# Patient Record
Sex: FEMALE | Race: WHITE | NOT HISPANIC OR LATINO | Employment: PART TIME | URBAN - METROPOLITAN AREA
[De-identification: names, ages, dates, MRNs, and addresses within clinical notes are randomized per-mention and may not be internally consistent; named-entity substitution may affect disease eponyms.]

---

## 2017-01-02 ENCOUNTER — MYC MEDICAL ADVICE (OUTPATIENT)
Dept: ORTHOPEDICS | Facility: CLINIC | Age: 22
End: 2017-01-02

## 2017-01-02 NOTE — TELEPHONE ENCOUNTER
Attempted to call patient, her phone number got disconnected.     Called patient's mom, who helped schedule patient with Dr. Coyle on Wednesday January 4th. Patient will callback to reschedule if the appointment time doesn't work for her.

## 2017-01-02 NOTE — TELEPHONE ENCOUNTER
----- Message from Edmond Smith MD sent at 1/2/2017 10:51 AM CST -----  Please help Mamie make an appt with [facility]--Sonny or Leonides, rizwana available. L knee pain hx of CF and meniscal tear and menisecal repair. Small meniscal tear. Unclear if pain is intra-articular.    Edmond Smith MD CAQ

## 2017-01-05 ENCOUNTER — RECORDS - HEALTHEAST (OUTPATIENT)
Dept: ADMINISTRATIVE | Facility: OTHER | Age: 22
End: 2017-01-05

## 2017-01-05 ENCOUNTER — OFFICE VISIT (OUTPATIENT)
Dept: OTOLARYNGOLOGY | Facility: CLINIC | Age: 22
End: 2017-01-05

## 2017-01-05 DIAGNOSIS — J32.0 CHRONIC MAXILLARY SINUSITIS: Primary | ICD-10-CM

## 2017-01-05 ASSESSMENT — PAIN SCALES - GENERAL: PAINLEVEL: MILD PAIN (2)

## 2017-01-05 NOTE — PATIENT INSTRUCTIONS
Plan of care:  Follow up with Dr Haile as needed  Clinic contact information:  1. To schedule an appointment call [phone], option 1  2. To talk to the Triage RN call [phone], option 3  3. If you need to speak to Ellie or get a message to your doctor on a Friday, call the triage RN  4. EllieRN: [phone]  5. Surgery scheduling:      Mahi Canela: [phone]      Brisa Aparicio: [phone]  6. Fax: [phone]

## 2017-01-05 NOTE — NURSING NOTE
Chief Complaint   Patient presents with     RECHECK     chronic maxillary sinus     Andie March Medical Assistant

## 2017-01-05 NOTE — Clinical Note
1/5/2017       RE: Mamie Rice  [address] [facility] [address]     Dear Colleague,    Thank you for referring your patient, Mamie Rice, to the [facility] EAR [facility]AT at [facility]. Please see a copy of my visit note below.    HISTORY OF PRESENT ILLNESS:  Mamie Rice returns.  It has been several weeks since I have seen her.  She is developing some nasal congestion and pressure.          PHYSICAL EXAMINATION:  Her nose is examined.  On anterior rhinoscopy, she has dryness throughout the middle meatus and nasal cavities.  No purulence or crusting is noted.  She responds well to  meropenem instillations.      PROCEDURE NOTE:  Therefore, meropenem mixed with saline is then sprayed into the maxillary antrum on both sides under visualization with the rigid endoscope.  She tolerates the procedure very well.      PLAN:  She will return in four weeks for repeat.         Again, thank you for allowing me to participate in the care of your patient.      Sincerely,    Mariela Haile MD

## 2017-01-06 NOTE — PROGRESS NOTES
HISTORY OF PRESENT ILLNESS:  Mamie Rice returns.  It has been several weeks since I have seen her.  She is developing some nasal congestion and pressure.          PHYSICAL EXAMINATION:  Her nose is examined.  On anterior rhinoscopy, she has dryness throughout the middle meatus and nasal cavities.  No purulence or crusting is noted.  She responds well to  meropenem instillations.      PROCEDURE NOTE:  Therefore, meropenem mixed with saline is then sprayed into the maxillary antrum on both sides under visualization with the rigid endoscope.  She tolerates the procedure very well.      PLAN:  She will return in four weeks for repeat.

## 2017-01-11 ENCOUNTER — RECORDS - HEALTHEAST (OUTPATIENT)
Dept: ADMINISTRATIVE | Facility: OTHER | Age: 22
End: 2017-01-11

## 2017-01-11 ENCOUNTER — OFFICE VISIT (OUTPATIENT)
Dept: ORTHOPEDICS | Facility: CLINIC | Age: 22
End: 2017-01-11

## 2017-01-11 VITALS — BODY MASS INDEX: 34.36 KG/M2 | HEIGHT: 61 IN | WEIGHT: 182 LBS

## 2017-01-11 DIAGNOSIS — S83.209A MENISCUS TEAR: Primary | ICD-10-CM

## 2017-01-11 NOTE — NURSING NOTE
Reason For Visit:   Chief Complaint   Patient presents with     Knee left       ?  No  Occupation works in  as paraprofessional and coaches dance.  Currently working? Yes.  Work status?  Part-time.  Date of injury: none  Type of injury: no injury.  Date of surgery: none  Type of surgery: none.  Smoker: No  Request smoking cessation information: No    Pain Assessment  Patient Currently in Pain: Yes  Primary Pain Location: Knee  Pain Orientation: Left

## 2017-01-11 NOTE — Clinical Note
1/11/2017      RE: Mamie Rice  [address]   [place] [facility] [address]       Orthopaedic Surgery PGY-5Consultation  1/11/2017 9:56 AM   by He Smallwood MD    Mamie Rice MRN# [id number]   Age: 21 year old YOB: 1995     Reason for consult: L knee pain       Requesting physician: Edmond Ruiz               Assessment and Plan:   Assessment:   L knee medial meniscus tear on MRI  L knee pain in setting of CF and previous MMT and MMR      Plan:   Discussed treatment options, risks, benefits, and alternatives.  Specifically discussed risks of nerve or vessel damage, infection, bleeding, and need for removal of hardware. Also discussed risks of anesthesia including heart and lung damage and even death.    - At this time, patient would like to proceed with arthroscopy and potential revision repair meniscus vs partial menisectomy              History of Present Illness:   21 year old female with 12mo of L knee pain after no acute injury or instigating event.  Has tried to treat her pain with corticosteroid injection, therapy, bracing, and rest with limited success.  Pain is the symptom, no problems with instability.  Past medial and surgical history significant for cystic fibrosis, DM1, hip arthroscopy, and previous L knee medial meniscus tear and subsequent surgical repair.  Her B12 supplementation has resulted in greatly improved sensorium in her extremities.    Relevant Surgeries Include:  2006 - L MMR by Dr. Dodge  2009 - L knee scar tissue excision by Dr. Dodge          Past Medical History:     Patient Active Problem List   Diagnosis     Hip joint pain     Aspergillosis (H)     Chronic maxillary sinusitis     Hypoglycemia     Type 1 diabetes mellitus (H)     Cystic fibrosis with pulmonary manifestations (H)     Chronic constipation     Frontal sinusitis     Major depression     ADHD (attention deficit hyperactivity disorder)     Back pain     Pancreatic insufficiency     Non morbid  obesity due to excess calories     Acne vulgaris     Left knee pain     Cystic fibrosis (H)     Insomnia     Major depressive disorder with single episode     Past Medical History   Diagnosis Date     Cystic fibrosis without mention of meconium ileus      SWEAT TEST:Date: 2/17/1994   [facility]  296 mg, 104 mmol/L ClSample #2  295 mg, 104 mmol/L Cl GENOTYPING:Date: 10/15/2007,  Laboratory: [facility]: [id number]     Chronic sinusitis      Aspergillosis, with pneumonia (H)      fugus found caused chest pain     Exocrine pancreatic insufficiency      Constipation, chronic      MRSA (methicillin resistant Staphylococcus aureus) carrier      Cystic fibrosis with pulmonary manifestations (H) 12/19/2011     Pancreatic disease      Gastro-oesophageal reflux disease      Hip pain, right      Dysthymic disorder      ADHD (attention deficit hyperactivity disorder)      Diabetes      no meds currently     Chronic infection      CF, MRSA.,             Past Surgical History:   Relevant surgical history as mentioned in HPI.  Past Surgical History   Procedure Laterality Date     Bronchoscopies       Meniscus repair       Bronchoscopy       Sinus surgery       Left hip labral tear  5/11/2011     left hip arthroscopy with labral debridement and synovectomy     Optical tracking system endoscopic sinus surgery  10/14/2011     Procedure:OPTICAL TRACKING SYSTEM ENDOSCOPIC SINUS SURGERY; FESS (functional endoscopic sinus surgery) with Image Guidance, bronchial lavage and cultures; Surgeon:JUAN JOSE GARCIA; Location:[facility]     Optical tracking system endoscopic sinus surgery  5/18/2012     Procedure:OPTICAL TRACKING SYSTEM ENDOSCOPIC SINUS SURGERY; Right  and Left Image Guided Functional Endoscopic Sinus Surgery With  Frontal Approach, [employer]; Surgeon:JUAN JOSE GARCIA; Location:[facility]     Optical tracking system endoscopic sinus surgery  9/26/2012     Procedure: OPTICAL TRACKING SYSTEM ENDOSCOPIC SINUS SURGERY;  Stealth  Guided Bilateral Functional Endoscopic Sinus Surgery *Latex Safe*;  Surgeon: Goyo Kuo MD;  [facility]     Hc knee scope, diagnostic       Arthroscopy, Knee- left     Orthopedic surgery       left hip tear repair 2010     Arthroscopy hip, osteoplasty femur proximal, combined  3/11/2013     Procedure: COMBINED ARTHROSCOPY HIP, OSTEOPLASTY FEMUR PROXIMAL;  Right Hip Arthroscopy, Labral  Debridement.    surgeon request choice anesthesia/admit to [facility] after surgery;  Surgeon: Omkar Austin MD;  Location: [facility]     Exam under anesthesia, restorations, extraction(s) dental complex, combined  5/13/2013     Procedure: COMBINED EXAM UNDER ANESTHESIA, RESTORATIONS, EXTRACTION(S) DENTAL COMPLEX;  Dental Exam, Radiographs, Restorations. Single Extraction  Tooth #2. Restorations x 3;  Surgeon: Danilo rOtiz DDS;  Location: [facility] OR     Optical tracking system endoscopic sinus surgery Bilateral 10/16/2015     Procedure: OPTICAL TRACKING SYSTEM ENDOSCOPIC SINUS SURGERY;  Surgeon: Mariela Haile MD;  [facility]     Exam under anesthesia anus N/A 5/10/2016     Procedure: EXAM UNDER ANESTHESIA ANUS;  Surgeon: Chet Gaviria MD;  [facility]     Inject botox N/A 5/10/2016     Procedure: INJECT BOTOX;  Surgeon: Chet Gaviria MD;  [facility]              Social History:     Social History     Social History     Marital Status: Single     Spouse Name: N/A     Number of Children: N/A     Years of Education: N/A     Occupational History     Not on file.     Social History Main Topics     Smoking status: Never Smoker      Smokeless tobacco: Never Used      Comment: one person at home smokes outside     Alcohol Use: No     Drug Use: No     Sexual Activity: No     Other Topics Concern     Blood Transfusions No     Exercise Yes     Social History Narrative    Mamie lives with mother in [place], [place].  There is a cat in the home, but Mamie does not have any litterbox duties.  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  She gets essentially no exercise because of the tingling in her feet (says it bothers her even to stand).        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        8/2015 no change in social situation        2/15/2016 Pt is single and lives with mother and stepfather.     No Smoking, No EtOH, works at [employer]       Family History:     Family History   Problem Relation Age of Onset     Anesthesia Reaction No family hx of      Blood Disease No family hx of      Colon Polyps No family hx of      Crohn Disease No family hx of      Ulcerative Colitis No family hx of      Colon Cancer No family hx of      Melanoma No family hx of      Other Cancer Paternal Grandmother      Skin     CANCER Paternal Grandmother      Skin Cancer Paternal Grandmother      CANCER Paternal Grandfather      PGF had throat cancer (he was a smoker)     No history of problems with bleeding or anesthesia       Allergies:     Allergies   Allergen Reactions     Vancomycin Hives     Redmens skin rash      Vancomycin Rash          Medications:     Current Outpatient Prescriptions   Medication Sig     ciprofloxacin (CIPRO) 750 MG tablet Take 1 tablet (750 mg) by mouth 2 times daily     ascorbic acid (VITAMIN C) 500 MG tablet TAKE ONE TABLET BY MOUTH TWICE A DAY     montelukast (SINGULAIR) 10 MG tablet TAKE ONE TABLET BY MOUTH ONCE DAILY AT BEDTIME     VITAMIN D3 1000 UNITS tablet TAKE ONE TABLET BY MOUTH EVERY DAY     clindamycin (CLEOCIN T) 1 % lotion Apply topically every morning After washing face with benzoyl peroxide wash     adapalene (DIFFERIN) 0.1 % cream Apply topically At Bedtime After washing face     LORATADINE PO Take 10 mg by mouth     phentermine 15 MG capsule Take 1 capsule (15 mg) by mouth every morning     albuterol (2.5 MG/3ML) 0.083% nebulizer solution Take 1 vial (2.5 mg) by nebulization 4 times daily      omeprazole (PRILOSEC) 20 MG capsule TAKE 1 CAPSULE BY MOUTH TWICE A DAY     vitamin E 400 UNIT capsule TAKE 1 CAPSULE BY MOUTH TWICE A DAY     azithromycin (ZITHROMAX) 500 MG tablet Take 1 tablet (500 mg) by mouth daily M-W-F     beta carotene 63906 UNIT capsule Take 1 capsule (25,000 Units) by mouth Every Mon, Wed, Fri Morning     methylcellulose, laxative, (CITRUCEL) POWD Start with 1 heaping tablespoon. Increase as needed, 1 heaping tablespoon at a time, up to 3 times per day.     GLYCOPYRROLATE PO Take 1 mg by mouth 2 times daily      TRAZODONE HCL PO Take 100 mg by mouth At Bedtime      phytonadione (MEPHYTON) 5 MG tablet Take 1 tablet (5 mg) by mouth once a week     acetylcysteine (MUCOMYST) 20 % nebulizer solution Nebulize 4 mLs into the lungs 2 times daily. May increase to 3-4 times daily with increased cough/cold symptoms     cyanocobalamin (VITAMIN B12) 1000 MCG/ML injection Inject 1 mL into the muscle every 30 days      blood glucose (ACCU-CHEK SMARTVIEW) test strip Use to test blood sugar 4 times daily or as directed.     albuterol (PROAIR HFA, PROVENTIL HFA, VENTOLIN HFA) 108 (90 BASE) MCG/ACT inhaler Inhale 2 puffs into the lungs every 6 hours as needed for shortness of breath / dyspnea or wheezing     meropenem (MERREM) 500 MG injection 500 mg by Nasal Instillation route as needed      FLUoxetine (PROZAC) 40 MG capsule Take 80 mg by mouth daily.     Amphetamine-Dextroamphetamine (ADDERALL PO) Take 20 mg by mouth daily.     Leuprolide Acetate (LUPRON DEPOT IM) Inject  into the muscle every 3 months.     Multiple Vitamin (MULTIVITAMIN OR) Take 1 tablet by mouth daily.     No current facility-administered medications for this visit.     Facility-Administered Medications Ordered in Other Visits   Medication     albuterol (PROAIR HFA, PROVENTIL HFA, VENTOLIN HFA) inhaler             Review of Systems:   A comprehensive 10 point review of systems (constitutional, ENT, cardiac, peripheral vascular,  lymphatic, respiratory, GI, , Musculoskeletal, skin, Neurological) was performed and found to be negative except as described in this note.           Physical Exam:     EXAMINATION pertinent findings:   VITAL SIGNS: There were no vitals taken for this visit.  There is no weight on file to calculate BMI.  RESP: non labored breathing   CARD: comfortable, no acute distress  ABD: benign   LLE:       trace effusion       ROM 0 to 120       Stable to anterior, posterior, varus, valgus stress, 1A Lachmans       Tender to palpation at medial jointline, less tender at lateral jointline       Wounds healed from previous surgery       Sens: SILT dp/sp/s/s/t       Motor: fires EHL/FHL/TA/GSc       Vasc: wwp          Data:   All imaging studies reviewed by me.  Pertinent Imaging and Lab Review: increased ACL signal intensity with ACL intact.  Posterior horn medial meniscus tear, potential site of previous repair.      Patient seen and discussed with Dr. Leonides Smallwood MD  Orthopaedic Surgery PGY-5  [phone]           Patient seen and examined. Agree with history, physical and imaging as well as Assessment and Plan as detailed by Dr. Smallwood.    Assesment: meniscus tear following meniscus repair, 10 years prior, acl cyst. CF.    Plan: failed nonop management with attempts at injections, PT, time    Offered EUA. Arthroscopy, acl cyst decompression, partial meniscetomy    I discussed with the patient the risks, benefits, complications and techniques of surgery as well as the natural history of meniscus tars and the alternative treatment options.    The risks include, but are not limited to the risk of death and risk of a myocardial infarction, risk of bleeding and a risk of infection, risk of nerve damage and a risk of muscle damage, stiffness, instability, continued pain or worsening pain and retear, continued pain, failure to improve, need for future surgery.    The patient was provided an opportunity to ask  questions and these were answered.      I was present with the resident during the history and exam.  I discussed the case with the resident and agree with the findings as documented in the assessment and plan.        Jethro Coyel MD

## 2017-01-11 NOTE — PROGRESS NOTES
Orthopaedic Surgery PGY-5Consultation  1/11/2017 9:56 AM   by He Smallwood MD    Mamie Rice MRN# [id number]   Age: 21 year old YOB: 1995     Reason for consult: L knee pain       Requesting physician: Edmond Ruiz               Assessment and Plan:   Assessment:   L knee medial meniscus tear on MRI  L knee pain in setting of CF and previous MMT and MMR      Plan:   Discussed treatment options, risks, benefits, and alternatives.  Specifically discussed risks of nerve or vessel damage, infection, bleeding, and need for removal of hardware. Also discussed risks of anesthesia including heart and lung damage and even death.    - At this time, patient would like to proceed with arthroscopy and potential revision repair meniscus vs partial menisectomy              History of Present Illness:   21 year old female with 12mo of L knee pain after no acute injury or instigating event.  Has tried to treat her pain with corticosteroid injection, therapy, bracing, and rest with limited success.  Pain is the symptom, no problems with instability.  Past medial and surgical history significant for cystic fibrosis, DM1, hip arthroscopy, and previous L knee medial meniscus tear and subsequent surgical repair.  Her B12 supplementation has resulted in greatly improved sensorium in her extremities.    Relevant Surgeries Include:  2006 - L MMR by Dr. Dodge  2009 - L knee scar tissue excision by Dr. Dodge          Past Medical History:     Patient Active Problem List   Diagnosis     Hip joint pain     Aspergillosis (H)     Chronic maxillary sinusitis     Hypoglycemia     Type 1 diabetes mellitus (H)     Cystic fibrosis with pulmonary manifestations (H)     Chronic constipation     Frontal sinusitis     Major depression     ADHD (attention deficit hyperactivity disorder)     Back pain     Pancreatic insufficiency     Non morbid obesity due to excess calories     Acne vulgaris     Left knee pain     Cystic fibrosis  (H)     Insomnia     Major depressive disorder with single episode     Past Medical History   Diagnosis Date     Cystic fibrosis without mention of meconium ileus      SWEAT TEST:Date: 2/17/1994   [facility]  296 [facility], 104 mmol/L ClSample #2  295 mg, 104 mmol/L Cl GENOTYPING:Date: 10/15/2007,  Laboratory: [facility]: [id number]     Chronic sinusitis      Aspergillosis, with pneumonia (H)      fugus found caused chest pain     Exocrine pancreatic insufficiency      Constipation, chronic      MRSA (methicillin resistant Staphylococcus aureus) carrier      Cystic fibrosis with pulmonary manifestations (H) 12/19/2011     Pancreatic disease      Gastro-oesophageal reflux disease      Hip pain, right      Dysthymic disorder      ADHD (attention deficit hyperactivity disorder)      Diabetes      no meds currently     Chronic infection      CF, MRSA.,             Past Surgical History:   Relevant surgical history as mentioned in HPI.  Past Surgical History   Procedure Laterality Date     Bronchoscopies       Meniscus repair       Bronchoscopy       Sinus surgery       Left hip labral tear  5/11/2011     left hip arthroscopy with labral debridement and synovectomy     Optical tracking system endoscopic sinus surgery  10/14/2011     Procedure:OPTICAL TRACKING SYSTEM ENDOSCOPIC SINUS SURGERY; FESS (functional endoscopic sinus surgery) with Image Guidance, bronchial lavage and cultures; Surgeon:JUAN JOSE GARCIA; Location:[facility]     Optical tracking system endoscopic sinus surgery  5/18/2012     Procedure:OPTICAL TRACKING SYSTEM ENDOSCOPIC SINUS SURGERY; Right  and Left Image Guided Functional Endoscopic Sinus Surgery With  Frontal Approach, [employer]; Surgeon:JUAN JOSE GARCIA; Location:[facility]     Optical tracking system endoscopic sinus surgery  9/26/2012     Procedure: OPTICAL TRACKING SYSTEM ENDOSCOPIC SINUS SURGERY;  Stealth Guided Bilateral Functional Endoscopic Sinus Surgery *[employer] Safe*;  Surgeon: Ayaan  MD Goyo;  Location: [facility]     Hc knee scope, diagnostic       Arthroscopy, Knee- left     Orthopedic surgery       left hip tear repair 2010     Arthroscopy hip, osteoplasty femur proximal, combined  3/11/2013     Procedure: COMBINED ARTHROSCOPY HIP, OSTEOPLASTY FEMUR PROXIMAL;  Right Hip Arthroscopy, Labral  Debridement.    surgeon request choice anesthesia/admit to [facility] after surgery;  Surgeon: Omkar Austin MD;  Location: [facility]     Exam under anesthesia, restorations, extraction(s) dental complex, combined  5/13/2013     Procedure: COMBINED EXAM UNDER ANESTHESIA, RESTORATIONS, EXTRACTION(S) DENTAL COMPLEX;  Dental Exam, Radiographs, Restorations. Single Extraction  Tooth #2. Restorations x 3;  Surgeon: Danilo Ortiz DDS;  Location: [facility]     Optical tracking system endoscopic sinus surgery Bilateral 10/16/2015     Procedure: OPTICAL TRACKING SYSTEM ENDOSCOPIC SINUS SURGERY;  Surgeon: Mariela Haile MD;  Location: [facility]     Exam under anesthesia anus N/A 5/10/2016     Procedure: EXAM UNDER ANESTHESIA ANUS;  Surgeon: Chet Gaviria MD;  [facility]: [facility]     Inject botox N/A 5/10/2016     Procedure: INJECT BOTOX;  Surgeon: Chet Gaviria MD;  Location: [facility]              Social History:     Social History     Social History     Marital Status: Single     Spouse Name: N/A     Number of Children: N/A     Years of Education: N/A     Occupational History     Not on file.     Social History Main Topics     Smoking status: Never Smoker      Smokeless tobacco: Never Used      Comment: one person at home smokes outside     Alcohol Use: No     Drug Use: No     Sexual Activity: No     Other Topics Concern     Blood Transfusions No     Exercise Yes     Social History Narrative    Mamie lives with mother in [place], [place].  There is a cat in the home, but Mamie does not have any litterbox duties.  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  She gets essentially no exercise because  of the tingling in her feet (says it bothers her even to stand).        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        8/2015 no change in social situation        2/15/2016 Pt is single and lives with mother and stepfather.     No Smoking, No EtOH, works at [employer]       Family History:     Family History   Problem Relation Age of Onset     Anesthesia Reaction No family hx of      Blood Disease No family hx of      Colon Polyps No family hx of      Crohn Disease No family hx of      Ulcerative Colitis No family hx of      Colon Cancer No family hx of      Melanoma No family hx of      Other Cancer Paternal Grandmother      Skin     CANCER Paternal Grandmother      Skin Cancer Paternal Grandmother      CANCER Paternal Grandfather      PGF had throat cancer (he was a smoker)     No history of problems with bleeding or anesthesia       Allergies:     Allergies   Allergen Reactions     Vancomycin Hives     Redmens skin rash      Vancomycin Rash          Medications:     Current Outpatient Prescriptions   Medication Sig     ciprofloxacin (CIPRO) 750 MG tablet Take 1 tablet (750 mg) by mouth 2 times daily     ascorbic acid (VITAMIN C) 500 MG tablet TAKE ONE TABLET BY MOUTH TWICE A DAY     montelukast (SINGULAIR) 10 MG tablet TAKE ONE TABLET BY MOUTH ONCE DAILY AT BEDTIME     VITAMIN D3 1000 UNITS tablet TAKE ONE TABLET BY MOUTH EVERY DAY     clindamycin (CLEOCIN T) 1 % lotion Apply topically every morning After washing face with benzoyl peroxide wash     adapalene (DIFFERIN) 0.1 % cream Apply topically At Bedtime After washing face     LORATADINE PO Take 10 mg by mouth     phentermine 15 MG capsule Take 1 capsule (15 mg) by mouth every morning     albuterol (2.5 MG/3ML) 0.083% nebulizer solution Take 1 vial (2.5 mg) by nebulization 4 times daily     omeprazole (PRILOSEC) 20 MG capsule TAKE 1 CAPSULE BY MOUTH TWICE A DAY     vitamin E  400 UNIT capsule TAKE 1 CAPSULE BY MOUTH TWICE A DAY     azithromycin (ZITHROMAX) 500 MG tablet Take 1 tablet (500 mg) by mouth daily M-W-F     beta carotene 35254 UNIT capsule Take 1 capsule (25,000 Units) by mouth Every Mon, Wed, Fri Morning     methylcellulose, laxative, (CITRUCEL) POWD Start with 1 heaping tablespoon. Increase as needed, 1 heaping tablespoon at a time, up to 3 times per day.     GLYCOPYRROLATE PO Take 1 mg by mouth 2 times daily      TRAZODONE HCL PO Take 100 mg by mouth At Bedtime      phytonadione (MEPHYTON) 5 MG tablet Take 1 tablet (5 mg) by mouth once a week     acetylcysteine (MUCOMYST) 20 % nebulizer solution Nebulize 4 mLs into the lungs 2 times daily. May increase to 3-4 times daily with increased cough/cold symptoms     cyanocobalamin (VITAMIN B12) 1000 MCG/ML injection Inject 1 mL into the muscle every 30 days      blood glucose (ACCU-CHEK SMARTVIEW) test strip Use to test blood sugar 4 times daily or as directed.     albuterol (PROAIR HFA, PROVENTIL HFA, VENTOLIN HFA) 108 (90 BASE) MCG/ACT inhaler Inhale 2 puffs into the lungs every 6 hours as needed for shortness of breath / dyspnea or wheezing     meropenem (MERREM) 500 MG injection 500 mg by Nasal Instillation route as needed      FLUoxetine (PROZAC) 40 MG capsule Take 80 mg by mouth daily.     Amphetamine-Dextroamphetamine (ADDERALL PO) Take 20 mg by mouth daily.     Leuprolide Acetate (LUPRON DEPOT IM) Inject  into the muscle every 3 months.     Multiple Vitamin (MULTIVITAMIN OR) Take 1 tablet by mouth daily.     No current facility-administered medications for this visit.     Facility-Administered Medications Ordered in Other Visits   Medication     albuterol (PROAIR HFA, PROVENTIL HFA, VENTOLIN HFA) inhaler             Review of Systems:   A comprehensive 10 point review of systems (constitutional, ENT, cardiac, peripheral vascular, lymphatic, respiratory, GI, , Musculoskeletal, skin, Neurological) was performed and found to  be negative except as described in this note.           Physical Exam:     EXAMINATION pertinent findings:   VITAL SIGNS: There were no vitals taken for this visit.  There is no weight on file to calculate BMI.  RESP: non labored breathing   CARD: comfortable, no acute distress  ABD: benign   LLE:       trace effusion       ROM 0 to 120       Stable to anterior, posterior, varus, valgus stress, 1A Lachmans       Tender to palpation at medial jointline, less tender at lateral jointline       Wounds healed from previous surgery       Sens: SILT dp/sp/s/s/t       Motor: fires EHL/FHL/TA/GSc       Vasc: wwp          Data:   All imaging studies reviewed by me.  Pertinent Imaging and Lab Review: increased ACL signal intensity with ACL intact.  Posterior horn medial meniscus tear, potential site of previous repair.      Patient seen and discussed with Dr. Leonides Smallwood MD  Orthopaedic Surgery PGY-5  [phone]

## 2017-01-11 NOTE — NURSING NOTE
Teaching Flowsheet   Relevant Diagnosis: left knee EUA, arthroscopy, partial meniscectomy   Teaching Topic: pre and post operative care     Person(s) involved in teaching:   Patient     Motivation Level:  Asks Questions: Yes  Eager to Learn: Yes  Cooperative: Yes  Receptive (willing/able to accept information): Yes  Any cultural factors/Islam beliefs that may influence understanding or compliance? NA  Comments: NA     Patient demonstrates understanding of the following:  Reason for the appointment, diagnosis and treatment plan: Yes  Knowledge of proper use of medications and conditions for which they are ordered (with special attention to potential side effects or drug interactions): Yes  Which situations necessitate calling provider and whom to contact: Yes     Teaching Concerns Addressed:   Comments: pre and post operative care     Proper use and care of crutches/brace (medical equip, care aids, etc.): NA  Nutritional needs and diet plan: Yes  Pain management techniques: NA  Wound Care: Yes  How and/when to access community resources: NA     Instructional Materials Used/Given: verbal and written instruction given    *patient has CF- will communicate with her physician that manages to be cleared in addition to her PCP.  *patient has history of MRSA in lungs- does not remember when or how treated, has had negative tests since. Dr. Coyle said ok with no issue  *patient has diabetes- well controlled, does not take insulin. Will check blood sugar and will talk to PCP about surgery and needs to manage.

## 2017-01-11 NOTE — PROGRESS NOTES
Patient seen and examined. Agree with history, physical and imaging as well as Assessment and Plan as detailed by Dr. Smallwood.    Assesment: meniscus tear following meniscus repair, 10 years prior, acl cyst. CF.    Plan: failed nonop management with attempts at injections, PT, time    Offered EUA. Arthroscopy, acl cyst decompression, partial meniscetomy    I discussed with the patient the risks, benefits, complications and techniques of surgery as well as the natural history of meniscus tars and the alternative treatment options.    The risks include, but are not limited to the risk of death and risk of a myocardial infarction, risk of bleeding and a risk of infection, risk of nerve damage and a risk of muscle damage, stiffness, instability, continued pain or worsening pain and retear, continued pain, failure to improve, need for future surgery.    The patient was provided an opportunity to ask questions and these were answered.      I was present with the resident during the history and exam.  I discussed the case with the resident and agree with the findings as documented in the assessment and plan.

## 2017-01-12 ENCOUNTER — AMBULATORY - HEALTHEAST (OUTPATIENT)
Dept: HEALTH INFORMATION MANAGEMENT | Facility: CLINIC | Age: 22
End: 2017-01-12

## 2017-01-19 ENCOUNTER — CARE COORDINATION (OUTPATIENT)
Dept: PULMONOLOGY | Facility: CLINIC | Age: 22
End: 2017-01-19

## 2017-01-19 NOTE — PROGRESS NOTES
Pt called per Dr. Allison request to remind her to do 4 vest therapies for 1-2 days before her arthroscopic knee surgery 1/31/17. Advised to call the CF office if she needs anything or has any questions.

## 2017-01-22 ASSESSMENT — ENCOUNTER SYMPTOMS
MYALGIAS: 1
NECK PAIN: 0
DECREASED CONCENTRATION: 1
SINUS PAIN: 1
NAIL CHANGES: 0
SORE THROAT: 1
BACK PAIN: 0
NERVOUS/ANXIOUS: 1
SKIN CHANGES: 0
SINUS CONGESTION: 1
STIFFNESS: 0
DEPRESSION: 1
PANIC: 0
ARTHRALGIAS: 1
NECK MASS: 0
TROUBLE SWALLOWING: 0
POOR WOUND HEALING: 0
SMELL DISTURBANCE: 0
MUSCLE WEAKNESS: 0
JOINT SWELLING: 0
INSOMNIA: 1
TASTE DISTURBANCE: 1
MUSCLE CRAMPS: 0
HOARSE VOICE: 1

## 2017-01-23 ENCOUNTER — OFFICE VISIT (OUTPATIENT)
Dept: ENDOCRINOLOGY | Facility: CLINIC | Age: 22
End: 2017-01-23

## 2017-01-23 ENCOUNTER — OFFICE VISIT (OUTPATIENT)
Dept: DERMATOLOGY | Facility: CLINIC | Age: 22
End: 2017-01-23

## 2017-01-23 VITALS
SYSTOLIC BLOOD PRESSURE: 124 MMHG | OXYGEN SATURATION: 98 % | WEIGHT: 181.6 LBS | HEIGHT: 61 IN | HEART RATE: 100 BPM | BODY MASS INDEX: 34.29 KG/M2 | DIASTOLIC BLOOD PRESSURE: 81 MMHG

## 2017-01-23 DIAGNOSIS — E66.09 NON MORBID OBESITY DUE TO EXCESS CALORIES: Primary | ICD-10-CM

## 2017-01-23 DIAGNOSIS — L70.0 ACNE VULGARIS: Primary | ICD-10-CM

## 2017-01-23 RX ORDER — BUPROPION HYDROCHLORIDE 150 MG/1
150 TABLET, EXTENDED RELEASE ORAL 2 TIMES DAILY
COMMUNITY
End: 2018-02-13

## 2017-01-23 RX ORDER — SPIRONOLACTONE 25 MG/1
25 TABLET ORAL DAILY
Qty: 60 TABLET | Refills: 3 | Status: SHIPPED | OUTPATIENT
Start: 2017-01-23 | End: 2017-08-02

## 2017-01-23 RX ORDER — PHENTERMINE HYDROCHLORIDE 15 MG/1
30 CAPSULE ORAL EVERY MORNING
Qty: 30 CAPSULE | Refills: 5 | Status: SHIPPED | OUTPATIENT
Start: 2017-01-23 | End: 2017-02-13

## 2017-01-23 ASSESSMENT — PAIN SCALES - GENERAL: PAINLEVEL: NO PAIN (0)

## 2017-01-23 NOTE — Clinical Note
1/23/2017       RE: Mamie Rice  [address] [facility] [address]     Dear Colleague,    Thank you for referring your patient, Mamie Rice, to the [facility] DERMATOLOGY at [facility]. Please see a copy of my visit note below.    [facility] Dermatology Note    Dermatology Problem List:  1. Acne vulgaris s/p benzoyl peroxide facial wash, stopped due to skin irritation  - clindamycin 1% lotion, adapalene 0.1% cream, spironolactone 25 mg - can increase to 50 mg if tolerated well  2. CF  3. DM Type II    Encounter Date: Jan 23, 2017    CC:   Chief Complaint   Patient presents with     Derm Problem     Mamie is here for acne. Mamie states her acne has not improved     History of Present Illness:  This 21 year old female presents as a follow up for acne. The patient was last seen on 10/19/16 when she started benzoyl peroxide facial wash, clindamycin 1% lotion, and adapalene 0.1% cream. Today the patient reports that the acne has not improved, but it has not worsened. She notes that the benzoyl peroxide facial wash burned her face due to her sensitive skin. She also reports that her face is pruritic. She notes that her CF is doing well and she has knee surgery next week. The patient reports no other lesions of concern at this time.     Otherwise, the patient reports no painful, bleeding, nonhealing, or pruritic lesions, and denies new or changing moles.     Past Medical History:   Patient Active Problem List   Diagnosis     Hip joint pain     Aspergillosis (H)     Chronic maxillary sinusitis     Hypoglycemia     Type 1 diabetes mellitus (H)     Cystic fibrosis with pulmonary manifestations (H)     Chronic constipation     Frontal sinusitis     Major depression     ADHD (attention deficit hyperactivity disorder)     Back pain     Pancreatic insufficiency     Non morbid obesity due to excess calories     Acne vulgaris     Left knee pain     Cystic  fibrosis (H)     Insomnia     Major depressive disorder with single episode     Past Medical History   Diagnosis Date     Cystic fibrosis without mention of meconium ileus      SWEAT TEST:Date: 2/17/1994   [facility]  296 mg, 104 mmol/L ClSample #2  295 mg, 104 mmol/L Cl GENOTYPING:Date: 10/15/2007,  Laboratory: [facility]: [id number]     Chronic sinusitis      Aspergillosis, with pneumonia (H)      fugus found caused chest pain     Exocrine pancreatic insufficiency      Constipation, chronic      MRSA (methicillin resistant Staphylococcus aureus) carrier      Cystic fibrosis with pulmonary manifestations (H) 12/19/2011     Pancreatic disease      Gastro-oesophageal reflux disease      Hip pain, right      Dysthymic disorder      ADHD (attention deficit hyperactivity disorder)      Diabetes      no meds currently     Chronic infection      CF, MRSA.,      Depressive disorder      Past Surgical History   Procedure Laterality Date     Bronchoscopies       Meniscus repair       Bronchoscopy       Sinus surgery       Left hip labral tear  5/11/2011     left hip arthroscopy with labral debridement and synovectomy     Optical tracking system endoscopic sinus surgery  10/14/2011     Procedure:OPTICAL TRACKING SYSTEM ENDOSCOPIC SINUS SURGERY; FESS (functional endoscopic sinus surgery) with Image Guidance, bronchial lavage and cultures; Surgeon:GOYO KUO; Location:[facility] OR     Optical tracking system endoscopic sinus surgery  5/18/2012     Procedure:OPTICAL TRACKING SYSTEM ENDOSCOPIC SINUS SURGERY; Right  and Left Image Guided Functional Endoscopic Sinus Surgery With  Frontal Approach, [employer]; Surgeon:GOYO KUO; Location:[facility] OR     Optical tracking system endoscopic sinus surgery  9/26/2012     Procedure: OPTICAL TRACKING SYSTEM ENDOSCOPIC SINUS SURGERY;  Stealth Guided Bilateral Functional Endoscopic Sinus Surgery *Latex Safe*;  Surgeon: Goyo Kuo MD;  [facility]      knee scope,  diagnostic       Arthroscopy, Knee- left     Orthopedic surgery       left hip tear repair 2010     Arthroscopy hip, osteoplasty femur proximal, combined  3/11/2013     Procedure: COMBINED ARTHROSCOPY HIP, OSTEOPLASTY FEMUR PROXIMAL;  Right Hip Arthroscopy, Labral  Debridement.    surgeon request choice anesthesia/admit to [facility] after surgery;  Surgeon: Omkar Austin MD;  Location: [facility] OR     Exam under anesthesia, restorations, extraction(s) dental complex, combined  5/13/2013     Procedure: COMBINED EXAM UNDER ANESTHESIA, RESTORATIONS, EXTRACTION(S) DENTAL COMPLEX;  Dental Exam, Radiographs, Restorations. Single Extraction  Tooth #2. 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 Former use of tanning beds (high school).     Family History:  There is a family history of presumed melanoma in the patient's grandmother.    Medications:  Current Outpatient Prescriptions   Medication Sig Dispense Refill     MedroxyPROGESTERone Acetate (DEPO-PROVERA IM)        buPROPion (WELLBUTRIN SR) 150 MG 12 hr tablet Take 150 mg by mouth 2 times daily       phentermine 15 MG capsule Take 2 capsules (30 mg) by mouth every morning 30 capsule 5     spironolactone (ALDACTONE) 25 MG tablet Take 1 tablet (25 mg) by mouth daily 60 tablet 3     ciprofloxacin (CIPRO) 750 MG tablet Take 1 tablet (750 mg) by mouth 2 times daily 42 tablet 0     ascorbic acid (VITAMIN C) 500 MG tablet TAKE  ONE TABLET BY MOUTH TWICE A  tablet 3     montelukast (SINGULAIR) 10 MG tablet TAKE ONE TABLET BY MOUTH ONCE DAILY AT BEDTIME 30 tablet 11     VITAMIN D3 1000 UNITS tablet TAKE ONE TABLET BY MOUTH EVERY DAY 30 tablet 11     clindamycin (CLEOCIN T) 1 % lotion Apply topically every morning After washing face with benzoyl peroxide wash 60 mL 11     adapalene (DIFFERIN) 0.1 % cream Apply topically At Bedtime After washing face 45 g 11     LORATADINE PO Take 10 mg by mouth       albuterol (2.5 MG/3ML) 0.083% nebulizer solution Take 1 vial (2.5 mg) by nebulization 4 times daily 120 vial 11     omeprazole (PRILOSEC) 20 MG capsule TAKE 1 CAPSULE BY MOUTH TWICE A DAY 60 capsule 11     vitamin E 400 UNIT capsule TAKE 1 CAPSULE BY MOUTH TWICE A DAY 60 capsule 11     azithromycin (ZITHROMAX) 500 MG tablet Take 1 tablet (500 mg) by mouth daily M-W-F 36 tablet 4     beta carotene 90058 UNIT capsule Take 1 capsule (25,000 Units) by mouth Every Mon, Wed, Fri Morning 36 capsule 4     methylcellulose, laxative, (CITRUCEL) POWD Start with 1 heaping tablespoon. Increase as needed, 1 heaping tablespoon at a time, up to 3 times per day. 479 g 3     GLYCOPYRROLATE PO Take 1 mg by mouth 2 times daily        TRAZODONE HCL PO Take 100 mg by mouth At Bedtime        phytonadione (MEPHYTON) 5 MG tablet Take 1 tablet (5 mg) by mouth once a week 4 tablet 11     acetylcysteine (MUCOMYST) 20 % nebulizer solution Nebulize 4 mLs into the lungs 2 times daily. May increase to 3-4 times daily with increased cough/cold symptoms 360 mL 11     cyanocobalamin (VITAMIN B12) 1000 MCG/ML injection Inject 1 mL into the muscle every 30 days        blood glucose (ACCU-CHEK SMARTVIEW) test strip Use to test blood sugar 4 times daily or as directed. 1 Month 12     albuterol (PROAIR HFA, PROVENTIL HFA, VENTOLIN HFA) 108 (90 BASE) MCG/ACT inhaler Inhale 2 puffs into the lungs every 6 hours as needed for shortness of breath / dyspnea or wheezing 1 Inhaler 12      meropenem (MERREM) 500 MG injection 500 mg by Nasal Instillation route as needed  4 mL 0     FLUoxetine (PROZAC) 40 MG capsule Take 80 mg by mouth daily.       Amphetamine-Dextroamphetamine (ADDERALL PO) Take 20 mg by mouth daily.       Leuprolide Acetate (LUPRON DEPOT IM) Inject  into the muscle every 3 months.       Multiple Vitamin (MULTIVITAMIN OR) Take 1 tablet by mouth daily.       Allergies   Allergen Reactions     Vancomycin Hives     Redmens skin rash      Vancomycin Rash     Review of Systems:  -As per HPI    Physical exam:   /81, pulse 100  GEN: This is a well developed, well-nourished female in no acute distress, in a pleasant mood.      SKIN: Acne exam, which includes the face, neck, upper central chest, and upper central back was performed.  - Superficial excoriated acneiform papules with intermixed open and closed comedones on the face > chest and back  - More excoriated papules on the face than previous visit  - No other lesions of concern on areas examined.     Impression/Plan:  1. Acne vulgaris, acne excoree   - Stop benzoyl peroxide facial wash due to this irritating the patient's skin.   - Start spironolactone 25 mg (1 tablet), increase to 50 mg (2 tablets) if tolerated well after one week. Discussion that it can cause breast tenderness, spotting, dizziness and to not get pregnant while on this medication. Start this medication 2 weeks after the knee surgery. Blood pressure will be checked every 3 months. The patient advised to drink lots of fluids.   -Chart review revealed recent basic metabolic panel with normal creatinine and potassium   -If responding to the medication,  But not clearing, could increase strength of spirolactone   - Discussion of accutane as a potential treatment if needed.   - Continue clindamycin 1% lotion - apply after benzoyl peroxide in the morning.  - Continue adapalene 0.1% cream - apply a thin pea-sized amount of cream to the entire face.    Follow-up in 3  months, earlier for new or changing lesions.     Staff Involved:  Scribe Disclosure:   I, Nessaalfredo Maricruz, am serving as a scribe to document services personally performed by Paige Reid, based on data collection and the provider's statements to me.    Provider Disclosure:   I agree with above History, Review of Systems, Physical exam and Plan. I have reviewed the content of the documentation and have edited it as needed. I have personally performed the services documented here and the documentation accurately represents those services and the decisions I have made.     All risks, benefits and alternatives were discussed with patient.  Patient is in agreement and understands the assessment and plan.  All questions were answered.  Sun Screen Education was given.   Return to Clinic in 3 months or sooner as needed.   Paige Reid PA-C

## 2017-01-23 NOTE — NURSING NOTE
"Chief Complaint   Patient presents with     Weight Problem     [facility]       Filed Vitals:    01/23/17 1046   BP: 124/81   Pulse: 100   Height: 5' 1\"   Weight: 181 lb 9.6 oz   SpO2: 98%       Body mass index is 34.33 kg/(m^2).    Lesia Greene CMA                            "

## 2017-01-23 NOTE — NURSING NOTE
Dermatology Rooming Note    Mamie Rice's goals for this visit include:   Chief Complaint   Patient presents with     Derm Problem     Mamie is here for acne. Mamie states her acne has not improved     Valerie Ghosh LPN

## 2017-01-23 NOTE — Clinical Note
"2017       RE: Mamie Rice  [address] [facility] [address]     Dear Colleague,    Thank you for referring your patient, Mamie Rice, to the [facility] MEDICAL WEIGHT MANAGEMENT at [facility]. Please see a copy of my visit note below.        Return Medical Weight Management Note     Mamie Rice  MRN:  [id number]  :  1995  STEPHEN:  2017    Dear Dr. De La Rosa,     I had the pleasure of seeing your patient Mamie Rice.  She is a 21 year old female who I am continuing to see for treatment of obesity related to:    I Have Reviewed The Following Co-Morbidities With The Patient 10/9/2016   I have the following co-morbidities associated with obesity: Back Pain   I have the following co-morbidities associated with obesity: Knee Pain     CURRENT WEIGHT:   181 lbs 9.6 oz    Wt Readings from Last 4 Encounters:   17 82.373 kg (181 lb 9.6 oz)   17 82.555 kg (182 lb)   16 82.555 kg (182 lb)   16 83 kg (182 lb 15.7 oz)     Height:  5' 1\"  Body Mass Index:  Body mass index is 34.33 kg/(m^2).  Vitals:  B/P: 124/81, P: 100    Initial consult weight was 182 on 10/14/2016.  Weight change since last seen on 10/14/2016 is down 1 pounds.   Total loss is 1 pounds.    INTERVAL HISTORY:  No real food change other than snacking on fruit. Activity changes are aspirational.    ROS    MEDICATIONS:   Current Outpatient Prescriptions   Medication     MedroxyPROGESTERone Acetate (DEPO-PROVERA IM)     buPROPion (WELLBUTRIN SR) 150 MG 12 hr tablet     ascorbic acid (VITAMIN C) 500 MG tablet     montelukast (SINGULAIR) 10 MG tablet     VITAMIN D3 1000 UNITS tablet     clindamycin (CLEOCIN T) 1 % lotion     adapalene (DIFFERIN) 0.1 % cream     phentermine 15 MG capsule     albuterol (2.5 MG/3ML) 0.083% nebulizer solution     omeprazole (PRILOSEC) 20 MG capsule     vitamin E 400 UNIT capsule     azithromycin (ZITHROMAX) 500 MG tablet     beta carotene 25321 UNIT " capsule     GLYCOPYRROLATE PO     TRAZODONE HCL PO     phytonadione (MEPHYTON) 5 MG tablet     acetylcysteine (MUCOMYST) 20 % nebulizer solution     meropenem (MERREM) 500 MG injection     FLUoxetine (PROZAC) 40 MG capsule     Amphetamine-Dextroamphetamine (ADDERALL PO)     Multiple Vitamin (MULTIVITAMIN OR)     ciprofloxacin (CIPRO) 750 MG tablet     LORATADINE PO     methylcellulose, laxative, (CITRUCEL) POWD     cyanocobalamin (VITAMIN B12) 1000 MCG/ML injection     blood glucose (ACCU-CHEK SMARTVIEW) test strip     albuterol (PROAIR HFA, PROVENTIL HFA, VENTOLIN HFA) 108 (90 BASE) MCG/ACT inhaler     Leuprolide Acetate (LUPRON DEPOT IM)     No current facility-administered medications for this visit.     Facility-Administered Medications Ordered in Other Visits   Medication     albuterol (PROAIR HFA, PROVENTIL HFA, VENTOLIN HFA) inhaler       Weight Loss Medication History Reviewed With Patient 1/22/2017   Which weight loss medications are you currently taking on a regular basis?  Phentermine   Are you having any side effects from the weight loss medication that we have prescribed you? No     ASSESSMENT:   Will increase phentermine in support, reinforced food plan - focus on no between meal snacking, aggressive lowering of starches and cheese    FOLLOW-UP:    12 weeks.  10/15 minutes spent on counseling and education    Sincerely,    Alec Argueta MD

## 2017-01-23 NOTE — PROGRESS NOTES
[facility] Dermatology Note    Dermatology Problem List:  1. Acne vulgaris s/p benzoyl peroxide facial wash, stopped due to skin irritation  - clindamycin 1% lotion, adapalene 0.1% cream, spironolactone 25 mg - can increase to 50 mg if tolerated well  2. CF  3. DM Type II    Encounter Date: Jan 23, 2017    CC:   Chief Complaint   Patient presents with     Derm Problem     Mamie is here for acne. Mamie states her acne has not improved     History of Present Illness:  This 21 year old female presents as a follow up for acne. The patient was last seen on 10/19/16 when she started benzoyl peroxide facial wash, clindamycin 1% lotion, and adapalene 0.1% cream. Today the patient reports that the acne has not improved, but it has not worsened. She notes that the benzoyl peroxide facial wash burned her face due to her sensitive skin. She also reports that her face is pruritic. She notes that her CF is doing well and she has knee surgery next week. The patient reports no other lesions of concern at this time.     Otherwise, the patient reports no painful, bleeding, nonhealing, or pruritic lesions, and denies new or changing moles.     Past Medical History:   Patient Active Problem List   Diagnosis     Hip joint pain     Aspergillosis (H)     Chronic maxillary sinusitis     Hypoglycemia     Type 1 diabetes mellitus (H)     Cystic fibrosis with pulmonary manifestations (H)     Chronic constipation     Frontal sinusitis     Major depression     ADHD (attention deficit hyperactivity disorder)     Back pain     Pancreatic insufficiency     Non morbid obesity due to excess calories     Acne vulgaris     Left knee pain     Cystic fibrosis (H)     Insomnia     Major depressive disorder with single episode     Past Medical History   Diagnosis Date     Cystic fibrosis without mention of meconium ileus      SWEAT TEST:Date: 2/17/1994   [facility]  296 mg, 104 mmol/L ClSample #2  295 mg, 104 mmol/L  Cl GENOTYPING:Date: 10/15/2007,  Laboratory: [facility]: [id number]     Chronic sinusitis      Aspergillosis, with pneumonia (H)      fugus found caused chest pain     Exocrine pancreatic insufficiency      Constipation, chronic      MRSA (methicillin resistant Staphylococcus aureus) carrier      Cystic fibrosis with pulmonary manifestations (H) 12/19/2011     Pancreatic disease      Gastro-oesophageal reflux disease      Hip pain, right      Dysthymic disorder      ADHD (attention deficit hyperactivity disorder)      Diabetes      no meds currently     Chronic infection      CF, MRSA.,      Depressive disorder      Past Surgical History   Procedure Laterality Date     Bronchoscopies       Meniscus repair       Bronchoscopy       Sinus surgery       Left hip labral tear  5/11/2011     left hip arthroscopy with labral debridement and synovectomy     Optical tracking system endoscopic sinus surgery  10/14/2011     Procedure:OPTICAL TRACKING SYSTEM ENDOSCOPIC SINUS SURGERY; FESS (functional endoscopic sinus surgery) with Image Guidance, bronchial lavage and cultures; Surgeon:GOYO KUO; Location:[facility] OR     Optical tracking system endoscopic sinus surgery  5/18/2012     Procedure:OPTICAL TRACKING SYSTEM ENDOSCOPIC SINUS SURGERY; Right  and Left Image Guided Functional Endoscopic Sinus Surgery With  Frontal Approach, [employer]; Surgeon:GOYO KUO; Location:[facility] OR     Optical tracking system endoscopic sinus surgery  9/26/2012     Procedure: OPTICAL TRACKING SYSTEM ENDOSCOPIC SINUS SURGERY;  Stealth Guided Bilateral Functional Endoscopic Sinus Surgery *[employer]x Safe*;  Surgeon: Goyo Kuo MD;  [facility]: [facility] OR      knee scope, diagnostic       Arthroscopy, Knee- left     Orthopedic surgery       left hip tear repair 2010     Arthroscopy hip, osteoplasty femur proximal, combined  3/11/2013     Procedure: COMBINED ARTHROSCOPY HIP, OSTEOPLASTY FEMUR PROXIMAL;  Right Hip Arthroscopy, Labral  Debridement.     surgeon request choice anesthesia/admit to [facility] after surgery;  Surgeon: Omkar Austin MD;  Location: [facility] OR     Exam under anesthesia, restorations, extraction(s) dental complex, combined  5/13/2013     Procedure: COMBINED EXAM UNDER ANESTHESIA, RESTORATIONS, EXTRACTION(S) DENTAL COMPLEX;  Dental Exam, Radiographs, Restorations. Single Extraction  Tooth #2. 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 Former use of tanning beds (high school).     Family History:  There is a family history of presumed melanoma in the patient's grandmother.    Medications:  Current Outpatient Prescriptions   Medication Sig Dispense Refill     MedroxyPROGESTERone Acetate (DEPO-PROVERA IM)        buPROPion (WELLBUTRIN SR) 150 MG 12 hr tablet Take 150 mg by mouth 2 times daily       phentermine 15 MG capsule Take 2 capsules (30 mg) by mouth every morning 30 capsule 5     spironolactone (ALDACTONE) 25 MG tablet Take 1 tablet (25 mg) by mouth daily 60 tablet 3     ciprofloxacin (CIPRO) 750 MG tablet Take 1 tablet (750 mg) by mouth 2 times daily 42 tablet 0     ascorbic acid (VITAMIN C) 500 MG tablet TAKE ONE TABLET BY MOUTH TWICE A  tablet 3     montelukast (SINGULAIR) 10 MG tablet TAKE ONE TABLET BY MOUTH ONCE DAILY AT BEDTIME 30 tablet 11     VITAMIN D3 1000 UNITS tablet TAKE ONE TABLET BY MOUTH EVERY DAY 30 tablet 11     clindamycin (CLEOCIN T) 1 % lotion Apply topically  every morning After washing face with benzoyl peroxide wash 60 mL 11     adapalene (DIFFERIN) 0.1 % cream Apply topically At Bedtime After washing face 45 g 11     LORATADINE PO Take 10 mg by mouth       albuterol (2.5 MG/3ML) 0.083% nebulizer solution Take 1 vial (2.5 mg) by nebulization 4 times daily 120 vial 11     omeprazole (PRILOSEC) 20 MG capsule TAKE 1 CAPSULE BY MOUTH TWICE A DAY 60 capsule 11     vitamin E 400 UNIT capsule TAKE 1 CAPSULE BY MOUTH TWICE A DAY 60 capsule 11     azithromycin (ZITHROMAX) 500 MG tablet Take 1 tablet (500 mg) by mouth daily M-W-F 36 tablet 4     beta carotene 45293 UNIT capsule Take 1 capsule (25,000 Units) by mouth Every Mon, Wed, Fri Morning 36 capsule 4     methylcellulose, laxative, (CITRUCEL) POWD Start with 1 heaping tablespoon. Increase as needed, 1 heaping tablespoon at a time, up to 3 times per day. 479 g 3     GLYCOPYRROLATE PO Take 1 mg by mouth 2 times daily        TRAZODONE HCL PO Take 100 mg by mouth At Bedtime        phytonadione (MEPHYTON) 5 MG tablet Take 1 tablet (5 mg) by mouth once a week 4 tablet 11     acetylcysteine (MUCOMYST) 20 % nebulizer solution Nebulize 4 mLs into the lungs 2 times daily. May increase to 3-4 times daily with increased cough/cold symptoms 360 mL 11     cyanocobalamin (VITAMIN B12) 1000 MCG/ML injection Inject 1 mL into the muscle every 30 days        blood glucose (ACCU-CHEK SMARTVIEW) test strip Use to test blood sugar 4 times daily or as directed. 1 Month 12     albuterol (PROAIR HFA, PROVENTIL HFA, VENTOLIN HFA) 108 (90 BASE) MCG/ACT inhaler Inhale 2 puffs into the lungs every 6 hours as needed for shortness of breath / dyspnea or wheezing 1 Inhaler 12     meropenem (MERREM) 500 MG injection 500 mg by Nasal Instillation route as needed  4 mL 0     FLUoxetine (PROZAC) 40 MG capsule Take 80 mg by mouth daily.       Amphetamine-Dextroamphetamine (ADDERALL PO) Take 20 mg by mouth daily.       Leuprolide Acetate (LUPRON DEPOT IM) Inject   into the muscle every 3 months.       Multiple Vitamin (MULTIVITAMIN OR) Take 1 tablet by mouth daily.       Allergies   Allergen Reactions     Vancomycin Hives     Redmens skin rash      Vancomycin Rash     Review of Systems:  -As per HPI    Physical exam:   /81, pulse 100  GEN: This is a well developed, well-nourished female in no acute distress, in a pleasant mood.      SKIN: Acne exam, which includes the face, neck, upper central chest, and upper central back was performed.  - Superficial excoriated acneiform papules with intermixed open and closed comedones on the face > chest and back  - More excoriated papules on the face than previous visit  - No other lesions of concern on areas examined.     Impression/Plan:  1. Acne vulgaris, acne excoree   - Stop benzoyl peroxide facial wash due to this irritating the patient's skin.   - Start spironolactone 25 mg (1 tablet), increase to 50 mg (2 tablets) if tolerated well after one week. Discussion that it can cause breast tenderness, spotting, dizziness and to not get pregnant while on this medication. Start this medication 2 weeks after the knee surgery. Blood pressure will be checked every 3 months. The patient advised to drink lots of fluids.   -Chart review revealed recent basic metabolic panel with normal creatinine and potassium   -If responding to the medication,  But not clearing, could increase strength of spirolactone   - Discussion of accutane as a potential treatment if needed.   - Continue clindamycin 1% lotion - apply after benzoyl peroxide in the morning.  - Continue adapalene 0.1% cream - apply a thin pea-sized amount of cream to the entire face.      Follow-up in 3 months, earlier for new or changing lesions.     Staff Involved:  Scribe Disclosure:   NICO, Lupe Alcantara, am serving as a scribe to document services personally performed by Paige Reid, based on data collection and the provider's statements to me.    Provider Disclosure:   I  agree with above History, Review of Systems, Physical exam and Plan. I have reviewed the content of the documentation and have edited it as needed. I have personally performed the services documented here and the documentation accurately represents those services and the decisions I have made.     All risks, benefits and alternatives were discussed with patient.  Patient is in agreement and understands the assessment and plan.  All questions were answered.  Sun Screen Education was given.   Return to Clinic in 3 months or sooner as needed.   Paige Reid PA-C

## 2017-01-23 NOTE — PROGRESS NOTES
"    Return Medical Weight Management Note     Mamie Rice  MRN:  [id number]  :  1995  STEPHEN:  2017    Dear Dr. De La Rosa,     I had the pleasure of seeing your patient Mamie Rice.  She is a 21 year old female who I am continuing to see for treatment of obesity related to:    I Have Reviewed The Following Co-Morbidities With The Patient 10/9/2016   I have the following co-morbidities associated with obesity: Back Pain   I have the following co-morbidities associated with obesity: Knee Pain     CURRENT WEIGHT:   181 lbs 9.6 oz    Wt Readings from Last 4 Encounters:   17 82.373 kg (181 lb 9.6 oz)   17 82.555 kg (182 lb)   16 82.555 kg (182 lb)   16 83 kg (182 lb 15.7 oz)     Height:  5' 1\"  Body Mass Index:  Body mass index is 34.33 kg/(m^2).  Vitals:  B/P: 124/81, P: 100    Initial consult weight was 182 on 10/14/2016.  Weight change since last seen on 10/14/2016 is down 1 pounds.   Total loss is 1 pounds.    INTERVAL HISTORY:  No real food change other than snacking on fruit. Activity changes are aspirational.    ROS    MEDICATIONS:   Current Outpatient Prescriptions   Medication     MedroxyPROGESTERone Acetate (DEPO-PROVERA IM)     buPROPion (WELLBUTRIN SR) 150 MG 12 hr tablet     ascorbic acid (VITAMIN C) 500 MG tablet     montelukast (SINGULAIR) 10 MG tablet     VITAMIN D3 1000 UNITS tablet     clindamycin (CLEOCIN T) 1 % lotion     adapalene (DIFFERIN) 0.1 % cream     phentermine 15 MG capsule     albuterol (2.5 MG/3ML) 0.083% nebulizer solution     omeprazole (PRILOSEC) 20 MG capsule     vitamin E 400 UNIT capsule     azithromycin (ZITHROMAX) 500 MG tablet     beta carotene 01405 UNIT capsule     GLYCOPYRROLATE PO     TRAZODONE HCL PO     phytonadione (MEPHYTON) 5 MG tablet     acetylcysteine (MUCOMYST) 20 % nebulizer solution     meropenem (MERREM) 500 MG injection     FLUoxetine (PROZAC) 40 MG capsule     Amphetamine-Dextroamphetamine (ADDERALL PO)     Multiple Vitamin " (MULTIVITAMIN OR)     ciprofloxacin (CIPRO) 750 MG tablet     LORATADINE PO     methylcellulose, laxative, (CITRUCEL) POWD     cyanocobalamin (VITAMIN B12) 1000 MCG/ML injection     blood glucose (ACCU-CHEK SMARTVIEW) test strip     albuterol (PROAIR HFA, PROVENTIL HFA, VENTOLIN HFA) 108 (90 BASE) MCG/ACT inhaler     Leuprolide Acetate (LUPRON DEPOT IM)     No current facility-administered medications for this visit.     Facility-Administered Medications Ordered in Other Visits   Medication     albuterol (PROAIR HFA, PROVENTIL HFA, VENTOLIN HFA) inhaler       Weight Loss Medication History Reviewed With Patient 1/22/2017   Which weight loss medications are you currently taking on a regular basis?  Phentermine   Are you having any side effects from the weight loss medication that we have prescribed you? No     ASSESSMENT:   Will increase phentermine in support, reinforced food plan - focus on no between meal snacking, aggressive lowering of starches and cheese    FOLLOW-UP:    12 weeks.  10/15 minutes spent on counseling and education    Sincerely,    Alec Argueta MD

## 2017-01-26 DIAGNOSIS — E84.9 CF (CYSTIC FIBROSIS) (H): Primary | ICD-10-CM

## 2017-01-26 RX ORDER — ACETYLCYSTEINE 200 MG/ML
SOLUTION ORAL; RESPIRATORY (INHALATION)
Qty: 360 ML | Refills: 11 | Status: SHIPPED
Start: 2017-01-26 | End: 2018-01-08

## 2017-01-30 ENCOUNTER — ANESTHESIA EVENT (OUTPATIENT)
Dept: SURGERY | Facility: CLINIC | Age: 22
End: 2017-01-30
Payer: COMMERCIAL

## 2017-01-31 ENCOUNTER — HOSPITAL ENCOUNTER (OUTPATIENT)
Facility: CLINIC | Age: 22
Discharge: HOME | End: 2017-01-31
Attending: ORTHOPAEDIC SURGERY | Admitting: ORTHOPAEDIC SURGERY
Payer: COMMERCIAL

## 2017-01-31 ENCOUNTER — ANESTHESIA (OUTPATIENT)
Dept: SURGERY | Facility: CLINIC | Age: 22
End: 2017-01-31
Payer: COMMERCIAL

## 2017-01-31 VITALS
BODY MASS INDEX: 34.3 KG/M2 | OXYGEN SATURATION: 97 % | DIASTOLIC BLOOD PRESSURE: 80 MMHG | HEIGHT: 61 IN | SYSTOLIC BLOOD PRESSURE: 117 MMHG | WEIGHT: 181.66 LBS | RESPIRATION RATE: 29 BRPM | HEART RATE: 82 BPM | TEMPERATURE: 98.2 F

## 2017-01-31 DIAGNOSIS — M25.562 CHRONIC PAIN OF LEFT KNEE: Primary | ICD-10-CM

## 2017-01-31 DIAGNOSIS — G89.29 CHRONIC PAIN OF LEFT KNEE: Primary | ICD-10-CM

## 2017-01-31 LAB
GLUCOSE BLDC GLUCOMTR-MCNC: 123 MG/DL (ref 70–99)
GLUCOSE SERPL-MCNC: 87 MG/DL (ref 70–99)
HCG UR QL: NEGATIVE
HGB BLD-MCNC: 15.4 G/DL (ref 11.7–15.7)

## 2017-01-31 PROCEDURE — 36415 COLL VENOUS BLD VENIPUNCTURE: CPT | Performed by: ANESTHESIOLOGY

## 2017-01-31 PROCEDURE — 27210794 ZZH OR GENERAL SUPPLY STERILE: Performed by: ORTHOPAEDIC SURGERY

## 2017-01-31 PROCEDURE — 40000170 ZZH STATISTIC PRE-PROCEDURE ASSESSMENT II: Performed by: ORTHOPAEDIC SURGERY

## 2017-01-31 PROCEDURE — 36000059 ZZH SURGERY LEVEL 3 EA 15 ADDTL MIN UMMC: Performed by: ORTHOPAEDIC SURGERY

## 2017-01-31 PROCEDURE — 37000008 ZZH ANESTHESIA TECHNICAL FEE, 1ST 30 MIN: Performed by: ORTHOPAEDIC SURGERY

## 2017-01-31 PROCEDURE — 36000057 ZZH SURGERY LEVEL 3 1ST 30 MIN - UMMC: Performed by: ORTHOPAEDIC SURGERY

## 2017-01-31 PROCEDURE — 25000125 ZZHC RX 250: Performed by: NURSE ANESTHETIST, CERTIFIED REGISTERED

## 2017-01-31 PROCEDURE — 25000125 ZZHC RX 250: Performed by: ORTHOPAEDIC SURGERY

## 2017-01-31 PROCEDURE — 82962 GLUCOSE BLOOD TEST: CPT

## 2017-01-31 PROCEDURE — 25800025 ZZH RX 258: Performed by: ORTHOPAEDIC SURGERY

## 2017-01-31 PROCEDURE — 25000128 H RX IP 250 OP 636: Performed by: ORTHOPAEDIC SURGERY

## 2017-01-31 PROCEDURE — 82947 ASSAY GLUCOSE BLOOD QUANT: CPT | Mod: 91 | Performed by: ANESTHESIOLOGY

## 2017-01-31 PROCEDURE — 25000125 ZZHC RX 250: Performed by: ANESTHESIOLOGY

## 2017-01-31 PROCEDURE — 71000016 ZZH RECOVERY PHASE 1 LEVEL 3 FIRST HR: Performed by: ORTHOPAEDIC SURGERY

## 2017-01-31 PROCEDURE — 25800025 ZZH RX 258: Performed by: ANESTHESIOLOGY

## 2017-01-31 PROCEDURE — 71000027 ZZH RECOVERY PHASE 2 EACH 15 MINS: Performed by: ORTHOPAEDIC SURGERY

## 2017-01-31 PROCEDURE — 25000128 H RX IP 250 OP 636: Performed by: NURSE ANESTHETIST, CERTIFIED REGISTERED

## 2017-01-31 PROCEDURE — 37000009 ZZH ANESTHESIA TECHNICAL FEE, EACH ADDTL 15 MIN: Performed by: ORTHOPAEDIC SURGERY

## 2017-01-31 PROCEDURE — 81025 URINE PREGNANCY TEST: CPT | Performed by: ANESTHESIOLOGY

## 2017-01-31 PROCEDURE — 85018 HEMOGLOBIN: CPT | Performed by: ANESTHESIOLOGY

## 2017-01-31 PROCEDURE — 71000017 ZZH RECOVERY PHASE 1 LEVEL 3 EA ADDTL HR: Performed by: ORTHOPAEDIC SURGERY

## 2017-01-31 RX ORDER — CEFAZOLIN SODIUM 1 G/3ML
1 INJECTION, POWDER, FOR SOLUTION INTRAMUSCULAR; INTRAVENOUS SEE ADMIN INSTRUCTIONS
Status: DISCONTINUED | OUTPATIENT
Start: 2017-01-31 | End: 2017-01-31 | Stop reason: HOSPADM

## 2017-01-31 RX ORDER — BUPIVACAINE HYDROCHLORIDE AND EPINEPHRINE 2.5; 5 MG/ML; UG/ML
INJECTION, SOLUTION INFILTRATION; PERINEURAL PRN
Status: DISCONTINUED | OUTPATIENT
Start: 2017-01-31 | End: 2017-01-31 | Stop reason: HOSPADM

## 2017-01-31 RX ORDER — ACETAMINOPHEN 325 MG/1
650 TABLET ORAL
Status: DISCONTINUED | OUTPATIENT
Start: 2017-01-31 | End: 2017-01-31 | Stop reason: HOSPADM

## 2017-01-31 RX ORDER — NALOXONE HYDROCHLORIDE 0.4 MG/ML
.1-.4 INJECTION, SOLUTION INTRAMUSCULAR; INTRAVENOUS; SUBCUTANEOUS
Status: DISCONTINUED | OUTPATIENT
Start: 2017-01-31 | End: 2017-01-31 | Stop reason: HOSPADM

## 2017-01-31 RX ORDER — AMOXICILLIN 250 MG
1-2 CAPSULE ORAL 2 TIMES DAILY
Qty: 30 TABLET | Refills: 0 | Status: SHIPPED | OUTPATIENT
Start: 2017-01-31 | End: 2017-02-06

## 2017-01-31 RX ORDER — DEXTROSE MONOHYDRATE 50 MG/ML
INJECTION, SOLUTION INTRAVENOUS CONTINUOUS PRN
Status: DISCONTINUED | OUTPATIENT
Start: 2017-01-31 | End: 2017-01-31

## 2017-01-31 RX ORDER — ONDANSETRON 4 MG/1
4-8 TABLET, ORALLY DISINTEGRATING ORAL EVERY 8 HOURS PRN
Qty: 4 TABLET | Refills: 0 | Status: SHIPPED | OUTPATIENT
Start: 2017-01-31 | End: 2017-02-06

## 2017-01-31 RX ORDER — OXYCODONE HYDROCHLORIDE 5 MG/1
5-10 TABLET ORAL
Status: DISCONTINUED | OUTPATIENT
Start: 2017-01-31 | End: 2017-01-31 | Stop reason: HOSPADM

## 2017-01-31 RX ORDER — OXYCODONE HYDROCHLORIDE 5 MG/1
5-10 TABLET ORAL
Qty: 30 TABLET | Refills: 0 | Status: SHIPPED | OUTPATIENT
Start: 2017-01-31 | End: 2017-02-06

## 2017-01-31 RX ORDER — FENTANYL CITRATE 50 UG/ML
INJECTION, SOLUTION INTRAMUSCULAR; INTRAVENOUS PRN
Status: DISCONTINUED | OUTPATIENT
Start: 2017-01-31 | End: 2017-01-31

## 2017-01-31 RX ORDER — ONDANSETRON 2 MG/ML
4 INJECTION INTRAMUSCULAR; INTRAVENOUS EVERY 30 MIN PRN
Status: DISCONTINUED | OUTPATIENT
Start: 2017-01-31 | End: 2017-01-31 | Stop reason: HOSPADM

## 2017-01-31 RX ORDER — CEFAZOLIN SODIUM 2 G/100ML
2 INJECTION, SOLUTION INTRAVENOUS
Status: COMPLETED | OUTPATIENT
Start: 2017-01-31 | End: 2017-01-31

## 2017-01-31 RX ORDER — ACETAMINOPHEN 325 MG/1
650 TABLET ORAL EVERY 4 HOURS PRN
Qty: 100 TABLET | Refills: 0 | Status: SHIPPED | OUTPATIENT
Start: 2017-01-31 | End: 2021-06-08

## 2017-01-31 RX ORDER — MEPERIDINE HYDROCHLORIDE 25 MG/ML
12.5 INJECTION INTRAMUSCULAR; INTRAVENOUS; SUBCUTANEOUS
Status: DISCONTINUED | OUTPATIENT
Start: 2017-01-31 | End: 2017-01-31 | Stop reason: HOSPADM

## 2017-01-31 RX ORDER — FENTANYL CITRATE 50 UG/ML
25-50 INJECTION, SOLUTION INTRAMUSCULAR; INTRAVENOUS
Status: DISCONTINUED | OUTPATIENT
Start: 2017-01-31 | End: 2017-01-31 | Stop reason: HOSPADM

## 2017-01-31 RX ORDER — ONDANSETRON 4 MG/1
4 TABLET, ORALLY DISINTEGRATING ORAL EVERY 30 MIN PRN
Status: DISCONTINUED | OUTPATIENT
Start: 2017-01-31 | End: 2017-01-31 | Stop reason: HOSPADM

## 2017-01-31 RX ORDER — BUPIVACAINE HYDROCHLORIDE 7.5 MG/ML
INJECTION, SOLUTION EPIDURAL; RETROBULBAR PRN
Status: DISCONTINUED | OUTPATIENT
Start: 2017-01-31 | End: 2017-01-31

## 2017-01-31 RX ORDER — SODIUM CHLORIDE, SODIUM LACTATE, POTASSIUM CHLORIDE, CALCIUM CHLORIDE 600; 310; 30; 20 MG/100ML; MG/100ML; MG/100ML; MG/100ML
INJECTION, SOLUTION INTRAVENOUS CONTINUOUS
Status: DISCONTINUED | OUTPATIENT
Start: 2017-01-31 | End: 2017-01-31 | Stop reason: HOSPADM

## 2017-01-31 RX ORDER — PROPOFOL 10 MG/ML
INJECTION, EMULSION INTRAVENOUS CONTINUOUS PRN
Status: DISCONTINUED | OUTPATIENT
Start: 2017-01-31 | End: 2017-01-31

## 2017-01-31 RX ORDER — BUPIVACAINE HYDROCHLORIDE 7.5 MG/ML
INJECTION, SOLUTION INTRASPINAL PRN
Status: DISCONTINUED | OUTPATIENT
Start: 2017-01-31 | End: 2017-01-31

## 2017-01-31 RX ORDER — HYDROXYZINE HYDROCHLORIDE 25 MG/1
25 TABLET, FILM COATED ORAL EVERY 6 HOURS PRN
Qty: 30 TABLET | Refills: 0 | Status: SHIPPED | OUTPATIENT
Start: 2017-01-31 | End: 2018-02-13

## 2017-01-31 RX ADMIN — BUPIVACAINE HYDROCHLORIDE IN DEXTROSE 1.8 ML: 7.5 INJECTION, SOLUTION SUBARACHNOID at 10:55

## 2017-01-31 RX ADMIN — DEXTROSE MONOHYDRATE: 50 INJECTION, SOLUTION INTRAVENOUS at 10:38

## 2017-01-31 RX ADMIN — CEFAZOLIN SODIUM 2 G: 2 INJECTION, SOLUTION INTRAVENOUS at 10:58

## 2017-01-31 RX ADMIN — FENTANYL CITRATE 100 MCG: 50 INJECTION, SOLUTION INTRAMUSCULAR; INTRAVENOUS at 10:54

## 2017-01-31 RX ADMIN — MIDAZOLAM HYDROCHLORIDE 2 MG: 1 INJECTION, SOLUTION INTRAMUSCULAR; INTRAVENOUS at 10:40

## 2017-01-31 RX ADMIN — MIDAZOLAM HYDROCHLORIDE 1 MG: 1 INJECTION, SOLUTION INTRAMUSCULAR; INTRAVENOUS at 11:08

## 2017-01-31 RX ADMIN — PROPOFOL 50 MCG/KG/MIN: 10 INJECTION, EMULSION INTRAVENOUS at 11:05

## 2017-01-31 RX ADMIN — SODIUM CHLORIDE, POTASSIUM CHLORIDE, SODIUM LACTATE AND CALCIUM CHLORIDE: 600; 310; 30; 20 INJECTION, SOLUTION INTRAVENOUS at 10:40

## 2017-01-31 NOTE — OR NURSING
PT dressed with help from mom.  PT tried to use commode in room with no results she states she would like to lay down for a while and then try again.  PT states she feels slight pressure in the bladder but is not uncomfortable at this time.

## 2017-01-31 NOTE — IP AVS SNAPSHOT
MRN:[id number]                      After Visit Summary   1/31/2017    Mamie Rice    MRN: [id number]           Thank you!     Thank you for choosing [facility] for your care. Our goal is always to provide you with excellent care. Hearing back from our patients is one way we can continue to improve our services. Please take a few minutes to complete the written survey that you may receive in the mail after you visit with us. Thank you!        Patient Information     Date Of Birth          1995        About your hospital stay     You were admitted on:  January 31, 2017 You last received care in the:   PACU    You were discharged on:  January 31, 2017       Who to Call     For medical emergencies, please call 911.  For non-urgent questions about your medical care, please call your primary care provider or clinic, [phone]  For questions related to your surgery, please call your surgery clinic        Attending Provider     Provider    Jethro Coyle MD       Primary Care Provider Office Phone # Fax #    Malik De La Rosa [phone]       [place] [phone]        After Care Instructions      Diet as Tolerated       Return to diet before surgery, unless instructed otherwise.            Discharge Instructions       Review outpatient procedure discharge instructions with patient as directed by Provider            Dressing Change        IF leaking, remove and redress. Wash hands before and after and use gloves.            Dressing Change       Change dressing on third day after surgery.            Ice to affected area       Ice pack to surgical site every 15 minutes per hour for 24 hours            No Alcohol       For 24 hours post procedure            No driving or operating machinery        until the day after procedure            Notify Provider       For signs and symptoms of infection: Fever greater  than 101, redness, swelling, heat at site, drainage, pus.            Remove dressing - at 72 hours            Return to clinic       Return to clinic in 2 weeks            Shower        Cover dressing if dressing is not going to be changed today            Weight bearing - As tolerated           Wound care       Do not immerse wound in water until sutures removed                  Your next 10 appointments already scheduled     Feb 06, 2017  1:00 PM   (Arrive by 12:45 PM)   Return Visit with Jethro Coyle MD   [facility] ([facility])    [address] [place] [id number]   [phone]            Feb 07, 2017 10:00 AM   CF LOOP with UC PFL CF   [facility] ([facility])    [address] [place] [phone]   [phone]            Feb 07, 2017 10:40 AM   (Arrive by 10:25 AM)   RETURN CYSTIC FIBROSIS VISIT with Hugo Prieto MD   [facility] ([facility])    [address] [place] [phone]   [phone]            Feb 07, 2017 11:20 AM   (Arrive by 11:05 AM)   RETURN CYSTIC FIBROSIS VISIT with Gavi Allison MD   [facility] ([facility])    [address] [place] [id number]   [phone]            Feb 15, 2017  2:45 PM   (Arrive by 2:30 PM)   RETURN RHINOLOGY with Mariela Haile MD   [facility] ([facility])    [address] [place] [phone]   [phone]            Mar 20, 2017  4:00 PM   (Arrive by 3:45 PM)   RETURN RHINOLOGY with Mariela Haile MD   [facility] ([facility])    [address] [place] [phone]   [phone]              Further  instructions from your care team       Same-Day Surgery   Adult Discharge Orders & Instructions     For 24 hours after surgery:  1. Get plenty of rest.  A responsible adult must stay with you for at least 24 hours after you leave the hospital.   2. Pain medication can slow your reflexes. Do not drive or use heavy equipment.  If you have weakness or tingling, don't drive or use heavy equipment until this feeling goes away.  3. Mixing alcohol and pain medication can cause dizziness and slow your breathing. It can even be fatal. Do not drink alcohol while taking pain medication.  4. Avoid strenuous or risky activities.  Ask for help when climbing stairs.   5. You may feel lightheaded.  If so, sit for a few minutes before standing.  Have someone help you get up.   6. If you have nausea (feel sick to your stomach), drink only clear liquids such as apple juice, ginger ale, broth or 7-Up.  Rest may also help.  Be sure to drink enough fluids.  Move to a regular diet as you feel able. Take pain medications with a small amount of solid food, such as toast or crackers, to avoid nausea.   7. A slight fever is normal. Call the doctor if your fever is over 100 F (37.7 C) (taken under the tongue) or lasts longer than 24 hours.  8. You may have a dry mouth, muscle aches, trouble sleeping or a sore throat.  These symptoms should go away after 24 hours.  9. Do not make important or legal decisions.   Pain Management:      1. Take pain medication (if prescribed) for pain as directed by your physician.        2. WARNING: If the pain medication you have been prescribed contains Tylenol  (acetaminophen), DO NOT take additional doses of Tylenol (acetaminophen).     Call your doctor for any of the followin.  Signs of infection (fever, growing tenderness at the surgery site, severe pain, a large amount of drainage or bleeding, foul-smelling drainage, redness, swelling).    2.  It has been over 8 to 10 hours since surgery and you are  still not able to urinate (pee).    3.  Headache for over 24 hours.    4.  Numbness, tingling or weakness the day after surgery (if you had spinal anesthesia).  To contact a doctor, call _____________________________________ or:      [phone] and ask for the Resident On Call for:          __________________________________________ (answered 24 hours a day)      Emergency Department:  [facility]: [phone]  [facility]: [phone]  [facility] Emergency Department: [phone]             Rev. 10/2014     Post Operative Instructions: Following Arthroscopy of the Knee    Diet  You have no restrictions on your diet. During the evening following surgery, drink plenty of fluids and eat a light dinner.   Nausea  The anesthesia may produce some nausea. If you feel nauseated, stay in bed, keep your head down, and try drinking fluids such as 7up, tea, or soup.   Discomfort    The amount of pain you can expect is unpredictable. If you have pain that cannot be controlled with the prescription you may have been given, you should notify your doctor.     Some residual swelling and discomfort may occur for one to two weeks.     Elevating your leg or applying an ice pack for approximately 10 minutes may help relieve the pain and/or swelling.     When applying an ice pack, be sure your dressing does not get wet. Wrap plastic around the knee.   Drainage/Dressings    You may expect a small amount of drainage from the incisions on your knee.    If you have Band-Aids only, you may change if soiled.     Keep white steri-strips in place. They will fall off on their own in 7-10 days.     Keep all other dressings in place until seen by your doctor.   Activity    You will be up walking the day of your surgery    Follow the weight bearing instructions given by your doctor.     Crutches may be needed to keep weight off your knee.     Activities such as walking, stair  "climbing, or driving may be resumed as tolerated or as directed by your doctor.     You may not drive the day of surgery, however, because of the effects of anesthesia.     No running or sports activities for 1 week after surgery.    You can go back to work or school provided no problems develop such as increase in pain and/or swelling.    You may shower but do not get the dressing wet. You can shower with the steri-strips in place.     Do not soak in water (no baths, hot tubs, swimming pools) until your doctor says it is ok.   Exercise    Point and flex your foot, and rotate your ankle as much as possible during the first few weeks following surgery.    Wiggle your toes often while awake.     Slowly bend and straighten your affected leg as far as you can, unless your doctor tells you otherwise. Do this several times a day starting the day after surgery.    Straight leg raises, quadriceps tightening and toe raises start the day after surgery.  Contact Your Doctor if:    You have a large amount of bleeding.    Green or yellow foul smelling drainage.    Severe pain not relieved by the medication prescribed.     Numbness, tingling, or coldness in your foot or leg.    Increased swelling    Fever above 100.4 or shaking chills.      Rev. 5/12    Pending Results     No orders found from 1/30/2017 to 2/1/2017.            Admission Information        Provider Department Dept Phone    1/31/2017 Jethro Coyle MD  [facility] [phone]      Your Vitals Were     Blood Pressure Pulse Temperature    108/77 mmHg 82 98.2  F (36.8  C) (Oral)    Respirations Height Weight    24 1.549 m (5' 1\") 82.4 kg (181 lb 10.5 oz)    BMI (Body Mass Index) Pulse Oximetry       34.34 kg/m2 97%       MyChart Information     [employer] gives you secure access to your electronic health record. If you see a primary care provider, you can also send messages to your care team and make appointments. If you have questions, please call your primary " care clinic.  If you do not have a primary care provider, please call [phone] and they will assist you.        Care EveryWhere ID     This is your Care EveryWhere ID. This could be used by other organizations to access your [facility] medical records  [id number]           Review of your medicines      START taking        Dose / Directions    acetaminophen 325 MG tablet   Commonly known as:  TYLENOL   Used for:  Chronic pain of left knee        Dose:  650 mg   Take 2 tablets (650 mg) by mouth every 4 hours as needed for other (mild pain)   Quantity:  100 tablet   Refills:  0       hydrOXYzine 25 MG tablet   Commonly known as:  ATARAX   Used for:  Chronic pain of left knee        Dose:  25 mg   Take 1 tablet (25 mg) by mouth every 6 hours as needed for itching (and nausea)   Quantity:  30 tablet   Refills:  0       ondansetron 4 MG ODT tab   Commonly known as:  ZOFRAN-ODT   Used for:  Chronic pain of left knee        Dose:  4-8 mg   Take 1-2 tablets (4-8 mg) by mouth every 8 hours as needed for nausea Dissolve ON the tongue.   Quantity:  4 tablet   Refills:  0       oxyCODONE 5 MG IR tablet   Commonly known as:  ROXICODONE   Used for:  Chronic pain of left knee        Dose:  5-10 mg   Take 1-2 tablets (5-10 mg) by mouth every 3 hours as needed for pain or other (Moderate to Severe)   Quantity:  30 tablet   Refills:  0       senna-docusate 8.6-50 MG per tablet   Commonly known as:  SENOKOT-S;PERICOLACE   Used for:  Chronic pain of left knee        Dose:  1-2 tablet   Take 1-2 tablets by mouth 2 times daily Take while on oral narcotics to prevent or treat constipation.   Quantity:  30 tablet   Refills:  0         CONTINUE these medicines which have NOT CHANGED        Dose / Directions    acetylcysteine 20 % injection   Commonly known as:  MUCOMYST   Used for:  CF (cystic fibrosis) (H)        INHALE ONE 4ML NEB INTO LUNGS VIA NEBULIZER TWO TIMES A DAY, MAY INCREASE TO 3-4 TIMES A DAY WITH INCREASE COUGH/COLD  SYMPTOMS   Quantity:  360 mL   Refills:  11       adapalene 0.1 % cream   Commonly known as:  DIFFERIN   Used for:  Acne vulgaris        Apply topically At Bedtime After washing face   Quantity:  45 g   Refills:  11       ADDERALL PO        Dose:  20 mg   Take 20 mg by mouth daily.   Refills:  0       * albuterol 108 (90 BASE) MCG/ACT Inhaler   Commonly known as:  PROAIR HFA/PROVENTIL HFA/VENTOLIN HFA   Used for:  Cystic fibrosis with pulmonary manifestations (H), Sinusitis, chronic, Diabetes mellitus type 1 (H)        Dose:  2 puff   Inhale 2 puffs into the lungs every 6 hours as needed for shortness of breath / dyspnea or wheezing   Quantity:  1 Inhaler   Refills:  12       * albuterol (2.5 MG/3ML) 0.083% neb solution   Used for:  CF (cystic fibrosis) (H)        Dose:  2.5 mg   Take 1 vial (2.5 mg) by nebulization 4 times daily   Quantity:  120 vial   Refills:  11       ascorbic acid 500 MG tablet   Commonly known as:  VITAMIN C   Used for:  Cystic fibrosis with pulmonary manifestations (H)        TAKE ONE TABLET BY MOUTH TWICE A DAY   Quantity:  100 tablet   Refills:  3       azithromycin 500 MG tablet   Commonly known as:  ZITHROMAX   Used for:  CF (cystic fibrosis) (H)        Dose:  500 mg   Take 1 tablet (500 mg) by mouth daily M-W-F   Quantity:  36 tablet   Refills:  4       beta carotene 05053 UNIT capsule   Used for:  Cystic fibrosis with pulmonary manifestations (H)        Dose:  58054 Units   Take 1 capsule (25,000 Units) by mouth Every Mon, Wed, Fri Morning   Quantity:  36 capsule   Refills:  4       blood glucose monitoring test strip   Commonly known as:  ACCU-CHEK SMARTVIEW   Used for:  Type I (juvenile type) diabetes mellitus without mention of complication, not stated as uncontrolled        Use to test blood sugar 4 times daily or as directed.   Quantity:  1 Month   Refills:  12       buPROPion 150 MG 12 hr tablet   Commonly known as:  WELLBUTRIN SR        Dose:  150 mg   Take 150 mg by mouth 2  times daily   Refills:  0       cholecalciferol 1000 UNIT tablet   Commonly known as:  vitamin D   Used for:  CF (cystic fibrosis) (H), Exocrine pancreatic insufficiency        TAKE ONE TABLET BY MOUTH EVERY DAY   Quantity:  30 tablet   Refills:  11       clindamycin 1 % lotion   Commonly known as:  CLEOCIN T   Used for:  Acne vulgaris        Apply topically every morning After washing face with benzoyl peroxide wash   Quantity:  60 mL   Refills:  11       cyanocobalamin 1000 MCG/ML injection   Commonly known as:  VITAMIN B12        Dose:  1 mL   Inject 1 mL into the muscle every 30 days   Refills:  0       DEPO-PROVERA IM        Refills:  0       GLYCOPYRROLATE PO        Dose:  1 mg   Take 1 mg by mouth 2 times daily   Refills:  0       LORATADINE PO        Dose:  10 mg   Take 10 mg by mouth   Refills:  0       LUPRON DEPOT IM   Used for:  CF (cystic fibrosis) (H)        Inject  into the muscle every 3 months.   Refills:  0       meropenem 500 MG vial   Commonly known as:  MERREM        Dose:  500 mg   500 mg by Nasal Instillation route as needed   Quantity:  4 mL   Refills:  0       methylcellulose (laxative) Powd   Commonly known as:  CITRUCEL   Used for:  Other constipation        Start with 1 heaping tablespoon. Increase as needed, 1 heaping tablespoon at a time, up to 3 times per day.   Quantity:  479 g   Refills:  3       montelukast 10 MG tablet   Commonly known as:  SINGULAIR   Used for:  CF (cystic fibrosis) (H)        TAKE ONE TABLET BY MOUTH ONCE DAILY AT BEDTIME   Quantity:  30 tablet   Refills:  11       MULTIVITAMIN PO        Dose:  1 tablet   Take 1 tablet by mouth daily.   Refills:  0       omeprazole 20 MG CR capsule   Commonly known as:  priLOSEC   Used for:  CF (cystic fibrosis) (H)        TAKE 1 CAPSULE BY MOUTH TWICE A DAY   Quantity:  60 capsule   Refills:  11       phentermine 15 MG capsule   Used for:  Non morbid obesity due to excess calories        Dose:  30 mg   Take 2 capsules (30 mg) by  mouth every morning   Quantity:  30 capsule   Refills:  5       phytonadione 5 MG tablet   Commonly known as:  MEPHYTON   Used for:  Cystic fibrosis with pulmonary manifestations (H), Cystic fibrosis with pulmonary manifestations (H), Aspergillosis (H), Pancreatic insufficiency        Dose:  5 mg   Take 1 tablet (5 mg) by mouth once a week   Quantity:  4 tablet   Refills:  11       PROZAC 40 MG capsule   Used for:  Cystic fibrosis with pulmonary manifestations (H)   Generic drug:  FLUoxetine        Dose:  80 mg   Take 80 mg by mouth daily.   Refills:  0       spironolactone 25 MG tablet   Commonly known as:  ALDACTONE   Used for:  Acne vulgaris        Dose:  25 mg   Take 1 tablet (25 mg) by mouth daily   Quantity:  60 tablet   Refills:  3       TRAZODONE HCL PO        Dose:  100 mg   Take 100 mg by mouth At Bedtime   Refills:  0       vitamin E 400 UNIT capsule   Used for:  CF (cystic fibrosis) (H), Pancreatic insufficiency        TAKE 1 CAPSULE BY MOUTH TWICE A DAY   Quantity:  60 capsule   Refills:  11       * Notice:  This list has 2 medication(s) that are the same as other medications prescribed for you. Read the directions carefully, and ask your doctor or other care provider to review them with you.         Where to get your medicines      These medications were sent to [facility] [place], [place] - [address]  [address] [address]     Phone:  [phone]    - acetaminophen 325 MG tablet  - hydrOXYzine 25 MG tablet  - ondansetron 4 MG ODT tab  - senna-docusate 8.6-50 MG per tablet      Some of these will need a paper prescription and others can be bought over the counter. Ask your nurse if you have questions.     Bring a paper prescription for each of these medications    - oxyCODONE 5 MG IR tablet             Protect others around you: Learn how to safely use, store and throw away your medicines at www.disposemymeds.org.             Medication List: This is a  list of all your medications and when to take them. Check marks below indicate your daily home schedule. Keep this list as a reference.      Medications           Morning Afternoon Evening Bedtime As Needed    acetaminophen 325 MG tablet   Commonly known as:  TYLENOL   Take 2 tablets (650 mg) by mouth every 4 hours as needed for other (mild pain)                                acetylcysteine 20 % injection   Commonly known as:  MUCOMYST   INHALE ONE 4ML NEB INTO LUNGS VIA NEBULIZER TWO TIMES A DAY, MAY INCREASE TO 3-4 TIMES A DAY WITH INCREASE COUGH/COLD SYMPTOMS                                adapalene 0.1 % cream   Commonly known as:  DIFFERIN   Apply topically At Bedtime After washing face                                ADDERALL PO   Take 20 mg by mouth daily.                                * albuterol 108 (90 BASE) MCG/ACT Inhaler   Commonly known as:  PROAIR HFA/PROVENTIL HFA/VENTOLIN HFA   Inhale 2 puffs into the lungs every 6 hours as needed for shortness of breath / dyspnea or wheezing                                * albuterol (2.5 MG/3ML) 0.083% neb solution   Take 1 vial (2.5 mg) by nebulization 4 times daily                                ascorbic acid 500 MG tablet   Commonly known as:  VITAMIN C   TAKE ONE TABLET BY MOUTH TWICE A DAY                                azithromycin 500 MG tablet   Commonly known as:  ZITHROMAX   Take 1 tablet (500 mg) by mouth daily M-W-F                                beta carotene 61167 UNIT capsule   Take 1 capsule (25,000 Units) by mouth Every Mon, Wed, Fri Morning                                blood glucose monitoring test strip   Commonly known as:  ACCU-CHEK SMARTVIEW   Use to test blood sugar 4 times daily or as directed.                                buPROPion 150 MG 12 hr tablet   Commonly known as:  WELLBUTRIN SR   Take 150 mg by mouth 2 times daily                                cholecalciferol 1000 UNIT tablet   Commonly known as:  vitamin D   TAKE ONE TABLET  BY MOUTH EVERY DAY                                clindamycin 1 % lotion   Commonly known as:  CLEOCIN T   Apply topically every morning After washing face with benzoyl peroxide wash                                cyanocobalamin 1000 MCG/ML injection   Commonly known as:  VITAMIN B12   Inject 1 mL into the muscle every 30 days                                DEPO-PROVERA IM                                GLYCOPYRROLATE PO   Take 1 mg by mouth 2 times daily                                hydrOXYzine 25 MG tablet   Commonly known as:  ATARAX   Take 1 tablet (25 mg) by mouth every 6 hours as needed for itching (and nausea)                                LORATADINE PO   Take 10 mg by mouth                                LUPRON DEPOT IM   Inject  into the muscle every 3 months.                                meropenem 500 MG vial   Commonly known as:  MERREM   500 mg by Nasal Instillation route as needed                                methylcellulose (laxative) Powd   Commonly known as:  CITRUCEL   Start with 1 heaping tablespoon. Increase as needed, 1 heaping tablespoon at a time, up to 3 times per day.                                montelukast 10 MG tablet   Commonly known as:  SINGULAIR   TAKE ONE TABLET BY MOUTH ONCE DAILY AT BEDTIME                                MULTIVITAMIN PO   Take 1 tablet by mouth daily.                                omeprazole 20 MG CR capsule   Commonly known as:  priLOSEC   TAKE 1 CAPSULE BY MOUTH TWICE A DAY                                ondansetron 4 MG ODT tab   Commonly known as:  ZOFRAN-ODT   Take 1-2 tablets (4-8 mg) by mouth every 8 hours as needed for nausea Dissolve ON the tongue.                                oxyCODONE 5 MG IR tablet   Commonly known as:  ROXICODONE   Take 1-2 tablets (5-10 mg) by mouth every 3 hours as needed for pain or other (Moderate to Severe)                                phentermine 15 MG capsule   Take 2 capsules (30 mg) by mouth every morning                                 phytonadione 5 MG tablet   Commonly known as:  MEPHYTON   Take 1 tablet (5 mg) by mouth once a week                                PROZAC 40 MG capsule   Take 80 mg by mouth daily.   Generic drug:  FLUoxetine                                senna-docusate 8.6-50 MG per tablet   Commonly known as:  SENOKOT-S;PERICOLACE   Take 1-2 tablets by mouth 2 times daily Take while on oral narcotics to prevent or treat constipation.                                spironolactone 25 MG tablet   Commonly known as:  ALDACTONE   Take 1 tablet (25 mg) by mouth daily                                TRAZODONE HCL PO   Take 100 mg by mouth At Bedtime                                vitamin E 400 UNIT capsule   TAKE 1 CAPSULE BY MOUTH TWICE A DAY                                * Notice:  This list has 2 medication(s) that are the same as other medications prescribed for you. Read the directions carefully, and ask your doctor or other care provider to review them with you.

## 2017-01-31 NOTE — DISCHARGE INSTRUCTIONS
Same-Day Surgery   Adult Discharge Orders & Instructions     For 24 hours after surgery:  1. Get plenty of rest.  A responsible adult must stay with you for at least 24 hours after you leave the hospital.   2. Pain medication can slow your reflexes. Do not drive or use heavy equipment.  If you have weakness or tingling, don't drive or use heavy equipment until this feeling goes away.  3. Mixing alcohol and pain medication can cause dizziness and slow your breathing. It can even be fatal. Do not drink alcohol while taking pain medication.  4. Avoid strenuous or risky activities.  Ask for help when climbing stairs.   5. You may feel lightheaded.  If so, sit for a few minutes before standing.  Have someone help you get up.   6. If you have nausea (feel sick to your stomach), drink only clear liquids such as apple juice, ginger ale, broth or 7-Up.  Rest may also help.  Be sure to drink enough fluids.  Move to a regular diet as you feel able. Take pain medications with a small amount of solid food, such as toast or crackers, to avoid nausea.   7. A slight fever is normal. Call the doctor if your fever is over 100 F (37.7 C) (taken under the tongue) or lasts longer than 24 hours.  8. You may have a dry mouth, muscle aches, trouble sleeping or a sore throat.  These symptoms should go away after 24 hours.  9. Do not make important or legal decisions.   Pain Management:      1. Take pain medication (if prescribed) for pain as directed by your physician.        2. WARNING: If the pain medication you have been prescribed contains Tylenol  (acetaminophen), DO NOT take additional doses of Tylenol (acetaminophen).     Call your doctor for any of the followin.  Signs of infection (fever, growing tenderness at the surgery site, severe pain, a large amount of drainage or bleeding, foul-smelling drainage, redness, swelling).    2.  It has been over 8 to 10 hours since surgery and you are still not able to urinate (pee).    3.   Headache for over 24 hours.    4.  Numbness, tingling or weakness the day after surgery (if you had spinal anesthesia).  To contact a doctor, call _____________________________________ or:      [phone] and ask for the Resident On Call for:          __________________________________________ (answered 24 hours a day)      Emergency Department:  [facility]: [phone]  [facility]: [phone]  [facility] Emergency Department: [phone]             Rev. 10/2014     Post Operative Instructions: Following Arthroscopy of the Knee    Diet  You have no restrictions on your diet. During the evening following surgery, drink plenty of fluids and eat a light dinner.   Nausea  The anesthesia may produce some nausea. If you feel nauseated, stay in bed, keep your head down, and try drinking fluids such as 7up, tea, or soup.   Discomfort    The amount of pain you can expect is unpredictable. If you have pain that cannot be controlled with the prescription you may have been given, you should notify your doctor.     Some residual swelling and discomfort may occur for one to two weeks.     Elevating your leg or applying an ice pack for approximately 10 minutes may help relieve the pain and/or swelling.     When applying an ice pack, be sure your dressing does not get wet. Wrap plastic around the knee.   Drainage/Dressings    You may expect a small amount of drainage from the incisions on your knee.    If you have Band-Aids only, you may change if soiled.     Keep white steri-strips in place. They will fall off on their own in 7-10 days.     Keep all other dressings in place until seen by your doctor.   Activity    You will be up walking the day of your surgery    Follow the weight bearing instructions given by your doctor.     Crutches may be needed to keep weight off your knee.     Activities such as walking, stair climbing, or driving may be resumed as  tolerated or as directed by your doctor.     You may not drive the day of surgery, however, because of the effects of anesthesia.     No running or sports activities for 1 week after surgery.    You can go back to work or school provided no problems develop such as increase in pain and/or swelling.    You may shower but do not get the dressing wet. You can shower with the steri-strips in place.     Do not soak in water (no baths, hot tubs, swimming pools) until your doctor says it is ok.   Exercise    Point and flex your foot, and rotate your ankle as much as possible during the first few weeks following surgery.    Wiggle your toes often while awake.     Slowly bend and straighten your affected leg as far as you can, unless your doctor tells you otherwise. Do this several times a day starting the day after surgery.    Straight leg raises, quadriceps tightening and toe raises start the day after surgery.  Contact Your Doctor if:    You have a large amount of bleeding.    Green or yellow foul smelling drainage.    Severe pain not relieved by the medication prescribed.     Numbness, tingling, or coldness in your foot or leg.    Increased swelling    Fever above 100.4 or shaking chills.      Rev. 5/12

## 2017-01-31 NOTE — ANESTHESIA CARE TRANSFER NOTE
Patient: Mamie Rice    ARTHROSCOPY KNEE WITH MEDIAL MENISCECTOMY (Left Knee)  Additional InformationProcedure(s):  Left Knee Exam Under Anesthesia, Left Knee Arthroscopy, Partial Meniscectomy   anterior chamber debridement  - Wound Class: I-Clean    Diagnosis: Meniscus Tear Left Knee   Diagnosis Additional Information: No value filed.    Anesthesia Type:   General, ETT, Periph. Nerve Block for postop pain     Note:  Airway :Face Mask  Patient transferred to:PACU  Comments: To PAR.  Report to RN.  VSS  102/51, HR 91, sat 98%, RR 12, T 36.3      Vitals: (Last set prior to Anesthesia Care Transfer)              Electronically Signed By: DANIEL Anguiano CRNA  January 31, 2017  12:07 PM

## 2017-01-31 NOTE — OR NURSING
Dr. Castillo into see PT.  Discussed plan that PT is OK to dc to home, but if she can not urinate by 1800 - 1900 this evening or if she feels uncomfortable pressure in the bladder she should got to ED to have a straight cath done.  PT and family voiced understanding and agreement with this plan.

## 2017-01-31 NOTE — OR NURSING
Pt doing well. Spinal still at T9 but pt is able to wiggle toes on R. Otherwise CMS is intact.Pt is to d/c with crutches but has her own and is familiar with crutch walking.

## 2017-01-31 NOTE — IP AVS SNAPSHOT
[facility]    [address] [address]    Phone:  [phone]                                       After Visit Summary   1/31/2017    Mamie Rice    MRN: [id number]           After Visit Summary Signature Page     I have received my discharge instructions, and my questions have been answered. I have discussed any challenges I see with this plan with the nurse or doctor.    ..........................................................................................................................................  Patient/Patient Representative Signature      ..........................................................................................................................................  Patient Representative Print Name and Relationship to Patient    ..................................................               ................................................  Date                                            Time    ..........................................................................................................................................  Reviewed by Signature/Title    ...................................................              ..............................................  Date                                                            Time

## 2017-01-31 NOTE — ANESTHESIA PROCEDURE NOTES
Spinal/LP Procedure Note    Spinal Block  Staff:     Anesthesiologist:  REMY MIMS  Location: OR  Procedure Start/Stop Times:      1/31/2017 10:48 AM     1/31/2017 10:55 AM    patient identified, IV checked, site marked, risks and benefits discussed, informed consent, monitors and equipment checked, pre-op evaluation, at physician/surgeon's request and post-op pain management      Correct Patient: Yes      Correct Position: Yes      Correct Site: Yes      Correct Procedure: Yes      Correct Laterality:  Yes    Site Marked:  Yes  Procedure:     Procedure:  Intrathecal    ASA:  3    Diagnosis:  Lt knee meniscus tear    Position:  Sitting    Sterile Prep: chloraprep, mask, sterile gloves and patient draped      Insertion site:  L4-5    Approach:  Midline    Needle Type:  Irma    Needle gauge (G):  25    Local Skin Infiltration:  1% lidocaine    amount (ml):  3    Needle Length (in):  3.5    Introducer used: Yes      Introducer gauge:  20 G    Attempts:  1    CSF:  Clear    Paresthesias:  No    Time injected:  10:55  Assessment/Narrative:     Sensory Level:  T6     Easy placement.  No issues.

## 2017-01-31 NOTE — BRIEF OP NOTE
Brief Orthopaedic Op Note    Date of Service: 01/31/2017    Attending Surgeon: Jethro Coyle*  Resident Surgeons: Zenon    Pre-Op Diagnosis: Meniscus Tear Left Knee   Post-Op Diagnosis: same  Procedures: Procedure(s):  ARTHROSCOPY KNEE WITH MEDIAL MENISCECTOMY    EBL: 5  Anesthesia: Spinal Anesthesia  Specimens:  None  Implants: none    Findings:    See Operative Report.  Complications:  None  Condition: Stable to PACU  Comments: See Full Dictated Operative Report for Full Details      Post-Operative Care:   - Discharge home today.  - WBAT LLE  - Home Diabetic Diet  - Medications (oral pain meds, vistaril, bowel meds)  - Mechanical DVT prophylaxis  - F/U: 2 weeks with Dr. Coyle No xrays    Jason Yarbrough MD  Orthopedics PGY-5  [phone]

## 2017-01-31 NOTE — ANESTHESIA PREPROCEDURE EVALUATION
Anesthesia Evaluation     . Pt has had prior anesthetic. Type: General    No history of anesthetic complications     ROS/MED HX    ENT/Pulmonary:     (+), cystic fibrosis Well controlled, no current issues. .    Neurologic:  - neg neurologic ROS     Cardiovascular:  - neg cardiovascular ROS       METS/Exercise Tolerance:     Hematologic:         Musculoskeletal:   (+) , , other musculoskeletal- Left knee meniscus injury      GI/Hepatic:     (+) GERD       Renal/Genitourinary:         Endo:     (+) type I DM, Using insulin - not using insulin pump Normal glucose range: 87 not previously admitted for DM/DKA Obesity, Other Endocrine Disorder CF-associated diabetes mellitus.      Psychiatric:     (+) psychiatric history anxiety, other (comment) and depression (ADHD)      Infectious Disease:  - neg infectious disease ROS       Malignancy:      - no malignancy   Other:               Physical Exam  Normal systems: cardiovascular, pulmonary and dental    Airway   Mallampati: I  TM distance: >3 FB  Neck ROM: full    Dental     Cardiovascular   Rhythm and rate: regular and normal      Pulmonary    breath sounds clear to auscultation    Other findings: All lung fields clear.  No recent URI                Anesthesia Plan      History & Physical Review      ASA Status:  3 .    NPO Status:  > 8 hours and > 2 hours    Plan for Spinal   PONV prophylaxis:  Ondansetron (or other 5HT-3)  Glucose 87. Started D5 piggyback in preop. GA/Spinal risks reviewed.  All questions answered.  Pt and Mother wish to proceed. Spinal with sedation      Postoperative Care  Postoperative pain management:  IV analgesics, Multi-modal analgesia and Peripheral nerve block (Single Shot).      Consents  Anesthetic plan, risks, benefits and alternatives discussed with:  Patient and Parent (Mother and/or Father)..                          .

## 2017-01-31 NOTE — ANESTHESIA POSTPROCEDURE EVALUATION
Patient: Mamie Rice    ARTHROSCOPY KNEE WITH MEDIAL MENISCECTOMY (Left Knee)  Additional InformationProcedure(s):  Left Knee Exam Under Anesthesia, Left Knee Arthroscopy, Partial Meniscectomy   anterior chamber debridement  - Wound Class: I-Clean    Diagnosis:Meniscus Tear Left Knee   Diagnosis Additional Information: No value filed.    Anesthesia Type:  General, ETT, Periph. Nerve Block for postop pain    Note:  Anesthesia Post Evaluation    Patient location during evaluation: PACU and Bedside  Patient participation: Able to fully participate in evaluation  Level of consciousness: awake and alert  Pain management: adequate  Airway patency: patent  Cardiovascular status: acceptable  Respiratory status: acceptable  Hydration status: balanced  PONV: none     Anesthetic complications: None    Comments: Pt one hour into PACU, 13:00, spinal block to T10, resolving.  Pt has urinated.  Right nonoperative leg with free range of motion, full function.  Lt operative leg full function at hip, no motion in foot.  Pt was blocked into the wound intraoperatively by the surgeon with 20 ml bupivacaine.  13:30 operative extremity foot now with motion.        Last vitals:  Filed Vitals:    01/31/17 1215 01/31/17 1230 01/31/17 1245   BP: 104/57 100/69 108/77   Pulse:      Temp:  36.8  C (98.2  F)    Resp: 16 24 24   SpO2: 100% 95% 97%       Electronically Signed By: Ellie Castillo MD  January 31, 2017  1:13 PM

## 2017-02-01 NOTE — OP NOTE
DATE OF SURGERY: 01/31/2017      PREOPERATIVE DIAGNOSIS:  Medial meniscus tear, left knee.      POSTOPERATIVE DIAGNOSIS:  Medial meniscus tear, left knee.      PROCEDURES:   1.  Examination under anesthesia, left knee.   2.  Left knee arthroscopy.   3.  Partial medial meniscectomy.   4.  Anterior chamber/fat pad debridement.   5.  ACL cyst decompression.      SURGEON:  Jethro Coyle MD      ASSISTANT:  Jason Yarbrough MD      OPERATIVE INDICATIONS:  Mamie Rice is a pleasant 21-year-old young woman.  She has a history of a partial medial meniscectomy in the past.  She also has known history of cystic fibrosis.  She was seen through my clinic with medial-sided knee pain.  It showed evidence of medial meniscus tear.  I discussed with him the risks, benefits, complications, techniques and alternatives.  We reviewed the expected course of recovery and alternative treatment options and together through a combined decision making approach, elected to proceed.  She understood that there would be risk of future arthritis.  There is continued wear about her knee and she may need future surgery.  We also saw that she had an ACL cyst, we planned to decompress, though this was not a formal surgical excision of the cyst.  She was apprised of risks and benefits and desired to proceed.      OPERATIVE DETAILS:  In the preoperative area, the patient's informed consent was reviewed and desired to proceed.  The left knee was marked.  The patient was in agreement.  Taken to the operating room where timeout was performed and all parties were in agreement.  Spinal anesthesia was applied.  The patient was sedated.  Left leg was prepped and draped in the usual sterile fashion after an examination under anesthesia was performed.  This showed full range of motion 0-145.  Stable to varus valgus stress.  Stable posterior drawer.  Stable anterior drawer.  No pivot shift.  Range of motion 0-145.  At this time, a bump was placed beneath the  ipsilateral hip.  Left leg was prepped and draped in the usual sterile fashion and examination was performed.  No tourniquet was applied.  Anteromedial and anterolateral arthroscopic portals were created and diagnostic arthroscopy was performed with following findings:  There were no loose bodies in the suprapatellar pouch, medial gutter, lateral gutter.  Medial patellar facet, central ridge, lateral patellar facets, central trochlea were normal.  Popliteus tendon normal.  ACL, PCL normal.  Lateral meniscus normal.  Medial meniscus showed an undersurface and free edge tearing in the area of prior meniscectomy.  At this time, working with the motorized biter and shaver, partial medial meniscectomy was performed until a balanced stable rim of meniscal tissue remained.  At this time, decompression of the patient's ACL cyst along its tibial origin was performed using a rongeur, a spinal needle as well as the blunt obturator for the arthroscope.  Synovial fluid was seen to come from the cyst and it was seen to decompress underneath the synovial covering.  At this time, an anterior chamber debridement/isolated synovectomy the anterior chamber was performed with a motorized biter and shaver.  At this time, we completed our anterior chamber debridement.  There were no further synovial fingers or irritation connected to the ACL and debridement of the ACL stump was performed.  Partial medial meniscectomy was completed.  Copious irrigation was performed.  Portal sites were closed with nylon.  Sterile dressings were applied.  The patient was awakened from anesthesia, transferred to recovery room in stable condition with stable vital signs.      COMPLICATIONS:  None apparent.      DRAINS:  None.      SPECIMENS:  None.      ESTIMATED BLOOD LOSS:  5 mL.      TOURNIQUET TIME:  None.  No tourniquet was placed.      POSTOPERATIVE PLAN:  The patient will be weightbearing as tolerated, range of motion as tolerated.  No running,  jumping, pounding sports for 6 weeks.  Follow up in my Orthopedic Clinic in 1 week.         NOAH JOHN MD             D: 2017 15:00   T: 2017 15:32   MT: LENNIE      Name:     DELTA DAVIS   MRN:      [id number]        Account:        [id number]   :      1995           Procedure Date: 2017      Document: [id number]

## 2017-02-04 ASSESSMENT — ACTIVITIES OF DAILY LIVING (ADL)
DO_MEMBERS_OF_YOUR_HOUSEHOLD_USE_SUNSCREEN?: N
ARE_THERE_FIREARMS_IN_YOUR_HOME?: N
DO_MEMBERS_OF_YOUR_HOUSEHOLD_WEAR_SEAT_BELTS?: Y
DO_MEMBERS_OF_YOUR_HOUSEHOLD_USE_SAFETY_HELMETS?: Y
ARE_THERE_SMOKE_DETECTORS_IN_YOUR_HOME?: Y
ARE_THERE_CARBON_MONOXIDE_DETECTORS_IN_YOUR_HOME?: Y

## 2017-02-06 ENCOUNTER — OFFICE VISIT (OUTPATIENT)
Dept: ORTHOPEDICS | Facility: CLINIC | Age: 22
End: 2017-02-06

## 2017-02-06 DIAGNOSIS — G89.29 CHRONIC PAIN OF LEFT KNEE: Primary | ICD-10-CM

## 2017-02-06 DIAGNOSIS — M25.562 CHRONIC PAIN OF LEFT KNEE: Primary | ICD-10-CM

## 2017-02-06 NOTE — PROGRESS NOTES
HISTORY OF PRESENT ILLNESS:  Mamie returns to my clinic today for interval followup.  She is a very pleasant 21-year-old young woman with cystic fibrosis who is now 1 week status post arthroscopic meniscectomy and a chondroplasty.  She  is overall doing well.  She is not having pain.  She is off narcotics.      PHYSICAL EXAMINATION:  On exam, pleasant woman in no acute distress, articulate and interactive.  Visual inspection shows no redness, no drainage is no suggestion of infection.  Ligament stable, neurovascularly intact.      CLINICAL ASSESSMENT:  One week status post arthroscopic meniscectomy and anterior chamber debridement.      PLAN:  I had a long discussion today with Mamie.  At this time, I think she is doing well.  She is weightbearing as tolerated, motion as tolerated.  No running, jumping, pounding sports for the first 6 weeks.  She will follow up in my Orthopaedic Clinic in 6 weeks.  Otherwise, on an as-needed basis.

## 2017-02-06 NOTE — NURSING NOTE
Reason For Visit:   Chief Complaint   Patient presents with     Surgical Followup     1 week post-op, LEFT arthroscopy, meniscectomy, ACL cyst decompression, DOS 1/31/17     Here with mother    Currently working? Yes. Returned today    Date of surgery: 1/31/17    Pain Assessment  Patient Currently in Pain: No

## 2017-02-06 NOTE — Clinical Note
2/6/2017       RE: Mamie Rice  [address] [facility] [address]     Dear Colleague,    Thank you for referring your patient, Mamie Rice, to the [facility] at [facility]. Please see a copy of my visit note below.    HISTORY OF PRESENT ILLNESS:  Mamie returns to my clinic today for interval followup.  She is a very pleasant 21-year-old young woman with cystic fibrosis who is now 1 week status post arthroscopic meniscectomy and a chondroplasty.  She  is overall doing well.  She is not having pain.  She is off narcotics.      PHYSICAL EXAMINATION:  On exam, pleasant woman in no acute distress, articulate and interactive.  Visual inspection shows no redness, no drainage is no suggestion of infection.  Ligament stable, neurovascularly intact.      CLINICAL ASSESSMENT:  One week status post arthroscopic meniscectomy and anterior chamber debridement.      PLAN:  I had a long discussion today with Mamie.  At this time, I think she is doing well.  She is weightbearing as tolerated, motion as tolerated.  No running, jumping, pounding sports for the first 6 weeks.  She will follow up in my Orthopaedic Clinic in 6 weeks.  Otherwise, on an as-needed basis.       Again, thank you for allowing me to participate in the care of your patient.      Sincerely,    Jethro Coyle MD

## 2017-02-07 ENCOUNTER — OFFICE VISIT (OUTPATIENT)
Dept: PULMONOLOGY | Facility: CLINIC | Age: 22
End: 2017-02-07
Attending: INTERNAL MEDICINE
Payer: COMMERCIAL

## 2017-02-07 ENCOUNTER — OFFICE VISIT (OUTPATIENT)
Dept: ENDOCRINOLOGY | Facility: CLINIC | Age: 22
End: 2017-02-07
Attending: INTERNAL MEDICINE
Payer: COMMERCIAL

## 2017-02-07 VITALS
HEART RATE: 93 BPM | WEIGHT: 177.91 LBS | OXYGEN SATURATION: 93 % | RESPIRATION RATE: 18 BRPM | SYSTOLIC BLOOD PRESSURE: 117 MMHG | TEMPERATURE: 98.5 F | HEIGHT: 61 IN | DIASTOLIC BLOOD PRESSURE: 77 MMHG | BODY MASS INDEX: 33.59 KG/M2

## 2017-02-07 VITALS
HEIGHT: 61 IN | OXYGEN SATURATION: 93 % | BODY MASS INDEX: 33.59 KG/M2 | TEMPERATURE: 98.5 F | WEIGHT: 177.91 LBS | SYSTOLIC BLOOD PRESSURE: 117 MMHG | RESPIRATION RATE: 18 BRPM | HEART RATE: 93 BPM | DIASTOLIC BLOOD PRESSURE: 77 MMHG

## 2017-02-07 DIAGNOSIS — E66.09 NON MORBID OBESITY DUE TO EXCESS CALORIES: ICD-10-CM

## 2017-02-07 DIAGNOSIS — K86.89 PANCREATIC INSUFFICIENCY: ICD-10-CM

## 2017-02-07 DIAGNOSIS — E84.0 CYSTIC FIBROSIS WITH PULMONARY MANIFESTATIONS (H): Primary | ICD-10-CM

## 2017-02-07 DIAGNOSIS — E10.9 TYPE 1 DIABETES MELLITUS WITHOUT COMPLICATION (H): ICD-10-CM

## 2017-02-07 DIAGNOSIS — E84.9 CF (CYSTIC FIBROSIS) (H): Primary | ICD-10-CM

## 2017-02-07 LAB
EXPTIME-PRE: 6.73 SEC
FEF2575-%PRED-PRE: 121 %
FEF2575-PRE: 4.48 L/SEC
FEF2575-PRED: 3.68 L/SEC
FEFMAX-%PRED-PRE: 150 %
FEFMAX-PRE: 9.95 L/SEC
FEFMAX-PRED: 6.6 L/SEC
FEV1-%PRED-PRE: 107 %
FEV1-PRE: 3.32 L
FEV1FEV6-PRE: 89 %
FEV1FEV6-PRED: 86 %
FEV1FVC-PRE: 89 %
FEV1FVC-PRED: 87 %
FIFMAX-PRE: 4.14 L/SEC
FVC-%PRED-PRE: 104 %
FVC-PRE: 3.72 L
FVC-PRED: 3.55 L
HBA1C MFR BLD: 5.5 % (ref 0–5.7)

## 2017-02-07 PROCEDURE — 99212 OFFICE O/P EST SF 10 MIN: CPT | Mod: ZF

## 2017-02-07 PROCEDURE — 36416 COLLJ CAPILLARY BLOOD SPEC: CPT | Mod: ZF

## 2017-02-07 PROCEDURE — 97803 MED NUTRITION INDIV SUBSEQ: CPT | Mod: ZF | Performed by: DIETITIAN, REGISTERED

## 2017-02-07 PROCEDURE — 83036 HEMOGLOBIN GLYCOSYLATED A1C: CPT | Mod: ZF | Performed by: INTERNAL MEDICINE

## 2017-02-07 ASSESSMENT — ENCOUNTER SYMPTOMS
JAUNDICE: 0
SNORES LOUDLY: 0
DECREASED LIBIDO: 0
NERVOUS/ANXIOUS: 0
BLOATING: 0
PALPITATIONS: 0
BOWEL INCONTINENCE: 0
SORE THROAT: 0
POSTURAL DYSPNEA: 0
DEPRESSION: 0
POOR WOUND HEALING: 0
STIFFNESS: 0
PANIC: 0
BACK PAIN: 0
EXERCISE INTOLERANCE: 0
DECREASED CONCENTRATION: 0
SLEEP DISTURBANCES DUE TO BREATHING: 0
NUMBNESS: 0
NIGHT SWEATS: 0
WHEEZING: 0
CONSTIPATION: 0
POLYDIPSIA: 0
DIARRHEA: 0
CLAUDICATION: 0
HEARTBURN: 0
DECREASED APPETITE: 0
HYPOTENSION: 0
SPUTUM PRODUCTION: 0
SPEECH CHANGE: 0
HOARSE VOICE: 0
DOUBLE VISION: 0
TINGLING: 0
WEIGHT LOSS: 0
RECTAL BLEEDING: 0
ALTERED TEMPERATURE REGULATION: 0
FLANK PAIN: 0
PARALYSIS: 0
ABDOMINAL PAIN: 0
TACHYCARDIA: 0
HEMATURIA: 0
EYE IRRITATION: 0
BLOOD IN STOOL: 0
LOSS OF CONSCIOUSNESS: 0
NAIL CHANGES: 0
DIZZINESS: 0
NECK MASS: 0
HEADACHES: 0
ORTHOPNEA: 0
MUSCLE WEAKNESS: 0
NAUSEA: 0
RECTAL PAIN: 0
POLYPHAGIA: 0
LEG PAIN: 0
HEMOPTYSIS: 0
DYSPNEA ON EXERTION: 0
EYE REDNESS: 0
WEAKNESS: 0
DIFFICULTY URINATING: 0
HYPERTENSION: 0
VOMITING: 0
EXTREMITY NUMBNESS: 0
SHORTNESS OF BREATH: 0
FEVER: 0
SEIZURES: 0
FATIGUE: 0
LEG SWELLING: 0
INCREASED ENERGY: 0
TREMORS: 0
NECK PAIN: 0
CHILLS: 0
LIGHT-HEADEDNESS: 0
COUGH: 0
SYNCOPE: 0
RESPIRATORY PAIN: 0
HALLUCINATIONS: 0
WEIGHT GAIN: 0
TROUBLE SWALLOWING: 0
INSOMNIA: 0
DISTURBANCES IN COORDINATION: 0
COUGH DISTURBING SLEEP: 0
BREAST PAIN: 0
SINUS PAIN: 0
ARTHRALGIAS: 0
MEMORY LOSS: 0
MYALGIAS: 0
MUSCLE CRAMPS: 0
TASTE DISTURBANCE: 0
EYE WATERING: 0
DYSURIA: 0
EYE PAIN: 0
BRUISES/BLEEDS EASILY: 0
BREAST MASS: 0
SWOLLEN GLANDS: 0
SINUS CONGESTION: 0
HOT FLASHES: 0
SKIN CHANGES: 0
SMELL DISTURBANCE: 0
JOINT SWELLING: 0

## 2017-02-07 ASSESSMENT — PAIN SCALES - GENERAL: PAINLEVEL: NO PAIN (0)

## 2017-02-07 NOTE — PROGRESS NOTES
[facility]  February 7, 2017         Assessment and Plan:   Mamie Rice is a 21 year old female with cystic fibrosis.    1. CF lung disease with normal spirometry:  Mamie has shown evidence of excellent stability in her pulmonary function as well as her pulmonary symptomatology.  She recently tolerated spinal anesthesia without event.  She does continue to show evidence of Staphylococcus and Achromobacter in her sputum.  At this time, she should continue on her present bronchial drainage and nebulized therapy.    2.  Recent arthroscopic knee surgery.  Mamie tolerated this well as an outpatient.  She did have some nausea and vomiting postoperatively on the way home, but this resolved without event.    3.  Psychosocial.  Mamie reports that her mother has stopped her trazodone and Prozac and that her mood has stayed stable.  She is seen by a psychiatrist at the [facility], Deyanira Hsieh.  She has been managing her medication.  She reports that the relationship is quite good.  She is currently pleased with her mood stability.    4.  Neuropathy.  Mamie was also on B12 shots for neuropathic changes.  She reports that most of them have resolved.  She is now off these shots.  This is being followed by her primary care physician.    5.  Weight management.  Mamie is currently involved in a weight management clinic.  She is pleased with her recent weight loss.    6.  Dermatology.  Mamie is being seen by Dermatology for management of her acne.   7. Pancreatic sufficiency:  The patient has no new symptoms consistent with worsening malabsorption.  The plan is to continue the present dose of vitamin supplementation.    Gavi Allison MD MPH   of Medicine  Pulmonary, Allergy, Critical Care and Sleep Medicine      Interval History:     Mamie does continue to produce minimal amounts of yellow-green sputum.  Her cough  frequency and sputum volume are stable.  She is performing vest therapy 2 times per day.          Review of Systems:     All questionnaires were reviewed by me today with the patient.  Review of Systems     Constitutional:  Negative for fever, chills, weight loss, weight gain, fatigue, decreased appetite, night sweats, recent stressors, height gain, height loss, post-operative complications, incisional pain, hallucinations, increased energy, hyperactivity and confused.   HENT:  Negative for ear pain, hearing loss, tinnitus, nosebleeds, trouble swallowing, hoarse voice, mouth sores, sore throat, ear discharge, tooth pain, gum tenderness, taste disturbance, smell disturbance, hearing aid, bleeding gums, dry mouth, sinus pain, sinus congestion and neck mass.    Eyes:  Negative for double vision, pain, redness, eye pain, decreased vision, eye watering, eye bulging, eye dryness, flashing lights, spots, floaters, strabismus, tunnel vision, jaundice and eye irritation.   Respiratory:   Negative for cough, hemoptysis, sputum production, shortness of breath, wheezing, sleep disturbances due to breathing, snores loudly, respiratory pain, dyspnea on exertion, cough disturbing sleep and postural dyspnea.    Cardiovascular:  Negative for chest pain, dyspnea on exertion, palpitations, orthopnea, claudication, leg swelling, fingers/toes turn blue, hypertension, hypotension, syncope, history of heart murmur, chest pain on exertion, chest pain at rest, pacemaker, few scattered varicosities, leg pain, sleep disturbances due to breathing, tachycardia, light-headedness, exercise intolerance and edema.   Gastrointestinal:  Negative for heartburn, nausea, vomiting, abdominal pain, diarrhea, constipation, blood in stool, melena, rectal pain, bloating, hemorrhoids, bowel incontinence, jaundice, rectal bleeding, coffee ground emesis and change in stool.   Genitourinary:  Negative for bladder incontinence, dysuria, urgency, hematuria, flank  pain, vaginal discharge, difficulty urinating, genital sores, dyspareunia, decreased libido, nocturia, voiding less frequently, arousal difficulty, abnormal vaginal bleeding, excessive menstruation, menstrual changes, hot flashes, vaginal dryness and postmenopausal bleeding.   Musculoskeletal:  Negative for myalgias, back pain, joint swelling, arthralgias, stiffness, muscle cramps, neck pain, bone pain, muscle weakness and fracture.   Skin:  Negative for nail changes, itching, poor wound healing, rash, hair changes, skin changes, acne, warts, poor wound healing, scarring, flaky skin, Raynaud's phenomenon, sensitivity to sunlight and skin thickening.   Neurological:  Negative for dizziness, tingling, tremors, speech change, seizures, loss of consciousness, weakness, light-headedness, numbness, headaches, disturbances in coordination, extremity numbness, memory loss, difficulty walking and paralysis.   Endo/Heme:  Negative for anemia, swollen glands and bruises/bleeds easily.   Psychiatric/Behavioral:  Negative for depression, hallucinations, memory loss, decreased concentration, mood swings and panic attacks.    Breast:  Negative for breast discharge, breast mass, breast pain and nipple retraction.   Endocrine:  Negative for altered temperature regulation, polyphagia, polydipsia, unwanted hair growth and change in facial hair.            Past Medical and Surgical History:     Past Medical History   Diagnosis Date     Cystic fibrosis without mention of meconium ileus      SWEAT TEST:Date: 2/17/1994   [facility]  296 mg, 104 mmol/L ClSample #2  295 mg, 104 mmol/L Cl GENOTYPING:Date: 10/15/2007,  Laboratory: [facility]: [id number]     Chronic sinusitis      Aspergillosis, with pneumonia (H)      fugus found caused chest pain     Exocrine pancreatic insufficiency      Constipation, chronic      MRSA (methicillin resistant Staphylococcus aureus) carrier      Cystic fibrosis with pulmonary  manifestations (H) 12/19/2011     Pancreatic disease      Gastro-oesophageal reflux disease      Hip pain, right      Dysthymic disorder      ADHD (attention deficit hyperactivity disorder)      Diabetes      no meds currently     Chronic infection      CF, MRSA.,      Depressive disorder      Past Surgical History   Procedure Laterality Date     Bronchoscopies       Meniscus repair       Bronchoscopy       Sinus surgery       Left hip labral tear  5/11/2011     left hip arthroscopy with labral debridement and synovectomy     Optical tracking system endoscopic sinus surgery  10/14/2011     Procedure:OPTICAL TRACKING SYSTEM ENDOSCOPIC SINUS SURGERY; FESS (functional endoscopic sinus surgery) with Image Guidance, bronchial lavage and cultures; Surgeon:GOYO KUO; Location:[facility] OR     Optical tracking system endoscopic sinus surgery  5/18/2012     Procedure:OPTICAL TRACKING SYSTEM ENDOSCOPIC SINUS SURGERY; Right  and Left Image Guided Functional Endoscopic Sinus Surgery With  Frontal Approach, [employer]; Surgeon:GOYO KUO; Location:[facility] OR     Optical tracking system endoscopic sinus surgery  9/26/2012     Procedure: OPTICAL TRACKING SYSTEM ENDOSCOPIC SINUS SURGERY;  Stealth Guided Bilateral Functional Endoscopic Sinus Surgery *Latex Safe*;  Surgeon: Goyo Kuo MD;  [facility]      knee scope, diagnostic       Arthroscopy, Knee- left     Orthopedic surgery       left hip tear repair 2010     Arthroscopy hip, osteoplasty femur proximal, combined  3/11/2013     Procedure: COMBINED ARTHROSCOPY HIP, OSTEOPLASTY FEMUR PROXIMAL;  Right Hip Arthroscopy, Labral  Debridement.    surgeon request choice anesthesia/admit to [facility] after surgery;  Surgeon: Omkar Austin MD;  Location: [facility]     Exam under anesthesia, restorations, extraction(s) dental complex, combined  5/13/2013     Procedure: COMBINED EXAM UNDER ANESTHESIA, RESTORATIONS, EXTRACTION(S) DENTAL COMPLEX;  Dental Exam, Radiographs, Restorations.  Single Extraction  Tooth #2. Restorations x 3;  Surgeon: Danilo Ortiz DDS;  Location: [facility] OR     Optical tracking system endoscopic sinus surgery Bilateral 10/16/2015     Procedure: OPTICAL TRACKING SYSTEM ENDOSCOPIC SINUS SURGERY;  Surgeon: Mariela Haile MD;  [facility]: [facility]     Exam under anesthesia anus N/A 5/10/2016     Procedure: EXAM UNDER ANESTHESIA ANUS;  Surgeon: Chet Gaviria MD;  [facility]     Inject botox N/A 5/10/2016     Procedure: INJECT BOTOX;  Surgeon: Chet Gaviria MD;  [facility]     Arthroscopy knee with medial meniscectomy Left 1/31/2017     Procedure: ARTHROSCOPY KNEE WITH MEDIAL MENISCECTOMY;  Surgeon: Jethro Coyle MD;  Location: [facility]           Family History:     Family History   Problem Relation Age of Onset     Anesthesia Reaction No family hx of      Blood Disease No family hx of      Colon Polyps No family hx of      Crohn Disease No family hx of      Ulcerative Colitis No family hx of      Colon Cancer No family hx of      Melanoma No family hx of      CANCER Paternal Grandmother      Skin Cancer Paternal Grandmother      CANCER Paternal Grandfather      PGF had throat cancer (he was a smoker)     Other Cancer Paternal Grandmother      Skin     Other Cancer Paternal Grandfather      Anxiety Disorder Paternal Grandfather      Thyroid Disease Mother      Obesity Paternal Grandmother      Obesity Other             Social History:     Social History     Social History     Marital Status: Single     Spouse Name: N/A     Number of Children: N/A     Years of Education: N/A     Occupational History     Not on file.     Social History Main Topics     Smoking status: Never Smoker      Smokeless tobacco: Never Used      Comment: one person at home smokes outside     Alcohol Use: No     Drug Use: No     Sexual Activity: No     Other Topics Concern     Blood Transfusions No     Exercise Yes     Social History Narrative    Mamie lives with  mother in [place], [place].  There is a cat in the home, but Mamie does not have any litterbox duties.  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  She gets essentially no exercise because of the tingling in her feet (says it bothers her even to stand).        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        8/2015 no change in social situation        2/15/2016 Pt is single and lives with mother and stepfather.            Medications:     Current Outpatient Prescriptions   Medication     hydrOXYzine (ATARAX) 25 MG tablet     acetaminophen (TYLENOL) 325 MG tablet     acetylcysteine (MUCOMYST) 20 % injection     MedroxyPROGESTERone Acetate (DEPO-PROVERA IM)     buPROPion (WELLBUTRIN SR) 150 MG 12 hr tablet     phentermine 15 MG capsule     spironolactone (ALDACTONE) 25 MG tablet     ascorbic acid (VITAMIN C) 500 MG tablet     montelukast (SINGULAIR) 10 MG tablet     VITAMIN D3 1000 UNITS tablet     clindamycin (CLEOCIN T) 1 % lotion     adapalene (DIFFERIN) 0.1 % cream     LORATADINE PO     albuterol (2.5 MG/3ML) 0.083% nebulizer solution     omeprazole (PRILOSEC) 20 MG capsule     vitamin E 400 UNIT capsule     azithromycin (ZITHROMAX) 500 MG tablet     beta carotene 57265 UNIT capsule     methylcellulose, laxative, (CITRUCEL) POWD     GLYCOPYRROLATE PO     TRAZODONE HCL PO     phytonadione (MEPHYTON) 5 MG tablet     cyanocobalamin (VITAMIN B12) 1000 MCG/ML injection     blood glucose (ACCU-CHEK SMARTVIEW) test strip     albuterol (PROAIR HFA, PROVENTIL HFA, VENTOLIN HFA) 108 (90 BASE) MCG/ACT inhaler     meropenem (MERREM) 500 MG injection     FLUoxetine (PROZAC) 40 MG capsule     Amphetamine-Dextroamphetamine (ADDERALL PO)     Leuprolide Acetate (LUPRON DEPOT IM)     Multiple Vitamin (MULTIVITAMIN OR)     No current facility-administered medications for this visit.     Facility-Administered Medications Ordered in Other Visits   Medication  "    albuterol (PROAIR HFA, PROVENTIL HFA, VENTOLIN HFA) inhaler            Physical Exam:   /77 mmHg  Pulse 93  Temp(Src) 98.5  F (36.9  C)  Resp 18  Ht 1.549 m (5' 1\")  Wt 80.7 kg (177 lb 14.6 oz)  BMI 33.63 kg/m2  SpO2 93%    Constitutional:   Awake, alert and in no apparent distress     Eyes:   nonicteric     ENT:   oral mucosa moist without lesions, normal tm bilaterally, bilateral mucosal edema      Neck:   Supple without supraclavicular or cervical lymphadenopathy     Lungs:   Good air flow.  No crackles. No rhonchi.  No wheezes.     Cardiovascular:   Normal S1 and S2.  RRR.  No murmur, gallop or rub.     Abdomen:   NABS, soft, nontender, nondistended.       Musculoskeletal:   No edema, digital clubbing present     Neurologic:   Alert and conversant.     Skin:   Warm, dry.  No rash on limited exam.             Data:   All laboratory and imaging data reviewed.    Cystic Fibrosis Culture  SPECIMEN DESCRIPTION   Date Value Ref Range Status   11/01/2016 Throat  Final   08/02/2016 Sputum  Final   08/02/2016 Sputum  Final   08/02/2016 Sputum  Final    CULTURE MICRO   Date Value Ref Range Status   11/01/2016 *  Final    Light growth Normal roberto carlos  Moderate growth Staphylococcus aureus This isolate is presumed to be clindamycin   resistant based on detection of inducible clindamycin resistance. Erythromycin   and clindamycin are resistant, therefore, they are not recommended for use.  Light growth Achromobacter xylosoxidans/denitrificans     08/02/2016 *  Final    Light growth Normal roberto carlos  Moderate growth Staphylococcus aureus Organism failed to thrive for   susceptibility testing  Light growth Enterobacter cloacae complex  Light growth Strain 2 Enterobacter cloacae complex     08/02/2016   Final    Culture negative for acid fast bacilli  Assayed at [facility] [address]          Recent Results (from the past 168 hour(s))   Hemoglobin A1c POCT    Collection Time: 02/07/17 12:00 AM "   Result Value Ref Range    Hemoglobin A1C 5.5 %   General PFT Lab (Please always keep checked)    Collection Time: 02/07/17  9:59 AM   Result Value Ref Range    FVC-Pred 3.55 L    FVC-Pre 3.72 L    FVC-%Pred-Pre 104 %    FEV1-Pre 3.32 L    FEV1-%Pred-Pre 107 %    FEV1FVC-Pred 87 %    FEV1FVC-Pre 89 %    FEFMax-Pred 6.60 L/sec    FEFMax-Pre 9.95 L/sec    FEFMax-%Pred-Pre 150 %    FEF2575-Pred 3.68 L/sec    FEF2575-Pre 4.48 L/sec    HYL1948-%Pred-Pre 121 %    ExpTime-Pre 6.73 sec    FIFMax-Pre 4.14 L/sec    FEV1FEV6-Pred 86 %    FEV1FEV6-Pre 89 %       PFT: The spirometry is normal.  When compared to 11/1/2016, the FEV1 and FVC have increased.  T

## 2017-02-07 NOTE — Clinical Note
2/7/2017       RE: Mamie Rice  [address] [facility] [id number]     Dear Colleague,    Thank you for referring your patient, Mamie Rice, to the [facility] at [facility]. Please see a copy of my visit note below.    [facility]  February 7, 2017         Assessment and Plan:   Mamie Rice is a 21 year old female with cystic fibrosis.    1. CF lung disease with normal spirometry:  Mamie has shown evidence of excellent stability in her pulmonary function as well as her pulmonary symptomatology.  She recently tolerated spinal anesthesia without event.  She does continue to show evidence of Staphylococcus and Achromobacter in her sputum.  At this time, she should continue on her present bronchial drainage and nebulized therapy.    2.  Recent arthroscopic knee surgery.  Mamie tolerated this well as an outpatient.  She did have some nausea and vomiting postoperatively on the way home, but this resolved without event.    3.  Psychosocial.  Mamie reports that her mother has stopped her trazodone and Prozac and that her mood has stayed stable.  She is seen by a psychiatrist at the [facility], Deyanira Hsieh.  She has been managing her medication.  She reports that the relationship is quite good.  She is currently pleased with her mood stability.    4.  Neuropathy.  Mamie was also on B12 shots for neuropathic changes.  She reports that most of them have resolved.  She is now off these shots.  This is being followed by her primary care physician.    5.  Weight management.  Mamie is currently involved in a weight management clinic.  She is pleased with her recent weight loss.    6.  Dermatology.  Mamie is being seen by Dermatology for management of her acne.   7. Pancreatic sufficiency:  The patient has no new symptoms consistent with worsening malabsorption.   The plan is to continue the present dose of vitamin supplementation.    Gavi Allison MD MPH   of Medicine  Pulmonary, Allergy, Critical Care and Sleep Medicine      Interval History:     Mamie does continue to produce minimal amounts of yellow-green sputum.  Her cough frequency and sputum volume are stable.  She is performing vest therapy 2 times per day.          Review of Systems:     All questionnaires were reviewed by me today with the patient.  Review of Systems     Constitutional:  Negative for fever, chills, weight loss, weight gain, fatigue, decreased appetite, night sweats, recent stressors, height gain, height loss, post-operative complications, incisional pain, hallucinations, increased energy, hyperactivity and confused.   HENT:  Negative for ear pain, hearing loss, tinnitus, nosebleeds, trouble swallowing, hoarse voice, mouth sores, sore throat, ear discharge, tooth pain, gum tenderness, taste disturbance, smell disturbance, hearing aid, bleeding gums, dry mouth, sinus pain, sinus congestion and neck mass.    Eyes:  Negative for double vision, pain, redness, eye pain, decreased vision, eye watering, eye bulging, eye dryness, flashing lights, spots, floaters, strabismus, tunnel vision, jaundice and eye irritation.   Respiratory:   Negative for cough, hemoptysis, sputum production, shortness of breath, wheezing, sleep disturbances due to breathing, snores loudly, respiratory pain, dyspnea on exertion, cough disturbing sleep and postural dyspnea.    Cardiovascular:  Negative for chest pain, dyspnea on exertion, palpitations, orthopnea, claudication, leg swelling, fingers/toes turn blue, hypertension, hypotension, syncope, history of heart murmur, chest pain on exertion, chest pain at rest, pacemaker, few scattered varicosities, leg pain, sleep disturbances due to breathing, tachycardia, light-headedness, exercise intolerance and edema.   Gastrointestinal:  Negative for heartburn,  nausea, vomiting, abdominal pain, diarrhea, constipation, blood in stool, melena, rectal pain, bloating, hemorrhoids, bowel incontinence, jaundice, rectal bleeding, coffee ground emesis and change in stool.   Genitourinary:  Negative for bladder incontinence, dysuria, urgency, hematuria, flank pain, vaginal discharge, difficulty urinating, genital sores, dyspareunia, decreased libido, nocturia, voiding less frequently, arousal difficulty, abnormal vaginal bleeding, excessive menstruation, menstrual changes, hot flashes, vaginal dryness and postmenopausal bleeding.   Musculoskeletal:  Negative for myalgias, back pain, joint swelling, arthralgias, stiffness, muscle cramps, neck pain, bone pain, muscle weakness and fracture.   Skin:  Negative for nail changes, itching, poor wound healing, rash, hair changes, skin changes, acne, warts, poor wound healing, scarring, flaky skin, Raynaud's phenomenon, sensitivity to sunlight and skin thickening.   Neurological:  Negative for dizziness, tingling, tremors, speech change, seizures, loss of consciousness, weakness, light-headedness, numbness, headaches, disturbances in coordination, extremity numbness, memory loss, difficulty walking and paralysis.   Endo/Heme:  Negative for anemia, swollen glands and bruises/bleeds easily.   Psychiatric/Behavioral:  Negative for depression, hallucinations, memory loss, decreased concentration, mood swings and panic attacks.    Breast:  Negative for breast discharge, breast mass, breast pain and nipple retraction.   Endocrine:  Negative for altered temperature regulation, polyphagia, polydipsia, unwanted hair growth and change in facial hair.            Past Medical and Surgical History:     Past Medical History   Diagnosis Date     Cystic fibrosis without mention of meconium ileus      SWEAT TEST:Date: 2/17/1994   Labor[facility]  296 mg, 104 mmol/L ClSample #2  295 mg, 104 mmol/L Cl GENOTYPING:Date: 10/15/2007,  Laboratory:  AmbryGenotype: [id number]     Chronic sinusitis      Aspergillosis, with pneumonia (H)      fugus found caused chest pain     Exocrine pancreatic insufficiency      Constipation, chronic      MRSA (methicillin resistant Staphylococcus aureus) carrier      Cystic fibrosis with pulmonary manifestations (H) 12/19/2011     Pancreatic disease      Gastro-oesophageal reflux disease      Hip pain, right      Dysthymic disorder      ADHD (attention deficit hyperactivity disorder)      Diabetes      no meds currently     Chronic infection      CF, MRSA.,      Depressive disorder      Past Surgical History   Procedure Laterality Date     Bronchoscopies       Meniscus repair       Bronchoscopy       Sinus surgery       Left hip labral tear  5/11/2011     left hip arthroscopy with labral debridement and synovectomy     Optical tracking system endoscopic sinus surgery  10/14/2011     Procedure:OPTICAL TRACKING SYSTEM ENDOSCOPIC SINUS SURGERY; FESS (functional endoscopic sinus surgery) with Image Guidance, bronchial lavage and cultures; Surgeon:GOYO KUO; Location:[facility] OR     Optical tracking system endoscopic sinus surgery  5/18/2012     Procedure:OPTICAL TRACKING SYSTEM ENDOSCOPIC SINUS SURGERY; Right  and Left Image Guided Functional Endoscopic Sinus Surgery With  Frontal Approach, [employer]; Surgeon:GOYO KUO; Location[facility] OR     Optical tracking system endoscopic sinus surgery  9/26/2012     Procedure: OPTICAL TRACKING SYSTEM ENDOSCOPIC SINUS SURGERY;  Stealth Guided Bilateral Functional Endoscopic Sinus Surgery *Latex Safe*;  Surgeon: Goyo Kuo MD;  [facility]      knee scope, diagnostic       Arthroscopy, Knee- left     Orthopedic surgery       left hip tear repair 2010     Arthroscopy hip, osteoplasty femur proximal, combined  3/11/2013     Procedure: COMBINED ARTHROSCOPY HIP, OSTEOPLASTY FEMUR PROXIMAL;  Right Hip Arthroscopy, Labral  Debridement.    surgeon request choice anesthesia/admit to [facility]  after surgery;  Surgeon: Omkar Austin MD;  [facility]: [facility]     Exam under anesthesia, restorations, extraction(s) dental complex, combined  5/13/2013     Procedure: COMBINED EXAM UNDER ANESTHESIA, RESTORATIONS, EXTRACTION(S) DENTAL COMPLEX;  Dental Exam, Radiographs, Restorations. Single Extraction  Tooth #2. Restorations x 3;  Surgeon: Danilo Ortiz DDS;  Location: [facility] OR     Optical tracking system endoscopic sinus surgery Bilateral 10/16/2015     Procedure: OPTICAL TRACKING SYSTEM ENDOSCOPIC SINUS SURGERY;  Surgeon: Mariela Haile MD;  [facility]     Exam under anesthesia anus N/A 5/10/2016     Procedure: EXAM UNDER ANESTHESIA ANUS;  Surgeon: Chet Gaviria MD;  [facility]     Inject botox N/A 5/10/2016     Procedure: INJECT BOTOX;  Surgeon: Chet Gaviria MD;  [facility]     Arthroscopy knee with medial meniscectomy Left 1/31/2017     Procedure: ARTHROSCOPY KNEE WITH MEDIAL MENISCECTOMY;  Surgeon: Jethro Coyle MD;  [facility]           Family History:     Family History   Problem Relation Age of Onset     Anesthesia Reaction No family hx of      Blood Disease No family hx of      Colon Polyps No family hx of      Crohn Disease No family hx of      Ulcerative Colitis No family hx of      Colon Cancer No family hx of      Melanoma No family hx of      CANCER Paternal Grandmother      Skin Cancer Paternal Grandmother      CANCER Paternal Grandfather      PGF had throat cancer (he was a smoker)     Other Cancer Paternal Grandmother      Skin     Other Cancer Paternal Grandfather      Anxiety Disorder Paternal Grandfather      Thyroid Disease Mother      Obesity Paternal Grandmother      Obesity Other             Social History:     Social History     Social History     Marital Status: Single     Spouse Name: N/A     Number of Children: N/A     Years of Education: N/A     Occupational History     Not on file.     Social History Main Topics      Smoking status: Never Smoker      Smokeless tobacco: Never Used      Comment: one person at home smokes outside     Alcohol Use: No     Drug Use: No     Sexual Activity: No     Other Topics Concern     Blood Transfusions No     Exercise Yes     Social History Narrative    Mamie lives with mother in [place], [place].  There is a cat in the home, but Mamie does not have any litterbox duties.  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  She gets essentially no exercise because of the tingling in her feet (says it bothers her even to stand).        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        8/2015 no change in social situation        2/15/2016 Pt is single and lives with mother and stepfather.            Medications:     Current Outpatient Prescriptions   Medication     hydrOXYzine (ATARAX) 25 MG tablet     acetaminophen (TYLENOL) 325 MG tablet     acetylcysteine (MUCOMYST) 20 % injection     MedroxyPROGESTERone Acetate (DEPO-PROVERA IM)     buPROPion (WELLBUTRIN SR) 150 MG 12 hr tablet     phentermine 15 MG capsule     spironolactone (ALDACTONE) 25 MG tablet     ascorbic acid (VITAMIN C) 500 MG tablet     montelukast (SINGULAIR) 10 MG tablet     VITAMIN D3 1000 UNITS tablet     clindamycin (CLEOCIN T) 1 % lotion     adapalene (DIFFERIN) 0.1 % cream     LORATADINE PO     albuterol (2.5 MG/3ML) 0.083% nebulizer solution     omeprazole (PRILOSEC) 20 MG capsule     vitamin E 400 UNIT capsule     azithromycin (ZITHROMAX) 500 MG tablet     beta carotene 48320 UNIT capsule     methylcellulose, laxative, (CITRUCEL) POWD     GLYCOPYRROLATE PO     TRAZODONE HCL PO     phytonadione (MEPHYTON) 5 MG tablet     cyanocobalamin (VITAMIN B12) 1000 MCG/ML injection     blood glucose (ACCU-CHEK SMARTVIEW) test strip     albuterol (PROAIR HFA, PROVENTIL HFA, VENTOLIN HFA) 108 (90 BASE) MCG/ACT inhaler     meropenem (MERREM) 500 MG injection     FLUoxetine  "(PROZAC) 40 MG capsule     Amphetamine-Dextroamphetamine (ADDERALL PO)     Leuprolide Acetate (LUPRON DEPOT IM)     Multiple Vitamin (MULTIVITAMIN OR)     No current facility-administered medications for this visit.     Facility-Administered Medications Ordered in Other Visits   Medication     albuterol (PROAIR HFA, PROVENTIL HFA, VENTOLIN HFA) inhaler            Physical Exam:   /77 mmHg  Pulse 93  Temp(Src) 98.5  F (36.9  C)  Resp 18  Ht 1.549 m (5' 1\")  Wt 80.7 kg (177 lb 14.6 oz)  BMI 33.63 kg/m2  SpO2 93%    Constitutional:   Awake, alert and in no apparent distress     Eyes:   nonicteric     ENT:   oral mucosa moist without lesions, normal tm bilaterally, bilateral mucosal edema      Neck:   Supple without supraclavicular or cervical lymphadenopathy     Lungs:   Good air flow.  No crackles. No rhonchi.  No wheezes.     Cardiovascular:   Normal S1 and S2.  RRR.  No murmur, gallop or rub.     Abdomen:   NABS, soft, nontender, nondistended.       Musculoskeletal:   No edema, digital clubbing present     Neurologic:   Alert and conversant.     Skin:   Warm, dry.  No rash on limited exam.             Data:   All laboratory and imaging data reviewed.    Cystic Fibrosis Culture  SPECIMEN DESCRIPTION   Date Value Ref Range Status   11/01/2016 Throat  Final   08/02/2016 Sputum  Final   08/02/2016 Sputum  Final   08/02/2016 Sputum  Final    CULTURE MICRO   Date Value Ref Range Status   11/01/2016 *  Final    Light growth Normal roberto carlos  Moderate growth Staphylococcus aureus This isolate is presumed to be clindamycin   resistant based on detection of inducible clindamycin resistance. Erythromycin   and clindamycin are resistant, therefore, they are not recommended for use.  Light growth Achromobacter xylosoxidans/denitrificans     08/02/2016 *  Final    Light growth Normal roberto carlos  Moderate growth Staphylococcus aureus Organism failed to thrive for   susceptibility testing  Light growth Enterobacter cloacae " "complex  Light growth Strain 2 Enterobacter cloacae complex     08/02/2016   Final    Culture negative for acid fast bacilli  Assayed at [facility] [address]          Recent Results (from the past 168 hour(s))   Hemoglobin A1c POCT    Collection Time: 02/07/17 12:00 AM   Result Value Ref Range    Hemoglobin A1C 5.5 %   General PFT Lab (Please always keep checked)    Collection Time: 02/07/17  9:59 AM   Result Value Ref Range    FVC-Pred 3.55 L    FVC-Pre 3.72 L    FVC-%Pred-Pre 104 %    FEV1-Pre 3.32 L    FEV1-%Pred-Pre 107 %    FEV1FVC-Pred 87 %    FEV1FVC-Pre 89 %    FEFMax-Pred 6.60 L/sec    FEFMax-Pre 9.95 L/sec    FEFMax-%Pred-Pre 150 %    FEF2575-Pred 3.68 L/sec    FEF2575-Pre 4.48 L/sec    EXA7682-%Pred-Pre 121 %    ExpTime-Pre 6.73 sec    FIFMax-Pre 4.14 L/sec    FEV1FEV6-Pred 86 %    FEV1FEV6-Pre 89 %       PFT: The spirometry is normal.  When compared to 11/1/2016, the FEV1 and FVC have increased.  T      CF Annual Nutrition Assessment    Reason for Assessment  Assessed during Dr. Gavi Allison' clinic r/t increased nutrition risk with diagnosis of CF per protocol.    Nutrition Significant PMH  Mild Lung Disease   Pancreatic Insufficient (fecal elastase done 2013)  CFRD - sees CF Endocrinologist, Dr. 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   Current weight is considered: Overweight/Obese and above recommended weight range for CF     Wt Readings from Last 12 Encounters:   02/07/17 " "80.7 kg (177 lb 14.6 oz)   02/07/17 80.7 kg (177 lb 14.6 oz)   01/31/17 82.4 kg (181 lb 10.5 oz)   01/23/17 82.373 kg (181 lb 9.6 oz)   01/11/17 82.555 kg (182 lb)   11/07/16 82.555 kg (182 lb)   11/01/16 83 kg (182 lb 15.7 oz)   10/14/16 82.827 kg (182 lb 9.6 oz)   08/24/16 83.734 kg (184 lb 9.6 oz)   08/02/16 84 kg (185 lb 3 oz)   06/20/16 83.144 kg (183 lb 4.8 oz)   05/24/16 83.4 kg (183 lb 13.8 oz)     Comments:  Mamie is making slow progress with weight loss, continues to follow with the [facility] clinic team and is taking medications to support weight control.  She has lost 3 kg since August 2016 or 3.6% total body weight.      Physical Activity/Exercise:  Due to recent knee surgery and back pain issues Mamie is doing very little physical activity outside of ADL's, no formal exercise regimen.  She is seeing a Chiropractor but has not done physical therapy.  She states she has activity restrictions for a further 3 weeks and then can return to more strength/endurance activities.     Pancreatic Enzymes  Although tested as pancreatic insufficient pt does not take enzymes, stopped taking them in 2015 as she felt they had little affect on her GI symptoms. Ok'd this with CF MD prior to stopping.      Diet History and Assessment  Diet Preferences/Allergies/Intolerances: Regular diet  Appetite Stimulant Rx:  No, on medications for appetite suppression for weight loss per [facility]WM  Intake Recall/Comments:  Due to recent knee surgery pt states her dieting plan has gotten somewhat put on hold although she is still trying to limit snacking and portions. Continues to take the recommended medications from [facility] although is concerned one of them may be \"on hold\" and she is unsure what to do or if this will impact her negatively (phentermine). She is struggling with reducing milk consumption.  Baseline of 1 gallon skim milk per day, has been instructed via MV to get this down to 1 glass.  Pt states this has been a struggle for her since " she enjoys milk so much but also did not know if this was ok for her CF.      Calcium: As above, baseline intake 1 gallon milk and cheese/other dairy PRN  Salt: Adequate, added to meals  Hydration:  Does not like plain water, will drink juice or soda but she is also trying to reduce her intake of soda (Dr. Pepper)    Supplements:  No    Tube Feeding:  No    MALNUTRITION  % Intake:  No decreased intake noted  % Weight Loss:  Weight loss does not meet criteria for malnutrition (intentional)  Subcutaneous Fat Loss:  None observed  Muscle Loss:  None observed  Fluid Accumulation/Edema:  None noted    Malnutrition Diagnosis: Patient does not meet two of the above criteria necessary for diagnosing malnutrition    Estimated Energy and Protein Needs  Estimation based on weight loss with Mild lung disease and mild pancreatic insufficiency.    9034-1600 kcals/day =  25-30 kcal/kg For weight loss (increase to 30-35 kcal/day for maintenance)   70-90 g protein/day = 1.2-1.5 g/kg (increase to 1.5-2 g/kg- pancreatic insufficient)    Laboratory Assessment  Date: 8/2/16  Total 25-Hydroxyvitamin D: WNL  Vitamin A: WNL  Vitamin E: WNL  Iron: WNL  Lipid Panel: Low HDL, high TGL    Current Vitamin/Mineral Prescriptions: Multivitamin, Vitamin D 1000 International Units, Vitamin E 400 International Units, Betacarotene 25,000 International Units 3x/week, Vitamin B12 1 mL IMJ 1x/month, Vitamin K weekly and Vitamin C 250 mg BID    Comments:  Pt states she is taking all of the above, could not remember exactly the amounts but the supplements above are per her current prescriptions.      NUTRITION DIAGNOSIS  Overweight/obese r/t excessive energy intake and lack of physical activity AEB BMI >30 kg/m2 with pt-reported chronic overeating requiring intervention via [facility] clinic.     INTERVENTIONS/RECOMMENDATIONS  1) Reviewed weight trends, current intake patterns and food recall, physical activity goals s/p knee surgery.  Mamie is doing well  following with her team in the [facility] clinic and I encouraged her to continue to pursue this care.  Reinforced intake goals as given by [facility] hallie and CRISTINA, however due to pt's increased risk of CF-related bone disease I instructed her to increase goal milk consumption to 2 16-oz glasses per day of skim milk (~1200 mg calcium per day).  I will provide an SBAR to CHARITY JUNIOR and RD regarding our visit today and this goal adjustment, pt in agreement with this communication.   2) Endocrine - pt seen by CF endocrinologist today, remains off insulin.  Encouraged frequent blood glucose checks/as prescribed and quick treatment of hypoglycemic episodes.  Pt afraid of more frequent hypoglycemic episodes due to reducing intake for weight loss, encouraged her to have a source of glucose/sucrose nearby at all times and reviewed the 15/15 correction rule.    3) Reviewed annual studies/vitamin prescriptions.  No changes made today, continue same regimen.  Annual studies due with DEXA in August 2017.     Patient Understanding: Good  Expected Compliance: Good  Follow-Up Plans: Per protocol    GOALS:  1) Limit daily milk intake to 2 16-oz glasses of skim per day.  2) Limit snacking between meals aside from hypoglycemic needs.  3) Weight to trend downward to goal of BMI <30 kg/m2 within the next 6-12 months.       FOLLOW-UP/MONITORING:  Visit patient within 6-12 month(s)    Time Spent In Face-to-Face Patient Interactions:  30 minutes    Theresa Ceja MS, RD, LD (pager [phone])  Cystic Fibrosis/Lung Transplant Dietitian      -Available Tues-Fri 8 AM-4:30 PM. On Mondays please contact pager [phone] (Amy Andino RD) and on weekends/holidays contact coverage dietitian at pager [phone] (inpatient use only).         Again, thank you for allowing me to participate in the care of your patient.      Sincerely,    Gavi Allison MD

## 2017-02-07 NOTE — PATIENT INSTRUCTIONS
Cystic Fibrosis Self-Care Plan       MRN: [id number]   Clinic Date: February 7, 2017   Patient: Mamie Rice     Annual Studies:   IGG   Date Value Ref Range Status   08/02/2016 612* 695 - 1620 mg/dL Final     INSULIN   Date Value Ref Range Status   03/30/2015 81 mU/L Final     Comment:     Reference Range:  0-20     There are no preventive care reminders to display for this patient.      Pulmonary Function Tests  FEV1: amount of air you can blow out in 1 second  FVC: total amount of air you can take in and blow out    Your Goals:         PFT 2/7/2017   FVC 3.72   FEV1 3.32   FVC% 104   FEV1% 107          Airway Clearance: The Most Important Way to Keep Your Lungs Healthy  Vest Settings:    Hill-Rom Frequencies: 8, 9, 10 Pressure 10 Then, Frequencies 18, 19, 20 Pressure 6      RespirTech: Quick Start with Pressure of     Do each frequency for 5 minutes; Deflate vest after each frequency & cough 3 times before beginning the next setting.    Vest and Neb Therapy should be done 1-2 times/day.    Good Nutrition Can Improve Lung Function and Overall Health     Take ALL of your vitamins with food     Take 1/2 of your enzymes before EVERY meal/snack and the other 1/2 mid-meal/snack    Wt Readings from Last 3 Encounters:   02/07/17 80.7 kg (177 lb 14.6 oz)   02/07/17 80.7 kg (177 lb 14.6 oz)   01/31/17 82.4 kg (181 lb 10.5 oz)       Body mass index is 33.63 kg/(m^2).         National CF Foundation Recommendations for BMI in CF Adults: Women: at least 22 Men: at least 23        Controlling Blood Sugars Helps Prevent Lung Infections & Improves Nutrition  Test blood sugar:     In the morning before eating (goal is )     2 hours after a meal (goal is less than 150)     When pre-meal glucose is greater than 150 add correction     At bedtime (if less than 100 eat a snack with 15 grams of carbohydrates  Last A1C Results:   HEMOGLOBIN A1C   Date Value Ref Range Status   02/07/2017 5.5 % Final         If diabetic,  measure A1C every 6 months. Goal: Under 7%    Staying Healthy    Research:  If you are interested in learning about research opportunities or have questions, please contact Mariana Smith at [phone] or [email or web address].      CF Foundation:  Compass is a personalized resource service to help you with the insurance, financial, legal and other issues you are facing.  It's free, confidential and available to anyone with CF.  Ask your  for more information or contact Compass directly at [phone]-COMPASS ([phone]) or compass@cff.org, or learn more at cff.org/compass.          RECOMMENDATIONS: Great job!  Keep doing what your doing.    YOUR GOAL:  Enjoy the rest of the school year.  Spring will be here soon.      CF Nurse Line:  Burton Anderson: [phone]   Jhoana Trinh, RT: [phone]     Amy Andino: [phone] or Theresa Ceja, Dieticians: [phone]   Audra Zhang, Diabetes Nurse: [phone]      Rupa Dunne: [phone] or Rosette Burnett [phone], Social Workers   [email or web address]

## 2017-02-07 NOTE — MR AVS SNAPSHOT
After Visit Summary   2/7/2017    Mamie Rice    MRN: [id number]           Patient Information     Date Of Birth          1995        Visit Information        Provider Department      2/7/2017 10:40 AM Hugo Prieto MD [facility]        Today's Diagnoses     CF (cystic fibrosis) (H)    -  1       Care Instructions    Nice to see meet you today Mamie.    As far as your diabetes/hypoglycemia: lets get more data.  Keep careful records for 2-3 days  and send them to Rachel.  The more information the better, including glucose levels before and 2 hours after eating.     Lets schedule a return appointment for 6 months.      Call Rachel, Diabetes Educator at [phone] in 2-3 days to review glucose numbers        Follow-ups after your visit        Follow-up notes from your care team     Return in about 6 months (around 8/7/2017).      Your next 10 appointments already scheduled     Feb 15, 2017  2:45 PM   (Arrive by 2:30 PM)   RETURN RHINOLOGY with Mariela Haile MD   [facility] ([facility])    [address] [place] [address]   [phone]            Apr 03, 2017  2:45 PM   (Arrive by 2:30 PM)   RETURN RHINOLOGY with Mariela Haile MD   [facility] ([facility])    [address] [place] [address]   [phone]            Apr 25, 2017 11:00 AM   CF LOOP with  PFL CF   [facility] Pulmonary Function Testing ([facility])    [address] [place] [address]   [phone]            Apr 25, 2017 11:20 AM   (Arrive by 11:05 AM)   RETURN CYSTIC FIBROSIS VISIT with Gavi Allison MD   [facility] Science [facility] ([facility])    [address] [place] [address]   [phone]              Who to contact     If  "you have questions or need follow up information about today's clinic visit or your schedule please contact [facility] FOR [facility]ENCE [facility] HE[facility] directly at [phone].  Normal or non-critical lab and imaging results will be communicated to you by MyChart, letter or phone within 4 business days after the clinic has received the results. If you do not hear from us within 7 days, please contact the clinic through Whisper Communicationshart or phone. If you have a critical or abnormal lab result, we will notify you by phone as soon as possible.  Submit refill requests through iTagged or call your pharmacy and they will forward the refill request to us. Please allow 3 business days for your refill to be completed.          Additional Information About Your Visit        iTagged Information     iTagged gives you secure access to your electronic health record. If you see a primary care provider, you can also send messages to your care team and make appointments. If you have questions, please call your primary care clinic.  If you do not have a primary care provider, please call [phone] and they will assist you.        Care EveryWhere ID     This is your Care EveryWhere ID. This could be used by other organizations to access your [facility] medical records  [id number]        Your Vitals Were     Pulse Temperature Respirations Height BMI (Body Mass Index) Pulse Oximetry    93 98.5  F (36.9  C) 18 1.549 m (5' 1\") 33.63 kg/m2 93%       Blood Pressure from Last 3 Encounters:   02/07/17 117/77   02/07/17 117/77   01/31/17 117/80    Weight from Last 3 Encounters:   02/07/17 80.7 kg (177 lb 14.6 oz)   02/07/17 80.7 kg (177 lb 14.6 oz)   01/31/17 82.4 kg (181 lb 10.5 oz)              We Performed the Following     Hemoglobin A1c POCT        Primary Care Provider Office Phone # Fax #    Malik De La Rosa [phone]       [address] [facility] [address]        Thank you!     Thank " you for choosing [facility]ENCE [facility]  for your care. Our goal is always to provide you with excellent care. Hearing back from our patients is one way we can continue to improve our services. Please take a few minutes to complete the written survey that you may receive in the mail after your visit with us. Thank you!             Your Updated Medication List - Protect others around you: Learn how to safely use, store and throw away your medicines at www.disposemymeds.org.          This list is accurate as of: 2/7/17  1:36 PM.  Always use your most recent med list.                   Brand Name Dispense Instructions for use    acetaminophen 325 MG tablet    TYLENOL    100 tablet    Take 2 tablets (650 mg) by mouth every 4 hours as needed for other (mild pain)       acetylcysteine 20 % injection    MUCOMYST    360 mL    INHALE ONE 4ML NEB INTO LUNGS VIA NEBULIZER TWO TIMES A DAY, MAY INCREASE TO 3-4 TIMES A DAY WITH INCREASE COUGH/COLD SYMPTOMS       adapalene 0.1 % cream    DIFFERIN    45 g    Apply topically At Bedtime After washing face       ADDERALL PO      Take 20 mg by mouth daily.       * albuterol 108 (90 BASE) MCG/ACT Inhaler    PROAIR HFA/PROVENTIL HFA/VENTOLIN HFA    1 Inhaler    Inhale 2 puffs into the lungs every 6 hours as needed for shortness of breath / dyspnea or wheezing       * albuterol (2.5 MG/3ML) 0.083% neb solution     120 vial    Take 1 vial (2.5 mg) by nebulization 4 times daily       ascorbic acid 500 MG tablet    VITAMIN C    100 tablet    TAKE ONE TABLET BY MOUTH TWICE A DAY       azithromycin 500 MG tablet    ZITHROMAX    36 tablet    Take 1 tablet (500 mg) by mouth daily M-W-F       beta carotene 50822 UNIT capsule     36 capsule    Take 1 capsule (25,000 Units) by mouth Every Mon, Wed, Fri Morning       blood glucose monitoring test strip    ACCU-CHEK SMARTVIEW    1 Month    Use to test blood sugar 4 times daily or as directed.       buPROPion 150 MG 12 hr tablet     WELLBUTRIN SR     Take 150 mg by mouth 2 times daily       cholecalciferol 1000 UNIT tablet    vitamin D    30 tablet    TAKE ONE TABLET BY MOUTH EVERY DAY       clindamycin 1 % lotion    CLEOCIN T    60 mL    Apply topically every morning After washing face with benzoyl peroxide wash       cyanocobalamin 1000 MCG/ML injection    VITAMIN B12     Inject 1 mL into the muscle every 30 days       DEPO-PROVERA IM          GLYCOPYRROLATE PO      Take 1 mg by mouth 2 times daily       hydrOXYzine 25 MG tablet    ATARAX    30 tablet    Take 1 tablet (25 mg) by mouth every 6 hours as needed for itching (and nausea)       LORATADINE PO      Take 10 mg by mouth       LUPRON DEPOT IM      Inject  into the muscle every 3 months.       meropenem 500 MG vial    MERREM    4 mL    500 mg by Nasal Instillation route as needed       methylcellulose (laxative) Powd    CITRUCEL    479 g    Start with 1 heaping tablespoon. Increase as needed, 1 heaping tablespoon at a time, up to 3 times per day.       montelukast 10 MG tablet    SINGULAIR    30 tablet    TAKE ONE TABLET BY MOUTH ONCE DAILY AT BEDTIME       MULTIVITAMIN PO      Take 1 tablet by mouth daily.       omeprazole 20 MG CR capsule    priLOSEC    60 capsule    TAKE 1 CAPSULE BY MOUTH TWICE A DAY       phentermine 15 MG capsule     30 capsule    Take 2 capsules (30 mg) by mouth every morning       phytonadione 5 MG tablet    MEPHYTON    4 tablet    Take 1 tablet (5 mg) by mouth once a week       PROZAC 40 MG capsule   Generic drug:  FLUoxetine      Take 80 mg by mouth daily.       spironolactone 25 MG tablet    ALDACTONE    60 tablet    Take 1 tablet (25 mg) by mouth daily       TRAZODONE HCL PO      Take 100 mg by mouth At Bedtime       vitamin E 400 UNIT capsule     60 capsule    TAKE 1 CAPSULE BY MOUTH TWICE A DAY       * Notice:  This list has 2 medication(s) that are the same as other medications prescribed for you. Read the directions carefully, and ask your doctor or  other care provider to review them with you.

## 2017-02-07 NOTE — MR AVS SNAPSHOT
After Visit Summary   2/7/2017    Mamie Rice    MRN: [id number]           Patient Information     Date Of Birth          1995        Visit Information        Provider Department      2/7/2017 11:20 AM Gavi Allison MD [facility] for [facility] Science and [facility]        Today's Diagnoses     Cystic fibrosis with pulmonary manifestations (H)    -  1       Care Instructions    Cystic Fibrosis Self-Care Plan       MRN: [id number]   Clinic Date: February 7, 2017   Patient: Mamie Rice     Annual Studies:   IGG   Date Value Ref Range Status   08/02/2016 612* 695 - 1620 mg/dL Final     INSULIN   Date Value Ref Range Status   03/30/2015 81 mU/L Final     Comment:     Reference Range:  0-20     There are no preventive care reminders to display for this patient.      Pulmonary Function Tests  FEV1: amount of air you can blow out in 1 second  FVC: total amount of air you can take in and blow out    Your Goals:         PFT 2/7/2017   FVC 3.72   FEV1 3.32   FVC% 104   FEV1% 107          Airway Clearance: The Most Important Way to Keep Your Lungs Healthy  Vest Settings:    Hill-Rom Frequencies: 8, 9, 10 Pressure 10 Then, Frequencies 18, 19, 20 Pressure 6      RespirTech: Quick Start with Pressure of     Do each frequency for 5 minutes; Deflate vest after each frequency & cough 3 times before beginning the next setting.    Vest and Neb Therapy should be done 1-2 times/day.    Good Nutrition Can Improve Lung Function and Overall Health     Take ALL of your vitamins with food     Take 1/2 of your enzymes before EVERY meal/snack and the other 1/2 mid-meal/snack    Wt Readings from Last 3 Encounters:   02/07/17 80.7 kg (177 lb 14.6 oz)   02/07/17 80.7 kg (177 lb 14.6 oz)   01/31/17 82.4 kg (181 lb 10.5 oz)       Body mass index is 33.63 kg/(m^2).         National CF Foundation Recommendations for BMI in CF Adults: Women: at least 22 Men: at least 23        Controlling Blood Sugars Helps  Prevent Lung Infections & Improves Nutrition  Test blood sugar:     In the morning before eating (goal is )     2 hours after a meal (goal is less than 150)     When pre-meal glucose is greater than 150 add correction     At bedtime (if less than 100 eat a snack with 15 grams of carbohydrates  Last A1C Results:   HEMOGLOBIN A1C   Date Value Ref Range Status   02/07/2017 5.5 % Final         If diabetic, measure A1C every 6 months. Goal: Under 7%    Staying Healthy    Research:  If you are interested in learning about research opportunities or have questions, please contact Mariana Smith at [phone] or [email or web address].      CF Foundation:  Compass is a personalized resource service to help you with the insurance, financial, legal and other issues you are facing.  It's free, confidential and available to anyone with CF.  Ask your  for more information or contact Compass directly at [facility] ([phone]) or compass@cff.org, or learn more at cff.org/compass.          RECOMMENDATIONS: Great job!  Keep doing what your doing.    YOUR GOAL:  Enjoy the rest of the school year.  Spring will be here soon.      CF Nurse Line:  Burton Anderson: [phone]   Jhoana Trinh, RT: [phone]     Amy Andino: [phone] or Tanisha Pablo: [phone]   Audra Zhang, Diabetes Nurse: [phone]      Rupa Dunne: [phone] or Rosette Burnett [phone], Social Workers   [email or web address]                   Follow-ups after your visit        Follow-up notes from your care team     Return in about 3 months (around 5/7/2017).      Your next 10 appointments already scheduled     Feb 15, 2017  2:45 PM   (Arrive by 2:30 PM)   RETURN RHINOLOGY with Mariela Haile MD   [facility] ([facility])    [address] [place] [address]   [phone]            Apr 03, 2017  2:45 PM   (Arrive by 2:30 PM)   RETURN  RHINOLOGY with Mariela Haile MD   [facility] ([facility])    [address]  [address] [facility] [place] [address] [phone]            Apr 25, 2017 11:00 AM   CF LOOP with UC PFL CF   [facility] Testing ([facility])    [address]  [address] [facility] [place] [id number] [phone]            Apr 25, 2017 11:20 AM   (Arrive by 11:05 AM)   RETURN CYSTIC FIBROSIS VISIT with Gavi Allison MD   [facility] for Lung Science and Health ([facility])    [address]  [address] [facility] [place] [address] [phone]              Who to contact     If you have questions or need follow up information about today's clinic visit or your schedule please contact [facility] directly at [phone].  Normal or non-critical lab and imaging results will be communicated to you by Evrihart, letter or phone within 4 business days after the clinic has received the results. If you do not hear from us within 7 days, please contact the clinic through RVXt or phone. If you have a critical or abnormal lab result, we will notify you by phone as soon as possible.  Submit refill requests through Triptrotting or call your pharmacy and they will forward the refill request to us. Please allow 3 business days for your refill to be completed.          Additional Information About Your Visit        Evrihart Information     Triptrotting gives you secure access to your electronic health record. If you see a primary care provider, you can also send messages to your care team and make appointments. If you have questions, please call your primary care clinic.  If you do not have a primary care provider, please call [phone] and they will assist you.        Care EveryWhere ID     This is your Care EveryWhere ID. This could be used by other organizations to access your  "[facility] medical records  [id number]        Your Vitals Were     Pulse Temperature Respirations Height BMI (Body Mass Index) Pulse Oximetry    93 98.5  F (36.9  C) 18 1.549 m (5' 1\") 33.63 kg/m2 93%       Blood Pressure from Last 3 Encounters:   02/07/17 117/77   02/07/17 117/77   01/31/17 117/80    Weight from Last 3 Encounters:   02/07/17 80.7 kg (177 lb 14.6 oz)   02/07/17 80.7 kg (177 lb 14.6 oz)   01/31/17 82.4 kg (181 lb 10.5 oz)              Today, you had the following     No orders found for display       Primary Care Provider Office Phone # Fax #    Malik De La Rosa [phone]       [address] [facility] [address]        Thank you!     Thank you for choosing [facility] FOR LUNG SCIENCE AND HEALTH  for your care. Our goal is always to provide you with excellent care. Hearing back from our patients is one way we can continue to improve our services. Please take a few minutes to complete the written survey that you may receive in the mail after your visit with us. Thank you!             Your Updated Medication List - Protect others around you: Learn how to safely use, store and throw away your medicines at www.disposemymeds.org.          This list is accurate as of: 2/7/17  1:46 PM.  Always use your most recent med list.                   Brand Name Dispense Instructions for use    acetaminophen 325 MG tablet    TYLENOL    100 tablet    Take 2 tablets (650 mg) by mouth every 4 hours as needed for other (mild pain)       acetylcysteine 20 % injection    MUCOMYST    360 mL    INHALE ONE 4ML NEB INTO LUNGS VIA NEBULIZER TWO TIMES A DAY, MAY INCREASE TO 3-4 TIMES A DAY WITH INCREASE COUGH/COLD SYMPTOMS       adapalene 0.1 % cream    DIFFERIN    45 g    Apply topically At Bedtime After washing face       ADDERALL PO      Take 20 mg by mouth daily.       * albuterol 108 (90 BASE) MCG/ACT Inhaler    PROAIR HFA/PROVENTIL HFA/VENTOLIN HFA    1 Inhaler "    Inhale 2 puffs into the lungs every 6 hours as needed for shortness of breath / dyspnea or wheezing       * albuterol (2.5 MG/3ML) 0.083% neb solution     120 vial    Take 1 vial (2.5 mg) by nebulization 4 times daily       ascorbic acid 500 MG tablet    VITAMIN C    100 tablet    TAKE ONE TABLET BY MOUTH TWICE A DAY       azithromycin 500 MG tablet    ZITHROMAX    36 tablet    Take 1 tablet (500 mg) by mouth daily M-W-F       beta carotene 23726 UNIT capsule     36 capsule    Take 1 capsule (25,000 Units) by mouth Every Mon, Wed, Fri Morning       blood glucose monitoring test strip    ACCU-CHEK SMARTVIEW    1 Month    Use to test blood sugar 4 times daily or as directed.       buPROPion 150 MG 12 hr tablet    WELLBUTRIN SR     Take 150 mg by mouth 2 times daily       cholecalciferol 1000 UNIT tablet    vitamin D    30 tablet    TAKE ONE TABLET BY MOUTH EVERY DAY       clindamycin 1 % lotion    CLEOCIN T    60 mL    Apply topically every morning After washing face with benzoyl peroxide wash       cyanocobalamin 1000 MCG/ML injection    VITAMIN B12     Inject 1 mL into the muscle every 30 days       DEPO-PROVERA IM          GLYCOPYRROLATE PO      Take 1 mg by mouth 2 times daily       hydrOXYzine 25 MG tablet    ATARAX    30 tablet    Take 1 tablet (25 mg) by mouth every 6 hours as needed for itching (and nausea)       LORATADINE PO      Take 10 mg by mouth       LUPRON DEPOT IM      Inject  into the muscle every 3 months.       meropenem 500 MG vial    MERREM    4 mL    500 mg by Nasal Instillation route as needed       methylcellulose (laxative) Powd    CITRUCEL    479 g    Start with 1 heaping tablespoon. Increase as needed, 1 heaping tablespoon at a time, up to 3 times per day.       montelukast 10 MG tablet    SINGULAIR    30 tablet    TAKE ONE TABLET BY MOUTH ONCE DAILY AT BEDTIME       MULTIVITAMIN PO      Take 1 tablet by mouth daily.       omeprazole 20 MG CR capsule    priLOSEC    60 capsule    TAKE 1  CAPSULE BY MOUTH TWICE A DAY       phentermine 15 MG capsule     30 capsule    Take 2 capsules (30 mg) by mouth every morning       phytonadione 5 MG tablet    MEPHYTON    4 tablet    Take 1 tablet (5 mg) by mouth once a week       PROZAC 40 MG capsule   Generic drug:  FLUoxetine      Take 80 mg by mouth daily.       spironolactone 25 MG tablet    ALDACTONE    60 tablet    Take 1 tablet (25 mg) by mouth daily       TRAZODONE HCL PO      Take 100 mg by mouth At Bedtime       vitamin E 400 UNIT capsule     60 capsule    TAKE 1 CAPSULE BY MOUTH TWICE A DAY       * Notice:  This list has 2 medication(s) that are the same as other medications prescribed for you. Read the directions carefully, and ask your doctor or other care provider to review them with you.

## 2017-02-07 NOTE — PROGRESS NOTES
CF Endocrinology Return Consultation:  Diabetes  :   Patient: Mamie Rice MRN# [id number]   YOB: 1995 Age: 21 year old   Date of Visit: 2/7/2017  Dear Dr. Malik De La Rosa:    I had the pleasure of seeing your patient, Mamie Rice in the [facility], [facility], on 2/7/2017 for a consultation regarding CFRD.           Problem list:     Patient Active Problem List    Diagnosis Date Noted     Acne vulgaris 10/19/2016     Priority: Medium     Non morbid obesity due to excess calories 08/24/2016     Priority: Medium     Pancreatic insufficiency 11/13/2014     Priority: Medium     Fecal elastase < 50       Back pain 10/22/2014     Priority: Medium     Major depression 06/13/2013     Priority: Medium     6/13/13  Well-managed with Prozac.  HENRIETTA MORFIN         ADHD (attention deficit hyperactivity disorder) 06/13/2013     Priority: Medium     6/13/13  Well-managed with Adderall.  HENRIETTA MORFIN         Frontal sinusitis 05/10/2012     Priority: Medium     Insomnia 03/28/2012     Priority: Medium     Major depressive disorder with single episode 03/28/2012     Priority: Medium     Chronic constipation 03/04/2012     Priority: Medium     Cystic fibrosis with pulmonary manifestations (H) 12/19/2011     Priority: Medium     SWEAT TEST:  Date: 2/17/1994          Laboratory: [facility]  Sample #1  296 mg           104 mmol/L Cl  Sample #2  295 mg           104 mmol/L Cl     GENOTYPING:  Date: 10/15/2007               Laboratory: [facility]  Genotype: [id number]  Poly T Variant:  [  ]/[  ]         Type 1 diabetes mellitus (H) 11/09/2011     Priority: Medium     Problem list name updated by automated process. Provider to review       Hypoglycemia 10/28/2011     Priority: Medium     Reactive hypoglycemia  updating diagnosis code for icd10 cutover       Chronic maxillary sinusitis 09/08/2011     Priority: Medium     Aspergillosis (H) 07/21/2011     Priority: Medium     Hip joint pain  04/11/2011     Priority: Medium     Cystic fibrosis (H) 02/27/2011     Priority: Medium            HPI:   Mamie is a 21 year old female with Cystic Fibrosis Related Diabetes Mellitus (CFRD).    I have reviewed the available past laboratory evaluations, imaging studies, and medical records available to me at this visit. I have reviewed  Mamie'height and weight.    History was obtained from the patient and the medical record.  I have reviewed the notes of the pulmonary care team entered into the medical record since I last saw the patient.    Did not bring glucose meter today     Reports checking BG 1-2 time per week, only if she feels symptoms of low. Reports symptoms of low after eating or after physical activity . Reports BG in 50's however this is very rare and she is not concerned about it    A1c:  Today s hemoglobin A1c: 5.5%  Previous two HbA1c results: A1C      5.5   8/2/2016  A1C      6.0   8/11/2015   Result was discussed at today's visit.     Current insulin regimen:    not using insulin at this time. Last insulin use > 1 year ago.    Family history and social history were reviewed and updated from my previous visit.          Past Medical History:     Past Medical History   Diagnosis Date     Cystic fibrosis without mention of meconium ileus      SWEAT TEST:Date: 2/17/1994   [facility]  [facility], 104 mmol/L ClSample #2  295 mg, 104 mmol/L Cl GENOTYPING:Date: 10/15/2007,  Laboratory: [facility]: [id number]     Chronic sinusitis      Aspergillosis, with pneumonia (H)      fugus found caused chest pain     Exocrine pancreatic insufficiency      Constipation, chronic      MRSA (methicillin resistant Staphylococcus aureus) carrier      Cystic fibrosis with pulmonary manifestations (H) 12/19/2011     Pancreatic disease      Gastro-oesophageal reflux disease      Hip pain, right      Dysthymic disorder      ADHD (attention deficit hyperactivity disorder)      Diabetes      no meds  currently     Chronic infection      CF, MRSA.,      Depressive disorder             Past Surgical History:     Past Surgical History   Procedure Laterality Date     Bronchoscopies       Meniscus repair       Bronchoscopy       Sinus surgery       Left hip labral tear  5/11/2011     left hip arthroscopy with labral debridement and synovectomy     Optical tracking system endoscopic sinus surgery  10/14/2011     Procedure:OPTICAL TRACKING SYSTEM ENDOSCOPIC SINUS SURGERY; FESS (functional endoscopic sinus surgery) with Image Guidance, bronchial lavage and cultures; Surgeon:GOYO KUO; Location:[facility]     Optical tracking system endoscopic sinus surgery  5/18/2012     Procedure:OPTICAL TRACKING SYSTEM ENDOSCOPIC SINUS SURGERY; Right  and Left Image Guided Functional Endoscopic Sinus Surgery With  Frontal Approach, [facility]; Surgeon:GOYO KUO; [facility]     Optical tracking system endoscopic sinus surgery  9/26/2012     Procedure: OPTICAL TRACKING SYSTEM ENDOSCOPIC SINUS SURGERY;  Stealth Guided Bilateral Functional Endoscopic Sinus Surgery *Latex Safe*;  Surgeon: Goyo Kuo MD;  [facility]     Hc knee scope, diagnostic       Arthroscopy, Knee- left     Orthopedic surgery       left hip tear repair 2010     Arthroscopy hip, osteoplasty femur proximal, combined  3/11/2013     Procedure: COMBINED ARTHROSCOPY HIP, OSTEOPLASTY FEMUR PROXIMAL;  Right Hip Arthroscopy, Labral  Debridement.    surgeon request choice anesthesia/admit to [facility] after surgery;  Surgeon: Omkar Austin MD;  [facility]: [facility]     Exam under anesthesia, restorations, extraction(s) dental complex, combined  5/13/2013     Procedure: COMBINED EXAM UNDER ANESTHESIA, RESTORATIONS, EXTRACTION(S) DENTAL COMPLEX;  Dental Exam, Radiographs, Restorations. Single Extraction  Tooth #2. Restorations x 3;  Surgeon: Danilo Ortzi DDS;  Location: [facility]     Optical tracking system endoscopic sinus surgery Bilateral 10/16/2015     Procedure:  OPTICAL TRACKING SYSTEM ENDOSCOPIC SINUS SURGERY;  Surgeon: Mariela Haile MD;  [facility]     Exam under anesthesia anus N/A 5/10/2016     Procedure: EXAM UNDER ANESTHESIA ANUS;  Surgeon: Chet Gaviria MD;  [facility]     Inject botox N/A 5/10/2016     Procedure: INJECT BOTOX;  Surgeon: Chet Gaviria MD;  [facility]     Arthroscopy knee with medial meniscectomy Left 1/31/2017     Procedure: ARTHROSCOPY KNEE WITH MEDIAL MENISCECTOMY;  Surgeon: Jethro Coyle MD;  [facility]: [facility]               Social History:     Social History     Social History Narrative    Maime lives with mother in [place], [place].  There is a cat in the home, but Mamie does not have any litterbox duties.  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  She gets essentially no exercise because of the tingling in her feet (says it bothers her even to stand).        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        8/2015 no change in social situation        2/15/2016 Pt is single and lives with mother and stepfather.              Family History:     Family History   Problem Relation Age of Onset     Anesthesia Reaction No family hx of      Blood Disease No family hx of      Colon Polyps No family hx of      Crohn Disease No family hx of      Ulcerative Colitis No family hx of      Colon Cancer No family hx of      Melanoma No family hx of      CANCER Paternal Grandmother      Skin Cancer Paternal Grandmother      CANCER Paternal Grandfather      PGF had throat cancer (he was a smoker)     Other Cancer Paternal Grandmother      Skin     Other Cancer Paternal Grandfather      Anxiety Disorder Paternal Grandfather      Thyroid Disease Mother      Obesity Paternal Grandmother      Obesity Other             Allergies:     Allergies   Allergen Reactions     Vancomycin Hives     Redmens skin rash      Vancomycin Rash              Medications:     Current Outpatient Rx   Name  Route  Sig  Dispense  Refill     hydrOXYzine (ATARAX) 25 MG tablet    Oral    Take 1 tablet (25 mg) by mouth every 6 hours as needed for itching (and nausea)    30 tablet    0       acetaminophen (TYLENOL) 325 MG tablet    Oral    Take 2 tablets (650 mg) by mouth every 4 hours as needed for other (mild pain)    100 tablet    0       acetylcysteine (MUCOMYST) 20 % injection        INHALE ONE 4ML NEB INTO LUNGS VIA NEBULIZER TWO TIMES A DAY, MAY INCREASE TO 3-4 TIMES A DAY WITH INCREASE COUGH/COLD SYMPTOMS    360 mL    11       MedroxyPROGESTERone Acetate (DEPO-PROVERA IM)    Intramuscular                   buPROPion (WELLBUTRIN SR) 150 MG 12 hr tablet    Oral    Take 150 mg by mouth 2 times daily               phentermine 15 MG capsule    Oral    Take 2 capsules (30 mg) by mouth every morning    30 capsule    5       spironolactone (ALDACTONE) 25 MG tablet    Oral    Take 1 tablet (25 mg) by mouth daily    60 tablet    3       Increase to 50 mg daily after one week if tolerati ...       ascorbic acid (VITAMIN C) 500 MG tablet        TAKE ONE TABLET BY MOUTH TWICE A DAY    100 tablet    3       montelukast (SINGULAIR) 10 MG tablet        TAKE ONE TABLET BY MOUTH ONCE DAILY AT BEDTIME    30 tablet    11       VITAMIN D3 1000 UNITS tablet        TAKE ONE TABLET BY MOUTH EVERY DAY    30 tablet    11       clindamycin (CLEOCIN T) 1 % lotion    Topical    Apply topically every morning After washing face with benzoyl peroxide wash    60 mL    11       adapalene (DIFFERIN) 0.1 % cream    Topical    Apply topically At Bedtime After washing face    45 g    11       LORATADINE PO    Oral    Take 10 mg by mouth               albuterol (2.5 MG/3ML) 0.083% nebulizer solution    Nebulization    Take 1 vial (2.5 mg) by nebulization 4 times daily    120 vial    11       omeprazole (PRILOSEC) 20 MG capsule        TAKE 1 CAPSULE BY MOUTH TWICE A DAY    60 capsule    11       vitamin E 400  UNIT capsule        TAKE 1 CAPSULE BY MOUTH TWICE A DAY    60 capsule    11       azithromycin (ZITHROMAX) 500 MG tablet    Oral    Take 1 tablet (500 mg) by mouth daily M-W-F    36 tablet    4       beta carotene 96708 UNIT capsule    Oral    Take 1 capsule (25,000 Units) by mouth Every Mon, Wed, Fri Morning    36 capsule    4       methylcellulose, laxative, (CITRUCEL) POWD        Start with 1 heaping tablespoon. Increase as needed, 1 heaping tablespoon at a time, up to 3 times per day.    479 g    3       GLYCOPYRROLATE PO    Oral    Take 1 mg by mouth 2 times daily                TRAZODONE HCL PO    Oral    Take 100 mg by mouth At Bedtime                phytonadione (MEPHYTON) 5 MG tablet    Oral    Take 1 tablet (5 mg) by mouth once a week    4 tablet    11       cyanocobalamin (VITAMIN B12) 1000 MCG/ML injection    Intramuscular    Inject 1 mL into the muscle every 30 days                blood glucose (ACCU-CHEK SMARTVIEW) test strip        Use to test blood sugar 4 times daily or as directed.    1 Month    12       albuterol (PROAIR HFA, PROVENTIL HFA, VENTOLIN HFA) 108 (90 BASE) MCG/ACT inhaler    Inhalation    Inhale 2 puffs into the lungs every 6 hours as needed for shortness of breath / dyspnea or wheezing    1 Inhaler    12       meropenem (MERREM) 500 MG injection    Nasal Instillation    500 mg by Nasal Instillation route as needed     4 mL    0       FLUoxetine (PROZAC) 40 MG capsule    Oral    Take 80 mg by mouth daily.               Amphetamine-Dextroamphetamine (ADDERALL PO)    Oral    Take 20 mg by mouth daily.               Leuprolide Acetate (LUPRON DEPOT IM)    Intramuscular    Inject  into the muscle every 3 months.               Multiple Vitamin (MULTIVITAMIN OR)    Oral    Take 1 tablet by mouth daily.                       Review of Systems:     Comprehensive ROS negative other than the symptoms noted above in the HPI.          Physical Exam:   Blood pressure 117/77, pulse 93, temperature  "98.5  F (36.9  C), resp. rate 18, height 1.549 m (5' 1\"), weight 80.7 kg (177 lb 14.6 oz), SpO2 93 %.  Normalized stature-for-age data available only for age 0 to 20 years.  Height: 5' 1\", Normalized stature-for-age data available only for age 0 to 20 years.  Weight: 177 lbs 14.58 oz, Normalized weight-for-age data available only for age 0 to 20 years.  BMI: Body mass index is 33.63 kg/(m^2)., Normalized BMI data available only for age 2 to 20 years.      CONSTITUTIONAL:   Awake, alert, and in no apparent distress.  HEAD: Normocephalic, without obvious abnormality.  EYES: Lids and lashes normal, sclera clear, conjunctiva normal.  ENT: external ears without lesions, nares clear, oral pharynx with moist mucus membranes.  NECK: Supple, symmetrical, trachea midline.  THYROID: symmetric, not enlarged and no tenderness.  HEMATOLOGIC/LYMPHATIC: No cervical lymphadenopathy.  LUNGS: No increased work of breathing, clear to auscultation  with good air entry  CARDIOVASCULAR: Regular rate and rhythm, no murmurs.  ABDOMEN: Soft, non-distended, pale < 1 cm striae   NEUROLOGIC:No focal deficits noted.   PSYCHIATRIC: Cooperative, no agitation.  SKIN: Insulin administration sites intact without lipohypertrophy. No acanthosis nigricans.  MUSCULOSKELETAL:  Full range of motion noted.  Motor strength and tone are normal.          Laboratory results:     TSH   Date Value Ref Range Status   08/02/2016 1.36 0.40 - 4.00 mU/L Final     CHOLESTEROL   Date Value Ref Range Status   08/02/2016 107 <200 mg/dL Final     ALBUMIN URINE MG/L   Date Value Ref Range Status   08/02/2016 <5 mg/L Final     TRIGLYCERIDES   Date Value Ref Range Status   08/02/2016 210* <150 mg/dL Final     Comment:     Borderline high:  150-199 mg/dl   High:             200-499 mg/dl   Very high:       >499 mg/dl   Non Fasting       HDL CHOLESTEROL   Date Value Ref Range Status   08/02/2016 26* >49 mg/dL Final     LDL CHOLESTEROL CALCULATED   Date Value Ref Range Status "   08/02/2016 39 <100 mg/dL Final     Comment:     Desirable:       <100 mg/dl     CHOLESTEROL/HDL RATIO   Date Value Ref Range Status   03/12/2014 3.0 0.0 - 5.0 Final     NON HDL CHOLESTEROL   Date Value Ref Range Status   08/02/2016 81 <130 mg/dL Final     A1C      5.5   8/2/2016  A1C      6.0   8/11/2015  A1C      5.8   5/14/2015  A1C      5.7   4/16/2015  A1C      5.5   3/30/2015 HEMOGLOBINA1      5.2   7/21/2011  HEMOGLOBINA1      5.6   4/21/2011        CF  Diabetes Health Maintenance    Date of Diabetes Diagnosis:  OGTT shows diabetes but her main problem is hypoglycemia    Special Notes (if any): follows with Dr. Argueta for obesity     Date Last Eye Exam:      Date Last Dental Appointment:     Dates of Episodes Severe* Hypoglycemia (month/year, cumulative, ongoing, assess each visit):    *Severe=patient unconscious, seizure, unable to help self    Last 25-Vitamin D (every year): 38 (2016)    Last DXA, lowest Z-score (every 2 years): Z-score of -0.1, March 2015       ?Bisphosphonates (yes/no):     Last Urine Microalbumin (every year):      No results found for: MICROALBUMIN    Last Testosterone: No results found for: TESTOSTTOTAL   On testosterone therapy (yes/no)?     Date of Last Diabetes Visit:         Assessment and Plan:   Mamie is a 21 year old female with early CFRD, reactive hypoglycemia and obesity.  She is pancreatic sufficient.    Hx of reactive hypoglycemia in past, however she denies any current significant issues  No BG data today to evaluate for hyperglycemia. A1C is ok   Check Bg and send data for review    I have reviewed this plan with the patient and with the diabetes nurse educator, who will communicate with the patient between visits for insulin adjustment.    Patient Instructions   Nice to see meet you today Mamie.    As far as your diabetes/hypoglycemia: lets get more data.  Keep careful records for 2-3 days  and send them to Rachel.  The more information the better, including glucose  levels before and 2 hours after eating.     Lets schedule a return appointment for 6 months.      Call Rachel, Diabetes Educator at [phone] in 2-3 days to review glucose numbers        Thank you for allowing me to participate in the care of your patient.  Please do not hesitate to call with questions or concerns.    Sincerely,    LINDA Cortés    CC  [facility]        Answers for HPI/ROS submitted by the patient on 2/4/2017   General Symptoms: No  Skin Symptoms: No  HENT Symptoms: No  EYE SYMPTOMS: No  HEART SYMPTOMS: No  LUNG SYMPTOMS: No  INTESTINAL SYMPTOMS: No  URINARY SYMPTOMS: No  GYNECOLOGIC SYMPTOMS: No  BREAST SYMPTOMS: No  SKELETAL SYMPTOMS: No  BLOOD SYMPTOMS: No  NERVOUS SYSTEM SYMPTOMS: No  MENTAL HEALTH SYMPTOMS: No

## 2017-02-07 NOTE — PATIENT INSTRUCTIONS
Nice to see meet you today Mamie.    As far as your diabetes/hypoglycemia: lets get more data.  Keep careful records for 2-3 days  and send them to Rachel.  The more information the better, including glucose levels before and 2 hours after eating.     Lets schedule a return appointment for 6 months.      Call Rachel, Diabetes Educator at [phone] in 2-3 days to review glucose numbers

## 2017-02-07 NOTE — Clinical Note
2/7/2017       RE: Mamie Rice  [address] [facility] [address]     Dear Colleague,    Thank you for referring your patient, Mamie Rice, to the [facility] FOR LUNG SCIENCE AND HEALTH at [facility]. Please see a copy of my visit note below.    CF Endocrinology Return Consultation:  Diabetes  :   Patient: Mamie Rice MRN# [id number]   YOB: 1995 Age: 21 year old   Date of Visit: 2/7/2017  Dear Dr. Malik De La Rosa:    I had the pleasure of seeing your patient, Mamie Rice in the CF Endocrinology Clinic, [facility], on 2/7/2017 for a consultation regarding CFRD.           Problem list:     Patient Active Problem List    Diagnosis Date Noted     Acne vulgaris 10/19/2016     Priority: Medium     Non morbid obesity due to excess calories 08/24/2016     Priority: Medium     Pancreatic insufficiency 11/13/2014     Priority: Medium     Fecal elastase < 50       Back pain 10/22/2014     Priority: Medium     Major depression 06/13/2013     Priority: Medium     6/13/13  Well-managed with Prozac.  HENRIETTA MORFIN         ADHD (attention deficit hyperactivity disorder) 06/13/2013     Priority: Medium     6/13/13  Well-managed with Adderall.  HENRIETTA MORFIN         Frontal sinusitis 05/10/2012     Priority: Medium     Insomnia 03/28/2012     Priority: Medium     Major depressive disorder with single episode 03/28/2012     Priority: Medium     Chronic constipation 03/04/2012     Priority: Medium     Cystic fibrosis with pulmonary manifestations (H) 12/19/2011     Priority: Medium     SWEAT TEST:  Date: 2/17/1994          Laboratory: [facility]  Sample #1  296 mg           104 mmol/L Cl  Sample #2  295 mg           104 mmol/L Cl     GENOTYPING:  Date: 10/15/2007               Laboratory: [facility]  Genotype: [id number]  Poly T Variant:  [  ]/[  ]         Type 1 diabetes mellitus (H) 11/09/2011     Priority: Medium     Problem list name  updated by automated process. Provider to review       Hypoglycemia 10/28/2011     Priority: Medium     Reactive hypoglycemia  updating diagnosis code for icd10 cutover       Chronic maxillary sinusitis 09/08/2011     Priority: Medium     Aspergillosis (H) 07/21/2011     Priority: Medium     Hip joint pain 04/11/2011     Priority: Medium     Cystic fibrosis (H) 02/27/2011     Priority: Medium            HPI:   Mamie is a 21 year old female with Cystic Fibrosis Related Diabetes Mellitus (CFRD).    I have reviewed the available past laboratory evaluations, imaging studies, and medical records available to me at this visit. I have reviewed  Mamie'height and weight.    History was obtained from the patient and the medical record.  I have reviewed the notes of the pulmonary care team entered into the medical record since I last saw the patient.    Did not bring glucose meter today     Reports checking BG 1-2 time per week, only if she feels symptoms of low. Reports symptoms of low after eating or after physical activity . Reports BG in 50's however this is very rare and she is not concerned about it    A1c:  Today s hemoglobin A1c: 5.5%  Previous two HbA1c results: A1C      5.5   8/2/2016  A1C      6.0   8/11/2015   Result was discussed at today's visit.     Current insulin regimen:    not using insulin at this time. Last insulin use > 1 year ago.    Family history and social history were reviewed and updated from my previous visit.          Past Medical History:     Past Medical History   Diagnosis Date     Cystic fibrosis without mention of meconium ileus      SWEAT TEST:Date: 2/17/1994   [facility]  [facility], 104 mmol/L ClSample #2  295 mg, 104 mmol/L Cl GENOTYPING:Date: 10/15/2007,  Laboratory: [facility]: [id number]     Chronic sinusitis      Aspergillosis, with pneumonia (H)      fugus found caused chest pain     Exocrine pancreatic insufficiency      Constipation, chronic      MRSA  (methicillin resistant Staphylococcus aureus) carrier      Cystic fibrosis with pulmonary manifestations (H) 12/19/2011     Pancreatic disease      Gastro-oesophageal reflux disease      Hip pain, right      Dysthymic disorder      ADHD (attention deficit hyperactivity disorder)      Diabetes      no meds currently     Chronic infection      CF, MRSA.,      Depressive disorder             Past Surgical History:     Past Surgical History   Procedure Laterality Date     Bronchoscopies       Meniscus repair       Bronchoscopy       Sinus surgery       Left hip labral tear  5/11/2011     left hip arthroscopy with labral debridement and synovectomy     Optical tracking system endoscopic sinus surgery  10/14/2011     Procedure:OPTICAL TRACKING SYSTEM ENDOSCOPIC SINUS SURGERY; FESS (functional endoscopic sinus surgery) with Image Guidance, bronchial lavage and cultures; Surgeon:GOYO KUO; Location:[facility] OR     Optical tracking system endoscopic sinus surgery  5/18/2012     Procedure:OPTICAL TRACKING SYSTEM ENDOSCOPIC SINUS SURGERY; Right  and Left Image Guided Functional Endoscopic Sinus Surgery With  Frontal Approach, [facility]; Surgeon:GOYO KUO; [facility] OR     Optical tracking system endoscopic sinus surgery  9/26/2012     Procedure: OPTICAL TRACKING SYSTEM ENDOSCOPIC SINUS SURGERY;  Stealth Guided Bilateral Functional Endoscopic Sinus Surgery *Latex Safe*;  Surgeon: Goyo Kuo MD;  [facility]      knee scope, diagnostic       Arthroscopy, Knee- left     Orthopedic surgery       left hip tear repair 2010     Arthroscopy hip, osteoplasty femur proximal, combined  3/11/2013     Procedure: COMBINED ARTHROSCOPY HIP, OSTEOPLASTY FEMUR PROXIMAL;  Right Hip Arthroscopy, Labral  Debridement.    surgeon request choice anesthesia/admit to [facility] after surgery;  Surgeon: Omkar Austin MD;  [facility]     Exam under anesthesia, restorations, extraction(s) dental complex, combined  5/13/2013      Procedure: COMBINED EXAM UNDER ANESTHESIA, RESTORATIONS, EXTRACTION(S) DENTAL COMPLEX;  Dental Exam, Radiographs, Restorations. Single Extraction  Tooth #2. Restorations x 3;  Surgeon: Danilo Ortiz DDS;  [facility] OR     Optical tracking system endoscopic sinus surgery Bilateral 10/16/2015     Procedure: OPTICAL TRACKING SYSTEM ENDOSCOPIC SINUS SURGERY;  Surgeon: Mariela Haile MD;  [facility]     Exam under anesthesia anus N/A 5/10/2016     Procedure: EXAM UNDER ANESTHESIA ANUS;  Surgeon: Chet Gaviria MD;  [facility]     Inject botox N/A 5/10/2016     Procedure: INJECT BOTOX;  Surgeon: Chet Gaviria MD;  [facility]     Arthroscopy knee with medial meniscectomy Left 1/31/2017     Procedure: ARTHROSCOPY KNEE WITH MEDIAL MENISCECTOMY;  Surgeon: Jethro Coyle MD;  [facility] OR               Social History:     Social History     Social History Narrative    Mamie lives with mother in [place], [place].  There is a cat in the home, but Mamie does not have any litterbox duties.  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  She gets essentially no exercise because of the tingling in her feet (says it bothers her even to stand).        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        8/2015 no change in social situation        2/15/2016 Pt is single and lives with mother and stepfather.              Family History:     Family History   Problem Relation Age of Onset     Anesthesia Reaction No family hx of      Blood Disease No family hx of      Colon Polyps No family hx of      Crohn Disease No family hx of      Ulcerative Colitis No family hx of      Colon Cancer No family hx of      Melanoma No family hx of      CANCER Paternal Grandmother      Skin Cancer Paternal Grandmother      CANCER Paternal Grandfather      PGF had throat cancer (he was a smoker)     Other Cancer Paternal  Grandmother      Skin     Other Cancer Paternal Grandfather      Anxiety Disorder Paternal Grandfather      Thyroid Disease Mother      Obesity Paternal Grandmother      Obesity Other             Allergies:     Allergies   Allergen Reactions     Vancomycin Hives     Redmens skin rash      Vancomycin Rash             Medications:     Current Outpatient Rx   Name  Route  Sig  Dispense  Refill     hydrOXYzine (ATARAX) 25 MG tablet    Oral    Take 1 tablet (25 mg) by mouth every 6 hours as needed for itching (and nausea)    30 tablet    0       acetaminophen (TYLENOL) 325 MG tablet    Oral    Take 2 tablets (650 mg) by mouth every 4 hours as needed for other (mild pain)    100 tablet    0       acetylcysteine (MUCOMYST) 20 % injection        INHALE ONE 4ML NEB INTO LUNGS VIA NEBULIZER TWO TIMES A DAY, MAY INCREASE TO 3-4 TIMES A DAY WITH INCREASE COUGH/COLD SYMPTOMS    360 mL    11       MedroxyPROGESTERone Acetate (DEPO-PROVERA IM)    Intramuscular                   buPROPion (WELLBUTRIN SR) 150 MG 12 hr tablet    Oral    Take 150 mg by mouth 2 times daily               phentermine 15 MG capsule    Oral    Take 2 capsules (30 mg) by mouth every morning    30 capsule    5       spironolactone (ALDACTONE) 25 MG tablet    Oral    Take 1 tablet (25 mg) by mouth daily    60 tablet    3       Increase to 50 mg daily after one week if tolerati ...       ascorbic acid (VITAMIN C) 500 MG tablet        TAKE ONE TABLET BY MOUTH TWICE A DAY    100 tablet    3       montelukast (SINGULAIR) 10 MG tablet        TAKE ONE TABLET BY MOUTH ONCE DAILY AT BEDTIME    30 tablet    11       VITAMIN D3 1000 UNITS tablet        TAKE ONE TABLET BY MOUTH EVERY DAY    30 tablet    11       clindamycin (CLEOCIN T) 1 % lotion    Topical    Apply topically every morning After washing face with benzoyl peroxide wash    60 mL    11       adapalene (DIFFERIN) 0.1 % cream    Topical    Apply topically At Bedtime After washing face    45 g    11        LORATADINE PO    Oral    Take 10 mg by mouth               albuterol (2.5 MG/3ML) 0.083% nebulizer solution    Nebulization    Take 1 vial (2.5 mg) by nebulization 4 times daily    120 vial    11       omeprazole (PRILOSEC) 20 MG capsule        TAKE 1 CAPSULE BY MOUTH TWICE A DAY    60 capsule    11       vitamin E 400 UNIT capsule        TAKE 1 CAPSULE BY MOUTH TWICE A DAY    60 capsule    11       azithromycin (ZITHROMAX) 500 MG tablet    Oral    Take 1 tablet (500 mg) by mouth daily M-W-F    36 tablet    4       beta carotene 06570 UNIT capsule    Oral    Take 1 capsule (25,000 Units) by mouth Every Mon, Wed, Fri Morning    36 capsule    4       methylcellulose, laxative, (CITRUCEL) POWD        Start with 1 heaping tablespoon. Increase as needed, 1 heaping tablespoon at a time, up to 3 times per day.    479 g    3       GLYCOPYRROLATE PO    Oral    Take 1 mg by mouth 2 times daily                TRAZODONE HCL PO    Oral    Take 100 mg by mouth At Bedtime                phytonadione (MEPHYTON) 5 MG tablet    Oral    Take 1 tablet (5 mg) by mouth once a week    4 tablet    11       cyanocobalamin (VITAMIN B12) 1000 MCG/ML injection    Intramuscular    Inject 1 mL into the muscle every 30 days                blood glucose (ACCU-CHEK SMARTVIEW) test strip        Use to test blood sugar 4 times daily or as directed.    1 Month    12       albuterol (PROAIR HFA, PROVENTIL HFA, VENTOLIN HFA) 108 (90 BASE) MCG/ACT inhaler    Inhalation    Inhale 2 puffs into the lungs every 6 hours as needed for shortness of breath / dyspnea or wheezing    1 Inhaler    12       meropenem (MERREM) 500 MG injection    Nasal Instillation    500 mg by Nasal Instillation route as needed     4 mL    0       FLUoxetine (PROZAC) 40 MG capsule    Oral    Take 80 mg by mouth daily.               Amphetamine-Dextroamphetamine (ADDERALL PO)    Oral    Take 20 mg by mouth daily.               Leuprolide Acetate (LUPRON DEPOT IM)    Intramuscular     "Inject  into the muscle every 3 months.               Multiple Vitamin (MULTIVITAMIN OR)    Oral    Take 1 tablet by mouth daily.                       Review of Systems:     Comprehensive ROS negative other than the symptoms noted above in the HPI.          Physical Exam:   Blood pressure 117/77, pulse 93, temperature 98.5  F (36.9  C), resp. rate 18, height 1.549 m (5' 1\"), weight 80.7 kg (177 lb 14.6 oz), SpO2 93 %.  Normalized stature-for-age data available only for age 0 to 20 years.  Height: 5' 1\", Normalized stature-for-age data available only for age 0 to 20 years.  Weight: 177 lbs 14.58 oz, Normalized weight-for-age data available only for age 0 to 20 years.  BMI: Body mass index is 33.63 kg/(m^2)., Normalized BMI data available only for age 2 to 20 years.      CONSTITUTIONAL:   Awake, alert, and in no apparent distress.  HEAD: Normocephalic, without obvious abnormality.  EYES: Lids and lashes normal, sclera clear, conjunctiva normal.  ENT: external ears without lesions, nares clear, oral pharynx with moist mucus membranes.  NECK: Supple, symmetrical, trachea midline.  THYROID: symmetric, not enlarged and no tenderness.  HEMATOLOGIC/LYMPHATIC: No cervical lymphadenopathy.  LUNGS: No increased work of breathing, clear to auscultation  with good air entry  CARDIOVASCULAR: Regular rate and rhythm, no murmurs.  ABDOMEN: Soft, non-distended, pale < 1 cm striae   NEUROLOGIC:No focal deficits noted.   PSYCHIATRIC: Cooperative, no agitation.  SKIN: Insulin administration sites intact without lipohypertrophy. No acanthosis nigricans.  MUSCULOSKELETAL:  Full range of motion noted.  Motor strength and tone are normal.          Laboratory results:     TSH   Date Value Ref Range Status   08/02/2016 1.36 0.40 - 4.00 mU/L Final     CHOLESTEROL   Date Value Ref Range Status   08/02/2016 107 <200 mg/dL Final     ALBUMIN URINE MG/L   Date Value Ref Range Status   08/02/2016 <5 mg/L Final     TRIGLYCERIDES   Date Value Ref " Range Status   08/02/2016 210* <150 mg/dL Final     Comment:     Borderline high:  150-199 mg/dl   High:             200-499 mg/dl   Very high:       >499 mg/dl   Non Fasting       HDL CHOLESTEROL   Date Value Ref Range Status   08/02/2016 26* >49 mg/dL Final     LDL CHOLESTEROL CALCULATED   Date Value Ref Range Status   08/02/2016 39 <100 mg/dL Final     Comment:     Desirable:       <100 mg/dl     CHOLESTEROL/HDL RATIO   Date Value Ref Range Status   03/12/2014 3.0 0.0 - 5.0 Final     NON HDL CHOLESTEROL   Date Value Ref Range Status   08/02/2016 81 <130 mg/dL Final     A1C      5.5   8/2/2016  A1C      6.0   8/11/2015  A1C      5.8   5/14/2015  A1C      5.7   4/16/2015  A1C      5.5   3/30/2015 HEMOGLOBINA1      5.2   7/21/2011  HEMOGLOBINA1      5.6   4/21/2011        CF  Diabetes Health Maintenance    Date of Diabetes Diagnosis:  OGTT shows diabetes but her main problem is hypoglycemia    Special Notes (if any): follows with Dr. Argueta for obesity     Date Last Eye Exam:      Date Last Dental Appointment:     Dates of Episodes Severe* Hypoglycemia (month/year, cumulative, ongoing, assess each visit):    *Severe=patient unconscious, seizure, unable to help self    Last 25-Vitamin D (every year): 38 (2016)    Last DXA, lowest Z-score (every 2 years): Z-score of -0.1, March 2015       ?Bisphosphonates (yes/no):     Last Urine Microalbumin (every year):      No results found for: MICROALBUMIN    Last Testosterone: No results found for: TESTOSTTOTAL   On testosterone therapy (yes/no)?     Date of Last Diabetes Visit:         Assessment and Plan:   Mamie is a 21 year old female with early CFRD, reactive hypoglycemia and obesity.  She is pancreatic sufficient.    Hx of reactive hypoglycemia in past, however she denies any current significant issues  No BG data today to evaluate for hyperglycemia. A1C is ok   Check Bg and send data for review    I have reviewed this plan with the patient and with the diabetes  nurse educator, who will communicate with the patient between visits for insulin adjustment.    Patient Instructions   Nice to see meet you today Mamie.    As far as your diabetes/hypoglycemia: lets get more data.  Keep careful records for 2-3 days  and send them to Rachel.  The more information the better, including glucose levels before and 2 hours after eating.     Lets schedule a return appointment for 6 months.      Call Rachel, Diabetes Educator at [phone] in 2-3 days to review glucose numbers        Thank you for allowing me to participate in the care of your patient.  Please do not hesitate to call with questions or concerns.    Sincerely,    LINDA Cortés    CC  [facility]

## 2017-02-07 NOTE — NURSING NOTE
Chief Complaint   Patient presents with     RECHECK     RUPERT Cook LPN  Neuroscience- Movement Disorders and Epilepsy

## 2017-02-08 NOTE — PROGRESS NOTES
"CF Annual Nutrition Assessment    Reason for Assessment  Assessed during Dr. Gavi Allison' clinic r/t increased nutrition risk with diagnosis of CF per protocol.    Nutrition Significant PMH  Mild Lung Disease   Pancreatic Insufficient (fecal elastase done 2013)  CFRD - sees CF Endocrinologist, Dr. 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   Current weight is considered: Overweight/Obese and above recommended weight range for CF     Wt Readings from Last 12 Encounters:   02/07/17 80.7 kg (177 lb 14.6 oz)   02/07/17 80.7 kg (177 lb 14.6 oz)   01/31/17 82.4 kg (181 lb 10.5 oz)   01/23/17 82.373 kg (181 lb 9.6 oz)   01/11/17 82.555 kg (182 lb)   11/07/16 82.555 kg (182 lb)   11/01/16 83 kg (182 lb 15.7 oz)   10/14/16 82.827 kg (182 lb 9.6 oz)   08/24/16 83.734 kg (184 lb 9.6 oz)   08/02/16 84 kg (185 lb 3 oz)   06/20/16 83.144 kg (183 lb 4.8 oz)   05/24/16 83.4 kg (183 lb 13.8 oz)     Comments:  Mamie is making slow progress with weight loss, continues to follow with the [facility] clinic team and is taking medications to support weight control.  She has lost 3 kg since August 2016 or 3.6% total body weight.      Physical Activity/Exercise:  Due to recent knee surgery and back pain issues Mamie is doing very little physical activity outside of ADL's, no formal exercise regimen.  She is seeing a Chiropractor but has not done physical therapy.  She states she has activity restrictions for a further 3 weeks and then can return to " "more strength/endurance activities.     Pancreatic Enzymes  Although tested as pancreatic insufficient pt does not take enzymes, stopped taking them in 2015 as she felt they had little affect on her GI symptoms. Ok'd this with CF MD prior to stopping.      Diet History and Assessment  Diet Preferences/Allergies/Intolerances: Regular diet  Appetite Stimulant Rx:  No, on medications for appetite suppression for weight loss per [facility]  Intake Recall/Comments:  Due to recent knee surgery pt states her dieting plan has gotten somewhat put on hold although she is still trying to limit snacking and portions. Continues to take the recommended medications from [facility] although is concerned one of them may be \"on hold\" and she is unsure what to do or if this will impact her negatively (phentermine). She is struggling with reducing milk consumption.  Baseline of 1 gallon skim milk per day, has been instructed via [facility] to get this down to 1 glass.  Pt states this has been a struggle for her since she enjoys milk so much but also did not know if this was ok for her CF.      Calcium: As above, baseline intake 1 gallon milk and cheese/other dairy PRN  Salt: Adequate, added to meals  Hydration:  Does not like plain water, will drink juice or soda but she is also trying to reduce her intake of soda (Dr. Pepper)    Supplements:  No    Tube Feeding:  No    MALNUTRITION  % Intake:  No decreased intake noted  % Weight Loss:  Weight loss does not meet criteria for malnutrition (intentional)  Subcutaneous Fat Loss:  None observed  Muscle Loss:  None observed  Fluid Accumulation/Edema:  None noted    Malnutrition Diagnosis: Patient does not meet two of the above criteria necessary for diagnosing malnutrition    Estimated Energy and Protein Needs  Estimation based on weight loss with Mild lung disease and mild pancreatic insufficiency.    6052-5947 kcals/day =  25-30 kcal/kg For weight loss (increase to 30-35 kcal/day for maintenance)   70-90 g " protein/day = 1.2-1.5 g/kg (increase to 1.5-2 g/kg- pancreatic insufficient)    Laboratory Assessment  Date: 8/2/16  Total 25-Hydroxyvitamin D: WNL  Vitamin A: WNL  Vitamin E: WNL  Iron: WNL  Lipid Panel: Low HDL, high TGL    Current Vitamin/Mineral Prescriptions: Multivitamin, Vitamin D 1000 International Units, Vitamin E 400 International Units, Betacarotene 25,000 International Units 3x/week, Vitamin B12 1 mL IMJ 1x/month, Vitamin K weekly and Vitamin C 250 mg BID    Comments:  Pt states she is taking all of the above, could not remember exactly the amounts but the supplements above are per her current prescriptions.      NUTRITION DIAGNOSIS  Overweight/obese r/t excessive energy intake and lack of physical activity AEB BMI >30 kg/m2 with pt-reported chronic overeating requiring intervention via [facility] clinic.     INTERVENTIONS/RECOMMENDATIONS  1) Reviewed weight trends, current intake patterns and food recall, physical activity goals s/p knee surgery.  Mamie is doing well following with her team in the [facility] clinic and I encouraged her to continue to pursue this care.  Reinforced intake goals as given by [facility] hallie and RD, however due to pt's increased risk of CF-related bone disease I instructed her to increase goal milk consumption to 2 16-oz glasses per day of skim milk (~1200 mg calcium per day).  I will provide an SBAR to CHARITY MD and RD regarding our visit today and this goal adjustment, pt in agreement with this communication.   2) Endocrine - pt seen by CF endocrinologist today, remains off insulin.  Encouraged frequent blood glucose checks/as prescribed and quick treatment of hypoglycemic episodes.  Pt afraid of more frequent hypoglycemic episodes due to reducing intake for weight loss, encouraged her to have a source of glucose/sucrose nearby at all times and reviewed the 15/15 correction rule.    3) Reviewed annual studies/vitamin prescriptions.  No changes made today, continue same regimen.  Annual  studies due with DEXA in August 2017.     Patient Understanding: Good  Expected Compliance: Good  Follow-Up Plans: Per protocol    GOALS:  1) Limit daily milk intake to 2 16-oz glasses of skim per day.  2) Limit snacking between meals aside from hypoglycemic needs.  3) Weight to trend downward to goal of BMI <30 kg/m2 within the next 6-12 months.       FOLLOW-UP/MONITORING:  Visit patient within 6-12 month(s)    Time Spent In Face-to-Face Patient Interactions:  30 minutes    Theresa Ceja MS, RD, LD (pager [phone])  Cystic Fibrosis/Lung Transplant Dietitian      -Available Tues-Fri 8 AM-4:30 PM. On Mondays please contact pager [phone] (Amy Andino RD) and on weekends/holidays contact coverage dietitian at pager [phone] (inpatient use only).

## 2017-02-13 ENCOUNTER — MYC REFILL (OUTPATIENT)
Dept: ENDOCRINOLOGY | Facility: CLINIC | Age: 22
End: 2017-02-13

## 2017-02-13 DIAGNOSIS — E66.09 NON MORBID OBESITY DUE TO EXCESS CALORIES: ICD-10-CM

## 2017-02-16 RX ORDER — PHENTERMINE HYDROCHLORIDE 15 MG/1
30 CAPSULE ORAL EVERY MORNING
Qty: 60 CAPSULE | Refills: 4
Start: 2017-02-16 | End: 2017-08-26

## 2017-02-16 NOTE — TELEPHONE ENCOUNTER
Message from [employer]:  Original authorizing provider: Alec Argueta MD    Mamie Rice would like a refill of the following medications:  phentermine 15 MG capsule [Alec Argueta MD]    Preferred pharmacy: [facility] & [address]    Comment:

## 2017-02-16 NOTE — TELEPHONE ENCOUNTER
phentermine 15 MG capsule 30 capsule 5 1/23/2017  No      Sig: Take 2 capsules (30 mg) by mouth every morning     Pharmacy needs new Rx for the increase of Phentermine from last OV. New Rx sent for #60 with 4 refills per [facility] nurse protocol.  Pricilla Rob RN, BSN

## 2017-02-28 ENCOUNTER — PRE VISIT (OUTPATIENT)
Dept: OTOLARYNGOLOGY | Facility: CLINIC | Age: 22
End: 2017-02-28

## 2017-02-28 NOTE — TELEPHONE ENCOUNTER
1.  Date/reason for appt: 3/10/17 - chronic maxillary sinusitis  2.  Referring provider: self  3.  Call to patient (Yes / No - short description): no, recs in epic  4.  [facility]

## 2017-03-09 ENCOUNTER — OFFICE VISIT (OUTPATIENT)
Dept: OTOLARYNGOLOGY | Facility: CLINIC | Age: 22
End: 2017-03-09

## 2017-03-09 VITALS — WEIGHT: 180 LBS | HEIGHT: 61 IN | BODY MASS INDEX: 33.99 KG/M2

## 2017-03-09 DIAGNOSIS — J32.4 CHRONIC PANSINUSITIS: Primary | ICD-10-CM

## 2017-03-09 ASSESSMENT — PAIN SCALES - GENERAL: PAINLEVEL: NO PAIN (0)

## 2017-03-09 NOTE — MR AVS SNAPSHOT
After Visit Summary   3/9/2017    Mamie Rice    MRN: [id number]           Patient Information     Date Of Birth          1995        Visit Information        Provider Department      3/9/2017 11:00 AM Mariela Haile MD [facility] Ear Nose [facility] Throat        Today's Diagnoses     Chronic pansinusitis    -  1       Follow-ups after your visit        Your next 10 appointments already scheduled     Mar 20, 2017 11:45 AM CDT   (Arrive by 11:30 AM)   Return Visit with Paige Reid PA-C   [facility] Dermatology ([facility])    [address] [place] [id number]   [phone]            Apr 03, 2017  2:45 PM CDT   (Arrive by 2:30 PM)   RETURN RHINOLOGY with Mariela Haile MD   [facility] ([facility])    [address] [place] [id number] [phone]            Apr 25, 2017 11:00 AM CDT   CF LOOP with  PFL CF   [facility] Pulmonary Function Testing ([facility])    [address] [place] [id number] [phone]            Apr 25, 2017 11:20 AM CDT   (Arrive by 11:05 AM)   RETURN CYSTIC FIBROSIS VISIT with Gavi Allison MD   [facility] ([facility])    [address] [place] [id number] [phone]              Who to contact     Please call your clinic at [phone] to:    Ask questions about your health    Make or cancel appointments    Discuss your medicines    Learn about your test results    Speak to your doctor   If you have compliments or concerns about an experience at your clinic, or if you wish to file a complaint, please contact [facility] Patient Relations at [phone] or email us at [email or web address]         Additional Information About Your Visit        MyChart Information      "[employer] gives you secure access to your electronic health record. If you see a primary care provider, you can also send messages to your care team and make appointments. If you have questions, please call your primary care clinic.  If you do not have a primary care provider, please call [phone] and they will assist you.      [employer] is an electronic gateway that provides easy, online access to your medical records. With [employer] you can request a clinic appointment, read your test results, renew a prescription or communicate with your care team.     To access your existing account, please contact your [facility] or call [phone] for assistance.        Care EveryWhere ID     This is your Care EveryWhere ID. This could be used by other organizations to access your [facility] medical records  [id number]        Your Vitals Were     Height BMI (Body Mass Index)                1.55 m (5' 1.02\") 33.98 kg/m2           Blood Pressure from Last 3 Encounters:   02/07/17 117/77   02/07/17 117/77   01/31/17 117/80    Weight from Last 3 Encounters:   03/09/17 81.6 kg (180 lb)   02/07/17 80.7 kg (177 lb 14.6 oz)   02/07/17 80.7 kg (177 lb 14.6 oz)              We Performed the Following     NASAL ENDOSCOPY, DIAGNOSTIC     NASAL/SINUS SCOPE W PREETI SOLER        Primary Care Provider Office Phone # Fax #    Malik De La Rosa [phone]       [address] [address]        Thank you!     Thank you for choosing [facility] E[facility]SE [facility] TH[facility]AT  for your care. Our goal is always to provide you with excellent care. Hearing back from our patients is one way we can continue to improve our services. Please take a few minutes to complete the written survey that you may receive in the mail after your visit with us. Thank you!             Your Updated Medication List - Protect others around you: Learn how to safely use, store and throw away " your medicines at www.disposemymeds.org.          This list is accurate as of: 3/9/17 11:59 PM.  Always use your most recent med list.                   Brand Name Dispense Instructions for use    acetaminophen 325 MG tablet    TYLENOL    100 tablet    Take 2 tablets (650 mg) by mouth every 4 hours as needed for other (mild pain)       acetylcysteine 20 % nebulizer solution    MUCOMYST    360 mL    INHALE ONE 4ML NEB INTO LUNGS VIA NEBULIZER TWO TIMES A DAY, MAY INCREASE TO 3-4 TIMES A DAY WITH INCREASE COUGH/COLD SYMPTOMS       adapalene 0.1 % cream    DIFFERIN    45 g    Apply topically At Bedtime After washing face       ADDERALL PO      Take 20 mg by mouth daily.       * albuterol 108 (90 BASE) MCG/ACT Inhaler    PROAIR HFA/PROVENTIL HFA/VENTOLIN HFA    1 Inhaler    Inhale 2 puffs into the lungs every 6 hours as needed for shortness of breath / dyspnea or wheezing       * albuterol (2.5 MG/3ML) 0.083% neb solution     120 vial    Take 1 vial (2.5 mg) by nebulization 4 times daily       ascorbic acid 500 MG tablet    VITAMIN C    100 tablet    TAKE ONE TABLET BY MOUTH TWICE A DAY       azithromycin 500 MG tablet    ZITHROMAX    36 tablet    Take 1 tablet (500 mg) by mouth daily M-W-F       beta carotene 69098 UNIT capsule     36 capsule    Take 1 capsule (25,000 Units) by mouth Every Mon, Wed, Fri Morning       blood glucose monitoring test strip    ACCU-CHEK SMARTVIEW    1 Month    Use to test blood sugar 4 times daily or as directed.       buPROPion 150 MG 12 hr tablet    WELLBUTRIN SR     Take 150 mg by mouth 2 times daily       cholecalciferol 1000 UNIT tablet    vitamin D    30 tablet    TAKE ONE TABLET BY MOUTH EVERY DAY       clindamycin 1 % lotion    CLEOCIN T    60 mL    Apply topically every morning After washing face with benzoyl peroxide wash       cyanocobalamin 1000 MCG/ML injection    VITAMIN B12     Inject 1 mL into the muscle every 30 days       DEPO-PROVERA IM          GLYCOPYRROLATE PO       Take 1 mg by mouth 2 times daily       hydrOXYzine 25 MG tablet    ATARAX    30 tablet    Take 1 tablet (25 mg) by mouth every 6 hours as needed for itching (and nausea)       LORATADINE PO      Take 10 mg by mouth       LUPRON DEPOT IM      Inject  into the muscle every 3 months.       meropenem 500 MG vial    MERREM    4 mL    500 mg by Nasal Instillation route as needed       methylcellulose (laxative) Powd    CITRUCEL    479 g    Start with 1 heaping tablespoon. Increase as needed, 1 heaping tablespoon at a time, up to 3 times per day.       montelukast 10 MG tablet    SINGULAIR    30 tablet    TAKE ONE TABLET BY MOUTH ONCE DAILY AT BEDTIME       MULTIVITAMIN PO      Take 1 tablet by mouth daily.       omeprazole 20 MG CR capsule    priLOSEC    60 capsule    TAKE 1 CAPSULE BY MOUTH TWICE A DAY       phentermine 15 MG capsule     60 capsule    Take 2 capsules (30 mg) by mouth every morning       phytonadione 5 MG tablet    MEPHYTON    4 tablet    Take 1 tablet (5 mg) by mouth once a week       PROZAC 40 MG capsule   Generic drug:  FLUoxetine      Take 80 mg by mouth daily.       spironolactone 25 MG tablet    ALDACTONE    60 tablet    Take 1 tablet (25 mg) by mouth daily       TRAZODONE HCL PO      Take 100 mg by mouth At Bedtime       vitamin E 400 UNIT capsule     60 capsule    TAKE 1 CAPSULE BY MOUTH TWICE A DAY       * Notice:  This list has 2 medication(s) that are the same as other medications prescribed for you. Read the directions carefully, and ask your doctor or other care provider to review them with you.

## 2017-03-09 NOTE — LETTER
3/9/2017      RE: Mamie Rice  [address] [facility] [phone]       HISTORY OF PRESENT ILLNESS:  The patient is here for meropenem instillation.  She is doing reasonably well.  She has delayed this appointment because of other commitments.      PHYSICAL EXAMINATION:  She is examined today.      PROCEDURE:  She is examined today with the 0-degree rigid endoscope.  On the right, the middle meatus is clear.  I was able to instill 2 cc of meropenem into the maxillary antrum.  On the left, she has a polyp in the anterior aspect of the middle meatus.  This is debrided with suction and then I was able to instill 2 cc of meropenem into that area.      PLAN:  She is going to return in one month.         Mariela Haile MD

## 2017-03-09 NOTE — LETTER
3/9/2017       RE: Mamie Rice  [address] [facility] [phone]     Dear Colleague,    Thank you for referring your patient, Mamie Rice, to the [facility] E[facility] NOSE AND THROAT at [facility]. Please see a copy of my visit note below.    HISTORY OF PRESENT ILLNESS:  The patient is here for meropenem instillation.  She is doing reasonably well.  She has delayed this appointment because of other commitments.      PHYSICAL EXAMINATION:  She is examined today.      PROCEDURE:  She is examined today with the 0-degree rigid endoscope.  On the right, the middle meatus is clear.  I was able to instill 2 cc of meropenem into the maxillary antrum.  On the left, she has a polyp in the anterior aspect of the middle meatus.  This is debrided with suction and then I was able to instill 2 cc of meropenem into that area.      PLAN:  She is going to return in one month.         Again, thank you for allowing me to participate in the care of your patient.      Sincerely,    Mariela Haile MD

## 2017-03-09 NOTE — PROGRESS NOTES
HISTORY OF PRESENT ILLNESS:  The patient is here for meropenem instillation.  She is doing reasonably well.  She has delayed this appointment because of other commitments.      PHYSICAL EXAMINATION:  She is examined today.      PROCEDURE:  She is examined today with the 0-degree rigid endoscope.  On the right, the middle meatus is clear.  I was able to instill 2 cc of meropenem into the maxillary antrum.  On the left, she has a polyp in the anterior aspect of the middle meatus.  This is debrided with suction and then I was able to instill 2 cc of meropenem into that area.      PLAN:  She is going to return in one month.

## 2017-03-20 ENCOUNTER — OFFICE VISIT (OUTPATIENT)
Dept: DERMATOLOGY | Facility: CLINIC | Age: 22
End: 2017-03-20

## 2017-03-20 VITALS — DIASTOLIC BLOOD PRESSURE: 61 MMHG | SYSTOLIC BLOOD PRESSURE: 99 MMHG | HEART RATE: 90 BPM

## 2017-03-20 DIAGNOSIS — L70.0 ACNE VULGARIS: Primary | ICD-10-CM

## 2017-03-20 RX ORDER — SPIRONOLACTONE 50 MG/1
50 TABLET, FILM COATED ORAL 2 TIMES DAILY
Qty: 60 TABLET | Refills: 2 | Status: SHIPPED | OUTPATIENT
Start: 2017-03-20 | End: 2017-08-02

## 2017-03-20 ASSESSMENT — PAIN SCALES - GENERAL: PAINLEVEL: NO PAIN (0)

## 2017-03-20 NOTE — LETTER
3/20/2017       RE: Mamie Rice  [address] [facility] [address]     Dear Colleague,    Thank you for referring your patient, Mamie Rice, to the [facility] DERMATOLOGY at [facility]. Please see a copy of my visit note below.    [facility] Dermatology Note    Dermatology Problem List:  1. Acne vulgaris s/p benzoyl peroxide facial wash, stopped due to skin irritation  - clindamycin 1% lotion, adapalene 0.1% cream, spironolactone 50 mg bid  2. CF  3. DM Type II    Encounter Date: Mar 20, 2017    CC:   Chief Complaint   Patient presents with     Derm Problem     Mamie is here for acne follow up, does not note improvement.     History of Present Illness:  This 21 year old female presents as a follow up for acne. The patient was last seen on 1/23/17 when she started spironolactone 25 mg and continued clindamycin 1% lotion and adapalene 0.1% cream. The patient reports that her skin is not doing well and she is still breaking out on her face and back. Her chest is doing well. She has not had any light headedness, passing out, or breast tenderness. She notes that her CF is doing great. She also notes that she had acne prior to the Depo. The patient reports no other lesions of concern at this time.     Otherwise, the patient reports no painful, bleeding, nonhealing, or pruritic lesions, and denies new or changing moles.     Past Medical History:   Patient Active Problem List   Diagnosis     Hip joint pain     Aspergillosis (H)     Chronic maxillary sinusitis     Hypoglycemia     Type 1 diabetes mellitus (H)     Cystic fibrosis with pulmonary manifestations (H)     Chronic constipation     Frontal sinusitis     Major depression     ADHD (attention deficit hyperactivity disorder)     Back pain     Pancreatic insufficiency     Non morbid obesity due to excess calories     Acne vulgaris     Cystic fibrosis (H)     Insomnia     Major depressive disorder with  single episode     Past Medical History   Diagnosis Date     ADHD (attention deficit hyperactivity disorder)      Aspergillosis, with pneumonia (H)      fugus found caused chest pain     Chronic infection      CF, MRSA.,      Chronic sinusitis      Constipation, chronic      Cystic fibrosis with pulmonary manifestations (H) 12/19/2011     Cystic fibrosis without mention of meconium ileus      SWEAT TEST:Date: 2/17/1994   [facility]  296 mg, 104 mmol/L ClSample #2  295 mg, 104 mmol/L Cl GENOTYPING:Date: 10/15/2007,  Laboratory: [facility]: [id number]     Depressive disorder      Diabetes      no meds currently     Dysthymic disorder      Exocrine pancreatic insufficiency      Gastro-oesophageal reflux disease      Hip pain, right      MRSA (methicillin resistant Staphylococcus aureus) carrier      Pancreatic disease      Past Surgical History   Procedure Laterality Date     Bronchoscopies       Meniscus repair       Bronchoscopy       Sinus surgery       Left hip labral tear  5/11/2011     left hip arthroscopy with labral debridement and synovectomy     Optical tracking system endoscopic sinus surgery  10/14/2011     Procedure:OPTICAL TRACKING SYSTEM ENDOSCOPIC SINUS SURGERY; FESS (functional endoscopic sinus surgery) with Image Guidance, bronchial lavage and cultures; Surgeon:GOYO KUO; Location:[facility] OR     Optical tracking system endoscopic sinus surgery  5/18/2012     Procedure:OPTICAL TRACKING SYSTEM ENDOSCOPIC SINUS SURGERY; Right  and Left Image Guided Functional Endoscopic Sinus Surgery With  Frontal Approach, [employer]; Surgeon:GOYO KUO; Location:[facility] OR     Optical tracking system endoscopic sinus surgery  9/26/2012     Procedure: OPTICAL TRACKING SYSTEM ENDOSCOPIC SINUS SURGERY;  Stealth Guided Bilateral Functional Endoscopic Sinus Surgery *Latex Safe*;  Surgeon: Goyo Kuo MD;  [facility] OR      knee scope, diagnostic       Arthroscopy, Knee- left     Orthopedic  surgery       left hip tear repair 2010     Arthroscopy hip, osteoplasty femur proximal, combined  3/11/2013     Procedure: COMBINED ARTHROSCOPY HIP, OSTEOPLASTY FEMUR PROXIMAL;  Right Hip Arthroscopy, Labral  Debridement.    surgeon request choice anesthesia/admit to [facility] after surgery;  Surgeon: Omkar Austin MD;  Location: [facility] OR     Exam under anesthesia, restorations, extraction(s) dental complex, combined  5/13/2013     Procedure: COMBINED EXAM UNDER ANESTHESIA, RESTORATIONS, EXTRACTION(S) DENTAL COMPLEX;  Dental Exam, Radiographs, Restorations. Single Extraction  Tooth #2. Restorations x 3;  Surgeon: Danilo Ortiz DDS;  Location: [facility] OR     Optical tracking system endoscopic sinus surgery Bilateral 10/16/2015     Procedure: OPTICAL TRACKING SYSTEM ENDOSCOPIC SINUS SURGERY;  Surgeon: Mariela Haile MD;  [facility] OR     Exam under anesthesia anus N/A 5/10/2016     Procedure: EXAM UNDER ANESTHESIA ANUS;  Surgeon: Chet Gaviria MD;  [facility]     Inject botox N/A 5/10/2016     Procedure: INJECT BOTOX;  Surgeon: Chet Gaviria MD;  [facility]     Arthroscopy knee with medial meniscectomy Left 1/31/2017     Procedure: ARTHROSCOPY KNEE WITH MEDIAL MENISCECTOMY;  Surgeon: Jethro Coyle MD;  [facility] OR     Social History:  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 Former use of tanning beds (high school).     Family History:  There is a family history of presumed melanoma in the patient's grandmother.    Medications:  Current Outpatient Prescriptions   Medication Sig Dispense Refill     phentermine 15 MG capsule Take 2 capsules (30 mg) by mouth every morning 60 capsule 4     hydrOXYzine (ATARAX) 25 MG tablet Take 1 tablet (25 mg) by mouth every 6 hours as needed for itching (and nausea) 30 tablet 0     acetaminophen (TYLENOL) 325 MG tablet Take 2 tablets (650 mg) by mouth every 4 hours as needed for other (mild pain) 100  tablet 0     acetylcysteine (MUCOMYST) 20 % injection INHALE ONE 4ML NEB INTO LUNGS VIA NEBULIZER TWO TIMES A DAY, MAY INCREASE TO 3-4 TIMES A DAY WITH INCREASE COUGH/COLD SYMPTOMS 360 mL 11     MedroxyPROGESTERone Acetate (DEPO-PROVERA IM)        buPROPion (WELLBUTRIN SR) 150 MG 12 hr tablet Take 150 mg by mouth 2 times daily       spironolactone (ALDACTONE) 25 MG tablet Take 1 tablet (25 mg) by mouth daily 60 tablet 3     ascorbic acid (VITAMIN C) 500 MG tablet TAKE ONE TABLET BY MOUTH TWICE A  tablet 3     montelukast (SINGULAIR) 10 MG tablet TAKE ONE TABLET BY MOUTH ONCE DAILY AT BEDTIME 30 tablet 11     VITAMIN D3 1000 UNITS tablet TAKE ONE TABLET BY MOUTH EVERY DAY 30 tablet 11     clindamycin (CLEOCIN T) 1 % lotion Apply topically every morning After washing face with benzoyl peroxide wash 60 mL 11     adapalene (DIFFERIN) 0.1 % cream Apply topically At Bedtime After washing face 45 g 11     LORATADINE PO Take 10 mg by mouth       albuterol (2.5 MG/3ML) 0.083% nebulizer solution Take 1 vial (2.5 mg) by nebulization 4 times daily 120 vial 11     omeprazole (PRILOSEC) 20 MG capsule TAKE 1 CAPSULE BY MOUTH TWICE A DAY 60 capsule 11     vitamin E 400 UNIT capsule TAKE 1 CAPSULE BY MOUTH TWICE A DAY 60 capsule 11     azithromycin (ZITHROMAX) 500 MG tablet Take 1 tablet (500 mg) by mouth daily M-W-F 36 tablet 4     beta carotene 83656 UNIT capsule Take 1 capsule (25,000 Units) by mouth Every Mon, Wed, Fri Morning 36 capsule 4     methylcellulose, laxative, (CITRUCEL) POWD Start with 1 heaping tablespoon. Increase as needed, 1 heaping tablespoon at a time, up to 3 times per day. 479 g 3     GLYCOPYRROLATE PO Take 1 mg by mouth 2 times daily        TRAZODONE HCL PO Take 100 mg by mouth At Bedtime        phytonadione (MEPHYTON) 5 MG tablet Take 1 tablet (5 mg) by mouth once a week 4 tablet 11     cyanocobalamin (VITAMIN B12) 1000 MCG/ML injection Inject 1 mL into the muscle every 30 days        blood glucose  (ACCU-CHEK SMARTVIEW) test strip Use to test blood sugar 4 times daily or as directed. 1 Month 12     albuterol (PROAIR HFA, PROVENTIL HFA, VENTOLIN HFA) 108 (90 BASE) MCG/ACT inhaler Inhale 2 puffs into the lungs every 6 hours as needed for shortness of breath / dyspnea or wheezing 1 Inhaler 12     meropenem (MERREM) 500 MG injection 500 mg by Nasal Instillation route as needed  4 mL 0     FLUoxetine (PROZAC) 40 MG capsule Take 80 mg by mouth daily.       Amphetamine-Dextroamphetamine (ADDERALL PO) Take 20 mg by mouth daily.       Leuprolide Acetate (LUPRON DEPOT IM) Inject  into the muscle every 3 months.       Multiple Vitamin (MULTIVITAMIN OR) Take 1 tablet by mouth daily.       Allergies   Allergen Reactions     Vancomycin Hives     Redmens skin rash      Vancomycin Rash     Review of Systems:  -As per HPI    Physical exam:  BP 99/61  GEN: This is a well developed, well-nourished female in no acute distress, in a pleasant mood.      SKIN: Acne exam, which includes the face, neck, upper central chest, and upper central back was performed.  - Superficial excoriated acneiform papules with intermixed open and closed comedones on the face greater than the chest and back  - No other lesions of concern on areas examined.     Impression/Plan:  1. Acne vulgaris, acne excoree   - Increase spironolactone to 50 mg bid. Discussion that it can cause breast tenderness, spotting, dizziness and to not get pregnant while on this medication. Blood pressure will be checked every 3 months. The patient advised to drink lots of fluids.   - Continue clindamycin 1% lotion - apply after benzoyl peroxide in the morning.  - Continue adapalene 0.1% cream - apply a thin pea-sized amount of cream to the entire face.  - Continue BPO facial wash  - Consider accutane if no improvement is made, information booklet given.       Follow-up in 3 months, earlier for new or changing lesions.     Staff Involved:  Scribe Disclosure:   Lupe WALSH,  am serving as a scribe to document services personally performed by Paige Reid, based on data collection and the provider's statements to me.    Provider Disclosure:   The documentation recorded by the scribe accurately reflects the services I personally performed and the decisions made by me.    All risks, benefits and alternatives were discussed with patient.  Patient is in agreement and understands the assessment and plan.  All questions were answered.  Sun Screen Education was given.   Return to Clinic in 3 months or sooner as needed.   Paige Reid PA-C   [facility]

## 2017-03-20 NOTE — NURSING NOTE
Dermatology Rooming Note    Mamie Rice's goals for this visit include:   Chief Complaint   Patient presents with     Derm Problem     Mamie is here for acne follow up, does not note improvement.     Valerie Ghosh LPN

## 2017-03-20 NOTE — PROGRESS NOTES
[facility] Dermatology Note    Dermatology Problem List:  1. Acne vulgaris s/p benzoyl peroxide facial wash, stopped due to skin irritation  - clindamycin 1% lotion, adapalene 0.1% cream, spironolactone 50 mg bid  2. CF  3. DM Type II    Encounter Date: Mar 20, 2017    CC:   Chief Complaint   Patient presents with     Derm Problem     Mamie is here for acne follow up, does not note improvement.     History of Present Illness:  This 21 year old female presents as a follow up for acne. The patient was last seen on 1/23/17 when she started spironolactone 25 mg and continued clindamycin 1% lotion and adapalene 0.1% cream. The patient reports that her skin is not doing well and she is still breaking out on her face and back. Her chest is doing well. She has not had any light headedness, passing out, or breast tenderness. She notes that her CF is doing great. She also notes that she had acne prior to the Depo. The patient reports no other lesions of concern at this time.     Otherwise, the patient reports no painful, bleeding, nonhealing, or pruritic lesions, and denies new or changing moles.     Past Medical History:   Patient Active Problem List   Diagnosis     Hip joint pain     Aspergillosis (H)     Chronic maxillary sinusitis     Hypoglycemia     Type 1 diabetes mellitus (H)     Cystic fibrosis with pulmonary manifestations (H)     Chronic constipation     Frontal sinusitis     Major depression     ADHD (attention deficit hyperactivity disorder)     Back pain     Pancreatic insufficiency     Non morbid obesity due to excess calories     Acne vulgaris     Cystic fibrosis (H)     Insomnia     Major depressive disorder with single episode     Past Medical History   Diagnosis Date     ADHD (attention deficit hyperactivity disorder)      Aspergillosis, with pneumonia (H)      fugus found caused chest pain     Chronic infection      CF, MRSA.,      Chronic sinusitis      Constipation, chronic       Cystic fibrosis with pulmonary manifestations (H) 12/19/2011     Cystic fibrosis without mention of meconium ileus      SWEAT TEST:Date: 2/17/1994   Labor[facility]  296 mg, 104 mmol/L ClSample #2  295 mg, 104 mmol/L Cl GENOTYPING:Date: 10/15/2007,  Laboratory: [facility]: [id number]     Depressive disorder      Diabetes      no meds currently     Dysthymic disorder      Exocrine pancreatic insufficiency      Gastro-oesophageal reflux disease      Hip pain, right      MRSA (methicillin resistant Staphylococcus aureus) carrier      Pancreatic disease      Past Surgical History   Procedure Laterality Date     Bronchoscopies       Meniscus repair       Bronchoscopy       Sinus surgery       Left hip labral tear  5/11/2011     left hip arthroscopy with labral debridement and synovectomy     Optical tracking system endoscopic sinus surgery  10/14/2011     Procedure:OPTICAL TRACKING SYSTEM ENDOSCOPIC SINUS SURGERY; FESS (functional endoscopic sinus surgery) with Image Guidance, bronchial lavage and cultures; Surgeon:GOYO KUO; [facility] OR     Optical tracking system endoscopic sinus surgery  5/18/2012     Procedure:OPTICAL TRACKING SYSTEM ENDOSCOPIC SINUS SURGERY; Right  and Left Image Guided Functional Endoscopic Sinus Surgery With  Frontal Approach, [employer]; Surgeon:GOYO KUO; [facility] OR     Optical tracking system endoscopic sinus surgery  9/26/2012     Procedure: OPTICAL TRACKING SYSTEM ENDOSCOPIC SINUS SURGERY;  Stealth Guided Bilateral Functional Endoscopic Sinus Surgery *Latex Safe*;  Surgeon: Goyo Kuo MD;  [facility]      knee scope, diagnostic       Arthroscopy, Knee- left     Orthopedic surgery       left hip tear repair 2010     Arthroscopy hip, osteoplasty femur proximal, combined  3/11/2013     Procedure: COMBINED ARTHROSCOPY HIP, OSTEOPLASTY FEMUR PROXIMAL;  Right Hip Arthroscopy, Labral  Debridement.    surgeon request choice anesthesia/admit to [facility] after  surgery;  Surgeon: Omkar Austin MD;  [facility]     Exam under anesthesia, restorations, extraction(s) dental complex, combined  5/13/2013     Procedure: COMBINED EXAM UNDER ANESTHESIA, RESTORATIONS, EXTRACTION(S) DENTAL COMPLEX;  Dental Exam, Radiographs, Restorations. Single Extraction  Tooth #2. Restorations x 3;  Surgeon: Danilo Ortiz DDS;  [facility] OR     Optical tracking system endoscopic sinus surgery Bilateral 10/16/2015     Procedure: OPTICAL TRACKING SYSTEM ENDOSCOPIC SINUS SURGERY;  Surgeon: Mariela Haile MD;  [facility]     Exam under anesthesia anus N/A 5/10/2016     Procedure: EXAM UNDER ANESTHESIA ANUS;  Surgeon: Chet Gaviria MD;  [facility]     Inject botox N/A 5/10/2016     Procedure: INJECT BOTOX;  Surgeon: Chet Gaviria MD;  [facility]     Arthroscopy knee with medial meniscectomy Left 1/31/2017     Procedure: ARTHROSCOPY KNEE WITH MEDIAL MENISCECTOMY;  Surgeon: Jethro Coyle MD;  [facility]     Social History:  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 Former use of tanning beds (high school).     Family History:  There is a family history of presumed melanoma in the patient's grandmother.    Medications:  Current Outpatient Prescriptions   Medication Sig Dispense Refill     phentermine 15 MG capsule Take 2 capsules (30 mg) by mouth every morning 60 capsule 4     hydrOXYzine (ATARAX) 25 MG tablet Take 1 tablet (25 mg) by mouth every 6 hours as needed for itching (and nausea) 30 tablet 0     acetaminophen (TYLENOL) 325 MG tablet Take 2 tablets (650 mg) by mouth every 4 hours as needed for other (mild pain) 100 tablet 0     acetylcysteine (MUCOMYST) 20 % injection INHALE ONE 4ML NEB INTO LUNGS VIA NEBULIZER TWO TIMES A DAY, MAY INCREASE TO 3-4 TIMES A DAY WITH INCREASE COUGH/COLD SYMPTOMS 360 mL 11     MedroxyPROGESTERone Acetate (DEPO-PROVERA IM)        buPROPion (WELLBUTRIN SR) 150 MG 12  hr tablet Take 150 mg by mouth 2 times daily       spironolactone (ALDACTONE) 25 MG tablet Take 1 tablet (25 mg) by mouth daily 60 tablet 3     ascorbic acid (VITAMIN C) 500 MG tablet TAKE ONE TABLET BY MOUTH TWICE A  tablet 3     montelukast (SINGULAIR) 10 MG tablet TAKE ONE TABLET BY MOUTH ONCE DAILY AT BEDTIME 30 tablet 11     VITAMIN D3 1000 UNITS tablet TAKE ONE TABLET BY MOUTH EVERY DAY 30 tablet 11     clindamycin (CLEOCIN T) 1 % lotion Apply topically every morning After washing face with benzoyl peroxide wash 60 mL 11     adapalene (DIFFERIN) 0.1 % cream Apply topically At Bedtime After washing face 45 g 11     LORATADINE PO Take 10 mg by mouth       albuterol (2.5 MG/3ML) 0.083% nebulizer solution Take 1 vial (2.5 mg) by nebulization 4 times daily 120 vial 11     omeprazole (PRILOSEC) 20 MG capsule TAKE 1 CAPSULE BY MOUTH TWICE A DAY 60 capsule 11     vitamin E 400 UNIT capsule TAKE 1 CAPSULE BY MOUTH TWICE A DAY 60 capsule 11     azithromycin (ZITHROMAX) 500 MG tablet Take 1 tablet (500 mg) by mouth daily M-W-F 36 tablet 4     beta carotene 24923 UNIT capsule Take 1 capsule (25,000 Units) by mouth Every Mon, Wed, Fri Morning 36 capsule 4     methylcellulose, laxative, (CITRUCEL) POWD Start with 1 heaping tablespoon. Increase as needed, 1 heaping tablespoon at a time, up to 3 times per day. 479 g 3     GLYCOPYRROLATE PO Take 1 mg by mouth 2 times daily        TRAZODONE HCL PO Take 100 mg by mouth At Bedtime        phytonadione (MEPHYTON) 5 MG tablet Take 1 tablet (5 mg) by mouth once a week 4 tablet 11     cyanocobalamin (VITAMIN B12) 1000 MCG/ML injection Inject 1 mL into the muscle every 30 days        blood glucose (ACCU-CHEK SMARTVIEW) test strip Use to test blood sugar 4 times daily or as directed. 1 Month 12     albuterol (PROAIR HFA, PROVENTIL HFA, VENTOLIN HFA) 108 (90 BASE) MCG/ACT inhaler Inhale 2 puffs into the lungs every 6 hours as needed for shortness of breath / dyspnea or wheezing 1  Inhaler 12     meropenem (MERREM) 500 MG injection 500 mg by Nasal Instillation route as needed  4 mL 0     FLUoxetine (PROZAC) 40 MG capsule Take 80 mg by mouth daily.       Amphetamine-Dextroamphetamine (ADDERALL PO) Take 20 mg by mouth daily.       Leuprolide Acetate (LUPRON DEPOT IM) Inject  into the muscle every 3 months.       Multiple Vitamin (MULTIVITAMIN OR) Take 1 tablet by mouth daily.       Allergies   Allergen Reactions     Vancomycin Hives     Redmens skin rash      Vancomycin Rash     Review of Systems:  -As per HPI    Physical exam:  BP 99/61  GEN: This is a well developed, well-nourished female in no acute distress, in a pleasant mood.      SKIN: Acne exam, which includes the face, neck, upper central chest, and upper central back was performed.  - Superficial excoriated acneiform papules with intermixed open and closed comedones on the face greater than the chest and back  - No other lesions of concern on areas examined.     Impression/Plan:  1. Acne vulgaris, acne excoree   - Increase spironolactone to 50 mg bid. Discussion that it can cause breast tenderness, spotting, dizziness and to not get pregnant while on this medication. Blood pressure will be checked every 3 months. The patient advised to drink lots of fluids.   - Continue clindamycin 1% lotion - apply after benzoyl peroxide in the morning.  - Continue adapalene 0.1% cream - apply a thin pea-sized amount of cream to the entire face.  - Continue BPO facial wash  - Consider accutane if no improvement is made, information booklet given.       Follow-up in 3 months, earlier for new or changing lesions.     Staff Involved:  Scribe Disclosure:   I, Lupe Alcantara, am serving as a scribe to document services personally performed by Paige Reid, based on data collection and the provider's statements to me.    Provider Disclosure:   The documentation recorded by the scribe accurately reflects the services I personally performed and the  decisions made by me.    All risks, benefits and alternatives were discussed with patient.  Patient is in agreement and understands the assessment and plan.  All questions were answered.  Sun Screen Education was given.   Return to Clinic in 3 months or sooner as needed.   Paige Reid PA-C   [facility]

## 2017-03-20 NOTE — MR AVS SNAPSHOT
After Visit Summary   3/20/2017    Mamie Rice    MRN: [id number]           Patient Information     Date Of Birth          1995        Visit Information        Provider Department      3/20/2017 11:45 AM Paige Reid PA-C [facility] Dermatology        Today's Diagnoses     Acne vulgaris    -  1       Follow-ups after your visit        Follow-up notes from your care team     Return in about 3 months (around 6/20/2017).      Your next 10 appointments already scheduled     Apr 03, 2017  2:45 PM CDT   (Arrive by 2:30 PM)   RETURN RHINOLOGY with Mariela Haile MD   [facility] ([facility])    [address] [place] [address]  4th [facility] [id number] [phone]            Apr 25, 2017 11:00 AM CDT   CF LOOP with  PFL CF   [facility] Pulmonary Function Testing ([facility])    [address] [place] [id number]   [phone]            Apr 25, 2017 11:20 AM CDT   (Arrive by 11:05 AM)   RETURN CYSTIC FIBROSIS VISIT with Gavi Allison MD   [facility] ([facility])    [address] [place] [id number]   [phone]            Jun 21, 2017 10:45 AM CDT   (Arrive by 10:30 AM)   Return Visit with Pagie Reid PA-C   [facility] Dermatology ([facility])    [address] [place] [id number] [phone]              Who to contact     Please call your clinic at [phone] to:    Ask questions about your health    Make or cancel appointments    Discuss your medicines    Learn about your test results    Speak to your doctor   If you have compliments or concerns about an experience at your clinic, or if you wish to file a complaint, please contact [facility] Patient Relations at [phone] or email us at  [email or web address]         Additional Information About Your Visit        [employer] Information     [employer] gives you secure access to your electronic health record. If you see a primary care provider, you can also send messages to your care team and make appointments. If you have questions, please call your primary care clinic.  If you do not have a primary care provider, please call [phone] and they will assist you.      [employer] is an electronic gateway that provides easy, online access to your medical records. With [employer], you can request a clinic appointment, read your test results, renew a prescription or communicate with your care team.     To access your existing account, please contact your [facility] or call [phone] for assistance.        Care EveryWhere ID     This is your Care EveryWhere ID. This could be used by other organizations to access your [place] medical records  [id number]        Your Vitals Were     Pulse                   90            Blood Pressure from Last 3 Encounters:   03/20/17 99/61   02/07/17 117/77   02/07/17 117/77    Weight from Last 3 Encounters:   03/09/17 81.6 kg (180 lb)   02/07/17 80.7 kg (177 lb 14.6 oz)   02/07/17 80.7 kg (177 lb 14.6 oz)              Today, you had the following     No orders found for display         Today's Medication Changes          These changes are accurate as of: 3/20/17  6:46 PM.  If you have any questions, ask your nurse or doctor.               These medicines have changed or have updated prescriptions.        Dose/Directions    * spironolactone 25 MG tablet   Commonly known as:  ALDACTONE   This may have changed:  Another medication with the same name was added. Make sure you understand how and when to take each.   Used for:  Acne vulgaris        Dose:  25 mg   Take 1 tablet (25 mg) by mouth daily   Quantity:  60 tablet   Refills:  3       * spironolactone 50 MG tablet   Commonly known  as:  ALDACTONE   This may have changed:  You were already taking a medication with the same name, and this prescription was added. Make sure you understand how and when to take each.   Used for:  Acne vulgaris        Dose:  50 mg   Take 1 tablet (50 mg) by mouth 2 times daily   Quantity:  60 tablet   Refills:  2       * Notice:  This list has 2 medication(s) that are the same as other medications prescribed for you. Read the directions carefully, and ask your doctor or other care provider to review them with you.         Where to get your medicines      These medications were sent to [facility] - [address] [address]     Phone:  [phone]     spironolactone 50 MG tablet                Primary Care Provider Office Phone # Fax #    Malik De La Rosa [phone]       [address] [facility] [address]        Thank you!     Thank you for choosing [facility] DERMATOLOGY  for your care. Our goal is always to provide you with excellent care. Hearing back from our patients is one way we can continue to improve our services. Please take a few minutes to complete the written survey that you may receive in the mail after your visit with us. Thank you!             Your Updated Medication List - Protect others around you: Learn how to safely use, store and throw away your medicines at www.disposemymeds.org.          This list is accurate as of: 3/20/17  6:46 PM.  Always use your most recent med list.                   Brand Name Dispense Instructions for use    acetaminophen 325 MG tablet    TYLENOL    100 tablet    Take 2 tablets (650 mg) by mouth every 4 hours as needed for other (mild pain)       acetylcysteine 20 % nebulizer solution    MUCOMYST    360 mL    INHALE ONE 4ML NEB INTO LUNGS VIA NEBULIZER TWO TIMES A DAY, MAY INCREASE TO 3-4 TIMES A DAY WITH INCREASE COUGH/COLD SYMPTOMS       adapalene 0.1 %  cream    DIFFERIN    45 g    Apply topically At Bedtime After washing face       ADDERALL PO      Take 20 mg by mouth daily.       * albuterol 108 (90 BASE) MCG/ACT Inhaler    PROAIR HFA/PROVENTIL HFA/VENTOLIN HFA    1 Inhaler    Inhale 2 puffs into the lungs every 6 hours as needed for shortness of breath / dyspnea or wheezing       * albuterol (2.5 MG/3ML) 0.083% neb solution     120 vial    Take 1 vial (2.5 mg) by nebulization 4 times daily       ascorbic acid 500 MG tablet    VITAMIN C    100 tablet    TAKE ONE TABLET BY MOUTH TWICE A DAY       azithromycin 500 MG tablet    ZITHROMAX    36 tablet    Take 1 tablet (500 mg) by mouth daily M-W-F       beta carotene 66011 UNIT capsule     36 capsule    Take 1 capsule (25,000 Units) by mouth Every Mon, Wed, Fri Morning       blood glucose monitoring test strip    ACCU-CHEK SMARTVIEW    1 Month    Use to test blood sugar 4 times daily or as directed.       buPROPion 150 MG 12 hr tablet    WELLBUTRIN SR     Take 150 mg by mouth 2 times daily       cholecalciferol 1000 UNIT tablet    vitamin D    30 tablet    TAKE ONE TABLET BY MOUTH EVERY DAY       clindamycin 1 % lotion    CLEOCIN T    60 mL    Apply topically every morning After washing face with benzoyl peroxide wash       cyanocobalamin 1000 MCG/ML injection    VITAMIN B12     Inject 1 mL into the muscle every 30 days       DEPO-PROVERA IM          GLYCOPYRROLATE PO      Take 1 mg by mouth 2 times daily       hydrOXYzine 25 MG tablet    ATARAX    30 tablet    Take 1 tablet (25 mg) by mouth every 6 hours as needed for itching (and nausea)       LORATADINE PO      Take 10 mg by mouth       LUPRON DEPOT IM      Inject  into the muscle every 3 months.       meropenem 500 MG vial    MERREM    4 mL    500 mg by Nasal Instillation route as needed       methylcellulose (laxative) Powd    CITRUCEL    479 g    Start with 1 heaping tablespoon. Increase as needed, 1 heaping tablespoon at a time, up to 3 times per day.        montelukast 10 MG tablet    SINGULAIR    30 tablet    TAKE ONE TABLET BY MOUTH ONCE DAILY AT BEDTIME       MULTIVITAMIN PO      Take 1 tablet by mouth daily.       omeprazole 20 MG CR capsule    priLOSEC    60 capsule    TAKE 1 CAPSULE BY MOUTH TWICE A DAY       phentermine 15 MG capsule     60 capsule    Take 2 capsules (30 mg) by mouth every morning       phytonadione 5 MG tablet    MEPHYTON    4 tablet    Take 1 tablet (5 mg) by mouth once a week       PROZAC 40 MG capsule   Generic drug:  FLUoxetine      Take 80 mg by mouth daily.       * spironolactone 25 MG tablet    ALDACTONE    60 tablet    Take 1 tablet (25 mg) by mouth daily       * spironolactone 50 MG tablet    ALDACTONE    60 tablet    Take 1 tablet (50 mg) by mouth 2 times daily       TRAZODONE HCL PO      Take 100 mg by mouth At Bedtime       vitamin E 400 UNIT capsule     60 capsule    TAKE 1 CAPSULE BY MOUTH TWICE A DAY       * Notice:  This list has 4 medication(s) that are the same as other medications prescribed for you. Read the directions carefully, and ask your doctor or other care provider to review them with you.

## 2017-04-01 LAB — NORMAL RANGE: NORMAL

## 2017-04-03 ENCOUNTER — OFFICE VISIT (OUTPATIENT)
Dept: OTOLARYNGOLOGY | Facility: CLINIC | Age: 22
End: 2017-04-03

## 2017-04-03 VITALS — WEIGHT: 179 LBS | BODY MASS INDEX: 33.79 KG/M2 | HEIGHT: 61 IN

## 2017-04-03 DIAGNOSIS — J32.4 CHRONIC PANSINUSITIS: ICD-10-CM

## 2017-04-03 DIAGNOSIS — R09.81 NASAL CONGESTION: Primary | ICD-10-CM

## 2017-04-03 RX ORDER — FLUTICASONE PROPIONATE 50 MCG
2 SPRAY, SUSPENSION (ML) NASAL DAILY
Qty: 1 BOTTLE | Refills: 6 | Status: SHIPPED | OUTPATIENT
Start: 2017-04-03 | End: 2017-05-03

## 2017-04-03 ASSESSMENT — PAIN SCALES - GENERAL: PAINLEVEL: SEVERE PAIN (6)

## 2017-04-03 NOTE — LETTER
4/3/2017       RE: Mamie Rice  [address] [facility] [id number]     Dear Colleague,    Thank you for referring your patient, Mamie Rice, to the [facility]. Please see a copy of my visit note below.    HISTORY OF PRESENT ILLNESS:  Mamie Rice presents.  Over the weekend, she developed increasing nasal congestion, coughing, no change in her nasal drainage or breathing.      PHYSICAL EXAMINATION:  She is examined today.      PROCEDURE:  A 0-degree rigid endoscope is used for nasal examination.  She has no crusting in the middle meatus on the right, and I can see into the maxillary antrum.  She has benefited in the past from meropenem instillation so we went ahead and did instillation of meropenem under endoscopic visualization today into the middle meatus on the right.  Some of this went into the antrum.  On the left side, I repeated the procedure and was able to get some of the meropenem into her antrum and into the middle meatus as well.  She tolerates this very well.        ASSESSMENT AND PLAN:  I do not see any evidence of sinus infection.  I am concerned that her symptoms over the weekend may be due to allergies.  I am going to start her on Flonase nasal spray.  If she worsens at all with regards to her cough, she will contact the Pulmonary Clinic for further evaluation.  She is also requesting an allergy evaluation which I think is reasonable.  I will see her back in a month for repeat meropenem instillation.         Again, thank you for allowing me to participate in the care of your patient.      Sincerely,    Mariela Haile MD

## 2017-04-03 NOTE — MR AVS SNAPSHOT
After Visit Summary   4/3/2017    Mamie Rice    MRN: [id number]           Patient Information     Date Of Birth          1995        Visit Information        Provider Department      4/3/2017 2:45 PM Mariela Haile MD [facility] Ear Nose and Throat        Today's Diagnoses     Nasal congestion    -  1    Chronic pansinusitis          Care Instructions    Plan of care:  Flonase 2 sprays each nostril every day  Allergy consult  Clinic contact information:  1. To schedule an appointment call [phone], option 1  2. To talk to the Triage RN call [phone], option 3  3. If you need to speak to Ellie or get a message to your doctor on a Friday, call the triage RN  4. EllieRN: [phone]  5. Surgery scheduling:      Mahi Canela: [phone]      Brisa Aparicio: [phone]  6. Fax: [phone]  7. [phone]          Follow-ups after your visit        Additional Services     MISCELLANEOUS REFERRAL       Referral to allergy/Dr Marquez                  Your next 10 appointments already scheduled     2017 11:00 AM CDT   CF LOOP with  PFL CF   [facility] Pulmonary Function Testing ([facility])    [address] [place] [id number] [phone]            2017 11:20 AM CDT   (Arrive by 11:05 AM)   RETURN CYSTIC FIBROSIS VISIT with Gavi Allison MD   [facility] Lung Science and Health ([facility] [place])    [address] [place] [phone]   [phone]            2017  7:30 AM CDT   PFT VISIT with UC PFL A   [facility] Pulmonary Function Testing ([facility])    [address] [place] [address]   [phone]            2017  8:15 AM CDT   (Arrive by 8:00 AM)   New Allergy with Beau Marquez MD   [facility] Lung Science [facility] Health ([facility] [place])     "[address] [address] [phone]           Do not take anti-histamines or Zantac for seven day prior to your appointment.            Jun 21, 2017 10:45 AM CDT   (Arrive by 10:30 AM)   Return Visit with VANESA [facility] Dermatology ([facility] [place])    [address] [address] [phone]              Who to contact     Please call your clinic at [phone] to:    Ask questions about your health    Make or cancel appointments    Discuss your medicines    Learn about your test results    Speak to your doctor   If you have compliments or concerns about an experience at your clinic, or if you wish to file a complaint, please contact [facility] Patient Relations at [phone] or email us at [email or web address]         Additional Information About Your Visit        [employer] Information     [employer] gives you secure access to your electronic health record. If you see a primary care provider, you can also send messages to your care team and make appointments. If you have questions, please call your primary care clinic.  If you do not have a primary care provider, please call [phone] and they will assist you.      [employer] is an electronic gateway that provides easy, online access to your medical records. With [employer], you can request a clinic appointment, read your test results, renew a prescription or communicate with your care team.     To access your existing account, please contact your [facility] or call [phone] for assistance.        Care EveryWhere ID     This is your Care EveryWhere ID. This could be used by other organizations to access your [facility] medical records  [id number]        Your Vitals Were     Height BMI (Body Mass Index)                1.549 m (5' 1\") 33.82 kg/m2           Blood Pressure from Last 3 " Encounters:   03/20/17 99/61   02/07/17 117/77   02/07/17 117/77    Weight from Last 3 Encounters:   04/03/17 81.2 kg (179 lb)   03/09/17 81.6 kg (180 lb)   02/07/17 80.7 kg (177 lb 14.6 oz)              We Performed the Following     MISCELLANEOUS REFERRAL          Today's Medication Changes          These changes are accurate as of: 4/3/17  3:38 PM.  If you have any questions, ask your nurse or doctor.               Start taking these medicines.        Dose/Directions    fluticasone 50 MCG/ACT spray   Commonly known as:  FLONASE   Used for:  Nasal congestion, Chronic pansinusitis   Started by:  Mariela Haile MD        Dose:  2 spray   Spray 2 sprays into both nostrils daily   Quantity:  1 Bottle   Refills:  6            Where to get your medicines      These medications were sent to [facility] - [address] [address]     Phone:  [phone]     fluticasone 50 MCG/ACT spray                Primary Care Provider Office Phone # Fax #    Malik De La Rosa [phone]       [address] [facility] [address]        Thank you!     Thank you for choosing [facility] EAR NOSE AND THROAT  for your care. Our goal is always to provide you with excellent care. Hearing back from our patients is one way we can continue to improve our services. Please take a few minutes to complete the written survey that you may receive in the mail after your visit with us. Thank you!             Your Updated Medication List - Protect others around you: Learn how to safely use, store and throw away your medicines at www.disposemymeds.org.          This list is accurate as of: 4/3/17  3:38 PM.  Always use your most recent med list.                   Brand Name Dispense Instructions for use    acetaminophen 325 MG tablet    TYLENOL    100 tablet    Take 2 tablets (650 mg) by mouth every 4 hours as needed for other (mild pain)        acetylcysteine 20 % nebulizer solution    MUCOMYST    360 mL    INHALE ONE 4ML NEB INTO LUNGS VIA NEBULIZER TWO TIMES A DAY, MAY INCREASE TO 3-4 TIMES A DAY WITH INCREASE COUGH/COLD SYMPTOMS       adapalene 0.1 % cream    DIFFERIN    45 g    Apply topically At Bedtime After washing face       ADDERALL PO      Take 20 mg by mouth daily.       * albuterol 108 (90 BASE) MCG/ACT Inhaler    PROAIR HFA/PROVENTIL HFA/VENTOLIN HFA    1 Inhaler    Inhale 2 puffs into the lungs every 6 hours as needed for shortness of breath / dyspnea or wheezing       * albuterol (2.5 MG/3ML) 0.083% neb solution     120 vial    Take 1 vial (2.5 mg) by nebulization 4 times daily       ascorbic acid 500 MG tablet    VITAMIN C    100 tablet    TAKE ONE TABLET BY MOUTH TWICE A DAY       azithromycin 500 MG tablet    ZITHROMAX    36 tablet    Take 1 tablet (500 mg) by mouth daily M-W-F       beta carotene 58711 UNIT capsule     36 capsule    Take 1 capsule (25,000 Units) by mouth Every Mon, Wed, Fri Morning       blood glucose monitoring test strip    ACCU-CHEK SMARTVIEW    1 Month    Use to test blood sugar 4 times daily or as directed.       buPROPion 150 MG 12 hr tablet    WELLBUTRIN SR     Take 150 mg by mouth 2 times daily       cholecalciferol 1000 UNIT tablet    vitamin D    30 tablet    TAKE ONE TABLET BY MOUTH EVERY DAY       clindamycin 1 % lotion    CLEOCIN T    60 mL    Apply topically every morning After washing face with benzoyl peroxide wash       cyanocobalamin 1000 MCG/ML injection    VITAMIN B12     Inject 1 mL into the muscle every 30 days       DEPO-PROVERA IM          fluticasone 50 MCG/ACT spray    FLONASE    1 Bottle    Spray 2 sprays into both nostrils daily       GLYCOPYRROLATE PO      Take 1 mg by mouth 2 times daily       hydrOXYzine 25 MG tablet    ATARAX    30 tablet    Take 1 tablet (25 mg) by mouth every 6 hours as needed for itching (and nausea)       LORATADINE PO      Take 10 mg by mouth       LUPRON DEPOT IM       Inject  into the muscle every 3 months.       meropenem 500 MG vial    MERREM    4 mL    500 mg by Nasal Instillation route as needed       methylcellulose (laxative) Powd    CITRUCEL    479 g    Start with 1 heaping tablespoon. Increase as needed, 1 heaping tablespoon at a time, up to 3 times per day.       montelukast 10 MG tablet    SINGULAIR    30 tablet    TAKE ONE TABLET BY MOUTH ONCE DAILY AT BEDTIME       MULTIVITAMIN PO      Take 1 tablet by mouth daily.       omeprazole 20 MG CR capsule    priLOSEC    60 capsule    TAKE 1 CAPSULE BY MOUTH TWICE A DAY       phentermine 15 MG capsule     60 capsule    Take 2 capsules (30 mg) by mouth every morning       phytonadione 5 MG tablet    MEPHYTON    4 tablet    Take 1 tablet (5 mg) by mouth once a week       PROZAC 40 MG capsule   Generic drug:  FLUoxetine      Take 80 mg by mouth daily.       * spironolactone 25 MG tablet    ALDACTONE    60 tablet    Take 1 tablet (25 mg) by mouth daily       * spironolactone 50 MG tablet    ALDACTONE    60 tablet    Take 1 tablet (50 mg) by mouth 2 times daily       TRAZODONE HCL PO      Take 100 mg by mouth At Bedtime       vitamin E 400 UNIT capsule     60 capsule    TAKE 1 CAPSULE BY MOUTH TWICE A DAY       * Notice:  This list has 4 medication(s) that are the same as other medications prescribed for you. Read the directions carefully, and ask your doctor or other care provider to review them with you.

## 2017-04-03 NOTE — NURSING NOTE
Chief Complaint   Patient presents with     RECHECK     Return Chronic maxillary sinusitis      Pt states facial and ear pain of 6 today.    N Emeka SINGHN

## 2017-04-03 NOTE — PATIENT INSTRUCTIONS
Plan of care:  Flonase 2 sprays each nostril every day  Allergy consult  Clinic contact information:  1. To schedule an appointment call [phone], option 1  2. To talk to the Triage RN call [phone], option 3  3. If you need to speak to Ellie or get a message to your doctor on a Friday, call the triage RN  4. EllieRN: [phone]  5. Surgery scheduling:      Mahi Canela: [phone]      Brisa Aparicio: [phone]  6. Fax: [phone]  7. [phone]

## 2017-04-03 NOTE — PROGRESS NOTES
HISTORY OF PRESENT ILLNESS:  Mamie Rice presents.  Over the weekend, she developed increasing nasal congestion, coughing, no change in her nasal drainage or breathing.      PHYSICAL EXAMINATION:  She is examined today.      PROCEDURE:  A 0-degree rigid endoscope is used for nasal examination.  She has no crusting in the middle meatus on the right, and I can see into the maxillary antrum.  She has benefited in the past from meropenem instillation so we went ahead and did instillation of meropenem under endoscopic visualization today into the middle meatus on the right.  Some of this went into the antrum.  On the left side, I repeated the procedure and was able to get some of the meropenem into her antrum and into the middle meatus as well.  She tolerates this very well.        ASSESSMENT AND PLAN:  I do not see any evidence of sinus infection.  I am concerned that her symptoms over the weekend may be due to allergies.  I am going to start her on Flonase nasal spray.  If she worsens at all with regards to her cough, she will contact the Pulmonary Clinic for further evaluation.  She is also requesting an allergy evaluation which I think is reasonable.  I will see her back in a month for repeat meropenem instillation.

## 2017-04-10 ENCOUNTER — TELEPHONE (OUTPATIENT)
Dept: PULMONOLOGY | Facility: CLINIC | Age: 22
End: 2017-04-10

## 2017-04-10 DIAGNOSIS — E84.0 CYSTIC FIBROSIS WITH PULMONARY MANIFESTATIONS (H): Primary | ICD-10-CM

## 2017-04-10 RX ORDER — SULFAMETHOXAZOLE/TRIMETHOPRIM 800-160 MG
2 TABLET ORAL 2 TIMES DAILY
Qty: 56 TABLET | Refills: 0 | Status: SHIPPED | OUTPATIENT
Start: 2017-04-10 | End: 2017-04-24

## 2017-04-10 NOTE — TELEPHONE ENCOUNTER
[facility]  April 10, 2017    Malik De La Rosa    CF Provider: Gavi Allison MD    Caller: Patient     Clinical information:  Mamie Rice called with complaint of increased cough for the past 1 1/2 weeks. Complaining of chest tightness despite extra therapy.    Plan:   Discussed with Dr Allison:  Bactrim DS, one tablet BID x14 days.  Call back with any new or worsening symptoms/concerns.    Caller verbalized understanding of plan and agrees with advice given.

## 2017-04-16 ASSESSMENT — ENCOUNTER SYMPTOMS
NECK MASS: 0
DOUBLE VISION: 0
EYE WATERING: 0
RESPIRATORY PAIN: 1
WHEEZING: 1
SMELL DISTURBANCE: 0
EYE REDNESS: 0
SPUTUM PRODUCTION: 1
HEMOPTYSIS: 0
SHORTNESS OF BREATH: 1
NERVOUS/ANXIOUS: 1
SNORES LOUDLY: 0
SKIN CHANGES: 0
POSTURAL DYSPNEA: 1
POOR WOUND HEALING: 1
TASTE DISTURBANCE: 0
PANIC: 0
NAIL CHANGES: 0
SINUS PAIN: 1
COUGH DISTURBING SLEEP: 1
SORE THROAT: 0
HOARSE VOICE: 1
TROUBLE SWALLOWING: 0
SINUS CONGESTION: 1
INSOMNIA: 1
DYSPNEA ON EXERTION: 0
COUGH: 1
EYE IRRITATION: 0
EYE PAIN: 1
DECREASED CONCENTRATION: 0
DEPRESSION: 1

## 2017-04-25 ENCOUNTER — ALLIED HEALTH/NURSE VISIT (OUTPATIENT)
Dept: CARE COORDINATION | Facility: CLINIC | Age: 22
End: 2017-04-25

## 2017-04-25 ENCOUNTER — OFFICE VISIT (OUTPATIENT)
Dept: PULMONOLOGY | Facility: CLINIC | Age: 22
End: 2017-04-25
Attending: INTERNAL MEDICINE
Payer: COMMERCIAL

## 2017-04-25 VITALS
DIASTOLIC BLOOD PRESSURE: 68 MMHG | BODY MASS INDEX: 32.9 KG/M2 | RESPIRATION RATE: 16 BRPM | HEART RATE: 84 BPM | WEIGHT: 178.79 LBS | SYSTOLIC BLOOD PRESSURE: 99 MMHG | OXYGEN SATURATION: 97 % | HEIGHT: 62 IN

## 2017-04-25 DIAGNOSIS — Z13.9 RISK AND FUNCTIONAL ASSESSMENT: Primary | ICD-10-CM

## 2017-04-25 DIAGNOSIS — E84.9 CYSTIC FIBROSIS (H): Primary | ICD-10-CM

## 2017-04-25 DIAGNOSIS — E84.0 CYSTIC FIBROSIS WITH PULMONARY MANIFESTATIONS (H): ICD-10-CM

## 2017-04-25 LAB
EXPTIME-PRE: 6.79 SEC
FEF2575-%PRED-PRE: 113 %
FEF2575-PRE: 4.16 L/SEC
FEF2575-PRED: 3.67 L/SEC
FEFMAX-%PRED-PRE: 143 %
FEFMAX-PRE: 9.52 L/SEC
FEFMAX-PRED: 6.61 L/SEC
FEV1-%PRED-PRE: 102 %
FEV1-PRE: 3.17 L
FEV1FEV6-PRE: 89 %
FEV1FEV6-PRED: 86 %
FEV1FVC-PRE: 89 %
FEV1FVC-PRED: 87 %
FIFMAX-PRE: 5.34 L/SEC
FVC-%PRED-PRE: 100 %
FVC-PRE: 3.58 L
FVC-PRED: 3.55 L

## 2017-04-25 PROCEDURE — 99212 OFFICE O/P EST SF 10 MIN: CPT | Mod: ZF

## 2017-04-25 PROCEDURE — 87070 CULTURE OTHR SPECIMN AEROBIC: CPT | Performed by: INTERNAL MEDICINE

## 2017-04-25 PROCEDURE — 87077 CULTURE AEROBIC IDENTIFY: CPT | Performed by: INTERNAL MEDICINE

## 2017-04-25 PROCEDURE — 87186 SC STD MICRODIL/AGAR DIL: CPT | Performed by: INTERNAL MEDICINE

## 2017-04-25 ASSESSMENT — ENCOUNTER SYMPTOMS
FLANK PAIN: 0
POOR WOUND HEALING: 1
MEMORY LOSS: 0
POLYDIPSIA: 0
WEIGHT GAIN: 0
SHORTNESS OF BREATH: 1
JOINT SWELLING: 0
LEG PAIN: 0
ABDOMINAL PAIN: 0
ARTHRALGIAS: 0
WEAKNESS: 0
RECTAL PAIN: 0
NAUSEA: 0
RECTAL BLEEDING: 0
SEIZURES: 0
BLOOD IN STOOL: 0
SWOLLEN GLANDS: 0
DEPRESSION: 1
HALLUCINATIONS: 0
INSOMNIA: 1
DIARRHEA: 0
MUSCLE WEAKNESS: 0
SPUTUM PRODUCTION: 1
VOMITING: 0
BLOATING: 0
DECREASED CONCENTRATION: 0
PALPITATIONS: 0
BREAST PAIN: 0
SINUS CONGESTION: 1
BREAST MASS: 0
TINGLING: 0
CLAUDICATION: 0
BRUISES/BLEEDS EASILY: 0
SYNCOPE: 0
NAIL CHANGES: 0
POSTURAL DYSPNEA: 1
HYPERTENSION: 0
DIZZINESS: 0
SKIN CHANGES: 0
EYE PAIN: 1
EXERCISE INTOLERANCE: 0
SINUS PAIN: 1
STIFFNESS: 0
EXTREMITY NUMBNESS: 0
HOARSE VOICE: 1
SMELL DISTURBANCE: 0
SORE THROAT: 0
EYE WATERING: 0
FEVER: 0
MUSCLE CRAMPS: 0
BOWEL INCONTINENCE: 0
ALTERED TEMPERATURE REGULATION: 0
HYPOTENSION: 0
LEG SWELLING: 0
WEIGHT LOSS: 0
NECK PAIN: 0
NECK MASS: 0
WHEEZING: 1
TROUBLE SWALLOWING: 0
TASTE DISTURBANCE: 0
EYE REDNESS: 0
SNORES LOUDLY: 0
BACK PAIN: 0
DECREASED LIBIDO: 0
POLYPHAGIA: 0
COUGH: 1
MYALGIAS: 0
DIFFICULTY URINATING: 0
RESPIRATORY PAIN: 1
TREMORS: 0
TACHYCARDIA: 0
COUGH DISTURBING SLEEP: 1
EYE IRRITATION: 0
CONSTIPATION: 0
DYSURIA: 0
HEADACHES: 0
DYSPNEA ON EXERTION: 0
DOUBLE VISION: 0
HEMATURIA: 0
PARALYSIS: 0
PANIC: 0
INCREASED ENERGY: 0
JAUNDICE: 0
DISTURBANCES IN COORDINATION: 0
SLEEP DISTURBANCES DUE TO BREATHING: 0
FATIGUE: 0
HEMOPTYSIS: 0
SPEECH CHANGE: 0
NUMBNESS: 0
CHILLS: 0
NERVOUS/ANXIOUS: 1
LOSS OF CONSCIOUSNESS: 0
LIGHT-HEADEDNESS: 0
ORTHOPNEA: 0
NIGHT SWEATS: 0
DECREASED APPETITE: 0
HOT FLASHES: 0
HEARTBURN: 0

## 2017-04-25 ASSESSMENT — PAIN SCALES - GENERAL: PAINLEVEL: NO PAIN (0)

## 2017-04-25 NOTE — MR AVS SNAPSHOT
After Visit Summary   4/25/2017    Mamie Rice    MRN: [id number]           Patient Information     Date Of Birth          1995        Visit Information        Provider Department      4/25/2017 1:49 PM Rupa Dunne, MSW [facility]        Today's Diagnoses     Risk and functional assessment    -  1       Follow-ups after your visit        Your next 10 appointments already scheduled     Jun 05, 2017  7:30 AM CDT   PFT VISIT with  PFL A   [facility] Pulmonary Function Testing ([facility])    [address] [place] [address]   [phone]            Jun 05, 2017  8:15 AM CDT   (Arrive by 8:00 AM)   New Allergy with Beau Marquez MD   [facility] for Lung Science and Health ([facility])    [address] [place] [address]   [phone]           Do not take anti-histamines or Zantac for seven day prior to your appointment.            Jun 07, 2017 10:15 AM CDT   (Arrive by 10:00 AM)   Return Visit with Mariela Haile MD   [facility] Ear Nose and Throat ([facility])    [address] [place] [address]  [address] [facility] [address]   [phone]            Jun 19, 2017  2:00 PM CDT   (Arrive by 1:45 PM)   Return Visit with Paige Reid PA-C   [facility] Dermatology ([facility])    [address] [place] [address]   [phone]            Aug 02, 2017  7:15 AM CDT   Lab with  LAB   [facility] Lab ([facility])    [address] [place] [address]   [phone]            Aug 02, 2017  7:30 AM CDT   CF LOOP with  PFL CF   [facility] Pulmonary Function Testing ([facility])    [address] [place] [address]   [phone]            Aug 02, 2017  7:50 AM CDT   (Arrive by 7:35 AM)   RETURN  CYSTIC FIBROSIS VISIT with Gavi Allison MD   [facility] for Lung Science and Health ([facility])    [address] [facility]  3rd [facility] [id number] [phone]            Aug 02, 2017  8:45 AM CDT   (Arrive by 8:30 AM)   XR CHEST 2 VIEWS with UCXR1   [facility] Xray ([facility])    [address] [place] [address]  1st [facility] [phone] [phone]           Please bring a list of your current medicines to your exam. (Include vitamins, minerals and over-thecounter medicines.) Leave your valuables at home.  Tell your doctor if there is a chance you may be pregnant.  You do not need to do anything special for this exam.            Aug 02, 2017  9:00 AM CDT   DX HIP/PELVIS/SPINE with UCDX1   [facility] Dexa ([facility])    [address] [place] [id number] [phone]           Please do not take any of the following 48 hours prior to your exam: vitamins, calcium tablets, antacids.            Aug 02, 2017 10:15 AM CDT   (Arrive by 10:00 AM)   Return Visit with Mariela Haile MD   [facility] Ear Nose and Throat ([facility])    [address] [place] [address]  4th [facility] [id number] [phone]              Who to contact     If you have questions or need follow up information about today's clinic visit or your schedule please contact  CASE MANAGEMENT directly at [phone].  Normal or non-critical lab and imaging results will be communicated to you by MyChart, letter or phone within 4 business days after the clinic has received the results. If you do not hear from us within 7 days, please contact the clinic through MyChart or phone. If you have a critical or abnormal lab result, we will notify you by phone as soon as possible.  Submit refill requests through [email or web address] or call your pharmacy and they will forward the refill  request to us. Please allow 3 business days for your refill to be completed.          Additional Information About Your Visit        [employer] Information     [employer] gives you secure access to your electronic health record. If you see a primary care provider, you can also send messages to your care team and make appointments. If you have questions, please call your primary care clinic.  If you do not have a primary care provider, please call [phone] and they will assist you.        Care EveryWhere ID     This is your Care EveryWhere ID. This could be used by other organizations to access your [place] medical records  [id number]         Blood Pressure from Last 3 Encounters:   04/25/17 99/68   03/20/17 99/61   02/07/17 117/77    Weight from Last 3 Encounters:   05/01/17 82.1 kg (181 lb)   04/25/17 81.1 kg (178 lb 12.7 oz)   04/03/17 81.2 kg (179 lb)              Today, you had the following     No orders found for display       Primary Care Provider Office Phone # Fax #    Malik De La Rosa [phone]       [address] [facility] [address]        Thank you!     Thank you for choosing [facility]  for your care. Our goal is always to provide you with excellent care. Hearing back from our patients is one way we can continue to improve our services. Please take a few minutes to complete the written survey that you may receive in the mail after your visit with us. Thank you!             Your Updated Medication List - Protect others around you: Learn how to safely use, store and throw away your medicines at www.disposemymeds.org.          This list is accurate as of: 4/25/17 11:59 PM.  Always use your most recent med list.                   Brand Name Dispense Instructions for use    acetaminophen 325 MG tablet    TYLENOL    100 tablet    Take 2 tablets (650 mg) by mouth every 4 hours as needed for other (mild pain)       acetylcysteine 20 % nebulizer  solution    MUCOMYST    360 mL    INHALE ONE 4ML NEB INTO LUNGS VIA NEBULIZER TWO TIMES A DAY, MAY INCREASE TO 3-4 TIMES A DAY WITH INCREASE COUGH/COLD SYMPTOMS       adapalene 0.1 % cream    DIFFERIN    45 g    Apply topically At Bedtime After washing face       ADDERALL PO      Take 20 mg by mouth daily.       * albuterol 108 (90 BASE) MCG/ACT Inhaler    PROAIR HFA/PROVENTIL HFA/VENTOLIN HFA    1 Inhaler    Inhale 2 puffs into the lungs every 6 hours as needed for shortness of breath / dyspnea or wheezing       * albuterol (2.5 MG/3ML) 0.083% neb solution     120 vial    Take 1 vial (2.5 mg) by nebulization 4 times daily       ascorbic acid 500 MG tablet    VITAMIN C    100 tablet    TAKE ONE TABLET BY MOUTH TWICE A DAY       azithromycin 500 MG tablet    ZITHROMAX    36 tablet    Take 1 tablet (500 mg) by mouth daily M-W-F       beta carotene 38722 UNIT capsule     36 capsule    Take 1 capsule (25,000 Units) by mouth Every Mon, Wed, Fri Morning       blood glucose monitoring test strip    ACCU-CHEK SMARTVIEW    1 Month    Use to test blood sugar 4 times daily or as directed.       buPROPion 150 MG 12 hr tablet    WELLBUTRIN SR     Take 150 mg by mouth 2 times daily       cholecalciferol 1000 UNIT tablet    vitamin D    30 tablet    TAKE ONE TABLET BY MOUTH EVERY DAY       clindamycin 1 % lotion    CLEOCIN T    60 mL    Apply topically every morning After washing face with benzoyl peroxide wash       cyanocobalamin 1000 MCG/ML injection    VITAMIN B12     Inject 1 mL into the muscle every 30 days       DEPO-PROVERA IM          fluticasone 50 MCG/ACT spray    FLONASE    1 Bottle    Spray 2 sprays into both nostrils daily       GLYCOPYRROLATE PO      Take 1 mg by mouth 2 times daily       hydrOXYzine 25 MG tablet    ATARAX    30 tablet    Take 1 tablet (25 mg) by mouth every 6 hours as needed for itching (and nausea)       LORATADINE PO      Take 10 mg by mouth       LUPRON DEPOT IM      Inject  into the muscle  every 3 months.       meropenem 500 MG vial    MERREM    4 mL    500 mg by Nasal Instillation route as needed       methylcellulose (laxative) Powd    CITRUCEL    479 g    Start with 1 heaping tablespoon. Increase as needed, 1 heaping tablespoon at a time, up to 3 times per day.       montelukast 10 MG tablet    SINGULAIR    30 tablet    TAKE ONE TABLET BY MOUTH ONCE DAILY AT BEDTIME       MULTIVITAMIN PO      Take 1 tablet by mouth daily.       omeprazole 20 MG CR capsule    priLOSEC    60 capsule    TAKE 1 CAPSULE BY MOUTH TWICE A DAY       phentermine 15 MG capsule     60 capsule    Take 2 capsules (30 mg) by mouth every morning       PROZAC 40 MG capsule   Generic drug:  FLUoxetine      Take 80 mg by mouth daily.       * spironolactone 25 MG tablet    ALDACTONE    60 tablet    Take 1 tablet (25 mg) by mouth daily       * spironolactone 50 MG tablet    ALDACTONE    60 tablet    Take 1 tablet (50 mg) by mouth 2 times daily       TRAZODONE HCL PO      Take 100 mg by mouth At Bedtime       vitamin E 400 UNIT capsule     60 capsule    TAKE 1 CAPSULE BY MOUTH TWICE A DAY       * Notice:  This list has 4 medication(s) that are the same as other medications prescribed for you. Read the directions carefully, and ask your doctor or other care provider to review them with you.

## 2017-04-25 NOTE — MR AVS SNAPSHOT
After Visit Summary   4/25/2017    Mamie Rice    MRN: [id number]           Patient Information     Date Of Birth          1995        Visit Information        Provider Department      4/25/2017 11:20 AM Gavi Allison MD [facility]        Today's Diagnoses     Cystic fibrosis with pulmonary manifestations (H)          Care Instructions    Cystic Fibrosis Self-Care Plan       MRN: [id number]   Clinic Date: April 25, 2017   Patient: Mamie Rice     Annual Studies:   IGG   Date Value Ref Range Status   08/02/2016 612 (L) 695 - 1620 mg/dL Final     Insulin   Date Value Ref Range Status   03/30/2015 81 mU/L Final     Comment:     Reference Range:  0-20     There are no preventive care reminders to display for this patient.      Pulmonary Function Tests  FEV1: amount of air you can blow out in 1 second  FVC: total amount of air you can take in and blow out    Your Goals:         PFT Latest Ref Rng & Units 4/25/2017   FVC L 3.58   FEV1 L 3.17   FVC% % 100   FEV1% % 102          Airway Clearance: The Most Important Way to Keep Your Lungs Healthy  Vest Settings:    Hill-Rom Frequencies: 8, 9, 10 Pressure 10 Then, Frequencies 18, 19, 20 Pressure 6      RespirTech: Quick Start with Pressure of     Do each frequency for 5 minutes; Deflate vest after each frequency & cough 3 times before beginning the next setting.    Vest and Neb Therapy should be done 2 times/day.    Good Nutrition Can Improve Lung Function and Overall Health     Take ALL of your vitamins with food     Take 1/2 of your enzymes before EVERY meal/snack and the other 1/2 mid-meal/snack    Wt Readings from Last 3 Encounters:   04/25/17 81.1 kg (178 lb 12.7 oz)   04/03/17 81.2 kg (179 lb)   03/09/17 81.6 kg (180 lb)       Body mass index is 32.69 kg/(m^2).         National CF Foundation Recommendations for BMI in CF Adults: Women: at least 22 Men: at least 23        Controlling Blood  Sugars Helps Prevent Lung Infections & Improves Nutrition  Test blood sugar:     In the morning before eating (goal is )     2 hours after a meal (goal is less than 150)     When pre-meal glucose is greater than 150 add correction     At bedtime (if less than 100 eat a snack with 15 grams of carbohydrates  Last A1C Results:   Hemoglobin A1C   Date Value Ref Range Status   02/07/2017 5.5 % Final         If diabetic, measure A1C every 6 months. Goal: Under 7%    Staying Healthy    Research:  If you are interested in learning about research opportunities or have questions, please contact Mariana Smith at [phone] or [email or web address].      CF Foundation:  Compass is a personalized resource service to help you with the insurance, financial, legal and other issues you are facing.  It's free, confidential and available to anyone with CF.  Ask your  for more information or contact Compass directly at [phone]-COMPASS ([phone]) or compass@cff.org, or learn more at [email or web address].          RECOMMENDATIONS: Keep up the good work.  Annual studies at next visit.    YOUR GOAL:  Stay well and good luck with the job opportunities.      CF Nurse Line:  Caitie Anderson and Lashae: [phone]   Jhoana Trinh, RT: [phone]     Amy Andino: [phone] or Tanisha Pablo: [phone]   Audra Zhang, Diabetes Nurse: [phone]      Rupa Dunne: [phone] or Rosette Burnett [phone], Social Workers   [email or web address]                 Follow-ups after your visit        Follow-up notes from your care team     Return in about 3 months (around 7/25/2017).      Your next 10 appointments already scheduled     May 01, 2017  3:00 PM CDT   (Arrive by 2:45 PM)   Return Visit with Mariela Haile MD   [facility] ([facility])    [address] [place] [address]   [phone]            May 01, 2017  5:30 PM CDT   (Arrive  by 5:15 PM)   Return Visit with Paige Reid PA-C   [facility] ([facility])    [address] [place] [address]   [phone]            Jun 05, 2017  7:30 AM CDT   PFT VISIT with  PFL A   [facility] Pulmonary Function Testing ([facility])    [address] [place] [address]   [phone]            Jun 05, 2017  8:15 AM CDT   (Arrive by 8:00 AM)   New Allergy with Beau Marquez MD   [facility] ([facility])    [address] [place] [address]   [phone]           Do not take anti-histamines or Zantac for seven day prior to your appointment.            Jun 07, 2017 10:15 AM CDT   (Arrive by 10:00 AM)   Return Visit with Mariela Haile MD   [facility] ([facility])    [address] [place] [address]   [phone]            Jun 21, 2017 10:45 AM CDT   (Arrive by 10:30 AM)   Return Visit with Paige Reid PA-C   [facility] ([facility])    [address] [place] [address]   [phone]            Aug 02, 2017  7:30 AM CDT   CF LOOP with  PFL CF   [facility] Pulmonary Function Testing ([facility])    [address] [place] [address]   [phone]            Aug 02, 2017  7:50 AM CDT   (Arrive by 7:35 AM)   RETURN CYSTIC FIBROSIS VISIT with Gavi Allison MD   [facility] ([facility])    [address] [place] [address]   [phone]            Aug 02, 2017  8:45 AM CDT   (Arrive by 8:30 AM)   XR CHEST 2 VIEWS with UCXR1   [facility] X[facility] ([facility])    [address] [place] [address]  Floor  [facility] [id number] [phone]           Please bring a list of your current medicines to your exam. (Include vitamins, minerals and over-thecounter medicines.) Leave your valuables at home.  Tell your doctor if there is a chance you may be pregnant.  You do not need to do anything special for this exam.            Aug 02, 2017 10:15 AM CDT   (Arrive by 10:00 AM)   Return Visit with Mariela Haile MD   [facility] ([facility])    [address] [facility] [facility] [address]  4th Floor  [facility] [address] [phone]              Future tests that were ordered for you today     Open Future Orders        Priority Expected Expires Ordered    Cystic Fibrosis Culture Aerob Bacterial Routine 7/25/2017 4/25/2018 4/25/2017    X-ray Chest 2 vws* Routine 7/25/2017 4/25/2018 4/25/2017    Dexa hip/pelvis/spine* Routine 7/25/2017 4/25/2018 4/25/2017    ALT Routine 7/25/2017 4/25/2018 4/25/2017    Basic metabolic panel Routine 7/25/2017 4/25/2018 4/25/2017    Lipid Profile Routine 7/25/2017 4/25/2018 4/25/2017    Albumin level Routine 7/25/2017 4/25/2018 4/25/2017    Alkaline phosphatase Routine 7/25/2017 4/25/2018 4/25/2017    AST Routine 7/25/2017 4/25/2018 4/25/2017    Iron Routine 7/25/2017 4/25/2018 4/25/2017    GGT Routine 7/25/2017 4/25/2018 4/25/2017    Hemoglobin A1c Routine 7/25/2017 4/25/2018 4/25/2017    IgA Routine 7/25/2017 4/25/2018 4/25/2017    IgG Routine 7/25/2017 4/25/2018 4/25/2017    IgM Routine 7/25/2017 4/25/2018 4/25/2017    IgE Routine 7/25/2017 4/25/2018 4/25/2017    INR Routine 7/25/2017 4/25/2018 4/25/2017    Magnesium Routine 7/25/2017 4/25/2018 4/25/2017    Phosphorus Routine 7/25/2017 4/25/2018 4/25/2017    Testosterone total  Routine 7/25/2017 4/25/2018 4/25/2017    Protein total Routine 7/25/2017 4/25/2018 4/25/2017    TSH with free T4 reflex Routine 7/25/2017 4/25/2018 4/25/2017    Vitamin A Routine 7/25/2017 4/25/2018 4/25/2017    Vitamin E Routine  7/25/2017 4/25/2018 4/25/2017    Vitamin D Deficiency Routine 7/25/2017 4/25/2018 4/25/2017    CBC with platelets differential Routine 7/25/2017 4/25/2018 4/25/2017    Erythrocyte sedimentation rate auto Routine 7/25/2017 4/25/2018 4/25/2017    Routine UA with microscopic Routine 7/25/2017 4/25/2018 4/25/2017    Albumin Random Urine Quantitative Routine 7/25/2017 4/25/2018 4/25/2017    Nocardia culture Routine 7/25/2017 4/25/2018 4/25/2017    Fungus Culture, non-blood Routine 7/25/2017 4/25/2018 4/25/2017    AFB Stain Non Blood Routine 7/25/2017 4/25/2018 4/25/2017    AFB Culture Non Blood Routine 7/25/2017 4/25/2018 4/25/2017    RESPIRATORY FLOW VOLUME LOOP Routine 7/25/2017 4/25/2018 4/25/2017    Cystic Fibrosis Culture Aerob Bacterial Routine 7/25/2017 4/25/2018 4/25/2017            Who to contact     If you have questions or need follow up information about today's clinic visit or your schedule please contact [facility] directly at [phone].  Normal or non-critical lab and imaging results will be communicated to you by Schedulicityhart, letter or phone within 4 business days after the clinic has received the results. If you do not hear from us within 7 days, please contact the clinic through Bridgefyt or phone. If you have a critical or abnormal lab result, we will notify you by phone as soon as possible.  Submit refill requests through [employer] or call your pharmacy and they will forward the refill request to us. Please allow 3 business days for your refill to be completed.          Additional Information About Your Visit        [employer] Information     [employer] gives you secure access to your electronic health record. If you see a primary care provider, you can also send messages to your care team and make appointments. If you have questions, please call your primary care clinic.  If you do not have a primary care provider, please call [phone] and they will assist you.        Care  "EveryWhere ID     This is your Care EveryWhere ID. This could be used by other organizations to access your [facility] medical records  [id number]        Your Vitals Were     Pulse Respirations Height Pulse Oximetry BMI (Body Mass Index)       84 16 1.575 m (5' 2.01\") 97% 32.69 kg/m2        Blood Pressure from Last 3 Encounters:   04/25/17 99/68   03/20/17 99/61   02/07/17 117/77    Weight from Last 3 Encounters:   04/25/17 81.1 kg (178 lb 12.7 oz)   04/03/17 81.2 kg (179 lb)   03/09/17 81.6 kg (180 lb)              We Performed the Following     Cystic Fibrosis Culture Aerob Bacterial        Primary Care Provider Office Phone # Fax #    Malik De La Rosa [phone]       [address] [facility] [address]        Thank you!     Thank you for choosing [facility]  for your care. Our goal is always to provide you with excellent care. Hearing back from our patients is one way we can continue to improve our services. Please take a few minutes to complete the written survey that you may receive in the mail after your visit with us. Thank you!             Your Updated Medication List - Protect others around you: Learn how to safely use, store and throw away your medicines at www.disposemymeds.org.          This list is accurate as of: 4/25/17  3:42 PM.  Always use your most recent med list.                   Brand Name Dispense Instructions for use    acetaminophen 325 MG tablet    TYLENOL    100 tablet    Take 2 tablets (650 mg) by mouth every 4 hours as needed for other (mild pain)       acetylcysteine 20 % nebulizer solution    MUCOMYST    360 mL    INHALE ONE 4ML NEB INTO LUNGS VIA NEBULIZER TWO TIMES A DAY, MAY INCREASE TO 3-4 TIMES A DAY WITH INCREASE COUGH/COLD SYMPTOMS       adapalene 0.1 % cream    DIFFERIN    45 g    Apply topically At Bedtime After washing face       ADDERALL PO      Take 20 mg by mouth daily.       * " albuterol 108 (90 BASE) MCG/ACT Inhaler    PROAIR HFA/PROVENTIL HFA/VENTOLIN HFA    1 Inhaler    Inhale 2 puffs into the lungs every 6 hours as needed for shortness of breath / dyspnea or wheezing       * albuterol (2.5 MG/3ML) 0.083% neb solution     120 vial    Take 1 vial (2.5 mg) by nebulization 4 times daily       ascorbic acid 500 MG tablet    VITAMIN C    100 tablet    TAKE ONE TABLET BY MOUTH TWICE A DAY       azithromycin 500 MG tablet    ZITHROMAX    36 tablet    Take 1 tablet (500 mg) by mouth daily M-W-F       beta carotene 41282 UNIT capsule     36 capsule    Take 1 capsule (25,000 Units) by mouth Every Mon, Wed, Fri Morning       blood glucose monitoring test strip    ACCU-CHEK SMARTVIEW    1 Month    Use to test blood sugar 4 times daily or as directed.       buPROPion 150 MG 12 hr tablet    WELLBUTRIN SR     Take 150 mg by mouth 2 times daily       cholecalciferol 1000 UNIT tablet    vitamin D    30 tablet    TAKE ONE TABLET BY MOUTH EVERY DAY       clindamycin 1 % lotion    CLEOCIN T    60 mL    Apply topically every morning After washing face with benzoyl peroxide wash       cyanocobalamin 1000 MCG/ML injection    VITAMIN B12     Inject 1 mL into the muscle every 30 days       DEPO-PROVERA IM          fluticasone 50 MCG/ACT spray    FLONASE    1 Bottle    Spray 2 sprays into both nostrils daily       GLYCOPYRROLATE PO      Take 1 mg by mouth 2 times daily       hydrOXYzine 25 MG tablet    ATARAX    30 tablet    Take 1 tablet (25 mg) by mouth every 6 hours as needed for itching (and nausea)       LORATADINE PO      Take 10 mg by mouth       LUPRON DEPOT IM      Inject  into the muscle every 3 months.       meropenem 500 MG vial    MERREM    4 mL    500 mg by Nasal Instillation route as needed       methylcellulose (laxative) Powd    CITRUCEL    479 g    Start with 1 heaping tablespoon. Increase as needed, 1 heaping tablespoon at a time, up to 3 times per day.       montelukast 10 MG tablet     SINGULAIR    30 tablet    TAKE ONE TABLET BY MOUTH ONCE DAILY AT BEDTIME       MULTIVITAMIN PO      Take 1 tablet by mouth daily.       omeprazole 20 MG CR capsule    priLOSEC    60 capsule    TAKE 1 CAPSULE BY MOUTH TWICE A DAY       phentermine 15 MG capsule     60 capsule    Take 2 capsules (30 mg) by mouth every morning       phytonadione 5 MG tablet    MEPHYTON    4 tablet    Take 1 tablet (5 mg) by mouth once a week       PROZAC 40 MG capsule   Generic drug:  FLUoxetine      Take 80 mg by mouth daily.       * spironolactone 25 MG tablet    ALDACTONE    60 tablet    Take 1 tablet (25 mg) by mouth daily       * spironolactone 50 MG tablet    ALDACTONE    60 tablet    Take 1 tablet (50 mg) by mouth 2 times daily       TRAZODONE HCL PO      Take 100 mg by mouth At Bedtime       vitamin E 400 UNIT capsule     60 capsule    TAKE 1 CAPSULE BY MOUTH TWICE A DAY       * Notice:  This list has 4 medication(s) that are the same as other medications prescribed for you. Read the directions carefully, and ask your doctor or other care provider to review them with you.

## 2017-04-25 NOTE — PATIENT INSTRUCTIONS
Cystic Fibrosis Self-Care Plan       MRN: [id number]   Clinic Date: April 25, 2017   Patient: Mamie Rice     Annual Studies:   IGG   Date Value Ref Range Status   08/02/2016 612 (L) 695 - 1620 mg/dL Final     Insulin   Date Value Ref Range Status   03/30/2015 81 mU/L Final     Comment:     Reference Range:  0-20     There are no preventive care reminders to display for this patient.      Pulmonary Function Tests  FEV1: amount of air you can blow out in 1 second  FVC: total amount of air you can take in and blow out    Your Goals:         PFT Latest Ref Rng & Units 4/25/2017   FVC L 3.58   FEV1 L 3.17   FVC% % 100   FEV1% % 102          Airway Clearance: The Most Important Way to Keep Your Lungs Healthy  Vest Settings:    Hill-Rom Frequencies: 8, 9, 10 Pressure 10 Then, Frequencies 18, 19, 20 Pressure 6      RespirTech: Quick Start with Pressure of     Do each frequency for 5 minutes; Deflate vest after each frequency & cough 3 times before beginning the next setting.    Vest and Neb Therapy should be done 2 times/day.    Good Nutrition Can Improve Lung Function and Overall Health     Take ALL of your vitamins with food     Take 1/2 of your enzymes before EVERY meal/snack and the other 1/2 mid-meal/snack    Wt Readings from Last 3 Encounters:   04/25/17 81.1 kg (178 lb 12.7 oz)   04/03/17 81.2 kg (179 lb)   03/09/17 81.6 kg (180 lb)       Body mass index is 32.69 kg/(m^2).         National CF Foundation Recommendations for BMI in CF Adults: Women: at least 22 Men: at least 23        Controlling Blood Sugars Helps Prevent Lung Infections & Improves Nutrition  Test blood sugar:     In the morning before eating (goal is )     2 hours after a meal (goal is less than 150)     When pre-meal glucose is greater than 150 add correction     At bedtime (if less than 100 eat a snack with 15 grams of carbohydrates  Last A1C Results:   Hemoglobin A1C   Date Value Ref Range Status   02/07/2017 5.5 % Final         If  diabetic, measure A1C every 6 months. Goal: Under 7%    Staying Healthy    Research:  If you are interested in learning about research opportunities or have questions, please contact Mariana Smith at [phone] or [email or web address].      CF Foundation:  Compass is a personalized resource service to help you with the insurance, financial, legal and other issues you are facing.  It's free, confidential and available to anyone with CF.  Ask your  for more information or contact Compass directly at [phone]-[phone] ([phone]) or compass@cff.org, or learn more at cff.org/compass.          RECOMMENDATIONS: Keep up the good work.  Annual studies at next visit.    YOUR GOAL:  Stay well and good luck with the job opportunities.      CF Nurse Line:  Burton Anderson: [phone]   Jhoana Trinh, RT: [phone]     Amy Andino: [phone] or Theresa Ceja, Dieticians: [phone]   Audra Zhang, Diabetes Nurse: [phone]      Rupa Dunne: [phone] or Rosette Burnett [phone], Social Workers   [email or web address]

## 2017-04-25 NOTE — LETTER
4/25/2017       RE: Mamie Rice  [address] [place] [id number]     Dear Colleague,    Thank you for referring your patient, Mamie Rice, to the [facility] at [facility]. Please see a copy of my visit note below.    [facility]  April 25, 2017         Assessment and Plan:   Mamie Rice is a 21 year old female with cystic fibrosis.    1. CF lung disease with normal spirometry:  Since last being seen, Mamie did require antibiotics for an upper respiratory tract infection.  She reports since initiating this that she has had marked improvement in both her cough and pulmonary symptoms as well.  She does continue to show evidence of Staph aureus and Achromobacter in her sputum.  At this time, I have recommended that she continue on her present bronchial drainage and nebulized therapy.   2.  Cystic fibrosis-related diabetes.  Mamie has had a history of adequate control of her blood sugars.   3.  Weight management.  Mamie is going to the Weight Management Clinic here at the [place].  I defer to them for this.    4.  Acne.  Mamie is considering going on Accutane.  I did discuss with her that one symptom that we worry about is drying of her secretions.  I do not think that there are other reasons related to CF that would be prohibitive.   5.  Psychosocial.  Mamie is struggling with some work-related issues.  She did spend time with Rupa Dunne, our , today regarding this.   6. Pancreatic Insufficiency:  The patient has no new symptoms consistent with worsening malabsorption.  The plan is to continue the present dose of pancreatic enzymes and vitamin supplementation.    Gavi Allison MD MPH   of Medicine  Pulmonary, Allergy, Critical Care and Sleep Medicine      Interval History:     Mamie does continue to produce sputum that is  unchanged in color.  She is performing 2 vest therapies per day. Her activity tolerance remains stable.          Review of Systems:     All questionnaires were reviewed by me today with the patient.  Review of Systems     Constitutional:  Negative for fever, chills, weight loss, weight gain, fatigue, decreased appetite, night sweats, recent stressors, height gain, height loss, post-operative complications, incisional pain, hallucinations, increased energy, hyperactivity and confused.   HENT:  Positive for ear pain, tinnitus, hoarse voice, dry mouth, sinus pain and sinus congestion. Negative for hearing loss, nosebleeds, trouble swallowing, mouth sores, sore throat, ear discharge, tooth pain, gum tenderness, taste disturbance, smell disturbance, hearing aid, bleeding gums and neck mass.    Eyes:  Positive for pain, eye pain and decreased vision. Negative for double vision, redness, eye watering, eye bulging, eye dryness, flashing lights, spots, floaters, strabismus, tunnel vision, jaundice and eye irritation.   Respiratory:   Positive for cough, sputum production, shortness of breath, wheezing, respiratory pain, cough disturbing sleep and postural dyspnea. Negative for hemoptysis, sleep disturbances due to breathing, snores loudly and dyspnea on exertion.    Cardiovascular:  Negative for chest pain, dyspnea on exertion, palpitations, orthopnea, claudication, leg swelling, fingers/toes turn blue, hypertension, hypotension, syncope, history of heart murmur, chest pain on exertion, chest pain at rest, pacemaker, few scattered varicosities, leg pain, sleep disturbances due to breathing, tachycardia, light-headedness, exercise intolerance and edema.   Gastrointestinal:  Negative for heartburn, nausea, vomiting, abdominal pain, diarrhea, constipation, blood in stool, melena, rectal pain, bloating, hemorrhoids, bowel incontinence, jaundice, rectal bleeding, coffee ground emesis and change in stool.   Genitourinary:   Negative for bladder incontinence, dysuria, urgency, hematuria, flank pain, vaginal discharge, difficulty urinating, genital sores, dyspareunia, decreased libido, nocturia, voiding less frequently, arousal difficulty, abnormal vaginal bleeding, excessive menstruation, menstrual changes, hot flashes, vaginal dryness and postmenopausal bleeding.   Musculoskeletal:  Negative for myalgias, back pain, joint swelling, arthralgias, stiffness, muscle cramps, neck pain, bone pain, muscle weakness and fracture.   Skin:  Positive for itching, poor wound healing, acne and poor wound healing. Negative for nail changes, rash, hair changes, skin changes, warts, scarring, flaky skin, Raynaud's phenomenon, sensitivity to sunlight and skin thickening.   Neurological:  Negative for dizziness, tingling, tremors, speech change, seizures, loss of consciousness, weakness, light-headedness, numbness, headaches, disturbances in coordination, extremity numbness, memory loss, difficulty walking and paralysis.   Endo/Heme:  Negative for anemia, swollen glands and bruises/bleeds easily.   Psychiatric/Behavioral:  Positive for depression and mood swings. Negative for hallucinations, memory loss, decreased concentration and panic attacks.    Breast:  Negative for breast discharge, breast mass, breast pain and nipple retraction.   Endocrine:  Negative for altered temperature regulation, polyphagia, polydipsia, unwanted hair growth and change in facial hair.            Past Medical and Surgical History:     Past Medical History:   Diagnosis Date     ADHD (attention deficit hyperactivity disorder)      Aspergillosis, with pneumonia (H)     fugus found caused chest pain     Chronic infection     CF, MRSA.,      Chronic sinusitis      Constipation, chronic      Cystic fibrosis with pulmonary manifestations (H) 12/19/2011     Cystic fibrosis without mention of meconium ileus     SWEAT TEST:Date: 2/17/1994   Laboratory[facility]  296 mg, 104  mmol/L ClSample #2  295 mg, 104 mmol/L Cl GENOTYPING:Date: 10/15/2007,  Laboratory: [facility]otype: [id number]     Depressive disorder      Diabetes     no meds currently     Dysthymic disorder      Exocrine pancreatic insufficiency      Gastro-oesophageal reflux disease      Hip pain, right      MRSA (methicillin resistant Staphylococcus aureus) carrier      Pancreatic disease      Past Surgical History:   Procedure Laterality Date     ARTHROSCOPY HIP, OSTEOPLASTY FEMUR PROXIMAL, COMBINED  3/11/2013    Procedure: COMBINED ARTHROSCOPY HIP, OSTEOPLASTY FEMUR PROXIMAL;  Right Hip Arthroscopy, Labral  Debridement.    surgeon request choice anesthesia/admit to [facility] after surgery;  Surgeon: Omkar Austin MD;  [facility]     ARTHROSCOPY KNEE WITH MEDIAL MENISCECTOMY Left 1/31/2017    Procedure: ARTHROSCOPY KNEE WITH MEDIAL MENISCECTOMY;  Surgeon: Jethro Coyle MD;  [facility] OR     bronchoscopies       BRONCHOSCOPY       EXAM UNDER ANESTHESIA ANUS N/A 5/10/2016    Procedure: EXAM UNDER ANESTHESIA ANUS;  Surgeon: Chet Gaviria MD;  [facility]     EXAM UNDER ANESTHESIA, RESTORATIONS, EXTRACTION(S) DENTAL COMPLEX, COMBINED  5/13/2013    Procedure: COMBINED EXAM UNDER ANESTHESIA, RESTORATIONS, EXTRACTION(S) DENTAL COMPLEX;  Dental Exam, Radiographs, Restorations. Single Extraction  Tooth #2. Restorations x 3;  Surgeon: Danilo Ortiz DDS;  [facility]: [facility] OR     HC KNEE SCOPE, DIAGNOSTIC      Arthroscopy, Knee- left     INJECT BOTOX N/A 5/10/2016    Procedure: INJECT BOTOX;  Surgeon: Chet Gaviria MD;  [facility]     left hip labral tear  5/11/2011    left hip arthroscopy with labral debridement and synovectomy     meniscus repair       OPTICAL TRACKING SYSTEM ENDOSCOPIC SINUS SURGERY  10/14/2011    Procedure:OPTICAL TRACKING SYSTEM ENDOSCOPIC SINUS SURGERY; FESS (functional endoscopic sinus surgery) with Image Guidance, bronchial lavage and cultures;  Surgeon:GOYO KUO; Location:[facility] OR     OPTICAL TRACKING SYSTEM ENDOSCOPIC SINUS SURGERY  5/18/2012    Procedure:OPTICAL TRACKING SYSTEM ENDOSCOPIC SINUS SURGERY; Right  and Left Image Guided Functional Endoscopic Sinus Surgery With  Frontal Approach, [employer]; Surgeon:GOYO KUO; [facility] OR     OPTICAL TRACKING SYSTEM ENDOSCOPIC SINUS SURGERY  9/26/2012    Procedure: OPTICAL TRACKING SYSTEM ENDOSCOPIC SINUS SURGERY;  Stealth Guided Bilateral Functional Endoscopic Sinus Surgery *Latex Safe*;  Surgeon: Goyo Kuo MD;  [facility]     OPTICAL TRACKING SYSTEM ENDOSCOPIC SINUS SURGERY Bilateral 10/16/2015    Procedure: OPTICAL TRACKING SYSTEM ENDOSCOPIC SINUS SURGERY;  Surgeon: Mariela Haile MD;  [facility]     ORTHOPEDIC SURGERY      left hip tear repair 2010     SINUS SURGERY             Family History:     Family History   Problem Relation Age of Onset     CANCER Paternal Grandmother      Skin Cancer Paternal Grandmother      Other Cancer Paternal Grandmother      Skin     Obesity Paternal Grandmother      CANCER Paternal Grandfather      PGF had throat cancer (he was a smoker)     Other Cancer Paternal Grandfather      Anxiety Disorder Paternal Grandfather      Thyroid Disease Mother      Obesity Other      Anesthesia Reaction No family hx of      Blood Disease No family hx of      Colon Polyps No family hx of      Crohn Disease No family hx of      Ulcerative Colitis No family hx of      Colon Cancer No family hx of      Melanoma No family hx of             Social History:     Social History     Social History     Marital status: Single     Spouse name: N/A     Number of children: N/A     Years of education: N/A     Occupational History     Not on file.     Social History Main Topics     Smoking status: Never Smoker     Smokeless tobacco: Never Used      Comment: one person at home smokes outside     Alcohol use No     Drug use: No     Sexual activity: No     Other Topics Concern     Blood  Transfusions No     Exercise Yes     Social History Narrative    Mamie lives with mother in [place], [place].  There is a cat in the home, but Mamie does not have any litterbox duties.  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  She gets essentially no exercise because of the tingling in her feet (says it bothers her even to stand).        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        8/2015 no change in social situation        2/15/2016 Pt is single and lives with mother and stepfather.            Medications:     Current Outpatient Prescriptions   Medication     fluticasone (FLONASE) 50 MCG/ACT spray     spironolactone (ALDACTONE) 50 MG tablet     phentermine 15 MG capsule     hydrOXYzine (ATARAX) 25 MG tablet     acetaminophen (TYLENOL) 325 MG tablet     acetylcysteine (MUCOMYST) 20 % injection     MedroxyPROGESTERone Acetate (DEPO-PROVERA IM)     buPROPion (WELLBUTRIN SR) 150 MG 12 hr tablet     spironolactone (ALDACTONE) 25 MG tablet     ascorbic acid (VITAMIN C) 500 MG tablet     montelukast (SINGULAIR) 10 MG tablet     VITAMIN D3 1000 UNITS tablet     clindamycin (CLEOCIN T) 1 % lotion     adapalene (DIFFERIN) 0.1 % cream     LORATADINE PO     albuterol (2.5 MG/3ML) 0.083% nebulizer solution     omeprazole (PRILOSEC) 20 MG capsule     vitamin E 400 UNIT capsule     azithromycin (ZITHROMAX) 500 MG tablet     beta carotene 28787 UNIT capsule     GLYCOPYRROLATE PO     TRAZODONE HCL PO     cyanocobalamin (VITAMIN B12) 1000 MCG/ML injection     blood glucose (ACCU-CHEK SMARTVIEW) test strip     albuterol (PROAIR HFA, PROVENTIL HFA, VENTOLIN HFA) 108 (90 BASE) MCG/ACT inhaler     meropenem (MERREM) 500 MG injection     FLUoxetine (PROZAC) 40 MG capsule     Amphetamine-Dextroamphetamine (ADDERALL PO)     Leuprolide Acetate (LUPRON DEPOT IM)     Multiple Vitamin (MULTIVITAMIN OR)     MEPHYTON 5 MG tablet     methylcellulose, laxative,  "(CITRUCEL) POWD     No current facility-administered medications for this visit.      Facility-Administered Medications Ordered in Other Visits   Medication     albuterol (PROAIR HFA, PROVENTIL HFA, VENTOLIN HFA) inhaler            Physical Exam:   BP 99/68 (BP Location: Right arm, Patient Position: Chair, Cuff Size: Adult Large)  Pulse 84  Resp 16  Ht 1.575 m (5' 2.01\")  Wt 81.1 kg (178 lb 12.7 oz)  SpO2 97%  BMI 32.69 kg/m2    Constitutional:   Awake, alert and in no apparent distress     Eyes:   nonicteric     ENT:   oral mucosa moist without lesions, normal tm bilaterally, bilateral mucosal edema      Neck:   Supple without supraclavicular or cervical lymphadenopathy     Lungs:   Good air flow.  Left apical crackles. No rhonchi.  No wheezes.     Cardiovascular:   Normal S1 and S2.  RRR.  No murmur, gallop or rub.     Abdomen:   NABS, soft, nontender, nondistended.       Musculoskeletal:   No edema, digital clubbing present     Neurologic:   Alert and conversant.     Skin:   Warm, dry.  No rash on limited exam.             Data:   All laboratory and imaging data reviewed.    Cystic Fibrosis Culture  Specimen Description   Date Value Ref Range Status   04/25/2017 Throat  Final   11/01/2016 Throat  Final   08/02/2016 Sputum  Final   08/02/2016 Sputum  Final   08/02/2016 Sputum  Final    Culture Micro   Date Value Ref Range Status   04/25/2017 (A)  Final    Moderate growth Normal roberto carlos  Moderate growth Staphylococcus aureus Susceptibility testing in progress  Culture in progress     11/01/2016 (A)  Final    Light growth Normal roberto carlos  Moderate growth Staphylococcus aureus This isolate is presumed to be clindamycin   resistant based on detection of inducible clindamycin resistance. Erythromycin   and clindamycin are resistant, therefore, they are not recommended for use.  Light growth Achromobacter xylosoxidans/denitrificans     08/02/2016 (A)  Final    Light growth Normal roberto carlos  Moderate growth Staphylococcus " aureus Organism failed to thrive for   susceptibility testing  Light growth Enterobacter cloacae complex  Light growth Strain 2 Enterobacter cloacae complex     08/02/2016   Final    Culture negative for acid fast bacilli  Assayed at [facility] [address]          Recent Results (from the past 168 hour(s))   General PFT Lab (Please always keep checked)    Collection Time: 04/25/17  9:58 AM   Result Value Ref Range    FVC-Pred 3.55 L    FVC-Pre 3.58 L    FVC-%Pred-Pre 100 %    FEV1-Pre 3.17 L    FEV1-%Pred-Pre 102 %    FEV1FVC-Pred 87 %    FEV1FVC-Pre 89 %    FEFMax-Pred 6.61 L/sec    FEFMax-Pre 9.52 L/sec    FEFMax-%Pred-Pre 143 %    FEF2575-Pred 3.67 L/sec    FEF2575-Pre 4.16 L/sec    GPM3756-%Pred-Pre 113 %    ExpTime-Pre 6.79 sec    FIFMax-Pre 5.34 L/sec    FEV1FEV6-Pred 86 %    FEV1FEV6-Pre 89 %   Cystic Fibrosis Culture Aerob Bacterial    Collection Time: 04/25/17 11:20 AM   Result Value Ref Range    Specimen Description Throat     Special Requests Specimen collected in eSwab transport (white cap)     Culture Micro (A)      Moderate growth Normal roberto carlos  Moderate growth Staphylococcus aureus Susceptibility testing in progress  Culture in progress      Micro Report Status Pending        PFT: The spirometry is normal.  When compared to 2/7/17, the FEV1 and FVC have little change.      Nutrition Note    Reason for Visit:  Follow-up r/t CF and Weight Management    Since last visit Sonya reports she has tried to stay consistent with the food plan given to her by [facility] clinic.  She is working on reducing her milk intake to the recommended 2 16-oz glasses that we discussed last visit although this can be a struggle for her, particularly at dinner.  Her weight today is actually up from last visit by ~1 lb which may be due to natural variation, no weight loss however.  Of note, she has not returned to [facility] clinic since her initial visit.     Interventions/Recommendations:  Recommended pt continue  with the goals and diet plan that we and her [facility] staff have collaborated upon, she was also instructed to contact [facility] clinic staff for a follow-up appointment.  Will see patient at next visit when she will be completing her annual studies.       Theresa Ceja MS, RD, LD (pager [phone])  Cystic Fibrosis/Lung Transplant Dietitian      -Available Tues-Fri 8 AM-4:30 PM. On Mondays please contact pager [phone] (Amy Andino RD) and on weekends/holidays contact coverage dietitian at pager [phone] (inpatient use only).

## 2017-04-25 NOTE — PROGRESS NOTES
"Adult Cystic Fibrosis Program  Annual Psychosocial Assessment    Presenting Information:  Mamie is a 21-year-old woman with cystic fibrosis, presenting in CF clinic for a regular follow up with primary CF provider, Dr. Allison.  Met with Mamie to complete an updated annual psychosocial assessment.     Living situation:  Mamie lives with her mother and stepfather in [place], [place].  Her 9 yr-old nephew lives with them 90% of the time, he is like a brother to Mamie.  Mamie denies any concerns about her living situation.      Family Constellation:  Mamie's parents  when she was [age] years old. She was subsequently raised by her mother, who later remarried. She had visitation with her father while growing up, and he currently lives about 20 miles away from her.  She does not have contact with her father now. Mamie has a 39-year-old half-brother through her father, whom she had previously only met twice but now communicates with through on Facebook.  She has a half-sister, aged 29, who has a 9 year-old son and lives in the same neighborhood.  She also has 27-year-old christen who lives nearby.    Social Support:  Mamie reports good social support. She identifies her mother as her strongest source of support. She draws additional support from co-workers and friends. Mamie does not attend [employer] regularly, as she is typically tired on Sunday mornings and her mother works on Sundays. Mamie does not like to go to [employer] by herself.    Adjustment to Illness:  Mamie was diagnosed with CF in infancy.  She reports that her mother instilled a strong foundation of doing routine CF care.  She had a consistent therapy routine, and they brought the vest machine on vacations.  She noted that her mother discussed therapy as \"part of life\" and the best way to reduce complications.  Mamie reports going through more complications during her high school career, during which she evidently had reactions to the air " "quality in her school building.  She often felt ill and missed school.  She recalls having a couple PICC lines during that time.  She reports that her school accommodated her through letting her go home early and make up work through other avenues.  She feels her increased health complications caused her to lose some friends because some people didn't know how to handle her illness.      Mamie describes her current health status as \"good\". Clinically, she has mild lung disease and a history of impaired glucose tolerance.  She typically does 2 vest treatments per day. She does not exercise. She reports her back, hip, and knee pain resolved after receiving B-12 shots once a month. She is no longer receiving B-12 shots, stopped 6 months ago.  Mamie attends the weight management clinic. She denies any health problems that interfere with activities of daily living. Mamie describes herself as very open about having CF and feels she manages her health well.     Health Care Directive:  Mamie has previously received Health Care Directive education. Mamie has stated in the past she does not want her father to act as a health care agent for her, since they do not have a close relationship. Reviewed today and Mamie stated he father would not become involved, so she is not concerned about it at this time. Encouraged her to complete a health care directive to ensure her father is not involved, but she declined.     Education:  Mamie completed high school but missed a lot of school due to aforementioned health issues. She tried a semester of college classes, but had difficulty keeping up, which she feels was partially due to untreated AD/HD (see below).  At this point, she has no specific plans for further education.       Employment:  Mamie is currently working two jobs. She is working at a  from 7am-12pm daily and as a high school dance coach. The dance position is wrapping up, as the season is ending. 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 This is a point of frustration for Mamie.     Finances:  Mamie receives income from wages and parental support. She denies any financial concerns.  She previously applied for and was denied Social Security benefits due to her health exceeding requirements.    Insurance:  Mamie is insured through her mother and denies any insurance concerns. She reports her mother pays her medical bills.     Mental Health:  Mamie describes a history of depression, starting in 9th grade.  She initially had difficulty expressing her feelings, but got help about a year later.  She received both counseling and medication.  She found the counseling only somewhat helpful because the therapist was not a good \"fit\" for her.  Currently Mamie is taking Prozac and Wellbutrin to help manage her mood. These medications are prescribed by her PCP. She started Wellbutrin about three months ago to help with anxiety and feels it is helping. She described feeling more \"crabby and irritable\" prior to starting the medication. Mamie struggled with symptoms of AD/HD for many years and notes a strong family history of this disorder.  She \"got by\" in younger years due to having a lot of support in school but was formally diagnosed and treated with Adderall in 2015 after doing poorly in college classes. She continues to find this medication very helpful. Mamie also takes Trazadone to assist with sleep.     Mamie completed the anxiety and depression screen.   MECHE-7 Score: 11, indicating moderate anxiety, as described as somewhat difficult to manage.  PHQ-9 Score: 9, indicating mild depression as described as somewhat difficult to manage.     Mamie agreed with the scores above. She denied any additional symptoms not addressed on this screen. She generally jony with stressors through venting her concerns to " her mother, or getting some sleep. Mamie declined the need to see the CF psychiatrist or a counselor and feels able to talk openly with her PCP about thoughts and feelings.    Chemical Health:  Mamie denies use of alcohol, tobacco or other drugs.    Leisure Activities/Interests:  Mamie enjoys spending time with family and friends, going out to eat, and sleeping.    Intervention:  -Psychosocial Assessment  -Health Care Directive education review  -Mental Health Screening   -Supportive counseling re: work related issues and mental health     Assessment:  Mamie was open in responses. She continues to be under the care of her PCP for psychotropic medication managment. She declined needing further mental health support at this time. Mamie's main concern today was work related issues and finding adequate employment for the summer. No additional concerns expressed/noted. Recommend f/u as noted in the plan below.     Plan:  -Continue to follow through intermittent clinic consult to assess mood and coping.   -Complete psychosocial assessment annually.    AUSTIN Franz  Social Work for Adult Cystic Fibrosis   Phone: [phone]  Pager: [phone]  Fax: [phone]

## 2017-04-25 NOTE — NURSING NOTE
Chief Complaint   Patient presents with     Cystic Fibrosis     Patient is being seen for CF follow up      Nena Blank CMA at 11:35 AM on 4/25/2017

## 2017-04-25 NOTE — PROGRESS NOTES
Nutrition Note    Reason for Visit:  Follow-up r/t CF and Weight Management    Since last visit Sonya reports she has tried to stay consistent with the food plan given to her by [facility] clinic.  She is working on reducing her milk intake to the recommended 2 16-oz glasses that we discussed last visit although this can be a struggle for her, particularly at dinner.  Her weight today is actually up from last visit by ~1 lb which may be due to natural variation, no weight loss however.  Of note, she has not returned to [facility] clinic since her initial visit.     Interventions/Recommendations:  Recommended pt continue with the goals and diet plan that we and her [facility] staff have collaborated upon, she was also instructed to contact [facility] clinic staff for a follow-up appointment.  Will see patient at next visit when she will be completing her annual studies.       Theresa Ceja MS, RD, LD (pager [phone])  Cystic Fibrosis/Lung Transplant Dietitian      -Available Tues-Fri 8 AM-4:30 PM. On Mondays please contact pager [phone] (Amy Andino RD) and on weekends/holidays contact coverage dietitian at pager [phone] (inpatient use only).

## 2017-04-25 NOTE — PROGRESS NOTES
[facility]  April 25, 2017         Assessment and Plan:   Mamie Rice is a 21 year old female with cystic fibrosis.    1. CF lung disease with normal spirometry:  Since last being seen, Mamie did require antibiotics for an upper respiratory tract infection.  She reports since initiating this that she has had marked improvement in both her cough and pulmonary symptoms as well.  She does continue to show evidence of Staph aureus and Achromobacter in her sputum.  At this time, I have recommended that she continue on her present bronchial drainage and nebulized therapy.   2.  Cystic fibrosis-related diabetes.  Mamie has had a history of adequate control of her blood sugars.   3.  Weight management.  Mamie is going to the Weight Management Clinic here at the [place].  I defer to them for this.    4.  Acne.  Mamie is considering going on Accutane.  I did discuss with her that one symptom that we worry about is drying of her secretions.  I do not think that there are other reasons related to CF that would be prohibitive.   5.  Psychosocial.  Mamie is struggling with some work-related issues.  She did spend time with Rupa Dunne, our , today regarding this.   6. Pancreatic Insufficiency:  The patient has no new symptoms consistent with worsening malabsorption.  The plan is to continue the present dose of pancreatic enzymes and vitamin supplementation.    Gavi Allison MD MPH   of Medicine  Pulmonary, Allergy, Critical Care and Sleep Medicine      Interval History:     Mamie does continue to produce sputum that is unchanged in color.  She is performing 2 vest therapies per day. Her activity tolerance remains stable.          Review of Systems:     All questionnaires were reviewed by me today with the patient.  Review of Systems     Constitutional:  Negative for fever, chills, weight loss, weight gain, fatigue,  decreased appetite, night sweats, recent stressors, height gain, height loss, post-operative complications, incisional pain, hallucinations, increased energy, hyperactivity and confused.   HENT:  Positive for ear pain, tinnitus, hoarse voice, dry mouth, sinus pain and sinus congestion. Negative for hearing loss, nosebleeds, trouble swallowing, mouth sores, sore throat, ear discharge, tooth pain, gum tenderness, taste disturbance, smell disturbance, hearing aid, bleeding gums and neck mass.    Eyes:  Positive for pain, eye pain and decreased vision. Negative for double vision, redness, eye watering, eye bulging, eye dryness, flashing lights, spots, floaters, strabismus, tunnel vision, jaundice and eye irritation.   Respiratory:   Positive for cough, sputum production, shortness of breath, wheezing, respiratory pain, cough disturbing sleep and postural dyspnea. Negative for hemoptysis, sleep disturbances due to breathing, snores loudly and dyspnea on exertion.    Cardiovascular:  Negative for chest pain, dyspnea on exertion, palpitations, orthopnea, claudication, leg swelling, fingers/toes turn blue, hypertension, hypotension, syncope, history of heart murmur, chest pain on exertion, chest pain at rest, pacemaker, few scattered varicosities, leg pain, sleep disturbances due to breathing, tachycardia, light-headedness, exercise intolerance and edema.   Gastrointestinal:  Negative for heartburn, nausea, vomiting, abdominal pain, diarrhea, constipation, blood in stool, melena, rectal pain, bloating, hemorrhoids, bowel incontinence, jaundice, rectal bleeding, coffee ground emesis and change in stool.   Genitourinary:  Negative for bladder incontinence, dysuria, urgency, hematuria, flank pain, vaginal discharge, difficulty urinating, genital sores, dyspareunia, decreased libido, nocturia, voiding less frequently, arousal difficulty, abnormal vaginal bleeding, excessive menstruation, menstrual changes, hot flashes, vaginal  dryness and postmenopausal bleeding.   Musculoskeletal:  Negative for myalgias, back pain, joint swelling, arthralgias, stiffness, muscle cramps, neck pain, bone pain, muscle weakness and fracture.   Skin:  Positive for itching, poor wound healing, acne and poor wound healing. Negative for nail changes, rash, hair changes, skin changes, warts, scarring, flaky skin, Raynaud's phenomenon, sensitivity to sunlight and skin thickening.   Neurological:  Negative for dizziness, tingling, tremors, speech change, seizures, loss of consciousness, weakness, light-headedness, numbness, headaches, disturbances in coordination, extremity numbness, memory loss, difficulty walking and paralysis.   Endo/Heme:  Negative for anemia, swollen glands and bruises/bleeds easily.   Psychiatric/Behavioral:  Positive for depression and mood swings. Negative for hallucinations, memory loss, decreased concentration and panic attacks.    Breast:  Negative for breast discharge, breast mass, breast pain and nipple retraction.   Endocrine:  Negative for altered temperature regulation, polyphagia, polydipsia, unwanted hair growth and change in facial hair.            Past Medical and Surgical History:     Past Medical History:   Diagnosis Date     ADHD (attention deficit hyperactivity disorder)      Aspergillosis, with pneumonia (H)     fugus found caused chest pain     Chronic infection     CF, MRSA.,      Chronic sinusitis      Constipation, chronic      Cystic fibrosis with pulmonary manifestations (H) 12/19/2011     Cystic fibrosis without mention of meconium ileus     SWEAT TEST:Date: 2/17/1994   [facility]  296 mg, 104 mmol/L ClSample #2  295 mg, 104 mmol/L Cl GENOTYPING:Date: 10/15/2007,  Laboratory: [facility]: [id number]     Depressive disorder      Diabetes     no meds currently     Dysthymic disorder      Exocrine pancreatic insufficiency      Gastro-oesophageal reflux disease      Hip pain, right      MRSA  (methicillin resistant Staphylococcus aureus) carrier      Pancreatic disease      Past Surgical History:   Procedure Laterality Date     ARTHROSCOPY HIP, OSTEOPLASTY FEMUR PROXIMAL, COMBINED  3/11/2013    Procedure: COMBINED ARTHROSCOPY HIP, OSTEOPLASTY FEMUR PROXIMAL;  Right Hip Arthroscopy, Labral  Debridement.    surgeon request choice anesthesia/admit to [facility] after surgery;  Surgeon: Omkar Austin MD;  [facility]     ARTHROSCOPY KNEE WITH MEDIAL MENISCECTOMY Left 1/31/2017    Procedure: ARTHROSCOPY KNEE WITH MEDIAL MENISCECTOMY;  Surgeon: Jethro Coyle MD;  [facility]     bronchoscopies       BRONCHOSCOPY       EXAM UNDER ANESTHESIA ANUS N/A 5/10/2016    Procedure: EXAM UNDER ANESTHESIA ANUS;  Surgeon: Chet Gaviria MD;  [facility]     EXAM UNDER ANESTHESIA, RESTORATIONS, EXTRACTION(S) DENTAL COMPLEX, COMBINED  5/13/2013    Procedure: COMBINED EXAM UNDER ANESTHESIA, RESTORATIONS, EXTRACTION(S) DENTAL COMPLEX;  Dental Exam, Radiographs, Restorations. Single Extraction  Tooth #2. Restorations x 3;  Surgeon: Danilo Ortiz DDS;  [facility]     HC KNEE SCOPE, DIAGNOSTIC      Arthroscopy, Knee- left     INJECT BOTOX N/A 5/10/2016    Procedure: INJECT BOTOX;  Surgeon: Chet Gaviria MD;  [facility]     left hip labral tear  5/11/2011    left hip arthroscopy with labral debridement and synovectomy     meniscus repair       OPTICAL TRACKING SYSTEM ENDOSCOPIC SINUS SURGERY  10/14/2011    Procedure:OPTICAL TRACKING SYSTEM ENDOSCOPIC SINUS SURGERY; FESS (functional endoscopic sinus surgery) with Image Guidance, bronchial lavage and cultures; Surgeon:JUAN JOSE GARCIA; Location:[facility] OR     OPTICAL TRACKING SYSTEM ENDOSCOPIC SINUS SURGERY  5/18/2012    Procedure:OPTICAL TRACKING SYSTEM ENDOSCOPIC SINUS SURGERY; Right  and Left Image Guided Functional Endoscopic Sinus Surgery With  Frontal Approach, [employer]; Surgeon:JUAN JOSE GARCIA; [facility]     OPTICAL  TRACKING SYSTEM ENDOSCOPIC SINUS SURGERY  9/26/2012    Procedure: OPTICAL TRACKING SYSTEM ENDOSCOPIC SINUS SURGERY;  Stealth Guided Bilateral Functional Endoscopic Sinus Surgery *Latex Safe*;  Surgeon: Goyo Kuo MD;  [facility]     OPTICAL TRACKING SYSTEM ENDOSCOPIC SINUS SURGERY Bilateral 10/16/2015    Procedure: OPTICAL TRACKING SYSTEM ENDOSCOPIC SINUS SURGERY;  Surgeon: Mariela Haile MD;  [facility]     ORTHOPEDIC SURGERY      left hip tear repair 2010     SINUS SURGERY             Family History:     Family History   Problem Relation Age of Onset     CANCER Paternal Grandmother      Skin Cancer Paternal Grandmother      Other Cancer Paternal Grandmother      Skin     Obesity Paternal Grandmother      CANCER Paternal Grandfather      PGF had throat cancer (he was a smoker)     Other Cancer Paternal Grandfather      Anxiety Disorder Paternal Grandfather      Thyroid Disease Mother      Obesity Other      Anesthesia Reaction No family hx of      Blood Disease No family hx of      Colon Polyps No family hx of      Crohn Disease No family hx of      Ulcerative Colitis No family hx of      Colon Cancer No family hx of      Melanoma No family hx of             Social History:     Social History     Social History     Marital status: Single     Spouse name: N/A     Number of children: N/A     Years of education: N/A     Occupational History     Not on file.     Social History Main Topics     Smoking status: Never Smoker     Smokeless tobacco: Never Used      Comment: one person at home smokes outside     Alcohol use No     Drug use: No     Sexual activity: No     Other Topics Concern     Blood Transfusions No     Exercise Yes     Social History Narrative    Mamie lives with mother in [place], [place].  There is a cat in the home, but Mamie does not have any litterbox duties.  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  She gets essentially no exercise because of the tingling in her feet (says it  bothers her even to stand).        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        8/2015 no change in social situation        2/15/2016 Pt is single and lives with mother and stepfather.            Medications:     Current Outpatient Prescriptions   Medication     fluticasone (FLONASE) 50 MCG/ACT spray     spironolactone (ALDACTONE) 50 MG tablet     phentermine 15 MG capsule     hydrOXYzine (ATARAX) 25 MG tablet     acetaminophen (TYLENOL) 325 MG tablet     acetylcysteine (MUCOMYST) 20 % injection     MedroxyPROGESTERone Acetate (DEPO-PROVERA IM)     buPROPion (WELLBUTRIN SR) 150 MG 12 hr tablet     spironolactone (ALDACTONE) 25 MG tablet     ascorbic acid (VITAMIN C) 500 MG tablet     montelukast (SINGULAIR) 10 MG tablet     VITAMIN D3 1000 UNITS tablet     clindamycin (CLEOCIN T) 1 % lotion     adapalene (DIFFERIN) 0.1 % cream     LORATADINE PO     albuterol (2.5 MG/3ML) 0.083% nebulizer solution     omeprazole (PRILOSEC) 20 MG capsule     vitamin E 400 UNIT capsule     azithromycin (ZITHROMAX) 500 MG tablet     beta carotene 41210 UNIT capsule     GLYCOPYRROLATE PO     TRAZODONE HCL PO     cyanocobalamin (VITAMIN B12) 1000 MCG/ML injection     blood glucose (ACCU-CHEK SMARTVIEW) test strip     albuterol (PROAIR HFA, PROVENTIL HFA, VENTOLIN HFA) 108 (90 BASE) MCG/ACT inhaler     meropenem (MERREM) 500 MG injection     FLUoxetine (PROZAC) 40 MG capsule     Amphetamine-Dextroamphetamine (ADDERALL PO)     Leuprolide Acetate (LUPRON DEPOT IM)     Multiple Vitamin (MULTIVITAMIN OR)     MEPHYTON 5 MG tablet     methylcellulose, laxative, (CITRUCEL) POWD     No current facility-administered medications for this visit.      Facility-Administered Medications Ordered in Other Visits   Medication     albuterol (PROAIR HFA, PROVENTIL HFA, VENTOLIN HFA) inhaler            Physical Exam:   BP 99/68 (BP Location: Right arm, Patient Position: Chair, Cuff  "Size: Adult Large)  Pulse 84  Resp 16  Ht 1.575 m (5' 2.01\")  Wt 81.1 kg (178 lb 12.7 oz)  SpO2 97%  BMI 32.69 kg/m2    Constitutional:   Awake, alert and in no apparent distress     Eyes:   nonicteric     ENT:   oral mucosa moist without lesions, normal tm bilaterally, bilateral mucosal edema      Neck:   Supple without supraclavicular or cervical lymphadenopathy     Lungs:   Good air flow.  Left apical crackles. No rhonchi.  No wheezes.     Cardiovascular:   Normal S1 and S2.  RRR.  No murmur, gallop or rub.     Abdomen:   NABS, soft, nontender, nondistended.       Musculoskeletal:   No edema, digital clubbing present     Neurologic:   Alert and conversant.     Skin:   Warm, dry.  No rash on limited exam.             Data:   All laboratory and imaging data reviewed.    Cystic Fibrosis Culture  Specimen Description   Date Value Ref Range Status   04/25/2017 Throat  Final   11/01/2016 Throat  Final   08/02/2016 Sputum  Final   08/02/2016 Sputum  Final   08/02/2016 Sputum  Final    Culture Micro   Date Value Ref Range Status   04/25/2017 (A)  Final    Moderate growth Normal roberto carlos  Moderate growth Staphylococcus aureus Susceptibility testing in progress  Culture in progress     11/01/2016 (A)  Final    Light growth Normal roberto carlos  Moderate growth Staphylococcus aureus This isolate is presumed to be clindamycin   resistant based on detection of inducible clindamycin resistance. Erythromycin   and clindamycin are resistant, therefore, they are not recommended for use.  Light growth Achromobacter xylosoxidans/denitrificans     08/02/2016 (A)  Final    Light growth Normal roberto carlos  Moderate growth Staphylococcus aureus Organism failed to thrive for   susceptibility testing  Light growth Enterobacter cloacae complex  Light growth Strain 2 Enterobacter cloacae complex     08/02/2016   Final    Culture negative for acid fast bacilli  Assayed at [facility] [address]          Recent Results (from the " past 168 hour(s))   General PFT Lab (Please always keep checked)    Collection Time: 04/25/17  9:58 AM   Result Value Ref Range    FVC-Pred 3.55 L    FVC-Pre 3.58 L    FVC-%Pred-Pre 100 %    FEV1-Pre 3.17 L    FEV1-%Pred-Pre 102 %    FEV1FVC-Pred 87 %    FEV1FVC-Pre 89 %    FEFMax-Pred 6.61 L/sec    FEFMax-Pre 9.52 L/sec    FEFMax-%Pred-Pre 143 %    FEF2575-Pred 3.67 L/sec    FEF2575-Pre 4.16 L/sec    DKI4497-%Pred-Pre 113 %    ExpTime-Pre 6.79 sec    FIFMax-Pre 5.34 L/sec    FEV1FEV6-Pred 86 %    FEV1FEV6-Pre 89 %   Cystic Fibrosis Culture Aerob Bacterial    Collection Time: 04/25/17 11:20 AM   Result Value Ref Range    Specimen Description Throat     Special Requests Specimen collected in eSwab transport (white cap)     Culture Micro (A)      Moderate growth Normal roberto carlos  Moderate growth Staphylococcus aureus Susceptibility testing in progress  Culture in progress      Micro Report Status Pending        PFT: The spirometry is normal.  When compared to 2/7/17, the FEV1 and FVC have little change.

## 2017-04-26 DIAGNOSIS — B44.9 ASPERGILLOSIS (H): ICD-10-CM

## 2017-04-26 DIAGNOSIS — E84.0 CYSTIC FIBROSIS WITH PULMONARY MANIFESTATIONS (H): ICD-10-CM

## 2017-04-26 DIAGNOSIS — K86.89 PANCREATIC INSUFFICIENCY: ICD-10-CM

## 2017-04-26 RX ORDER — PHYTONADIONE (VIT K1) 5 MG
TABLET ORAL
Qty: 4 TABLET | Refills: 11 | Status: SHIPPED | OUTPATIENT
Start: 2017-04-26 | End: 2018-04-28

## 2017-04-30 LAB
BACTERIA SPEC CULT: ABNORMAL
Lab: ABNORMAL
MICRO REPORT STATUS: ABNORMAL
MICROORGANISM SPEC CULT: ABNORMAL
SPECIMEN SOURCE: ABNORMAL

## 2017-05-01 ENCOUNTER — OFFICE VISIT (OUTPATIENT)
Dept: OTOLARYNGOLOGY | Facility: CLINIC | Age: 22
End: 2017-05-01

## 2017-05-01 ENCOUNTER — OFFICE VISIT (OUTPATIENT)
Dept: DERMATOLOGY | Facility: CLINIC | Age: 22
End: 2017-05-01

## 2017-05-01 VITALS — HEIGHT: 62 IN | WEIGHT: 181 LBS | BODY MASS INDEX: 33.31 KG/M2

## 2017-05-01 DIAGNOSIS — L70.0 ACNE VULGARIS: Primary | ICD-10-CM

## 2017-05-01 DIAGNOSIS — Z79.899 MEDICATION MANAGEMENT: ICD-10-CM

## 2017-05-01 DIAGNOSIS — L70.0 ACNE VULGARIS: ICD-10-CM

## 2017-05-01 DIAGNOSIS — J32.0 CHRONIC MAXILLARY SINUSITIS: Primary | ICD-10-CM

## 2017-05-01 LAB — HCG UR QL: NEGATIVE

## 2017-05-01 ASSESSMENT — PAIN SCALES - GENERAL
PAINLEVEL: NO PAIN (0)
PAINLEVEL: NO PAIN (0)

## 2017-05-01 NOTE — PATIENT INSTRUCTIONS
Plan of care:  Follow up in one month as needed  Clinic contact information:  1. To schedule an appointment call [phone], option 1  2. To talk to the Triage RN call [phone], option 3  3. If you need to speak to Ellie or get a message to your doctor on a Friday, call the triage RN  4. EllieRN: [phone]  5. Surgery scheduling:      Mahi Canela: [phone]      Brisa Aparicio: [phone]  6. Fax: [phone]  7. [phone]

## 2017-05-01 NOTE — PROGRESS NOTES
"[facility] Dermatology Note    Dermatology Problem List:  1. Acne vulgaris s/p benzoyl peroxide facial wash, stopped due to skin irritation  - clindamycin 1% lotion, adapalene 0.1% cream, spironolactone 50 mg bid  2. CF  3. DM Type II    Encounter Date: May 1, 2017    CC:   Chief Complaint   Patient presents with     Derm Problem     Acne, Mamie states \" it is the same.\"     History of Present Illness:  This 21 year old female presents as a follow up for acne. The patient was last seen on 3/20/17 when her spironolactone was increased to 50 mg bid. She was continued on clindamycin 1% lotion and adapalene 0.1% cream. The patient reports that her skin is not doing well and she is still breaking out on her face and back. She is interested in other options, such as Accutane. this was discussed briefly at her last visit and she is ready to move forward.  She has not had any light headedness, passing out, or breast tenderness. She notes that her CF is doing great. She also notes that she had acne prior to the Depo. The patient reports no other lesions of concern at this time.     Otherwise, the patient reports no painful, bleeding, nonhealing, or pruritic lesions, and denies new or changing moles.     Past Medical History:   Patient Active Problem List   Diagnosis     Hip joint pain     Aspergillosis (H)     Chronic maxillary sinusitis     Hypoglycemia     Type 1 diabetes mellitus (H)     Cystic fibrosis with pulmonary manifestations (H)     Chronic constipation     Frontal sinusitis     Major depression     ADHD (attention deficit hyperactivity disorder)     Back pain     Pancreatic insufficiency     Non morbid obesity due to excess calories     Acne vulgaris     Cystic fibrosis (H)     Insomnia     Major depressive disorder with single episode     Past Medical History:   Diagnosis Date     ADHD (attention deficit hyperactivity disorder)      Aspergillosis, with pneumonia (H)     fugus found caused " chest pain     Chronic infection     CF, MRSA.,      Chronic sinusitis      Constipation, chronic      Cystic fibrosis with pulmonary manifestations (H) 12/19/2011     Cystic fibrosis without mention of meconium ileus     SWEAT TEST:Date: 2/17/1994   Laboratory: [facility]  296 mg, 104 mmol/L ClSample #2  295 mg, 104 mmol/L Cl GENOTYPING:Date: 10/15/2007,  Laboratory: [facility]Genotype: [id number]     Depressive disorder      Diabetes     no meds currently     Dysthymic disorder      Exocrine pancreatic insufficiency      Gastro-oesophageal reflux disease      Hip pain, right      MRSA (methicillin resistant Staphylococcus aureus) carrier      Pancreatic disease      Past Surgical History:   Procedure Laterality Date     ARTHROSCOPY HIP, OSTEOPLASTY FEMUR PROXIMAL, COMBINED  3/11/2013    Procedure: COMBINED ARTHROSCOPY HIP, OSTEOPLASTY FEMUR PROXIMAL;  Right Hip Arthroscopy, Labral  Debridement.    surgeon request choice anesthesia/admit to [facility] after surgery;  Surgeon: Omkar Austin MD;  [facility]     ARTHROSCOPY KNEE WITH MEDIAL MENISCECTOMY Left 1/31/2017    Procedure: ARTHROSCOPY KNEE WITH MEDIAL MENISCECTOMY;  Surgeon: Jethro Coyle MD;  [facility]     bronchoscopies       BRONCHOSCOPY       EXAM UNDER ANESTHESIA ANUS N/A 5/10/2016    Procedure: EXAM UNDER ANESTHESIA ANUS;  Surgeon: Chet Gaviria MD;  [facility]     EXAM UNDER ANESTHESIA, RESTORATIONS, EXTRACTION(S) DENTAL COMPLEX, COMBINED  5/13/2013    Procedure: COMBINED EXAM UNDER ANESTHESIA, RESTORATIONS, EXTRACTION(S) DENTAL COMPLEX;  Dental Exam, Radiographs, Restorations. Single Extraction  Tooth #2. Restorations x 3;  Surgeon: Danilo Ortiz DDS;  [facility]     HC KNEE SCOPE, DIAGNOSTIC      Arthroscopy, Knee- left     INJECT BOTOX N/A 5/10/2016    Procedure: INJECT BOTOX;  Surgeon: Chet Gaviria MD;  [facility]     left hip labral tear  5/11/2011    left hip arthroscopy  with labral debridement and synovectomy     meniscus repair       OPTICAL TRACKING SYSTEM ENDOSCOPIC SINUS SURGERY  10/14/2011    Procedure:OPTICAL TRACKING SYSTEM ENDOSCOPIC SINUS SURGERY; FESS (functional endoscopic sinus surgery) with Image Guidance, bronchial lavage and cultures; Surgeon:GOYO KUO; [facility]     OPTICAL TRACKING SYSTEM ENDOSCOPIC SINUS SURGERY  5/18/2012    Procedure:OPTICAL TRACKING SYSTEM ENDOSCOPIC SINUS SURGERY; Right  and Left Image Guided Functional Endoscopic Sinus Surgery With  Frontal Approach, [employer]; Surgeon:GOYO KUO; [facility]     OPTICAL TRACKING SYSTEM ENDOSCOPIC SINUS SURGERY  9/26/2012    Procedure: OPTICAL TRACKING SYSTEM ENDOSCOPIC SINUS SURGERY;  Stealth Guided Bilateral Functional Endoscopic Sinus Surgery *Latex Safe*;  Surgeon: Goyo Kuo MD;  [facility]     OPTICAL TRACKING SYSTEM ENDOSCOPIC SINUS SURGERY Bilateral 10/16/2015    Procedure: OPTICAL TRACKING SYSTEM ENDOSCOPIC SINUS SURGERY;  Surgeon: Mariela Haile MD;  [facility]     ORTHOPEDIC SURGERY      left hip tear repair 2010     SINUS SURGERY       Social History:  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 Former use of tanning beds (high school).     Family History:  There is a family history of presumed melanoma in the patient's grandmother.    Medications:  Current Outpatient Prescriptions   Medication Sig Dispense Refill     MEPHYTON 5 MG tablet TAKE 1 TABLET BY MOUTH ONCE A WEEK 4 tablet 11     fluticasone (FLONASE) 50 MCG/ACT spray Spray 2 sprays into both nostrils daily 1 Bottle 6     spironolactone (ALDACTONE) 50 MG tablet Take 1 tablet (50 mg) by mouth 2 times daily 60 tablet 2     phentermine 15 MG capsule Take 2 capsules (30 mg) by mouth every morning 60 capsule 4     hydrOXYzine (ATARAX) 25 MG tablet Take 1 tablet (25 mg) by mouth every 6 hours as needed for itching (and nausea) 30 tablet 0     acetaminophen (TYLENOL) 325 MG tablet Take 2  tablets (650 mg) by mouth every 4 hours as needed for other (mild pain) 100 tablet 0     acetylcysteine (MUCOMYST) 20 % injection INHALE ONE 4ML NEB INTO LUNGS VIA NEBULIZER TWO TIMES A DAY, MAY INCREASE TO 3-4 TIMES A DAY WITH INCREASE COUGH/COLD SYMPTOMS 360 mL 11     MedroxyPROGESTERone Acetate (DEPO-PROVERA IM)        buPROPion (WELLBUTRIN SR) 150 MG 12 hr tablet Take 150 mg by mouth 2 times daily       spironolactone (ALDACTONE) 25 MG tablet Take 1 tablet (25 mg) by mouth daily 60 tablet 3     ascorbic acid (VITAMIN C) 500 MG tablet TAKE ONE TABLET BY MOUTH TWICE A  tablet 3     montelukast (SINGULAIR) 10 MG tablet TAKE ONE TABLET BY MOUTH ONCE DAILY AT BEDTIME 30 tablet 11     VITAMIN D3 1000 UNITS tablet TAKE ONE TABLET BY MOUTH EVERY DAY 30 tablet 11     clindamycin (CLEOCIN T) 1 % lotion Apply topically every morning After washing face with benzoyl peroxide wash 60 mL 11     adapalene (DIFFERIN) 0.1 % cream Apply topically At Bedtime After washing face 45 g 11     LORATADINE PO Take 10 mg by mouth       albuterol (2.5 MG/3ML) 0.083% nebulizer solution Take 1 vial (2.5 mg) by nebulization 4 times daily 120 vial 11     omeprazole (PRILOSEC) 20 MG capsule TAKE 1 CAPSULE BY MOUTH TWICE A DAY 60 capsule 11     vitamin E 400 UNIT capsule TAKE 1 CAPSULE BY MOUTH TWICE A DAY 60 capsule 11     azithromycin (ZITHROMAX) 500 MG tablet Take 1 tablet (500 mg) by mouth daily M-W-F 36 tablet 4     beta carotene 53965 UNIT capsule Take 1 capsule (25,000 Units) by mouth Every Mon, Wed, Fri Morning 36 capsule 4     methylcellulose, laxative, (CITRUCEL) POWD Start with 1 heaping tablespoon. Increase as needed, 1 heaping tablespoon at a time, up to 3 times per day. 479 g 3     GLYCOPYRROLATE PO Take 1 mg by mouth 2 times daily        TRAZODONE HCL PO Take 100 mg by mouth At Bedtime        cyanocobalamin (VITAMIN B12) 1000 MCG/ML injection Inject 1 mL into the muscle every 30 days        blood glucose (ACCU-CHEK  SMARTVIEW) test strip Use to test blood sugar 4 times daily or as directed. 1 Month 12     albuterol (PROAIR HFA, PROVENTIL HFA, VENTOLIN HFA) 108 (90 BASE) MCG/ACT inhaler Inhale 2 puffs into the lungs every 6 hours as needed for shortness of breath / dyspnea or wheezing 1 Inhaler 12     meropenem (MERREM) 500 MG injection 500 mg by Nasal Instillation route as needed  4 mL 0     FLUoxetine (PROZAC) 40 MG capsule Take 80 mg by mouth daily.       Amphetamine-Dextroamphetamine (ADDERALL PO) Take 20 mg by mouth daily.       Leuprolide Acetate (LUPRON DEPOT IM) Inject  into the muscle every 3 months.       Multiple Vitamin (MULTIVITAMIN OR) Take 1 tablet by mouth daily.       Allergies   Allergen Reactions     Vancomycin Hives     Redmens skin rash      Vancomycin Rash     Review of Systems:  -As per HPI    Physical exam:  BP 99/61  GEN: This is a well developed, well-nourished female in no acute distress, in a pleasant mood.      SKIN: Acne exam, which includes the face, neck, upper central chest, and upper central back was performed.  - Superficial excoriated acneiform papules with intermixed open and closed comedones on the face greater than the chest and back  - No other lesions of concern on areas examined.     Impression/Plan:  1. Acne vulgaris, acne excoree , Start Accutane process.   Okay to finish course of spironolactone.    Discussion that it can cause breast tenderness, spotting, dizziness and to not get pregnant while on this medication. Blood pressure will be checked every 3 months. The patient advised to drink lots of fluids.   - Continue clindamycin 1% lotion - apply after benzoyl peroxide in the morning.  - Continue adapalene 0.1% cream - apply a thin pea-sized amount of cream to the entire face.  - Continue BPO facial wash      Discussion of the risks and side effects of Accutane including but not limited to mucocutaneous dryness, arthralgias, myalgias, depression, suicidal ideation, headache, blurred  vision,  increase in liver function tests, increase in lipids, and teratogenic effects. Discussed need for two forms of contraception. The ipledgeprogram brochure was provided and the contents discussed with the patient. The patient was counseled they cannot give blood while on Accutane. No personal or family history of inflammatory bowel disease or hypertriglyceridemia known to patient. Reviewed need to avoid alcohol on medication.   Vecastedge program consent was obtained.   At this visit, we will begin Accutane 40 mg daily. .Goal dose is 9840 to 12,300 mg for 120-150 mg/kg dosing in this 82 kg patient.    Baseline labs including CBC, CMP, fasting lipids and will be obtained today.      Baseline pregnancy test today. The patients two forms of contraception are Depo and male latex condoms.      Total cumulative dose 0mg.   Patient's I-pledge # is [id number].    The patient will stop all acne medications, when she starts the medication.   Follow-up in 1 months, earlier for new or changing lesions.     Staff Involved:    All risks, benefits and alternatives were discussed with patient.  Patient is in agreement and understands the assessment and plan.  All questions were answered.  Sun Screen Education was given.   Return to Clinic in 1 month or sooner as needed.   Paige Reid PA-C   [facility] Dermatology [facility]

## 2017-05-01 NOTE — LETTER
"5/1/2017       RE: Mamie Rice  [address] [facility] [address]     Dear Colleague,    Thank you for referring your patient, Mamie Rice, to the [facility] DERMATOLOGY at [facility]. Please see a copy of my visit note below.    [facility] Dermatology Note    Dermatology Problem List:  1. Acne vulgaris s/p benzoyl peroxide facial wash, stopped due to skin irritation  - clindamycin 1% lotion, adapalene 0.1% cream, spironolactone 50 mg bid  2. CF  3. DM Type II    Encounter Date: May 1, 2017    CC:   Chief Complaint   Patient presents with     Derm Problem     Acne, Mamie states \" it is the same.\"     History of Present Illness:  This 21 year old female presents as a follow up for acne. The patient was last seen on 3/20/17 when her spironolactone was increased to 50 mg bid. She was continued on clindamycin 1% lotion and adapalene 0.1% cream. The patient reports that her skin is not doing well and she is still breaking out on her face and back. She is interested in other options, such as Accutane. this was discussed briefly at her last visit and she is ready to move forward.  She has not had any light headedness, passing out, or breast tenderness. She notes that her CF is doing great. She also notes that she had acne prior to the Depo. The patient reports no other lesions of concern at this time.     Otherwise, the patient reports no painful, bleeding, nonhealing, or pruritic lesions, and denies new or changing moles.     Past Medical History:   Patient Active Problem List   Diagnosis     Hip joint pain     Aspergillosis (H)     Chronic maxillary sinusitis     Hypoglycemia     Type 1 diabetes mellitus (H)     Cystic fibrosis with pulmonary manifestations (H)     Chronic constipation     Frontal sinusitis     Major depression     ADHD (attention deficit hyperactivity disorder)     Back pain     Pancreatic insufficiency     Non morbid obesity due to " excess calories     Acne vulgaris     Cystic fibrosis (H)     Insomnia     Major depressive disorder with single episode     Past Medical History:   Diagnosis Date     ADHD (attention deficit hyperactivity disorder)      Aspergillosis, with pneumonia (H)     fugus found caused chest pain     Chronic infection     CF, MRSA.,      Chronic sinusitis      Constipation, chronic      Cystic fibrosis with pulmonary manifestations (H) 12/19/2011     Cystic fibrosis without mention of meconium ileus     SWEAT TEST:Date: 2/17/1994   Laboratory: [facility]1  296 mg, 104 mmol/L ClSample #2  295 mg, 104 mmol/L Cl GENOTYPING:Date: 10/15/2007,  Laboratory: [facility]: [id number]     Depressive disorder      Diabetes     no meds currently     Dysthymic disorder      Exocrine pancreatic insufficiency      Gastro-oesophageal reflux disease      Hip pain, right      MRSA (methicillin resistant Staphylococcus aureus) carrier      Pancreatic disease      Past Surgical History:   Procedure Laterality Date     ARTHROSCOPY HIP, OSTEOPLASTY FEMUR PROXIMAL, COMBINED  3/11/2013    Procedure: COMBINED ARTHROSCOPY HIP, OSTEOPLASTY FEMUR PROXIMAL;  Right Hip Arthroscopy, Labral  Debridement.    surgeon request choice anesthesia/admit to [facility] after surgery;  Surgeon: Omkar Austin MD;  [facility]     ARTHROSCOPY KNEE WITH MEDIAL MENISCECTOMY Left 1/31/2017    Procedure: ARTHROSCOPY KNEE WITH MEDIAL MENISCECTOMY;  Surgeon: Jethro Coyle MD;  [facility]     bronchoscopies       BRONCHOSCOPY       EXAM UNDER ANESTHESIA ANUS N/A 5/10/2016    Procedure: EXAM UNDER ANESTHESIA ANUS;  Surgeon: Chet Gaviria MD;  [facility]     EXAM UNDER ANESTHESIA, RESTORATIONS, EXTRACTION(S) DENTAL COMPLEX, COMBINED  5/13/2013    Procedure: COMBINED EXAM UNDER ANESTHESIA, RESTORATIONS, EXTRACTION(S) DENTAL COMPLEX;  Dental Exam, Radiographs, Restorations. Single Extraction  Tooth #2. Restorations x 3;   Surgeon: Danilo Ortiz DDS;  [facility]     HC KNEE SCOPE, DIAGNOSTIC      Arthroscopy, Knee- left     INJECT BOTOX N/A 5/10/2016    Procedure: INJECT BOTOX;  Surgeon: Chet Gaviria MD;  [facility]     left hip labral tear  5/11/2011    left hip arthroscopy with labral debridement and synovectomy     meniscus repair       OPTICAL TRACKING SYSTEM ENDOSCOPIC SINUS SURGERY  10/14/2011    Procedure:OPTICAL TRACKING SYSTEM ENDOSCOPIC SINUS SURGERY; FESS (functional endoscopic sinus surgery) with Image Guidance, bronchial lavage and cultures; Surgeon:GOYO KUO; [facility]     OPTICAL TRACKING SYSTEM ENDOSCOPIC SINUS SURGERY  5/18/2012    Procedure:OPTICAL TRACKING SYSTEM ENDOSCOPIC SINUS SURGERY; Right  and Left Image Guided Functional Endoscopic Sinus Surgery With  Frontal Approach, [employer]; Surgeon:GOYO KUO; [facility]     OPTICAL TRACKING SYSTEM ENDOSCOPIC SINUS SURGERY  9/26/2012    Procedure: OPTICAL TRACKING SYSTEM ENDOSCOPIC SINUS SURGERY;  Stealth Guided Bilateral Functional Endoscopic Sinus Surgery *Latex Safe*;  Surgeon: Goyo Kuo MD;  [facility]     OPTICAL TRACKING SYSTEM ENDOSCOPIC SINUS SURGERY Bilateral 10/16/2015    Procedure: OPTICAL TRACKING SYSTEM ENDOSCOPIC SINUS SURGERY;  Surgeon: Mariela Haile MD;  [facility]     ORTHOPEDIC SURGERY      left hip tear repair 2010     SINUS SURGERY       Social History:  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     Family History:  There is a family history of presumed melanoma in the patient's grandmother.    Medications:  Current Outpatient Prescriptions   Medication Sig Dispense Refill     MEPHYTON 5 MG tablet TAKE 1 TABLET BY MOUTH ONCE A WEEK 4 tablet 11     fluticasone (FLONASE) 50 MCG/ACT spray Spray 2 sprays into both nostrils daily 1 Bottle 6     spironolactone (ALDACTONE) 50 MG tablet Take 1 tablet (50 mg) by mouth 2 times daily  60 tablet 2     phentermine 15 MG capsule Take 2 capsules (30 mg) by mouth every morning 60 capsule 4     hydrOXYzine (ATARAX) 25 MG tablet Take 1 tablet (25 mg) by mouth every 6 hours as needed for itching (and nausea) 30 tablet 0     acetaminophen (TYLENOL) 325 MG tablet Take 2 tablets (650 mg) by mouth every 4 hours as needed for other (mild pain) 100 tablet 0     acetylcysteine (MUCOMYST) 20 % injection INHALE ONE 4ML NEB INTO LUNGS VIA NEBULIZER TWO TIMES A DAY, MAY INCREASE TO 3-4 TIMES A DAY WITH INCREASE COUGH/COLD SYMPTOMS 360 mL 11     MedroxyPROGESTERone Acetate (DEPO-PROVERA IM)        buPROPion (WELLBUTRIN SR) 150 MG 12 hr tablet Take 150 mg by mouth 2 times daily       spironolactone (ALDACTONE) 25 MG tablet Take 1 tablet (25 mg) by mouth daily 60 tablet 3     ascorbic acid (VITAMIN C) 500 MG tablet TAKE ONE TABLET BY MOUTH TWICE A  tablet 3     montelukast (SINGULAIR) 10 MG tablet TAKE ONE TABLET BY MOUTH ONCE DAILY AT BEDTIME 30 tablet 11     VITAMIN D3 1000 UNITS tablet TAKE ONE TABLET BY MOUTH EVERY DAY 30 tablet 11     clindamycin (CLEOCIN T) 1 % lotion Apply topically every morning After washing face with benzoyl peroxide wash 60 mL 11     adapalene (DIFFERIN) 0.1 % cream Apply topically At Bedtime After washing face 45 g 11     LORATADINE PO Take 10 mg by mouth       albuterol (2.5 MG/3ML) 0.083% nebulizer solution Take 1 vial (2.5 mg) by nebulization 4 times daily 120 vial 11     omeprazole (PRILOSEC) 20 MG capsule TAKE 1 CAPSULE BY MOUTH TWICE A DAY 60 capsule 11     vitamin E 400 UNIT capsule TAKE 1 CAPSULE BY MOUTH TWICE A DAY 60 capsule 11     azithromycin (ZITHROMAX) 500 MG tablet Take 1 tablet (500 mg) by mouth daily M-W-F 36 tablet 4     beta carotene 69903 UNIT capsule Take 1 capsule (25,000 Units) by mouth Every Mon, Wed, Fri Morning 36 capsule 4     methylcellulose, laxative, (CITRUCEL) POWD Start with 1 heaping tablespoon. Increase as needed, 1 heaping tablespoon at a time, up  to 3 times per day. 479 g 3     GLYCOPYRROLATE PO Take 1 mg by mouth 2 times daily        TRAZODONE HCL PO Take 100 mg by mouth At Bedtime        cyanocobalamin (VITAMIN B12) 1000 MCG/ML injection Inject 1 mL into the muscle every 30 days        blood glucose (ACCU-CHEK SMARTVIEW) test strip Use to test blood sugar 4 times daily or as directed. 1 Month 12     albuterol (PROAIR HFA, PROVENTIL HFA, VENTOLIN HFA) 108 (90 BASE) MCG/ACT inhaler Inhale 2 puffs into the lungs every 6 hours as needed for shortness of breath / dyspnea or wheezing 1 Inhaler 12     meropenem (MERREM) 500 MG injection 500 mg by Nasal Instillation route as needed  4 mL 0     FLUoxetine (PROZAC) 40 MG capsule Take 80 mg by mouth daily.       Amphetamine-Dextroamphetamine (ADDERALL PO) Take 20 mg by mouth daily.       Leuprolide Acetate (LUPRON DEPOT IM) Inject  into the muscle every 3 months.       Multiple Vitamin (MULTIVITAMIN OR) Take 1 tablet by mouth daily.       Allergies   Allergen Reactions     Vancomycin Hives     Redmens skin rash      Vancomycin Rash     Review of Systems:  -As per HPI    Physical exam:  BP 99/61  GEN: This is a well developed, well-nourished female in no acute distress, in a pleasant mood.      SKIN: Acne exam, which includes the face, neck, upper central chest, and upper central back was performed.  - Superficial excoriated acneiform papules with intermixed open and closed comedones on the face greater than the chest and back  - No other lesions of concern on areas examined.     Impression/Plan:  1. Acne vulgaris, acne excoree , Start Accutane process.   Okay to finish course of spironolactone.    Discussion that it can cause breast tenderness, spotting, dizziness and to not get pregnant while on this medication. Blood pressure will be checked every 3 months. The patient advised to drink lots of fluids.   - Continue clindamycin 1% lotion - apply after benzoyl peroxide in the morning.  - Continue adapalene 0.1% cream  - apply a thin pea-sized amount of cream to the entire face.  - Continue BPO facial wash      Discussion of the risks and side effects of Accutane including but not limited to mucocutaneous dryness, arthralgias, myalgias, depression, suicidal ideation, headache, blurred vision,  increase in liver function tests, increase in lipids, and teratogenic effects. Discussed need for two forms of contraception. The Samplesaintedgeprogram brochure was provided and the contents discussed with the patient. The patient was counseled they cannot give blood while on Accutane. No personal or family history of inflammatory bowel disease or hypertriglyceridemia known to patient. Reviewed need to avoid alcohol on medication.   Ipledge program consent was obtained.   At this visit, we will begin Accutane 40 mg daily. .Goal dose is 9840 to 12,300 mg for 120-150 mg/kg dosing in this 82 kg patient.    Baseline labs including CBC, CMP, fasting lipids and will be obtained today.      Baseline pregnancy test today. The patients two forms of contraception are Depo and male latex condoms.      Total cumulative dose 0mg.   Patient's I-pledge # is [id number].    The patient will stop all acne medications, when she starts the medication.   Follow-up in 1 months, earlier for new or changing lesions.     Staff Involved:    All risks, benefits and alternatives were discussed with patient.  Patient is in agreement and understands the assessment and plan.  All questions were answered.  Sun Screen Education was given.   Return to Clinic in 1 month or sooner as needed.   Paige Reid PA-C   [facility]     Accutane Intake:  Ipledge number:[id number]  Copy of consent retained for medical record:Yes  Baseline and future lab orders placed:Yes  Patient registered:Yes      Paige Reid PA-C

## 2017-05-01 NOTE — LETTER
5/1/2017       RE: Mamie Rice  [address] [facility] [address]     Dear Colleague,    Thank you for referring your patient, Mamie Rice, to the [facility] EAR NOSE AND THROAT at [facility]. Please see a copy of my visit note below.    HISTORY OF PRESENT ILLNESS:  Mamie returns.  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  She states that overall her symptoms are stable.  She has lost some weight through the weight loss clinic.      PHYSICAL EXAMINATION:  She is examined.        PROCEDURE:  A 0-degree rigid endoscope was used to examine the right middle meatus.  There are some scant secretions in the posterior aspect of the middle meatus.  2 cc of meropenem are sprayed into the maxillary antrum on that side.  On the left, she has a polyp in the anterior aspect of the middle meatus and some purulent secretions.  This was suctioned away and meropenem is sprayed into the maxillary antrum on that side as well.  Mamie tolerates this well.      ASSESSMENT AND PLAN:  I will see her back in one month.      HB/miko         Again, thank you for allowing me to participate in the care of your patient.      Sincerely,    Mariela Haile MD

## 2017-05-01 NOTE — MR AVS SNAPSHOT
After Visit Summary   2017    Mamie Rice    MRN: [id number]           Patient Information     Date Of Birth          1995        Visit Information        Provider Department      2017 3:00 PM Mariela Hiale MD [facility] Ear Nose and Throat        Today's Diagnoses     Chronic maxillary sinusitis    -  1      Care Instructions    Plan of care:  Follow up in one month as needed  Clinic contact information:  1. To schedule an appointment call [phone], option 1  2. To talk to the Triage RN call [phone], option 3  3. If you need to speak to Ellie or get a message to your doctor on a Friday, call the triage RN  4. EllieRN: [phone]  5. Surgery scheduling:      Mahi Canela: [phone]      Brisa Alessandra: [phone]  6. Fax: [phone]  7. [phone]          Follow-ups after your visit        Your next 10 appointments already scheduled     May 04, 2017  6:00 AM CDT   LAB with  LAB    [facility] ([facility])    [address] [place] [id number] [phone]           Patient must bring picture ID.  Patient should be prepared to give a urine specimen  Please do not eat 10-12 hours before your appointment if you are coming in fasting for labs on lipids, cholesterol, or glucose (sugar).  Pregnant women should follow their Care Team instructions. Water with medications is okay. Do not drink coffee or other fluids.   If you have concerns about taking  your medications, please ask at office or if scheduling via [employer], send a message by clicking on Secure Messaging, Message Your Care Team.            2017  7:30 AM CDT   PFT VISIT with  PFL DIMITRY   [facility] Pulmonary Function Testing ([facility])    [address] [place] [address] [phone]            2017  8:15 AM CDT   (Arrive by 8:00 AM)   New Allergy with Beau Marquez MD     [facility] Lung Science and Health ([facility])    [address] [place] [address]   [phone]           Do not take anti-histamines or Zantac for seven day prior to your appointment.            Jun 07, 2017 10:15 AM CDT   (Arrive by 10:00 AM)   Return Visit with Mariela Haile MD   [facility] Ear Nose and Throat ([facility])    [address] [place] [address]  4th [facility] [address]   [phone]            Jun 19, 2017  2:00 PM CDT   (Arrive by 1:45 PM)   Return Visit with Paige Reid PA-C   [facility] Dermatology ([facility])    [address] [place] [address]   [phone]            Aug 02, 2017  7:30 AM CDT   CF LOOP with  PFL CF   [facility] Pulmonary Function Testing ([facility])    [address] [place] [address]   [phone]            Aug 02, 2017  7:50 AM CDT   (Arrive by 7:35 AM)   RETURN CYSTIC FIBROSIS VISIT with Gavi Allison MD   [facility] Lung Science and Health ([facility])    [address] [place] [address]   [phone]            Aug 02, 2017  8:45 AM CDT   (Arrive by 8:30 AM)   XR CHEST 2 VIEWS with XR1   [facility] ([facility])    [address] [place] [address]   [phone]           Please bring a list of your current medicines to your exam. (Include vitamins, minerals and over-thecounter medicines.) Leave your valuables at home.  Tell your doctor if there is a chance you may be pregnant.  You do not need to do anything special for this exam.            Aug 02, 2017  9:00 AM CDT   DX HIP/PELVIS/SPINE with UCDX1   [facility]a ([facility])    [address] [address]    [phone]           Please do not take any of the following 48 hours prior to your exam: vitamins, calcium tablets, antacids.            Aug 02, 2017 10:15 AM CDT   (Arrive by 10:00 AM)   Return Visit with Mariela Haile MD   [facility] Ear Nose and Throat ([facility])    [address] [phone] [phone]              Future tests that were ordered for you today     Open Future Orders        Priority Expected Expires Ordered    Comprehensive metabolic panel Routine 5/8/2017 5/1/2018 5/1/2017    Lipid Profile Routine 5/8/2017 5/1/2018 5/1/2017            Who to contact     Please call your clinic at [phone] to:    Ask questions about your health    Make or cancel appointments    Discuss your medicines    Learn about your test results    Speak to your doctor   If you have compliments or concerns about an experience at your clinic, or if you wish to file a complaint, please contact [facility] Patient Relations at [phone] or email us at [email or web address]         Additional Information About Your Visit        [employer] Information     [employer] gives you secure access to your electronic health record. If you see a primary care provider, you can also send messages to your care team and make appointments. If you have questions, please call your primary care clinic.  If you do not have a primary care provider, please call [phone] and they will assist you.      [employer] is an electronic gateway that provides easy, online access to your medical records. With [employer], you can request a clinic appointment, read your test results, renew a prescription or communicate with your care team.     To access your existing account, please contact your [facility] or call [phone] for assistance.        Care EveryWhere ID     This is your Care EveryWhere ID. This could be used by other  "organizations to access your [place] medical records  [id number]        Your Vitals Were     Height BMI (Body Mass Index)                1.57 m (5' 1.81\") 33.31 kg/m2           Blood Pressure from Last 3 Encounters:   04/25/17 99/68   03/20/17 99/61   02/07/17 117/77    Weight from Last 3 Encounters:   05/01/17 82.1 kg (181 lb)   04/25/17 81.1 kg (178 lb 12.7 oz)   04/03/17 81.2 kg (179 lb)              We Performed the Following     NASAL ENDOSCOPY, DIAGNOSTIC     NASAL/SINUS SCOPE W THER [facility]        Primary Care Provider Office Phone # Fax #    Malik De La Rosa [phone]       [address] [facility] [address]        Thank you!     Thank you for choosing [facility] EAR NOSE AND THROAT  for your care. Our goal is always to provide you with excellent care. Hearing back from our patients is one way we can continue to improve our services. Please take a few minutes to complete the written survey that you may receive in the mail after your visit with us. Thank you!             Your Updated Medication List - Protect others around you: Learn how to safely use, store and throw away your medicines at www.disposemymeds.org.          This list is accurate as of: 5/1/17 11:59 PM.  Always use your most recent med list.                   Brand Name Dispense Instructions for use    acetaminophen 325 MG tablet    TYLENOL    100 tablet    Take 2 tablets (650 mg) by mouth every 4 hours as needed for other (mild pain)       acetylcysteine 20 % nebulizer solution    MUCOMYST    360 mL    INHALE ONE 4ML NEB INTO LUNGS VIA NEBULIZER TWO TIMES A DAY, MAY INCREASE TO 3-4 TIMES A DAY WITH INCREASE COUGH/COLD SYMPTOMS       adapalene 0.1 % cream    DIFFERIN    45 g    Apply topically At Bedtime After washing face       ADDERALL PO      Take 20 mg by mouth daily.       * albuterol 108 (90 BASE) MCG/ACT Inhaler    PROAIR HFA/PROVENTIL HFA/VENTOLIN HFA    1 Inhaler    Inhale 2 " puffs into the lungs every 6 hours as needed for shortness of breath / dyspnea or wheezing       * albuterol (2.5 MG/3ML) 0.083% neb solution     120 vial    Take 1 vial (2.5 mg) by nebulization 4 times daily       ascorbic acid 500 MG tablet    VITAMIN C    100 tablet    TAKE ONE TABLET BY MOUTH TWICE A DAY       azithromycin 500 MG tablet    ZITHROMAX    36 tablet    Take 1 tablet (500 mg) by mouth daily M-W-F       beta carotene 83354 UNIT capsule     36 capsule    Take 1 capsule (25,000 Units) by mouth Every Mon, Wed, Fri Morning       blood glucose monitoring test strip    ACCU-CHEK SMARTVIEW    1 Month    Use to test blood sugar 4 times daily or as directed.       buPROPion 150 MG 12 hr tablet    WELLBUTRIN SR     Take 150 mg by mouth 2 times daily       cholecalciferol 1000 UNIT tablet    vitamin D    30 tablet    TAKE ONE TABLET BY MOUTH EVERY DAY       clindamycin 1 % lotion    CLEOCIN T    60 mL    Apply topically every morning After washing face with benzoyl peroxide wash       cyanocobalamin 1000 MCG/ML injection    VITAMIN B12     Inject 1 mL into the muscle every 30 days       DEPO-PROVERA IM          fluticasone 50 MCG/ACT spray    FLONASE    1 Bottle    Spray 2 sprays into both nostrils daily       GLYCOPYRROLATE PO      Take 1 mg by mouth 2 times daily       hydrOXYzine 25 MG tablet    ATARAX    30 tablet    Take 1 tablet (25 mg) by mouth every 6 hours as needed for itching (and nausea)       LORATADINE PO      Take 10 mg by mouth       LUPRON DEPOT IM      Inject  into the muscle every 3 months.       MEPHYTON 5 MG tablet   Generic drug:  phytonadione     4 tablet    TAKE 1 TABLET BY MOUTH ONCE A WEEK       meropenem 500 MG vial    MERREM    4 mL    500 mg by Nasal Instillation route as needed       methylcellulose (laxative) Powd    CITRUCEL    479 g    Start with 1 heaping tablespoon. Increase as needed, 1 heaping tablespoon at a time, up to 3 times per day.       montelukast 10 MG tablet     SINGULAIR    30 tablet    TAKE ONE TABLET BY MOUTH ONCE DAILY AT BEDTIME       MULTIVITAMIN PO      Take 1 tablet by mouth daily.       omeprazole 20 MG CR capsule    priLOSEC    60 capsule    TAKE 1 CAPSULE BY MOUTH TWICE A DAY       phentermine 15 MG capsule     60 capsule    Take 2 capsules (30 mg) by mouth every morning       PROZAC 40 MG capsule   Generic drug:  FLUoxetine      Take 80 mg by mouth daily.       * spironolactone 25 MG tablet    ALDACTONE    60 tablet    Take 1 tablet (25 mg) by mouth daily       * spironolactone 50 MG tablet    ALDACTONE    60 tablet    Take 1 tablet (50 mg) by mouth 2 times daily       TRAZODONE HCL PO      Take 100 mg by mouth At Bedtime       vitamin E 400 UNIT capsule     60 capsule    TAKE 1 CAPSULE BY MOUTH TWICE A DAY       * Notice:  This list has 4 medication(s) that are the same as other medications prescribed for you. Read the directions carefully, and ask your doctor or other care provider to review them with you.

## 2017-05-01 NOTE — PROGRESS NOTES
HISTORY OF PRESENT ILLNESS:  Mamie returns.  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  She states that overall her symptoms are stable.  She has lost some weight through the weight loss clinic.      PHYSICAL EXAMINATION:  She is examined.        PROCEDURE:  A 0-degree rigid endoscope was used to examine the right middle meatus.  There are some scant secretions in the posterior aspect of the middle meatus.  2 cc of meropenem are sprayed into the maxillary antrum on that side.  On the left, she has a polyp in the anterior aspect of the middle meatus and some purulent secretions.  This was suctioned away and meropenem is sprayed into the maxillary antrum on that side as well.  Mamie tolerates this well.      ASSESSMENT AND PLAN:  I will see her back in one month.      MISSY/lz

## 2017-05-01 NOTE — NURSING NOTE
"Dermatology Rooming Note    Mamie Rice's goals for this visit include:   Chief Complaint   Patient presents with     Derm Problem     Acne, Mamie states \" it is the same.\"     Valerie Ghosh LPN  "

## 2017-05-01 NOTE — MR AVS SNAPSHOT
After Visit Summary   5/1/2017    Mamie Rice    MRN: [id number]           Patient Information     Date Of Birth          1995        Visit Information        Provider Department      5/1/2017 5:30 PM Paige Reid PA-C [facility] Dermatology        Today's Diagnoses     Acne vulgaris    -  1       Follow-ups after your visit        Your next 10 appointments already scheduled     May 01, 2017  6:00 PM CDT   LAB with [facility] ([facility])    [address] [place] [id number] [phone]           Patient must bring picture ID.  Patient should be prepared to give a urine specimen  Please do not eat 10-12 hours before your appointment if you are coming in fasting for labs on lipids, cholesterol, or glucose (sugar).  Pregnant women should follow their Care Team instructions. Water with medications is okay. Do not drink coffee or other fluids.   If you have concerns about taking  your medications, please ask at office or if scheduling via [employer], send a message by clicking on Secure Messaging, Message Your Care Team.            May 04, 2017  6:00 AM CDT   LAB with  LAB    [facility] ([facility])    [address] [place] [id number] [phone]           Patient must bring picture ID.  Patient should be prepared to give a urine specimen  Please do not eat 10-12 hours before your appointment if you are coming in fasting for labs on lipids, cholesterol, or glucose (sugar).  Pregnant women should follow their Care Team instructions. Water with medications is okay. Do not drink coffee or other fluids.   If you have concerns about taking  your medications, please ask at office or if scheduling via [employer], send a message by clicking on Secure Messaging, Message Your Care Team.            Jun 05, 2017  7:30 AM CDT   PFT VISIT with  PFL A   [facility] Pulmonary Function Testing  ([facility])    [address] [place] [address]   [phone]            Jun 05, 2017  8:15 AM CDT   (Arrive by 8:00 AM)   New Allergy with Beau Marquez MD   [facility] Lung Science and Health ([facility])    [address] [place] [address]   [phone]           Do not take anti-histamines or Zantac for seven day prior to your appointment.            Jun 07, 2017 10:15 AM CDT   (Arrive by 10:00 AM)   Return Visit with Mariela Haile MD   [facility] Ear Nose and Throat ([facility])    [address] [place] [address]   [phone]            Jun 19, 2017  2:00 PM CDT   (Arrive by 1:45 PM)   Return Visit with Paige Reid PA-C   [facility] Dermatology ([facility])    [address] [place] [address]   [phone]            Jun 21, 2017 10:45 AM CDT   (Arrive by 10:30 AM)   Return Visit with Paige Reid PA-C   [facility] Dermatology ([facility])    [address] [place] [address]   [phone]            Aug 02, 2017  7:30 AM CDT   CF LOOP with  PFL CF   [facility] Pulmonary Function Testing ([facility])    [address] [place] [address]   [phone]            Aug 02, 2017  7:50 AM CDT   (Arrive by 7:35 AM)   RETURN CYSTIC FIBROSIS VISIT with Gavi Allison MD   [facility] Science [facility] Health ([facility])    [address] [place] [address]   [phone]            Aug 02, 2017 10:15 AM CDT   (Arrive by 10:00 AM)   Return Visit with Mariela Haile MD   [facility] Ear Nose and Throat ([facility])    [address] [place] [phone] [phone]               Future tests that were ordered for you today     Open Future Orders        Priority Expected Expires Ordered    Comprehensive metabolic panel Routine 5/8/2017 5/1/2018 5/1/2017    Lipid Profile Routine 5/8/2017 5/1/2018 5/1/2017    HCG qualitative urine Routine 5/1/2017 5/1/2018 5/1/2017            Who to contact     Please call your clinic at [phone] to:    Ask questions about your health    Make or cancel appointments    Discuss your medicines    Learn about your test results    Speak to your doctor   If you have compliments or concerns about an experience at your clinic, or if you wish to file a complaint, please contact [facility] Patient Relations at [phone] or email us at [email or web address]         Additional Information About Your Visit        [employer] Information     [employer] gives you secure access to your electronic health record. If you see a primary care provider, you can also send messages to your care team and make appointments. If you have questions, please call your primary care clinic.  If you do not have a primary care provider, please call [phone] and they will assist you.      [employer] is an electronic gateway that provides easy, online access to your medical records. With [employer], you can request a clinic appointment, read your test results, renew a prescription or communicate with your care team.     To access your existing account, please contact your [facility] or call [phone] for assistance.        Care EveryWhere ID     This is your Care EveryWhere ID. This could be used by other organizations to access your [facility] medical records  [id number]         Blood Pressure from Last 3 Encounters:   04/25/17 99/68   03/20/17 99/61   02/07/17 117/77    Weight from Last 3 Encounters:   05/01/17 82.1 kg (181 lb)   04/25/17 81.1 kg (178 lb 12.7 oz)   04/03/17 81.2 kg (179 lb)               Primary  Care Provider Office Phone # Fax #    Malik De La Rosa [phone]       [address] [facility] [address]        Thank you!     Thank you for choosing [facility] DERMATOLOGY  for your care. Our goal is always to provide you with excellent care. Hearing back from our patients is one way we can continue to improve our services. Please take a few minutes to complete the written survey that you may receive in the mail after your visit with us. Thank you!             Your Updated Medication List - Protect others around you: Learn how to safely use, store and throw away your medicines at www.disposemymeds.org.          This list is accurate as of: 5/1/17  5:54 PM.  Always use your most recent med list.                   Brand Name Dispense Instructions for use    acetaminophen 325 MG tablet    TYLENOL    100 tablet    Take 2 tablets (650 mg) by mouth every 4 hours as needed for other (mild pain)       acetylcysteine 20 % nebulizer solution    MUCOMYST    360 mL    INHALE ONE 4ML NEB INTO LUNGS VIA NEBULIZER TWO TIMES A DAY, MAY INCREASE TO 3-4 TIMES A DAY WITH INCREASE COUGH/COLD SYMPTOMS       adapalene 0.1 % cream    DIFFERIN    45 g    Apply topically At Bedtime After washing face       ADDERALL PO      Take 20 mg by mouth daily.       * albuterol 108 (90 BASE) MCG/ACT Inhaler    PROAIR HFA/PROVENTIL HFA/VENTOLIN HFA    1 Inhaler    Inhale 2 puffs into the lungs every 6 hours as needed for shortness of breath / dyspnea or wheezing       * albuterol (2.5 MG/3ML) 0.083% neb solution     120 vial    Take 1 vial (2.5 mg) by nebulization 4 times daily       ascorbic acid 500 MG tablet    VITAMIN C    100 tablet    TAKE ONE TABLET BY MOUTH TWICE A DAY       azithromycin 500 MG tablet    ZITHROMAX    36 tablet    Take 1 tablet (500 mg) by mouth daily M-W-F       beta carotene 73609 UNIT capsule     36 capsule    Take 1 capsule (25,000 Units) by mouth Every Mon, Wed, Fri  Morning       blood glucose monitoring test strip    ACCU-CHEK SMARTVIEW    1 Month    Use to test blood sugar 4 times daily or as directed.       buPROPion 150 MG 12 hr tablet    WELLBUTRIN SR     Take 150 mg by mouth 2 times daily       cholecalciferol 1000 UNIT tablet    vitamin D    30 tablet    TAKE ONE TABLET BY MOUTH EVERY DAY       clindamycin 1 % lotion    CLEOCIN T    60 mL    Apply topically every morning After washing face with benzoyl peroxide wash       cyanocobalamin 1000 MCG/ML injection    VITAMIN B12     Inject 1 mL into the muscle every 30 days       DEPO-PROVERA IM          fluticasone 50 MCG/ACT spray    FLONASE    1 Bottle    Spray 2 sprays into both nostrils daily       GLYCOPYRROLATE PO      Take 1 mg by mouth 2 times daily       hydrOXYzine 25 MG tablet    ATARAX    30 tablet    Take 1 tablet (25 mg) by mouth every 6 hours as needed for itching (and nausea)       LORATADINE PO      Take 10 mg by mouth       LUPRON DEPOT IM      Inject  into the muscle every 3 months.       MEPHYTON 5 MG tablet   Generic drug:  phytonadione     4 tablet    TAKE 1 TABLET BY MOUTH ONCE A WEEK       meropenem 500 MG vial    MERREM    4 mL    500 mg by Nasal Instillation route as needed       methylcellulose (laxative) Powd    CITRUCEL    479 g    Start with 1 heaping tablespoon. Increase as needed, 1 heaping tablespoon at a time, up to 3 times per day.       montelukast 10 MG tablet    SINGULAIR    30 tablet    TAKE ONE TABLET BY MOUTH ONCE DAILY AT BEDTIME       MULTIVITAMIN PO      Take 1 tablet by mouth daily.       omeprazole 20 MG CR capsule    priLOSEC    60 capsule    TAKE 1 CAPSULE BY MOUTH TWICE A DAY       phentermine 15 MG capsule     60 capsule    Take 2 capsules (30 mg) by mouth every morning       PROZAC 40 MG capsule   Generic drug:  FLUoxetine      Take 80 mg by mouth daily.       * spironolactone 25 MG tablet    ALDACTONE    60 tablet    Take 1 tablet (25 mg) by mouth daily       *  spironolactone 50 MG tablet    ALDACTONE    60 tablet    Take 1 tablet (50 mg) by mouth 2 times daily       TRAZODONE HCL PO      Take 100 mg by mouth At Bedtime       vitamin E 400 UNIT capsule     60 capsule    TAKE 1 CAPSULE BY MOUTH TWICE A DAY       * Notice:  This list has 4 medication(s) that are the same as other medications prescribed for you. Read the directions carefully, and ask your doctor or other care provider to review them with you.

## 2017-05-02 NOTE — PROGRESS NOTES
Accutane Intake:  Ipledge number:[id number]  Copy of consent retained for medical record:Yes  Baseline and future lab orders placed:Yes  Patient registered:Yes

## 2017-05-02 NOTE — NURSING NOTE
Respiratory Therapist Note:    Vest    Brand: Hill-Rom - Model 105   Settings: Hill Rom: Frequencies 8, 9, 10 at pressure 10 then frequencies 18, 19, 20 at pressure 6.   Cough Pause: Yes. Frequency:  Duration:    Vest Garment Size: Adult Medium   Last Fitting Date: 2017   Frequency of therapy: 2 times per day   Concerns: none    Alternative Airway Clearance:     Nebulized Medications   Bronchodilators: Albuterol   Mucolytic: Mucomyst   Antibiotics:    Additional Inhaled Medications:     Review Cleaning: Yes. Soap and boil.    Education and Transition Information   Correct order of inhaled medications: Yes   Mechanism of Action of inhaled medications: Yes   Frequency of inhaled medications: Yes   Dosage of inhaled medications: Yes   Other:     Home Care   Nebulizer Compressor    Year Purchased: 2017   Home Care Company:     [facility], Phone: [phone], Fax: [phone]    Oxygen:     Pulmonary Rehab   Site:    Date Completed:     Other Comments: keep up the good work and work on a exercise program  Goals   Next Visit: keep up the good work   Next Year: as above

## 2017-05-04 DIAGNOSIS — E84.0 CYSTIC FIBROSIS WITH PULMONARY MANIFESTATIONS (H): ICD-10-CM

## 2017-05-04 DIAGNOSIS — L70.0 ACNE VULGARIS: ICD-10-CM

## 2017-05-04 LAB
ALBUMIN SERPL-MCNC: 3.7 G/DL (ref 3.4–5)
ALBUMIN UR-MCNC: NEGATIVE MG/DL
ALP SERPL-CCNC: 83 U/L (ref 40–150)
ALT SERPL W P-5'-P-CCNC: 40 U/L (ref 0–50)
ANION GAP SERPL CALCULATED.3IONS-SCNC: 8 MMOL/L (ref 3–14)
APPEARANCE UR: ABNORMAL
AST SERPL W P-5'-P-CCNC: 22 U/L (ref 0–45)
BACTERIA #/AREA URNS HPF: ABNORMAL /HPF
BASOPHILS # BLD AUTO: 0 10E9/L (ref 0–0.2)
BASOPHILS NFR BLD AUTO: 0.6 %
BILIRUB SERPL-MCNC: 0.3 MG/DL (ref 0.2–1.3)
BILIRUB UR QL STRIP: NEGATIVE
BUN SERPL-MCNC: 6 MG/DL (ref 7–30)
CALCIUM SERPL-MCNC: 8.6 MG/DL (ref 8.5–10.1)
CHLORIDE SERPL-SCNC: 106 MMOL/L (ref 94–109)
CHOLEST SERPL-MCNC: 108 MG/DL
CO2 SERPL-SCNC: 28 MMOL/L (ref 20–32)
COLOR UR AUTO: YELLOW
CREAT SERPL-MCNC: 0.75 MG/DL (ref 0.52–1.04)
CREAT UR-MCNC: 201 MG/DL
DEPRECATED CALCIDIOL+CALCIFEROL SERPL-MC: 34 UG/L (ref 20–75)
DIFFERENTIAL METHOD BLD: NORMAL
EOSINOPHIL # BLD AUTO: 0 10E9/L (ref 0–0.7)
EOSINOPHIL NFR BLD AUTO: 0 %
ERYTHROCYTE [DISTWIDTH] IN BLOOD BY AUTOMATED COUNT: 12.7 % (ref 10–15)
ERYTHROCYTE [SEDIMENTATION RATE] IN BLOOD BY WESTERGREN METHOD: 10 MM/H (ref 0–20)
GFR SERPL CREATININE-BSD FRML MDRD: ABNORMAL ML/MIN/1.7M2
GGT SERPL-CCNC: 37 U/L (ref 0–40)
GLUCOSE SERPL-MCNC: 81 MG/DL (ref 70–99)
GLUCOSE UR STRIP-MCNC: NEGATIVE MG/DL
HBA1C MFR BLD: 5.6 % (ref 4.3–6)
HCT VFR BLD AUTO: 42.9 % (ref 35–47)
HDLC SERPL-MCNC: 42 MG/DL
HGB BLD-MCNC: 14 G/DL (ref 11.7–15.7)
HGB UR QL STRIP: NEGATIVE
HYALINE CASTS #/AREA URNS LPF: 1 /LPF (ref 0–2)
IGA SERPL-MCNC: 145 MG/DL (ref 70–380)
IGG SERPL-MCNC: 675 MG/DL (ref 695–1620)
IGM SERPL-MCNC: 248 MG/DL (ref 60–265)
IMM GRANULOCYTES # BLD: 0 10E9/L (ref 0–0.4)
IMM GRANULOCYTES NFR BLD: 0.2 %
INR PPP: 1.06 (ref 0.86–1.14)
IRON SERPL-MCNC: 50 UG/DL (ref 35–180)
KETONES UR STRIP-MCNC: NEGATIVE MG/DL
LDLC SERPL CALC-MCNC: 47 MG/DL
LEUKOCYTE ESTERASE UR QL STRIP: ABNORMAL
LYMPHOCYTES # BLD AUTO: 1.6 10E9/L (ref 0.8–5.3)
LYMPHOCYTES NFR BLD AUTO: 25.3 %
MAGNESIUM SERPL-MCNC: 2.1 MG/DL (ref 1.6–2.3)
MCH RBC QN AUTO: 27.4 PG (ref 26.5–33)
MCHC RBC AUTO-ENTMCNC: 32.6 G/DL (ref 31.5–36.5)
MCV RBC AUTO: 84 FL (ref 78–100)
MICROALBUMIN UR-MCNC: 15 MG/L
MICROALBUMIN/CREAT UR: 7.36 MG/G CR (ref 0–25)
MONOCYTES # BLD AUTO: 0.6 10E9/L (ref 0–1.3)
MONOCYTES NFR BLD AUTO: 9.1 %
MUCOUS THREADS #/AREA URNS LPF: PRESENT /LPF
NEUTROPHILS # BLD AUTO: 4.1 10E9/L (ref 1.6–8.3)
NEUTROPHILS NFR BLD AUTO: 64.8 %
NITRATE UR QL: NEGATIVE
NONHDLC SERPL-MCNC: 66 MG/DL
NRBC # BLD AUTO: 0 10*3/UL
NRBC BLD AUTO-RTO: 0 /100
PH UR STRIP: 6 PH (ref 5–7)
PHOSPHATE SERPL-MCNC: 3.6 MG/DL (ref 2.5–4.5)
PLATELET # BLD AUTO: 340 10E9/L (ref 150–450)
POTASSIUM SERPL-SCNC: 3.9 MMOL/L (ref 3.4–5.3)
PROT SERPL-MCNC: 7.1 G/DL (ref 6.8–8.8)
RBC # BLD AUTO: 5.11 10E12/L (ref 3.8–5.2)
RBC #/AREA URNS AUTO: 2 /HPF (ref 0–2)
SODIUM SERPL-SCNC: 142 MMOL/L (ref 133–144)
SP GR UR STRIP: 1.02 (ref 1–1.03)
SQUAMOUS #/AREA URNS AUTO: 4 /HPF (ref 0–1)
TRIGL SERPL-MCNC: 94 MG/DL
TSH SERPL DL<=0.005 MIU/L-ACNC: 2.91 MU/L (ref 0.4–4)
URN SPEC COLLECT METH UR: ABNORMAL
UROBILINOGEN UR STRIP-MCNC: 0 MG/DL (ref 0–2)
WBC # BLD AUTO: 6.3 10E9/L (ref 4–11)
WBC #/AREA URNS AUTO: 12 /HPF (ref 0–2)

## 2017-05-05 LAB — IGE SERPL-ACNC: 31 KIU/L (ref 0–114)

## 2017-05-07 LAB
A-TOCOPHEROL VIT E SERPL-MCNC: 7.8
ANNOTATION COMMENT IMP: NORMAL
BETA+GAMMA TOCOPHEROL SERPL-MCNC: 0.3 MG/DL
RETINYL PALMITATE SERPL-MCNC: 0.03 UG/ML
VIT A SERPL-MCNC: 0.62 UG/ML

## 2017-05-09 DIAGNOSIS — E84.0 CYSTIC FIBROSIS WITH PULMONARY MANIFESTATIONS (H): Primary | ICD-10-CM

## 2017-05-09 RX ORDER — CIPROFLOXACIN 500 MG/1
500 TABLET, FILM COATED ORAL DAILY
Qty: 7 TABLET | Refills: 0 | Status: SHIPPED | OUTPATIENT
Start: 2017-05-09 | End: 2017-05-16

## 2017-05-12 ASSESSMENT — ANXIETY QUESTIONNAIRES
7. FEELING AFRAID AS IF SOMETHING AWFUL MIGHT HAPPEN: SEVERAL DAYS
5. BEING SO RESTLESS THAT IT IS HARD TO SIT STILL: SEVERAL DAYS
2. NOT BEING ABLE TO STOP OR CONTROL WORRYING: SEVERAL DAYS
GAD7 TOTAL SCORE: 11
1. FEELING NERVOUS, ANXIOUS, OR ON EDGE: MORE THAN HALF THE DAYS
IF YOU CHECKED OFF ANY PROBLEMS ON THIS QUESTIONNAIRE, HOW DIFFICULT HAVE THESE PROBLEMS MADE IT FOR YOU TO DO YOUR WORK, TAKE CARE OF THINGS AT HOME, OR GET ALONG WITH OTHER PEOPLE: SOMEWHAT DIFFICULT
6. BECOMING EASILY ANNOYED OR IRRITABLE: MORE THAN HALF THE DAYS
3. WORRYING TOO MUCH ABOUT DIFFERENT THINGS: MORE THAN HALF THE DAYS

## 2017-05-12 ASSESSMENT — PATIENT HEALTH QUESTIONNAIRE - PHQ9: 5. POOR APPETITE OR OVEREATING: MORE THAN HALF THE DAYS

## 2017-05-13 ASSESSMENT — ANXIETY QUESTIONNAIRES: GAD7 TOTAL SCORE: 11

## 2017-05-13 ASSESSMENT — PATIENT HEALTH QUESTIONNAIRE - PHQ9: SUM OF ALL RESPONSES TO PHQ QUESTIONS 1-9: 9

## 2017-05-25 ASSESSMENT — ENCOUNTER SYMPTOMS
TASTE DISTURBANCE: 0
NECK PAIN: 0
MUSCLE WEAKNESS: 0
SMELL DISTURBANCE: 0
SINUS PAIN: 1
SINUS CONGESTION: 0
POOR WOUND HEALING: 1
SKIN CHANGES: 0
NAIL CHANGES: 0
SORE THROAT: 0
MYALGIAS: 1
NECK MASS: 0
HOARSE VOICE: 0
TROUBLE SWALLOWING: 0
ARTHRALGIAS: 1
STIFFNESS: 0
MUSCLE CRAMPS: 1
JOINT SWELLING: 1
BACK PAIN: 0

## 2017-05-26 ENCOUNTER — CARE COORDINATION (OUTPATIENT)
Dept: PULMONOLOGY | Facility: CLINIC | Age: 22
End: 2017-05-26

## 2017-05-26 NOTE — PROGRESS NOTES
Writer called and spoke with patient's mom to remind patient to hold antihistamines for 1 week prior to upcoming appointment with Dr. Marquez. Patient's mom will pass on the message. (Reminder call done today to due holiday on Monday).

## 2017-05-30 ENCOUNTER — PRE VISIT (OUTPATIENT)
Dept: PULMONOLOGY | Facility: CLINIC | Age: 22
End: 2017-05-30

## 2017-05-30 NOTE — TELEPHONE ENCOUNTER
1.  Date/reason for appt:6/5/17, Allergy testing  2.  Referring provider: RALPH BARNETT  3.  Call to patient (Yes / No - short description): No, referred  4.  Previous care at / records requested from:   [facility]- office notes are in epic.

## 2017-06-05 ENCOUNTER — OFFICE VISIT (OUTPATIENT)
Dept: PULMONOLOGY | Facility: CLINIC | Age: 22
End: 2017-06-05
Attending: ALLERGY & IMMUNOLOGY
Payer: COMMERCIAL

## 2017-06-05 ENCOUNTER — TELEPHONE (OUTPATIENT)
Dept: DERMATOLOGY | Facility: CLINIC | Age: 22
End: 2017-06-05

## 2017-06-05 VITALS
HEART RATE: 82 BPM | WEIGHT: 176.15 LBS | OXYGEN SATURATION: 94 % | BODY MASS INDEX: 32.42 KG/M2 | SYSTOLIC BLOOD PRESSURE: 111 MMHG | DIASTOLIC BLOOD PRESSURE: 78 MMHG | RESPIRATION RATE: 16 BRPM

## 2017-06-05 DIAGNOSIS — J30.89 ALLERGIC RHINITIS DUE TO AMERICAN HOUSE DUST MITE: Primary | ICD-10-CM

## 2017-06-05 DIAGNOSIS — Z51.81 MEDICATION MONITORING ENCOUNTER: Primary | ICD-10-CM

## 2017-06-05 DIAGNOSIS — L50.8 URTICARIA, CHRONIC: ICD-10-CM

## 2017-06-05 DIAGNOSIS — Z51.81 MEDICATION MONITORING ENCOUNTER: ICD-10-CM

## 2017-06-05 DIAGNOSIS — R09.81 NASAL CONGESTION: ICD-10-CM

## 2017-06-05 DIAGNOSIS — J32.4 CHRONIC PANSINUSITIS: ICD-10-CM

## 2017-06-05 DIAGNOSIS — J30.89 ALLERGIC RHINITIS DUE TO HOUSE DUST MITE: Primary | ICD-10-CM

## 2017-06-05 DIAGNOSIS — E84.0 CYSTIC FIBROSIS WITH PULMONARY MANIFESTATIONS (H): Primary | ICD-10-CM

## 2017-06-05 LAB — HCG UR QL: NEGATIVE

## 2017-06-05 PROCEDURE — 99212 OFFICE O/P EST SF 10 MIN: CPT | Mod: ZF

## 2017-06-05 PROCEDURE — 95024 IQ TESTS W/ALLERGENIC XTRCS: CPT | Performed by: ALLERGY & IMMUNOLOGY

## 2017-06-05 PROCEDURE — 95004 PERQ TESTS W/ALRGNC XTRCS: CPT | Performed by: ALLERGY & IMMUNOLOGY

## 2017-06-05 RX ORDER — CETIRIZINE HYDROCHLORIDE 10 MG/1
10 TABLET ORAL EVERY EVENING
Qty: 30 TABLET | Refills: 1 | Status: SHIPPED | OUTPATIENT
Start: 2017-06-05 | End: 2017-08-20

## 2017-06-05 ASSESSMENT — PAIN SCALES - GENERAL: PAINLEVEL: NO PAIN (0)

## 2017-06-05 NOTE — LETTER
6/5/2017       RE: Mamie Rice  [address] [facility] [id number]     Dear Colleague,    Thank you for referring your patient, Mamie Rice, to the [facility] at [facility]. Please see a copy of my visit note below.    Reason for Visit  Mamie Rice is a 21 year old female who is referred by Malik De La Rosa for possible allergies.    Allergy HPI  Mamie presents today for initial consultation regarding concern of possible environmental allergies at the request of ENT.  She says her ears are always feeling plugged and it has been going on for about 2 years.  She has had lots of ear infections as a kid as well.  She has had nasal polyps in the past and frequent sinus pressure concerns and multiple sinus surgeries.  It is important to note she does have cystic fibrosis.  She has no seasonal pattern to this.  She notes grass makes her legs get itchy.  She also incidentally has hives every day if she is not on Benadryl, hives occur randomly but no triggers noticed.  Overall no food triggers.  She uses albuterol scheduled.      FAMILY HISTORY:  No CF.  Grandmother allergic to flowers, no asthma.      SOCIAL HISTORY:  Lives with mom, alex, 1 cat and 1 dog.  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         The patient was seen and examined by Beau Connor MD   Current Outpatient Prescriptions   Medication     MEPHYTON 5 MG tablet     spironolactone (ALDACTONE) 50 MG tablet     phentermine 15 MG capsule     hydrOXYzine (ATARAX) 25 MG tablet     acetaminophen (TYLENOL) 325 MG tablet     acetylcysteine (MUCOMYST) 20 % injection     MedroxyPROGESTERone Acetate (DEPO-PROVERA IM)     buPROPion (WELLBUTRIN SR) 150 MG 12 hr tablet     spironolactone (ALDACTONE) 25 MG tablet     ascorbic acid (VITAMIN C) 500 MG tablet     montelukast (SINGULAIR) 10 MG tablet     VITAMIN D3 1000 UNITS tablet     clindamycin  (CLEOCIN T) 1 % lotion     adapalene (DIFFERIN) 0.1 % cream     LORATADINE PO     albuterol (2.5 MG/3ML) 0.083% nebulizer solution     omeprazole (PRILOSEC) 20 MG capsule     vitamin E 400 UNIT capsule     azithromycin (ZITHROMAX) 500 MG tablet     beta carotene 48569 UNIT capsule     methylcellulose, laxative, (CITRUCEL) POWD     GLYCOPYRROLATE PO     TRAZODONE HCL PO     cyanocobalamin (VITAMIN B12) 1000 MCG/ML injection     blood glucose (ACCU-CHEK SMARTVIEW) test strip     albuterol (PROAIR HFA, PROVENTIL HFA, VENTOLIN HFA) 108 (90 BASE) MCG/ACT inhaler     meropenem (MERREM) 500 MG injection     FLUoxetine (PROZAC) 40 MG capsule     Amphetamine-Dextroamphetamine (ADDERALL PO)     Leuprolide Acetate (LUPRON DEPOT IM)     Multiple Vitamin (MULTIVITAMIN OR)     No current facility-administered medications for this visit.      Facility-Administered Medications Ordered in Other Visits   Medication     albuterol (PROAIR HFA, PROVENTIL HFA, VENTOLIN HFA) inhaler     Allergies   Allergen Reactions     Vancomycin Hives     Redmens skin rash      Vancomycin Rash     Social History     Social History     Marital status: Single     Spouse name: N/A     Number of children: N/A     Years of education: N/A     Occupational History     Not on file.     Social History Main Topics     Smoking status: Never Smoker     Smokeless tobacco: Never Used      Comment: one person at home smokes outside     Alcohol use No     Drug use: No     Sexual activity: No     Other Topics Concern     Blood Transfusions No     Exercise Yes     Social History Narrative    Mamie lives with mother in [place], [place].  There is a cat in the home, but Mamie does not have any litterbox duties.  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  She gets essentially no exercise because of the tingling in her feet (says it bothers her even to stand).        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        8/2015 no change in social situation        2/15/2016 Pt is single and lives with mother and stepfather.     Past Medical History:   Diagnosis Date     ADHD (attention deficit hyperactivity disorder)      Aspergillosis, with pneumonia (H)     fugus found caused chest pain     Chronic infection     CF, MRSA.,      Chronic sinusitis      Constipation, chronic      Cystic fibrosis with pulmonary manifestations (H) 12/19/2011     Cystic fibrosis without mention of meconium ileus     SWEAT TEST:Date: 2/17/1994   [facility]  296 mg, 104 mmol/L ClSample #2  295 mg, 104 mmol/L Cl GENOTYPING:Date: 10/15/2007,  Laboratory: [facility]: [id number]     Depressive disorder      Diabetes     no meds currently     Dysthymic disorder      Exocrine pancreatic insufficiency      Gastro-oesophageal reflux disease      Hip pain, right      MRSA (methicillin resistant Staphylococcus aureus) carrier      Pancreatic disease      Past Surgical History:   Procedure Laterality Date     ARTHROSCOPY HIP, OSTEOPLASTY FEMUR PROXIMAL, COMBINED  3/11/2013    Procedure: COMBINED ARTHROSCOPY HIP, OSTEOPLASTY FEMUR PROXIMAL;  Right Hip Arthroscopy, Labral  Debridement.    surgeon request choice anesthesia/admit to [facility] after surgery;  Surgeon: Omkar Austin MD;  Location: [facility] OR     ARTHROSCOPY KNEE WITH MEDIAL MENISCECTOMY Left 1/31/2017    Procedure: ARTHROSCOPY KNEE WITH MEDIAL MENISCECTOMY;  Surgeon: Jethro Coyle MD;  Location: [facility] OR     bronchoscopies       BRONCHOSCOPY       EXAM UNDER ANESTHESIA ANUS N/A 5/10/2016    Procedure: EXAM UNDER ANESTHESIA ANUS;  Surgeon: Chet Gaviria MD;  [facility]     EXAM UNDER ANESTHESIA, RESTORATIONS, EXTRACTION(S) DENTAL COMPLEX, COMBINED  5/13/2013    Procedure: COMBINED EXAM UNDER ANESTHESIA, RESTORATIONS, EXTRACTION(S) DENTAL COMPLEX;  Dental Exam, Radiographs, Restorations. Single Extraction  Tooth #2. Restorations  x 3;  Surgeon: Danilo Ortiz DDS;  Location: [facility] OR     HC KNEE SCOPE, DIAGNOSTIC      Arthroscopy, Knee- left     INJECT BOTOX N/A 5/10/2016    Procedure: INJECT BOTOX;  Surgeon: Chet Gaviria MD;  [facility]     left hip labral tear  5/11/2011    left hip arthroscopy with labral debridement and synovectomy     meniscus repair       OPTICAL TRACKING SYSTEM ENDOSCOPIC SINUS SURGERY  10/14/2011    Procedure:OPTICAL TRACKING SYSTEM ENDOSCOPIC SINUS SURGERY; FESS (functional endoscopic sinus surgery) with Image Guidance, bronchial lavage and cultures; Surgeon:GOYO KUO; Location:[facility] OR     OPTICAL TRACKING SYSTEM ENDOSCOPIC SINUS SURGERY  5/18/2012    Procedure:OPTICAL TRACKING SYSTEM ENDOSCOPIC SINUS SURGERY; Right  and Left Image Guided Functional Endoscopic Sinus Surgery With  Frontal Approach, [employer]; Surgeon:GOYO KUO; Location:[facility] OR     OPTICAL TRACKING SYSTEM ENDOSCOPIC SINUS SURGERY  9/26/2012    Procedure: OPTICAL TRACKING SYSTEM ENDOSCOPIC SINUS SURGERY;  Stealth Guided Bilateral Functional Endoscopic Sinus Surgery *Latex Safe*;  Surgeon: Goyo Kuo MD;  [facility]     OPTICAL TRACKING SYSTEM ENDOSCOPIC SINUS SURGERY Bilateral 10/16/2015    Procedure: OPTICAL TRACKING SYSTEM ENDOSCOPIC SINUS SURGERY;  Surgeon: Mariela Haile MD;  [facility]     ORTHOPEDIC SURGERY      left hip tear repair 2010     SINUS SURGERY       Family History   Problem Relation Age of Onset     CANCER Paternal Grandmother      Skin Cancer Paternal Grandmother      Other Cancer Paternal Grandmother      Skin     Obesity Paternal Grandmother      CANCER Paternal Grandfather      PGF had throat cancer (he was a smoker)     Other Cancer Paternal Grandfather      Anxiety Disorder Paternal Grandfather      Thyroid Disease Mother      Obesity Other      Anesthesia Reaction No family hx of      Blood Disease No family hx of      Colon Polyps No family hx of      Crohn Disease No family hx of       Ulcerative Colitis No family hx of      Colon Cancer No family hx of      Melanoma No family hx of          ROS   A complete ROS was otherwise negative except as noted in the HPI and the end of the note.  /78 (BP Location: Right arm, Patient Position: Chair, Cuff Size: Adult Large)  Pulse 82  Resp 16  Wt 79.9 kg (176 lb 2.4 oz)  SpO2 94%  BMI 32.42 kg/m2  Exam:   GENERAL APPEARANCE: Well developed, well nourished, alert, and in no apparent distress.  EYES: PERRL, EOMI, conjunctiva clear non-injected  HENT: Nasal mucosa with no edema and no discharge. No nasal polyps.  No facial tenderness.  EARS: Canals clear, TMs normal  MOUTH: Oral mucosa is moist, without any lesions, no tonsillar enlargement, no oropharyngeal exudate.  NECK: Supple, no masses, no thyromegaly.  LYMPHATICS: No significant cervical, or supraclavicular nodes.  RESP: Good air flow throughout.  No crackles. No rhonchi. No wheezes.  CV: Normal S1, S2, regular rhythm, normal rate. No murmur.  No rub. No gallop. No LE edema.   MS: Extremities normal. No clubbing. No cyanosis.  SKIN: No rashes noted  NEURO: Speech normal, normal strength and tone, normal gait and stance  PSYCH: Normal mentation, orientation to person, place, and time.  Results: 38 percutaneous environmental allergen (and 2 controls) extracts placed by MA and read by MD.  All negative.  30 intradermal environmental allergen (and 2 controls) extracts placed by MA and read by MD.  Positive to dust mites, fusarium mold and maple tree pollen        Assessment and plan: Mamie presents today for an initial visit regarding concern of congestion in the ear.  She does have a history of CF, but IgE mediated skin testing did show a mild positive sensitivity to dust mites, as well as Fusarium, mold, and maple tree pollen.  I do recommend her starting 10 mg of cetirizine, which will also help with chronic urticaria which is most likely idiopathic at this point.  Unfortunately antihistamine  can dry out patient with CF; however, at this time I recommend this treatment.  We will also use QVAR 1 spray in each nostril twice a day to help with the congestion and ear pressure.  Hopefully, this will prevent nasal polyp growth.  She will follow up in 3 months or sooner if necessary.               Answers for HPI/ROS submitted by the patient on 5/25/2017   General Symptoms: No  Skin Symptoms: Yes  HENT Symptoms: Yes  EYE SYMPTOMS: No  HEART SYMPTOMS: No  LUNG SYMPTOMS: No  INTESTINAL SYMPTOMS: No  URINARY SYMPTOMS: No  GYNECOLOGIC SYMPTOMS: No  BREAST SYMPTOMS: No  SKELETAL SYMPTOMS: Yes  BLOOD SYMPTOMS: No  NERVOUS SYSTEM SYMPTOMS: No  MENTAL HEALTH SYMPTOMS: No  Changes in hair: No  Changes in moles/birth marks: No  Itching: Yes  Rashes: Yes  Changes in nails: No  Acne: Yes  Hair in places you don't want it: No  Change in facial hair: No  Warts: No  Non-healing sores: Yes  Scarring: Yes  Flaking of skin: No  Color changes of hands/feet in cold : No  Sun sensitivity: No  Skin thickening: No  Ear pain: Yes  Ear discharge: No  Hearing loss: No  Tinnitus: Yes  Nosebleeds: No  Congestion: No  Sinus pain: Yes  Trouble swallowing: No   Voice hoarseness: No  Mouth sores: No  Sore throat: No  Tooth pain: Yes  Gum tenderness: Yes  Bleeding gums: Yes  Change in taste: No  Change in sense of smell: No  Dry mouth: Yes  Hearing aid used: No  Neck lump: No  Back pain: No  Muscle aches: Yes  Neck pain: No  Swollen joints: Yes  Joint pain: Yes  Bone pain: No  Muscle cramps: Yes  Muscle weakness: No  Joint stiffness: No  Bone fracture: No      Again, thank you for allowing me to participate in the care of your patient.      Sincerely,    Beau Connor MD

## 2017-06-05 NOTE — MR AVS SNAPSHOT
After Visit Summary   6/5/2017    Mamie Rice    MRN: [id number]           Patient Information     Date Of Birth          1995        Visit Information        Provider Department      6/5/2017 8:15 AM Beau Marquez MD [facility]        Today's Diagnoses     Allergic rhinitis due to American house dust mite    -  1    Nasal congestion        Chronic pansinusitis        Urticaria, chronic          Care Instructions      10 mg cetirizine daily this can help with the hives and may help the sinuses.    Qvar or Flovent into the nose 1 spray twice a day.      DUST MITE ALLERGY  Dust mites are microscopic bugs that live in dust, feeding on dead skin from our bodies. Dust mites flourish in warm, humid environments and therefore are highest  in number in carpeting, pillows and mattresses.     Tips:    Encase pillows, mattresses, and box springs in zippered allergy proof covers.    Wash all bed linens in at least 1300 F every week.    Remove stuffed animals or freeze them every other week.     Remove upholstered furniture from the bedroom and consider removing the carpet.     Keep ceiling fans off in the bedroom as they can stir up dust mite allergens.    Frequently dust and vacuum the house, especially the bedroom.    Acaracides (chemicals which kill mites) are available for use in the home. These acaracides require repeated applications to remain effective and may irritate asthmatics. It is still unclear how much, if any clinical benefit is gained by the use of acaracides. Therefore these agents are usually not recommended for initial mite control.        *All information has been reviewed, updated and approved by:  Dr. [facility] updated: 11/2016  Mold Allergy    Remove live indoor houseplants (molds are in the soil).    Keep windows and doors shut and run the air conditioner at all times; this keeps the  "molds outside.    Keep windows closed while in the car and air conditioner on with it set to  recirculate  mode.    Stay indoors as much as possible when the mold count is high. Check allergy counts by going to our website: www.pollen.com    If you have a basement, use a dehumidifier.    Use bleach if you see evidence of molds in bathrooms or cabrera.   MOLD WHERE MOLDS ARE MOST COMMONLY FOUND   Alternaria Outdoors On decaying plants and live plant material, also in soil   Helminthosporium Outdoors On grain plants and grasses, ricki counts in rural areas when threshing grain   Hormodendrum/Cladosporium Indoors & Outdoors On leather, rubber, cloth, foods, and wood products; in soil, living and dead plants; also released after a rain   Fusarium Outdoors On garden plants (peas, beans, tomatoes, corn etc.) and stored fruits and vegetables   Aspergillus niger Indoors & Outdoors On damp hay, cloth, leather, paper, spoiled foods, decaying plant and vegetable material, in bathrooms and in refrigerator drip pans   Epicoccum Indoors & Outdoors In soil and on living or dead plants (such as small black dots), and uncooked fruit     *All information has been reviewed, updated and approved by:  Dr. [facility] updated: 11/2016           Pollen Control Measures      * Keep windows and doors shut and run air conditioner at all times. This keeps outdoor air outside.    * Keep windows closed while in the car and air conditioner on the \"re-circulate\" mode.    * Wash your hair before going to bed at night to reduce exposure during sleep.    * Keep pets outdoors to prevent the pollen from being brought in on their hair.    * Avoid hanging clothes and linens outside as pollens may collect on the items.     * Don't mow the lawn if you are allergic to grass or weeds. If you must mow the grass, wear a face mask.       Spring: Tree Pollen    Summer: Grass Pollen and Mold    Fall: Weed Pollen " and Mold      *All information has been reviewed, updated and approved by:  Dr. Beau [facility] updated: 11/2016    Urticaria (Hives)    What are hives?  Hives (also known as urticaria) look like mosquito bites. They range from the size of a pinhead to that of a dinner plate. Although some unlucky individuals can have them every day for weeks, individual lumps usually go away in minutes to hours. In most people, hives are not due to allergy.     How common are hives?  Approximately 1 in 6 people will develop hives some time during their lifetime and are most common in children. They eventually disappear in most people. They may reappear following infection, when under stress or for no particular reason.     Hives occur in the skin:  Underneath the lining of the skin, gut, lungs, nose and eyes are mast cells. Mast cells are filled with irritant chemicals including histamine. When these are released in small amounts, they cause local itch and irritation. In larger amounts, they will cause fluid to leak out of blood vessels, resulting in swelling of the skin. Occasionally, hives may not be itchy at all.     Can hives occur anywhere else?  Swelling of the lips and face is uncomfortable and cosmetically embarrassing, but is not dangerous. Around 1 in 3 people with hives can also have swelling of the tongue and throat. This is called angioedema, and is caused by similar swelling deeper in the tissues. Occasionally the swellings will occur inside the stomach causing pain or cramps.     Can hives be dangerous?  Hives on the outside don't harm us. They are not damaging inside organs like kidneys, liver or lungs. The only danger is if the tongue swells or the back of the throat swells severely. Since this can cause difficulty breathing, this is a symptom that needs to be taken seriously. Urgent medical treatment is required if this occurs. Hives are rarely due to a nasty underlying  "disease.     Are hive always itchy?  Usually, but not always! Occasionally, swelling in the deeper layers of skin (known as \"angioedema\") can even be painful or burning, particularly when it occurs over joints. These sometimes last for days.     What causes hives?  Common causes include:  Infection (particularly in young children)  Contact with animals or plants  Foods or food additives  Medications such as pain killers (Aspirin,NSAIDS-Ibuprofen/Advil), arthritis medications (Naproxen, Diclofenac) or Antibiotics  Insect stings  Stress    Most people with urticaria do not need tests.      Hereditary angioedema (HAE) occurs in around 1 in 100,000 people. Patients lack an effective enzyme (known as C1 esterase), which is associated with non-itchy swellings of the face, throat or limbs. Swelling of the gut is a common symptom, resulting at times in severe abdominal pain and sometimes unnecessary surgery.     How long do hives last?  Most hives go away within days to a few weeks. Occasionally unlucky individuals will have itches and swellings that come and go over many years. Fortunately, that is the exception rather than the rule!    Treatment of urticaria and angioedema:    -Time is a wonderful healer. Most resolve within a couple of weeks.  -Avoid aggravating factors: Non-specific measures such as avoiding excessive heat, spicy foods or alcohol are often useful.   -Aspirin/NSAIDS should be avoided as it often makes symptoms worse.   -Medication: Medicines like antihistamines are often used to reduce the severity of the itch. Severe throat swelling requires early use of medication and attention by your doctor in a clinic or hospital setting. Adrenaline (Epinephrine) by injection can be used if there is a significant risk of dangerous throat swelling. Other medications (like Cortisone tablets) may be needed if symptoms are severe.   -Special diets: Sometimes going on a restricted elimination diet is needed and will " "help. Unfortunately, once cannot predict who will or will not respond to diet on the basis of history or allergy testing alone. The only way to sort it out is to put people on a temporary \"elimination diet\" under close supervision, followed by challenges if it helps.                      *All information has been reviewed, updated and approved by:  Dr. Beau [facility] updated: 11/2016                Follow-ups after your visit        Follow-up notes from your care team     Return in about 3 months (around 9/5/2017).      Your next 10 appointments already scheduled     Jun 07, 2017 10:15 AM CDT   (Arrive by 10:00 AM)   Return Visit with Mariela Haile MD   [facility] ([facility])    [address] [place] [address]  [address] [facility] [address]   [phone]            Jun 19, 2017  2:00 PM CDT   (Arrive by 1:45 PM)   Return Visit with Paige Reid PA-C   [facility] D[facility]at[facility] ([facility])    [address] [place] [address]  [address] [facility] [address]   [phone]            Aug 02, 2017  7:15 AM CDT   Lab with  LAB   [facility] ([facility])    [address] [place] [address]  [address]st [facility] [address]   [phone]            Aug 02, 2017  7:30 AM CDT   CF LOOP with  PFL CF   [facility] Pulmonary Function Testing ([facility])    [address] [place] [address]  [address] [facility] [address]   [phone]            Aug 02, 2017  7:50 AM CDT   (Arrive by 7:35 AM)   RETURN CYSTIC FIBROSIS VISIT with Gavi Allison MD   [facility] ([facility])    [address] [place] [address]  [address] [facility] [address]   [phone]            Aug 02, 2017  8:45 AM CDT   (Arrive by 8:30 AM)   XR CHEST 2 VIEWS with XR1   [facility] ([facility])    [address] [facility]  [address] [facility] [id number] [phone]           Please bring a list of your current medicines to your exam. (Include vitamins, minerals and over-thecounter medicines.) Leave your valuables at home.  Tell your doctor if there is a chance you may be pregnant.  You do not need to do anything special for this exam.            Aug 02, 2017  9:00 AM CDT   DX HIP/PELVIS/SPINE with UCDX1   [facility] Dexa ([facility])    [address] [facility]  [address] [facility] [id number] [phone]           Please do not take any of the following 24 hours prior to the day of your exam: vitamins, calcium tablets, antacids.            Aug 02, 2017 10:15 AM CDT   (Arrive by 10:00 AM)   Return Visit with Mariela Haile MD   [facility] ([facility])    [address] [place] [address]  [address]th [facility] [id number] [phone]            Sep 11, 2017  7:55 AM CDT   (Arrive by 7:40 AM)   Return Allergy with Beau Marquez MD   [facility] ([facility])    [address] [facility]  [address] [facility] [id number] [phone]           Do not take anti-histamines or Zantac for seven day prior to your appointment.              Future tests that were ordered for you today     Open Future Orders        Priority Expected Expires Ordered    HCG qualitative urine Routine  6/5/2018 6/5/2017            Who to contact     If you have questions or need follow up information about today's clinic visit or your schedule please contact [facility] directly at [phone].  Normal or non-critical lab and imaging results will be communicated to you by MyChart, letter or phone within 4 business days after the clinic has received the results. If you do not hear from us within 7 days, please contact the clinic through MyChart or phone. If you  have a critical or abnormal lab result, we will notify you by phone as soon as possible.  Submit refill requests through [employer] or call your pharmacy and they will forward the refill request to us. Please allow 3 business days for your refill to be completed.          Additional Information About Your Visit        [email or web address] Information     [employer] gives you secure access to your electronic health record. If you see a primary care provider, you can also send messages to your care team and make appointments. If you have questions, please call your primary care clinic.  If you do not have a primary care provider, please call [phone] and they will assist you.        Care EveryWhere ID     This is your Care EveryWhere ID. This could be used by other organizations to access your [facility] medical records  [id number]        Your Vitals Were     Pulse Respirations Pulse Oximetry BMI (Body Mass Index)          82 16 94% 32.42 kg/m2         Blood Pressure from Last 3 Encounters:   06/05/17 111/78   04/25/17 99/68   03/20/17 99/61    Weight from Last 3 Encounters:   06/05/17 79.9 kg (176 lb 2.4 oz)   05/01/17 82.1 kg (181 lb)   04/25/17 81.1 kg (178 lb 12.7 oz)              We Performed the Following     Intradermal test with allergic extract with number of test to be specified     Percutaneous test with allergic extract with number of test to be specified          Today's Medication Changes          These changes are accurate as of: 6/5/17 10:20 AM.  If you have any questions, ask your nurse or doctor.               Start taking these medicines.        Dose/Directions    cetirizine 10 MG tablet   Commonly known as:  zyrTEC   Used for:  Allergic rhinitis due to American house dust mite, Urticaria, chronic   Started by:  Beau Marquez MD        Dose:  10 mg   Take 1 tablet (10 mg) by mouth every evening   Quantity:  30 tablet   Refills:  1            Where to get your medicines      These medications were sent  to [facility] - [place], [place] - [address] [place] [address]  [address] [facility] [address]     Phone:  [phone]     cetirizine 10 MG tablet                Primary Care Provider Office Phone # Fax #    Malik De La Rosa [phone]       [address] [facility] [address]        Thank you!     Thank you for choosing [facility]  for your care. Our goal is always to provide you with excellent care. Hearing back from our patients is one way we can continue to improve our services. Please take a few minutes to complete the written survey that you may receive in the mail after your visit with us. Thank you!             Your Updated Medication List - Protect others around you: Learn how to safely use, store and throw away your medicines at www.disposemymeds.org.          This list is accurate as of: 6/5/17 10:20 AM.  Always use your most recent med list.                   Brand Name Dispense Instructions for use    acetaminophen 325 MG tablet    TYLENOL    100 tablet    Take 2 tablets (650 mg) by mouth every 4 hours as needed for other (mild pain)       acetylcysteine 20 % nebulizer solution    MUCOMYST    360 mL    INHALE ONE 4ML NEB INTO LUNGS VIA NEBULIZER TWO TIMES A DAY, MAY INCREASE TO 3-4 TIMES A DAY WITH INCREASE COUGH/COLD SYMPTOMS       adapalene 0.1 % cream    DIFFERIN    45 g    Apply topically At Bedtime After washing face       ADDERALL PO      Take 20 mg by mouth daily.       * albuterol 108 (90 BASE) MCG/ACT Inhaler    PROAIR HFA/PROVENTIL HFA/VENTOLIN HFA    1 Inhaler    Inhale 2 puffs into the lungs every 6 hours as needed for shortness of breath / dyspnea or wheezing       * albuterol (2.5 MG/3ML) 0.083% neb solution     120 vial    Take 1 vial (2.5 mg) by nebulization 4 times daily       ascorbic acid 500 MG tablet    VITAMIN C    100 tablet    TAKE ONE TABLET BY MOUTH TWICE A DAY       azithromycin 500  MG tablet    ZITHROMAX    36 tablet    Take 1 tablet (500 mg) by mouth daily M-W-F       beta carotene 90601 UNIT capsule     36 capsule    Take 1 capsule (25,000 Units) by mouth Every Mon, Wed, Fri Morning       blood glucose monitoring test strip    ACCU-CHEK SMARTVIEW    1 Month    Use to test blood sugar 4 times daily or as directed.       buPROPion 150 MG 12 hr tablet    WELLBUTRIN SR     Take 150 mg by mouth 2 times daily       cetirizine 10 MG tablet    zyrTEC    30 tablet    Take 1 tablet (10 mg) by mouth every evening       cholecalciferol 1000 UNIT tablet    vitamin D    30 tablet    TAKE ONE TABLET BY MOUTH EVERY DAY       clindamycin 1 % lotion    CLEOCIN T    60 mL    Apply topically every morning After washing face with benzoyl peroxide wash       cyanocobalamin 1000 MCG/ML injection    VITAMIN B12     Inject 1 mL into the muscle every 30 days       DEPO-PROVERA IM          GLYCOPYRROLATE PO      Take 1 mg by mouth 2 times daily       hydrOXYzine 25 MG tablet    ATARAX    30 tablet    Take 1 tablet (25 mg) by mouth every 6 hours as needed for itching (and nausea)       LORATADINE PO      Take 10 mg by mouth       LUPRON DEPOT IM      Inject  into the muscle every 3 months.       MEPHYTON 5 MG tablet   Generic drug:  phytonadione     4 tablet    TAKE 1 TABLET BY MOUTH ONCE A WEEK       meropenem 500 MG vial    MERREM    4 mL    500 mg by Nasal Instillation route as needed       methylcellulose (laxative) Powd    CITRUCEL    479 g    Start with 1 heaping tablespoon. Increase as needed, 1 heaping tablespoon at a time, up to 3 times per day.       montelukast 10 MG tablet    SINGULAIR    30 tablet    TAKE ONE TABLET BY MOUTH ONCE DAILY AT BEDTIME       MULTIVITAMIN PO      Take 1 tablet by mouth daily.       omeprazole 20 MG CR capsule    priLOSEC    60 capsule    TAKE 1 CAPSULE BY MOUTH TWICE A DAY       phentermine 15 MG capsule     60 capsule    Take 2 capsules (30 mg) by mouth every morning        PROZAC 40 MG capsule   Generic drug:  FLUoxetine      Take 80 mg by mouth daily.       * spironolactone 25 MG tablet    ALDACTONE    60 tablet    Take 1 tablet (25 mg) by mouth daily       * spironolactone 50 MG tablet    ALDACTONE    60 tablet    Take 1 tablet (50 mg) by mouth 2 times daily       TRAZODONE HCL PO      Take 100 mg by mouth At Bedtime       vitamin E 400 UNIT capsule     60 capsule    TAKE 1 CAPSULE BY MOUTH TWICE A DAY       * Notice:  This list has 4 medication(s) that are the same as other medications prescribed for you. Read the directions carefully, and ask your doctor or other care provider to review them with you.

## 2017-06-05 NOTE — PATIENT INSTRUCTIONS
10 mg cetirizine daily this can help with the hives and may help the sinuses.    Qvar or Flovent into the nose 1 spray twice a day.      DUST MITE ALLERGY  Dust mites are microscopic bugs that live in dust, feeding on dead skin from our bodies. Dust mites flourish in warm, humid environments and therefore are highest  in number in carpeting, pillows and mattresses.     Tips:    Encase pillows, mattresses, and box springs in zippered allergy proof covers.    Wash all bed linens in at least 1300 F every week.    Remove stuffed animals or freeze them every other week.     Remove upholstered furniture from the bedroom and consider removing the carpet.     Keep ceiling fans off in the bedroom as they can stir up dust mite allergens.    Frequently dust and vacuum the house, especially the bedroom.    Acaracides (chemicals which kill mites) are available for use in the home. These acaracides require repeated applications to remain effective and may irritate asthmatics. It is still unclear how much, if any clinical benefit is gained by the use of acaracides. Therefore these agents are usually not recommended for initial mite control.        *All information has been reviewed, updated and approved by:  Dr. [facility] at [facility] updated: 11/2016  Mold Allergy    Remove live indoor houseplants (molds are in the soil).    Keep windows and doors shut and run the air conditioner at all times; this keeps the molds outside.    Keep windows closed while in the car and air conditioner on with it set to  recirculate  mode.    Stay indoors as much as possible when the mold count is high. Check allergy counts by going to our website: www.pollen.com    If you have a basement, use a dehumidifier.    Use bleach if you see evidence of molds in bathrooms or cabrera.   MOLD WHERE MOLDS ARE MOST COMMONLY FOUND   Alternaria Outdoors On decaying plants and live plant material, also in soil  "  Helminthosporium Outdoors On grain plants and grasses, ricki counts in rural areas when threshing grain   Hormodendrum/Cladosporium Indoors & Outdoors On leather, rubber, cloth, foods, and wood products; in soil, living and dead plants; also released after a rain   Fusarium Outdoors On garden plants (peas, beans, tomatoes, corn etc.) and stored fruits and vegetables   Aspergillus niger Indoors & Outdoors On damp hay, cloth, leather, paper, spoiled foods, decaying plant and vegetable material, in bathrooms and in refrigerator drip pans   Epicoccum Indoors & Outdoors In soil and on living or dead plants (such as small black dots), and uncooked fruit     *All information has been reviewed, updated and approved by:  Dr. Beau Marquez-[facility] updated: 11/2016           Pollen Control Measures      * Keep windows and doors shut and run air conditioner at all times. This keeps outdoor air outside.    * Keep windows closed while in the car and air conditioner on the \"re-circulate\" mode.    * Wash your hair before going to bed at night to reduce exposure during sleep.    * Keep pets outdoors to prevent the pollen from being brought in on their hair.    * Avoid hanging clothes and linens outside as pollens may collect on the items.     * Don't mow the lawn if you are allergic to grass or weeds. If you must mow the grass, wear a face mask.       Spring: Tree Pollen    Summer: Grass Pollen and Mold    Fall: Weed Pollen and Mold      *All information has been reviewed, updated and approved by:  Dr. Beau Marquez[facility] at [facility] updated: 11/2016    Urticaria (Hives)    What are hives?  Hives (also known as urticaria) look like mosquito bites. They range from the size of a pinhead to that of a dinner plate. Although some unlucky individuals can have them every day for weeks, individual lumps usually go away in minutes to hours. In most people, hives are not " "due to allergy.     How common are hives?  Approximately 1 in 6 people will develop hives some time during their lifetime and are most common in children. They eventually disappear in most people. They may reappear following infection, when under stress or for no particular reason.     Hives occur in the skin:  Underneath the lining of the skin, gut, lungs, nose and eyes are mast cells. Mast cells are filled with irritant chemicals including histamine. When these are released in small amounts, they cause local itch and irritation. In larger amounts, they will cause fluid to leak out of blood vessels, resulting in swelling of the skin. Occasionally, hives may not be itchy at all.     Can hives occur anywhere else?  Swelling of the lips and face is uncomfortable and cosmetically embarrassing, but is not dangerous. Around 1 in 3 people with hives can also have swelling of the tongue and throat. This is called angioedema, and is caused by similar swelling deeper in the tissues. Occasionally the swellings will occur inside the stomach causing pain or cramps.     Can hives be dangerous?  Hives on the outside don't harm us. They are not damaging inside organs like kidneys, liver or lungs. The only danger is if the tongue swells or the back of the throat swells severely. Since this can cause difficulty breathing, this is a symptom that needs to be taken seriously. Urgent medical treatment is required if this occurs. Hives are rarely due to a nasty underlying disease.     Are hive always itchy?  Usually, but not always! Occasionally, swelling in the deeper layers of skin (known as \"angioedema\") can even be painful or burning, particularly when it occurs over joints. These sometimes last for days.     What causes hives?  Common causes include:  Infection (particularly in young children)  Contact with animals or plants  Foods or food additives  Medications such as pain killers (Aspirin,NSAIDS-Ibuprofen/Advil), arthritis " "medications (Naproxen, Diclofenac) or Antibiotics  Insect stings  Stress    Most people with urticaria do not need tests.      Hereditary angioedema (HAE) occurs in around 1 in 100,000 people. Patients lack an effective enzyme (known as C1 esterase), which is associated with non-itchy swellings of the face, throat or limbs. Swelling of the gut is a common symptom, resulting at times in severe abdominal pain and sometimes unnecessary surgery.     How long do hives last?  Most hives go away within days to a few weeks. Occasionally unlucky individuals will have itches and swellings that come and go over many years. Fortunately, that is the exception rather than the rule!    Treatment of urticaria and angioedema:    -Time is a wonderful healer. Most resolve within a couple of weeks.  -Avoid aggravating factors: Non-specific measures such as avoiding excessive heat, spicy foods or alcohol are often useful.   -Aspirin/NSAIDS should be avoided as it often makes symptoms worse.   -Medication: Medicines like antihistamines are often used to reduce the severity of the itch. Severe throat swelling requires early use of medication and attention by your doctor in a clinic or hospital setting. Adrenaline (Epinephrine) by injection can be used if there is a significant risk of dangerous throat swelling. Other medications (like Cortisone tablets) may be needed if symptoms are severe.   -Special diets: Sometimes going on a restricted elimination diet is needed and will help. Unfortunately, once cannot predict who will or will not respond to diet on the basis of history or allergy testing alone. The only way to sort it out is to put people on a temporary \"elimination diet\" under close supervision, followed by challenges if it helps.                      *All information has been reviewed, updated and approved by:  Dr. Beau [facility]quez[facility] at [facility] updated: 11/2016        "

## 2017-06-05 NOTE — PROGRESS NOTES
Reason for Visit  Mamie Rice is a 21 year old female who is referred by Malik De La Rosa for possible allergies.    Allergy HPI  Mamie presents today for initial consultation regarding concern of possible environmental allergies at the request of ENT.  She says her ears are always feeling plugged and it has been going on for about 2 years.  She has had lots of ear infections as a kid as well.  She has had nasal polyps in the past and frequent sinus pressure concerns and multiple sinus surgeries.  It is important to note she does have cystic fibrosis.  She has no seasonal pattern to this.  She notes grass makes her legs get itchy.  She also incidentally has hives every day if she is not on Benadryl, hives occur randomly but no triggers noticed.  Overall no food triggers.  She uses albuterol scheduled.      FAMILY HISTORY:  No CF.  Grandmother allergic to flowers, no asthma.      SOCIAL HISTORY:  Lives with mom, alex, 1 cat and 1 dog.  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         The patient was seen and examined by Beau Connor MD   Current Outpatient Prescriptions   Medication     MEPHYTON 5 MG tablet     spironolactone (ALDACTONE) 50 MG tablet     phentermine 15 MG capsule     hydrOXYzine (ATARAX) 25 MG tablet     acetaminophen (TYLENOL) 325 MG tablet     acetylcysteine (MUCOMYST) 20 % injection     MedroxyPROGESTERone Acetate (DEPO-PROVERA IM)     buPROPion (WELLBUTRIN SR) 150 MG 12 hr tablet     spironolactone (ALDACTONE) 25 MG tablet     ascorbic acid (VITAMIN C) 500 MG tablet     montelukast (SINGULAIR) 10 MG tablet     VITAMIN D3 1000 UNITS tablet     clindamycin (CLEOCIN T) 1 % lotion     adapalene (DIFFERIN) 0.1 % cream     LORATADINE PO     albuterol (2.5 MG/3ML) 0.083% nebulizer solution     omeprazole (PRILOSEC) 20 MG capsule     vitamin E 400 UNIT capsule     azithromycin (ZITHROMAX) 500 MG tablet     beta carotene 87879 UNIT capsule     methylcellulose,  laxative, (CITRUCEL) POWD     GLYCOPYRROLATE PO     TRAZODONE HCL PO     cyanocobalamin (VITAMIN B12) 1000 MCG/ML injection     blood glucose (ACCU-CHEK SMARTVIEW) test strip     albuterol (PROAIR HFA, PROVENTIL HFA, VENTOLIN HFA) 108 (90 BASE) MCG/ACT inhaler     meropenem (MERREM) 500 MG injection     FLUoxetine (PROZAC) 40 MG capsule     Amphetamine-Dextroamphetamine (ADDERALL PO)     Leuprolide Acetate (LUPRON DEPOT IM)     Multiple Vitamin (MULTIVITAMIN OR)     No current facility-administered medications for this visit.      Facility-Administered Medications Ordered in Other Visits   Medication     albuterol (PROAIR HFA, PROVENTIL HFA, VENTOLIN HFA) inhaler     Allergies   Allergen Reactions     Vancomycin Hives     Redmens skin rash      Vancomycin Rash     Social History     Social History     Marital status: Single     Spouse name: N/A     Number of children: N/A     Years of education: N/A     Occupational History     Not on file.     Social History Main Topics     Smoking status: Never Smoker     Smokeless tobacco: Never Used      Comment: one person at home smokes outside     Alcohol use No     Drug use: No     Sexual activity: No     Other Topics Concern     Blood Transfusions No     Exercise Yes     Social History Narrative    Mamie lives with mother in [place], [place].  There is a cat in the home, but Mamie does not have any litterbox duties.  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  She gets essentially no exercise because of the tingling in her feet (says it bothers her even to stand).        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        8/2015 no change in social situation        2/15/2016 Pt is single and lives with mother and stepfather.     Past Medical History:   Diagnosis Date     ADHD (attention deficit hyperactivity disorder)      Aspergillosis, with pneumonia (H)     fugus found caused chest pain      Chronic infection     CF, MRSA.,      Chronic sinusitis      Constipation, chronic      Cystic fibrosis with pulmonary manifestations (H) 12/19/2011     Cystic fibrosis without mention of meconium ileus     SWEAT TEST:Date: 2/17/1994   [facility]  296 mg, 104 mmol/L ClSample #2  295 mg, 104 mmol/L Cl GENOTYPING:Date: 10/15/2007,  Laboratory: [facility]: [id number]     Depressive disorder      Diabetes     no meds currently     Dysthymic disorder      Exocrine pancreatic insufficiency      Gastro-oesophageal reflux disease      Hip pain, right      MRSA (methicillin resistant Staphylococcus aureus) carrier      Pancreatic disease      Past Surgical History:   Procedure Laterality Date     ARTHROSCOPY HIP, OSTEOPLASTY FEMUR PROXIMAL, COMBINED  3/11/2013    Procedure: COMBINED ARTHROSCOPY HIP, OSTEOPLASTY FEMUR PROXIMAL;  Right Hip Arthroscopy, Labral  Debridement.    surgeon request choice anesthesia/admit to [facility] after surgery;  Surgeon: Omkar Austin MD;  [facility]     ARTHROSCOPY KNEE WITH MEDIAL MENISCECTOMY Left 1/31/2017    Procedure: ARTHROSCOPY KNEE WITH MEDIAL MENISCECTOMY;  Surgeon: Jethro Coyle MD;  [facility]: [facility] OR     bronchoscopies       BRONCHOSCOPY       EXAM UNDER ANESTHESIA ANUS N/A 5/10/2016    Procedure: EXAM UNDER ANESTHESIA ANUS;  Surgeon: Chet Gaviria MD;  [facility]     EXAM UNDER ANESTHESIA, RESTORATIONS, EXTRACTION(S) DENTAL COMPLEX, COMBINED  5/13/2013    Procedure: COMBINED EXAM UNDER ANESTHESIA, RESTORATIONS, EXTRACTION(S) DENTAL COMPLEX;  Dental Exam, Radiographs, Restorations. Single Extraction  Tooth #2. Restorations x 3;  Surgeon: Danilo Ortiz DDS;  [facility] OR     HC KNEE SCOPE, DIAGNOSTIC      Arthroscopy, Knee- left     INJECT BOTOX N/A 5/10/2016    Procedure: INJECT BOTOX;  Surgeon: Chet Gaviria MD;  [facility]     left hip labral tear  5/11/2011    left hip arthroscopy with labral  debridement and synovectomy     meniscus repair       OPTICAL TRACKING SYSTEM ENDOSCOPIC SINUS SURGERY  10/14/2011    Procedure:OPTICAL TRACKING SYSTEM ENDOSCOPIC SINUS SURGERY; FESS (functional endoscopic sinus surgery) with Image Guidance, bronchial lavage and cultures; Surgeon:GOYO KUO; Location:[facility] OR     OPTICAL TRACKING SYSTEM ENDOSCOPIC SINUS SURGERY  5/18/2012    Procedure:OPTICAL TRACKING SYSTEM ENDOSCOPIC SINUS SURGERY; Right  and Left Image Guided Functional Endoscopic Sinus Surgery With  Frontal Approach, Landmarx; Surgeon:GOYO KUO; [facility] OR     OPTICAL TRACKING SYSTEM ENDOSCOPIC SINUS SURGERY  9/26/2012    Procedure: OPTICAL TRACKING SYSTEM ENDOSCOPIC SINUS SURGERY;  Stealth Guided Bilateral Functional Endoscopic Sinus Surgery *Latex Safe*;  Surgeon: Goyo Kuo MD;  [facility]     OPTICAL TRACKING SYSTEM ENDOSCOPIC SINUS SURGERY Bilateral 10/16/2015    Procedure: OPTICAL TRACKING SYSTEM ENDOSCOPIC SINUS SURGERY;  Surgeon: Mariela Haile MD;  [facility]     ORTHOPEDIC SURGERY      left hip tear repair 2010     SINUS SURGERY       Family History   Problem Relation Age of Onset     CANCER Paternal Grandmother      Skin Cancer Paternal Grandmother      Other Cancer Paternal Grandmother      Skin     Obesity Paternal Grandmother      CANCER Paternal Grandfather      PGF had throat cancer (he was a smoker)     Other Cancer Paternal Grandfather      Anxiety Disorder Paternal Grandfather      Thyroid Disease Mother      Obesity Other      Anesthesia Reaction No family hx of      Blood Disease No family hx of      Colon Polyps No family hx of      Crohn Disease No family hx of      Ulcerative Colitis No family hx of      Colon Cancer No family hx of      Melanoma No family hx of          ROS   A complete ROS was otherwise negative except as noted in the HPI and the end of the note.  /78 (BP Location: Right arm, Patient Position: Chair, Cuff Size: Adult Large)  Pulse 82  Resp  16  Wt 79.9 kg (176 lb 2.4 oz)  SpO2 94%  BMI 32.42 kg/m2  Exam:   GENERAL APPEARANCE: Well developed, well nourished, alert, and in no apparent distress.  EYES: PERRL, EOMI, conjunctiva clear non-injected  HENT: Nasal mucosa with no edema and no discharge. No nasal polyps.  No facial tenderness.  EARS: Canals clear, TMs normal  MOUTH: Oral mucosa is moist, without any lesions, no tonsillar enlargement, no oropharyngeal exudate.  NECK: Supple, no masses, no thyromegaly.  LYMPHATICS: No significant cervical, or supraclavicular nodes.  RESP: Good air flow throughout.  No crackles. No rhonchi. No wheezes.  CV: Normal S1, S2, regular rhythm, normal rate. No murmur.  No rub. No gallop. No LE edema.   MS: Extremities normal. No clubbing. No cyanosis.  SKIN: No rashes noted  NEURO: Speech normal, normal strength and tone, normal gait and stance  PSYCH: Normal mentation, orientation to person, place, and time.  Results: 38 percutaneous environmental allergen (and 2 controls) extracts placed by MA and read by MD.  All negative.  30 intradermal environmental allergen (and 2 controls) extracts placed by MA and read by MD.  Positive to dust mites, fusarium mold and maple tree pollen        Assessment and plan: Mamie presents today for an initial visit regarding concern of congestion in the ear.  She does have a history of CF, but IgE mediated skin testing did show a mild positive sensitivity to dust mites, as well as Fusarium, mold, and maple tree pollen.  I do recommend her starting 10 mg of cetirizine, which will also help with chronic urticaria which is most likely idiopathic at this point.  Unfortunately antihistamine can dry out patient with CF; however, at this time I recommend this treatment.  We will also use QVAR 1 spray in each nostril twice a day to help with the congestion and ear pressure.  Hopefully, this will prevent nasal polyp growth.  She will follow up in 3 months or sooner if necessary.                Answers for HPI/ROS submitted by the patient on 5/25/2017   General Symptoms: No  Skin Symptoms: Yes  HENT Symptoms: Yes  EYE SYMPTOMS: No  HEART SYMPTOMS: No  LUNG SYMPTOMS: No  INTESTINAL SYMPTOMS: No  URINARY SYMPTOMS: No  GYNECOLOGIC SYMPTOMS: No  BREAST SYMPTOMS: No  SKELETAL SYMPTOMS: Yes  BLOOD SYMPTOMS: No  NERVOUS SYSTEM SYMPTOMS: No  MENTAL HEALTH SYMPTOMS: No  Changes in hair: No  Changes in moles/birth marks: No  Itching: Yes  Rashes: Yes  Changes in nails: No  Acne: Yes  Hair in places you don't want it: No  Change in facial hair: No  Warts: No  Non-healing sores: Yes  Scarring: Yes  Flaking of skin: No  Color changes of hands/feet in cold : No  Sun sensitivity: No  Skin thickening: No  Ear pain: Yes  Ear discharge: No  Hearing loss: No  Tinnitus: Yes  Nosebleeds: No  Congestion: No  Sinus pain: Yes  Trouble swallowing: No   Voice hoarseness: No  Mouth sores: No  Sore throat: No  Tooth pain: Yes  Gum tenderness: Yes  Bleeding gums: Yes  Change in taste: No  Change in sense of smell: No  Dry mouth: Yes  Hearing aid used: No  Neck lump: No  Back pain: No  Muscle aches: Yes  Neck pain: No  Swollen joints: Yes  Joint pain: Yes  Bone pain: No  Muscle cramps: Yes  Muscle weakness: No  Joint stiffness: No  Bone fracture: No

## 2017-06-05 NOTE — TELEPHONE ENCOUNTER
Patient is at the [facility] for pulmonary and would like to stop by Lab and give a sample of urine (Microalbumin quantitative random urine ) prior to starting Accutane. This is 2 of 2 urine test needed.    Can you please place order.     Ciara Briones RN

## 2017-06-05 NOTE — NURSING NOTE
Chief Complaint   Patient presents with     Allergy Consult     Patient is being seen for consultation of allergies      Nena Blank CMA at 7:53 AM on 6/5/2017

## 2017-06-07 ENCOUNTER — OFFICE VISIT (OUTPATIENT)
Dept: OTOLARYNGOLOGY | Facility: CLINIC | Age: 22
End: 2017-06-07

## 2017-06-07 DIAGNOSIS — E84.9 CF (CYSTIC FIBROSIS) (H): ICD-10-CM

## 2017-06-07 DIAGNOSIS — J32.4 CHRONIC PANSINUSITIS: Primary | ICD-10-CM

## 2017-06-07 ASSESSMENT — PAIN SCALES - GENERAL: PAINLEVEL: NO PAIN (0)

## 2017-06-07 NOTE — PATIENT INSTRUCTIONS
Plan of care:  Follow up with Dr Haile as scheduled  Clinic contact information:  1. To schedule an appointment call [phone], option 1  2. To talk to the Triage RN call [phone], option 3  3. If you need to speak to Ellie or get a message to your doctor on a Friday, call the triage RN  4. EllieRN: [phone]  5. Surgery scheduling:      Mahi Canela: [phone]      Brisa Aparicio: [phone]  6. Fax: [phone]  7. [phone]

## 2017-06-07 NOTE — LETTER
6/7/2017       RE: Mamie Rice  [address] [facility] [id number]     Dear Colleague,    Thank you for referring your patient, Mamie Rice, to the [facility] EAR [facility] AND [facility]ROAT at [facility]. Please see a copy of my visit note below.    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  She is doing well from a sinus standpoint.  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      PHYSICAL EXAMINATION:  She is examined.  On nasal endoscopy, she has very clear nasal passages today.  I was able to instill 2 cc of meropenem into the left middle meatus easily.  There is some scarring present.  On the right, she has moderate scarring.  It is difficult to get into the middle meatus, but I was able to get 2 cc of meropenem into the maxillary antrum.      PLAN:  I will see her back again in a month for repeat treatment.      HB/ms         Again, thank you for allowing me to participate in the care of your patient.      Sincerely,    Mariela Haile MD

## 2017-06-07 NOTE — MR AVS SNAPSHOT
After Visit Summary   2017    Mamie Rice    MRN: [id number]           Patient Information     Date Of Birth          1995        Visit Information        Provider Department      2017 10:15 AM Mariela Haile MD [facility]        Today's Diagnoses     Chronic pansinusitis    -  1    CF (cystic fibrosis) (H)          Care Instructions    Plan of care:  Follow up with Dr Haile as scheduled  Clinic contact information:  1. To schedule an appointment call [phone], option 1  2. To talk to the Triage RN call [phone], option 3  3. If you need to speak to Ellie or get a message to your doctor on a Friday, call the triage RN  4. EllieRN: [phone]  5. Surgery scheduling:      Mahi Canela: [phone]      Brisa Aparicio: [phone]  6. Fax: [phone]  7. [phone]            Follow-ups after your visit        Your next 10 appointments already scheduled     2017  2:00 PM CDT   (Arrive by 1:45 PM)   Return Visit with Paige Reid PA-C   [facility] ([facility])    [address] [place] [id number] [phone]            Aug 02, 2017  7:15 AM CDT   Lab with  LAB   [facility] ([facility])    [address] [place] [address]   [phone]            Aug 02, 2017  7:30 AM CDT   CF LOOP with  PFL CF   [facility] ([facility])    [address] [id number] [phone]            Aug 02, 2017  7:50 AM CDT   (Arrive by 7:35 AM)   RETURN CYSTIC FIBROSIS VISIT with Gavi Allison MD   [facility] ([facility])    [address] [place] [id number] [phone]            Aug 02, 2017  8:45 AM CDT   (Arrive by 8:30 AM)   XR CHEST 2 VIEWS with UCXR1    [facility] ([facility])    [address] [address] [phone]           Please bring a list of your current medicines to your exam. (Include vitamins, minerals and over-thecounter medicines.) Leave your valuables at home.  Tell your doctor if there is a chance you may be pregnant.  You do not need to do anything special for this exam.            Aug 02, 2017  9:00 AM CDT   DX HIP/PELVIS/SPINE with UCDX1   [facility] ([facility])    [address] [id number] [phone]           Please do not take any of the following 24 hours prior to the day of your exam: vitamins, calcium tablets, antacids.            Aug 02, 2017 10:15 AM CDT   (Arrive by 10:00 AM)   Return Visit with Mariela Haile MD   [facility] ([facility])    [address] [place] [address]  4th [facility] [address] [phone]            Sep 11, 2017  7:55 AM CDT   (Arrive by 7:40 AM)   Return Allergy with Beau Marquez MD   [facility] ([facility])    [address] [place] [address] [phone]           Do not take anti-histamines or Zantac for seven day prior to your appointment.              Who to contact     Please call your clinic at [phone] to:    Ask questions about your health    Make or cancel appointments    Discuss your medicines    Learn about your test results    Speak to your doctor   If you have compliments or concerns about an experience at your clinic, or if you wish to file a complaint, please contact [facility] Patient Relations at [phone] or email us at [email or web address]         Additional Information About Your Visit        MyChart Information     [employer] gives you secure access to  your electronic health record. If you see a primary care provider, you can also send messages to your care team and make appointments. If you have questions, please call your primary care clinic.  If you do not have a primary care provider, please call [phone] and they will assist you.      [employer] is an electronic gateway that provides easy, online access to your medical records. With [employer], you can request a clinic appointment, read your test results, renew a prescription or communicate with your care team.     To access your existing account, please contact your [facility] or call [phone] for assistance.        Care EveryWhere ID     This is your Care EveryWhere ID. This could be used by other organizations to access your [place] medical records  [id number]         Blood Pressure from Last 3 Encounters:   06/05/17 111/78   04/25/17 99/68   03/20/17 99/61    Weight from Last 3 Encounters:   06/05/17 79.9 kg (176 lb 2.4 oz)   05/01/17 82.1 kg (181 lb)   04/25/17 81.1 kg (178 lb 12.7 oz)              We Performed the Following     NASAL ENDOSCOPY, DIAGNOSTIC     NASAL/SINUS SCOPE W THER [facility]        Primary Care Provider Office Phone # Fax #    Malik De La Rosa [phone]       [address] [address]        Thank you!     Thank you for choosing [facility]  for your care. Our goal is always to provide you with excellent care. Hearing back from our patients is one way we can continue to improve our services. Please take a few minutes to complete the written survey that you may receive in the mail after your visit with us. Thank you!             Your Updated Medication List - Protect others around you: Learn how to safely use, store and throw away your medicines at www.disposemymeds.org.          This list is accurate as of: 6/7/17 11:59 PM.  Always use your most recent med list.                    Brand Name Dispense Instructions for use    acetaminophen 325 MG tablet    TYLENOL    100 tablet    Take 2 tablets (650 mg) by mouth every 4 hours as needed for other (mild pain)       acetylcysteine 20 % nebulizer solution    MUCOMYST    360 mL    INHALE ONE 4ML NEB INTO LUNGS VIA NEBULIZER TWO TIMES A DAY, MAY INCREASE TO 3-4 TIMES A DAY WITH INCREASE COUGH/COLD SYMPTOMS       adapalene 0.1 % cream    DIFFERIN    45 g    Apply topically At Bedtime After washing face       ADDERALL PO      Take 20 mg by mouth daily.       * albuterol 108 (90 BASE) MCG/ACT Inhaler    PROAIR HFA/PROVENTIL HFA/VENTOLIN HFA    1 Inhaler    Inhale 2 puffs into the lungs every 6 hours as needed for shortness of breath / dyspnea or wheezing       * albuterol (2.5 MG/3ML) 0.083% neb solution     120 vial    Take 1 vial (2.5 mg) by nebulization 4 times daily       ascorbic acid 500 MG tablet    VITAMIN C    100 tablet    TAKE ONE TABLET BY MOUTH TWICE A DAY       azithromycin 500 MG tablet    ZITHROMAX    36 tablet    Take 1 tablet (500 mg) by mouth daily M-W-F       beclomethasone 80 MCG/ACT Inhaler    QVAR    1 Inhaler    Inhale 1 puff into the lungs 2 times daily       beta carotene 21748 UNIT capsule     36 capsule    Take 1 capsule (25,000 Units) by mouth Every Mon, Wed, Fri Morning       blood glucose monitoring test strip    ACCU-CHEK SMARTVIEW    1 Month    Use to test blood sugar 4 times daily or as directed.       buPROPion 150 MG 12 hr tablet    WELLBUTRIN SR     Take 150 mg by mouth 2 times daily       cetirizine 10 MG tablet    zyrTEC    30 tablet    Take 1 tablet (10 mg) by mouth every evening       cholecalciferol 1000 UNIT tablet    vitamin D    30 tablet    TAKE ONE TABLET BY MOUTH EVERY DAY       clindamycin 1 % lotion    CLEOCIN T    60 mL    Apply topically every morning After washing face with benzoyl peroxide wash       cyanocobalamin 1000 MCG/ML injection    VITAMIN B12     Inject 1 mL into the muscle  every 30 days       DEPO-PROVERA IM          GLYCOPYRROLATE PO      Take 1 mg by mouth 2 times daily       hydrOXYzine 25 MG tablet    ATARAX    30 tablet    Take 1 tablet (25 mg) by mouth every 6 hours as needed for itching (and nausea)       LORATADINE PO      Take 10 mg by mouth       LUPRON DEPOT IM      Inject  into the muscle every 3 months.       MEPHYTON 5 MG tablet   Generic drug:  phytonadione     4 tablet    TAKE 1 TABLET BY MOUTH ONCE A WEEK       meropenem 500 MG vial    MERREM    4 mL    500 mg by Nasal Instillation route as needed       methylcellulose (laxative) Powd    CITRUCEL    479 g    Start with 1 heaping tablespoon. Increase as needed, 1 heaping tablespoon at a time, up to 3 times per day.       montelukast 10 MG tablet    SINGULAIR    30 tablet    TAKE ONE TABLET BY MOUTH ONCE DAILY AT BEDTIME       MULTIVITAMIN PO      Take 1 tablet by mouth daily.       omeprazole 20 MG CR capsule    priLOSEC    60 capsule    TAKE 1 CAPSULE BY MOUTH TWICE A DAY       phentermine 15 MG capsule     60 capsule    Take 2 capsules (30 mg) by mouth every morning       PROZAC 40 MG capsule   Generic drug:  FLUoxetine      Take 80 mg by mouth daily.       * spironolactone 25 MG tablet    ALDACTONE    60 tablet    Take 1 tablet (25 mg) by mouth daily       * spironolactone 50 MG tablet    ALDACTONE    60 tablet    Take 1 tablet (50 mg) by mouth 2 times daily       TRAZODONE HCL PO      Take 100 mg by mouth At Bedtime       vitamin E 400 UNIT capsule     60 capsule    TAKE 1 CAPSULE BY MOUTH TWICE A DAY       * Notice:  This list has 4 medication(s) that are the same as other medications prescribed for you. Read the directions carefully, and ask your doctor or other care provider to review them with you.

## 2017-06-07 NOTE — PROGRESS NOTES
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  She is doing well from a sinus standpoint.  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      PHYSICAL EXAMINATION:  She is examined.  On nasal endoscopy, she has very clear nasal passages today.  I was able to instill 2 cc of meropenem into the left middle meatus easily.  There is some scarring present.  On the right, she has moderate scarring.  It is difficult to get into the middle meatus, but I was able to get 2 cc of meropenem into the maxillary antrum.      PLAN:  I will see her back again in a month for repeat treatment.      HB/ms

## 2017-06-19 ENCOUNTER — OFFICE VISIT (OUTPATIENT)
Dept: DERMATOLOGY | Facility: CLINIC | Age: 22
End: 2017-06-19

## 2017-06-19 VITALS — HEART RATE: 82 BPM | SYSTOLIC BLOOD PRESSURE: 116 MMHG | DIASTOLIC BLOOD PRESSURE: 73 MMHG

## 2017-06-19 DIAGNOSIS — L70.0 ACNE VULGARIS: Primary | ICD-10-CM

## 2017-06-19 DIAGNOSIS — Z79.899 ON ISOTRETINOIN THERAPY: ICD-10-CM

## 2017-06-19 ASSESSMENT — PAIN SCALES - GENERAL: PAINLEVEL: NO PAIN (0)

## 2017-06-19 NOTE — MR AVS SNAPSHOT
After Visit Summary   6/19/2017    Mamie Rice    MRN: [id number]           Patient Information     Date Of Birth          1995        Visit Information        Provider Department      6/19/2017 2:00 PM Paige Reid PA-C [facility]        Today's Diagnoses     Acne vulgaris    -  1    On isotretinoin therapy           Follow-ups after your visit        Follow-up notes from your care team     Return in about 4 weeks (around 7/17/2017).      Your next 10 appointments already scheduled     Jul 26, 2017  3:00 PM CDT   (Arrive by 2:45 PM)   Return Visit with Paige Reid PA-C   [facility] ([facility])    [address] [place] [address]   [phone]            Aug 02, 2017  7:15 AM CDT   Lab with  LAB   [facility] ([facility])    [address] [place] [address]   [phone]            Aug 02, 2017  7:30 AM CDT   CF LOOP with  PFL CF   [facility] Pulmonary Function Testing ([facility])    [address] [place] [address]   [phone]            Aug 02, 2017  7:50 AM CDT   (Arrive by 7:35 AM)   RETURN CYSTIC FIBROSIS VISIT with Gavi Allison MD   [facility] ([facility])    [address] [place] [address]   [phone]            Aug 02, 2017  8:45 AM CDT   (Arrive by 8:30 AM)   XR CHEST 2 VIEWS with XR1   [facility] ([facility])    [address] [place] [address]   [phone]           Please bring a list of your current medicines to your exam. (Include vitamins, minerals and over-thecounter medicines.) Leave your valuables at home.  Tell your doctor if there is a chance you may be pregnant.  You do not need to do anything special  for this exam.            Aug 02, 2017  9:00 AM CDT   DX HIP/PELVIS/SPINE with UCDX1   [facility] ([facility])    [address] [facility]  1st [facility] [id number] [phone]           Please do not take any of the following 24 hours prior to the day of your exam: vitamins, calcium tablets, antacids.            Aug 02, 2017 10:15 AM CDT   (Arrive by 10:00 AM)   Return Visit with Mariela Haile MD   [facility] ([facility])    [address]  4th [facility] [phone] [phone]            Sep 11, 2017  7:55 AM CDT   (Arrive by 7:40 AM)   Return Allergy with Beau Marquez MD   [facility] ([facility])    [address]  3rd [facility] [address] [phone]           Do not take anti-histamines or Zantac for seven day prior to your appointment.              Who to contact     Please call your clinic at [phone] to:    Ask questions about your health    Make or cancel appointments    Discuss your medicines    Learn about your test results    Speak to your doctor   If you have compliments or concerns about an experience at your clinic, or if you wish to file a complaint, please contact [facility] Patient Relations at [phone] or email us at [email or web address]         Additional Information About Your Visit        Certainhart Information     [employer] gives you secure access to your electronic health record. If you see a primary care provider, you can also send messages to your care team and make appointments. If you have questions, please call your primary care clinic.  If you do not have a primary care provider, please call [phone] and they will assist you.      [employer] is an electronic gateway that provides easy, online access to your medical records. With [employer],  you can request a clinic appointment, read your test results, renew a prescription or communicate with your care team.     To access your existing account, please contact your [facility] or call [phone] for assistance.        Care EveryWhere ID     This is your Care EveryWhere ID. This could be used by other organizations to access your [place] medical records  [id number]        Your Vitals Were     Pulse                   82            Blood Pressure from Last 3 Encounters:   06/19/17 116/73   06/05/17 111/78   04/25/17 99/68    Weight from Last 3 Encounters:   06/05/17 79.9 kg (176 lb 2.4 oz)   05/01/17 82.1 kg (181 lb)   04/25/17 81.1 kg (178 lb 12.7 oz)                 Today's Medication Changes          These changes are accurate as of: 6/19/17 11:59 PM.  If you have any questions, ask your nurse or doctor.               Start taking these medicines.        Dose/Directions    ISOtretinoin 40 MG capsule   Commonly known as:  ACCUTANE   Used for:  Acne vulgaris        Dose:  40 mg   Take 1 capsule (40 mg) by mouth daily   Quantity:  30 capsule   Refills:  0            Where to get your medicines      These medications were sent to [facility] [place] [address]     Phone:  [phone]     ISOtretinoin 40 MG capsule                Primary Care Provider Office Phone # Fax #    Malik De La Rosa [phone]       [address] [facility] [address]        Equal Access to Services     CYDNEY MARTINEZ AH: Hadii aad ku hadasho Soyesenia, waaxda luqadaha, qaybta kaalmada adenakiayaheron, david cardoso. So [facility] [phone].    ATENCIÓN: Si habla español, tiene a hidalgo disposición servicios gratuitos de asistencia lingüística. Llame al [phone].    We comply with applicable federal civil rights laws and [place] laws. We do not discriminate on the  basis of race, color, national origin, age, disability sex, sexual orientation or gender identity.            Thank you!     Thank you for choosing [facility] DER[facility]  for your care. Our goal is always to provide you with excellent care. Hearing back from our patients is one way we can continue to improve our services. Please take a few minutes to complete the written survey that you may receive in the mail after your visit with us. Thank you!             Your Updated Medication List - Protect others around you: Learn how to safely use, store and throw away your medicines at www.disposemymeds.org.          This list is accurate as of: 6/19/17 11:59 PM.  Always use your most recent med list.                   Brand Name Dispense Instructions for use Diagnosis    acetaminophen 325 MG tablet    TYLENOL    100 tablet    Take 2 tablets (650 mg) by mouth every 4 hours as needed for other (mild pain)    Chronic pain of left knee       acetylcysteine 20 % nebulizer solution    MUCOMYST    360 mL    INHALE ONE 4ML NEB INTO LUNGS VIA NEBULIZER TWO TIMES A DAY, MAY INCREASE TO 3-4 TIMES A DAY WITH INCREASE COUGH/COLD SYMPTOMS    CF (cystic fibrosis) (H)       adapalene 0.1 % cream    DIFFERIN    45 g    Apply topically At Bedtime After washing face    Acne vulgaris       ADDERALL PO      Take 20 mg by mouth daily.        * albuterol 108 (90 BASE) MCG/ACT Inhaler    PROAIR HFA/PROVENTIL HFA/VENTOLIN HFA    1 Inhaler    Inhale 2 puffs into the lungs every 6 hours as needed for shortness of breath / dyspnea or wheezing    Cystic fibrosis with pulmonary manifestations (H), Sinusitis, chronic, Diabetes mellitus type 1 (H)       * albuterol (2.5 MG/3ML) 0.083% neb solution     120 vial    Take 1 vial (2.5 mg) by nebulization 4 times daily    CF (cystic fibrosis) (H)       azithromycin 500 MG tablet    ZITHROMAX    36 tablet    Take 1 tablet (500 mg) by mouth daily M-W-F    CF (cystic fibrosis) (H)       beclomethasone 80 MCG/ACT  Inhaler    QVAR    1 Inhaler    Inhale 1 puff into the lungs 2 times daily    Allergic rhinitis due to house dust mite       beta carotene 57402 UNIT capsule     36 capsule    Take 1 capsule (25,000 Units) by mouth Every Mon, Wed, Fri Morning    Cystic fibrosis with pulmonary manifestations (H)       blood glucose monitoring test strip    ACCU-CHEK SMARTVIEW    1 Month    Use to test blood sugar 4 times daily or as directed.    Type I (juvenile type) diabetes mellitus without mention of complication, not stated as uncontrolled       buPROPion 150 MG 12 hr tablet    WELLBUTRIN SR     Take 150 mg by mouth 2 times daily        cetirizine 10 MG tablet    zyrTEC    30 tablet    Take 1 tablet (10 mg) by mouth every evening    Allergic rhinitis due to [place] house dust mite, Urticaria, chronic       cholecalciferol 1000 UNIT tablet    vitamin D    30 tablet    TAKE ONE TABLET BY MOUTH EVERY DAY    CF (cystic fibrosis) (H), Exocrine pancreatic insufficiency       clindamycin 1 % lotion    CLEOCIN T    60 mL    Apply topically every morning After washing face with benzoyl peroxide wash    Acne vulgaris       cyanocobalamin 1000 MCG/ML injection    VITAMIN B12     Inject 1 mL into the muscle every 30 days        DEPO-PROVERA IM           GLYCOPYRROLATE PO      Take 1 mg by mouth 2 times daily        hydrOXYzine 25 MG tablet    ATARAX    30 tablet    Take 1 tablet (25 mg) by mouth every 6 hours as needed for itching (and nausea)    Chronic pain of left knee       ISOtretinoin 40 MG capsule    ACCUTANE    30 capsule    Take 1 capsule (40 mg) by mouth daily    Acne vulgaris       LORATADINE PO      Take 10 mg by mouth        LUPRON DEPOT IM      Inject  into the muscle every 3 months.    CF (cystic fibrosis) (H)       MEPHYTON 5 MG tablet   Generic drug:  phytonadione     4 tablet    TAKE 1 TABLET BY MOUTH ONCE A WEEK    Cystic fibrosis with pulmonary manifestations (H), Cystic fibrosis with pulmonary manifestations (H),  Aspergillosis (H), Pancreatic insufficiency       meropenem 500 MG vial    MERREM    4 mL    500 mg by Nasal Instillation route as needed        methylcellulose (laxative) Powd    CITRUCEL    479 g    Start with 1 heaping tablespoon. Increase as needed, 1 heaping tablespoon at a time, up to 3 times per day.    Other constipation       montelukast 10 MG tablet    SINGULAIR    30 tablet    TAKE ONE TABLET BY MOUTH ONCE DAILY AT BEDTIME    CF (cystic fibrosis) (H)       MULTIVITAMIN PO      Take 1 tablet by mouth daily.        omeprazole 20 MG CR capsule    priLOSEC    60 capsule    TAKE 1 CAPSULE BY MOUTH TWICE A DAY    CF (cystic fibrosis) (H)       phentermine 15 MG capsule     60 capsule    Take 2 capsules (30 mg) by mouth every morning    Non morbid obesity due to excess calories       PROZAC 40 MG capsule   Generic drug:  FLUoxetine      Take 80 mg by mouth daily.    Cystic fibrosis with pulmonary manifestations (H)       * spironolactone 25 MG tablet    ALDACTONE    60 tablet    Take 1 tablet (25 mg) by mouth daily    Acne vulgaris       * spironolactone 50 MG tablet    ALDACTONE    60 tablet    Take 1 tablet (50 mg) by mouth 2 times daily    Acne vulgaris       TRAZODONE HCL PO      Take 100 mg by mouth At Bedtime        vitamin E 400 UNIT capsule     60 capsule    TAKE 1 CAPSULE BY MOUTH TWICE A DAY    CF (cystic fibrosis) (H), Pancreatic insufficiency       * Notice:  This list has 4 medication(s) that are the same as other medications prescribed for you. Read the directions carefully, and ask your doctor or other care provider to review them with you.

## 2017-06-19 NOTE — LETTER
6/19/2017       RE: Mamie Rice  [address] [facility] [address]     Dear Colleague,    Thank you for referring your patient, Mamie Rice, to the [facility] DERMATOLOGY at [facility]. Please see a copy of my visit note below.    [facility] Dermatology Note    Dermatology Problem List:  1. Acne vulgaris s/p benzoyl peroxide facial wash, stopped due to skin irritation  - clindamycin 1% lotion, adapalene 0.1% cream, spironolactone 50 mg bid  2. CF  3. DM Type II    Encounter Date: Jun 19, 2017    CC:   Chief Complaint   Patient presents with     Derm Problem     Acne, Mamie would like to start accutane.     History of Present Illness:  This 21 year old female presents as a follow up for acne. The patient was last seen on 5/1/17 when the Accutane process was initiated. She was registered in ipledge. She finished her spironolactone and continued on her topical medications,  clindamycin 1% lotion and adapalene 0.1% cream. She has had two negative pregnancy tests and normal CBC, CMP and fasting lipid panel. She has continued on the Depo injection every 3 months. She continues to have acne and is eager to start Accutane. She is feeling well. The patient reports no other lesions of concern at this time.     Otherwise, the patient reports no painful, bleeding, nonhealing, or pruritic lesions, and denies new or changing moles.     Past Medical History:   Patient Active Problem List   Diagnosis     Hip joint pain     Aspergillosis (H)     Chronic maxillary sinusitis     Hypoglycemia     Type 1 diabetes mellitus (H)     Cystic fibrosis with pulmonary manifestations (H)     Chronic constipation     Frontal sinusitis     Major depression     ADHD (attention deficit hyperactivity disorder)     Back pain     Pancreatic insufficiency     Non morbid obesity due to excess calories     Acne vulgaris     Cystic fibrosis (H)     Insomnia     Major depressive disorder  with single episode     Past Medical History:   Diagnosis Date     ADHD (attention deficit hyperactivity disorder)      Aspergillosis, with pneumonia (H)     fugus found caused chest pain     Chronic infection     CF, MRSA.,      Chronic sinusitis      Constipation, chronic      Cystic fibrosis with pulmonary manifestations (H) 12/19/2011     Cystic fibrosis without mention of meconium ileus     SWEAT TEST:Date: 2/17/1994   Laboratory[facility]  296 mg, 104 mmol/L ClSample #2  295 mg, 104 mmol/L Cl GENOTYPING:Date: 10/15/2007,  Laboratory: [facility]: [id number]     Depressive disorder      Diabetes     no meds currently     Dysthymic disorder      Exocrine pancreatic insufficiency      Gastro-oesophageal reflux disease      Hip pain, right      MRSA (methicillin resistant Staphylococcus aureus) carrier      Pancreatic disease      Past Surgical History:   Procedure Laterality Date     ARTHROSCOPY HIP, OSTEOPLASTY FEMUR PROXIMAL, COMBINED  3/11/2013    Procedure: COMBINED ARTHROSCOPY HIP, OSTEOPLASTY FEMUR PROXIMAL;  Right Hip Arthroscopy, Labral  Debridement.    surgeon request choice anesthesia/admit to [facility] after surgery;  Surgeon: Omkar Austin MD;  [facility]     ARTHROSCOPY KNEE WITH MEDIAL MENISCECTOMY Left 1/31/2017    Procedure: ARTHROSCOPY KNEE WITH MEDIAL MENISCECTOMY;  Surgeon: Jethro Coyle MD;  [facility]     bronchoscopies       BRONCHOSCOPY       EXAM UNDER ANESTHESIA ANUS N/A 5/10/2016    Procedure: EXAM UNDER ANESTHESIA ANUS;  Surgeon: Chet Gaviria MD;  [facility]     EXAM UNDER ANESTHESIA, RESTORATIONS, EXTRACTION(S) DENTAL COMPLEX, COMBINED  5/13/2013    Procedure: COMBINED EXAM UNDER ANESTHESIA, RESTORATIONS, EXTRACTION(S) DENTAL COMPLEX;  Dental Exam, Radiographs, Restorations. Single Extraction  Tooth #2. Restorations x 3;  Surgeon: Danilo Ortiz DDS;  [facility]     HC KNEE SCOPE, DIAGNOSTIC      Arthroscopy, Knee-  left     INJECT BOTOX N/A 5/10/2016    Procedure: INJECT BOTOX;  Surgeon: Chet Gaviria MD;  [facility]     left hip labral tear  5/11/2011    left hip arthroscopy with labral debridement and synovectomy     meniscus repair       OPTICAL TRACKING SYSTEM ENDOSCOPIC SINUS SURGERY  10/14/2011    Procedure:OPTICAL TRACKING SYSTEM ENDOSCOPIC SINUS SURGERY; FESS (functional endoscopic sinus surgery) with Image Guidance, bronchial lavage and cultures; Surgeon:OGYO KUO; [facility]     OPTICAL TRACKING SYSTEM ENDOSCOPIC SINUS SURGERY  5/18/2012    Procedure:OPTICAL TRACKING SYSTEM ENDOSCOPIC SINUS SURGERY; Right  and Left Image Guided Functional Endoscopic Sinus Surgery With  Frontal Approach, [employer]; Surgeon:GOYO KUO; [facility]     OPTICAL TRACKING SYSTEM ENDOSCOPIC SINUS SURGERY  9/26/2012    Procedure: OPTICAL TRACKING SYSTEM ENDOSCOPIC SINUS SURGERY;  Stealth Guided Bilateral Functional Endoscopic Sinus Surgery *Latex Safe*;  Surgeon: Goyo Kuo MD;  [facility]     OPTICAL TRACKING SYSTEM ENDOSCOPIC SINUS SURGERY Bilateral 10/16/2015    Procedure: OPTICAL TRACKING SYSTEM ENDOSCOPIC SINUS SURGERY;  Surgeon: Mariela Haile MD;  [facility]     ORTHOPEDIC SURGERY      left hip tear repair 2010     SINUS SURGERY       Social History:  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 Former use of tanning beds (high school).     Family History:  There is a family history of presumed melanoma in the patient's grandmother.    Medications:  Current Outpatient Prescriptions   Medication Sig Dispense Refill     ISOtretinoin (ACCUTANE) 40 MG capsule Take 1 capsule (40 mg) by mouth daily 30 capsule 0     cetirizine (ZYRTEC) 10 MG tablet Take 1 tablet (10 mg) by mouth every evening 30 tablet 1     beclomethasone (QVAR) 80 MCG/ACT Inhaler Inhale 1 puff into the lungs 2 times daily 1 Inhaler 3     MEPHYTON 5 MG tablet TAKE 1 TABLET BY MOUTH ONCE A WEEK 4 tablet 11      spironolactone (ALDACTONE) 50 MG tablet Take 1 tablet (50 mg) by mouth 2 times daily 60 tablet 2     phentermine 15 MG capsule Take 2 capsules (30 mg) by mouth every morning 60 capsule 4     hydrOXYzine (ATARAX) 25 MG tablet Take 1 tablet (25 mg) by mouth every 6 hours as needed for itching (and nausea) 30 tablet 0     acetaminophen (TYLENOL) 325 MG tablet Take 2 tablets (650 mg) by mouth every 4 hours as needed for other (mild pain) 100 tablet 0     acetylcysteine (MUCOMYST) 20 % injection INHALE ONE 4ML NEB INTO LUNGS VIA NEBULIZER TWO TIMES A DAY, MAY INCREASE TO 3-4 TIMES A DAY WITH INCREASE COUGH/COLD SYMPTOMS 360 mL 11     MedroxyPROGESTERone Acetate (DEPO-PROVERA IM)        buPROPion (WELLBUTRIN SR) 150 MG 12 hr tablet Take 150 mg by mouth 2 times daily       spironolactone (ALDACTONE) 25 MG tablet Take 1 tablet (25 mg) by mouth daily 60 tablet 3     montelukast (SINGULAIR) 10 MG tablet TAKE ONE TABLET BY MOUTH ONCE DAILY AT BEDTIME 30 tablet 11     VITAMIN D3 1000 UNITS tablet TAKE ONE TABLET BY MOUTH EVERY DAY 30 tablet 11     clindamycin (CLEOCIN T) 1 % lotion Apply topically every morning After washing face with benzoyl peroxide wash 60 mL 11     adapalene (DIFFERIN) 0.1 % cream Apply topically At Bedtime After washing face 45 g 11     LORATADINE PO Take 10 mg by mouth       albuterol (2.5 MG/3ML) 0.083% nebulizer solution Take 1 vial (2.5 mg) by nebulization 4 times daily 120 vial 11     omeprazole (PRILOSEC) 20 MG capsule TAKE 1 CAPSULE BY MOUTH TWICE A DAY 60 capsule 11     vitamin E 400 UNIT capsule TAKE 1 CAPSULE BY MOUTH TWICE A DAY 60 capsule 11     azithromycin (ZITHROMAX) 500 MG tablet Take 1 tablet (500 mg) by mouth daily M-W-F 36 tablet 4     beta carotene 60523 UNIT capsule Take 1 capsule (25,000 Units) by mouth Every Mon, Wed, Fri Morning 36 capsule 4     methylcellulose, laxative, (CITRUCEL) POWD Start with 1 heaping tablespoon. Increase as needed, 1 heaping tablespoon at a time, up to 3  times per day. 479 g 3     GLYCOPYRROLATE PO Take 1 mg by mouth 2 times daily        TRAZODONE HCL PO Take 100 mg by mouth At Bedtime        cyanocobalamin (VITAMIN B12) 1000 MCG/ML injection Inject 1 mL into the muscle every 30 days        blood glucose (ACCU-CHEK SMARTVIEW) test strip Use to test blood sugar 4 times daily or as directed. 1 Month 12     albuterol (PROAIR HFA, PROVENTIL HFA, VENTOLIN HFA) 108 (90 BASE) MCG/ACT inhaler Inhale 2 puffs into the lungs every 6 hours as needed for shortness of breath / dyspnea or wheezing 1 Inhaler 12     meropenem (MERREM) 500 MG injection 500 mg by Nasal Instillation route as needed  4 mL 0     FLUoxetine (PROZAC) 40 MG capsule Take 80 mg by mouth daily.       Amphetamine-Dextroamphetamine (ADDERALL PO) Take 20 mg by mouth daily.       Leuprolide Acetate (LUPRON DEPOT IM) Inject  into the muscle every 3 months.       Multiple Vitamin (MULTIVITAMIN OR) Take 1 tablet by mouth daily.       ascorbic acid (VITAMIN C) 500 MG tablet TAKE ONE TABLET BY MOUTH TWICE A  tablet 3     Allergies   Allergen Reactions     Vancomycin Hives     Redmens skin rash      Review of Systems:  -As per HPI    Physical exam:  BP 99/61  GEN: This is a well developed, well-nourished female in no acute distress, in a pleasant mood.      SKIN: Acne exam, which includes the face, neck, upper central chest, and upper central back was performed.  - Superficial excoriated acneiform papules with intermixed open and closed comedones on the face greater than the chest and back  - No other lesions of concern on areas examined.     Impression/Plan:  1. Acne vulgaris, acne excoree ,Resistant to hormonal therapy, appropriate oral and topical antibiotics and well as topical retinoids.       Discussion of the risks and side effects of Accutane including but not limited to mucocutaneous dryness, arthralgias, myalgias, depression, suicidal ideation, headache, blurred vision,  increase in liver function  tests, increase in lipids, and teratogenic effects. Discussed need for two forms of contraception. The ipledgeprogram brochure was provided and the contents discussed with the patient. The patient was counseled they cannot give blood while on Accutane. No personal or family history of inflammatory bowel disease or hypertriglyceridemia known to patient. Reviewed need to avoid alcohol on medication.   Ipledge program consent was obtained.   At this visit, we will begin Accutane 40 mg daily. .Goal dose is 9840 to 12,300 mg for 120-150 mg/kg dosing in this 82 kg patient.    Previous Baseline labs including CBC, CMP, fasting lipids were normal.     Baseline pregnancy test today. The patients two forms of contraception are Depo and male latex condoms.      Total cumulative dose 0mg.   Patient's I-pledge # is [phone].    The patient will stop all acne medications, when she starts the medication.   Follow-up in 1 months, earlier for new or changing lesions.     Staff Involved:    All risks, benefits and alternatives were discussed with patient.  Patient is in agreement and understands the assessment and plan.  All questions were answered.  Sun Screen Education was given.   Return to Clinic in 1 month or sooner as needed.   Paige Reid PA-C   [facility] Dermatology [facility]

## 2017-06-19 NOTE — NURSING NOTE
Dermatology Rooming Note    Mamie Rice's goals for this visit include:   Chief Complaint   Patient presents with     Derm Problem     Acne, Mamie would like to start accutane.     Valerie Ghohs LPN

## 2017-06-19 NOTE — PROGRESS NOTES
[facility] Dermatology Note    Dermatology Problem List:  1. Acne vulgaris s/p benzoyl peroxide facial wash, stopped due to skin irritation  - clindamycin 1% lotion, adapalene 0.1% cream, spironolactone 50 mg bid  2. CF  3. DM Type II    Encounter Date: Jun 19, 2017    CC:   Chief Complaint   Patient presents with     Derm Problem     Acne, Mamie would like to start accutane.     History of Present Illness:  This 21 year old female presents as a follow up for acne. The patient was last seen on 5/1/17 when the Accutane process was initiated. She was registered in ipledge. She finished her spironolactone and continued on her topical medications,  clindamycin 1% lotion and adapalene 0.1% cream. She has had two negative pregnancy tests and normal CBC, CMP and fasting lipid panel. She has continued on the Depo injection every 3 months. She continues to have acne and is eager to start Accutane. She is feeling well. The patient reports no other lesions of concern at this time.     Otherwise, the patient reports no painful, bleeding, nonhealing, or pruritic lesions, and denies new or changing moles.     Past Medical History:   Patient Active Problem List   Diagnosis     Hip joint pain     Aspergillosis (H)     Chronic maxillary sinusitis     Hypoglycemia     Type 1 diabetes mellitus (H)     Cystic fibrosis with pulmonary manifestations (H)     Chronic constipation     Frontal sinusitis     Major depression     ADHD (attention deficit hyperactivity disorder)     Back pain     Pancreatic insufficiency     Non morbid obesity due to excess calories     Acne vulgaris     Cystic fibrosis (H)     Insomnia     Major depressive disorder with single episode     Past Medical History:   Diagnosis Date     ADHD (attention deficit hyperactivity disorder)      Aspergillosis, with pneumonia (H)     fugus found caused chest pain     Chronic infection     CF, MRSA.,      Chronic sinusitis      Constipation, chronic       Cystic fibrosis with pulmonary manifestations (H) 12/19/2011     Cystic fibrosis without mention of meconium ileus     SWEAT TEST:Date: 2/17/1994   Laboratory: [facility]1  296 mg, 104 mmol/L ClSample #2  295 mg, 104 mmol/L Cl GENOTYPING:Date: 10/15/2007,  Laboratory: [facility]: [id number]     Depressive disorder      Diabetes     no meds currently     Dysthymic disorder      Exocrine pancreatic insufficiency      Gastro-oesophageal reflux disease      Hip pain, right      MRSA (methicillin resistant Staphylococcus aureus) carrier      Pancreatic disease      Past Surgical History:   Procedure Laterality Date     ARTHROSCOPY HIP, OSTEOPLASTY FEMUR PROXIMAL, COMBINED  3/11/2013    Procedure: COMBINED ARTHROSCOPY HIP, OSTEOPLASTY FEMUR PROXIMAL;  Right Hip Arthroscopy, Labral  Debridement.    surgeon request choice anesthesia/admit to [facility] after surgery;  Surgeon: Omkar Austin MD;  [facility]     ARTHROSCOPY KNEE WITH MEDIAL MENISCECTOMY Left 1/31/2017    Procedure: ARTHROSCOPY KNEE WITH MEDIAL MENISCECTOMY;  Surgeon: Jethro Coyle MD;  [facility]     bronchoscopies       BRONCHOSCOPY       EXAM UNDER ANESTHESIA ANUS N/A 5/10/2016    Procedure: EXAM UNDER ANESTHESIA ANUS;  Surgeon: Chet Gaviria MD;  [facility]     EXAM UNDER ANESTHESIA, RESTORATIONS, EXTRACTION(S) DENTAL COMPLEX, COMBINED  5/13/2013    Procedure: COMBINED EXAM UNDER ANESTHESIA, RESTORATIONS, EXTRACTION(S) DENTAL COMPLEX;  Dental Exam, Radiographs, Restorations. Single Extraction  Tooth #2. Restorations x 3;  Surgeon: Danilo Ortiz DDS;  [facility]     HC KNEE SCOPE, DIAGNOSTIC      Arthroscopy, Knee- left     INJECT BOTOX N/A 5/10/2016    Procedure: INJECT BOTOX;  Surgeon: Chet Gaviria MD;  [facility]     left hip labral tear  5/11/2011    left hip arthroscopy with labral debridement and synovectomy     meniscus repair       OPTICAL TRACKING SYSTEM ENDOSCOPIC  SINUS SURGERY  10/14/2011    Procedure:OPTICAL TRACKING SYSTEM ENDOSCOPIC SINUS SURGERY; FESS (functional endoscopic sinus surgery) with Image Guidance, bronchial lavage and cultures; Surgeon:GOYO KUO; [facility]     OPTICAL TRACKING SYSTEM ENDOSCOPIC SINUS SURGERY  5/18/2012    Procedure:OPTICAL TRACKING SYSTEM ENDOSCOPIC SINUS SURGERY; Right  and Left Image Guided Functional Endoscopic Sinus Surgery With  Frontal Approach, [employer]; Surgeon:GOYO KUO; [facility]     OPTICAL TRACKING SYSTEM ENDOSCOPIC SINUS SURGERY  9/26/2012    Procedure: OPTICAL TRACKING SYSTEM ENDOSCOPIC SINUS SURGERY;  Stealth Guided Bilateral Functional Endoscopic Sinus Surgery *Latex Safe*;  Surgeon: Goyo Kuo MD;  [facility]     OPTICAL TRACKING SYSTEM ENDOSCOPIC SINUS SURGERY Bilateral 10/16/2015    Procedure: OPTICAL TRACKING SYSTEM ENDOSCOPIC SINUS SURGERY;  Surgeon: Mariela Haile MD;  [facility]     ORTHOPEDIC SURGERY      left hip tear repair 2010     SINUS SURGERY       Social History:  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     Family History:  There is a family history of presumed melanoma in the patient's grandmother.    Medications:  Current Outpatient Prescriptions   Medication Sig Dispense Refill     ISOtretinoin (ACCUTANE) 40 MG capsule Take 1 capsule (40 mg) by mouth daily 30 capsule 0     cetirizine (ZYRTEC) 10 MG tablet Take 1 tablet (10 mg) by mouth every evening 30 tablet 1     beclomethasone (QVAR) 80 MCG/ACT Inhaler Inhale 1 puff into the lungs 2 times daily 1 Inhaler 3     MEPHYTON 5 MG tablet TAKE 1 TABLET BY MOUTH ONCE A WEEK 4 tablet 11     spironolactone (ALDACTONE) 50 MG tablet Take 1 tablet (50 mg) by mouth 2 times daily 60 tablet 2     phentermine 15 MG capsule Take 2 capsules (30 mg) by mouth every morning 60 capsule 4     hydrOXYzine (ATARAX) 25 MG tablet Take 1 tablet (25 mg) by mouth every 6 hours as  needed for itching (and nausea) 30 tablet 0     acetaminophen (TYLENOL) 325 MG tablet Take 2 tablets (650 mg) by mouth every 4 hours as needed for other (mild pain) 100 tablet 0     acetylcysteine (MUCOMYST) 20 % injection INHALE ONE 4ML NEB INTO LUNGS VIA NEBULIZER TWO TIMES A DAY, MAY INCREASE TO 3-4 TIMES A DAY WITH INCREASE COUGH/COLD SYMPTOMS 360 mL 11     MedroxyPROGESTERone Acetate (DEPO-PROVERA IM)        buPROPion (WELLBUTRIN SR) 150 MG 12 hr tablet Take 150 mg by mouth 2 times daily       spironolactone (ALDACTONE) 25 MG tablet Take 1 tablet (25 mg) by mouth daily 60 tablet 3     montelukast (SINGULAIR) 10 MG tablet TAKE ONE TABLET BY MOUTH ONCE DAILY AT BEDTIME 30 tablet 11     VITAMIN D3 1000 UNITS tablet TAKE ONE TABLET BY MOUTH EVERY DAY 30 tablet 11     clindamycin (CLEOCIN T) 1 % lotion Apply topically every morning After washing face with benzoyl peroxide wash 60 mL 11     adapalene (DIFFERIN) 0.1 % cream Apply topically At Bedtime After washing face 45 g 11     LORATADINE PO Take 10 mg by mouth       albuterol (2.5 MG/3ML) 0.083% nebulizer solution Take 1 vial (2.5 mg) by nebulization 4 times daily 120 vial 11     omeprazole (PRILOSEC) 20 MG capsule TAKE 1 CAPSULE BY MOUTH TWICE A DAY 60 capsule 11     vitamin E 400 UNIT capsule TAKE 1 CAPSULE BY MOUTH TWICE A DAY 60 capsule 11     azithromycin (ZITHROMAX) 500 MG tablet Take 1 tablet (500 mg) by mouth daily M-W-F 36 tablet 4     beta carotene 18260 UNIT capsule Take 1 capsule (25,000 Units) by mouth Every Mon, Wed, Fri Morning 36 capsule 4     methylcellulose, laxative, (CITRUCEL) POWD Start with 1 heaping tablespoon. Increase as needed, 1 heaping tablespoon at a time, up to 3 times per day. 479 g 3     GLYCOPYRROLATE PO Take 1 mg by mouth 2 times daily        TRAZODONE HCL PO Take 100 mg by mouth At Bedtime        cyanocobalamin (VITAMIN B12) 1000 MCG/ML injection Inject 1 mL into the muscle every 30 days        blood glucose (ACCU-CHEK SMARTVIEW)  test strip Use to test blood sugar 4 times daily or as directed. 1 Month 12     albuterol (PROAIR HFA, PROVENTIL HFA, VENTOLIN HFA) 108 (90 BASE) MCG/ACT inhaler Inhale 2 puffs into the lungs every 6 hours as needed for shortness of breath / dyspnea or wheezing 1 Inhaler 12     meropenem (MERREM) 500 MG injection 500 mg by Nasal Instillation route as needed  4 mL 0     FLUoxetine (PROZAC) 40 MG capsule Take 80 mg by mouth daily.       Amphetamine-Dextroamphetamine (ADDERALL PO) Take 20 mg by mouth daily.       Leuprolide Acetate (LUPRON DEPOT IM) Inject  into the muscle every 3 months.       Multiple Vitamin (MULTIVITAMIN OR) Take 1 tablet by mouth daily.       ascorbic acid (VITAMIN C) 500 MG tablet TAKE ONE TABLET BY MOUTH TWICE A  tablet 3     Allergies   Allergen Reactions     Vancomycin Hives     Redmens skin rash      Review of Systems:  -As per HPI    Physical exam:  BP 99/61  GEN: This is a well developed, well-nourished female in no acute distress, in a pleasant mood.      SKIN: Acne exam, which includes the face, neck, upper central chest, and upper central back was performed.  - Superficial excoriated acneiform papules with intermixed open and closed comedones on the face greater than the chest and back  - No other lesions of concern on areas examined.     Impression/Plan:  1. Acne vulgaris, acne excoree ,Resistant to hormonal therapy, appropriate oral and topical antibiotics and well as topical retinoids.       Discussion of the risks and side effects of Accutane including but not limited to mucocutaneous dryness, arthralgias, myalgias, depression, suicidal ideation, headache, blurred vision,  increase in liver function tests, increase in lipids, and teratogenic effects. Discussed need for two forms of contraception. The ipledgeprogram brochure was provided and the contents discussed with the patient. The patient was counseled they cannot give blood while on Accutane. No personal or family history  of inflammatory bowel disease or hypertriglyceridemia known to patient. Reviewed need to avoid alcohol on medication.   [facility].   At this visit, we will begin Accutane 40 mg daily. .Goal dose is 9840 to 12,300 mg for 120-150 mg/kg dosing in this 82 kg patient.    Previous Baseline labs including CBC, CMP, fasting lipids were normal.     Baseline pregnancy test today. The patients two forms of contraception are Depo and male latex condoms.      Total cumulative dose 0mg.   Patient's I-pledge # is [phone].    The patient will stop all acne medications, when she starts the medication.   Follow-up in 1 months, earlier for new or changing lesions.     Staff Involved:    All risks, benefits and alternatives were discussed with patient.  Patient is in agreement and understands the assessment and plan.  All questions were answered.  Sun Screen Education was given.   Return to Clinic in 1 month or sooner as needed.   Paige Reid PA-C   [facility] Dermat[facility]

## 2017-06-23 ENCOUNTER — TELEPHONE (OUTPATIENT)
Dept: ENDOCRINOLOGY | Facility: CLINIC | Age: 22
End: 2017-06-23

## 2017-06-23 NOTE — TELEPHONE ENCOUNTER
Prior Authorization Retail Medication Request  Medication/Dose: Phentermine  Diagnosis and ICD code: Obesity, cystic fibrosis  New/Renewal/Insurance Change PA:   Previously Tried and Failed Therapies:     Insurance ID (if provided):   Insurance Phone (if provided):     Any additional info from fax request: Parents have been paying out of pocket. Report that she is having success while on phentermine. Running out beginning of next week.    If you received a fax notification from an outside Pharmacy:  Pharmacy Name:  Pharmacy #:  Pharmacy Fax:

## 2017-06-23 NOTE — TELEPHONE ENCOUNTER
Prior Authorization Approval    Authorization Effective Date: 5/23/2017  Authorization Expiration Date: 12/20/2017  Medication: Phentermine (APPROVED)  Approved Dose/Quantity: 60 PER 30 DAYS  Reference #:     Insurance Company: [employer] - Phone [phone] Fax [phone]  Expected CoPay: $5     CoPay Card Available:      Foundation Assistance Needed:    Which Pharmacy is filling the prescription (Not needed for infusion/clinic administered): [facility] - [place], [place] - [address] [place] [address]  Pharmacy Notified:  YES  Patient Notified:

## 2017-06-24 ENCOUNTER — TRANSFERRED RECORDS (OUTPATIENT)
Dept: HEALTH INFORMATION MANAGEMENT | Facility: CLINIC | Age: 22
End: 2017-06-24

## 2017-06-26 DIAGNOSIS — E84.0 CYSTIC FIBROSIS WITH PULMONARY MANIFESTATIONS (H): ICD-10-CM

## 2017-06-26 RX ORDER — ASCORBIC ACID 500 MG
TABLET ORAL
Qty: 100 TABLET | Refills: 3 | Status: SHIPPED | OUTPATIENT
Start: 2017-06-26 | End: 2018-03-01

## 2017-06-27 DIAGNOSIS — L70.0 ACNE VULGARIS: ICD-10-CM

## 2017-06-27 RX ORDER — ISOTRETINOIN 40 MG/1
40 CAPSULE ORAL DAILY
Qty: 30 CAPSULE | Refills: 0 | Status: SHIPPED | OUTPATIENT
Start: 2017-06-27 | End: 2017-07-03

## 2017-06-27 RX ORDER — SPIRONOLACTONE 50 MG/1
50 TABLET, FILM COATED ORAL 2 TIMES DAILY
Qty: 60 TABLET | Refills: 2 | Status: CANCELLED | OUTPATIENT
Start: 2017-06-27

## 2017-06-27 NOTE — TELEPHONE ENCOUNTER
Last seen 6/19/17,  Next appt 7/26/17  Last blood work completed 5/4/17      . Acne vulgaris, acne excoree ,Resistant to hormonal therapy, appropriate oral and topical antibiotics and well as topical retinoids.         Discussion of the risks and side effects of Accutane including but not limited to mucocutaneous dryness, arthralgias, myalgias, depression, suicidal ideation, headache, blurred vision,  increase in liver function tests, increase in lipids, and teratogenic effects. Discussed need for two forms of contraception. The [employer] brochure was provided and the contents discussed with the patient. The patient was counseled they cannot give blood while on Accutane. No personal or family history of inflammatory bowel disease or hypertriglyceridemia known to patient. Reviewed need to avoid alcohol on medication.   Ipledge program consent was obtained.   At this visit, we will begin Accutane 40 mg daily. .Goal dose is 9840 to 12,300 mg for 120-150 mg/kg dosing in this 82 kg patient.    Previous Baseline labs including CBC, CMP, fasting lipids were normal.      Baseline pregnancy test today. The patients two forms of contraception are Depo and male latex condoms.       Total cumulative dose 0mg.   Patient's I-pledge # is [id number].    The patient will stop all acne medications, when she starts the medication.   Follow-up in 1 months, earlier for new or changing lesions.

## 2017-06-28 LAB
EXPTIME-PRE: 6.31 SEC
FEF2575-%PRED-PRE: 102 %
FEF2575-PRE: 3.76 L/SEC
FEF2575-PRED: 3.67 L/SEC
FEFMAX-%PRED-PRE: 136 %
FEFMAX-PRE: 9 L/SEC
FEFMAX-PRED: 6.61 L/SEC
FEV1-%PRED-PRE: 102 %
FEV1-PRE: 3.18 L
FEV1FEV6-PRE: 87 %
FEV1FEV6-PRED: 86 %
FEV1FVC-PRE: 87 %
FEV1FVC-PRED: 87 %
FIFMAX-PRE: 5.08 L/SEC
FVC-%PRED-PRE: 102 %
FVC-PRE: 3.64 L
FVC-PRED: 3.55 L

## 2017-06-28 NOTE — TELEPHONE ENCOUNTER
Refill response from provider,    Mamie doesn't need to continue on spironolactone now that we are starting accutane. I send the prescription today for Accutane. I'm not sure if Valerie got a change to speak with her to confirm her and have her  her rx.     I;m going to pend this in case she for some reason does not want to start Accutane.     Thanks, Paige

## 2017-07-01 ENCOUNTER — HEALTH MAINTENANCE LETTER (OUTPATIENT)
Age: 22
End: 2017-07-01

## 2017-07-03 DIAGNOSIS — L70.0 ACNE VULGARIS: ICD-10-CM

## 2017-07-03 RX ORDER — ISOTRETINOIN 40 MG/1
40 CAPSULE ORAL DAILY
Qty: 30 CAPSULE | Refills: 0 | Status: SHIPPED | OUTPATIENT
Start: 2017-07-03 | End: 2017-07-14

## 2017-07-14 ENCOUNTER — TELEPHONE (OUTPATIENT)
Dept: DERMATOLOGY | Facility: CLINIC | Age: 22
End: 2017-07-14

## 2017-07-14 ENCOUNTER — TRANSFERRED RECORDS (OUTPATIENT)
Dept: HEALTH INFORMATION MANAGEMENT | Facility: CLINIC | Age: 22
End: 2017-07-14

## 2017-07-14 DIAGNOSIS — L70.0 ACNE VULGARIS: ICD-10-CM

## 2017-07-14 RX ORDER — ISOTRETINOIN 40 MG/1
40 CAPSULE ORAL DAILY
Qty: 30 CAPSULE | Refills: 0 | Status: SHIPPED | OUTPATIENT
Start: 2017-07-14 | End: 2017-08-14

## 2017-07-14 NOTE — TELEPHONE ENCOUNTER
[facility]. Negative pregnancy results, routed to provider for review.

## 2017-07-14 NOTE — TELEPHONE ENCOUNTER
Delaney was given the patient's Ipledge number that was in the last chart note.   Devorah Escalona, CMA

## 2017-07-20 DIAGNOSIS — E84.9 CF (CYSTIC FIBROSIS) (H): Primary | ICD-10-CM

## 2017-07-24 ASSESSMENT — ENCOUNTER SYMPTOMS
TROUBLE SWALLOWING: 0
SINUS CONGESTION: 1
HOARSE VOICE: 0
NAIL CHANGES: 0
SINUS PAIN: 1
NECK MASS: 0
POOR WOUND HEALING: 0
TASTE DISTURBANCE: 0
SMELL DISTURBANCE: 0
SORE THROAT: 1
SKIN CHANGES: 0

## 2017-07-24 ASSESSMENT — ACTIVITIES OF DAILY LIVING (ADL)
DO_MEMBERS_OF_YOUR_HOUSEHOLD_USE_SAFETY_HELMETS?: Y
DO_MEMBERS_OF_YOUR_HOUSEHOLD_WEAR_SEAT_BELTS?: Y
DO_MEMBERS_OF_YOUR_HOUSEHOLD_USE_SUNSCREEN?: Y
ARE_THERE_CARBON_MONOXIDE_DETECTORS_IN_YOUR_HOME?: Y
ARE_THERE_FIREARMS_IN_YOUR_HOME?: N
ARE_THERE_SMOKE_DETECTORS_IN_YOUR_HOME?: Y

## 2017-07-27 DIAGNOSIS — E84.0 CYSTIC FIBROSIS WITH PULMONARY MANIFESTATIONS (H): Primary | ICD-10-CM

## 2017-07-28 DIAGNOSIS — E84.9 CF (CYSTIC FIBROSIS) (H): ICD-10-CM

## 2017-07-28 RX ORDER — AZITHROMYCIN 500 MG/1
TABLET, FILM COATED ORAL
Qty: 36 TABLET | Refills: 4 | Status: SHIPPED | OUTPATIENT
Start: 2017-07-28 | End: 2018-08-30

## 2017-08-02 ENCOUNTER — OFFICE VISIT (OUTPATIENT)
Dept: PULMONOLOGY | Facility: CLINIC | Age: 22
End: 2017-08-02
Attending: INTERNAL MEDICINE
Payer: COMMERCIAL

## 2017-08-02 ENCOUNTER — OFFICE VISIT (OUTPATIENT)
Dept: OTOLARYNGOLOGY | Facility: CLINIC | Age: 22
End: 2017-08-02

## 2017-08-02 VITALS
DIASTOLIC BLOOD PRESSURE: 80 MMHG | HEART RATE: 90 BPM | BODY MASS INDEX: 32.33 KG/M2 | SYSTOLIC BLOOD PRESSURE: 112 MMHG | WEIGHT: 175.71 LBS | OXYGEN SATURATION: 99 % | RESPIRATION RATE: 16 BRPM

## 2017-08-02 VITALS — WEIGHT: 177 LBS | BODY MASS INDEX: 33.42 KG/M2 | HEIGHT: 61 IN

## 2017-08-02 DIAGNOSIS — E84.0 CYSTIC FIBROSIS WITH PULMONARY MANIFESTATIONS (H): Primary | ICD-10-CM

## 2017-08-02 DIAGNOSIS — E84.0 CYSTIC FIBROSIS WITH PULMONARY MANIFESTATIONS (H): ICD-10-CM

## 2017-08-02 DIAGNOSIS — J32.0 CHRONIC MAXILLARY SINUSITIS: Primary | ICD-10-CM

## 2017-08-02 LAB
ALBUMIN SERPL-MCNC: 3.7 G/DL (ref 3.4–5)
ALBUMIN UR-MCNC: NEGATIVE MG/DL
ALP SERPL-CCNC: 87 U/L (ref 40–150)
ALT SERPL W P-5'-P-CCNC: 43 U/L (ref 0–50)
ANION GAP SERPL CALCULATED.3IONS-SCNC: 9 MMOL/L (ref 3–14)
APPEARANCE UR: ABNORMAL
AST SERPL W P-5'-P-CCNC: 24 U/L (ref 0–45)
BACTERIA #/AREA URNS HPF: ABNORMAL /HPF
BASOPHILS # BLD AUTO: 0 10E9/L (ref 0–0.2)
BASOPHILS NFR BLD AUTO: 0.5 %
BILIRUB UR QL STRIP: NEGATIVE
BUN SERPL-MCNC: 10 MG/DL (ref 7–30)
CALCIUM SERPL-MCNC: 8.3 MG/DL (ref 8.5–10.1)
CHLORIDE SERPL-SCNC: 106 MMOL/L (ref 94–109)
CHOLEST SERPL-MCNC: 112 MG/DL
CO2 SERPL-SCNC: 27 MMOL/L (ref 20–32)
COLOR UR AUTO: YELLOW
CREAT SERPL-MCNC: 0.77 MG/DL (ref 0.52–1.04)
CREAT UR-MCNC: 154 MG/DL
DEPRECATED CALCIDIOL+CALCIFEROL SERPL-MC: 39 UG/L (ref 20–75)
DIFFERENTIAL METHOD BLD: NORMAL
EOSINOPHIL # BLD AUTO: 0 10E9/L (ref 0–0.7)
EOSINOPHIL NFR BLD AUTO: 0 %
ERYTHROCYTE [DISTWIDTH] IN BLOOD BY AUTOMATED COUNT: 12.2 % (ref 10–15)
ERYTHROCYTE [SEDIMENTATION RATE] IN BLOOD BY WESTERGREN METHOD: 6 MM/H (ref 0–20)
EXPTIME-PRE: 7.26 SEC
FEF2575-%PRED-PRE: 105 %
FEF2575-PRE: 3.85 L/SEC
FEF2575-PRED: 3.66 L/SEC
FEFMAX-%PRED-PRE: 131 %
FEFMAX-PRE: 8.72 L/SEC
FEFMAX-PRED: 6.61 L/SEC
FEV1-%PRED-PRE: 100 %
FEV1-PRE: 3.1 L
FEV1FEV6-PRE: 87 %
FEV1FEV6-PRED: 86 %
FEV1FVC-PRE: 87 %
FEV1FVC-PRED: 87 %
FIFMAX-PRE: 4.38 L/SEC
FVC-%PRED-PRE: 100 %
FVC-PRE: 3.56 L
FVC-PRED: 3.55 L
GFR SERPL CREATININE-BSD FRML MDRD: ABNORMAL ML/MIN/1.7M2
GGT SERPL-CCNC: 30 U/L (ref 0–40)
GLUCOSE SERPL-MCNC: 90 MG/DL (ref 70–99)
GLUCOSE UR STRIP-MCNC: NEGATIVE MG/DL
HBA1C MFR BLD: 5.5 % (ref 4.3–6)
HCT VFR BLD AUTO: 43.3 % (ref 35–47)
HDLC SERPL-MCNC: 31 MG/DL
HGB BLD-MCNC: 13.9 G/DL (ref 11.7–15.7)
HGB UR QL STRIP: NEGATIVE
IGA SERPL-MCNC: 132 MG/DL (ref 70–380)
IGG SERPL-MCNC: 689 MG/DL (ref 695–1620)
IGM SERPL-MCNC: 220 MG/DL (ref 60–265)
IMM GRANULOCYTES # BLD: 0 10E9/L (ref 0–0.4)
IMM GRANULOCYTES NFR BLD: 0.2 %
INR PPP: 1.1 (ref 0.86–1.14)
IRON SERPL-MCNC: 67 UG/DL (ref 35–180)
KETONES UR STRIP-MCNC: NEGATIVE MG/DL
LDLC SERPL CALC-MCNC: 49 MG/DL
LEUKOCYTE ESTERASE UR QL STRIP: ABNORMAL
LYMPHOCYTES # BLD AUTO: 2.5 10E9/L (ref 0.8–5.3)
LYMPHOCYTES NFR BLD AUTO: 45.3 %
MAGNESIUM SERPL-MCNC: 2 MG/DL (ref 1.6–2.3)
MCH RBC QN AUTO: 27 PG (ref 26.5–33)
MCHC RBC AUTO-ENTMCNC: 32.1 G/DL (ref 31.5–36.5)
MCV RBC AUTO: 84 FL (ref 78–100)
MICROALBUMIN UR-MCNC: 9 MG/L
MICROALBUMIN/CREAT UR: 5.92 MG/G CR (ref 0–25)
MONOCYTES # BLD AUTO: 0.5 10E9/L (ref 0–1.3)
MONOCYTES NFR BLD AUTO: 9 %
NEUTROPHILS # BLD AUTO: 2.5 10E9/L (ref 1.6–8.3)
NEUTROPHILS NFR BLD AUTO: 45 %
NITRATE UR QL: NEGATIVE
NONHDLC SERPL-MCNC: 81 MG/DL
NRBC # BLD AUTO: 0 10*3/UL
NRBC BLD AUTO-RTO: 0 /100
PH UR STRIP: 5 PH (ref 5–7)
PHOSPHATE SERPL-MCNC: 4.2 MG/DL (ref 2.5–4.5)
PLATELET # BLD AUTO: 311 10E9/L (ref 150–450)
POTASSIUM SERPL-SCNC: 3.6 MMOL/L (ref 3.4–5.3)
PROT SERPL-MCNC: 6.7 G/DL (ref 6.8–8.8)
RBC # BLD AUTO: 5.15 10E12/L (ref 3.8–5.2)
RBC #/AREA URNS AUTO: 2 /HPF (ref 0–2)
SODIUM SERPL-SCNC: 142 MMOL/L (ref 133–144)
SP GR UR STRIP: 1.02 (ref 1–1.03)
SQUAMOUS #/AREA URNS AUTO: 4 /HPF (ref 0–1)
TRIGL SERPL-MCNC: 160 MG/DL
TSH SERPL DL<=0.005 MIU/L-ACNC: 1.66 MU/L (ref 0.4–4)
URN SPEC COLLECT METH UR: ABNORMAL
UROBILINOGEN UR STRIP-MCNC: 0 MG/DL (ref 0–2)
WBC # BLD AUTO: 5.6 10E9/L (ref 4–11)
WBC #/AREA URNS AUTO: 10 /HPF (ref 0–2)

## 2017-08-02 PROCEDURE — 84590 ASSAY OF VITAMIN A: CPT | Performed by: INTERNAL MEDICINE

## 2017-08-02 PROCEDURE — 85610 PROTHROMBIN TIME: CPT | Performed by: INTERNAL MEDICINE

## 2017-08-02 PROCEDURE — 87186 SC STD MICRODIL/AGAR DIL: CPT | Performed by: INTERNAL MEDICINE

## 2017-08-02 PROCEDURE — 84403 ASSAY OF TOTAL TESTOSTERONE: CPT | Performed by: INTERNAL MEDICINE

## 2017-08-02 PROCEDURE — 81001 URINALYSIS AUTO W/SCOPE: CPT | Performed by: INTERNAL MEDICINE

## 2017-08-02 PROCEDURE — 87206 SMEAR FLUORESCENT/ACID STAI: CPT | Performed by: INTERNAL MEDICINE

## 2017-08-02 PROCEDURE — 87102 FUNGUS ISOLATION CULTURE: CPT | Mod: 59 | Performed by: INTERNAL MEDICINE

## 2017-08-02 PROCEDURE — 84460 ALANINE AMINO (ALT) (SGPT): CPT | Performed by: INTERNAL MEDICINE

## 2017-08-02 PROCEDURE — 82043 UR ALBUMIN QUANTITATIVE: CPT | Performed by: INTERNAL MEDICINE

## 2017-08-02 PROCEDURE — 87077 CULTURE AEROBIC IDENTIFY: CPT | Performed by: INTERNAL MEDICINE

## 2017-08-02 PROCEDURE — 99212 OFFICE O/P EST SF 10 MIN: CPT | Mod: ZF

## 2017-08-02 PROCEDURE — 87116 MYCOBACTERIA CULTURE: CPT | Performed by: INTERNAL MEDICINE

## 2017-08-02 PROCEDURE — 84075 ASSAY ALKALINE PHOSPHATASE: CPT | Performed by: INTERNAL MEDICINE

## 2017-08-02 PROCEDURE — 82306 VITAMIN D 25 HYDROXY: CPT | Performed by: INTERNAL MEDICINE

## 2017-08-02 PROCEDURE — 80061 LIPID PANEL: CPT | Performed by: INTERNAL MEDICINE

## 2017-08-02 PROCEDURE — 83540 ASSAY OF IRON: CPT | Performed by: INTERNAL MEDICINE

## 2017-08-02 PROCEDURE — 82977 ASSAY OF GGT: CPT | Performed by: INTERNAL MEDICINE

## 2017-08-02 PROCEDURE — 87106 FUNGI IDENTIFICATION YEAST: CPT | Performed by: INTERNAL MEDICINE

## 2017-08-02 PROCEDURE — 82784 ASSAY IGA/IGD/IGG/IGM EACH: CPT | Performed by: INTERNAL MEDICINE

## 2017-08-02 PROCEDURE — 84446 ASSAY OF VITAMIN E: CPT | Performed by: INTERNAL MEDICINE

## 2017-08-02 PROCEDURE — 84443 ASSAY THYROID STIM HORMONE: CPT | Performed by: INTERNAL MEDICINE

## 2017-08-02 PROCEDURE — 87015 SPECIMEN INFECT AGNT CONCNTJ: CPT | Performed by: INTERNAL MEDICINE

## 2017-08-02 PROCEDURE — 87102 FUNGUS ISOLATION CULTURE: CPT | Performed by: INTERNAL MEDICINE

## 2017-08-02 PROCEDURE — 84450 TRANSFERASE (AST) (SGOT): CPT | Performed by: INTERNAL MEDICINE

## 2017-08-02 PROCEDURE — 85652 RBC SED RATE AUTOMATED: CPT | Performed by: INTERNAL MEDICINE

## 2017-08-02 PROCEDURE — 83036 HEMOGLOBIN GLYCOSYLATED A1C: CPT | Performed by: INTERNAL MEDICINE

## 2017-08-02 PROCEDURE — 82785 ASSAY OF IGE: CPT | Performed by: INTERNAL MEDICINE

## 2017-08-02 PROCEDURE — 94375 RESPIRATORY FLOW VOLUME LOOP: CPT | Mod: ZF | Performed by: INTERNAL MEDICINE

## 2017-08-02 PROCEDURE — 87070 CULTURE OTHR SPECIMN AEROBIC: CPT | Performed by: INTERNAL MEDICINE

## 2017-08-02 PROCEDURE — 83735 ASSAY OF MAGNESIUM: CPT | Performed by: INTERNAL MEDICINE

## 2017-08-02 PROCEDURE — 36415 COLL VENOUS BLD VENIPUNCTURE: CPT | Performed by: INTERNAL MEDICINE

## 2017-08-02 PROCEDURE — 84155 ASSAY OF PROTEIN SERUM: CPT | Performed by: INTERNAL MEDICINE

## 2017-08-02 PROCEDURE — 80069 RENAL FUNCTION PANEL: CPT | Performed by: INTERNAL MEDICINE

## 2017-08-02 PROCEDURE — 85025 COMPLETE CBC W/AUTO DIFF WBC: CPT | Performed by: INTERNAL MEDICINE

## 2017-08-02 ASSESSMENT — PAIN SCALES - GENERAL
PAINLEVEL: NO PAIN (0)
PAINLEVEL: NO PAIN (0)

## 2017-08-02 NOTE — NURSING NOTE
Chief Complaint   Patient presents with     Cystic Fibrosis     Patient is being seen for CF follow up.      Nena Blank  CMA at 7:19 AM on 8/2/2017

## 2017-08-02 NOTE — PATIENT INSTRUCTIONS
Plan of care:  Follow up with Dr Haile in one month  Clinic contact information:  1. To schedule an appointment call [phone], option 1  2. To talk to the Triage RN call [phone], option 3  3. If you need to speak to Ellie or get a message to your doctor on a Friday, call the triage RN  4. EllieRN: [phone]  5. Surgery scheduling:      Mahi Canela: [phone]      Brisa Aparicio: [phone]  6. Fax: [phone]  7. [phone]

## 2017-08-02 NOTE — LETTER
8/2/2017       RE: Mamie Rice  [address] [facility] [address]     Dear Colleague,    Thank you for referring your patient, Mamie Rice, to the [facility] EAR NOSE AND THROAT at [facility]. Please see a copy of my visit note below.    Mamie is doing well, no new complaints.  She is here for Merem instillation into her sinuses for CRS related to CF.  This has helped her in the past with reducing infections.    Exam:  Rigid endoscopy is used for visualization to place 2 cc of merem with saline into the maxillary antrum.  No crusting or polyps seen, moderate scar tissue.    A/P:  Mamie has stable sinus issues. We will continue current management.     Again, thank you for allowing me to participate in the care of your patient.      Sincerely,    Mariela Haile MD

## 2017-08-02 NOTE — LETTER
8/2/2017       RE: Mamie Rice  [address] [facility] [address]     Dear Colleague,    Thank you for referring your patient, Mamie Rice, to the [facility] FOR L[facility]ENCE AND HEALTH at [facility]. Please see a copy of my visit note below.    [facility]  August 2, 2017         Assessment and Plan:   Mamie Rice is a 21 year old female with cystic fibrosis.    1. CF lung disease with normal spirometry:  Mamie has shown evidence of excellent stability in her pulmonary function as well as her pulmonary symptomatology.  She continues to be quite adherent with her nebulized and bronchial drainage therapy.  She shows evidence of Staph aureus and intermittent Achromobacter in her sputum.  At this time, I would recommend that she continue on her present regimen.   2.  History of abnormal glucose tolerance.  Mamie currently is not on insulin therapy as she has had hypoglycemia in the past with its use.  She does report that she checks blood sugars and they remain quite good.   3.  Cystic fibrosis sinus disease.  Mamie is due to see Dr. Haile today.  She does feel that her symptoms are under control.    4.  Acne.  Mamie is being treated with Accutane.  She reports that the course is going well.  She is followed up monthly for this.    5.  Weight management.  [facility]  There is some concern by her about her followup.  I have encouraged her to contact them for appropriate followup.    6.  Health care maintenance.  Mamie did have annual study labs performed today.  Her vitamin levels are pending.  Her cholesterol profile was reviewed by me with her.  I have recommended that she discuss both her cholesterol profile and her vitamin levels with Theresa Ceja, our dietitian.  Additionally, Mamie is up-to-date on an eye exam and dental as well.    7.   Psychosocial.  Mamie is now working at [employer] and reports that this is going well.  She is doing a job as a nanny as well.  She is having difficulty with one of the children in the home.   . Pancreatic sufficiency:  The patient has no new symptoms consistent with worsening malabsorption.  The plan is to continue the present dose vitamin supplementation.    Gavi Allison MD MPH   of Medicine  Pulmonary, Allergy, Critical Care and Sleep Medicine      Interval History:     Mamie does continue to produce sputum that is gray in color.  Her cough frequency and sputum volume are unchanged.  Her activity tolerance remains good.  She does perform 2 vest therapies per day.          Review of Systems:     All questionnaires were reviewed by me today with the patient.  Review of Systems     Constitutional:  Negative for fever, chills, weight loss, weight gain, fatigue, decreased appetite, night sweats, recent stressors, height gain, height loss, post-operative complications, incisional pain, hallucinations, increased energy, hyperactivity and confused.   HENT:  Positive for ear pain, sore throat, sinus pain and sinus congestion. Negative for hearing loss, tinnitus, nosebleeds, trouble swallowing, hoarse voice, mouth sores, ear discharge, tooth pain, gum tenderness, taste disturbance, smell disturbance, hearing aid, bleeding gums, dry mouth and neck mass.    Eyes:  Negative for double vision, pain, redness, eye pain, decreased vision, eye watering, eye bulging, eye dryness, flashing lights, spots, floaters, strabismus, tunnel vision, jaundice and eye irritation.   Respiratory:   Negative for cough, hemoptysis, sputum production, shortness of breath, wheezing, sleep disturbances due to breathing, snores loudly, respiratory pain, dyspnea on exertion, cough disturbing sleep and postural dyspnea.    Cardiovascular:  Negative for chest pain, dyspnea on exertion, palpitations, orthopnea,  claudication, leg swelling, fingers/toes turn blue, hypertension, hypotension, syncope, history of heart murmur, chest pain on exertion, chest pain at rest, pacemaker, few scattered varicosities, leg pain, sleep disturbances due to breathing, tachycardia, light-headedness, exercise intolerance and edema.   Gastrointestinal:  Negative for heartburn, nausea, vomiting, abdominal pain, diarrhea, constipation, blood in stool, melena, rectal pain, bloating, hemorrhoids, bowel incontinence, jaundice, rectal bleeding, coffee ground emesis and change in stool.   Genitourinary:  Negative for bladder incontinence, dysuria, urgency, hematuria, flank pain, vaginal discharge, difficulty urinating, genital sores, dyspareunia, decreased libido, nocturia, voiding less frequently, arousal difficulty, abnormal vaginal bleeding, excessive menstruation, menstrual changes, hot flashes, vaginal dryness and postmenopausal bleeding.   Musculoskeletal:  Negative for myalgias, back pain, joint swelling, arthralgias, stiffness, muscle cramps, neck pain, bone pain, muscle weakness and fracture.   Skin:  Positive for acne. Negative for nail changes, itching, poor wound healing, rash, hair changes, skin changes, warts, poor wound healing, scarring, flaky skin, Raynaud's phenomenon, sensitivity to sunlight and skin thickening.   Neurological:  Negative for dizziness, tingling, tremors, speech change, seizures, loss of consciousness, weakness, light-headedness, numbness, headaches, disturbances in coordination, extremity numbness, memory loss, difficulty walking and paralysis.   Endo/Heme:  Negative for anemia, swollen glands and bruises/bleeds easily.   Psychiatric/Behavioral:  Negative for depression, hallucinations, memory loss, decreased concentration, mood swings and panic attacks.    Breast:  Negative for breast discharge, breast mass, breast pain and nipple retraction.   Endocrine:  Negative for altered temperature regulation, polyphagia,  polydipsia, unwanted hair growth and change in facial hair.            Past Medical and Surgical History:     Past Medical History:   Diagnosis Date     ADHD (attention deficit hyperactivity disorder)      Anxiety      Aspergillosis, with pneumonia (H)     fugus found caused chest pain     Chronic infection     CF, MRSA.,      Chronic sinusitis      Constipation, chronic      Cystic fibrosis with pulmonary manifestations (H) 12/19/2011     Cystic fibrosis without mention of meconium ileus     SWEAT TEST:Date: 2/17/1994   Laboratory[facility]  296 mg, 104 mmol/L ClSample #2  295 mg, 104 mmol/L Cl GENOTYPING:Date: 10/15/2007,  Laboratory: [facility]otype: [id number]     Depressive disorder      Diabetes     no meds currently     Dysthymic disorder      Exocrine pancreatic insufficiency      Gastro-oesophageal reflux disease      Hip pain, right      MRSA (methicillin resistant Staphylococcus aureus) carrier      Pancreatic disease      Past Surgical History:   Procedure Laterality Date     ARTHROSCOPY HIP, OSTEOPLASTY FEMUR PROXIMAL, COMBINED  3/11/2013    Procedure: COMBINED ARTHROSCOPY HIP, OSTEOPLASTY FEMUR PROXIMAL;  Right Hip Arthroscopy, Labral  Debridement.    surgeon request choice anesthesia/admit to [facility] after surgery;  Surgeon: Omkar Austin MD;  [facility]     ARTHROSCOPY KNEE WITH MEDIAL MENISCECTOMY Left 1/31/2017    Procedure: ARTHROSCOPY KNEE WITH MEDIAL MENISCECTOMY;  Surgeon: Jethro Coyle MD;  Location: [facility] OR     bronchoscopies       BRONCHOSCOPY       EXAM UNDER ANESTHESIA ANUS N/A 5/10/2016    Procedure: EXAM UNDER ANESTHESIA ANUS;  Surgeon: Chet Gaviria MD;  [facility]     EXAM UNDER ANESTHESIA, RESTORATIONS, EXTRACTION(S) DENTAL COMPLEX, COMBINED  5/13/2013    Procedure: COMBINED EXAM UNDER ANESTHESIA, RESTORATIONS, EXTRACTION(S) DENTAL COMPLEX;  Dental Exam, Radiographs, Restorations. Single Extraction  Tooth #2. Restorations x 3;   Surgeon: Danilo Ortiz DDS;  [facility]: [facility] OR     HC KNEE SCOPE, DIAGNOSTIC      Arthroscopy, Knee- left     INJECT BOTOX N/A 5/10/2016    Procedure: INJECT BOTOX;  Surgeon: Chet Gaviria MD;  [facility]     left hip labral tear  5/11/2011    left hip arthroscopy with labral debridement and synovectomy     meniscus repair       OPTICAL TRACKING SYSTEM ENDOSCOPIC SINUS SURGERY  10/14/2011    Procedure:OPTICAL TRACKING SYSTEM ENDOSCOPIC SINUS SURGERY; FESS (functional endoscopic sinus surgery) with Image Guidance, bronchial lavage and cultures; Surgeon:GOYO KUO; Location:[facility] OR     OPTICAL TRACKING SYSTEM ENDOSCOPIC SINUS SURGERY  5/18/2012    Procedure:OPTICAL TRACKING SYSTEM ENDOSCOPIC SINUS SURGERY; Right  and Left Image Guided Functional Endoscopic Sinus Surgery With  Frontal Approach, [employer]; Surgeon:GOYO KUO; [facility]     OPTICAL TRACKING SYSTEM ENDOSCOPIC SINUS SURGERY  9/26/2012    Procedure: OPTICAL TRACKING SYSTEM ENDOSCOPIC SINUS SURGERY;  Stealth Guided Bilateral Functional Endoscopic Sinus Surgery *Latex Safe*;  Surgeon: Goyo Kuo MD;  [facility]     OPTICAL TRACKING SYSTEM ENDOSCOPIC SINUS SURGERY Bilateral 10/16/2015    Procedure: OPTICAL TRACKING SYSTEM ENDOSCOPIC SINUS SURGERY;  Surgeon: Mariela Haile MD;  [facility]     ORTHOPEDIC SURGERY      left hip tear repair 2010     SINUS SURGERY             Family History:     Family History   Problem Relation Age of Onset     CANCER Paternal Grandmother      Skin Cancer Paternal Grandmother      Other Cancer Paternal Grandmother      Skin     Obesity Paternal Grandmother      CANCER Paternal Grandfather      PGF had throat cancer (he was a smoker)     Other Cancer Paternal Grandfather      Anxiety Disorder Paternal Grandfather      Thyroid Disease Mother      ,     Obesity Other      Anesthesia Reaction No family hx of      Blood Disease No family hx of      Colon Polyps No family hx of      Crohn Disease  No family hx of      Ulcerative Colitis No family hx of      Colon Cancer No family hx of      Melanoma No family hx of             Social History:     Social History     Social History     Marital status: Single     Spouse name: N/A     Number of children: N/A     Years of education: N/A     Occupational History     Not on file.     Social History Main Topics     Smoking status: Never Smoker     Smokeless tobacco: Never Used      Comment: one person at home smokes outside     Alcohol use No     Drug use: No     Sexual activity: No     Other Topics Concern     Blood Transfusions No     Exercise Yes     Social History Narrative    Mamie lives with mother in [place], [place].  There is a cat in the home, but Mamie does not have any litterbox duties.  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  She gets essentially no exercise because of the tingling in her feet (says it bothers her even to stand).        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        8/2015 no change in social situation        2/15/2016 Pt is single and lives with mother and stepfather.            Medications:     Current Outpatient Prescriptions   Medication     azithromycin (ZITHROMAX) 500 MG tablet     ISOtretinoin (ACCUTANE) 40 MG capsule     ascorbic acid (VITAMIN C) 500 MG tablet     cetirizine (ZYRTEC) 10 MG tablet     beclomethasone (QVAR) 80 MCG/ACT Inhaler     MEPHYTON 5 MG tablet     phentermine 15 MG capsule     hydrOXYzine (ATARAX) 25 MG tablet     acetylcysteine (MUCOMYST) 20 % injection     MedroxyPROGESTERone Acetate (DEPO-PROVERA IM)     buPROPion (WELLBUTRIN SR) 150 MG 12 hr tablet     montelukast (SINGULAIR) 10 MG tablet     VITAMIN D3 1000 UNITS tablet     adapalene (DIFFERIN) 0.1 % cream     LORATADINE PO     albuterol (2.5 MG/3ML) 0.083% nebulizer solution     omeprazole (PRILOSEC) 20 MG capsule     vitamin E 400 UNIT capsule     beta carotene 39321  UNIT capsule     methylcellulose, laxative, (CITRUCEL) POWD     GLYCOPYRROLATE PO     TRAZODONE HCL PO     blood glucose (ACCU-CHEK SMARTVIEW) test strip     albuterol (PROAIR HFA, PROVENTIL HFA, VENTOLIN HFA) 108 (90 BASE) MCG/ACT inhaler     meropenem (MERREM) 500 MG injection     FLUoxetine (PROZAC) 40 MG capsule     Amphetamine-Dextroamphetamine (ADDERALL PO)     Multiple Vitamin (MULTIVITAMIN OR)     acetaminophen (TYLENOL) 325 MG tablet     No current facility-administered medications for this visit.      Facility-Administered Medications Ordered in Other Visits   Medication     albuterol (PROAIR HFA, PROVENTIL HFA, VENTOLIN HFA) inhaler            Physical Exam:   /80 (BP Location: Right arm, Patient Position: Chair, Cuff Size: Adult Regular)  Pulse 90  Resp 16  Wt 79.7 kg (175 lb 11.3 oz)  SpO2 99%  BMI 32.33 kg/m2    Constitutional:   Awake, alert and in no apparent distress     Eyes:   nonicteric     ENT:   oral mucosa moist without lesions, normal tm bilaterally, bilateral mucosal edema      Neck:   Supple without supraclavicular or cervical lymphadenopathy     Lungs:   Good air flow.  No crackles. No rhonchi.  No wheezes.     Cardiovascular:   Normal S1 and S2.  RRR.  No murmur, gallop or rub.     Abdomen:   NABS, soft, nontender, nondistended.       Musculoskeletal:   No edema, digital clubbing present     Neurologic:   Alert and conversant.     Skin:   Warm, dry.  No rash on limited exam.             Data:   All laboratory and imaging data reviewed.    Cystic Fibrosis Culture  Specimen Description   Date Value Ref Range Status   08/02/2017 Sputum  Final   04/25/2017 Throat  Final   11/01/2016 Throat  Final    Culture Micro   Date Value Ref Range Status   08/02/2017 Culture negative after 3 hours  Final   04/25/2017 (A)  Final    Moderate growth Normal roberto carlos  Moderate growth Staphylococcus aureus This isolate is presumed to be clindamycin   resistant based on detection of inducible  clindamycin resistance. Erythromycin   and clindamycin are resistant, therefore, they are not recommended for use.     11/01/2016 (A)  Final    Light growth Normal roberto carlos  Moderate growth Staphylococcus aureus This isolate is presumed to be clindamycin   resistant based on detection of inducible clindamycin resistance. Erythromycin   and clindamycin are resistant, therefore, they are not recommended for use.  Light growth Achromobacter xylosoxidans/denitrificans          Recent Results (from the past 168 hour(s))   Routine UA with microscopic    Collection Time: 08/02/17  6:50 AM   Result Value Ref Range    Color Urine Yellow     Appearance Urine Slightly Cloudy     Glucose Urine Negative NEG mg/dL    Bilirubin Urine Negative NEG    Ketones Urine Negative NEG mg/dL    Specific Gravity Urine 1.019 1.003 - 1.035    Blood Urine Negative NEG    pH Urine 5.0 5.0 - 7.0 pH    Protein Albumin Urine Negative NEG mg/dL    Urobilinogen mg/dL 0.0 0.0 - 2.0 mg/dL    Nitrite Urine Negative NEG    Leukocyte Esterase Urine Small (A) NEG    Source Midstream Urine     WBC Urine 10 (H) 0 - 2 /HPF    RBC Urine 2 0 - 2 /HPF    Bacteria Urine Few (A) NEG /HPF    Squamous Epithelial /HPF Urine 4 (H) 0 - 1 /HPF   Albumin Random Urine Quantitative    Collection Time: 08/02/17  6:50 AM   Result Value Ref Range    Creatinine Urine 154 mg/dL    Albumin Urine mg/L 9 mg/L    Albumin Urine mg/g Cr 5.92 0 - 25 mg/g Cr   ALT    Collection Time: 08/02/17  6:54 AM   Result Value Ref Range    ALT 43 0 - 50 U/L   Basic metabolic panel    Collection Time: 08/02/17  6:54 AM   Result Value Ref Range    Sodium 142 133 - 144 mmol/L    Potassium 3.6 3.4 - 5.3 mmol/L    Chloride 106 94 - 109 mmol/L    Carbon Dioxide 27 20 - 32 mmol/L    Anion Gap 9 3 - 14 mmol/L    Glucose 90 70 - 99 mg/dL    Urea Nitrogen 10 7 - 30 mg/dL    Creatinine 0.77 0.52 - 1.04 mg/dL    GFR Estimate >90  Non  GFR Calc   >60 mL/min/1.7m2    GFR Estimate If Black  >90   GFR Calc   >60 mL/min/1.7m2    Calcium 8.3 (L) 8.5 - 10.1 mg/dL   Lipid Profile    Collection Time: 08/02/17  6:54 AM   Result Value Ref Range    Cholesterol 112 <200 mg/dL    Triglycerides 160 (H) <150 mg/dL    HDL Cholesterol 31 (L) >49 mg/dL    LDL Cholesterol Calculated 49 <100 mg/dL    Non HDL Cholesterol 81 <130 mg/dL   Albumin level    Collection Time: 08/02/17  6:54 AM   Result Value Ref Range    Albumin 3.7 3.4 - 5.0 g/dL   Alkaline phosphatase    Collection Time: 08/02/17  6:54 AM   Result Value Ref Range    Alkaline Phosphatase 87 40 - 150 U/L   AST    Collection Time: 08/02/17  6:54 AM   Result Value Ref Range    AST 24 0 - 45 U/L   Iron    Collection Time: 08/02/17  6:54 AM   Result Value Ref Range    Iron 67 35 - 180 ug/dL   GGT    Collection Time: 08/02/17  6:54 AM   Result Value Ref Range    GGT 30 0 - 40 U/L   Hemoglobin A1c    Collection Time: 08/02/17  6:54 AM   Result Value Ref Range    Hemoglobin A1C 5.5 4.3 - 6.0 %   IgA    Collection Time: 08/02/17  6:54 AM   Result Value Ref Range     70 - 380 mg/dL   IgG    Collection Time: 08/02/17  6:54 AM   Result Value Ref Range     (L) 695 - 1620 mg/dL   IgM    Collection Time: 08/02/17  6:54 AM   Result Value Ref Range     60 - 265 mg/dL   INR    Collection Time: 08/02/17  6:54 AM   Result Value Ref Range    INR 1.10 0.86 - 1.14   Magnesium    Collection Time: 08/02/17  6:54 AM   Result Value Ref Range    Magnesium 2.0 1.6 - 2.3 mg/dL   Phosphorus    Collection Time: 08/02/17  6:54 AM   Result Value Ref Range    Phosphorus 4.2 2.5 - 4.5 mg/dL   Protein total    Collection Time: 08/02/17  6:54 AM   Result Value Ref Range    Protein Total 6.7 (L) 6.8 - 8.8 g/dL   TSH with free T4 reflex    Collection Time: 08/02/17  6:54 AM   Result Value Ref Range    TSH 1.66 0.40 - 4.00 mU/L   Vitamin D Deficiency    Collection Time: 08/02/17  6:54 AM   Result Value Ref Range    Vitamin D Deficiency screening 39 20 - 75 ug/L    CBC with platelets differential    Collection Time: 08/02/17  6:54 AM   Result Value Ref Range    WBC 5.6 4.0 - 11.0 10e9/L    RBC Count 5.15 3.8 - 5.2 10e12/L    Hemoglobin 13.9 11.7 - 15.7 g/dL    Hematocrit 43.3 35.0 - 47.0 %    MCV 84 78 - 100 fl    MCH 27.0 26.5 - 33.0 pg    MCHC 32.1 31.5 - 36.5 g/dL    RDW 12.2 10.0 - 15.0 %    Platelet Count 311 150 - 450 10e9/L    Diff Method Automated Method     % Neutrophils 45.0 %    % Lymphocytes 45.3 %    % Monocytes 9.0 %    % Eosinophils 0.0 %    % Basophils 0.5 %    % Immature Granulocytes 0.2 %    Nucleated RBCs 0 0 /100    Absolute Neutrophil 2.5 1.6 - 8.3 10e9/L    Absolute Lymphocytes 2.5 0.8 - 5.3 10e9/L    Absolute Monocytes 0.5 0.0 - 1.3 10e9/L    Absolute Eosinophils 0.0 0.0 - 0.7 10e9/L    Absolute Basophils 0.0 0.0 - 0.2 10e9/L    Abs Immature Granulocytes 0.0 0 - 0.4 10e9/L    Absolute Nucleated RBC 0.0    Erythrocyte sedimentation rate auto    Collection Time: 08/02/17  6:54 AM   Result Value Ref Range    Sed Rate 6 0 - 20 mm/h   Fungus Culture, non-blood    Collection Time: 08/02/17  6:55 AM   Result Value Ref Range    Specimen Description Sputum     Culture Micro Culture negative after 3 hours     Micro Report Status Pending    General PFT Lab (Please always keep checked)    Collection Time: 08/02/17  7:09 AM   Result Value Ref Range    FVC-Pred 3.55 L    FVC-Pre 3.56 L    FVC-%Pred-Pre 100 %    FEV1-Pre 3.10 L    FEV1-%Pred-Pre 100 %    FEV1FVC-Pred 87 %    FEV1FVC-Pre 87 %    FEFMax-Pred 6.61 L/sec    FEFMax-Pre 8.72 L/sec    FEFMax-%Pred-Pre 131 %    FEF2575-Pred 3.66 L/sec    FEF2575-Pre 3.85 L/sec    CAF5036-%Pred-Pre 105 %    ExpTime-Pre 7.26 sec    FIFMax-Pre 4.38 L/sec    FEV1FEV6-Pred 86 %    FEV1FEV6-Pre 87 %       PFT: The spirometry is normal.  When compared to 6/5/17, the FEV1 and FVC have little change.      Again, thank you for allowing me to participate in the care of your patient.      Sincerely,    Gavi Allison MD

## 2017-08-02 NOTE — PATIENT INSTRUCTIONS
Cystic Fibrosis Self-Care Plan       MRN: [id number]   Clinic Date: August 2, 2017   Patient: Mamie Rice     Annual Studies:   IGG   Date Value Ref Range Status   05/04/2017 675 (L) 695 - 1620 mg/dL Final     Insulin   Date Value Ref Range Status   03/30/2015 81 mU/L Final     Comment:     Reference Range:  0-20     There are no preventive care reminders to display for this patient.      Pulmonary Function Tests  FEV1: amount of air you can blow out in 1 second  FVC: total amount of air you can take in and blow out    Your Goals:         PFT Latest Ref Rng & Units 8/2/2017   FVC L 3.56   FEV1 L 3.10   FVC% % 100   FEV1% % 100          Airway Clearance: The Most Important Way to Keep Your Lungs Healthy  Vest Settings:    Hill-Rom Frequencies: 8, 9, 10 Pressure 10 Then, Frequencies 18, 19, 20 Pressure 6      RespirTech: Quick Start with Pressure of     Do each frequency for 5 minutes; Deflate vest after each frequency & cough 3 times before beginning the next setting.    Vest and Neb Therapy should be done 2 times/day.    Good Nutrition Can Improve Lung Function and Overall Health     Take ALL of your vitamins with food     Take 1/2 of your enzymes before EVERY meal/snack and the other 1/2 mid-meal/snack    Wt Readings from Last 3 Encounters:   08/02/17 79.7 kg (175 lb 11.3 oz)   06/05/17 79.9 kg (176 lb 2.4 oz)   05/01/17 82.1 kg (181 lb)       Body mass index is 32.33 kg/(m^2).         National CF Foundation Recommendations for BMI in CF Adults: Women: at least 22 Men: at least 23        Controlling Blood Sugars Helps Prevent Lung Infections & Improves Nutrition  Test blood sugar:     In the morning before eating (goal is )     2 hours after a meal (goal is less than 150)     When pre-meal glucose is greater than 150 add correction     At bedtime (if less than 100 eat a snack with 15 grams of carbohydrates  Last A1C Results:   Hemoglobin A1C   Date Value Ref Range Status   08/02/2017 5.5 4.3 - 6.0 %  Final         If diabetic, measure A1C every 6 months. Goal: Under 7%    Staying Healthy    Research:  If you are interested in learning about research opportunities or have questions, please contact Mariana Smith at [phone] or [email or web address].      CF Foundation:  Compass is a personalized resource service to help you with the insurance, financial, legal and other issues you are facing.  It's free, confidential and available to anyone with CF.  Ask your  for more information or contact Compass directly at 015-COMPASS ([phone]) or compass@cff.org, or learn more at cff.org/compass.          RECOMMENDATIONS: Great job!  I am excited about your new job.  Hang in there with the  position.  I will Theresa call you about vitamins and cholesterol    YOUR GOAL:  Enjoy the rest of the summer.      CF Nurse Line:  Burton Anderson: [phone]   Jhoana Trinh, RT: [phone]     Amy Andino: [phone] or Theresa Ceja, Dieticians: [phone]   Audra Zhang, Diabetes Nurse: [phone]      Rupa Dunne: [phone] or Rosette Burnett [phone], Social Workers   [email or web address]

## 2017-08-02 NOTE — PROGRESS NOTES
[facility]  August 2, 2017         Assessment and Plan:   Mamie Rice is a 21 year old female with cystic fibrosis.    1. CF lung disease with normal spirometry:  Mamie has shown evidence of excellent stability in her pulmonary function as well as her pulmonary symptomatology.  She continues to be quite adherent with her nebulized and bronchial drainage therapy.  She shows evidence of Staph aureus and intermittent Achromobacter in her sputum.  At this time, I would recommend that she continue on her present regimen.   2.  History of abnormal glucose tolerance.  Mamie currently is not on insulin therapy as she has had hypoglycemia in the past with its use.  She does report that she checks blood sugars and they remain quite good.   3.  Cystic fibrosis sinus disease.  Mamie is due to see Dr. Haile today.  She does feel that her symptoms are under control.    4.  Acne.  Mamie is being treated with Accutane.  She reports that the course is going well.  She is followed up monthly for this.    5.  Weight management.  [facility]  There is some concern by her about her followup.  I have encouraged her to contact them for appropriate followup.    6.  Health care maintenance.  Mamie did have annual study labs performed today.  Her vitamin levels are pending.  Her cholesterol profile was reviewed by me with her.  I have recommended that she discuss both her cholesterol profile and her vitamin levels with Theresa Ceja, our dietitian.  Additionally, Mamie is up-to-date on an eye exam and dental as well.    7.  Psychosocial.  Mamie is now working at [employer] and reports that this is going well.  She is doing a job as a nanny as well.  She is having difficulty with one of the children in the home.   . Pancreatic sufficiency:  The patient has no new symptoms consistent with worsening  malabsorption.  The plan is to continue the present dose vitamin supplementation.    Gavi Allison MD MPH   of Medicine  Pulmonary, Allergy, Critical Care and Sleep Medicine      Interval History:     Mamie does continue to produce sputum that is gray in color.  Her cough frequency and sputum volume are unchanged.  Her activity tolerance remains good.  She does perform 2 vest therapies per day.          Review of Systems:     All questionnaires were reviewed by me today with the patient.  Review of Systems     Constitutional:  Negative for fever, chills, weight loss, weight gain, fatigue, decreased appetite, night sweats, recent stressors, height gain, height loss, post-operative complications, incisional pain, hallucinations, increased energy, hyperactivity and confused.   HENT:  Positive for ear pain, sore throat, sinus pain and sinus congestion. Negative for hearing loss, tinnitus, nosebleeds, trouble swallowing, hoarse voice, mouth sores, ear discharge, tooth pain, gum tenderness, taste disturbance, smell disturbance, hearing aid, bleeding gums, dry mouth and neck mass.    Eyes:  Negative for double vision, pain, redness, eye pain, decreased vision, eye watering, eye bulging, eye dryness, flashing lights, spots, floaters, strabismus, tunnel vision, jaundice and eye irritation.   Respiratory:   Negative for cough, hemoptysis, sputum production, shortness of breath, wheezing, sleep disturbances due to breathing, snores loudly, respiratory pain, dyspnea on exertion, cough disturbing sleep and postural dyspnea.    Cardiovascular:  Negative for chest pain, dyspnea on exertion, palpitations, orthopnea, claudication, leg swelling, fingers/toes turn blue, hypertension, hypotension, syncope, history of heart murmur, chest pain on exertion, chest pain at rest, pacemaker, few scattered varicosities, leg pain, sleep disturbances due to breathing, tachycardia, light-headedness, exercise intolerance  and edema.   Gastrointestinal:  Negative for heartburn, nausea, vomiting, abdominal pain, diarrhea, constipation, blood in stool, melena, rectal pain, bloating, hemorrhoids, bowel incontinence, jaundice, rectal bleeding, coffee ground emesis and change in stool.   Genitourinary:  Negative for bladder incontinence, dysuria, urgency, hematuria, flank pain, vaginal discharge, difficulty urinating, genital sores, dyspareunia, decreased libido, nocturia, voiding less frequently, arousal difficulty, abnormal vaginal bleeding, excessive menstruation, menstrual changes, hot flashes, vaginal dryness and postmenopausal bleeding.   Musculoskeletal:  Negative for myalgias, back pain, joint swelling, arthralgias, stiffness, muscle cramps, neck pain, bone pain, muscle weakness and fracture.   Skin:  Positive for acne. Negative for nail changes, itching, poor wound healing, rash, hair changes, skin changes, warts, poor wound healing, scarring, flaky skin, Raynaud's phenomenon, sensitivity to sunlight and skin thickening.   Neurological:  Negative for dizziness, tingling, tremors, speech change, seizures, loss of consciousness, weakness, light-headedness, numbness, headaches, disturbances in coordination, extremity numbness, memory loss, difficulty walking and paralysis.   Endo/Heme:  Negative for anemia, swollen glands and bruises/bleeds easily.   Psychiatric/Behavioral:  Negative for depression, hallucinations, memory loss, decreased concentration, mood swings and panic attacks.    Breast:  Negative for breast discharge, breast mass, breast pain and nipple retraction.   Endocrine:  Negative for altered temperature regulation, polyphagia, polydipsia, unwanted hair growth and change in facial hair.            Past Medical and Surgical History:     Past Medical History:   Diagnosis Date     ADHD (attention deficit hyperactivity disorder)      Anxiety      Aspergillosis, with pneumonia (H)     fugus found caused chest pain      Chronic infection     CF, MRSA.,      Chronic sinusitis      Constipation, chronic      Cystic fibrosis with pulmonary manifestations (H) 12/19/2011     Cystic fibrosis without mention of meconium ileus     SWEAT TEST:Date: 2/17/1994   [facility]  296 mg, 104 mmol/L ClSample #2  295 mg, 104 mmol/L Cl GENOTYPING:Date: 10/15/2007,  Laboratory: [facility]: [id number]     Depressive disorder      Diabetes     no meds currently     Dysthymic disorder      Exocrine pancreatic insufficiency      Gastro-oesophageal reflux disease      Hip pain, right      MRSA (methicillin resistant Staphylococcus aureus) carrier      Pancreatic disease      Past Surgical History:   Procedure Laterality Date     ARTHROSCOPY HIP, OSTEOPLASTY FEMUR PROXIMAL, COMBINED  3/11/2013    Procedure: COMBINED ARTHROSCOPY HIP, OSTEOPLASTY FEMUR PROXIMAL;  Right Hip Arthroscopy, Labral  Debridement.    surgeon request choice anesthesia/admit to [facility] after surgery;  Surgeon: Omkar Austin MD;  [facility]     ARTHROSCOPY KNEE WITH MEDIAL MENISCECTOMY Left 1/31/2017    Procedure: ARTHROSCOPY KNEE WITH MEDIAL MENISCECTOMY;  Surgeon: Jethro Coyle MD;  [facility]     bronchoscopies       BRONCHOSCOPY       EXAM UNDER ANESTHESIA ANUS N/A 5/10/2016    Procedure: EXAM UNDER ANESTHESIA ANUS;  Surgeon: Chet Gaviria MD;  [facility]     EXAM UNDER ANESTHESIA, RESTORATIONS, EXTRACTION(S) DENTAL COMPLEX, COMBINED  5/13/2013    Procedure: COMBINED EXAM UNDER ANESTHESIA, RESTORATIONS, EXTRACTION(S) DENTAL COMPLEX;  Dental Exam, Radiographs, Restorations. Single Extraction  Tooth #2. Restorations x 3;  Surgeon: Danilo Ortiz DDS;  [facility]     HC KNEE SCOPE, DIAGNOSTIC      Arthroscopy, Knee- left     INJECT BOTOX N/A 5/10/2016    Procedure: INJECT BOTOX;  Surgeon: Chet Gaviria MD;  [facility]     left hip labral tear  5/11/2011    left hip arthroscopy with labral  debridement and synovectomy     meniscus repair       OPTICAL TRACKING SYSTEM ENDOSCOPIC SINUS SURGERY  10/14/2011    Procedure:OPTICAL TRACKING SYSTEM ENDOSCOPIC SINUS SURGERY; FESS (functional endoscopic sinus surgery) with Image Guidance, bronchial lavage and cultures; Surgeon:GOYO KUO; Location:[facility]     OPTICAL TRACKING SYSTEM ENDOSCOPIC SINUS SURGERY  5/18/2012    Procedure:OPTICAL TRACKING SYSTEM ENDOSCOPIC SINUS SURGERY; Right  and Left Image Guided Functional Endoscopic Sinus Surgery With  Frontal Approach, [employer]; Surgeon:GOYO KUO; [facility]     OPTICAL TRACKING SYSTEM ENDOSCOPIC SINUS SURGERY  9/26/2012    Procedure: OPTICAL TRACKING SYSTEM ENDOSCOPIC SINUS SURGERY;  Stealth Guided Bilateral Functional Endoscopic Sinus Surgery *Latex Safe*;  Surgeon: Goyo Kuo MD;  [facility]     OPTICAL TRACKING SYSTEM ENDOSCOPIC SINUS SURGERY Bilateral 10/16/2015    Procedure: OPTICAL TRACKING SYSTEM ENDOSCOPIC SINUS SURGERY;  Surgeon: Mareila Haile MD;  [facility]     ORTHOPEDIC SURGERY      left hip tear repair 2010     SINUS SURGERY             Family History:     Family History   Problem Relation Age of Onset     CANCER Paternal Grandmother      Skin Cancer Paternal Grandmother      Other Cancer Paternal Grandmother      Skin     Obesity Paternal Grandmother      CANCER Paternal Grandfather      PGF had throat cancer (he was a smoker)     Other Cancer Paternal Grandfather      Anxiety Disorder Paternal Grandfather      Thyroid Disease Mother      ,     Obesity Other      Anesthesia Reaction No family hx of      Blood Disease No family hx of      Colon Polyps No family hx of      Crohn Disease No family hx of      Ulcerative Colitis No family hx of      Colon Cancer No family hx of      Melanoma No family hx of             Social History:     Social History     Social History     Marital status: Single     Spouse name: N/A     Number of children: N/A     Years of education: N/A      Occupational History     Not on file.     Social History Main Topics     Smoking status: Never Smoker     Smokeless tobacco: Never Used      Comment: one person at home smokes outside     Alcohol use No     Drug use: No     Sexual activity: No     Other Topics Concern     Blood Transfusions No     Exercise Yes     Social History Narrative    Mamie lives with mother in [place], [place].  There is a cat in the home, but Mamie does not have any litterbox duties.  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  She gets essentially no exercise because of the tingling in her feet (says it bothers her even to stand).        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        8/2015 no change in social situation        2/15/2016 Pt is single and lives with mother and stepfather.            Medications:     Current Outpatient Prescriptions   Medication     azithromycin (ZITHROMAX) 500 MG tablet     ISOtretinoin (ACCUTANE) 40 MG capsule     ascorbic acid (VITAMIN C) 500 MG tablet     cetirizine (ZYRTEC) 10 MG tablet     beclomethasone (QVAR) 80 MCG/ACT Inhaler     MEPHYTON 5 MG tablet     phentermine 15 MG capsule     hydrOXYzine (ATARAX) 25 MG tablet     acetylcysteine (MUCOMYST) 20 % injection     MedroxyPROGESTERone Acetate (DEPO-PROVERA IM)     buPROPion (WELLBUTRIN SR) 150 MG 12 hr tablet     montelukast (SINGULAIR) 10 MG tablet     VITAMIN D3 1000 UNITS tablet     adapalene (DIFFERIN) 0.1 % cream     LORATADINE PO     albuterol (2.5 MG/3ML) 0.083% nebulizer solution     omeprazole (PRILOSEC) 20 MG capsule     vitamin E 400 UNIT capsule     beta carotene 36261 UNIT capsule     methylcellulose, laxative, (CITRUCEL) POWD     GLYCOPYRROLATE PO     TRAZODONE HCL PO     blood glucose (ACCU-CHEK SMARTVIEW) test strip     albuterol (PROAIR HFA, PROVENTIL HFA, VENTOLIN HFA) 108 (90 BASE) MCG/ACT inhaler     meropenem (MERREM) 500 MG injection      FLUoxetine (PROZAC) 40 MG capsule     Amphetamine-Dextroamphetamine (ADDERALL PO)     Multiple Vitamin (MULTIVITAMIN OR)     acetaminophen (TYLENOL) 325 MG tablet     No current facility-administered medications for this visit.      Facility-Administered Medications Ordered in Other Visits   Medication     albuterol (PROAIR HFA, PROVENTIL HFA, VENTOLIN HFA) inhaler            Physical Exam:   /80 (BP Location: Right arm, Patient Position: Chair, Cuff Size: Adult Regular)  Pulse 90  Resp 16  Wt 79.7 kg (175 lb 11.3 oz)  SpO2 99%  BMI 32.33 kg/m2    Constitutional:   Awake, alert and in no apparent distress     Eyes:   nonicteric     ENT:   oral mucosa moist without lesions, normal tm bilaterally, bilateral mucosal edema      Neck:   Supple without supraclavicular or cervical lymphadenopathy     Lungs:   Good air flow.  No crackles. No rhonchi.  No wheezes.     Cardiovascular:   Normal S1 and S2.  RRR.  No murmur, gallop or rub.     Abdomen:   NABS, soft, nontender, nondistended.       Musculoskeletal:   No edema, digital clubbing present     Neurologic:   Alert and conversant.     Skin:   Warm, dry.  No rash on limited exam.             Data:   All laboratory and imaging data reviewed.    Cystic Fibrosis Culture  Specimen Description   Date Value Ref Range Status   08/02/2017 Sputum  Final   04/25/2017 Throat  Final   11/01/2016 Throat  Final    Culture Micro   Date Value Ref Range Status   08/02/2017 Culture negative after 3 hours  Final   04/25/2017 (A)  Final    Moderate growth Normal roberto carlos  Moderate growth Staphylococcus aureus This isolate is presumed to be clindamycin   resistant based on detection of inducible clindamycin resistance. Erythromycin   and clindamycin are resistant, therefore, they are not recommended for use.     11/01/2016 (A)  Final    Light growth Normal roberto carlos  Moderate growth Staphylococcus aureus This isolate is presumed to be clindamycin   resistant based on detection of  inducible clindamycin resistance. Erythromycin   and clindamycin are resistant, therefore, they are not recommended for use.  Light growth Achromobacter xylosoxidans/denitrificans          Recent Results (from the past 168 hour(s))   Routine UA with microscopic    Collection Time: 08/02/17  6:50 AM   Result Value Ref Range    Color Urine Yellow     Appearance Urine Slightly Cloudy     Glucose Urine Negative NEG mg/dL    Bilirubin Urine Negative NEG    Ketones Urine Negative NEG mg/dL    Specific Gravity Urine 1.019 1.003 - 1.035    Blood Urine Negative NEG    pH Urine 5.0 5.0 - 7.0 pH    Protein Albumin Urine Negative NEG mg/dL    Urobilinogen mg/dL 0.0 0.0 - 2.0 mg/dL    Nitrite Urine Negative NEG    Leukocyte Esterase Urine Small (A) NEG    Source Midstream Urine     WBC Urine 10 (H) 0 - 2 /HPF    RBC Urine 2 0 - 2 /HPF    Bacteria Urine Few (A) NEG /HPF    Squamous Epithelial /HPF Urine 4 (H) 0 - 1 /HPF   Albumin Random Urine Quantitative    Collection Time: 08/02/17  6:50 AM   Result Value Ref Range    Creatinine Urine 154 mg/dL    Albumin Urine mg/L 9 mg/L    Albumin Urine mg/g Cr 5.92 0 - 25 mg/g Cr   ALT    Collection Time: 08/02/17  6:54 AM   Result Value Ref Range    ALT 43 0 - 50 U/L   Basic metabolic panel    Collection Time: 08/02/17  6:54 AM   Result Value Ref Range    Sodium 142 133 - 144 mmol/L    Potassium 3.6 3.4 - 5.3 mmol/L    Chloride 106 94 - 109 mmol/L    Carbon Dioxide 27 20 - 32 mmol/L    Anion Gap 9 3 - 14 mmol/L    Glucose 90 70 - 99 mg/dL    Urea Nitrogen 10 7 - 30 mg/dL    Creatinine 0.77 0.52 - 1.04 mg/dL    GFR Estimate >90  Non  GFR Calc   >60 mL/min/1.7m2    GFR Estimate If Black >90   GFR Calc   >60 mL/min/1.7m2    Calcium 8.3 (L) 8.5 - 10.1 mg/dL   Lipid Profile    Collection Time: 08/02/17  6:54 AM   Result Value Ref Range    Cholesterol 112 <200 mg/dL    Triglycerides 160 (H) <150 mg/dL    HDL Cholesterol 31 (L) >49 mg/dL    LDL Cholesterol  Calculated 49 <100 mg/dL    Non HDL Cholesterol 81 <130 mg/dL   Albumin level    Collection Time: 08/02/17  6:54 AM   Result Value Ref Range    Albumin 3.7 3.4 - 5.0 g/dL   Alkaline phosphatase    Collection Time: 08/02/17  6:54 AM   Result Value Ref Range    Alkaline Phosphatase 87 40 - 150 U/L   AST    Collection Time: 08/02/17  6:54 AM   Result Value Ref Range    AST 24 0 - 45 U/L   Iron    Collection Time: 08/02/17  6:54 AM   Result Value Ref Range    Iron 67 35 - 180 ug/dL   GGT    Collection Time: 08/02/17  6:54 AM   Result Value Ref Range    GGT 30 0 - 40 U/L   Hemoglobin A1c    Collection Time: 08/02/17  6:54 AM   Result Value Ref Range    Hemoglobin A1C 5.5 4.3 - 6.0 %   IgA    Collection Time: 08/02/17  6:54 AM   Result Value Ref Range     70 - 380 mg/dL   IgG    Collection Time: 08/02/17  6:54 AM   Result Value Ref Range     (L) 695 - 1620 mg/dL   IgM    Collection Time: 08/02/17  6:54 AM   Result Value Ref Range     60 - 265 mg/dL   INR    Collection Time: 08/02/17  6:54 AM   Result Value Ref Range    INR 1.10 0.86 - 1.14   Magnesium    Collection Time: 08/02/17  6:54 AM   Result Value Ref Range    Magnesium 2.0 1.6 - 2.3 mg/dL   Phosphorus    Collection Time: 08/02/17  6:54 AM   Result Value Ref Range    Phosphorus 4.2 2.5 - 4.5 mg/dL   Protein total    Collection Time: 08/02/17  6:54 AM   Result Value Ref Range    Protein Total 6.7 (L) 6.8 - 8.8 g/dL   TSH with free T4 reflex    Collection Time: 08/02/17  6:54 AM   Result Value Ref Range    TSH 1.66 0.40 - 4.00 mU/L   Vitamin D Deficiency    Collection Time: 08/02/17  6:54 AM   Result Value Ref Range    Vitamin D Deficiency screening 39 20 - 75 ug/L   CBC with platelets differential    Collection Time: 08/02/17  6:54 AM   Result Value Ref Range    WBC 5.6 4.0 - 11.0 10e9/L    RBC Count 5.15 3.8 - 5.2 10e12/L    Hemoglobin 13.9 11.7 - 15.7 g/dL    Hematocrit 43.3 35.0 - 47.0 %    MCV 84 78 - 100 fl    MCH 27.0 26.5 - 33.0 pg    MCHC  32.1 31.5 - 36.5 g/dL    RDW 12.2 10.0 - 15.0 %    Platelet Count 311 150 - 450 10e9/L    Diff Method Automated Method     % Neutrophils 45.0 %    % Lymphocytes 45.3 %    % Monocytes 9.0 %    % Eosinophils 0.0 %    % Basophils 0.5 %    % Immature Granulocytes 0.2 %    Nucleated RBCs 0 0 /100    Absolute Neutrophil 2.5 1.6 - 8.3 10e9/L    Absolute Lymphocytes 2.5 0.8 - 5.3 10e9/L    Absolute Monocytes 0.5 0.0 - 1.3 10e9/L    Absolute Eosinophils 0.0 0.0 - 0.7 10e9/L    Absolute Basophils 0.0 0.0 - 0.2 10e9/L    Abs Immature Granulocytes 0.0 0 - 0.4 10e9/L    Absolute Nucleated RBC 0.0    Erythrocyte sedimentation rate auto    Collection Time: 08/02/17  6:54 AM   Result Value Ref Range    Sed Rate 6 0 - 20 mm/h   Fungus Culture, non-blood    Collection Time: 08/02/17  6:55 AM   Result Value Ref Range    Specimen Description Sputum     Culture Micro Culture negative after 3 hours     Micro Report Status Pending    General PFT Lab (Please always keep checked)    Collection Time: 08/02/17  7:09 AM   Result Value Ref Range    FVC-Pred 3.55 L    FVC-Pre 3.56 L    FVC-%Pred-Pre 100 %    FEV1-Pre 3.10 L    FEV1-%Pred-Pre 100 %    FEV1FVC-Pred 87 %    FEV1FVC-Pre 87 %    FEFMax-Pred 6.61 L/sec    FEFMax-Pre 8.72 L/sec    FEFMax-%Pred-Pre 131 %    FEF2575-Pred 3.66 L/sec    FEF2575-Pre 3.85 L/sec    ZHG8300-%Pred-Pre 105 %    ExpTime-Pre 7.26 sec    FIFMax-Pre 4.38 L/sec    FEV1FEV6-Pred 86 %    FEV1FEV6-Pre 87 %       PFT: The spirometry is normal.  When compared to 6/5/17, the FEV1 and FVC have little change.

## 2017-08-02 NOTE — MR AVS SNAPSHOT
After Visit Summary   8/2/2017    Mamie Rice    MRN: [id number]           Patient Information     Date Of Birth          1995        Visit Information        Provider Department      8/2/2017 7:50 AM Gavi Allison MD [facility]        Today's Diagnoses     Cystic fibrosis with pulmonary manifestations (H)    -  1      Care Instructions    Cystic Fibrosis Self-Care Plan       MRN: [id number]   Clinic Date: August 2, 2017   Patient: Mamie Rice     Annual Studies:   IGG   Date Value Ref Range Status   05/04/2017 675 (L) 695 - 1620 mg/dL Final     Insulin   Date Value Ref Range Status   03/30/2015 81 mU/L Final     Comment:     Reference Range:  0-20     There are no preventive care reminders to display for this patient.      Pulmonary Function Tests  FEV1: amount of air you can blow out in 1 second  FVC: total amount of air you can take in and blow out    Your Goals:         PFT Latest Ref Rng & Units 8/2/2017   FVC L 3.56   FEV1 L 3.10   FVC% % 100   FEV1% % 100          Airway Clearance: The Most Important Way to Keep Your Lungs Healthy  Vest Settings:    Hill-Rom Frequencies: 8, 9, 10 Pressure 10 Then, Frequencies 18, 19, 20 Pressure 6      RespirTech: Quick Start with Pressure of     Do each frequency for 5 minutes; Deflate vest after each frequency & cough 3 times before beginning the next setting.    Vest and Neb Therapy should be done 2 times/day.    Good Nutrition Can Improve Lung Function and Overall Health     Take ALL of your vitamins with food     Take 1/2 of your enzymes before EVERY meal/snack and the other 1/2 mid-meal/snack    Wt Readings from Last 3 Encounters:   08/02/17 79.7 kg (175 lb 11.3 oz)   06/05/17 79.9 kg (176 lb 2.4 oz)   05/01/17 82.1 kg (181 lb)       Body mass index is 32.33 kg/(m^2).         National CF Foundation Recommendations for BMI in CF Adults: Women: at least 22 Men: at least 23        Controlling  Blood Sugars Helps Prevent Lung Infections & Improves Nutrition  Test blood sugar:     In the morning before eating (goal is )     2 hours after a meal (goal is less than 150)     When pre-meal glucose is greater than 150 add correction     At bedtime (if less than 100 eat a snack with 15 grams of carbohydrates  Last A1C Results:   Hemoglobin A1C   Date Value Ref Range Status   08/02/2017 5.5 4.3 - 6.0 % Final         If diabetic, measure A1C every 6 months. Goal: Under 7%    Staying Healthy    Research:  If you are interested in learning about research opportunities or have questions, please contact Mariana Smith at [phone] or [email or web address].      CF Foundation:  Compass is a personalized resource service to help you with the insurance, financial, legal and other issues you are facing.  It's free, confidential and available to anyone with CF.  Ask your  for more information or contact Compass directly at [facility] ([phone]) or compass@cff.org, or learn more at [email or web address].          RECOMMENDATIONS: Great job!  I am excited about your new job.  Hang in there with the  position.  I will Theresa call you about vitamins and cholesterol    YOUR GOAL:  Enjoy the rest of the summer.      CF Nurse Line:  Burton Anderson: [phone]   Jhoana Trinh, RT: [phone]     Amy Andino: [phone] or Theresa Ceja, Dieticians: [phone]   Audra Zhang, Diabetes Nurse: [phone]      Rupa Dunne: [phone] or Rosette Burnett [phone], Social Workers   [email or web address]                   Follow-ups after your visit        Follow-up notes from your care team     Return in about 3 months (around 11/2/2017).      Your next 10 appointments already scheduled     Aug 02, 2017  8:45 AM CDT   (Arrive by 8:30 AM)   XR CHEST 2 VIEWS with UCXR1   [facility] ([facility])    [address]  [id number] [phone]           Please bring a list of your current medicines to your exam. (Include vitamins, minerals and over-thecounter medicines.) Leave your valuables at home.  Tell your doctor if there is a chance you may be pregnant.  You do not need to do anything special for this exam.            Aug 02, 2017  9:00 AM CDT   DX HIP/PELVIS/SPINE with DX1   [facility] Dexa ([facility])    [address] [place] [phone]           Please do not take any of the following 24 hours prior to the day of your exam: vitamins, calcium tablets, antacids.  If possible, please wear clothes without metal (snaps, zippers). A sweatsuit works well.            Aug 02, 2017 10:15 AM CDT   (Arrive by 10:00 AM)   Return Visit with Mariela Haile MD   [facility] Ear Nose and Throat ([facility])    [address] [place] [id number] [phone]            Aug 14, 2017  8:30 AM CDT   LAB with  LAB   [facility] Lab ([facility])    [address] [place] [id number] [phone]           Patient must bring picture ID. Patient should be prepared to give a urine specimen  Please do not eat 10-12 hours before your appointment if you are coming in fasting for labs on lipids, cholesterol, or glucose (sugar). Pregnant women should follow their Care Team instructions. Water with medications is okay. Do not drink coffee or other fluids. If you have concerns about taking  your medications, please ask at office or if scheduling via [employer], send a message by clicking on Secure Messaging, Message Your Care Team.            Aug 14, 2017  8:45 AM CDT   (Arrive by 8:30 AM)   Return Visit with Paige Reid PA-C   [facility] Dermatology ([facility])    [address] [place] [phone]             Sep 11, 2017  7:55 AM CDT   (Arrive by 7:40 AM)   Return Allergy with Beau Marquez MD   [facility] ([facility])    [address] [place] [phone]           Do not take anti-histamines or Zantac for seven day prior to your appointment.            Nov 01, 2017  9:30 AM CDT   CF LOOP with UC PFL CF   [facility] Pulmonary Function Testing ([facility])    [address] [place] [address] [phone]            Nov 01, 2017  9:50 AM CDT   (Arrive by 9:35 AM)   RETURN CYSTIC FIBROSIS VISIT with Gavi Allison MD   [facility] ([facility])    [address] [place] [phone]              Future tests that were ordered for you today     Open Future Orders        Priority Expected Expires Ordered    Cystic Fibrosis Culture Aerob Bacterial Routine  8/2/2018 8/2/2017            Who to contact     If you have questions or need follow up information about today's clinic visit or your schedule please contact [facility] directly at [phone].  Normal or non-critical lab and imaging results will be communicated to you by MyChart, letter or phone within 4 business days after the clinic has received the results. If you do not hear from us within 7 days, please contact the clinic through MyChart or phone. If you have a critical or abnormal lab result, we will notify you by phone as soon as possible.  Submit refill requests through [email or web address] or call your pharmacy and they will forward the refill request to us. Please allow 3 business days for your refill to be completed.          Additional Information About Your Visit        [email or web address] Information     [email or web address] gives you secure access to your electronic health record. If you see a  primary care provider, you can also send messages to your care team and make appointments. If you have questions, please call your primary care clinic.  If you do not have a primary care provider, please call [phone] and they will assist you.        Care EveryWhere ID     This is your Care EveryWhere ID. This could be used by other organizations to access your [place] medical records  [id number]        Your Vitals Were     Pulse Respirations Pulse Oximetry BMI (Body Mass Index)          90 16 99% 32.33 kg/m2         Blood Pressure from Last 3 Encounters:   08/02/17 112/80   06/19/17 116/73   06/05/17 111/78    Weight from Last 3 Encounters:   08/02/17 79.7 kg (175 lb 11.3 oz)   06/05/17 79.9 kg (176 lb 2.4 oz)   05/01/17 82.1 kg (181 lb)              We Performed the Following     RESPIRATORY FLOW VOLUME LOOP          Today's Medication Changes          These changes are accurate as of: 8/2/17  8:04 AM.  If you have any questions, ask your nurse or doctor.               Stop taking these medicines if you haven't already. Please contact your care team if you have questions.     LUPRON DEPOT IM   Stopped by:  Gavi Allison MD           spironolactone 25 MG tablet   Commonly known as:  ALDACTONE   Stopped by:  Gavi Allison MD           spironolactone 50 MG tablet   Commonly known as:  ALDACTONE   Stopped by:  Gavi Allison MD                    Primary Care Provider Office Phone # Fax #    Malik De La Rosa [phone]       [address] [facility] [address]        Equal Access to Services     [facility]: Hadii martin good hadashsienna Soyesenia, waaxda luqadaha, qaybta kaalmada david mccarthy. So [facility] [phone].    ATENCIÓN: Si habla español, tiene a hidalgo disposición servicios gratuitos de asistencia lingüística. Llame al [phone].    We comply with applicable federal civil rights  laws and [place] laws. We do not discriminate on the basis of race, color, national origin, age, disability sex, sexual orientation or gender identity.            Thank you!     Thank you for choosing [facility]  for your care. Our goal is always to provide you with excellent care. Hearing back from our patients is one way we can continue to improve our services. Please take a few minutes to complete the written survey that you may receive in the mail after your visit with us. Thank you!             Your Updated Medication List - Protect others around you: Learn how to safely use, store and throw away your medicines at www.disposemymeds.org.          This list is accurate as of: 8/2/17  8:04 AM.  Always use your most recent med list.                   Brand Name Dispense Instructions for use Diagnosis    acetaminophen 325 MG tablet    TYLENOL    100 tablet    Take 2 tablets (650 mg) by mouth every 4 hours as needed for other (mild pain)    Chronic pain of left knee       acetylcysteine 20 % nebulizer solution    MUCOMYST    360 mL    INHALE ONE 4ML NEB INTO LUNGS VIA NEBULIZER TWO TIMES A DAY, MAY INCREASE TO 3-4 TIMES A DAY WITH INCREASE COUGH/COLD SYMPTOMS    CF (cystic fibrosis) (H)       adapalene 0.1 % cream    DIFFERIN    45 g    Apply topically At Bedtime After washing face    Acne vulgaris       ADDERALL PO      Take 20 mg by mouth daily.        * albuterol 108 (90 BASE) MCG/ACT Inhaler    PROAIR HFA/PROVENTIL HFA/VENTOLIN HFA    1 Inhaler    Inhale 2 puffs into the lungs every 6 hours as needed for shortness of breath / dyspnea or wheezing    Cystic fibrosis with pulmonary manifestations (H), Sinusitis, chronic, Diabetes mellitus type 1 (H)       * albuterol (2.5 MG/3ML) 0.083% neb solution     120 vial    Take 1 vial (2.5 mg) by nebulization 4 times daily    CF (cystic fibrosis) (H)       ascorbic acid 500 MG tablet    VITAMIN C    100 tablet    TAKE ONE TABLET BY MOUTH  TWICE A DAY    Cystic fibrosis with pulmonary manifestations (H)       azithromycin 500 MG tablet    ZITHROMAX    36 tablet    TAKE ONE TABLET BY MOUTH ON MONDAY, WEDNESDAY AND FRIDAY    CF (cystic fibrosis) (H)       beclomethasone 80 MCG/ACT Inhaler    QVAR    1 Inhaler    Inhale 1 puff into the lungs 2 times daily    Allergic rhinitis due to house dust mite       beta carotene 23637 UNIT capsule     36 capsule    Take 1 capsule (25,000 Units) by mouth Every Mon, Wed, Fri Morning    Cystic fibrosis with pulmonary manifestations (H)       blood glucose monitoring test strip    ACCU-CHEK SMARTVIEW    1 Month    Use to test blood sugar 4 times daily or as directed.    Type I (juvenile type) diabetes mellitus without mention of complication, not stated as uncontrolled       buPROPion 150 MG 12 hr tablet    WELLBUTRIN SR     Take 150 mg by mouth 2 times daily        cetirizine 10 MG tablet    zyrTEC    30 tablet    Take 1 tablet (10 mg) by mouth every evening    Allergic rhinitis due to [employer] house dust mite, Urticaria, chronic       cholecalciferol 1000 UNIT tablet    vitamin D    30 tablet    TAKE ONE TABLET BY MOUTH EVERY DAY    CF (cystic fibrosis) (H), Exocrine pancreatic insufficiency       DEPO-PROVERA IM           GLYCOPYRROLATE PO      Take 1 mg by mouth 2 times daily        hydrOXYzine 25 MG tablet    ATARAX    30 tablet    Take 1 tablet (25 mg) by mouth every 6 hours as needed for itching (and nausea)    Chronic pain of left knee       ISOtretinoin 40 MG capsule    ACCUTANE    30 capsule    Take 1 capsule (40 mg) by mouth daily    Acne vulgaris       LORATADINE PO      Take 10 mg by mouth        MEPHYTON 5 MG tablet   Generic drug:  phytonadione     4 tablet    TAKE 1 TABLET BY MOUTH ONCE A WEEK    Cystic fibrosis with pulmonary manifestations (H), Cystic fibrosis with pulmonary manifestations (H), Aspergillosis (H), Pancreatic insufficiency       meropenem 500 MG vial    MERREM    4 mL    500 mg by Nasal  Instillation route as needed        methylcellulose (laxative) Powd    CITRUCEL    479 g    Start with 1 heaping tablespoon. Increase as needed, 1 heaping tablespoon at a time, up to 3 times per day.    Other constipation       montelukast 10 MG tablet    SINGULAIR    30 tablet    TAKE ONE TABLET BY MOUTH ONCE DAILY AT BEDTIME    CF (cystic fibrosis) (H)       MULTIVITAMIN PO      Take 1 tablet by mouth daily.        omeprazole 20 MG CR capsule    priLOSEC    60 capsule    TAKE 1 CAPSULE BY MOUTH TWICE A DAY    CF (cystic fibrosis) (H)       phentermine 15 MG capsule     60 capsule    Take 2 capsules (30 mg) by mouth every morning    Non morbid obesity due to excess calories       PROZAC 40 MG capsule   Generic drug:  FLUoxetine      Take 80 mg by mouth daily.    Cystic fibrosis with pulmonary manifestations (H)       TRAZODONE HCL PO      Take 100 mg by mouth At Bedtime        vitamin E 400 UNIT capsule     60 capsule    TAKE 1 CAPSULE BY MOUTH TWICE A DAY    CF (cystic fibrosis) (H), Pancreatic insufficiency       * Notice:  This list has 2 medication(s) that are the same as other medications prescribed for you. Read the directions carefully, and ask your doctor or other care provider to review them with you.

## 2017-08-02 NOTE — NURSING NOTE
Chief Complaint   Patient presents with     Procedure     Meropenum injection     Jacky Rodriguez LPN

## 2017-08-03 LAB
IGE SERPL-ACNC: 35 KIU/L (ref 0–114)
TESTOST SERPL-MCNC: 10 NG/DL (ref 8–60)

## 2017-08-03 ASSESSMENT — ENCOUNTER SYMPTOMS
JOINT SWELLING: 0
EXERCISE INTOLERANCE: 0
DIARRHEA: 0
LEG PAIN: 0
BLOOD IN STOOL: 0
NUMBNESS: 0
HYPERTENSION: 0
RESPIRATORY PAIN: 0
PANIC: 0
PARALYSIS: 0
BREAST MASS: 0
WEIGHT GAIN: 0
SLEEP DISTURBANCES DUE TO BREATHING: 0
SORE THROAT: 1
FATIGUE: 0
MUSCLE WEAKNESS: 0
SWOLLEN GLANDS: 0
HEADACHES: 0
DECREASED LIBIDO: 0
SHORTNESS OF BREATH: 0
BOWEL INCONTINENCE: 0
WEIGHT LOSS: 0
LOSS OF CONSCIOUSNESS: 0
NECK MASS: 0
WEAKNESS: 0
POLYPHAGIA: 0
MEMORY LOSS: 0
VOMITING: 0
SYNCOPE: 0
INCREASED ENERGY: 0
STIFFNESS: 0
NECK PAIN: 0
POLYDIPSIA: 0
WHEEZING: 0
HOT FLASHES: 0
BACK PAIN: 0
COUGH: 0
TACHYCARDIA: 0
LIGHT-HEADEDNESS: 0
TINGLING: 0
RECTAL BLEEDING: 0
POOR WOUND HEALING: 0
MYALGIAS: 0
DYSPNEA ON EXERTION: 0
LEG SWELLING: 0
HEARTBURN: 0
SMELL DISTURBANCE: 0
EYE IRRITATION: 0
NIGHT SWEATS: 0
DIFFICULTY URINATING: 0
TASTE DISTURBANCE: 0
DECREASED APPETITE: 0
SPUTUM PRODUCTION: 0
SINUS PAIN: 1
NAUSEA: 0
EXTREMITY NUMBNESS: 0
POSTURAL DYSPNEA: 0
ALTERED TEMPERATURE REGULATION: 0
DEPRESSION: 0
CONSTIPATION: 0
HALLUCINATIONS: 0
NERVOUS/ANXIOUS: 0
FEVER: 0
EYE REDNESS: 0
EYE PAIN: 0
MUSCLE CRAMPS: 0
EYE WATERING: 0
NAIL CHANGES: 0
CLAUDICATION: 0
PALPITATIONS: 0
SPEECH CHANGE: 0
DECREASED CONCENTRATION: 0
HOARSE VOICE: 0
DIZZINESS: 0
COUGH DISTURBING SLEEP: 0
SINUS CONGESTION: 1
SNORES LOUDLY: 0
HEMOPTYSIS: 0
DYSURIA: 0
FLANK PAIN: 0
SEIZURES: 0
ABDOMINAL PAIN: 0
INSOMNIA: 0
DOUBLE VISION: 0
ARTHRALGIAS: 0
TROUBLE SWALLOWING: 0
CHILLS: 0
ORTHOPNEA: 0
BLOATING: 0
BREAST PAIN: 0
BRUISES/BLEEDS EASILY: 0
DISTURBANCES IN COORDINATION: 0
SKIN CHANGES: 0
RECTAL PAIN: 0
HEMATURIA: 0
JAUNDICE: 0
TREMORS: 0
HYPOTENSION: 0

## 2017-08-04 LAB
ACID FAST STN SPEC QL: NORMAL
MICRO REPORT STATUS: NORMAL
SPECIMEN SOURCE: NORMAL

## 2017-08-05 LAB
A-TOCOPHEROL VIT E SERPL-MCNC: 7.7
ANNOTATION COMMENT IMP: NORMAL
BETA+GAMMA TOCOPHEROL SERPL-MCNC: 0.3 MG/DL
RETINYL PALMITATE SERPL-MCNC: 0.03 UG/ML
VIT A SERPL-MCNC: 0.55 UG/ML

## 2017-08-05 NOTE — PROGRESS NOTES
Mamie is doing well, no new complaints.  She is here for Merem instillation into her sinuses for CRS related to CF.  This has helped her in the past with reducing infections.    Exam:  Rigid endoscopy is used for visualization to place 2 cc of merem with saline into the maxillary antrum.  No crusting or polyps seen, moderate scar tissue.    A/P:  Mamie has stable sinus issues. We will continue current management.

## 2017-08-08 LAB
BACTERIA SPEC CULT: ABNORMAL
MICRO REPORT STATUS: ABNORMAL
MICROORGANISM SPEC CULT: ABNORMAL
MICROORGANISM SPEC CULT: ABNORMAL
SPECIMEN SOURCE: ABNORMAL

## 2017-08-14 ENCOUNTER — OFFICE VISIT (OUTPATIENT)
Dept: DERMATOLOGY | Facility: CLINIC | Age: 22
End: 2017-08-14

## 2017-08-14 DIAGNOSIS — K13.0 CHEILITIS: Primary | ICD-10-CM

## 2017-08-14 DIAGNOSIS — L70.0 ACNE VULGARIS: ICD-10-CM

## 2017-08-14 DIAGNOSIS — Z79.899 ON ISOTRETINOIN THERAPY: ICD-10-CM

## 2017-08-14 LAB — HCG UR QL: NEGATIVE

## 2017-08-14 RX ORDER — ISOTRETINOIN 40 MG/1
80 CAPSULE ORAL DAILY
Qty: 60 CAPSULE | Refills: 0 | Status: SHIPPED | OUTPATIENT
Start: 2017-08-14 | End: 2017-09-13

## 2017-08-14 RX ORDER — MUPIROCIN 20 MG/G
OINTMENT TOPICAL
Qty: 22 G | Refills: 3 | Status: SHIPPED | OUTPATIENT
Start: 2017-08-14 | End: 2017-11-01

## 2017-08-14 NOTE — LETTER
"8/14/2017       RE: Mamie Rice  [address] [facility] [id number]     Dear Colleague,    Thank you for referring your patient, Mamie Rice, to the [facility] DERMATOLOGY at [facility]. Please see a copy of my visit note below.    [facility] Dermatology Note    Dermatology Problem List:  1. Acne vulgaris s/p benzoyl peroxide facial wash, stopped due to skin irritation  - clindamycin 1% lotion, adapalene 0.1% cream, spironolactone 50 mg bid  2. Cheilitis  - mupirocin 2% ointment   3. CF  4. DM Type II    Encounter Date: Aug 14, 2017    CC:   Chief Complaint   Patient presents with     Derm Problem     Accutane follow up, Mamie states \" It is better but my chin is rough.\"     History of Present Illness:  This 21 year old female presents as a follow up for acne. Today the patients that there has been some improvement, but there has been some acne lesions on her chin. She has had less breakouts overall, first week did have worse. She notes that her CF is stable and doing well. Her knee is painful, but this was an issue before she started the medication. The patient denies any nosebleeds, irritated dry eyes, headaches with blurred vision, muscle or joint aches, changes in bowel habits, depressive thoughts, sharing medication, or donating blood. The patient reports no other lesions of concern at this time.     Otherwise, the patient reports no painful, bleeding, nonhealing, or pruritic lesions, and denies new or changing moles.     Past Medical History:   Patient Active Problem List   Diagnosis     Hip joint pain     Aspergillosis (H)     Chronic maxillary sinusitis     Hypoglycemia     Type 1 diabetes mellitus (H)     Cystic fibrosis with pulmonary manifestations (H)     Chronic constipation     Frontal sinusitis     Major depression     ADHD (attention deficit hyperactivity disorder)     Back pain     Pancreatic insufficiency     Non morbid obesity " due to excess calories     Acne vulgaris     Cystic fibrosis (H)     Insomnia     Major depressive disorder with single episode     Past Medical History:   Diagnosis Date     ADHD (attention deficit hyperactivity disorder)      Anxiety      Aspergillosis, with pneumonia (H)     fugus found caused chest pain     Chronic infection     CF, MRSA.,      Chronic sinusitis      Constipation, chronic      Cystic fibrosis with pulmonary manifestations (H) 12/19/2011     Cystic fibrosis without mention of meconium ileus     SWEAT TEST:Date: 2/17/1994   Laboratory: [facility]  296 mg, 104 mmol/L ClSample #2  295 mg, 104 mmol/L Cl GENOTYPING:Date: 10/15/2007,  Laboratory: [facility]: [id number]     Depressive disorder      Diabetes     no meds currently     Dysthymic disorder      Exocrine pancreatic insufficiency      Gastro-oesophageal reflux disease      Hip pain, right      MRSA (methicillin resistant Staphylococcus aureus) carrier      Pancreatic disease      Past Surgical History:   Procedure Laterality Date     ARTHROSCOPY HIP, OSTEOPLASTY FEMUR PROXIMAL, COMBINED  3/11/2013    Procedure: COMBINED ARTHROSCOPY HIP, OSTEOPLASTY FEMUR PROXIMAL;  Right Hip Arthroscopy, Labral  Debridement.    surgeon request choice anesthesia/admit to [facility] after surgery;  Surgeon: Omkar Austin MD;  [facility]     ARTHROSCOPY KNEE WITH MEDIAL MENISCECTOMY Left 1/31/2017    Procedure: ARTHROSCOPY KNEE WITH MEDIAL MENISCECTOMY;  Surgeon: Jethro Coyle MD;  [facility]     bronchoscopies       BRONCHOSCOPY       EXAM UNDER ANESTHESIA ANUS N/A 5/10/2016    Procedure: EXAM UNDER ANESTHESIA ANUS;  Surgeon: Chet Gaviria MD;  [facility]     EXAM UNDER ANESTHESIA, RESTORATIONS, EXTRACTION(S) DENTAL COMPLEX, COMBINED  5/13/2013    Procedure: COMBINED EXAM UNDER ANESTHESIA, RESTORATIONS, EXTRACTION(S) DENTAL COMPLEX;  Dental Exam, Radiographs, Restorations. Single Extraction  Tooth #2.  Restorations x 3;  Surgeon: Danilo Ortiz DDS;  [facility]     HC KNEE SCOPE, DIAGNOSTIC      Arthroscopy, Knee- left     INJECT BOTOX N/A 5/10/2016    Procedure: INJECT BOTOX;  Surgeon: Chet Gaviria MD;  [facility]     left hip labral tear  5/11/2011    left hip arthroscopy with labral debridement and synovectomy     meniscus repair       OPTICAL TRACKING SYSTEM ENDOSCOPIC SINUS SURGERY  10/14/2011    Procedure:OPTICAL TRACKING SYSTEM ENDOSCOPIC SINUS SURGERY; FESS (functional endoscopic sinus surgery) with Image Guidance, bronchial lavage and cultures; Surgeon:GOYO KUO; [facility]     OPTICAL TRACKING SYSTEM ENDOSCOPIC SINUS SURGERY  5/18/2012    Procedure:OPTICAL TRACKING SYSTEM ENDOSCOPIC SINUS SURGERY; Right  and Left Image Guided Functional Endoscopic Sinus Surgery With  Frontal Approach, [employer]; Surgeon:GOYO KUO; [facility]     OPTICAL TRACKING SYSTEM ENDOSCOPIC SINUS SURGERY  9/26/2012    Procedure: OPTICAL TRACKING SYSTEM ENDOSCOPIC SINUS SURGERY;  Stealth Guided Bilateral Functional Endoscopic Sinus Surgery *Latex Safe*;  Surgeon: Goyo Kuo MD;  [facility]     OPTICAL TRACKING SYSTEM ENDOSCOPIC SINUS SURGERY Bilateral 10/16/2015    Procedure: OPTICAL TRACKING SYSTEM ENDOSCOPIC SINUS SURGERY;  Surgeon: Mariela Haile MD;  [facility]     ORTHOPEDIC SURGERY      left hip tear repair 2010     SINUS SURGERY       Social History:  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 Former use of tanning beds (high school).     Family History:  There is a family history of presumed melanoma in the patient's grandmother.    Medications:  Current Outpatient Prescriptions   Medication Sig Dispense Refill     ISOtretinoin (ACCUTANE) 40 MG capsule Take 2 capsules (80 mg) by mouth daily 60 capsule 0     azithromycin (ZITHROMAX) 500 MG tablet TAKE ONE TABLET BY MOUTH ON MONDAY, WEDNESDAY AND FRIDAY 36 tablet 4     ascorbic acid (VITAMIN C)  500 MG tablet TAKE ONE TABLET BY MOUTH TWICE A  tablet 3     cetirizine (ZYRTEC) 10 MG tablet Take 1 tablet (10 mg) by mouth every evening 30 tablet 1     beclomethasone (QVAR) 80 MCG/ACT Inhaler Inhale 1 puff into the lungs 2 times daily 1 Inhaler 3     MEPHYTON 5 MG tablet TAKE 1 TABLET BY MOUTH ONCE A WEEK 4 tablet 11     phentermine 15 MG capsule Take 2 capsules (30 mg) by mouth every morning 60 capsule 4     hydrOXYzine (ATARAX) 25 MG tablet Take 1 tablet (25 mg) by mouth every 6 hours as needed for itching (and nausea) 30 tablet 0     acetaminophen (TYLENOL) 325 MG tablet Take 2 tablets (650 mg) by mouth every 4 hours as needed for other (mild pain) 100 tablet 0     acetylcysteine (MUCOMYST) 20 % injection INHALE ONE 4ML NEB INTO LUNGS VIA NEBULIZER TWO TIMES A DAY, MAY INCREASE TO 3-4 TIMES A DAY WITH INCREASE COUGH/COLD SYMPTOMS 360 mL 11     MedroxyPROGESTERone Acetate (DEPO-PROVERA IM)        buPROPion (WELLBUTRIN SR) 150 MG 12 hr tablet Take 150 mg by mouth 2 times daily       montelukast (SINGULAIR) 10 MG tablet TAKE ONE TABLET BY MOUTH ONCE DAILY AT BEDTIME 30 tablet 11     VITAMIN D3 1000 UNITS tablet TAKE ONE TABLET BY MOUTH EVERY DAY 30 tablet 11     adapalene (DIFFERIN) 0.1 % cream Apply topically At Bedtime After washing face 45 g 11     LORATADINE PO Take 10 mg by mouth       albuterol (2.5 MG/3ML) 0.083% nebulizer solution Take 1 vial (2.5 mg) by nebulization 4 times daily 120 vial 11     omeprazole (PRILOSEC) 20 MG capsule TAKE 1 CAPSULE BY MOUTH TWICE A DAY 60 capsule 11     vitamin E 400 UNIT capsule TAKE 1 CAPSULE BY MOUTH TWICE A DAY 60 capsule 11     beta carotene 74997 UNIT capsule Take 1 capsule (25,000 Units) by mouth Every Mon, Wed, Fri Morning 36 capsule 4     methylcellulose, laxative, (CITRUCEL) POWD Start with 1 heaping tablespoon. Increase as needed, 1 heaping tablespoon at a time, up to 3 times per day. 479 g 3     GLYCOPYRROLATE PO Take 1 mg by mouth 2 times daily         TRAZODONE HCL PO Take 100 mg by mouth At Bedtime        blood glucose (ACCU-CHEK SMARTVIEW) test strip Use to test blood sugar 4 times daily or as directed. 1 Month 12     albuterol (PROAIR HFA, PROVENTIL HFA, VENTOLIN HFA) 108 (90 BASE) MCG/ACT inhaler Inhale 2 puffs into the lungs every 6 hours as needed for shortness of breath / dyspnea or wheezing 1 Inhaler 12     meropenem (MERREM) 500 MG injection 500 mg by Nasal Instillation route as needed  4 mL 0     FLUoxetine (PROZAC) 40 MG capsule Take 80 mg by mouth daily.       Amphetamine-Dextroamphetamine (ADDERALL PO) Take 20 mg by mouth daily.       Multiple Vitamin (MULTIVITAMIN OR) Take 1 tablet by mouth daily.       Allergies   Allergen Reactions     Vancomycin Hives     Redmens skin rash      Review of Systems:  - As per HPI  - No headaches with vision changes  - No muscle aches or joint aches  - No stomach upsets, constipation, or diarrhea   - No depressive thoughts or significant mood changes    Physical exam:  BP 99/61 - last visit.  GEN: This is a well developed, well-nourished female in no acute distress, in a pleasant mood.      SKIN: Acne exam, which includes the face, neck, upper central chest, and upper central back was performed.  - Excoriated papules on lower face  - Chest and back clear  - Superficial fissuring on the bilateral labial commissures   - No other lesions of concern on areas examined.     Impression/Plan:  1. Acne vulgaris, acne excoriee - Resistant to hormonal therapy, appropriate oral and topical antibiotics and well as topical retinoids.    - Discussion of the risks and side effects of Accutane including but not limited to mucocutaneous dryness, arthralgias, myalgias, depression, suicidal ideation, headache, blurred vision, increase in liver function tests, increase in lipids, and teratogenic effects. Discussed need for two forms of contraception. The ipledgeprogram brochure was provided and the contents discussed with the patient.  The patient was counseled they cannot give blood while on Accutane. No personal or family history of inflammatory bowel disease or hypertriglyceridemia known to patient. Reviewed need to avoid alcohol on medication. Ipledge program consent was obtained.   - At this visit, we will increase to Accutane 80 mg daily. .Goal dose is 12,300 mg for 150 mg/kg dosing in this 82 kg patient. Recommend to take medication with food containing fat.  - Labs including CBC (nomal), CMP, fasting lipids (160) were within normal limits. Pregnancy test today is negative. The patients two forms of contraception are Depo and male latex condoms.   --Continue to hold your beta carotene.      2. Cheilitis  - Continue using lip moisturization.   - Start mupirocin 2% ointment - apply to cracks on sides of lips.    Total cumulative dose 1200 mg. Patient's I-pledge # is [id number]. The patient will stop all acne medications, when she starts the medication.     Follow-up in 30 days, earlier for new or changing lesions.     Staff Involved:  Scribe Disclosure:   I, Lupe Alcantara, am serving as a scribe to document services personally performed by Paige Reid PA-C, based on data collection and the provider's statements to me.    Provider Disclosure:   The documentation recorded by the scribe accurately reflects the services I personally performed and the decisions made by me.    All risks, benefits and alternatives were discussed with patient.  Patient is in agreement and understands the assessment and plan.  All questions were answered.  Sun Screen Education was given.   Return to Clinic in 1 month or sooner as needed.   Paige Reid PA-C   [facility] Dermatology Clinic

## 2017-08-14 NOTE — PROGRESS NOTES
"[facility] Dermatology Note    Dermatology Problem List:  1. Acne vulgaris s/p benzoyl peroxide facial wash, stopped due to skin irritation  - clindamycin 1% lotion, adapalene 0.1% cream, spironolactone 50 mg bid  2. Cheilitis  - mupirocin 2% ointment   3. CF  4. DM Type II    Encounter Date: Aug 14, 2017    CC:   Chief Complaint   Patient presents with     Derm Problem     Accutane follow up, Mamie states \" It is better but my chin is rough.\"     History of Present Illness:  This 21 year old female presents as a follow up for acne. Today the patients that there has been some improvement, but there has been some acne lesions on her chin. She has had less breakouts overall, first week did have worse. She notes that her CF is stable and doing well. Her knee is painful, but this was an issue before she started the medication. The patient denies any nosebleeds, irritated dry eyes, headaches with blurred vision, muscle or joint aches, changes in bowel habits, depressive thoughts, sharing medication, or donating blood. The patient reports no other lesions of concern at this time.     Otherwise, the patient reports no painful, bleeding, nonhealing, or pruritic lesions, and denies new or changing moles.     Past Medical History:   Patient Active Problem List   Diagnosis     Hip joint pain     Aspergillosis (H)     Chronic maxillary sinusitis     Hypoglycemia     Type 1 diabetes mellitus (H)     Cystic fibrosis with pulmonary manifestations (H)     Chronic constipation     Frontal sinusitis     Major depression     ADHD (attention deficit hyperactivity disorder)     Back pain     Pancreatic insufficiency     Non morbid obesity due to excess calories     Acne vulgaris     Cystic fibrosis (H)     Insomnia     Major depressive disorder with single episode     Past Medical History:   Diagnosis Date     ADHD (attention deficit hyperactivity disorder)      Anxiety      Aspergillosis, with pneumonia (H)     " fugus found caused chest pain     Chronic infection     CF, MRSA.,      Chronic sinusitis      Constipation, chronic      Cystic fibrosis with pulmonary manifestations (H) 12/19/2011     Cystic fibrosis without mention of meconium ileus     SWEAT TEST:Date: 2/17/1994   Laboratory: [facility]1  296 mg, 104 mmol/L ClSample #2  295 mg, 104 mmol/L Cl GENOTYPING:Date: 10/15/2007,  Laboratory: [facility]: [id number]     Depressive disorder      Diabetes     no meds currently     Dysthymic disorder      Exocrine pancreatic insufficiency      Gastro-oesophageal reflux disease      Hip pain, right      MRSA (methicillin resistant Staphylococcus aureus) carrier      Pancreatic disease      Past Surgical History:   Procedure Laterality Date     ARTHROSCOPY HIP, OSTEOPLASTY FEMUR PROXIMAL, COMBINED  3/11/2013    Procedure: COMBINED ARTHROSCOPY HIP, OSTEOPLASTY FEMUR PROXIMAL;  Right Hip Arthroscopy, Labral  Debridement.    surgeon request choice anesthesia/admit to [facility] after surgery;  Surgeon: Omkar Austin MD;  [facility]     ARTHROSCOPY KNEE WITH MEDIAL MENISCECTOMY Left 1/31/2017    Procedure: ARTHROSCOPY KNEE WITH MEDIAL MENISCECTOMY;  Surgeon: Jethro Coyle MD;  [facility]     bronchoscopies       BRONCHOSCOPY       EXAM UNDER ANESTHESIA ANUS N/A 5/10/2016    Procedure: EXAM UNDER ANESTHESIA ANUS;  Surgeon: Chet Gaviria MD;  [facility]     EXAM UNDER ANESTHESIA, RESTORATIONS, EXTRACTION(S) DENTAL COMPLEX, COMBINED  5/13/2013    Procedure: COMBINED EXAM UNDER ANESTHESIA, RESTORATIONS, EXTRACTION(S) DENTAL COMPLEX;  Dental Exam, Radiographs, Restorations. Single Extraction  Tooth #2. Restorations x 3;  Surgeon: Danilo Ortiz DDS;  [facility]     HC KNEE SCOPE, DIAGNOSTIC      Arthroscopy, Knee- left     INJECT BOTOX N/A 5/10/2016    Procedure: INJECT BOTOX;  Surgeon: Chet Gaviria MD;  [facility]     left hip labral tear  5/11/2011     left hip arthroscopy with labral debridement and synovectomy     meniscus repair       OPTICAL TRACKING SYSTEM ENDOSCOPIC SINUS SURGERY  10/14/2011    Procedure:OPTICAL TRACKING SYSTEM ENDOSCOPIC SINUS SURGERY; FESS (functional endoscopic sinus surgery) with Image Guidance, bronchial lavage and cultures; Surgeon:GOYO KUO; [facility]     OPTICAL TRACKING SYSTEM ENDOSCOPIC SINUS SURGERY  5/18/2012    Procedure:OPTICAL TRACKING SYSTEM ENDOSCOPIC SINUS SURGERY; Right  and Left Image Guided Functional Endoscopic Sinus Surgery With  Frontal Approach, [employer]; Surgeon:GOYO KUO; [facility]     OPTICAL TRACKING SYSTEM ENDOSCOPIC SINUS SURGERY  9/26/2012    Procedure: OPTICAL TRACKING SYSTEM ENDOSCOPIC SINUS SURGERY;  Stealth Guided Bilateral Functional Endoscopic Sinus Surgery *Latex Safe*;  Surgeon: Goyo Kuo MD;  [facility]     OPTICAL TRACKING SYSTEM ENDOSCOPIC SINUS SURGERY Bilateral 10/16/2015    Procedure: OPTICAL TRACKING SYSTEM ENDOSCOPIC SINUS SURGERY;  Surgeon: Mariela Haile MD;  [facility]     ORTHOPEDIC SURGERY      left hip tear repair 2010     SINUS SURGERY       Social History:  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     Family History:  There is a family history of presumed melanoma in the patient's grandmother.    Medications:  Current Outpatient Prescriptions   Medication Sig Dispense Refill     ISOtretinoin (ACCUTANE) 40 MG capsule Take 2 capsules (80 mg) by mouth daily 60 capsule 0     azithromycin (ZITHROMAX) 500 MG tablet TAKE ONE TABLET BY MOUTH ON MONDAY, WEDNESDAY AND FRIDAY 36 tablet 4     ascorbic acid (VITAMIN C) 500 MG tablet TAKE ONE TABLET BY MOUTH TWICE A  tablet 3     cetirizine (ZYRTEC) 10 MG tablet Take 1 tablet (10 mg) by mouth every evening 30 tablet 1     beclomethasone (QVAR) 80 MCG/ACT Inhaler Inhale 1 puff into the lungs 2 times daily 1 Inhaler 3     MEPHYTON 5 MG tablet  TAKE 1 TABLET BY MOUTH ONCE A WEEK 4 tablet 11     phentermine 15 MG capsule Take 2 capsules (30 mg) by mouth every morning 60 capsule 4     hydrOXYzine (ATARAX) 25 MG tablet Take 1 tablet (25 mg) by mouth every 6 hours as needed for itching (and nausea) 30 tablet 0     acetaminophen (TYLENOL) 325 MG tablet Take 2 tablets (650 mg) by mouth every 4 hours as needed for other (mild pain) 100 tablet 0     acetylcysteine (MUCOMYST) 20 % injection INHALE ONE 4ML NEB INTO LUNGS VIA NEBULIZER TWO TIMES A DAY, MAY INCREASE TO 3-4 TIMES A DAY WITH INCREASE COUGH/COLD SYMPTOMS 360 mL 11     MedroxyPROGESTERone Acetate (DEPO-PROVERA IM)        buPROPion (WELLBUTRIN SR) 150 MG 12 hr tablet Take 150 mg by mouth 2 times daily       montelukast (SINGULAIR) 10 MG tablet TAKE ONE TABLET BY MOUTH ONCE DAILY AT BEDTIME 30 tablet 11     VITAMIN D3 1000 UNITS tablet TAKE ONE TABLET BY MOUTH EVERY DAY 30 tablet 11     adapalene (DIFFERIN) 0.1 % cream Apply topically At Bedtime After washing face 45 g 11     LORATADINE PO Take 10 mg by mouth       albuterol (2.5 MG/3ML) 0.083% nebulizer solution Take 1 vial (2.5 mg) by nebulization 4 times daily 120 vial 11     omeprazole (PRILOSEC) 20 MG capsule TAKE 1 CAPSULE BY MOUTH TWICE A DAY 60 capsule 11     vitamin E 400 UNIT capsule TAKE 1 CAPSULE BY MOUTH TWICE A DAY 60 capsule 11     beta carotene 81110 UNIT capsule Take 1 capsule (25,000 Units) by mouth Every Mon, Wed, Fri Morning 36 capsule 4     methylcellulose, laxative, (CITRUCEL) POWD Start with 1 heaping tablespoon. Increase as needed, 1 heaping tablespoon at a time, up to 3 times per day. 479 g 3     GLYCOPYRROLATE PO Take 1 mg by mouth 2 times daily        TRAZODONE HCL PO Take 100 mg by mouth At Bedtime        blood glucose (ACCU-CHEK SMARTVIEW) test strip Use to test blood sugar 4 times daily or as directed. 1 Month 12     albuterol (PROAIR HFA, PROVENTIL HFA, VENTOLIN HFA) 108 (90 BASE) MCG/ACT inhaler Inhale 2 puffs into the lungs  every 6 hours as needed for shortness of breath / dyspnea or wheezing 1 Inhaler 12     meropenem (MERREM) 500 MG injection 500 mg by Nasal Instillation route as needed  4 mL 0     FLUoxetine (PROZAC) 40 MG capsule Take 80 mg by mouth daily.       Amphetamine-Dextroamphetamine (ADDERALL PO) Take 20 mg by mouth daily.       Multiple Vitamin (MULTIVITAMIN OR) Take 1 tablet by mouth daily.       Allergies   Allergen Reactions     Vancomycin Hives     Redmens skin rash      Review of Systems:  - As per HPI  - No headaches with vision changes  - No muscle aches or joint aches  - No stomach upsets, constipation, or diarrhea   - No depressive thoughts or significant mood changes    Physical exam:  BP 99/61 - last visit.  GEN: This is a well developed, well-nourished female in no acute distress, in a pleasant mood.      SKIN: Acne exam, which includes the face, neck, upper central chest, and upper central back was performed.  - Excoriated papules on lower face  - Chest and back clear  - Superficial fissuring on the bilateral labial commissures   - No other lesions of concern on areas examined.     Impression/Plan:  1. Acne vulgaris, acne excoriee - Resistant to hormonal therapy, appropriate oral and topical antibiotics and well as topical retinoids.    - Discussion of the risks and side effects of Accutane including but not limited to mucocutaneous dryness, arthralgias, myalgias, depression, suicidal ideation, headache, blurred vision, increase in liver function tests, increase in lipids, and teratogenic effects. Discussed need for two forms of contraception. The DeliveryCheetahedgeprogram brochure was provided and the contents discussed with the patient. The patient was counseled they cannot give blood while on Accutane. No personal or family history of inflammatory bowel disease or hypertriglyceridemia known to patient. Reviewed need to avoid alcohol on medication. [employer] program consent was obtained.   - At this visit, we will  increase to Accutane 80 mg daily. .Goal dose is 12,300 mg for 150 mg/kg dosing in this 82 kg patient. Recommend to take medication with food containing fat.  - Labs including CBC (nomal), CMP, fasting lipids (160) were within normal limits. Pregnancy test today is negative. The patients two forms of contraception are Depo and male latex condoms.   --Continue to hold your beta carotene.      2. Cheilitis  - Continue using lip moisturization.   - Start mupirocin 2% ointment - apply to cracks on sides of lips.    Total cumulative dose 1200 mg. Patient's I-pledge # is [id number]. The patient will stop all acne medications, when she starts the medication.     Follow-up in 30 days, earlier for new or changing lesions.     Staff Involved:  Scribe Disclosure:   ILupe, am serving as a scribe to document services personally performed by Paige Reid PA-C, based on data collection and the provider's statements to me.    Provider Disclosure:   The documentation recorded by the scribe accurately reflects the services I personally performed and the decisions made by me.    All risks, benefits and alternatives were discussed with patient.  Patient is in agreement and understands the assessment and plan.  All questions were answered.  Sun Screen Education was given.   Return to Clinic in 1 month or sooner as needed.   Paige Reid PA-C   [facility]

## 2017-08-14 NOTE — NURSING NOTE
"Dermatology Rooming Note    Mamie Rice's goals for this visit include:   Chief Complaint   Patient presents with     Derm Problem     Accutane follow up, Mamie states \" It is better but my chin is rough.\"     Valerie Ghosh LPN  "

## 2017-08-14 NOTE — MR AVS SNAPSHOT
After Visit Summary   8/14/2017    Mamie Rice    MRN: [id number]           Patient Information     Date Of Birth          1995        Visit Information        Provider Department      8/14/2017 9:15 AM Paige Reid PA-C [facility] Dermatology        Today's Diagnoses     Cheilitis    -  1    Acne vulgaris        On isotretinoin therapy           Follow-ups after your visit        Follow-up notes from your care team     Return in about 4 weeks (around 9/11/2017).      Your next 10 appointments already scheduled     Sep 06, 2017  3:30 PM CDT   (Arrive by 3:15 PM)   Return Visit with Mariela Haile MD   [facility] Ear Nose and Throat ([facility])    [address] [place] [address]  4th [facility] [id number] [phone]            Sep 11, 2017  7:55 AM CDT   (Arrive by 7:40 AM)   Return Allergy with Beau Marquez MD   [facility] ([facility])    [address] [place] [address]  3rd [facility] [id number] [phone]           Do not take anti-histamines or Zantac for seven day prior to your appointment.            Sep 13, 2017  7:15 AM CDT   LAB with  LAB   [facility] Lab ([facility])    [address] [place] [address]  1st [facility] [id number] [phone]           Patient must bring picture ID. Patient should be prepared to give a urine specimen  Please do not eat 10-12 hours before your appointment if you are coming in fasting for labs on lipids, cholesterol, or glucose (sugar). Pregnant women should follow their Care Team instructions. Water with medications is okay. Do not drink coffee or other fluids. If you have concerns about taking  your medications, please ask at office or if scheduling via [employer], send a message by clicking on Secure Messaging, Message Your Care Team.            Sep 13, 2017  9:30 AM CDT   (Arrive by 9:15 AM)   Return Visit with Paige  Diane Reid PA-C   [facility] Dermatology ([facility])    [address] [place] [id number]   [phone]            Sep 26, 2017  3:00 PM CDT   (Arrive by 2:45 PM)   Return Visit with Ranad Ho MD   [facility] Medical Weight Management ([facility])    [address] [place] [id number]   [phone]            Oct 11, 2017  9:15 AM CDT   (Arrive by 9:00 AM)   Return Visit with Mariela Haile MD   [facility] ([facility])    [address] [place] [address]   [phone]            Oct 16, 2017  4:15 PM CDT   (Arrive by 4:00 PM)   Return Visit with Paige Reid PA-C   [facility] Dermatology ([facility])    [address] [place] [id number]   [phone]            Nov 01, 2017  9:30 AM CDT   CF LOOP with  PFL CF   [facility] Pulmonary Function Testing ([facility])    [address] [place] [address]   [phone]            Nov 01, 2017  9:50 AM CDT   (Arrive by 9:35 AM)   RETURN CYSTIC FIBROSIS VISIT with Gavi Allison MD   [facility] ([facility])    [address] [place] [address]   [phone]            Nov 01, 2017 11:00 AM CDT   (Arrive by 10:45 AM)   Return Visit with Mariela Haile MD   [facility] ([facility])    [address] [place] [id number] [phone]              Who to contact     Please call your clinic at [phone] to:    Ask questions about your health    Make or cancel appointments    Discuss your medicines    Learn about your test results    Speak to your doctor   If you have compliments or concerns about an experience at your clinic,  or if you wish to file a complaint, please contact [facility] Patient Relations at [phone] or email us at [email or web address]         Additional Information About Your Visit        [employer] Information     [employer] gives you secure access to your electronic health record. If you see a primary care provider, you can also send messages to your care team and make appointments. If you have questions, please call your primary care clinic.  If you do not have a primary care provider, please call [phone] and they will assist you.      [employer] is an electronic gateway that provides easy, online access to your medical records. With [employer], you can request a clinic appointment, read your test results, renew a prescription or communicate with your care team.     To access your existing account, please contact your [facility] or call [phone] for assistance.        Care EveryWhere ID     This is your Care EveryWhere ID. This could be used by other organizations to access your [place] medical records  [id number]         Blood Pressure from Last 3 Encounters:   08/02/17 112/80   06/19/17 116/73   06/05/17 111/78    Weight from Last 3 Encounters:   08/02/17 80.3 kg (177 lb)   08/02/17 79.7 kg (175 lb 11.3 oz)   06/05/17 79.9 kg (176 lb 2.4 oz)                 Today's Medication Changes          These changes are accurate as of: 8/14/17 11:59 PM.  If you have any questions, ask your nurse or doctor.               Start taking these medicines.        Dose/Directions    mupirocin 2 % ointment   Commonly known as:  BACTROBAN   Used for:  Cheilitis   Started by:  Paige Reid PA-C        Use 2 times a day to affected area on the lips.   Quantity:  22 g   Refills:  3         These medicines have changed or have updated prescriptions.        Dose/Directions    ISOtretinoin 40 MG capsule   Commonly known as:  ACCUTANE   This may have changed:   how much to take   Used for:  Acne vulgaris   Changed by:  Paige Reid PA-C        Dose:  80 mg   Take 2 capsules (80 mg) by mouth daily   Quantity:  60 capsule   Refills:  0            Where to get your medicines      These medications were sent to [facility] [address] [address]  [address] [facility] [address]     Phone:  [phone]     ISOtretinoin 40 MG capsule    mupirocin 2 % ointment                Primary Care Provider Office Phone # Fax #    Malik De La Rosa [phone]       [address] [facility] [address]        Equal Access to Services     LUZMARIA MARTINEZ AH: Vinita yuo Soyesenia, waaxda luqadaha, qaybta kaalmada adeegyada, david rutledge . So [facility] [phone].    ATENCIÓN: Si habla español, tiene a hidalgo disposición servicios gratuitos de asistencia lingüística. Llame al [phone].    We comply with applicable federal civil rights laws and [place] laws. We do not discriminate on the basis of race, color, national origin, age, disability sex, sexual orientation or gender identity.            Thank you!     Thank you for choosing [facility] DERMAT[facility]  for your care. Our goal is always to provide you with excellent care. Hearing back from our patients is one way we can continue to improve our services. Please take a few minutes to complete the written survey that you may receive in the mail after your visit with us. Thank you!             Your Updated Medication List - Protect others around you: Learn how to safely use, store and throw away your medicines at www.disposemymeds.org.          This list is accurate as of: 8/14/17 11:59 PM.  Always use your most recent med list.                   Brand Name Dispense Instructions for use Diagnosis    acetaminophen 325 MG tablet    TYLENOL    100 tablet    Take 2 tablets (650 mg) by mouth  every 4 hours as needed for other (mild pain)    Chronic pain of left knee       acetylcysteine 20 % nebulizer solution    MUCOMYST    360 mL    INHALE ONE 4ML NEB INTO LUNGS VIA NEBULIZER TWO TIMES A DAY, MAY INCREASE TO 3-4 TIMES A DAY WITH INCREASE COUGH/COLD SYMPTOMS    CF (cystic fibrosis) (H)       adapalene 0.1 % cream    DIFFERIN    45 g    Apply topically At Bedtime After washing face    Acne vulgaris       ADDERALL PO      Take 20 mg by mouth daily.        * albuterol 108 (90 BASE) MCG/ACT Inhaler    PROAIR HFA/PROVENTIL HFA/VENTOLIN HFA    1 Inhaler    Inhale 2 puffs into the lungs every 6 hours as needed for shortness of breath / dyspnea or wheezing    Cystic fibrosis with pulmonary manifestations (H), Sinusitis, chronic, Diabetes mellitus type 1 (H)       * albuterol (2.5 MG/3ML) 0.083% neb solution     120 vial    Take 1 vial (2.5 mg) by nebulization 4 times daily    CF (cystic fibrosis) (H)       ascorbic acid 500 MG tablet    VITAMIN C    100 tablet    TAKE ONE TABLET BY MOUTH TWICE A DAY    Cystic fibrosis with pulmonary manifestations (H)       azithromycin 500 MG tablet    ZITHROMAX    36 tablet    TAKE ONE TABLET BY MOUTH ON MONDAY, WEDNESDAY AND FRIDAY    CF (cystic fibrosis) (H)       beclomethasone 80 MCG/ACT Inhaler    QVAR    1 Inhaler    Inhale 1 puff into the lungs 2 times daily    Allergic rhinitis due to house dust mite       beta carotene 96184 UNIT capsule     36 capsule    Take 1 capsule (25,000 Units) by mouth Every Mon, Wed, Fri Morning    Cystic fibrosis with pulmonary manifestations (H)       blood glucose monitoring test strip    ACCU-CHEK SMARTVIEW    1 Month    Use to test blood sugar 4 times daily or as directed.    Type I (juvenile type) diabetes mellitus without mention of complication, not stated as uncontrolled       buPROPion 150 MG 12 hr tablet    WELLBUTRIN SR     Take 150 mg by mouth 2 times daily        cetirizine 10 MG tablet    zyrTEC    30 tablet    Take 1 tablet  (10 mg) by mouth every evening    Allergic rhinitis due to [employer] house dust mite, Urticaria, chronic       cholecalciferol 1000 UNIT tablet    vitamin D    30 tablet    TAKE ONE TABLET BY MOUTH EVERY DAY    CF (cystic fibrosis) (H), Exocrine pancreatic insufficiency       DEPO-PROVERA IM           GLYCOPYRROLATE PO      Take 1 mg by mouth 2 times daily        hydrOXYzine 25 MG tablet    ATARAX    30 tablet    Take 1 tablet (25 mg) by mouth every 6 hours as needed for itching (and nausea)    Chronic pain of left knee       ISOtretinoin 40 MG capsule    ACCUTANE    60 capsule    Take 2 capsules (80 mg) by mouth daily    Acne vulgaris       LORATADINE PO      Take 10 mg by mouth        MEPHYTON 5 MG tablet   Generic drug:  phytonadione     4 tablet    TAKE 1 TABLET BY MOUTH ONCE A WEEK    Cystic fibrosis with pulmonary manifestations (H), Cystic fibrosis with pulmonary manifestations (H), Aspergillosis (H), Pancreatic insufficiency       meropenem 500 MG vial    MERREM    4 mL    500 mg by Nasal Instillation route as needed        methylcellulose (laxative) Powd    CITRUCEL    479 g    Start with 1 heaping tablespoon. Increase as needed, 1 heaping tablespoon at a time, up to 3 times per day.    Other constipation       montelukast 10 MG tablet    SINGULAIR    30 tablet    TAKE ONE TABLET BY MOUTH ONCE DAILY AT BEDTIME    CF (cystic fibrosis) (H)       MULTIVITAMIN PO      Take 1 tablet by mouth daily.        mupirocin 2 % ointment    BACTROBAN    22 g    Use 2 times a day to affected area on the lips.    Cheilitis       omeprazole 20 MG CR capsule    priLOSEC    60 capsule    TAKE 1 CAPSULE BY MOUTH TWICE A DAY    CF (cystic fibrosis) (H)       phentermine 15 MG capsule     60 capsule    Take 2 capsules (30 mg) by mouth every morning    Non morbid obesity due to excess calories       PROZAC 40 MG capsule   Generic drug:  FLUoxetine      Take 80 mg by mouth daily.    Cystic fibrosis with pulmonary manifestations (H)        TRAZODONE HCL PO      Take 100 mg by mouth At Bedtime        vitamin E 400 UNIT capsule     60 capsule    TAKE 1 CAPSULE BY MOUTH TWICE A DAY    CF (cystic fibrosis) (H), Pancreatic insufficiency       * Notice:  This list has 2 medication(s) that are the same as other medications prescribed for you. Read the directions carefully, and ask your doctor or other care provider to review them with you.

## 2017-08-20 ENCOUNTER — MYC REFILL (OUTPATIENT)
Dept: PULMONOLOGY | Facility: CLINIC | Age: 22
End: 2017-08-20

## 2017-08-20 DIAGNOSIS — J30.89 ALLERGIC RHINITIS DUE TO AMERICAN HOUSE DUST MITE: ICD-10-CM

## 2017-08-20 DIAGNOSIS — L50.8 URTICARIA, CHRONIC: ICD-10-CM

## 2017-08-21 RX ORDER — CETIRIZINE HYDROCHLORIDE 10 MG/1
10 TABLET ORAL EVERY EVENING
Qty: 30 TABLET | Refills: 1 | Status: SHIPPED | OUTPATIENT
Start: 2017-08-21 | End: 2017-11-01

## 2017-08-21 NOTE — TELEPHONE ENCOUNTER
Message from [employer]:  Original authorizing provider: Beau Connor MD    Mamie Rice would like a refill of the following medications:  cetirizine (ZYRTEC) 10 MG tablet [Beau Connor MD]    Preferred pharmacy: [facility] - [address]    Comment:      Medication renewals requested in this message routed to other providers:  phentermine 15 MG capsule [Alec Argueta MD]

## 2017-08-23 DIAGNOSIS — E84.9 CF (CYSTIC FIBROSIS) (H): ICD-10-CM

## 2017-08-23 DIAGNOSIS — K86.89 PANCREATIC INSUFFICIENCY: ICD-10-CM

## 2017-08-24 RX ORDER — VITAMIN E 268 MG
CAPSULE ORAL
Qty: 60 CAPSULE | Refills: 11 | Status: SHIPPED | OUTPATIENT
Start: 2017-08-24 | End: 2018-08-29

## 2017-08-26 DIAGNOSIS — E66.09 NON MORBID OBESITY DUE TO EXCESS CALORIES: ICD-10-CM

## 2017-08-26 RX ORDER — PHENTERMINE HYDROCHLORIDE 15 MG/1
30 CAPSULE ORAL EVERY MORNING
Qty: 60 CAPSULE | Refills: 1 | Status: SHIPPED | OUTPATIENT
Start: 2017-08-26 | End: 2017-09-26

## 2017-08-30 LAB
BACTERIA SPEC CULT: NORMAL
FUNGUS SPEC CULT: ABNORMAL
FUNGUS SPEC CULT: ABNORMAL
SPECIMEN SOURCE: ABNORMAL
SPECIMEN SOURCE: NORMAL

## 2017-09-05 DIAGNOSIS — E84.9 CF (CYSTIC FIBROSIS) (H): ICD-10-CM

## 2017-09-06 ENCOUNTER — OFFICE VISIT (OUTPATIENT)
Dept: OTOLARYNGOLOGY | Facility: CLINIC | Age: 22
End: 2017-09-06

## 2017-09-06 VITALS — BODY MASS INDEX: 32.02 KG/M2 | HEIGHT: 62 IN | WEIGHT: 174 LBS

## 2017-09-06 DIAGNOSIS — J32.0 CHRONIC MAXILLARY SINUSITIS: Primary | ICD-10-CM

## 2017-09-06 DIAGNOSIS — L98.9 SKIN LESION: ICD-10-CM

## 2017-09-06 DIAGNOSIS — E84.0 CYSTIC FIBROSIS WITH PULMONARY MANIFESTATIONS (H): ICD-10-CM

## 2017-09-06 ASSESSMENT — PAIN SCALES - GENERAL: PAINLEVEL: NO PAIN (0)

## 2017-09-06 NOTE — LETTER
9/6/2017       RE: Mamie Rice  [address] [place] [phone]     Dear Colleague,    Thank you for referring your patient, Mamie Rice, to the [facility] at [facility]. Please see a copy of my visit note below.    HISTORY OF PRESENT ILLNESS:  Mamie returns.  She has chronic sinusitis related to cystic fibrosis.  She states that her nose feels dry since starting Accutane.      PHYSICAL EXAMINATION:  She is examined.      PROCEDURE:  A 0-degree rigid endoscope is used to examine the middle meatus.  She undergoes meropenem instillation as it seems to reduce her incidents of infections.  Meropenem is instilled into her right maxillary antrum.  It is actually very clear with less inflammation than usual.  On the left, everything is really open.  No polyps or inflammation are noted so she may be responding somewhat even just to the Accutane.  The meropenem was instilled into the antrum on the left side as well.  She tolerates the procedure well.      ASSESSMENT AND PLAN:  I will see her back again in the next month or so for repeat procedure.         Again, thank you for allowing me to participate in the care of your patient.      Sincerely,    Mariela Haile MD

## 2017-09-06 NOTE — NURSING NOTE
Chief Complaint   Patient presents with     RECHECK     merrem injection     Jacky Rodriguez LPN

## 2017-09-06 NOTE — PROGRESS NOTES
HISTORY OF PRESENT ILLNESS:  Mamie returns.  She has chronic sinusitis related to cystic fibrosis.  She states that her nose feels dry since starting Accutane.      PHYSICAL EXAMINATION:  She is examined.      PROCEDURE:  A 0-degree rigid endoscope is used to examine the middle meatus.  She undergoes meropenem instillation as it seems to reduce her incidents of infections.  Meropenem is instilled into her right maxillary antrum.  It is actually very clear with less inflammation than usual.  On the left, everything is really open.  No polyps or inflammation are noted so she may be responding somewhat even just to the Accutane.  The meropenem was instilled into the antrum on the left side as well.  She tolerates the procedure well.      ASSESSMENT AND PLAN:  I will see her back again in the next month or so for repeat procedure.

## 2017-09-06 NOTE — MR AVS SNAPSHOT
After Visit Summary   9/6/2017    Mamie Rice    MRN: [id number]           Patient Information     Date Of Birth          1995        Visit Information        Provider Department      9/6/2017 3:30 PM Mariela Haile MD [facility] Ear Nose and Throat        Today's Diagnoses     Chronic maxillary sinusitis    -  1    Cystic fibrosis with pulmonary manifestations (H)        Skin lesion           Follow-ups after your visit        Your next 10 appointments already scheduled     Sep 11, 2017  7:55 AM CDT   (Arrive by 7:40 AM)   Return Allergy with Beau Marquez MD   [facility] ([facility])    [address] [place] [id number] [phone]           Do not take anti-histamines or Zantac for seven day prior to your appointment.            Sep 13, 2017  7:15 AM CDT   LAB with  LAB   [facility] ([facility])    [address] [place] [address] [id number] [phone]           Patient must bring picture ID. Patient should be prepared to give a urine specimen  Please do not eat 10-12 hours before your appointment if you are coming in fasting for labs on lipids, cholesterol, or glucose (sugar). Pregnant women should follow their Care Team instructions. Water with medications is okay. Do not drink coffee or other fluids. If you have concerns about taking  your medications, please ask at office or if scheduling via [employer], send a message by clicking on Secure Messaging, Message Your Care Team.            Sep 13, 2017  9:30 AM CDT   (Arrive by 9:15 AM)   Return Visit with Paige Reid PA-C   [facility] Dermatology ([facility])    [address] [place] [address] [id number] [phone]            Sep 26, 2017  3:00 PM CDT   (Arrive by 2:45 PM)   Return Visit with Randa Ho MD   [facility] Weight  Management ([facility])    [address] [address]   [phone]            Oct 11, 2017  9:15 AM CDT   (Arrive by 9:00 AM)   Return Visit with Mariela Haile MD   [facility]ose [facility] Throat ([facility])    [address] [place] [id number]   [phone]            Oct 16, 2017  4:15 PM CDT   (Arrive by 4:00 PM)   Return Visit with Paige Reid PA-C   [facility] Dermatology ([facility])    [address] [place] [id number]   [phone]            Nov 01, 2017  9:30 AM CDT   CF LOOP with  PFL CF   [facility] Pulmonary Function Testing ([facility])    [address] [place] [id number]   [phone]            Nov 01, 2017  9:50 AM CDT   (Arrive by 9:35 AM)   RETURN CYSTIC FIBROSIS VISIT with Gavi Allison MD   [facility] ([facility])    [address] [place] [phone]   [phone]            Nov 01, 2017 11:00 AM CDT   (Arrive by 10:45 AM)   Return Visit with Mariela Haile MD   [facility] ([facility])    [address] [place] [id number] [phone]              Who to contact     Please call your clinic at [phone] to:    Ask questions about your health    Make or cancel appointments    Discuss your medicines    Learn about your test results    Speak to your doctor   If you have compliments or concerns about an experience at your clinic, or if you wish to file a complaint, please contact [facility] Patient Relations at [phone] or email us at [email or web address]         Additional Information About Your Visit        MyChart Information     [employer] gives you secure access to your  "electronic health record. If you see a primary care provider, you can also send messages to your care team and make appointments. If you have questions, please call your primary care clinic.  If you do not have a primary care provider, please call [phone] and they will assist you.      Outside[employer] is an electronic gateway that provides easy, online access to your medical records. With [employer], you can request a clinic appointment, read your test results, renew a prescription or communicate with your care team.     To access your existing account, please contact your [facility] or call [phone] for assistance.        Care EveryWhere ID     This is your Care EveryWhere ID. This could be used by other organizations to access your [facility] medical records  [id number]        Your Vitals Were     Height BMI (Body Mass Index)                1.57 m (5' 1.81\") 32.02 kg/m2           Blood Pressure from Last 3 Encounters:   08/02/17 112/80   06/19/17 116/73   06/05/17 111/78    Weight from Last 3 Encounters:   09/06/17 78.9 kg (174 lb)   08/02/17 80.3 kg (177 lb)   08/02/17 79.7 kg (175 lb 11.3 oz)              We Performed the Following     NASAL ENDOSCOPY, DIAGNOSTIC        Primary Care Provider Office Phone # Fax #    Malik De La Rosa [phone]       [address] [place] [address]        Equal Access to Services     [facility] : Hadii martin [place], waaxda luarmando, qaybta kaalmada zenyyaheron, david cardoso. So [facility] [phone].    ATENCIÓN: Si habla español, tiene a hidalgo disposición servicios gratuitos de asistencia lingüística. Llame al [phone].    We comply with applicable federal civil rights laws and [place] laws. We do not discriminate on the basis of race, color, national origin, age, disability sex, sexual orientation or gender identity.            Thank you!     Thank you " for choosing [facility]  for your care. Our goal is always to provide you with excellent care. Hearing back from our patients is one way we can continue to improve our services. Please take a few minutes to complete the written survey that you may receive in the mail after your visit with us. Thank you!             Your Updated Medication List - Protect others around you: Learn how to safely use, store and throw away your medicines at www.disposemymeds.org.          This list is accurate as of: 9/6/17 11:59 PM.  Always use your most recent med list.                   Brand Name Dispense Instructions for use Diagnosis    acetaminophen 325 MG tablet    TYLENOL    100 tablet    Take 2 tablets (650 mg) by mouth every 4 hours as needed for other (mild pain)    Chronic pain of left knee       acetylcysteine 20 % nebulizer solution    MUCOMYST    360 mL    INHALE ONE 4ML NEB INTO LUNGS VIA NEBULIZER TWO TIMES A DAY, MAY INCREASE TO 3-4 TIMES A DAY WITH INCREASE COUGH/COLD SYMPTOMS    CF (cystic fibrosis) (H)       adapalene 0.1 % cream    DIFFERIN    45 g    Apply topically At Bedtime After washing face    Acne vulgaris       ADDERALL PO      Take 20 mg by mouth daily.        * albuterol 108 (90 BASE) MCG/ACT Inhaler    PROAIR HFA/PROVENTIL HFA/VENTOLIN HFA    1 Inhaler    Inhale 2 puffs into the lungs every 6 hours as needed for shortness of breath / dyspnea or wheezing    Cystic fibrosis with pulmonary manifestations (H), Sinusitis, chronic, Diabetes mellitus type 1 (H)       * albuterol (2.5 MG/3ML) 0.083% neb solution     120 vial    Take 1 vial (2.5 mg) by nebulization 4 times daily    CF (cystic fibrosis) (H)       ascorbic acid 500 MG tablet    VITAMIN C    100 tablet    TAKE ONE TABLET BY MOUTH TWICE A DAY    Cystic fibrosis with pulmonary manifestations (H)       azithromycin 500 MG tablet    ZITHROMAX    36 tablet    TAKE ONE TABLET BY MOUTH ON MONDAY, WEDNESDAY AND FRIDAY    CF (cystic  fibrosis) (H)       beclomethasone 80 MCG/ACT Inhaler    QVAR    1 Inhaler    Inhale 1 puff into the lungs 2 times daily    Allergic rhinitis due to house dust mite       beta carotene 91346 UNIT capsule     36 capsule    Take 1 capsule (25,000 Units) by mouth Every Mon, Wed, Fri Morning    Cystic fibrosis with pulmonary manifestations (H)       blood glucose monitoring test strip    ACCU-CHEK SMARTVIEW    1 Month    Use to test blood sugar 4 times daily or as directed.    Type I (juvenile type) diabetes mellitus without mention of complication, not stated as uncontrolled       buPROPion 150 MG 12 hr tablet    WELLBUTRIN SR     Take 150 mg by mouth 2 times daily        cetirizine 10 MG tablet    zyrTEC    30 tablet    Take 1 tablet (10 mg) by mouth every evening    Allergic rhinitis due to [employer] house dust mite, Urticaria, chronic       cholecalciferol 1000 UNIT tablet    vitamin D    30 tablet    TAKE ONE TABLET BY MOUTH EVERY DAY    CF (cystic fibrosis) (H), Exocrine pancreatic insufficiency       DEPO-PROVERA IM           GLYCOPYRROLATE PO      Take 1 mg by mouth 2 times daily        hydrOXYzine 25 MG tablet    ATARAX    30 tablet    Take 1 tablet (25 mg) by mouth every 6 hours as needed for itching (and nausea)    Chronic pain of left knee       ISOtretinoin 40 MG capsule    ACCUTANE    60 capsule    Take 2 capsules (80 mg) by mouth daily    Acne vulgaris       LORATADINE PO      Take 10 mg by mouth        MEPHYTON 5 MG tablet   Generic drug:  phytonadione     4 tablet    TAKE 1 TABLET BY MOUTH ONCE A WEEK    Cystic fibrosis with pulmonary manifestations (H), Cystic fibrosis with pulmonary manifestations (H), Aspergillosis (H), Pancreatic insufficiency       meropenem 500 MG vial    MERREM    4 mL    500 mg by Nasal Instillation route as needed        methylcellulose (laxative) Powd    CITRUCEL    479 g    Start with 1 heaping tablespoon. Increase as needed, 1 heaping tablespoon at a time, up to 3 times per  day.    Other constipation       montelukast 10 MG tablet    SINGULAIR    30 tablet    TAKE ONE TABLET BY MOUTH ONCE DAILY AT BEDTIME    CF (cystic fibrosis) (H)       MULTIVITAMIN PO      Take 1 tablet by mouth daily.        mupirocin 2 % ointment    BACTROBAN    22 g    Use 2 times a day to affected area on the lips.    Cheilitis       omeprazole 20 MG CR capsule    priLOSEC    60 capsule    TAKE 1 CAPSULE BY MOUTH TWICE A DAY    CF (cystic fibrosis) (H)       phentermine 15 MG capsule     60 capsule    Take 2 capsules (30 mg) by mouth every morning    Non morbid obesity due to excess calories       PROZAC 40 MG capsule   Generic drug:  FLUoxetine      Take 80 mg by mouth daily.    Cystic fibrosis with pulmonary manifestations (H)       TRAZODONE HCL PO      Take 100 mg by mouth At Bedtime        vitamin E 400 UNIT capsule     60 capsule    TAKE 1 CAPSULE BY MOUTH TWICE A DAY    CF (cystic fibrosis) (H), Pancreatic insufficiency       * Notice:  This list has 2 medication(s) that are the same as other medications prescribed for you. Read the directions carefully, and ask your doctor or other care provider to review them with you.

## 2017-09-13 ENCOUNTER — OFFICE VISIT (OUTPATIENT)
Dept: DERMATOLOGY | Facility: CLINIC | Age: 22
End: 2017-09-13

## 2017-09-13 DIAGNOSIS — L70.0 ACNE VULGARIS: ICD-10-CM

## 2017-09-13 DIAGNOSIS — L70.0 ACNE VULGARIS: Primary | ICD-10-CM

## 2017-09-13 DIAGNOSIS — K13.0 CHEILITIS: ICD-10-CM

## 2017-09-13 DIAGNOSIS — E84.9 CF (CYSTIC FIBROSIS) (H): ICD-10-CM

## 2017-09-13 DIAGNOSIS — Z79.899 ON ISOTRETINOIN THERAPY: ICD-10-CM

## 2017-09-13 LAB
ALBUMIN SERPL-MCNC: 3.5 G/DL (ref 3.4–5)
ALP SERPL-CCNC: 97 U/L (ref 40–150)
ALT SERPL W P-5'-P-CCNC: 45 U/L (ref 0–50)
AST SERPL W P-5'-P-CCNC: 25 U/L (ref 0–45)
BASOPHILS # BLD AUTO: 0 10E9/L (ref 0–0.2)
BASOPHILS NFR BLD AUTO: 0.8 %
BILIRUB DIRECT SERPL-MCNC: <0.1 MG/DL (ref 0–0.2)
BILIRUB SERPL-MCNC: 0.5 MG/DL (ref 0.2–1.3)
DIFFERENTIAL METHOD BLD: ABNORMAL
EOSINOPHIL # BLD AUTO: 0 10E9/L (ref 0–0.7)
EOSINOPHIL NFR BLD AUTO: 0 %
ERYTHROCYTE [DISTWIDTH] IN BLOOD BY AUTOMATED COUNT: 12.2 % (ref 10–15)
HCG UR QL: NEGATIVE
HCT VFR BLD AUTO: 46.1 % (ref 35–47)
HGB BLD-MCNC: 14.8 G/DL (ref 11.7–15.7)
IMM GRANULOCYTES # BLD: 0 10E9/L (ref 0–0.4)
IMM GRANULOCYTES NFR BLD: 0.2 %
LYMPHOCYTES # BLD AUTO: 2.6 10E9/L (ref 0.8–5.3)
LYMPHOCYTES NFR BLD AUTO: 49 %
MCH RBC QN AUTO: 27 PG (ref 26.5–33)
MCHC RBC AUTO-ENTMCNC: 32.1 G/DL (ref 31.5–36.5)
MCV RBC AUTO: 84 FL (ref 78–100)
MONOCYTES # BLD AUTO: 0.6 10E9/L (ref 0–1.3)
MONOCYTES NFR BLD AUTO: 11.8 %
NEUTROPHILS # BLD AUTO: 2 10E9/L (ref 1.6–8.3)
NEUTROPHILS NFR BLD AUTO: 38.2 %
NRBC # BLD AUTO: 0 10*3/UL
NRBC BLD AUTO-RTO: 0 /100
PLATELET # BLD AUTO: 337 10E9/L (ref 150–450)
PROT SERPL-MCNC: 7.3 G/DL (ref 6.8–8.8)
RBC # BLD AUTO: 5.49 10E12/L (ref 3.8–5.2)
TRIGL SERPL-MCNC: 206 MG/DL
WBC # BLD AUTO: 5.3 10E9/L (ref 4–11)

## 2017-09-13 RX ORDER — ISOTRETINOIN 40 MG/1
80 CAPSULE ORAL DAILY
Qty: 60 CAPSULE | Refills: 0 | Status: SHIPPED | OUTPATIENT
Start: 2017-09-13 | End: 2017-10-16

## 2017-09-13 RX ORDER — ALBUTEROL SULFATE 0.83 MG/ML
SOLUTION RESPIRATORY (INHALATION)
Qty: 360 ML | Refills: 11 | Status: SHIPPED | OUTPATIENT
Start: 2017-09-13 | End: 2018-01-23

## 2017-09-13 ASSESSMENT — PAIN SCALES - GENERAL: PAINLEVEL: NO PAIN (0)

## 2017-09-13 NOTE — PROGRESS NOTES
[facility] Dermatology Note    Dermatology Problem List:  1. Acne vulgaris  - s/p BPO facial wash - stopped due to skin irritation, clindamycin 1% lotion, adapalene 0.1% cream, spironolactone 50 mg bid  - on accutane, I-pledge#: [id number]  2. Cheilitis  - mupirocin 2% ointment  3. CF  4. DM Type II    Encounter Date: Sep 13, 2017    CC:   Chief Complaint   Patient presents with     Derm Problem     Mamie Willis notes improvement of acne, does not note any negative medication side effects at this time.     History of Present Illness:  This 21 year old female presents as a follow up for acne. Today the patients that her skin has improved. Her skin is dry, but this is manageable. She has less breakouts which she is happy about. The last time she ate was last night and she had a bowl of cereal. She thinks that her diet is pretty healthy. The patient denies any nosebleeds, irritated dry eyes, headaches with blurred vision, muscle or joint aches, changes in bowel habits, depressive thoughts, sharing medication, or donating blood. The patient reports no other lesions of concern at this time.     Otherwise, the patient reports no painful, bleeding, nonhealing, or pruritic lesions, and denies new or changing moles.     Past Medical History:   Patient Active Problem List   Diagnosis     Hip joint pain     Aspergillosis (H)     Chronic maxillary sinusitis     Hypoglycemia     Type 1 diabetes mellitus (H)     Cystic fibrosis with pulmonary manifestations (H)     Chronic constipation     Frontal sinusitis     Major depression     ADHD (attention deficit hyperactivity disorder)     Back pain     Pancreatic insufficiency     Non morbid obesity due to excess calories     Acne vulgaris     Cystic fibrosis (H)     Insomnia     Major depressive disorder with single episode     Past Medical History:   Diagnosis Date     ADHD (attention deficit hyperactivity disorder)      Anxiety      Aspergillosis, with  pneumonia (H)     fugus found caused chest pain     Chronic infection     CF, MRSA.,      Chronic sinusitis      Constipation, chronic      Cystic fibrosis with pulmonary manifestations (H) 12/19/2011     Cystic fibrosis without mention of meconium ileus     SWEAT TEST:Date: 2/17/1994   Laboratory[facility]  296 mg, 104 mmol/L ClSample #2  295 mg, 104 mmol/L Cl GENOTYPING:Date: 10/15/2007,  Laboratory: [facility]: [id number]     Depressive disorder      Diabetes     no meds currently     Dysthymic disorder      Exocrine pancreatic insufficiency      Gastro-oesophageal reflux disease      Hip pain, right      MRSA (methicillin resistant Staphylococcus aureus) carrier      Pancreatic disease      Past Surgical History:   Procedure Laterality Date     ARTHROSCOPY HIP, OSTEOPLASTY FEMUR PROXIMAL, COMBINED  3/11/2013    Procedure: COMBINED ARTHROSCOPY HIP, OSTEOPLASTY FEMUR PROXIMAL;  Right Hip Arthroscopy, Labral  Debridement.    surgeon request choice anesthesia/admit to [facility] after surgery;  Surgeon: Omkar Austin MD;  [facility]: [facility] OR     ARTHROSCOPY KNEE WITH MEDIAL MENISCECTOMY Left 1/31/2017    Procedure: ARTHROSCOPY KNEE WITH MEDIAL MENISCECTOMY;  Surgeon: Jethro Coyle MD;  Location: [facility] OR     bronchoscopies       BRONCHOSCOPY       EXAM UNDER ANESTHESIA ANUS N/A 5/10/2016    Procedure: EXAM UNDER ANESTHESIA ANUS;  Surgeon: Chet Gaviria MD;  [facility]     EXAM UNDER ANESTHESIA, RESTORATIONS, EXTRACTION(S) DENTAL COMPLEX, COMBINED  5/13/2013    Procedure: COMBINED EXAM UNDER ANESTHESIA, RESTORATIONS, EXTRACTION(S) DENTAL COMPLEX;  Dental Exam, Radiographs, Restorations. Single Extraction  Tooth #2. Restorations x 3;  Surgeon: Danilo Ortiz DDS;  Location: [facility] OR     HC KNEE SCOPE, DIAGNOSTIC      Arthroscopy, Knee- left     INJECT BOTOX N/A 5/10/2016    Procedure: INJECT BOTOX;  Surgeon: Chet Gaviria MD;  [facility]     left hip labral  tear  5/11/2011    left hip arthroscopy with labral debridement and synovectomy     meniscus repair       OPTICAL TRACKING SYSTEM ENDOSCOPIC SINUS SURGERY  10/14/2011    Procedure:OPTICAL TRACKING SYSTEM ENDOSCOPIC SINUS SURGERY; FESS (functional endoscopic sinus surgery) with Image Guidance, bronchial lavage and cultures; Surgeon:GOYO KUO; Location:[facility] OR     OPTICAL TRACKING SYSTEM ENDOSCOPIC SINUS SURGERY  5/18/2012    Procedure:OPTICAL TRACKING SYSTEM ENDOSCOPIC SINUS SURGERY; Right  and Left Image Guided Functional Endoscopic Sinus Surgery With  Frontal Approach, [employer]; Surgeon:GOYO KUO; [facility] OR     OPTICAL TRACKING SYSTEM ENDOSCOPIC SINUS SURGERY  9/26/2012    Procedure: OPTICAL TRACKING SYSTEM ENDOSCOPIC SINUS SURGERY;  Stealth Guided Bilateral Functional Endoscopic Sinus Surgery *Latex Safe*;  Surgeon: Goyo Kuo MD;  [facility]     OPTICAL TRACKING SYSTEM ENDOSCOPIC SINUS SURGERY Bilateral 10/16/2015    Procedure: OPTICAL TRACKING SYSTEM ENDOSCOPIC SINUS SURGERY;  Surgeon: Mariela Haile MD;  [facility]     ORTHOPEDIC SURGERY      left hip tear repair 2010     SINUS SURGERY       Social History:  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 Former use of tanning beds (high school).     Family History:  There is a family history of presumed melanoma in the patient's grandmother.    Medications:  Current Outpatient Prescriptions   Medication Sig Dispense Refill     omeprazole (PRILOSEC) 20 MG CR capsule TAKE 1 CAPSULE BY MOUTH TWICE A DAY 60 capsule 11     phentermine 15 MG capsule Take 2 capsules (30 mg) by mouth every morning 60 capsule 1     vitamin E 400 UNIT capsule TAKE 1 CAPSULE BY MOUTH TWICE A DAY 60 capsule 11     cetirizine (ZYRTEC) 10 MG tablet Take 1 tablet (10 mg) by mouth every evening 30 tablet 1     ISOtretinoin (ACCUTANE) 40 MG capsule Take 2 capsules (80 mg) by mouth daily 60 capsule 0     mupirocin (BACTROBAN) 2 % ointment Use 2  times a day to affected area on the lips. 22 g 3     azithromycin (ZITHROMAX) 500 MG tablet TAKE ONE TABLET BY MOUTH ON MONDAY, WEDNESDAY AND FRIDAY 36 tablet 4     ascorbic acid (VITAMIN C) 500 MG tablet TAKE ONE TABLET BY MOUTH TWICE A  tablet 3     beclomethasone (QVAR) 80 MCG/ACT Inhaler Inhale 1 puff into the lungs 2 times daily 1 Inhaler 3     MEPHYTON 5 MG tablet TAKE 1 TABLET BY MOUTH ONCE A WEEK 4 tablet 11     hydrOXYzine (ATARAX) 25 MG tablet Take 1 tablet (25 mg) by mouth every 6 hours as needed for itching (and nausea) 30 tablet 0     acetaminophen (TYLENOL) 325 MG tablet Take 2 tablets (650 mg) by mouth every 4 hours as needed for other (mild pain) 100 tablet 0     acetylcysteine (MUCOMYST) 20 % injection INHALE ONE 4ML NEB INTO LUNGS VIA NEBULIZER TWO TIMES A DAY, MAY INCREASE TO 3-4 TIMES A DAY WITH INCREASE COUGH/COLD SYMPTOMS 360 mL 11     MedroxyPROGESTERone Acetate (DEPO-PROVERA IM)        buPROPion (WELLBUTRIN SR) 150 MG 12 hr tablet Take 150 mg by mouth 2 times daily       montelukast (SINGULAIR) 10 MG tablet TAKE ONE TABLET BY MOUTH ONCE DAILY AT BEDTIME 30 tablet 11     VITAMIN D3 1000 UNITS tablet TAKE ONE TABLET BY MOUTH EVERY DAY 30 tablet 11     adapalene (DIFFERIN) 0.1 % cream Apply topically At Bedtime After washing face 45 g 11     LORATADINE PO Take 10 mg by mouth       albuterol (2.5 MG/3ML) 0.083% nebulizer solution Take 1 vial (2.5 mg) by nebulization 4 times daily 120 vial 11     beta carotene 28965 UNIT capsule Take 1 capsule (25,000 Units) by mouth Every Mon, Wed, Fri Morning 36 capsule 4     methylcellulose, laxative, (CITRUCEL) POWD Start with 1 heaping tablespoon. Increase as needed, 1 heaping tablespoon at a time, up to 3 times per day. 479 g 3     GLYCOPYRROLATE PO Take 1 mg by mouth 2 times daily        TRAZODONE HCL PO Take 100 mg by mouth At Bedtime        blood glucose (ACCU-CHEK SMARTVIEW) test strip Use to test blood sugar 4 times daily or as directed. 1 Month  12     albuterol (PROAIR HFA, PROVENTIL HFA, VENTOLIN HFA) 108 (90 BASE) MCG/ACT inhaler Inhale 2 puffs into the lungs every 6 hours as needed for shortness of breath / dyspnea or wheezing 1 Inhaler 12     meropenem (MERREM) 500 MG injection 500 mg by Nasal Instillation route as needed  4 mL 0     FLUoxetine (PROZAC) 40 MG capsule Take 80 mg by mouth daily.       Amphetamine-Dextroamphetamine (ADDERALL PO) Take 20 mg by mouth daily.       Multiple Vitamin (MULTIVITAMIN OR) Take 1 tablet by mouth daily.       Allergies   Allergen Reactions     Vancomycin Hives     Redmens skin rash      Review of Systems:  - As per HPI  - No headaches with vision changes  - No muscle aches or joint aches  - No stomach upsets, constipation, or diarrhea   - No depressive thoughts or significant mood changes    Physical exam:  BP 99/61 - last visit.  GEN: This is a well developed, well-nourished female in no acute distress, in a pleasant mood.      SKIN: Acne exam, which includes the face, neck, upper central chest, and upper central back was performed.  - Excoriated papules to chin and lower face  - Cheeks and forehead are clear  - Superficial fissuring on labial commissures   - No other lesions of concern on areas examined.     Impression/Plan:  1. Acne vulgaris, acne excoriee - Resistant to hormonal therapy, appropriate oral and topical antibiotics and well as topical retinoids.    - Discussion of the risks and side effects of Accutane including but not limited to mucocutaneous dryness, arthralgias, myalgias, depression, suicidal ideation, headache, blurred vision, increase in liver function tests, increase in lipids, and teratogenic effects. Discussed need for two forms of contraception. The ipledgeprogram brochure was provided and the contents discussed with the patient. The patient was counseled they cannot give blood while on Accutane. No personal or family history of inflammatory bowel disease or hypertriglyceridemia known to  patient. Reviewed need to avoid alcohol on medication. Ipledge program consent was obtained.   - At this visit, continue Accutane 80 mg daily. Goal dose is 12,300 mg for 150 mg/kg dosing in this 82 kg patient. Recommend to take medication with food containing fat. The patients two forms of contraception are Depo and male latex condoms.  - Labs including CBC (nomal), CMP, fasting lipids (206, ate last night - bowl of cereal) were checked. Pregnancy test is negative.  - Recommend taking fish oil capsule once daily due to higher fasting lipids.   - Continue to hold beta carotene.    Total cumulative dose 3600 mg. Patient's I-pledge # is [id number]. The patient will stop all acne medications, when she starts the medication.     2. Cheilitis  - Continue using lip moisturization.   - Continue mupirocin 2% ointment - apply to cracks on sides of lips.    Follow-up in 30 days, earlier for new or changing lesions.     Staff Involved:  Scribe Disclosure:   ILupe, am serving as a scribe to document services personally performed by Paige Reid PA-C, based on data collection and the provider's statements to me.    Provider Disclosure:   The documentation recorded by the scribe accurately reflects the services I personally performed and the decisions made by me.    All risks, benefits and alternatives were discussed with patient.  Patient is in agreement and understands the assessment and plan.  All questions were answered.  Sun Screen Education was given.   Return to Clinic in 1 month or sooner as needed.   Paige Reid PA-C   [facility]

## 2017-09-13 NOTE — NURSING NOTE
Dermatology Rooming Note    Mamie Rice's goals for this visit include:   Chief Complaint   Patient presents with     Derm Problem     AccutaneMamie notes improvement of acne, does not note any negative medication side effects at this time.     Valerie Ghosh LPN

## 2017-09-13 NOTE — MR AVS SNAPSHOT
After Visit Summary   9/13/2017    Mamie Rice    MRN: [id number]           Patient Information     Date Of Birth          1995        Visit Information        Provider Department      9/13/2017 9:30 AM Paige Reid PA-C [facility] Dermatology        Today's Diagnoses     Acne vulgaris    -  1    On isotretinoin therapy        Cheilitis           Follow-ups after your visit        Follow-up notes from your care team     Return in about 4 weeks (around 10/11/2017).      Your next 10 appointments already scheduled     Sep 26, 2017  3:00 PM CDT   (Arrive by 2:45 PM)   Return Visit with Randa Ho MD   [facility] Medical Weight Management ([facility])    [address] [place] [address]   [phone]            Oct 11, 2017  9:15 AM CDT   (Arrive by 9:00 AM)   Return Visit with Mariela Haile MD   [facility] Ear Nose and Throat ([facility] [place])    [address] [place] [address]   [phone]            Oct 16, 2017  4:15 PM CDT   (Arrive by 4:00 PM)   Return Visit with Paige Reid PA-C   [facility] Dermatology ([facility])    [address] [place] [address]   [phone]            Nov 01, 2017  9:30 AM CDT   CF LOOP with  PFL CF   [facility] Pulmonary Function Testing ([facility])    [address] [place] [address]   [phone]            Nov 01, 2017  9:50 AM CDT   (Arrive by 9:35 AM)   RETURN CYSTIC FIBROSIS VISIT with Gavi Allison MD   [facility] ([facility] [place])    [address] [place] [address]   [phone]            Nov 01, 2017 11:00 AM CDT   (Arrive by 10:45 AM)   Return Visit with Mariela Haile MD   [facility] Th[facility] ([facility]  [place])    [address] [facility]  [address] [facility] [address] [phone]              Future tests that were ordered for you today     Open Future Orders        Priority Expected Expires Ordered    Triglycerides Routine 10/11/2017 9/13/2018 9/13/2017            Who to contact     Please call your clinic at [phone] to:    Ask questions about your health    Make or cancel appointments    Discuss your medicines    Learn about your test results    Speak to your doctor   If you have compliments or concerns about an experience at your clinic, or if you wish to file a complaint, please contact [facility] Patient Relations at [phone] or email us at [email or web address]         Additional Information About Your Visit        Matomy Moneyhar[employer]uki ([employer]ero/Geng[employer]) Information     Runteq gives you secure access to your electronic health record. If you see a primary care provider, you can also send messages to your care team and make appointments. If you have questions, please call your primary care clinic.  If you do not have a primary care provider, please call [phone] and they will assist you.      Runteq is an electronic gateway that provides easy, online access to your medical records. With Runteq, you can request a clinic appointment, read your test results, renew a prescription or communicate with your care team.     To access your existing account, please contact your [facility] or call [phone] for assistance.        Care EveryWhere ID     This is your Care EveryWhere ID. This could be used by other organizations to access your [place] medical records  [id number]         Blood Pressure from Last 3 Encounters:   08/02/17 112/80   06/19/17 116/73   06/05/17 111/78    Weight from Last 3 Encounters:   09/06/17 78.9 kg (174 lb)   08/02/17 80.3 kg (177 lb)   08/02/17 79.7 kg (175 lb 11.3 oz)              Today, you had the following     No orders found  for display         Where to get your medicines      These medications were sent to [facility] [id number] [place] [address] AT [address]  [address] [facility] [address]     Phone:  [phone]     ISOtretinoin 40 MG capsule          Primary Care Provider Office Phone # Fax #    Malik De La Rosa [phone]       [address]  [facility] [address]        Equal Access to Services     [facility]: Hadii aad ku hadasho Soomaali, waaxda luqadaha, qaybta kaalmada adeegyada, waxsilver rutledge . So [facility] [phone].    ATENCIÓN: Si habla español, tiene a hidalgo disposición servicios gratuitos de asistencia lingüística. [facility] [phone].    We comply with applicable federal civil rights laws and [place] laws. We do not discriminate on the basis of race, color, national origin, age, disability sex, sexual orientation or gender identity.            Thank you!     Thank you for choosing [facility] DERMATOLOGY  for your care. Our goal is always to provide you with excellent care. Hearing back from our patients is one way we can continue to improve our services. Please take a few minutes to complete the written survey that you may receive in the mail after your visit with us. Thank you!             Your Updated Medication List - Protect others around you: Learn how to safely use, store and throw away your medicines at www.disposemymeds.org.          This list is accurate as of: 9/13/17 11:33 AM.  Always use your most recent med list.                   Brand Name Dispense Instructions for use Diagnosis    acetaminophen 325 MG tablet    TYLENOL    100 tablet    Take 2 tablets (650 mg) by mouth every 4 hours as needed for other (mild pain)    Chronic pain of left knee       acetylcysteine 20 % nebulizer solution    MUCOMYST    360 mL    INHALE ONE 4ML NEB INTO LUNGS VIA NEBULIZER TWO TIMES A DAY, MAY  INCREASE TO 3-4 TIMES A DAY WITH INCREASE COUGH/COLD SYMPTOMS    CF (cystic fibrosis) (H)       adapalene 0.1 % cream    DIFFERIN    45 g    Apply topically At Bedtime After washing face    Acne vulgaris       ADDERALL PO      Take 20 mg by mouth daily.        * albuterol 108 (90 BASE) MCG/ACT Inhaler    PROAIR HFA/PROVENTIL HFA/VENTOLIN HFA    1 Inhaler    Inhale 2 puffs into the lungs every 6 hours as needed for shortness of breath / dyspnea or wheezing    Cystic fibrosis with pulmonary manifestations (H), Sinusitis, chronic, Diabetes mellitus type 1 (H)       * albuterol (2.5 MG/3ML) 0.083% neb solution     120 vial    Take 1 vial (2.5 mg) by nebulization 4 times daily    CF (cystic fibrosis) (H)       ascorbic acid 500 MG tablet    VITAMIN C    100 tablet    TAKE ONE TABLET BY MOUTH TWICE A DAY    Cystic fibrosis with pulmonary manifestations (H)       azithromycin 500 MG tablet    ZITHROMAX    36 tablet    TAKE ONE TABLET BY MOUTH ON MONDAY, WEDNESDAY AND FRIDAY    CF (cystic fibrosis) (H)       beclomethasone 80 MCG/ACT Inhaler    QVAR    1 Inhaler    Inhale 1 puff into the lungs 2 times daily    Allergic rhinitis due to house dust mite       beta carotene 36540 UNIT capsule     36 capsule    Take 1 capsule (25,000 Units) by mouth Every Mon, Wed, Fri Morning    Cystic fibrosis with pulmonary manifestations (H)       blood glucose monitoring test strip    ACCU-CHEK SMARTVIEW    1 Month    Use to test blood sugar 4 times daily or as directed.    Type I (juvenile type) diabetes mellitus without mention of complication, not stated as uncontrolled       buPROPion 150 MG 12 hr tablet    WELLBUTRIN SR     Take 150 mg by mouth 2 times daily        cetirizine 10 MG tablet    zyrTEC    30 tablet    Take 1 tablet (10 mg) by mouth every evening    Allergic rhinitis due to [employer] house dust mite, Urticaria, chronic       cholecalciferol 1000 UNIT tablet    vitamin D    30 tablet    TAKE ONE TABLET BY MOUTH EVERY DAY     CF (cystic fibrosis) (H), Exocrine pancreatic insufficiency       DEPO-PROVERA IM           GLYCOPYRROLATE PO      Take 1 mg by mouth 2 times daily        hydrOXYzine 25 MG tablet    ATARAX    30 tablet    Take 1 tablet (25 mg) by mouth every 6 hours as needed for itching (and nausea)    Chronic pain of left knee       ISOtretinoin 40 MG capsule    ACCUTANE    60 capsule    Take 2 capsules (80 mg) by mouth daily    Acne vulgaris       LORATADINE PO      Take 10 mg by mouth        MEPHYTON 5 MG tablet   Generic drug:  phytonadione     4 tablet    TAKE 1 TABLET BY MOUTH ONCE A WEEK    Cystic fibrosis with pulmonary manifestations (H), Cystic fibrosis with pulmonary manifestations (H), Aspergillosis (H), Pancreatic insufficiency       meropenem 500 MG vial    MERREM    4 mL    500 mg by Nasal Instillation route as needed        methylcellulose (laxative) Powd    CITRUCEL    479 g    Start with 1 heaping tablespoon. Increase as needed, 1 heaping tablespoon at a time, up to 3 times per day.    Other constipation       montelukast 10 MG tablet    SINGULAIR    30 tablet    TAKE ONE TABLET BY MOUTH ONCE DAILY AT BEDTIME    CF (cystic fibrosis) (H)       MULTIVITAMIN PO      Take 1 tablet by mouth daily.        mupirocin 2 % ointment    BACTROBAN    22 g    Use 2 times a day to affected area on the lips.    Cheilitis       omeprazole 20 MG CR capsule    priLOSEC    60 capsule    TAKE 1 CAPSULE BY MOUTH TWICE A DAY    CF (cystic fibrosis) (H)       phentermine 15 MG capsule     60 capsule    Take 2 capsules (30 mg) by mouth every morning    Non morbid obesity due to excess calories       PROZAC 40 MG capsule   Generic drug:  FLUoxetine      Take 80 mg by mouth daily.    Cystic fibrosis with pulmonary manifestations (H)       TRAZODONE HCL PO      Take 100 mg by mouth At Bedtime        vitamin E 400 UNIT capsule     60 capsule    TAKE 1 CAPSULE BY MOUTH TWICE A DAY    CF (cystic fibrosis) (H), Pancreatic insufficiency       *  Notice:  This list has 2 medication(s) that are the same as other medications prescribed for you. Read the directions carefully, and ask your doctor or other care provider to review them with you.

## 2017-09-13 NOTE — LETTER
9/13/2017       RE: Mamie Rice  [address] [facility] [address]     Dear Colleague,    Thank you for referring your patient, Mamie Rice, to the [facility] DERMATOLOGY at [facility]. Please see a copy of my visit note below.    [facility] Dermatology Note    Dermatology Problem List:  1. Acne vulgaris  - s/p BPO facial wash - stopped due to skin irritation, clindamycin 1% lotion, adapalene 0.1% cream, spironolactone 50 mg bid  - on accutane, I-pledge#: [id number]  2. Cheilitis  - mupirocin 2% ointment  3. CF  4. DM Type II    Encounter Date: Sep 13, 2017    CC:   Chief Complaint   Patient presents with     Derm Problem     Ronaldne Mamie notes improvement of acne, does not note any negative medication side effects at this time.     History of Present Illness:  This 21 year old female presents as a follow up for acne. Today the patients that her skin has improved. Her skin is dry, but this is manageable. She has less breakouts which she is happy about. The last time she ate was last night and she had a bowl of cereal. She thinks that her diet is pretty healthy. The patient denies any nosebleeds, irritated dry eyes, headaches with blurred vision, muscle or joint aches, changes in bowel habits, depressive thoughts, sharing medication, or donating blood. The patient reports no other lesions of concern at this time.     Otherwise, the patient reports no painful, bleeding, nonhealing, or pruritic lesions, and denies new or changing moles.     Past Medical History:   Patient Active Problem List   Diagnosis     Hip joint pain     Aspergillosis (H)     Chronic maxillary sinusitis     Hypoglycemia     Type 1 diabetes mellitus (H)     Cystic fibrosis with pulmonary manifestations (H)     Chronic constipation     Frontal sinusitis     Major depression     ADHD (attention deficit hyperactivity disorder)     Back pain     Pancreatic insufficiency     Non  morbid obesity due to excess calories     Acne vulgaris     Cystic fibrosis (H)     Insomnia     Major depressive disorder with single episode     Past Medical History:   Diagnosis Date     ADHD (attention deficit hyperactivity disorder)      Anxiety      Aspergillosis, with pneumonia (H)     fugus found caused chest pain     Chronic infection     CF, MRSA.,      Chronic sinusitis      Constipation, chronic      Cystic fibrosis with pulmonary manifestations (H) 12/19/2011     Cystic fibrosis without mention of meconium ileus     SWEAT TEST:Date: 2/17/1994   Laboratory: [facility]  296 mg, 104 mmol/L ClSample #2  295 mg, 104 mmol/L Cl GENOTYPING:Date: 10/15/2007,  Laboratory: [facility]: [id number]     Depressive disorder      Diabetes     no meds currently     Dysthymic disorder      Exocrine pancreatic insufficiency      Gastro-oesophageal reflux disease      Hip pain, right      MRSA (methicillin resistant Staphylococcus aureus) carrier      Pancreatic disease      Past Surgical History:   Procedure Laterality Date     ARTHROSCOPY HIP, OSTEOPLASTY FEMUR PROXIMAL, COMBINED  3/11/2013    Procedure: COMBINED ARTHROSCOPY HIP, OSTEOPLASTY FEMUR PROXIMAL;  Right Hip Arthroscopy, Labral  Debridement.    surgeon request choice anesthesia/admit to [facility] after surgery;  Surgeon: Omkar Austin MD;  [facility] OR     ARTHROSCOPY KNEE WITH MEDIAL MENISCECTOMY Left 1/31/2017    Procedure: ARTHROSCOPY KNEE WITH MEDIAL MENISCECTOMY;  Surgeon: Jethro Coyle MD;  Location: [facility] OR     bronchoscopies       BRONCHOSCOPY       EXAM UNDER ANESTHESIA ANUS N/A 5/10/2016    Procedure: EXAM UNDER ANESTHESIA ANUS;  Surgeon: Chet Gaviria MD;  [facility]     EXAM UNDER ANESTHESIA, RESTORATIONS, EXTRACTION(S) DENTAL COMPLEX, COMBINED  5/13/2013    Procedure: COMBINED EXAM UNDER ANESTHESIA, RESTORATIONS, EXTRACTION(S) DENTAL COMPLEX;  Dental Exam, Radiographs, Restorations. Single Extraction   Tooth #2. Restorations x 3;  Surgeon: Danilo Ortiz DDS;  Location: [facility] OR     HC KNEE SCOPE, DIAGNOSTIC      Arthroscopy, Knee- left     INJECT BOTOX N/A 5/10/2016    Procedure: INJECT BOTOX;  Surgeon: Chet Gaviria MD;  [facility]     left hip labral tear  5/11/2011    left hip arthroscopy with labral debridement and synovectomy     meniscus repair       OPTICAL TRACKING SYSTEM ENDOSCOPIC SINUS SURGERY  10/14/2011    Procedure:OPTICAL TRACKING SYSTEM ENDOSCOPIC SINUS SURGERY; FESS (functional endoscopic sinus surgery) with Image Guidance, bronchial lavage and cultures; Surgeon:GOYO KUO; Location:[facility] OR     OPTICAL TRACKING SYSTEM ENDOSCOPIC SINUS SURGERY  5/18/2012    Procedure:OPTICAL TRACKING SYSTEM ENDOSCOPIC SINUS SURGERY; Right  and Left Image Guided Functional Endoscopic Sinus Surgery With  Frontal Approach, [employer]; Surgeon:GOYO KUO; Location[facility] OR     OPTICAL TRACKING SYSTEM ENDOSCOPIC SINUS SURGERY  9/26/2012    Procedure: OPTICAL TRACKING SYSTEM ENDOSCOPIC SINUS SURGERY;  Stealth Guided Bilateral Functional Endoscopic Sinus Surgery *Latex Safe*;  Surgeon: Goyo Kuo MD;  [facility]     OPTICAL TRACKING SYSTEM ENDOSCOPIC SINUS SURGERY Bilateral 10/16/2015    Procedure: OPTICAL TRACKING SYSTEM ENDOSCOPIC SINUS SURGERY;  Surgeon: Mariela Haile MD;  [facility] OR     ORTHOPEDIC SURGERY      left hip tear repair 2010     SINUS SURGERY       Social History:  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 Former use of tanning beds (high school).     Family History:  There is a family history of presumed melanoma in the patient's grandmother.    Medications:  Current Outpatient Prescriptions   Medication Sig Dispense Refill     omeprazole (PRILOSEC) 20 MG CR capsule TAKE 1 CAPSULE BY MOUTH TWICE A DAY 60 capsule 11     phentermine 15 MG capsule Take 2 capsules (30 mg) by mouth every morning 60 capsule 1     vitamin E 400 UNIT capsule TAKE 1  CAPSULE BY MOUTH TWICE A DAY 60 capsule 11     cetirizine (ZYRTEC) 10 MG tablet Take 1 tablet (10 mg) by mouth every evening 30 tablet 1     ISOtretinoin (ACCUTANE) 40 MG capsule Take 2 capsules (80 mg) by mouth daily 60 capsule 0     mupirocin (BACTROBAN) 2 % ointment Use 2 times a day to affected area on the lips. 22 g 3     azithromycin (ZITHROMAX) 500 MG tablet TAKE ONE TABLET BY MOUTH ON MONDAY, WEDNESDAY AND FRIDAY 36 tablet 4     ascorbic acid (VITAMIN C) 500 MG tablet TAKE ONE TABLET BY MOUTH TWICE A  tablet 3     beclomethasone (QVAR) 80 MCG/ACT Inhaler Inhale 1 puff into the lungs 2 times daily 1 Inhaler 3     MEPHYTON 5 MG tablet TAKE 1 TABLET BY MOUTH ONCE A WEEK 4 tablet 11     hydrOXYzine (ATARAX) 25 MG tablet Take 1 tablet (25 mg) by mouth every 6 hours as needed for itching (and nausea) 30 tablet 0     acetaminophen (TYLENOL) 325 MG tablet Take 2 tablets (650 mg) by mouth every 4 hours as needed for other (mild pain) 100 tablet 0     acetylcysteine (MUCOMYST) 20 % injection INHALE ONE 4ML NEB INTO LUNGS VIA NEBULIZER TWO TIMES A DAY, MAY INCREASE TO 3-4 TIMES A DAY WITH INCREASE COUGH/COLD SYMPTOMS 360 mL 11     MedroxyPROGESTERone Acetate (DEPO-PROVERA IM)        buPROPion (WELLBUTRIN SR) 150 MG 12 hr tablet Take 150 mg by mouth 2 times daily       montelukast (SINGULAIR) 10 MG tablet TAKE ONE TABLET BY MOUTH ONCE DAILY AT BEDTIME 30 tablet 11     VITAMIN D3 1000 UNITS tablet TAKE ONE TABLET BY MOUTH EVERY DAY 30 tablet 11     adapalene (DIFFERIN) 0.1 % cream Apply topically At Bedtime After washing face 45 g 11     LORATADINE PO Take 10 mg by mouth       albuterol (2.5 MG/3ML) 0.083% nebulizer solution Take 1 vial (2.5 mg) by nebulization 4 times daily 120 vial 11     beta carotene 63873 UNIT capsule Take 1 capsule (25,000 Units) by mouth Every Mon, Wed, Fri Morning 36 capsule 4     methylcellulose, laxative, (CITRUCEL) POWD Start with 1 heaping tablespoon. Increase as needed, 1 heaping  tablespoon at a time, up to 3 times per day. 479 g 3     GLYCOPYRROLATE PO Take 1 mg by mouth 2 times daily        TRAZODONE HCL PO Take 100 mg by mouth At Bedtime        blood glucose (ACCU-CHEK SMARTVIEW) test strip Use to test blood sugar 4 times daily or as directed. 1 Month 12     albuterol (PROAIR HFA, PROVENTIL HFA, VENTOLIN HFA) 108 (90 BASE) MCG/ACT inhaler Inhale 2 puffs into the lungs every 6 hours as needed for shortness of breath / dyspnea or wheezing 1 Inhaler 12     meropenem (MERREM) 500 MG injection 500 mg by Nasal Instillation route as needed  4 mL 0     FLUoxetine (PROZAC) 40 MG capsule Take 80 mg by mouth daily.       Amphetamine-Dextroamphetamine (ADDERALL PO) Take 20 mg by mouth daily.       Multiple Vitamin (MULTIVITAMIN OR) Take 1 tablet by mouth daily.       Allergies   Allergen Reactions     Vancomycin Hives     Redmens skin rash      Review of Systems:  - As per HPI  - No headaches with vision changes  - No muscle aches or joint aches  - No stomach upsets, constipation, or diarrhea   - No depressive thoughts or significant mood changes    Physical exam:  BP 99/61 - last visit.  GEN: This is a well developed, well-nourished female in no acute distress, in a pleasant mood.      SKIN: Acne exam, which includes the face, neck, upper central chest, and upper central back was performed.  - Excoriated papules to chin and lower face  - Cheeks and forehead are clear  - Superficial fissuring on labial commissures   - No other lesions of concern on areas examined.     Impression/Plan:  1. Acne vulgaris, acne excoriee - Resistant to hormonal therapy, appropriate oral and topical antibiotics and well as topical retinoids.    - Discussion of the risks and side effects of Accutane including but not limited to mucocutaneous dryness, arthralgias, myalgias, depression, suicidal ideation, headache, blurred vision, increase in liver function tests, increase in lipids, and teratogenic effects. Discussed need  for two forms of contraception. The [employer] brochure was provided and the contents discussed with the patient. The patient was counseled they cannot give blood while on Accutane. No personal or family history of inflammatory bowel disease or hypertriglyceridemia known to patient. Reviewed need to avoid alcohol on medication. [employer] program consent was obtained.   - At this visit, continue Accutane 80 mg daily. Goal dose is 12,300 mg for 150 mg/kg dosing in this 82 kg patient. Recommend to take medication with food containing fat. The patients two forms of contraception are Depo and male latex condoms.  - Labs including CBC (nomal), CMP, fasting lipids (206, ate last night - bowl of cereal) were checked. Pregnancy test is negative.  - Recommend taking fish oil capsule once daily due to higher fasting lipids.   - Continue to hold beta carotene.    Total cumulative dose 3600 mg. Patient's I-pledge # is [id number]. The patient will stop all acne medications, when she starts the medication.     2. Cheilitis  - Continue using lip moisturization.   - Continue mupirocin 2% ointment - apply to cracks on sides of lips.    Follow-up in 30 days, earlier for new or changing lesions.     Staff Involved:  Scribe Disclosure:   I, Lupe Alcantara, am serving as a scribe to document services personally performed by Paige Reid PA-C, based on data collection and the provider's statements to me.    Provider Disclosure:   The documentation recorded by the scribe accurately reflects the services I personally performed and the decisions made by me.    All risks, benefits and alternatives were discussed with patient.  Patient is in agreement and understands the assessment and plan.  All questions were answered.  Sun Screen Education was given.   Return to Clinic in 1 month or sooner as needed.   Paige Reid PA-C   [facility]

## 2017-09-23 ASSESSMENT — ENCOUNTER SYMPTOMS
NECK PAIN: 0
FATIGUE: 0
MYALGIAS: 1
WEIGHT GAIN: 0
INCREASED ENERGY: 1
CHILLS: 0
NIGHT SWEATS: 1
STIFFNESS: 1
MUSCLE WEAKNESS: 1
WEIGHT LOSS: 0
ARTHRALGIAS: 1
FEVER: 0
BACK PAIN: 0
POLYDIPSIA: 1
JOINT SWELLING: 1
HALLUCINATIONS: 0
MUSCLE CRAMPS: 0
DECREASED APPETITE: 0
ALTERED TEMPERATURE REGULATION: 1
POLYPHAGIA: 0

## 2017-09-26 ENCOUNTER — OFFICE VISIT (OUTPATIENT)
Dept: ENDOCRINOLOGY | Facility: CLINIC | Age: 22
End: 2017-09-26

## 2017-09-26 VITALS
WEIGHT: 173.7 LBS | HEIGHT: 61 IN | OXYGEN SATURATION: 99 % | BODY MASS INDEX: 32.8 KG/M2 | SYSTOLIC BLOOD PRESSURE: 124 MMHG | DIASTOLIC BLOOD PRESSURE: 87 MMHG | HEART RATE: 94 BPM

## 2017-09-26 DIAGNOSIS — E66.09 NON MORBID OBESITY DUE TO EXCESS CALORIES: Primary | ICD-10-CM

## 2017-09-26 RX ORDER — NALTREXONE HYDROCHLORIDE 50 MG/1
TABLET, FILM COATED ORAL
Qty: 30 TABLET | Refills: 3 | Status: SHIPPED | OUTPATIENT
Start: 2017-09-26 | End: 2017-09-26

## 2017-09-26 RX ORDER — NALTREXONE HYDROCHLORIDE 50 MG/1
TABLET, FILM COATED ORAL
Qty: 30 TABLET | Refills: 3 | Status: SHIPPED | OUTPATIENT
Start: 2017-09-26 | End: 2018-05-01

## 2017-09-26 ASSESSMENT — ENCOUNTER SYMPTOMS
WHEEZING: 0
HEADACHES: 0
SWOLLEN GLANDS: 0
CLAUDICATION: 0
DIZZINESS: 0
DYSURIA: 0
BLOOD IN STOOL: 0
LOSS OF CONSCIOUSNESS: 0
RESPIRATORY PAIN: 0
BRUISES/BLEEDS EASILY: 0
WEAKNESS: 0
TREMORS: 0
DISTURBANCES IN COORDINATION: 0
PALPITATIONS: 0
SPEECH CHANGE: 0
MYALGIAS: 1
SORE THROAT: 0
EXTREMITY NUMBNESS: 0
EYE REDNESS: 0
TINGLING: 0
BLOATING: 0
BOWEL INCONTINENCE: 0
NIGHT SWEATS: 1
DECREASED LIBIDO: 0
FEVER: 0
DYSPNEA ON EXERTION: 0
ALTERED TEMPERATURE REGULATION: 1
EYE WATERING: 0
NAUSEA: 0
HOARSE VOICE: 0
JOINT SWELLING: 1
CHILLS: 0
HALLUCINATIONS: 0
ORTHOPNEA: 0
DECREASED APPETITE: 0
SINUS PAIN: 0
COUGH: 0
STIFFNESS: 1
BREAST MASS: 0
SEIZURES: 0
DIFFICULTY URINATING: 0
SKIN CHANGES: 0
JAUNDICE: 0
TACHYCARDIA: 0
ABDOMINAL PAIN: 0
TROUBLE SWALLOWING: 0
FLANK PAIN: 0
NECK MASS: 0
FATIGUE: 0
LEG SWELLING: 0
DECREASED CONCENTRATION: 0
POLYDIPSIA: 1
HEARTBURN: 0
PARALYSIS: 0
EYE IRRITATION: 0
MUSCLE WEAKNESS: 1
POOR WOUND HEALING: 0
ARTHRALGIAS: 1
HYPERTENSION: 0
POSTURAL DYSPNEA: 0
WEIGHT GAIN: 0
SPUTUM PRODUCTION: 0
INSOMNIA: 0
TASTE DISTURBANCE: 0
HOT FLASHES: 0
NERVOUS/ANXIOUS: 0
POLYPHAGIA: 0
INCREASED ENERGY: 1
WEIGHT LOSS: 0
RECTAL BLEEDING: 0
DOUBLE VISION: 0
MEMORY LOSS: 0
EYE PAIN: 0
PANIC: 0
NAIL CHANGES: 0
HEMOPTYSIS: 0
COUGH DISTURBING SLEEP: 0
CONSTIPATION: 0
SLEEP DISTURBANCES DUE TO BREATHING: 0
MUSCLE CRAMPS: 0
SHORTNESS OF BREATH: 0
DEPRESSION: 0
SYNCOPE: 0
BACK PAIN: 0
HEMATURIA: 0
EXERCISE INTOLERANCE: 0
SINUS CONGESTION: 0
NECK PAIN: 0
BREAST PAIN: 0
HYPOTENSION: 0
NUMBNESS: 0
SMELL DISTURBANCE: 0
SNORES LOUDLY: 0
LIGHT-HEADEDNESS: 0
DIARRHEA: 0
LEG PAIN: 0
VOMITING: 0
RECTAL PAIN: 0

## 2017-09-26 ASSESSMENT — PAIN SCALES - GENERAL: PAINLEVEL: NO PAIN (0)

## 2017-09-26 NOTE — PROGRESS NOTES
"    Return Medical Weight Management Note     Mamie Rice  MRN:  [id number]  :  1995  STEPHEN:  2017    Dear Dr. De La Rosa,     I had the pleasure of seeing your patient Mamie Rice.  She is a 22 year old female who I am continuing to see for treatment of obesity.  She has CF, CFRD, depression, joints pain, ADHD    INTERVAL History  Last seen  in 2017. She was started on phentermine and is currently on 30 mg daily. She said it helped at first but not much anymore. She tried to eat balanced diet but still mainly carbohydrate. Sometime, she eats to prevent hypoglycemia. She reported having BG in 60s. She is not on insulin.    She is also on Adderall for ADHD and Bupropion for depression.  She works as a , nanny and dance coach.    Diet recall:  BF: cereal or toast  Lunch: sandwich with cheese and ham or PB sandwich  Supper: meat, vegetable (corn, pea, carrot)  Snack: fruit snack.    CURRENT WEIGHT:   173 lbs 11.2 oz    Wt Readings from Last 4 Encounters:   17 78.8 kg (173 lb 11.2 oz)   17 78.9 kg (174 lb)   17 80.3 kg (177 lb)   17 79.7 kg (175 lb 11.3 oz)     Height:  5' 1\"  Body Mass Index:  Body mass index is 32.82 kg/(m^2).  Vitals:  B/P: 124/81, P: 100    Initial consult weight was 182 on 10/14/2016.  Weight change since last seen on 2017 is down 8pounds.   Total loss is 9 pounds.      Review of Systems     Constitutional:  Positive for night sweats and increased energy. Negative for fever, chills, weight loss, weight gain, fatigue, decreased appetite, recent stressors, height loss, post-operative complications, incisional pain, hallucinations, hyperactivity and confused.   HENT:  Negative for ear pain, hearing loss, tinnitus, nosebleeds, trouble swallowing, hoarse voice, mouth sores, sore throat, ear discharge, tooth pain, gum tenderness, taste disturbance, smell disturbance, hearing aid, bleeding gums, dry mouth, sinus pain, sinus " congestion and neck mass.    Eyes:  Negative for double vision, pain, redness, eye pain, decreased vision, eye watering, eye bulging, eye dryness, flashing lights, spots, floaters, strabismus, tunnel vision, jaundice and eye irritation.   Respiratory:   Negative for cough, hemoptysis, sputum production, shortness of breath, wheezing, sleep disturbances due to breathing, snores loudly, respiratory pain, dyspnea on exertion, cough disturbing sleep and postural dyspnea.    Cardiovascular:  Negative for chest pain, dyspnea on exertion, palpitations, orthopnea, claudication, leg swelling, fingers/toes turn blue, hypertension, hypotension, syncope, history of heart murmur, chest pain on exertion, chest pain at rest, pacemaker, few scattered varicosities, leg pain, sleep disturbances due to breathing, tachycardia, light-headedness, exercise intolerance and edema.   Gastrointestinal:  Negative for heartburn, nausea, vomiting, abdominal pain, diarrhea, constipation, blood in stool, melena, rectal pain, bloating, hemorrhoids, bowel incontinence, jaundice, rectal bleeding, coffee ground emesis and change in stool.   Genitourinary:  Negative for bladder incontinence, dysuria, urgency, hematuria, flank pain, vaginal discharge, difficulty urinating, genital sores, dyspareunia, decreased libido, nocturia, voiding less frequently, arousal difficulty, abnormal vaginal bleeding, excessive menstruation, menstrual changes, hot flashes, vaginal dryness and postmenopausal bleeding.   Musculoskeletal:  Positive for myalgias, joint swelling, arthralgias, stiffness and muscle weakness. Negative for back pain, muscle cramps, neck pain, bone pain and fracture.   Skin:  Negative for nail changes, itching, poor wound healing, rash, hair changes, skin changes, acne, warts, poor wound healing, scarring, flaky skin, Raynaud's phenomenon, sensitivity to sunlight and skin thickening.   Neurological:  Negative for dizziness, tingling, tremors, speech  change, seizures, loss of consciousness, weakness, light-headedness, numbness, headaches, disturbances in coordination, extremity numbness, memory loss, difficulty walking and paralysis.   Endo/Heme:  Negative for anemia, swollen glands and bruises/bleeds easily.   Psychiatric/Behavioral:  Negative for depression, hallucinations, memory loss, decreased concentration, mood swings and panic attacks.    Breast:  Negative for breast discharge, breast mass, breast pain and nipple retraction.   Endocrine:  Positive for altered temperature regulation and polydipsia.Negative for polyphagia, unwanted hair growth and change in facial hair.      MEDICATIONS:   Current Outpatient Prescriptions   Medication Sig Dispense Refill     albuterol (2.5 MG/3ML) 0.083% neb solution INHALE 1 NEB VIA NEBULIZER FOUR TIMES A  mL 11     ISOtretinoin (ACCUTANE) 40 MG capsule Take 2 capsules (80 mg) by mouth daily 60 capsule 0     omeprazole (PRILOSEC) 20 MG CR capsule TAKE 1 CAPSULE BY MOUTH TWICE A DAY 60 capsule 11     vitamin E 400 UNIT capsule TAKE 1 CAPSULE BY MOUTH TWICE A DAY 60 capsule 11     cetirizine (ZYRTEC) 10 MG tablet Take 1 tablet (10 mg) by mouth every evening 30 tablet 1     mupirocin (BACTROBAN) 2 % ointment Use 2 times a day to affected area on the lips. 22 g 3     azithromycin (ZITHROMAX) 500 MG tablet TAKE ONE TABLET BY MOUTH ON MONDAY, WEDNESDAY AND FRIDAY 36 tablet 4     ascorbic acid (VITAMIN C) 500 MG tablet TAKE ONE TABLET BY MOUTH TWICE A  tablet 3     beclomethasone (QVAR) 80 MCG/ACT Inhaler Inhale 1 puff into the lungs 2 times daily 1 Inhaler 3     MEPHYTON 5 MG tablet TAKE 1 TABLET BY MOUTH ONCE A WEEK 4 tablet 11     hydrOXYzine (ATARAX) 25 MG tablet Take 1 tablet (25 mg) by mouth every 6 hours as needed for itching (and nausea) 30 tablet 0     acetaminophen (TYLENOL) 325 MG tablet Take 2 tablets (650 mg) by mouth every 4 hours as needed for other (mild pain) 100 tablet 0     acetylcysteine  (MUCOMYST) 20 % injection INHALE ONE 4ML NEB INTO LUNGS VIA NEBULIZER TWO TIMES A DAY, MAY INCREASE TO 3-4 TIMES A DAY WITH INCREASE COUGH/COLD SYMPTOMS 360 mL 11     MedroxyPROGESTERone Acetate (DEPO-PROVERA IM)        buPROPion (WELLBUTRIN SR) 150 MG 12 hr tablet Take 150 mg by mouth 2 times daily       montelukast (SINGULAIR) 10 MG tablet TAKE ONE TABLET BY MOUTH ONCE DAILY AT BEDTIME 30 tablet 11     VITAMIN D3 1000 UNITS tablet TAKE ONE TABLET BY MOUTH EVERY DAY 30 tablet 11     adapalene (DIFFERIN) 0.1 % cream Apply topically At Bedtime After washing face 45 g 11     LORATADINE PO Take 10 mg by mouth       beta carotene 92459 UNIT capsule Take 1 capsule (25,000 Units) by mouth Every Mon, Wed, Fri Morning 36 capsule 4     methylcellulose, laxative, (CITRUCEL) POWD Start with 1 heaping tablespoon. Increase as needed, 1 heaping tablespoon at a time, up to 3 times per day. 479 g 3     GLYCOPYRROLATE PO Take 1 mg by mouth 2 times daily        TRAZODONE HCL PO Take 100 mg by mouth At Bedtime        blood glucose (ACCU-CHEK SMARTVIEW) test strip Use to test blood sugar 4 times daily or as directed. 1 Month 12     albuterol (PROAIR HFA, PROVENTIL HFA, VENTOLIN HFA) 108 (90 BASE) MCG/ACT inhaler Inhale 2 puffs into the lungs every 6 hours as needed for shortness of breath / dyspnea or wheezing 1 Inhaler 12     meropenem (MERREM) 500 MG injection 500 mg by Nasal Instillation route as needed  4 mL 0     FLUoxetine (PROZAC) 40 MG capsule Take 80 mg by mouth daily.       Amphetamine-Dextroamphetamine (ADDERALL PO) Take 20 mg by mouth daily.       Multiple Vitamin (MULTIVITAMIN OR) Take 1 tablet by mouth daily.         Weight Loss Medication History Reviewed With Patient 9/26/2017   Which weight loss medications are you currently taking on a regular basis?  None   If you are not taking a weight loss medication that was prescribed to you, please indicate why: Other   Are you having any side effects from the weight loss  medication that we have prescribed you? No     ASSESSMENT:    Mamie Rice is a 22 year old female who I am continuing to see for treatment of obesity.  She has CF, CFRD, depression, joints pain, ADHD    Responded initially with phentermine. However, she is also on Adderall so I will d/c phentermine and switch to naltrexone since she is already on bupropion.  Still eats heavily in carb.    PLAN:  -d/c phentermine  -start naltrexone 25-50 mg daily. She already on bupropion  -reinforced food plan - focus on no between meal snacking, aggressive lowering of starches and cheese, increase protein and vegetable    FOLLOW-UP:    RTC 3 months with Janna Rodriguez for med checked.  RTC 6 months with me      I spent a total of 15 minutes face-to-face with Mamie Rice during today's office visit. Over 50% of this time was spent counseling the patient and/or coordinating care regarding    Sincerely,    Randa Ho MD

## 2017-09-26 NOTE — MR AVS SNAPSHOT
"              After Visit Summary   9/26/2017    Mamie Rice    MRN: [id number]           Patient Information     Date Of Birth          1995        Visit Information        Provider Department      9/26/2017 3:00 PM Randa Ho MD [facility] Medical Weight Management        Today's Diagnoses     Non morbid obesity due to excess calories    -  1      Care Instructions    Follow up with HERMINIO Bowman in 3 months    Follow up with Dr. Tolentino in 6 months      MEDICATION STARTED AT THIS APPOINTMENT  We are starting Naltrexone. Start with 1/2 tab 1-2 hours prior to the time you have the most trouble with cravings or extra hunger. If you are doing well you may switch to a whole tablet taken at the same time period.     WARNING: This medication blocks the action of opioid type pain medications. If you routinely take any medication like Codeine, Oxycontin,Percocet,Morphine,Dilaudid or Methodone, do not take this until you have talked with weight management staff. If you are planning surgery you should stop Naltrexone 4 days prior to the surgery. If you have an injury that requires pain medication, make sure the health care staff knows you take Naltrexone.     Call the nurse at [phone] if you have any questions or concerns. (Do not stop taking it if you don't think it's working. For some people it works without them knowing it.)    Naltrexone is a medication that is used most often to help people who are troubled by dependence on prescription pain killers or alcohol. It has also been found to help with weight loss. Although it's not currently FDA approved for weight loss, it has been used safely for a number of years to help people who are carrying extra weight.     Just how Naltrexone helps with weight loss has not been exactly determined.  It seems to work by quieting down brain signals related to strong food cravings. Many of our patients use the word \"addiction\" to describe their feelings and " constant thoughts about food. It makes sense then to treat the feeling of dependence on food, outside of real hunger, with a medication designed to help with other sorts of dependence.     Our patients on Naltrexone find that they:    >feel less interest in food   >think less about food and eating and have more time to think of other things   >find it easier to push the plate away   >have an easier time eating less    For some of our patients, these feelings are very immediate. Other patients, don't feel much of a change but find they've lost weight. Like all weight loss medications, Naltrexone works best when you help it work. This means:  1. Having less tempting high calorie (fattening) food around the house or office. (For people with strong cravings this is very important.)   2. Staying away from situations or people that may trigger your cravings .   3. Eating out only one time or less each week.  4. Eating your meals at a table with the TV or computer off.    Side-effects. Naltrexone is generally well tolerated. The main side-effect we see is  nausea or a woozy feeling. A small number of people feel quite ill. Most people have a mild reaction and some people have no reaction at all.  The good news is that this feeling does go away.     In order to avoid nausea, please start the medication with half a pill for the first few days. Go on to a full pill if you are feeling well.      If you  are nauseated on 1/2 a pill it is okay to cut back to 1/4 pill ( a very small amount). Take this for a couple of days and work your way back up to a 1/2 pill and then a whole pill. Taking the medication at night or with food  to start also may help prevent the feeling of nausea.         Please refer to the pharmacy insert for more information on side-effects. Since many pharmacists are not familiar with the use of naltrexone in weight loss, calling the nurse at [phone] will get you the most accurate information.  In order  to get refills of this or any medication we prescribe you must be seen in the medical weight mgmt clinic every 2-3 months. Please have your pharmacy fax a refill request to [phone].                Follow-ups after your visit        Your next 10 appointments already scheduled     Oct 11, 2017  9:15 AM CDT   (Arrive by 9:00 AM)   Return Visit with Mariela Haile MD   [facility] Ear Nose [facility] Throat ([facility])    [address] [place] [id number] [phone]            Oct 16, 2017  4:15 PM CDT   (Arrive by 4:00 PM)   Return Visit with Paige Reid PA-C   [facility] Dermatology ([facility])    [address] [place] [id number]   [phone]            Nov 01, 2017  9:30 AM CDT   CF LOOP with UC PFL CF   [facility] Pulmonary Function Testing ([facility])    [address] [place] [id number]   [phone]            Nov 01, 2017  9:50 AM CDT   (Arrive by 9:35 AM)   RETURN CYSTIC FIBROSIS VISIT with Gavi Allison MD   [facility] ([facility])    [address] [place] [id number]   [phone]            Nov 01, 2017 11:00 AM CDT   (Arrive by 10:45 AM)   Return Visit with Mariela Haile MD   [facility] ([facility])    [address] [place] [id number] [phone]              Who to contact     Please call your clinic at [phone] to:    Ask questions about your health    Make or cancel appointments    Discuss your medicines    Learn about your test results    Speak to your doctor   If you have compliments or concerns about an experience at your clinic, or if you wish to file a complaint, please contact [facility] Patient Relations at [phone] or email us at  [email or web address]         Additional Information About Your Visit        [employer] Information     [employer] gives you secure access to your electronic health record. If you see a primary care provider, you can also send messages to your care team and make appointments. If you have questions, please call your primary care clinic.  If you do not have a primary care provider, please call [phone] and they will assist you.      [employer] is an electronic gateway that provides easy, online access to your medical records. With [employer], you can request a clinic appointment, read your test results, renew a prescription or communicate with your care team.     To access your existing account, please contact your [facility] or call [phone] for assistance.        Care EveryWhere ID     This is your Care EveryWhere ID. This could be used by other organizations to access your [place] medical records  [id number]        Your Vitals Were     Pulse Height Pulse Oximetry BMI (Body Mass Index)          94 1.549 m (5' 1\") 99% 32.82 kg/m2         Blood Pressure from Last 3 Encounters:   09/26/17 124/87   08/02/17 112/80   06/19/17 116/73    Weight from Last 3 Encounters:   09/26/17 78.8 kg (173 lb 11.2 oz)   09/06/17 78.9 kg (174 lb)   08/02/17 80.3 kg (177 lb)              Today, you had the following     No orders found for display         Today's Medication Changes          These changes are accurate as of: 9/26/17  3:19 PM.  If you have any questions, ask your nurse or doctor.               Start taking these medicines.        Dose/Directions    naltrexone 50 MG tablet   Commonly known as:  DEPADE;REVIA   Used for:  Non morbid obesity due to excess calories   Started by:  Randa Ho MD        Take 1/2 Tablet daily then increase to maximum of 1 Full Tablet daily as tolerated.  Time it one to two hours prior to worst cravings   Quantity:  30 tablet   Refills:  3       "   Stop taking these medicines if you haven't already. Please contact your care team if you have questions.     phentermine 15 MG capsule   Stopped by:  Randa Ho MD                Where to get your medicines      These medications were sent to [facility] [id number] [address]  [address] [facility] [address]     Phone:  [phone]     naltrexone 50 MG tablet                Primary Care Provider Office Phone # Fax #    Malik De La Rosa [phone]       [facility] [address] [address]  [facility] [address]        Equal Access to Services     [facility] : Hadii martin yuo Soyesenia, waaxda luqadaha, qaybta kaalmada adeegyada, david rutledge . So [facility] [phone].    ATENCIÓN: Si habla español, tiene a hidalgo disposición servicios gratuitos de asistencia lingüística. [facility] [phone].    We comply with applicable federal civil rights laws and [place] laws. We do not discriminate on the basis of race, color, national origin, age, disability sex, sexual orientation or gender identity.            Thank you!     Thank you for choosing [facility] MEDICAL WEIGHT MANAGEMENT  for your care. Our goal is always to provide you with excellent care. Hearing back from our patients is one way we can continue to improve our services. Please take a few minutes to complete the written survey that you may receive in the mail after your visit with us. Thank you!             Your Updated Medication List - Protect others around you: Learn how to safely use, store and throw away your medicines at www.disposemymeds.org.          This list is accurate as of: 9/26/17  3:19 PM.  Always use your most recent med list.                   Brand Name Dispense Instructions for use Diagnosis    acetaminophen 325 MG tablet    TYLENOL    100 tablet    Take 2 tablets (650 mg) by mouth every 4 hours as  needed for other (mild pain)    Chronic pain of left knee       acetylcysteine 20 % nebulizer solution    MUCOMYST    360 mL    INHALE ONE 4ML NEB INTO LUNGS VIA NEBULIZER TWO TIMES A DAY, MAY INCREASE TO 3-4 TIMES A DAY WITH INCREASE COUGH/COLD SYMPTOMS    CF (cystic fibrosis) (H)       adapalene 0.1 % cream    DIFFERIN    45 g    Apply topically At Bedtime After washing face    Acne vulgaris       ADDERALL PO      Take 20 mg by mouth daily.        * albuterol 108 (90 BASE) MCG/ACT Inhaler    PROAIR HFA/PROVENTIL HFA/VENTOLIN HFA    1 Inhaler    Inhale 2 puffs into the lungs every 6 hours as needed for shortness of breath / dyspnea or wheezing    Cystic fibrosis with pulmonary manifestations (H), Sinusitis, chronic, Diabetes mellitus type 1 (H)       * albuterol (2.5 MG/3ML) 0.083% neb solution     360 mL    INHALE 1 NEB VIA NEBULIZER FOUR TIMES A DAY    CF (cystic fibrosis) (H)       ascorbic acid 500 MG tablet    VITAMIN C    100 tablet    TAKE ONE TABLET BY MOUTH TWICE A DAY    Cystic fibrosis with pulmonary manifestations (H)       azithromycin 500 MG tablet    ZITHROMAX    36 tablet    TAKE ONE TABLET BY MOUTH ON MONDAY, WEDNESDAY AND FRIDAY    CF (cystic fibrosis) (H)       beclomethasone 80 MCG/ACT Inhaler    QVAR    1 Inhaler    Inhale 1 puff into the lungs 2 times daily    Allergic rhinitis due to house dust mite       beta carotene 12907 UNIT capsule     36 capsule    Take 1 capsule (25,000 Units) by mouth Every Mon, Wed, Fri Morning    Cystic fibrosis with pulmonary manifestations (H)       blood glucose monitoring test strip    ACCU-CHEK SMARTVIEW    1 Month    Use to test blood sugar 4 times daily or as directed.    Type I (juvenile type) diabetes mellitus without mention of complication, not stated as uncontrolled       buPROPion 150 MG 12 hr tablet    WELLBUTRIN SR     Take 150 mg by mouth 2 times daily        cetirizine 10 MG tablet    zyrTEC    30 tablet    Take 1 tablet (10 mg) by mouth every  evening    Allergic rhinitis due to [place] house dust mite, Urticaria, chronic       cholecalciferol 1000 UNIT tablet    vitamin D    30 tablet    TAKE ONE TABLET BY MOUTH EVERY DAY    CF (cystic fibrosis) (H), Exocrine pancreatic insufficiency       DEPO-PROVERA IM           GLYCOPYRROLATE PO      Take 1 mg by mouth 2 times daily        hydrOXYzine 25 MG tablet    ATARAX    30 tablet    Take 1 tablet (25 mg) by mouth every 6 hours as needed for itching (and nausea)    Chronic pain of left knee       ISOtretinoin 40 MG capsule    ACCUTANE    60 capsule    Take 2 capsules (80 mg) by mouth daily    Acne vulgaris       LORATADINE PO      Take 10 mg by mouth        MEPHYTON 5 MG tablet   Generic drug:  phytonadione     4 tablet    TAKE 1 TABLET BY MOUTH ONCE A WEEK    Cystic fibrosis with pulmonary manifestations (H), Cystic fibrosis with pulmonary manifestations (H), Aspergillosis (H), Pancreatic insufficiency       meropenem 500 MG vial    MERREM    4 mL    500 mg by Nasal Instillation route as needed        methylcellulose (laxative) Powd    CITRUCEL    479 g    Start with 1 heaping tablespoon. Increase as needed, 1 heaping tablespoon at a time, up to 3 times per day.    Other constipation       montelukast 10 MG tablet    SINGULAIR    30 tablet    TAKE ONE TABLET BY MOUTH ONCE DAILY AT BEDTIME    CF (cystic fibrosis) (H)       MULTIVITAMIN PO      Take 1 tablet by mouth daily.        mupirocin 2 % ointment    BACTROBAN    22 g    Use 2 times a day to affected area on the lips.    Cheilitis       naltrexone 50 MG tablet    DEPADE;REVIA    30 tablet    Take 1/2 Tablet daily then increase to maximum of 1 Full Tablet daily as tolerated.  Time it one to two hours prior to worst cravings    Non morbid obesity due to excess calories       omeprazole 20 MG CR capsule    priLOSEC    60 capsule    TAKE 1 CAPSULE BY MOUTH TWICE A DAY    CF (cystic fibrosis) (H)       PROZAC 40 MG capsule   Generic drug:  FLUoxetine      Take  80 mg by mouth daily.    Cystic fibrosis with pulmonary manifestations (H)       TRAZODONE HCL PO      Take 100 mg by mouth At Bedtime        vitamin E 400 UNIT capsule     60 capsule    TAKE 1 CAPSULE BY MOUTH TWICE A DAY    CF (cystic fibrosis) (H), Pancreatic insufficiency       * Notice:  This list has 2 medication(s) that are the same as other medications prescribed for you. Read the directions carefully, and ask your doctor or other care provider to review them with you.

## 2017-09-26 NOTE — PATIENT INSTRUCTIONS
"Follow up with HERIMNIO Bowman in 3 months    Follow up with Dr. Tolentino in 6 months      MEDICATION STARTED AT THIS APPOINTMENT  We are starting Naltrexone. Start with 1/2 tab 1-2 hours prior to the time you have the most trouble with cravings or extra hunger. If you are doing well you may switch to a whole tablet taken at the same time period.     WARNING: This medication blocks the action of opioid type pain medications. If you routinely take any medication like Codeine, Oxycontin,Percocet,Morphine,Dilaudid or Methodone, do not take this until you have talked with weight management staff. If you are planning surgery you should stop Naltrexone 4 days prior to the surgery. If you have an injury that requires pain medication, make sure the health care staff knows you take Naltrexone.     Call the nurse at [phone] if you have any questions or concerns. (Do not stop taking it if you don't think it's working. For some people it works without them knowing it.)    Naltrexone is a medication that is used most often to help people who are troubled by dependence on prescription pain killers or alcohol. It has also been found to help with weight loss. Although it's not currently FDA approved for weight loss, it has been used safely for a number of years to help people who are carrying extra weight.     Just how Naltrexone helps with weight loss has not been exactly determined.  It seems to work by quieting down brain signals related to strong food cravings. Many of our patients use the word \"addiction\" to describe their feelings and constant thoughts about food. It makes sense then to treat the feeling of dependence on food, outside of real hunger, with a medication designed to help with other sorts of dependence.     Our patients on Naltrexone find that they:    >feel less interest in food   >think less about food and eating and have more time to think of other things   >find it easier to push the plate away   >have an " easier time eating less    For some of our patients, these feelings are very immediate. Other patients, don't feel much of a change but find they've lost weight. Like all weight loss medications, Naltrexone works best when you help it work. This means:  1. Having less tempting high calorie (fattening) food around the house or office. (For people with strong cravings this is very important.)   2. Staying away from situations or people that may trigger your cravings .   3. Eating out only one time or less each week.  4. Eating your meals at a table with the TV or computer off.    Side-effects. Naltrexone is generally well tolerated. The main side-effect we see is  nausea or a woozy feeling. A small number of people feel quite ill. Most people have a mild reaction and some people have no reaction at all.  The good news is that this feeling does go away.     In order to avoid nausea, please start the medication with half a pill for the first few days. Go on to a full pill if you are feeling well.      If you  are nauseated on 1/2 a pill it is okay to cut back to 1/4 pill ( a very small amount). Take this for a couple of days and work your way back up to a 1/2 pill and then a whole pill. Taking the medication at night or with food  to start also may help prevent the feeling of nausea.         Please refer to the pharmacy insert for more information on side-effects. Since many pharmacists are not familiar with the use of naltrexone in weight loss, calling the nurse at [phone] will get you the most accurate information.  In order to get refills of this or any medication we prescribe you must be seen in the medical weight mgmt clinic every 2-3 months. Please have your pharmacy fax a refill request to [phone].

## 2017-09-26 NOTE — NURSING NOTE
"(   Chief Complaint   Patient presents with     RECHECK     weight loss    )    ( Weight: 78.8 kg (173 lb 11.2 oz) )  ( Height: 154.9 cm (5' 1\") )  ( BMI (Calculated): 32.89 )  (   )  (   )  (   )  (   )  (   )  (   )    ( BP: 124/87 )  (   )  (   )  (   )  ( Pulse: 94 )  (   )  ( SpO2: 99 % )    (   Patient Active Problem List   Diagnosis     Hip joint pain     Aspergillosis (H)     Chronic maxillary sinusitis     Hypoglycemia     Type 1 diabetes mellitus (H)     Cystic fibrosis with pulmonary manifestations (H)     Chronic constipation     Frontal sinusitis     Major depression     ADHD (attention deficit hyperactivity disorder)     Back pain     Pancreatic insufficiency     Non morbid obesity due to excess calories     Acne vulgaris     Cystic fibrosis (H)     Insomnia     Major depressive disorder with single episode    )  (   Current Outpatient Prescriptions   Medication Sig Dispense Refill     albuterol (2.5 MG/3ML) 0.083% neb solution INHALE 1 NEB VIA NEBULIZER FOUR TIMES A  mL 11     ISOtretinoin (ACCUTANE) 40 MG capsule Take 2 capsules (80 mg) by mouth daily 60 capsule 0     omeprazole (PRILOSEC) 20 MG CR capsule TAKE 1 CAPSULE BY MOUTH TWICE A DAY 60 capsule 11     phentermine 15 MG capsule Take 2 capsules (30 mg) by mouth every morning 60 capsule 1     vitamin E 400 UNIT capsule TAKE 1 CAPSULE BY MOUTH TWICE A DAY 60 capsule 11     cetirizine (ZYRTEC) 10 MG tablet Take 1 tablet (10 mg) by mouth every evening 30 tablet 1     mupirocin (BACTROBAN) 2 % ointment Use 2 times a day to affected area on the lips. 22 g 3     azithromycin (ZITHROMAX) 500 MG tablet TAKE ONE TABLET BY MOUTH ON MONDAY, WEDNESDAY AND FRIDAY 36 tablet 4     ascorbic acid (VITAMIN C) 500 MG tablet TAKE ONE TABLET BY MOUTH TWICE A  tablet 3     beclomethasone (QVAR) 80 MCG/ACT Inhaler Inhale 1 puff into the lungs 2 times daily 1 Inhaler 3     MEPHYTON 5 MG tablet TAKE 1 TABLET BY MOUTH ONCE A WEEK 4 tablet 11     hydrOXYzine " (ATARAX) 25 MG tablet Take 1 tablet (25 mg) by mouth every 6 hours as needed for itching (and nausea) 30 tablet 0     acetaminophen (TYLENOL) 325 MG tablet Take 2 tablets (650 mg) by mouth every 4 hours as needed for other (mild pain) 100 tablet 0     acetylcysteine (MUCOMYST) 20 % injection INHALE ONE 4ML NEB INTO LUNGS VIA NEBULIZER TWO TIMES A DAY, MAY INCREASE TO 3-4 TIMES A DAY WITH INCREASE COUGH/COLD SYMPTOMS 360 mL 11     MedroxyPROGESTERone Acetate (DEPO-PROVERA IM)        buPROPion (WELLBUTRIN SR) 150 MG 12 hr tablet Take 150 mg by mouth 2 times daily       montelukast (SINGULAIR) 10 MG tablet TAKE ONE TABLET BY MOUTH ONCE DAILY AT BEDTIME 30 tablet 11     VITAMIN D3 1000 UNITS tablet TAKE ONE TABLET BY MOUTH EVERY DAY 30 tablet 11     adapalene (DIFFERIN) 0.1 % cream Apply topically At Bedtime After washing face 45 g 11     LORATADINE PO Take 10 mg by mouth       beta carotene 45955 UNIT capsule Take 1 capsule (25,000 Units) by mouth Every Mon, Wed, Fri Morning 36 capsule 4     methylcellulose, laxative, (CITRUCEL) POWD Start with 1 heaping tablespoon. Increase as needed, 1 heaping tablespoon at a time, up to 3 times per day. 479 g 3     GLYCOPYRROLATE PO Take 1 mg by mouth 2 times daily        TRAZODONE HCL PO Take 100 mg by mouth At Bedtime        blood glucose (ACCU-CHEK SMARTVIEW) test strip Use to test blood sugar 4 times daily or as directed. 1 Month 12     albuterol (PROAIR HFA, PROVENTIL HFA, VENTOLIN HFA) 108 (90 BASE) MCG/ACT inhaler Inhale 2 puffs into the lungs every 6 hours as needed for shortness of breath / dyspnea or wheezing 1 Inhaler 12     meropenem (MERREM) 500 MG injection 500 mg by Nasal Instillation route as needed  4 mL 0     FLUoxetine (PROZAC) 40 MG capsule Take 80 mg by mouth daily.       Amphetamine-Dextroamphetamine (ADDERALL PO) Take 20 mg by mouth daily.       Multiple Vitamin (MULTIVITAMIN OR) Take 1 tablet by mouth daily.      )  ( Diabetes Eval:    )    ( Pain Eval:  No  Pain (0) )    ( Wound Eval:       )    (   History   Smoking Status     Never Smoker   Smokeless Tobacco     Never Used     Comment: one person at home smokes outside    )    ( Signed By:  Evan Mi; September 26, 2017; 2:59 PM )

## 2017-09-26 NOTE — LETTER
"2017       RE: Mamie Rice  [address] [facility] [address]     Dear Colleague,    Thank you for referring your patient, Mamie Rice, to the [facility]. Please see a copy of my visit note below.        Return Medical Weight Management Note     Mamie Rice  MRN:  [id number]  :  1995  STEPHEN:  2017    Dear Dr. De La Rosa,     I had the pleasure of seeing your patient Mamie Rice.  She is a 22 year old female who I am continuing to see for treatment of obesity.  She has CF, CFRD, depression, joints pain, ADHD    INTERVAL History  Last seen  in 2017. She was started on phentermine and is currently on 30 mg daily. She said it helped at first but not much anymore. She tried to eat balanced diet but still mainly carbohydrate. Sometime, she eats to prevent hypoglycemia. She reported having BG in 60s. She is not on insulin.    She is also on Adderall for ADHD and Bupropion for depression.  She works as a , [employer] and dance coach.    Diet recall:  BF: cereal or toast  Lunch: sandwich with cheese and ham or PB sandwich  Supper: meat, vegetable (corn, pea, carrot)  Snack: fruit snack.    CURRENT WEIGHT:   173 lbs 11.2 oz    Wt Readings from Last 4 Encounters:   17 78.8 kg (173 lb 11.2 oz)   17 78.9 kg (174 lb)   17 80.3 kg (177 lb)   17 79.7 kg (175 lb 11.3 oz)     Height:  5' 1\"  Body Mass Index:  Body mass index is 32.82 kg/(m^2).  Vitals:  B/P: 124/81, P: 100    Initial consult weight was 182 on 10/14/2016.  Weight change since last seen on 2017 is down 8pounds.   Total loss is 9 pounds.      Review of Systems     Constitutional:  Positive for night sweats and increased energy. Negative for fever, chills, weight loss, weight gain, fatigue, decreased appetite, recent stressors, height loss, post-operative complications, incisional pain, hallucinations, " hyperactivity and confused.   HENT:  Negative for ear pain, hearing loss, tinnitus, nosebleeds, trouble swallowing, hoarse voice, mouth sores, sore throat, ear discharge, tooth pain, gum tenderness, taste disturbance, smell disturbance, hearing aid, bleeding gums, dry mouth, sinus pain, sinus congestion and neck mass.    Eyes:  Negative for double vision, pain, redness, eye pain, decreased vision, eye watering, eye bulging, eye dryness, flashing lights, spots, floaters, strabismus, tunnel vision, jaundice and eye irritation.   Respiratory:   Negative for cough, hemoptysis, sputum production, shortness of breath, wheezing, sleep disturbances due to breathing, snores loudly, respiratory pain, dyspnea on exertion, cough disturbing sleep and postural dyspnea.    Cardiovascular:  Negative for chest pain, dyspnea on exertion, palpitations, orthopnea, claudication, leg swelling, fingers/toes turn blue, hypertension, hypotension, syncope, history of heart murmur, chest pain on exertion, chest pain at rest, pacemaker, few scattered varicosities, leg pain, sleep disturbances due to breathing, tachycardia, light-headedness, exercise intolerance and edema.   Gastrointestinal:  Negative for heartburn, nausea, vomiting, abdominal pain, diarrhea, constipation, blood in stool, melena, rectal pain, bloating, hemorrhoids, bowel incontinence, jaundice, rectal bleeding, coffee ground emesis and change in stool.   Genitourinary:  Negative for bladder incontinence, dysuria, urgency, hematuria, flank pain, vaginal discharge, difficulty urinating, genital sores, dyspareunia, decreased libido, nocturia, voiding less frequently, arousal difficulty, abnormal vaginal bleeding, excessive menstruation, menstrual changes, hot flashes, vaginal dryness and postmenopausal bleeding.   Musculoskeletal:  Positive for myalgias, joint swelling, arthralgias, stiffness and muscle weakness. Negative for back pain, muscle cramps, neck pain, bone pain and  fracture.   Skin:  Negative for nail changes, itching, poor wound healing, rash, hair changes, skin changes, acne, warts, poor wound healing, scarring, flaky skin, Raynaud's phenomenon, sensitivity to sunlight and skin thickening.   Neurological:  Negative for dizziness, tingling, tremors, speech change, seizures, loss of consciousness, weakness, light-headedness, numbness, headaches, disturbances in coordination, extremity numbness, memory loss, difficulty walking and paralysis.   Endo/Heme:  Negative for anemia, swollen glands and bruises/bleeds easily.   Psychiatric/Behavioral:  Negative for depression, hallucinations, memory loss, decreased concentration, mood swings and panic attacks.    Breast:  Negative for breast discharge, breast mass, breast pain and nipple retraction.   Endocrine:  Positive for altered temperature regulation and polydipsia.Negative for polyphagia, unwanted hair growth and change in facial hair.      MEDICATIONS:   Current Outpatient Prescriptions   Medication Sig Dispense Refill     albuterol (2.5 MG/3ML) 0.083% neb solution INHALE 1 NEB VIA NEBULIZER FOUR TIMES A  mL 11     ISOtretinoin (ACCUTANE) 40 MG capsule Take 2 capsules (80 mg) by mouth daily 60 capsule 0     omeprazole (PRILOSEC) 20 MG CR capsule TAKE 1 CAPSULE BY MOUTH TWICE A DAY 60 capsule 11     vitamin E 400 UNIT capsule TAKE 1 CAPSULE BY MOUTH TWICE A DAY 60 capsule 11     cetirizine (ZYRTEC) 10 MG tablet Take 1 tablet (10 mg) by mouth every evening 30 tablet 1     mupirocin (BACTROBAN) 2 % ointment Use 2 times a day to affected area on the lips. 22 g 3     azithromycin (ZITHROMAX) 500 MG tablet TAKE ONE TABLET BY MOUTH ON MONDAY, WEDNESDAY AND FRIDAY 36 tablet 4     ascorbic acid (VITAMIN C) 500 MG tablet TAKE ONE TABLET BY MOUTH TWICE A  tablet 3     beclomethasone (QVAR) 80 MCG/ACT Inhaler Inhale 1 puff into the lungs 2 times daily 1 Inhaler 3     MEPHYTON 5 MG tablet TAKE 1 TABLET BY MOUTH ONCE A WEEK 4  tablet 11     hydrOXYzine (ATARAX) 25 MG tablet Take 1 tablet (25 mg) by mouth every 6 hours as needed for itching (and nausea) 30 tablet 0     acetaminophen (TYLENOL) 325 MG tablet Take 2 tablets (650 mg) by mouth every 4 hours as needed for other (mild pain) 100 tablet 0     acetylcysteine (MUCOMYST) 20 % injection INHALE ONE 4ML NEB INTO LUNGS VIA NEBULIZER TWO TIMES A DAY, MAY INCREASE TO 3-4 TIMES A DAY WITH INCREASE COUGH/COLD SYMPTOMS 360 mL 11     MedroxyPROGESTERone Acetate (DEPO-PROVERA IM)        buPROPion (WELLBUTRIN SR) 150 MG 12 hr tablet Take 150 mg by mouth 2 times daily       montelukast (SINGULAIR) 10 MG tablet TAKE ONE TABLET BY MOUTH ONCE DAILY AT BEDTIME 30 tablet 11     VITAMIN D3 1000 UNITS tablet TAKE ONE TABLET BY MOUTH EVERY DAY 30 tablet 11     adapalene (DIFFERIN) 0.1 % cream Apply topically At Bedtime After washing face 45 g 11     LORATADINE PO Take 10 mg by mouth       beta carotene 93921 UNIT capsule Take 1 capsule (25,000 Units) by mouth Every Mon, Wed, Fri Morning 36 capsule 4     methylcellulose, laxative, (CITRUCEL) POWD Start with 1 heaping tablespoon. Increase as needed, 1 heaping tablespoon at a time, up to 3 times per day. 479 g 3     GLYCOPYRROLATE PO Take 1 mg by mouth 2 times daily        TRAZODONE HCL PO Take 100 mg by mouth At Bedtime        blood glucose (ACCU-CHEK SMARTVIEW) test strip Use to test blood sugar 4 times daily or as directed. 1 Month 12     albuterol (PROAIR HFA, PROVENTIL HFA, VENTOLIN HFA) 108 (90 BASE) MCG/ACT inhaler Inhale 2 puffs into the lungs every 6 hours as needed for shortness of breath / dyspnea or wheezing 1 Inhaler 12     meropenem (MERREM) 500 MG injection 500 mg by Nasal Instillation route as needed  4 mL 0     FLUoxetine (PROZAC) 40 MG capsule Take 80 mg by mouth daily.       Amphetamine-Dextroamphetamine (ADDERALL PO) Take 20 mg by mouth daily.       Multiple Vitamin (MULTIVITAMIN OR) Take 1 tablet by mouth daily.         Weight Loss  Medication History Reviewed With Patient 9/26/2017   Which weight loss medications are you currently taking on a regular basis?  None   If you are not taking a weight loss medication that was prescribed to you, please indicate why: Other   Are you having any side effects from the weight loss medication that we have prescribed you? No     ASSESSMENT:    Mamie Rice is a 22 year old female who I am continuing to see for treatment of obesity.  She has CF, CFRD, depression, joints pain, ADHD    Responded initially with phentermine. However, she is also on Adderall so I will d/c phentermine and switch to naltrexone since she is already on bupropion.  Still eats heavily in carb.    PLAN:  -d/c phentermine  -start naltrexone 25-50 mg daily. She already on bupropion  -reinforced food plan - focus on no between meal snacking, aggressive lowering of starches and cheese, increase protein and vegetable    FOLLOW-UP:    RTC 3 months with Janna Rodriguez for med checked.  RTC 6 months with me      I spent a total of 15 minutes face-to-face with Mamie Rice during today's office visit. Over 50% of this time was spent counseling the patient and/or coordinating care regarding    Sincerely,    Randa Ho MD

## 2017-09-27 LAB
MYCOBACTERIUM SPEC CULT: NORMAL
MYCOBACTERIUM SPEC CULT: NORMAL
SPECIMEN SOURCE: NORMAL

## 2017-10-11 ENCOUNTER — OFFICE VISIT (OUTPATIENT)
Dept: OTOLARYNGOLOGY | Facility: CLINIC | Age: 22
End: 2017-10-11

## 2017-10-11 VITALS — HEIGHT: 61 IN | WEIGHT: 174 LBS | BODY MASS INDEX: 32.85 KG/M2

## 2017-10-11 DIAGNOSIS — J32.0 CHRONIC MAXILLARY SINUSITIS: Primary | ICD-10-CM

## 2017-10-11 DIAGNOSIS — E84.0 CYSTIC FIBROSIS WITH PULMONARY MANIFESTATIONS (H): ICD-10-CM

## 2017-10-11 ASSESSMENT — PAIN SCALES - GENERAL: PAINLEVEL: NO PAIN (0)

## 2017-10-11 NOTE — PATIENT INSTRUCTIONS
Plan of care:  Follow up with Dr Haile in one month for a [facility] contact information:  1. To schedule an appointment call [phone], option 1  2. To talk to the Triage RN call [phone], option 3  3. If you need to speak to lElie or get a message to your doctor on a Friday, call the triage RN  4. EllieRN: [phone]  5. Surgery scheduling:      Mahi Canela: [phone]      Brisa Aparicio: [phone]  6. Fax: [phone]  7. [phone]

## 2017-10-11 NOTE — PROGRESS NOTES
HISTORY OF PRESENT ILLNESS:  Mamie returns for meropenem instillation.  Her sinus symptoms are stable.  She does have a sore on the end of her nose and some skin lesions.  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  In any event, she is here for meropenem instillation.       PROCEDURE:  In order to see the maxillary antrum, a 0 degree rigid endoscope was necessary.  The endoscope was passed into her nasal cavity and 2 cc of meropenem is sprayed into the maxillary antrum on the left.  The procedure is repeated on the right side.  She tolerates this well.      PLAN:  I will see her back again in a month for repeat treatment.      HB/ms

## 2017-10-11 NOTE — NURSING NOTE
Chief Complaint   Patient presents with     RECHECK     merrem inj     Andie March Medical Assistant

## 2017-10-11 NOTE — MR AVS SNAPSHOT
After Visit Summary   10/11/2017    Mamie Rice    MRN: [id number]           Patient Information     Date Of Birth          1995        Visit Information        Provider Department      10/11/2017 9:15 AM Mariela Haile MD [facility] Th[facility]        Today's Diagnoses     Chronic maxillary sinusitis    -  1    Cystic fibrosis with pulmonary manifestations (H)          Care Instructions    Plan of care:  Follow up with Dr Haile in one month for a merrem [facility]  Clinic contact information:  1. To schedule an appointment call [phone], option 1  2. To talk to the Triage RN call [phone], option 3  3. If you need to speak to Ellie or get a message to your doctor on a Friday, call the triage RN  4. EllieRN: [phone]  5. Surgery scheduling:      Mahi Canela: [phone]      Brisa Aparicio: [phone]  6. Fax: [phone]  7. [phone]            Follow-ups after your visit        Your next 10 appointments already scheduled     Oct 16, 2017  4:15 PM CDT   (Arrive by 4:00 PM)   Return Visit with Paige Reid PA-C   [facility] Dermatology ([facility] [place])    [address] [place] [id number]   [phone]            2017  9:30 AM CDT   CF LOOP with UC PFL CF   [facility] Pulmonary Function Testing ([facility])    [address] [place] [address]   [phone]            2017  9:50 AM CDT   (Arrive by 9:35 AM)   RETURN CYSTIC FIBROSIS VISIT with Gavi Allison MD   [facility] ([facility] [place])    [address] [place] [id number]   [phone]            2017 11:00 AM CDT   (Arrive by 10:45 AM)   Return Visit with Mariela Haile MD   [facility] ([facility] [place])    [address] [place] [address]  "[id number] [phone]              Who to contact     Please call your clinic at [phone] to:    Ask questions about your health    Make or cancel appointments    Discuss your medicines    Learn about your test results    Speak to your doctor   If you have compliments or concerns about an experience at your clinic, or if you wish to file a complaint, please contact [facility] Patient Relations at [phone] or email us at [email or web address]         Additional Information About Your Visit        [employer] Information     [employer] gives you secure access to your electronic health record. If you see a primary care provider, you can also send messages to your care team and make appointments. If you have questions, please call your primary care clinic.  If you do not have a primary care provider, please call [phone] and they will assist you.      [employer] is an electronic gateway that provides easy, online access to your medical records. With [employer], you can request a clinic appointment, read your test results, renew a prescription or communicate with your care team.     To access your existing account, please contact your [facility] or call [phone] for assistance.        Care EveryWhere ID     This is your Care EveryWhere ID. This could be used by other organizations to access your [place] medical records  [id number]        Your Vitals Were     Height BMI (Body Mass Index)                1.549 m (5' 1\") 32.88 kg/m2           Blood Pressure from Last 3 Encounters:   09/26/17 124/87   08/02/17 112/80   06/19/17 116/73    Weight from Last 3 Encounters:   10/11/17 78.9 kg (174 lb)   09/26/17 78.8 kg (173 lb 11.2 oz)   09/06/17 78.9 kg (174 lb)              We Performed the Following     NASAL ENDOSCOPY, DIAGNOSTIC     NASAL/SINUS SCOPE W PREETI SOLER        Primary Care Provider Office Phone # Fax #    Malik De La Rosa [phone] " [phone]       [place] [id number]  [facility] [address]        Equal Access to Services     LUZMARIA MARTINEZ : Vinita Marion, washelbiheron vasquezhayleyha, shanita kaclaire edgardona, waxsilver orlyin hayaaruddy hernándeznakia dawson maty cardoso. So [facility] [phone].    ATENCIÓN: Si habla español, tiene a hidalgo disposición servicios gratuitos de asistencia lingüística. Llame al [phone].    We comply with applicable federal civil rights laws and [place] laws. We do not discriminate on the basis of race, color, national origin, age, disability, sex, sexual orientation, or gender identity.            Thank you!     Thank you for choosing [facility] E[facility]  for your care. Our goal is always to provide you with excellent care. Hearing back from our patients is one way we can continue to improve our services. Please take a few minutes to complete the written survey that you may receive in the mail after your visit with us. Thank you!             Your Updated Medication List - Protect others around you: Learn how to safely use, store and throw away your medicines at www.disposemymeds.org.          This list is accurate as of: 10/11/17 10:40 PM.  Always use your most recent med list.                   Brand Name Dispense Instructions for use Diagnosis    acetaminophen 325 MG tablet    TYLENOL    100 tablet    Take 2 tablets (650 mg) by mouth every 4 hours as needed for other (mild pain)    Chronic pain of left knee       acetylcysteine 20 % nebulizer solution    MUCOMYST    360 mL    INHALE ONE 4ML NEB INTO LUNGS VIA NEBULIZER TWO TIMES A DAY, MAY INCREASE TO 3-4 TIMES A DAY WITH INCREASE COUGH/COLD SYMPTOMS    CF (cystic fibrosis) (H)       adapalene 0.1 % cream    DIFFERIN    45 g    Apply topically At Bedtime After washing face    Acne vulgaris       ADDERALL PO      Take 20 mg by mouth daily.        * albuterol 108 (90 BASE) MCG/ACT Inhaler    PROAIR HFA/PROVENTIL HFA/VENTOLIN HFA    1  Inhaler    Inhale 2 puffs into the lungs every 6 hours as needed for shortness of breath / dyspnea or wheezing    Cystic fibrosis with pulmonary manifestations (H), Sinusitis, chronic, Diabetes mellitus type 1 (H)       * albuterol (2.5 MG/3ML) 0.083% neb solution     360 mL    INHALE 1 NEB VIA NEBULIZER FOUR TIMES A DAY    CF (cystic fibrosis) (H)       ascorbic acid 500 MG tablet    VITAMIN C    100 tablet    TAKE ONE TABLET BY MOUTH TWICE A DAY    Cystic fibrosis with pulmonary manifestations (H)       azithromycin 500 MG tablet    ZITHROMAX    36 tablet    TAKE ONE TABLET BY MOUTH ON MONDAY, WEDNESDAY AND FRIDAY    CF (cystic fibrosis) (H)       beclomethasone 80 MCG/ACT Inhaler    QVAR    1 Inhaler    Inhale 1 puff into the lungs 2 times daily    Allergic rhinitis due to house dust mite       beta carotene 13557 UNIT capsule     36 capsule    Take 1 capsule (25,000 Units) by mouth Every Mon, Wed, Fri Morning    Cystic fibrosis with pulmonary manifestations (H)       blood glucose monitoring test strip    ACCU-CHEK SMARTVIEW    1 Month    Use to test blood sugar 4 times daily or as directed.    Type I (juvenile type) diabetes mellitus without mention of complication, not stated as uncontrolled       buPROPion 150 MG 12 hr tablet    WELLBUTRIN SR     Take 150 mg by mouth 2 times daily        cetirizine 10 MG tablet    zyrTEC    30 tablet    Take 1 tablet (10 mg) by mouth every evening    Allergic rhinitis due to [place] house dust mite, Urticaria, chronic       cholecalciferol 1000 UNIT tablet    vitamin D    30 tablet    TAKE ONE TABLET BY MOUTH EVERY DAY    CF (cystic fibrosis) (H), Exocrine pancreatic insufficiency       DEPO-PROVERA IM           GLYCOPYRROLATE PO      Take 1 mg by mouth 2 times daily        hydrOXYzine 25 MG tablet    ATARAX    30 tablet    Take 1 tablet (25 mg) by mouth every 6 hours as needed for itching (and nausea)    Chronic pain of left knee       ISOtretinoin 40 MG capsule    ACCUTANE     60 capsule    Take 2 capsules (80 mg) by mouth daily    Acne vulgaris       LORATADINE PO      Take 10 mg by mouth        MEPHYTON 5 MG tablet   Generic drug:  phytonadione     4 tablet    TAKE 1 TABLET BY MOUTH ONCE A WEEK    Cystic fibrosis with pulmonary manifestations (H), Cystic fibrosis with pulmonary manifestations (H), Aspergillosis (H), Pancreatic insufficiency       meropenem 500 MG vial    MERREM    4 mL    500 mg by Nasal Instillation route as needed        methylcellulose (laxative) Powd    CITRUCEL    479 g    Start with 1 heaping tablespoon. Increase as needed, 1 heaping tablespoon at a time, up to 3 times per day.    Other constipation       montelukast 10 MG tablet    SINGULAIR    30 tablet    TAKE ONE TABLET BY MOUTH ONCE DAILY AT BEDTIME    CF (cystic fibrosis) (H)       MULTIVITAMIN PO      Take 1 tablet by mouth daily.        mupirocin 2 % ointment    BACTROBAN    22 g    Use 2 times a day to affected area on the lips.    Cheilitis       naltrexone 50 MG tablet    DEPADE;REVIA    30 tablet    Take 1/2 Tablet daily then increase to maximum of 1 Full Tablet daily as tolerated.  Time it one to two hours prior to worst cravings    Non morbid obesity due to excess calories       omeprazole 20 MG CR capsule    priLOSEC    60 capsule    TAKE 1 CAPSULE BY MOUTH TWICE A DAY    CF (cystic fibrosis) (H)       PROZAC 40 MG capsule   Generic drug:  FLUoxetine      Take 80 mg by mouth daily.    Cystic fibrosis with pulmonary manifestations (H)       TRAZODONE HCL PO      Take 100 mg by mouth At Bedtime        vitamin E 400 UNIT capsule     60 capsule    TAKE 1 CAPSULE BY MOUTH TWICE A DAY    CF (cystic fibrosis) (H), Pancreatic insufficiency       * Notice:  This list has 2 medication(s) that are the same as other medications prescribed for you. Read the directions carefully, and ask your doctor or other care provider to review them with you.

## 2017-10-16 ENCOUNTER — OFFICE VISIT (OUTPATIENT)
Dept: DERMATOLOGY | Facility: CLINIC | Age: 22
End: 2017-10-16

## 2017-10-16 DIAGNOSIS — L70.0 ACNE VULGARIS: ICD-10-CM

## 2017-10-16 DIAGNOSIS — Z79.899 ON ISOTRETINOIN THERAPY: Primary | ICD-10-CM

## 2017-10-16 LAB
HCG UR QL: NEGATIVE
TRIGL SERPL-MCNC: 125 MG/DL

## 2017-10-16 RX ORDER — ISOTRETINOIN 40 MG/1
80 CAPSULE ORAL DAILY
Qty: 60 CAPSULE | Refills: 0 | Status: SHIPPED | OUTPATIENT
Start: 2017-10-16 | End: 2017-12-18

## 2017-10-16 ASSESSMENT — PAIN SCALES - GENERAL: PAINLEVEL: NO PAIN (0)

## 2017-10-16 NOTE — NURSING NOTE
Dermatology Rooming Note    Mamie Rice's goals for this visit include:   Chief Complaint   Patient presents with     Derm Problem     Accutane, Mamie notes her acne is improving.     Valerie Ghosh LPN

## 2017-10-16 NOTE — PROGRESS NOTES
[facility] Dermatology Note    Dermatology Problem List:  1. Acne vulgaris  - s/p BPO facial wash - stopped due to skin irritation, clindamycin 1% lotion, adapalene 0.1% cream, spironolactone 50 mg bid  - on accutane, NICO-steven#: [id number]  2. Cheilitis  - mupirocin 2% ointment  3. CF  4. DM Type II    Encounter Date: Oct 16, 2017    CC:   Chief Complaint   Patient presents with     Derm Problem     Alba Mamie notes her acne is improving.     History of Present Illness:  This 22 year old female presents as a follow up for acne. The patient was last seen on 9/13/17 when she continued accutane at 80 mg daily. Today the patient reports that her acne is improving and she has two spots on her left cheek that are resolving. The patient denies any nosebleeds, irritated dry eyes, headaches with blurred vision, muscle or joint aches, changes in bowel habits, depressive thoughts, sharing medication, or donating blood. She notes that she is getting knee surgery tomorrow to fix a torn meniscus for the 4th time. The patient reports no other lesions of concern at this time.     Otherwise, the patient reports no painful, bleeding, nonhealing, or pruritic lesions, and denies new or changing moles.     Past Medical History:   Patient Active Problem List   Diagnosis     Hip joint pain     Aspergillosis (H)     Chronic maxillary sinusitis     Hypoglycemia     Type 1 diabetes mellitus (H)     Cystic fibrosis with pulmonary manifestations (H)     Chronic constipation     Frontal sinusitis     Major depression     ADHD (attention deficit hyperactivity disorder)     Back pain     Pancreatic insufficiency     Non morbid obesity due to excess calories     Acne vulgaris     Cystic fibrosis (H)     Insomnia     Major depressive disorder with single episode     Past Medical History:   Diagnosis Date     ADHD (attention deficit hyperactivity disorder)      Anxiety      Aspergillosis, with pneumonia (H)     fugus found  caused chest pain     Chronic infection     CF, MRSA.,      Chronic sinusitis      Constipation, chronic      Cystic fibrosis with pulmonary manifestations (H) 12/19/2011     Cystic fibrosis without mention of meconium ileus     SWEAT TEST:Date: 2/17/1994   [facility]  296 mg, 104 mmol/L ClSample #2  295 mg, 104 mmol/L Cl GENOTYPING:Date: 10/15/2007,  Laboratory: [facility]: [id number]     Depressive disorder      Diabetes     no meds currently     Dysthymic disorder      Exocrine pancreatic insufficiency      Gastro-oesophageal reflux disease      Hip pain, right      MRSA (methicillin resistant Staphylococcus aureus) carrier      Pancreatic disease      Past Surgical History:   Procedure Laterality Date     ARTHROSCOPY HIP, OSTEOPLASTY FEMUR PROXIMAL, COMBINED  3/11/2013    Procedure: COMBINED ARTHROSCOPY HIP, OSTEOPLASTY FEMUR PROXIMAL;  Right Hip Arthroscopy, Labral  Debridement.    surgeon request choice anesthesia/admit to [facility] after surgery;  Surgeon: Omkar Asutin MD;  [facility]: [facility] OR     ARTHROSCOPY KNEE WITH MEDIAL MENISCECTOMY Left 1/31/2017    Procedure: ARTHROSCOPY KNEE WITH MEDIAL MENISCECTOMY;  Surgeon: Jethro Coyle MD;  Location: [facility] OR     bronchoscopies       BRONCHOSCOPY       EXAM UNDER ANESTHESIA ANUS N/A 5/10/2016    Procedure: EXAM UNDER ANESTHESIA ANUS;  Surgeon: Chet Gaviria MD;  [facility]     EXAM UNDER ANESTHESIA, RESTORATIONS, EXTRACTION(S) DENTAL COMPLEX, COMBINED  5/13/2013    Procedure: COMBINED EXAM UNDER ANESTHESIA, RESTORATIONS, EXTRACTION(S) DENTAL COMPLEX;  Dental Exam, Radiographs, Restorations. Single Extraction  Tooth #2. Restorations x 3;  Surgeon: Danilo Ortiz DDS;  Location: [facility] OR     HC KNEE SCOPE, DIAGNOSTIC      Arthroscopy, Knee- left     INJECT BOTOX N/A 5/10/2016    Procedure: INJECT BOTOX;  Surgeon: Chet Gaviria MD;  [facility]     left hip labral tear  5/11/2011    left hip  arthroscopy with labral debridement and synovectomy     meniscus repair       OPTICAL TRACKING SYSTEM ENDOSCOPIC SINUS SURGERY  10/14/2011    Procedure:OPTICAL TRACKING SYSTEM ENDOSCOPIC SINUS SURGERY; FESS (functional endoscopic sinus surgery) with Image Guidance, bronchial lavage and cultures; Surgeon:GOYO KUO; Location:[facility] OR     OPTICAL TRACKING SYSTEM ENDOSCOPIC SINUS SURGERY  5/18/2012    Procedure:OPTICAL TRACKING SYSTEM ENDOSCOPIC SINUS SURGERY; Right  and Left Image Guided Functional Endoscopic Sinus Surgery With  Frontal Approach, [employer]; Surgeon:GOYO KUO; Location:[facility] OR     OPTICAL TRACKING SYSTEM ENDOSCOPIC SINUS SURGERY  9/26/2012    Procedure: OPTICAL TRACKING SYSTEM ENDOSCOPIC SINUS SURGERY;  Stealth Guided Bilateral Functional Endoscopic Sinus Surgery *Latex Safe*;  Surgeon: Goyo Kuo MD;  [facility]     OPTICAL TRACKING SYSTEM ENDOSCOPIC SINUS SURGERY Bilateral 10/16/2015    Procedure: OPTICAL TRACKING SYSTEM ENDOSCOPIC SINUS SURGERY;  Surgeon: Mariela Haile MD;  [facility]     ORTHOPEDIC SURGERY      left hip tear repair 2010     SINUS SURGERY       Social History:  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 Former use of tanning beds (high school).     Family History:  There is a family history of presumed melanoma in the patient's grandmother.    Medications:  Current Outpatient Prescriptions   Medication Sig Dispense Refill     naltrexone (DEPADE;REVIA) 50 MG tablet Take 1/2 Tablet daily then increase to maximum of 1 Full Tablet daily as tolerated.  Time it one to two hours prior to worst cravings 30 tablet 3     albuterol (2.5 MG/3ML) 0.083% neb solution INHALE 1 NEB VIA NEBULIZER FOUR TIMES A  mL 11     ISOtretinoin (ACCUTANE) 40 MG capsule Take 2 capsules (80 mg) by mouth daily 60 capsule 0     omeprazole (PRILOSEC) 20 MG CR capsule TAKE 1 CAPSULE BY MOUTH TWICE A DAY 60 capsule 11     vitamin E 400 UNIT capsule TAKE 1 CAPSULE BY  MOUTH TWICE A DAY 60 capsule 11     cetirizine (ZYRTEC) 10 MG tablet Take 1 tablet (10 mg) by mouth every evening 30 tablet 1     mupirocin (BACTROBAN) 2 % ointment Use 2 times a day to affected area on the lips. 22 g 3     azithromycin (ZITHROMAX) 500 MG tablet TAKE ONE TABLET BY MOUTH ON MONDAY, WEDNESDAY AND FRIDAY 36 tablet 4     ascorbic acid (VITAMIN C) 500 MG tablet TAKE ONE TABLET BY MOUTH TWICE A  tablet 3     beclomethasone (QVAR) 80 MCG/ACT Inhaler Inhale 1 puff into the lungs 2 times daily 1 Inhaler 3     MEPHYTON 5 MG tablet TAKE 1 TABLET BY MOUTH ONCE A WEEK 4 tablet 11     hydrOXYzine (ATARAX) 25 MG tablet Take 1 tablet (25 mg) by mouth every 6 hours as needed for itching (and nausea) 30 tablet 0     acetaminophen (TYLENOL) 325 MG tablet Take 2 tablets (650 mg) by mouth every 4 hours as needed for other (mild pain) 100 tablet 0     acetylcysteine (MUCOMYST) 20 % injection INHALE ONE 4ML NEB INTO LUNGS VIA NEBULIZER TWO TIMES A DAY, MAY INCREASE TO 3-4 TIMES A DAY WITH INCREASE COUGH/COLD SYMPTOMS 360 mL 11     MedroxyPROGESTERone Acetate (DEPO-PROVERA IM)        buPROPion (WELLBUTRIN SR) 150 MG 12 hr tablet Take 150 mg by mouth 2 times daily       montelukast (SINGULAIR) 10 MG tablet TAKE ONE TABLET BY MOUTH ONCE DAILY AT BEDTIME 30 tablet 11     VITAMIN D3 1000 UNITS tablet TAKE ONE TABLET BY MOUTH EVERY DAY 30 tablet 11     adapalene (DIFFERIN) 0.1 % cream Apply topically At Bedtime After washing face 45 g 11     LORATADINE PO Take 10 mg by mouth       beta carotene 19081 UNIT capsule Take 1 capsule (25,000 Units) by mouth Every Mon, Wed, Fri Morning 36 capsule 4     methylcellulose, laxative, (CITRUCEL) POWD Start with 1 heaping tablespoon. Increase as needed, 1 heaping tablespoon at a time, up to 3 times per day. 479 g 3     GLYCOPYRROLATE PO Take 1 mg by mouth 2 times daily        TRAZODONE HCL PO Take 100 mg by mouth At Bedtime        blood glucose (ACCU-CHEK SMARTVIEW) test strip Use to  test blood sugar 4 times daily or as directed. 1 Month 12     albuterol (PROAIR HFA, PROVENTIL HFA, VENTOLIN HFA) 108 (90 BASE) MCG/ACT inhaler Inhale 2 puffs into the lungs every 6 hours as needed for shortness of breath / dyspnea or wheezing 1 Inhaler 12     meropenem (MERREM) 500 MG injection 500 mg by Nasal Instillation route as needed  4 mL 0     FLUoxetine (PROZAC) 40 MG capsule Take 80 mg by mouth daily.       Amphetamine-Dextroamphetamine (ADDERALL PO) Take 20 mg by mouth daily.       Multiple Vitamin (MULTIVITAMIN OR) Take 1 tablet by mouth daily.       Allergies   Allergen Reactions     Vancomycin Hives     Redmens skin rash      Review of Systems:  - As per HPI  - No headaches with vision changes  - No muscle aches or joint aches  - No stomach upsets, constipation, or diarrhea   - No depressive thoughts or significant mood changes    Physical exam:  BP 99/61 - as of 8/14/17.  GEN: This is a well developed, well-nourished female in no acute distress, in a pleasant mood.      SKIN: Acne exam, which includes the face, neck, upper central chest, and upper central back was performed.  - Two excoriated papules on the left medial cheek  - No other lesions of concern on areas examined.     Impression/Plan:  1. Acne vulgaris, acne excoriee - Resistant to hormonal therapy, appropriate oral and topical antibiotics and well as topical retinoids.    - Discussion of the risks and side effects of Accutane including but not limited to mucocutaneous dryness, arthralgias, myalgias, depression, suicidal ideation, headache, blurred vision, increase in liver function tests, increase in lipids, and teratogenic effects. Discussed need for two forms of contraception. The ipledgeprogram brochure was provided and the contents discussed with the patient. The patient was counseled they cannot give blood while on Accutane. No personal or family history of inflammatory bowel disease or hypertriglyceridemia known to patient. Reviewed  need to avoid alcohol on medication. Ipledge program consent was obtained.   - At this visit, continue Accutane 80 mg daily. Goal dose is 12,300 to 18,040mg for 150-220 mg/kg dosing in this 82 kg patient. Recommend to take medication with food containing fat. The patients two forms of contraception are Depo and male latex condoms.  - Labs including triglycerides were checked. Pregnancy test is negative.She has her CBC and CMP checked regularly through her CF MD.   - Continue to hold beta carotene.    Total cumulative dose 6000 mg. Patient's I-pledge # is [id number]. The patient will stop all acne medications, when she starts the medication.     2. Cheilitis  - Continue using lip moisturization.   - Continue mupirocin 2% ointment - apply to cracks on sides of lips.    Follow-up in 30 days, earlier for new or changing lesions.     Staff Involved:  Scribe Disclosure:   ILupe, am serving as a scribe to document services personally performed by Paige Reid PA-C, based on data collection and the provider's statements to me.    Provider Disclosure:   The documentation recorded by the scribe accurately reflects the services I personally performed and the decisions made by me.    All risks, benefits and alternatives were discussed with patient.  Patient is in agreement and understands the assessment and plan.  All questions were answered.  Sun Screen Education was given.   Return to Clinic in 1 month or sooner as needed.   Paige Reid PA-C   [facility]

## 2017-10-16 NOTE — MR AVS SNAPSHOT
After Visit Summary   10/16/2017    Mamie Rice    MRN: [id number]           Patient Information     Date Of Birth          1995        Visit Information        Provider Department      10/16/2017 4:15 PM Paige Reid PA-C [facility]        Today's Diagnoses     On isotretinoin therapy    -  1    Acne vulgaris           Follow-ups after your visit        Follow-up notes from your care team     Return in about 4 weeks (around 11/13/2017).      Your next 10 appointments already scheduled     Nov 01, 2017  9:30 AM CDT   CF LOOP with  PFL CF   [facility] Pul[facility] Function Testing ([facility])    [address] [place] [address]  3rd [facility] [address]   [phone]            Nov 01, 2017  9:50 AM CDT   (Arrive by 9:35 AM)   RETURN CYSTIC FIBROSIS VISIT with Gavi Allison MD   [facility] ([facility])    [address] [place] [address]  3rd [facility] [address]   [phone]            Nov 01, 2017 11:00 AM CDT   (Arrive by 10:45 AM)   Return Visit with Mariela Haile MD   [facility]ose [facility] Thro[facility] ([facility])    [address] [place] [address]  4th [facility] [address]   [phone]            Nov 16, 2017  2:00 PM CST   (Arrive by 1:45 PM)   Return Visit with VANESA Lloyd [facility] ([facility])    [address] [place] [address]  3rd [facility] [address]   [phone]            Dec 18, 2017  8:45 AM CST   (Arrive by 8:30 AM)   Return Visit with VANESA Lloyd [facility] ([facility])    [address] [place] [address]  3rd [facility] [address]   [phone]            Jan 17, 2018 10:00 AM CST   (Arrive by 9:45 AM)   Return Visit with VANESA Lloyd [facility] ([facility])    [address] [place] [address]  Se  3rd Floor  [facility] [address] [phone]              Who to contact     Please call your clinic at [phone] to:    Ask questions about your health    Make or cancel appointments    Discuss your medicines    Learn about your test results    Speak to your doctor   If you have compliments or concerns about an experience at your clinic, or if you wish to file a complaint, please contact [facility] Patient Relations at [phone] or email us at [email or web address]         Additional Information About Your Visit        [employer] Information     [employer] gives you secure access to your electronic health record. If you see a primary care provider, you can also send messages to your care team and make appointments. If you have questions, please call your primary care clinic.  If you do not have a primary care provider, please call [phone] and they will assist you.      [employer] is an electronic gateway that provides easy, online access to your medical records. With [employer], you can request a clinic appointment, read your test results, renew a prescription or communicate with your care team.     To access your existing account, please contact your [facility] or call [phone] for assistance.        Care EveryWhere ID     This is your Care EveryWhere ID. This could be used by other organizations to access your [facility] medical records  [id number]         Blood Pressure from Last 3 Encounters:   09/26/17 124/87   08/02/17 112/80   06/19/17 116/73    Weight from Last 3 Encounters:   10/11/17 78.9 kg (174 lb)   09/26/17 78.8 kg (173 lb 11.2 oz)   09/06/17 78.9 kg (174 lb)              Today, you had the following     No orders found for display         Where to get your medicines      These medications were sent to [facility] - [address] [address]     Phone:  [phone]      ISOtretinoin 40 MG capsule          Primary Care Provider Office Phone # Fax #    Malik De La Rosa [phone]       [address] [facility] [address]        Equal Access to Services     LUZMARIA MARTINEZ : Hadii aad ku haddelmasienna Soyesenia, waaxda luqadaha, qaybta kaalmada adeegyada, david smithmalcolmdeepika cardoso. So [facility] [phone].    ATENCIÓN: Si habla español, tiene a hidalgo disposición servicios gratuitos de asistencia lingüística. Llame al [phone].    We comply with applicable federal civil rights laws and [place] laws. We do not discriminate on the basis of race, color, national origin, age, disability, sex, sexual orientation, or gender identity.            Thank you!     Thank you for choosing [facility]  for your care. Our goal is always to provide you with excellent care. Hearing back from our patients is one way we can continue to improve our services. Please take a few minutes to complete the written survey that you may receive in the mail after your visit with us. Thank you!             Your Updated Medication List - Protect others around you: Learn how to safely use, store and throw away your medicines at www.disposemymeds.org.          This list is accurate as of: 10/16/17 11:59 PM.  Always use your most recent med list.                   Brand Name Dispense Instructions for use Diagnosis    acetaminophen 325 MG tablet    TYLENOL    100 tablet    Take 2 tablets (650 mg) by mouth every 4 hours as needed for other (mild pain)    Chronic pain of left knee       acetylcysteine 20 % nebulizer solution    MUCOMYST    360 mL    INHALE ONE 4ML NEB INTO LUNGS VIA NEBULIZER TWO TIMES A DAY, MAY INCREASE TO 3-4 TIMES A DAY WITH INCREASE COUGH/COLD SYMPTOMS    CF (cystic fibrosis) (H)       adapalene 0.1 % cream    DIFFERIN    45 g    Apply topically At Bedtime After washing face    Acne vulgaris       ADDERALL PO      Take 20 mg by  mouth daily.        * albuterol 108 (90 BASE) MCG/ACT Inhaler    PROAIR HFA/PROVENTIL HFA/VENTOLIN HFA    1 Inhaler    Inhale 2 puffs into the lungs every 6 hours as needed for shortness of breath / dyspnea or wheezing    Cystic fibrosis with pulmonary manifestations (H), Sinusitis, chronic, Diabetes mellitus type 1 (H)       * albuterol (2.5 MG/3ML) 0.083% neb solution     360 mL    INHALE 1 NEB VIA NEBULIZER FOUR TIMES A DAY    CF (cystic fibrosis) (H)       ascorbic acid 500 MG tablet    VITAMIN C    100 tablet    TAKE ONE TABLET BY MOUTH TWICE A DAY    Cystic fibrosis with pulmonary manifestations (H)       azithromycin 500 MG tablet    ZITHROMAX    36 tablet    TAKE ONE TABLET BY MOUTH ON MONDAY, WEDNESDAY AND FRIDAY    CF (cystic fibrosis) (H)       beclomethasone 80 MCG/ACT Inhaler    QVAR    1 Inhaler    Inhale 1 puff into the lungs 2 times daily    Allergic rhinitis due to house dust mite       beta carotene 01924 UNIT capsule     36 capsule    Take 1 capsule (25,000 Units) by mouth Every Mon, Wed, Fri Morning    Cystic fibrosis with pulmonary manifestations (H)       blood glucose monitoring test strip    ACCU-CHEK SMARTVIEW    1 Month    Use to test blood sugar 4 times daily or as directed.    Type I (juvenile type) diabetes mellitus without mention of complication, not stated as uncontrolled       buPROPion 150 MG 12 hr tablet    WELLBUTRIN SR     Take 150 mg by mouth 2 times daily        cetirizine 10 MG tablet    zyrTEC    30 tablet    Take 1 tablet (10 mg) by mouth every evening    Allergic rhinitis due to [employer] house dust mite, Urticaria, chronic       cholecalciferol 1000 UNIT tablet    vitamin D    30 tablet    TAKE ONE TABLET BY MOUTH EVERY DAY    CF (cystic fibrosis) (H), Exocrine pancreatic insufficiency       DEPO-PROVERA IM           GLYCOPYRROLATE PO      Take 1 mg by mouth 2 times daily        hydrOXYzine 25 MG tablet    ATARAX    30 tablet    Take 1 tablet (25 mg) by mouth every 6 hours  as needed for itching (and nausea)    Chronic pain of left knee       ISOtretinoin 40 MG capsule    ACCUTANE    60 capsule    Take 2 capsules (80 mg) by mouth daily    Acne vulgaris       LORATADINE PO      Take 10 mg by mouth        MEPHYTON 5 MG tablet   Generic drug:  phytonadione     4 tablet    TAKE 1 TABLET BY MOUTH ONCE A WEEK    Cystic fibrosis with pulmonary manifestations (H), Cystic fibrosis with pulmonary manifestations (H), Aspergillosis (H), Pancreatic insufficiency       meropenem 500 MG vial    MERREM    4 mL    500 mg by Nasal Instillation route as needed        methylcellulose (laxative) Powd    CITRUCEL    479 g    Start with 1 heaping tablespoon. Increase as needed, 1 heaping tablespoon at a time, up to 3 times per day.    Other constipation       montelukast 10 MG tablet    SINGULAIR    30 tablet    TAKE ONE TABLET BY MOUTH ONCE DAILY AT BEDTIME    CF (cystic fibrosis) (H)       MULTIVITAMIN PO      Take 1 tablet by mouth daily.        mupirocin 2 % ointment    BACTROBAN    22 g    Use 2 times a day to affected area on the lips.    Cheilitis       naltrexone 50 MG tablet    DEPADE;REVIA    30 tablet    Take 1/2 Tablet daily then increase to maximum of 1 Full Tablet daily as tolerated.  Time it one to two hours prior to worst cravings    Non morbid obesity due to excess calories       omeprazole 20 MG CR capsule    priLOSEC    60 capsule    TAKE 1 CAPSULE BY MOUTH TWICE A DAY    CF (cystic fibrosis) (H)       PROZAC 40 MG capsule   Generic drug:  FLUoxetine      Take 80 mg by mouth daily.    Cystic fibrosis with pulmonary manifestations (H)       TRAZODONE HCL PO      Take 100 mg by mouth At Bedtime        vitamin E 400 UNIT capsule     60 capsule    TAKE 1 CAPSULE BY MOUTH TWICE A DAY    CF (cystic fibrosis) (H), Pancreatic insufficiency       * Notice:  This list has 2 medication(s) that are the same as other medications prescribed for you. Read the directions carefully, and ask your doctor or  other care provider to review them with you.

## 2017-10-16 NOTE — LETTER
10/16/2017       RE: Mamie Rice  [address] [facility] [address]     Dear Colleague,    Thank you for referring your patient, Mamie Rice, to the [facility] DER[facility] at [facility]. Please see a copy of my visit note below.    [facility] Dermatology Note    Dermatology Problem List:  1. Acne vulgaris  - s/p BPO facial wash - stopped due to skin irritation, clindamycin 1% lotion, adapalene 0.1% cream, spironolactone 50 mg bid  - on accutane, I-pledge#: [id number]  2. Cheilitis  - mupirocin 2% ointment  3. CF  4. DM Type II    Encounter Date: Oct 16, 2017    CC:   Chief Complaint   Patient presents with     Derm Problem     Accutane, Mamie notes her acne is improving.     History of Present Illness:  This 22 year old female presents as a follow up for acne. The patient was last seen on 9/13/17 when she continued accutane at 80 mg daily. Today the patient reports that her acne is improving and she has two spots on her left cheek that are resolving. The patient denies any nosebleeds, irritated dry eyes, headaches with blurred vision, muscle or joint aches, changes in bowel habits, depressive thoughts, sharing medication, or donating blood. She notes that she is getting knee surgery tomorrow to fix a torn meniscus for the 4th time. The patient reports no other lesions of concern at this time.     Otherwise, the patient reports no painful, bleeding, nonhealing, or pruritic lesions, and denies new or changing moles.     Past Medical History:   Patient Active Problem List   Diagnosis     Hip joint pain     Aspergillosis (H)     Chronic maxillary sinusitis     Hypoglycemia     Type 1 diabetes mellitus (H)     Cystic fibrosis with pulmonary manifestations (H)     Chronic constipation     Frontal sinusitis     Major depression     ADHD (attention deficit hyperactivity disorder)     Back pain     Pancreatic insufficiency     Non morbid obesity due to  excess calories     Acne vulgaris     Cystic fibrosis (H)     Insomnia     Major depressive disorder with single episode     Past Medical History:   Diagnosis Date     ADHD (attention deficit hyperactivity disorder)      Anxiety      Aspergillosis, with pneumonia (H)     fugus found caused chest pain     Chronic infection     CF, MRSA.,      Chronic sinusitis      Constipation, chronic      Cystic fibrosis with pulmonary manifestations (H) 12/19/2011     Cystic fibrosis without mention of meconium ileus     SWEAT TEST:Date: 2/17/1994   Labor[facility]  296 mg, 104 mmol/L ClSample #2  295 mg, 104 mmol/L Cl GENOTYPING:Date: 10/15/2007,  Laboratory: [facility]: [id number]     Depressive disorder      Diabetes     no meds currently     Dysthymic disorder      Exocrine pancreatic insufficiency      Gastro-oesophageal reflux disease      Hip pain, right      MRSA (methicillin resistant Staphylococcus aureus) carrier      Pancreatic disease      Past Surgical History:   Procedure Laterality Date     ARTHROSCOPY HIP, OSTEOPLASTY FEMUR PROXIMAL, COMBINED  3/11/2013    Procedure: COMBINED ARTHROSCOPY HIP, OSTEOPLASTY FEMUR PROXIMAL;  Right Hip Arthroscopy, Labral  Debridement.    surgeon request choice anesthesia/admit to [facility] after surgery;  Surgeon: Omkar Austin MD;  [facility]: [facility]     ARTHROSCOPY KNEE WITH MEDIAL MENISCECTOMY Left 1/31/2017    Procedure: ARTHROSCOPY KNEE WITH MEDIAL MENISCECTOMY;  Surgeon: Jethro Coyle MD;  Location: [facility] OR     bronchoscopies       BRONCHOSCOPY       EXAM UNDER ANESTHESIA ANUS N/A 5/10/2016    Procedure: EXAM UNDER ANESTHESIA ANUS;  Surgeon: Chet Gaviria MD;  [facility]     EXAM UNDER ANESTHESIA, RESTORATIONS, EXTRACTION(S) DENTAL COMPLEX, COMBINED  5/13/2013    Procedure: COMBINED EXAM UNDER ANESTHESIA, RESTORATIONS, EXTRACTION(S) DENTAL COMPLEX;  Dental Exam, Radiographs, Restorations. Single Extraction  Tooth #2.  Restorations x 3;  Surgeon: Danilo Ortiz DDS;  Location: [facility] OR     HC KNEE SCOPE, DIAGNOSTIC      Arthroscopy, Knee- left     INJECT BOTOX N/A 5/10/2016    Procedure: INJECT BOTOX;  Surgeon: Chet Gaviria MD;  [facility]     left hip labral tear  5/11/2011    left hip arthroscopy with labral debridement and synovectomy     meniscus repair       OPTICAL TRACKING SYSTEM ENDOSCOPIC SINUS SURGERY  10/14/2011    Procedure:OPTICAL TRACKING SYSTEM ENDOSCOPIC SINUS SURGERY; FESS (functional endoscopic sinus surgery) with Image Guidance, bronchial lavage and cultures; Surgeon:GOYO KUO; Location:[facility] OR     OPTICAL TRACKING SYSTEM ENDOSCOPIC SINUS SURGERY  5/18/2012    Procedure:OPTICAL TRACKING SYSTEM ENDOSCOPIC SINUS SURGERY; Right  and Left Image Guided Functional Endoscopic Sinus Surgery With  Frontal Approach, [employer]; Surgeon:GOYO KUO; [facility] OR     OPTICAL TRACKING SYSTEM ENDOSCOPIC SINUS SURGERY  9/26/2012    Procedure: OPTICAL TRACKING SYSTEM ENDOSCOPIC SINUS SURGERY;  Stealth Guided Bilateral Functional Endoscopic Sinus Surgery *Latex Safe*;  Surgeon: Goyo Kuo MD;  [facility]     OPTICAL TRACKING SYSTEM ENDOSCOPIC SINUS SURGERY Bilateral 10/16/2015    Procedure: OPTICAL TRACKING SYSTEM ENDOSCOPIC SINUS SURGERY;  Surgeon: Mariela Haile MD;  [facility]     ORTHOPEDIC SURGERY      left hip tear repair 2010     SINUS SURGERY       Social History:  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 Former use of tanning beds (high school).     Family History:  There is a family history of presumed melanoma in the patient's grandmother.    Medications:  Current Outpatient Prescriptions   Medication Sig Dispense Refill     naltrexone (DEPADE;REVIA) 50 MG tablet Take 1/2 Tablet daily then increase to maximum of 1 Full Tablet daily as tolerated.  Time it one to two hours prior to worst cravings 30 tablet 3     albuterol (2.5 MG/3ML) 0.083% neb solution  INHALE 1 NEB VIA NEBULIZER FOUR TIMES A  mL 11     ISOtretinoin (ACCUTANE) 40 MG capsule Take 2 capsules (80 mg) by mouth daily 60 capsule 0     omeprazole (PRILOSEC) 20 MG CR capsule TAKE 1 CAPSULE BY MOUTH TWICE A DAY 60 capsule 11     vitamin E 400 UNIT capsule TAKE 1 CAPSULE BY MOUTH TWICE A DAY 60 capsule 11     cetirizine (ZYRTEC) 10 MG tablet Take 1 tablet (10 mg) by mouth every evening 30 tablet 1     mupirocin (BACTROBAN) 2 % ointment Use 2 times a day to affected area on the lips. 22 g 3     azithromycin (ZITHROMAX) 500 MG tablet TAKE ONE TABLET BY MOUTH ON MONDAY, WEDNESDAY AND FRIDAY 36 tablet 4     ascorbic acid (VITAMIN C) 500 MG tablet TAKE ONE TABLET BY MOUTH TWICE A  tablet 3     beclomethasone (QVAR) 80 MCG/ACT Inhaler Inhale 1 puff into the lungs 2 times daily 1 Inhaler 3     MEPHYTON 5 MG tablet TAKE 1 TABLET BY MOUTH ONCE A WEEK 4 tablet 11     hydrOXYzine (ATARAX) 25 MG tablet Take 1 tablet (25 mg) by mouth every 6 hours as needed for itching (and nausea) 30 tablet 0     acetaminophen (TYLENOL) 325 MG tablet Take 2 tablets (650 mg) by mouth every 4 hours as needed for other (mild pain) 100 tablet 0     acetylcysteine (MUCOMYST) 20 % injection INHALE ONE 4ML NEB INTO LUNGS VIA NEBULIZER TWO TIMES A DAY, MAY INCREASE TO 3-4 TIMES A DAY WITH INCREASE COUGH/COLD SYMPTOMS 360 mL 11     MedroxyPROGESTERone Acetate (DEPO-PROVERA IM)        buPROPion (WELLBUTRIN SR) 150 MG 12 hr tablet Take 150 mg by mouth 2 times daily       montelukast (SINGULAIR) 10 MG tablet TAKE ONE TABLET BY MOUTH ONCE DAILY AT BEDTIME 30 tablet 11     VITAMIN D3 1000 UNITS tablet TAKE ONE TABLET BY MOUTH EVERY DAY 30 tablet 11     adapalene (DIFFERIN) 0.1 % cream Apply topically At Bedtime After washing face 45 g 11     LORATADINE PO Take 10 mg by mouth       beta carotene 09509 UNIT capsule Take 1 capsule (25,000 Units) by mouth Every Mon, Wed, Fri Morning 36 capsule 4     methylcellulose, laxative, (CITRUCEL)  POWD Start with 1 heaping tablespoon. Increase as needed, 1 heaping tablespoon at a time, up to 3 times per day. 479 g 3     GLYCOPYRROLATE PO Take 1 mg by mouth 2 times daily        TRAZODONE HCL PO Take 100 mg by mouth At Bedtime        blood glucose (ACCU-CHEK SMARTVIEW) test strip Use to test blood sugar 4 times daily or as directed. 1 Month 12     albuterol (PROAIR HFA, PROVENTIL HFA, VENTOLIN HFA) 108 (90 BASE) MCG/ACT inhaler Inhale 2 puffs into the lungs every 6 hours as needed for shortness of breath / dyspnea or wheezing 1 Inhaler 12     meropenem (MERREM) 500 MG injection 500 mg by Nasal Instillation route as needed  4 mL 0     FLUoxetine (PROZAC) 40 MG capsule Take 80 mg by mouth daily.       Amphetamine-Dextroamphetamine (ADDERALL PO) Take 20 mg by mouth daily.       Multiple Vitamin (MULTIVITAMIN OR) Take 1 tablet by mouth daily.       Allergies   Allergen Reactions     Vancomycin Hives     Redmens skin rash      Review of Systems:  - As per HPI  - No headaches with vision changes  - No muscle aches or joint aches  - No stomach upsets, constipation, or diarrhea   - No depressive thoughts or significant mood changes    Physical exam:  BP 99/61 - as of 8/14/17.  GEN: This is a well developed, well-nourished female in no acute distress, in a pleasant mood.      SKIN: Acne exam, which includes the face, neck, upper central chest, and upper central back was performed.  - Two excoriated papules on the left medial cheek  - No other lesions of concern on areas examined.     Impression/Plan:  1. Acne vulgaris, acne excoriee - Resistant to hormonal therapy, appropriate oral and topical antibiotics and well as topical retinoids.    - Discussion of the risks and side effects of Accutane including but not limited to mucocutaneous dryness, arthralgias, myalgias, depression, suicidal ideation, headache, blurred vision, increase in liver function tests, increase in lipids, and teratogenic effects. Discussed need for  two forms of contraception. The [employer] In Historyprogram brochure was provided and the contents discussed with the patient. The patient was counseled they cannot give blood while on Accutane. No personal or family history of inflammatory bowel disease or hypertriglyceridemia known to patient. Reviewed need to avoid alcohol on medication. Ipledge program consent was obtained.   - At this visit, continue Accutane 80 mg daily. Goal dose is 12,300 to 18,040mg for 150-220 mg/kg dosing in this 82 kg patient. Recommend to take medication with food containing fat. The patients two forms of contraception are Depo and male latex condoms.  - Labs including triglycerides were checked. Pregnancy test is negative.She has her CBC and CMP checked regularly through her CF MD.   - Continue to hold beta carotene.    Total cumulative dose 6000 mg. Patient's I-pledge # is [id number]. The patient will stop all acne medications, when she starts the medication.     2. Cheilitis  - Continue using lip moisturization.   - Continue mupirocin 2% ointment - apply to cracks on sides of lips.    Follow-up in 30 days, earlier for new or changing lesions.     Staff Involved:  Scribe Disclosure:   I, Lupe Alcantara, am serving as a scribe to document services personally performed by Paige Reid PA-C, based on data collection and the provider's statements to me.    Provider Disclosure:   The documentation recorded by the scribe accurately reflects the services I personally performed and the decisions made by me.    All risks, benefits and alternatives were discussed with patient.  Patient is in agreement and understands the assessment and plan.  All questions were answered.  Sun Screen Education was given.   Return to Clinic in 1 month or sooner as needed.   Paige Reid PA-C   [facility]

## 2017-11-01 ENCOUNTER — OFFICE VISIT (OUTPATIENT)
Dept: PULMONOLOGY | Facility: CLINIC | Age: 22
End: 2017-11-01
Attending: INTERNAL MEDICINE
Payer: COMMERCIAL

## 2017-11-01 ENCOUNTER — OFFICE VISIT (OUTPATIENT)
Dept: OTOLARYNGOLOGY | Facility: CLINIC | Age: 22
End: 2017-11-01

## 2017-11-01 VITALS
SYSTOLIC BLOOD PRESSURE: 126 MMHG | BODY MASS INDEX: 31.6 KG/M2 | HEART RATE: 95 BPM | OXYGEN SATURATION: 98 % | HEIGHT: 62 IN | RESPIRATION RATE: 18 BRPM | DIASTOLIC BLOOD PRESSURE: 86 MMHG | WEIGHT: 171.74 LBS

## 2017-11-01 DIAGNOSIS — E84.9 CYSTIC FIBROSIS (H): Primary | ICD-10-CM

## 2017-11-01 DIAGNOSIS — J30.89 ALLERGIC RHINITIS DUE TO AMERICAN HOUSE DUST MITE: ICD-10-CM

## 2017-11-01 DIAGNOSIS — E84.0 CYSTIC FIBROSIS WITH PULMONARY MANIFESTATIONS (H): ICD-10-CM

## 2017-11-01 DIAGNOSIS — J32.0 CHRONIC MAXILLARY SINUSITIS: Primary | ICD-10-CM

## 2017-11-01 DIAGNOSIS — L50.8 URTICARIA, CHRONIC: ICD-10-CM

## 2017-11-01 DIAGNOSIS — E84.0 CYSTIC FIBROSIS WITH PULMONARY MANIFESTATIONS (H): Primary | ICD-10-CM

## 2017-11-01 LAB
EXPTIME-PRE: 6.53 SEC
FEF2575-%PRED-PRE: 110 %
FEF2575-PRE: 4.02 L/SEC
FEF2575-PRED: 3.65 L/SEC
FEFMAX-%PRED-PRE: 134 %
FEFMAX-PRE: 8.93 L/SEC
FEFMAX-PRED: 6.62 L/SEC
FEV1-%PRED-PRE: 101 %
FEV1-PRE: 3.14 L
FEV1FEV6-PRE: 89 %
FEV1FEV6-PRED: 86 %
FEV1FVC-PRE: 89 %
FEV1FVC-PRED: 87 %
FIFMAX-PRE: 4.89 L/SEC
FVC-%PRED-PRE: 99 %
FVC-PRE: 3.54 L
FVC-PRED: 3.55 L

## 2017-11-01 PROCEDURE — 90732 PPSV23 VACC 2 YRS+ SUBQ/IM: CPT | Mod: ZF | Performed by: INTERNAL MEDICINE

## 2017-11-01 PROCEDURE — 87077 CULTURE AEROBIC IDENTIFY: CPT | Performed by: INTERNAL MEDICINE

## 2017-11-01 PROCEDURE — G0008 ADMIN INFLUENZA VIRUS VAC: HCPCS | Mod: ZF

## 2017-11-01 PROCEDURE — 94375 RESPIRATORY FLOW VOLUME LOOP: CPT | Mod: ZF | Performed by: INTERNAL MEDICINE

## 2017-11-01 PROCEDURE — 87070 CULTURE OTHR SPECIMN AEROBIC: CPT | Performed by: INTERNAL MEDICINE

## 2017-11-01 PROCEDURE — G0009 ADMIN PNEUMOCOCCAL VACCINE: HCPCS | Mod: ZF

## 2017-11-01 PROCEDURE — 99212 OFFICE O/P EST SF 10 MIN: CPT | Mod: ZF

## 2017-11-01 PROCEDURE — 25000128 H RX IP 250 OP 636: Mod: ZF | Performed by: INTERNAL MEDICINE

## 2017-11-01 PROCEDURE — 87186 SC STD MICRODIL/AGAR DIL: CPT | Performed by: INTERNAL MEDICINE

## 2017-11-01 PROCEDURE — 96372 THER/PROPH/DIAG INJ SC/IM: CPT | Mod: ZF

## 2017-11-01 PROCEDURE — 90686 IIV4 VACC NO PRSV 0.5 ML IM: CPT | Mod: ZF | Performed by: INTERNAL MEDICINE

## 2017-11-01 RX ORDER — CETIRIZINE HYDROCHLORIDE 10 MG/1
10 TABLET ORAL EVERY EVENING
Qty: 30 TABLET | Refills: 3 | Status: SHIPPED | OUTPATIENT
Start: 2017-11-01 | End: 2021-10-22

## 2017-11-01 RX ADMIN — INFLUENZA A VIRUS A/MICHIGAN/45/2015 X-275 (H1N1) ANTIGEN (FORMALDEHYDE INACTIVATED), INFLUENZA A VIRUS A/HONG KONG/4801/2014 X-263B (H3N2) ANTIGEN (FORMALDEHYDE INACTIVATED), INFLUENZA B VIRUS B/PHUKET/3073/2013 ANTIGEN (FORMALDEHYDE INACTIVATED), AND INFLUENZA B VIRUS B/BRISBANE/60/2008 ANTIGEN (FORMALDEHYDE INACTIVATED) 0.5 ML: 15; 15; 15; 15 INJECTION, SUSPENSION INTRAMUSCULAR at 10:37

## 2017-11-01 RX ADMIN — PNEUMOCOCCAL VACCINE POLYVALENT 0.5 ML
25; 25; 25; 25; 25; 25; 25; 25; 25; 25; 25; 25; 25; 25; 25; 25; 25; 25; 25; 25; 25; 25; 25 INJECTION, SOLUTION INTRAMUSCULAR; SUBCUTANEOUS at 10:36

## 2017-11-01 ASSESSMENT — ENCOUNTER SYMPTOMS
CONSTIPATION: 0
SWOLLEN GLANDS: 0
WEIGHT LOSS: 0
JAUNDICE: 0
TROUBLE SWALLOWING: 0
NAUSEA: 0
DECREASED LIBIDO: 0
LOSS OF CONSCIOUSNESS: 0
BRUISES/BLEEDS EASILY: 0
SLEEP DISTURBANCES DUE TO BREATHING: 0
NECK MASS: 0
BACK PAIN: 0
TINGLING: 0
POLYPHAGIA: 0
SPEECH CHANGE: 0
EYE WATERING: 0
SINUS PAIN: 0
SINUS CONGESTION: 0
HEMATURIA: 0
CLAUDICATION: 0
HEADACHES: 0
NERVOUS/ANXIOUS: 0
SHORTNESS OF BREATH: 0
ABDOMINAL PAIN: 0
HYPOTENSION: 0
BREAST MASS: 0
HYPERTENSION: 0
BREAST PAIN: 0
JOINT SWELLING: 0
EXTREMITY NUMBNESS: 0
LIGHT-HEADEDNESS: 0
HOT FLASHES: 0
NUMBNESS: 0
ARTHRALGIAS: 0
POLYDIPSIA: 0
BOWEL INCONTINENCE: 0
LEG PAIN: 0
INSOMNIA: 0
SNORES LOUDLY: 0
TASTE DISTURBANCE: 0
HEMOPTYSIS: 0
FATIGUE: 0
MEMORY LOSS: 0
PANIC: 0
STIFFNESS: 0
DYSURIA: 0
PARALYSIS: 0
RECTAL PAIN: 0
INCREASED ENERGY: 0
HOARSE VOICE: 0
VOMITING: 0
TREMORS: 0
NAIL CHANGES: 0
BLOATING: 0
DIARRHEA: 0
ALTERED TEMPERATURE REGULATION: 0
NIGHT SWEATS: 0
DECREASED APPETITE: 0
RECTAL BLEEDING: 0
WEIGHT GAIN: 0
EYE IRRITATION: 0
POOR WOUND HEALING: 0
DISTURBANCES IN COORDINATION: 0
RESPIRATORY PAIN: 0
DEPRESSION: 0
ORTHOPNEA: 0
CHILLS: 0
SMELL DISTURBANCE: 0
SORE THROAT: 0
MYALGIAS: 0
SEIZURES: 0
MUSCLE CRAMPS: 0
SPUTUM PRODUCTION: 0
PALPITATIONS: 0
EYE REDNESS: 0
EYE PAIN: 0
FEVER: 0
DECREASED CONCENTRATION: 0
BLOOD IN STOOL: 0
COUGH: 0
HEARTBURN: 0
DYSPNEA ON EXERTION: 0
LEG SWELLING: 0
DIFFICULTY URINATING: 0
WHEEZING: 0
DIZZINESS: 0
NECK PAIN: 0
SKIN CHANGES: 0
COUGH DISTURBING SLEEP: 0
DOUBLE VISION: 0
MUSCLE WEAKNESS: 0
HALLUCINATIONS: 0
SYNCOPE: 0
EXERCISE INTOLERANCE: 0
POSTURAL DYSPNEA: 0
TACHYCARDIA: 0
WEAKNESS: 0
FLANK PAIN: 0

## 2017-11-01 ASSESSMENT — PAIN SCALES - GENERAL
PAINLEVEL: NO PAIN (0)
PAINLEVEL: NO PAIN (0)

## 2017-11-01 NOTE — MR AVS SNAPSHOT
After Visit Summary   11/1/2017    Mamie Rice    MRN: [id number]           Patient Information     Date Of Birth          1995        Visit Information        Provider Department      11/1/2017 9:50 AM Gavi Allison MD [facility]        Today's Diagnoses     Cystic fibrosis with pulmonary manifestations (H)    -  1    Allergic rhinitis due to American house dust mite        Urticaria, chronic          Care Instructions    Cystic Fibrosis Self-Care Plan       MRN: [id number]   Clinic Date: November 1, 2017   Patient: Mamie Rice     Annual Studies:   IGG   Date Value Ref Range Status   08/02/2017 689 (L) 695 - 1620 mg/dL Final     Insulin   Date Value Ref Range Status   03/30/2015 81 mU/L Final     Comment:     Reference Range:  0-20     There are no preventive care reminders to display for this patient.      Pulmonary Function Tests  FEV1: amount of air you can blow out in 1 second  FVC: total amount of air you can take in and blow out    Your Goals:         PFT Latest Ref Rng & Units 11/1/2017   FVC L 3.54   FEV1 L 3.14   FVC% % 99   FEV1% % 101          Airway Clearance: The Most Important Way to Keep Your Lungs Healthy  Vest Settings:    Hill-Rom Frequencies: 8, 9, 10 Pressure 10 Then, Frequencies 18, 19, 20 Pressure 6      RespirTech: Quick Start with Pressure of     Do each frequency for 5 minutes; Deflate vest after each frequency & cough 3 times before beginning the next setting.    Vest and Neb Therapy should be done 2 times/day.    Good Nutrition Can Improve Lung Function and Overall Health     Take ALL of your vitamins with food     Take 1/2 of your enzymes before EVERY meal/snack and the other 1/2 mid-meal/snack    Wt Readings from Last 3 Encounters:   11/01/17 77.9 kg (171 lb 11.8 oz)   10/11/17 78.9 kg (174 lb)   09/26/17 78.8 kg (173 lb 11.2 oz)       Body mass index is 31.4 kg/(m^2).         National CF Foundation  Recommendations for BMI in CF Adults: Women: at least 22 Men: at least 23        Controlling Blood Sugars Helps Prevent Lung Infections & Improves Nutrition  Test blood sugar:     In the morning before eating (goal is )     2 hours after a meal (goal is less than 150)     When pre-meal glucose is greater than 150 add correction     At bedtime (if less than 100 eat a snack with 15 grams of carbohydrates  Last A1C Results:   Hemoglobin A1C   Date Value Ref Range Status   08/02/2017 5.5 4.3 - 6.0 % Final         If diabetic, measure A1C every 6 months. Goal: Under 7%    Staying Healthy    Research:  If you are interested in learning about research opportunities or have questions, please contact Mariana Smith at [phone] or [email or web address].      CF Foundation:  Compass is a personalized resource service to help you with the insurance, financial, legal and other issues you are facing.  It's free, confidential and available to anyone with CF.  Ask your  for more information or contact Compass directly at [facility] ([phone]) or compass@cff.org, or learn more at [email or web address].          RECOMMENDATIONS: Flu vaccine today.  Pneumovax today.  This is for pneumococcal pneumonia.  Great job!    YOUR GOAL: Enjoy the Holidays!      CF Nurse Line:  Burton Anderson: [phone]   Jhoana Trinh, RT: [phone]     Amy Andino: [phone] or Theresa Ceja, Dieticians: [phone]   Audra Zhang, Diabetes Nurse: [phone]      Rupa Dunne: [phone] or Rosette Burnett [phone], Social Workers   [email or web address]                   Follow-ups after your visit        Follow-up notes from your care team     Return in about 3 months (around 2/1/2018).      Your next 10 appointments already scheduled     Nov 16, 2017  2:00 PM CST   (Arrive by 1:45 PM)   Return Visit with VANESA Lloyd [facility] ([facility])    [address] [place] [address]  [address] [address]   [phone]            Dec 04, 2017  6:45 PM CST   (Arrive by 6:30 PM)   Return Visit with Mariela Haile MD   [facility] ([facility])    [address] [place] [address]   [phone]            Dec 18, 2017  8:45 AM CST   (Arrive by 8:30 AM)   Return Visit with Paige Reid PA-C   [facility] ([facility])    [address] [place] [address]   [phone]            Jan 17, 2018  7:00 AM CST   (Arrive by 6:45 AM)   Return Visit with Mariela Haile MD   [facility] ([facility])    [address] [place] [address]   [phone]            Jan 17, 2018  8:00 AM CST   [facility]    [address] [place] [address]   [phone]            Jan 17, 2018  8:30 AM CST   (Arrive by 8:15 AM)   RETURN CYSTIC FIBROSIS VISIT with Gavi Allison MD   [facility] ([facility])    [address] [place] [address]   [phone]            Jan 17, 2018 10:00 AM CST   (Arrive by 9:45 AM)   Return Visit with Paige Reid PA-C   [facility] ([facility])    [address] [place] [address]   [phone]            Feb 21, 2018 10:15 AM CST   (Arrive by 10:00 AM)   Return Visit with Mariela Haile MD   [facility] ([facility])    [address] [place] [address]   [phone]              Future tests that were ordered for you today     Open Future Orders        Priority Expected Expires Ordered    Cystic Fibrosis Culture Aerob Bacterial  "Routine  11/1/2018 11/1/2017            Who to contact     If you have questions or need follow up information about today's clinic visit or your schedule please contact [facility] directly at [phone].  Normal or non-critical lab and imaging results will be communicated to you by [employer], letter or phone within 4 business days after the clinic has received the results. If you do not hear from us within 7 days, please contact the clinic through [employer] or phone. If you have a critical or abnormal lab result, we will notify you by phone as soon as possible.  Submit refill requests through [employer] or call your pharmacy and they will forward the refill request to us. Please allow 3 business days for your refill to be completed.          Additional Information About Your Visit        [employer] Information     [employer] gives you secure access to your electronic health record. If you see a primary care provider, you can also send messages to your care team and make appointments. If you have questions, please call your primary care clinic.  If you do not have a primary care provider, please call [phone] and they will assist you.        Care EveryWhere ID     This is your Care EveryWhere ID. This could be used by other organizations to access your [facility] medical records  [id number]        Your Vitals Were     Pulse Respirations Height Pulse Oximetry BMI (Body Mass Index)       95 18 1.575 m (5' 2.01\") 98% 31.4 kg/m2        Blood Pressure from Last 3 Encounters:   11/01/17 126/86   09/26/17 124/87   08/02/17 112/80    Weight from Last 3 Encounters:   11/01/17 77.9 kg (171 lb 11.8 oz)   10/11/17 78.9 kg (174 lb)   09/26/17 78.8 kg (173 lb 11.2 oz)              We Performed the Following     RESPIRATORY FLOW VOLUME LOOP          Today's Medication Changes          These changes are accurate as of: 11/1/17 11:59 PM.  If you have any questions, ask your nurse or doctor.             "   Stop taking these medicines if you haven't already. Please contact your care team if you have questions.     adapalene 0.1 % cream   Commonly known as:  DIFFERIN   Stopped by:  Gavi Allison MD           mupirocin 2 % ointment   Commonly known as:  BACTROBAN   Stopped by:  Gavi Allison MD                Where to get your medicines      These medications were sent to [facility] - [address] [address]     Phone:  [phone]     cetirizine 10 MG tablet                Primary Care Provider Office Phone # Fax #    Malik De La Rosa [phone]       [address] [facility] [address]        Equal Access to Services     [facility] : Hadii martin akbar Soyesenia, waaxda luqadaha, qaybta kaalmada adeegyada, david rutledge . So [facility] [phone].    ATENCIÓN: Si habla español, tiene a hidalgo disposición servicios gratuitos de asistencia lingüística. [facility] [phone].    We comply with applicable federal civil rights laws and [place] laws. We do not discriminate on the basis of race, color, national origin, age, disability, sex, sexual orientation, or gender identity.            Thank you!     Thank you for choosing [facility]  for your care. Our goal is always to provide you with excellent care. Hearing back from our patients is one way we can continue to improve our services. Please take a few minutes to complete the written survey that you may receive in the mail after your visit with us. Thank you!             Your Updated Medication List - Protect others around you: Learn how to safely use, store and throw away your medicines at www.disposemymeds.org.          This list is accurate as of: 11/1/17 11:59 PM.  Always use your most recent med list.                   Brand Name Dispense Instructions for use Diagnosis     acetaminophen 325 MG tablet    TYLENOL    100 tablet    Take 2 tablets (650 mg) by mouth every 4 hours as needed for other (mild pain)    Chronic pain of left knee       acetylcysteine 20 % nebulizer solution    MUCOMYST    360 mL    INHALE ONE 4ML NEB INTO LUNGS VIA NEBULIZER TWO TIMES A DAY, MAY INCREASE TO 3-4 TIMES A DAY WITH INCREASE COUGH/COLD SYMPTOMS    CF (cystic fibrosis) (H)       ADDERALL PO      Take 20 mg by mouth daily.        * albuterol 108 (90 BASE) MCG/ACT Inhaler    PROAIR HFA/PROVENTIL HFA/VENTOLIN HFA    1 Inhaler    Inhale 2 puffs into the lungs every 6 hours as needed for shortness of breath / dyspnea or wheezing    Cystic fibrosis with pulmonary manifestations (H), Sinusitis, chronic, Diabetes mellitus type 1 (H)       * albuterol (2.5 MG/3ML) 0.083% neb solution     360 mL    INHALE 1 NEB VIA NEBULIZER FOUR TIMES A DAY    CF (cystic fibrosis) (H)       ascorbic acid 500 MG tablet    VITAMIN C    100 tablet    TAKE ONE TABLET BY MOUTH TWICE A DAY    Cystic fibrosis with pulmonary manifestations (H)       azithromycin 500 MG tablet    ZITHROMAX    36 tablet    TAKE ONE TABLET BY MOUTH ON MONDAY, WEDNESDAY AND FRIDAY    CF (cystic fibrosis) (H)       beclomethasone 80 MCG/ACT Inhaler    QVAR    1 Inhaler    Inhale 1 puff into the lungs 2 times daily    Allergic rhinitis due to house dust mite       beta carotene 63259 UNIT capsule     36 capsule    Take 1 capsule (25,000 Units) by mouth Every Mon, Wed, Fri Morning    Cystic fibrosis with pulmonary manifestations (H)       blood glucose monitoring test strip    ACCU-CHEK SMARTVIEW    1 Month    Use to test blood sugar 4 times daily or as directed.    Type I (juvenile type) diabetes mellitus without mention of complication, not stated as uncontrolled       buPROPion 150 MG 12 hr tablet    WELLBUTRIN SR     Take 150 mg by mouth 2 times daily        cetirizine 10 MG tablet    zyrTEC    30 tablet    Take 1 tablet (10 mg) by mouth every evening     Allergic rhinitis due to American house dust mite, Urticaria, chronic       cholecalciferol 1000 UNIT tablet    vitamin D3    30 tablet    TAKE ONE TABLET BY MOUTH EVERY DAY    CF (cystic fibrosis) (H), Exocrine pancreatic insufficiency       DEPO-PROVERA IM           GLYCOPYRROLATE PO      Take 1 mg by mouth 2 times daily        hydrOXYzine 25 MG tablet    ATARAX    30 tablet    Take 1 tablet (25 mg) by mouth every 6 hours as needed for itching (and nausea)    Chronic pain of left knee       ISOtretinoin 40 MG capsule    ACCUTANE    60 capsule    Take 2 capsules (80 mg) by mouth daily    Acne vulgaris       LORATADINE PO      Take 10 mg by mouth        MEPHYTON 5 MG tablet   Generic drug:  phytonadione     4 tablet    TAKE 1 TABLET BY MOUTH ONCE A WEEK    Cystic fibrosis with pulmonary manifestations (H), Cystic fibrosis with pulmonary manifestations (H), Aspergillosis (H), Pancreatic insufficiency       meropenem 500 MG vial    MERREM    4 mL    500 mg by Nasal Instillation route as needed        methylcellulose (laxative) Powd    CITRUCEL    479 g    Start with 1 heaping tablespoon. Increase as needed, 1 heaping tablespoon at a time, up to 3 times per day.    Other constipation       montelukast 10 MG tablet    SINGULAIR    30 tablet    TAKE ONE TABLET BY MOUTH ONCE DAILY AT BEDTIME    CF (cystic fibrosis) (H)       MULTIVITAMIN PO      Take 1 tablet by mouth daily.        naltrexone 50 MG tablet    DEPADE;REVIA    30 tablet    Take 1/2 Tablet daily then increase to maximum of 1 Full Tablet daily as tolerated.  Time it one to two hours prior to worst cravings    Non morbid obesity due to excess calories       omeprazole 20 MG CR capsule    priLOSEC    60 capsule    TAKE 1 CAPSULE BY MOUTH TWICE A DAY    CF (cystic fibrosis) (H)       PROZAC 40 MG capsule   Generic drug:  FLUoxetine      Take 80 mg by mouth daily.    Cystic fibrosis with pulmonary manifestations (H)       TRAZODONE HCL PO      Take 100 mg by  mouth At Bedtime        vitamin E 400 UNIT capsule     60 capsule    TAKE 1 CAPSULE BY MOUTH TWICE A DAY    CF (cystic fibrosis) (H), Pancreatic insufficiency       * Notice:  This list has 2 medication(s) that are the same as other medications prescribed for you. Read the directions carefully, and ask your doctor or other care provider to review them with you.

## 2017-11-01 NOTE — LETTER
11/1/2017       RE: Mamie Rice  [address] [facility] [address]     Dear Colleague,    Thank you for referring your patient, Mamie Rice, to the [facility] at [facility]. Please see a copy of my visit note below.    Mamie Rice returns.  She is doing well.  She has no sinonasal complaints.  She has chronic sinusitis related to cystic fibrosis.  She is here for meropenem instillation.  A 0-degree rigid endoscope is used for visualization.  I sprayed 2 cc of meropenem into each middle meatal cavity.  She tolerates this very well.  She will return in a month for repeat procedure.         Again, thank you for allowing me to participate in the care of your patient.      Sincerely,    Mariela Haile MD

## 2017-11-01 NOTE — NURSING NOTE
Chief Complaint   Patient presents with     RECHECK     Return Merrem Injection, Pt states no pain today.        N Emeka FERNANDEZ

## 2017-11-01 NOTE — PATIENT INSTRUCTIONS
Plan of care:  Follow up with Dr Haile as scheduled  Clinic contact information:  1. To schedule an appointment call [phone], option 1  2. To talk to the Triage RN call [phone], option 3  3. If you need to speak to Ellie or get a message to your doctor on a Friday, call the triage RN  4. EllieRN: [phone]  5. Surgery scheduling:      Mahi Canela: [phone]      Brisa Aparicio: [phone]  6. Fax: [phone]  7. [phone]

## 2017-11-01 NOTE — PROGRESS NOTES
[facility]  November 1, 2017         Assessment and Plan:   Mamie Rice is a 22 year old female with cystic fibrosis.    1. CF lung disease with normal spriometry:  Mamie has shown evidence of stability in her pulmonary function as well as her pulmonary symptomatology.  She continues to show evidence of Staph and Achromobacter in her sputum.  At this time, I would recommend that she continue on her present bronchial drainage and nebulized therapies.    2.  Cystic fibrosis sinus disease.  Mamie is followed regularly by Dr. Haile.  She is due to see her today.  She does continue to get meropenem to her sinuses every month.  This has controlled her symptoms well.    3.  Abnormal glucose tolerance.  Mamie denies any difficulty with her blood sugars.  She currently is not on any insulin therapy.    4.  Weight management.  [facility]  She is now on a new medication which she feels is helping control her appetite.  She is pleased with her slow weight loss.    5.  Attention deficit disorder.  Mamie does remain on Adderall which is managed by her primary care physician.    6.  Meniscal tear.  Mamie recently had an arthroscopic left knee surgery.  This was done locally in [place].  She is pleased with the results.    7.  Health care maintenance.  Mamie had an influenza and pneumococcal vaccine today.   8.  Psychosocial.  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  She is enjoying her work very much.   9. Pancreatic Insufficiency:  The patient has no new symptoms consistent with worsening malabsorption.  The plan is to continue the present dose of pancreatic enzymes and vitamin supplementation.    Gavi Allison MD MPH   of Medicine  Pulmonary, Allergy, Critical Care and Sleep Medicine      Interval History:     Mamie does continue to  produce sputum that is green in color.  Her cough frequency and sputum volume are unchanged.  She does perform 2 vest therapies per day.          Review of Systems:     All questionnaires were reviewed by me today with the patient.  Review of Systems     Constitutional:  Negative for fever, chills, weight loss, weight gain, fatigue, decreased appetite, night sweats, recent stressors, height gain, height loss, post-operative complications, incisional pain, hallucinations, increased energy, hyperactivity and confused.   HENT:  Negative for ear pain, hearing loss, tinnitus, nosebleeds, trouble swallowing, hoarse voice, mouth sores, sore throat, ear discharge, tooth pain, gum tenderness, taste disturbance, smell disturbance, hearing aid, bleeding gums, dry mouth, sinus pain, sinus congestion and neck mass.    Eyes:  Negative for double vision, pain, redness, eye pain, decreased vision, eye watering, eye bulging, eye dryness, flashing lights, spots, floaters, strabismus, tunnel vision, jaundice and eye irritation.   Respiratory:   Negative for cough, hemoptysis, sputum production, shortness of breath, wheezing, sleep disturbances due to breathing, snores loudly, respiratory pain, dyspnea on exertion, cough disturbing sleep and postural dyspnea.    Cardiovascular:  Negative for chest pain, dyspnea on exertion, palpitations, orthopnea, claudication, leg swelling, fingers/toes turn blue, hypertension, hypotension, syncope, history of heart murmur, chest pain on exertion, chest pain at rest, pacemaker, few scattered varicosities, leg pain, sleep disturbances due to breathing, tachycardia, light-headedness, exercise intolerance and edema.   Gastrointestinal:  Negative for heartburn, nausea, vomiting, abdominal pain, diarrhea, constipation, blood in stool, melena, rectal pain, bloating, hemorrhoids, bowel incontinence, jaundice, rectal bleeding, coffee ground emesis and change in stool.   Genitourinary:  Negative for bladder  incontinence, dysuria, urgency, hematuria, flank pain, vaginal discharge, difficulty urinating, genital sores, dyspareunia, decreased libido, nocturia, voiding less frequently, arousal difficulty, abnormal vaginal bleeding, excessive menstruation, menstrual changes, hot flashes, vaginal dryness and postmenopausal bleeding.   Musculoskeletal:  Negative for myalgias, back pain, joint swelling, arthralgias, stiffness, muscle cramps, neck pain, bone pain, muscle weakness and fracture.   Skin:  Negative for nail changes, itching, poor wound healing, rash, hair changes, skin changes, acne, warts, poor wound healing, scarring, flaky skin, Raynaud's phenomenon, sensitivity to sunlight and skin thickening.   Neurological:  Negative for dizziness, tingling, tremors, speech change, seizures, loss of consciousness, weakness, light-headedness, numbness, headaches, disturbances in coordination, extremity numbness, memory loss, difficulty walking and paralysis.   Endo/Heme:  Negative for anemia, swollen glands and bruises/bleeds easily.   Psychiatric/Behavioral:  Negative for depression, hallucinations, memory loss, decreased concentration, mood swings and panic attacks.    Breast:  Negative for breast discharge, breast mass, breast pain and nipple retraction.   Endocrine:  Negative for altered temperature regulation, polyphagia, polydipsia, unwanted hair growth and change in facial hair.         Past Medical and Surgical History:     Past Medical History:   Diagnosis Date     ADHD (attention deficit hyperactivity disorder)      Anxiety      Aspergillosis, with pneumonia (H)     fugus found caused chest pain     Chronic infection     CF, MRSA.,      Chronic sinusitis      Constipation, chronic      Cystic fibrosis with pulmonary manifestations (H) 12/19/2011     Cystic fibrosis without mention of meconium ileus     SWEAT TEST:Date: 2/17/1994   [facility]  296 mg, 104 mmol/L ClSample #2  295 mg, 104 mmol/L Cl  GENOTYPING:Date: 10/15/2007,  Laboratory: [facility]: [id number]     Depressive disorder      Diabetes     no meds currently     Dysthymic disorder      Exocrine pancreatic insufficiency      Gastro-oesophageal reflux disease      Hip pain, right      MRSA (methicillin resistant Staphylococcus aureus) carrier      Pancreatic disease      Past Surgical History:   Procedure Laterality Date     ARTHROSCOPY HIP, OSTEOPLASTY FEMUR PROXIMAL, COMBINED  3/11/2013    Procedure: COMBINED ARTHROSCOPY HIP, OSTEOPLASTY FEMUR PROXIMAL;  Right Hip Arthroscopy, Labral  Debridement.    surgeon request choice anesthesia/admit to [facility] after surgery;  Surgeon: Omkar Austin MD;  [facility]     ARTHROSCOPY KNEE WITH MEDIAL MENISCECTOMY Left 1/31/2017    Procedure: ARTHROSCOPY KNEE WITH MEDIAL MENISCECTOMY;  Surgeon: Jethro Coyle MD;  [facility] OR     bronchoscopies       BRONCHOSCOPY       EXAM UNDER ANESTHESIA ANUS N/A 5/10/2016    Procedure: EXAM UNDER ANESTHESIA ANUS;  Surgeon: Chet Gaviria MD;  [facility]     EXAM UNDER ANESTHESIA, RESTORATIONS, EXTRACTION(S) DENTAL COMPLEX, COMBINED  5/13/2013    Procedure: COMBINED EXAM UNDER ANESTHESIA, RESTORATIONS, EXTRACTION(S) DENTAL COMPLEX;  Dental Exam, Radiographs, Restorations. Single Extraction  Tooth #2. Restorations x 3;  Surgeon: Danilo Ortiz DDS;  [facility]     HC KNEE SCOPE, DIAGNOSTIC      Arthroscopy, Knee- left     INJECT BOTOX N/A 5/10/2016    Procedure: INJECT BOTOX;  Surgeon: Chet Gaviria MD;  [facility]     left hip labral tear  5/11/2011    left hip arthroscopy with labral debridement and synovectomy     meniscus repair       OPTICAL TRACKING SYSTEM ENDOSCOPIC SINUS SURGERY  10/14/2011    Procedure:OPTICAL TRACKING SYSTEM ENDOSCOPIC SINUS SURGERY; FESS (functional endoscopic sinus surgery) with Image Guidance, bronchial lavage and cultures; Surgeon:JUAN JOSE GARCIA; Location:[facility]     OPTICAL  TRACKING SYSTEM ENDOSCOPIC SINUS SURGERY  5/18/2012    Procedure:OPTICAL TRACKING SYSTEM ENDOSCOPIC SINUS SURGERY; Right  and Left Image Guided Functional Endoscopic Sinus Surgery With  Frontal Approach, [place]; Surgeon:GOYO KUO; [facility]     OPTICAL TRACKING SYSTEM ENDOSCOPIC SINUS SURGERY  9/26/2012    Procedure: OPTICAL TRACKING SYSTEM ENDOSCOPIC SINUS SURGERY;  Stealth Guided Bilateral Functional Endoscopic Sinus Surgery *Latex Safe*;  Surgeon: Goyo Kuo MD;  [facility]     OPTICAL TRACKING SYSTEM ENDOSCOPIC SINUS SURGERY Bilateral 10/16/2015    Procedure: OPTICAL TRACKING SYSTEM ENDOSCOPIC SINUS SURGERY;  Surgeon: Mariela Haile MD;  [facility]     ORTHOPEDIC SURGERY      left hip tear repair 2010     SINUS SURGERY             Family History:     Family History   Problem Relation Age of Onset     CANCER Paternal Grandmother      Skin Cancer Paternal Grandmother      Other Cancer Paternal Grandmother      Skin     Obesity Paternal Grandmother      CANCER Paternal Grandfather      PGF had throat cancer (he was a smoker)     Other Cancer Paternal Grandfather      Anxiety Disorder Paternal Grandfather      Thyroid Disease Mother      ,     Obesity Other      Anesthesia Reaction No family hx of      Blood Disease No family hx of      Colon Polyps No family hx of      Crohn Disease No family hx of      Ulcerative Colitis No family hx of      Colon Cancer No family hx of      Melanoma No family hx of             Social History:     Social History     Social History     Marital status: Single     Spouse name: N/A     Number of children: N/A     Years of education: N/A     Occupational History     Not on file.     Social History Main Topics     Smoking status: Never Smoker     Smokeless tobacco: Never Used      Comment: one person at home smokes outside     Alcohol use No     Drug use: No     Sexual activity: No     Other Topics Concern     Blood Transfusions No     Exercise Yes     Social  History Narrative    Mamie lives with mother in [place], [place].  There is a cat in the home, but Mamie does not have any litterbox duties.  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  She gets essentially no exercise because of the tingling in her feet (says it bothers her even to stand).        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        8/2015 no change in social situation        2/15/2016 Pt is single and lives with mother and stepfather.            Medications:     Current Outpatient Prescriptions   Medication     cetirizine (ZYRTEC) 10 MG tablet     ISOtretinoin (ACCUTANE) 40 MG capsule     naltrexone (DEPADE;REVIA) 50 MG tablet     albuterol (2.5 MG/3ML) 0.083% neb solution     omeprazole (PRILOSEC) 20 MG CR capsule     vitamin E 400 UNIT capsule     azithromycin (ZITHROMAX) 500 MG tablet     ascorbic acid (VITAMIN C) 500 MG tablet     beclomethasone (QVAR) 80 MCG/ACT Inhaler     MEPHYTON 5 MG tablet     hydrOXYzine (ATARAX) 25 MG tablet     acetaminophen (TYLENOL) 325 MG tablet     acetylcysteine (MUCOMYST) 20 % injection     MedroxyPROGESTERone Acetate (DEPO-PROVERA IM)     buPROPion (WELLBUTRIN SR) 150 MG 12 hr tablet     montelukast (SINGULAIR) 10 MG tablet     VITAMIN D3 1000 UNITS tablet     LORATADINE PO     beta carotene 52942 UNIT capsule     methylcellulose, laxative, (CITRUCEL) POWD     GLYCOPYRROLATE PO     TRAZODONE HCL PO     blood glucose (ACCU-CHEK SMARTVIEW) test strip     albuterol (PROAIR HFA, PROVENTIL HFA, VENTOLIN HFA) 108 (90 BASE) MCG/ACT inhaler     meropenem (MERREM) 500 MG injection     FLUoxetine (PROZAC) 40 MG capsule     Amphetamine-Dextroamphetamine (ADDERALL PO)     Multiple Vitamin (MULTIVITAMIN OR)     No current facility-administered medications for this visit.      Facility-Administered Medications Ordered in Other Visits   Medication     albuterol (PROAIR HFA, PROVENTIL HFA, VENTOLIN HFA)  "inhaler            Physical Exam:   /86  Pulse 95  Resp 18  Ht 1.575 m (5' 2.01\")  Wt 77.9 kg (171 lb 11.8 oz)  SpO2 98%  BMI 31.4 kg/m2    Constitutional:   Awake, alert and in no apparent distress     Eyes:   nonicteric     ENT:   oral mucosa moist without lesions, normal tm bilaterally, bilateral mucosal edema      Neck:   Supple without supraclavicular or cervical lymphadenopathy     Lungs:   Good air flow.  No crackles. No rhonchi.  No wheezes.     Cardiovascular:   Normal S1 and S2.  RRR.  No murmur, gallop or rub.     Abdomen:   NABS, soft, nontender, nondistended.       Musculoskeletal:   No edema, digital clubbing present     Neurologic:   Alert and conversant.     Skin:   Warm, dry.  No rash on limited exam.             Data:   All laboratory and imaging data reviewed.    Cystic Fibrosis Culture  Specimen Description   Date Value Ref Range Status   11/01/2017 Sputum  Final   08/02/2017 Sputum  Final   08/02/2017 Sputum  Final   08/02/2017 Sputum  Final    Culture Micro   Date Value Ref Range Status   11/01/2017 PENDING  Preliminary   08/02/2017 (A)  Final    Moderate growth Normal roberto carlos  Heavy growth Staphylococcus aureus This isolate is presumed to be clindamycin   resistant based on detection of inducible clindamycin resistance. Erythromycin   and clindamycin are resistant, therefore, they are not recommended for use.  Moderate growth Strain 2 Staphylococcus aureus Organism failed to thrive for   susceptibility testing  Light growth Achromobacter xylosoxidans/denitrificans Identification obtained by   MALDI-TOF mass spectrometry research use only database. Test characteristics   determined and verified by the Infectious Diseases Diagnostic Laboratory ([facility])   [place], [place].     08/02/2017 Culture negative for acid fast bacilli  Final   08/02/2017   Final    Assayed at [facility],[address] [address]        Recent Results (from the past 168 hour(s))   General PFT Lab (Please " always keep checked)    Collection Time: 11/01/17  9:28 AM   Result Value Ref Range    FVC-Pred 3.55 L    FVC-Pre 3.54 L    FVC-%Pred-Pre 99 %    FEV1-Pre 3.14 L    FEV1-%Pred-Pre 101 %    FEV1FVC-Pred 87 %    FEV1FVC-Pre 89 %    FEFMax-Pred 6.62 L/sec    FEFMax-Pre 8.93 L/sec    FEFMax-%Pred-Pre 134 %    FEF2575-Pred 3.65 L/sec    FEF2575-Pre 4.02 L/sec    CDC3751-%Pred-Pre 110 %    ExpTime-Pre 6.53 sec    FIFMax-Pre 4.89 L/sec    FEV1FEV6-Pred 86 %    FEV1FEV6-Pre 89 %   Cystic Fibrosis Culture Aerob Bacterial    Collection Time: 11/01/17 10:30 AM   Result Value Ref Range    Specimen Description Sputum     Special Requests Specimen collected in eSwab transport (white cap)     Culture Micro PENDING        PFT: The spirometry is normal.  When compared to 8/2/2017, the FEV1 and FVC have little change.  The decrease in FVC may represent air trapping v. restrictive physiology.  Lung volumes would be necessary to determine.

## 2017-11-01 NOTE — PATIENT INSTRUCTIONS
Cystic Fibrosis Self-Care Plan       MRN: [id number]   Clinic Date: November 1, 2017   Patient: Mamie Rice     Annual Studies:   IGG   Date Value Ref Range Status   08/02/2017 689 (L) 695 - 1620 mg/dL Final     Insulin   Date Value Ref Range Status   03/30/2015 81 mU/L Final     Comment:     Reference Range:  0-20     There are no preventive care reminders to display for this patient.      Pulmonary Function Tests  FEV1: amount of air you can blow out in 1 second  FVC: total amount of air you can take in and blow out    Your Goals:         PFT Latest Ref Rng & Units 11/1/2017   FVC L 3.54   FEV1 L 3.14   FVC% % 99   FEV1% % 101          Airway Clearance: The Most Important Way to Keep Your Lungs Healthy  Vest Settings:    Hill-Rom Frequencies: 8, 9, 10 Pressure 10 Then, Frequencies 18, 19, 20 Pressure 6      RespirTech: Quick Start with Pressure of     Do each frequency for 5 minutes; Deflate vest after each frequency & cough 3 times before beginning the next setting.    Vest and Neb Therapy should be done 2 times/day.    Good Nutrition Can Improve Lung Function and Overall Health     Take ALL of your vitamins with food     Take 1/2 of your enzymes before EVERY meal/snack and the other 1/2 mid-meal/snack    Wt Readings from Last 3 Encounters:   11/01/17 77.9 kg (171 lb 11.8 oz)   10/11/17 78.9 kg (174 lb)   09/26/17 78.8 kg (173 lb 11.2 oz)       Body mass index is 31.4 kg/(m^2).         National CF Foundation Recommendations for BMI in CF Adults: Women: at least 22 Men: at least 23        Controlling Blood Sugars Helps Prevent Lung Infections & Improves Nutrition  Test blood sugar:     In the morning before eating (goal is )     2 hours after a meal (goal is less than 150)     When pre-meal glucose is greater than 150 add correction     At bedtime (if less than 100 eat a snack with 15 grams of carbohydrates  Last A1C Results:   Hemoglobin A1C   Date Value Ref Range Status   08/02/2017 5.5 4.3 - 6.0  % Final         If diabetic, measure A1C every 6 months. Goal: Under 7%    Staying Healthy    Research:  If you are interested in learning about research opportunities or have questions, please contact Mariana Smith at [phone] or [email or web address].      CF Foundation:  Compass is a personalized resource service to help you with the insurance, financial, legal and other issues you are facing.  It's free, confidential and available to anyone with CF.  Ask your  for more information or contact Compass directly at [facility] ([phone]) or compass@cff.org, or learn more at cff.org/compass.          RECOMMENDATIONS: Flu vaccine today.  Pneumovax today.  This is for pneumococcal pneumonia.  Great job!    YOUR GOAL: Enjoy the Holidays!      CF Nurse Line:  Burton Anderson: [phone]   Jhoana Trinh, RT: [phone]     Amy Andino: [phone] or Theresa Ceja, Dieticians: [phone]   Audra Zhang, Diabetes Nurse: [phone]      Rupa Dunne: [phone] or Rosette Burnett [phone], Social Workers   [email or web address]

## 2017-11-01 NOTE — LETTER
11/1/2017       RE: Mamie Rice  [address] [facility] [id number]     Dear Colleague,    Thank you for referring your patient, Mamie Rice, to the [facility] at [facility]. Please see a copy of my visit note below.    [facility]  November 1, 2017         Assessment and Plan:   Mamie Rice is a 22 year old female with cystic fibrosis.    1. CF lung disease with normal spriometry:  Mamie has shown evidence of stability in her pulmonary function as well as her pulmonary symptomatology.  She continues to show evidence of Staph and Achromobacter in her sputum.  At this time, I would recommend that she continue on her present bronchial drainage and nebulized therapies.    2.  Cystic fibrosis sinus disease.  Mamie is followed regularly by Dr. Haile.  She is due to see her today.  She does continue to get meropenem to her sinuses every month.  This has controlled her symptoms well.    3.  Abnormal glucose tolerance.  Mamie denies any difficulty with her blood sugars.  She currently is not on any insulin therapy.    4.  Weight management.  [facility]  She is now on a new medication which she feels is helping control her appetite.  She is pleased with her slow weight loss.    5.  Attention deficit disorder.  Mamie does remain on Adderall which is managed by her primary care physician.    6.  Meniscal tear.  Mamie recently had an arthroscopic left knee surgery.  This was done locally in [place].  She is pleased with the results.    7.  Health care maintenance.  Mamie had an influenza and pneumococcal vaccine today.   8.  Psychosocial.  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  She is enjoying her work very much.   9. Pancreatic Insufficiency:  The patient has no new symptoms  consistent with worsening malabsorption.  The plan is to continue the present dose of pancreatic enzymes and vitamin supplementation.    Gavi Allison MD MPH   of Medicine  Pulmonary, Allergy, Critical Care and Sleep Medicine      Interval History:     Mamie does continue to produce sputum that is green in color.  Her cough frequency and sputum volume are unchanged.  She does perform 2 vest therapies per day.          Review of Systems:     All questionnaires were reviewed by me today with the patient.  Review of Systems     Constitutional:  Negative for fever, chills, weight loss, weight gain, fatigue, decreased appetite, night sweats, recent stressors, height gain, height loss, post-operative complications, incisional pain, hallucinations, increased energy, hyperactivity and confused.   HENT:  Negative for ear pain, hearing loss, tinnitus, nosebleeds, trouble swallowing, hoarse voice, mouth sores, sore throat, ear discharge, tooth pain, gum tenderness, taste disturbance, smell disturbance, hearing aid, bleeding gums, dry mouth, sinus pain, sinus congestion and neck mass.    Eyes:  Negative for double vision, pain, redness, eye pain, decreased vision, eye watering, eye bulging, eye dryness, flashing lights, spots, floaters, strabismus, tunnel vision, jaundice and eye irritation.   Respiratory:   Negative for cough, hemoptysis, sputum production, shortness of breath, wheezing, sleep disturbances due to breathing, snores loudly, respiratory pain, dyspnea on exertion, cough disturbing sleep and postural dyspnea.    Cardiovascular:  Negative for chest pain, dyspnea on exertion, palpitations, orthopnea, claudication, leg swelling, fingers/toes turn blue, hypertension, hypotension, syncope, history of heart murmur, chest pain on exertion, chest pain at rest, pacemaker, few scattered varicosities, leg pain, sleep disturbances due to breathing, tachycardia, light-headedness, exercise intolerance  and edema.   Gastrointestinal:  Negative for heartburn, nausea, vomiting, abdominal pain, diarrhea, constipation, blood in stool, melena, rectal pain, bloating, hemorrhoids, bowel incontinence, jaundice, rectal bleeding, coffee ground emesis and change in stool.   Genitourinary:  Negative for bladder incontinence, dysuria, urgency, hematuria, flank pain, vaginal discharge, difficulty urinating, genital sores, dyspareunia, decreased libido, nocturia, voiding less frequently, arousal difficulty, abnormal vaginal bleeding, excessive menstruation, menstrual changes, hot flashes, vaginal dryness and postmenopausal bleeding.   Musculoskeletal:  Negative for myalgias, back pain, joint swelling, arthralgias, stiffness, muscle cramps, neck pain, bone pain, muscle weakness and fracture.   Skin:  Negative for nail changes, itching, poor wound healing, rash, hair changes, skin changes, acne, warts, poor wound healing, scarring, flaky skin, Raynaud's phenomenon, sensitivity to sunlight and skin thickening.   Neurological:  Negative for dizziness, tingling, tremors, speech change, seizures, loss of consciousness, weakness, light-headedness, numbness, headaches, disturbances in coordination, extremity numbness, memory loss, difficulty walking and paralysis.   Endo/Heme:  Negative for anemia, swollen glands and bruises/bleeds easily.   Psychiatric/Behavioral:  Negative for depression, hallucinations, memory loss, decreased concentration, mood swings and panic attacks.    Breast:  Negative for breast discharge, breast mass, breast pain and nipple retraction.   Endocrine:  Negative for altered temperature regulation, polyphagia, polydipsia, unwanted hair growth and change in facial hair.         Past Medical and Surgical History:     Past Medical History:   Diagnosis Date     ADHD (attention deficit hyperactivity disorder)      Anxiety      Aspergillosis, with pneumonia (H)     fugus found caused chest pain     Chronic infection      CF, MRSA.,      Chronic sinusitis      Constipation, chronic      Cystic fibrosis with pulmonary manifestations (H) 12/19/2011     Cystic fibrosis without mention of meconium ileus     SWEAT TEST:Date: 2/17/1994   [facility]  296 mg, 104 mmol/L ClSample #2  295 mg, 104 mmol/L Cl GENOTYPING:Date: 10/15/2007,  Laboratory: [facility]: [id number]     Depressive disorder      Diabetes     no meds currently     Dysthymic disorder      Exocrine pancreatic insufficiency      Gastro-oesophageal reflux disease      Hip pain, right      MRSA (methicillin resistant Staphylococcus aureus) carrier      Pancreatic disease      Past Surgical History:   Procedure Laterality Date     ARTHROSCOPY HIP, OSTEOPLASTY FEMUR PROXIMAL, COMBINED  3/11/2013    Procedure: COMBINED ARTHROSCOPY HIP, OSTEOPLASTY FEMUR PROXIMAL;  Right Hip Arthroscopy, Labral  Debridement.    surgeon request choice anesthesia/admit to [facility] after surgery;  Surgeon: Omkar Austin MD;  [facility]     ARTHROSCOPY KNEE WITH MEDIAL MENISCECTOMY Left 1/31/2017    Procedure: ARTHROSCOPY KNEE WITH MEDIAL MENISCECTOMY;  Surgeon: Jethro Coyle MD;  [facility]: [facility] OR     bronchoscopies       BRONCHOSCOPY       EXAM UNDER ANESTHESIA ANUS N/A 5/10/2016    Procedure: EXAM UNDER ANESTHESIA ANUS;  Surgeon: Chet Gaviria MD;  [facility]     EXAM UNDER ANESTHESIA, RESTORATIONS, EXTRACTION(S) DENTAL COMPLEX, COMBINED  5/13/2013    Procedure: COMBINED EXAM UNDER ANESTHESIA, RESTORATIONS, EXTRACTION(S) DENTAL COMPLEX;  Dental Exam, Radiographs, Restorations. Single Extraction  Tooth #2. Restorations x 3;  Surgeon: Danilo Ortiz DDS;  [facility]     HC KNEE SCOPE, DIAGNOSTIC      Arthroscopy, Knee- left     INJECT BOTOX N/A 5/10/2016    Procedure: INJECT BOTOX;  Surgeon: Chet Gaviria MD;  [facility]     left hip labral tear  5/11/2011    left hip arthroscopy with labral debridement and  synovectomy     meniscus repair       OPTICAL TRACKING SYSTEM ENDOSCOPIC SINUS SURGERY  10/14/2011    Procedure:OPTICAL TRACKING SYSTEM ENDOSCOPIC SINUS SURGERY; FESS (functional endoscopic sinus surgery) with Image Guidance, bronchial lavage and cultures; Surgeon:GOYO KUO; Location:[facility] OR     OPTICAL TRACKING SYSTEM ENDOSCOPIC SINUS SURGERY  5/18/2012    Procedure:OPTICAL TRACKING SYSTEM ENDOSCOPIC SINUS SURGERY; Right  and Left Image Guided Functional Endoscopic Sinus Surgery With  Frontal Approach, [employer]; Surgeon:GOYO KUO; [facility]     OPTICAL TRACKING SYSTEM ENDOSCOPIC SINUS SURGERY  9/26/2012    Procedure: OPTICAL TRACKING SYSTEM ENDOSCOPIC SINUS SURGERY;  Stealth Guided Bilateral Functional Endoscopic Sinus Surgery *Latex Safe*;  Surgeon: Goyo Kuo MD;  [facility]     OPTICAL TRACKING SYSTEM ENDOSCOPIC SINUS SURGERY Bilateral 10/16/2015    Procedure: OPTICAL TRACKING SYSTEM ENDOSCOPIC SINUS SURGERY;  Surgeon: Mariela Haile MD;  [facility]     ORTHOPEDIC SURGERY      left hip tear repair 2010     SINUS SURGERY             Family History:     Family History   Problem Relation Age of Onset     CANCER Paternal Grandmother      Skin Cancer Paternal Grandmother      Other Cancer Paternal Grandmother      Skin     Obesity Paternal Grandmother      CANCER Paternal Grandfather      PGF had throat cancer (he was a smoker)     Other Cancer Paternal Grandfather      Anxiety Disorder Paternal Grandfather      Thyroid Disease Mother      ,     Obesity Other      Anesthesia Reaction No family hx of      Blood Disease No family hx of      Colon Polyps No family hx of      Crohn Disease No family hx of      Ulcerative Colitis No family hx of      Colon Cancer No family hx of      Melanoma No family hx of             Social History:     Social History     Social History     Marital status: Single     Spouse name: N/A     Number of children: N/A     Years of education: N/A     Occupational  History     Not on file.     Social History Main Topics     Smoking status: Never Smoker     Smokeless tobacco: Never Used      Comment: one person at home smokes outside     Alcohol use No     Drug use: No     Sexual activity: No     Other Topics Concern     Blood Transfusions No     Exercise Yes     Social History Narrative    Mamie lives with mother in [place], [place].  There is a cat in the home, but Mamie does not have any litterbox duties.  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  She gets essentially no exercise because of the tingling in her feet (says it bothers her even to stand).        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        8/2015 no change in social situation        2/15/2016 Pt is single and lives with mother and stepfather.            Medications:     Current Outpatient Prescriptions   Medication     cetirizine (ZYRTEC) 10 MG tablet     ISOtretinoin (ACCUTANE) 40 MG capsule     naltrexone (DEPADE;REVIA) 50 MG tablet     albuterol (2.5 MG/3ML) 0.083% neb solution     omeprazole (PRILOSEC) 20 MG CR capsule     vitamin E 400 UNIT capsule     azithromycin (ZITHROMAX) 500 MG tablet     ascorbic acid (VITAMIN C) 500 MG tablet     beclomethasone (QVAR) 80 MCG/ACT Inhaler     MEPHYTON 5 MG tablet     hydrOXYzine (ATARAX) 25 MG tablet     acetaminophen (TYLENOL) 325 MG tablet     acetylcysteine (MUCOMYST) 20 % injection     MedroxyPROGESTERone Acetate (DEPO-PROVERA IM)     buPROPion (WELLBUTRIN SR) 150 MG 12 hr tablet     montelukast (SINGULAIR) 10 MG tablet     VITAMIN D3 1000 UNITS tablet     LORATADINE PO     beta carotene 35300 UNIT capsule     methylcellulose, laxative, (CITRUCEL) POWD     GLYCOPYRROLATE PO     TRAZODONE HCL PO     blood glucose (ACCU-CHEK SMARTVIEW) test strip     albuterol (PROAIR HFA, PROVENTIL HFA, VENTOLIN HFA) 108 (90 BASE) MCG/ACT inhaler     meropenem (MERREM) 500 MG injection      "FLUoxetine (PROZAC) 40 MG capsule     Amphetamine-Dextroamphetamine (ADDERALL PO)     Multiple Vitamin (MULTIVITAMIN OR)     No current facility-administered medications for this visit.      Facility-Administered Medications Ordered in Other Visits   Medication     albuterol (PROAIR HFA, PROVENTIL HFA, VENTOLIN HFA) inhaler            Physical Exam:   /86  Pulse 95  Resp 18  Ht 1.575 m (5' 2.01\")  Wt 77.9 kg (171 lb 11.8 oz)  SpO2 98%  BMI 31.4 kg/m2    Constitutional:   Awake, alert and in no apparent distress     Eyes:   nonicteric     ENT:   oral mucosa moist without lesions, normal tm bilaterally, bilateral mucosal edema      Neck:   Supple without supraclavicular or cervical lymphadenopathy     Lungs:   Good air flow.  No crackles. No rhonchi.  No wheezes.     Cardiovascular:   Normal S1 and S2.  RRR.  No murmur, gallop or rub.     Abdomen:   NABS, soft, nontender, nondistended.       Musculoskeletal:   No edema, digital clubbing present     Neurologic:   Alert and conversant.     Skin:   Warm, dry.  No rash on limited exam.             Data:   All laboratory and imaging data reviewed.    Cystic Fibrosis Culture  Specimen Description   Date Value Ref Range Status   11/01/2017 Sputum  Final   08/02/2017 Sputum  Final   08/02/2017 Sputum  Final   08/02/2017 Sputum  Final    Culture Micro   Date Value Ref Range Status   11/01/2017 PENDING  Preliminary   08/02/2017 (A)  Final    Moderate growth Normal roberto carlos  Heavy growth Staphylococcus aureus This isolate is presumed to be clindamycin   resistant based on detection of inducible clindamycin resistance. Erythromycin   and clindamycin are resistant, therefore, they are not recommended for use.  Moderate growth Strain 2 Staphylococcus aureus Organism failed to thrive for   susceptibility testing  Light growth Achromobacter xylosoxidans/denitrificans Identification obtained by   MALDI-TOF mass spectrometry research use only database. Test characteristics   " determined and verified by the Infectious [facility] Diagnostic Laboratory ([facility])   [place], [place].     08/02/2017 Culture negative for acid fast bacilli  Final   08/02/2017   Final    Assayed at [facility],[employer] [address]        Recent Results (from the past 168 hour(s))   General PFT Lab (Please always keep checked)    Collection Time: 11/01/17  9:28 AM   Result Value Ref Range    FVC-Pred 3.55 L    FVC-Pre 3.54 L    FVC-%Pred-Pre 99 %    FEV1-Pre 3.14 L    FEV1-%Pred-Pre 101 %    FEV1FVC-Pred 87 %    FEV1FVC-Pre 89 %    FEFMax-Pred 6.62 L/sec    FEFMax-Pre 8.93 L/sec    FEFMax-%Pred-Pre 134 %    FEF2575-Pred 3.65 L/sec    FEF2575-Pre 4.02 L/sec    PSP1612-%Pred-Pre 110 %    ExpTime-Pre 6.53 sec    FIFMax-Pre 4.89 L/sec    FEV1FEV6-Pred 86 %    FEV1FEV6-Pre 89 %   Cystic Fibrosis Culture Aerob Bacterial    Collection Time: 11/01/17 10:30 AM   Result Value Ref Range    Specimen Description Sputum     Special Requests Specimen collected in eSwab transport (white cap)     Culture Micro PENDING        PFT: The spirometry is normal.  When compared to 8/2/2017, the FEV1 and FVC have little change.  The decrease in FVC may represent air trapping v. restrictive physiology.  Lung volumes would be necessary to determine.      Again, thank you for allowing me to participate in the care of your patient.      Sincerely,    Gavi Allison MD

## 2017-11-01 NOTE — MR AVS SNAPSHOT
After Visit Summary   11/1/2017    Mamie Rice    MRN: [id number]           Patient Information     Date Of Birth          1995        Visit Information        Provider Department      11/1/2017 11:00 AM Mariela Haile MD [facility]         Follow-ups after your visit        Your next 10 appointments already scheduled     Nov 01, 2017 11:00 AM CDT   (Arrive by 10:45 AM)   Return Visit with Mariela Haile MD   [facility] ([facility])    [address] [place] [id number]   [phone]            Nov 16, 2017  2:00 PM CST   (Arrive by 1:45 PM)   Return Visit with VANESA Lloyd [facility] ([facility])    [address] [place] [address]   [phone]            Dec 04, 2017  6:45 PM CST   (Arrive by 6:30 PM)   Return Visit with Mariela Haile MD   [facility] ([facility])    [address] [place] [address]   [phone]            Dec 18, 2017  8:45 AM CST   (Arrive by 8:30 AM)   Return Visit with VANESA Lloyd [facility] ([facility])    [address] [place] [address]   [phone]            Jan 17, 2018  7:00 AM CST   (Arrive by 6:45 AM)   Return Visit with MD LOKESH Gramajo [facility] ([facility])    [address] [place] [address]   [phone]            Jan 17, 2018  8:00 AM CST   PFT VISIT with  PFL RJ   [facility] Pulmonary Function Testing ([facility])    [address] [place] [address]   [phone]            Jan 17, 2018  8:30 AM CST   (Arrive by 8:15 AM)   RETURN CYSTIC FIBROSIS VISIT with Gavi Allison MD   [facility] ([facility])    [address]  [address] [facility] [address]   [phone]            Jan 17, 2018 10:00 AM CST   (Arrive by 9:45 AM)   Return Visit with Paige Reid PA-C   [facility] ([facility])    [address]  [address] [facility] [id number] [phone]            Feb 21, 2018 10:15 AM CST   (Arrive by 10:00 AM)   Return Visit with Mareila Haile MD   [facility] ([facility])    [address]  [address] [facility] [id number] [phone]              Future tests that were ordered for you today     Open Future Orders        Priority Expected Expires Ordered    Cystic Fibrosis Culture Aerob Bacterial Routine  11/1/2018 11/1/2017            Who to contact     Please call your clinic at [phone] to:    Ask questions about your health    Make or cancel appointments    Discuss your medicines    Learn about your test results    Speak to your doctor   If you have compliments or concerns about an experience at your clinic, or if you wish to file a complaint, please contact [facility] Patient Relations at [phone] or email us at [email or web address]         Additional Information About Your Visit        MyChart Information     MUJINt gives you secure access to your electronic health record. If you see a primary care provider, you can also send messages to your care team and make appointments. If you have questions, please call your primary care clinic.  If you do not have a primary care provider, please call [phone] and they will assist you.      Voltari is an electronic gateway that provides easy, online access to your medical records. With Voltari, you can request a clinic appointment, read your test results, renew a prescription or communicate with your care team.     To access your existing account,  please contact your [facility] or call [phone] for assistance.        Care EveryWhere ID     This is your Care EveryWhere ID. This could be used by other organizations to access your [facility] medical records  [id number]         Blood Pressure from Last 3 Encounters:   11/01/17 126/86   09/26/17 124/87   08/02/17 112/80    Weight from Last 3 Encounters:   11/01/17 77.9 kg (171 lb 11.8 oz)   10/11/17 78.9 kg (174 lb)   09/26/17 78.8 kg (173 lb 11.2 oz)              Today, you had the following     No orders found for display         Today's Medication Changes          These changes are accurate as of: 11/1/17 10:52 AM.  If you have any questions, ask your nurse or doctor.               Stop taking these medicines if you haven't already. Please contact your care team if you have questions.     adapalene 0.1 % cream   Commonly known as:  DIFFERIN   Stopped by:  Gavi Allison MD           mupirocin 2 % ointment   Commonly known as:  BACTROBAN   Stopped by:  Gavi Allison MD                Where to get your medicines      These medications were sent to [facility] - [place] [id number]     Phone:  [phone]     cetirizine 10 MG tablet                Primary Care Provider Office Phone # Fax #    Malik De La Rosa [phone]       [address] [facility] [address]        Equal Access to Services     LUZMARIA MARTINEZ : Hadii martin akbar Soyesenia, waaxda luqadaha, qaybta kaalmadavid pérez. So [facility] [phone].    ATENCIÓN: Si habla español, tiene a hidalgo disposición servicios gratuitos de asistencia lingüística. Llame al [phone].    We comply with applicable federal civil rights laws and [place] laws. We do not discriminate on the basis of race, color, national origin, age, disability, sex,  sexual orientation, or gender identity.            Thank you!     Thank you for choosing [facility]  for your care. Our goal is always to provide you with excellent care. Hearing back from our patients is one way we can continue to improve our services. Please take a few minutes to complete the written survey that you may receive in the mail after your visit with us. Thank you!             Your Updated Medication List - Protect others around you: Learn how to safely use, store and throw away your medicines at www.disposemymeds.org.          This list is accurate as of: 11/1/17 10:52 AM.  Always use your most recent med list.                   Brand Name Dispense Instructions for use Diagnosis    acetaminophen 325 MG tablet    TYLENOL    100 tablet    Take 2 tablets (650 mg) by mouth every 4 hours as needed for other (mild pain)    Chronic pain of left knee       acetylcysteine 20 % nebulizer solution    MUCOMYST    360 mL    INHALE ONE 4ML NEB INTO LUNGS VIA NEBULIZER TWO TIMES A DAY, MAY INCREASE TO 3-4 TIMES A DAY WITH INCREASE COUGH/COLD SYMPTOMS    CF (cystic fibrosis) (H)       ADDERALL PO      Take 20 mg by mouth daily.        * albuterol 108 (90 BASE) MCG/ACT Inhaler    PROAIR HFA/PROVENTIL HFA/VENTOLIN HFA    1 Inhaler    Inhale 2 puffs into the lungs every 6 hours as needed for shortness of breath / dyspnea or wheezing    Cystic fibrosis with pulmonary manifestations (H), Sinusitis, chronic, Diabetes mellitus type 1 (H)       * albuterol (2.5 MG/3ML) 0.083% neb solution     360 mL    INHALE 1 NEB VIA NEBULIZER FOUR TIMES A DAY    CF (cystic fibrosis) (H)       ascorbic acid 500 MG tablet    VITAMIN C    100 tablet    TAKE ONE TABLET BY MOUTH TWICE A DAY    Cystic fibrosis with pulmonary manifestations (H)       azithromycin 500 MG tablet    ZITHROMAX    36 tablet    TAKE ONE TABLET BY MOUTH ON MONDAY, WEDNESDAY AND FRIDAY    CF (cystic fibrosis) (H)       beclomethasone 80 MCG/ACT Inhaler     QVAR    1 Inhaler    Inhale 1 puff into the lungs 2 times daily    Allergic rhinitis due to house dust mite       beta carotene 29160 UNIT capsule     36 capsule    Take 1 capsule (25,000 Units) by mouth Every Mon, Wed, Fri Morning    Cystic fibrosis with pulmonary manifestations (H)       blood glucose monitoring test strip    ACCU-CHEK SMARTVIEW    1 Month    Use to test blood sugar 4 times daily or as directed.    Type I (juvenile type) diabetes mellitus without mention of complication, not stated as uncontrolled       buPROPion 150 MG 12 hr tablet    WELLBUTRIN SR     Take 150 mg by mouth 2 times daily        cetirizine 10 MG tablet    zyrTEC    30 tablet    Take 1 tablet (10 mg) by mouth every evening    Allergic rhinitis due to [employer] house dust mite, Urticaria, chronic       cholecalciferol 1000 UNIT tablet    vitamin D3    30 tablet    TAKE ONE TABLET BY MOUTH EVERY DAY    CF (cystic fibrosis) (H), Exocrine pancreatic insufficiency       DEPO-PROVERA IM           GLYCOPYRROLATE PO      Take 1 mg by mouth 2 times daily        hydrOXYzine 25 MG tablet    ATARAX    30 tablet    Take 1 tablet (25 mg) by mouth every 6 hours as needed for itching (and nausea)    Chronic pain of left knee       ISOtretinoin 40 MG capsule    ACCUTANE    60 capsule    Take 2 capsules (80 mg) by mouth daily    Acne vulgaris       LORATADINE PO      Take 10 mg by mouth        MEPHYTON 5 MG tablet   Generic drug:  phytonadione     4 tablet    TAKE 1 TABLET BY MOUTH ONCE A WEEK    Cystic fibrosis with pulmonary manifestations (H), Cystic fibrosis with pulmonary manifestations (H), Aspergillosis (H), Pancreatic insufficiency       meropenem 500 MG vial    MERREM    4 mL    500 mg by Nasal Instillation route as needed        methylcellulose (laxative) Powd    CITRUCEL    479 g    Start with 1 heaping tablespoon. Increase as needed, 1 heaping tablespoon at a time, up to 3 times per day.    Other constipation       montelukast 10 MG  tablet    SINGULAIR    30 tablet    TAKE ONE TABLET BY MOUTH ONCE DAILY AT BEDTIME    CF (cystic fibrosis) (H)       MULTIVITAMIN PO      Take 1 tablet by mouth daily.        naltrexone 50 MG tablet    DEPADE;REVIA    30 tablet    Take 1/2 Tablet daily then increase to maximum of 1 Full Tablet daily as tolerated.  Time it one to two hours prior to worst cravings    Non morbid obesity due to excess calories       omeprazole 20 MG CR capsule    priLOSEC    60 capsule    TAKE 1 CAPSULE BY MOUTH TWICE A DAY    CF (cystic fibrosis) (H)       PROZAC 40 MG capsule   Generic drug:  FLUoxetine      Take 80 mg by mouth daily.    Cystic fibrosis with pulmonary manifestations (H)       TRAZODONE HCL PO      Take 100 mg by mouth At Bedtime        vitamin E 400 UNIT capsule     60 capsule    TAKE 1 CAPSULE BY MOUTH TWICE A DAY    CF (cystic fibrosis) (H), Pancreatic insufficiency       * Notice:  This list has 2 medication(s) that are the same as other medications prescribed for you. Read the directions carefully, and ask your doctor or other care provider to review them with you.

## 2017-11-01 NOTE — NURSING NOTE
Chief Complaint   Patient presents with     Cystic Fibrosis     Follow up on Mamie and her CF     John Mcknight CMA at 9:45 AM on 11/1/2017

## 2017-11-01 NOTE — PROGRESS NOTES
Mamie Rice returns.  She is doing well.  She has no sinonasal complaints.  She has chronic sinusitis related to cystic fibrosis.  She is here for meropenem instillation.  A 0-degree rigid endoscope is used for visualization.  I sprayed 2 cc of meropenem into each middle meatal cavity.  She tolerates this very well.  She will return in a month for repeat procedure.

## 2017-11-06 LAB
BACTERIA SPEC CULT: ABNORMAL
BACTERIA SPEC CULT: ABNORMAL
Lab: ABNORMAL
SPECIMEN SOURCE: ABNORMAL

## 2017-11-16 ENCOUNTER — OFFICE VISIT (OUTPATIENT)
Dept: DERMATOLOGY | Facility: CLINIC | Age: 22
End: 2017-11-16

## 2017-11-16 DIAGNOSIS — L70.0 ACNE VULGARIS: ICD-10-CM

## 2017-11-16 DIAGNOSIS — Z79.899 ON ISOTRETINOIN THERAPY: ICD-10-CM

## 2017-11-16 DIAGNOSIS — L70.0 ACNE VULGARIS: Primary | ICD-10-CM

## 2017-11-16 LAB — HCG UR QL: NEGATIVE

## 2017-11-16 RX ORDER — ISOTRETINOIN 30 MG/1
1 CAPSULE ORAL 2 TIMES DAILY
Qty: 60 CAPSULE | Refills: 0 | Status: SHIPPED | OUTPATIENT
Start: 2017-11-16 | End: 2017-12-18

## 2017-11-16 NOTE — NURSING NOTE
"Dermatology Rooming Note    Mamie Rice's goals for this visit include:   Chief Complaint   Patient presents with     Derm Problem     Mamie Willis states \" I keep getting nauseated and I get blisters.\"     Valerie Ghosh LPN  "

## 2017-11-16 NOTE — PROGRESS NOTES
"[facility] Dermatology Note    Dermatology Problem List:  1. Acne vulgaris  - s/p BPO facial wash - stopped due to skin irritation, clindamycin 1% lotion, adapalene 0.1% cream, spironolactone 50 mg bid  - on accutaneNICOHeathertortanya#: [id number]  2. Cheilitis  - mupirocin 2% ointment  3. CF  4. DM Type II    Encounter Date: Nov 16, 2017    CC:   Chief Complaint   Patient presents with     Derm Problem     Mamie Willis states \" I keep getting nauseated and I get blisters.\"     History of Present Illness:  This 22 year old female presents as a follow up for acne. The patient was last seen on 1016/17 when she continued 80 mg of accutane. Today the patient reports that she is very nauseated. She is taking the medication with food. She also had rashes on her left hand extending up her arm. She decreased the dose to one pill for the last week and the nausea continued.She denies any associated symptoms such as vomiting, abdominal pain, stool changes. She notes that her acne has improved. She also reports that she has some \"splotches\" on her forehead and she notes that her sister and her nephew have it as well on their trunk. The patient denies any nosebleeds, irritated dry eyes, headaches with blurred vision, muscle or joint aches, changes in bowel habits, depressive thoughts, sharing medication, or donating blood.The patient reports no other lesions of concern at this time.     Otherwise, the patient reports no painful, bleeding, nonhealing, or pruritic lesions, and denies new or changing moles.     Past Medical History:   Patient Active Problem List   Diagnosis     Hip joint pain     Aspergillosis (H)     Chronic maxillary sinusitis     Hypoglycemia     Type 1 diabetes mellitus (H)     Cystic fibrosis with pulmonary manifestations (H)     Chronic constipation     Frontal sinusitis     Major depression     ADHD (attention deficit hyperactivity disorder)     Back pain     Pancreatic insufficiency     Non " morbid obesity due to excess calories     Acne vulgaris     Cystic fibrosis (H)     Insomnia     Major depressive disorder with single episode     Past Medical History:   Diagnosis Date     ADHD (attention deficit hyperactivity disorder)      Anxiety      Aspergillosis, with pneumonia (H)     fugus found caused chest pain     Chronic infection     CF, MRSA.,      Chronic sinusitis      Constipation, chronic      Cystic fibrosis with pulmonary manifestations (H) 12/19/2011     Cystic fibrosis without mention of meconium ileus     SWEAT TEST:Date: 2/17/1994   Laboratory[facility]  296 mg, 104 mmol/L ClSample #2  295 mg, 104 mmol/L Cl GENOTYPING:Date: 10/15/2007,  Laboratory: [facility]: [id number]     Depressive disorder      Diabetes     no meds currently     Dysthymic disorder      Exocrine pancreatic insufficiency      Gastro-oesophageal reflux disease      Hip pain, right      MRSA (methicillin resistant Staphylococcus aureus) carrier      Pancreatic disease      Past Surgical History:   Procedure Laterality Date     ARTHROSCOPY HIP, OSTEOPLASTY FEMUR PROXIMAL, COMBINED  3/11/2013    Procedure: COMBINED ARTHROSCOPY HIP, OSTEOPLASTY FEMUR PROXIMAL;  Right Hip Arthroscopy, Labral  Debridement.    surgeon request choice anesthesia/admit to [facility] after surgery;  Surgeon: Omkar Austin MD;  [facility]: [facility] OR     ARTHROSCOPY KNEE WITH MEDIAL MENISCECTOMY Left 1/31/2017    Procedure: ARTHROSCOPY KNEE WITH MEDIAL MENISCECTOMY;  Surgeon: Jethro Coyle MD;  [facility] OR     bronchoscopies       BRONCHOSCOPY       EXAM UNDER ANESTHESIA ANUS N/A 5/10/2016    Procedure: EXAM UNDER ANESTHESIA ANUS;  Surgeon: Chet Gaviria MD;  [facility]     EXAM UNDER ANESTHESIA, RESTORATIONS, EXTRACTION(S) DENTAL COMPLEX, COMBINED  5/13/2013    Procedure: COMBINED EXAM UNDER ANESTHESIA, RESTORATIONS, EXTRACTION(S) DENTAL COMPLEX;  Dental Exam, Radiographs, Restorations. Single Extraction   Tooth #2. Restorations x 3;  Surgeon: Danilo Ortiz DDS;  [facility] OR     HC KNEE SCOPE, DIAGNOSTIC      Arthroscopy, Knee- left     INJECT BOTOX N/A 5/10/2016    Procedure: INJECT BOTOX;  Surgeon: Chet Gaviria MD;  [facility]     left hip labral tear  5/11/2011    left hip arthroscopy with labral debridement and synovectomy     meniscus repair       OPTICAL TRACKING SYSTEM ENDOSCOPIC SINUS SURGERY  10/14/2011    Procedure:OPTICAL TRACKING SYSTEM ENDOSCOPIC SINUS SURGERY; FESS (functional endoscopic sinus surgery) with Image Guidance, bronchial lavage and cultures; Surgeon:GOYO KUO; [facility] OR     OPTICAL TRACKING SYSTEM ENDOSCOPIC SINUS SURGERY  5/18/2012    Procedure:OPTICAL TRACKING SYSTEM ENDOSCOPIC SINUS SURGERY; Right  and Left Image Guided Functional Endoscopic Sinus Surgery With  Frontal Approach, [employer]; Surgeon:GOYO KUO; [facility] OR     OPTICAL TRACKING SYSTEM ENDOSCOPIC SINUS SURGERY  9/26/2012    Procedure: OPTICAL TRACKING SYSTEM ENDOSCOPIC SINUS SURGERY;  Stealth Guided Bilateral Functional Endoscopic Sinus Surgery *Latex Safe*;  Surgeon: Goyo Kuo MD;  [facility]     OPTICAL TRACKING SYSTEM ENDOSCOPIC SINUS SURGERY Bilateral 10/16/2015    Procedure: OPTICAL TRACKING SYSTEM ENDOSCOPIC SINUS SURGERY;  Surgeon: Mariela Haile MD;  [facility]     ORTHOPEDIC SURGERY      left hip tear repair 2010     SINUS SURGERY       Social History:  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 Former use of tanning beds (high school).     Family History:  There is a family history of presumed melanoma in the patient's grandmother.    Medications:  Current Outpatient Prescriptions   Medication Sig Dispense Refill     cetirizine (ZYRTEC) 10 MG tablet Take 1 tablet (10 mg) by mouth every evening 30 tablet 3     ISOtretinoin (ACCUTANE) 40 MG capsule Take 2 capsules (80 mg) by mouth daily 60 capsule 0     naltrexone (DEPADE;REVIA) 50 MG  tablet Take 1/2 Tablet daily then increase to maximum of 1 Full Tablet daily as tolerated.  Time it one to two hours prior to worst cravings 30 tablet 3     albuterol (2.5 MG/3ML) 0.083% neb solution INHALE 1 NEB VIA NEBULIZER FOUR TIMES A  mL 11     omeprazole (PRILOSEC) 20 MG CR capsule TAKE 1 CAPSULE BY MOUTH TWICE A DAY 60 capsule 11     vitamin E 400 UNIT capsule TAKE 1 CAPSULE BY MOUTH TWICE A DAY 60 capsule 11     azithromycin (ZITHROMAX) 500 MG tablet TAKE ONE TABLET BY MOUTH ON MONDAY, WEDNESDAY AND FRIDAY 36 tablet 4     ascorbic acid (VITAMIN C) 500 MG tablet TAKE ONE TABLET BY MOUTH TWICE A  tablet 3     beclomethasone (QVAR) 80 MCG/ACT Inhaler Inhale 1 puff into the lungs 2 times daily 1 Inhaler 3     MEPHYTON 5 MG tablet TAKE 1 TABLET BY MOUTH ONCE A WEEK 4 tablet 11     hydrOXYzine (ATARAX) 25 MG tablet Take 1 tablet (25 mg) by mouth every 6 hours as needed for itching (and nausea) 30 tablet 0     acetaminophen (TYLENOL) 325 MG tablet Take 2 tablets (650 mg) by mouth every 4 hours as needed for other (mild pain) 100 tablet 0     acetylcysteine (MUCOMYST) 20 % injection INHALE ONE 4ML NEB INTO LUNGS VIA NEBULIZER TWO TIMES A DAY, MAY INCREASE TO 3-4 TIMES A DAY WITH INCREASE COUGH/COLD SYMPTOMS 360 mL 11     MedroxyPROGESTERone Acetate (DEPO-PROVERA IM)        buPROPion (WELLBUTRIN SR) 150 MG 12 hr tablet Take 150 mg by mouth 2 times daily       montelukast (SINGULAIR) 10 MG tablet TAKE ONE TABLET BY MOUTH ONCE DAILY AT BEDTIME 30 tablet 11     VITAMIN D3 1000 UNITS tablet TAKE ONE TABLET BY MOUTH EVERY DAY 30 tablet 11     LORATADINE PO Take 10 mg by mouth       beta carotene 70060 UNIT capsule Take 1 capsule (25,000 Units) by mouth Every Mon, Wed, Fri Morning 36 capsule 4     methylcellulose, laxative, (CITRUCEL) POWD Start with 1 heaping tablespoon. Increase as needed, 1 heaping tablespoon at a time, up to 3 times per day. 479 g 3     GLYCOPYRROLATE PO Take 1 mg by mouth 2 times daily         TRAZODONE HCL PO Take 100 mg by mouth At Bedtime        blood glucose (ACCU-CHEK SMARTVIEW) test strip Use to test blood sugar 4 times daily or as directed. 1 Month 12     albuterol (PROAIR HFA, PROVENTIL HFA, VENTOLIN HFA) 108 (90 BASE) MCG/ACT inhaler Inhale 2 puffs into the lungs every 6 hours as needed for shortness of breath / dyspnea or wheezing 1 Inhaler 12     meropenem (MERREM) 500 MG injection 500 mg by Nasal Instillation route as needed  4 mL 0     FLUoxetine (PROZAC) 40 MG capsule Take 80 mg by mouth daily.       Amphetamine-Dextroamphetamine (ADDERALL PO) Take 20 mg by mouth daily.       Multiple Vitamin (MULTIVITAMIN OR) Take 1 tablet by mouth daily.       Allergies   Allergen Reactions     Vancomycin Hives     Redmens skin rash      Review of Systems:  - As per HPI  - No headaches with vision changes  - No muscle aches or joint aches  - No stomach upsets, constipation, or diarrhea   - No depressive thoughts or significant mood changes    Physical exam:  There were no vitals taken for this visit.  GEN: This is a well developed, well-nourished female in no acute distress, in a pleasant mood.      SKIN: Acne exam, which includes the face, neck, upper central chest, and upper central back was performed.  - 1 resolving small inflammatory papule to right upper lip  - Faintly hyperpigmented patches on bilateral forehead  - No other lesions of concern on areas examined.     Impression/Plan:  1. Acne vulgaris, acne excoriee - Resistant to hormonal therapy, appropriate oral and topical antibiotics and well as topical retinoids.    - Discussion of the risks and side effects of Accutane including but not limited to mucocutaneous dryness, arthralgias, myalgias, depression, suicidal ideation, headache, blurred vision, increase in liver function tests, increase in lipids, and teratogenic effects. Discussed need for two forms of contraception. The [facility] brochure was provided and the contents discussed  with the patient. The patient was counseled they cannot give blood while on Accutane. No personal or family history of inflammatory bowel disease or hypertriglyceridemia known to patient. Reviewed need to avoid alcohol on medication. Ipledge program consent was obtained.   - At this visit, decrease Accutane to 60 mg daily. Goal dose is 12,300 to 18,040mg for 150-220 mg/kg dosing in this 82 kg patient. Recommend to take medication with food containing fat. The patients two forms of contraception are Depo and male latex condoms.  - Labs are no longer needed. Pregnancy test is negative. She has her CBC and CMP checked regularly through her CF MD.   - Continue to hold beta carotene.  - Continue good moisturization.    - Total cumulative dose 8400 mg. Patient's I-pledge # is [id number]. The patient will stop all acne medications, when she starts the medication.     2. Hx of Cheilitis  - Continue using lip moisturization.   - Continue mupirocin 2% ointment - apply to cracks on sides of lips, if they occur.     3. Melasma  - Photos obtained at today's visit.  - Recommend good moisturization with sun screen.  -Reassurance given.     Follow-up in 30 days, earlier for new or changing lesions.     Staff Involved:  Scribe Disclosure:   I, Lupe Alcantara, am serving as a scribe to document services personally performed by Paige Reid PA-C, based on data collection and the provider's statements to me.    Provider Disclosure:   The documentation recorded by the scribe accurately reflects the services I personally performed and the decisions made by me.    All risks, benefits and alternatives were discussed with patient.  Patient is in agreement and understands the assessment and plan.  All questions were answered.  Sun Screen Education was given.   Return to Clinic in 1 month or sooner as needed.   Paige Reid PA-C   [facility]

## 2017-11-16 NOTE — MR AVS SNAPSHOT
After Visit Summary   11/16/2017    Mamie Rice    MRN: [id number]           Patient Information     Date Of Birth          1995        Visit Information        Provider Department      11/16/2017 2:00 PM Paige Reid PA-C [facility]        Today's Diagnoses     Acne vulgaris    -  1    On isotretinoin therapy           Follow-ups after your visit        Follow-up notes from your care team     Return in about 4 weeks (around 12/14/2017).      Your next 10 appointments already scheduled     Dec 04, 2017  6:45 PM CST   (Arrive by 6:30 PM)   Return Visit with Mariela Haile MD   [facility] ([facility])    [address] [place] [address]   [phone]            Dec 18, 2017  8:45 AM CST   (Arrive by 8:30 AM)   Return Visit with VANESA Lloyd [facility] ([facility])    [address] [place] [address]   [phone]            Jan 17, 2018  7:00 AM CST   (Arrive by 6:45 AM)   Return Visit with Mariela Haile MD   [facility] ([facility])    [address] [place] [address]   [phone]            Jan 17, 2018  8:00 AM CST   [facility]    [address] [place] [address]   [phone]            Jan 17, 2018  8:30 AM CST   (Arrive by 8:15 AM)   RETURN CYSTIC FIBROSIS VISIT with Gavi Allison MD   [facility] ([facility])    [address] [place] [address]   [phone]            Jan 17, 2018 10:00 AM CST   (Arrive by 9:45 AM)   Return Visit with VANESA Lloyd [facility] ([facility])    [address] [place] [address]  Se  3rd Floor  [facility] [address]   [phone]            Feb 21, 2018 10:15 AM CST   (Arrive by 10:00 AM)   Return Visit with Mariela Haile MD   [facility] ([facility])    [address] [facility]  4th [facility] [address] [phone]              Who to contact     Please call your clinic at [phone] to:    Ask questions about your health    Make or cancel appointments    Discuss your medicines    Learn about your test results    Speak to your doctor   If you have compliments or concerns about an experience at your clinic, or if you wish to file a complaint, please contact [facility] Patient Relations at [phone] or email us at [email or web address]         Additional Information About Your Visit        MyChart Information     [employer] gives you secure access to your electronic health record. If you see a primary care provider, you can also send messages to your care team and make appointments. If you have questions, please call your primary care clinic.  If you do not have a primary care provider, please call [phone] and they will assist you.      [employer] is an electronic gateway that provides easy, online access to your medical records. With [employer], you can request a clinic appointment, read your test results, renew a prescription or communicate with your care team.     To access your existing account, please contact your [facility] or call [phone] for assistance.        Care EveryWhere ID     This is your Care EveryWhere ID. This could be used by other organizations to access your [place] medical records  [id number]         Blood Pressure from Last 3 Encounters:   11/01/17 126/86   09/26/17 124/87   08/02/17 112/80    Weight from Last 3 Encounters:   11/01/17 77.9 kg (171 lb 11.8 oz)   10/11/17 78.9 kg (174 lb)   09/26/17 78.8 kg (173 lb 11.2 oz)               Today, you had the following     No orders found for display         Today's Medication Changes          These changes are accurate as of: 11/16/17 11:59 PM.  If you have any questions, ask your nurse or doctor.               These medicines have changed or have updated prescriptions.        Dose/Directions    * ISOtretinoin 40 MG capsule   Commonly known as:  ACCUTANE   This may have changed:  Another medication with the same name was added. Make sure you understand how and when to take each.   Used for:  Acne vulgaris   Changed by:  Paige Reid PA-C        Dose:  80 mg   Take 2 capsules (80 mg) by mouth daily   Quantity:  60 capsule   Refills:  0       * ISOtretinoin 30 MG Caps   This may have changed:  You were already taking a medication with the same name, and this prescription was added. Make sure you understand how and when to take each.   Used for:  Acne vulgaris   Changed by:  Paige Reid PA-C        Dose:  1 capsule   Take 1 capsule by mouth 2 times daily With meals.   Quantity:  60 capsule   Refills:  0       * Notice:  This list has 2 medication(s) that are the same as other medications prescribed for you. Read the directions carefully, and ask your doctor or other care provider to review them with you.         Where to get your medicines      These medications were sent to [facility] - [address] [address]     Phone:  [phone]     ISOtretinoin 30 MG Caps                Primary Care Provider Office Phone # Fax #    Malik De La Rosa [phone]       [address] [facility] [address]        Equal Access to Services     [facility] : Vinita [place], waeduin luqadaha, qaybta kaalmadavid pérez. [facility] [phone].    ATENCIÓN: Si habla español, tiene a hidalgo disposición servicios gratuitos de asistencia  lingüísticaSheeba Lechuga al [phone].    We comply with applicable federal civil rights laws and [place] laws. We do not discriminate on the basis of race, color, national origin, age, disability, sex, sexual orientation, or gender identity.            Thank you!     Thank you for choosing [facility]  for your care. Our goal is always to provide you with excellent care. Hearing back from our patients is one way we can continue to improve our services. Please take a few minutes to complete the written survey that you may receive in the mail after your visit with us. Thank you!             Your Updated Medication List - Protect others around you: Learn how to safely use, store and throw away your medicines at www.disposemymeds.org.          This list is accurate as of: 11/16/17 11:59 PM.  Always use your most recent med list.                   Brand Name Dispense Instructions for use Diagnosis    acetaminophen 325 MG tablet    TYLENOL    100 tablet    Take 2 tablets (650 mg) by mouth every 4 hours as needed for other (mild pain)    Chronic pain of left knee       acetylcysteine 20 % nebulizer solution    MUCOMYST    360 mL    INHALE ONE 4ML NEB INTO LUNGS VIA NEBULIZER TWO TIMES A DAY, MAY INCREASE TO 3-4 TIMES A DAY WITH INCREASE COUGH/COLD SYMPTOMS    CF (cystic fibrosis) (H)       ADDERALL PO      Take 20 mg by mouth daily.        * albuterol 108 (90 BASE) MCG/ACT Inhaler    PROAIR HFA/PROVENTIL HFA/VENTOLIN HFA    1 Inhaler    Inhale 2 puffs into the lungs every 6 hours as needed for shortness of breath / dyspnea or wheezing    Cystic fibrosis with pulmonary manifestations (H), Sinusitis, chronic, Diabetes mellitus type 1 (H)       * albuterol (2.5 MG/3ML) 0.083% neb solution     360 mL    INHALE 1 NEB VIA NEBULIZER FOUR TIMES A DAY    CF (cystic fibrosis) (H)       ascorbic acid 500 MG tablet    VITAMIN C    100 tablet    TAKE ONE TABLET BY MOUTH TWICE A DAY    Cystic fibrosis with pulmonary  manifestations (H)       azithromycin 500 MG tablet    ZITHROMAX    36 tablet    TAKE ONE TABLET BY MOUTH ON MONDAY, WEDNESDAY AND FRIDAY    CF (cystic fibrosis) (H)       beclomethasone 80 MCG/ACT Inhaler    QVAR    1 Inhaler    Inhale 1 puff into the lungs 2 times daily    Allergic rhinitis due to house dust mite       beta carotene 60621 UNIT capsule     36 capsule    Take 1 capsule (25,000 Units) by mouth Every Mon, Wed, Fri Morning    Cystic fibrosis with pulmonary manifestations (H)       blood glucose monitoring test strip    ACCU-CHEK SMARTVIEW    1 Month    Use to test blood sugar 4 times daily or as directed.    Type I (juvenile type) diabetes mellitus without mention of complication, not stated as uncontrolled       buPROPion 150 MG 12 hr tablet    WELLBUTRIN SR     Take 150 mg by mouth 2 times daily        cetirizine 10 MG tablet    zyrTEC    30 tablet    Take 1 tablet (10 mg) by mouth every evening    Allergic rhinitis due to [place] house dust mite, Urticaria, chronic       cholecalciferol 1000 UNIT tablet    vitamin D3    30 tablet    TAKE ONE TABLET BY MOUTH EVERY DAY    CF (cystic fibrosis) (H), Exocrine pancreatic insufficiency       DEPO-PROVERA IM           GLYCOPYRROLATE PO      Take 1 mg by mouth 2 times daily        hydrOXYzine 25 MG tablet    ATARAX    30 tablet    Take 1 tablet (25 mg) by mouth every 6 hours as needed for itching (and nausea)    Chronic pain of left knee       * ISOtretinoin 40 MG capsule    ACCUTANE    60 capsule    Take 2 capsules (80 mg) by mouth daily    Acne vulgaris       * ISOtretinoin 30 MG Caps     60 capsule    Take 1 capsule by mouth 2 times daily With meals.    Acne vulgaris       LORATADINE PO      Take 10 mg by mouth        MEPHYTON 5 MG tablet   Generic drug:  phytonadione     4 tablet    TAKE 1 TABLET BY MOUTH ONCE A WEEK    Cystic fibrosis with pulmonary manifestations (H), Cystic fibrosis with pulmonary manifestations (H), Aspergillosis (H), Pancreatic  insufficiency       meropenem 500 MG vial    MERREM    4 mL    500 mg by Nasal Instillation route as needed        methylcellulose (laxative) Powd    CITRUCEL    479 g    Start with 1 heaping tablespoon. Increase as needed, 1 heaping tablespoon at a time, up to 3 times per day.    Other constipation       montelukast 10 MG tablet    SINGULAIR    30 tablet    TAKE ONE TABLET BY MOUTH ONCE DAILY AT BEDTIME    CF (cystic fibrosis) (H)       MULTIVITAMIN PO      Take 1 tablet by mouth daily.        naltrexone 50 MG tablet    DEPADE;REVIA    30 tablet    Take 1/2 Tablet daily then increase to maximum of 1 Full Tablet daily as tolerated.  Time it one to two hours prior to worst cravings    Non morbid obesity due to excess calories       omeprazole 20 MG CR capsule    priLOSEC    60 capsule    TAKE 1 CAPSULE BY MOUTH TWICE A DAY    CF (cystic fibrosis) (H)       PROZAC 40 MG capsule   Generic drug:  FLUoxetine      Take 80 mg by mouth daily.    Cystic fibrosis with pulmonary manifestations (H)       TRAZODONE HCL PO      Take 100 mg by mouth At Bedtime        vitamin E 400 UNIT capsule     60 capsule    TAKE 1 CAPSULE BY MOUTH TWICE A DAY    CF (cystic fibrosis) (H), Pancreatic insufficiency       * Notice:  This list has 4 medication(s) that are the same as other medications prescribed for you. Read the directions carefully, and ask your doctor or other care provider to review them with you.

## 2017-11-16 NOTE — LETTER
"11/16/2017       RE: Mamie Rice  [address] [place] [address]     Dear Colleague,    Thank you for referring your patient, Mamie Rice, to the [facility] at [facility]. Please see a copy of my visit note below.    [facility] Dermatology Note    Dermatology Problem List:  1. Acne vulgaris  - s/p BPO facial wash - stopped due to skin irritation, clindamycin 1% lotion, adapalene 0.1% cream, spironolactone 50 mg bid  - on accutane, I-pledge#: [id number]  2. Cheilitis  - mupirocin 2% ointment  3. CF  4. DM Type II    Encounter Date: Nov 16, 2017    CC:   Chief Complaint   Patient presents with     Derm Problem     Mamie Willis states \" I keep getting nauseated and I get blisters.\"     History of Present Illness:  This 22 year old female presents as a follow up for acne. The patient was last seen on 1016/17 when she continued 80 mg of accutane. Today the patient reports that she is very nauseated. She is taking the medication with food. She also had rashes on her left hand extending up her arm. She decreased the dose to one pill for the last week and the nausea continued.She denies any associated symptoms such as vomiting, abdominal pain, stool changes. She notes that her acne has improved. She also reports that she has some \"splotches\" on her forehead and she notes that her sister and her nephew have it as well on their trunk. The patient denies any nosebleeds, irritated dry eyes, headaches with blurred vision, muscle or joint aches, changes in bowel habits, depressive thoughts, sharing medication, or donating blood.The patient reports no other lesions of concern at this time.     Otherwise, the patient reports no painful, bleeding, nonhealing, or pruritic lesions, and denies new or changing moles.     Past Medical History:   Patient Active Problem List   Diagnosis     Hip joint pain     Aspergillosis (H)     Chronic maxillary " sinusitis     Hypoglycemia     Type 1 diabetes mellitus (H)     Cystic fibrosis with pulmonary manifestations (H)     Chronic constipation     Frontal sinusitis     Major depression     ADHD (attention deficit hyperactivity disorder)     Back pain     Pancreatic insufficiency     Non morbid obesity due to excess calories     Acne vulgaris     Cystic fibrosis (H)     Insomnia     Major depressive disorder with single episode     Past Medical History:   Diagnosis Date     ADHD (attention deficit hyperactivity disorder)      Anxiety      Aspergillosis, with pneumonia (H)     fugus found caused chest pain     Chronic infection     CF, MRSA.,      Chronic sinusitis      Constipation, chronic      Cystic fibrosis with pulmonary manifestations (H) 12/19/2011     Cystic fibrosis without mention of meconium ileus     SWEAT TEST:Date: 2/17/1994   Laboratory: [facility]  296 mg, 104 mmol/L ClSample #2  295 mg, 104 mmol/L Cl GENOTYPING:Date: 10/15/2007,  Laboratory: [facility]: [id number]     Depressive disorder      Diabetes     no meds currently     Dysthymic disorder      Exocrine pancreatic insufficiency      Gastro-oesophageal reflux disease      Hip pain, right      MRSA (methicillin resistant Staphylococcus aureus) carrier      Pancreatic disease      Past Surgical History:   Procedure Laterality Date     ARTHROSCOPY HIP, OSTEOPLASTY FEMUR PROXIMAL, COMBINED  3/11/2013    Procedure: COMBINED ARTHROSCOPY HIP, OSTEOPLASTY FEMUR PROXIMAL;  Right Hip Arthroscopy, Labral  Debridement.    surgeon request choice anesthesia/admit to [facility] after surgery;  Surgeon: Omkar Austin MD;  [facility]: [facility]     ARTHROSCOPY KNEE WITH MEDIAL MENISCECTOMY Left 1/31/2017    Procedure: ARTHROSCOPY KNEE WITH MEDIAL MENISCECTOMY;  Surgeon: Jethro Coyle MD;  Location: [facility] OR     bronchoscopies       BRONCHOSCOPY       EXAM UNDER ANESTHESIA ANUS N/A 5/10/2016    Procedure: EXAM UNDER ANESTHESIA ANUS;  Surgeon:  Chet Gaviria MD;  [facility]     EXAM UNDER ANESTHESIA, RESTORATIONS, EXTRACTION(S) DENTAL COMPLEX, COMBINED  5/13/2013    Procedure: COMBINED EXAM UNDER ANESTHESIA, RESTORATIONS, EXTRACTION(S) DENTAL COMPLEX;  Dental Exam, Radiographs, Restorations. Single Extraction  Tooth #2. Restorations x 3;  Surgeon: Danilo Ortiz DDS;  Location: [facility] OR     HC KNEE SCOPE, DIAGNOSTIC      Arthroscopy, Knee- left     INJECT BOTOX N/A 5/10/2016    Procedure: INJECT BOTOX;  Surgeon: Chet Gaviria MD;  [facility]     left hip labral tear  5/11/2011    left hip arthroscopy with labral debridement and synovectomy     meniscus repair       OPTICAL TRACKING SYSTEM ENDOSCOPIC SINUS SURGERY  10/14/2011    Procedure:OPTICAL TRACKING SYSTEM ENDOSCOPIC SINUS SURGERY; FESS (functional endoscopic sinus surgery) with Image Guidance, bronchial lavage and cultures; Surgeon:GOYO KUO; Location:[facility] OR     OPTICAL TRACKING SYSTEM ENDOSCOPIC SINUS SURGERY  5/18/2012    Procedure:OPTICAL TRACKING SYSTEM ENDOSCOPIC SINUS SURGERY; Right  and Left Image Guided Functional Endoscopic Sinus Surgery With  Frontal Approach, [employer]; Surgeon:GOYO KUO; [facility] OR     OPTICAL TRACKING SYSTEM ENDOSCOPIC SINUS SURGERY  9/26/2012    Procedure: OPTICAL TRACKING SYSTEM ENDOSCOPIC SINUS SURGERY;  Stealth Guided Bilateral Functional Endoscopic Sinus Surgery *Latex Safe*;  Surgeon: Goyo Kuo MD;  [facility]     OPTICAL TRACKING SYSTEM ENDOSCOPIC SINUS SURGERY Bilateral 10/16/2015    Procedure: OPTICAL TRACKING SYSTEM ENDOSCOPIC SINUS SURGERY;  Surgeon: Mariela Haile MD;  [facility]     ORTHOPEDIC SURGERY      left hip tear repair 2010     SINUS SURGERY       Social History:  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 Former use of tanning beds (high school).     Family History:  There is a family history of presumed melanoma in the patient's  grandmother.    Medications:  Current Outpatient Prescriptions   Medication Sig Dispense Refill     cetirizine (ZYRTEC) 10 MG tablet Take 1 tablet (10 mg) by mouth every evening 30 tablet 3     ISOtretinoin (ACCUTANE) 40 MG capsule Take 2 capsules (80 mg) by mouth daily 60 capsule 0     naltrexone (DEPADE;REVIA) 50 MG tablet Take 1/2 Tablet daily then increase to maximum of 1 Full Tablet daily as tolerated.  Time it one to two hours prior to worst cravings 30 tablet 3     albuterol (2.5 MG/3ML) 0.083% neb solution INHALE 1 NEB VIA NEBULIZER FOUR TIMES A  mL 11     omeprazole (PRILOSEC) 20 MG CR capsule TAKE 1 CAPSULE BY MOUTH TWICE A DAY 60 capsule 11     vitamin E 400 UNIT capsule TAKE 1 CAPSULE BY MOUTH TWICE A DAY 60 capsule 11     azithromycin (ZITHROMAX) 500 MG tablet TAKE ONE TABLET BY MOUTH ON MONDAY, WEDNESDAY AND FRIDAY 36 tablet 4     ascorbic acid (VITAMIN C) 500 MG tablet TAKE ONE TABLET BY MOUTH TWICE A  tablet 3     beclomethasone (QVAR) 80 MCG/ACT Inhaler Inhale 1 puff into the lungs 2 times daily 1 Inhaler 3     MEPHYTON 5 MG tablet TAKE 1 TABLET BY MOUTH ONCE A WEEK 4 tablet 11     hydrOXYzine (ATARAX) 25 MG tablet Take 1 tablet (25 mg) by mouth every 6 hours as needed for itching (and nausea) 30 tablet 0     acetaminophen (TYLENOL) 325 MG tablet Take 2 tablets (650 mg) by mouth every 4 hours as needed for other (mild pain) 100 tablet 0     acetylcysteine (MUCOMYST) 20 % injection INHALE ONE 4ML NEB INTO LUNGS VIA NEBULIZER TWO TIMES A DAY, MAY INCREASE TO 3-4 TIMES A DAY WITH INCREASE COUGH/COLD SYMPTOMS 360 mL 11     MedroxyPROGESTERone Acetate (DEPO-PROVERA IM)        buPROPion (WELLBUTRIN SR) 150 MG 12 hr tablet Take 150 mg by mouth 2 times daily       montelukast (SINGULAIR) 10 MG tablet TAKE ONE TABLET BY MOUTH ONCE DAILY AT BEDTIME 30 tablet 11     VITAMIN D3 1000 UNITS tablet TAKE ONE TABLET BY MOUTH EVERY DAY 30 tablet 11     LORATADINE PO Take 10 mg by mouth       beta  carotene 17990 UNIT capsule Take 1 capsule (25,000 Units) by mouth Every Mon, Wed, Fri Morning 36 capsule 4     methylcellulose, laxative, (CITRUCEL) POWD Start with 1 heaping tablespoon. Increase as needed, 1 heaping tablespoon at a time, up to 3 times per day. 479 g 3     GLYCOPYRROLATE PO Take 1 mg by mouth 2 times daily        TRAZODONE HCL PO Take 100 mg by mouth At Bedtime        blood glucose (ACCU-CHEK SMARTVIEW) test strip Use to test blood sugar 4 times daily or as directed. 1 Month 12     albuterol (PROAIR HFA, PROVENTIL HFA, VENTOLIN HFA) 108 (90 BASE) MCG/ACT inhaler Inhale 2 puffs into the lungs every 6 hours as needed for shortness of breath / dyspnea or wheezing 1 Inhaler 12     meropenem (MERREM) 500 MG injection 500 mg by Nasal Instillation route as needed  4 mL 0     FLUoxetine (PROZAC) 40 MG capsule Take 80 mg by mouth daily.       Amphetamine-Dextroamphetamine (ADDERALL PO) Take 20 mg by mouth daily.       Multiple Vitamin (MULTIVITAMIN OR) Take 1 tablet by mouth daily.       Allergies   Allergen Reactions     Vancomycin Hives     Redmens skin rash      Review of Systems:  - As per HPI  - No headaches with vision changes  - No muscle aches or joint aches  - No stomach upsets, constipation, or diarrhea   - No depressive thoughts or significant mood changes    Physical exam:  There were no vitals taken for this visit.  GEN: This is a well developed, well-nourished female in no acute distress, in a pleasant mood.      SKIN: Acne exam, which includes the face, neck, upper central chest, and upper central back was performed.  - 1 resolving small inflammatory papule to right upper lip  - Faintly hyperpigmented patches on bilateral forehead  - No other lesions of concern on areas examined.     Impression/Plan:  1. Acne vulgaris, acne excoriee - Resistant to hormonal therapy, appropriate oral and topical antibiotics and well as topical retinoids.    - Discussion of the risks and side effects of Accutane  including but not limited to mucocutaneous dryness, arthralgias, myalgias, depression, suicidal ideation, headache, blurred vision, increase in liver function tests, increase in lipids, and teratogenic effects. Discussed need for two forms of contraception. The NextEnergy[employer]program brochure was provided and the contents discussed with the patient. The patient was counseled they cannot give blood while on Accutane. No personal or family history of inflammatory bowel disease or hypertriglyceridemia known to patient. Reviewed need to avoid alcohol on medication. Ipledge program consent was obtained.   - At this visit, decrease Accutane to 60 mg daily. Goal dose is 12,300 to 18,040mg for 150-220 mg/kg dosing in this 82 kg patient. Recommend to take medication with food containing fat. The patients two forms of contraception are Depo and male latex condoms.  - Labs are no longer needed. Pregnancy test is negative. She has her CBC and CMP checked regularly through her CF MD.   - Continue to hold beta carotene.  - Continue good moisturization.    - Total cumulative dose 8400 mg. Patient's I-pledge # is [id number]. The patient will stop all acne medications, when she starts the medication.     2. Hx of Cheilitis  - Continue using lip moisturization.   - Continue mupirocin 2% ointment - apply to cracks on sides of lips, if they occur.     3. Melasma  - Photos obtained at today's visit.  - Recommend good moisturization with sun screen.  -Reassurance given.     Follow-up in 30 days, earlier for new or changing lesions.     Staff Involved:  Scribe Disclosure:   I, Lupe Alcantara, am serving as a scribe to document services personally performed by Paige Reid PA-C, based on data collection and the provider's statements to me.    Provider Disclosure:   The documentation recorded by the scribe accurately reflects the services I personally performed and the decisions made by me.    All risks, benefits and alternatives were discussed  with patient.  Patient is in agreement and understands the assessment and plan.  All questions were answered.  Sun Screen Education was given.   Return to Clinic in 1 month or sooner as needed.   Paige Reid PA-C   [facility]       Pictures were placed in Pt's chart today for future reference.

## 2017-11-21 DIAGNOSIS — E84.9 CF (CYSTIC FIBROSIS) (H): ICD-10-CM

## 2017-11-21 RX ORDER — MONTELUKAST SODIUM 10 MG/1
TABLET ORAL
Qty: 30 TABLET | Refills: 11 | Status: SHIPPED | OUTPATIENT
Start: 2017-11-21 | End: 2018-11-28

## 2017-12-18 ENCOUNTER — OFFICE VISIT (OUTPATIENT)
Dept: DERMATOLOGY | Facility: CLINIC | Age: 22
End: 2017-12-18
Payer: MEDICAID

## 2017-12-18 ENCOUNTER — APPOINTMENT (OUTPATIENT)
Dept: LAB | Facility: CLINIC | Age: 22
End: 2017-12-18
Payer: MEDICAID

## 2017-12-18 DIAGNOSIS — L70.0 ACNE VULGARIS: Primary | ICD-10-CM

## 2017-12-18 DIAGNOSIS — Z79.899 ON ISOTRETINOIN THERAPY: ICD-10-CM

## 2017-12-18 LAB — HCG UR QL: NEGATIVE

## 2017-12-18 RX ORDER — ISOTRETINOIN 30 MG/1
1 CAPSULE ORAL 2 TIMES DAILY
Qty: 60 CAPSULE | Refills: 0 | Status: SHIPPED | OUTPATIENT
Start: 2017-12-18 | End: 2018-01-17

## 2017-12-18 ASSESSMENT — PAIN SCALES - GENERAL: PAINLEVEL: NO PAIN (0)

## 2017-12-18 NOTE — PROGRESS NOTES
[facility] Dermatology Note    Dermatology Problem List:  1. Acne vulgaris  - s/p BPO facial wash (stopped due to skin irritation), clindamycin 1% lotion, adapalene 0.1% cream, spironolactone 50 mg bid  - on accutane, I-pledgcourt#: [id number]  2. Cheilitis  - mupirocin 2% ointment  3. CF  4. DM Type II    Encounter Date: Dec 18, 2017    CC:   Chief Complaint   Patient presents with     Derm Problem     Alba Mamie notes dry skin.     History of Present Illness:  This 22 year old female presents as a follow up for acne. The patient was last seen on 11/16/17 when her accutane dose was decreased to 60 mg. Today the patient reports that there is improvement with no noticeable acne break outs. She notes that her nausea is a lot better after decreasing the dose. She states that she has dryness on her face and left arm, but this is manageable. The patient denies any nosebleeds, irritated dry eyes, headaches with blurred vision, muscle or joint aches, changes in bowel habits, depressive thoughts, sharing medication, or donating blood. The patient reports no other lesions of concern at this time.     Otherwise, the patient reports no painful, bleeding, nonhealing, or pruritic lesions, and denies new or changing moles.     Past Medical History:   Patient Active Problem List   Diagnosis     Hip joint pain     Aspergillosis (H)     Chronic maxillary sinusitis     Hypoglycemia     Type 1 diabetes mellitus (H)     Cystic fibrosis with pulmonary manifestations (H)     Chronic constipation     Frontal sinusitis     Major depression     ADHD (attention deficit hyperactivity disorder)     Back pain     Pancreatic insufficiency     Non morbid obesity due to excess calories     Acne vulgaris     Cystic fibrosis (H)     Insomnia     Major depressive disorder with single episode     Past Medical History:   Diagnosis Date     ADHD (attention deficit hyperactivity disorder)      Anxiety      Aspergillosis, with  pneumonia (H)     fugus found caused chest pain     Chronic infection     CF, MRSA.,      Chronic sinusitis      Constipation, chronic      Cystic fibrosis with pulmonary manifestations (H) 12/19/2011     Cystic fibrosis without mention of meconium ileus     SWEAT TEST:Date: 2/17/1994   Labor[facility]  296 mg, 104 mmol/L ClSample #2  295 mg, 104 mmol/L Cl GENOTYPING:Date: 10/15/2007,  Laboratory: [facility]: [id number]     Depressive disorder      Diabetes     no meds currently     Dysthymic disorder      Exocrine pancreatic insufficiency      Gastro-oesophageal reflux disease      Hip pain, right      MRSA (methicillin resistant Staphylococcus aureus) carrier      Pancreatic disease      Past Surgical History:   Procedure Laterality Date     ARTHROSCOPY HIP, OSTEOPLASTY FEMUR PROXIMAL, COMBINED  3/11/2013    Procedure: COMBINED ARTHROSCOPY HIP, OSTEOPLASTY FEMUR PROXIMAL;  Right Hip Arthroscopy, Labral  Debridement.    surgeon request choice anesthesia/admit to [facility] after surgery;  Surgeon: Omkar Austin MD;  [facility]     ARTHROSCOPY KNEE WITH MEDIAL MENISCECTOMY Left 1/31/2017    Procedure: ARTHROSCOPY KNEE WITH MEDIAL MENISCECTOMY;  Surgeon: Jethro Coyle MD;  [facility] OR     bronchoscopies       BRONCHOSCOPY       EXAM UNDER ANESTHESIA ANUS N/A 5/10/2016    Procedure: EXAM UNDER ANESTHESIA ANUS;  Surgeon: Chet Gaviria MD;  [facility]     EXAM UNDER ANESTHESIA, RESTORATIONS, EXTRACTION(S) DENTAL COMPLEX, COMBINED  5/13/2013    Procedure: COMBINED EXAM UNDER ANESTHESIA, RESTORATIONS, EXTRACTION(S) DENTAL COMPLEX;  Dental Exam, Radiographs, Restorations. Single Extraction  Tooth #2. Restorations x 3;  Surgeon: Danilo Ortiz DDS;  [facility] OR     HC KNEE SCOPE, DIAGNOSTIC      Arthroscopy, Knee- left     INJECT BOTOX N/A 5/10/2016    Procedure: INJECT BOTOX;  Surgeon: Chet Gaviria MD;  [facility]     left hip labral  tear  5/11/2011    left hip arthroscopy with labral debridement and synovectomy     meniscus repair       OPTICAL TRACKING SYSTEM ENDOSCOPIC SINUS SURGERY  10/14/2011    Procedure:OPTICAL TRACKING SYSTEM ENDOSCOPIC SINUS SURGERY; FESS (functional endoscopic sinus surgery) with Image Guidance, bronchial lavage and cultures; Surgeon:GOYO KUO; [facility]     OPTICAL TRACKING SYSTEM ENDOSCOPIC SINUS SURGERY  5/18/2012    Procedure:OPTICAL TRACKING SYSTEM ENDOSCOPIC SINUS SURGERY; Right  and Left Image Guided Functional Endoscopic Sinus Surgery With  Frontal Approach, [employer]; Surgeon:GOYO KUO; [facility]     OPTICAL TRACKING SYSTEM ENDOSCOPIC SINUS SURGERY  9/26/2012    Procedure: OPTICAL TRACKING SYSTEM ENDOSCOPIC SINUS SURGERY;  Stealth Guided Bilateral Functional Endoscopic Sinus Surgery *Latex Safe*;  Surgeon: Goyo Kuo MD;  [facility]     OPTICAL TRACKING SYSTEM ENDOSCOPIC SINUS SURGERY Bilateral 10/16/2015    Procedure: OPTICAL TRACKING SYSTEM ENDOSCOPIC SINUS SURGERY;  Surgeon: Mariela Haile MD;  [facility]     ORTHOPEDIC SURGERY      left hip tear repair 2010     SINUS SURGERY       Social History:  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 Former use of tanning beds (high school).     Family History:  There is a family history of presumed melanoma in the patient's grandmother.    Medications:  Current Outpatient Prescriptions   Medication Sig Dispense Refill     montelukast (SINGULAIR) 10 MG tablet TAKE 1 TABLET BY MOUTH DAILY AT BEDTIME 30 tablet 11     ISOtretinoin 30 MG CAPS Take 1 capsule by mouth 2 times daily With meals. 60 capsule 0     cetirizine (ZYRTEC) 10 MG tablet Take 1 tablet (10 mg) by mouth every evening 30 tablet 3     ISOtretinoin (ACCUTANE) 40 MG capsule Take 2 capsules (80 mg) by mouth daily 60 capsule 0     naltrexone (DEPADE;REVIA) 50 MG tablet Take 1/2 Tablet daily then increase to maximum of 1 Full Tablet daily as tolerated.   Time it one to two hours prior to worst cravings 30 tablet 3     albuterol (2.5 MG/3ML) 0.083% neb solution INHALE 1 NEB VIA NEBULIZER FOUR TIMES A  mL 11     omeprazole (PRILOSEC) 20 MG CR capsule TAKE 1 CAPSULE BY MOUTH TWICE A DAY 60 capsule 11     vitamin E 400 UNIT capsule TAKE 1 CAPSULE BY MOUTH TWICE A DAY 60 capsule 11     azithromycin (ZITHROMAX) 500 MG tablet TAKE ONE TABLET BY MOUTH ON MONDAY, WEDNESDAY AND FRIDAY 36 tablet 4     ascorbic acid (VITAMIN C) 500 MG tablet TAKE ONE TABLET BY MOUTH TWICE A  tablet 3     beclomethasone (QVAR) 80 MCG/ACT Inhaler Inhale 1 puff into the lungs 2 times daily 1 Inhaler 3     MEPHYTON 5 MG tablet TAKE 1 TABLET BY MOUTH ONCE A WEEK 4 tablet 11     hydrOXYzine (ATARAX) 25 MG tablet Take 1 tablet (25 mg) by mouth every 6 hours as needed for itching (and nausea) 30 tablet 0     acetaminophen (TYLENOL) 325 MG tablet Take 2 tablets (650 mg) by mouth every 4 hours as needed for other (mild pain) 100 tablet 0     acetylcysteine (MUCOMYST) 20 % injection INHALE ONE 4ML NEB INTO LUNGS VIA NEBULIZER TWO TIMES A DAY, MAY INCREASE TO 3-4 TIMES A DAY WITH INCREASE COUGH/COLD SYMPTOMS 360 mL 11     MedroxyPROGESTERone Acetate (DEPO-PROVERA IM)        buPROPion (WELLBUTRIN SR) 150 MG 12 hr tablet Take 150 mg by mouth 2 times daily       VITAMIN D3 1000 UNITS tablet TAKE ONE TABLET BY MOUTH EVERY DAY 30 tablet 11     LORATADINE PO Take 10 mg by mouth       beta carotene 70209 UNIT capsule Take 1 capsule (25,000 Units) by mouth Every Mon, Wed, Fri Morning 36 capsule 4     methylcellulose, laxative, (CITRUCEL) POWD Start with 1 heaping tablespoon. Increase as needed, 1 heaping tablespoon at a time, up to 3 times per day. 479 g 3     GLYCOPYRROLATE PO Take 1 mg by mouth 2 times daily        TRAZODONE HCL PO Take 100 mg by mouth At Bedtime        blood glucose (ACCU-CHEK SMARTVIEW) test strip Use to test blood sugar 4 times daily or as directed. 1 Month 12     albuterol  (PROAIR HFA, PROVENTIL HFA, VENTOLIN HFA) 108 (90 BASE) MCG/ACT inhaler Inhale 2 puffs into the lungs every 6 hours as needed for shortness of breath / dyspnea or wheezing 1 Inhaler 12     meropenem (MERREM) 500 MG injection 500 mg by Nasal Instillation route as needed  4 mL 0     FLUoxetine (PROZAC) 40 MG capsule Take 80 mg by mouth daily.       Amphetamine-Dextroamphetamine (ADDERALL PO) Take 20 mg by mouth daily.       Multiple Vitamin (MULTIVITAMIN OR) Take 1 tablet by mouth daily.       Allergies   Allergen Reactions     Vancomycin Hives     Redmens skin rash      Review of Systems:  - As per HPI  - No headaches with vision changes  - No muscle aches or joint aches  - No stomach upsets, constipation, or diarrhea   - No depressive thoughts or significant mood changes    Physical exam:  There were no vitals taken for this visit.  GEN: This is a well developed, well-nourished female in no acute distress, in a pleasant mood.      SKIN: Acne exam, which includes the face, neck, upper central chest, and upper central back was performed.  - No acne to face  - Mild xerosis periorally and left dorsal forearm  - Slight hyperpigmentation on the forehead  - No other lesions of concern on areas examined.     Impression/Plan:  1. Acne vulgaris, acne excoriee - Resistant to hormonal therapy, appropriate oral and topical antibiotics and well as topical retinoids.    - Discussion of the risks and side effects of Accutane including but not limited to mucocutaneous dryness, arthralgias, myalgias, depression, suicidal ideation, headache, blurred vision, increase in liver function tests, increase in lipids, and teratogenic effects. Discussed need for two forms of contraception. The ipledgeprogram brochure was provided and the contents discussed with the patient. The patient was counseled they cannot give blood while on Accutane. No personal or family history of inflammatory bowel disease or hypertriglyceridemia known to patient.  Reviewed need to avoid alcohol on medication. Ipledge program consent was obtained.   - At this visit, continue Accutane 60 mg daily. Goal dose is 12,300 to 18,040 mg for 150-220 mg/kg dosing in this 82 kg patient. Recommend to take medication with food containing fat. The patients two forms of contraception are Depo and male latex condoms.  - Labs are no longer needed. Pregnancy test obtained today. She has her CBC and CMP checked regularly through her CF MD. Continue to hold beta carotene. Continue good moisturization.    - Total cumulative dose 10,200 mg. Patient's I-pledge # is [id number]. The patient will stop all acne medications, when she starts the medication. Plan on 2 more months.    2. Hx of Cheilitis  - Continue using lip moisturization.   - Continue mupirocin 2% ointment - apply to cracks on sides of lips, if they occur.     3. Melasma  - Continue good moisturization with sun screen.    Follow-up in 30 days, earlier for new or changing lesions.     Staff Involved:  Scribe Disclosure:   I, Lupe Alcantara, am serving as a scribe to document services personally performed by Paige Reid PA-C, based on data collection and the provider's statements to me.    Provider Disclosure:   The documentation recorded by the scribe accurately reflects the services I personally performed and the decisions made by me.    All risks, benefits and alternatives were discussed with patient.  Patient is in agreement and understands the assessment and plan.  All questions were answered.  Sun Screen Education was given.   Return to Clinic in 1 month or sooner as needed.   Paige Reid PA-C   [facility]

## 2017-12-18 NOTE — MR AVS SNAPSHOT
After Visit Summary   12/18/2017    Mamie Rice    MRN: [id number]           Patient Information     Date Of Birth          1995        Visit Information        Provider Department      12/18/2017 9:00 AM Paige Reid PA-C [facility] Dermat[facility]        Today's Diagnoses     Acne vulgaris    -  1    On isotretinoin therapy           Follow-ups after your visit        Follow-up notes from your care team     Return in about 4 weeks (around 1/15/2018).      Your next 10 appointments already scheduled     Jan 17, 2018  7:00 AM CST   (Arrive by 6:45 AM)   Return Visit with Mariela Haile MD   [facility] ([facility])    [address]  [address] [facility] [address]   [phone]            Jan 17, 2018  8:00 AM CST   PFT VISIT with  CRISTIAN DE LA ROSA   [facility] Pulmonary Function Testing ([facility])    [address]  [address] [facility] [address]   [phone]            Jan 17, 2018  8:30 AM CST   (Arrive by 8:15 AM)   RETURN CYSTIC FIBROSIS VISIT with Gavi Allison MD   [facility] ([facility])    [address] [address]   [phone]            Jan 17, 2018 10:00 AM CST   (Arrive by 9:45 AM)   Return Visit with Paige Reid PA-C   [facility] D[facility]atology ([facility])    [address] [place] [id number] [phone]              Who to contact     Please call your clinic at [phone] to:    Ask questions about your health    Make or cancel appointments    Discuss your medicines    Learn about your test results    Speak to your doctor   If you have compliments or concerns about an experience at your clinic, or if you wish to file a complaint, please contact [facility] Patient Relations at [phone] or email us at  [email or web address]         Additional Information About Your Visit        [employer] Information     [employer] gives you secure access to your electronic health record. If you see a primary care provider, you can also send messages to your care team and make appointments. If you have questions, please call your primary care clinic.  If you do not have a primary care provider, please call [phone] and they will assist you.      [employer] is an electronic gateway that provides easy, online access to your medical records. With [employer], you can request a clinic appointment, read your test results, renew a prescription or communicate with your care team.     To access your existing account, please contact your [facility] or call [phone] for assistance.        Care EveryWhere ID     This is your Care EveryWhere ID. This could be used by other organizations to access your [facility] medical records  [id number]         Blood Pressure from Last 3 Encounters:   11/01/17 126/86   09/26/17 124/87   08/02/17 112/80    Weight from Last 3 Encounters:   11/01/17 77.9 kg (171 lb 11.8 oz)   10/11/17 78.9 kg (174 lb)   09/26/17 78.8 kg (173 lb 11.2 oz)              We Performed the Following     HCG qualitative urine          Where to get your medicines      These medications were sent to [facility] - [place] [address]     Phone:  [phone]     ISOtretinoin 30 MG Caps          Primary Care Provider Office Phone # Fax #    Malik De La Rosa [phone]       [address] [place] [id number]        Equal Access to Services     LUZMARIA MARTINEZ : Hadii martin Marion, candelaria rader, shanita hyattaldavid sauer. So [facility] [phone].    ATENCIÓN: Si habla español, tiene a hidalgo disposición servicios gratuitos de asistencia  lingüísticaSheeba Lechuga al [phone].    We comply with applicable federal civil rights laws and [place] laws. We do not discriminate on the basis of race, color, national origin, age, disability, sex, sexual orientation, or gender identity.            Thank you!     Thank you for choosing [facility]  for your care. Our goal is always to provide you with excellent care. Hearing back from our patients is one way we can continue to improve our services. Please take a few minutes to complete the written survey that you may receive in the mail after your visit with us. Thank you!             Your Updated Medication List - Protect others around you: Learn how to safely use, store and throw away your medicines at www.disposemymeds.org.          This list is accurate as of: 12/18/17  2:04 PM.  Always use your most recent med list.                   Brand Name Dispense Instructions for use Diagnosis    acetaminophen 325 MG tablet    TYLENOL    100 tablet    Take 2 tablets (650 mg) by mouth every 4 hours as needed for other (mild pain)    Chronic pain of left knee       acetylcysteine 20 % nebulizer solution    MUCOMYST    360 mL    INHALE ONE 4ML NEB INTO LUNGS VIA NEBULIZER TWO TIMES A DAY, MAY INCREASE TO 3-4 TIMES A DAY WITH INCREASE COUGH/COLD SYMPTOMS    CF (cystic fibrosis) (H)       ADDERALL PO      Take 20 mg by mouth daily.        * albuterol 108 (90 BASE) MCG/ACT Inhaler    PROAIR HFA/PROVENTIL HFA/VENTOLIN HFA    1 Inhaler    Inhale 2 puffs into the lungs every 6 hours as needed for shortness of breath / dyspnea or wheezing    Cystic fibrosis with pulmonary manifestations (H), Sinusitis, chronic, Diabetes mellitus type 1 (H)       * albuterol (2.5 MG/3ML) 0.083% neb solution     360 mL    INHALE 1 NEB VIA NEBULIZER FOUR TIMES A DAY    CF (cystic fibrosis) (H)       ascorbic acid 500 MG tablet    VITAMIN C    100 tablet    TAKE ONE TABLET BY MOUTH TWICE A DAY    Cystic fibrosis with pulmonary  manifestations (H)       azithromycin 500 MG tablet    ZITHROMAX    36 tablet    TAKE ONE TABLET BY MOUTH ON MONDAY, WEDNESDAY AND FRIDAY    CF (cystic fibrosis) (H)       beclomethasone 80 MCG/ACT Inhaler    QVAR    1 Inhaler    Inhale 1 puff into the lungs 2 times daily    Allergic rhinitis due to house dust mite       beta carotene 88877 UNIT capsule     36 capsule    Take 1 capsule (25,000 Units) by mouth Every Mon, Wed, Fri Morning    Cystic fibrosis with pulmonary manifestations (H)       blood glucose monitoring test strip    ACCU-CHEK SMARTVIEW    1 Month    Use to test blood sugar 4 times daily or as directed.    Type I (juvenile type) diabetes mellitus without mention of complication, not stated as uncontrolled       buPROPion 150 MG 12 hr tablet    WELLBUTRIN SR     Take 150 mg by mouth 2 times daily        cetirizine 10 MG tablet    zyrTEC    30 tablet    Take 1 tablet (10 mg) by mouth every evening    Allergic rhinitis due to [place] house dust mite, Urticaria, chronic       cholecalciferol 1000 UNIT tablet    vitamin D3    30 tablet    TAKE ONE TABLET BY MOUTH EVERY DAY    CF (cystic fibrosis) (H), Exocrine pancreatic insufficiency       DEPO-PROVERA IM           GLYCOPYRROLATE PO      Take 1 mg by mouth 2 times daily        hydrOXYzine 25 MG tablet    ATARAX    30 tablet    Take 1 tablet (25 mg) by mouth every 6 hours as needed for itching (and nausea)    Chronic pain of left knee       ISOtretinoin 30 MG Caps     60 capsule    Take 1 capsule by mouth 2 times daily With meals.    Acne vulgaris       LORATADINE PO      Take 10 mg by mouth        MEPHYTON 5 MG tablet   Generic drug:  phytonadione     4 tablet    TAKE 1 TABLET BY MOUTH ONCE A WEEK    Cystic fibrosis with pulmonary manifestations (H), Cystic fibrosis with pulmonary manifestations (H), Aspergillosis (H), Pancreatic insufficiency       meropenem 500 MG vial    MERREM    4 mL    500 mg by Nasal Instillation route as needed         methylcellulose (laxative) Powd    CITRUCEL    479 g    Start with 1 heaping tablespoon. Increase as needed, 1 heaping tablespoon at a time, up to 3 times per day.    Other constipation       montelukast 10 MG tablet    SINGULAIR    30 tablet    TAKE 1 TABLET BY MOUTH DAILY AT BEDTIME    CF (cystic fibrosis) (H)       MULTIVITAMIN PO      Take 1 tablet by mouth daily.        naltrexone 50 MG tablet    DEPADE;REVIA    30 tablet    Take 1/2 Tablet daily then increase to maximum of 1 Full Tablet daily as tolerated.  Time it one to two hours prior to worst cravings    Non morbid obesity due to excess calories       omeprazole 20 MG CR capsule    priLOSEC    60 capsule    TAKE 1 CAPSULE BY MOUTH TWICE A DAY    CF (cystic fibrosis) (H)       PROZAC 40 MG capsule   Generic drug:  FLUoxetine      Take 80 mg by mouth daily.    Cystic fibrosis with pulmonary manifestations (H)       TRAZODONE HCL PO      Take 100 mg by mouth At Bedtime        vitamin E 400 UNIT capsule     60 capsule    TAKE 1 CAPSULE BY MOUTH TWICE A DAY    CF (cystic fibrosis) (H), Pancreatic insufficiency       * Notice:  This list has 2 medication(s) that are the same as other medications prescribed for you. Read the directions carefully, and ask your doctor or other care provider to review them with you.

## 2017-12-18 NOTE — LETTER
12/18/2017       RE: Mamie Rice  [address] [facility] [id number]     Dear Colleague,    Thank you for referring your patient, Mamie Rice, to the [facility] DERMATOLOGY at [facility]. Please see a copy of my visit note below.    [facility] Dermatology Note    Dermatology Problem List:  1. Acne vulgaris  - s/p BPO facial wash (stopped due to skin irritation), clindamycin 1% lotion, adapalene 0.1% cream, spironolactone 50 mg bid  - on accutane, I-pledge#: [id number]  2. Cheilitis  - mupirocin 2% ointment  3. CF  4. DM Type II    Encounter Date: Dec 18, 2017    CC:   Chief Complaint   Patient presents with     Derm Problem     Ronaldne, Mamie notes dry skin.     History of Present Illness:  This 22 year old female presents as a follow up for acne. The patient was last seen on 11/16/17 when her accutane dose was decreased to 60 mg. Today the patient reports that there is improvement with no noticeable acne break outs. She notes that her nausea is a lot better after decreasing the dose. She states that she has dryness on her face and left arm, but this is manageable. The patient denies any nosebleeds, irritated dry eyes, headaches with blurred vision, muscle or joint aches, changes in bowel habits, depressive thoughts, sharing medication, or donating blood. The patient reports no other lesions of concern at this time.     Otherwise, the patient reports no painful, bleeding, nonhealing, or pruritic lesions, and denies new or changing moles.     Past Medical History:   Patient Active Problem List   Diagnosis     Hip joint pain     Aspergillosis (H)     Chronic maxillary sinusitis     Hypoglycemia     Type 1 diabetes mellitus (H)     Cystic fibrosis with pulmonary manifestations (H)     Chronic constipation     Frontal sinusitis     Major depression     ADHD (attention deficit hyperactivity disorder)     Back pain     Pancreatic insufficiency     Non  morbid obesity due to excess calories     Acne vulgaris     Cystic fibrosis (H)     Insomnia     Major depressive disorder with single episode     Past Medical History:   Diagnosis Date     ADHD (attention deficit hyperactivity disorder)      Anxiety      Aspergillosis, with pneumonia (H)     fugus found caused chest pain     Chronic infection     CF, MRSA.,      Chronic sinusitis      Constipation, chronic      Cystic fibrosis with pulmonary manifestations (H) 12/19/2011     Cystic fibrosis without mention of meconium ileus     SWEAT TEST:Date: 2/17/1994   [facility]  296 mg, 104 mmol/L ClSample #2  295 mg, 104 mmol/L Cl GENOTYPING:Date: 10/15/2007,  Laboratory: [facility]: [id number]     Depressive disorder      Diabetes     no meds currently     Dysthymic disorder      Exocrine pancreatic insufficiency      Gastro-oesophageal reflux disease      Hip pain, right      MRSA (methicillin resistant Staphylococcus aureus) carrier      Pancreatic disease      Past Surgical History:   Procedure Laterality Date     ARTHROSCOPY HIP, OSTEOPLASTY FEMUR PROXIMAL, COMBINED  3/11/2013    Procedure: COMBINED ARTHROSCOPY HIP, OSTEOPLASTY FEMUR PROXIMAL;  Right Hip Arthroscopy, Labral  Debridement.    surgeon request choice anesthesia/admit to [facility] after surgery;  Surgeon: Omkar Austin MD;  [facility]: [facility] OR     ARTHROSCOPY KNEE WITH MEDIAL MENISCECTOMY Left 1/31/2017    Procedure: ARTHROSCOPY KNEE WITH MEDIAL MENISCECTOMY;  Surgeon: Jethro Coyle MD;  Location: [facility] OR     bronchoscopies       BRONCHOSCOPY       EXAM UNDER ANESTHESIA ANUS N/A 5/10/2016    Procedure: EXAM UNDER ANESTHESIA ANUS;  Surgeon: Chet Gaviria MD;  [facility]     EXAM UNDER ANESTHESIA, RESTORATIONS, EXTRACTION(S) DENTAL COMPLEX, COMBINED  5/13/2013    Procedure: COMBINED EXAM UNDER ANESTHESIA, RESTORATIONS, EXTRACTION(S) DENTAL COMPLEX;  Dental Exam, Radiographs, Restorations. Single Extraction   Tooth #2. Restorations x 3;  Surgeon: Danilo Ortiz DDS;  Location: [facility] OR     HC KNEE SCOPE, DIAGNOSTIC      Arthroscopy, Knee- left     INJECT BOTOX N/A 5/10/2016    Procedure: INJECT BOTOX;  Surgeon: Chet Gaviria MD;  [facility]     left hip labral tear  5/11/2011    left hip arthroscopy with labral debridement and synovectomy     meniscus repair       OPTICAL TRACKING SYSTEM ENDOSCOPIC SINUS SURGERY  10/14/2011    Procedure:OPTICAL TRACKING SYSTEM ENDOSCOPIC SINUS SURGERY; FESS (functional endoscopic sinus surgery) with Image Guidance, bronchial lavage and cultures; Surgeon:GOYO KUO; Location:[facility] OR     OPTICAL TRACKING SYSTEM ENDOSCOPIC SINUS SURGERY  5/18/2012    Procedure:OPTICAL TRACKING SYSTEM ENDOSCOPIC SINUS SURGERY; Right  and Left Image Guided Functional Endoscopic Sinus Surgery With  Frontal Approach, [employer]; Surgeon:GOYO KUO; [facility] OR     OPTICAL TRACKING SYSTEM ENDOSCOPIC SINUS SURGERY  9/26/2012    Procedure: OPTICAL TRACKING SYSTEM ENDOSCOPIC SINUS SURGERY;  Stealth Guided Bilateral Functional Endoscopic Sinus Surgery *Latex Safe*;  Surgeon: Goyo Kuo MD;  [facility]     OPTICAL TRACKING SYSTEM ENDOSCOPIC SINUS SURGERY Bilateral 10/16/2015    Procedure: OPTICAL TRACKING SYSTEM ENDOSCOPIC SINUS SURGERY;  Surgeon: Mariela Haile MD;  [facility]     ORTHOPEDIC SURGERY      left hip tear repair 2010     SINUS SURGERY       Social History:  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 Former use of tanning beds (high school).     Family History:  There is a family history of presumed melanoma in the patient's grandmother.    Medications:  Current Outpatient Prescriptions   Medication Sig Dispense Refill     montelukast (SINGULAIR) 10 MG tablet TAKE 1 TABLET BY MOUTH DAILY AT BEDTIME 30 tablet 11     ISOtretinoin 30 MG CAPS Take 1 capsule by mouth 2 times daily With meals. 60 capsule 0     cetirizine (ZYRTEC) 10 MG tablet Take  1 tablet (10 mg) by mouth every evening 30 tablet 3     ISOtretinoin (ACCUTANE) 40 MG capsule Take 2 capsules (80 mg) by mouth daily 60 capsule 0     naltrexone (DEPADE;REVIA) 50 MG tablet Take 1/2 Tablet daily then increase to maximum of 1 Full Tablet daily as tolerated.  Time it one to two hours prior to worst cravings 30 tablet 3     albuterol (2.5 MG/3ML) 0.083% neb solution INHALE 1 NEB VIA NEBULIZER FOUR TIMES A  mL 11     omeprazole (PRILOSEC) 20 MG CR capsule TAKE 1 CAPSULE BY MOUTH TWICE A DAY 60 capsule 11     vitamin E 400 UNIT capsule TAKE 1 CAPSULE BY MOUTH TWICE A DAY 60 capsule 11     azithromycin (ZITHROMAX) 500 MG tablet TAKE ONE TABLET BY MOUTH ON MONDAY, WEDNESDAY AND FRIDAY 36 tablet 4     ascorbic acid (VITAMIN C) 500 MG tablet TAKE ONE TABLET BY MOUTH TWICE A  tablet 3     beclomethasone (QVAR) 80 MCG/ACT Inhaler Inhale 1 puff into the lungs 2 times daily 1 Inhaler 3     MEPHYTON 5 MG tablet TAKE 1 TABLET BY MOUTH ONCE A WEEK 4 tablet 11     hydrOXYzine (ATARAX) 25 MG tablet Take 1 tablet (25 mg) by mouth every 6 hours as needed for itching (and nausea) 30 tablet 0     acetaminophen (TYLENOL) 325 MG tablet Take 2 tablets (650 mg) by mouth every 4 hours as needed for other (mild pain) 100 tablet 0     acetylcysteine (MUCOMYST) 20 % injection INHALE ONE 4ML NEB INTO LUNGS VIA NEBULIZER TWO TIMES A DAY, MAY INCREASE TO 3-4 TIMES A DAY WITH INCREASE COUGH/COLD SYMPTOMS 360 mL 11     MedroxyPROGESTERone Acetate (DEPO-PROVERA IM)        buPROPion (WELLBUTRIN SR) 150 MG 12 hr tablet Take 150 mg by mouth 2 times daily       VITAMIN D3 1000 UNITS tablet TAKE ONE TABLET BY MOUTH EVERY DAY 30 tablet 11     LORATADINE PO Take 10 mg by mouth       beta carotene 45201 UNIT capsule Take 1 capsule (25,000 Units) by mouth Every Mon, Wed, Fri Morning 36 capsule 4     methylcellulose, laxative, (CITRUCEL) POWD Start with 1 heaping tablespoon. Increase as needed, 1 heaping tablespoon at a time, up to  3 times per day. 479 g 3     GLYCOPYRROLATE PO Take 1 mg by mouth 2 times daily        TRAZODONE HCL PO Take 100 mg by mouth At Bedtime        blood glucose (ACCU-CHEK SMARTVIEW) test strip Use to test blood sugar 4 times daily or as directed. 1 Month 12     albuterol (PROAIR HFA, PROVENTIL HFA, VENTOLIN HFA) 108 (90 BASE) MCG/ACT inhaler Inhale 2 puffs into the lungs every 6 hours as needed for shortness of breath / dyspnea or wheezing 1 Inhaler 12     meropenem (MERREM) 500 MG injection 500 mg by Nasal Instillation route as needed  4 mL 0     FLUoxetine (PROZAC) 40 MG capsule Take 80 mg by mouth daily.       Amphetamine-Dextroamphetamine (ADDERALL PO) Take 20 mg by mouth daily.       Multiple Vitamin (MULTIVITAMIN OR) Take 1 tablet by mouth daily.       Allergies   Allergen Reactions     Vancomycin Hives     Redmens skin rash      Review of Systems:  - As per HPI  - No headaches with vision changes  - No muscle aches or joint aches  - No stomach upsets, constipation, or diarrhea   - No depressive thoughts or significant mood changes    Physical exam:  There were no vitals taken for this visit.  GEN: This is a well developed, well-nourished female in no acute distress, in a pleasant mood.      SKIN: Acne exam, which includes the face, neck, upper central chest, and upper central back was performed.  - No acne to face  - Mild xerosis periorally and left dorsal forearm  - Slight hyperpigmentation on the forehead  - No other lesions of concern on areas examined.     Impression/Plan:  1. Acne vulgaris, acne excoriee - Resistant to hormonal therapy, appropriate oral and topical antibiotics and well as topical retinoids.    - Discussion of the risks and side effects of Accutane including but not limited to mucocutaneous dryness, arthralgias, myalgias, depression, suicidal ideation, headache, blurred vision, increase in liver function tests, increase in lipids, and teratogenic effects. Discussed need for two forms of  contraception. The ipledgeprogram brochure was provided and the contents discussed with the patient. The patient was counseled they cannot give blood while on Accutane. No personal or family history of inflammatory bowel disease or hypertriglyceridemia known to patient. Reviewed need to avoid alcohol on medication. Ipledge program consent was obtained.   - At this visit, continue Accutane 60 mg daily. Goal dose is 12,300 to 18,040 mg for 150-220 mg/kg dosing in this 82 kg patient. Recommend to take medication with food containing fat. The patients two forms of contraception are Depo and male latex condoms.  - Labs are no longer needed. Pregnancy test obtained today. She has her CBC and CMP checked regularly through her CF MD. Continue to hold beta carotene. Continue good moisturization.    - Total cumulative dose 10,200 mg. Patient's I-pledge # is [id number]. The patient will stop all acne medications, when she starts the medication. Plan on 2 more months.    2. Hx of Cheilitis  - Continue using lip moisturization.   - Continue mupirocin 2% ointment - apply to cracks on sides of lips, if they occur.     3. Melasma  - Continue good moisturization with sun screen.    Follow-up in 30 days, earlier for new or changing lesions.     Staff Involved:  Scribe Disclosure:   I, Lupe Alcantara, am serving as a scribe to document services personally performed by Paige Reid PA-C, based on data collection and the provider's statements to me.    Provider Disclosure:   The documentation recorded by the scribe accurately reflects the services I personally performed and the decisions made by me.    All risks, benefits and alternatives were discussed with patient.  Patient is in agreement and understands the assessment and plan.  All questions were answered.  Sun Screen Education was given.   Return to Clinic in 1 month or sooner as needed.   Paige Reid PA-C   [facility]

## 2017-12-18 NOTE — NURSING NOTE
Dermatology Rooming Note    Mamie Rice's goals for this visit include:   Chief Complaint   Patient presents with     Derm Problem     Accutane, Mamie notes dry skin.     Valerie Ghosh LPN

## 2018-01-08 DIAGNOSIS — E84.9 CF (CYSTIC FIBROSIS) (H): ICD-10-CM

## 2018-01-08 DIAGNOSIS — K86.81 EXOCRINE PANCREATIC INSUFFICIENCY: ICD-10-CM

## 2018-01-08 RX ORDER — ACETYLCYSTEINE 200 MG/ML
SOLUTION ORAL; RESPIRATORY (INHALATION)
Qty: 360 ML | Refills: 11 | Status: SHIPPED | OUTPATIENT
Start: 2018-01-08 | End: 2019-01-09

## 2018-01-08 RX ORDER — CHOLECALCIFEROL (VITAMIN D3) 25 MCG
TABLET ORAL
Qty: 100 TABLET | Refills: 3 | Status: SHIPPED | OUTPATIENT
Start: 2018-01-08 | End: 2019-01-10

## 2018-01-12 ASSESSMENT — ENCOUNTER SYMPTOMS
WEIGHT GAIN: 0
CHILLS: 0
BOWEL INCONTINENCE: 0
INCREASED ENERGY: 0
FATIGUE: 0
RECTAL PAIN: 0
NIGHT SWEATS: 1
DIARRHEA: 0
JAUNDICE: 0
BLOOD IN STOOL: 0
WEIGHT LOSS: 0
NAUSEA: 0
HEARTBURN: 1
ABDOMINAL PAIN: 0
ALTERED TEMPERATURE REGULATION: 0
VOMITING: 0
BLOATING: 0
DECREASED APPETITE: 0
POLYPHAGIA: 0
POLYDIPSIA: 0
HALLUCINATIONS: 0
CONSTIPATION: 0
FEVER: 0

## 2018-01-17 ENCOUNTER — OFFICE VISIT (OUTPATIENT)
Dept: OTOLARYNGOLOGY | Facility: CLINIC | Age: 23
End: 2018-01-17
Payer: COMMERCIAL

## 2018-01-17 ENCOUNTER — ALLIED HEALTH/NURSE VISIT (OUTPATIENT)
Dept: CARE COORDINATION | Facility: CLINIC | Age: 23
End: 2018-01-17

## 2018-01-17 ENCOUNTER — OFFICE VISIT (OUTPATIENT)
Dept: DERMATOLOGY | Facility: CLINIC | Age: 23
End: 2018-01-17
Payer: MEDICAID

## 2018-01-17 ENCOUNTER — OFFICE VISIT (OUTPATIENT)
Dept: PULMONOLOGY | Facility: CLINIC | Age: 23
End: 2018-01-17
Attending: INTERNAL MEDICINE
Payer: COMMERCIAL

## 2018-01-17 VITALS
HEIGHT: 62 IN | OXYGEN SATURATION: 98 % | HEART RATE: 109 BPM | SYSTOLIC BLOOD PRESSURE: 114 MMHG | WEIGHT: 165.34 LBS | RESPIRATION RATE: 18 BRPM | BODY MASS INDEX: 30.43 KG/M2 | DIASTOLIC BLOOD PRESSURE: 68 MMHG

## 2018-01-17 VITALS — WEIGHT: 171 LBS | BODY MASS INDEX: 31.47 KG/M2 | HEIGHT: 62 IN

## 2018-01-17 DIAGNOSIS — L70.0 ACNE VULGARIS: ICD-10-CM

## 2018-01-17 DIAGNOSIS — Z71.9 ENCOUNTER FOR COUNSELING: Primary | ICD-10-CM

## 2018-01-17 DIAGNOSIS — E84.0 CYSTIC FIBROSIS WITH PULMONARY MANIFESTATIONS (H): ICD-10-CM

## 2018-01-17 DIAGNOSIS — L70.0 ACNE VULGARIS: Primary | ICD-10-CM

## 2018-01-17 DIAGNOSIS — E84.0 CYSTIC FIBROSIS WITH PULMONARY MANIFESTATIONS (H): Primary | ICD-10-CM

## 2018-01-17 DIAGNOSIS — J34.89 NASAL VESTIBULITIS: Primary | ICD-10-CM

## 2018-01-17 DIAGNOSIS — Z79.899 ON ISOTRETINOIN THERAPY: ICD-10-CM

## 2018-01-17 DIAGNOSIS — J32.0 CHRONIC MAXILLARY SINUSITIS: ICD-10-CM

## 2018-01-17 LAB
EXPTIME-PRE: 6.92 SEC
FEF2575-%PRED-PRE: 112 %
FEF2575-PRE: 4.08 L/SEC
FEF2575-PRED: 3.64 L/SEC
FEFMAX-%PRED-PRE: 127 %
FEFMAX-PRE: 8.42 L/SEC
FEFMAX-PRED: 6.62 L/SEC
FEV1-%PRED-PRE: 100 %
FEV1-PRE: 3.1 L
FEV1FEV6-PRE: 89 %
FEV1FEV6-PRED: 86 %
FEV1FVC-PRE: 89 %
FEV1FVC-PRED: 87 %
FIFMAX-PRE: 5.45 L/SEC
FVC-%PRED-PRE: 97 %
FVC-PRE: 3.48 L
FVC-PRED: 3.55 L
HCG UR QL: NEGATIVE

## 2018-01-17 PROCEDURE — 94375 RESPIRATORY FLOW VOLUME LOOP: CPT | Mod: ZF | Performed by: INTERNAL MEDICINE

## 2018-01-17 PROCEDURE — G0463 HOSPITAL OUTPT CLINIC VISIT: HCPCS | Mod: ZF

## 2018-01-17 RX ORDER — SULFAMETHOXAZOLE/TRIMETHOPRIM 800-160 MG
1 TABLET ORAL 2 TIMES DAILY
Qty: 28 TABLET | Refills: 0 | Status: SHIPPED | OUTPATIENT
Start: 2018-01-17 | End: 2019-05-28

## 2018-01-17 RX ORDER — ISOTRETINOIN 30 MG/1
1 CAPSULE ORAL 2 TIMES DAILY
Qty: 60 CAPSULE | Refills: 0 | Status: SHIPPED | OUTPATIENT
Start: 2018-01-17 | End: 2018-04-18

## 2018-01-17 RX ORDER — MEROPENEM 500 MG/1
INJECTION, POWDER, FOR SOLUTION INTRAVENOUS
Qty: 60 EACH | COMMUNITY
Start: 2018-01-17 | End: 2018-04-18

## 2018-01-17 RX ORDER — MUPIROCIN 20 MG/G
OINTMENT TOPICAL
Qty: 22 G | Refills: 0 | Status: SHIPPED | OUTPATIENT
Start: 2018-01-17 | End: 2018-04-18

## 2018-01-17 ASSESSMENT — ENCOUNTER SYMPTOMS
WEIGHT LOSS: 0
INSOMNIA: 0
NIGHT SWEATS: 1
BREAST PAIN: 0
HEARTBURN: 1
VOMITING: 0
SKIN CHANGES: 0
NECK PAIN: 0
MUSCLE CRAMPS: 0
HYPOTENSION: 0
EYE WATERING: 0
WHEEZING: 0
ABDOMINAL PAIN: 0
RECTAL PAIN: 0
DYSPNEA ON EXERTION: 0
LEG PAIN: 0
SYNCOPE: 0
POOR WOUND HEALING: 0
MEMORY LOSS: 0
JOINT SWELLING: 0
COUGH DISTURBING SLEEP: 0
TROUBLE SWALLOWING: 0
HEMOPTYSIS: 0
STIFFNESS: 0
BLOATING: 0
HALLUCINATIONS: 0
BLOOD IN STOOL: 0
CHILLS: 0
ARTHRALGIAS: 0
NAIL CHANGES: 0
DISTURBANCES IN COORDINATION: 0
BREAST MASS: 0
CONSTIPATION: 0
FLANK PAIN: 0
POLYPHAGIA: 0
ALTERED TEMPERATURE REGULATION: 0
TINGLING: 0
WEIGHT GAIN: 0
NAUSEA: 0
MYALGIAS: 0
PALPITATIONS: 0
COUGH: 0
DYSURIA: 0
FATIGUE: 0
HYPERTENSION: 0
ORTHOPNEA: 0
SINUS PAIN: 0
HOT FLASHES: 0
EYE IRRITATION: 0
PANIC: 0
RESPIRATORY PAIN: 0
SPUTUM PRODUCTION: 0
SHORTNESS OF BREATH: 0
LOSS OF CONSCIOUSNESS: 0
SWOLLEN GLANDS: 0
EXERCISE INTOLERANCE: 0
HOARSE VOICE: 0
DIFFICULTY URINATING: 0
BOWEL INCONTINENCE: 0
HEADACHES: 0
LEG SWELLING: 0
SNORES LOUDLY: 0
DECREASED CONCENTRATION: 0
TASTE DISTURBANCE: 0
DIARRHEA: 0
BACK PAIN: 0
DECREASED LIBIDO: 0
DECREASED APPETITE: 0
SEIZURES: 0
NUMBNESS: 0
SMELL DISTURBANCE: 0
EXTREMITY NUMBNESS: 0
SORE THROAT: 0
POLYDIPSIA: 0
CLAUDICATION: 0
EYE REDNESS: 0
TREMORS: 0
POSTURAL DYSPNEA: 0
INCREASED ENERGY: 0
DIZZINESS: 0
NERVOUS/ANXIOUS: 0
WEAKNESS: 0
PARALYSIS: 0
HEMATURIA: 0
DOUBLE VISION: 0
SPEECH CHANGE: 0
DEPRESSION: 0
SINUS CONGESTION: 0
LIGHT-HEADEDNESS: 0
SLEEP DISTURBANCES DUE TO BREATHING: 0
EYE PAIN: 0
BRUISES/BLEEDS EASILY: 0
JAUNDICE: 0
TACHYCARDIA: 0
FEVER: 0
NECK MASS: 0
MUSCLE WEAKNESS: 0

## 2018-01-17 ASSESSMENT — PAIN SCALES - GENERAL
PAINLEVEL: MILD PAIN (3)
PAINLEVEL: NO PAIN (0)
PAINLEVEL: NO PAIN (0)

## 2018-01-17 NOTE — LETTER
1/17/2018       RE: Mamie Rice  [address] [facility] [id number]     Dear Colleague,    Thank you for referring your patient, Mamie Rice, to the [facility] at [facility]. Please see a copy of my visit note below.    [facility]  January 17, 2018         Assessment and Plan:   Mamie Rice is a 22 year old female with cystic fibrosis.    1. CF lung disease with normal spirometry:  Mamie describes some mild changes in her symptomatology despite having stable pulmonary function.  She is asking for an antibiotic today given both her sinus and pulmonary symptoms.  I have recommended that she begin Bactrim 1 double strength b.i.d.  This should cover her MRSA and Achromobacter.  Mamie should continue to be aggressive with her bronchial drainage and nebulized therapies.    2.  Abnormal glucose tolerance.  Mamie describes good control of her blood sugars.  She has struggled a little bit with hypoglycemia with her recent illness but is able to manage it without difficulty.    3.  Cystic fibrosis sinus disease.  Mamie was seen by Dr. Haile today.  I defer to her for management.  She does describe some nose sores related to her use of Accutane and was prescribed Bactroban ointment by Dr. Haile.   4.  Acne.  Mamie is being seen by Dermatology and is currently on Accutane therapy.  She does describe some dry skin related to this.   5.  Psychosocial.  Mamie does continue to work several jobs and help out a fair amount with the care of her nephews.  Despite this, she feels that things are going well.  She is in a good mood and is able to maintain her self-cares.  I did discuss with her trying to be more consistent with her job in order to be more able to have a regular schedule.    6.  Weight management.  Mamie is currently on naltrexone for weight management.  She is being  followed in our [facility].  She is pleased with her weight loss.   7. Pancreatic Insufficiency:  The patient has no new symptoms consistent with worsening malabsorption.  The plan is to continue the present dose of pancreatic enzymes and vitamin supplementation.    Gavi Allison MD MPH   of Medicine  Pulmonary, Allergy, Critical Care and Sleep Medicine      Interval History:     Mamie does continue to produce sputum that is yellow-green in color.  Her cough frequency and sputum volume are slightly above baseline.  Her activity tolerance remains good.  She is performing 2 vest therapies per day.          Review of Systems:     All questionnaires were reviewed by me today with the patient.  Review of Systems     Constitutional:  Positive for night sweats and recent stressors. Negative for fever, chills, weight loss, weight gain, fatigue, decreased appetite, height loss, post-operative complications, incisional pain, hallucinations, increased energy, hyperactivity and confused.   HENT:  Negative for ear pain, hearing loss, tinnitus, nosebleeds, trouble swallowing, hoarse voice, mouth sores, sore throat, ear discharge, tooth pain, gum tenderness, taste disturbance, smell disturbance, hearing aid, bleeding gums, dry mouth, sinus pain, sinus congestion and neck mass.    Eyes:  Negative for double vision, pain, redness, eye pain, decreased vision, eye watering, eye bulging, eye dryness, flashing lights, spots, floaters, strabismus, tunnel vision, jaundice and eye irritation.   Respiratory:   Negative for cough, hemoptysis, sputum production, shortness of breath, wheezing, sleep disturbances due to breathing, snores loudly, respiratory pain, dyspnea on exertion, cough disturbing sleep and postural dyspnea.    Cardiovascular:  Negative for chest pain, dyspnea on exertion, palpitations, orthopnea, claudication, leg swelling, fingers/toes turn blue, hypertension, hypotension, syncope,  history of heart murmur, chest pain on exertion, chest pain at rest, pacemaker, few scattered varicosities, leg pain, sleep disturbances due to breathing, tachycardia, light-headedness, exercise intolerance and edema.   Gastrointestinal:  Positive for heartburn. Negative for nausea, vomiting, abdominal pain, diarrhea, constipation, blood in stool, melena, rectal pain, bloating, bowel incontinence, jaundice, coffee ground emesis and change in stool.   Genitourinary:  Negative for bladder incontinence, dysuria, urgency, hematuria, flank pain, vaginal discharge, difficulty urinating, genital sores, dyspareunia, decreased libido, nocturia, voiding less frequently, arousal difficulty, abnormal vaginal bleeding, excessive menstruation, menstrual changes, hot flashes, vaginal dryness and postmenopausal bleeding.   Musculoskeletal:  Negative for myalgias, back pain, joint swelling, arthralgias, stiffness, muscle cramps, neck pain, bone pain, muscle weakness and fracture.   Skin:  Negative for nail changes, itching, poor wound healing, rash, hair changes, skin changes, acne, warts, poor wound healing, scarring, flaky skin, Raynaud's phenomenon, sensitivity to sunlight and skin thickening.   Neurological:  Negative for dizziness, tingling, tremors, speech change, seizures, loss of consciousness, weakness, light-headedness, numbness, headaches, disturbances in coordination, extremity numbness, memory loss, difficulty walking and paralysis.   Endo/Heme:  Negative for anemia, swollen glands and bruises/bleeds easily.   Psychiatric/Behavioral:  Negative for depression, hallucinations, memory loss, decreased concentration, mood swings and panic attacks.    Breast:  Negative for breast discharge, breast mass, breast pain and nipple retraction.   Endocrine:  Negative for altered temperature regulation, polyphagia, polydipsia, unwanted hair growth and change in facial hair.            Past Medical and Surgical History:     Past  Medical History:   Diagnosis Date     ADHD (attention deficit hyperactivity disorder)      Anxiety      Aspergillosis, with pneumonia (H)     fugus found caused chest pain     Chronic infection     CF, MRSA.,      Chronic sinusitis      Constipation, chronic      Cystic fibrosis with pulmonary manifestations (H) 12/19/2011     Cystic fibrosis without mention of meconium ileus     SWEAT TEST:Date: 2/17/1994   [facility]  296 mg, 104 mmol/L ClSample #2  295 mg, 104 mmol/L Cl GENOTYPING:Date: 10/15/2007,  Laboratory: [facility]: [id number]     Depressive disorder      Diabetes     no meds currently     Dysthymic disorder      Exocrine pancreatic insufficiency      Gastro-oesophageal reflux disease      Hip pain, right      MRSA (methicillin resistant Staphylococcus aureus) carrier      Pancreatic disease      Past Surgical History:   Procedure Laterality Date     ARTHROSCOPY HIP, OSTEOPLASTY FEMUR PROXIMAL, COMBINED  3/11/2013    Procedure: COMBINED ARTHROSCOPY HIP, OSTEOPLASTY FEMUR PROXIMAL;  Right Hip Arthroscopy, Labral  Debridement.    surgeon request choice anesthesia/admit to [facility] after surgery;  Surgeon: Omkar Austin MD;  Location: [facility] OR     ARTHROSCOPY KNEE WITH MEDIAL MENISCECTOMY Left 1/31/2017    Procedure: ARTHROSCOPY KNEE WITH MEDIAL MENISCECTOMY;  Surgeon: Jethro Coyle MD;  Location: [facility] OR     bronchoscopies       BRONCHOSCOPY       EXAM UNDER ANESTHESIA ANUS N/A 5/10/2016    Procedure: EXAM UNDER ANESTHESIA ANUS;  Surgeon: Chet Gaviria MD;  [facility]     EXAM UNDER ANESTHESIA, RESTORATIONS, EXTRACTION(S) DENTAL COMPLEX, COMBINED  5/13/2013    Procedure: COMBINED EXAM UNDER ANESTHESIA, RESTORATIONS, EXTRACTION(S) DENTAL COMPLEX;  Dental Exam, Radiographs, Restorations. Single Extraction  Tooth #2. Restorations x 3;  Surgeon: Danilo Ortiz DDS;  Location: [facility] OR     HC KNEE SCOPE, DIAGNOSTIC      Arthroscopy, Knee- left     INJECT  BOTOX N/A 5/10/2016    Procedure: INJECT BOTOX;  Surgeon: Chet Gaviria MD;  [facility]     left hip labral tear  5/11/2011    left hip arthroscopy with labral debridement and synovectomy     meniscus repair       OPTICAL TRACKING SYSTEM ENDOSCOPIC SINUS SURGERY  10/14/2011    Procedure:OPTICAL TRACKING SYSTEM ENDOSCOPIC SINUS SURGERY; FESS (functional endoscopic sinus surgery) with Image Guidance, bronchial lavage and cultures; Surgeon:GOYO KUO; Location:[facility] OR     OPTICAL TRACKING SYSTEM ENDOSCOPIC SINUS SURGERY  5/18/2012    Procedure:OPTICAL TRACKING SYSTEM ENDOSCOPIC SINUS SURGERY; Right  and Left Image Guided Functional Endoscopic Sinus Surgery With  Frontal Approach, [employer]; Surgeon:GOYO KUO; Location:[facility] OR     OPTICAL TRACKING SYSTEM ENDOSCOPIC SINUS SURGERY  9/26/2012    Procedure: OPTICAL TRACKING SYSTEM ENDOSCOPIC SINUS SURGERY;  Stealth Guided Bilateral Functional Endoscopic Sinus Surgery *Latex Safe*;  Surgeon: Goyo Kuo MD;  [facility]     OPTICAL TRACKING SYSTEM ENDOSCOPIC SINUS SURGERY Bilateral 10/16/2015    Procedure: OPTICAL TRACKING SYSTEM ENDOSCOPIC SINUS SURGERY;  Surgeon: Mariela Haile MD;  [facility]     ORTHOPEDIC SURGERY      left hip tear repair 2010     SINUS SURGERY             Family History:     Family History   Problem Relation Age of Onset     CANCER Paternal Grandmother      Skin Cancer Paternal Grandmother      Other Cancer Paternal Grandmother      Skin     Obesity Paternal Grandmother      CANCER Paternal Grandfather      PGF had throat cancer (he was a smoker)     Other Cancer Paternal Grandfather      Anxiety Disorder Paternal Grandfather      Thyroid Disease Mother      ,     Obesity Other      Anesthesia Reaction No family hx of      Blood Disease No family hx of      Colon Polyps No family hx of      Crohn Disease No family hx of      Ulcerative Colitis No family hx of      Colon Cancer No family hx of      Melanoma No family hx  of             Social History:     Social History     Social History     Marital status: Single     Spouse name: N/A     Number of children: N/A     Years of education: N/A     Occupational History     Not on file.     Social History Main Topics     Smoking status: Never Smoker     Smokeless tobacco: Never Used      Comment: one person at home smokes outside     Alcohol use No     Drug use: No     Sexual activity: No     Other Topics Concern     Blood Transfusions No     Exercise Yes     Social History Narrative    Mamie lives with mother in [place], [place].  There is a cat in the home, but Mamie does not have any litterbox duties.  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  She gets essentially no exercise because of the tingling in her feet (says it bothers her even to stand).        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        8/2015 no change in social situation        2/15/2016 Pt is single and lives with mother and stepfather.            Medications:     Current Outpatient Prescriptions   Medication     mupirocin (BACTROBAN) 2 % ointment     sulfamethoxazole-trimethoprim (BACTRIM DS/SEPTRA DS) 800-160 MG per tablet     VITAMIN D3 1000 UNITS tablet     acetylcysteine (MUCOMYST) 20 % nebulizer solution     montelukast (SINGULAIR) 10 MG tablet     cetirizine (ZYRTEC) 10 MG tablet     naltrexone (DEPADE;REVIA) 50 MG tablet     albuterol (2.5 MG/3ML) 0.083% neb solution     omeprazole (PRILOSEC) 20 MG CR capsule     vitamin E 400 UNIT capsule     azithromycin (ZITHROMAX) 500 MG tablet     ascorbic acid (VITAMIN C) 500 MG tablet     beclomethasone (QVAR) 80 MCG/ACT Inhaler     MEPHYTON 5 MG tablet     hydrOXYzine (ATARAX) 25 MG tablet     acetaminophen (TYLENOL) 325 MG tablet     MedroxyPROGESTERone Acetate (DEPO-PROVERA IM)     buPROPion (WELLBUTRIN SR) 150 MG 12 hr tablet     beta carotene 47309 UNIT capsule     methylcellulose,  "laxative, (CITRUCEL) POWD     GLYCOPYRROLATE PO     TRAZODONE HCL PO     blood glucose (ACCU-CHEK SMARTVIEW) test strip     albuterol (PROAIR HFA, PROVENTIL HFA, VENTOLIN HFA) 108 (90 BASE) MCG/ACT inhaler     meropenem (MERREM) 500 MG injection     FLUoxetine (PROZAC) 40 MG capsule     Amphetamine-Dextroamphetamine (ADDERALL PO)     Multiple Vitamin (MULTIVITAMIN OR)     ISOtretinoin 30 MG CAPS     meropenem (MERREM) 500 MG vial     No current facility-administered medications for this visit.      Facility-Administered Medications Ordered in Other Visits   Medication     albuterol (PROAIR HFA, PROVENTIL HFA, VENTOLIN HFA) inhaler            Physical Exam:   /68  Pulse 109  Resp 18  Ht 1.575 m (5' 2\")  Wt 75 kg (165 lb 5.5 oz)  SpO2 98%  BMI 30.24 kg/m2    Constitutional:   Awake, alert and in no apparent distress     Eyes:   nonicteric     ENT:   oral mucosa moist without lesions, normal tm bilaterally, bilateral mucosal sores     Neck:   Supple without supraclavicular or cervical lymphadenopathy     Lungs:   Good air flow.  No crackles. No rhonchi.  No wheezes.     Cardiovascular:   Normal S1 and S2.  RRR.  No murmur, gallop or rub.     Abdomen:   NABS, soft, nontender, nondistended.       Musculoskeletal:   No edema, digital clubbing present     Neurologic:   Alert and conversant.     Skin:   Warm, dry.  No rash on limited exam.             Data:   All laboratory and imaging data reviewed.    Cystic Fibrosis Culture  Specimen Description   Date Value Ref Range Status   11/01/2017 Sputum  Final   08/02/2017 Sputum  Final   08/02/2017 Sputum  Final   08/02/2017 Sputum  Final    Culture Micro   Date Value Ref Range Status   11/01/2017 Light growth  Normal roberto carlos    Final   11/01/2017 (A)  Final    Moderate growth  Staphylococcus aureus  This isolate is presumed to be clindamycin resistant based on detection of inducible   clindamycin resistance. Erythromycin and clindamycin are resistant, therefore, they " are   not recommended for use.     08/02/2017 (A)  Final    Moderate growth Normal roberto carlos  Heavy growth Staphylococcus aureus This isolate is presumed to be clindamycin   resistant based on detection of inducible clindamycin resistance. Erythromycin   and clindamycin are resistant, therefore, they are not recommended for use.  Moderate growth Strain 2 Staphylococcus aureus Organism failed to thrive for   susceptibility testing  Light growth Achromobacter xylosoxidans/denitrificans Identification obtained by   MALDI-TOF mass spectrometry research use only database. Test characteristics   determined and verified by the Infectious Diseases Diagnostic Laboratory ([facility])   [place], [place].          Recent Results (from the past 168 hour(s))   General PFT Lab (Please always keep checked)    Collection Time: 01/17/18  8:01 AM   Result Value Ref Range    FVC-Pred 3.55 L    FVC-Pre 3.48 L    FVC-%Pred-Pre 97 %    FEV1-Pre 3.10 L    FEV1-%Pred-Pre 100 %    FEV1FVC-Pred 87 %    FEV1FVC-Pre 89 %    FEFMax-Pred 6.62 L/sec    FEFMax-Pre 8.42 L/sec    FEFMax-%Pred-Pre 127 %    FEF2575-Pred 3.64 L/sec    FEF2575-Pre 4.08 L/sec    UGC4950-%Pred-Pre 112 %    ExpTime-Pre 6.92 sec    FIFMax-Pre 5.45 L/sec    FEV1FEV6-Pred 86 %    FEV1FEV6-Pre 89 %   HCG qualitative urine    Collection Time: 01/17/18  9:15 AM   Result Value Ref Range    HCG Qual Urine Negative NEG^Negative       PFT:  The spirometry is normal.  When compared to 11/1/17, the FEV1 and FVC have little change.     Again, thank you for allowing me to participate in the care of your patient.      Sincerely,    Gavi Allison MD

## 2018-01-17 NOTE — LETTER
1/17/2018       RE: Mamie Rice  [address] [place] [id number]     Dear Colleague,    Thank you for referring your patient, Mamie Rice, to the [facility] DERMATOLOGY at [facility]. Please see a copy of my visit note below.    [facility] Dermatology Note    Dermatology Problem List:  1. Acne vulgaris  - s/p BPO facial wash (stopped due to skin irritation), clindamycin 1% lotion, adapalene 0.1% cream, spironolactone 50 mg bid  - on accutane, I-pledge#: [id number]  2. Cheilitis  - mupirocin 2% ointment  3. CF  4. DM Type II    Encounter Date: Jan 17, 2018    CC:   Chief Complaint   Patient presents with     Derm Problem     Acne. Accutane. Notes improvement. She notes that her skin is very dry.     History of Present Illness:  This 22 year old female presents as a follow up for acne. The patient was last seen on 12/18/17 when she continued accutane at 60 mg. Today the patient reports that her skin is doing great. She notes that she just has very dry skin. She has been getting some nose bleeds. The patient denies any irritated dry eyes, headaches with blurred vision, muscle or joint aches, changes in bowel habits, depressive thoughts, sharing medication, or donating blood.    She also reports that she has been waking up in the morning with a lot of sweat on her pajama bottoms. She states that it started a few months ago and is wondering if this is correlated with the accutane medication. This use to happen every once in awhile, but now it has been happening every morning. The patient reports no other lesions of concern at this time.     Otherwise, the patient reports no painful, bleeding, nonhealing, or pruritic lesions, and denies new or changing moles.     Past Medical History:   Patient Active Problem List   Diagnosis     Hip joint pain     Aspergillosis (H)     Chronic maxillary sinusitis     Hypoglycemia     Type 1 diabetes mellitus (H)      Cystic fibrosis with pulmonary manifestations (H)     Chronic constipation     Frontal sinusitis     Major depression     ADHD (attention deficit hyperactivity disorder)     Back pain     Pancreatic insufficiency     Non morbid obesity due to excess calories     Acne vulgaris     Cystic fibrosis (H)     Insomnia     Major depressive disorder with single episode     Past Medical History:   Diagnosis Date     ADHD (attention deficit hyperactivity disorder)      Anxiety      Aspergillosis, with pneumonia (H)     fugus found caused chest pain     Chronic infection     CF, MRSA.,      Chronic sinusitis      Constipation, chronic      Cystic fibrosis with pulmonary manifestations (H) 12/19/2011     Cystic fibrosis without mention of meconium ileus     SWEAT TEST:Date: 2/17/1994   [facility]  296 mg, 104 mmol/L ClSample #2  295 mg, 104 mmol/L Cl GENOTYPING:Date: 10/15/2007,  Laboratory: [facility]: [id number]     Depressive disorder      Diabetes     no meds currently     Dysthymic disorder      Exocrine pancreatic insufficiency      Gastro-oesophageal reflux disease      Hip pain, right      MRSA (methicillin resistant Staphylococcus aureus) carrier      Pancreatic disease      Past Surgical History:   Procedure Laterality Date     ARTHROSCOPY HIP, OSTEOPLASTY FEMUR PROXIMAL, COMBINED  3/11/2013    Procedure: COMBINED ARTHROSCOPY HIP, OSTEOPLASTY FEMUR PROXIMAL;  Right Hip Arthroscopy, Labral  Debridement.    surgeon request choice anesthesia/admit to [facility] after surgery;  Surgeon: Omkar Austin MD;  Location: [facility] OR     ARTHROSCOPY KNEE WITH MEDIAL MENISCECTOMY Left 1/31/2017    Procedure: ARTHROSCOPY KNEE WITH MEDIAL MENISCECTOMY;  Surgeon: Jethro Coyle MD;  Location: [facility] OR     bronchoscopies       BRONCHOSCOPY       EXAM UNDER ANESTHESIA ANUS N/A 5/10/2016    Procedure: EXAM UNDER ANESTHESIA ANUS;  Surgeon: Chet Gaviria MD;  [facility]     EXAM UNDER  ANESTHESIA, RESTORATIONS, EXTRACTION(S) DENTAL COMPLEX, COMBINED  5/13/2013    Procedure: COMBINED EXAM UNDER ANESTHESIA, RESTORATIONS, EXTRACTION(S) DENTAL COMPLEX;  Dental Exam, Radiographs, Restorations. Single Extraction  Tooth #2. Restorations x 3;  Surgeon: Danilo Ortiz DDS;  Location: [facility] OR     HC KNEE SCOPE, DIAGNOSTIC      Arthroscopy, Knee- left     INJECT BOTOX N/A 5/10/2016    Procedure: INJECT BOTOX;  Surgeon: Chet Gaviria MD;  [facility]     left hip labral tear  5/11/2011    left hip arthroscopy with labral debridement and synovectomy     meniscus repair       OPTICAL TRACKING SYSTEM ENDOSCOPIC SINUS SURGERY  10/14/2011    Procedure:OPTICAL TRACKING SYSTEM ENDOSCOPIC SINUS SURGERY; FESS (functional endoscopic sinus surgery) with Image Guidance, bronchial lavage and cultures; Surgeon:GOYO KUO; Location:[facility] OR     OPTICAL TRACKING SYSTEM ENDOSCOPIC SINUS SURGERY  5/18/2012    Procedure:OPTICAL TRACKING SYSTEM ENDOSCOPIC SINUS SURGERY; Right  and Left Image Guided Functional Endoscopic Sinus Surgery With  Frontal Approach, [place]; Surgeon:GOYO KUO; Location[facility] OR     OPTICAL TRACKING SYSTEM ENDOSCOPIC SINUS SURGERY  9/26/2012    Procedure: OPTICAL TRACKING SYSTEM ENDOSCOPIC SINUS SURGERY;  Stealth Guided Bilateral Functional Endoscopic Sinus Surgery *Latex Safe*;  Surgeon: Goyo Kuo MD;  [facility]     OPTICAL TRACKING SYSTEM ENDOSCOPIC SINUS SURGERY Bilateral 10/16/2015    Procedure: OPTICAL TRACKING SYSTEM ENDOSCOPIC SINUS SURGERY;  Surgeon: Mariela Haile MD;  [facility]     ORTHOPEDIC SURGERY      left hip tear repair 2010     SINUS SURGERY       Social History:  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 Former use of tanning beds (high school).     Family History:  There is a family history of presumed melanoma in the patient's grandmother.    Medications:  Current Outpatient Prescriptions   Medication Sig Dispense Refill      mupirocin (BACTROBAN) 2 % ointment Use in nose twice daily for 5 days 22 g 0     sulfamethoxazole-trimethoprim (BACTRIM DS/SEPTRA DS) 800-160 MG per tablet Take 1 tablet by mouth 2 times daily for 14 days 28 tablet 0     VITAMIN D3 1000 UNITS tablet TAKE 1 TABLET BY MOUTH EVERY  tablet 3     acetylcysteine (MUCOMYST) 20 % nebulizer solution INHALE ONE 4ML NEB INTO LUNGS VIA NEBULIZER TWO TIMES A DAY, MAY INCREASE TO 3-4 TIMES A DAY WITH INCREASE COUGH/COLD SYMPTOMS 360 mL 11     ISOtretinoin 30 MG CAPS Take 1 capsule by mouth 2 times daily With meals. 60 capsule 0     montelukast (SINGULAIR) 10 MG tablet TAKE 1 TABLET BY MOUTH DAILY AT BEDTIME 30 tablet 11     cetirizine (ZYRTEC) 10 MG tablet Take 1 tablet (10 mg) by mouth every evening 30 tablet 3     naltrexone (DEPADE;REVIA) 50 MG tablet Take 1/2 Tablet daily then increase to maximum of 1 Full Tablet daily as tolerated.  Time it one to two hours prior to worst cravings 30 tablet 3     albuterol (2.5 MG/3ML) 0.083% neb solution INHALE 1 NEB VIA NEBULIZER FOUR TIMES A  mL 11     omeprazole (PRILOSEC) 20 MG CR capsule TAKE 1 CAPSULE BY MOUTH TWICE A DAY 60 capsule 11     vitamin E 400 UNIT capsule TAKE 1 CAPSULE BY MOUTH TWICE A DAY 60 capsule 11     azithromycin (ZITHROMAX) 500 MG tablet TAKE ONE TABLET BY MOUTH ON MONDAY, WEDNESDAY AND FRIDAY 36 tablet 4     ascorbic acid (VITAMIN C) 500 MG tablet TAKE ONE TABLET BY MOUTH TWICE A  tablet 3     beclomethasone (QVAR) 80 MCG/ACT Inhaler Inhale 1 puff into the lungs 2 times daily 1 Inhaler 3     MEPHYTON 5 MG tablet TAKE 1 TABLET BY MOUTH ONCE A WEEK 4 tablet 11     hydrOXYzine (ATARAX) 25 MG tablet Take 1 tablet (25 mg) by mouth every 6 hours as needed for itching (and nausea) 30 tablet 0     acetaminophen (TYLENOL) 325 MG tablet Take 2 tablets (650 mg) by mouth every 4 hours as needed for other (mild pain) 100 tablet 0     MedroxyPROGESTERone Acetate (DEPO-PROVERA IM)        buPROPion  (WELLBUTRIN SR) 150 MG 12 hr tablet Take 150 mg by mouth 2 times daily       beta carotene 44615 UNIT capsule Take 1 capsule (25,000 Units) by mouth Every Mon, Wed, Fri Morning 36 capsule 4     methylcellulose, laxative, (CITRUCEL) POWD Start with 1 heaping tablespoon. Increase as needed, 1 heaping tablespoon at a time, up to 3 times per day. 479 g 3     GLYCOPYRROLATE PO Take 1 mg by mouth 2 times daily        TRAZODONE HCL PO Take 100 mg by mouth At Bedtime        blood glucose (ACCU-CHEK SMARTVIEW) test strip Use to test blood sugar 4 times daily or as directed. 1 Month 12     albuterol (PROAIR HFA, PROVENTIL HFA, VENTOLIN HFA) 108 (90 BASE) MCG/ACT inhaler Inhale 2 puffs into the lungs every 6 hours as needed for shortness of breath / dyspnea or wheezing 1 Inhaler 12     meropenem (MERREM) 500 MG injection 500 mg by Nasal Instillation route as needed  4 mL 0     FLUoxetine (PROZAC) 40 MG capsule Take 80 mg by mouth daily.       Amphetamine-Dextroamphetamine (ADDERALL PO) Take 20 mg by mouth daily.       Multiple Vitamin (MULTIVITAMIN OR) Take 1 tablet by mouth daily.       Allergies   Allergen Reactions     Vancomycin Hives     Redmens skin rash      Review of Systems:  - As per HPI  - No headaches with vision changes  - No muscle aches or joint aches  - No stomach upsets, constipation, or diarrhea   - No depressive thoughts or significant mood changes    Physical exam:  There were no vitals taken for this visit.  GEN: This is a well developed, well-nourished female in no acute distress, in a pleasant mood.      SKIN: Acne exam, which includes the face, neck, upper central chest, and upper central back was performed.  - Resolving excoriated papule on right nasal ala  - No other lesions of concern on areas examined.     Impression/Plan:  1. Acne vulgaris, acne excoriee - Resistant to hormonal therapy, appropriate oral and topical antibiotics and well as topical retinoids.  - Will address excess sweating at 3  month follow up. Discussion that this is not a side effect of the accutane medication.     - Discussion of the risks and side effects of Accutane including but not limited to mucocutaneous dryness, arthralgias, myalgias, depression, suicidal ideation, headache, blurred vision, increase in liver function tests, increase in lipids, and teratogenic effects. Discussed need for two forms of contraception. The ipledgeprogram brochure was provided and the contents discussed with the patient. The patient was counseled they cannot give blood while on Accutane. No personal or family history of inflammatory bowel disease or hypertriglyceridemia known to patient. Reviewed need to avoid alcohol on medication. Ipledge program consent was obtained.   - At this visit, continue Accutane 60 mg daily for the last month. Goal dose is 12,300 to 18,040 mg for 150-220 mg/kg dosing in this 82 kg patient. Pt has lost weight, monitored by her providers, she is 75 kg now. (11,250 mg-16,500mg)   Recommend to take medication with food containing fat. The patients two forms of contraception are Depo and male latex condoms.  - Labs are no longer needed. Pregnancy test  today, negative, and again in 30 days. She has her CBC and CMP checked regularly through her CF MD. Continue to hold beta carotene. Continue good moisturization.     - Total cumulative dose 10,200 mg. Patient's I-pledge # is [id number]. The patient will stop all acne medications, when she starts the medication. Plan on 2 more months.    2. Hx of Cheilitis  - Continue using lip moisturization.   - Continue mupirocin 2% ointment - apply to cracks on sides of lips, if they occur.     3. Melasma  - Continue good moisturization with sun screen.    4. Hyperhidrosis, groin. Package insert for isotretinoin indicate potential for increased sweating. Will recheck sx after discontinuing medication.     Follow-up in 3 months, earlier for new or changing lesions.     Staff Involved:  Juan Pablo  Disclosure:   I, Lupe Alcantara, am serving as a scribe to document services personally performed by Paige Reid PA-C, based on data collection and the provider's statements to me.    Provider Disclosure:   The documentation recorded by the scribe accurately reflects the services I personally performed and the decisions made by me.    All risks, benefits and alternatives were discussed with patient.  Patient is in agreement and understands the assessment and plan.  All questions were answered.  Sun Screen Education was given.   Return to Clinic in 3 months or sooner as needed.   Paige Reid PA-C   [facility]

## 2018-01-17 NOTE — PROGRESS NOTES
[facility] Dermatology Note    Dermatology Problem List:  1. Acne vulgaris  - s/p BPO facial wash (stopped due to skin irritation), clindamycin 1% lotion, adapalene 0.1% cream, spironolactone 50 mg bid  - on accutane, I-pledge#: [id number]  2. Cheilitis  - mupirocin 2% ointment  3. CF  4. DM Type II    Encounter Date: Jan 17, 2018    CC:   Chief Complaint   Patient presents with     Derm Problem     Acne. Accutane. Notes improvement. She notes that her skin is very dry.     History of Present Illness:  This 22 year old female presents as a follow up for acne. The patient was last seen on 12/18/17 when she continued accutane at 60 mg. Today the patient reports that her skin is doing great. She notes that she just has very dry skin. She has been getting some nose bleeds. The patient denies any irritated dry eyes, headaches with blurred vision, muscle or joint aches, changes in bowel habits, depressive thoughts, sharing medication, or donating blood.    She also reports that she has been waking up in the morning with a lot of sweat on her pajama bottoms. She states that it started a few months ago and is wondering if this is correlated with the accutane medication. This use to happen every once in awhile, but now it has been happening every morning. The patient reports no other lesions of concern at this time.     Otherwise, the patient reports no painful, bleeding, nonhealing, or pruritic lesions, and denies new or changing moles.     Past Medical History:   Patient Active Problem List   Diagnosis     Hip joint pain     Aspergillosis (H)     Chronic maxillary sinusitis     Hypoglycemia     Type 1 diabetes mellitus (H)     Cystic fibrosis with pulmonary manifestations (H)     Chronic constipation     Frontal sinusitis     Major depression     ADHD (attention deficit hyperactivity disorder)     Back pain     Pancreatic insufficiency     Non morbid obesity due to excess calories     Acne vulgaris      Cystic fibrosis (H)     Insomnia     Major depressive disorder with single episode     Past Medical History:   Diagnosis Date     ADHD (attention deficit hyperactivity disorder)      Anxiety      Aspergillosis, with pneumonia (H)     fugus found caused chest pain     Chronic infection     CF, MRSA.,      Chronic sinusitis      Constipation, chronic      Cystic fibrosis with pulmonary manifestations (H) 12/19/2011     Cystic fibrosis without mention of meconium ileus     SWEAT TEST:Date: 2/17/1994   [facility]  296 mg, 104 mmol/L ClSample #2  295 mg, 104 mmol/L Cl GENOTYPING:Date: 10/15/2007,  Laboratory: [facility]: [id number]     Depressive disorder      Diabetes     no meds currently     Dysthymic disorder      Exocrine pancreatic insufficiency      Gastro-oesophageal reflux disease      Hip pain, right      MRSA (methicillin resistant Staphylococcus aureus) carrier      Pancreatic disease      Past Surgical History:   Procedure Laterality Date     ARTHROSCOPY HIP, OSTEOPLASTY FEMUR PROXIMAL, COMBINED  3/11/2013    Procedure: COMBINED ARTHROSCOPY HIP, OSTEOPLASTY FEMUR PROXIMAL;  Right Hip Arthroscopy, Labral  Debridement.    surgeon request choice anesthesia/admit to [facility] after surgery;  Surgeon: Omkar Austin MD;  [facility]: [facility]     ARTHROSCOPY KNEE WITH MEDIAL MENISCECTOMY Left 1/31/2017    Procedure: ARTHROSCOPY KNEE WITH MEDIAL MENISCECTOMY;  Surgeon: Jetrho Coyle MD;  [facility]: [facility] OR     bronchoscopies       BRONCHOSCOPY       EXAM UNDER ANESTHESIA ANUS N/A 5/10/2016    Procedure: EXAM UNDER ANESTHESIA ANUS;  Surgeon: Chet Gaviria MD;  [facility]     EXAM UNDER ANESTHESIA, RESTORATIONS, EXTRACTION(S) DENTAL COMPLEX, COMBINED  5/13/2013    Procedure: COMBINED EXAM UNDER ANESTHESIA, RESTORATIONS, EXTRACTION(S) DENTAL COMPLEX;  Dental Exam, Radiographs, Restorations. Single Extraction  Tooth #2. Restorations x 3;  Surgeon: Danilo Ortiz  DDS;  [facility] OR     HC KNEE SCOPE, DIAGNOSTIC      Arthroscopy, Knee- left     INJECT BOTOX N/A 5/10/2016    Procedure: INJECT BOTOX;  Surgeon: Chet Gaviria MD;  [facility]     left hip labral tear  5/11/2011    left hip arthroscopy with labral debridement and synovectomy     meniscus repair       OPTICAL TRACKING SYSTEM ENDOSCOPIC SINUS SURGERY  10/14/2011    Procedure:OPTICAL TRACKING SYSTEM ENDOSCOPIC SINUS SURGERY; FESS (functional endoscopic sinus surgery) with Image Guidance, bronchial lavage and cultures; Surgeon:GOYO KUO; [facility] OR     OPTICAL TRACKING SYSTEM ENDOSCOPIC SINUS SURGERY  5/18/2012    Procedure:OPTICAL TRACKING SYSTEM ENDOSCOPIC SINUS SURGERY; Right  and Left Image Guided Functional Endoscopic Sinus Surgery With  Frontal Approach, [employer]; Surgeon:GOYO KUO; [facility] OR     OPTICAL TRACKING SYSTEM ENDOSCOPIC SINUS SURGERY  9/26/2012    Procedure: OPTICAL TRACKING SYSTEM ENDOSCOPIC SINUS SURGERY;  Stealth Guided Bilateral Functional Endoscopic Sinus Surgery *Latex Safe*;  Surgeon: Goyo Kuo MD;  [facility]     OPTICAL TRACKING SYSTEM ENDOSCOPIC SINUS SURGERY Bilateral 10/16/2015    Procedure: OPTICAL TRACKING SYSTEM ENDOSCOPIC SINUS SURGERY;  Surgeon: Mariela Haile MD;  [facility]     ORTHOPEDIC SURGERY      left hip tear repair 2010     SINUS SURGERY       Social History:  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 Former use of tanning beds (high school).     Family History:  There is a family history of presumed melanoma in the patient's grandmother.    Medications:  Current Outpatient Prescriptions   Medication Sig Dispense Refill     mupirocin (BACTROBAN) 2 % ointment Use in nose twice daily for 5 days 22 g 0     sulfamethoxazole-trimethoprim (BACTRIM DS/SEPTRA DS) 800-160 MG per tablet Take 1 tablet by mouth 2 times daily for 14 days 28 tablet 0     VITAMIN D3 1000 UNITS tablet TAKE 1 TABLET BY MOUTH EVERY   tablet 3     acetylcysteine (MUCOMYST) 20 % nebulizer solution INHALE ONE 4ML NEB INTO LUNGS VIA NEBULIZER TWO TIMES A DAY, MAY INCREASE TO 3-4 TIMES A DAY WITH INCREASE COUGH/COLD SYMPTOMS 360 mL 11     ISOtretinoin 30 MG CAPS Take 1 capsule by mouth 2 times daily With meals. 60 capsule 0     montelukast (SINGULAIR) 10 MG tablet TAKE 1 TABLET BY MOUTH DAILY AT BEDTIME 30 tablet 11     cetirizine (ZYRTEC) 10 MG tablet Take 1 tablet (10 mg) by mouth every evening 30 tablet 3     naltrexone (DEPADE;REVIA) 50 MG tablet Take 1/2 Tablet daily then increase to maximum of 1 Full Tablet daily as tolerated.  Time it one to two hours prior to worst cravings 30 tablet 3     albuterol (2.5 MG/3ML) 0.083% neb solution INHALE 1 NEB VIA NEBULIZER FOUR TIMES A  mL 11     omeprazole (PRILOSEC) 20 MG CR capsule TAKE 1 CAPSULE BY MOUTH TWICE A DAY 60 capsule 11     vitamin E 400 UNIT capsule TAKE 1 CAPSULE BY MOUTH TWICE A DAY 60 capsule 11     azithromycin (ZITHROMAX) 500 MG tablet TAKE ONE TABLET BY MOUTH ON MONDAY, WEDNESDAY AND FRIDAY 36 tablet 4     ascorbic acid (VITAMIN C) 500 MG tablet TAKE ONE TABLET BY MOUTH TWICE A  tablet 3     beclomethasone (QVAR) 80 MCG/ACT Inhaler Inhale 1 puff into the lungs 2 times daily 1 Inhaler 3     MEPHYTON 5 MG tablet TAKE 1 TABLET BY MOUTH ONCE A WEEK 4 tablet 11     hydrOXYzine (ATARAX) 25 MG tablet Take 1 tablet (25 mg) by mouth every 6 hours as needed for itching (and nausea) 30 tablet 0     acetaminophen (TYLENOL) 325 MG tablet Take 2 tablets (650 mg) by mouth every 4 hours as needed for other (mild pain) 100 tablet 0     MedroxyPROGESTERone Acetate (DEPO-PROVERA IM)        buPROPion (WELLBUTRIN SR) 150 MG 12 hr tablet Take 150 mg by mouth 2 times daily       beta carotene 76819 UNIT capsule Take 1 capsule (25,000 Units) by mouth Every Mon, Wed, Fri Morning 36 capsule 4     methylcellulose, laxative, (CITRUCEL) POWD Start with 1 heaping tablespoon. Increase as needed,  1 heaping tablespoon at a time, up to 3 times per day. 479 g 3     GLYCOPYRROLATE PO Take 1 mg by mouth 2 times daily        TRAZODONE HCL PO Take 100 mg by mouth At Bedtime        blood glucose (ACCU-CHEK SMARTVIEW) test strip Use to test blood sugar 4 times daily or as directed. 1 Month 12     albuterol (PROAIR HFA, PROVENTIL HFA, VENTOLIN HFA) 108 (90 BASE) MCG/ACT inhaler Inhale 2 puffs into the lungs every 6 hours as needed for shortness of breath / dyspnea or wheezing 1 Inhaler 12     meropenem (MERREM) 500 MG injection 500 mg by Nasal Instillation route as needed  4 mL 0     FLUoxetine (PROZAC) 40 MG capsule Take 80 mg by mouth daily.       Amphetamine-Dextroamphetamine (ADDERALL PO) Take 20 mg by mouth daily.       Multiple Vitamin (MULTIVITAMIN OR) Take 1 tablet by mouth daily.       Allergies   Allergen Reactions     Vancomycin Hives     Redmens skin rash      Review of Systems:  - As per HPI  - No headaches with vision changes  - No muscle aches or joint aches  - No stomach upsets, constipation, or diarrhea   - No depressive thoughts or significant mood changes    Physical exam:  There were no vitals taken for this visit.  GEN: This is a well developed, well-nourished female in no acute distress, in a pleasant mood.      SKIN: Acne exam, which includes the face, neck, upper central chest, and upper central back was performed.  - Resolving excoriated papule on right nasal ala  - No other lesions of concern on areas examined.     Impression/Plan:  1. Acne vulgaris, acne excoriee - Resistant to hormonal therapy, appropriate oral and topical antibiotics and well as topical retinoids.  - Will address excess sweating at 3 month follow up. Discussion that this is not a side effect of the accutane medication.     - Discussion of the risks and side effects of Accutane including but not limited to mucocutaneous dryness, arthralgias, myalgias, depression, suicidal ideation, headache, blurred vision, increase in  liver function tests, increase in lipids, and teratogenic effects. Discussed need for two forms of contraception. The ipledgeprogram brochure was provided and the contents discussed with the patient. The patient was counseled they cannot give blood while on Accutane. No personal or family history of inflammatory bowel disease or hypertriglyceridemia known to patient. Reviewed need to avoid alcohol on medication. Ipledge program consent was obtained.   - At this visit, continue Accutane 60 mg daily for the last month. Goal dose is 12,300 to 18,040 mg for 150-220 mg/kg dosing in this 82 kg patient. Pt has lost weight, monitored by her providers, she is 75 kg now. (11,250 mg-16,500mg)   Recommend to take medication with food containing fat. The patients two forms of contraception are Depo and male latex condoms.  - Labs are no longer needed. Pregnancy test today, negative, and again in 30 days. She has her CBC and CMP checked regularly through her CF MD. Continue to hold beta carotene. Continue good moisturization.     - Total cumulative dose 10,200 mg. Patient's I-pledge # is [id number]. The patient will stop all acne medications, when she starts the medication. Plan on 2 more months.    2. Hx of Cheilitis  - Continue using lip moisturization.   - Continue mupirocin 2% ointment - apply to cracks on sides of lips, if they occur.     3. Melasma  - Continue good moisturization with sun screen.    4. Hyperhidrosis, groin. Package insert for isotretinoin indicate potential for increased sweating. Will recheck sx after discontinuing medication.     Follow-up in 3 months, earlier for new or changing lesions.     Staff Involved:  Scribe Disclosure:   Lupe WALSH, am serving as a scribe to document services personally performed by Paige Reid PA-C, based on data collection and the provider's statements to me.    Provider Disclosure:   The documentation recorded by the scribe accurately reflects the services NICO  personally performed and the decisions made by me.    All risks, benefits and alternatives were discussed with patient.  Patient is in agreement and understands the assessment and plan.  All questions were answered.  Sun Screen Education was given.   Return to Clinic in 3 months or sooner as needed.   Paige Reid PA-C   [facility]

## 2018-01-17 NOTE — PATIENT INSTRUCTIONS
Cystic Fibrosis Self-Care Plan    RECOMMENDATIONS:   Bactrim one tablet twice daily for 14 days.  Keep up the good work with your vest.  You work too much.    YOUR GOAL:  Stay well and enjoy the rest of the winter.      CF Nurse line:          Burton Anderson   [phone]            CF Resp Therapist: Jhoana Trinh            [phone]   CF Dietitians:           Amy Andino            [phone]                       Theresa Ceja                       [phone]   CF Diabetes Nurse: Rhonda Esteves             [phone]    CF Social Workers:  Deyanira Figueroa             [phone]                Rosette Burnett            [phone]  CF Pharmacist:        Gris Adame                              [phone]  [email or web address]         MRN: [id number]   Clinic Date: January 17, 2018   Patient: Mamie Rice     Annual Studies:   IGG   Date Value Ref Range Status   08/02/2017 689 (L) 695 - 1620 mg/dL Final     Insulin   Date Value Ref Range Status   03/30/2015 81 mU/L Final     Comment:     Reference Range:  0-20     There are no preventive care reminders to display for this patient.      Pulmonary Function Tests  FEV1: amount of air you can blow out in 1 second  FVC: total amount of air you can take in and blow out    Your Goals:         PFT Latest Ref Rng & Units 1/17/2018   FVC L 3.48   FEV1 L 3.10   FVC% % 97   FEV1% % 100          Airway Clearance: The Most Important Way to Keep Your Lungs Healthy  Vest Settings:    Hill-Rom Frequencies: 8, 9, 10 Pressure 10 Then, Frequencies 18, 19, 20 Pressure 6      RespirTech: Quick Start with Pressure of     Do each frequency for 5 minutes; Deflate vest after each frequency & cough 3 times before beginning the next setting.    Vest and Neb Therapy should be done 2 times/day.    Good Nutrition Can Improve Lung Function and Overall Health     Take ALL of your vitamins with food     Take 1/2 of your enzymes before EVERY meal/snack and the other 1/2  mid-meal/snack    Wt Readings from Last 3 Encounters:   01/17/18 75 kg (165 lb 5.5 oz)   01/17/18 77.6 kg (171 lb)   11/01/17 77.9 kg (171 lb 11.8 oz)       Body mass index is 30.24 kg/(m^2).         National CF Foundation Recommendations for BMI in CF Adults: Women: at least 22 Men: at least 23        Controlling Blood Sugars Helps Prevent Lung Infections & Improves Nutrition  Test blood sugar:     In the morning before eating (goal is )     2 hours after a meal (goal is less than 150)     When pre-meal glucose is greater than 150 add correction     At bedtime (if less than 100 eat a snack with 15 grams of carbohydrates  Last A1C Results:   Hemoglobin A1C   Date Value Ref Range Status   08/02/2017 5.5 4.3 - 6.0 % Final         If diabetic, measure A1C every 6 months. Goal: Under 7%    Staying Healthy    Research:  If you are interested in learning about research opportunities or have questions, please contact Mariana Smith at [phone] or [email or web address].      CF Foundation:  [employer] is a personalized resource service to help you with the insurance, financial, legal and other issues you are facing.  It's free, confidential and available to anyone with CF.  Ask your  for more information or contact [employer] directly at 616-TUTNLTX ([phone]) or compass@cff.org, or learn more at cff.org/compass.

## 2018-01-17 NOTE — LETTER
1/17/2018       RE: Mamie Rice  [address] [place] [address]     Dear Colleague,    Thank you for referring your patient, Mamie Rice, to the [facility] at [facility]. Please see a copy of my visit note below.    Here for Meropenem instillation.  Also has mild sores in end of nose, recent URI with frequent nose blowing.    Exam:  Nasal vestibule with irritation and excoriation.    Procedure:  2 cc of meropenem instilled into bilateral maxillary antrum under endoscopic visualization.      See me again in 1 month - mupirocin prescribed for nasal sx.    Again, thank you for allowing me to participate in the care of your patient.      Sincerely,    Mariela Haile MD

## 2018-01-17 NOTE — PROGRESS NOTES
Here for Meropenem instillation.  Also has mild sores in end of nose, recent URI with frequent nose blowing.    Exam:  Nasal vestibule with irritation and excoriation.    Procedure:  2 cc of meropenem instilled into bilateral maxillary antrum under endoscopic visualization.      See me again in 1 month - mupirocin prescribed for nasal sx.

## 2018-01-17 NOTE — MR AVS SNAPSHOT
After Visit Summary   1/17/2018    Mamie Rice    MRN: [id number]           Patient Information     Date Of Birth          1995        Visit Information        Provider Department      1/17/2018 8:30 AM Gavi Allison MD [facility]        Today's Diagnoses     Cystic fibrosis with pulmonary manifestations (H)    -  1      Care Instructions    Cystic Fibrosis Self-Care Plan    RECOMMENDATIONS:   Bactrim one tablet twice daily for 14 days.  Keep up the good work with your vest.  You work too much.    YOUR GOAL:  Stay well and enjoy the rest of the winter.      CF Nurse line:          Burton Anderson   [phone]            CF Resp Therapist: Jhoana Trinh            [phone]   CF Dietitians:           Amy Andino            [phone]                       Theresa Ceja                       [phone]   CF Diabetes Nurse: Rhonda Esteves             [phone]    CF Social Workers:  Deyanira Figueroa             [phone]                Rosette Burnett            [phone]  CF Pharmacist:        Gris Adame                              [phone]  [email or web address]         MRN: [id number]   Clinic Date: January 17, 2018   Patient: Mamie Rice     Annual Studies:   IGG   Date Value Ref Range Status   08/02/2017 689 (L) 695 - 1620 mg/dL Final     Insulin   Date Value Ref Range Status   03/30/2015 81 mU/L Final     Comment:     Reference Range:  0-20     There are no preventive care reminders to display for this patient.      Pulmonary Function Tests  FEV1: amount of air you can blow out in 1 second  FVC: total amount of air you can take in and blow out    Your Goals:         PFT Latest Ref Rng & Units 1/17/2018   FVC L 3.48   FEV1 L 3.10   FVC% % 97   FEV1% % 100          Airway Clearance: The Most Important Way to Keep Your Lungs Healthy  Vest Settings:    Hill-Rom Frequencies: 8, 9, 10 Pressure 10 Then, Frequencies 18, 19,  20 Pressure 6      RespirTech: Quick Start with Pressure of     Do each frequency for 5 minutes; Deflate vest after each frequency & cough 3 times before beginning the next setting.    Vest and Neb Therapy should be done 2 times/day.    Good Nutrition Can Improve Lung Function and Overall Health     Take ALL of your vitamins with food     Take 1/2 of your enzymes before EVERY meal/snack and the other 1/2 mid-meal/snack    Wt Readings from Last 3 Encounters:   01/17/18 75 kg (165 lb 5.5 oz)   01/17/18 77.6 kg (171 lb)   11/01/17 77.9 kg (171 lb 11.8 oz)       Body mass index is 30.24 kg/(m^2).         National CF Foundation Recommendations for BMI in CF Adults: Women: at least 22 Men: at least 23        Controlling Blood Sugars Helps Prevent Lung Infections & Improves Nutrition  Test blood sugar:     In the morning before eating (goal is )     2 hours after a meal (goal is less than 150)     When pre-meal glucose is greater than 150 add correction     At bedtime (if less than 100 eat a snack with 15 grams of carbohydrates  Last A1C Results:   Hemoglobin A1C   Date Value Ref Range Status   08/02/2017 5.5 4.3 - 6.0 % Final         If diabetic, measure A1C every 6 months. Goal: Under 7%    Staying Healthy    Research:  If you are interested in learning about research opportunities or have questions, please contact Mariana Smith at [phone] or [email or web address].      CF Foundation:  Compass is a personalized resource service to help you with the insurance, financial, legal and other issues you are facing.  It's free, confidential and available to anyone with CF.  Ask your  for more information or contact Compass directly at 768-COMPASS ([phone]) or compass@cff.org, or learn more at cff.org/compass.                             Follow-ups after your visit        Follow-up notes from your care team     Return in about 3 months (around 4/17/2018).      Your next 10 appointments already scheduled   "   Apr 18, 2018 10:00 AM CDT   (Arrive by 9:45 AM)   Return Visit with Paige Reid PA-C   [facility] ([facility])    [address]  [address] [facility] [address] [phone]              Future tests that were ordered for you today     Open Future Orders        Priority Expected Expires Ordered    Cystic Fibrosis Culture Aerob Bacterial Routine  1/17/2019 1/17/2018            Who to contact     If you have questions or need follow up information about today's clinic visit or your schedule please contact [facility] directly at [phone].  Normal or non-critical lab and imaging results will be communicated to you by MyChart, letter or phone within 4 business days after the clinic has received the results. If you do not hear from us within 7 days, please contact the clinic through [employer] or phone. If you have a critical or abnormal lab result, we will notify you by phone as soon as possible.  Submit refill requests through [employer]ico[employer] or call your pharmacy and they will forward the refill request to us. Please allow 3 business days for your refill to be completed.          Additional Information About Your Visit        MyChart Information     Maicoin gives you secure access to your electronic health record. If you see a primary care provider, you can also send messages to your care team and make appointments. If you have questions, please call your primary care clinic.  If you do not have a primary care provider, please call [phone] and they will assist you.        Care EveryWhere ID     This is your Care EveryWhere ID. This could be used by other organizations to access your [place] medical records  [id number]        Your Vitals Were     Pulse Respirations Height Pulse Oximetry BMI (Body Mass Index)       109 18 1.575 m (5' 2\") 98% 30.24 kg/m2        Blood Pressure from Last 3 Encounters:   01/17/18 114/68 "   11/01/17 126/86   09/26/17 124/87    Weight from Last 3 Encounters:   01/17/18 75 kg (165 lb 5.5 oz)   01/17/18 77.6 kg (171 lb)   11/01/17 77.9 kg (171 lb 11.8 oz)              We Performed the Following     RESPIRATORY FLOW VOLUME LOOP          Today's Medication Changes          These changes are accurate as of: 1/17/18 11:59 PM.  If you have any questions, ask your nurse or doctor.               Start taking these medicines.        Dose/Directions    mupirocin 2 % ointment   Commonly known as:  BACTROBAN   Used for:  Nasal vestibulitis   Started by:  Mariela Haile MD        Use in nose twice daily for 5 days   Quantity:  22 g   Refills:  0       sulfamethoxazole-trimethoprim 800-160 MG per tablet   Commonly known as:  BACTRIM DS/SEPTRA DS   Used for:  Cystic fibrosis with pulmonary manifestations (H)   Started by:  Gavi Allison MD        Dose:  1 tablet   Take 1 tablet by mouth 2 times daily for 14 days   Quantity:  28 tablet   Refills:  0         Stop taking these medicines if you haven't already. Please contact your care team if you have questions.     LORATADINE PO   Stopped by:  Gavi Allison MD                Where to get your medicines      These medications were sent to [facility] [place] [address]     Phone:  [phone]     ISOtretinoin 30 MG Caps    mupirocin 2 % ointment    sulfamethoxazole-trimethoprim 800-160 MG per tablet                Primary Care Provider Office Phone # Fax #    Malik De La Rosa [phone]       [address] [place] [id number]        Equal Access to Services     CYDNEY MARTINEZ AH: Hadii martin [place], washelbida luqadaha, qaybta kaalmada adeegyada, david cardoso. So [facility] [phone].    ATENCIÓN: Si habla español, tiene a hidalgo disposición servicios gratuitos de asistencia lingüística. Llame al  [phone].    We comply with applicable federal civil rights laws and [place] laws. We do not discriminate on the basis of race, color, national origin, age, disability, sex, sexual orientation, or gender identity.            Thank you!     Thank you for choosing [facility]  for your care. Our goal is always to provide you with excellent care. Hearing back from our patients is one way we can continue to improve our services. Please take a few minutes to complete the written survey that you may receive in the mail after your visit with us. Thank you!             Your Updated Medication List - Protect others around you: Learn how to safely use, store and throw away your medicines at www.disposemymeds.org.          This list is accurate as of: 1/17/18 11:59 PM.  Always use your most recent med list.                   Brand Name Dispense Instructions for use Diagnosis    acetaminophen 325 MG tablet    TYLENOL    100 tablet    Take 2 tablets (650 mg) by mouth every 4 hours as needed for other (mild pain)    Chronic pain of left knee       acetylcysteine 20 % nebulizer solution    MUCOMYST    360 mL    INHALE ONE 4ML NEB INTO LUNGS VIA NEBULIZER TWO TIMES A DAY, MAY INCREASE TO 3-4 TIMES A DAY WITH INCREASE COUGH/COLD SYMPTOMS    CF (cystic fibrosis) (H)       ADDERALL PO      Take 20 mg by mouth daily.        * albuterol 108 (90 BASE) MCG/ACT Inhaler    PROAIR HFA/PROVENTIL HFA/VENTOLIN HFA    1 Inhaler    Inhale 2 puffs into the lungs every 6 hours as needed for shortness of breath / dyspnea or wheezing    Cystic fibrosis with pulmonary manifestations (H), Sinusitis, chronic, Diabetes mellitus type 1 (H)       * albuterol (2.5 MG/3ML) 0.083% neb solution     360 mL    INHALE 1 NEB VIA NEBULIZER FOUR TIMES A DAY    CF (cystic fibrosis) (H)       ascorbic acid 500 MG tablet    VITAMIN C    100 tablet    TAKE ONE TABLET BY MOUTH TWICE A DAY    Cystic fibrosis with pulmonary  manifestations (H)       azithromycin 500 MG tablet    ZITHROMAX    36 tablet    TAKE ONE TABLET BY MOUTH ON MONDAY, WEDNESDAY AND FRIDAY    CF (cystic fibrosis) (H)       beclomethasone 80 MCG/ACT Inhaler    QVAR    1 Inhaler    Inhale 1 puff into the lungs 2 times daily    Allergic rhinitis due to house dust mite       beta carotene 91065 UNIT capsule     36 capsule    Take 1 capsule (25,000 Units) by mouth Every Mon, Wed, Fri Morning    Cystic fibrosis with pulmonary manifestations (H)       blood glucose monitoring test strip    ACCU-CHEK SMARTVIEW    1 Month    Use to test blood sugar 4 times daily or as directed.    Type I (juvenile type) diabetes mellitus without mention of complication, not stated as uncontrolled       buPROPion 150 MG 12 hr tablet    WELLBUTRIN SR     Take 150 mg by mouth 2 times daily        cetirizine 10 MG tablet    zyrTEC    30 tablet    Take 1 tablet (10 mg) by mouth every evening    Allergic rhinitis due to American house dust mite, Urticaria, chronic       cholecalciferol 1000 UNIT tablet    vitamin D3    100 tablet    TAKE 1 TABLET BY MOUTH EVERY DAY    CF (cystic fibrosis) (H), Exocrine pancreatic insufficiency       DEPO-PROVERA IM           GLYCOPYRROLATE PO      Take 1 mg by mouth 2 times daily        hydrOXYzine 25 MG tablet    ATARAX    30 tablet    Take 1 tablet (25 mg) by mouth every 6 hours as needed for itching (and nausea)    Chronic pain of left knee       ISOtretinoin 30 MG Caps     60 capsule    Take 1 capsule by mouth 2 times daily With meals.    Acne vulgaris       MEPHYTON 5 MG tablet   Generic drug:  phytonadione     4 tablet    TAKE 1 TABLET BY MOUTH ONCE A WEEK    Cystic fibrosis with pulmonary manifestations (H), Cystic fibrosis with pulmonary manifestations (H), Aspergillosis (H), Pancreatic insufficiency       * meropenem 500 MG vial    MERREM    4 mL    500 mg by Nasal Instillation route as needed        * meropenem 500 MG vial    MERREM    60 each    For  sinus irrigation        methylcellulose (laxative) Powd    CITRUCEL    479 g    Start with 1 heaping tablespoon. Increase as needed, 1 heaping tablespoon at a time, up to 3 times per day.    Other constipation       montelukast 10 MG tablet    SINGULAIR    30 tablet    TAKE 1 TABLET BY MOUTH DAILY AT BEDTIME    CF (cystic fibrosis) (H)       MULTIVITAMIN PO      Take 1 tablet by mouth daily.        mupirocin 2 % ointment    BACTROBAN    22 g    Use in nose twice daily for 5 days    Nasal vestibulitis       naltrexone 50 MG tablet    DEPADE;REVIA    30 tablet    Take 1/2 Tablet daily then increase to maximum of 1 Full Tablet daily as tolerated.  Time it one to two hours prior to worst cravings    Non morbid obesity due to excess calories       omeprazole 20 MG CR capsule    priLOSEC    60 capsule    TAKE 1 CAPSULE BY MOUTH TWICE A DAY    CF (cystic fibrosis) (H)       PROZAC 40 MG capsule   Generic drug:  FLUoxetine      Take 80 mg by mouth daily.    Cystic fibrosis with pulmonary manifestations (H)       sulfamethoxazole-trimethoprim 800-160 MG per tablet    BACTRIM DS/SEPTRA DS    28 tablet    Take 1 tablet by mouth 2 times daily for 14 days    Cystic fibrosis with pulmonary manifestations (H)       TRAZODONE HCL PO      Take 100 mg by mouth At Bedtime        vitamin E 400 UNIT capsule     60 capsule    TAKE 1 CAPSULE BY MOUTH TWICE A DAY    CF (cystic fibrosis) (H), Pancreatic insufficiency       * Notice:  This list has 4 medication(s) that are the same as other medications prescribed for you. Read the directions carefully, and ask your doctor or other care provider to review them with you.

## 2018-01-17 NOTE — MR AVS SNAPSHOT
After Visit Summary   1/17/2018    Mamie Rice    MRN: [id number]           Patient Information     Date Of Birth          1995        Visit Information        Provider Department      1/17/2018 7:00 AM Mariela Haile MD [facility]        Today's Diagnoses     Nasal vestibulitis    -  1    Chronic maxillary sinusitis        Cystic fibrosis with pulmonary manifestations (H)           Follow-ups after your visit        Your next 10 appointments already scheduled     Jan 17, 2018  8:00 AM CST   PFT VISIT with  PFL RJ   [facility] Pulmonary Function Testing ([facility])    [address]  3rd [facility] [id number] [phone]            Jan 17, 2018  8:30 AM CST   (Arrive by 8:15 AM)   RETURN CYSTIC FIBROSIS VISIT with Gavi Allison MD   [facility] ([facility])    [address] [id number] [phone]            Jan 17, 2018 10:00 AM CST   (Arrive by 9:45 AM)   Return Visit with Paige Reid PA-C   [facility] Dermat[facility] ([facility])    [address] [id number] [phone]              Who to contact     Please call your clinic at [phone] to:    Ask questions about your health    Make or cancel appointments    Discuss your medicines    Learn about your test results    Speak to your doctor   If you have compliments or concerns about an experience at your clinic, or if you wish to file a complaint, please contact [facility] Patient Relations at [phone] or email us at [email or web address]         Additional Information About Your Visit        MyChart Information     MemBlazehart gives you secure access to your electronic health record. If you see a primary care provider, you can also send messages to your care team and make  "appointments. If you have questions, please call your primary care clinic.  If you do not have a primary care provider, please call [phone] and they will assist you.      [email or web address] is an electronic gateway that provides easy, online access to your medical records. With [email or web address], you can request a clinic appointment, read your test results, renew a prescription or communicate with your care team.     To access your existing account, please contact your [facility] or call [phone] for assistance.        Care EveryWhere ID     This is your Care EveryWhere ID. This could be used by other organizations to access your [place] medical records  [id number]        Your Vitals Were     Height BMI (Body Mass Index)                1.575 m (5' 2.01\") 31.27 kg/m2           Blood Pressure from Last 3 Encounters:   11/01/17 126/86   09/26/17 124/87   08/02/17 112/80    Weight from Last 3 Encounters:   01/17/18 77.6 kg (171 lb)   11/01/17 77.9 kg (171 lb 11.8 oz)   10/11/17 78.9 kg (174 lb)              We Performed the Following     NASAL ENDOSCOPY, DIAGNOSTIC     NASAL/SINUS SCOPE W THER INSTILL          Today's Medication Changes          These changes are accurate as of: 1/17/18  7:39 AM.  If you have any questions, ask your nurse or doctor.               Start taking these medicines.        Dose/Directions    mupirocin 2 % ointment   Commonly known as:  BACTROBAN   Used for:  Nasal vestibulitis   Started by:  Mariela Haile MD        Use in nose twice daily for 5 days   Quantity:  22 g   Refills:  0            Where to get your medicines      These medications were sent to [facility] - [address], [facility] [address]     Phone:  [phone]     mupirocin 2 % ointment                Primary Care Provider Office Phone # Fax #    Malik De La Rosa [phone]       [address] [facility] " [facility] [address]        Equal Access to Services     [facility] Center[facility]INE : Hadii martin good raffy [place], washelbida luqadaha, qaybta kaalyuri edgarlanheron, david nixon smithmalcolmdeepika cardoso. So [place] [phone].    ATENCIÓN: Si habla español, tiene a hidalgo disposición servicios gratuitos de asistencia lingüística. Ankush al [phone].    We comply with applicable federal civil rights laws and [place] laws. We do not discriminate on the basis of race, color, national origin, age, disability, sex, sexual orientation, or gender identity.            Thank you!     Thank you for choosing [facility]  for your care. Our goal is always to provide you with excellent care. Hearing back from our patients is one way we can continue to improve our services. Please take a few minutes to complete the written survey that you may receive in the mail after your visit with us. Thank you!             Your Updated Medication List - Protect others around you: Learn how to safely use, store and throw away your medicines at www.disposemymeds.org.          This list is accurate as of: 1/17/18  7:39 AM.  Always use your most recent med list.                   Brand Name Dispense Instructions for use Diagnosis    acetaminophen 325 MG tablet    TYLENOL    100 tablet    Take 2 tablets (650 mg) by mouth every 4 hours as needed for other (mild pain)    Chronic pain of left knee       acetylcysteine 20 % nebulizer solution    MUCOMYST    360 mL    INHALE ONE 4ML NEB INTO LUNGS VIA NEBULIZER TWO TIMES A DAY, MAY INCREASE TO 3-4 TIMES A DAY WITH INCREASE COUGH/COLD SYMPTOMS    CF (cystic fibrosis) (H)       ADDERALL PO      Take 20 mg by mouth daily.        * albuterol 108 (90 BASE) MCG/ACT Inhaler    PROAIR HFA/PROVENTIL HFA/VENTOLIN HFA    1 Inhaler    Inhale 2 puffs into the lungs every 6 hours as needed for shortness of breath / dyspnea or wheezing    Cystic fibrosis with pulmonary manifestations (H), Sinusitis, chronic, Diabetes  mellitus type 1 (H)       * albuterol (2.5 MG/3ML) 0.083% neb solution     360 mL    INHALE 1 NEB VIA NEBULIZER FOUR TIMES A DAY    CF (cystic fibrosis) (H)       ascorbic acid 500 MG tablet    VITAMIN C    100 tablet    TAKE ONE TABLET BY MOUTH TWICE A DAY    Cystic fibrosis with pulmonary manifestations (H)       azithromycin 500 MG tablet    ZITHROMAX    36 tablet    TAKE ONE TABLET BY MOUTH ON MONDAY, WEDNESDAY AND FRIDAY    CF (cystic fibrosis) (H)       beclomethasone 80 MCG/ACT Inhaler    QVAR    1 Inhaler    Inhale 1 puff into the lungs 2 times daily    Allergic rhinitis due to house dust mite       beta carotene 93506 UNIT capsule     36 capsule    Take 1 capsule (25,000 Units) by mouth Every Mon, Wed, Fri Morning    Cystic fibrosis with pulmonary manifestations (H)       blood glucose monitoring test strip    ACCU-CHEK SMARTVIEW    1 Month    Use to test blood sugar 4 times daily or as directed.    Type I (juvenile type) diabetes mellitus without mention of complication, not stated as uncontrolled       buPROPion 150 MG 12 hr tablet    WELLBUTRIN SR     Take 150 mg by mouth 2 times daily        cetirizine 10 MG tablet    zyrTEC    30 tablet    Take 1 tablet (10 mg) by mouth every evening    Allergic rhinitis due to American house dust mite, Urticaria, chronic       cholecalciferol 1000 UNIT tablet    vitamin D3    100 tablet    TAKE 1 TABLET BY MOUTH EVERY DAY    CF (cystic fibrosis) (H), Exocrine pancreatic insufficiency       DEPO-PROVERA IM           GLYCOPYRROLATE PO      Take 1 mg by mouth 2 times daily        hydrOXYzine 25 MG tablet    ATARAX    30 tablet    Take 1 tablet (25 mg) by mouth every 6 hours as needed for itching (and nausea)    Chronic pain of left knee       ISOtretinoin 30 MG Caps     60 capsule    Take 1 capsule by mouth 2 times daily With meals.    Acne vulgaris       LORATADINE PO      Take 10 mg by mouth        MEPHYTON 5 MG tablet   Generic drug:  phytonadione     4 tablet    TAKE  1 TABLET BY MOUTH ONCE A WEEK    Cystic fibrosis with pulmonary manifestations (H), Cystic fibrosis with pulmonary manifestations (H), Aspergillosis (H), Pancreatic insufficiency       meropenem 500 MG vial    MERREM    4 mL    500 mg by Nasal Instillation route as needed        methylcellulose (laxative) Powd    CITRUCEL    479 g    Start with 1 heaping tablespoon. Increase as needed, 1 heaping tablespoon at a time, up to 3 times per day.    Other constipation       montelukast 10 MG tablet    SINGULAIR    30 tablet    TAKE 1 TABLET BY MOUTH DAILY AT BEDTIME    CF (cystic fibrosis) (H)       MULTIVITAMIN PO      Take 1 tablet by mouth daily.        mupirocin 2 % ointment    BACTROBAN    22 g    Use in nose twice daily for 5 days    Nasal vestibulitis       naltrexone 50 MG tablet    DEPADE;REVIA    30 tablet    Take 1/2 Tablet daily then increase to maximum of 1 Full Tablet daily as tolerated.  Time it one to two hours prior to worst cravings    Non morbid obesity due to excess calories       omeprazole 20 MG CR capsule    priLOSEC    60 capsule    TAKE 1 CAPSULE BY MOUTH TWICE A DAY    CF (cystic fibrosis) (H)       PROZAC 40 MG capsule   Generic drug:  FLUoxetine      Take 80 mg by mouth daily.    Cystic fibrosis with pulmonary manifestations (H)       TRAZODONE HCL PO      Take 100 mg by mouth At Bedtime        vitamin E 400 UNIT capsule     60 capsule    TAKE 1 CAPSULE BY MOUTH TWICE A DAY    CF (cystic fibrosis) (H), Pancreatic insufficiency       * Notice:  This list has 2 medication(s) that are the same as other medications prescribed for you. Read the directions carefully, and ask your doctor or other care provider to review them with you.

## 2018-01-17 NOTE — NURSING NOTE
Chief Complaint   Patient presents with     Minor Procedure     Merrem inj     Andie March Medical Assistant

## 2018-01-17 NOTE — NURSING NOTE
Chief Complaint   Patient presents with     Cystic Fibrosis     Follow up on Mamie and her CF     John Mcknight CMA at 8:17 AM on 1/17/2018

## 2018-01-17 NOTE — MR AVS SNAPSHOT
After Visit Summary   1/17/2018    Mamie Rice    MRN: [id number]           Patient Information     Date Of Birth          1995        Visit Information        Provider Department      1/17/2018 10:00 AM Paige Reid PA-C M [facility]        Today's Diagnoses     Acne vulgaris    -  1    On isotretinoin therapy           Follow-ups after your visit        Follow-up notes from your care team     Return in about 3 months (around 4/17/2018).      Your next 10 appointments already scheduled     Apr 18, 2018 10:00 AM CDT   (Arrive by 9:45 AM)   Return Visit with VANESA Lloyd [facility] ([facility])    [address] [address] [phone]              Future tests that were ordered for you today     Open Future Orders        Priority Expected Expires Ordered    Cystic Fibrosis Culture Aerob Bacterial Routine  1/17/2019 1/17/2018            Who to contact     Please call your clinic at [phone] to:    Ask questions about your health    Make or cancel appointments    Discuss your medicines    Learn about your test results    Speak to your doctor   If you have compliments or concerns about an experience at your clinic, or if you wish to file a complaint, please contact [facility] Patient Relations at [phone] or email us at [email or web address]         Additional Information About Your Visit        MyChart Information     [employer] gives you secure access to your electronic health record. If you see a primary care provider, you can also send messages to your care team and make appointments. If you have questions, please call your primary care clinic.  If you do not have a primary care provider, please call [phone] and they will assist you.      [employer] is an electronic gateway that provides easy, online access to your medical records. With [employer],  you can request a clinic appointment, read your test results, renew a prescription or communicate with your care team.     To access your existing account, please contact your [facility] or call [phone] for assistance.        Care EveryWhere ID     This is your Care EveryWhere ID. This could be used by other organizations to access your [place] medical records  [id number]         Blood Pressure from Last 3 Encounters:   01/17/18 114/68   11/01/17 126/86   09/26/17 124/87    Weight from Last 3 Encounters:   01/17/18 75 kg (165 lb 5.5 oz)   01/17/18 77.6 kg (171 lb)   11/01/17 77.9 kg (171 lb 11.8 oz)              Today, you had the following     No orders found for display         Today's Medication Changes          These changes are accurate as of: 1/17/18 12:04 PM.  If you have any questions, ask your nurse or doctor.               Start taking these medicines.        Dose/Directions    mupirocin 2 % ointment   Commonly known as:  BACTROBAN   Used for:  Nasal vestibulitis   Started by:  Mariela Haile MD        Use in nose twice daily for 5 days   Quantity:  22 g   Refills:  0       sulfamethoxazole-trimethoprim 800-160 MG per tablet   Commonly known as:  BACTRIM DS/SEPTRA DS   Used for:  Cystic fibrosis with pulmonary manifestations (H)   Started by:  Gavi Allison MD        Dose:  1 tablet   Take 1 tablet by mouth 2 times daily for 14 days   Quantity:  28 tablet   Refills:  0         Stop taking these medicines if you haven't already. Please contact your care team if you have questions.     LORATADINE PO   Stopped by:  Gavi Allison MD                Where to get your medicines      These medications were sent to [facility] - [address] [address]     Phone:  [phone]     ISOtretinoin 30 MG Caps    mupirocin 2 % ointment    sulfamethoxazole-trimethoprim 800-160 MG per tablet                 Primary Care Provider Office Phone # Fax #    Malik De La Rosa [phone]       [address] [facility] [address]        Equal Access to Services     LUZMARIA MARTINEZ : Hadii aad ku haddelmasienna Soyesenia, waaxda luqadaha, qaybta kaalmada adelanda, david orlyin hayaaruddy moura vamshideepika cardoso. So [facility] [phone].    ATENCIÓN: Si habla español, tiene a hidalgo disposición servicios gratuitos de asistencia lingüística. Llame al [phone].    We comply with applicable federal civil rights laws and [place] laws. We do not discriminate on the basis of race, color, national origin, age, disability, sex, sexual orientation, or gender identity.            Thank you!     Thank you for choosing [facility]  for your care. Our goal is always to provide you with excellent care. Hearing back from our patients is one way we can continue to improve our services. Please take a few minutes to complete the written survey that you may receive in the mail after your visit with us. Thank you!             Your Updated Medication List - Protect others around you: Learn how to safely use, store and throw away your medicines at www.disposemymeds.org.          This list is accurate as of: 1/17/18 12:04 PM.  Always use your most recent med list.                   Brand Name Dispense Instructions for use Diagnosis    acetaminophen 325 MG tablet    TYLENOL    100 tablet    Take 2 tablets (650 mg) by mouth every 4 hours as needed for other (mild pain)    Chronic pain of left knee       acetylcysteine 20 % nebulizer solution    MUCOMYST    360 mL    INHALE ONE 4ML NEB INTO LUNGS VIA NEBULIZER TWO TIMES A DAY, MAY INCREASE TO 3-4 TIMES A DAY WITH INCREASE COUGH/COLD SYMPTOMS    CF (cystic fibrosis) (H)       ADDERALL PO      Take 20 mg by mouth daily.        * albuterol 108 (90 BASE) MCG/ACT Inhaler    PROAIR HFA/PROVENTIL HFA/VENTOLIN HFA    1 Inhaler    Inhale 2 puffs into the lungs  every 6 hours as needed for shortness of breath / dyspnea or wheezing    Cystic fibrosis with pulmonary manifestations (H), Sinusitis, chronic, Diabetes mellitus type 1 (H)       * albuterol (2.5 MG/3ML) 0.083% neb solution     360 mL    INHALE 1 NEB VIA NEBULIZER FOUR TIMES A DAY    CF (cystic fibrosis) (H)       ascorbic acid 500 MG tablet    VITAMIN C    100 tablet    TAKE ONE TABLET BY MOUTH TWICE A DAY    Cystic fibrosis with pulmonary manifestations (H)       azithromycin 500 MG tablet    ZITHROMAX    36 tablet    TAKE ONE TABLET BY MOUTH ON MONDAY, WEDNESDAY AND FRIDAY    CF (cystic fibrosis) (H)       beclomethasone 80 MCG/ACT Inhaler    QVAR    1 Inhaler    Inhale 1 puff into the lungs 2 times daily    Allergic rhinitis due to house dust mite       beta carotene 90208 UNIT capsule     36 capsule    Take 1 capsule (25,000 Units) by mouth Every Mon, Wed, Fri Morning    Cystic fibrosis with pulmonary manifestations (H)       blood glucose monitoring test strip    ACCU-CHEK SMARTVIEW    1 Month    Use to test blood sugar 4 times daily or as directed.    Type I (juvenile type) diabetes mellitus without mention of complication, not stated as uncontrolled       buPROPion 150 MG 12 hr tablet    WELLBUTRIN SR     Take 150 mg by mouth 2 times daily        cetirizine 10 MG tablet    zyrTEC    30 tablet    Take 1 tablet (10 mg) by mouth every evening    Allergic rhinitis due to [employer] house dust mite, Urticaria, chronic       cholecalciferol 1000 UNIT tablet    vitamin D3    100 tablet    TAKE 1 TABLET BY MOUTH EVERY DAY    CF (cystic fibrosis) (H), Exocrine pancreatic insufficiency       DEPO-PROVERA IM           GLYCOPYRROLATE PO      Take 1 mg by mouth 2 times daily        hydrOXYzine 25 MG tablet    ATARAX    30 tablet    Take 1 tablet (25 mg) by mouth every 6 hours as needed for itching (and nausea)    Chronic pain of left knee       ISOtretinoin 30 MG Caps     60 capsule    Take 1 capsule by mouth 2 times daily  With meals.    Acne vulgaris       MEPHYTON 5 MG tablet   Generic drug:  phytonadione     4 tablet    TAKE 1 TABLET BY MOUTH ONCE A WEEK    Cystic fibrosis with pulmonary manifestations (H), Cystic fibrosis with pulmonary manifestations (H), Aspergillosis (H), Pancreatic insufficiency       * meropenem 500 MG vial    MERREM    4 mL    500 mg by Nasal Instillation route as needed        * meropenem 500 MG vial    MERREM    60 each    For sinus irrigation        methylcellulose (laxative) Powd    CITRUCEL    479 g    Start with 1 heaping tablespoon. Increase as needed, 1 heaping tablespoon at a time, up to 3 times per day.    Other constipation       montelukast 10 MG tablet    SINGULAIR    30 tablet    TAKE 1 TABLET BY MOUTH DAILY AT BEDTIME    CF (cystic fibrosis) (H)       MULTIVITAMIN PO      Take 1 tablet by mouth daily.        mupirocin 2 % ointment    BACTROBAN    22 g    Use in nose twice daily for 5 days    Nasal vestibulitis       naltrexone 50 MG tablet    DEPADE;REVIA    30 tablet    Take 1/2 Tablet daily then increase to maximum of 1 Full Tablet daily as tolerated.  Time it one to two hours prior to worst cravings    Non morbid obesity due to excess calories       omeprazole 20 MG CR capsule    priLOSEC    60 capsule    TAKE 1 CAPSULE BY MOUTH TWICE A DAY    CF (cystic fibrosis) (H)       PROZAC 40 MG capsule   Generic drug:  FLUoxetine      Take 80 mg by mouth daily.    Cystic fibrosis with pulmonary manifestations (H)       sulfamethoxazole-trimethoprim 800-160 MG per tablet    BACTRIM DS/SEPTRA DS    28 tablet    Take 1 tablet by mouth 2 times daily for 14 days    Cystic fibrosis with pulmonary manifestations (H)       TRAZODONE HCL PO      Take 100 mg by mouth At Bedtime        vitamin E 400 UNIT capsule     60 capsule    TAKE 1 CAPSULE BY MOUTH TWICE A DAY    CF (cystic fibrosis) (H), Pancreatic insufficiency       * Notice:  This list has 4 medication(s) that are the same as other medications  prescribed for you. Read the directions carefully, and ask your doctor or other care provider to review them with you.

## 2018-01-17 NOTE — PROGRESS NOTES
[facility]  January 17, 2018         Assessment and Plan:   Mamie Rice is a 22 year old female with cystic fibrosis.    1. CF lung disease with normal spirometry:  Mamie describes some mild changes in her symptomatology despite having stable pulmonary function.  She is asking for an antibiotic today given both her sinus and pulmonary symptoms.  I have recommended that she begin Bactrim 1 double strength b.i.d.  This should cover her MRSA and Achromobacter.  Mamie should continue to be aggressive with her bronchial drainage and nebulized therapies.    2.  Abnormal glucose tolerance.  Mamie describes good control of her blood sugars.  She has struggled a little bit with hypoglycemia with her recent illness but is able to manage it without difficulty.    3.  Cystic fibrosis sinus disease.  Mamie was seen by Dr. Haile today.  I defer to her for management.  She does describe some nose sores related to her use of Accutane and was prescribed Bactroban ointment by Dr. Haile.   4.  Acne.  Mamie is being seen by Dermatology and is currently on Accutane therapy.  She does describe some dry skin related to this.   5.  Psychosocial.  Mamie does continue to work several jobs and help out a fair amount with the care of her nephews.  Despite this, she feels that things are going well.  She is in a good mood and is able to maintain her self-cares.  I did discuss with her trying to be more consistent with her job in order to be more able to have a regular schedule.    6.  Weight management.  Mamie is currently on naltrexone for weight management.  She is being followed in our Weight Management Clinic.  She is pleased with her weight loss.   7. Pancreatic Insufficiency:  The patient has no new symptoms consistent with worsening malabsorption.  The plan is to continue the present dose of pancreatic enzymes and vitamin supplementation.    Gavi Allison MD  [facility]   of [facility]  Pulmonary, Allergy, Critical Care and Sleep Medicine      Interval History:     Mamie does continue to produce sputum that is yellow-green in color.  Her cough frequency and sputum volume are slightly above baseline.  Her activity tolerance remains good.  She is performing 2 vest therapies per day.          Review of Systems:     All questionnaires were reviewed by me today with the patient.  Review of Systems     Constitutional:  Positive for night sweats and recent stressors. Negative for fever, chills, weight loss, weight gain, fatigue, decreased appetite, height loss, post-operative complications, incisional pain, hallucinations, increased energy, hyperactivity and confused.   HENT:  Negative for ear pain, hearing loss, tinnitus, nosebleeds, trouble swallowing, hoarse voice, mouth sores, sore throat, ear discharge, tooth pain, gum tenderness, taste disturbance, smell disturbance, hearing aid, bleeding gums, dry mouth, sinus pain, sinus congestion and neck mass.    Eyes:  Negative for double vision, pain, redness, eye pain, decreased vision, eye watering, eye bulging, eye dryness, flashing lights, spots, floaters, strabismus, tunnel vision, jaundice and eye irritation.   Respiratory:   Negative for cough, hemoptysis, sputum production, shortness of breath, wheezing, sleep disturbances due to breathing, snores loudly, respiratory pain, dyspnea on exertion, cough disturbing sleep and postural dyspnea.    Cardiovascular:  Negative for chest pain, dyspnea on exertion, palpitations, orthopnea, claudication, leg swelling, fingers/toes turn blue, hypertension, hypotension, syncope, history of heart murmur, chest pain on exertion, chest pain at rest, pacemaker, few scattered varicosities, leg pain, sleep disturbances due to breathing, tachycardia, light-headedness, exercise intolerance and edema.   Gastrointestinal:  Positive for heartburn. Negative for nausea, vomiting, abdominal  pain, diarrhea, constipation, blood in stool, melena, rectal pain, bloating, bowel incontinence, jaundice, coffee ground emesis and change in stool.   Genitourinary:  Negative for bladder incontinence, dysuria, urgency, hematuria, flank pain, vaginal discharge, difficulty urinating, genital sores, dyspareunia, decreased libido, nocturia, voiding less frequently, arousal difficulty, abnormal vaginal bleeding, excessive menstruation, menstrual changes, hot flashes, vaginal dryness and postmenopausal bleeding.   Musculoskeletal:  Negative for myalgias, back pain, joint swelling, arthralgias, stiffness, muscle cramps, neck pain, bone pain, muscle weakness and fracture.   Skin:  Negative for nail changes, itching, poor wound healing, rash, hair changes, skin changes, acne, warts, poor wound healing, scarring, flaky skin, Raynaud's phenomenon, sensitivity to sunlight and skin thickening.   Neurological:  Negative for dizziness, tingling, tremors, speech change, seizures, loss of consciousness, weakness, light-headedness, numbness, headaches, disturbances in coordination, extremity numbness, memory loss, difficulty walking and paralysis.   Endo/Heme:  Negative for anemia, swollen glands and bruises/bleeds easily.   Psychiatric/Behavioral:  Negative for depression, hallucinations, memory loss, decreased concentration, mood swings and panic attacks.    Breast:  Negative for breast discharge, breast mass, breast pain and nipple retraction.   Endocrine:  Negative for altered temperature regulation, polyphagia, polydipsia, unwanted hair growth and change in facial hair.            Past Medical and Surgical History:     Past Medical History:   Diagnosis Date     ADHD (attention deficit hyperactivity disorder)      Anxiety      Aspergillosis, with pneumonia (H)     fugus found caused chest pain     Chronic infection     CF, MRSA.,      Chronic sinusitis      Constipation, chronic      Cystic fibrosis with pulmonary manifestations  (H) 12/19/2011     Cystic fibrosis without mention of meconium ileus     SWEAT TEST:Date: 2/17/1994   [facility]  296 mg, 104 mmol/L ClSample #2  295 mg, 104 mmol/L Cl GENOTYPING:Date: 10/15/2007,  Laboratory: [facility]: [id number]     Depressive disorder      Diabetes     no meds currently     Dysthymic disorder      Exocrine pancreatic insufficiency      Gastro-oesophageal reflux disease      Hip pain, right      MRSA (methicillin resistant Staphylococcus aureus) carrier      Pancreatic disease      Past Surgical History:   Procedure Laterality Date     ARTHROSCOPY HIP, OSTEOPLASTY FEMUR PROXIMAL, COMBINED  3/11/2013    Procedure: COMBINED ARTHROSCOPY HIP, OSTEOPLASTY FEMUR PROXIMAL;  Right Hip Arthroscopy, Labral  Debridement.    surgeon request choice anesthesia/admit to [facility] after surgery;  Surgeon: Omkar Austin MD;  [facility]     ARTHROSCOPY KNEE WITH MEDIAL MENISCECTOMY Left 1/31/2017    Procedure: ARTHROSCOPY KNEE WITH MEDIAL MENISCECTOMY;  Surgeon: Jethro Coyle MD;  Location: [facility] OR     bronchoscopies       BRONCHOSCOPY       EXAM UNDER ANESTHESIA ANUS N/A 5/10/2016    Procedure: EXAM UNDER ANESTHESIA ANUS;  Surgeon: Chet Gaviria MD;  [facility]     EXAM UNDER ANESTHESIA, RESTORATIONS, EXTRACTION(S) DENTAL COMPLEX, COMBINED  5/13/2013    Procedure: COMBINED EXAM UNDER ANESTHESIA, RESTORATIONS, EXTRACTION(S) DENTAL COMPLEX;  Dental Exam, Radiographs, Restorations. Single Extraction  Tooth #2. Restorations x 3;  Surgeon: Danilo Ortiz DDS;  [facility]     HC KNEE SCOPE, DIAGNOSTIC      Arthroscopy, Knee- left     INJECT BOTOX N/A 5/10/2016    Procedure: INJECT BOTOX;  Surgeon: Chet Gaviria MD;  [facility]     left hip labral tear  5/11/2011    left hip arthroscopy with labral debridement and synovectomy     meniscus repair       OPTICAL TRACKING SYSTEM ENDOSCOPIC SINUS SURGERY  10/14/2011    Procedure:OPTICAL  TRACKING SYSTEM ENDOSCOPIC SINUS SURGERY; FESS (functional endoscopic sinus surgery) with Image Guidance, bronchial lavage and cultures; Surgeon:GOYO KUO; Location:[facility] OR     OPTICAL TRACKING SYSTEM ENDOSCOPIC SINUS SURGERY  5/18/2012    Procedure:OPTICAL TRACKING SYSTEM ENDOSCOPIC SINUS SURGERY; Right  and Left Image Guided Functional Endoscopic Sinus Surgery With  Frontal Approach, [place]; Surgeon:GOYO KUO; [facility]     OPTICAL TRACKING SYSTEM ENDOSCOPIC SINUS SURGERY  9/26/2012    Procedure: OPTICAL TRACKING SYSTEM ENDOSCOPIC SINUS SURGERY;  Stealth Guided Bilateral Functional Endoscopic Sinus Surgery *Latex Safe*;  Surgeon: Goyo Kuo MD;  [facility]     OPTICAL TRACKING SYSTEM ENDOSCOPIC SINUS SURGERY Bilateral 10/16/2015    Procedure: OPTICAL TRACKING SYSTEM ENDOSCOPIC SINUS SURGERY;  Surgeon: Mariela Haile MD;  [facility]     ORTHOPEDIC SURGERY      left hip tear repair 2010     SINUS SURGERY             Family History:     Family History   Problem Relation Age of Onset     CANCER Paternal Grandmother      Skin Cancer Paternal Grandmother      Other Cancer Paternal Grandmother      Skin     Obesity Paternal Grandmother      CANCER Paternal Grandfather      PGF had throat cancer (he was a smoker)     Other Cancer Paternal Grandfather      Anxiety Disorder Paternal Grandfather      Thyroid Disease Mother      ,     Obesity Other      Anesthesia Reaction No family hx of      Blood Disease No family hx of      Colon Polyps No family hx of      Crohn Disease No family hx of      Ulcerative Colitis No family hx of      Colon Cancer No family hx of      Melanoma No family hx of             Social History:     Social History     Social History     Marital status: Single     Spouse name: N/A     Number of children: N/A     Years of education: N/A     Occupational History     Not on file.     Social History Main Topics     Smoking status: Never Smoker     Smokeless tobacco: Never Used       Comment: one person at home smokes outside     Alcohol use No     Drug use: No     Sexual activity: No     Other Topics Concern     Blood Transfusions No     Exercise Yes     Social History Narrative    Mamie lives with mother in [place], [place].  There is a cat in the home, but Mamie does not have any litterbox duties.  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  She gets essentially no exercise because of the tingling in her feet (says it bothers her even to stand).        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        8/2015 no change in social situation        2/15/2016 Pt is single and lives with mother and stepfather.            Medications:     Current Outpatient Prescriptions   Medication     mupirocin (BACTROBAN) 2 % ointment     sulfamethoxazole-trimethoprim (BACTRIM DS/SEPTRA DS) 800-160 MG per tablet     VITAMIN D3 1000 UNITS tablet     acetylcysteine (MUCOMYST) 20 % nebulizer solution     montelukast (SINGULAIR) 10 MG tablet     cetirizine (ZYRTEC) 10 MG tablet     naltrexone (DEPADE;REVIA) 50 MG tablet     albuterol (2.5 MG/3ML) 0.083% neb solution     omeprazole (PRILOSEC) 20 MG CR capsule     vitamin E 400 UNIT capsule     azithromycin (ZITHROMAX) 500 MG tablet     ascorbic acid (VITAMIN C) 500 MG tablet     beclomethasone (QVAR) 80 MCG/ACT Inhaler     MEPHYTON 5 MG tablet     hydrOXYzine (ATARAX) 25 MG tablet     acetaminophen (TYLENOL) 325 MG tablet     MedroxyPROGESTERone Acetate (DEPO-PROVERA IM)     buPROPion (WELLBUTRIN SR) 150 MG 12 hr tablet     beta carotene 27983 UNIT capsule     methylcellulose, laxative, (CITRUCEL) POWD     GLYCOPYRROLATE PO     TRAZODONE HCL PO     blood glucose (ACCU-CHEK SMARTVIEW) test strip     albuterol (PROAIR HFA, PROVENTIL HFA, VENTOLIN HFA) 108 (90 BASE) MCG/ACT inhaler     meropenem (MERREM) 500 MG injection     FLUoxetine (PROZAC) 40 MG capsule      "Amphetamine-Dextroamphetamine (ADDERALL PO)     Multiple Vitamin (MULTIVITAMIN OR)     ISOtretinoin 30 MG CAPS     meropenem (MERREM) 500 MG vial     No current facility-administered medications for this visit.      Facility-Administered Medications Ordered in Other Visits   Medication     albuterol (PROAIR HFA, PROVENTIL HFA, VENTOLIN HFA) inhaler            Physical Exam:   /68  Pulse 109  Resp 18  Ht 1.575 m (5' 2\")  Wt 75 kg (165 lb 5.5 oz)  SpO2 98%  BMI 30.24 kg/m2    Constitutional:   Awake, alert and in no apparent distress     Eyes:   nonicteric     ENT:   oral mucosa moist without lesions, normal tm bilaterally, bilateral mucosal sores     Neck:   Supple without supraclavicular or cervical lymphadenopathy     Lungs:   Good air flow.  No crackles. No rhonchi.  No wheezes.     Cardiovascular:   Normal S1 and S2.  RRR.  No murmur, gallop or rub.     Abdomen:   NABS, soft, nontender, nondistended.       Musculoskeletal:   No edema, digital clubbing present     Neurologic:   Alert and conversant.     Skin:   Warm, dry.  No rash on limited exam.             Data:   All laboratory and imaging data reviewed.    Cystic Fibrosis Culture  Specimen Description   Date Value Ref Range Status   11/01/2017 Sputum  Final   08/02/2017 Sputum  Final   08/02/2017 Sputum  Final   08/02/2017 Sputum  Final    Culture Micro   Date Value Ref Range Status   11/01/2017 Light growth  Normal roberto carlos    Final   11/01/2017 (A)  Final    Moderate growth  Staphylococcus aureus  This isolate is presumed to be clindamycin resistant based on detection of inducible   clindamycin resistance. Erythromycin and clindamycin are resistant, therefore, they are   not recommended for use.     08/02/2017 (A)  Final    Moderate growth Normal roberto carlos  Heavy growth Staphylococcus aureus This isolate is presumed to be clindamycin   resistant based on detection of inducible clindamycin resistance. Erythromycin   and clindamycin are resistant, " therefore, they are not recommended for use.  Moderate growth Strain 2 Staphylococcus aureus Organism failed to thrive for   susceptibility testing  Light growth Achromobacter xylosoxidans/denitrificans Identification obtained by   MALDI-TOF mass spectrometry research use only database. Test characteristics   determined and verified by the Infectious Diseases Diagnostic Laboratory ([facility])   [place], [place].          Recent Results (from the past 168 hour(s))   General PFT Lab (Please always keep checked)    Collection Time: 01/17/18  8:01 AM   Result Value Ref Range    FVC-Pred 3.55 L    FVC-Pre 3.48 L    FVC-%Pred-Pre 97 %    FEV1-Pre 3.10 L    FEV1-%Pred-Pre 100 %    FEV1FVC-Pred 87 %    FEV1FVC-Pre 89 %    FEFMax-Pred 6.62 L/sec    FEFMax-Pre 8.42 L/sec    FEFMax-%Pred-Pre 127 %    FEF2575-Pred 3.64 L/sec    FEF2575-Pre 4.08 L/sec    GPX5158-%Pred-Pre 112 %    ExpTime-Pre 6.92 sec    FIFMax-Pre 5.45 L/sec    FEV1FEV6-Pred 86 %    FEV1FEV6-Pre 89 %   HCG qualitative urine    Collection Time: 01/17/18  9:15 AM   Result Value Ref Range    HCG Qual Urine Negative NEG^Negative       PFT:  The spirometry is normal.  When compared to 11/1/17, the FEV1 and FVC have little change.

## 2018-01-17 NOTE — NURSING NOTE
Dermatology Rooming Note    Mamie Rice's goals for this visit include:   Chief Complaint   Patient presents with     Derm Problem     Acne. Accutane. Notes improvement. She notes that her skin is very dry.     Azucena Bauman, CMA

## 2018-01-17 NOTE — PATIENT INSTRUCTIONS
Plan of care:  Follow up with Dr Haile as scheduled  Clinic contact information:  1. To schedule an appointment call [phone], option 1  2. To talk to the Triage RN call [phone], option 3  3. If you need to speak to Ellie or get a message to your doctor on a Friday, call the triage RN  4. EllieRN: [phone]  5. Surgery scheduling:      Mahi Canela: [phone]      Brisa Aparicio: [phone]  6. Fax: [phone]  7. [phone]

## 2018-01-18 DIAGNOSIS — E66.09 NON MORBID OBESITY DUE TO EXCESS CALORIES: ICD-10-CM

## 2018-01-18 RX ORDER — NALTREXONE HYDROCHLORIDE 50 MG/1
TABLET, FILM COATED ORAL
Qty: 30 TABLET | Refills: 3 | OUTPATIENT
Start: 2018-01-18

## 2018-01-18 NOTE — TELEPHONE ENCOUNTER
Recieved refill request for Naltrexone . Patient needs appointment scheduled prior to any refills. Clinic Coordinator notified and will follow up with the patient as appropriate. The pharmacy has been notified that the medication will not be refilled prior to an appointment being scheduled.

## 2018-01-25 DIAGNOSIS — E84.0 CYSTIC FIBROSIS WITH PULMONARY MANIFESTATIONS (H): Primary | ICD-10-CM

## 2018-01-30 NOTE — PROGRESS NOTES
"Adult Cystic Fibrosis Program  Social Work Clinic Consult    Data:   Met with Mamie to introduce self as new CF SW and to review psychosocial needs and provide counseling as needed.    Living Situation  Mamie continues to live with her mom and step-dad in [place], [place].  Her nephew also lives in the home a majority of the time.  Mamie is happy with her living situation and denies any concerns about her living situation.    Social Support  Mamie reports strong support from her family and friends.  She reports that she is not currently in a significant relationship.      Adjustment to Illness  Mamie continues to report adherence with CF cares and continues to have excellent pulmonary function.  Although she continues to admit that she is very busy between working 2 jobs, making her days very busy between working and completing her CF cares, she has been able to keep up these but is very exhausted most of the time.      Employment  Mamie continues to work two jobs, she works at the  at [employer] which she enjoys but it is labor-intensive.  She talked about wanting to look at doing something other than  due to the physical nature of the job as well as the risk of being exposed to illness, however she states that it's all she knows and she is not sure what else she would do.  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      Finances  Mmaie reports that she is able to afford her daily living expenses, although it's a goal for her to move out of her parents house, she is not sure how she would afford this.  She indicated that she does not have a lot of money left over for savings.      Mental Health  Mamie reports that she feels her mood has been \"pretty good\", she reports that she is exhausted and irritable a lot but believes this is due to her busy schedule, not depression. She continues to take Wellbutrin, Prozac, Adderall and Trazodone.  These medication had " been managed by her PCP, but her PCP moved and she has to establish care with a new PCP.  SW did explain option of scheduling with CF psychiatrist Dr. Smith which she was interested in and plans to schedule.  She denied having significant mental health concerns or new mental health symptoms.      Chemical Health  Mamie denies the use of tobacco products, illicit drugs including marijuana or alcohol.      Insurance  Mamie continues to be insured through her mom's insurance policy, she is aware that she will no longer be eligible for this once she turns 24.      Intervention:  -Introduction to SW and SW role  -Needs assessment  -Mental health assessment    Assessment: Mamie was open, receptive and friendly during SW visit.  She shared that her goals include finding a new job that better fits her lifestyle, something that is less labor intensive.  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  She is open to meeting with CF psychiatrist Dr. Smith and plans to schedule.  She denied having specific questions/concerns for SW at this time.    Plan:   -Continue to assist with mental health concern(s).  -Continue to follow through regular clinic consult.  -Continue to follow for any psychosocial needs that may arise.  -Complete full psychosocial assessment annually.       TAY Juarez, [facility]  Adult Cystic Fibrosis   Ph: [phone]  Pager: [phone]

## 2018-01-31 ENCOUNTER — TELEPHONE (OUTPATIENT)
Dept: PULMONOLOGY | Facility: CLINIC | Age: 23
End: 2018-01-31

## 2018-01-31 DIAGNOSIS — E84.0 CYSTIC FIBROSIS WITH PULMONARY MANIFESTATIONS (H): Primary | ICD-10-CM

## 2018-01-31 RX ORDER — OSELTAMIVIR PHOSPHATE 75 MG/1
75 CAPSULE ORAL DAILY
Qty: 10 CAPSULE | Refills: 0 | Status: SHIPPED | OUTPATIENT
Start: 2018-01-31 | End: 2018-07-18

## 2018-01-31 NOTE — TELEPHONE ENCOUNTER
[facility]  January 31, 2018    Malik De La Rosa    CF Provider: Gavi Allison MD    Caller: Patient     Clinical information:  Mamie Rice called with complaint of exposure to influenza. 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 Does not have flu symptoms herself yet.    Plan:   Tamilfu 75 mg daily x10 days.  Call back with any new or worsening symptoms/concerns.    Caller verbalized understanding of plan and agrees with advice given.

## 2018-02-13 ENCOUNTER — OFFICE VISIT (OUTPATIENT)
Dept: PSYCHIATRY | Facility: CLINIC | Age: 23
End: 2018-02-13
Attending: CLINICAL NURSE SPECIALIST
Payer: COMMERCIAL

## 2018-02-13 VITALS
BODY MASS INDEX: 30.54 KG/M2 | DIASTOLIC BLOOD PRESSURE: 90 MMHG | HEART RATE: 89 BPM | SYSTOLIC BLOOD PRESSURE: 134 MMHG | WEIGHT: 167 LBS

## 2018-02-13 DIAGNOSIS — G89.29 CHRONIC PAIN OF LEFT KNEE: ICD-10-CM

## 2018-02-13 DIAGNOSIS — E84.0 CYSTIC FIBROSIS WITH PULMONARY MANIFESTATIONS (H): ICD-10-CM

## 2018-02-13 DIAGNOSIS — F41.9 ANXIETY: ICD-10-CM

## 2018-02-13 DIAGNOSIS — E66.09 NON MORBID OBESITY DUE TO EXCESS CALORIES: ICD-10-CM

## 2018-02-13 DIAGNOSIS — M25.562 CHRONIC PAIN OF LEFT KNEE: ICD-10-CM

## 2018-02-13 DIAGNOSIS — F33.0 MAJOR DEPRESSIVE DISORDER, RECURRENT EPISODE, MILD (H): ICD-10-CM

## 2018-02-13 DIAGNOSIS — G47.00 INSOMNIA, UNSPECIFIED TYPE: ICD-10-CM

## 2018-02-13 DIAGNOSIS — F90.0 ATTENTION DEFICIT HYPERACTIVITY DISORDER (ADHD), PREDOMINANTLY INATTENTIVE TYPE: Primary | ICD-10-CM

## 2018-02-13 RX ORDER — DEXTROAMPHETAMINE SACCHARATE, AMPHETAMINE ASPARTATE MONOHYDRATE, DEXTROAMPHETAMINE SULFATE AND AMPHETAMINE SULFATE 5; 5; 5; 5 MG/1; MG/1; MG/1; MG/1
20 CAPSULE, EXTENDED RELEASE ORAL DAILY
Qty: 30 CAPSULE | Refills: 0 | Status: SHIPPED | OUTPATIENT
Start: 2018-02-13 | End: 2018-05-15

## 2018-02-13 RX ORDER — TRAZODONE HYDROCHLORIDE 100 MG/1
100 TABLET ORAL AT BEDTIME
Qty: 30 TABLET | Refills: 5 | Status: SHIPPED | OUTPATIENT
Start: 2018-02-13 | End: 2018-08-09

## 2018-02-13 RX ORDER — DEXTROAMPHETAMINE SACCHARATE, AMPHETAMINE ASPARTATE MONOHYDRATE, DEXTROAMPHETAMINE SULFATE AND AMPHETAMINE SULFATE 5; 5; 5; 5 MG/1; MG/1; MG/1; MG/1
20 CAPSULE, EXTENDED RELEASE ORAL DAILY
Qty: 30 CAPSULE | Refills: 0 | Status: SHIPPED | OUTPATIENT
Start: 2018-02-13 | End: 2018-02-13

## 2018-02-13 RX ORDER — DEXTROAMPHETAMINE SACCHARATE, AMPHETAMINE ASPARTATE MONOHYDRATE, DEXTROAMPHETAMINE SULFATE AND AMPHETAMINE SULFATE 5; 5; 5; 5 MG/1; MG/1; MG/1; MG/1
20 CAPSULE, EXTENDED RELEASE ORAL DAILY
Qty: 30 CAPSULE | Refills: 0 | Status: SHIPPED | OUTPATIENT
Start: 2018-03-13 | End: 2018-05-15

## 2018-02-13 RX ORDER — FLUOXETINE 40 MG/1
80 CAPSULE ORAL DAILY
Qty: 60 CAPSULE | Refills: 5 | Status: SHIPPED | OUTPATIENT
Start: 2018-02-13 | End: 2018-08-09

## 2018-02-13 RX ORDER — HYDROXYZINE HYDROCHLORIDE 25 MG/1
25 TABLET, FILM COATED ORAL EVERY 6 HOURS PRN
Qty: 90 TABLET | Refills: 2 | Status: SHIPPED | OUTPATIENT
Start: 2018-02-13 | End: 2018-05-15

## 2018-02-13 RX ORDER — DEXTROAMPHETAMINE SACCHARATE, AMPHETAMINE ASPARTATE MONOHYDRATE, DEXTROAMPHETAMINE SULFATE AND AMPHETAMINE SULFATE 5; 5; 5; 5 MG/1; MG/1; MG/1; MG/1
20 CAPSULE, EXTENDED RELEASE ORAL DAILY
Qty: 30 CAPSULE | Refills: 0 | Status: SHIPPED | OUTPATIENT
Start: 2018-04-10 | End: 2018-05-15

## 2018-02-13 RX ORDER — NALTREXONE HYDROCHLORIDE 50 MG/1
TABLET, FILM COATED ORAL
Qty: 30 TABLET | Refills: 3 | OUTPATIENT
Start: 2018-02-13

## 2018-02-13 RX ORDER — BUPROPION HYDROCHLORIDE 150 MG/1
150 TABLET, EXTENDED RELEASE ORAL 2 TIMES DAILY
Qty: 60 TABLET | Refills: 5 | Status: SHIPPED | OUTPATIENT
Start: 2018-02-13 | End: 2018-08-09

## 2018-02-13 ASSESSMENT — PAIN SCALES - GENERAL: PAINLEVEL: NO PAIN (0)

## 2018-02-13 NOTE — TELEPHONE ENCOUNTER
Recieved refill request for naltrexone (DEPADE;REVIA) 50 MG. Patient needs appointment scheduled prior to any refills. Clinic Coordinator notified and will follow up with the patient as appropriate. The pharmacy has been notified that the medication will not be refilled prior to an appointment being scheduled.

## 2018-02-13 NOTE — NURSING NOTE
Chief Complaint   Patient presents with     Eval/Assessment     Major Depression      Reviewed Allergies, Medications, Pharmacy, Smoking Status, and Pain Level  Obtained Weight, Blood Pressure, Heart Rate

## 2018-02-13 NOTE — MR AVS SNAPSHOT
After Visit Summary   2/13/2018    Mamie Rice    MRN: [id number]           Patient Information     Date Of Birth          1995        Visit Information        Provider Department      2/13/2018 11:15 AM Jessie Pitts APRN [facility]        Today's Diagnoses     Attention deficit hyperactivity disorder (ADHD), predominantly inattentive type    -  1    Insomnia, unspecified type        Chronic pain of left knee        Major depressive disorder, recurrent episode, mild (H)        Anxiety        Cystic fibrosis with pulmonary manifestations (H)           Follow-ups after your visit        Follow-up notes from your care team     Return in about 3 months (around 5/13/2018).      Your next 10 appointments already scheduled     Feb 21, 2018  7:45 AM CST   (Arrive by 7:30 AM)   Return Visit with Mariela Haile MD   [facility] ([facility])    [address] [place] [address]   [phone]            Mar 21, 2018  1:45 PM CDT   (Arrive by 1:30 PM)   Return Visit with Mariela Haile MD   [facility] ([facility])    [address] [place] [id number]   [phone]            Apr 18, 2018  7:30 AM CDT   CF LOOP with  PFL CF   [facility] Pulmonary Function Testing ([facility])    [address] [place] [id number]   [phone]            Apr 18, 2018  7:50 AM CDT   (Arrive by 7:35 AM)   RETURN CYSTIC FIBROSIS VISIT with Gavi Allison MD   [facility] ([facility])    [address] [place] [id number]   [phone]            Apr 18, 2018  9:30 AM CDT   (Arrive by 9:15 AM)   Return Visit with Mariela Haile MD   [facility] ([facility])    [address] [place] [address]  Floor  [facility] [address]   [phone]            Apr 18, 2018 10:00 AM CDT   (Arrive by 9:45 AM)   Return Visit with VANESA Lloyd [facility] ([facility])    [address] [facility]  [address]rd [facility] [address]   [phone]            May 15, 2018  9:45 AM CDT   Adult Med Follow UP with DANIEL Mtz CNS   [facility] ([facility])    [place] [id number]  [address] [facility] [id number] [phone]              Who to contact     Please call your clinic at [phone] to:    Ask questions about your health    Make or cancel appointments    Discuss your medicines    Learn about your test results    Speak to your doctor            Additional Information About Your Visit        [employer] Information     [employer] gives you secure access to your electronic health record. If you see a primary care provider, you can also send messages to your care team and make appointments. If you have questions, please call your primary care clinic.  If you do not have a primary care provider, please call [phone] and they will assist you.      [employer] is an electronic gateway that provides easy, online access to your medical records. With [employer], you can request a clinic appointment, read your test results, renew a prescription or communicate with your care team.     To access your existing account, please contact your [facility] or call [phone] for assistance.        Care EveryWhere ID     This is your Care EveryWhere ID. This could be used by other organizations to access your [facility] medical records  [id number]        Your Vitals Were     Pulse BMI (Body Mass Index)                89 30.54 kg/m2           Blood Pressure from Last 3 Encounters:   02/13/18 134/90   01/17/18 114/68   11/01/17 126/86    Weight from Last 3 Encounters:   02/13/18 75.8 kg (167 lb)    01/17/18 75 kg (165 lb 5.5 oz)   01/17/18 77.6 kg (171 lb)              Today, you had the following     No orders found for display         Today's Medication Changes          These changes are accurate as of 2/13/18 11:59 PM.  If you have any questions, ask your nurse or doctor.               Start taking these medicines.        Dose/Directions    * amphetamine-dextroamphetamine 20 MG per 24 hr capsule   Commonly known as:  ADDERALL XR   Used for:  Attention deficit hyperactivity disorder (ADHD), predominantly inattentive type   Started by:  Jessie Pitts APRN CNP        Dose:  20 mg   Take 1 capsule (20 mg) by mouth daily   Quantity:  30 capsule   Refills:  0       * amphetamine-dextroamphetamine 20 MG per 24 hr capsule   Commonly known as:  ADDERALL XR   Used for:  Attention deficit hyperactivity disorder (ADHD), predominantly inattentive type   Started by:  Jessie Pitts APRN CNP        Dose:  20 mg   Start taking on:  3/13/2018   Take 1 capsule (20 mg) by mouth daily   Quantity:  30 capsule   Refills:  0       * amphetamine-dextroamphetamine 20 MG per 24 hr capsule   Commonly known as:  ADDERALL XR   Used for:  Attention deficit hyperactivity disorder (ADHD), predominantly inattentive type   Started by:  Jessie Pitts APRN CNP        Dose:  20 mg   Start taking on:  4/10/2018   Take 1 capsule (20 mg) by mouth daily   Quantity:  30 capsule   Refills:  0       traZODone 100 MG tablet   Commonly known as:  DESYREL   Used for:  Insomnia, unspecified type   Started by:  Jessie Pitts APRN CNP        Dose:  100 mg   Take 1 tablet (100 mg) by mouth At Bedtime   Quantity:  30 tablet   Refills:  5       * Notice:  This list has 3 medication(s) that are the same as other medications prescribed for you. Read the directions carefully, and ask your doctor or other care provider to review them with you.      These medicines have changed or have updated prescriptions.        Dose/Directions    hydrOXYzine  25 MG tablet   Commonly known as:  ATARAX   This may have changed:  additional instructions   Used for:  Chronic pain of left knee   Changed by:  Jessie Pitts APRN CNP        Dose:  25 mg   Take 1 tablet (25 mg) by mouth every 6 hours as needed for itching (and nausea) And one to two at bedtime for insomnia   Quantity:  90 tablet   Refills:  2            Where to get your medicines      These medications were sent to [facility] - [address] [address]     Phone:  [phone]     buPROPion 150 MG 12 hr tablet    FLUoxetine 40 MG capsule    hydrOXYzine 25 MG tablet    traZODone 100 MG tablet         Some of these will need a paper prescription and others can be bought over the counter.  Ask your nurse if you have questions.     Bring a paper prescription for each of these medications     amphetamine-dextroamphetamine 20 MG per 24 hr capsule    amphetamine-dextroamphetamine 20 MG per 24 hr capsule    amphetamine-dextroamphetamine 20 MG per 24 hr capsule                Primary Care Provider Office Phone # Fax #    Malik De La Rosa [phone]       [address] [place] [id number]        Equal Access to Services     LUZMARIA MARTINEZ AH: Vinita [place], waaxda luarmando, qaybta kaalmada adelanda, david cardoso. [facility] [phone].    ATENCIÓN: Si habla español, tiene a hidalgo disposición servicios gratuitos de asistencia lingüística. [facility] [phone].    We comply with applicable federal civil rights laws and [place] laws. We do not discriminate on the basis of race, color, national origin, age, disability, sex, sexual orientation, or gender identity.            Thank you!     Thank you for choosing PS[facility]ATRY CLINIC  for your care. Our goal is always to provide you with excellent care. Hearing back from our patients is one way we can continue to improve  our services. Please take a few minutes to complete the written survey that you may receive in the mail after your visit with us. Thank you!             Your Updated Medication List - Protect others around you: Learn how to safely use, store and throw away your medicines at www.disposemymeds.org.          This list is accurate as of 2/13/18 11:59 PM.  Always use your most recent med list.                   Brand Name Dispense Instructions for use Diagnosis    acetaminophen 325 MG tablet    TYLENOL    100 tablet    Take 2 tablets (650 mg) by mouth every 4 hours as needed for other (mild pain)    Chronic pain of left knee       acetylcysteine 20 % nebulizer solution    MUCOMYST    360 mL    INHALE ONE 4ML NEB INTO LUNGS VIA NEBULIZER TWO TIMES A DAY, MAY INCREASE TO 3-4 TIMES A DAY WITH INCREASE COUGH/COLD SYMPTOMS    CF (cystic fibrosis) (H)       * albuterol 108 (90 BASE) MCG/ACT Inhaler    PROAIR HFA/PROVENTIL HFA/VENTOLIN HFA    1 Inhaler    Inhale 2 puffs into the lungs every 6 hours as needed for shortness of breath / dyspnea or wheezing    Cystic fibrosis with pulmonary manifestations (H), Sinusitis, chronic, Diabetes mellitus type 1 (H)       * albuterol (2.5 MG/3ML) 0.083% neb solution     360 mL    Take 1 vial (2.5 mg) by nebulization 4 times daily    CF (cystic fibrosis) (H)       * amphetamine-dextroamphetamine 20 MG per 24 hr capsule    ADDERALL XR    30 capsule    Take 1 capsule (20 mg) by mouth daily    Attention deficit hyperactivity disorder (ADHD), predominantly inattentive type       * amphetamine-dextroamphetamine 20 MG per 24 hr capsule   Start taking on:  3/13/2018    ADDERALL XR    30 capsule    Take 1 capsule (20 mg) by mouth daily    Attention deficit hyperactivity disorder (ADHD), predominantly inattentive type       * amphetamine-dextroamphetamine 20 MG per 24 hr capsule   Start taking on:  4/10/2018    ADDERALL XR    30 capsule    Take 1 capsule (20 mg) by mouth daily    Attention deficit  hyperactivity disorder (ADHD), predominantly inattentive type       ascorbic acid 500 MG tablet    VITAMIN C    100 tablet    TAKE ONE TABLET BY MOUTH TWICE A DAY    Cystic fibrosis with pulmonary manifestations (H)       azithromycin 500 MG tablet    ZITHROMAX    36 tablet    TAKE ONE TABLET BY MOUTH ON MONDAY, WEDNESDAY AND FRIDAY    CF (cystic fibrosis) (H)       beclomethasone 80 MCG/ACT Inhaler    QVAR    1 Inhaler    Inhale 1 puff into the lungs 2 times daily    Allergic rhinitis due to house dust mite       beta carotene 45875 UNIT capsule     36 capsule    Take 1 capsule (25,000 Units) by mouth Every Mon, Wed, Fri Morning    Cystic fibrosis with pulmonary manifestations (H)       blood glucose monitoring test strip    ACCU-CHEK SMARTVIEW    1 Month    Use to test blood sugar 4 times daily or as directed.    Type I (juvenile type) diabetes mellitus without mention of complication, not stated as uncontrolled       buPROPion 150 MG 12 hr tablet    WELLBUTRIN SR    60 tablet    Take 1 tablet (150 mg) by mouth 2 times daily    Major depressive disorder, recurrent episode, mild (H)       cetirizine 10 MG tablet    zyrTEC    30 tablet    Take 1 tablet (10 mg) by mouth every evening    Allergic rhinitis due to [place] house dust mite, Urticaria, chronic       cholecalciferol 1000 UNIT tablet    vitamin D3    100 tablet    TAKE 1 TABLET BY MOUTH EVERY DAY    CF (cystic fibrosis) (H), Exocrine pancreatic insufficiency       DEPO-PROVERA IM           dornase alpha 1 MG/ML neb solution    PULMOZYME    75 mL    Inhale 2.5 mg into the lungs daily    Cystic fibrosis with pulmonary manifestations (H)       FLUoxetine 40 MG capsule    PROZAC    60 capsule    Take 2 capsules (80 mg) by mouth daily    Cystic fibrosis with pulmonary manifestations (H)       GLYCOPYRROLATE PO      Take 1 mg by mouth 2 times daily        hydrOXYzine 25 MG tablet    ATARAX    90 tablet    Take 1 tablet (25 mg) by mouth every 6 hours as needed for  itching (and nausea) And one to two at bedtime for insomnia    Chronic pain of left knee       ISOtretinoin 30 MG Caps     60 capsule    Take 1 capsule by mouth 2 times daily With meals.    Acne vulgaris       MEPHYTON 5 MG tablet   Generic drug:  phytonadione     4 tablet    TAKE 1 TABLET BY MOUTH ONCE A WEEK    Cystic fibrosis with pulmonary manifestations (H), Cystic fibrosis with pulmonary manifestations (H), Aspergillosis (H), Pancreatic insufficiency       * meropenem 500 MG vial    MERREM    4 mL    500 mg by Nasal Instillation route as needed        * meropenem 500 MG vial    MERREM    60 each    For sinus irrigation        methylcellulose (laxative) Powd    CITRUCEL    479 g    Start with 1 heaping tablespoon. Increase as needed, 1 heaping tablespoon at a time, up to 3 times per day.    Other constipation       montelukast 10 MG tablet    SINGULAIR    30 tablet    TAKE 1 TABLET BY MOUTH DAILY AT BEDTIME    CF (cystic fibrosis) (H)       MULTIVITAMIN PO      Take 1 tablet by mouth daily.        mupirocin 2 % ointment    BACTROBAN    22 g    Use in nose twice daily for 5 days    Nasal vestibulitis       naltrexone 50 MG tablet    DEPADE;REVIA    30 tablet    Take 1/2 Tablet daily then increase to maximum of 1 Full Tablet daily as tolerated.  Time it one to two hours prior to worst cravings    Non morbid obesity due to excess calories       omeprazole 20 MG CR capsule    priLOSEC    60 capsule    TAKE 1 CAPSULE BY MOUTH TWICE A DAY    CF (cystic fibrosis) (H)       oseltamivir 75 MG capsule    TAMIFLU    10 capsule    Take 1 capsule (75 mg) by mouth daily    Cystic fibrosis with pulmonary manifestations (H)       traZODone 100 MG tablet    DESYREL    30 tablet    Take 1 tablet (100 mg) by mouth At Bedtime    Insomnia, unspecified type       vitamin E 400 UNIT capsule     60 capsule    TAKE 1 CAPSULE BY MOUTH TWICE A DAY    CF (cystic fibrosis) (H), Pancreatic insufficiency       * Notice:  This list has 7  medication(s) that are the same as other medications prescribed for you. Read the directions carefully, and ask your doctor or other care provider to review them with you.

## 2018-02-13 NOTE — PROGRESS NOTES
"  Outpatient Psychiatry Diagnostic Assessment     Provider:  DANIEL Villagomez CNP 2/13/2018 11:08 AM  Patient Identification: Mamie Rice  YOB: 1995   MRN: [id number]  Mamie Rice is a 22 year old female  who presents for a 60 minute Diagnostic Assessment.   The patient s chief complaint is  I need somebody to take over my medications as my PCP retired.\"  Patient was referred by pulmonology clinic.  Mamie is interviewed alone.  History of Present Illness      Mamie reports she has been taking current medications (Wellbutrin  mg BID, Prozac 80mg daily, Adderall XR 20 mg daily and Trazodone 100 mg daily) \"for a while.\"  Pt reports her mother sets up her medication daily and she may not know exactly what she is taking, but states \"that sounds about right.\"  Pt also takes Naltrexone 50 mg daily and Vistaril 25 mg HS PRN for weight loss and nausea from Naltrexone, but takes Vistaril only at HS.  Pt feels current medications are working well for her.  The medications have been managed by PCP in [place] where she resides, but due to multiple prison, she decided to transfer care as she comes to [facility] for all other healthcare needs.  She has never been psychiatric provider.      Pt reports she has some difficulties falling asleep and staying asleep.  States \"I'm a restless sleeper.\"  Pt feels this is mostly due to her unstable work schedule.  Taking Adderall in AM, no later than 10 am.  Pt works 3 jobs currently; providing  at 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  Pt reports she feels exhausted, but feels she has to do this to pay bills.  But once when she goes to work, she feels she is energized.  Pt reports she does not feel restless, but she has to be \"go and go and go\" all the time due to having " multiple jobs.  Denies depressed mood.  Hx of suicide ideation x 2 when her CF was really severe and hospitalized during HS, but never had any other suicide ideation or attempt.    Pt also reports decrease in appetite since started on Naltrexone in 9/2017, pt has been managed on Naltrexone by weight management clinic.  Pt has been stable on CF, not hospitalized since the end of HS.    Reports increase in anxiety last 1-2 years when her large family get together as it brings attention to them and her family is loud.  Pt denies anxiety in any other settings.  Even when she goes to dance tournament, besides from regular nervousness, she denies any anxiety.  Pt feels safe around the family.  Hx of OCD diagnosis, but currently does not have any repetitive or overwhelming behavior that affects daily life.    Psychiatric Review of Systems:  Depression per PHQ,   PHQ-9 SCORE 5/26/2016 5/12/2017 2/13/2018   Total Score 7 9 10      Anxiety : see subjective.  Quiana: denies   Psychosis:  Denies  ADHD: stable, diagnosed at the end of of HS, IEP but mostly due to missing classes due to hospitalizations  Gambling:denies  Eating disorder: denies  The patient reports no current chemical use.    Chemical use history is described in detail in a separate section.    Reports no suicidal thoughts.    Reports no violent ideation.    Current treatment resources include medication management.   Other support workers include none.  Pt has  at CF clinic.    Sleep assessment: see subjective  Nutrition: feels eating healthy after seeing weight loss clinic    Past Psychiatric History      Previous providers: none, PCP managed  Past medication trials include Wellbutrin XL, Trazodone 150 mg (too sedating, 50 mg not effective), phentamine for weight loss. -  She has had no psychiatric hospitalizations.    Past psychotherapy trials include individual psychotherapy during HS when CF was severe.   ECT: denies  Has made no suicide  attempts.    Has no history of self-injurious behavior.    no history of violent behavior.    Chemical Use History      CAGE -AID completed, Score of 0.  Denies frequent use or abuse of alcohol, once in 6 months if any for 1 drink  Cannabis: denies  Other substance abuse:  Stimulants: denies, Hallucinogens: denies, Opiates: denies  Caffeine 1-2 soda/day    Nicotine: denies  The patient has not had treatment for chemical dependence.     Past Medical/Surgical History      The patient s primary care provider is as listed in the medical record.    Allergies are listed in the medical record.   Medications prescribed by other providers are as listed in the medical record.   The patient reports current physical symptoms including fatigue.    The patient s chronic medical conditions are CF, diabetes due to CF.    She reports no history of head injury.   She reports no history of loss of consciousness.   She reports no history of seizures.   She reports no history of of other neurological concerns.      Patient Active Problem List   Diagnosis     Hip joint pain     Aspergillosis (H)     Chronic maxillary sinusitis     Hypoglycemia     Type 1 diabetes mellitus (H)     Cystic fibrosis with pulmonary manifestations (H)     Chronic constipation     Frontal sinusitis     Major depression     ADHD (attention deficit hyperactivity disorder)     Back pain     Pancreatic insufficiency     Non morbid obesity due to excess calories     Acne vulgaris     Cystic fibrosis (H)     Insomnia     Major depressive disorder with single episode     Current Outpatient Prescriptions Ordered in [facility]   Medication Sig Dispense Refill     oseltamivir (TAMIFLU) 75 MG capsule Take 1 capsule (75 mg) by mouth daily 10 capsule 0     dornase alpha (PULMOZYME) 1 MG/ML neb solution Inhale 2.5 mg into the lungs daily 75 mL 3     albuterol (2.5 MG/3ML) 0.083% neb solution Take 1 vial (2.5 mg) by nebulization 4 times daily 360 mL 11     mupirocin (BACTROBAN) 2 %  ointment Use in nose twice daily for 5 days 22 g 0     ISOtretinoin 30 MG CAPS Take 1 capsule by mouth 2 times daily With meals. 60 capsule 0     meropenem (MERREM) 500 MG vial For sinus irrigation 60 each      VITAMIN D3 1000 UNITS tablet TAKE 1 TABLET BY MOUTH EVERY  tablet 3     acetylcysteine (MUCOMYST) 20 % nebulizer solution INHALE ONE 4ML NEB INTO LUNGS VIA NEBULIZER TWO TIMES A DAY, MAY INCREASE TO 3-4 TIMES A DAY WITH INCREASE COUGH/COLD SYMPTOMS 360 mL 11     montelukast (SINGULAIR) 10 MG tablet TAKE 1 TABLET BY MOUTH DAILY AT BEDTIME 30 tablet 11     cetirizine (ZYRTEC) 10 MG tablet Take 1 tablet (10 mg) by mouth every evening 30 tablet 3     naltrexone (DEPADE;REVIA) 50 MG tablet Take 1/2 Tablet daily then increase to maximum of 1 Full Tablet daily as tolerated.  Time it one to two hours prior to worst cravings 30 tablet 3     omeprazole (PRILOSEC) 20 MG CR capsule TAKE 1 CAPSULE BY MOUTH TWICE A DAY 60 capsule 11     vitamin E 400 UNIT capsule TAKE 1 CAPSULE BY MOUTH TWICE A DAY 60 capsule 11     azithromycin (ZITHROMAX) 500 MG tablet TAKE ONE TABLET BY MOUTH ON MONDAY, WEDNESDAY AND FRIDAY 36 tablet 4     ascorbic acid (VITAMIN C) 500 MG tablet TAKE ONE TABLET BY MOUTH TWICE A  tablet 3     beclomethasone (QVAR) 80 MCG/ACT Inhaler Inhale 1 puff into the lungs 2 times daily 1 Inhaler 3     MEPHYTON 5 MG tablet TAKE 1 TABLET BY MOUTH ONCE A WEEK 4 tablet 11     hydrOXYzine (ATARAX) 25 MG tablet Take 1 tablet (25 mg) by mouth every 6 hours as needed for itching (and nausea) 30 tablet 0     acetaminophen (TYLENOL) 325 MG tablet Take 2 tablets (650 mg) by mouth every 4 hours as needed for other (mild pain) 100 tablet 0     MedroxyPROGESTERone Acetate (DEPO-PROVERA IM)        buPROPion (WELLBUTRIN SR) 150 MG 12 hr tablet Take 150 mg by mouth 2 times daily       beta carotene 63457 UNIT capsule Take 1 capsule (25,000 Units) by mouth Every Mon, Wed, Fri Morning 36 capsule 4     methylcellulose,  laxative, (CITRUCEL) POWD Start with 1 heaping tablespoon. Increase as needed, 1 heaping tablespoon at a time, up to 3 times per day. 479 g 3     GLYCOPYRROLATE PO Take 1 mg by mouth 2 times daily        TRAZODONE HCL PO Take 100 mg by mouth At Bedtime        blood glucose (ACCU-CHEK SMARTVIEW) test strip Use to test blood sugar 4 times daily or as directed. 1 Month 12     albuterol (PROAIR HFA, PROVENTIL HFA, VENTOLIN HFA) 108 (90 BASE) MCG/ACT inhaler Inhale 2 puffs into the lungs every 6 hours as needed for shortness of breath / dyspnea or wheezing 1 Inhaler 12     meropenem (MERREM) 500 MG injection 500 mg by Nasal Instillation route as needed  4 mL 0     FLUoxetine (PROZAC) 40 MG capsule Take 80 mg by mouth daily.       Amphetamine-Dextroamphetamine (ADDERALL PO) Take 20 mg by mouth daily.       Multiple Vitamin (MULTIVITAMIN OR) Take 1 tablet by mouth daily.       Current Facility-Administered Medications Ordered in Epic   Medication Dose Route Frequency Provider Last Rate Last Dose     albuterol (PROAIR HFA, PROVENTIL HFA, VENTOLIN HFA) inhaler    PRN Citlali Queen MD   2 puff at 10/16/15 0939         Family History      The patient reports a family mental health treatment of depression with maternal grandfather .  Family medical history includes   Family History   Problem Relation Age of Onset     CANCER Paternal Grandmother      Skin Cancer Paternal Grandmother      Other Cancer Paternal Grandmother      Skin     Obesity Paternal Grandmother      CANCER Paternal Grandfather      PGF had throat cancer (he was a smoker)     Other Cancer Paternal Grandfather      Anxiety Disorder Paternal Grandfather      Thyroid Disease Mother      ,     Obesity Other      Anesthesia Reaction No family hx of      Blood Disease No family hx of      Colon Polyps No family hx of      Crohn Disease No family hx of      Ulcerative Colitis No family hx of      Colon Cancer No family hx of      Melanoma No family hx of   "      Social History       The patient was raised in [place]. Grew up with mother and stepfather.  Felt safe growing up at home.  She has one half sister and one step sister who were older than patient.  Parents , pt does not have much contact with biological father, but has contact with paternal aunt and cousins.  Trauma history includes none.  Pt does not think being hospitalized frequently during childhood as traumatic.   The patient is single and has no children.  Pt does not plan to have children at this time.    The patient s social support system includes mother, maternal grandparents and friends.  She  lives with her mother and stepfather.    She  completed high school and did participate in special education classes, but reports mostly due to missing many classes due to hospitalizations related to CF. Post high school education includes one semester in college, but reports that was not for her and stopped.  She is currently employed as a day care provider, dance coach and substitute .    She has not had involvement with the legal system.   She has not served in the .   The patient reports the following spiritual and/or cultural history: none.  Finances are stable and basic needs are met.    Review of Systems     Pertinent items are noted in HPI.  All other systems are negative.    Results      Pertinent findings on review include: Review of records from PCP and specilists with relevant information reported in the HPI.    VS: /90  Pulse 89  Wt 75.8 kg (167 lb)  BMI 30.54 kg/m2    Mental Status Exam     Appearance:  Casually dressed and Adequately groomed  Behavior/relationship to examiner/demeanor:  Cooperative, Engaged and Pleasant  Gait:  Normal  Speech rate:  Normal  Speech volume:  Normal  Speech articulation:  Normal  Speech coherence:  Normal  Speech spontaneity:  Normal  Mood (subjective report):  \"good\"  Affect (objective appearance):  " Appropriate/mood-congruent  Thought Process (Associations):  Goal directed  Thought content:  no overt psychosis, denies suicidal ideation, intent or thoughts and patient does not appear to be responding to internal stimuli  Abnormal Perception:  None  Attention/Concentration:  Normal  Memory:  Immediate recall intact, Short-term memory intact and Long-term memory intact  Fund of Knowledge/Intelligence:  Above average  Insight:  Fair  Judgment:  Fair    Assessment & Plan     Assessment:   Mamie Rice is a 22 year old female  who presents for [facility] care after PCP retired who has been managing her medications.  Pt reports her mood is stable and her concentration and anxiety are well controlled at this time.  Pt reported occasional initial and middle insomnia which she feels due to irregular work schedule.   Pt has been taking PRN Vistaril 25 mg HS daily, but still having this problem.  Pt takes PRN Vistaril also for nausea with Naltrexone.  Pt also discussed higher dose of Trazodone, but was oversedating and 50 mg was not effective.  We discussed HS dose of Vistaril can be 25-50 mg and when she can't sleep, she may try higher dose of Vistaril to see if it would be helpful for sleep.      Pt did not report and exhibit any sign of OCD at this time.  Symptoms may be well controlled due to Prozac at this time, pt does not meet any criteria of OCD today, but pt reported occasional increase in anxiety last 1-2 years that is mostly controlled due to avoiding stimuli at this time.    Diagnosis  ADHD  Major depressive disorder, recurrent, mild  Anxiety    Plan:    Medication: Change HS Vistaril to 25-50 mg PRN, continue all other medications.  OTC Recommendations: none  Lab Orders:  none  Referrals: none  Release of Information: [facility] (PCP)  Future Treatment Considerations: per symptoms  Return for Follow Up: 3 months    The plan of care was reviewed and discussed with Mamie Rice.  This included  risks,benefits, efficacy, dose, side effects and length of treatment. she agreed with the plan and verbalized understanding.  Patient was given phone number and contact information for the clinic and encouraged to call if she has concerns prior to the follow up appointment.The patient understands to call 911 or come to the nearest ED if life threatening or urgent symptoms present. The patient understands the risks of using street drugs or alcohol.  NICO Pitts, Psychiatric/Mental Health Nurse Practitioner Student, 2/13/2018  I was present for the key and critical points of the visit including diagnosis and medication recommendations.  The above note was reviewed and approved    Kristina SANCHEZ   CNS, 2/13/18

## 2018-02-14 ASSESSMENT — PATIENT HEALTH QUESTIONNAIRE - PHQ9: SUM OF ALL RESPONSES TO PHQ QUESTIONS 1-9: 10

## 2018-02-27 DIAGNOSIS — E66.9 OBESITY: Primary | ICD-10-CM

## 2018-02-27 RX ORDER — NALTREXONE HYDROCHLORIDE 50 MG/1
TABLET, FILM COATED ORAL
Qty: 60 TABLET | Refills: 2 | Status: SHIPPED | OUTPATIENT
Start: 2018-02-27 | End: 2018-05-01

## 2018-03-01 DIAGNOSIS — E84.0 CYSTIC FIBROSIS WITH PULMONARY MANIFESTATIONS (H): ICD-10-CM

## 2018-03-01 RX ORDER — ASCORBIC ACID 500 MG
TABLET ORAL
Qty: 100 TABLET | Refills: 3 | Status: SHIPPED | OUTPATIENT
Start: 2018-03-01 | End: 2018-10-24

## 2018-03-21 ENCOUNTER — OFFICE VISIT (OUTPATIENT)
Dept: OTOLARYNGOLOGY | Facility: CLINIC | Age: 23
End: 2018-03-21
Payer: COMMERCIAL

## 2018-03-21 VITALS — HEIGHT: 62 IN | WEIGHT: 166 LBS | BODY MASS INDEX: 30.55 KG/M2

## 2018-03-21 DIAGNOSIS — E84.0 CYSTIC FIBROSIS WITH PULMONARY MANIFESTATIONS (H): Primary | ICD-10-CM

## 2018-03-21 DIAGNOSIS — J32.0 CHRONIC MAXILLARY SINUSITIS: ICD-10-CM

## 2018-03-21 RX ORDER — MEROPENEM 500 MG/1
INJECTION, POWDER, FOR SOLUTION INTRAVENOUS
Qty: 60 EACH | COMMUNITY
Start: 2018-03-21 | End: 2019-03-07

## 2018-03-21 ASSESSMENT — PAIN SCALES - GENERAL: PAINLEVEL: NO PAIN (0)

## 2018-03-21 NOTE — NURSING NOTE
"Chief Complaint   Patient presents with     RECHECK     merrem injection     Height 1.57 m (5' 1.81\"), weight 75.3 kg (166 lb).    .Jacky Rodriguez LPN    "

## 2018-03-21 NOTE — PATIENT INSTRUCTIONS
1.  You were seen in the ENT Clinic today by Dr. Haile.  If you have any questions or concerns after your appointment, please call [phone].  Press option #1 for scheduling related needs.  Press option #3 for Nurse advice.  2.  Plan is to return to clinic in 1 month for repeat procedure.     Ping FULLERN, RN  [facility]   Head & Neck Surgery

## 2018-03-21 NOTE — LETTER
3/21/2018       RE: Mamie Rice  [address] [facility] [id number]     Dear Colleague,    Thank you for referring your patient, Mamie Rice, to the [facility] EAR NOSE AND THROAT at [facility]. Please see a copy of my visit note below.    Mamie is here for merrem instillation.  No new issues.    Procedure:  Rigid endoscope used for visualization, 2cc merrem instilled into each maxillary sinus using endoscopic visualization.  No unusual findings. Left polyp    A/P:  Repeat procedure in 1 month.    Again, thank you for allowing me to participate in the care of your patient.      Sincerely,    Mariela Haile MD

## 2018-03-21 NOTE — MR AVS SNAPSHOT
After Visit Summary   3/21/2018    Mamie Rice    MRN: [id number]           Patient Information     Date Of Birth          1995        Visit Information        Provider Department      3/21/2018 1:45 PM Mariela Haile MD [facility] Ear Nose and Throat        Today's Diagnoses     Cystic fibrosis with pulmonary manifestations (H)    -  1    Chronic maxillary sinusitis          Care Instructions    1.  You were seen in the ENT Clinic today by Dr. Haile.  If you have any questions or concerns after your appointment, please call [phone].  Press option #1 for scheduling related needs.  Press option #3 for Nurse advice.  2.  Plan is to return to clinic in 1 month for repeat procedure.     Ping FULLERN, RN  [facility]   Head & Neck Surgery                 Follow-ups after your visit        Follow-up notes from your care team     Return in about 1 month (around 4/21/2018).      Your next 10 appointments already scheduled     Apr 18, 2018  7:30 AM CDT   CF LOOP with  PFL CF   [facility] Pulmonary Function Testing ([facility])    [address] [place] [address] [id number] [phone]            Apr 18, 2018  7:50 AM CDT   (Arrive by 7:35 AM)   RETURN CYSTIC FIBROSIS VISIT with Gavi Allison MD   [facility] ([facility])    [address] [place] [id number] [phone]            Apr 18, 2018  9:30 AM CDT   (Arrive by 9:15 AM)   Return Visit with Mariela Haile MD   [facility] Ear Nose and Throat ([facility])    [address] [place] [id number] [phone]            Apr 18, 2018 10:00 AM CDT   (Arrive by 9:45 AM)   Return Visit with Paige Reid PA-C   [facility] Dermatology ([facility])    [address] [place] [id number]    [phone]            May 01, 2018  1:45 PM CDT   (Arrive by 1:30 PM)   Return Visit with Randa Ho MD   [facility] ([facility])    [address] [place] [id number] [phone]            May 15, 2018  9:45 AM CDT   Adult Med Follow UP with DANIEL Mtz CNS   [facility] ([facility])    [place] [id number]  [address] [id number] [phone]            May 30, 2018  1:45 PM CDT   (Arrive by 1:30 PM)   Return Visit with Mariela Haile MD   [facility]ose [facility] Thro[facility] ([facility])    [address] [place] [id number] [phone]              Who to contact     Please call your clinic at [phone] to:    Ask questions about your health    Make or cancel appointments    Discuss your medicines    Learn about your test results    Speak to your doctor            Additional Information About Your Visit        [facility] Information     [employer] gives you secure access to your electronic health record. If you see a primary care provider, you can also send messages to your care team and make appointments. If you have questions, please call your primary care clinic.  If you do not have a primary care provider, please call [phone] and they will assist you.      [employer] is an electronic gateway that provides easy, online access to your medical records. With [employer], you can request a clinic appointment, read your test results, renew a prescription or communicate with your care team.     To access your existing account, please contact your [facility] or call [phone] for assistance.        Care EveryWhere ID     This is your Care EveryWhere ID. This could be used by other organizations to access your [place] medical records  [id number]        Your Vitals Were     Height  "BMI (Body Mass Index)                1.57 m (5' 1.81\") 30.55 kg/m2           Blood Pressure from Last 3 Encounters:   01/17/18 114/68   11/01/17 126/86   09/26/17 124/87    Weight from Last 3 Encounters:   03/21/18 75.3 kg (166 lb)   01/17/18 75 kg (165 lb 5.5 oz)   01/17/18 77.6 kg (171 lb)              We Performed the Following     NASAL ENDOSCOPY, DIAGNOSTIC     [facility]        Primary Care Provider Office Phone # Fax #    Malik De La Rosa [phone]       [address] [facility] [address]        Equal Access to Services     LUZMARIA MARTINEZ : [place] yuo Soyesenia, waaxda luqadaha, qaybta kaalmada adeegyaheron, david cardoso. So [facility] [phone].    ATENCIÓN: Si habla español, tiene a hidalgo disposición servicios gratuitos de asistencia lingüística. LlGal[facility] [phone].    We comply with applicable federal civil rights laws and [place] laws. We do not discriminate on the basis of race, color, national origin, age, disability, sex, sexual orientation, or gender identity.            Thank you!     Thank you for choosing [facility] EAR NOSE AND THROAT  for your care. Our goal is always to provide you with excellent care. Hearing back from our patients is one way we can continue to improve our services. Please take a few minutes to complete the written survey that you may receive in the mail after your visit with us. Thank you!             Your Updated Medication List - Protect others around you: Learn how to safely use, store and throw away your medicines at www.disposemymeds.org.          This list is accurate as of 3/21/18  2:52 PM.  Always use your most recent med list.                   Brand Name Dispense Instructions for use Diagnosis    acetaminophen 325 MG tablet    TYLENOL    100 tablet    Take 2 tablets (650 mg) by mouth every 4 hours as needed for other (mild pain)    Chronic pain of left knee "       acetylcysteine 20 % nebulizer solution    MUCOMYST    360 mL    INHALE ONE 4ML NEB INTO LUNGS VIA NEBULIZER TWO TIMES A DAY, MAY INCREASE TO 3-4 TIMES A DAY WITH INCREASE COUGH/COLD SYMPTOMS    CF (cystic fibrosis) (H)       * albuterol 108 (90 BASE) MCG/ACT Inhaler    PROAIR HFA/PROVENTIL HFA/VENTOLIN HFA    1 Inhaler    Inhale 2 puffs into the lungs every 6 hours as needed for shortness of breath / dyspnea or wheezing    Cystic fibrosis with pulmonary manifestations (H), Sinusitis, chronic, Diabetes mellitus type 1 (H)       * albuterol (2.5 MG/3ML) 0.083% neb solution     360 mL    Take 1 vial (2.5 mg) by nebulization 4 times daily    CF (cystic fibrosis) (H)       * amphetamine-dextroamphetamine 20 MG per 24 hr capsule    ADDERALL XR    30 capsule    Take 1 capsule (20 mg) by mouth daily    Attention deficit hyperactivity disorder (ADHD), predominantly inattentive type       * amphetamine-dextroamphetamine 20 MG per 24 hr capsule    ADDERALL XR    30 capsule    Take 1 capsule (20 mg) by mouth daily    Attention deficit hyperactivity disorder (ADHD), predominantly inattentive type       * amphetamine-dextroamphetamine 20 MG per 24 hr capsule   Start taking on:  4/10/2018    ADDERALL XR    30 capsule    Take 1 capsule (20 mg) by mouth daily    Attention deficit hyperactivity disorder (ADHD), predominantly inattentive type       ascorbic acid 500 MG tablet    VITAMIN C    100 tablet    TAKE ONE TABLET BY MOUTH TWICE A DAY    Cystic fibrosis with pulmonary manifestations (H)       azithromycin 500 MG tablet    ZITHROMAX    36 tablet    TAKE ONE TABLET BY MOUTH ON MONDAY, WEDNESDAY AND FRIDAY    CF (cystic fibrosis) (H)       beclomethasone 80 MCG/ACT Inhaler    QVAR    1 Inhaler    Inhale 1 puff into the lungs 2 times daily    Allergic rhinitis due to house dust mite       beta carotene 30348 UNIT capsule     36 capsule    Take 1 capsule (25,000 Units) by mouth Every Mon, Wed, Fri Morning    Cystic fibrosis  with pulmonary manifestations (H)       blood glucose monitoring test strip    ACCU-CHEK SMARTVIEW    1 Month    Use to test blood sugar 4 times daily or as directed.    Type I (juvenile type) diabetes mellitus without mention of complication, not stated as uncontrolled       buPROPion 150 MG 12 hr tablet    WELLBUTRIN SR    60 tablet    Take 1 tablet (150 mg) by mouth 2 times daily    Major depressive disorder, recurrent episode, mild (H)       cetirizine 10 MG tablet    zyrTEC    30 tablet    Take 1 tablet (10 mg) by mouth every evening    Allergic rhinitis due to [place] house dust mite, Urticaria, chronic       cholecalciferol 1000 UNIT tablet    vitamin D3    100 tablet    TAKE 1 TABLET BY MOUTH EVERY DAY    CF (cystic fibrosis) (H), Exocrine pancreatic insufficiency       DEPO-PROVERA IM           dornase alpha 1 MG/ML neb solution    PULMOZYME    75 mL    Inhale 2.5 mg into the lungs daily    Cystic fibrosis with pulmonary manifestations (H)       FLUoxetine 40 MG capsule    PROZAC    60 capsule    Take 2 capsules (80 mg) by mouth daily    Cystic fibrosis with pulmonary manifestations (H)       GLYCOPYRROLATE PO      Take 1 mg by mouth 2 times daily        hydrOXYzine 25 MG tablet    ATARAX    90 tablet    Take 1 tablet (25 mg) by mouth every 6 hours as needed for itching (and nausea) And one to two at bedtime for insomnia    Chronic pain of left knee       ISOtretinoin 30 MG Caps     60 capsule    Take 1 capsule by mouth 2 times daily With meals.    Acne vulgaris       MEPHYTON 5 MG tablet   Generic drug:  phytonadione     4 tablet    TAKE 1 TABLET BY MOUTH ONCE A WEEK    Cystic fibrosis with pulmonary manifestations (H), Cystic fibrosis with pulmonary manifestations (H), Aspergillosis (H), Pancreatic insufficiency       * meropenem 500 MG vial    MERREM    4 mL    500 mg by Nasal Instillation route as needed        * meropenem 500 MG vial    MERREM    60 each    For sinus irrigation        * meropenem 500  MG vial    MERREM    60 each         methylcellulose (laxative) Powd    CITRUCEL    479 g    Start with 1 heaping tablespoon. Increase as needed, 1 heaping tablespoon at a time, up to 3 times per day.    Other constipation       montelukast 10 MG tablet    SINGULAIR    30 tablet    TAKE 1 TABLET BY MOUTH DAILY AT BEDTIME    CF (cystic fibrosis) (H)       MULTIVITAMIN PO      Take 1 tablet by mouth daily.        mupirocin 2 % ointment    BACTROBAN    22 g    Use in nose twice daily for 5 days    Nasal vestibulitis       * naltrexone 50 MG tablet    DEPADE;REVIA    30 tablet    Take 1/2 Tablet daily then increase to maximum of 1 Full Tablet daily as tolerated.  Time it one to two hours prior to worst cravings    Non morbid obesity due to excess calories       * naltrexone 50 MG tablet    DEPADE;REVIA    60 tablet    Take 1 tablet.  Time it one to two hours prior to worst cravings.    Obesity       omeprazole 20 MG CR capsule    priLOSEC    60 capsule    TAKE 1 CAPSULE BY MOUTH TWICE A DAY    CF (cystic fibrosis) (H)       oseltamivir 75 MG capsule    TAMIFLU    10 capsule    Take 1 capsule (75 mg) by mouth daily    Cystic fibrosis with pulmonary manifestations (H)       traZODone 100 MG tablet    DESYREL    30 tablet    Take 1 tablet (100 mg) by mouth At Bedtime    Insomnia, unspecified type       vitamin E 400 UNIT capsule     60 capsule    TAKE 1 CAPSULE BY MOUTH TWICE A DAY    CF (cystic fibrosis) (H), Pancreatic insufficiency       * Notice:  This list has 10 medication(s) that are the same as other medications prescribed for you. Read the directions carefully, and ask your doctor or other care provider to review them with you.

## 2018-03-21 NOTE — NURSING NOTE
The following medication was given:     MEDICATION: Meropenem  ROUTE: Nasal instillation   SITE: Nasal   DOSE: 500 mg  LOT #: [id number]  :  Ctrip  EXPIRATION DATE:  06/2019  NDC#: 82665-566-71    Medication reconstituted with Normal Saline    The following medication was given:     MEDICATION: 0.9% Sodium Chloride 10 mL Vial  ROUTE: Nasal instillation   SITE: Nose  DOSE: 4 mL  LOT #: -[id number]  :  Hospira  EXP: 4/5/2019  ND: 3997-8107-26  Med waste: 6 mL - single use vial

## 2018-04-10 DIAGNOSIS — E84.9 CF (CYSTIC FIBROSIS) (H): Primary | ICD-10-CM

## 2018-04-10 ASSESSMENT — ENCOUNTER SYMPTOMS
JAUNDICE: 0
DYSURIA: 1
HEARTBURN: 0
DIFFICULTY URINATING: 1
RECTAL PAIN: 1
CONSTIPATION: 0
BOWEL INCONTINENCE: 0
FLANK PAIN: 0
NAUSEA: 0
HEMATURIA: 0
ABDOMINAL PAIN: 1
VOMITING: 0
BLOOD IN STOOL: 0
DIARRHEA: 0
BLOATING: 0

## 2018-04-18 ENCOUNTER — HOSPITAL ENCOUNTER (OUTPATIENT)
Dept: PHYSICAL THERAPY | Facility: CLINIC | Age: 23
Setting detail: THERAPIES SERIES
End: 2018-04-18
Attending: INTERNAL MEDICINE
Payer: COMMERCIAL

## 2018-04-18 ENCOUNTER — OFFICE VISIT (OUTPATIENT)
Dept: OTOLARYNGOLOGY | Facility: CLINIC | Age: 23
End: 2018-04-18
Payer: COMMERCIAL

## 2018-04-18 ENCOUNTER — OFFICE VISIT (OUTPATIENT)
Dept: PHARMACY | Facility: CLINIC | Age: 23
End: 2018-04-18

## 2018-04-18 ENCOUNTER — OFFICE VISIT (OUTPATIENT)
Dept: PULMONOLOGY | Facility: CLINIC | Age: 23
End: 2018-04-18
Attending: INTERNAL MEDICINE
Payer: COMMERCIAL

## 2018-04-18 ENCOUNTER — OFFICE VISIT (OUTPATIENT)
Dept: DERMATOLOGY | Facility: CLINIC | Age: 23
End: 2018-04-18
Payer: COMMERCIAL

## 2018-04-18 VITALS — WEIGHT: 166 LBS | HEIGHT: 62 IN | BODY MASS INDEX: 30.55 KG/M2

## 2018-04-18 VITALS
RESPIRATION RATE: 16 BRPM | OXYGEN SATURATION: 98 % | HEART RATE: 90 BPM | DIASTOLIC BLOOD PRESSURE: 83 MMHG | SYSTOLIC BLOOD PRESSURE: 120 MMHG

## 2018-04-18 DIAGNOSIS — E84.0 CYSTIC FIBROSIS OF THE LUNG (H): ICD-10-CM

## 2018-04-18 DIAGNOSIS — L74.519 FOCAL HYPERHIDROSIS: Primary | ICD-10-CM

## 2018-04-18 DIAGNOSIS — Z84.0: ICD-10-CM

## 2018-04-18 DIAGNOSIS — K59.00 CONSTIPATION, UNSPECIFIED CONSTIPATION TYPE: ICD-10-CM

## 2018-04-18 DIAGNOSIS — K86.89 PANCREATIC INSUFFICIENCY: ICD-10-CM

## 2018-04-18 DIAGNOSIS — F32.9 MAJOR DEPRESSIVE DISORDER: ICD-10-CM

## 2018-04-18 DIAGNOSIS — F90.9 OVERACTIVE CHILD: ICD-10-CM

## 2018-04-18 DIAGNOSIS — F51.01 PRIMARY INSOMNIA: ICD-10-CM

## 2018-04-18 DIAGNOSIS — E84.0 CYSTIC FIBROSIS OF THE LUNG (H): Primary | ICD-10-CM

## 2018-04-18 DIAGNOSIS — E84.9 CYSTIC FIBROSIS (H): Primary | ICD-10-CM

## 2018-04-18 DIAGNOSIS — F33.1 MODERATE EPISODE OF RECURRENT MAJOR DEPRESSIVE DISORDER (H): ICD-10-CM

## 2018-04-18 DIAGNOSIS — J32.0 CHRONIC MAXILLARY SINUSITIS: Primary | ICD-10-CM

## 2018-04-18 LAB
ALBUMIN UR-MCNC: NEGATIVE MG/DL
APPEARANCE UR: ABNORMAL
BACTERIA #/AREA URNS HPF: ABNORMAL /HPF
BACTERIA SPEC CULT: NORMAL
BILIRUB UR QL STRIP: NEGATIVE
COLOR UR AUTO: YELLOW
EXPTIME-PRE: 6.72 SEC
FEF2575-%PRED-PRE: 108 %
FEF2575-PRE: 3.95 L/SEC
FEF2575-PRED: 3.63 L/SEC
FEFMAX-%PRED-PRE: 136 %
FEFMAX-PRE: 9.02 L/SEC
FEFMAX-PRED: 6.63 L/SEC
FEV1-%PRED-PRE: 95 %
FEV1-PRE: 2.94 L
FEV1FEV6-PRE: 89 %
FEV1FEV6-PRED: 86 %
FEV1FVC-PRE: 89 %
FEV1FVC-PRED: 86 %
FIFMAX-PRE: 5.41 L/SEC
FVC-%PRED-PRE: 92 %
FVC-PRE: 3.29 L
FVC-PRED: 3.55 L
GLUCOSE UR STRIP-MCNC: NEGATIVE MG/DL
HGB UR QL STRIP: NEGATIVE
KETONES UR STRIP-MCNC: NEGATIVE MG/DL
LEUKOCYTE ESTERASE UR QL STRIP: ABNORMAL
NITRATE UR QL: NEGATIVE
PH UR STRIP: 7 PH (ref 5–7)
RBC #/AREA URNS AUTO: 1 /HPF (ref 0–2)
SOURCE: ABNORMAL
SP GR UR STRIP: 1.01 (ref 1–1.03)
SPECIMEN SOURCE: NORMAL
SQUAMOUS #/AREA URNS AUTO: 3 /HPF (ref 0–1)
TRANS CELLS #/AREA URNS HPF: <1 /HPF
UROBILINOGEN UR STRIP-MCNC: 0 MG/DL (ref 0–2)
WBC #/AREA URNS AUTO: 14 /HPF (ref 0–5)

## 2018-04-18 PROCEDURE — G8978 MOBILITY CURRENT STATUS: HCPCS | Mod: GP,CI

## 2018-04-18 PROCEDURE — 97161 PT EVAL LOW COMPLEX 20 MIN: CPT | Mod: GP

## 2018-04-18 PROCEDURE — 99605 MTMS BY PHARM NP 15 MIN: CPT | Performed by: PHARMACIST

## 2018-04-18 PROCEDURE — G0463 HOSPITAL OUTPT CLINIC VISIT: HCPCS | Mod: ZF

## 2018-04-18 PROCEDURE — 94375 RESPIRATORY FLOW VOLUME LOOP: CPT | Mod: ZF | Performed by: INTERNAL MEDICINE

## 2018-04-18 PROCEDURE — 87086 URINE CULTURE/COLONY COUNT: CPT | Performed by: INTERNAL MEDICINE

## 2018-04-18 PROCEDURE — G8980 MOBILITY D/C STATUS: HCPCS | Mod: GP,CI

## 2018-04-18 PROCEDURE — 81001 URINALYSIS AUTO W/SCOPE: CPT | Performed by: INTERNAL MEDICINE

## 2018-04-18 PROCEDURE — 40001012 ZZH STATISTIC PT CF CLINIC VISIT

## 2018-04-18 RX ORDER — POLYETHYLENE GLYCOL 3350 17 G/17G
1 POWDER, FOR SOLUTION ORAL DAILY PRN
Qty: 119 G | Refills: 3 | Status: SHIPPED | OUTPATIENT
Start: 2018-04-18 | End: 2023-01-17

## 2018-04-18 RX ORDER — MEROPENEM 500 MG/1
INJECTION, POWDER, FOR SOLUTION INTRAVENOUS
Qty: 1 EACH | COMMUNITY
Start: 2018-04-18 | End: 2019-03-07

## 2018-04-18 ASSESSMENT — ENCOUNTER SYMPTOMS
HYPOTENSION: 0
INSOMNIA: 0
EYE WATERING: 0
DYSPNEA ON EXERTION: 0
MEMORY LOSS: 0
BREAST MASS: 0
JAUNDICE: 0
EXTREMITY NUMBNESS: 0
COUGH DISTURBING SLEEP: 0
SNORES LOUDLY: 0
TROUBLE SWALLOWING: 0
NUMBNESS: 0
HEADACHES: 0
TREMORS: 0
BRUISES/BLEEDS EASILY: 0
FEVER: 0
STIFFNESS: 0
CHILLS: 0
TASTE DISTURBANCE: 0
TACHYCARDIA: 0
SINUS PAIN: 0
NAIL CHANGES: 0
JOINT SWELLING: 0
SMELL DISTURBANCE: 0
DECREASED APPETITE: 0
SKIN CHANGES: 0
LIGHT-HEADEDNESS: 0
POLYDIPSIA: 0
EYE REDNESS: 0
WHEEZING: 0
WEIGHT GAIN: 0
DOUBLE VISION: 0
SHORTNESS OF BREATH: 0
CONSTIPATION: 0
POSTURAL DYSPNEA: 0
FLANK PAIN: 0
BLOOD IN STOOL: 0
ABDOMINAL PAIN: 1
VOMITING: 0
NECK MASS: 0
DEPRESSION: 0
PARALYSIS: 0
CLAUDICATION: 0
INCREASED ENERGY: 0
DIARRHEA: 0
HEMATURIA: 0
HEMOPTYSIS: 0
RECTAL PAIN: 1
MUSCLE WEAKNESS: 0
NAUSEA: 0
WEIGHT LOSS: 0
SWOLLEN GLANDS: 0
PANIC: 0
SLEEP DISTURBANCES DUE TO BREATHING: 0
TINGLING: 0
SPUTUM PRODUCTION: 0
EYE IRRITATION: 0
HALLUCINATIONS: 0
ARTHRALGIAS: 0
LOSS OF CONSCIOUSNESS: 0
PALPITATIONS: 0
NERVOUS/ANXIOUS: 0
BACK PAIN: 0
DECREASED LIBIDO: 0
SORE THROAT: 0
POOR WOUND HEALING: 0
HOARSE VOICE: 0
FATIGUE: 0
MYALGIAS: 0
SPEECH CHANGE: 0
HEARTBURN: 0
SYNCOPE: 0
DECREASED CONCENTRATION: 0
COUGH: 0
SEIZURES: 0
DYSURIA: 1
EYE PAIN: 0
BREAST PAIN: 0
HOT FLASHES: 0
POLYPHAGIA: 0
BLOATING: 0
DIZZINESS: 0
ALTERED TEMPERATURE REGULATION: 0
BOWEL INCONTINENCE: 0
NECK PAIN: 0
LEG SWELLING: 0
WEAKNESS: 0
DIFFICULTY URINATING: 1
MUSCLE CRAMPS: 0
HYPERTENSION: 0
NIGHT SWEATS: 0
DISTURBANCES IN COORDINATION: 0
ORTHOPNEA: 0
SINUS CONGESTION: 0
RESPIRATORY PAIN: 0
LEG PAIN: 0
EXERCISE INTOLERANCE: 0

## 2018-04-18 ASSESSMENT — PAIN SCALES - GENERAL
PAINLEVEL: NO PAIN (0)

## 2018-04-18 NOTE — PROGRESS NOTES
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  Admittedly not very active at work in any job.  Denies any formal ex program. Denies any current pain in spite of partial L ACL tear.  Pulmonary symptoms are at baseline.     General Information Comments Pt presents with impaired pulmonary function due to Cystic Fibrosis, mild impaired posture and mild decreased activity/exercise tolerance, leading to decreased functional lung capacity and decreased airway clearance. Educated pt on role of PT in CF clinic: PT available to provide education and treatment for aerobic conditioning and strengthening HEP, postural reeducation to improve pulmonary function, incontinence treatment and prevention, pain managment associated with CF and strategies to reduce effects of osteoporosis.  Goal of PT is to increase quality of life, improve pulmonary function and decrease hospitalizations. Pt educated on personalized care plan including a tailored aerobic ex program and postural reeducation.  Pt declines need for PT at this time.  Educated on participating in 150min of aerobic ex /week in a mode that pt is likely to sustain such as walking, biking, running, swimming etc.  Educated that long term aerobic ex has been shown to maintain and decrease the decline of PFT's and important in reducing osteoporotic risk.     Fall Risk Screen   Fall screen completed by PT   Have you fallen 2 or more times in the past year? No   Have you fallen and had an injury in the past year? No   Is  patient a fall risk? No   Pain   Patient currently in pain No   Cognitive Status Examination   Orientation orientation to person, place and time   Level of Consciousness alert   Follows Commands and Answers Questions 100% of the time   Personal Safety and Judgment intact   Memory intact   Observation   Observation Pt agreeable to PT CF screen. Pt reports no incontinence of urine. Educated pt on PT's ability to provide treatment and education to improve incontinence and lessen the effects on daily activities and mobility. Pt verbalized understanding of available treatment and declines the need to address this with PT at this time.   Posture   Posture Comments Pt demonstrates mildly impaired posture with mild shoulder protraction and mild scapular winging. Pt's thoracic spine is in neutral alignment and cervical spine is in minimal forward head alignment.   Range of Motion (ROM)   ROM Comment Pt able to demonstrate full, 180 deg shoulder forward flexion, while still maintaining neutral spinal positioning, and with no pain. Pt does not demonstrate any compensatory patterns.  Pt achieves forward bend with finger tips 3 cm from floor.   Transfer Skills   Transfer Comments IND   Balance   Balance no deficits were identified   Planned Therapy Interventions   Planned Therapy Interventions strengthening   Clinical Impression   Criteria for Skilled Therapeutic Interventions Met patient/family refuse skilled intervention at this time   PT Diagnosis impaired posture and deconditioning    Risk & Benefits of therapy have been explained Yes   Patient, Family & other staff in agreement with plan of care Yes   Total Evaluation Time   Total Evaluation Time (Minutes) 15

## 2018-04-18 NOTE — PROGRESS NOTES
[facility]  [facility]  April 18, 2018         Assessment and Plan:   Mamie Rice is a 22 year old female with cystic fibrosis.    1. CF lung disease with normal spirometry:  Mamie reports since last being seen that from a pulmonary point of view she is doing fairly well.  She does continue to show evidence of Staph aureus and Achromobacter in her sputum.  She remains quite functional.  If anything, this has improved over time.     --  I would recommend that she continue on the present bronchial drainage and nebulized therapies.     --  Mamie remains quite active with work and she should continue to do so, as it does enhance her bronchial drainage.     2.  Lower abdominal pain.  Mamie describes some discomfort in her lower abdomen.  She denies any vaginal discharge.  She is currently not sexually active.  She does not menstruate as she is on Depo.  She says she is having some bladder symptoms as well.  In the past these symptoms have been related to constipation, but she does feel she is having regular, normal stools.  I have recommended today:    -- That we get a urine analysis.   --  That she try MiraLax for a few days to see if it allows for resolution of the symptoms.    --  If the symptoms were to persist, I would recommend she contact me for further evaluation.    --  I have also recommended that she consider a gynecologic evaluation if it were to persist as well.     3.  Abnormal glucose tolerance.  Mamie does check her blood sugars occasionally and reports that they are all within normal limits.     4.  Acne.  Mamie is followed closely by our dermatologists and had good results with their interventions.     5.  Weight management.  Mamie is seen by our [facility] and again is pleased with the results.  She is due to follow up with them in May.     6.  Psychosocial.  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  She reports that work is going quite well.  She does live at home with her mom.     7. Pancreatic Insufficiency:  The patient has no new symptoms consistent with worsening malabsorption.    - continue the present dose of pancreatic enzymes  - continue vitamin supplementation.    Gavi Allison MD MPH   of Medicine  Pulmonary, Allergy, Critical Care and Sleep Medicine      Interval History:     Mamie does continue to produce sputum that is green in color.  Her cough frequency and sputum volume are slightly above baseline.  Her activity tolerance remains good.  She does perform 2 vest therapies per day.          Review of Systems:     All questionnaires were reviewed by me today with the patient.  Review of Systems     Constitutional:  Negative for fever, chills, weight loss, weight gain, fatigue, decreased appetite, night sweats, recent stressors, height gain, height loss, post-operative complications, incisional pain, hallucinations, increased energy, hyperactivity and confused.   HENT:  Negative for ear pain, hearing loss, tinnitus, nosebleeds, trouble swallowing, hoarse voice, mouth sores, sore throat, ear discharge, tooth pain, gum tenderness, taste disturbance, smell disturbance, hearing aid, bleeding gums, dry mouth, sinus pain, sinus congestion and neck mass.    Eyes:  Negative for double vision, pain, redness, eye pain, decreased vision, eye watering, eye bulging, eye dryness, flashing lights, spots, floaters, strabismus, tunnel vision, jaundice and eye irritation.   Respiratory:   Negative for cough, hemoptysis, sputum production, shortness of breath, wheezing, sleep disturbances due to breathing, snores loudly, respiratory pain, dyspnea on exertion, cough disturbing sleep and postural dyspnea.    Cardiovascular:  Negative for chest pain, dyspnea on exertion, palpitations, orthopnea, claudication, leg swelling, fingers/toes turn blue, hypertension, hypotension, syncope,  history of heart murmur, chest pain on exertion, chest pain at rest, pacemaker, few scattered varicosities, leg pain, sleep disturbances due to breathing, tachycardia, light-headedness, exercise intolerance and edema.   Gastrointestinal:  Positive for abdominal pain and rectal pain. Negative for heartburn, nausea, vomiting, diarrhea, constipation, blood in stool, melena, bloating, bowel incontinence, jaundice, coffee ground emesis and change in stool.   Genitourinary:  Positive for bladder incontinence, dysuria, difficulty urinating and nocturia. Negative for urgency, hematuria, flank pain, vaginal discharge, genital sores, dyspareunia, decreased libido, voiding less frequently, arousal difficulty, abnormal vaginal bleeding, excessive menstruation, menstrual changes, hot flashes, vaginal dryness and postmenopausal bleeding.   Musculoskeletal:  Negative for myalgias, back pain, joint swelling, arthralgias, stiffness, muscle cramps, neck pain, bone pain, muscle weakness and fracture.   Skin:  Negative for nail changes, itching, poor wound healing, rash, hair changes, skin changes, acne, warts, poor wound healing, scarring, flaky skin, Raynaud's phenomenon, sensitivity to sunlight and skin thickening.   Neurological:  Negative for dizziness, tingling, tremors, speech change, seizures, loss of consciousness, weakness, light-headedness, numbness, headaches, disturbances in coordination, extremity numbness, memory loss, difficulty walking and paralysis.   Endo/Heme:  Negative for anemia, swollen glands and bruises/bleeds easily.   Psychiatric/Behavioral:  Negative for depression, hallucinations, memory loss, decreased concentration, mood swings and panic attacks.    Breast:  Negative for breast discharge, breast mass, breast pain and nipple retraction.   Endocrine:  Negative for altered temperature regulation, polyphagia, polydipsia, unwanted hair growth and change in facial hair.            Past Medical and Surgical  History:     Past Medical History:   Diagnosis Date     ADHD (attention deficit hyperactivity disorder)      Anxiety      Aspergillosis, with pneumonia (H)     fugus found caused chest pain     Chronic infection     CF, MRSA.,      Chronic sinusitis      Constipation, chronic      Cystic fibrosis with pulmonary manifestations (H) 12/19/2011     Cystic fibrosis without mention of meconium ileus     SWEAT TEST:Date: 2/17/1994   [facility]  296 mg, 104 mmol/L ClSample #2  295 mg, 104 mmol/L Cl GENOTYPING:Date: 10/15/2007,  Laboratory: [facility]: [id number]     Depressive disorder      Diabetes     no meds currently     Dysthymic disorder      Exocrine pancreatic insufficiency      Gastro-oesophageal reflux disease      Hip pain, right      MRSA (methicillin resistant Staphylococcus aureus) carrier      Pancreatic disease      Past Surgical History:   Procedure Laterality Date     ARTHROSCOPY HIP, OSTEOPLASTY FEMUR PROXIMAL, COMBINED  3/11/2013    Procedure: COMBINED ARTHROSCOPY HIP, OSTEOPLASTY FEMUR PROXIMAL;  Right Hip Arthroscopy, Labral  Debridement.    surgeon request choice anesthesia/admit to [facility] after surgery;  Surgeon: Omkar Austin MD;  Location: [facility]     ARTHROSCOPY KNEE WITH MEDIAL MENISCECTOMY Left 1/31/2017    Procedure: ARTHROSCOPY KNEE WITH MEDIAL MENISCECTOMY;  Surgeon: Jethro Coyle MD;  [facility]: [facility]     bronchoscopies       BRONCHOSCOPY       EXAM UNDER ANESTHESIA ANUS N/A 5/10/2016    Procedure: EXAM UNDER ANESTHESIA ANUS;  Surgeon: Chet Gaviria MD;  [facility]     EXAM UNDER ANESTHESIA, RESTORATIONS, EXTRACTION(S) DENTAL COMPLEX, COMBINED  5/13/2013    Procedure: COMBINED EXAM UNDER ANESTHESIA, RESTORATIONS, EXTRACTION(S) DENTAL COMPLEX;  Dental Exam, Radiographs, Restorations. Single Extraction  Tooth #2. Restorations x 3;  Surgeon: Danilo Ortiz DDS;  Location: [facility] OR     HC KNEE SCOPE, DIAGNOSTIC      Arthroscopy, Knee-  left     INJECT BOTOX N/A 5/10/2016    Procedure: INJECT BOTOX;  Surgeon: Chet Gaviria MD;  [facility]     left hip labral tear  5/11/2011    left hip arthroscopy with labral debridement and synovectomy     meniscus repair       OPTICAL TRACKING SYSTEM ENDOSCOPIC SINUS SURGERY  10/14/2011    Procedure:OPTICAL TRACKING SYSTEM ENDOSCOPIC SINUS SURGERY; FESS (functional endoscopic sinus surgery) with Image Guidance, bronchial lavage and cultures; Surgeon:GOYO KUO; Location:[facility] OR     OPTICAL TRACKING SYSTEM ENDOSCOPIC SINUS SURGERY  5/18/2012    Procedure:OPTICAL TRACKING SYSTEM ENDOSCOPIC SINUS SURGERY; Right  and Left Image Guided Functional Endoscopic Sinus Surgery With  Frontal Approach, [employer]; Surgeon:GOYO KUO; Location:[facility] OR     OPTICAL TRACKING SYSTEM ENDOSCOPIC SINUS SURGERY  9/26/2012    Procedure: OPTICAL TRACKING SYSTEM ENDOSCOPIC SINUS SURGERY;  Stealth Guided Bilateral Functional Endoscopic Sinus Surgery *Latex Safe*;  Surgeon: Goyo Kuo MD;  [facility]     OPTICAL TRACKING SYSTEM ENDOSCOPIC SINUS SURGERY Bilateral 10/16/2015    Procedure: OPTICAL TRACKING SYSTEM ENDOSCOPIC SINUS SURGERY;  Surgeon: Mariela Haile MD;  [facility]     ORTHOPEDIC SURGERY      left hip tear repair 2010     SINUS SURGERY             Family History:     Family History   Problem Relation Age of Onset     CANCER Paternal Grandmother      Skin Cancer Paternal Grandmother      Other Cancer Paternal Grandmother      Skin     Obesity Paternal Grandmother      CANCER Paternal Grandfather      PGF had throat cancer (he was a smoker)     Other Cancer Paternal Grandfather      Anxiety Disorder Paternal Grandfather      Thyroid Disease Mother      ,     Obesity Other      Anesthesia Reaction No family hx of      Blood Disease No family hx of      Colon Polyps No family hx of      Crohn Disease No family hx of      Ulcerative Colitis No family hx of      Colon Cancer No family hx of       Melanoma No family hx of           Social History:     Social History     Social History     Marital status: Single     Spouse name: N/A     Number of children: N/A     Years of education: N/A     Occupational History     Not on file.     Social History Main Topics     Smoking status: Never Smoker     Smokeless tobacco: Never Used      Comment: one person at home smokes outside     Alcohol use No     Drug use: No     Sexual activity: No     Other Topics Concern     Blood Transfusions No     Exercise Yes     Social History Narrative    Mamie lives with mother in [place], [place].  There is a cat in the home, but Mamie does not have any litterbox duties.  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  She gets essentially no exercise because of the tingling in her feet (says it bothers her even to stand).        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        8/2015 no change in social situation        2/15/2016 Pt is single and lives with mother and stepfather.          Medications:     Current Outpatient Prescriptions   Medication     polyethylene glycol (MIRALAX) powder     acetaminophen (TYLENOL) 325 MG tablet     acetylcysteine (MUCOMYST) 20 % nebulizer solution     albuterol (2.5 MG/3ML) 0.083% neb solution     albuterol (PROAIR HFA, PROVENTIL HFA, VENTOLIN HFA) 108 (90 BASE) MCG/ACT inhaler     aluminum chloride (DRYSOL) 20 % external solution     amphetamine-dextroamphetamine (ADDERALL XR) 20 MG per 24 hr capsule     amphetamine-dextroamphetamine (ADDERALL XR) 20 MG per 24 hr capsule     amphetamine-dextroamphetamine (ADDERALL XR) 20 MG per 24 hr capsule     ascorbic acid (VITAMIN C) 500 MG tablet     azithromycin (ZITHROMAX) 500 MG tablet     beta carotene 88698 UNIT capsule     blood glucose (ACCU-CHEK SMARTVIEW) test strip     buPROPion (WELLBUTRIN SR) 150 MG 12 hr tablet     cetirizine (ZYRTEC) 10 MG tablet     dornase alpha  (PULMOZYME) 1 MG/ML neb solution     FLUoxetine (PROZAC) 40 MG capsule     GLYCOPYRROLATE PO     hydrOXYzine (ATARAX) 25 MG tablet     MedroxyPROGESTERone Acetate (DEPO-PROVERA IM)     MEPHYTON 5 MG tablet     meropenem (MERREM) 500 MG vial     meropenem (MERREM) 500 MG vial     methylcellulose, laxative, (CITRUCEL) POWD     montelukast (SINGULAIR) 10 MG tablet     Multiple Vitamin (MULTIVITAMIN OR)     naltrexone (DEPADE;REVIA) 50 MG tablet     naltrexone (DEPADE;REVIA) 50 MG tablet     omeprazole (PRILOSEC) 20 MG CR capsule     oseltamivir (TAMIFLU) 75 MG capsule     traZODone (DESYREL) 100 MG tablet     VITAMIN D3 1000 UNITS tablet     vitamin E 400 UNIT capsule     No current facility-administered medications for this visit.      Facility-Administered Medications Ordered in Other Visits   Medication     albuterol (PROAIR HFA, PROVENTIL HFA, VENTOLIN HFA) inhaler          Physical Exam:   /83 (BP Location: Right arm, Patient Position: Chair, Cuff Size: Adult Regular)  Pulse 90  Resp 16  SpO2 98%    Constitutional:   Awake, alert and in no apparent distress     Eyes:   nonicteric     ENT:   oral mucosa moist without lesions, normal tm bilaterally, bilateral mucosal edema      Neck:   Supple without supraclavicular or cervical lymphadenopathy     Lungs:   Good air flow.  No crackles. No rhonchi.  No wheezes.     Cardiovascular:   Normal S1 and S2.  RRR.  No murmur, gallop or rub.     Abdomen:   NABS, soft, nontender, nondistended.       Musculoskeletal:   No edema, digital clubbing present     Neurologic:   Alert and conversant.     Skin:   Warm, dry.  No rash on limited exam.             Data:   All laboratory and imaging data reviewed.    Cystic Fibrosis Culture  Specimen Description   Date Value Ref Range Status   04/18/2018 Midstream Urine  Final   04/18/2018 Throat  Final   11/01/2017 Sputum  Final    Culture Micro   Date Value Ref Range Status   04/18/2018 No growth  Final   04/18/2018 Canceled, Test  credited  Final   04/18/2018 Canceled:  Specimen improperly labeled.  Final   04/18/2018   Final    Notification of test cancellation was given to  Ira SHAIKH from [facility].4/18/18 at 1016.TV.          Recent Results (from the past 168 hour(s))   General PFT Lab (Please always keep checked)    Collection Time: 04/18/18  7:46 AM   Result Value Ref Range    FVC-Pred 3.55 L    FVC-Pre 3.29 L    FVC-%Pred-Pre 92 %    FEV1-Pre 2.94 L    FEV1-%Pred-Pre 95 %    FEV1FVC-Pred 86 %    FEV1FVC-Pre 89 %    FEFMax-Pred 6.63 L/sec    FEFMax-Pre 9.02 L/sec    FEFMax-%Pred-Pre 136 %    FEF2575-Pred 3.63 L/sec    FEF2575-Pre 3.95 L/sec    YMK5475-%Pred-Pre 108 %    ExpTime-Pre 6.72 sec    FIFMax-Pre 5.41 L/sec    FEV1FEV6-Pred 86 %    FEV1FEV6-Pre 89 %   Cystic Fibrosis Culture Aerob Bacterial    Collection Time: 04/18/18  8:00 AM   Result Value Ref Range    Specimen Description Throat     Culture Micro Canceled, Test credited     Culture Micro Canceled:  Specimen improperly labeled.     Culture Micro       Notification of test cancellation was given to  Ira SHAIKH from [facility].4/18/18 at 1016.TV.     UA with Microscopic reflex to Culture    Collection Time: 04/18/18  9:45 AM   Result Value Ref Range    Color Urine Yellow     Appearance Urine Slightly Cloudy     Glucose Urine Negative NEG^Negative mg/dL    Bilirubin Urine Negative NEG^Negative    Ketones Urine Negative NEG^Negative mg/dL    Specific Gravity Urine 1.008 1.003 - 1.035    Blood Urine Negative NEG^Negative    pH Urine 7.0 5.0 - 7.0 pH    Protein Albumin Urine Negative NEG^Negative mg/dL    Urobilinogen mg/dL 0.0 0.0 - 2.0 mg/dL    Nitrite Urine Negative NEG^Negative    Leukocyte Esterase Urine Moderate (A) NEG^Negative    Source Midstream Urine     WBC Urine 14 (H) 0 - 5 /HPF    RBC Urine 1 0 - 2 /HPF    Bacteria Urine Many (A) NEG^Negative /HPF    Squamous Epithelial /HPF Urine 3 (H) 0 - 1 /HPF    Transitional Epi <1 (A) FEW^Few /HPF   Urine Culture Aerobic Bacterial     Collection Time: 04/18/18  9:45 AM   Result Value Ref Range    Specimen Description Midstream Urine     Special Requests Specimen received in preservative     Culture Micro No growth        PFT: The spirometry is normal.  When compared to 1/17/2018, the FEV1 and FVC have little change.      Pulmonary exacerbation: absent    1.  Mamie reports since last being seen that from a pulmonary point of view she is doing fairly well.  She does continue to show evidence of Staph aureus and Achromobacter in her sputum.  She remains quite functional.  If anything, this has improved over time.     A.  I would recommend that she continue on the present bronchial drainage and nebulized therapies.     B.  Mamie remains quite active with work and she should continue to do so, as it does enhance her bronchial drainage.   2.  Lower abdominal pain.  Mamie describes some discomfort in her lower abdomen.  She denies any vaginal discharge.  She is currently not sexually active.  She does not menstruate as she is on Depo.  She says she is having some bladder symptoms as well.  In the past these symptoms have been related to constipation, but she does feel she is having regular, normal stools.  I have recommended today:    A.  That we get a urine analysis.   B.  That she try MiraLax for a few days to see if it allows for resolution of the symptoms.    C.  If the symptoms were to persist, I would recommend she contact me for further evaluation.    D.  I have also recommended that she consider a gynecologic evaluation if it were to persist as well.   3.  Abnormal glucose tolerance.  Mamie does check her blood sugars occasionally and reports that they are all within normal limits.   4.  Acne.  Mamie is followed closely by our dermatologists and had good results with their interventions.   5.  Weight management.  Mamie is seen by our [facility] and again is pleased with the results.  She is due to follow up with them in May.    6.  Psychosocial.  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  She reports that work is going quite well.  She does live at home with her mom.

## 2018-04-18 NOTE — NURSING NOTE
Chief Complaint   Patient presents with     Cystic Fibrosis     Patient is being seen CF followu p      Nean Blank CMA at 8:03 AM on 4/18/2018

## 2018-04-18 NOTE — MR AVS SNAPSHOT
After Visit Summary   4/18/2018    Mamie Rice    MRN: [id number]           Patient Information     Date Of Birth          1995        Visit Information        Provider Department      4/18/2018 10:00 AM Paige Reid PA-C [facility] D[facility]atology        Today's Diagnoses     Focal hyperhidrosis    -  1    Family history of acne vulgaris           Follow-ups after your visit        Follow-up notes from your care team     Return in about 3 months (around 7/18/2018).      Your next 10 appointments already scheduled     May 01, 2018  1:45 PM CDT   (Arrive by 1:30 PM)   Return Visit with Randa Ho MD   [facility] Medical Weight Management ([facility])    [address] [place] [id number]   [phone]            May 15, 2018  9:45 AM CDT   Adult Med Follow UP with DANIEL Mtz CNS   [facility] ([facility])    [place] [id number]  [address] [place] [id number]   [phone]            May 30, 2018  1:45 PM CDT   (Arrive by 1:30 PM)   Return Visit with Mariela Haile MD   [facility] Ear Nose and Throat ([facility])    [address] [place] [id number]   [phone]            Jul 18, 2018  8:45 AM CDT   (Arrive by 8:30 AM)   Return Visit with Paige Reid PA-C   [facility] Dermatology ([facility])    [address] [place] [id number]   [phone]            Jul 18, 2018  9:30 AM CDT   CF LOOP with  PFL CF   [facility] Pulmonary Function Testing ([facility])    [address] [place] [id number]   [phone]            Jul 18, 2018  9:50 AM CDT   (Arrive by 9:35 AM)   RETURN CYSTIC FIBROSIS VISIT with Gavi Allison MD   [facility] ([facility])     [address] [place] [id number] [phone]              Who to contact     Please call your clinic at [phone] to:    Ask questions about your health    Make or cancel appointments    Discuss your medicines    Learn about your test results    Speak to your doctor            Additional Information About Your Visit        MyChart Information     [employer] gives you secure access to your electronic health record. If you see a primary care provider, you can also send messages to your care team and make appointments. If you have questions, please call your primary care clinic.  If you do not have a primary care provider, please call [phone] and they will assist you.      [employer] is an electronic gateway that provides easy, online access to your medical records. With [employer], you can request a clinic appointment, read your test results, renew a prescription or communicate with your care team.     To access your existing account, please contact your [facility] or call [phone] for assistance.        Care EveryWhere ID     This is your Care EveryWhere ID. This could be used by other organizations to access your [place] medical records  [id number]         Blood Pressure from Last 3 Encounters:   04/18/18 120/83   01/17/18 114/68   11/01/17 126/86    Weight from Last 3 Encounters:   04/18/18 75.3 kg (166 lb)   03/21/18 75.3 kg (166 lb)   01/17/18 75 kg (165 lb 5.5 oz)              We Performed the Following     Eye Exam - HIM Scan          Today's Medication Changes          These changes are accurate as of 4/18/18 11:59 PM.  If you have any questions, ask your nurse or doctor.               Start taking these medicines.        Dose/Directions    aluminum chloride 20 % external solution   Commonly known as:  DRYSOL   Used for:  Focal hyperhidrosis   Started by:  Paige Reid PA-C        Apply topically At Bedtime Everyother night to  every 3rd night. With impovement, decrease to 1-2 times weekly. Irritation may occur.   Quantity:  60 mL   Refills:  3       polyethylene glycol powder   Commonly known as:  MIRALAX   Used for:  Cystic fibrosis of the lung (H), Constipation, unspecified constipation type   Started by:  Gavi Allison MD        Dose:  1 capful   Take 17 g (1 capful) by mouth daily as needed for constipation   Quantity:  119 g   Refills:  3         These medicines have changed or have updated prescriptions.        Dose/Directions    * meropenem 500 MG vial   Commonly known as:  MERREM   This may have changed:  Another medication with the same name was removed. Continue taking this medication, and follow the directions you see here.   Changed by:  Gavi Allison MD        Quantity:  60 each   Refills:  0       * meropenem 500 MG vial   Commonly known as:  MERREM   This may have changed:  Another medication with the same name was removed. Continue taking this medication, and follow the directions you see here.   Changed by:  Gavi Allison MD        Give x 1 today   Quantity:  1 each   Refills:  0       * Notice:  This list has 2 medication(s) that are the same as other medications prescribed for you. Read the directions carefully, and ask your doctor or other care provider to review them with you.      Stop taking these medicines if you haven't already. Please contact your care team if you have questions.     beclomethasone 80 MCG/ACT Inhaler   Commonly known as:  QVAR   Stopped by:  Gavi Allison MD           ISOtretinoin 30 MG Caps   Stopped by:  Gavi Allison MD           mupirocin 2 % ointment   Commonly known as:  BACTROBAN   Stopped by:  Gavi Allison MD                Where to get your medicines      These medications were sent to [facility] - [address] [address]     Phone:  [phone]      aluminum chloride 20 % external solution    polyethylene glycol powder                Primary Care Provider Office Phone # Fax #    Malik De La Rosa [phone]       [address] [facility] [address]        Equal Access to Services     LUZMARIA MARTINEZ : Hadii aad ku haddelmasienna Soyesenia, waaxda luqadaha, qaybta kaalmada adeegyada, david smithmalcolmdeepika cardoso. So [facility] [phone].    ATENCIÓN: Si habla español, tiene a hidalgo disposición servicios gratuitos de asistencia lingüística. Llame al [phone].    We comply with applicable federal civil rights laws and [place] laws. We do not discriminate on the basis of race, color, national origin, age, disability, sex, sexual orientation, or gender identity.            Thank you!     Thank you for choosing [facility] DERMATOLOGY  for your care. Our goal is always to provide you with excellent care. Hearing back from our patients is one way we can continue to improve our services. Please take a few minutes to complete the written survey that you may receive in the mail after your visit with us. Thank you!             Your Updated Medication List - Protect others around you: Learn how to safely use, store and throw away your medicines at www.disposemymeds.org.          This list is accurate as of 4/18/18 11:59 PM.  Always use your most recent med list.                   Brand Name Dispense Instructions for use Diagnosis    acetaminophen 325 MG tablet    TYLENOL    100 tablet    Take 2 tablets (650 mg) by mouth every 4 hours as needed for other (mild pain)    Chronic pain of left knee       acetylcysteine 20 % nebulizer solution    MUCOMYST    360 mL    INHALE ONE 4ML NEB INTO LUNGS VIA NEBULIZER TWO TIMES A DAY, MAY INCREASE TO 3-4 TIMES A DAY WITH INCREASE COUGH/COLD SYMPTOMS    CF (cystic fibrosis) (H)       * albuterol 108 (90 Base) MCG/ACT Inhaler    PROAIR HFA/PROVENTIL HFA/VENTOLIN HFA    1 Inhaler     Inhale 2 puffs into the lungs every 6 hours as needed for shortness of breath / dyspnea or wheezing    Cystic fibrosis with pulmonary manifestations (H), Sinusitis, chronic, Diabetes mellitus type 1 (H)       * albuterol (2.5 MG/3ML) 0.083% neb solution     360 mL    Take 1 vial (2.5 mg) by nebulization 4 times daily    CF (cystic fibrosis) (H)       aluminum chloride 20 % external solution    DRYSOL    60 mL    Apply topically At Bedtime Everyother night to every 3rd night. With impovement, decrease to 1-2 times weekly. Irritation may occur.    Focal hyperhidrosis       * amphetamine-dextroamphetamine 20 MG per 24 hr capsule    ADDERALL XR    30 capsule    Take 1 capsule (20 mg) by mouth daily    Attention deficit hyperactivity disorder (ADHD), predominantly inattentive type       * amphetamine-dextroamphetamine 20 MG per 24 hr capsule    ADDERALL XR    30 capsule    Take 1 capsule (20 mg) by mouth daily    Attention deficit hyperactivity disorder (ADHD), predominantly inattentive type       * amphetamine-dextroamphetamine 20 MG per 24 hr capsule    ADDERALL XR    30 capsule    Take 1 capsule (20 mg) by mouth daily    Attention deficit hyperactivity disorder (ADHD), predominantly inattentive type       ascorbic acid 500 MG tablet    VITAMIN C    100 tablet    TAKE ONE TABLET BY MOUTH TWICE A DAY    Cystic fibrosis with pulmonary manifestations (H)       azithromycin 500 MG tablet    ZITHROMAX    36 tablet    TAKE ONE TABLET BY MOUTH ON MONDAY, WEDNESDAY AND FRIDAY    CF (cystic fibrosis) (H)       beta carotene 48831 UNIT capsule     36 capsule    Take 1 capsule (25,000 Units) by mouth Every Mon, Wed, Fri Morning    Cystic fibrosis with pulmonary manifestations (H)       blood glucose monitoring test strip    ACCU-CHEK SMARTVIEW    1 Month    Use to test blood sugar 4 times daily or as directed.    Type I (juvenile type) diabetes mellitus without mention of complication, not stated as uncontrolled       buPROPion  150 MG 12 hr tablet    WELLBUTRIN SR    60 tablet    Take 1 tablet (150 mg) by mouth 2 times daily    Major depressive disorder, recurrent episode, mild (H)       cetirizine 10 MG tablet    zyrTEC    30 tablet    Take 1 tablet (10 mg) by mouth every evening    Allergic rhinitis due to [employer] house dust mite, Urticaria, chronic       cholecalciferol 1000 UNIT tablet    vitamin D3    100 tablet    TAKE 1 TABLET BY MOUTH EVERY DAY    CF (cystic fibrosis) (H), Exocrine pancreatic insufficiency       DEPO-PROVERA IM           dornase alpha 1 MG/ML neb solution    PULMOZYME    75 mL    Inhale 2.5 mg into the lungs daily    Cystic fibrosis with pulmonary manifestations (H)       FLUoxetine 40 MG capsule    PROZAC    60 capsule    Take 2 capsules (80 mg) by mouth daily    Cystic fibrosis with pulmonary manifestations (H)       GLYCOPYRROLATE PO      Take 1 mg by mouth 2 times daily        hydrOXYzine 25 MG tablet    ATARAX    90 tablet    Take 1 tablet (25 mg) by mouth every 6 hours as needed for itching (and nausea) And one to two at bedtime for insomnia    Chronic pain of left knee       MEPHYTON 5 MG tablet   Generic drug:  phytonadione     4 tablet    TAKE 1 TABLET BY MOUTH ONCE A WEEK    Cystic fibrosis with pulmonary manifestations (H), Cystic fibrosis with pulmonary manifestations (H), Aspergillosis (H), Pancreatic insufficiency       * meropenem 500 MG vial    MERREM    60 each         * meropenem 500 MG vial    MERREM    1 each    Give x 1 today        methylcellulose (laxative) Powd    CITRUCEL    479 g    Start with 1 heaping tablespoon. Increase as needed, 1 heaping tablespoon at a time, up to 3 times per day.    Other constipation       montelukast 10 MG tablet    SINGULAIR    30 tablet    TAKE 1 TABLET BY MOUTH DAILY AT BEDTIME    CF (cystic fibrosis) (H)       MULTIVITAMIN PO      Take 1 tablet by mouth daily.        * naltrexone 50 MG tablet    DEPADE;REVIA    30 tablet    Take 1/2 Tablet daily then  increase to maximum of 1 Full Tablet daily as tolerated.  Time it one to two hours prior to worst cravings    Non morbid obesity due to excess calories       * naltrexone 50 MG tablet    DEPADE;REVIA    60 tablet    Take 1 tablet.  Time it one to two hours prior to worst cravings.    Obesity       omeprazole 20 MG CR capsule    priLOSEC    60 capsule    TAKE 1 CAPSULE BY MOUTH TWICE A DAY    CF (cystic fibrosis) (H)       oseltamivir 75 MG capsule    TAMIFLU    10 capsule    Take 1 capsule (75 mg) by mouth daily    Cystic fibrosis with pulmonary manifestations (H)       polyethylene glycol powder    MIRALAX    119 g    Take 17 g (1 capful) by mouth daily as needed for constipation    Cystic fibrosis of the lung (H), Constipation, unspecified constipation type       traZODone 100 MG tablet    DESYREL    30 tablet    Take 1 tablet (100 mg) by mouth At Bedtime    Insomnia, unspecified type       vitamin E 400 UNIT capsule     60 capsule    TAKE 1 CAPSULE BY MOUTH TWICE A DAY    CF (cystic fibrosis) (H), Pancreatic insufficiency       * Notice:  This list has 9 medication(s) that are the same as other medications prescribed for you. Read the directions carefully, and ask your doctor or other care provider to review them with you.

## 2018-04-18 NOTE — PROGRESS NOTES
[facility] Dermatology Note    Dermatology Problem List:  1. Acne vulgaris  - s/p BPO facial wash (stopped due to skin irritation), clindamycin 1% lotion, adapalene 0.1% cream, spironolactone 50 mg bid, accutane course - 12,000 mg  2. Hx of cheilitis, mupirocin 2% ointment  3. CF  4. DM Type II    Encounter Date: Apr 18, 2018    CC:   Chief Complaint   Patient presents with     Derm Problem     Acne - post accutane. Mamie notes that her skin is good.     Derm Problem     Would like to discuss hyperhidrosis     History of Present Illness:  This 22 year old female presents as a follow up for acne and an evaluation of hyperhidrosis. The patient was last seen on 1/27/18 when she finished her course of accutane. Today the patient reports that her skin is doing well and she has not had any break outs. She sometimes have small acne lesions on her back, but these resolve quickly. She reports that she is still having excess sweating in her groin area. She has to change her underwear 2 times a day and also changes her pants. She states that she also has to change her pajamas at night. This is very uncomfortable for her. The patient reports no other lesions of concern at this time.     Otherwise, the patient reports no painful, bleeding, nonhealing, or pruritic lesions, and denies new or changing moles.     Past Medical History:   Patient Active Problem List   Diagnosis     Hip joint pain     Aspergillosis (H)     Chronic maxillary sinusitis     Hypoglycemia     Type 1 diabetes mellitus (H)     Cystic fibrosis of the lung (H)     Chronic constipation     Frontal sinusitis     Major depressive disorder     Overactive child     Back pain     Pancreatic insufficiency     Non morbid obesity due to excess calories     Acne vulgaris     Cystic fibrosis (H)     Insomnia     Major depressive disorder with single episode     Diabetes mellitus, type 2 (H)     Persistent depressive disorder     Past Medical History:    Diagnosis Date     ADHD (attention deficit hyperactivity disorder)      Anxiety      Aspergillosis, with pneumonia (H)     fugus found caused chest pain     Chronic infection     CF, MRSA.,      Chronic sinusitis      Constipation, chronic      Cystic fibrosis with pulmonary manifestations (H) 12/19/2011     Cystic fibrosis without mention of meconium ileus     SWEAT TEST:Date: 2/17/1994   Laboratory[facility]  296 mg, 104 mmol/L ClSample #2  295 mg, 104 mmol/L Cl GENOTYPING:Date: 10/15/2007,  Laboratory: [facility]: [id number]     Depressive disorder      Diabetes     no meds currently     Dysthymic disorder      Exocrine pancreatic insufficiency      Gastro-oesophageal reflux disease      Hip pain, right      MRSA (methicillin resistant Staphylococcus aureus) carrier      Pancreatic disease      Past Surgical History:   Procedure Laterality Date     ARTHROSCOPY HIP, OSTEOPLASTY FEMUR PROXIMAL, COMBINED  3/11/2013    Procedure: COMBINED ARTHROSCOPY HIP, OSTEOPLASTY FEMUR PROXIMAL;  Right Hip Arthroscopy, Labral  Debridement.    surgeon request choice anesthesia/admit to [facility] after surgery;  Surgeon: Omkar Austin MD;  [facility]: [facility] OR     ARTHROSCOPY KNEE WITH MEDIAL MENISCECTOMY Left 1/31/2017    Procedure: ARTHROSCOPY KNEE WITH MEDIAL MENISCECTOMY;  Surgeon: Jethro Coyle MD;  Location: [facility] OR     bronchoscopies       BRONCHOSCOPY       EXAM UNDER ANESTHESIA ANUS N/A 5/10/2016    Procedure: EXAM UNDER ANESTHESIA ANUS;  Surgeon: Chet Gaviria MD;  [facility]     EXAM UNDER ANESTHESIA, RESTORATIONS, EXTRACTION(S) DENTAL COMPLEX, COMBINED  5/13/2013    Procedure: COMBINED EXAM UNDER ANESTHESIA, RESTORATIONS, EXTRACTION(S) DENTAL COMPLEX;  Dental Exam, Radiographs, Restorations. Single Extraction  Tooth #2. Restorations x 3;  Surgeon: Danilo Ortiz DDS;  Location: [facility] OR     HC KNEE SCOPE, DIAGNOSTIC      Arthroscopy, Knee- left     INJECT BOTOX N/A 5/10/2016     Procedure: INJECT BOTOX;  Surgeon: Chet Gaviria MD;  [facility]     left hip labral tear  5/11/2011    left hip arthroscopy with labral debridement and synovectomy     meniscus repair       OPTICAL TRACKING SYSTEM ENDOSCOPIC SINUS SURGERY  10/14/2011    Procedure:OPTICAL TRACKING SYSTEM ENDOSCOPIC SINUS SURGERY; FESS (functional endoscopic sinus surgery) with Image Guidance, bronchial lavage and cultures; Surgeon:GOYO KUO; Location:[facility] OR     OPTICAL TRACKING SYSTEM ENDOSCOPIC SINUS SURGERY  5/18/2012    Procedure:OPTICAL TRACKING SYSTEM ENDOSCOPIC SINUS SURGERY; Right  and Left Image Guided Functional Endoscopic Sinus Surgery With  Frontal Approach, [employer]; Surgeon:GOYO KUO; Location[facility] OR     OPTICAL TRACKING SYSTEM ENDOSCOPIC SINUS SURGERY  9/26/2012    Procedure: OPTICAL TRACKING SYSTEM ENDOSCOPIC SINUS SURGERY;  Stealth Guided Bilateral Functional Endoscopic Sinus Surgery *Latex Safe*;  Surgeon: Goyo Kuo MD;  [facility]     OPTICAL TRACKING SYSTEM ENDOSCOPIC SINUS SURGERY Bilateral 10/16/2015    Procedure: OPTICAL TRACKING SYSTEM ENDOSCOPIC SINUS SURGERY;  Surgeon: Mariela Haile MD;  [facility]     ORTHOPEDIC SURGERY      left hip tear repair 2010     SINUS SURGERY       Social History:  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 Former use of tanning beds (high school).     Family History:  There is a family history of presumed melanoma in the patient's grandmother.    Medications:  Current Outpatient Prescriptions   Medication Sig Dispense Refill     acetaminophen (TYLENOL) 325 MG tablet Take 2 tablets (650 mg) by mouth every 4 hours as needed for other (mild pain) 100 tablet 0     acetylcysteine (MUCOMYST) 20 % nebulizer solution INHALE ONE 4ML NEB INTO LUNGS VIA NEBULIZER TWO TIMES A DAY, MAY INCREASE TO 3-4 TIMES A DAY WITH INCREASE COUGH/COLD SYMPTOMS 360 mL 11     albuterol (2.5 MG/3ML) 0.083% neb solution Take 1 vial (2.5 mg)  by nebulization 4 times daily 360 mL 11     albuterol (PROAIR HFA, PROVENTIL HFA, VENTOLIN HFA) 108 (90 BASE) MCG/ACT inhaler Inhale 2 puffs into the lungs every 6 hours as needed for shortness of breath / dyspnea or wheezing 1 Inhaler 12     aluminum chloride (DRYSOL) 20 % external solution Apply topically At Bedtime Everyother night to every 3rd night. With impovement, decrease to 1-2 times weekly. Irritation may occur. 60 mL 3     amphetamine-dextroamphetamine (ADDERALL XR) 20 MG per 24 hr capsule Take 1 capsule (20 mg) by mouth daily 30 capsule 0     amphetamine-dextroamphetamine (ADDERALL XR) 20 MG per 24 hr capsule Take 1 capsule (20 mg) by mouth daily 30 capsule 0     amphetamine-dextroamphetamine (ADDERALL XR) 20 MG per 24 hr capsule Take 1 capsule (20 mg) by mouth daily 30 capsule 0     ascorbic acid (VITAMIN C) 500 MG tablet TAKE ONE TABLET BY MOUTH TWICE A  tablet 3     azithromycin (ZITHROMAX) 500 MG tablet TAKE ONE TABLET BY MOUTH ON MONDAY, WEDNESDAY AND FRIDAY 36 tablet 4     beta carotene 46646 UNIT capsule Take 1 capsule (25,000 Units) by mouth Every Mon, Wed, Fri Morning 36 capsule 4     blood glucose (ACCU-CHEK SMARTVIEW) test strip Use to test blood sugar 4 times daily or as directed. 1 Month 12     buPROPion (WELLBUTRIN SR) 150 MG 12 hr tablet Take 1 tablet (150 mg) by mouth 2 times daily 60 tablet 5     cetirizine (ZYRTEC) 10 MG tablet Take 1 tablet (10 mg) by mouth every evening 30 tablet 3     dornase alpha (PULMOZYME) 1 MG/ML neb solution Inhale 2.5 mg into the lungs daily 75 mL 3     FLUoxetine (PROZAC) 40 MG capsule Take 2 capsules (80 mg) by mouth daily 60 capsule 5     GLYCOPYRROLATE PO Take 1 mg by mouth 2 times daily        hydrOXYzine (ATARAX) 25 MG tablet Take 1 tablet (25 mg) by mouth every 6 hours as needed for itching (and nausea) And one to two at bedtime for insomnia 90 tablet 2     MedroxyPROGESTERone Acetate (DEPO-PROVERA IM)        MEPHYTON 5 MG tablet TAKE 1 TABLET BY  MOUTH ONCE A WEEK 4 tablet 11     meropenem (MERREM) 500 MG vial  60 each      methylcellulose, laxative, (CITRUCEL) POWD Start with 1 heaping tablespoon. Increase as needed, 1 heaping tablespoon at a time, up to 3 times per day. 479 g 3     montelukast (SINGULAIR) 10 MG tablet TAKE 1 TABLET BY MOUTH DAILY AT BEDTIME 30 tablet 11     Multiple Vitamin (MULTIVITAMIN OR) Take 1 tablet by mouth daily.       naltrexone (DEPADE;REVIA) 50 MG tablet Take 1/2 Tablet daily then increase to maximum of 1 Full Tablet daily as tolerated.  Time it one to two hours prior to worst cravings 30 tablet 3     naltrexone (DEPADE;REVIA) 50 MG tablet Take 1 tablet.  Time it one to two hours prior to worst cravings. 60 tablet 2     omeprazole (PRILOSEC) 20 MG CR capsule TAKE 1 CAPSULE BY MOUTH TWICE A DAY 60 capsule 11     oseltamivir (TAMIFLU) 75 MG capsule Take 1 capsule (75 mg) by mouth daily 10 capsule 0     polyethylene glycol (MIRALAX) powder Take 17 g (1 capful) by mouth daily as needed for constipation 119 g 3     traZODone (DESYREL) 100 MG tablet Take 1 tablet (100 mg) by mouth At Bedtime 30 tablet 5     VITAMIN D3 1000 UNITS tablet TAKE 1 TABLET BY MOUTH EVERY  tablet 3     vitamin E 400 UNIT capsule TAKE 1 CAPSULE BY MOUTH TWICE A DAY 60 capsule 11     Allergies   Allergen Reactions     Vancomycin Hives     Redmens skin rash   Redmens skin rash      Review of Systems:  - As per HPI    Physical exam:  There were no vitals taken for this visit.  GEN: This is a well developed, well-nourished female in no acute distress, in a pleasant mood.      SKIN: Acne exam, which includes the face, neck, upper central chest, and upper central back was performed.  - Few closed comedones on upper back  - No active acne on face  - Perspiration noted to inguinal folds  - No other lesions of concern on areas examined.     Impression/Plan:  1. Acne vulgaris, acne excoriee s/p accutane course, 67803 mg cumulative dose  - Start BPO wash in the  shower to face, chest, and back.    2. Hyperhidrosis, groin  - Discussion of treatment options including Drysol, oral medications - would need to discuss with CF physician, botox injections, laser treatments  - Start Drysol - apply topically at bedtime every other night to every 3rd night. If improved, okay to decrease to 1-2 times weekly. Discussion that irritation may occur.    Follow-up in 3 months, earlier for new or changing lesions.     Staff Involved:  Scribe Disclosure:   I, Lupe Alcantara, am serving as a scribe to document services personally performed by Paige Reid PA-C, based on data collection and the provider's statements to me.    Provider Disclosure:   The documentation recorded by the scribe accurately reflects the services I personally performed and the decisions made by me.    All risks, benefits and alternatives were discussed with patient.  Patient is in agreement and understands the assessment and plan.  All questions were answered.  Sun Screen Education was given.   Return to Clinic in 3 months or sooner as needed.   Paige Reid PA-C   [facility]

## 2018-04-18 NOTE — LETTER
4/18/2018       RE: Mamie Rice  [address] [facility] [address]     Dear Colleague,    Thank you for referring your patient, Mamie Rice, to the [facility] at [facility]. Please see a copy of my visit note below.    [facility]  April 18, 2018         Assessment and Plan:   Mamie Rice is a 22 year old female with cystic fibrosis.    1. CF lung disease with normal spirometry:  Mamie reports since last being seen that from a pulmonary point of view she is doing fairly well.  She does continue to show evidence of Staph aureus and Achromobacter in her sputum.  She remains quite functional.  If anything, this has improved over time.     --  I would recommend that she continue on the present bronchial drainage and nebulized therapies.     --  Mamie remains quite active with work and she should continue to do so, as it does enhance her bronchial drainage.     2.  Lower abdominal pain.  Mamie describes some discomfort in her lower abdomen.  She denies any vaginal discharge.  She is currently not sexually active.  She does not menstruate as she is on Depo.  She says she is having some bladder symptoms as well.  In the past these symptoms have been related to constipation, but she does feel she is having regular, normal stools.  I have recommended today:    -- That we get a urine analysis.   --  That she try MiraLax for a few days to see if it allows for resolution of the symptoms.    --  If the symptoms were to persist, I would recommend she contact me for further evaluation.    --  I have also recommended that she consider a gynecologic evaluation if it were to persist as well.     3.  Abnormal glucose tolerance.  Mamie does check her blood sugars occasionally and reports that they are all within normal limits.     4.  Acne.  Mamie is followed closely by our  dermatologists and had good results with their interventions.     5.  Weight management.  Mamie is seen by our [facility] and again is pleased with the results.  She is due to follow up with them in May.     6.  Psychosocial.  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  She reports that work is going quite well.  She does live at home with her mom.     7. Pancreatic Insufficiency:  The patient has no new symptoms consistent with worsening malabsorption.    - continue the present dose of pancreatic enzymes  - continue vitamin supplementation.    Gavi Allison MD MPH   of Medicine  Pulmonary, Allergy, Critical Care and Sleep Medicine      Interval History:     Mamie does continue to produce sputum that is green in color.  Her cough frequency and sputum volume are slightly above baseline.  Her activity tolerance remains good.  She does perform 2 vest therapies per day.          Review of Systems:     All questionnaires were reviewed by me today with the patient.  Review of Systems     Constitutional:  Negative for fever, chills, weight loss, weight gain, fatigue, decreased appetite, night sweats, recent stressors, height gain, height loss, post-operative complications, incisional pain, hallucinations, increased energy, hyperactivity and confused.   HENT:  Negative for ear pain, hearing loss, tinnitus, nosebleeds, trouble swallowing, hoarse voice, mouth sores, sore throat, ear discharge, tooth pain, gum tenderness, taste disturbance, smell disturbance, hearing aid, bleeding gums, dry mouth, sinus pain, sinus congestion and neck mass.    Eyes:  Negative for double vision, pain, redness, eye pain, decreased vision, eye watering, eye bulging, eye dryness, flashing lights, spots, floaters, strabismus, tunnel vision, jaundice and eye irritation.   Respiratory:   Negative for cough, hemoptysis, sputum production, shortness of breath, wheezing, sleep  disturbances due to breathing, snores loudly, respiratory pain, dyspnea on exertion, cough disturbing sleep and postural dyspnea.    Cardiovascular:  Negative for chest pain, dyspnea on exertion, palpitations, orthopnea, claudication, leg swelling, fingers/toes turn blue, hypertension, hypotension, syncope, history of heart murmur, chest pain on exertion, chest pain at rest, pacemaker, few scattered varicosities, leg pain, sleep disturbances due to breathing, tachycardia, light-headedness, exercise intolerance and edema.   Gastrointestinal:  Positive for abdominal pain and rectal pain. Negative for heartburn, nausea, vomiting, diarrhea, constipation, blood in stool, melena, bloating, bowel incontinence, jaundice, coffee ground emesis and change in stool.   Genitourinary:  Positive for bladder incontinence, dysuria, difficulty urinating and nocturia. Negative for urgency, hematuria, flank pain, vaginal discharge, genital sores, dyspareunia, decreased libido, voiding less frequently, arousal difficulty, abnormal vaginal bleeding, excessive menstruation, menstrual changes, hot flashes, vaginal dryness and postmenopausal bleeding.   Musculoskeletal:  Negative for myalgias, back pain, joint swelling, arthralgias, stiffness, muscle cramps, neck pain, bone pain, muscle weakness and fracture.   Skin:  Negative for nail changes, itching, poor wound healing, rash, hair changes, skin changes, acne, warts, poor wound healing, scarring, flaky skin, Raynaud's phenomenon, sensitivity to sunlight and skin thickening.   Neurological:  Negative for dizziness, tingling, tremors, speech change, seizures, loss of consciousness, weakness, light-headedness, numbness, headaches, disturbances in coordination, extremity numbness, memory loss, difficulty walking and paralysis.   Endo/Heme:  Negative for anemia, swollen glands and bruises/bleeds easily.   Psychiatric/Behavioral:  Negative for depression, hallucinations, memory loss, decreased  concentration, mood swings and panic attacks.    Breast:  Negative for breast discharge, breast mass, breast pain and nipple retraction.   Endocrine:  Negative for altered temperature regulation, polyphagia, polydipsia, unwanted hair growth and change in facial hair.            Past Medical and Surgical History:     Past Medical History:   Diagnosis Date     ADHD (attention deficit hyperactivity disorder)      Anxiety      Aspergillosis, with pneumonia (H)     fugus found caused chest pain     Chronic infection     CF, MRSA.,      Chronic sinusitis      Constipation, chronic      Cystic fibrosis with pulmonary manifestations (H) 12/19/2011     Cystic fibrosis without mention of meconium ileus     SWEAT TEST:Date: 2/17/1994   [facility]  296 mg, 104 mmol/L ClSample #2  295 mg, 104 mmol/L Cl GENOTYPING:Date: 10/15/2007,  Laboratory: [facility]otype: [id number]     Depressive disorder      Diabetes     no meds currently     Dysthymic disorder      Exocrine pancreatic insufficiency      Gastro-oesophageal reflux disease      Hip pain, right      MRSA (methicillin resistant Staphylococcus aureus) carrier      Pancreatic disease      Past Surgical History:   Procedure Laterality Date     ARTHROSCOPY HIP, OSTEOPLASTY FEMUR PROXIMAL, COMBINED  3/11/2013    Procedure: COMBINED ARTHROSCOPY HIP, OSTEOPLASTY FEMUR PROXIMAL;  Right Hip Arthroscopy, Labral  Debridement.    surgeon request choice anesthesia/admit to [facility] after surgery;  Surgeon: Omkar Austin MD;  Location: [facility] OR     ARTHROSCOPY KNEE WITH MEDIAL MENISCECTOMY Left 1/31/2017    Procedure: ARTHROSCOPY KNEE WITH MEDIAL MENISCECTOMY;  Surgeon: Jethro Coyle MD;  Location: [facility] OR     bronchoscopies       BRONCHOSCOPY       EXAM UNDER ANESTHESIA ANUS N/A 5/10/2016    Procedure: EXAM UNDER ANESTHESIA ANUS;  Surgeon: Chet Gaviria MD;  [facility]     EXAM UNDER ANESTHESIA, RESTORATIONS, EXTRACTION(S) DENTAL  COMPLEX, COMBINED  5/13/2013    Procedure: COMBINED EXAM UNDER ANESTHESIA, RESTORATIONS, EXTRACTION(S) DENTAL COMPLEX;  Dental Exam, Radiographs, Restorations. Single Extraction  Tooth #2. Restorations x 3;  Surgeon: Danilo Ortiz DDS;  Location: [facility] OR     HC KNEE SCOPE, DIAGNOSTIC      Arthroscopy, Knee- left     INJECT BOTOX N/A 5/10/2016    Procedure: INJECT BOTOX;  Surgeon: Chet Gaviria MD;  [facility]     left hip labral tear  5/11/2011    left hip arthroscopy with labral debridement and synovectomy     meniscus repair       OPTICAL TRACKING SYSTEM ENDOSCOPIC SINUS SURGERY  10/14/2011    Procedure:OPTICAL TRACKING SYSTEM ENDOSCOPIC SINUS SURGERY; FESS (functional endoscopic sinus surgery) with Image Guidance, bronchial lavage and cultures; Surgeon:GOYO KUO; Location:[facility] OR     OPTICAL TRACKING SYSTEM ENDOSCOPIC SINUS SURGERY  5/18/2012    Procedure:OPTICAL TRACKING SYSTEM ENDOSCOPIC SINUS SURGERY; Right  and Left Image Guided Functional Endoscopic Sinus Surgery With  Frontal Approach, [employer]; Surgeon:GOYO KUO; Location[facility] OR     OPTICAL TRACKING SYSTEM ENDOSCOPIC SINUS SURGERY  9/26/2012    Procedure: OPTICAL TRACKING SYSTEM ENDOSCOPIC SINUS SURGERY;  Stealth Guided Bilateral Functional Endoscopic Sinus Surgery *Latex Safe*;  Surgeon: Goyo Kuo MD;  [facility]     OPTICAL TRACKING SYSTEM ENDOSCOPIC SINUS SURGERY Bilateral 10/16/2015    Procedure: OPTICAL TRACKING SYSTEM ENDOSCOPIC SINUS SURGERY;  Surgeon: Mariela Haile MD;  [facility]     ORTHOPEDIC SURGERY      left hip tear repair 2010     SINUS SURGERY             Family History:     Family History   Problem Relation Age of Onset     CANCER Paternal Grandmother      Skin Cancer Paternal Grandmother      Other Cancer Paternal Grandmother      Skin     Obesity Paternal Grandmother      CANCER Paternal Grandfather      PGF had throat cancer (he was a smoker)     Other Cancer Paternal Grandfather      Anxiety  Disorder Paternal Grandfather      Thyroid Disease Mother      ,     Obesity Other      Anesthesia Reaction No family hx of      Blood Disease No family hx of      Colon Polyps No family hx of      Crohn Disease No family hx of      Ulcerative Colitis No family hx of      Colon Cancer No family hx of      Melanoma No family hx of           Social History:     Social History     Social History     Marital status: Single     Spouse name: N/A     Number of children: N/A     Years of education: N/A     Occupational History     Not on file.     Social History Main Topics     Smoking status: Never Smoker     Smokeless tobacco: Never Used      Comment: one person at home smokes outside     Alcohol use No     Drug use: No     Sexual activity: No     Other Topics Concern     Blood Transfusions No     Exercise Yes     Social History Narrative    Mamie lives with mother in [place], [place].  There is a cat in the home, but Mamie does not have any litterbox duties.  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  She gets essentially no exercise because of the tingling in her feet (says it bothers her even to stand).        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        8/2015 no change in social situation        2/15/2016 Pt is single and lives with mother and stepfather.          Medications:     Current Outpatient Prescriptions   Medication     polyethylene glycol (MIRALAX) powder     acetaminophen (TYLENOL) 325 MG tablet     acetylcysteine (MUCOMYST) 20 % nebulizer solution     albuterol (2.5 MG/3ML) 0.083% neb solution     albuterol (PROAIR HFA, PROVENTIL HFA, VENTOLIN HFA) 108 (90 BASE) MCG/ACT inhaler     aluminum chloride (DRYSOL) 20 % external solution     amphetamine-dextroamphetamine (ADDERALL XR) 20 MG per 24 hr capsule     amphetamine-dextroamphetamine (ADDERALL XR) 20 MG per 24 hr capsule     amphetamine-dextroamphetamine (ADDERALL XR) 20  MG per 24 hr capsule     ascorbic acid (VITAMIN C) 500 MG tablet     azithromycin (ZITHROMAX) 500 MG tablet     beta carotene 14841 UNIT capsule     blood glucose (ACCU-CHEK SMARTVIEW) test strip     buPROPion (WELLBUTRIN SR) 150 MG 12 hr tablet     cetirizine (ZYRTEC) 10 MG tablet     dornase alpha (PULMOZYME) 1 MG/ML neb solution     FLUoxetine (PROZAC) 40 MG capsule     GLYCOPYRROLATE PO     hydrOXYzine (ATARAX) 25 MG tablet     MedroxyPROGESTERone Acetate (DEPO-PROVERA IM)     MEPHYTON 5 MG tablet     meropenem (MERREM) 500 MG vial     meropenem (MERREM) 500 MG vial     methylcellulose, laxative, (CITRUCEL) POWD     montelukast (SINGULAIR) 10 MG tablet     Multiple Vitamin (MULTIVITAMIN OR)     naltrexone (DEPADE;REVIA) 50 MG tablet     naltrexone (DEPADE;REVIA) 50 MG tablet     omeprazole (PRILOSEC) 20 MG CR capsule     oseltamivir (TAMIFLU) 75 MG capsule     traZODone (DESYREL) 100 MG tablet     VITAMIN D3 1000 UNITS tablet     vitamin E 400 UNIT capsule     No current facility-administered medications for this visit.      Facility-Administered Medications Ordered in Other Visits   Medication     albuterol (PROAIR HFA, PROVENTIL HFA, VENTOLIN HFA) inhaler          Physical Exam:   /83 (BP Location: Right arm, Patient Position: Chair, Cuff Size: Adult Regular)  Pulse 90  Resp 16  SpO2 98%    Constitutional:   Awake, alert and in no apparent distress     Eyes:   nonicteric     ENT:   oral mucosa moist without lesions, normal tm bilaterally, bilateral mucosal edema      Neck:   Supple without supraclavicular or cervical lymphadenopathy     Lungs:   Good air flow.  No crackles. No rhonchi.  No wheezes.     Cardiovascular:   Normal S1 and S2.  RRR.  No murmur, gallop or rub.     Abdomen:   NABS, soft, nontender, nondistended.       Musculoskeletal:   No edema, digital clubbing present     Neurologic:   Alert and conversant.     Skin:   Warm, dry.  No rash on limited exam.             Data:   All laboratory  and imaging data reviewed.    Cystic Fibrosis Culture  Specimen Description   Date Value Ref Range Status   04/18/2018 Midstream Urine  Final   04/18/2018 Throat  Final   11/01/2017 Sputum  Final    Culture Micro   Date Value Ref Range Status   04/18/2018 No growth  Final   04/18/2018 Canceled, Test credited  Final   04/18/2018 Canceled:  Specimen improperly labeled.  Final   04/18/2018   Final    Notification of test cancellation was given to  Ira SHAIKH from [facility].4/18/18 at 1016.TV.          Recent Results (from the past 168 hour(s))   General PFT Lab (Please always keep checked)    Collection Time: 04/18/18  7:46 AM   Result Value Ref Range    FVC-Pred 3.55 L    FVC-Pre 3.29 L    FVC-%Pred-Pre 92 %    FEV1-Pre 2.94 L    FEV1-%Pred-Pre 95 %    FEV1FVC-Pred 86 %    FEV1FVC-Pre 89 %    FEFMax-Pred 6.63 L/sec    FEFMax-Pre 9.02 L/sec    FEFMax-%Pred-Pre 136 %    FEF2575-Pred 3.63 L/sec    FEF2575-Pre 3.95 L/sec    OJG1305-%Pred-Pre 108 %    ExpTime-Pre 6.72 sec    FIFMax-Pre 5.41 L/sec    FEV1FEV6-Pred 86 %    FEV1FEV6-Pre 89 %   Cystic Fibrosis Culture Aerob Bacterial    Collection Time: 04/18/18  8:00 AM   Result Value Ref Range    Specimen Description Throat     Culture Micro Canceled, Test credited     Culture Micro Canceled:  Specimen improperly labeled.     Culture Micro       Notification of test cancellation was given to  Ira SHAIKH from [facility].4/18/18 at 1016.TV.     UA with Microscopic reflex to Culture    Collection Time: 04/18/18  9:45 AM   Result Value Ref Range    Color Urine Yellow     Appearance Urine Slightly Cloudy     Glucose Urine Negative NEG^Negative mg/dL    Bilirubin Urine Negative NEG^Negative    Ketones Urine Negative NEG^Negative mg/dL    Specific Gravity Urine 1.008 1.003 - 1.035    Blood Urine Negative NEG^Negative    pH Urine 7.0 5.0 - 7.0 pH    Protein Albumin Urine Negative NEG^Negative mg/dL    Urobilinogen mg/dL 0.0 0.0 - 2.0 mg/dL    Nitrite Urine Negative NEG^Negative    Leukocyte  Esterase Urine Moderate (A) NEG^Negative    Source Midstream Urine     WBC Urine 14 (H) 0 - 5 /HPF    RBC Urine 1 0 - 2 /HPF    Bacteria Urine Many (A) NEG^Negative /HPF    Squamous Epithelial /HPF Urine 3 (H) 0 - 1 /HPF    Transitional Epi <1 (A) FEW^Few /HPF   Urine Culture Aerobic Bacterial    Collection Time: 04/18/18  9:45 AM   Result Value Ref Range    Specimen Description Midstream Urine     Special Requests Specimen received in preservative     Culture Micro No growth        PFT: The spirometry is normal.  When compared to 1/17/2018, the FEV1 and FVC have little change.      Pulmonary exacerbation: absent    1.  Mamie reports since last being seen that from a pulmonary point of view she is doing fairly well.  She does continue to show evidence of Staph aureus and Achromobacter in her sputum.  She remains quite functional.  If anything, this has improved over time.     A.  I would recommend that she continue on the present bronchial drainage and nebulized therapies.     B.  Mamie remains quite active with work and she should continue to do so, as it does enhance her bronchial drainage.   2.  Lower abdominal pain.  Mamie describes some discomfort in her lower abdomen.  She denies any vaginal discharge.  She is currently not sexually active.  She does not menstruate as she is on Depo.  She says she is having some bladder symptoms as well.  In the past these symptoms have been related to constipation, but she does feel she is having regular, normal stools.  I have recommended today:    A.  That we get a urine analysis.   B.  That she try MiraLax for a few days to see if it allows for resolution of the symptoms.    C.  If the symptoms were to persist, I would recommend she contact me for further evaluation.    D.  I have also recommended that she consider a gynecologic evaluation if it were to persist as well.   3.  Abnormal glucose tolerance.  Mamie does check her blood sugars occasionally and reports that  they are all within normal limits.   4.  Acne.  Mamie is followed closely by our dermatologists and had good results with their interventions.   5.  Weight management.  Mamie is seen by our Weight Management [facility] and again is pleased with the results.  She is due to follow up with them in May.   6.  Psychosocial.  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  She reports that work is going quite well.  She does live at home with her mom.         Respiratory Therapist Note:    Vest    Brand: Hill-Rom - traditional   Settings: Hill Rom: Frequencies 8, 9, 10 at pressure 10 then frequencies 18, 19, 20 at pressure 6.   Cough Pause: Yes.    Vest Garment Size: Adult Medium   Last Fitting Date: 2018   Frequency of therapy: 12 times per week   Concerns: none    Exercise:     Alternative Airway Clearance:       Nebulized Medications   Bronchodilators: Albuterol   Mucolytic: Mucomyst and Pulmozyme   Antibiotics:    Additional Inhaled Medications: 0.9% Normal Saline   Spacer Use: yes     Review Cleaning: Yes.     Education and Transition Information   Correct order of inhaled medications: Yes   Mechanism of Action of inhaled medications: Yes   Frequency of inhaled medications: Yes   Dosage of inhaled medications: Yes   Other:     Home Care:   Nebulizer Cups (Brand/Type): disposable   Nebulizer Compressor    Year Purchased: 2017   Home Care Company:     [facility], Phone: [phone], Fax: [phone]    Oxygen:    Pulmonary Rehab   Site:    Date Completed:     Plan of Care and Goals for next visit: increase coughing between settings        Again, thank you for allowing me to participate in the care of your patient.      Sincerely,    Gavi Allison MD

## 2018-04-18 NOTE — LETTER
4/18/2018       RE: Mamie Rice  [address] [place] [id number]     Dear Colleague,    Thank you for referring your patient, Mamie Rice, to the [facility] EAR [facility]SE [facility] THROAT at [facility]. Please see a copy of my visit note below.    Mamie has no new symptoms, here for instillation of meropenum into sinuses.    Procedure:  Rigid scope used for visualization to instill 2 cc meropenem into each sinus cavity.    She tolerates this will, return in 1 month to repeat.    Again, thank you for allowing me to participate in the care of your patient.      Sincerely,    Mariela Haile MD

## 2018-04-18 NOTE — MR AVS SNAPSHOT
After Visit Summary   4/18/2018    Mamie Rice    MRN: [id number]           Patient Information     Date Of Birth          1995        Visit Information        Provider Department      4/18/2018 7:50 AM Gavi Allison MD [facility]        Today's Diagnoses     Cystic fibrosis of the lung (H)    -  1    Constipation, unspecified constipation type          Care Instructions    Cystic Fibrosis Self-Care Plan       MRN: [id number]   Clinic Date: April 18, 2018   Patient: Mamie Rice     RECOMMENDATIONS: Start miralax daily until discomfort is improving, then may take as needed.  Call if discomfort does resolve.  Will check a urine.  I would talk primary doctor about a gyne exam.  Pulmozyme one time per day.  [facility] will fill Mucomyst for you - [phone].    YOUR GOAL:  Stay well.  Enjoy the spring.    CF Nurse Line:  Burton Anderson: [phone]   Jhoana Trinh, RT: [phone]     Amy Andino: [phone] or Theresa Ceja, Dieticians: [phone]   Rhonda Esteves, Diabetes Nurse: [phone]      Deyanira Figueroa: [phone] or Rosette Burnett [phone], Social Workers   [email or web address]    Annual Studies:   IGG   Date Value Ref Range Status   08/02/2017 689 (L) 695 - 1620 mg/dL Final     Insulin   Date Value Ref Range Status   03/30/2015 81 mU/L Final     Comment:     Reference Range:  0-20     There are no preventive care reminders to display for this patient.      Pulmonary Function Tests  FEV1: amount of air you can blow out in 1 second  FVC: total amount of air you can take in and blow out    Your Goals:         PFT Latest Ref Rng & Units 4/18/2018   FVC L 3.29   FEV1 L 2.94   FVC% % 92   FEV1% % 95          Airway Clearance: The Most Important Way to Keep Your Lungs Healthy  Vest Settings:    Hill-Rom Frequencies: 8, 9, 10 Pressure 10 Then, Frequencies 18, 19, 20 Pressure 6      RespirTech:  Quick Start with Pressure of     Do each frequency for 5 minutes; Deflate vest after each frequency & cough 3 times before beginning the next setting.    Vest and Neb Therapy should be done 2 times/day.    Good Nutrition Can Improve Lung Function and Overall Health     Take ALL of your vitamins with food     Take 1/2 of your enzymes before EVERY meal/snack and the other 1/2 mid-meal/snack    Wt Readings from Last 3 Encounters:   03/21/18 75.3 kg (166 lb)   01/17/18 75 kg (165 lb 5.5 oz)   01/17/18 77.6 kg (171 lb)       There is no height or weight on file to calculate BMI.         National CF Foundation Recommendations for BMI in CF Adults: Women: at least 22 Men: at least 23        Controlling Blood Sugars Helps Prevent Lung Infections & Improves Nutrition  Test blood sugar:     In the morning before eating (goal is )     2 hours after a meal (goal is less than 150)     When pre-meal glucose is greater than 150 add correction     At bedtime (if less than 100 eat a snack with 15 grams of carbohydrates  Last A1C Results:   Hemoglobin A1C   Date Value Ref Range Status   08/02/2017 5.5 4.3 - 6.0 % Final         If diabetic, measure A1C every 6 months. Goal: Under 7%    Staying Healthy    Research:  If you are interested in learning about research opportunities or have questions, please contact Mariana Smith at [phone] or [email or web address].      CF Foundation:  Compass is a personalized resource service to help you with the insurance, financial, legal and other issues you are facing.  It's free, confidential and available to anyone with CF.  Ask your  for more information or contact Compass directly at 988-COMPASS ([phone]) or compass@cff.org, or learn more at cff.org/compass.                             Follow-ups after your visit        Follow-up notes from your care team     Return in about 3 months (around 7/18/2018).      Your next 10 appointments already scheduled     Apr 18, 2018  10:00 AM CDT   (Arrive by 9:45 AM)   Return Visit with Paige Reid PA-C   [facility] Dermatology ([facility])    [address]  [address] [facility] [id number] [phone]            Apr 18, 2018 10:15 AM CDT   LAB with  LAB   [facility] Lab ([facility])    [address] [place] [address]  [address] [facility] [id number] [phone]           Please do not eat 10-12 hours before your appointment if you are coming in fasting for labs on lipids, cholesterol, or glucose (sugar). This does not apply to pregnant women. Water, hot tea and black coffee (with nothing added) are okay. Do not drink other fluids, diet soda or chew gum.            May 01, 2018  1:45 PM CDT   (Arrive by 1:30 PM)   Return Visit with Randa Ho MD   [facility] Medical Weight Management ([facility])    [address] [place] [address]   [phone]            May 15, 2018  9:45 AM CDT   Adult Med Follow UP with DANIEL Mtz CNS   [facility] ([facility])    [address] [id number] [phone]            May 30, 2018  1:45 PM CDT   (Arrive by 1:30 PM)   Return Visit with Mariela Haile MD   [facility] Ear Nose and Throat ([facility])    [address] [place] [address]  [address] [facility] [address]   [phone]            Jul 18, 2018  9:30 AM CDT   CF LOOP with  PFL CF   [facility] Pulmonary Function Testing ([facility])    [address] [id number] [phone]            Jul 18, 2018  9:50 AM CDT   (Arrive by 9:35 AM)   RETURN CYSTIC FIBROSIS VISIT with Gavi Allison MD   [facility] ([facility])    [address] [place] [id number] [phone]               Future tests that were ordered for you today     Open Standing Orders        Priority Remaining Interval Expires Ordered    Cystic Fibrosis Culture Aerob Bacterial Routine 100/100  4/18/2019 4/18/2018            Who to contact     If you have questions or need follow up information about today's clinic visit or your schedule please contact [facility] directly at [phone].  Normal or non-critical lab and imaging results will be communicated to you by [employer], letter or phone within 4 business days after the clinic has received the results. If you do not hear from us within 7 days, please contact the clinic through [employer] or phone. If you have a critical or abnormal lab result, we will notify you by phone as soon as possible.  Submit refill requests through [employer] or call your pharmacy and they will forward the refill request to us. Please allow 3 business days for your refill to be completed.          Additional Information About Your Visit        [employer] Information     [employer] gives you secure access to your electronic health record. If you see a primary care provider, you can also send messages to your care team and make appointments. If you have questions, please call your primary care clinic.  If you do not have a primary care provider, please call [phone] and they will assist you.        Care EveryWhere ID     This is your Care EveryWhere ID. This could be used by other organizations to access your [place] medical records  [id number]        Your Vitals Were     Pulse Respirations Pulse Oximetry             90 16 98%          Blood Pressure from Last 3 Encounters:   04/18/18 120/83   01/17/18 114/68   11/01/17 126/86    Weight from Last 3 Encounters:   04/18/18 75.3 kg (166 lb)   03/21/18 75.3 kg (166 lb)   01/17/18 75 kg (165 lb 5.5 oz)              We Performed the Following     Cystic Fibrosis Culture Aerob Bacterial     RESPIRATORY FLOW VOLUME LOOP           Today's Medication Changes          These changes are accurate as of 4/18/18  9:54 AM.  If you have any questions, ask your nurse or doctor.               Start taking these medicines.        Dose/Directions    polyethylene glycol powder   Commonly known as:  MIRALAX   Used for:  Cystic fibrosis of the lung (H), Constipation, unspecified constipation type   Started by:  Gavi Allison MD        Dose:  1 capful   Take 17 g (1 capful) by mouth daily as needed for constipation   Quantity:  119 g   Refills:  3         These medicines have changed or have updated prescriptions.        Dose/Directions    meropenem 500 MG vial   Commonly known as:  MERREM   This may have changed:  Another medication with the same name was removed. Continue taking this medication, and follow the directions you see here.   Changed by:  Gavi Allison MD        Quantity:  60 each   Refills:  0         Stop taking these medicines if you haven't already. Please contact your care team if you have questions.     beclomethasone 80 MCG/ACT Inhaler   Commonly known as:  QVAR   Stopped by:  Gavi Allison MD           ISOtretinoin 30 MG Caps   Stopped by:  Gavi Allison MD           mupirocin 2 % ointment   Commonly known as:  BACTROBAN   Stopped by:  Gavi Allison MD                Where to get your medicines      These medications were sent to [facility] [place] [id number]     Phone:  [phone]     polyethylene glycol powder                Primary Care Provider Office Phone # Fax #    Malik De La Rosa [phone]       [address] [facility] [address]        Equal Access to Services     [facility] MICHELLE : Vinita [place], candelaria rader, david martel. So [facility] [phone].    ATENCIÓN: Si  gilbert bentley, tiene a hidalgo disposición servicios gratuitos de asistencia lingüística. Ankush harper [phone].    We comply with applicable federal civil rights laws and [place] laws. We do not discriminate on the basis of race, color, national origin, age, disability, sex, sexual orientation, or gender identity.            Thank you!     Thank you for choosing [facility]  for your care. Our goal is always to provide you with excellent care. Hearing back from our patients is one way we can continue to improve our services. Please take a few minutes to complete the written survey that you may receive in the mail after your visit with us. Thank you!             Your Updated Medication List - Protect others around you: Learn how to safely use, store and throw away your medicines at www.disposemymeds.org.          This list is accurate as of 4/18/18  9:54 AM.  Always use your most recent med list.                   Brand Name Dispense Instructions for use Diagnosis    acetaminophen 325 MG tablet    TYLENOL    100 tablet    Take 2 tablets (650 mg) by mouth every 4 hours as needed for other (mild pain)    Chronic pain of left knee       acetylcysteine 20 % nebulizer solution    MUCOMYST    360 mL    INHALE ONE 4ML NEB INTO LUNGS VIA NEBULIZER TWO TIMES A DAY, MAY INCREASE TO 3-4 TIMES A DAY WITH INCREASE COUGH/COLD SYMPTOMS    CF (cystic fibrosis) (H)       * albuterol 108 (90 Base) MCG/ACT Inhaler    PROAIR HFA/PROVENTIL HFA/VENTOLIN HFA    1 Inhaler    Inhale 2 puffs into the lungs every 6 hours as needed for shortness of breath / dyspnea or wheezing    Cystic fibrosis with pulmonary manifestations (H), Sinusitis, chronic, Diabetes mellitus type 1 (H)       * albuterol (2.5 MG/3ML) 0.083% neb solution     360 mL    Take 1 vial (2.5 mg) by nebulization 4 times daily    CF (cystic fibrosis) (H)       * amphetamine-dextroamphetamine 20 MG per 24 hr capsule    ADDERALL XR    30 capsule     Take 1 capsule (20 mg) by mouth daily    Attention deficit hyperactivity disorder (ADHD), predominantly inattentive type       * amphetamine-dextroamphetamine 20 MG per 24 hr capsule    ADDERALL XR    30 capsule    Take 1 capsule (20 mg) by mouth daily    Attention deficit hyperactivity disorder (ADHD), predominantly inattentive type       * amphetamine-dextroamphetamine 20 MG per 24 hr capsule    ADDERALL XR    30 capsule    Take 1 capsule (20 mg) by mouth daily    Attention deficit hyperactivity disorder (ADHD), predominantly inattentive type       ascorbic acid 500 MG tablet    VITAMIN C    100 tablet    TAKE ONE TABLET BY MOUTH TWICE A DAY    Cystic fibrosis with pulmonary manifestations (H)       azithromycin 500 MG tablet    ZITHROMAX    36 tablet    TAKE ONE TABLET BY MOUTH ON MONDAY, WEDNESDAY AND FRIDAY    CF (cystic fibrosis) (H)       beta carotene 88485 UNIT capsule     36 capsule    Take 1 capsule (25,000 Units) by mouth Every Mon, Wed, Fri Morning    Cystic fibrosis with pulmonary manifestations (H)       blood glucose monitoring test strip    ACCU-CHEK SMARTVIEW    1 Month    Use to test blood sugar 4 times daily or as directed.    Type I (juvenile type) diabetes mellitus without mention of complication, not stated as uncontrolled       buPROPion 150 MG 12 hr tablet    WELLBUTRIN SR    60 tablet    Take 1 tablet (150 mg) by mouth 2 times daily    Major depressive disorder, recurrent episode, mild (H)       cetirizine 10 MG tablet    zyrTEC    30 tablet    Take 1 tablet (10 mg) by mouth every evening    Allergic rhinitis due to [employer] house dust mite, Urticaria, chronic       cholecalciferol 1000 UNIT tablet    vitamin D3    100 tablet    TAKE 1 TABLET BY MOUTH EVERY DAY    CF (cystic fibrosis) (H), Exocrine pancreatic insufficiency       DEPO-PROVERA IM           dornase alpha 1 MG/ML neb solution    PULMOZYME    75 mL    Inhale 2.5 mg into the lungs daily    Cystic fibrosis with pulmonary  manifestations (H)       FLUoxetine 40 MG capsule    PROZAC    60 capsule    Take 2 capsules (80 mg) by mouth daily    Cystic fibrosis with pulmonary manifestations (H)       GLYCOPYRROLATE PO      Take 1 mg by mouth 2 times daily        hydrOXYzine 25 MG tablet    ATARAX    90 tablet    Take 1 tablet (25 mg) by mouth every 6 hours as needed for itching (and nausea) And one to two at bedtime for insomnia    Chronic pain of left knee       MEPHYTON 5 MG tablet   Generic drug:  phytonadione     4 tablet    TAKE 1 TABLET BY MOUTH ONCE A WEEK    Cystic fibrosis with pulmonary manifestations (H), Cystic fibrosis with pulmonary manifestations (H), Aspergillosis (H), Pancreatic insufficiency       meropenem 500 MG vial    MERREM    60 each         methylcellulose (laxative) Powd    CITRUCEL    479 g    Start with 1 heaping tablespoon. Increase as needed, 1 heaping tablespoon at a time, up to 3 times per day.    Other constipation       montelukast 10 MG tablet    SINGULAIR    30 tablet    TAKE 1 TABLET BY MOUTH DAILY AT BEDTIME    CF (cystic fibrosis) (H)       MULTIVITAMIN PO      Take 1 tablet by mouth daily.        * naltrexone 50 MG tablet    DEPADE;REVIA    30 tablet    Take 1/2 Tablet daily then increase to maximum of 1 Full Tablet daily as tolerated.  Time it one to two hours prior to worst cravings    Non morbid obesity due to excess calories       * naltrexone 50 MG tablet    DEPADE;REVIA    60 tablet    Take 1 tablet.  Time it one to two hours prior to worst cravings.    Obesity       omeprazole 20 MG CR capsule    priLOSEC    60 capsule    TAKE 1 CAPSULE BY MOUTH TWICE A DAY    CF (cystic fibrosis) (H)       oseltamivir 75 MG capsule    TAMIFLU    10 capsule    Take 1 capsule (75 mg) by mouth daily    Cystic fibrosis with pulmonary manifestations (H)       polyethylene glycol powder    MIRALAX    119 g    Take 17 g (1 capful) by mouth daily as needed for constipation    Cystic fibrosis of the lung (H),  Constipation, unspecified constipation type       traZODone 100 MG tablet    DESYREL    30 tablet    Take 1 tablet (100 mg) by mouth At Bedtime    Insomnia, unspecified type       vitamin E 400 UNIT capsule     60 capsule    TAKE 1 CAPSULE BY MOUTH TWICE A DAY    CF (cystic fibrosis) (H), Pancreatic insufficiency       * Notice:  This list has 7 medication(s) that are the same as other medications prescribed for you. Read the directions carefully, and ask your doctor or other care provider to review them with you.

## 2018-04-18 NOTE — PATIENT INSTRUCTIONS
Cystic Fibrosis Self-Care Plan       MRN: [id number]   Clinic Date: April 18, 2018   Patient: Mamie Rice     RECOMMENDATIONS: Start miralax daily until discomfort is improving, then may take as needed.  Call if discomfort does resolve.  Will check a urine.  I would talk primary doctor about a gyne exam.  Pulmozyme one time per day.  [facility] will fill Mucomyst for you - [phone].    YOUR GOAL:  Stay well.  Enjoy the spring.    CF Nurse Line:  Burton Anderson: [phone]   Jhoana Trinh, RT: [phone]     Amy Andino: [phone] or Theresa Ceja Dieticians: [phone]   Rhonda Esteves, Diabetes Nurse: [phone]      Deyanira Figueroa: [phone] or Rosette Burnett [phone], Social Workers   [email or web address]    Annual Studies:   IGG   Date Value Ref Range Status   08/02/2017 689 (L) 695 - 1620 mg/dL Final     Insulin   Date Value Ref Range Status   03/30/2015 81 mU/L Final     Comment:     Reference Range:  0-20     There are no preventive care reminders to display for this patient.      Pulmonary Function Tests  FEV1: amount of air you can blow out in 1 second  FVC: total amount of air you can take in and blow out    Your Goals:         PFT Latest Ref Rng & Units 4/18/2018   FVC L 3.29   FEV1 L 2.94   FVC% % 92   FEV1% % 95          Airway Clearance: The Most Important Way to Keep Your Lungs Healthy  Vest Settings:    Hill-Rom Frequencies: 8, 9, 10 Pressure 10 Then, Frequencies 18, 19, 20 Pressure 6      RespirTech: Quick Start with Pressure of     Do each frequency for 5 minutes; Deflate vest after each frequency & cough 3 times before beginning the next setting.    Vest and Neb Therapy should be done 2 times/day.    Good Nutrition Can Improve Lung Function and Overall Health     Take ALL of your vitamins with food     Take 1/2 of your enzymes before EVERY meal/snack and the other 1/2 mid-meal/snack    Wt Readings from Last 3 Encounters:   03/21/18  75.3 kg (166 lb)   01/17/18 75 kg (165 lb 5.5 oz)   01/17/18 77.6 kg (171 lb)       There is no height or weight on file to calculate BMI.         National CF Foundation Recommendations for BMI in CF Adults: Women: at least 22 Men: at least 23        Controlling Blood Sugars Helps Prevent Lung Infections & Improves Nutrition  Test blood sugar:     In the morning before eating (goal is )     2 hours after a meal (goal is less than 150)     When pre-meal glucose is greater than 150 add correction     At bedtime (if less than 100 eat a snack with 15 grams of carbohydrates  Last A1C Results:   Hemoglobin A1C   Date Value Ref Range Status   08/02/2017 5.5 4.3 - 6.0 % Final         If diabetic, measure A1C every 6 months. Goal: Under 7%    Staying Healthy    Research:  If you are interested in learning about research opportunities or have questions, please contact Mariana Smith at [phone] or [email or web address].      CF Foundation:  Compass is a personalized resource service to help you with the insurance, financial, legal and other issues you are facing.  It's free, confidential and available to anyone with CF.  Ask your  for more information or contact Compass directly at 834-COMPASS ([phone]) or compass@cff.org, or learn more at cff.org/compass.

## 2018-04-18 NOTE — PROGRESS NOTES
SUBJECTIVE/OBJECTIVE:                           Mamie Rice is a 22 year old female coming in for routine clinic visit.  His/her primary pulmonologist is Dr. Allison.    Allergies/ADRs: Reviewed in Epic  Tobacco: No tobacco use  Alcohol: not currently using  PMH: Reviewed in Epic  CF Genotype: [id number]    Medication Adherence  The patient misses their medication very few times per week.    Patient has assistance with medication administration.  He mom Abigail helps order refills and sets up a med box for her.  Mamie feels that she is very knowledgeable about her medications and could do this if she needed to but appreciates the help from her mom.  Patient estimated adherence level: %  Pharmacy MPR is not available  Barriers to medication adherence include: no issues identified  Facilitators to medication adherence include: caregiver assistance and schedule/routine    Medication Access  Number of pharmacies used: 1  The patient fills general medications at  [facility] pharmacy in [place].  She does not use a specialty pharmacy.    Medication access barriers: issues identified by patient - obtaining medication from pharmacy.  Her pharmacy has been unable to fill acetylcysteine due to a backorder.  Has the patient been offered to fill at [facility]? Yes  Programs or coupons used: patient not sure    CF  Inhaled medications   Bronchodilator: albuterol   Mucolytic: Pulmozyme and acetylcysteine  Antibiotic: none  Other: none  Oral medications   Azithromycin: taking  CFTR modulator: not indicated   Other: cetirizine for allergies, Singulair  Pulmonary symptoms are stable  PFTs are decreased  Current FEV1 95%  Cultures: sputum cultures grow Staph and Achromobacter  Current exacerbation: no    Pancreatic Insufficiency/Nutrition: Pancreatic enzyme replacement - not currently taking enzymes.    Acid Reducer: Prilosec (omeprazole) 20 mg BID  Bowel regimen: Miralax  Weight and BMI are stable  Vitamins  include: vitamin D 1000 units daily, ascorbic acid, beta carotene, MVI, vitamin E, Mephyton    Lab Results   Component Value Date    VITDT 39 08/02/2017    VITDT 34 05/04/2017     Depression/Insomnia/ADHD: Taking Wellbutrin 150 mg BID and fluoxetine 80 mg daily for depression; trazodone 100 mg at bedtime for insomnia, and Adderall XR 20 mg once daily for ADHD.  She is tolerating these medications well and states her mood is stable.      PFTs:      Weight/BMI:      ASSESSMENT:                             Current medications were reviewed today.     Medication Adherence: no issues identified    Medication Access: facilitate pharmacy transfer.      CF: Needs Improvement. Patient would benefit from further education of how to obtain acetylcysteine since it is currently unavailable from her pharmacy    Pancreatic Insufficiency/Nutrition: Stable.  Patient would benefit from no changes at this time.    Depression/Insomnia/ADHD: Stable no changes.      PLAN:                            Continue current medications and nebuilzers.    I will arrange to have acetylcysteine filled at [facility] which does have the medication in stock.    I spent 15 minutes with this patient today.      Will follow up in 1 year or sooner if needed.    The patient was provided a summary of these recommendations in the AVS from CF care team visit.    Gris Adame PharmD  CF Medication Therapy Management Pharmacist  [facility]  [phone]

## 2018-04-18 NOTE — PROGRESS NOTES
Mamie has no new symptoms, here for instillation of meropenum into sinuses.    Procedure:  Rigid scope used for visualization to instill 2 cc meropenem into each sinus cavity.    She tolerates this will, return in 1 month to repeat.

## 2018-04-18 NOTE — NURSING NOTE
Dermatology Rooming Note    Mamie Rice's goals for this visit include:   Chief Complaint   Patient presents with     Derm Problem     Acne - post accutane. Mamie notes that her skin is good.     Derm Problem     Would like to discuss hyperhidrosis     Azucena Bauman CMA

## 2018-04-18 NOTE — LETTER
4/18/2018       RE: Mamie Rice  [address] [facility] [address]     Dear Colleague,    Thank you for referring your patient, Mamie Rice, to the [facility] DERMATOLOGY at [facility]. Please see a copy of my visit note below.    [facility] Dermatology Note    Dermatology Problem List:  1. Acne vulgaris  - s/p BPO facial wash (stopped due to skin irritation), clindamycin 1% lotion, adapalene 0.1% cream, spironolactone 50 mg bid, accutane course - 12,000 mg  2. Hx of cheilitis, mupirocin 2% ointment  3. CF  4. DM Type II    Encounter Date: Apr 18, 2018    CC:   Chief Complaint   Patient presents with     Derm Problem     Acne - post accutane. Mamie notes that her skin is good.     Derm Problem     Would like to discuss hyperhidrosis     History of Present Illness:  This 22 year old female presents as a follow up for acne and an evaluation of hyperhidrosis. The patient was last seen on 1/27/18 when she finished her course of accutane. Today the patient reports that her skin is doing well and she has not had any break outs. She sometimes have small acne lesions on her back, but these resolve quickly. She reports that she is still having excess sweating in her groin area. She has to change her underwear 2 times a day and also changes her pants. She states that she also has to change her pajamas at night. This is very uncomfortable for her. The patient reports no other lesions of concern at this time.     Otherwise, the patient reports no painful, bleeding, nonhealing, or pruritic lesions, and denies new or changing moles.     Past Medical History:   Patient Active Problem List   Diagnosis     Hip joint pain     Aspergillosis (H)     Chronic maxillary sinusitis     Hypoglycemia     Type 1 diabetes mellitus (H)     Cystic fibrosis of the lung (H)     Chronic constipation     Frontal sinusitis     Major depressive disorder     Overactive child     Back  pain     Pancreatic insufficiency     Non morbid obesity due to excess calories     Acne vulgaris     Cystic fibrosis (H)     Insomnia     Major depressive disorder with single episode     Diabetes mellitus, type 2 (H)     Persistent depressive disorder     Past Medical History:   Diagnosis Date     ADHD (attention deficit hyperactivity disorder)      Anxiety      Aspergillosis, with pneumonia (H)     fugus found caused chest pain     Chronic infection     CF, MRSA.,      Chronic sinusitis      Constipation, chronic      Cystic fibrosis with pulmonary manifestations (H) 12/19/2011     Cystic fibrosis without mention of meconium ileus     SWEAT TEST:Date: 2/17/1994   [facility]  296 mg, 104 mmol/L ClSample #2  295 mg, 104 mmol/L Cl GENOTYPING:Date: 10/15/2007,  Laboratory: [facility]: [id number]     Depressive disorder      Diabetes     no meds currently     Dysthymic disorder      Exocrine pancreatic insufficiency      Gastro-oesophageal reflux disease      Hip pain, right      MRSA (methicillin resistant Staphylococcus aureus) carrier      Pancreatic disease      Past Surgical History:   Procedure Laterality Date     ARTHROSCOPY HIP, OSTEOPLASTY FEMUR PROXIMAL, COMBINED  3/11/2013    Procedure: COMBINED ARTHROSCOPY HIP, OSTEOPLASTY FEMUR PROXIMAL;  Right Hip Arthroscopy, Labral  Debridement.    surgeon request choice anesthesia/admit to [facility] after surgery;  Surgeon: Omkar Austin MD;  Location: [facility] OR     ARTHROSCOPY KNEE WITH MEDIAL MENISCECTOMY Left 1/31/2017    Procedure: ARTHROSCOPY KNEE WITH MEDIAL MENISCECTOMY;  Surgeon: Jethro Coyle MD;  Location: [facility] OR     bronchoscopies       BRONCHOSCOPY       EXAM UNDER ANESTHESIA ANUS N/A 5/10/2016    Procedure: EXAM UNDER ANESTHESIA ANUS;  Surgeon: Chet Gaviria MD;  [facility]     EXAM UNDER ANESTHESIA, RESTORATIONS, EXTRACTION(S) DENTAL COMPLEX, COMBINED  5/13/2013    Procedure: COMBINED EXAM UNDER  ANESTHESIA, RESTORATIONS, EXTRACTION(S) DENTAL COMPLEX;  Dental Exam, Radiographs, Restorations. Single Extraction  Tooth #2. Restorations x 3;  Surgeon: Danilo Ortiz DDS;  Location: [facility] OR     HC KNEE SCOPE, DIAGNOSTIC      Arthroscopy, Knee- left     INJECT BOTOX N/A 5/10/2016    Procedure: INJECT BOTOX;  Surgeon: Chet Gaviria MD;  [facility]     left hip labral tear  5/11/2011    left hip arthroscopy with labral debridement and synovectomy     meniscus repair       OPTICAL TRACKING SYSTEM ENDOSCOPIC SINUS SURGERY  10/14/2011    Procedure:OPTICAL TRACKING SYSTEM ENDOSCOPIC SINUS SURGERY; FESS (functional endoscopic sinus surgery) with Image Guidance, bronchial lavage and cultures; Surgeon:GOYO KUO; Location:[facility] OR     OPTICAL TRACKING SYSTEM ENDOSCOPIC SINUS SURGERY  5/18/2012    Procedure:OPTICAL TRACKING SYSTEM ENDOSCOPIC SINUS SURGERY; Right  and Left Image Guided Functional Endoscopic Sinus Surgery With  Frontal Approach, [employer]; Surgeon:GOYO KUO; Location[facility] OR     OPTICAL TRACKING SYSTEM ENDOSCOPIC SINUS SURGERY  9/26/2012    Procedure: OPTICAL TRACKING SYSTEM ENDOSCOPIC SINUS SURGERY;  Stealth Guided Bilateral Functional Endoscopic Sinus Surgery *Latex Safe*;  Surgeon: Goyo Kuo MD;  [facility]     OPTICAL TRACKING SYSTEM ENDOSCOPIC SINUS SURGERY Bilateral 10/16/2015    Procedure: OPTICAL TRACKING SYSTEM ENDOSCOPIC SINUS SURGERY;  Surgeon: Mariela Haile MD;  [facility]     ORTHOPEDIC SURGERY      left hip tear repair 2010     SINUS SURGERY       Social History:  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 Former use of tanning beds (high school).     Family History:  There is a family history of presumed melanoma in the patient's grandmother.    Medications:  Current Outpatient Prescriptions   Medication Sig Dispense Refill     acetaminophen (TYLENOL) 325 MG tablet Take 2 tablets (650 mg) by mouth every 4 hours as needed for other  (mild pain) 100 tablet 0     acetylcysteine (MUCOMYST) 20 % nebulizer solution INHALE ONE 4ML NEB INTO LUNGS VIA NEBULIZER TWO TIMES A DAY, MAY INCREASE TO 3-4 TIMES A DAY WITH INCREASE COUGH/COLD SYMPTOMS 360 mL 11     albuterol (2.5 MG/3ML) 0.083% neb solution Take 1 vial (2.5 mg) by nebulization 4 times daily 360 mL 11     albuterol (PROAIR HFA, PROVENTIL HFA, VENTOLIN HFA) 108 (90 BASE) MCG/ACT inhaler Inhale 2 puffs into the lungs every 6 hours as needed for shortness of breath / dyspnea or wheezing 1 Inhaler 12     aluminum chloride (DRYSOL) 20 % external solution Apply topically At Bedtime Everyother night to every 3rd night. With impovement, decrease to 1-2 times weekly. Irritation may occur. 60 mL 3     amphetamine-dextroamphetamine (ADDERALL XR) 20 MG per 24 hr capsule Take 1 capsule (20 mg) by mouth daily 30 capsule 0     amphetamine-dextroamphetamine (ADDERALL XR) 20 MG per 24 hr capsule Take 1 capsule (20 mg) by mouth daily 30 capsule 0     amphetamine-dextroamphetamine (ADDERALL XR) 20 MG per 24 hr capsule Take 1 capsule (20 mg) by mouth daily 30 capsule 0     ascorbic acid (VITAMIN C) 500 MG tablet TAKE ONE TABLET BY MOUTH TWICE A  tablet 3     azithromycin (ZITHROMAX) 500 MG tablet TAKE ONE TABLET BY MOUTH ON MONDAY, WEDNESDAY AND FRIDAY 36 tablet 4     beta carotene 26926 UNIT capsule Take 1 capsule (25,000 Units) by mouth Every Mon, Wed, Fri Morning 36 capsule 4     blood glucose (ACCU-CHEK SMARTVIEW) test strip Use to test blood sugar 4 times daily or as directed. 1 Month 12     buPROPion (WELLBUTRIN SR) 150 MG 12 hr tablet Take 1 tablet (150 mg) by mouth 2 times daily 60 tablet 5     cetirizine (ZYRTEC) 10 MG tablet Take 1 tablet (10 mg) by mouth every evening 30 tablet 3     dornase alpha (PULMOZYME) 1 MG/ML neb solution Inhale 2.5 mg into the lungs daily 75 mL 3     FLUoxetine (PROZAC) 40 MG capsule Take 2 capsules (80 mg) by mouth daily 60 capsule 5     GLYCOPYRROLATE PO Take 1 mg by  mouth 2 times daily        hydrOXYzine (ATARAX) 25 MG tablet Take 1 tablet (25 mg) by mouth every 6 hours as needed for itching (and nausea) And one to two at bedtime for insomnia 90 tablet 2     MedroxyPROGESTERone Acetate (DEPO-PROVERA IM)        MEPHYTON 5 MG tablet TAKE 1 TABLET BY MOUTH ONCE A WEEK 4 tablet 11     meropenem (MERREM) 500 MG vial  60 each      methylcellulose, laxative, (CITRUCEL) POWD Start with 1 heaping tablespoon. Increase as needed, 1 heaping tablespoon at a time, up to 3 times per day. 479 g 3     montelukast (SINGULAIR) 10 MG tablet TAKE 1 TABLET BY MOUTH DAILY AT BEDTIME 30 tablet 11     Multiple Vitamin (MULTIVITAMIN OR) Take 1 tablet by mouth daily.       naltrexone (DEPADE;REVIA) 50 MG tablet Take 1/2 Tablet daily then increase to maximum of 1 Full Tablet daily as tolerated.  Time it one to two hours prior to worst cravings 30 tablet 3     naltrexone (DEPADE;REVIA) 50 MG tablet Take 1 tablet.  Time it one to two hours prior to worst cravings. 60 tablet 2     omeprazole (PRILOSEC) 20 MG CR capsule TAKE 1 CAPSULE BY MOUTH TWICE A DAY 60 capsule 11     oseltamivir (TAMIFLU) 75 MG capsule Take 1 capsule (75 mg) by mouth daily 10 capsule 0     polyethylene glycol (MIRALAX) powder Take 17 g (1 capful) by mouth daily as needed for constipation 119 g 3     traZODone (DESYREL) 100 MG tablet Take 1 tablet (100 mg) by mouth At Bedtime 30 tablet 5     VITAMIN D3 1000 UNITS tablet TAKE 1 TABLET BY MOUTH EVERY  tablet 3     vitamin E 400 UNIT capsule TAKE 1 CAPSULE BY MOUTH TWICE A DAY 60 capsule 11     Allergies   Allergen Reactions     Vancomycin Hives     Redmens skin rash   Redmens skin rash      Review of Systems:  - As per HPI    Physical exam:  There were no vitals taken for this visit.  GEN: This is a well developed, well-nourished female in no acute distress, in a pleasant mood.      SKIN: Acne exam, which includes the face, neck, upper central chest, and upper central back was  performed.  - Few closed comedones on upper back  - No active acne on face  - Perspiration noted to inguinal folds  - No other lesions of concern on areas examined.     Impression/Plan:  1. Acne vulgaris, acne excoriee s/p accutane course, 43491 mg cumulative dose  - Start BPO wash in the shower to face, chest, and back.    2. Hyperhidrosis, groin  - Discussion of treatment options including Drysol, oral medications - would need to discuss with CF physician, botox injections, laser treatments  - Start Drysol - apply topically at bedtime every other night to every 3rd night. If improved, okay to decrease to 1-2 times weekly. Discussion that irritation may occur.    Follow-up in 3 months, earlier for new or changing lesions.     Staff Involved:  Scribe Disclosure:   I, Lupe Alcantara, am serving as a scribe to document services personally performed by Paige Reid PA-C, based on data collection and the provider's statements to me.    Provider Disclosure:   The documentation recorded by the scribe accurately reflects the services I personally performed and the decisions made by me.    All risks, benefits and alternatives were discussed with patient.  Patient is in agreement and understands the assessment and plan.  All questions were answered.  Sun Screen Education was given.   Return to Clinic in 3 months or sooner as needed.   Paige Reid PA-C   [facility]

## 2018-04-18 NOTE — PATIENT INSTRUCTIONS
Plan of care:  Follow up with Dr Haile as scheduled  Clinic contact information:  1. To schedule an appointment call [phone], option 1  2. To talk to the Triage RN call [phone], option 3  3. If you need to speak to Ellie or get a message to your doctor on a Friday, call the triage RN  4. EllieRN: [phone]  5. Surgery scheduling:      Mahi Canela: [phone]      Brisa Aparicio: [phone]  6. Fax: [phone]  7. [phone]

## 2018-04-18 NOTE — MR AVS SNAPSHOT
After Visit Summary   2018    Mamie Rice    MRN: [id number]           Patient Information     Date Of Birth          1995        Visit Information        Provider Department      2018 9:30 AM Mariela Haile MD M [facility] Ear Nose and Throat        Today's Diagnoses     Chronic maxillary sinusitis    -  1    Cystic fibrosis of the lung (H)          Care Instructions    Plan of care:  Follow up with Dr Haile as scheduled  Clinic contact information:  1. To schedule an appointment call [phone], option 1  2. To talk to the Triage RN call [phone], option 3  3. If you need to speak to Ellie or get a message to your doctor on a Friday, call the triage RN  4. EllieRN: [phone]  5. Surgery scheduling:      Mahi Canela: [phone]      Brisa Aparicio: [phone]  6. Fax: [phone]  7. [phone]            Follow-ups after your visit        Your next 10 appointments already scheduled     May 01, 2018  1:45 PM CDT   (Arrive by 1:30 PM)   Return Visit with Randa Ho MD   [facility] Medical Weight Management ([facility])    [address]  [address] [facility] [address] [phone]            May 15, 2018  9:45 AM CDT   Adult Med Follow UP with DANIEL Mtz CNS   Psychiatry Clinic ([email or web address] Clinics)    [address]  [address] [id number] [phone]            May 30, 2018  1:45 PM CDT   (Arrive by 1:30 PM)   Return Visit with MD LOKESH Gramajo [facility] Ear Nose and Throat ([facility])    [address]  [address] [facility] [address] [phone]            2018  8:45 AM CDT   (Arrive by 8:30 AM)   Return Visit with Paige Reid PA-C   [facility] Dermatology ([facility])    [address]  [address] [facility] [id number] [phone]              "2018  9:30 AM CDT   CF LOOP with  PFL CF   [facility] Pulmonary Function Testing ([facility])    [address]  3rd Floor  [facility] [address] [phone]            Jul 18, 2018  9:50 AM CDT   (Arrive by 9:35 AM)   RETURN CYSTIC FIBROSIS VISIT with Gavi Allison MD   [facility] ([facility])    [address]  [address]  [facility] [address] [phone]              Future tests that were ordered for you today     Open Standing Orders        Priority Remaining Interval Expires Ordered    Cystic Fibrosis Culture Aerob Bacterial Routine 100/100  4/18/2019 4/18/2018            Who to contact     Please call your clinic at [phone] to:    Ask questions about your health    Make or cancel appointments    Discuss your medicines    Learn about your test results    Speak to your doctor            Additional Information About Your Visit        [employer] Information     [employer] gives you secure access to your electronic health record. If you see a primary care provider, you can also send messages to your care team and make appointments. If you have questions, please call your primary care clinic.  If you do not have a primary care provider, please call [phone] and they will assist you.      [employer] is an electronic gateway that provides easy, online access to your medical records. With [employer], you can request a clinic appointment, read your test results, renew a prescription or communicate with your care team.     To access your existing account, please contact your [facility] or call [phone] for assistance.        Care EveryWhere ID     This is your Care EveryWhere ID. This could be used by other organizations to access your [facility] medical records  [id number]        Your Vitals Were     Height BMI (Body Mass Index)                1.57 m (5' 1.81\") 30.55 " kg/m2           Blood Pressure from Last 3 Encounters:   04/18/18 120/83   01/17/18 114/68   11/01/17 126/86    Weight from Last 3 Encounters:   04/18/18 75.3 kg (166 lb)   03/21/18 75.3 kg (166 lb)   01/17/18 75 kg (165 lb 5.5 oz)              We Performed the Following     NASAL ENDOSCOPY, DIAGNOSTIC     NASAL/SINUS SCOPE W THER INSTILL          Today's Medication Changes          These changes are accurate as of 4/18/18 11:04 AM.  If you have any questions, ask your nurse or doctor.               Start taking these medicines.        Dose/Directions    aluminum chloride 20 % external solution   Commonly known as:  DRYSOL   Used for:  Focal hyperhidrosis   Started by:  Paige Reid PA-C        Apply topically At Bedtime Everyother night to every 3rd night. With impovement, decrease to 1-2 times weekly. Irritation may occur.   Quantity:  60 mL   Refills:  3       polyethylene glycol powder   Commonly known as:  MIRALAX   Used for:  Cystic fibrosis of the lung (H), Constipation, unspecified constipation type   Started by:  Gavi Allison MD        Dose:  1 capful   Take 17 g (1 capful) by mouth daily as needed for constipation   Quantity:  119 g   Refills:  3         These medicines have changed or have updated prescriptions.        Dose/Directions    meropenem 500 MG vial   Commonly known as:  MERREM   This may have changed:  Another medication with the same name was removed. Continue taking this medication, and follow the directions you see here.   Changed by:  Gavi Allison MD        Quantity:  60 each   Refills:  0         Stop taking these medicines if you haven't already. Please contact your care team if you have questions.     beclomethasone 80 MCG/ACT Inhaler   Commonly known as:  QVAR   Stopped by:  Gavi Allison MD           ISOtretinoin 30 MG Caps   Stopped by:  Gavi Allison MD           mupirocin 2 % ointment   Commonly known as:  BACTROBAN    Stopped by:  Gavi Allison MD                Where to get your medicines      These medications were sent to [facility] - [place], [place] - [address]  [address] [address]     Phone:  [phone]     aluminum chloride 20 % external solution    polyethylene glycol powder                Primary Care Provider Office Phone # Fax #    Malik De La Rosa [phone]       [address]  [facility] [address]        Equal Access to Services     [facility]: Hadii martin good hadasho Soomaali, waaxda luqadaha, qaybta kaalmada adeegyada, waxsilver rutledge . So [facility] [phone].    ATENCIÓN: Si habla español, tiene a hidalgo disposición servicios gratuitos de asistencia lingüística. [facility] [phone].    We comply with applicable federal civil rights laws and [place] laws. We do not discriminate on the basis of race, color, national origin, age, disability, sex, sexual orientation, or gender identity.            Thank you!     Thank you for choosing [facility] EAR NOSE AND THROAT  for your care. Our goal is always to provide you with excellent care. Hearing back from our patients is one way we can continue to improve our services. Please take a few minutes to complete the written survey that you may receive in the mail after your visit with us. Thank you!             Your Updated Medication List - Protect others around you: Learn how to safely use, store and throw away your medicines at www.disposemymeds.org.          This list is accurate as of 4/18/18 11:04 AM.  Always use your most recent med list.                   Brand Name Dispense Instructions for use Diagnosis    acetaminophen 325 MG tablet    TYLENOL    100 tablet    Take 2 tablets (650 mg) by mouth every 4 hours as needed for other (mild pain)    Chronic pain of left knee       acetylcysteine 20 % nebulizer solution    MUCOMYST    360 mL     INHALE ONE 4ML NEB INTO LUNGS VIA NEBULIZER TWO TIMES A DAY, MAY INCREASE TO 3-4 TIMES A DAY WITH INCREASE COUGH/COLD SYMPTOMS    CF (cystic fibrosis) (H)       * albuterol 108 (90 Base) MCG/ACT Inhaler    PROAIR HFA/PROVENTIL HFA/VENTOLIN HFA    1 Inhaler    Inhale 2 puffs into the lungs every 6 hours as needed for shortness of breath / dyspnea or wheezing    Cystic fibrosis with pulmonary manifestations (H), Sinusitis, chronic, Diabetes mellitus type 1 (H)       * albuterol (2.5 MG/3ML) 0.083% neb solution     360 mL    Take 1 vial (2.5 mg) by nebulization 4 times daily    CF (cystic fibrosis) (H)       aluminum chloride 20 % external solution    DRYSOL    60 mL    Apply topically At Bedtime Everyother night to every 3rd night. With impovement, decrease to 1-2 times weekly. Irritation may occur.    Focal hyperhidrosis       * amphetamine-dextroamphetamine 20 MG per 24 hr capsule    ADDERALL XR    30 capsule    Take 1 capsule (20 mg) by mouth daily    Attention deficit hyperactivity disorder (ADHD), predominantly inattentive type       * amphetamine-dextroamphetamine 20 MG per 24 hr capsule    ADDERALL XR    30 capsule    Take 1 capsule (20 mg) by mouth daily    Attention deficit hyperactivity disorder (ADHD), predominantly inattentive type       * amphetamine-dextroamphetamine 20 MG per 24 hr capsule    ADDERALL XR    30 capsule    Take 1 capsule (20 mg) by mouth daily    Attention deficit hyperactivity disorder (ADHD), predominantly inattentive type       ascorbic acid 500 MG tablet    VITAMIN C    100 tablet    TAKE ONE TABLET BY MOUTH TWICE A DAY    Cystic fibrosis with pulmonary manifestations (H)       azithromycin 500 MG tablet    ZITHROMAX    36 tablet    TAKE ONE TABLET BY MOUTH ON MONDAY, WEDNESDAY AND FRIDAY    CF (cystic fibrosis) (H)       beta carotene 47987 UNIT capsule     36 capsule    Take 1 capsule (25,000 Units) by mouth Every Mon, Wed, Fri Morning    Cystic fibrosis with pulmonary  manifestations (H)       blood glucose monitoring test strip    ACCU-CHEK SMARTVIEW    1 Month    Use to test blood sugar 4 times daily or as directed.    Type I (juvenile type) diabetes mellitus without mention of complication, not stated as uncontrolled       buPROPion 150 MG 12 hr tablet    WELLBUTRIN SR    60 tablet    Take 1 tablet (150 mg) by mouth 2 times daily    Major depressive disorder, recurrent episode, mild (H)       cetirizine 10 MG tablet    zyrTEC    30 tablet    Take 1 tablet (10 mg) by mouth every evening    Allergic rhinitis due to [employer] house dust mite, Urticaria, chronic       cholecalciferol 1000 UNIT tablet    vitamin D3    100 tablet    TAKE 1 TABLET BY MOUTH EVERY DAY    CF (cystic fibrosis) (H), Exocrine pancreatic insufficiency       DEPO-PROVERA IM           dornase alpha 1 MG/ML neb solution    PULMOZYME    75 mL    Inhale 2.5 mg into the lungs daily    Cystic fibrosis with pulmonary manifestations (H)       FLUoxetine 40 MG capsule    PROZAC    60 capsule    Take 2 capsules (80 mg) by mouth daily    Cystic fibrosis with pulmonary manifestations (H)       GLYCOPYRROLATE PO      Take 1 mg by mouth 2 times daily        hydrOXYzine 25 MG tablet    ATARAX    90 tablet    Take 1 tablet (25 mg) by mouth every 6 hours as needed for itching (and nausea) And one to two at bedtime for insomnia    Chronic pain of left knee       MEPHYTON 5 MG tablet   Generic drug:  phytonadione     4 tablet    TAKE 1 TABLET BY MOUTH ONCE A WEEK    Cystic fibrosis with pulmonary manifestations (H), Cystic fibrosis with pulmonary manifestations (H), Aspergillosis (H), Pancreatic insufficiency       meropenem 500 MG vial    MERREM    60 each         methylcellulose (laxative) Powd    CITRUCEL    479 g    Start with 1 heaping tablespoon. Increase as needed, 1 heaping tablespoon at a time, up to 3 times per day.    Other constipation       montelukast 10 MG tablet    SINGULAIR    30 tablet    TAKE 1 TABLET BY MOUTH  DAILY AT BEDTIME    CF (cystic fibrosis) (H)       MULTIVITAMIN PO      Take 1 tablet by mouth daily.        * naltrexone 50 MG tablet    DEPADE;REVIA    30 tablet    Take 1/2 Tablet daily then increase to maximum of 1 Full Tablet daily as tolerated.  Time it one to two hours prior to worst cravings    Non morbid obesity due to excess calories       * naltrexone 50 MG tablet    DEPADE;REVIA    60 tablet    Take 1 tablet.  Time it one to two hours prior to worst cravings.    Obesity       omeprazole 20 MG CR capsule    priLOSEC    60 capsule    TAKE 1 CAPSULE BY MOUTH TWICE A DAY    CF (cystic fibrosis) (H)       oseltamivir 75 MG capsule    TAMIFLU    10 capsule    Take 1 capsule (75 mg) by mouth daily    Cystic fibrosis with pulmonary manifestations (H)       polyethylene glycol powder    MIRALAX    119 g    Take 17 g (1 capful) by mouth daily as needed for constipation    Cystic fibrosis of the lung (H), Constipation, unspecified constipation type       traZODone 100 MG tablet    DESYREL    30 tablet    Take 1 tablet (100 mg) by mouth At Bedtime    Insomnia, unspecified type       vitamin E 400 UNIT capsule     60 capsule    TAKE 1 CAPSULE BY MOUTH TWICE A DAY    CF (cystic fibrosis) (H), Pancreatic insufficiency       * Notice:  This list has 7 medication(s) that are the same as other medications prescribed for you. Read the directions carefully, and ask your doctor or other care provider to review them with you.

## 2018-04-19 ENCOUNTER — CARE COORDINATION (OUTPATIENT)
Dept: PULMONOLOGY | Facility: CLINIC | Age: 23
End: 2018-04-19

## 2018-04-19 DIAGNOSIS — R30.0 DYSURIA: Primary | ICD-10-CM

## 2018-04-19 LAB
BACTERIA SPEC CULT: NO GROWTH
Lab: NORMAL
SPECIMEN SOURCE: NORMAL

## 2018-04-19 RX ORDER — CIPROFLOXACIN 750 MG/1
750 TABLET, FILM COATED ORAL 2 TIMES DAILY
Qty: 10 TABLET | Refills: 0 | Status: SHIPPED | OUTPATIENT
Start: 2018-04-19 | End: 2018-04-24

## 2018-04-19 NOTE — PROGRESS NOTES
VM left with pt per Dr. Allison request to let her know that she has a UTI and she is prescribing Cipro 750 mg twice daily for 5 days. She was advised to call the CF office for any questions or concerns and to stop taking azithromycin while on Cipro.

## 2018-04-19 NOTE — PROGRESS NOTES
Respiratory Therapist Note:    Vest    Brand: Hill-Rom - traditional   Settings: Hill Rom: Frequencies 8, 9, 10 at pressure 10 then frequencies 18, 19, 20 at pressure 6.   Cough Pause: Yes.    Vest Garment Size: Adult Medium   Last Fitting Date: 2018   Frequency of therapy: 12 times per week   Concerns: none    Exercise:     Alternative Airway Clearance:       Nebulized Medications   Bronchodilators: Albuterol   Mucolytic: Mucomyst and Pulmozyme   Antibiotics:    Additional Inhaled Medications: 0.9% Normal Saline   Spacer Use: yes     Review Cleaning: Yes.     Education and Transition Information   Correct order of inhaled medications: Yes   Mechanism of Action of inhaled medications: Yes   Frequency of inhaled medications: Yes   Dosage of inhaled medications: Yes   Other:     Home Care:   Nebulizer Cups (Brand/Type): disposable   Nebulizer Compressor    Year Purchased: 2017   Home Care Company:     [facility], Phone: [phone], Fax: [phone]    Oxygen:    Pulmonary Rehab   Site:    Date Completed:     Plan of Care and Goals for next visit: increase coughing between settings

## 2018-04-28 DIAGNOSIS — B44.9 ASPERGILLOSIS (H): ICD-10-CM

## 2018-04-28 DIAGNOSIS — E84.0 CYSTIC FIBROSIS WITH PULMONARY MANIFESTATIONS (H): ICD-10-CM

## 2018-04-28 DIAGNOSIS — K86.89 PANCREATIC INSUFFICIENCY: ICD-10-CM

## 2018-04-30 RX ORDER — PHYTONADIONE (VIT K1) 5 MG
TABLET ORAL
Qty: 4 TABLET | Refills: 11 | Status: SHIPPED | OUTPATIENT
Start: 2018-04-30 | End: 2019-05-10

## 2018-05-01 ENCOUNTER — ALLIED HEALTH/NURSE VISIT (OUTPATIENT)
Dept: SURGERY | Facility: CLINIC | Age: 23
End: 2018-05-01
Payer: COMMERCIAL

## 2018-05-01 ENCOUNTER — OFFICE VISIT (OUTPATIENT)
Dept: ENDOCRINOLOGY | Facility: CLINIC | Age: 23
End: 2018-05-01
Payer: COMMERCIAL

## 2018-05-01 VITALS
WEIGHT: 167.1 LBS | BODY MASS INDEX: 30.75 KG/M2 | DIASTOLIC BLOOD PRESSURE: 71 MMHG | SYSTOLIC BLOOD PRESSURE: 110 MMHG | HEART RATE: 97 BPM | HEIGHT: 62 IN | OXYGEN SATURATION: 99 %

## 2018-05-01 DIAGNOSIS — E66.811 CLASS 1 OBESITY DUE TO EXCESS CALORIES WITHOUT SERIOUS COMORBIDITY WITH BODY MASS INDEX (BMI) OF 30.0 TO 30.9 IN ADULT: Primary | ICD-10-CM

## 2018-05-01 DIAGNOSIS — E66.09 CLASS 1 OBESITY DUE TO EXCESS CALORIES WITHOUT SERIOUS COMORBIDITY WITH BODY MASS INDEX (BMI) OF 30.0 TO 30.9 IN ADULT: Primary | ICD-10-CM

## 2018-05-01 RX ORDER — NALTREXONE HYDROCHLORIDE 50 MG/1
TABLET, FILM COATED ORAL
Qty: 90 TABLET | Refills: 2 | Status: SHIPPED | OUTPATIENT
Start: 2018-05-01 | End: 2018-08-07

## 2018-05-01 ASSESSMENT — ENCOUNTER SYMPTOMS
VOMITING: 0
HEMATURIA: 0
POOR WOUND HEALING: 0
DOUBLE VISION: 0
JOINT SWELLING: 1
BLOATING: 0
DYSURIA: 0
RESPIRATORY PAIN: 0
NECK MASS: 0
SNORES LOUDLY: 0
SINUS PAIN: 0
NAUSEA: 0
HOARSE VOICE: 0
WHEEZING: 0
MUSCLE WEAKNESS: 1
SYNCOPE: 0
BREAST PAIN: 0
DIFFICULTY URINATING: 0
CONSTIPATION: 0
SMELL DISTURBANCE: 0
MEMORY LOSS: 0
SKIN CHANGES: 0
COUGH: 0
DECREASED APPETITE: 0
HEMOPTYSIS: 0
PANIC: 0
TACHYCARDIA: 0
POSTURAL DYSPNEA: 0
DISTURBANCES IN COORDINATION: 0
NAIL CHANGES: 0
BLOOD IN STOOL: 0
POLYDIPSIA: 1
LEG SWELLING: 0
CHILLS: 0
HYPOTENSION: 0
BRUISES/BLEEDS EASILY: 0
POLYPHAGIA: 0
JAUNDICE: 0
FATIGUE: 0
NERVOUS/ANXIOUS: 0
BOWEL INCONTINENCE: 0
TASTE DISTURBANCE: 0
ARTHRALGIAS: 1
SEIZURES: 0
DECREASED CONCENTRATION: 0
EYE IRRITATION: 0
HYPERTENSION: 0
RECTAL BLEEDING: 0
WEIGHT GAIN: 0
TINGLING: 0
FEVER: 0
SORE THROAT: 0
SPUTUM PRODUCTION: 0
HALLUCINATIONS: 0
PARALYSIS: 0
BACK PAIN: 0
PALPITATIONS: 0
MYALGIAS: 1
EYE WATERING: 0
EXERCISE INTOLERANCE: 0
DYSPNEA ON EXERTION: 0
TROUBLE SWALLOWING: 0
WEIGHT LOSS: 0
SWOLLEN GLANDS: 0
MUSCLE CRAMPS: 0
NUMBNESS: 0
SPEECH CHANGE: 0
LIGHT-HEADEDNESS: 0
TREMORS: 0
SINUS CONGESTION: 0
INCREASED ENERGY: 1
DECREASED LIBIDO: 0
EYE PAIN: 0
LOSS OF CONSCIOUSNESS: 0
DEPRESSION: 0
FLANK PAIN: 0
LEG PAIN: 0
DIARRHEA: 0
BREAST MASS: 0
EYE REDNESS: 0
HOT FLASHES: 0
CLAUDICATION: 0
EXTREMITY NUMBNESS: 0
WEAKNESS: 0
STIFFNESS: 1
RECTAL PAIN: 0
NECK PAIN: 0
SLEEP DISTURBANCES DUE TO BREATHING: 0
COUGH DISTURBING SLEEP: 0
INSOMNIA: 0
NIGHT SWEATS: 1
HEARTBURN: 0
ABDOMINAL PAIN: 0
ALTERED TEMPERATURE REGULATION: 1
ORTHOPNEA: 0
SHORTNESS OF BREATH: 0
HEADACHES: 0
DIZZINESS: 0

## 2018-05-01 NOTE — LETTER
"2018       RE: Mamie Rice  [address] [facility] [address]     Dear Colleague,    Thank you for referring your patient, Mamie Rice, to the [facility] MEDICAL WEIGHT MANAGEMENT at [facility]. Please see a copy of my visit note below.        Return Medical Weight Management Note     Mamie Rice  MRN:  [id number]  :  1995  STEPHEN: 2018    Dear Dr. De La Rosa,     I had the pleasure of seeing your patient Mamie Rice.  She is a 22 year old female who I am continuing to see for treatment of obesity.  She has CF, CFRD, depression, joints pain, ADHD    INTERVAL History  Last seen by me 2017. She is currently on naltrexone 50 mg daily that I started about 6 months. She said that it initially helped with reducing craving but not much anymore. She has tried to minimize carbohydrate and sweet. She cut down milk to 1 skim milk per day. She feels that she has not been snacking often anymore. She is trying to walk a dog daily. She is active a work.    She is also on Adderall for ADHD and Bupropion for depression.  She works as a , [employer] and dance coach.    Diet recall:  BF: cereal or toast  Lunch: sandwich or cereal or salad  Supper: meat, vegetable (corn, pea, carrot)  Snack: fruit snack.    CURRENT WEIGHT:   167 lbs 1.6 oz    Wt Readings from Last 4 Encounters:   18 75.8 kg (167 lb 1.6 oz)   18 75.3 kg (166 lb)   18 75.3 kg (166 lb)   18 75 kg (165 lb 5.5 oz)     Height:  5' 1.81\"  Body Mass Index:  Body mass index is 30.75 kg/(m^2).  Vitals:  B/P: 124/81, P: 100    Initial consult weight was 182 on 10/14/2016.  Weight change since last seen on 2017 is down 6 pounds.   Total loss is 15 pounds.      Review of Systems     Constitutional:  Positive for night sweats and increased energy. Negative for fever, chills, weight loss, weight gain, fatigue, decreased appetite, recent stressors, height loss, post-operative " complications, incisional pain, hallucinations, hyperactivity and confused.   HENT:  Negative for ear pain, hearing loss, tinnitus, nosebleeds, trouble swallowing, hoarse voice, mouth sores, sore throat, ear discharge, tooth pain, gum tenderness, taste disturbance, smell disturbance, hearing aid, bleeding gums, dry mouth, sinus pain, sinus congestion and neck mass.    Eyes:  Negative for double vision, pain, redness, eye pain, decreased vision, eye watering, eye bulging, eye dryness, flashing lights, spots, floaters, strabismus, tunnel vision, jaundice and eye irritation.   Respiratory:   Negative for cough, hemoptysis, sputum production, shortness of breath, wheezing, sleep disturbances due to breathing, snores loudly, respiratory pain, dyspnea on exertion, cough disturbing sleep and postural dyspnea.    Cardiovascular:  Negative for chest pain, dyspnea on exertion, palpitations, orthopnea, claudication, leg swelling, fingers/toes turn blue, hypertension, hypotension, syncope, history of heart murmur, chest pain on exertion, chest pain at rest, pacemaker, few scattered varicosities, leg pain, sleep disturbances due to breathing, tachycardia, light-headedness, exercise intolerance and edema.   Gastrointestinal:  Negative for heartburn, nausea, vomiting, abdominal pain, diarrhea, constipation, blood in stool, melena, rectal pain, bloating, hemorrhoids, bowel incontinence, jaundice, rectal bleeding, coffee ground emesis and change in stool.   Genitourinary:  Negative for bladder incontinence, dysuria, urgency, hematuria, flank pain, vaginal discharge, difficulty urinating, genital sores, dyspareunia, decreased libido, nocturia, voiding less frequently, arousal difficulty, abnormal vaginal bleeding, excessive menstruation, menstrual changes, hot flashes, vaginal dryness and postmenopausal bleeding.   Musculoskeletal:  Positive for myalgias, joint swelling, arthralgias, stiffness and muscle weakness. Negative for back  pain, muscle cramps, neck pain, bone pain and fracture.   Skin:  Negative for nail changes, itching, poor wound healing, rash, hair changes, skin changes, acne, warts, poor wound healing, scarring, flaky skin, Raynaud's phenomenon, sensitivity to sunlight and skin thickening.   Neurological:  Negative for dizziness, tingling, tremors, speech change, seizures, loss of consciousness, weakness, light-headedness, numbness, headaches, disturbances in coordination, extremity numbness, memory loss, difficulty walking and paralysis.   Endo/Heme:  Negative for anemia, swollen glands and bruises/bleeds easily.   Psychiatric/Behavioral:  Negative for depression, hallucinations, memory loss, decreased concentration, mood swings and panic attacks.    Breast:  Negative for breast discharge, breast mass, breast pain and nipple retraction.   Endocrine:  Positive for altered temperature regulation and polydipsia.Negative for polyphagia, unwanted hair growth and change in facial hair.      MEDICATIONS:   Current Outpatient Prescriptions   Medication Sig Dispense Refill     acetaminophen (TYLENOL) 325 MG tablet Take 2 tablets (650 mg) by mouth every 4 hours as needed for other (mild pain) 100 tablet 0     acetylcysteine (MUCOMYST) 20 % nebulizer solution INHALE ONE 4ML NEB INTO LUNGS VIA NEBULIZER TWO TIMES A DAY, MAY INCREASE TO 3-4 TIMES A DAY WITH INCREASE COUGH/COLD SYMPTOMS 360 mL 11     albuterol (2.5 MG/3ML) 0.083% neb solution Take 1 vial (2.5 mg) by nebulization 4 times daily 360 mL 11     albuterol (PROAIR HFA, PROVENTIL HFA, VENTOLIN HFA) 108 (90 BASE) MCG/ACT inhaler Inhale 2 puffs into the lungs every 6 hours as needed for shortness of breath / dyspnea or wheezing 1 Inhaler 12     aluminum chloride (DRYSOL) 20 % external solution Apply topically At Bedtime Everyother night to every 3rd night. With impovement, decrease to 1-2 times weekly. Irritation may occur. 60 mL 3     amphetamine-dextroamphetamine (ADDERALL XR) 20 MG  per 24 hr capsule Take 1 capsule (20 mg) by mouth daily 30 capsule 0     amphetamine-dextroamphetamine (ADDERALL XR) 20 MG per 24 hr capsule Take 1 capsule (20 mg) by mouth daily 30 capsule 0     amphetamine-dextroamphetamine (ADDERALL XR) 20 MG per 24 hr capsule Take 1 capsule (20 mg) by mouth daily 30 capsule 0     ascorbic acid (VITAMIN C) 500 MG tablet TAKE ONE TABLET BY MOUTH TWICE A  tablet 3     azithromycin (ZITHROMAX) 500 MG tablet TAKE ONE TABLET BY MOUTH ON MONDAY, WEDNESDAY AND FRIDAY 36 tablet 4     beta carotene 92500 UNIT capsule Take 1 capsule (25,000 Units) by mouth Every Mon, Wed, Fri Morning 36 capsule 4     blood glucose (ACCU-CHEK SMARTVIEW) test strip Use to test blood sugar 4 times daily or as directed. 1 Month 12     buPROPion (WELLBUTRIN SR) 150 MG 12 hr tablet Take 1 tablet (150 mg) by mouth 2 times daily 60 tablet 5     cetirizine (ZYRTEC) 10 MG tablet Take 1 tablet (10 mg) by mouth every evening 30 tablet 3     dornase alpha (PULMOZYME) 1 MG/ML neb solution Inhale 2.5 mg into the lungs daily 75 mL 3     FLUoxetine (PROZAC) 40 MG capsule Take 2 capsules (80 mg) by mouth daily 60 capsule 5     GLYCOPYRROLATE PO Take 1 mg by mouth 2 times daily        hydrOXYzine (ATARAX) 25 MG tablet Take 1 tablet (25 mg) by mouth every 6 hours as needed for itching (and nausea) And one to two at bedtime for insomnia 90 tablet 2     MedroxyPROGESTERone Acetate (DEPO-PROVERA IM)        MEPHYTON 5 MG tablet TAKE ONE TABLET BY MOUTH ONCE A WEEK 4 tablet 11     meropenem (MERREM) 500 MG vial  60 each      meropenem (MERREM) 500 MG vial Give x 1 today 1 each      methylcellulose, laxative, (CITRUCEL) POWD Start with 1 heaping tablespoon. Increase as needed, 1 heaping tablespoon at a time, up to 3 times per day. 479 g 3     montelukast (SINGULAIR) 10 MG tablet TAKE 1 TABLET BY MOUTH DAILY AT BEDTIME 30 tablet 11     Multiple Vitamin (MULTIVITAMIN OR) Take 1 tablet by mouth daily.       naltrexone  (DEPADE;REVIA) 50 MG tablet Take 1/2 Tablet daily then increase to maximum of 1 Full Tablet daily as tolerated.  Time it one to two hours prior to worst cravings 30 tablet 3     naltrexone (DEPADE;REVIA) 50 MG tablet Take 1 tablet.  Time it one to two hours prior to worst cravings. 60 tablet 2     omeprazole (PRILOSEC) 20 MG CR capsule TAKE 1 CAPSULE BY MOUTH TWICE A DAY 60 capsule 11     oseltamivir (TAMIFLU) 75 MG capsule Take 1 capsule (75 mg) by mouth daily 10 capsule 0     polyethylene glycol (MIRALAX) powder Take 17 g (1 capful) by mouth daily as needed for constipation 119 g 3     traZODone (DESYREL) 100 MG tablet Take 1 tablet (100 mg) by mouth At Bedtime 30 tablet 5     VITAMIN D3 1000 UNITS tablet TAKE 1 TABLET BY MOUTH EVERY  tablet 3     vitamin E 400 UNIT capsule TAKE 1 CAPSULE BY MOUTH TWICE A DAY 60 capsule 11       Weight Loss Medication History Reviewed With Patient 5/1/2018   Which weight loss medications are you currently taking on a regular basis?  Contrave (naltrexone/bupropion)   If you are not taking a weight loss medication that was prescribed to you, please indicate why: -   Are you having any side effects from the weight loss medication that we have prescribed you? No     ASSESSMENT:    Mamie Rice is a 22 year old female who I am continuing to see for treatment of obesity.  She has CF, CFRD, depression, joints pain, ADHD    Currently on naltrexone and bupropion -- work well with less snacking    PLAN:  - continue naltrexone 50 mg daily. She already on bupropion  - trial of modified liquid diet -- protein shake  - reinforced food plan - focus on no between meal snacking, aggressive lowering of starches and cheese, increase protein and vegetable    FOLLOW-UP:    RTC 3-4 months with me      I spent a total of 15 minutes face-to-face with Mamie Rice during today's office visit. Over 50% of this time was spent counseling the patient and/or coordinating care  regarding    Sincerely,    Randa Ho MD

## 2018-05-01 NOTE — MR AVS SNAPSHOT
After Visit Summary   5/1/2018    Mamie Rice    MRN: [id number]           Patient Information     Date Of Birth          1995        Visit Information        Provider Department      5/1/2018 1:45 PM Randa Ho MD [facility] Medical Weight Management        Today's Diagnoses     Class 1 obesity due to excess calories without serious comorbidity with body mass index (BMI) of 30.0 to 30.9 in adult    -  1      Care Instructions    Continue with naltrexone 50 mg daily  Please try protein shake in place of breakfast or lunch  Work on increasing vegetable  Walk daily  Follow up with Dr. Tolentino in 3-4 months.    If you have any questions, please do not hesitate to call Weight management clinic at [phone] or [phone].    Sincerely,    Randa Ho MD  Endocrinology            Follow-ups after your visit        Your next 10 appointments already scheduled     May 15, 2018  9:45 AM CDT   Adult Med Follow UP with DANIEL Mtz CNS   Psychiatry Clinic ([facility])    [address]  [address] [address] [phone]            May 30, 2018  1:45 PM CDT   (Arrive by 1:30 PM)   Return Visit with Marieal Haile MD   [facility] ([facility])    [address] [place] [address] [phone]            Jul 18, 2018  8:45 AM CDT   (Arrive by 8:30 AM)   Return Visit with Paige Reid PA-C   [facility] Dermatology ([facility])    [address] [place] [id number] [phone]            Jul 18, 2018  9:30 AM CDT   CF LOOP with  PFL CF   [facility] Pulmonary Function Testing ([facility])    [address] [place] [id number] [phone]            Jul 18, 2018  9:50 AM CDT   (Arrive by 9:35 AM)   RETURN CYSTIC FIBROSIS VISIT with Gaiv  Mary Kay Allison MD   [facility] ([facility])    [address]  [address]  [facility] [address]   [phone]            Aug 07, 2018 10:15 AM CDT   (Arrive by 10:00 AM)   Return Visit with Randa Ho MD   [facility] Medical Weight Management ([facility])    [address]  [address]th [facility] [id number] [phone]            Aug 07, 2018 11:00 AM CDT   (Arrive by 10:45 AM)   NUTRITION VISIT with Johana Thomas RD   [facility] Surgical Weight Management ([facility])    [address]  4th Floor  [facility] [id number] [phone]              Who to contact     Please call your clinic at [phone] to:    Ask questions about your health    Make or cancel appointments    Discuss your medicines    Learn about your test results    Speak to your doctor            Additional Information About Your Visit        [facility] Information     [employer] gives you secure access to your electronic health record. If you see a primary care provider, you can also send messages to your care team and make appointments. If you have questions, please call your primary care clinic.  If you do not have a primary care provider, please call [phone] and they will assist you.      [employer] is an electronic gateway that provides easy, online access to your medical records. With [employer], you can request a clinic appointment, read your test results, renew a prescription or communicate with your care team.     To access your existing account, please contact your [facility] or call [phone] for assistance.        Care EveryWhere ID     This is your Care EveryWhere ID. This could be used by other organizations to access your [place] medical records  [id number]        Your Vitals Were     Pulse Height Pulse Oximetry BMI (Body Mass Index)          97  "1.57 m (5' 1.81\") 99% 30.75 kg/m2         Blood Pressure from Last 3 Encounters:   05/01/18 110/71   04/18/18 120/83   01/17/18 114/68    Weight from Last 3 Encounters:   05/01/18 75.8 kg (167 lb 1.6 oz)   04/18/18 75.3 kg (166 lb)   03/21/18 75.3 kg (166 lb)              Today, you had the following     No orders found for display         Today's Medication Changes          These changes are accurate as of 5/1/18  4:23 PM.  If you have any questions, ask your nurse or doctor.               These medicines have changed or have updated prescriptions.        Dose/Directions    naltrexone 50 MG tablet   Commonly known as:  DEPADE;REVIA   This may have changed:  Another medication with the same name was removed. Continue taking this medication, and follow the directions you see here.   Used for:  Class 1 obesity due to excess calories without serious comorbidity with body mass index (BMI) of 30.0 to 30.9 in adult   Changed by:  Randa Ho MD        Take 1 tablet.  Time it one to two hours prior to worst cravings.   Quantity:  90 tablet   Refills:  2            Where to get your medicines      These medications were sent to [facility] - [place] [id number]     Phone:  [phone]     naltrexone 50 MG tablet                Primary Care Provider Office Phone # Fax #    Malik De La Rosa [phone]       [address] [facility] [address]        Equal Access to Services     CYDNEY MARTINEZ : Vinita Marion, washelibda luqadaha, qaybta kaaldavid sauer. So [facility] [phone].    ATENCIÓN: Si habla español, tiene a hidalgo disposición servicios gratuitos de asistencia lingüística. Llame al [phone].    We comply with applicable federal civil rights laws and [place] laws. We do not discriminate on the basis of race, color, national origin, age, " disability, sex, sexual orientation, or gender identity.            Thank you!     Thank you for choosing [facility] MEDICAL WEIGHT MANAGEMENT  for your care. Our goal is always to provide you with excellent care. Hearing back from our patients is one way we can continue to improve our services. Please take a few minutes to complete the written survey that you may receive in the mail after your visit with us. Thank you!             Your Updated Medication List - Protect others around you: Learn how to safely use, store and throw away your medicines at www.disposemymeds.org.          This list is accurate as of 5/1/18  4:23 PM.  Always use your most recent med list.                   Brand Name Dispense Instructions for use Diagnosis    acetaminophen 325 MG tablet    TYLENOL    100 tablet    Take 2 tablets (650 mg) by mouth every 4 hours as needed for other (mild pain)    Chronic pain of left knee       acetylcysteine 20 % nebulizer solution    MUCOMYST    360 mL    INHALE ONE 4ML NEB INTO LUNGS VIA NEBULIZER TWO TIMES A DAY, MAY INCREASE TO 3-4 TIMES A DAY WITH INCREASE COUGH/COLD SYMPTOMS    CF (cystic fibrosis) (H)       * albuterol 108 (90 Base) MCG/ACT Inhaler    PROAIR HFA/PROVENTIL HFA/VENTOLIN HFA    1 Inhaler    Inhale 2 puffs into the lungs every 6 hours as needed for shortness of breath / dyspnea or wheezing    Cystic fibrosis with pulmonary manifestations (H), Sinusitis, chronic, Diabetes mellitus type 1 (H)       * albuterol (2.5 MG/3ML) 0.083% neb solution     360 mL    Take 1 vial (2.5 mg) by nebulization 4 times daily    CF (cystic fibrosis) (H)       aluminum chloride 20 % external solution    DRYSOL    60 mL    Apply topically At Bedtime Everyother night to every 3rd night. With impovement, decrease to 1-2 times weekly. Irritation may occur.    Focal hyperhidrosis       * amphetamine-dextroamphetamine 20 MG per 24 hr capsule    ADDERALL XR    30 capsule    Take 1 capsule (20 mg) by mouth daily     Attention deficit hyperactivity disorder (ADHD), predominantly inattentive type       * amphetamine-dextroamphetamine 20 MG per 24 hr capsule    ADDERALL XR    30 capsule    Take 1 capsule (20 mg) by mouth daily    Attention deficit hyperactivity disorder (ADHD), predominantly inattentive type       * amphetamine-dextroamphetamine 20 MG per 24 hr capsule    ADDERALL XR    30 capsule    Take 1 capsule (20 mg) by mouth daily    Attention deficit hyperactivity disorder (ADHD), predominantly inattentive type       ascorbic acid 500 MG tablet    VITAMIN C    100 tablet    TAKE ONE TABLET BY MOUTH TWICE A DAY    Cystic fibrosis with pulmonary manifestations (H)       azithromycin 500 MG tablet    ZITHROMAX    36 tablet    TAKE ONE TABLET BY MOUTH ON MONDAY, WEDNESDAY AND FRIDAY    CF (cystic fibrosis) (H)       beta carotene 76415 UNIT capsule     36 capsule    Take 1 capsule (25,000 Units) by mouth Every Mon, Wed, Fri Morning    Cystic fibrosis with pulmonary manifestations (H)       blood glucose monitoring test strip    ACCU-CHEK SMARTVIEW    1 Month    Use to test blood sugar 4 times daily or as directed.    Type I (juvenile type) diabetes mellitus without mention of complication, not stated as uncontrolled       buPROPion 150 MG 12 hr tablet    WELLBUTRIN SR    60 tablet    Take 1 tablet (150 mg) by mouth 2 times daily    Major depressive disorder, recurrent episode, mild (H)       cetirizine 10 MG tablet    zyrTEC    30 tablet    Take 1 tablet (10 mg) by mouth every evening    Allergic rhinitis due to [employer] house dust mite, Urticaria, chronic       cholecalciferol 1000 UNIT tablet    vitamin D3    100 tablet    TAKE 1 TABLET BY MOUTH EVERY DAY    CF (cystic fibrosis) (H), Exocrine pancreatic insufficiency       DEPO-PROVERA IM           dornase alpha 1 MG/ML neb solution    PULMOZYME    75 mL    Inhale 2.5 mg into the lungs daily    Cystic fibrosis with pulmonary manifestations (H)       FLUoxetine 40 MG capsule     PROZAC    60 capsule    Take 2 capsules (80 mg) by mouth daily    Cystic fibrosis with pulmonary manifestations (H)       GLYCOPYRROLATE PO      Take 1 mg by mouth 2 times daily        hydrOXYzine 25 MG tablet    ATARAX    90 tablet    Take 1 tablet (25 mg) by mouth every 6 hours as needed for itching (and nausea) And one to two at bedtime for insomnia    Chronic pain of left knee       MEPHYTON 5 MG tablet   Generic drug:  phytonadione     4 tablet    TAKE ONE TABLET BY MOUTH ONCE A WEEK    Cystic fibrosis with pulmonary manifestations (H), Cystic fibrosis with pulmonary manifestations (H), Aspergillosis (H), Pancreatic insufficiency       * meropenem 500 MG vial    MERREM    60 each         * meropenem 500 MG vial    MERREM    1 each    Give x 1 today        methylcellulose (laxative) Powd    CITRUCEL    479 g    Start with 1 heaping tablespoon. Increase as needed, 1 heaping tablespoon at a time, up to 3 times per day.    Other constipation       montelukast 10 MG tablet    SINGULAIR    30 tablet    TAKE 1 TABLET BY MOUTH DAILY AT BEDTIME    CF (cystic fibrosis) (H)       MULTIVITAMIN PO      Take 1 tablet by mouth daily.        naltrexone 50 MG tablet    DEPADE;REVIA    90 tablet    Take 1 tablet.  Time it one to two hours prior to worst cravings.    Class 1 obesity due to excess calories without serious comorbidity with body mass index (BMI) of 30.0 to 30.9 in adult       omeprazole 20 MG CR capsule    priLOSEC    60 capsule    TAKE 1 CAPSULE BY MOUTH TWICE A DAY    CF (cystic fibrosis) (H)       oseltamivir 75 MG capsule    TAMIFLU    10 capsule    Take 1 capsule (75 mg) by mouth daily    Cystic fibrosis with pulmonary manifestations (H)       polyethylene glycol powder    MIRALAX    119 g    Take 17 g (1 capful) by mouth daily as needed for constipation    Cystic fibrosis of the lung (H), Constipation, unspecified constipation type       traZODone 100 MG tablet    DESYREL    30 tablet    Take 1 tablet (100  mg) by mouth At Bedtime    Insomnia, unspecified type       vitamin E 400 UNIT capsule     60 capsule    TAKE 1 CAPSULE BY MOUTH TWICE A DAY    CF (cystic fibrosis) (H), Pancreatic insufficiency       * Notice:  This list has 7 medication(s) that are the same as other medications prescribed for you. Read the directions carefully, and ask your doctor or other care provider to review them with you.

## 2018-05-01 NOTE — PROGRESS NOTES
"    Return Medical Weight Management Note     Mamie Rice  MRN:  [id number]  :  1995  STEPHEN: 2018    Dear Dr. De La Rosa,     I had the pleasure of seeing your patient Mamie Rice.  She is a 22 year old female who I am continuing to see for treatment of obesity.  She has CF, CFRD, depression, joints pain, ADHD    INTERVAL History  Last seen by me 2017. She is currently on naltrexone 50 mg daily that I started about 6 months. She said that it initially helped with reducing craving but not much anymore. She has tried to minimize carbohydrate and sweet. She cut down milk to 1 skim milk per day. She feels that she has not been snacking often anymore. She is trying to walk a dog daily. She is active a work.    She is also on Adderall for ADHD and Bupropion for depression.  She works as a , nanny and dance coach.    Diet recall:  BF: cereal or toast  Lunch: sandwich or cereal or salad  Supper: meat, vegetable (corn, pea, carrot)  Snack: fruit snack.    CURRENT WEIGHT:   167 lbs 1.6 oz    Wt Readings from Last 4 Encounters:   18 75.8 kg (167 lb 1.6 oz)   18 75.3 kg (166 lb)   18 75.3 kg (166 lb)   18 75 kg (165 lb 5.5 oz)     Height:  5' 1.81\"  Body Mass Index:  Body mass index is 30.75 kg/(m^2).  Vitals:  B/P: 124/81, P: 100    Initial consult weight was 182 on 10/14/2016.  Weight change since last seen on 2017 is down 6 pounds.   Total loss is 15 pounds.      Review of Systems     Constitutional:  Positive for night sweats and increased energy. Negative for fever, chills, weight loss, weight gain, fatigue, decreased appetite, recent stressors, height loss, post-operative complications, incisional pain, hallucinations, hyperactivity and confused.   HENT:  Negative for ear pain, hearing loss, tinnitus, nosebleeds, trouble swallowing, hoarse voice, mouth sores, sore throat, ear discharge, tooth pain, gum tenderness, taste disturbance, smell disturbance, hearing aid, " bleeding gums, dry mouth, sinus pain, sinus congestion and neck mass.    Eyes:  Negative for double vision, pain, redness, eye pain, decreased vision, eye watering, eye bulging, eye dryness, flashing lights, spots, floaters, strabismus, tunnel vision, jaundice and eye irritation.   Respiratory:   Negative for cough, hemoptysis, sputum production, shortness of breath, wheezing, sleep disturbances due to breathing, snores loudly, respiratory pain, dyspnea on exertion, cough disturbing sleep and postural dyspnea.    Cardiovascular:  Negative for chest pain, dyspnea on exertion, palpitations, orthopnea, claudication, leg swelling, fingers/toes turn blue, hypertension, hypotension, syncope, history of heart murmur, chest pain on exertion, chest pain at rest, pacemaker, few scattered varicosities, leg pain, sleep disturbances due to breathing, tachycardia, light-headedness, exercise intolerance and edema.   Gastrointestinal:  Negative for heartburn, nausea, vomiting, abdominal pain, diarrhea, constipation, blood in stool, melena, rectal pain, bloating, hemorrhoids, bowel incontinence, jaundice, rectal bleeding, coffee ground emesis and change in stool.   Genitourinary:  Negative for bladder incontinence, dysuria, urgency, hematuria, flank pain, vaginal discharge, difficulty urinating, genital sores, dyspareunia, decreased libido, nocturia, voiding less frequently, arousal difficulty, abnormal vaginal bleeding, excessive menstruation, menstrual changes, hot flashes, vaginal dryness and postmenopausal bleeding.   Musculoskeletal:  Positive for myalgias, joint swelling, arthralgias, stiffness and muscle weakness. Negative for back pain, muscle cramps, neck pain, bone pain and fracture.   Skin:  Negative for nail changes, itching, poor wound healing, rash, hair changes, skin changes, acne, warts, poor wound healing, scarring, flaky skin, Raynaud's phenomenon, sensitivity to sunlight and skin thickening.   Neurological:   Negative for dizziness, tingling, tremors, speech change, seizures, loss of consciousness, weakness, light-headedness, numbness, headaches, disturbances in coordination, extremity numbness, memory loss, difficulty walking and paralysis.   Endo/Heme:  Negative for anemia, swollen glands and bruises/bleeds easily.   Psychiatric/Behavioral:  Negative for depression, hallucinations, memory loss, decreased concentration, mood swings and panic attacks.    Breast:  Negative for breast discharge, breast mass, breast pain and nipple retraction.   Endocrine:  Positive for altered temperature regulation and polydipsia.Negative for polyphagia, unwanted hair growth and change in facial hair.      MEDICATIONS:   Current Outpatient Prescriptions   Medication Sig Dispense Refill     acetaminophen (TYLENOL) 325 MG tablet Take 2 tablets (650 mg) by mouth every 4 hours as needed for other (mild pain) 100 tablet 0     acetylcysteine (MUCOMYST) 20 % nebulizer solution INHALE ONE 4ML NEB INTO LUNGS VIA NEBULIZER TWO TIMES A DAY, MAY INCREASE TO 3-4 TIMES A DAY WITH INCREASE COUGH/COLD SYMPTOMS 360 mL 11     albuterol (2.5 MG/3ML) 0.083% neb solution Take 1 vial (2.5 mg) by nebulization 4 times daily 360 mL 11     albuterol (PROAIR HFA, PROVENTIL HFA, VENTOLIN HFA) 108 (90 BASE) MCG/ACT inhaler Inhale 2 puffs into the lungs every 6 hours as needed for shortness of breath / dyspnea or wheezing 1 Inhaler 12     aluminum chloride (DRYSOL) 20 % external solution Apply topically At Bedtime Everyother night to every 3rd night. With impovement, decrease to 1-2 times weekly. Irritation may occur. 60 mL 3     amphetamine-dextroamphetamine (ADDERALL XR) 20 MG per 24 hr capsule Take 1 capsule (20 mg) by mouth daily 30 capsule 0     amphetamine-dextroamphetamine (ADDERALL XR) 20 MG per 24 hr capsule Take 1 capsule (20 mg) by mouth daily 30 capsule 0     amphetamine-dextroamphetamine (ADDERALL XR) 20 MG per 24 hr capsule Take 1 capsule (20 mg) by  mouth daily 30 capsule 0     ascorbic acid (VITAMIN C) 500 MG tablet TAKE ONE TABLET BY MOUTH TWICE A  tablet 3     azithromycin (ZITHROMAX) 500 MG tablet TAKE ONE TABLET BY MOUTH ON MONDAY, WEDNESDAY AND FRIDAY 36 tablet 4     beta carotene 35212 UNIT capsule Take 1 capsule (25,000 Units) by mouth Every Mon, Wed, Fri Morning 36 capsule 4     blood glucose (ACCU-CHEK SMARTVIEW) test strip Use to test blood sugar 4 times daily or as directed. 1 Month 12     buPROPion (WELLBUTRIN SR) 150 MG 12 hr tablet Take 1 tablet (150 mg) by mouth 2 times daily 60 tablet 5     cetirizine (ZYRTEC) 10 MG tablet Take 1 tablet (10 mg) by mouth every evening 30 tablet 3     dornase alpha (PULMOZYME) 1 MG/ML neb solution Inhale 2.5 mg into the lungs daily 75 mL 3     FLUoxetine (PROZAC) 40 MG capsule Take 2 capsules (80 mg) by mouth daily 60 capsule 5     GLYCOPYRROLATE PO Take 1 mg by mouth 2 times daily        hydrOXYzine (ATARAX) 25 MG tablet Take 1 tablet (25 mg) by mouth every 6 hours as needed for itching (and nausea) And one to two at bedtime for insomnia 90 tablet 2     MedroxyPROGESTERone Acetate (DEPO-PROVERA IM)        MEPHYTON 5 MG tablet TAKE ONE TABLET BY MOUTH ONCE A WEEK 4 tablet 11     meropenem (MERREM) 500 MG vial  60 each      meropenem (MERREM) 500 MG vial Give x 1 today 1 each      methylcellulose, laxative, (CITRUCEL) POWD Start with 1 heaping tablespoon. Increase as needed, 1 heaping tablespoon at a time, up to 3 times per day. 479 g 3     montelukast (SINGULAIR) 10 MG tablet TAKE 1 TABLET BY MOUTH DAILY AT BEDTIME 30 tablet 11     Multiple Vitamin (MULTIVITAMIN OR) Take 1 tablet by mouth daily.       naltrexone (DEPADE;REVIA) 50 MG tablet Take 1/2 Tablet daily then increase to maximum of 1 Full Tablet daily as tolerated.  Time it one to two hours prior to worst cravings 30 tablet 3     naltrexone (DEPADE;REVIA) 50 MG tablet Take 1 tablet.  Time it one to two hours prior to worst cravings. 60 tablet 2      omeprazole (PRILOSEC) 20 MG CR capsule TAKE 1 CAPSULE BY MOUTH TWICE A DAY 60 capsule 11     oseltamivir (TAMIFLU) 75 MG capsule Take 1 capsule (75 mg) by mouth daily 10 capsule 0     polyethylene glycol (MIRALAX) powder Take 17 g (1 capful) by mouth daily as needed for constipation 119 g 3     traZODone (DESYREL) 100 MG tablet Take 1 tablet (100 mg) by mouth At Bedtime 30 tablet 5     VITAMIN D3 1000 UNITS tablet TAKE 1 TABLET BY MOUTH EVERY  tablet 3     vitamin E 400 UNIT capsule TAKE 1 CAPSULE BY MOUTH TWICE A DAY 60 capsule 11       Weight Loss Medication History Reviewed With Patient 5/1/2018   Which weight loss medications are you currently taking on a regular basis?  Contrave (naltrexone/bupropion)   If you are not taking a weight loss medication that was prescribed to you, please indicate why: -   Are you having any side effects from the weight loss medication that we have prescribed you? No     ASSESSMENT:    Mamie Rice is a 22 year old female who I am continuing to see for treatment of obesity.  She has CF, CFRD, depression, joints pain, ADHD    Currently on naltrexone and bupropion -- work well with less snacking    PLAN:  - continue naltrexone 50 mg daily. She already on bupropion  - trial of modified liquid diet -- protein shake  - reinforced food plan - focus on no between meal snacking, aggressive lowering of starches and cheese, increase protein and vegetable    FOLLOW-UP:    RTC 3-4 months with me      I spent a total of 15 minutes face-to-face with Mamie Rice during today's office visit. Over 50% of this time was spent counseling the patient and/or coordinating care regarding    Sincerely,    Randa Ho MD

## 2018-05-01 NOTE — PROGRESS NOTES
"Mamie Rice is a 22 year old female presents today for new weight management nutrition consultation.  Patient referred by Dr. Randa Ho(5/1/18).    Estimated body mass index is 30.75 kg/(m^2) as calculated from the following:    Height as of an earlier encounter on 5/1/18: 1.57 m (5' 1.81\").    Weight as of an earlier encounter on 5/1/18: 75.8 kg (167 lb 1.6 oz).    Nutrition history  See MD note for details.  Patient reported no food allergies  Patient noted that she will have low blood sugars occasionally if she is very busy at work.  Patient will have a snack between meals if her blood sugar is low, otherwise patient reported she does not snack between meals.    Nutrition Prescription  Meal replacements (per MD)    Nutrition Diagnosis  Food and nutrition related knowledge deficit r/t lack of prior exposure to modified liquid diet aeb pt unable to verbalize understanding of modified liquid diet and protein shake options for weight loss.    Nutrition Intervention  Materials/education provided on meal replacement options with the modified liquid det.  Encouraged patient to keep protein shakes meal replacements ~200 calories and at least 20 gm protein with no more than 10 gm sugar.    Patient Understanding: good  Expected Compliance: good    Nutrition Goals  1) Meal replacements 1-2 per day:  Protein Shake Criteria: no more than 250 Calories, at least 20 grams of protein, and less than 10 grams of sugar     Meal Replacement Shake Options:   [facility] Meal Replacement (250 Calories, 35 g protein)   Premier Protein (160 Calories, 30 g protein)  Slim Fast Advanced Nutrition (180 Calories, 20 g protein)  Muscle Milk, lactose-free, 17 oz bottle (210 Calories, 30 g protein)  Integrated Supplements, no artificial sugars (110 Calories, 20 g protein)    2) Weight loss    Follow-Up:  PRN    Time spent with patient: 15 minutes.  Johana Thomas, RD, LD        "

## 2018-05-01 NOTE — NURSING NOTE
"  Chief Complaint   Patient presents with     Weight Problem     RMWM     Vitals:    05/01/18 1347   BP: 110/71   Pulse: 97   SpO2: 99%   Weight: 167 lb 1.6 oz   Height: 5' 1.81\"     Body mass index is 30.75 kg/(m^2).  Lesia Greene CMA    "

## 2018-05-01 NOTE — PATIENT INSTRUCTIONS
Continue with naltrexone 50 mg daily  Please try protein shake in place of breakfast or lunch  Work on increasing vegetable  Walk daily  Follow up with Dr. Tolentino in 3-4 months.    If you have any questions, please do not hesitate to call Weight management clinic at [phone] or [phone].    Sincerely,    Randa Ho MD  Endocrinology

## 2018-05-09 NOTE — PROGRESS NOTES
[facility]        OUTPATIENT PHYSICAL THERAPY FUNCTIONAL EVALUATION  PLAN OF TREATMENT FOR OUTPATIENT REHABILITATION  (COMPLETE FOR INITIAL CLAIMS ONLY)  Patient's Last Name, First Name, M.I.  YOB: 1995  Mamie Rice     Provider's Name   [facility]   Medical Record No.  [id number]     Start of Care Date:  04/18/18   Onset Date:   4/18/2018   Type:     _X__PT   ____OT  ____SLP Medical Diagnosis:   Cystic Fibrosis     PT Diagnosis:  impaired posture and deconditioning  Visits from SOC:  1                              __________________________________________________________________________________  Plan of Treatment/Functional Goals:  strengthening           GOALS    Refuses treatment                                                                                          Therapy Frequency:      Predicted Duration of Therapy Intervention:       José Miguel Cannon, PT                                    I CERTIFY THE NEED FOR THESE SERVICES FURNISHED UNDER        THIS PLAN OF TREATMENT AND WHILE UNDER MY CARE     (Physician co-signature of this document indicates review and certification of the therapy plan).                Certification Date From:    4/18/2018   Certification Date To:   4/18/2018    Referring Provider:  Gavi Allison MD    Initial Assessment  See Epic Evaluation- Start of Care Date: 04/18/18

## 2018-05-15 ENCOUNTER — OFFICE VISIT (OUTPATIENT)
Dept: PSYCHIATRY | Facility: CLINIC | Age: 23
End: 2018-05-15
Attending: CLINICAL NURSE SPECIALIST
Payer: COMMERCIAL

## 2018-05-15 VITALS
SYSTOLIC BLOOD PRESSURE: 103 MMHG | HEART RATE: 81 BPM | DIASTOLIC BLOOD PRESSURE: 67 MMHG | BODY MASS INDEX: 30.7 KG/M2 | WEIGHT: 166.8 LBS

## 2018-05-15 DIAGNOSIS — G47.00 INSOMNIA, UNSPECIFIED TYPE: ICD-10-CM

## 2018-05-15 DIAGNOSIS — F90.2 ATTENTION DEFICIT HYPERACTIVITY DISORDER (ADHD), COMBINED TYPE: ICD-10-CM

## 2018-05-15 DIAGNOSIS — F33.0 MILD EPISODE OF RECURRENT MAJOR DEPRESSIVE DISORDER (H): Primary | ICD-10-CM

## 2018-05-15 DIAGNOSIS — G89.29 CHRONIC PAIN OF LEFT KNEE: ICD-10-CM

## 2018-05-15 DIAGNOSIS — F41.1 GAD (GENERALIZED ANXIETY DISORDER): ICD-10-CM

## 2018-05-15 DIAGNOSIS — M25.562 CHRONIC PAIN OF LEFT KNEE: ICD-10-CM

## 2018-05-15 DIAGNOSIS — F90.0 ATTENTION DEFICIT HYPERACTIVITY DISORDER (ADHD), PREDOMINANTLY INATTENTIVE TYPE: ICD-10-CM

## 2018-05-15 PROCEDURE — G0463 HOSPITAL OUTPT CLINIC VISIT: HCPCS | Mod: ZF

## 2018-05-15 RX ORDER — DEXTROAMPHETAMINE SACCHARATE, AMPHETAMINE ASPARTATE MONOHYDRATE, DEXTROAMPHETAMINE SULFATE AND AMPHETAMINE SULFATE 5; 5; 5; 5 MG/1; MG/1; MG/1; MG/1
20 CAPSULE, EXTENDED RELEASE ORAL DAILY
Qty: 30 CAPSULE | Refills: 0 | Status: SHIPPED | OUTPATIENT
Start: 2018-06-12 | End: 2018-08-09

## 2018-05-15 RX ORDER — DEXTROAMPHETAMINE SACCHARATE, AMPHETAMINE ASPARTATE MONOHYDRATE, DEXTROAMPHETAMINE SULFATE AND AMPHETAMINE SULFATE 5; 5; 5; 5 MG/1; MG/1; MG/1; MG/1
20 CAPSULE, EXTENDED RELEASE ORAL DAILY
Qty: 30 CAPSULE | Refills: 0 | Status: SHIPPED | OUTPATIENT
Start: 2018-05-15 | End: 2018-08-09

## 2018-05-15 RX ORDER — HYDROXYZINE HYDROCHLORIDE 25 MG/1
TABLET, FILM COATED ORAL
COMMUNITY
Start: 2018-05-15 | End: 2018-08-09

## 2018-05-15 RX ORDER — DEXTROAMPHETAMINE SACCHARATE, AMPHETAMINE ASPARTATE MONOHYDRATE, DEXTROAMPHETAMINE SULFATE AND AMPHETAMINE SULFATE 5; 5; 5; 5 MG/1; MG/1; MG/1; MG/1
20 CAPSULE, EXTENDED RELEASE ORAL DAILY
Qty: 30 CAPSULE | Refills: 0 | Status: SHIPPED | OUTPATIENT
Start: 2018-07-10 | End: 2018-08-09

## 2018-05-15 RX ORDER — BUSPIRONE HYDROCHLORIDE 15 MG/1
TABLET ORAL
Qty: 60 TABLET | Refills: 2 | Status: SHIPPED | OUTPATIENT
Start: 2018-05-15 | End: 2018-08-09

## 2018-05-15 ASSESSMENT — PAIN SCALES - GENERAL: PAINLEVEL: NO PAIN (0)

## 2018-05-15 NOTE — PROGRESS NOTES
Outpatient Psychiatry Progress Note     Provider: DANIEL Mtz CNS  Date: 5/15/2018  Service:  Medication follow up with counseling.   Patient Identification: Mamie Rice  : 1995   MRN: [id number]    Mmaie Rice is a 22 year old year old female who presents for ongoing psychiatric care.  Mamie Rice was last seen in clinic on 18 for intake appointment with Jessie Pitts NP student.  At that time,   Assessment & Plan      Assessment:   Mamie Rice is a 22 year old female  who presents for establishing care after PCP retired who has been managing her medications.  Pt reports her mood is stable and her concentration and anxiety are well controlled at this time.  Pt reported occasional initial and middle insomnia which she feels due to irregular work schedule.   Pt has been taking PRN Vistaril 25 mg HS daily, but still having this problem.  Pt takes PRN Vistaril also for nausea with Naltrexone.  Pt also discussed higher dose of Trazodone, but was oversedating and 50 mg was not effective.  We discussed HS dose of Vistaril can be 25-50 mg and when she can't sleep, she may try higher dose of Vistaril to see if it would be helpful for sleep.       Pt did not report and exhibit any sign of OCD at this time.  Symptoms may be well controlled due to Prozac at this time, pt does not meet any criteria of OCD today, but pt reported occasional increase in anxiety last 1-2 years that is mostly controlled due to avoiding stimuli at this time.     Diagnosis  ADHD  Major depressive disorder, recurrent, mild  Anxiety     Plan:    Medication: Change HS Vistaril to 25-50 mg PRN, continue all other medications.  OTC Recommendations: none  Lab Orders:  none  Referrals: none  Release of Information: [facility] (PCP)  Future Treatment Considerations: per symptoms  Return for Follow Up: 3  months      ____________________________________________________________________________________________________________________________________________    05/15/2018  Today Mamie reports her mood has been good. Hydroxyzine sometimes helps with sleep and takes it  everynight with Trazodone 100mg.  Takes Naltrexone every day but doesn't take Hydroxyzine for nausea.   Diagnosed with ADHD her senior year of high school or freshman year of college. OCD not a problem really - color coordinated work schedule, toys have to be in the right bin, can't make writing mistakes.    Side effects of medication include: none known  Psychiatric Review of Systems:  Depression: In the last 2 weeks per PHQ 9 several days anhedonia, feeling depressed, sleep disturbance, appetite disturbance, feelings of failure.  More than 1/2 days fatigue, concentration problems, restless/lethargy.  Anxiety : varies - large groups of people that she knows and out in public with them. She is not looking forward to camping with 38 relatives this summer. Stays in a cabin for a weekend.  She is wondering about increase in anxiety medication since additionally more stressed at work - can't sit still, breathing is off. She calls her mom and talks and this helps. Seems to be increasing everyday. Also at work was given a upper level job and has to talk to people when they do things wrong.   Quiana na   Psychosis  na.   ADHD managed with Adderall.    Review of Medical Systems:  Sleep: some difficulty still due to work schedule  Energy: moderate which is stable  Concentration: moderate- stable  Appetite: mild - stable  GI Concerns: no concerns  Cardiac concerns: no concern  Neurological concerns: no concerns  Other medical concerns: no new concerns. CF has been stable  Current Substance Use:  Alcohol:denies  Other drugs:denies  Caffeine:one bottle a day  Nicotine: none  Past Medical History:   Past Medical History:   Diagnosis Date     ADHD (attention deficit  hyperactivity disorder)      Anxiety      Aspergillosis, with pneumonia (H)     fugus found caused chest pain     Chronic infection     CF, MRSA.,      Chronic sinusitis      Constipation, chronic      Cystic fibrosis with pulmonary manifestations (H) 12/19/2011     Cystic fibrosis without mention of meconium ileus     SWEAT TEST:Date: 2/17/1994   [facility]  296 mg, 104 mmol/L ClSample #2  295 mg, 104 mmol/L Cl GENOTYPING:Date: 10/15/2007,  Laboratory: [facility]: [id number]     Depressive disorder      Diabetes     no meds currently     Dysthymic disorder      Exocrine pancreatic insufficiency      Gastro-oesophageal reflux disease      Hip pain, right      MRSA (methicillin resistant Staphylococcus aureus) carrier      Pancreatic disease      Patient Active Problem List   Diagnosis     Hip joint pain     Aspergillosis (H)     Chronic maxillary sinusitis     Hypoglycemia     Type 1 diabetes mellitus (H)     Cystic fibrosis of the lung (H)     Chronic constipation     Frontal sinusitis     Major depressive disorder     Overactive child     Back pain     Pancreatic insufficiency     Non morbid obesity due to excess calories     Acne vulgaris     Cystic fibrosis (H)     Insomnia     Major depressive disorder with single episode     Diabetes mellitus, type 2 (H)     Persistent depressive disorder       Allergies:   Allergies   Allergen Reactions     Vancomycin Hives     Redmens skin rash   Redmens skin rash           Current Medications     Current Outpatient Prescriptions Ordered in [facility]   Medication Sig Dispense Refill     acetaminophen (TYLENOL) 325 MG tablet Take 2 tablets (650 mg) by mouth every 4 hours as needed for other (mild pain) 100 tablet 0     acetylcysteine (MUCOMYST) 20 % nebulizer solution INHALE ONE 4ML NEB INTO LUNGS VIA NEBULIZER TWO TIMES A DAY, MAY INCREASE TO 3-4 TIMES A DAY WITH INCREASE COUGH/COLD SYMPTOMS 360 mL 11     albuterol (2.5 MG/3ML) 0.083% neb solution Take 1  vial (2.5 mg) by nebulization 4 times daily 360 mL 11     albuterol (PROAIR HFA/PROVENTIL HFA/VENTOLIN HFA) 108 (90 Base) MCG/ACT Inhaler Inhale 2 puffs into the lungs every 6 hours as needed for shortness of breath / dyspnea or wheezing 1 Inhaler 12     aluminum chloride (DRYSOL) 20 % external solution Apply topically At Bedtime Everyother night to every 3rd night. With impovement, decrease to 1-2 times weekly. Irritation may occur. 60 mL 3     amphetamine-dextroamphetamine (ADDERALL XR) 20 MG per 24 hr capsule Take 1 capsule (20 mg) by mouth daily 30 capsule 0     amphetamine-dextroamphetamine (ADDERALL XR) 20 MG per 24 hr capsule Take 1 capsule (20 mg) by mouth daily 30 capsule 0     amphetamine-dextroamphetamine (ADDERALL XR) 20 MG per 24 hr capsule Take 1 capsule (20 mg) by mouth daily 30 capsule 0     ascorbic acid (VITAMIN C) 500 MG tablet TAKE ONE TABLET BY MOUTH TWICE A  tablet 3     azithromycin (ZITHROMAX) 500 MG tablet TAKE ONE TABLET BY MOUTH ON MONDAY, WEDNESDAY AND FRIDAY 36 tablet 4     beta carotene 15558 UNIT capsule Take 1 capsule (25,000 Units) by mouth Every Mon, Wed, Fri Morning 36 capsule 4     blood glucose (ACCU-CHEK SMARTVIEW) test strip Use to test blood sugar 4 times daily or as directed. 1 Month 12     buPROPion (WELLBUTRIN SR) 150 MG 12 hr tablet Take 1 tablet (150 mg) by mouth 2 times daily 60 tablet 5     cetirizine (ZYRTEC) 10 MG tablet Take 1 tablet (10 mg) by mouth every evening 30 tablet 3     dornase alpha (PULMOZYME) 1 MG/ML neb solution Inhale 2.5 mg into the lungs daily 75 mL 3     FLUoxetine (PROZAC) 40 MG capsule Take 2 capsules (80 mg) by mouth daily 60 capsule 5     GLYCOPYRROLATE PO Take 1 mg by mouth 2 times daily        hydrOXYzine (ATARAX) 25 MG tablet Take 1 tablet (25 mg) by mouth every 6 hours as needed for itching (and nausea) And one to two at bedtime for insomnia 90 tablet 2     MedroxyPROGESTERone Acetate (DEPO-PROVERA IM)        MEPHYTON 5 MG tablet TAKE  "ONE TABLET BY MOUTH ONCE A WEEK 4 tablet 11     meropenem (MERREM) 500 MG vial  60 each      meropenem (MERREM) 500 MG vial Give x 1 today 1 each      methylcellulose, laxative, (CITRUCEL) POWD Start with 1 heaping tablespoon. Increase as needed, 1 heaping tablespoon at a time, up to 3 times per day. 479 g 3     montelukast (SINGULAIR) 10 MG tablet TAKE 1 TABLET BY MOUTH DAILY AT BEDTIME 30 tablet 11     Multiple Vitamin (MULTIVITAMIN OR) Take 1 tablet by mouth daily.       naltrexone (DEPADE;REVIA) 50 MG tablet Take 1 tablet.  Time it one to two hours prior to worst cravings. 90 tablet 2     omeprazole (PRILOSEC) 20 MG CR capsule TAKE 1 CAPSULE BY MOUTH TWICE A DAY 60 capsule 11     oseltamivir (TAMIFLU) 75 MG capsule Take 1 capsule (75 mg) by mouth daily 10 capsule 0     polyethylene glycol (MIRALAX) powder Take 17 g (1 capful) by mouth daily as needed for constipation 119 g 3     traZODone (DESYREL) 100 MG tablet Take 1 tablet (100 mg) by mouth At Bedtime 30 tablet 5     VITAMIN D3 1000 UNITS tablet TAKE 1 TABLET BY MOUTH EVERY  tablet 3     vitamin E 400 UNIT capsule TAKE 1 CAPSULE BY MOUTH TWICE A DAY 60 capsule 11     Current Facility-Administered Medications Ordered in Epic   Medication Dose Route Frequency Provider Last Rate Last Dose     albuterol (PROAIR HFA, PROVENTIL HFA, VENTOLIN HFA) inhaler    Citlali Denny MD   2 puff at 10/16/15 0939        Mental Status Exam     Appearance:  Casually dressed and Well groomed  Behavior/relationship to examiner/demeanor:  Cooperative  Orientation: Oriented to person, place, time and situation  Psychomotor: normal form  Speech Rate:  Normal  Speech Spontaneity:  Normal  Mood:  \"okay\"  Affect:  Appropriate/mood-congruent  Thought Process (Associations):  Goal directed  Thought Content:  no overt psychosis, denies suicidal ideation, intent or thoughts and patient does not appear to be responding to internal stimuli  Abnormal Perception:  " None  Attention/Concentration:  Normal  Insight:  Good  Judgment:  Good      Results     Vital signs: /67  Pulse 81  Wt 75.7 kg (166 lb 12.8 oz)  BMI 30.7 kg/m2    Laboratory Data:  no new data    Assessment & Plan      Mamie Rice is seen today for follow up and reports she is interested in further treatment of anxiety and agrees with plan to try Buspar.     Diagnosis  ADHD  Major depressive disorder, recurrent, mild  Anxiety    Plan:  Medication: Continue current medications and add Buspar 7.5mg bid for one then 15mg bid  OTC Recommendations: none  Lab Orders:  none  Referrals: none  Release of Information: none  Future Treatment Considerations:per symptoms  Return for Follow Up:10 weeks   The risks, benefits, alternatives and side effects have been discussed and are understood by the patient. The patient understands the risks of using street drugs or alcohol. There are no medical contraindications, the patient agrees to treatment, and has the capacity to do so. The patient understands to call 911 or come to the nearest ED if life threatening or urgent symptoms present.  In addition time was spent counseling the patient and/or coordinating care regarding review of social and occupational functioning.  In addition patient was counseled on health and wellness practices to augment medication treatment of symptoms. See note for details.    Kristina Kilgore, APRN CNS 5/15/2018

## 2018-05-15 NOTE — NURSING NOTE
Chief Complaint   Patient presents with     RECHECK     Attention deficit hyperactivity disorder (ADHD), predominantly inattentive type

## 2018-05-15 NOTE — MR AVS SNAPSHOT
After Visit Summary   5/15/2018    Mamie Rice    MRN: [id number]           Patient Information     Date Of Birth          1995        Visit Information        Provider Department      5/15/2018 9:45 AM Kristina Kilgore APRN [facility]        Today's Diagnoses     Mild episode of recurrent major depressive disorder (H)    -  1    Insomnia, unspecified type        MECHE (generalized anxiety disorder)        Attention deficit hyperactivity disorder (ADHD), combined type        Attention deficit hyperactivity disorder (ADHD), predominantly inattentive type        Chronic pain of left knee           Follow-ups after your visit        Follow-up notes from your care team     Return in about 10 weeks (around 7/24/2018).      Your next 10 appointments already scheduled     May 30, 2018  1:45 PM CDT   (Arrive by 1:30 PM)   Return Visit with Mariela Haile MD   [facility] Nose [facility] Thro[facility] ([facility])    [address] [place] [address]   [phone]            Jul 18, 2018  8:45 AM CDT   (Arrive by 8:30 AM)   Return Visit with Paige Reid PA-C   [facility] Dermatology ([facility])    [address] [place] [address]  3rd [facility] [address]   [phone]            Jul 18, 2018  9:30 AM CDT   CF LOOP with  PFL CF   [facility] Pulmonary Function Testing ([facility])    [address] [place] [address]   [phone]            Jul 18, 2018  9:50 AM CDT   (Arrive by 9:35 AM)   RETURN CYSTIC FIBROSIS VISIT with Gavi Allison MD   [facility] ([facility])    [address] [place] [address]  [address] [place] [address]   [phone]            Jul 25, 2018  9:45 AM CDT   Adult Med Follow UP with DANIEL Mtz CNS   [facility] ([facility])    [facility]  [address]  [address] [address]   [phone]            Aug 07, 2018 10:15 AM CDT   (Arrive by 10:00 AM)   Return Visit with MD LOKESH Deluna [facility] ([facility])    [address] [place] [id number] [phone]            Aug 07, 2018 11:00 AM CDT   (Arrive by 10:45 AM)   NUTRITION VISIT with CRISTINA Goodson [facility] ([facility])    [address] [id number] [phone]              Who to contact     Please call your clinic at [phone] to:    Ask questions about your health    Make or cancel appointments    Discuss your medicines    Learn about your test results    Speak to your doctor            Additional Information About Your Visit        Microt[employer] Information     [employer] gives you secure access to your electronic health record. If you see a primary care provider, you can also send messages to your care team and make appointments. If you have questions, please call your primary care clinic.  If you do not have a primary care provider, please call [phone] and they will assist you.      Microt[employer] is an electronic gateway that provides easy, online access to your medical records. With [employer], you can request a clinic appointment, read your test results, renew a prescription or communicate with your care team.     To access your existing account, please contact your [facility] or call [phone] for assistance.        Care EveryWhere ID     This is your Care EveryWhere ID. This could be used by other organizations to access your [facility] medical records  [id number]        Your Vitals Were     Pulse BMI (Body Mass Index)                81 30.7 kg/m2           Blood Pressure from Last 3 Encounters:   05/15/18 103/67   05/01/18 110/71   04/18/18  120/83    Weight from Last 3 Encounters:   05/15/18 75.7 kg (166 lb 12.8 oz)   05/01/18 75.8 kg (167 lb 1.6 oz)   04/18/18 75.3 kg (166 lb)              Today, you had the following     No orders found for display         Today's Medication Changes          These changes are accurate as of 5/15/18 11:59 PM.  If you have any questions, ask your nurse or doctor.               Start taking these medicines.        Dose/Directions    busPIRone 15 MG tablet   Commonly known as:  BUSPAR   Used for:  MECHE (generalized anxiety disorder)   Started by:  Kristina Kilgore APRN CNS        Take one half tablet daily in the am and afternoon for one week then one tablet twice daily   Quantity:  60 tablet   Refills:  2         These medicines have changed or have updated prescriptions.        Dose/Directions    * amphetamine-dextroamphetamine 20 MG per 24 hr capsule   Commonly known as:  ADDERALL XR   This may have changed:  Another medication with the same name was changed. Make sure you understand how and when to take each.   Used for:  Attention deficit hyperactivity disorder (ADHD), predominantly inattentive type   Changed by:  Kristina Kilgore APRN CNS        Dose:  20 mg   Take 1 capsule (20 mg) by mouth daily   Quantity:  30 capsule   Refills:  0       * amphetamine-dextroamphetamine 20 MG per 24 hr capsule   Commonly known as:  ADDERALL XR   This may have changed:  These instructions start on 6/12/2018. If you are unsure what to do until then, ask your doctor or other care provider.   Used for:  Attention deficit hyperactivity disorder (ADHD), predominantly inattentive type   Changed by:  Kristina Kilgore APRN CNS        Dose:  20 mg   Start taking on:  6/12/2018   Take 1 capsule (20 mg) by mouth daily   Quantity:  30 capsule   Refills:  0       * amphetamine-dextroamphetamine 20 MG per 24 hr capsule   Commonly known as:  ADDERALL XR   This may have changed:  These instructions start on 7/10/2018. If you are  unsure what to do until then, ask your doctor or other care provider.   Used for:  Attention deficit hyperactivity disorder (ADHD), predominantly inattentive type   Changed by:  Kristina Kilgore APRN CNS        Dose:  20 mg   Start taking on:  7/10/2018   Take 1 capsule (20 mg) by mouth daily   Quantity:  30 capsule   Refills:  0       hydrOXYzine 25 MG tablet   Commonly known as:  ATARAX   This may have changed:    - how much to take  - how to take this  - when to take this  - reasons to take this  - additional instructions   Used for:  Chronic pain of left knee   Changed by:  Kristina Kilgore APRN CNS        Take one tablet by mouth at bedtime for insomnia   Refills:  0       * Notice:  This list has 3 medication(s) that are the same as other medications prescribed for you. Read the directions carefully, and ask your doctor or other care provider to review them with you.         Where to get your medicines      These medications were sent to [facility] [place] [id number]     Phone:  [phone]     busPIRone 15 MG tablet         Some of these will need a paper prescription and others can be bought over the counter.  Ask your nurse if you have questions.     Bring a paper prescription for each of these medications     amphetamine-dextroamphetamine 20 MG per 24 hr capsule    amphetamine-dextroamphetamine 20 MG per 24 hr capsule    amphetamine-dextroamphetamine 20 MG per 24 hr capsule                Primary Care Provider Office Phone # Fax #    Malik De La Rosa [phone]       [address] [facility] [address]        Equal Access to Services     [facility]: Vinita Marion, washelbida luqadaha, qaybta kaalmadavid pérez. [facility] [phone].    ATENCIÓN: Si habla español, tiene a hidalgo disposición servicios gratuitos de asistencia  lingüísticaSheeba Lechuga al [phone].    We comply with applicable federal civil rights laws and [place] laws. We do not discriminate on the basis of race, color, national origin, age, disability, sex, sexual orientation, or gender identity.            Thank you!     Thank you for choosing [facility]  for your care. Our goal is always to provide you with excellent care. Hearing back from our patients is one way we can continue to improve our services. Please take a few minutes to complete the written survey that you may receive in the mail after your visit with us. Thank you!             Your Updated Medication List - Protect others around you: Learn how to safely use, store and throw away your medicines at www.disposemymeds.org.          This list is accurate as of 5/15/18 11:59 PM.  Always use your most recent med list.                   Brand Name Dispense Instructions for use Diagnosis    acetaminophen 325 MG tablet    TYLENOL    100 tablet    Take 2 tablets (650 mg) by mouth every 4 hours as needed for other (mild pain)    Chronic pain of left knee       acetylcysteine 20 % nebulizer solution    MUCOMYST    360 mL    INHALE ONE 4ML NEB INTO LUNGS VIA NEBULIZER TWO TIMES A DAY, MAY INCREASE TO 3-4 TIMES A DAY WITH INCREASE COUGH/COLD SYMPTOMS    CF (cystic fibrosis) (H)       * albuterol (2.5 MG/3ML) 0.083% neb solution     360 mL    Take 1 vial (2.5 mg) by nebulization 4 times daily    CF (cystic fibrosis) (H)       * albuterol 108 (90 Base) MCG/ACT Inhaler    PROAIR HFA/PROVENTIL HFA/VENTOLIN HFA    1 Inhaler    Inhale 2 puffs into the lungs every 6 hours as needed for shortness of breath / dyspnea or wheezing    Cystic fibrosis with pulmonary manifestations (H), Sinusitis, chronic, Diabetes mellitus type 1 (H)       aluminum chloride 20 % external solution    DRYSOL    60 mL    Apply topically At Bedtime Everyother night to every 3rd night. With impovement, decrease to 1-2 times weekly. Irritation may  occur.    Focal hyperhidrosis       * amphetamine-dextroamphetamine 20 MG per 24 hr capsule    ADDERALL XR    30 capsule    Take 1 capsule (20 mg) by mouth daily    Attention deficit hyperactivity disorder (ADHD), predominantly inattentive type       * amphetamine-dextroamphetamine 20 MG per 24 hr capsule   Start taking on:  6/12/2018    ADDERALL XR    30 capsule    Take 1 capsule (20 mg) by mouth daily    Attention deficit hyperactivity disorder (ADHD), predominantly inattentive type       * amphetamine-dextroamphetamine 20 MG per 24 hr capsule   Start taking on:  7/10/2018    ADDERALL XR    30 capsule    Take 1 capsule (20 mg) by mouth daily    Attention deficit hyperactivity disorder (ADHD), predominantly inattentive type       ascorbic acid 500 MG tablet    VITAMIN C    100 tablet    TAKE ONE TABLET BY MOUTH TWICE A DAY    Cystic fibrosis with pulmonary manifestations (H)       azithromycin 500 MG tablet    ZITHROMAX    36 tablet    TAKE ONE TABLET BY MOUTH ON MONDAY, WEDNESDAY AND FRIDAY    CF (cystic fibrosis) (H)       blood glucose monitoring test strip    ACCU-CHEK SMARTVIEW    1 Month    Use to test blood sugar 4 times daily or as directed.    Type I (juvenile type) diabetes mellitus without mention of complication, not stated as uncontrolled       buPROPion 150 MG 12 hr tablet    WELLBUTRIN SR    60 tablet    Take 1 tablet (150 mg) by mouth 2 times daily    Major depressive disorder, recurrent episode, mild (H)       busPIRone 15 MG tablet    BUSPAR    60 tablet    Take one half tablet daily in the am and afternoon for one week then one tablet twice daily    MECHE (generalized anxiety disorder)       cetirizine 10 MG tablet    zyrTEC    30 tablet    Take 1 tablet (10 mg) by mouth every evening    Allergic rhinitis due to [employer] house dust mite, Urticaria, chronic       cholecalciferol 1000 UNIT tablet    vitamin D3    100 tablet    TAKE 1 TABLET BY MOUTH EVERY DAY    CF (cystic fibrosis) (H), Exocrine  pancreatic insufficiency       DEPO-PROVERA IM           dornase alpha 1 MG/ML neb solution    PULMOZYME    75 mL    Inhale 2.5 mg into the lungs daily    Cystic fibrosis with pulmonary manifestations (H)       FLUoxetine 40 MG capsule    PROZAC    60 capsule    Take 2 capsules (80 mg) by mouth daily    Cystic fibrosis with pulmonary manifestations (H)       GLYCOPYRROLATE PO      Take 1 mg by mouth 2 times daily        hydrOXYzine 25 MG tablet    ATARAX     Take one tablet by mouth at bedtime for insomnia    Chronic pain of left knee       MEPHYTON 5 MG tablet   Generic drug:  phytonadione     4 tablet    TAKE ONE TABLET BY MOUTH ONCE A WEEK    Cystic fibrosis with pulmonary manifestations (H), Cystic fibrosis with pulmonary manifestations (H), Aspergillosis (H), Pancreatic insufficiency       * meropenem 500 MG vial    MERREM    60 each         * meropenem 500 MG vial    MERREM    1 each    Give x 1 today        methylcellulose (laxative) Powd    CITRUCEL    479 g    Start with 1 heaping tablespoon. Increase as needed, 1 heaping tablespoon at a time, up to 3 times per day.    Other constipation       montelukast 10 MG tablet    SINGULAIR    30 tablet    TAKE 1 TABLET BY MOUTH DAILY AT BEDTIME    CF (cystic fibrosis) (H)       MULTIVITAMIN PO      Take 1 tablet by mouth daily.        naltrexone 50 MG tablet    DEPADE;REVIA    90 tablet    Take 1 tablet.  Time it one to two hours prior to worst cravings.    Class 1 obesity due to excess calories without serious comorbidity with body mass index (BMI) of 30.0 to 30.9 in adult       omeprazole 20 MG CR capsule    priLOSEC    60 capsule    TAKE 1 CAPSULE BY MOUTH TWICE A DAY    CF (cystic fibrosis) (H)       oseltamivir 75 MG capsule    TAMIFLU    10 capsule    Take 1 capsule (75 mg) by mouth daily    Cystic fibrosis with pulmonary manifestations (H)       polyethylene glycol powder    MIRALAX    119 g    Take 17 g (1 capful) by mouth daily as needed for constipation     Cystic fibrosis of the lung (H), Constipation, unspecified constipation type       traZODone 100 MG tablet    DESYREL    30 tablet    Take 1 tablet (100 mg) by mouth At Bedtime    Insomnia, unspecified type       vitamin E 400 UNIT capsule     60 capsule    TAKE 1 CAPSULE BY MOUTH TWICE A DAY    CF (cystic fibrosis) (H), Pancreatic insufficiency       * Notice:  This list has 7 medication(s) that are the same as other medications prescribed for you. Read the directions carefully, and ask your doctor or other care provider to review them with you.

## 2018-05-17 ASSESSMENT — PATIENT HEALTH QUESTIONNAIRE - PHQ9: SUM OF ALL RESPONSES TO PHQ QUESTIONS 1-9: 11

## 2018-05-19 DIAGNOSIS — E84.0 CYSTIC FIBROSIS WITH PULMONARY MANIFESTATIONS (H): ICD-10-CM

## 2018-05-21 RX ORDER — BETA-CAROTENE 7500 MCG
CAPSULE ORAL
Qty: 36 CAPSULE | Refills: 4 | Status: SHIPPED | OUTPATIENT
Start: 2018-05-21 | End: 2019-07-01

## 2018-05-30 ENCOUNTER — OFFICE VISIT (OUTPATIENT)
Dept: OTOLARYNGOLOGY | Facility: CLINIC | Age: 23
End: 2018-05-30
Payer: COMMERCIAL

## 2018-05-30 DIAGNOSIS — E84.0 CYSTIC FIBROSIS OF THE LUNG (H): ICD-10-CM

## 2018-05-30 DIAGNOSIS — J32.0 CHRONIC MAXILLARY SINUSITIS: Primary | ICD-10-CM

## 2018-05-30 RX ORDER — MEROPENEM 500 MG/1
500 INJECTION, POWDER, FOR SOLUTION INTRAVENOUS EVERY 8 HOURS
Qty: 60 EACH | COMMUNITY
Start: 2018-05-30 | End: 2019-03-07

## 2018-05-30 NOTE — PROGRESS NOTES
HISTORY OF PRESENT ILLNESS:  Mamie Rice is a 22 year old female with a history of cystic fibrosis who presents today for instillation of Meropenem into the sinuses. She has no new symptoms at today's visit.    PROCEDURE:  Rigid scope used for visualization. 2 cc of Meropenem instilled into each sinus cavity. Patient tolerated procedure well.    ASSESSMENT AND PLAN:  Mamie Rice is a 22 year old female with a history of cystic fibrosis who receives periodic Meropenem instillations into both sinus cavities. Patient can return in 1 month to repeat treatment.    Patrizia Monroe PA-C  Otolaryngology - Head & Neck Surgery  [phone]

## 2018-05-30 NOTE — LETTER
5/30/2018     RE: Mamie Rice  [address] [place] [id number]     Dear Colleague,    Thank you for referring your patient, Mamie Rice, to the [facility] EAR NOSE AND THROAT at [facility]. Please see a copy of my visit note below.    HISTORY OF PRESENT ILLNESS:  Mamie Rice is a 22 year old female with a history of cystic fibrosis who presents today for instillation of Meropenem into the sinuses. She has no new symptoms at today's visit.    PROCEDURE:  Rigid scope used for visualization. 2 cc of Meropenem instilled into each sinus cavity. Patient tolerated procedure well.    ASSESSMENT AND PLAN:  Mamie Rice is a 22 year old female with a history of cystic fibrosis who receives periodic Meropenem instillations into both sinus cavities. Patient can return in 1 month to repeat treatment.    Patrizia Monroe PA-C  Otolaryngology - Head & Neck Surgery  [phone]

## 2018-05-30 NOTE — LETTER
5/30/2018      RE: Mamie Rice  [address] [facility] [address]     HISTORY OF PRESENT ILLNESS:  Mamie Rice is a 22 year old female with a history of cystic fibrosis who presents today for instillation of Meropenem into the sinuses. She has no new symptoms at today's visit.    PROCEDURE:  Rigid scope used for visualization. 2 cc of Meropenem instilled into each sinus cavity. Patient tolerated procedure well.    ASSESSMENT AND PLAN:  Mamie Rice is a 22 year old female with a history of cystic fibrosis who receives periodic Meropenem instillations into both sinus cavities. Patient can return in 1 month to repeat treatment.    Patrizia Monroe PA-C  Otolaryngology - Head & Neck Surgery  [phone]

## 2018-05-30 NOTE — MR AVS SNAPSHOT
After Visit Summary   5/30/2018    Mamie Rice    MRN: [id number]           Patient Information     Date Of Birth          1995        Visit Information        Provider Department      5/30/2018 1:45 PM Patrizia Monroe PA-C M [facility] Ear Nose and Throat         Follow-ups after your visit        Your next 10 appointments already scheduled     Jul 02, 2018 11:30 AM CDT   (Arrive by 11:15 AM)   Return Visit with VANESA Flanagan [facility] Ear Nose and Throat ([facility])    [address] [facility] [address]   [phone]            Jul 18, 2018  8:45 AM CDT   (Arrive by 8:30 AM)   Return Visit with VANESA Lloyd [facility] Dermatology ([facility])    [address] [facility] [address]   [phone]            Jul 18, 2018  9:30 AM CDT   CF LOOP with  PFL CF   [facility] Pulmonary Function Testing ([facility])    [address] [facility] [address]   [phone]            Jul 18, 2018  9:50 AM CDT   (Arrive by 9:35 AM)   RETURN CYSTIC FIBROSIS VISIT with Gavi Allison MD   [facility] ([facility])    [address]  [address] [place] [address]   [phone]            Jul 25, 2018  9:45 AM CDT   Adult Med Follow UP with DANIEL Mtz CNS   Psychiatry Clinic ([facility])    [address]  [address] [address]   [phone]            Aug 03, 2018 10:00 AM CDT   (Arrive by 9:45 AM)   Return Visit with VANESA Flanagan [facility] Ear Nose and Throat ([facility])    [address] [facility] [address]   [phone]            Aug 07, 2018 10:15 AM CDT   (Arrive by 10:00 AM)   Return Visit  with MD LOKESH Deluna [facility] ([facility])    [address] [address]   [phone]            Aug 07, 2018 11:00 AM CDT   (Arrive by 10:45 AM)   NUTRITION VISIT with CRISTINA Goodson [facility] ([facility])    [address] [id number] [phone]            Sep 04, 2018 10:30 AM CDT   (Arrive by 10:15 AM)   Return Visit with VANESA Flanagan [facility] ([facility])    [address] [id number] [phone]              Who to contact     Please call your clinic at [phone] to:    Ask questions about your health    Make or cancel appointments    Discuss your medicines    Learn about your test results    Speak to your doctor            Additional Information About Your Visit        Care[employer] Information     [employer] gives you secure access to your electronic health record. If you see a primary care provider, you can also send messages to your care team and make appointments. If you have questions, please call your primary care clinic.  If you do not have a primary care provider, please call [phone] and they will assist you.      [employer] is an electronic gateway that provides easy, online access to your medical records. With [employer], you can request a clinic appointment, read your test results, renew a prescription or communicate with your care team.     To access your existing account, please contact your [facility] or call [phone] for assistance.        Care EveryWhere ID     This is your Care EveryWhere ID. This could be used by other organizations to access your [place] medical records  [id number]         Blood Pressure from Last 3 Encounters:   05/30/18 120/77   05/01/18 110/71    04/18/18 120/83    Weight from Last 3 Encounters:   05/30/18 76.2 kg (168 lb)   05/01/18 75.8 kg (167 lb 1.6 oz)   04/18/18 75.3 kg (166 lb)              Today, you had the following     No orders found for display       Primary Care Provider Office Phone # Fax #    Malik De La Rosa [phone]       [address] [facility] [address]        Equal Access to Services     LUZMARIA MARTINEZ : Hadii martin ku hadasho Soomaali, waaxda luqadaha, qaybta kaalmada adeegyada, david rutledge . So [facility] [phone].    ATENCIÓN: Si habla español, tiene a hidalgo disposición servicios gratuitos de asistencia lingüística. Skip[facility] [phone].    We comply with applicable federal civil rights laws and [place] laws. We do not discriminate on the basis of race, color, national origin, age, disability, sex, sexual orientation, or gender identity.            Thank you!     Thank you for choosing [facility] EAR NOSE AND THROAT  for your care. Our goal is always to provide you with excellent care. Hearing back from our patients is one way we can continue to improve our services. Please take a few minutes to complete the written survey that you may receive in the mail after your visit with us. Thank you!             Your Updated Medication List - Protect others around you: Learn how to safely use, store and throw away your medicines at www.disposemymeds.org.          This list is accurate as of 5/30/18  2:17 PM.  Always use your most recent med list.                   Brand Name Dispense Instructions for use Diagnosis    acetaminophen 325 MG tablet    TYLENOL    100 tablet    Take 2 tablets (650 mg) by mouth every 4 hours as needed for other (mild pain)    Chronic pain of left knee       acetylcysteine 20 % nebulizer solution    MUCOMYST    360 mL    INHALE ONE 4ML NEB INTO LUNGS VIA NEBULIZER TWO TIMES A DAY, MAY INCREASE TO 3-4 TIMES A DAY WITH INCREASE COUGH/COLD  SYMPTOMS    CF (cystic fibrosis) (H)       * albuterol (2.5 MG/3ML) 0.083% neb solution     360 mL    Take 1 vial (2.5 mg) by nebulization 4 times daily    CF (cystic fibrosis) (H)       * albuterol 108 (90 Base) MCG/ACT Inhaler    PROAIR HFA/PROVENTIL HFA/VENTOLIN HFA    1 Inhaler    Inhale 2 puffs into the lungs every 6 hours as needed for shortness of breath / dyspnea or wheezing    Cystic fibrosis with pulmonary manifestations (H), Sinusitis, chronic, Diabetes mellitus type 1 (H)       aluminum chloride 20 % external solution    DRYSOL    60 mL    Apply topically At Bedtime Everyother night to every 3rd night. With impovement, decrease to 1-2 times weekly. Irritation may occur.    Focal hyperhidrosis       * amphetamine-dextroamphetamine 20 MG per 24 hr capsule    ADDERALL XR    30 capsule    Take 1 capsule (20 mg) by mouth daily    Attention deficit hyperactivity disorder (ADHD), predominantly inattentive type       * amphetamine-dextroamphetamine 20 MG per 24 hr capsule   Start taking on:  6/12/2018    ADDERALL XR    30 capsule    Take 1 capsule (20 mg) by mouth daily    Attention deficit hyperactivity disorder (ADHD), predominantly inattentive type       * amphetamine-dextroamphetamine 20 MG per 24 hr capsule   Start taking on:  7/10/2018    ADDERALL XR    30 capsule    Take 1 capsule (20 mg) by mouth daily    Attention deficit hyperactivity disorder (ADHD), predominantly inattentive type       ascorbic acid 500 MG tablet    VITAMIN C    100 tablet    TAKE ONE TABLET BY MOUTH TWICE A DAY    Cystic fibrosis with pulmonary manifestations (H)       azithromycin 500 MG tablet    ZITHROMAX    36 tablet    TAKE ONE TABLET BY MOUTH ON MONDAY, WEDNESDAY AND FRIDAY    CF (cystic fibrosis) (H)       beta carotene 28347 UNIT capsule     36 capsule    TAKE ONE CAPSULE BY MOUTH ON MON, WED AND FRIDAY MORNING    Cystic fibrosis with pulmonary manifestations (H)       blood glucose monitoring test strip    ACCU-CHEK  SMARTVIEW    1 Month    Use to test blood sugar 4 times daily or as directed.    Type I (juvenile type) diabetes mellitus without mention of complication, not stated as uncontrolled       buPROPion 150 MG 12 hr tablet    WELLBUTRIN SR    60 tablet    Take 1 tablet (150 mg) by mouth 2 times daily    Major depressive disorder, recurrent episode, mild (H)       busPIRone 15 MG tablet    BUSPAR    60 tablet    Take one half tablet daily in the am and afternoon for one week then one tablet twice daily    MECHE (generalized anxiety disorder)       cetirizine 10 MG tablet    zyrTEC    30 tablet    Take 1 tablet (10 mg) by mouth every evening    Allergic rhinitis due to [employer] house dust mite, Urticaria, chronic       cholecalciferol 1000 UNIT tablet    vitamin D3    100 tablet    TAKE 1 TABLET BY MOUTH EVERY DAY    CF (cystic fibrosis) (H), Exocrine pancreatic insufficiency       CLARAVIS 40 MG capsule   Generic drug:  ISOtretinoin      TK 2 PO D        DEPO-PROVERA IM           dornase alpha 1 MG/ML neb solution    PULMOZYME    75 mL    Inhale 2.5 mg into the lungs daily    Cystic fibrosis with pulmonary manifestations (H)       FLUoxetine 40 MG capsule    PROZAC    60 capsule    Take 2 capsules (80 mg) by mouth daily    Cystic fibrosis with pulmonary manifestations (H)       GLYCOPYRROLATE PO      Take 1 mg by mouth 2 times daily        hydrOXYzine 25 MG tablet    ATARAX     Take one tablet by mouth at bedtime for insomnia    Chronic pain of left knee       MEPHYTON 5 MG tablet   Generic drug:  phytonadione     4 tablet    TAKE ONE TABLET BY MOUTH ONCE A WEEK    Cystic fibrosis with pulmonary manifestations (H), Cystic fibrosis with pulmonary manifestations (H), Aspergillosis (H), Pancreatic insufficiency       * meropenem 500 MG vial    MERREM    60 each         * meropenem 500 MG vial    MERREM    1 each    Give x 1 today        methylcellulose (laxative) Powd    CITRUCEL    479 g    Start with 1 heaping tablespoon.  Increase as needed, 1 heaping tablespoon at a time, up to 3 times per day.    Other constipation       montelukast 10 MG tablet    SINGULAIR    30 tablet    TAKE 1 TABLET BY MOUTH DAILY AT BEDTIME    CF (cystic fibrosis) (H)       MULTIVITAMIN PO      Take 1 tablet by mouth daily.        mupirocin 2 % ointment    BACTROBAN     USE BID TO AFFECTED AREA ON THE LIPS        naltrexone 50 MG tablet    DEPADE;REVIA    90 tablet    Take 1 tablet.  Time it one to two hours prior to worst cravings.    Class 1 obesity due to excess calories without serious comorbidity with body mass index (BMI) of 30.0 to 30.9 in adult       omeprazole 20 MG CR capsule    priLOSEC    60 capsule    TAKE 1 CAPSULE BY MOUTH TWICE A DAY    CF (cystic fibrosis) (H)       oseltamivir 75 MG capsule    TAMIFLU    10 capsule    Take 1 capsule (75 mg) by mouth daily    Cystic fibrosis with pulmonary manifestations (H)       polyethylene glycol powder    MIRALAX    119 g    Take 17 g (1 capful) by mouth daily as needed for constipation    Cystic fibrosis of the lung (H), Constipation, unspecified constipation type       traZODone 100 MG tablet    DESYREL    30 tablet    Take 1 tablet (100 mg) by mouth At Bedtime    Insomnia, unspecified type       vitamin E 400 UNIT capsule     60 capsule    TAKE 1 CAPSULE BY MOUTH TWICE A DAY    CF (cystic fibrosis) (H), Pancreatic insufficiency       * Notice:  This list has 7 medication(s) that are the same as other medications prescribed for you. Read the directions carefully, and ask your doctor or other care provider to review them with you.

## 2018-06-14 DIAGNOSIS — E84.0 CYSTIC FIBROSIS WITH PULMONARY MANIFESTATIONS (H): ICD-10-CM

## 2018-06-14 RX ORDER — SULFAMETHOXAZOLE/TRIMETHOPRIM 800-160 MG
1 TABLET ORAL 2 TIMES DAILY
Qty: 20 TABLET | Refills: 0 | Status: SHIPPED | OUTPATIENT
Start: 2018-06-14 | End: 2018-08-07

## 2018-07-06 ENCOUNTER — OFFICE VISIT (OUTPATIENT)
Dept: OTOLARYNGOLOGY | Facility: CLINIC | Age: 23
End: 2018-07-06
Payer: COMMERCIAL

## 2018-07-06 VITALS — WEIGHT: 166 LBS | HEIGHT: 61 IN | BODY MASS INDEX: 31.34 KG/M2

## 2018-07-06 DIAGNOSIS — J32.0 CHRONIC MAXILLARY SINUSITIS: Primary | ICD-10-CM

## 2018-07-06 NOTE — LETTER
"7/6/2018       RE: Mamie Rice  [address] [place] [id number]     Dear Colleague,    Thank you for referring your patient, Mamie Rice, to the [facility] EAR NOSE AND THROAT at [facility]. Please see a copy of my visit note below.    HISTORY OF PRESENT ILLNESS:  Mamie Rice is a 22 year old female with a history of cystic fibrosis who present today for routine instillation of Meropenem into the maxillary sinuses. She recently felt like she got a sinus infection because she had green mucous and called to have an antibiotic prescribed. She doesn't remember which antibiotic she was given but said she had to be careful of sun sensitivity when taking it. Patient took it for 7 days 2 weeks ago. She says that she has nasal congestion and sinus pressure. Both sides are equal. She reports that her symptoms have improved somewhat but would like to wait to see how she feels after the Meropenem instillation before pursuing more care.    PHYSICAL EXAMINATION AND PROCEDURE:    Ht 1.549 m (5' 1\")  Wt 75.3 kg (166 lb)  BMI 31.37 kg/m2  Constitutional:  The patient was accompanied by her brother, well-groomed, and in no acute distress.     Nose: Rigid scope used for visualization to instill 2 cc of Meropenem into each maxillary sinus cavity. Minimal amounts of yellow crusting noted inferior to the scar band and around the opening of the left sinus. About 2 cc of Meropenem instilled into the left sinus. Right sinus opening had some purulent mucous. About 2 cc of Meropenem instilled into the right sinus. Patient tolerated this procedure well with no adverse events.      ASSESSMENT AND PLAN:  Mamie Rice is a 22 year old female with a history of cystic fibrosis who received Meropenem instillation into both maxillary sinus cavities today. Patient was recently treated for a sinus infection 2 weeks ago with antibiotics for 7 days. Symptoms have not resolved but patient would like " to wait a few days for the Merrem to take effect before pursuing more PO antibiotics. Patient will return in 1 month for repeat treatment.    Patrizia Monroe PA-C  Otolaryngology - Head & Neck Surgery  [phone]    CC:  Mariela Haile MD

## 2018-07-06 NOTE — PROGRESS NOTES
"HISTORY OF PRESENT ILLNESS:  Mamie Rice is a 22 year old female with a history of cystic fibrosis who present today for routine instillation of Meropenem into the maxillary sinuses. She recently felt like she got a sinus infection because she had green mucous and called to have an antibiotic prescribed. She doesn't remember which antibiotic she was given but said she had to be careful of sun sensitivity when taking it. Patient took it for 7 days 2 weeks ago. She says that she has nasal congestion and sinus pressure. Both sides are equal. She reports that her symptoms have improved somewhat but would like to wait to see how she feels after the Meropenem instillation before pursuing more care.    PHYSICAL EXAMINATION AND PROCEDURE:    Ht 1.549 m (5' 1\")  Wt 75.3 kg (166 lb)  BMI 31.37 kg/m2  Constitutional:  The patient was accompanied by her brother, well-groomed, and in no acute distress.     Nose: Rigid scope used for visualization to instill 2 cc of Meropenem into each maxillary sinus cavity. Minimal amounts of yellow crusting noted inferior to the scar band and around the opening of the left sinus. About 2 cc of Meropenem instilled into the left sinus. Right sinus opening had some purulent mucous. About 2 cc of Meropenem instilled into the right sinus. Patient tolerated this procedure well with no adverse events.      ASSESSMENT AND PLAN:  Mamie Rice is a 22 year old female with a history of cystic fibrosis who received Meropenem instillation into both maxillary sinus cavities today. Patient was recently treated for a sinus infection 2 weeks ago with antibiotics for 7 days. Symptoms have not resolved but patient would like to wait a few days for the Merrem to take effect before pursuing more PO antibiotics. Patient will return in 1 month for repeat treatment.    Patrizia Monroe PA-C  Otolaryngology - Head & Neck Surgery  [phone]          CC:  Mariela Haile MD  "

## 2018-07-06 NOTE — MR AVS SNAPSHOT
After Visit Summary   7/6/2018    Mamie Rice    MRN: [id number]           Patient Information     Date Of Birth          1995        Visit Information        Provider Department      7/6/2018 10:00 AM Patrizia Monroe PA-C [facility]        Today's Diagnoses     Chronic maxillary sinusitis    -  1       Follow-ups after your visit        Your next 10 appointments already scheduled     Jul 18, 2018  8:45 AM CDT   (Arrive by 8:30 AM)   Return Visit with Paige Reid PA-C   [facility] ([facility])    [address] [facility] [address]   [phone]            Jul 18, 2018  9:30 AM CDT   CF LOOP with  PFL CF   [facility] ([facility])    [address] [facility] [address]   [phone]            Jul 18, 2018  9:50 AM CDT   (Arrive by 9:35 AM)   RETURN CYSTIC FIBROSIS VISIT with Gavi Allison MD   [facility] ([facility])    [address]  [address] [place] [id number] [phone]            Jul 25, 2018  9:45 AM CDT   Adult Med Follow UP with DANIEL Mtz CNS   [facility] ([facility])    [address]  [address] [address]   [phone]            Aug 03, 2018 10:00 AM CDT   (Arrive by 9:45 AM)   Return Visit with Patrizia Monroe PA-C   [facility] ([facility])    [address]  4th [address]  [facility] [address]   [phone]            Aug 07, 2018 10:15 AM CDT   (Arrive by 10:00 AM)   Return Visit with Randa Ho MD   [facility] ([facility])    [address]  4th [address]  [facility] [address]   [phone]            Aug  "07, 2018 11:00 AM CDT   (Arrive by 10:45 AM)   NUTRITION VISIT with CRISTINA Goodson [facility] ([facility])    [address] [place] [address] [phone]            Sep 04, 2018 10:30 AM CDT   (Arrive by 10:15 AM)   Return Visit with VANESA Flanagan [facility] ([facility])    [address] [place] [address] [phone]              Who to contact     Please call your clinic at [phone] to:    Ask questions about your health    Make or cancel appointments    Discuss your medicines    Learn about your test results    Speak to your doctor            Additional Information About Your Visit        Vit[employer]lexharmorph[employer]ARD Information     [employer] gives you secure access to your electronic health record. If you see a primary care provider, you can also send messages to your care team and make appointments. If you have questions, please call your primary care clinic.  If you do not have a primary care provider, please call [phone] and they will assist you.      [employer] is an electronic gateway that provides easy, online access to your medical records. With [employer], you can request a clinic appointment, read your test results, renew a prescription or communicate with your care team.     To access your existing account, please contact your [facility] or call [phone] for assistance.        Care EveryWhere ID     This is your Care EveryWhere ID. This could be used by other organizations to access your [place] medical records  [id number]        Your Vitals Were     Height BMI (Body Mass Index)                1.549 m (5' 1\") 31.37 kg/m2           Blood Pressure from Last 3 Encounters:   05/30/18 120/77   05/01/18 110/71   04/18/18 120/83    Weight from Last 3 Encounters:   07/06/18 75.3 kg (166 lb)   05/30/18 76.2 kg (168 " lb)   05/01/18 75.8 kg (167 lb 1.6 oz)              We Performed the Following     NASAL ENDOSCOPY, DIAGNOSTIC     NASAL/SINUS SCOPE W PREETI SOLER        Primary Care Provider Office Phone # Fax #    Malik De La Rosa [phone]       [address] [facility] [address]        Equal Access to Services     [facility] : Hadii aad ku hadasho Soomaali, waaxda luqadaha, qaybta kaalmada adeegyada, waxay idiin hayaan adeeg kharash laveronica . So [facility] [phone].    ATENCIÓN: Si habla espj carlos, tiene a hidalgo disposición servicios gratuitos de asistencia lingüística. [facility] [phone].    We comply with applicable federal civil rights laws and [place] laws. We do not discriminate on the basis of race, color, national origin, age, disability, sex, sexual orientation, or gender identity.            Thank you!     Thank you for choosing [facility]  for your care. Our goal is always to provide you with excellent care. Hearing back from our patients is one way we can continue to improve our services. Please take a few minutes to complete the written survey that you may receive in the mail after your visit with us. Thank you!             Your Updated Medication List - Protect others around you: Learn how to safely use, store and throw away your medicines at www.disposemymeds.org.          This list is accurate as of 7/6/18 11:01 AM.  Always use your most recent med list.                   Brand Name Dispense Instructions for use Diagnosis    acetaminophen 325 MG tablet    TYLENOL    100 tablet    Take 2 tablets (650 mg) by mouth every 4 hours as needed for other (mild pain)    Chronic pain of left knee       acetylcysteine 20 % nebulizer solution    MUCOMYST    360 mL    INHALE ONE 4ML NEB INTO LUNGS VIA NEBULIZER TWO TIMES A DAY, MAY INCREASE TO 3-4 TIMES A DAY WITH INCREASE COUGH/COLD SYMPTOMS    CF (cystic fibrosis) (H)       * albuterol (2.5 MG/3ML)  0.083% neb solution     360 mL    Take 1 vial (2.5 mg) by nebulization 4 times daily    CF (cystic fibrosis) (H)       * albuterol 108 (90 Base) MCG/ACT Inhaler    PROAIR HFA/PROVENTIL HFA/VENTOLIN HFA    1 Inhaler    Inhale 2 puffs into the lungs every 6 hours as needed for shortness of breath / dyspnea or wheezing    Cystic fibrosis with pulmonary manifestations (H), Sinusitis, chronic, Diabetes mellitus type 1 (H)       aluminum chloride 20 % external solution    DRYSOL    60 mL    Apply topically At Bedtime Everyother night to every 3rd night. With impovement, decrease to 1-2 times weekly. Irritation may occur.    Focal hyperhidrosis       * amphetamine-dextroamphetamine 20 MG per 24 hr capsule    ADDERALL XR    30 capsule    Take 1 capsule (20 mg) by mouth daily    Attention deficit hyperactivity disorder (ADHD), predominantly inattentive type       * amphetamine-dextroamphetamine 20 MG per 24 hr capsule    ADDERALL XR    30 capsule    Take 1 capsule (20 mg) by mouth daily    Attention deficit hyperactivity disorder (ADHD), predominantly inattentive type       * amphetamine-dextroamphetamine 20 MG per 24 hr capsule   Start taking on:  7/10/2018    ADDERALL XR    30 capsule    Take 1 capsule (20 mg) by mouth daily    Attention deficit hyperactivity disorder (ADHD), predominantly inattentive type       ascorbic acid 500 MG tablet    VITAMIN C    100 tablet    TAKE ONE TABLET BY MOUTH TWICE A DAY    Cystic fibrosis with pulmonary manifestations (H)       azithromycin 500 MG tablet    ZITHROMAX    36 tablet    TAKE ONE TABLET BY MOUTH ON MONDAY, WEDNESDAY AND FRIDAY    CF (cystic fibrosis) (H)       beta carotene 51984 UNIT capsule     36 capsule    TAKE ONE CAPSULE BY MOUTH ON MON, WED AND FRIDAY MORNING    Cystic fibrosis with pulmonary manifestations (H)       blood glucose monitoring test strip    ACCU-CHEK SMARTVIEW    1 Month    Use to test blood sugar 4 times daily or as directed.    Type I (juvenile type)  diabetes mellitus without mention of complication, not stated as uncontrolled       buPROPion 150 MG 12 hr tablet    WELLBUTRIN SR    60 tablet    Take 1 tablet (150 mg) by mouth 2 times daily    Major depressive disorder, recurrent episode, mild (H)       busPIRone 15 MG tablet    BUSPAR    60 tablet    Take one half tablet daily in the am and afternoon for one week then one tablet twice daily    MECHE (generalized anxiety disorder)       cetirizine 10 MG tablet    zyrTEC    30 tablet    Take 1 tablet (10 mg) by mouth every evening    Allergic rhinitis due to [employer] house dust mite, Urticaria, chronic       cholecalciferol 1000 UNIT tablet    vitamin D3    100 tablet    TAKE 1 TABLET BY MOUTH EVERY DAY    CF (cystic fibrosis) (H), Exocrine pancreatic insufficiency       CLARAVIS 40 MG capsule   Generic drug:  ISOtretinoin      TK 2 PO D        DEPO-PROVERA IM           dornase alpha 1 MG/ML neb solution    PULMOZYME    75 mL    Inhale 2.5 mg into the lungs daily    Cystic fibrosis with pulmonary manifestations (H)       FLUoxetine 40 MG capsule    PROZAC    60 capsule    Take 2 capsules (80 mg) by mouth daily    Cystic fibrosis with pulmonary manifestations (H)       GLYCOPYRROLATE PO      Take 1 mg by mouth 2 times daily        hydrOXYzine 25 MG tablet    ATARAX     Take one tablet by mouth at bedtime for insomnia    Chronic pain of left knee       MEPHYTON 5 MG tablet   Generic drug:  phytonadione     4 tablet    TAKE ONE TABLET BY MOUTH ONCE A WEEK    Cystic fibrosis with pulmonary manifestations (H), Cystic fibrosis with pulmonary manifestations (H), Aspergillosis (H), Pancreatic insufficiency       * meropenem 500 MG vial    MERREM    60 each         * meropenem 500 MG vial    MERREM    1 each    Give x 1 today        * meropenem 500 MG vial    MERREM    60 each    500 mg by Nasal Instillation route every 8 hours    Chronic maxillary sinusitis, Cystic fibrosis of the lung (H)       methylcellulose (laxative)  Powd    CITRUCEL    479 g    Start with 1 heaping tablespoon. Increase as needed, 1 heaping tablespoon at a time, up to 3 times per day.    Other constipation       montelukast 10 MG tablet    SINGULAIR    30 tablet    TAKE 1 TABLET BY MOUTH DAILY AT BEDTIME    CF (cystic fibrosis) (H)       MULTIVITAMIN PO      Take 1 tablet by mouth daily.        mupirocin 2 % ointment    BACTROBAN     USE BID TO AFFECTED AREA ON THE LIPS        naltrexone 50 MG tablet    DEPADE;REVIA    90 tablet    Take 1 tablet.  Time it one to two hours prior to worst cravings.    Class 1 obesity due to excess calories without serious comorbidity with body mass index (BMI) of 30.0 to 30.9 in adult       omeprazole 20 MG CR capsule    priLOSEC    60 capsule    TAKE 1 CAPSULE BY MOUTH TWICE A DAY    CF (cystic fibrosis) (H)       oseltamivir 75 MG capsule    TAMIFLU    10 capsule    Take 1 capsule (75 mg) by mouth daily    Cystic fibrosis with pulmonary manifestations (H)       polyethylene glycol powder    MIRALAX    119 g    Take 17 g (1 capful) by mouth daily as needed for constipation    Cystic fibrosis of the lung (H), Constipation, unspecified constipation type       sulfamethoxazole-trimethoprim 800-160 MG per tablet    BACTRIM DS/SEPTRA DS    20 tablet    Take 1 tablet by mouth 2 times daily    Cystic fibrosis with pulmonary manifestations (H)       traZODone 100 MG tablet    DESYREL    30 tablet    Take 1 tablet (100 mg) by mouth At Bedtime    Insomnia, unspecified type       vitamin E 400 UNIT capsule     60 capsule    TAKE 1 CAPSULE BY MOUTH TWICE A DAY    CF (cystic fibrosis) (H), Pancreatic insufficiency       * Notice:  This list has 8 medication(s) that are the same as other medications prescribed for you. Read the directions carefully, and ask your doctor or other care provider to review them with you.

## 2018-07-06 NOTE — NURSING NOTE
"Chief Complaint   Patient presents with     RECHECK     merrem injection     Height 1.549 m (5' 1\"), weight 75.3 kg (166 lb).    Denys Núñez    "

## 2018-07-18 ENCOUNTER — OFFICE VISIT (OUTPATIENT)
Dept: PULMONOLOGY | Facility: CLINIC | Age: 23
End: 2018-07-18
Attending: INTERNAL MEDICINE
Payer: COMMERCIAL

## 2018-07-18 ENCOUNTER — ALLIED HEALTH/NURSE VISIT (OUTPATIENT)
Dept: CARE COORDINATION | Facility: CLINIC | Age: 23
End: 2018-07-18

## 2018-07-18 VITALS
HEART RATE: 85 BPM | RESPIRATION RATE: 16 BRPM | WEIGHT: 169.97 LBS | HEIGHT: 62 IN | OXYGEN SATURATION: 98 % | DIASTOLIC BLOOD PRESSURE: 81 MMHG | SYSTOLIC BLOOD PRESSURE: 118 MMHG | BODY MASS INDEX: 31.28 KG/M2

## 2018-07-18 DIAGNOSIS — E84.9 CYSTIC FIBROSIS (H): Primary | ICD-10-CM

## 2018-07-18 DIAGNOSIS — E84.0 CYSTIC FIBROSIS WITH PULMONARY MANIFESTATIONS (H): Primary | ICD-10-CM

## 2018-07-18 DIAGNOSIS — K86.89 PANCREATIC INSUFFICIENCY: ICD-10-CM

## 2018-07-18 DIAGNOSIS — E84.0 CYSTIC FIBROSIS OF THE LUNG (H): Primary | ICD-10-CM

## 2018-07-18 DIAGNOSIS — E66.09 NON MORBID OBESITY DUE TO EXCESS CALORIES: ICD-10-CM

## 2018-07-18 DIAGNOSIS — E10.9 TYPE 1 DIABETES MELLITUS WITHOUT COMPLICATION (H): ICD-10-CM

## 2018-07-18 DIAGNOSIS — Z13.9 RISK AND FUNCTIONAL ASSESSMENT: Primary | ICD-10-CM

## 2018-07-18 LAB
EXPTIME-PRE: 7.04 SEC
EXPTIME-PRE: 7.04 SEC
FEF2575-%PRED-PRE: 115 %
FEF2575-%PRED-PRE: 115 %
FEF2575-PRE: 4.19 L/SEC
FEF2575-PRE: 4.19 L/SEC
FEF2575-PRED: 3.62 L/SEC
FEF2575-PRED: 3.62 L/SEC
FEFMAX-%PRED-PRE: 137 %
FEFMAX-%PRED-PRE: 137 %
FEFMAX-PRE: 9.1 L/SEC
FEFMAX-PRE: 9.1 L/SEC
FEFMAX-PRED: 6.63 L/SEC
FEFMAX-PRED: 6.63 L/SEC
FEV1-%PRED-PRE: 100 %
FEV1-%PRED-PRE: 100 %
FEV1-PRE: 3.09 L
FEV1-PRE: 3.09 L
FEV1FEV6-PRE: 88 %
FEV1FEV6-PRE: 88 %
FEV1FEV6-PRED: 86 %
FEV1FEV6-PRED: 86 %
FEV1FVC-PRE: 88 %
FEV1FVC-PRE: 88 %
FEV1FVC-PRED: 86 %
FEV1FVC-PRED: 86 %
FIFMAX-PRE: 4.1 L/SEC
FIFMAX-PRE: 4.1 L/SEC
FVC-%PRED-PRE: 98 %
FVC-%PRED-PRE: 98 %
FVC-PRE: 3.5 L
FVC-PRE: 3.5 L
FVC-PRED: 3.55 L
FVC-PRED: 3.55 L

## 2018-07-18 PROCEDURE — 87186 SC STD MICRODIL/AGAR DIL: CPT | Performed by: INTERNAL MEDICINE

## 2018-07-18 PROCEDURE — 87070 CULTURE OTHR SPECIMN AEROBIC: CPT | Performed by: INTERNAL MEDICINE

## 2018-07-18 PROCEDURE — G0463 HOSPITAL OUTPT CLINIC VISIT: HCPCS | Mod: ZF

## 2018-07-18 PROCEDURE — 97803 MED NUTRITION INDIV SUBSEQ: CPT | Mod: ZF | Performed by: DIETITIAN, REGISTERED

## 2018-07-18 PROCEDURE — 87077 CULTURE AEROBIC IDENTIFY: CPT | Performed by: INTERNAL MEDICINE

## 2018-07-18 PROCEDURE — 94375 RESPIRATORY FLOW VOLUME LOOP: CPT | Mod: ZF | Performed by: INTERNAL MEDICINE

## 2018-07-18 ASSESSMENT — PAIN SCALES - GENERAL: PAINLEVEL: NO PAIN (0)

## 2018-07-18 NOTE — PROGRESS NOTES
CF Annual Nutrition Assessment    Reason for Assessment  Assessed during Dr. Gavi Allison' clinic r/t increased nutrition risk with diagnosis of CF per protocol.    Nutrition Significant PMH  Mild Lung Disease   Pancreatic Insufficient (fecal elastase done 2013)  CFRD - sees CF Endocrinologist, Dr. 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  Current weight is considered: Overweight/Obese and above recommended weight range for CF     Wt Readings from Last 30 Encounters:   07/06/18 75.3 kg (166 lb)   05/30/18 76.2 kg (168 lb)   05/01/18 75.8 kg (167 lb 1.6 oz)   04/18/18 75.3 kg (166 lb)   03/21/18 75.3 kg (166 lb)   01/17/18 75 kg (165 lb 5.5 oz)   01/17/18 77.6 kg (171 lb)   11/01/17 77.9 kg (171 lb 11.8 oz)   10/11/17 78.9 kg (174 lb)   09/26/17 78.8 kg (173 lb 11.2 oz)   09/06/17 78.9 kg (174 lb)   08/02/17 80.3 kg (177 lb)   08/02/17 79.7 kg (175 lb 11.3 oz)   06/05/17 79.9 kg (176 lb 2.4 oz)   05/01/17 82.1 kg (181 lb)   04/25/17 81.1 kg (178 lb 12.7 oz)   04/03/17 81.2 kg (179 lb)   03/09/17 81.6 kg (180 lb)   02/07/17 80.7 kg (177 lb 14.6 oz)   02/07/17 80.7 kg (177 lb 14.6 oz)   01/31/17 82.4 kg (181 lb 10.5 oz)   01/23/17 82.4 kg (181 lb 9.6 oz)   01/11/17 82.6 kg (182 lb)   11/07/16 82.6 kg (182 lb)   11/01/16 83 kg (182 lb 15.7 oz)   10/14/16 82.8 kg (182 lb 9.6 oz)   08/24/16 83.7 kg (184 lb 9.6 oz)   08/02/16 84 kg (185 lb 3 oz)   06/20/16 83.1 kg (183 lb 4.8  oz)   05/24/16 83.4 kg (183 lb 13.8 oz)       Comments:  Mamie is making slow progress with weight loss, continues to follow with the [facility] clinic team and is taking medications to support weight control.  She has lost >6 kg since August 2016 or ~8% total body weight.  She now sees Dr. Randa Ho who she is very happy with. She is currently taking naltrexone and bupropion for weight loss, as prescribed by Dr. Tolentino.  She states this helps with her cravings and snacking.      Physical Activity/Exercise:  Due to recent knee surgery and back pain issues Mamie is doing very little physical activity outside of ADL's, no formal exercise regimen.  She is seeing a Chiropractor but has not done physical therapy.  She states she has activity restrictions for a further 3 weeks and then can return to more strength/endurance activities.     Pancreatic Enzymes  Although tested as pancreatic insufficient pt does not take enzymes, stopped taking them in 2015 as she felt they had little affect on her GI symptoms. Ok'd this with CF MD prior to stopping.      Diet History and Assessment  Diet Preferences/Allergies/Intolerances: Regular diet  Appetite Stimulant Rx:  No, on medications for appetite suppression for weight loss per [facility]  Intake Recall/Comments:   Having three meals per day, minimizing snacking which her appetite suppressive medications help with.  She says she eats a very structured meal schedule unless she is at work (the ) when she does not know what/when she will be able to eat.  She is drinking milk with meals but staying with skim milk and not having more than 3 glasses per day.  At [facility] clinic they recommended she replace her breakfast meal with a protein shake but she states due to taste, cost, and appetite she discontinued this after 1 week.  She has some protein powder left but has not used it, might add it in as a dinner meal on work nights.     Calcium:  Loves milk, as we have discussed her  drinking >1 gallon milk/day contributing to weight loss she is trying to limit to two to three 16-oz glasses per day (skim).   Salt: Adequate, added to meals  Hydration:  Does not like plain water, will drink juice or diet soda or milk  Supplements: Yes, as meal replacement options for weight loss.     MALNUTRITION  % Intake:  No decreased intake noted  % Weight Loss:  Weight loss does not meet criteria for malnutrition (intentional)  Subcutaneous Fat Loss:  None observed  Muscle Loss:  None observed  Fluid Accumulation/Edema:  None noted    Malnutrition Diagnosis: Patient does not meet two of the above criteria necessary for diagnosing malnutrition    Estimated Energy and Protein Needs  Estimation based on weight loss with Mild lung disease and mild pancreatic insufficiency.    4194-3574 kcals/day =  25-30 kcal/kg For weight loss (increase to 30-35 kcal/day for maintenance)   70-90 g protein/day = 1.2-1.5 g/kg (increase to 1.5-2 g/kg- pancreatic insufficient)    Laboratory Assessment  Date: 8/2/17  Total 25-Hydroxyvitamin D: WNL  Vitamin A: WNL  Vitamin E: WNL  Iron: WNL  Lipid Panel: Low HDL, high TGL    Current Vitamin/Mineral Prescriptions: Multivitamin, Vitamin D 1000 International Units, Vitamin E 400 International Units, Betacarotene 25,000 International Units 3x/week, Vitamin B12 1 mL IMJ 1x/month, Vitamin K weekly and Vitamin C 250 mg BID    Comments:  Pt states she is taking all of the above, could not remember exactly the amounts but the supplements above are per her current prescriptions.    CF Related Diabetes Evaluation  Hgb A1C: 5.5%   Date: 8/2/17  FSBG range: Normal per pt  Insulin: No  Carbohydrate Counting: No    Some low blood sugars at work occasionally when she is busy.     NUTRITION DIAGNOSIS  Overweight/obese r/t excessive energy intake and lack of physical activity AEB BMI >30 kg/m2 with pt-reported chronic overeating requiring intervention via [facility] clinic.   INTERVENTIONS/RECOMMENDATIONS  1)  Weight management:  Reinforced goals for weight and structured nutrition intake, congratulated pt on her weight loss thus far and we reviewed her weight graph for the past 2 years.  Offered support via phone & MyChart.  2) Annual studies:  Planned for these to be done next visit.  Vitamin/mineral supplementation regimen reviewed 1:1.      Patient Understanding: Good  Expected Compliance: Good  Follow-Up Plans: Per protocol    GOALS: (2017)  1) Limit daily milk intake to 2-3 16-oz glasses of skim per day.  2) Limit snacking between meals aside from hypoglycemic needs.  3) Weight to trend downward to goal of BMI <30 kg/m2 within the next 6-12 months.       FOLLOW-UP/MONITORING:  Visit patient within 6-12 month(s)    Time Spent In Face-to-Face Patient Interactions:  30 minutes    Theresa Ceja MS, CRISTINA, LD (pager [phone])  Cystic Fibrosis/Lung Transplant Dietitian      -Available Mon-Thurs 8 AM-4:30 PM. On Fridays please contact pager [phone] (Amy Andino RD) and on weekends/holidays contact coverage dietitian at pager [phone] (inpatient use only).

## 2018-07-18 NOTE — LETTER
7/18/2018       RE: Mamie Rice  [address] [facility] [address]     Dear Colleague,    Thank you for referring your patient, Mamie Rice, to the [facility] at [facility]. Please see a copy of my visit note below.    CF Annual Nutrition Assessment    Reason for Assessment  Assessed during Dr. Gavi Allison' clinic r/t increased nutrition risk with diagnosis of CF per protocol.    Nutrition Significant PMH  Mild Lung Disease   Pancreatic Insufficient (fecal elastase done 2013)  CFRD - sees CF Endocrinologist, Dr. 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  Current weight is considered: Overweight/Obese and above recommended weight range for CF     Wt Readings from Last 30 Encounters:   07/06/18 75.3 kg (166 lb)   05/30/18 76.2 kg (168 lb)   05/01/18 75.8 kg (167 lb 1.6 oz)   04/18/18 75.3 kg (166 lb)   03/21/18 75.3 kg (166 lb)   01/17/18 75 kg (165 lb 5.5 oz)   01/17/18 77.6 kg (171 lb)   11/01/17 77.9 kg (171 lb 11.8 oz)   10/11/17 78.9 kg (174 lb)   09/26/17 78.8 kg (173 lb 11.2 oz)   09/06/17 78.9 kg (174 lb)   08/02/17 80.3 kg (177 lb)   08/02/17 79.7 kg (175 lb 11.3 oz)   06/05/17 79.9 kg (176 lb 2.4 oz)   05/01/17 82.1 kg (181 lb)   04/25/17 81.1 kg (178 lb 12.7 oz)   04/03/17 81.2 kg (179 lb)   03/09/17 81.6 kg (180 lb)   02/07/17 80.7 kg (177 lb 14.6 oz)   02/07/17 80.7 kg (177 lb  14.6 oz)   01/31/17 82.4 kg (181 lb 10.5 oz)   01/23/17 82.4 kg (181 lb 9.6 oz)   01/11/17 82.6 kg (182 lb)   11/07/16 82.6 kg (182 lb)   11/01/16 83 kg (182 lb 15.7 oz)   10/14/16 82.8 kg (182 lb 9.6 oz)   08/24/16 83.7 kg (184 lb 9.6 oz)   08/02/16 84 kg (185 lb 3 oz)   06/20/16 83.1 kg (183 lb 4.8 oz)   05/24/16 83.4 kg (183 lb 13.8 oz)       Comments:  Mamie is making slow progress with weight loss, continues to follow with the [facility] clinic team and is taking medications to support weight control.  She has lost >6 kg since August 2016 or ~8% total body weight.  She now sees Dr. Randa Ho who she is very happy with. She is currently taking naltrexone and bupropion for weight loss, as prescribed by Dr. Tolentino.  She states this helps with her cravings and snacking.      Physical Activity/Exercise:  Due to recent knee surgery and back pain issues Mamie is doing very little physical activity outside of ADL's, no formal exercise regimen.  She is seeing a Chiropractor but has not done physical therapy.  She states she has activity restrictions for a further 3 weeks and then can return to more strength/endurance activities.     Pancreatic Enzymes  Although tested as pancreatic insufficient pt does not take enzymes, stopped taking them in 2015 as she felt they had little affect on her GI symptoms. Ok'd this with CF MD prior to stopping.      Diet History and Assessment  Diet Preferences/Allergies/Intolerances: Regular diet  Appetite Stimulant Rx:  No, on medications for appetite suppression for weight loss per [facility]  Intake Recall/Comments:   Having three meals per day, minimizing snacking which her appetite suppressive medications help with.  She says she eats a very structured meal schedule unless she is at work (the ) when she does not know what/when she will be able to eat.  She is drinking milk with meals but staying with skim milk and not having more than 3 glasses per day.  At [facility] clinic they  recommended she replace her breakfast meal with a protein shake but she states due to taste, cost, and appetite she discontinued this after 1 week.  She has some protein powder left but has not used it, might add it in as a dinner meal on work nights.     Calcium:  Loves milk, as we have discussed her drinking >1 gallon milk/day contributing to weight loss she is trying to limit to two to three 16-oz glasses per day (skim).   Salt: Adequate, added to meals  Hydration:  Does not like plain water, will drink juice or diet soda or milk  Supplements: Yes, as meal replacement options for weight loss.     MALNUTRITION  % Intake:  No decreased intake noted  % Weight Loss:  Weight loss does not meet criteria for malnutrition (intentional)  Subcutaneous Fat Loss:  None observed  Muscle Loss:  None observed  Fluid Accumulation/Edema:  None noted    Malnutrition Diagnosis: Patient does not meet two of the above criteria necessary for diagnosing malnutrition    Estimated Energy and Protein Needs  Estimation based on weight loss with Mild lung disease and mild pancreatic insufficiency.    1744-1235 kcals/day =  25-30 kcal/kg For weight loss (increase to 30-35 kcal/day for maintenance)   70-90 g protein/day = 1.2-1.5 g/kg (increase to 1.5-2 g/kg- pancreatic insufficient)    Laboratory Assessment  Date: 8/2/17  Total 25-Hydroxyvitamin D: WNL  Vitamin A: WNL  Vitamin E: WNL  Iron: WNL  Lipid Panel: Low HDL, high TGL    Current Vitamin/Mineral Prescriptions: Multivitamin, Vitamin D 1000 International Units, Vitamin E 400 International Units, Betacarotene 25,000 International Units 3x/week, Vitamin B12 1 mL IMJ 1x/month, Vitamin K weekly and Vitamin C 250 mg BID    Comments:  Pt states she is taking all of the above, could not remember exactly the amounts but the supplements above are per her current prescriptions.    CF Related Diabetes Evaluation  Hgb A1C: 5.5%   Date: 8/2/17  FSBG range: Normal per pt  Insulin: No  Carbohydrate  Counting: No    Some low blood sugars at work occasionally when she is busy.     NUTRITION DIAGNOSIS  Overweight/obese r/t excessive energy intake and lack of physical activity AEB BMI >30 kg/m2 with pt-reported chronic overeating requiring intervention via [facility] clinic.   INTERVENTIONS/RECOMMENDATIONS  1) Weight management:  Reinforced goals for weight and structured nutrition intake, congratulated pt on her weight loss thus far and we reviewed her weight graph for the past 2 years.  Offered support via phone & MyChart.  2) Annual studies:  Planned for these to be done next visit.  Vitamin/mineral supplementation regimen reviewed 1:1.      Patient Understanding: Good  Expected Compliance: Good  Follow-Up Plans: Per protocol    GOALS: (2017)  1) Limit daily milk intake to 2-3 16-oz glasses of skim per day.  2) Limit snacking between meals aside from hypoglycemic needs.  3) Weight to trend downward to goal of BMI <30 kg/m2 within the next 6-12 months.       FOLLOW-UP/MONITORING:  Visit patient within 6-12 month(s)    Time Spent In Face-to-Face Patient Interactions:  30 minutes    Theresa Ceja MS, RD, LD (pager [phone])  Cystic Fibrosis/Lung Transplant Dietitian      -Available Mon-Thurs 8 AM-4:30 PM. On Fridays please contact pager [phone] (Amy Andino RD) and on weekends/holidays contact coverage dietitian at pager [phone] (inpatient use only).       [facility]  [facility]  July 18, 2018         Assessment and Plan:   Mamie Rice is a 22 year old female with cystic fibrosis.    1. CF lung disease with normal spirometry:  Mamie has shown evidence of excellent stability in her pulmonary function as well as her pulmonary symptomatology.  Since last being seen, she was treated for a sinus infection with resolution of these symptoms.  She does continue to show evidence of Staph in her sputum.  At this time, I would recommend:   -- That she continue to be aggressive  with her bronchial drainage therapy.     -- She should continue on her inhalational therapy.     2.  Cystic fibrosis sinus disease.  Mamie gets seen monthly by Dr. Haile.  At that time they do meropenem washes.  I defer to them for management of her sinuses.     3.  Abnormal glucose tolerance.  Mamie occasionally checks blood sugars which are normal.  She has no concerning symptoms for hyper or hypoglycemia.     4.  Weight management.  M[facility].  She is currently on naloxone therapy.  She has had a recent weight gain which she is disappointed by.  I have encouraged her to be careful about her intake.     5.  Dermatology.  Mamie is followed closely by Derm.  She missed her appointment this morning.  She has appropriately rescheduled.     6.  Hand swelling.  Mamie describes some intermittent bilateral hand swelling and difficulty to move.  It is unclear if this is a rheumatologic disorder.  She reports that it has responded to prednisone in the past.  This was provided by her primary care physician.  I have recommended that she consider a rheumatology evaluation.  She can certainly be seen locally for this.     7.  Psychosocial.  Mamie is followed at the [facility].  She reports that they have been doing medication adjustments which she is tolerating well.      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  She does live at home with her family.     8. Pancreatic Insufficiency:  The patient has no new symptoms consistent with worsening malabsorption.    - continue the present dose of pancreatic enzymes  - continue vitamin supplementation.    HCM:  Scheduled for fall  Immunizations: UTD  Colonoscopy: N/A    Gavi Allison MD MPH  Associate Professor of Medicine  Pulmonary, Allergy, Critical Care and Sleep Medicine      Interval History:     Mamie occasionally coughs and rarely produces sputum.  She denies any shortness of  breath.  She mostly does 2 vest therapies per day.          Review of Systems:     CONSTITUTIONAL: no fever, no chills, increase in weight, no change in energy, no change in appetite    INTEGUMENTARY/SKIN: no rash, no obvious new lesions    ENT/MOUTH: no sore throat, no new sinus pain, no new nasal drainage, no hearing loss, no ear ringing     RESPIRATORY: see interval history    CV: no chest pain, no palpitations, no peripheral edema, no orthopnea, no PND    GI: no nausea, no vomiting, no change in stools, no fatty stools, no GERD, no abdominal pain    : no dysuria, no urinary frequency, no menses on depot    MUSCULOSKELETAL: see HPI    ENDOCRINE: no excessive thirst    NEURO:  No headache, no numbness, no tingling    SLEEP: no issues    PSYCHIATRIC: mood stable          Past Medical and Surgical History:     Past Medical History:   Diagnosis Date     ADHD (attention deficit hyperactivity disorder)      Anxiety      Aspergillosis, with pneumonia (H)     fugus found caused chest pain     Chronic infection     CF, MRSA.,      Chronic sinusitis      Constipation, chronic      Cystic fibrosis with pulmonary manifestations (H) 12/19/2011     Cystic fibrosis without mention of meconium ileus     SWEAT TEST:Date: 2/17/1994   [facility]  296 mg, 104 mmol/L ClSample #2  295 mg, 104 mmol/L Cl GENOTYPING:Date: 10/15/2007,  Laboratory: [facility]: [id number]     Depressive disorder      Diabetes     no meds currently     Dysthymic disorder      Exocrine pancreatic insufficiency      Gastro-oesophageal reflux disease      Hip pain, right      MRSA (methicillin resistant Staphylococcus aureus) carrier      Pancreatic disease      Past Surgical History:   Procedure Laterality Date     ARTHROSCOPY HIP, OSTEOPLASTY FEMUR PROXIMAL, COMBINED  3/11/2013    Procedure: COMBINED ARTHROSCOPY HIP, OSTEOPLASTY FEMUR PROXIMAL;  Right Hip Arthroscopy, Labral  Debridement.    surgeon request choice anesthesia/admit  to [facility] after surgery;  Surgeon: Omkar Austin MD;  [facility]     ARTHROSCOPY KNEE WITH MEDIAL MENISCECTOMY Left 1/31/2017    Procedure: ARTHROSCOPY KNEE WITH MEDIAL MENISCECTOMY;  Surgeon: Jethro Coyle MD;  Location: [facility] OR     bronchoscopies       BRONCHOSCOPY       EXAM UNDER ANESTHESIA ANUS N/A 5/10/2016    Procedure: EXAM UNDER ANESTHESIA ANUS;  Surgeon: Chet Gaviria MD;  [facility]     EXAM UNDER ANESTHESIA, RESTORATIONS, EXTRACTION(S) DENTAL COMPLEX, COMBINED  5/13/2013    Procedure: COMBINED EXAM UNDER ANESTHESIA, RESTORATIONS, EXTRACTION(S) DENTAL COMPLEX;  Dental Exam, Radiographs, Restorations. Single Extraction  Tooth #2. Restorations x 3;  Surgeon: Danilo Ortiz DDS;  Location: [facility] OR     HC KNEE SCOPE, DIAGNOSTIC      Arthroscopy, Knee- left     INJECT BOTOX N/A 5/10/2016    Procedure: INJECT BOTOX;  Surgeon: Chet Gaviria MD;  [facility]     left hip labral tear  5/11/2011    left hip arthroscopy with labral debridement and synovectomy     meniscus repair       OPTICAL TRACKING SYSTEM ENDOSCOPIC SINUS SURGERY  10/14/2011    Procedure:OPTICAL TRACKING SYSTEM ENDOSCOPIC SINUS SURGERY; FESS (functional endoscopic sinus surgery) with Image Guidance, bronchial lavage and cultures; Surgeon:GOYO KUO; Location:[facility] OR     OPTICAL TRACKING SYSTEM ENDOSCOPIC SINUS SURGERY  5/18/2012    Procedure:OPTICAL TRACKING SYSTEM ENDOSCOPIC SINUS SURGERY; Right  and Left Image Guided Functional Endoscopic Sinus Surgery With  Frontal Approach, [employer]; Surgeon:GOYO KUO; Location[facility] OR     OPTICAL TRACKING SYSTEM ENDOSCOPIC SINUS SURGERY  9/26/2012    Procedure: OPTICAL TRACKING SYSTEM ENDOSCOPIC SINUS SURGERY;  Stealth Guided Bilateral Functional Endoscopic Sinus Surgery *Latex Safe*;  Surgeon: Goyo Kuo MD;  [facility]     OPTICAL TRACKING SYSTEM ENDOSCOPIC SINUS SURGERY Bilateral 10/16/2015    Procedure: OPTICAL TRACKING SYSTEM  ENDOSCOPIC SINUS SURGERY;  Surgeon: Mariela Haile MD;  [facility]     ORTHOPEDIC SURGERY      left hip tear repair 2010     SINUS SURGERY           Family History:     Family History   Problem Relation Age of Onset     Cancer Paternal Grandmother      Skin Cancer Paternal Grandmother      Other Cancer Paternal Grandmother      Skin     Obesity Paternal Grandmother      Cancer Paternal Grandfather      PGF had throat cancer (he was a smoker)     Other Cancer Paternal Grandfather      Anxiety Disorder Paternal Grandfather      Thyroid Disease Mother      ,     Obesity Other      Anesthesia Reaction No family hx of      Blood Disease No family hx of      Colon Polyps No family hx of      Crohn Disease No family hx of      Ulcerative Colitis No family hx of      Colon Cancer No family hx of      Melanoma No family hx of           Social History:     Social History     Social History     Marital status: Single     Spouse name: N/A     Number of children: N/A     Years of education: N/A     Occupational History     Not on file.     Social History Main Topics     Smoking status: Never Smoker     Smokeless tobacco: Never Used      Comment: one person at home smokes outside     Alcohol use No     Drug use: No     Sexual activity: No     Other Topics Concern     Blood Transfusions No     Exercise Yes     Social History Narrative    Mamie lives with mother in [place], [place].  There is a cat in the home, but Mamie does not have any litterbox duties.  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  She gets essentially no exercise because of the tingling in her feet (says it bothers her even to stand).        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        8/2015 no change in social situation        2/15/2016 Pt is single and lives with mother and stepfather.          Medications:     Current Outpatient Prescriptions   Medication      "acetaminophen (TYLENOL) 325 MG tablet     acetylcysteine (MUCOMYST) 20 % nebulizer solution     albuterol (2.5 MG/3ML) 0.083% neb solution     albuterol (PROAIR HFA/PROVENTIL HFA/VENTOLIN HFA) 108 (90 Base) MCG/ACT Inhaler     aluminum chloride (DRYSOL) 20 % external solution     amphetamine-dextroamphetamine (ADDERALL XR) 20 MG per 24 hr capsule     amphetamine-dextroamphetamine (ADDERALL XR) 20 MG per 24 hr capsule     amphetamine-dextroamphetamine (ADDERALL XR) 20 MG per 24 hr capsule     ascorbic acid (VITAMIN C) 500 MG tablet     azithromycin (ZITHROMAX) 500 MG tablet     beta carotene 89108 UNIT capsule     blood glucose (ACCU-CHEK SMARTVIEW) test strip     buPROPion (WELLBUTRIN SR) 150 MG 12 hr tablet     busPIRone (BUSPAR) 15 MG tablet     cetirizine (ZYRTEC) 10 MG tablet     CLARAVIS 40 MG capsule     dornase alpha (PULMOZYME) 1 MG/ML neb solution     FLUoxetine (PROZAC) 40 MG capsule     GLYCOPYRROLATE PO     hydrOXYzine (ATARAX) 25 MG tablet     MedroxyPROGESTERone Acetate (DEPO-PROVERA IM)     MEPHYTON 5 MG tablet     meropenem (MERREM) 500 MG vial     meropenem (MERREM) 500 MG vial     meropenem (MERREM) 500 MG vial     methylcellulose, laxative, (CITRUCEL) POWD     montelukast (SINGULAIR) 10 MG tablet     Multiple Vitamin (MULTIVITAMIN OR)     mupirocin (BACTROBAN) 2 % ointment     naltrexone (DEPADE;REVIA) 50 MG tablet     omeprazole (PRILOSEC) 20 MG CR capsule     polyethylene glycol (MIRALAX) powder     sulfamethoxazole-trimethoprim (BACTRIM DS/SEPTRA DS) 800-160 MG per tablet     traZODone (DESYREL) 100 MG tablet     VITAMIN D3 1000 UNITS tablet     vitamin E 400 UNIT capsule     No current facility-administered medications for this visit.      Facility-Administered Medications Ordered in Other Visits   Medication     albuterol (PROAIR HFA, PROVENTIL HFA, VENTOLIN HFA) inhaler          Physical Exam:   /81  Pulse 85  Resp 16  Ht 1.575 m (5' 2.01\")  Wt 77.1 kg (169 lb 15.6 oz)  SpO2 98%  " BMI 31.08 kg/m2    Constitutional:   Awake, alert and in no apparent distress     Eyes:   nonicteric     ENT:   oral mucosa moist without lesions, normal tm bilaterally, bilateral mucosa normal     Neck:   Supple without supraclavicular or cervical lymphadenopathy     Lungs:   Good air flow.  No crackles. No rhonchi.  No wheezes.     Cardiovascular:   Normal S1 and S2.  RRR.  No murmur, gallop or rub.     Abdomen:   NABS, soft, nontender, nondistended.       Musculoskeletal:   No edema, digital clubbing present     Neurologic:   Alert and conversant.     Skin:   Warm, dry.  No rash on limited exam.             Data:   All laboratory and imaging data reviewed.    Cystic Fibrosis Culture  Specimen Description   Date Value Ref Range Status   07/18/2018 Throat  Final   04/18/2018 Midstream Urine  Final   04/18/2018 Throat  Final    Culture Micro   Date Value Ref Range Status   07/18/2018 PENDING  Preliminary   04/18/2018 No growth  Final   04/18/2018 Canceled, Test credited  Final   04/18/2018 Canceled:  Specimen improperly labeled.  Final   04/18/2018   Final    Notification of test cancellation was given to  Ira SHAIKH from CLS.4/18/18 at 1016.TV.          Recent Results (from the past 168 hour(s))   General PFT Lab (Please always keep checked)    Collection Time: 07/18/18  9:21 AM   Result Value Ref Range    FVC-Pred 3.55 L    FVC-Pre 3.50 L    FVC-%Pred-Pre 98 %    FEV1-Pre 3.09 L    FEV1-%Pred-Pre 100 %    FEV1FVC-Pred 86 %    FEV1FVC-Pre 88 %    FEFMax-Pred 6.63 L/sec    FEFMax-Pre 9.10 L/sec    FEFMax-%Pred-Pre 137 %    FEF2575-Pred 3.62 L/sec    FEF2575-Pre 4.19 L/sec    DST1731-%Pred-Pre 115 %    ExpTime-Pre 7.04 sec    FIFMax-Pre 4.10 L/sec    FEV1FEV6-Pred 86 %    FEV1FEV6-Pre 88 %   General PFT Lab (Please always keep checked)    Collection Time: 07/18/18  9:21 AM   Result Value Ref Range    FVC-Pred 3.55 L    FVC-Pre 3.50 L    FVC-%Pred-Pre 98 %    FEV1-Pre 3.09 L    FEV1-%Pred-Pre 100 %    FEV1FVC-Pred  86 %    FEV1FVC-Pre 88 %    FEFMax-Pred 6.63 L/sec    FEFMax-Pre 9.10 L/sec    FEFMax-%Pred-Pre 137 %    FEF2575-Pred 3.62 L/sec    FEF2575-Pre 4.19 L/sec    SMA0931-%Pred-Pre 115 %    ExpTime-Pre 7.04 sec    FIFMax-Pre 4.10 L/sec    FEV1FEV6-Pred 86 %    FEV1FEV6-Pre 88 %   Cystic Fibrosis Culture Aerob Bacterial    Collection Time: 07/18/18  9:42 AM   Result Value Ref Range    Specimen Description Throat     Special Requests Specimen collected in eSwab transport (white cap)     Culture Micro PENDING        PFT:  The spirometry is normal.  When compared to 4/18/2018, the FEV1 and FVC have little change.      Pulmonary exacerbation: absent        Again, thank you for allowing me to participate in the care of your patient.      Sincerely,    Gavi Allison MD

## 2018-07-18 NOTE — PROGRESS NOTES
[facility]  July 18, 2018         Assessment and Plan:   Mamie Rice is a 22 year old female with cystic fibrosis.    1. CF lung disease with normal spirometry:  Mamie has shown evidence of excellent stability in her pulmonary function as well as her pulmonary symptomatology.  Since last being seen, she was treated for a sinus infection with resolution of these symptoms.  She does continue to show evidence of Staph in her sputum.  At this time, I would recommend:   -- That she continue to be aggressive with her bronchial drainage therapy.     -- She should continue on her inhalational therapy.     2.  Cystic fibrosis sinus disease.  Mamie gets seen monthly by Dr. Haile.  At that time they do meropenem washes.  I defer to them for management of her sinuses.     3.  Abnormal glucose tolerance.  Mamie occasionally checks blood sugars which are normal.  She has no concerning symptoms for hyper or hypoglycemia.     4.  Weight management.  [facility]  She is currently on naloxone therapy.  She has had a recent weight gain which she is disappointed by.  I have encouraged her to be careful about her intake.     5.  Dermatology.  Mamie is followed closely by Derm.  She missed her appointment this morning.  She has appropriately rescheduled.     6.  Hand swelling.  Mamie describes some intermittent bilateral hand swelling and difficulty to move.  It is unclear if this is a rheumatologic disorder.  She reports that it has responded to prednisone in the past.  This was provided by her primary care physician.  I have recommended that she consider a rheumatology evaluation.  She can certainly be seen locally for this.     7.  Psychosocial.  Mamie is followed at the [facility].  She reports that they have been doing medication adjustments which she is tolerating well.      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  She does live at home with her family.     8. Pancreatic Insufficiency:  The patient has no new symptoms consistent with worsening malabsorption.    - continue the present dose of pancreatic enzymes  - continue vitamin supplementation.    HCM:  Scheduled for fall  Immunizations: UTD  Colonoscopy: N/A    Gavi Allison MD MPH  Associate Professor of Medicine  Pulmonary, Allergy, Critical Care and Sleep Medicine      Interval History:     Mamie occasionally coughs and rarely produces sputum.  She denies any shortness of breath.  She mostly does 2 vest therapies per day.          Review of Systems:     CONSTITUTIONAL: no fever, no chills, increase in weight, no change in energy, no change in appetite    INTEGUMENTARY/SKIN: no rash, no obvious new lesions    ENT/MOUTH: no sore throat, no new sinus pain, no new nasal drainage, no hearing loss, no ear ringing     RESPIRATORY: see interval history    CV: no chest pain, no palpitations, no peripheral edema, no orthopnea, no PND    GI: no nausea, no vomiting, no change in stools, no fatty stools, no GERD, no abdominal pain    : no dysuria, no urinary frequency, no menses on depot    MUSCULOSKELETAL: see HPI    ENDOCRINE: no excessive thirst    NEURO:  No headache, no numbness, no tingling    SLEEP: no issues    PSYCHIATRIC: mood stable          Past Medical and Surgical History:     Past Medical History:   Diagnosis Date     ADHD (attention deficit hyperactivity disorder)      Anxiety      Aspergillosis, with pneumonia (H)     fugus found caused chest pain     Chronic infection     CF, MRSA.,      Chronic sinusitis      Constipation, chronic      Cystic fibrosis with pulmonary manifestations (H) 12/19/2011     Cystic fibrosis without mention of meconium ileus     SWEAT TEST:Date: 2/17/1994   Labor[facility]  296 mg, 104 mmol/L ClSample #2  295 mg, 104 mmol/L Cl GENOTYPING:Date: 10/15/2007,  Laboratory:  [facility]ry[facility]otype: [id number]     Depressive disorder      Diabetes     no meds currently     Dysthymic disorder      Exocrine pancreatic insufficiency      Gastro-oesophageal reflux disease      Hip pain, right      MRSA (methicillin resistant Staphylococcus aureus) carrier      Pancreatic disease      Past Surgical History:   Procedure Laterality Date     ARTHROSCOPY HIP, OSTEOPLASTY FEMUR PROXIMAL, COMBINED  3/11/2013    Procedure: COMBINED ARTHROSCOPY HIP, OSTEOPLASTY FEMUR PROXIMAL;  Right Hip Arthroscopy, Labral  Debridement.    surgeon request choice anesthesia/admit to [facility] after surgery;  Surgeon: Omkar Austin MD;  [facility]     ARTHROSCOPY KNEE WITH MEDIAL MENISCECTOMY Left 1/31/2017    Procedure: ARTHROSCOPY KNEE WITH MEDIAL MENISCECTOMY;  Surgeon: Jethro Coyle MD;  [facility] OR     bronchoscopies       BRONCHOSCOPY       EXAM UNDER ANESTHESIA ANUS N/A 5/10/2016    Procedure: EXAM UNDER ANESTHESIA ANUS;  Surgeon: Chet Gaviria MD;  [facility]     EXAM UNDER ANESTHESIA, RESTORATIONS, EXTRACTION(S) DENTAL COMPLEX, COMBINED  5/13/2013    Procedure: COMBINED EXAM UNDER ANESTHESIA, RESTORATIONS, EXTRACTION(S) DENTAL COMPLEX;  Dental Exam, Radiographs, Restorations. Single Extraction  Tooth #2. Restorations x 3;  Surgeon: Danilo Ortiz DDS;  [facility]     HC KNEE SCOPE, DIAGNOSTIC      Arthroscopy, Knee- left     INJECT BOTOX N/A 5/10/2016    Procedure: INJECT BOTOX;  Surgeon: Chet Gaviria MD;  [facility]     left hip labral tear  5/11/2011    left hip arthroscopy with labral debridement and synovectomy     meniscus repair       OPTICAL TRACKING SYSTEM ENDOSCOPIC SINUS SURGERY  10/14/2011    Procedure:OPTICAL TRACKING SYSTEM ENDOSCOPIC SINUS SURGERY; FESS (functional endoscopic sinus surgery) with Image Guidance, bronchial lavage and cultures; Surgeon:JUAN JOSE GARCIA; [facility] OR     OPTICAL TRACKING SYSTEM ENDOSCOPIC SINUS SURGERY   5/18/2012    Procedure:OPTICAL TRACKING SYSTEM ENDOSCOPIC SINUS SURGERY; Right  and Left Image Guided Functional Endoscopic Sinus Surgery With  Frontal Approach, [employer]x; Surgeon:GOYO KUO; [facility] OR     OPTICAL TRACKING SYSTEM ENDOSCOPIC SINUS SURGERY  9/26/2012    Procedure: OPTICAL TRACKING SYSTEM ENDOSCOPIC SINUS SURGERY;  Stealth Guided Bilateral Functional Endoscopic Sinus Surgery *Latex Safe*;  Surgeon: Goyo Kuo MD;  [facility]     OPTICAL TRACKING SYSTEM ENDOSCOPIC SINUS SURGERY Bilateral 10/16/2015    Procedure: OPTICAL TRACKING SYSTEM ENDOSCOPIC SINUS SURGERY;  Surgeon: Mariela Haile MD;  [facility]     ORTHOPEDIC SURGERY      left hip tear repair 2010     SINUS SURGERY           Family History:     Family History   Problem Relation Age of Onset     Cancer Paternal Grandmother      Skin Cancer Paternal Grandmother      Other Cancer Paternal Grandmother      Skin     Obesity Paternal Grandmother      Cancer Paternal Grandfather      PGF had throat cancer (he was a smoker)     Other Cancer Paternal Grandfather      Anxiety Disorder Paternal Grandfather      Thyroid Disease Mother      ,     Obesity Other      Anesthesia Reaction No family hx of      Blood Disease No family hx of      Colon Polyps No family hx of      Crohn Disease No family hx of      Ulcerative Colitis No family hx of      Colon Cancer No family hx of      Melanoma No family hx of           Social History:     Social History     Social History     Marital status: Single     Spouse name: N/A     Number of children: N/A     Years of education: N/A     Occupational History     Not on file.     Social History Main Topics     Smoking status: Never Smoker     Smokeless tobacco: Never Used      Comment: one person at home smokes outside     Alcohol use No     Drug use: No     Sexual activity: No     Other Topics Concern     Blood Transfusions No     Exercise Yes     Social History Narrative    Mamie lives with mother  in [place], [place].  There is a cat in the home, but Mamie does not have any litterbox duties.  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  She gets essentially no exercise because of the tingling in her feet (says it bothers her even to stand).        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        8/2015 no change in social situation        2/15/2016 Pt is single and lives with mother and stepfather.          Medications:     Current Outpatient Prescriptions   Medication     acetaminophen (TYLENOL) 325 MG tablet     acetylcysteine (MUCOMYST) 20 % nebulizer solution     albuterol (2.5 MG/3ML) 0.083% neb solution     albuterol (PROAIR HFA/PROVENTIL HFA/VENTOLIN HFA) 108 (90 Base) MCG/ACT Inhaler     aluminum chloride (DRYSOL) 20 % external solution     amphetamine-dextroamphetamine (ADDERALL XR) 20 MG per 24 hr capsule     amphetamine-dextroamphetamine (ADDERALL XR) 20 MG per 24 hr capsule     amphetamine-dextroamphetamine (ADDERALL XR) 20 MG per 24 hr capsule     ascorbic acid (VITAMIN C) 500 MG tablet     azithromycin (ZITHROMAX) 500 MG tablet     beta carotene 86787 UNIT capsule     blood glucose (ACCU-CHEK SMARTVIEW) test strip     buPROPion (WELLBUTRIN SR) 150 MG 12 hr tablet     busPIRone (BUSPAR) 15 MG tablet     cetirizine (ZYRTEC) 10 MG tablet     CLARAVIS 40 MG capsule     dornase alpha (PULMOZYME) 1 MG/ML neb solution     FLUoxetine (PROZAC) 40 MG capsule     GLYCOPYRROLATE PO     hydrOXYzine (ATARAX) 25 MG tablet     MedroxyPROGESTERone Acetate (DEPO-PROVERA IM)     MEPHYTON 5 MG tablet     meropenem (MERREM) 500 MG vial     meropenem (MERREM) 500 MG vial     meropenem (MERREM) 500 MG vial     methylcellulose, laxative, (CITRUCEL) POWD     montelukast (SINGULAIR) 10 MG tablet     Multiple Vitamin (MULTIVITAMIN OR)     mupirocin (BACTROBAN) 2 % ointment     naltrexone (DEPADE;REVIA) 50 MG tablet     omeprazole (PRILOSEC) 20 MG  "CR capsule     polyethylene glycol (MIRALAX) powder     sulfamethoxazole-trimethoprim (BACTRIM DS/SEPTRA DS) 800-160 MG per tablet     traZODone (DESYREL) 100 MG tablet     VITAMIN D3 1000 UNITS tablet     vitamin E 400 UNIT capsule     No current facility-administered medications for this visit.      Facility-Administered Medications Ordered in Other Visits   Medication     albuterol (PROAIR HFA, PROVENTIL HFA, VENTOLIN HFA) inhaler          Physical Exam:   /81  Pulse 85  Resp 16  Ht 1.575 m (5' 2.01\")  Wt 77.1 kg (169 lb 15.6 oz)  SpO2 98%  BMI 31.08 kg/m2    Constitutional:   Awake, alert and in no apparent distress     Eyes:   nonicteric     ENT:   oral mucosa moist without lesions, normal tm bilaterally, bilateral mucosa normal     Neck:   Supple without supraclavicular or cervical lymphadenopathy     Lungs:   Good air flow.  No crackles. No rhonchi.  No wheezes.     Cardiovascular:   Normal S1 and S2.  RRR.  No murmur, gallop or rub.     Abdomen:   NABS, soft, nontender, nondistended.       Musculoskeletal:   No edema, digital clubbing present     Neurologic:   Alert and conversant.     Skin:   Warm, dry.  No rash on limited exam.             Data:   All laboratory and imaging data reviewed.    Cystic Fibrosis Culture  Specimen Description   Date Value Ref Range Status   07/18/2018 Throat  Final   04/18/2018 Midstream Urine  Final   04/18/2018 Throat  Final    Culture Micro   Date Value Ref Range Status   07/18/2018 PENDING  Preliminary   04/18/2018 No growth  Final   04/18/2018 Canceled, Test credited  Final   04/18/2018 Canceled:  Specimen improperly labeled.  Final   04/18/2018   Final    Notification of test cancellation was given to  Ira SHAIKH from [facility].4/18/18 at 1016.TV.          Recent Results (from the past 168 hour(s))   General PFT Lab (Please always keep checked)    Collection Time: 07/18/18  9:21 AM   Result Value Ref Range    FVC-Pred 3.55 L    FVC-Pre 3.50 L    FVC-%Pred-Pre 98 % "    FEV1-Pre 3.09 L    FEV1-%Pred-Pre 100 %    FEV1FVC-Pred 86 %    FEV1FVC-Pre 88 %    FEFMax-Pred 6.63 L/sec    FEFMax-Pre 9.10 L/sec    FEFMax-%Pred-Pre 137 %    FEF2575-Pred 3.62 L/sec    FEF2575-Pre 4.19 L/sec    RUT5254-%Pred-Pre 115 %    ExpTime-Pre 7.04 sec    FIFMax-Pre 4.10 L/sec    FEV1FEV6-Pred 86 %    FEV1FEV6-Pre 88 %   General PFT Lab (Please always keep checked)    Collection Time: 07/18/18  9:21 AM   Result Value Ref Range    FVC-Pred 3.55 L    FVC-Pre 3.50 L    FVC-%Pred-Pre 98 %    FEV1-Pre 3.09 L    FEV1-%Pred-Pre 100 %    FEV1FVC-Pred 86 %    FEV1FVC-Pre 88 %    FEFMax-Pred 6.63 L/sec    FEFMax-Pre 9.10 L/sec    FEFMax-%Pred-Pre 137 %    FEF2575-Pred 3.62 L/sec    FEF2575-Pre 4.19 L/sec    QMB4027-%Pred-Pre 115 %    ExpTime-Pre 7.04 sec    FIFMax-Pre 4.10 L/sec    FEV1FEV6-Pred 86 %    FEV1FEV6-Pre 88 %   Cystic Fibrosis Culture Aerob Bacterial    Collection Time: 07/18/18  9:42 AM   Result Value Ref Range    Specimen Description Throat     Special Requests Specimen collected in eSwab transport (white cap)     Culture Micro PENDING        PFT:  The spirometry is normal.  When compared to 4/18/2018, the FEV1 and FVC have little change.      Pulmonary exacerbation: absent

## 2018-07-18 NOTE — PATIENT INSTRUCTIONS
Cystic Fibrosis Self-Care Plan    RECOMMENDATIONS:   You are doing great job!  You should consider seeing a rheumatologist for your hands.    YOUR GOAL:  Enjoy the rest the summer.      CF Nurse line:          Burton Anderson   [phone]            CF Resp Therapist: Jhoana Trinh            [phone]   CF Dietitians:           Amy Andino            [phone]                       Theresa Ceja                       [phone]   CF Diabetes Nurse: Rhonda Esteves             [phone]    CF Social Workers:  Deyanira Figueroa             [phone]                Rosette Burnett            [phone]  CF Pharmacist:        Gris Adame                              [phone]  [email or web address]         MRN: [id number]   Clinic Date: July 18, 2018   Patient: Mamie Rice     Annual Studies:   IGG   Date Value Ref Range Status   08/02/2017 689 (L) 695 - 1620 mg/dL Final     Insulin   Date Value Ref Range Status   03/30/2015 81 mU/L Final     Comment:     Reference Range:  0-20     There are no preventive care reminders to display for this patient.      Pulmonary Function Tests  FEV1: amount of air you can blow out in 1 second  FVC: total amount of air you can take in and blow out    Your Goals:         PFT Latest Ref Rng & Units 7/18/2018   FVC L 3.50   FEV1 L 3.09   FVC% % 98   FEV1% % 100          Airway Clearance: The Most Important Way to Keep Your Lungs Healthy  Vest Settings:    Hill-Rom Frequencies: 8, 9, 10 Pressure 10 Then, Frequencies 18, 19, 20 Pressure 6      RespirTech: Quick Start with Pressure of     Do each frequency for 5 minutes; Deflate vest after each frequency & cough 3 times before beginning the next setting.    Vest and Neb Therapy should be done 2 times/day.    Good Nutrition Can Improve Lung Function and Overall Health     Take ALL of your vitamins with food     Take 1/2 of your enzymes before EVERY meal/snack and the other 1/2 mid-meal/snack    Wt Readings from  Last 3 Encounters:   07/18/18 77.1 kg (169 lb 15.6 oz)   07/06/18 75.3 kg (166 lb)   05/30/18 76.2 kg (168 lb)       Body mass index is 31.08 kg/(m^2).         National CF Foundation Recommendations for BMI in CF Adults: Women: at least 22 Men: at least 23        Controlling Blood Sugars Helps Prevent Lung Infections & Improves Nutrition  Test blood sugar:     In the morning before eating (goal is )     2 hours after a meal (goal is less than 150)     When pre-meal glucose is greater than 150 add correction     At bedtime (if less than 100 eat a snack with 15 grams of carbohydrates  Last A1C Results:   Hemoglobin A1C   Date Value Ref Range Status   08/02/2017 5.5 4.3 - 6.0 % Final         If diabetic, measure A1C every 6 months. Goal: Under 7%    Staying Healthy    Research:  If you are interested in learning about research opportunities or have questions, please contact the CF Research Team at [phone] or [email or web address].      CF Foundation:  Compass is a personalized resource service to help you with the insurance, financial, legal and other issues you are facing.  It's free, confidential and available to anyone with CF.  Ask your  for more information or contact Compass directly at 167-DAQVBAG ([phone]) or compass@cff.org, or learn more at cff.[email or web address].

## 2018-07-18 NOTE — MR AVS SNAPSHOT
After Visit Summary   7/18/2018    Mamie Rice    MRN: [id number]           Patient Information     Date Of Birth          1995        Visit Information        Provider Department      7/18/2018 9:50 AM Gavi Allison MD [facility]        Today's Diagnoses     Cystic fibrosis (H)    -  1      Care Instructions    Cystic Fibrosis Self-Care Plan    RECOMMENDATIONS:   You are doing great job!  You should consider seeing a rheumatologist for your hands.    YOUR GOAL:  Enjoy the rest the summer.      CF Nurse line:          Burton Anderson   [phone]            CF Resp Therapist: Jhoana Trinh            [phone]   CF Dietitians:           Amy Andino            [phone]                       Theresa Ceja                       [phone]   CF Diabetes Nurse: Rhonda Esteves             [phone]    CF Social Workers:  Deyanira Figueroa             [phone]                Rosette Burnett            [phone]  CF Pharmacist:        Gris Adame                              [phone]  [email or web address]         MRN: [id number]   Clinic Date: July 18, 2018   Patient: Mamie Rice     Annual Studies:   IGG   Date Value Ref Range Status   08/02/2017 689 (L) 695 - 1620 mg/dL Final     Insulin   Date Value Ref Range Status   03/30/2015 81 mU/L Final     Comment:     Reference Range:  0-20     There are no preventive care reminders to display for this patient.      Pulmonary Function Tests  FEV1: amount of air you can blow out in 1 second  FVC: total amount of air you can take in and blow out    Your Goals:         PFT Latest Ref Rng & Units 7/18/2018   FVC L 3.50   FEV1 L 3.09   FVC% % 98   FEV1% % 100          Airway Clearance: The Most Important Way to Keep Your Lungs Healthy  Vest Settings:    Hill-Rom Frequencies: 8, 9, 10 Pressure 10 Then, Frequencies 18, 19, 20 Pressure 6      RespirTech: Quick Start with Pressure of      Do each frequency for 5 minutes; Deflate vest after each frequency & cough 3 times before beginning the next setting.    Vest and Neb Therapy should be done 2 times/day.    Good Nutrition Can Improve Lung Function and Overall Health     Take ALL of your vitamins with food     Take 1/2 of your enzymes before EVERY meal/snack and the other 1/2 mid-meal/snack    Wt Readings from Last 3 Encounters:   07/18/18 77.1 kg (169 lb 15.6 oz)   07/06/18 75.3 kg (166 lb)   05/30/18 76.2 kg (168 lb)       Body mass index is 31.08 kg/(m^2).         National CF Foundation Recommendations for BMI in CF Adults: Women: at least 22 Men: at least 23        Controlling Blood Sugars Helps Prevent Lung Infections & Improves Nutrition  Test blood sugar:     In the morning before eating (goal is )     2 hours after a meal (goal is less than 150)     When pre-meal glucose is greater than 150 add correction     At bedtime (if less than 100 eat a snack with 15 grams of carbohydrates  Last A1C Results:   Hemoglobin A1C   Date Value Ref Range Status   08/02/2017 5.5 4.3 - 6.0 % Final         If diabetic, measure A1C every 6 months. Goal: Under 7%    Staying Healthy    Research:  If you are interested in learning about research opportunities or have questions, please contact the CF Research Team at [phone] or [email or web address].      CF Foundation:  Compass is a personalized resource service to help you with the insurance, financial, legal and other issues you are facing.  It's free, confidential and available to anyone with CF.  Ask your  for more information or contact Compass directly at 728-COMPASS ([phone]) or compass@cff.org, or learn more at cff.[email or web address].                             Follow-ups after your visit        Follow-up notes from your care team     Return in about 3 months (around 10/18/2018).      Your next 10 appointments already scheduled     Aug 03, 2018 10:00 AM CDT   (Arrive by 9:45 AM)    Return Visit with VANESA Flanagan [facility] ([facility] [place])    [address] [address]   [phone]            Aug 07, 2018 10:15 AM CDT   (Arrive by 10:00 AM)   Return Visit with Randa Ho MD   [facility] Management ([facility])    [address] [place] [id number]   [phone]            Aug 07, 2018 11:00 AM CDT   (Arrive by 10:45 AM)   NUTRITION VISIT with Johana Thomas RD   [facility] ([facility])    [address] [place] [id number]   [phone]            Sep 04, 2018 10:30 AM CDT   (Arrive by 10:15 AM)   Return Visit with VANESA Flanagan [facility] ([facility])    [address] [place] [address]   [phone]            Nov 06, 2018  7:45 AM CST   XR CHEST 2 VIEWS with XR1   [facility] Xray ([facility])    [address] [place] [id number] [phone]           Please bring a list of your current medicines to your exam. (Include vitamins, minerals and over-thecounter medicines.) Leave your valuables at home.  Tell your doctor if there is a chance you may be pregnant.  You do not need to do anything special for this exam.            Nov 06, 2018 11:00 AM CST   PFT VISIT with  PFL RJ   [facility] Pulmonary Function Testing ([facility])    [address] [place] [address]  [address] [facility] [id number] [phone]            Nov 06, 2018 11:20 AM CST   (Arrive by 11:05 AM)   RETURN CYSTIC FIBROSIS VISIT with Gavi Allison MD   [facility] ([facility])    [address] [place] [id number]    [phone]              Future tests that were ordered for you today     Open Future Orders        Priority Expected Expires Ordered    Nocardia culture Routine  11/19/2018 7/18/2018    Fungus Culture, non-blood Routine  11/19/2018 7/18/2018    AFB Stain Non Blood Routine  11/19/2018 7/18/2018    AFB Culture Non Blood Routine  11/19/2018 7/18/2018    Cystic Fibrosis Culture Aerob Bacterial Routine  11/19/2018 7/18/2018    X-ray Chest 2 vws* Routine 7/18/2018 11/19/2018 7/18/2018    ALT Routine  11/19/2018 7/18/2018    Basic metabolic panel Routine  11/19/2018 7/18/2018    Lipid Profile Routine  11/19/2018 7/18/2018    Albumin level Routine  11/19/2018 7/18/2018    Alkaline phosphatase Routine  11/19/2018 7/18/2018    AST Routine  11/19/2018 7/18/2018    Iron Routine  11/19/2018 7/18/2018    GGT Routine  11/19/2018 7/18/2018    Hemoglobin A1c Routine  11/19/2018 7/18/2018    IgA Routine  11/19/2018 7/18/2018    IgG Routine  11/19/2018 7/18/2018    IgM Routine  11/19/2018 7/18/2018    IgE Routine  11/19/2018 7/18/2018    INR Routine  11/19/2018 7/18/2018    Magnesium Routine  11/19/2018 7/18/2018    Phosphorus Routine  11/19/2018 7/18/2018    Protein total Routine  11/19/2018 7/18/2018    TSH with free T4 reflex Routine  11/19/2018 7/18/2018    Vitamin A Routine  11/19/2018 7/18/2018    Vitamin E Routine  11/19/2018 7/18/2018    Vitamin D Deficiency Routine  11/19/2018 7/18/2018    CBC with platelets differential Routine  11/19/2018 7/18/2018    Erythrocyte sedimentation rate auto Routine  11/19/2018 7/18/2018    Routine UA with microscopic Routine  11/19/2018 7/18/2018    Albumin Random Urine Quantitative with Creat Ratio Routine  11/19/2018 7/18/2018    Cystic Fibrosis Culture Aerob Bacterial Routine  7/18/2019 7/18/2018            Who to contact     If you have questions or need follow up information about today's clinic visit or your schedule please contact [facility] directly  "at [phone].  Normal or non-critical lab and imaging results will be communicated to you by Squabblerhart, letter or phone within 4 business days after the clinic has received the results. If you do not hear from us within 7 days, please contact the clinic through Squabblerhart or phone. If you have a critical or abnormal lab result, we will notify you by phone as soon as possible.  Submit refill requests through Sense Platform or call your pharmacy and they will forward the refill request to us. Please allow 3 business days for your refill to be completed.          Additional Information About Your Visit        Squabblerhart Information     Sense Platform gives you secure access to your electronic health record. If you see a primary care provider, you can also send messages to your care team and make appointments. If you have questions, please call your primary care clinic.  If you do not have a primary care provider, please call [phone] and they will assist you.        Care EveryWhere ID     This is your Care EveryWhere ID. This could be used by other organizations to access your [place] medical records  [id number]        Your Vitals Were     Pulse Respirations Height Pulse Oximetry BMI (Body Mass Index)       85 16 1.575 m (5' 2.01\") 98% 31.08 kg/m2        Blood Pressure from Last 3 Encounters:   07/18/18 118/81   05/30/18 120/77   05/01/18 110/71    Weight from Last 3 Encounters:   07/18/18 77.1 kg (169 lb 15.6 oz)   07/06/18 75.3 kg (166 lb)   05/30/18 76.2 kg (168 lb)              We Performed the Following     Cystic Fibrosis Culture Aerob Bacterial     RESPIRATORY FLOW VOLUME LOOP          Today's Medication Changes          These changes are accurate as of 7/18/18 11:23 AM.  If you have any questions, ask your nurse or doctor.               Stop taking these medicines if you haven't already. Please contact your care team if you have questions.     oseltamivir 75 MG capsule   Commonly known as:  TAMIFLU   Stopped by:  Keegan, " Gavi Muñiz MD                    Primary Care Provider Office Phone # Fax #    Malik De La Rosa [phone]       [address] [facility] [address]        Equal Access to Services     LUZMARIA MARTINEZ : Hadii aad ku haddelmasienna Soyesenia, waaxda luqadaha, qaybta kaalmada adeegyada, david smithmalcolmdeepika cardoso. So [facility] [phone].    ATENCIÓN: Si habla español, tiene a hidalgo disposición servicios gratuitos de asistencia lingüística. LlSumma [facility] Campus [phone].    We comply with applicable federal civil rights laws and [place] laws. We do not discriminate on the basis of race, color, national origin, age, disability, sex, sexual orientation, or gender identity.            Thank you!     Thank you for choosing [facility]  for your care. Our goal is always to provide you with excellent care. Hearing back from our patients is one way we can continue to improve our services. Please take a few minutes to complete the written survey that you may receive in the mail after your visit with us. Thank you!             Your Updated Medication List - Protect others around you: Learn how to safely use, store and throw away your medicines at www.disposemymeds.org.          This list is accurate as of 7/18/18 11:23 AM.  Always use your most recent med list.                   Brand Name Dispense Instructions for use Diagnosis    acetaminophen 325 MG tablet    TYLENOL    100 tablet    Take 2 tablets (650 mg) by mouth every 4 hours as needed for other (mild pain)    Chronic pain of left knee       acetylcysteine 20 % nebulizer solution    MUCOMYST    360 mL    INHALE ONE 4ML NEB INTO LUNGS VIA NEBULIZER TWO TIMES A DAY, MAY INCREASE TO 3-4 TIMES A DAY WITH INCREASE COUGH/COLD SYMPTOMS    CF (cystic fibrosis) (H)       * albuterol (2.5 MG/3ML) 0.083% neb solution     360 mL    Take 1 vial (2.5 mg) by nebulization 4 times daily    CF (cystic  fibrosis) (H)       * albuterol 108 (90 Base) MCG/ACT Inhaler    PROAIR HFA/PROVENTIL HFA/VENTOLIN HFA    1 Inhaler    Inhale 2 puffs into the lungs every 6 hours as needed for shortness of breath / dyspnea or wheezing    Cystic fibrosis with pulmonary manifestations (H), Sinusitis, chronic, Diabetes mellitus type 1 (H)       aluminum chloride 20 % external solution    DRYSOL    60 mL    Apply topically At Bedtime Everyother night to every 3rd night. With impovement, decrease to 1-2 times weekly. Irritation may occur.    Focal hyperhidrosis       * amphetamine-dextroamphetamine 20 MG per 24 hr capsule    ADDERALL XR    30 capsule    Take 1 capsule (20 mg) by mouth daily    Attention deficit hyperactivity disorder (ADHD), predominantly inattentive type       * amphetamine-dextroamphetamine 20 MG per 24 hr capsule    ADDERALL XR    30 capsule    Take 1 capsule (20 mg) by mouth daily    Attention deficit hyperactivity disorder (ADHD), predominantly inattentive type       * amphetamine-dextroamphetamine 20 MG per 24 hr capsule    ADDERALL XR    30 capsule    Take 1 capsule (20 mg) by mouth daily    Attention deficit hyperactivity disorder (ADHD), predominantly inattentive type       ascorbic acid 500 MG tablet    VITAMIN C    100 tablet    TAKE ONE TABLET BY MOUTH TWICE A DAY    Cystic fibrosis with pulmonary manifestations (H)       azithromycin 500 MG tablet    ZITHROMAX    36 tablet    TAKE ONE TABLET BY MOUTH ON MONDAY, WEDNESDAY AND FRIDAY    CF (cystic fibrosis) (H)       beta carotene 72148 UNIT capsule     36 capsule    TAKE ONE CAPSULE BY MOUTH ON MON, WED AND FRIDAY MORNING    Cystic fibrosis with pulmonary manifestations (H)       blood glucose monitoring test strip    ACCU-CHEK SMARTVIEW    1 Month    Use to test blood sugar 4 times daily or as directed.    Type I (juvenile type) diabetes mellitus without mention of complication, not stated as uncontrolled       buPROPion 150 MG 12 hr tablet    WELLBUTRIN SR     60 tablet    Take 1 tablet (150 mg) by mouth 2 times daily    Major depressive disorder, recurrent episode, mild (H)       busPIRone 15 MG tablet    BUSPAR    60 tablet    Take one half tablet daily in the am and afternoon for one week then one tablet twice daily    MECHE (generalized anxiety disorder)       cetirizine 10 MG tablet    zyrTEC    30 tablet    Take 1 tablet (10 mg) by mouth every evening    Allergic rhinitis due to [employer] house dust mite, Urticaria, chronic       cholecalciferol 1000 UNIT tablet    vitamin D3    100 tablet    TAKE 1 TABLET BY MOUTH EVERY DAY    CF (cystic fibrosis) (H), Exocrine pancreatic insufficiency       CLARAVIS 40 MG capsule   Generic drug:  ISOtretinoin      TK 2 PO D        DEPO-PROVERA IM           dornase alpha 1 MG/ML neb solution    PULMOZYME    75 mL    Inhale 2.5 mg into the lungs daily    Cystic fibrosis with pulmonary manifestations (H)       FLUoxetine 40 MG capsule    PROZAC    60 capsule    Take 2 capsules (80 mg) by mouth daily    Cystic fibrosis with pulmonary manifestations (H)       GLYCOPYRROLATE PO      Take 1 mg by mouth 2 times daily        hydrOXYzine 25 MG tablet    ATARAX     Take one tablet by mouth at bedtime for insomnia    Chronic pain of left knee       MEPHYTON 5 MG tablet   Generic drug:  phytonadione     4 tablet    TAKE ONE TABLET BY MOUTH ONCE A WEEK    Cystic fibrosis with pulmonary manifestations (H), Cystic fibrosis with pulmonary manifestations (H), Aspergillosis (H), Pancreatic insufficiency       * meropenem 500 MG vial    MERREM    60 each         * meropenem 500 MG vial    MERREM    1 each    Give x 1 today        * meropenem 500 MG vial    MERREM    60 each    500 mg by Nasal Instillation route every 8 hours    Chronic maxillary sinusitis, Cystic fibrosis of the lung (H)       methylcellulose (laxative) Powd    CITRUCEL    479 g    Start with 1 heaping tablespoon. Increase as needed, 1 heaping tablespoon at a time, up to 3 times per  day.    Other constipation       montelukast 10 MG tablet    SINGULAIR    30 tablet    TAKE 1 TABLET BY MOUTH DAILY AT BEDTIME    CF (cystic fibrosis) (H)       MULTIVITAMIN PO      Take 1 tablet by mouth daily.        mupirocin 2 % ointment    BACTROBAN     USE BID TO AFFECTED AREA ON THE LIPS        naltrexone 50 MG tablet    DEPADE;REVIA    90 tablet    Take 1 tablet.  Time it one to two hours prior to worst cravings.    Class 1 obesity due to excess calories without serious comorbidity with body mass index (BMI) of 30.0 to 30.9 in adult       omeprazole 20 MG CR capsule    priLOSEC    60 capsule    TAKE 1 CAPSULE BY MOUTH TWICE A DAY    CF (cystic fibrosis) (H)       polyethylene glycol powder    MIRALAX    119 g    Take 17 g (1 capful) by mouth daily as needed for constipation    Cystic fibrosis of the lung (H), Constipation, unspecified constipation type       sulfamethoxazole-trimethoprim 800-160 MG per tablet    BACTRIM DS/SEPTRA DS    20 tablet    Take 1 tablet by mouth 2 times daily    Cystic fibrosis with pulmonary manifestations (H)       traZODone 100 MG tablet    DESYREL    30 tablet    Take 1 tablet (100 mg) by mouth At Bedtime    Insomnia, unspecified type       vitamin E 400 UNIT capsule     60 capsule    TAKE 1 CAPSULE BY MOUTH TWICE A DAY    CF (cystic fibrosis) (H), Pancreatic insufficiency       * Notice:  This list has 8 medication(s) that are the same as other medications prescribed for you. Read the directions carefully, and ask your doctor or other care provider to review them with you.

## 2018-07-23 LAB
BACTERIA SPEC CULT: ABNORMAL
BACTERIA SPEC CULT: ABNORMAL
Lab: ABNORMAL
SPECIMEN SOURCE: ABNORMAL

## 2018-07-24 ASSESSMENT — ANXIETY QUESTIONNAIRES
3. WORRYING TOO MUCH ABOUT DIFFERENT THINGS: SEVERAL DAYS
2. NOT BEING ABLE TO STOP OR CONTROL WORRYING: SEVERAL DAYS
7. FEELING AFRAID AS IF SOMETHING AWFUL MIGHT HAPPEN: SEVERAL DAYS
1. FEELING NERVOUS, ANXIOUS, OR ON EDGE: SEVERAL DAYS
6. BECOMING EASILY ANNOYED OR IRRITABLE: MORE THAN HALF THE DAYS
5. BEING SO RESTLESS THAT IT IS HARD TO SIT STILL: MORE THAN HALF THE DAYS
GAD7 TOTAL SCORE: 9
IF YOU CHECKED OFF ANY PROBLEMS ON THIS QUESTIONNAIRE, HOW DIFFICULT HAVE THESE PROBLEMS MADE IT FOR YOU TO DO YOUR WORK, TAKE CARE OF THINGS AT HOME, OR GET ALONG WITH OTHER PEOPLE: NOT DIFFICULT AT ALL

## 2018-07-24 ASSESSMENT — PATIENT HEALTH QUESTIONNAIRE - PHQ9: 5. POOR APPETITE OR OVEREATING: SEVERAL DAYS

## 2018-07-24 NOTE — PROGRESS NOTES
"Adult Cystic Fibrosis Program  Annual Psychosocial Assessment    Presenting Information:  Mamie is a 22-year-old woman with cystic fibrosis, presenting in CF clinic for a regular follow up with primary CF provider, Dr. Allison.  Met with Mamie to complete an updated annual psychosocial assessment.     Living situation:  Mamie lives with her mother and stepfather in [place], [place].  Her 10 yr-old nephew lives with them 90% of the time, he is like a brother to Mamie.  Mamie denies any concerns about her living situation.      Family Constellation:  Mamie's parents  when she was [age] year old. She was subsequently raised by her mother, who later remarried. She had visitation with her father while growing up, and he currently lives about 20 miles away from her.  She does not have contact with her father now. Mamie has a 40-year-old half-brother through her father, whom she had previously only met twice but now communicates with through on Facebook.  She has a half-sister, age 30, who lives in the same neighborhood and whose son lives with Mamie's parents much of the time.  She also has 28-year-old christen who lives nearby.  Mamie does not know of any family members with CF.  She has never been  and does not have children.      Social Support:  Mamie reports good social support. She identifies her mother as her strongest source of support.  She draws additional support from co-workers and friends. Mamie is not currently in a significant relationship and reports that she is \"too busy\" right now for dating.      Adjustment to Illness:  Mamie was diagnosed with CF in infancy.  She reports that her mother instilled a strong foundation of doing routine CF care.  She had a consistent therapy routine, and they brought the vest machine on vacations.  She noted that her mother discussed therapy as \"part of life\" and the best way to reduce complications.  Mamie reports going through more " "complications during high school, during which she evidently had reactions to the air quality in her school building.  She often felt ill and missed school.  She recalls having a couple of PICC lines during that time.  She reports that her school accommodated her through letting her go home early and make up work through other avenues.  She feels her increased health complications caused her to lose some friends because some people didn't know how to handle her illness.      Mamie describes her current health status as \"good\", other than recently being treated for a sinus infection.  Clinically, she has mild lung disease, CF sinus disease and pancreatic insufficiency.  She also follows regularly with Dermatology, Weight Management Clinic, ENT and recently established care with the Psychiatry clinic on the [facility].  She typically does 2 vest treatments per day.  Mamie attends the weight management clinic. She denies any health problems that interfere with activities of daily living.  She does struggle with exercise but has been trying to do more walking recently.  Mamie describes herself as very open about having CF and feels she manages her health well.     Health Care Directive:  Mamie has previously received Health Care Directive education. Mamie has stated in the past she does not want her father to act as a health care agent for her, since they do not have a close relationship. Reviewed today and Mamie stated he father would not become involved, so she is not concerned about it at this time. Encouraged her to complete a health care directive to ensure her father is not involved, but she declined.     Education:  Mamie completed high school but missed a lot of school due to health issues. She tried a semester of college classes, but had difficulty keeping up, which she feels was partially due to untreated ADHD (see below).  At this point, she has no specific plans for further education.   " "    Employment:  Mamie is currently working two jobs. 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 She has indicated in the past that she would like to find a different job from the  as it is physically tiring and she is exposed to more illness, however she is not sure what else she would do.  She does enjoy teaching dance and denies any employment concerns.      Finances:  Mamie receives income from wages and parental support. She denies having any specific financial concerns.      Insurance:  Mamie is insured through her mother's employer ([employer]) and Medical assistance.  She denies any insurance concerns.     Mental Health:  Mamie has been diagnosed with depression, anxiety and ADHD.  Mamie has described feelings of depression, starting in 9th grade.  She initially had difficulty expressing her feelings, but got help about a year later, receiving both counseling and medication.  Mamie struggled with symptoms of ADHD for many years and notes a strong family history of this disorder.  She \"got by\" in younger years due to having a lot of support in school but was formally diagnosed ADHD and treated with Adderall in 2015 after doing poorly in college classes.   She has continued to receive consistent psychiatric care.  Her psychotropic medications used to be prescribed through her PCP but her PCP left the clinic and she has recently established care with [facility] at [facility].  Her current psychotropic medications are: Adderall, Vistaril, Prozac, Trazodone and Buspar was recently added. She is not currently seeing a therapist.  She describes her recent mood as \"good\" and she feels she has been stable from a mental health standpoint.    Mamie completed the following screens:  MECHE-7 Score: 9, indicating mild symptoms of anxiety and described as not difficult at all in daily functioning.    PHQ-9 " Score: 10, indicating moderate symptoms of depression and described as not difficult at all in daily functioning.      Mamie agreed with the scores above. She denied any additional symptoms not addressed on this screen. She denies any suicidal ideation.  She reports symptoms including having trouble sleeping and sitting still, worry, irritability and feeling tired.  She feels that these symptoms are manageable and her baseline functioning, she feels that her busy schedule impacts these symptoms but at the same time, she enjoys being busy.      Chemical Health:  Mamie denies use of alcohol, tobacco or illicit drugs.    Leisure Activities/Interests:  Mamie enjoys spending time with family and friends, going out to eat and sleeping.    Intervention:  -Psychosocial Assessment  -Mental Health Screening   -Supportive counseling     Assessment:  Mamie was open in her responses and appeared to be in good spirits.  She continues to work 2 jobs and is very busy which she feels is good for her.  She does have a lot of medical appointments due to seeing multiple specialty providers and reports adherence and manageable work/life/disease balance.  She recently established care at [facility] for management of her psychiatric medications and feels this is going well.  No additional concerns expressed/noted. Recommend f/u as noted in the plan below.     Plan:  -Continue to follow through intermittent clinic consult to assess mood and coping.   -Complete psychosocial assessment annually.    TAY Juarez, [facility], [place]  Adult Cystic Fibrosis   Ph: [phone]  Pager: [phone]

## 2018-07-25 ASSESSMENT — PATIENT HEALTH QUESTIONNAIRE - PHQ9: SUM OF ALL RESPONSES TO PHQ QUESTIONS 1-9: 10

## 2018-07-25 ASSESSMENT — ANXIETY QUESTIONNAIRES: GAD7 TOTAL SCORE: 9

## 2018-08-07 ENCOUNTER — ALLIED HEALTH/NURSE VISIT (OUTPATIENT)
Dept: SURGERY | Facility: CLINIC | Age: 23
End: 2018-08-07
Payer: COMMERCIAL

## 2018-08-07 ENCOUNTER — TELEPHONE (OUTPATIENT)
Dept: ENDOCRINOLOGY | Facility: CLINIC | Age: 23
End: 2018-08-07

## 2018-08-07 ENCOUNTER — OFFICE VISIT (OUTPATIENT)
Dept: OTOLARYNGOLOGY | Facility: CLINIC | Age: 23
End: 2018-08-07
Payer: COMMERCIAL

## 2018-08-07 ENCOUNTER — OFFICE VISIT (OUTPATIENT)
Dept: ENDOCRINOLOGY | Facility: CLINIC | Age: 23
End: 2018-08-07
Payer: COMMERCIAL

## 2018-08-07 VITALS — BODY MASS INDEX: 30.12 KG/M2 | HEIGHT: 63 IN | WEIGHT: 170 LBS

## 2018-08-07 VITALS
HEIGHT: 63 IN | WEIGHT: 171.6 LBS | BODY MASS INDEX: 30.41 KG/M2 | SYSTOLIC BLOOD PRESSURE: 113 MMHG | HEART RATE: 90 BPM | OXYGEN SATURATION: 99 % | DIASTOLIC BLOOD PRESSURE: 68 MMHG

## 2018-08-07 DIAGNOSIS — E66.09 CLASS 1 OBESITY DUE TO EXCESS CALORIES WITHOUT SERIOUS COMORBIDITY WITH BODY MASS INDEX (BMI) OF 30.0 TO 30.9 IN ADULT: ICD-10-CM

## 2018-08-07 DIAGNOSIS — J32.0 CHRONIC MAXILLARY SINUSITIS: Primary | ICD-10-CM

## 2018-08-07 DIAGNOSIS — E66.811 CLASS 1 OBESITY DUE TO EXCESS CALORIES WITHOUT SERIOUS COMORBIDITY WITH BODY MASS INDEX (BMI) OF 30.0 TO 30.9 IN ADULT: ICD-10-CM

## 2018-08-07 RX ORDER — MEROPENEM 500 MG/1
500 INJECTION, POWDER, FOR SOLUTION INTRAVENOUS ONCE
Qty: 60 EACH | COMMUNITY
Start: 2018-08-07 | End: 2021-01-20

## 2018-08-07 RX ORDER — NALTREXONE HYDROCHLORIDE 50 MG/1
TABLET, FILM COATED ORAL
Qty: 90 TABLET | Refills: 2 | Status: SHIPPED | OUTPATIENT
Start: 2018-08-07 | End: 2018-11-13

## 2018-08-07 ASSESSMENT — ENCOUNTER SYMPTOMS
RECTAL PAIN: 0
TREMORS: 0
RESPIRATORY PAIN: 0
RECTAL BLEEDING: 0
BREAST PAIN: 0
HOT FLASHES: 0
EYE WATERING: 0
SEIZURES: 0
BREAST MASS: 0
HOARSE VOICE: 0
LOSS OF CONSCIOUSNESS: 0
HYPERTENSION: 0
POOR WOUND HEALING: 0
NAIL CHANGES: 0
SWOLLEN GLANDS: 0
ARTHRALGIAS: 1
WEAKNESS: 0
PARALYSIS: 0
SKIN CHANGES: 0
JOINT SWELLING: 1
LEG PAIN: 0
POSTURAL DYSPNEA: 0
BRUISES/BLEEDS EASILY: 0
MUSCLE WEAKNESS: 1
POLYDIPSIA: 1
COUGH DISTURBING SLEEP: 0
STIFFNESS: 1
DECREASED CONCENTRATION: 0
SINUS PAIN: 0
ORTHOPNEA: 0
DOUBLE VISION: 0
EYE REDNESS: 0
DECREASED LIBIDO: 0
SPUTUM PRODUCTION: 0
WEIGHT LOSS: 0
MEMORY LOSS: 0
LIGHT-HEADEDNESS: 0
NUMBNESS: 0
TACHYCARDIA: 0
LEG SWELLING: 0
DYSURIA: 0
HALLUCINATIONS: 0
ALTERED TEMPERATURE REGULATION: 1
PALPITATIONS: 0
POLYPHAGIA: 0
MYALGIAS: 1
DECREASED APPETITE: 0
HYPOTENSION: 0
NECK MASS: 0
SMELL DISTURBANCE: 0
INSOMNIA: 0
SNORES LOUDLY: 0
TROUBLE SWALLOWING: 0
EYE IRRITATION: 0
FATIGUE: 0
HEMATURIA: 0
WHEEZING: 0
FLANK PAIN: 0
TINGLING: 0
VOMITING: 0
MUSCLE CRAMPS: 0
HEARTBURN: 0
BACK PAIN: 0
WEIGHT GAIN: 0
INCREASED ENERGY: 1
SLEEP DISTURBANCES DUE TO BREATHING: 0
PANIC: 0
CONSTIPATION: 0
FEVER: 0
COUGH: 0
DIFFICULTY URINATING: 0
NERVOUS/ANXIOUS: 0
SYNCOPE: 0
BLOOD IN STOOL: 0
NAUSEA: 0
DISTURBANCES IN COORDINATION: 0
EXTREMITY NUMBNESS: 0
EYE PAIN: 0
HEADACHES: 0
SHORTNESS OF BREATH: 0
NECK PAIN: 0
DYSPNEA ON EXERTION: 0
HEMOPTYSIS: 0
DIZZINESS: 0
SPEECH CHANGE: 0
CHILLS: 0
ABDOMINAL PAIN: 0
SINUS CONGESTION: 0
DEPRESSION: 0
SORE THROAT: 0
DIARRHEA: 0
CLAUDICATION: 0
NIGHT SWEATS: 1
BLOATING: 0
TASTE DISTURBANCE: 0
EXERCISE INTOLERANCE: 0
JAUNDICE: 0
BOWEL INCONTINENCE: 0

## 2018-08-07 ASSESSMENT — PAIN SCALES - GENERAL
PAINLEVEL: NO PAIN (0)
PAINLEVEL: NO PAIN (0)

## 2018-08-07 NOTE — PROGRESS NOTES
"    Return Medical Weight Management Note     Mamie Rice  MRN:  [id number]  :  1995  STEPHEN:  2018    Dear Dr. De La Rosa,     I had the pleasure of seeing your patient Mamie Rice.  She is a 22 year old female who I am continuing to see for treatment of obesity. She has CF, CFRD, depression, joints pain, ADHD    INTERVAL History  Last seen by me on 2018. She is currently on naltrexone 50 mg daily for the past 9 months. She has already taken bupropion 150 mg bid. She said that it still helps with cutting down appetite and craving. She said that she ate worse on her day off and ate better on her work-day. She is trying to walk a dog daily and be active at work.    She is also on Adderall for ADHD and Bupropion for depression.  She works as a , nanny and dance coach.    Diet recall:  BF: cereal or toast  Lunch: sandwich or cereal or salad  Supper: meat, vegetable (corn, pea, carrot)  Snack: fruit snack.    CURRENT WEIGHT:   171 lbs 9.6 oz    Wt Readings from Last 4 Encounters:   18 77.8 kg (171 lb 9.6 oz)   18 77.1 kg (170 lb)   18 77.1 kg (169 lb 15.6 oz)   18 75.3 kg (166 lb)     Height:  5' 2.6\"  Body Mass Index:  Body mass index is 30.79 kg/(m^2).  Vitals:  B/P: 124/81, P: 100    Initial consult weight was 182 on 10/14/2016.  Weight change since last seen on 2017 is down 6 pounds.   Total loss is 15 pounds.      Review of Systems     Constitutional:  Positive for night sweats and increased energy. Negative for fever, chills, weight loss, weight gain, fatigue, decreased appetite, recent stressors, height loss, post-operative complications, incisional pain, hallucinations, hyperactivity and confused.   HENT:  Negative for ear pain, hearing loss, tinnitus, nosebleeds, trouble swallowing, hoarse voice, mouth sores, sore throat, ear discharge, tooth pain, gum tenderness, taste disturbance, smell disturbance, hearing aid, bleeding gums, dry mouth, sinus pain, " sinus congestion and neck mass.    Eyes:  Negative for double vision, pain, redness, eye pain, decreased vision, eye watering, eye bulging, eye dryness, flashing lights, spots, floaters, strabismus, tunnel vision, jaundice and eye irritation.   Respiratory:   Negative for cough, hemoptysis, sputum production, shortness of breath, wheezing, sleep disturbances due to breathing, snores loudly, respiratory pain, dyspnea on exertion, cough disturbing sleep and postural dyspnea.    Cardiovascular:  Negative for chest pain, dyspnea on exertion, palpitations, orthopnea, claudication, leg swelling, fingers/toes turn blue, hypertension, hypotension, syncope, history of heart murmur, chest pain on exertion, chest pain at rest, pacemaker, few scattered varicosities, leg pain, sleep disturbances due to breathing, tachycardia, light-headedness, exercise intolerance and edema.   Gastrointestinal:  Negative for heartburn, nausea, vomiting, abdominal pain, diarrhea, constipation, blood in stool, melena, rectal pain, bloating, hemorrhoids, bowel incontinence, jaundice, rectal bleeding, coffee ground emesis and change in stool.   Genitourinary:  Negative for bladder incontinence, dysuria, urgency, hematuria, flank pain, vaginal discharge, difficulty urinating, genital sores, dyspareunia, decreased libido, nocturia, voiding less frequently, arousal difficulty, abnormal vaginal bleeding, excessive menstruation, menstrual changes, hot flashes, vaginal dryness and postmenopausal bleeding.   Musculoskeletal:  Positive for myalgias, joint swelling, arthralgias, stiffness and muscle weakness. Negative for back pain, muscle cramps, neck pain, bone pain and fracture.   Skin:  Negative for nail changes, itching, poor wound healing, rash, hair changes, skin changes, acne, warts, poor wound healing, scarring, flaky skin, Raynaud's phenomenon, sensitivity to sunlight and skin thickening.   Neurological:  Negative for dizziness, tingling, tremors,  speech change, seizures, loss of consciousness, weakness, light-headedness, numbness, headaches, disturbances in coordination, extremity numbness, memory loss, difficulty walking and paralysis.   Endo/Heme:  Negative for anemia, swollen glands and bruises/bleeds easily.   Psychiatric/Behavioral:  Negative for depression, hallucinations, memory loss, decreased concentration, mood swings and panic attacks.    Breast:  Negative for breast discharge, breast mass, breast pain and nipple retraction.   Endocrine:  Positive for altered temperature regulation and polydipsia.Negative for polyphagia, unwanted hair growth and change in facial hair.      MEDICATIONS:   Current Outpatient Prescriptions   Medication Sig Dispense Refill     acetaminophen (TYLENOL) 325 MG tablet Take 2 tablets (650 mg) by mouth every 4 hours as needed for other (mild pain) 100 tablet 0     acetylcysteine (MUCOMYST) 20 % nebulizer solution INHALE ONE 4ML NEB INTO LUNGS VIA NEBULIZER TWO TIMES A DAY, MAY INCREASE TO 3-4 TIMES A DAY WITH INCREASE COUGH/COLD SYMPTOMS 360 mL 11     albuterol (2.5 MG/3ML) 0.083% neb solution Take 1 vial (2.5 mg) by nebulization 4 times daily 360 mL 11     albuterol (PROAIR HFA/PROVENTIL HFA/VENTOLIN HFA) 108 (90 Base) MCG/ACT Inhaler Inhale 2 puffs into the lungs every 6 hours as needed for shortness of breath / dyspnea or wheezing 1 Inhaler 12     aluminum chloride (DRYSOL) 20 % external solution Apply topically At Bedtime Everyother night to every 3rd night. With impovement, decrease to 1-2 times weekly. Irritation may occur. 60 mL 3     amphetamine-dextroamphetamine (ADDERALL XR) 20 MG per 24 hr capsule Take 1 capsule (20 mg) by mouth daily 30 capsule 0     amphetamine-dextroamphetamine (ADDERALL XR) 20 MG per 24 hr capsule Take 1 capsule (20 mg) by mouth daily 30 capsule 0     amphetamine-dextroamphetamine (ADDERALL XR) 20 MG per 24 hr capsule Take 1 capsule (20 mg) by mouth daily 30 capsule 0     ascorbic acid  (VITAMIN C) 500 MG tablet TAKE ONE TABLET BY MOUTH TWICE A  tablet 3     azithromycin (ZITHROMAX) 500 MG tablet TAKE ONE TABLET BY MOUTH ON MONDAY, WEDNESDAY AND FRIDAY 36 tablet 4     beta carotene 10767 UNIT capsule TAKE ONE CAPSULE BY MOUTH ON MON, WED AND FRIDAY MORNING 36 capsule 4     blood glucose (ACCU-CHEK SMARTVIEW) test strip Use to test blood sugar 4 times daily or as directed. 1 Month 12     buPROPion (WELLBUTRIN SR) 150 MG 12 hr tablet Take 1 tablet (150 mg) by mouth 2 times daily 60 tablet 5     busPIRone (BUSPAR) 15 MG tablet Take one half tablet daily in the am and afternoon for one week then one tablet twice daily 60 tablet 2     cetirizine (ZYRTEC) 10 MG tablet Take 1 tablet (10 mg) by mouth every evening 30 tablet 3     CLARAVIS 40 MG capsule TK 2 PO D  2     dornase alpha (PULMOZYME) 1 MG/ML neb solution Inhale 2.5 mg into the lungs daily 75 mL 3     FLUoxetine (PROZAC) 40 MG capsule Take 2 capsules (80 mg) by mouth daily 60 capsule 5     GLYCOPYRROLATE PO Take 1 mg by mouth 2 times daily        hydrOXYzine (ATARAX) 25 MG tablet Take one tablet by mouth at bedtime for insomnia       MedroxyPROGESTERone Acetate (DEPO-PROVERA IM)        MEPHYTON 5 MG tablet TAKE ONE TABLET BY MOUTH ONCE A WEEK 4 tablet 11     meropenem (MERREM) 500 MG vial 500 mg by Nasal Instillation route once for 1 dose 60 each      meropenem (MERREM) 500 MG vial 500 mg by Nasal Instillation route every 8 hours 60 each      meropenem (MERREM) 500 MG vial Give x 1 today 1 each      meropenem (MERREM) 500 MG vial  60 each      methylcellulose, laxative, (CITRUCEL) POWD Start with 1 heaping tablespoon. Increase as needed, 1 heaping tablespoon at a time, up to 3 times per day. 479 g 3     montelukast (SINGULAIR) 10 MG tablet TAKE 1 TABLET BY MOUTH DAILY AT BEDTIME 30 tablet 11     Multiple Vitamin (MULTIVITAMIN OR) Take 1 tablet by mouth daily.       mupirocin (BACTROBAN) 2 % ointment USE BID TO AFFECTED AREA ON THE LIPS  3      naltrexone (DEPADE;REVIA) 50 MG tablet Take 1 tablet.  Time it one to two hours prior to worst cravings. 90 tablet 2     omeprazole (PRILOSEC) 20 MG CR capsule TAKE 1 CAPSULE BY MOUTH TWICE A DAY 60 capsule 11     polyethylene glycol (MIRALAX) powder Take 17 g (1 capful) by mouth daily as needed for constipation 119 g 3     traZODone (DESYREL) 100 MG tablet Take 1 tablet (100 mg) by mouth At Bedtime 30 tablet 5     VITAMIN D3 1000 UNITS tablet TAKE 1 TABLET BY MOUTH EVERY  tablet 3     vitamin E 400 UNIT capsule TAKE 1 CAPSULE BY MOUTH TWICE A DAY 60 capsule 11       Weight Loss Medication History Reviewed With Patient 8/7/2018   Which weight loss medications are you currently taking on a regular basis?  Naltrexone   If you are not taking a weight loss medication that was prescribed to you, please indicate why: -   Are you having any side effects from the weight loss medication that we have prescribed you? No     ASSESSMENT:    Mamie Rice is a 22 year old female who I am continuing to see for treatment of obesity.  She has CF, CFRD, depression, joints pain, ADHD    Currently on naltrexone and bupropion -- work well with less snacking    PLAN:  - continue naltrexone 50 mg daily -- recommend to take it in the evening when she tends to snack  - discussed minimizing diet soda -- use sparkling water instead  - continue liquid diet -- protein shake for breakfast  - reinforced food plan - focus on no between meal snacking, aggressive lowering of starches and cheese, increase protein and vegetable  - recipe website given today -- [employer]    FOLLOW-UP:    RTC 3-4 months with me    I spent a total of 15 minutes face-to-face with Mamie Rice during today's office visit. Over 50% of this time was spent counseling the patient and/or coordinating care regarding    Sincerely,    Randa Ho MD

## 2018-08-07 NOTE — PATIENT INSTRUCTIONS
Please take a look at this website for resources and recipes [email or web address]    Try to minimize diet soda -- choose sparkling water instead  Try naltrexone in the evening instead  Go for walk more often    See me in 3 months    If you have any questions, please do not hesitate to call Weight management clinic at [phone] or [phone].    Sincerely,    Randa Ho MD  Endocrinology

## 2018-08-07 NOTE — PATIENT INSTRUCTIONS
Nutrition Goals  1) May replace one meal per day with 16 oz of skim milk or protein shake  *Protein Shake Criteria: no more than 210 Calories, at least 20 grams of protein, and less than 10 grams of sugar     Meal Replacement Shake Options:   [facility] smoothie (160 Calories, 20 g protein)   Premier Protein (160 Calories, 30 g protein)  Slim Fast Advanced Nutrition (180 Calories, 20 g protein)  Muscle Milk, lactose-free, 17 oz bottle (210 Calories, 30 g protein)  Integrated Supplements, no artificial sugars (110 Calories, 20 g protein)  Quest Protein Bars (190 Calories, 20 g protein)  No Cow Protein Bar, gluten, dairy, and soy free (200 Calories, 20 g protein)    2) use snacks off 100 calorie snack list, limit to once per day  3) Weight loss    Follow up with RD in three months    Johana Thomas RD, LD  If you need to schedule or reschedule with a dietitian please call [phone] or [phone].

## 2018-08-07 NOTE — TELEPHONE ENCOUNTER
Have you had Bariatric surgery in the past?  Not Applicable    Reason for call:  Medication   If this is a refill request, has the caller requested the refill from the pharmacy already? N/A  Will the patient be using a [facility]? No  Name of the pharmacy and phone number for the current request: [facility] [phone]    Name of the medication requested: Naltrexone    Other request: Needs to know the frequency or maximum dosage per day for the instructions    Phone number to reach patient:  Other phone number: [phone]    Best Time:  Anytime    Can we leave a detailed message on this number?  Not Applicable

## 2018-08-07 NOTE — LETTER
"2018       RE: Mamie Rice  [address] [facility] [address]     Dear Colleague,    Thank you for referring your patient, Mamie Rice, to the [facility] MEDICAL WEIGHT MANAGEMENT at [facility]. Please see a copy of my visit note below.        Return Medical Weight Management Note     Mamie Rice  MRN:  [id number]  :  1995  STEPHEN:  2018    Dear Dr. De La Rosa,     I had the pleasure of seeing your patient Mamie Rice.  She is a 22 year old female who I am continuing to see for treatment of obesity. She has CF, CFRD, depression, joints pain, ADHD    INTERVAL History  Last seen by me on 2018. She is currently on naltrexone 50 mg daily for the past 9 months. She has already taken bupropion 150 mg bid. She said that it still helps with cutting down appetite and craving. She said that she ate worse on her day off and ate better on her work-day. She is trying to walk a dog daily and be active at work.    She is also on Adderall for ADHD and Bupropion for depression.  She works as a , nanny and dance coach.    Diet recall:  BF: cereal or toast  Lunch: sandwich or cereal or salad  Supper: meat, vegetable (corn, pea, carrot)  Snack: fruit snack.    CURRENT WEIGHT:   171 lbs 9.6 oz    Wt Readings from Last 4 Encounters:   18 77.8 kg (171 lb 9.6 oz)   18 77.1 kg (170 lb)   18 77.1 kg (169 lb 15.6 oz)   18 75.3 kg (166 lb)     Height:  5' 2.6\"  Body Mass Index:  Body mass index is 30.79 kg/(m^2).  Vitals:  B/P: 124/81, P: 100    Initial consult weight was 182 on 10/14/2016.  Weight change since last seen on 2017 is down 6 pounds.   Total loss is 15 pounds.      Review of Systems     Constitutional:  Positive for night sweats and increased energy. Negative for fever, chills, weight loss, weight gain, fatigue, decreased appetite, recent stressors, height loss, post-operative complications, incisional pain, " hallucinations, hyperactivity and confused.   HENT:  Negative for ear pain, hearing loss, tinnitus, nosebleeds, trouble swallowing, hoarse voice, mouth sores, sore throat, ear discharge, tooth pain, gum tenderness, taste disturbance, smell disturbance, hearing aid, bleeding gums, dry mouth, sinus pain, sinus congestion and neck mass.    Eyes:  Negative for double vision, pain, redness, eye pain, decreased vision, eye watering, eye bulging, eye dryness, flashing lights, spots, floaters, strabismus, tunnel vision, jaundice and eye irritation.   Respiratory:   Negative for cough, hemoptysis, sputum production, shortness of breath, wheezing, sleep disturbances due to breathing, snores loudly, respiratory pain, dyspnea on exertion, cough disturbing sleep and postural dyspnea.    Cardiovascular:  Negative for chest pain, dyspnea on exertion, palpitations, orthopnea, claudication, leg swelling, fingers/toes turn blue, hypertension, hypotension, syncope, history of heart murmur, chest pain on exertion, chest pain at rest, pacemaker, few scattered varicosities, leg pain, sleep disturbances due to breathing, tachycardia, light-headedness, exercise intolerance and edema.   Gastrointestinal:  Negative for heartburn, nausea, vomiting, abdominal pain, diarrhea, constipation, blood in stool, melena, rectal pain, bloating, hemorrhoids, bowel incontinence, jaundice, rectal bleeding, coffee ground emesis and change in stool.   Genitourinary:  Negative for bladder incontinence, dysuria, urgency, hematuria, flank pain, vaginal discharge, difficulty urinating, genital sores, dyspareunia, decreased libido, nocturia, voiding less frequently, arousal difficulty, abnormal vaginal bleeding, excessive menstruation, menstrual changes, hot flashes, vaginal dryness and postmenopausal bleeding.   Musculoskeletal:  Positive for myalgias, joint swelling, arthralgias, stiffness and muscle weakness. Negative for back pain, muscle cramps, neck pain,  bone pain and fracture.   Skin:  Negative for nail changes, itching, poor wound healing, rash, hair changes, skin changes, acne, warts, poor wound healing, scarring, flaky skin, Raynaud's phenomenon, sensitivity to sunlight and skin thickening.   Neurological:  Negative for dizziness, tingling, tremors, speech change, seizures, loss of consciousness, weakness, light-headedness, numbness, headaches, disturbances in coordination, extremity numbness, memory loss, difficulty walking and paralysis.   Endo/Heme:  Negative for anemia, swollen glands and bruises/bleeds easily.   Psychiatric/Behavioral:  Negative for depression, hallucinations, memory loss, decreased concentration, mood swings and panic attacks.    Breast:  Negative for breast discharge, breast mass, breast pain and nipple retraction.   Endocrine:  Positive for altered temperature regulation and polydipsia.Negative for polyphagia, unwanted hair growth and change in facial hair.      MEDICATIONS:   Current Outpatient Prescriptions   Medication Sig Dispense Refill     acetaminophen (TYLENOL) 325 MG tablet Take 2 tablets (650 mg) by mouth every 4 hours as needed for other (mild pain) 100 tablet 0     acetylcysteine (MUCOMYST) 20 % nebulizer solution INHALE ONE 4ML NEB INTO LUNGS VIA NEBULIZER TWO TIMES A DAY, MAY INCREASE TO 3-4 TIMES A DAY WITH INCREASE COUGH/COLD SYMPTOMS 360 mL 11     albuterol (2.5 MG/3ML) 0.083% neb solution Take 1 vial (2.5 mg) by nebulization 4 times daily 360 mL 11     albuterol (PROAIR HFA/PROVENTIL HFA/VENTOLIN HFA) 108 (90 Base) MCG/ACT Inhaler Inhale 2 puffs into the lungs every 6 hours as needed for shortness of breath / dyspnea or wheezing 1 Inhaler 12     aluminum chloride (DRYSOL) 20 % external solution Apply topically At Bedtime Everyother night to every 3rd night. With impovement, decrease to 1-2 times weekly. Irritation may occur. 60 mL 3     amphetamine-dextroamphetamine (ADDERALL XR) 20 MG per 24 hr capsule Take 1 capsule  (20 mg) by mouth daily 30 capsule 0     amphetamine-dextroamphetamine (ADDERALL XR) 20 MG per 24 hr capsule Take 1 capsule (20 mg) by mouth daily 30 capsule 0     amphetamine-dextroamphetamine (ADDERALL XR) 20 MG per 24 hr capsule Take 1 capsule (20 mg) by mouth daily 30 capsule 0     ascorbic acid (VITAMIN C) 500 MG tablet TAKE ONE TABLET BY MOUTH TWICE A  tablet 3     azithromycin (ZITHROMAX) 500 MG tablet TAKE ONE TABLET BY MOUTH ON MONDAY, WEDNESDAY AND FRIDAY 36 tablet 4     beta carotene 95387 UNIT capsule TAKE ONE CAPSULE BY MOUTH ON MON, WED AND FRIDAY MORNING 36 capsule 4     blood glucose (ACCU-CHEK SMARTVIEW) test strip Use to test blood sugar 4 times daily or as directed. 1 Month 12     buPROPion (WELLBUTRIN SR) 150 MG 12 hr tablet Take 1 tablet (150 mg) by mouth 2 times daily 60 tablet 5     busPIRone (BUSPAR) 15 MG tablet Take one half tablet daily in the am and afternoon for one week then one tablet twice daily 60 tablet 2     cetirizine (ZYRTEC) 10 MG tablet Take 1 tablet (10 mg) by mouth every evening 30 tablet 3     CLARAVIS 40 MG capsule TK 2 PO D  2     dornase alpha (PULMOZYME) 1 MG/ML neb solution Inhale 2.5 mg into the lungs daily 75 mL 3     FLUoxetine (PROZAC) 40 MG capsule Take 2 capsules (80 mg) by mouth daily 60 capsule 5     GLYCOPYRROLATE PO Take 1 mg by mouth 2 times daily        hydrOXYzine (ATARAX) 25 MG tablet Take one tablet by mouth at bedtime for insomnia       MedroxyPROGESTERone Acetate (DEPO-PROVERA IM)        MEPHYTON 5 MG tablet TAKE ONE TABLET BY MOUTH ONCE A WEEK 4 tablet 11     meropenem (MERREM) 500 MG vial 500 mg by Nasal Instillation route once for 1 dose 60 each      meropenem (MERREM) 500 MG vial 500 mg by Nasal Instillation route every 8 hours 60 each      meropenem (MERREM) 500 MG vial Give x 1 today 1 each      meropenem (MERREM) 500 MG vial  60 each      methylcellulose, laxative, (CITRUCEL) POWD Start with 1 heaping tablespoon. Increase as needed, 1  heaping tablespoon at a time, up to 3 times per day. 479 g 3     montelukast (SINGULAIR) 10 MG tablet TAKE 1 TABLET BY MOUTH DAILY AT BEDTIME 30 tablet 11     Multiple Vitamin (MULTIVITAMIN OR) Take 1 tablet by mouth daily.       mupirocin (BACTROBAN) 2 % ointment USE BID TO AFFECTED AREA ON THE LIPS  3     naltrexone (DEPADE;REVIA) 50 MG tablet Take 1 tablet.  Time it one to two hours prior to worst cravings. 90 tablet 2     omeprazole (PRILOSEC) 20 MG CR capsule TAKE 1 CAPSULE BY MOUTH TWICE A DAY 60 capsule 11     polyethylene glycol (MIRALAX) powder Take 17 g (1 capful) by mouth daily as needed for constipation 119 g 3     traZODone (DESYREL) 100 MG tablet Take 1 tablet (100 mg) by mouth At Bedtime 30 tablet 5     VITAMIN D3 1000 UNITS tablet TAKE 1 TABLET BY MOUTH EVERY  tablet 3     vitamin E 400 UNIT capsule TAKE 1 CAPSULE BY MOUTH TWICE A DAY 60 capsule 11       Weight Loss Medication History Reviewed With Patient 8/7/2018   Which weight loss medications are you currently taking on a regular basis?  Naltrexone   If you are not taking a weight loss medication that was prescribed to you, please indicate why: -   Are you having any side effects from the weight loss medication that we have prescribed you? No     ASSESSMENT:    Mamie Rice is a 22 year old female who I am continuing to see for treatment of obesity.  She has CF, CFRD, depression, joints pain, ADHD    Currently on naltrexone and bupropion -- work well with less snacking    PLAN:  - continue naltrexone 50 mg daily -- recommend to take it in the evening when she tends to snack  - discussed minimizing diet soda -- use sparkling water instead  - continue liquid diet -- protein shake for breakfast  - reinforced food plan - focus on no between meal snacking, aggressive lowering of starches and cheese, increase protein and vegetable  - recipe website given today -- [employer]    FOLLOW-UP:    RTC 3-4 months with me    I spent a total of 15  minutes face-to-face with Mamie Rice during today's office visit. Over 50% of this time was spent counseling the patient and/or coordinating care regarding    Sincerely,    Randa Ho MD

## 2018-08-07 NOTE — LETTER
"8/7/2018       RE: Mamie Rice  [address] [facility] [address]     Dear Colleague,    Thank you for referring your patient, Mamie Rice, to the [facility] EAR NOSE AND THROAT at [facility]. Please see a copy of my visit note below.    HISTORY OF PRESENT ILLNESS:  Mamie Rice is a 22 year old female with a history of cystic fibrosis who present today for routine instillation of Meropenem into the maxillary sinuses. She has no new symptoms.    PHYSICAL EXAMINATION:    Ht 1.59 m (5' 2.6\")  Wt 77.1 kg (170 lb)  BMI 30.5 kg/m2  Constitutional:  The patient was unaccompanied, well-groomed, and in no acute distress.     Nose: Rigid scope used for visualization to instill 2 ml of Meropenem reconstitution into each maxillary sinus cavity. No adverse events during procedure. Patient tolerated it well. No crusts noted in either nare today. Polyp in left nare superior to scare band present.      PROCEDURE:  Patient underwent Meropenem instillation as stated above. 2 ml of meropenem instilled in each maxillary sinus.     MEDICATION: Meropenem  ROUTE: Nasal instillation   SITE: Nasal   DOSE: 500 mg  LOT #: [id number]  :  [employer]  EXPIRATION DATE:  06/2019  NDC#: 67398-425-28     Medication reconstituted with Normal Saline     The following medication was given:      MEDICATION: 0.9% Sodium Chloride 10 mL Vial  ROUTE: Nasal instillation   SITE: Nose  DOSE: 4 mL  LOT #: [id number]  :   [employer]  EXP: 01/2020  NDC: 63323-186-10  Med waste: 6 mL - single use vial    ASSESSMENT AND PLAN:  Mamie Rice is a 22 year old female with a history of cystic fibrosis who routinely receives Meropenem instillation in both maxillary sinus cavities. Patient underwent 2 ml of Meropenem reconstitution instillation in each maxillary sinus today and tolerated the procedure well. She had no adverse events. Patient will return in 1 month for repeat " treatment.      Patrizia Monroe PA-C  Otolaryngology - Head & Neck Surgery  [phone]      CC:  Mariela Haile MD

## 2018-08-07 NOTE — NURSING NOTE
"  Chief Complaint   Patient presents with     Weight Problem     RMWM     Vitals:    08/07/18 0855   BP: 113/68   Pulse: 90   SpO2: 99%   Weight: 171 lb 9.6 oz   Height: 5' 2.6\"     Body mass index is 30.79 kg/(m^2).  Lesia Greene CMA    "

## 2018-08-07 NOTE — PROGRESS NOTES
"Mamie Rice is a 22 year old female presents today for new weight management nutrition consultation.  Patient referred by Dr. Randa Ho(5/1/18).    Initial Estimated body mass index is 34.95 kg/(m^2) as calculated from the following:    Height as of 8/2/16: 1.549 m (5' 1\").    Initial Weight on 8/24/16: 184.6 lbs    Current weight on 8/7/18: 77.8 kg (171 lb 9.6 oz). (-13 lbs since initial visit in clinic, +4.5 lbs since last [facility] visit)    Nutrition history  See MD note for details.  Patient reported no food allergies  Patient noted that she will have low blood sugars occasionally if she is very busy at work.  Patient will have a snack between meals if her blood sugar is low, otherwise patient reported she does not snack between meals.    Recent food recall:  Two meals per day when working - three meals on days that she karan not work  Breakfast- cereal or banana   Lunch- sandwich   Dinner- Hamburger corn(vegetable) occasionally green beans   Snacks- little brad snack cakes, granola bar, or chips  Beverages- diet dr pepper, apple juice at work 2-3 per day 6 oz, milk 2-1/2 gallon skim     Nutrition Prescription  Meal replacements (per MD)    Nutrition Diagnosis   Obesity r/t long history of self-monitoring deficit and excessive energy intake aeb BMI >30.   Nutrition Intervention  Materials/education provided, reviewed previous goals.  Patient stated that she did not like the protein shake that she purchased in clinic, discussed other options.  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  Continued to encourage limited snacking and calorie containing beverages between meals.    Patient Understanding: good  Expected Compliance: good    Nutrition Goals  1) May replace one meal per day with 16 oz of skim milk or " protein shake  *Protein Shake Criteria: no more than 210 Calories, at least 20 grams of protein, and less than 10 grams of sugar     Meal Replacement Shake Options:   [facility] smoothie (160 Calories, 20 g protein)   Premier Protein (160 Calories, 30 g protein)  Slim Fast Advanced Nutrition (180 Calories, 20 g protein)  Muscle Milk, lactose-free, 17 oz bottle (210 Calories, 30 g protein)  Integrated Supplements, no artificial sugars (110 Calories, 20 g protein)  [employer] Protein Bars (190 Calories, 20 g protein)  No Cow Protein Bar, gluten, dairy, and soy free (200 Calories, 20 g protein)    2) use snacks off 100 calorie snack list, limit to once per day  3) Weight loss    Follow-Up:  PRN    Time spent with patient: 15 minutes.  Johana Thomas RD, LD

## 2018-08-07 NOTE — MR AVS SNAPSHOT
After Visit Summary   8/7/2018    Mamie Rice    MRN: [id number]           Patient Information     Date Of Birth          1995        Visit Information        Provider Department      8/7/2018 10:15 AM Randa Ho MD M [facility] Medical Weight Management        Care Instructions    Please take a look at this website for resources and recipes [email or web address]    Try to minimize diet soda -- choose sparkling water instead  Try naltrexone in the evening instead  Go for walk more often    See me in 3 months    If you have any questions, please do not hesitate to call Weight management clinic at [phone] or [phone].    Sincerely,    Randa Ho MD  Endocrinology            Follow-ups after your visit        Your next 10 appointments already scheduled     Aug 07, 2018 10:15 AM CDT   (Arrive by 10:00 AM)   Return Weight Management Visit with MD LOKESH Deluna [facility] Medical Weight Management ([facility])    [address] [place] [id number] [phone]            Aug 07, 2018 11:00 AM CDT   (Arrive by 10:45 AM)   NUTRITION VISIT with Johana Thomas RD   [facility] ([facility])    [address] [place] [id number] [phone]            Aug 09, 2018  9:45 AM CDT   Adult Med Follow UP with DANIEL Mtz CNS   Psychiatry Clinic ([facility])    [address]  [address] [address]   [phone]            Sep 04, 2018 10:30 AM CDT   (Arrive by 10:15 AM)   Return Visit with VANESA Flanagan [facility] Ear Nose and Throat ([facility])    [address] [place] [address]   [phone]            Nov 06, 2018  7:45 AM CST   XR CHEST 2 VIEWS with UCXR1   [facility] ([facility]  [facility])    [address]  1st [facility] [id number] [phone]           Please bring a list of your current medicines to your exam. (Include vitamins, minerals and over-thecounter medicines.) Leave your valuables at home.  Tell your doctor if there is a chance you may be pregnant.  You do not need to do anything special for this exam.            Nov 06, 2018 11:00 AM CST   PFT VISIT with  PFL C   [facility] Pulmonary Function Testing ([facility])    [address]  3rd Floor  [facility] [id number] [phone]            Nov 06, 2018 11:20 AM CST   (Arrive by 11:05 AM)   RETURN CYSTIC FIBROSIS VISIT with Gavi Allison MD   [facility] ([facility])    [address] [place] [address]  [address] [place] [id number] [phone]              Who to contact     Please call your clinic at [phone] to:    Ask questions about your health    Make or cancel appointments    Discuss your medicines    Learn about your test results    Speak to your doctor            Additional Information About Your Visit        [employer] Information     [employer] gives you secure access to your electronic health record. If you see a primary care provider, you can also send messages to your care team and make appointments. If you have questions, please call your primary care clinic.  If you do not have a primary care provider, please call [phone] and they will assist you.      [employer] is an electronic gateway that provides easy, online access to your medical records. With [employer], you can request a clinic appointment, read your test results, renew a prescription or communicate with your care team.     To access your existing account, please contact your [facility] or call [phone] for assistance.        Care EveryWhere ID     This is your Care EveryWhere  "ID. This could be used by other organizations to access your [place] medical records  [id number]        Your Vitals Were     Pulse Height Pulse Oximetry BMI (Body Mass Index)          90 1.59 m (5' 2.6\") 99% 30.79 kg/m2         Blood Pressure from Last 3 Encounters:   08/07/18 113/68   07/18/18 118/81   05/30/18 120/77    Weight from Last 3 Encounters:   08/07/18 77.8 kg (171 lb 9.6 oz)   08/07/18 77.1 kg (170 lb)   07/18/18 77.1 kg (169 lb 15.6 oz)              Today, you had the following     No orders found for display       Primary Care Provider Office Phone # Fax #    Malik De La Rosa [phone]       [address] [facility] [address]        Equal Access to Services     [facility]: Hadii martin yuo Soyesenia, waaxda luqadaha, qaybta kaalmada adenakiayada, david rutledge . So [facility] [phone].    ATENCIÓN: Si habla español, tiene a hidalgo disposición servicios gratuitos de asistencia lingüística. Ankush al [phone].    We comply with applicable federal civil rights laws and [place] laws. We do not discriminate on the basis of race, color, national origin, age, disability, sex, sexual orientation, or gender identity.            Thank you!     Thank you for choosing [facility] WEIGHT MANAGEMENT  for your care. Our goal is always to provide you with excellent care. Hearing back from our patients is one way we can continue to improve our services. Please take a few minutes to complete the written survey that you may receive in the mail after your visit with us. Thank you!             Your Updated Medication List - Protect others around you: Learn how to safely use, store and throw away your medicines at www.disposemymeds.org.          This list is accurate as of 8/7/18  9:11 AM.  Always use your most recent med list.                   Brand Name Dispense Instructions for use Diagnosis    acetaminophen 325 MG tablet    " TYLENOL    100 tablet    Take 2 tablets (650 mg) by mouth every 4 hours as needed for other (mild pain)    Chronic pain of left knee       acetylcysteine 20 % nebulizer solution    MUCOMYST    360 mL    INHALE ONE 4ML NEB INTO LUNGS VIA NEBULIZER TWO TIMES A DAY, MAY INCREASE TO 3-4 TIMES A DAY WITH INCREASE COUGH/COLD SYMPTOMS    CF (cystic fibrosis) (H)       * albuterol (2.5 MG/3ML) 0.083% neb solution     360 mL    Take 1 vial (2.5 mg) by nebulization 4 times daily    CF (cystic fibrosis) (H)       * albuterol 108 (90 Base) MCG/ACT Inhaler    PROAIR HFA/PROVENTIL HFA/VENTOLIN HFA    1 Inhaler    Inhale 2 puffs into the lungs every 6 hours as needed for shortness of breath / dyspnea or wheezing    Cystic fibrosis with pulmonary manifestations (H), Sinusitis, chronic, Diabetes mellitus type 1 (H)       aluminum chloride 20 % external solution    DRYSOL    60 mL    Apply topically At Bedtime Everyother night to every 3rd night. With impovement, decrease to 1-2 times weekly. Irritation may occur.    Focal hyperhidrosis       * amphetamine-dextroamphetamine 20 MG per 24 hr capsule    ADDERALL XR    30 capsule    Take 1 capsule (20 mg) by mouth daily    Attention deficit hyperactivity disorder (ADHD), predominantly inattentive type       * amphetamine-dextroamphetamine 20 MG per 24 hr capsule    ADDERALL XR    30 capsule    Take 1 capsule (20 mg) by mouth daily    Attention deficit hyperactivity disorder (ADHD), predominantly inattentive type       * amphetamine-dextroamphetamine 20 MG per 24 hr capsule    ADDERALL XR    30 capsule    Take 1 capsule (20 mg) by mouth daily    Attention deficit hyperactivity disorder (ADHD), predominantly inattentive type       ascorbic acid 500 MG tablet    VITAMIN C    100 tablet    TAKE ONE TABLET BY MOUTH TWICE A DAY    Cystic fibrosis with pulmonary manifestations (H)       azithromycin 500 MG tablet    ZITHROMAX    36 tablet    TAKE ONE TABLET BY MOUTH ON MONDAY, WEDNESDAY AND  FRIDAY    CF (cystic fibrosis) (H)       beta carotene 61643 UNIT capsule     36 capsule    TAKE ONE CAPSULE BY MOUTH ON MON, WED AND FRIDAY MORNING    Cystic fibrosis with pulmonary manifestations (H)       blood glucose monitoring test strip    ACCU-CHEK SMARTVIEW    1 Month    Use to test blood sugar 4 times daily or as directed.    Type I (juvenile type) diabetes mellitus without mention of complication, not stated as uncontrolled       buPROPion 150 MG 12 hr tablet    WELLBUTRIN SR    60 tablet    Take 1 tablet (150 mg) by mouth 2 times daily    Major depressive disorder, recurrent episode, mild (H)       busPIRone 15 MG tablet    BUSPAR    60 tablet    Take one half tablet daily in the am and afternoon for one week then one tablet twice daily    MECHE (generalized anxiety disorder)       cetirizine 10 MG tablet    zyrTEC    30 tablet    Take 1 tablet (10 mg) by mouth every evening    Allergic rhinitis due to [employer] house dust mite, Urticaria, chronic       cholecalciferol 1000 UNIT tablet    vitamin D3    100 tablet    TAKE 1 TABLET BY MOUTH EVERY DAY    CF (cystic fibrosis) (H), Exocrine pancreatic insufficiency       CLARAVIS 40 MG capsule   Generic drug:  ISOtretinoin      TK 2 PO D        DEPO-PROVERA IM           dornase alpha 1 MG/ML neb solution    PULMOZYME    75 mL    Inhale 2.5 mg into the lungs daily    Cystic fibrosis with pulmonary manifestations (H)       FLUoxetine 40 MG capsule    PROZAC    60 capsule    Take 2 capsules (80 mg) by mouth daily    Cystic fibrosis with pulmonary manifestations (H)       GLYCOPYRROLATE PO      Take 1 mg by mouth 2 times daily        hydrOXYzine 25 MG tablet    ATARAX     Take one tablet by mouth at bedtime for insomnia    Chronic pain of left knee       MEPHYTON 5 MG tablet   Generic drug:  phytonadione     4 tablet    TAKE ONE TABLET BY MOUTH ONCE A WEEK    Cystic fibrosis with pulmonary manifestations (H), Cystic fibrosis with pulmonary manifestations (H),  Aspergillosis (H), Pancreatic insufficiency       * meropenem 500 MG vial    MERREM    60 each         * meropenem 500 MG vial    MERREM    1 each    Give x 1 today        * meropenem 500 MG vial    MERREM    60 each    500 mg by Nasal Instillation route every 8 hours    Chronic maxillary sinusitis, Cystic fibrosis of the lung (H)       * meropenem 500 MG vial    MERREM    60 each    500 mg by Nasal Instillation route once for 1 dose        methylcellulose (laxative) Powd    CITRUCEL    479 g    Start with 1 heaping tablespoon. Increase as needed, 1 heaping tablespoon at a time, up to 3 times per day.    Other constipation       montelukast 10 MG tablet    SINGULAIR    30 tablet    TAKE 1 TABLET BY MOUTH DAILY AT BEDTIME    CF (cystic fibrosis) (H)       MULTIVITAMIN PO      Take 1 tablet by mouth daily.        mupirocin 2 % ointment    BACTROBAN     USE BID TO AFFECTED AREA ON THE LIPS        naltrexone 50 MG tablet    DEPADE;REVIA    90 tablet    Take 1 tablet.  Time it one to two hours prior to worst cravings.    Class 1 obesity due to excess calories without serious comorbidity with body mass index (BMI) of 30.0 to 30.9 in adult       omeprazole 20 MG CR capsule    priLOSEC    60 capsule    TAKE 1 CAPSULE BY MOUTH TWICE A DAY    CF (cystic fibrosis) (H)       polyethylene glycol powder    MIRALAX    119 g    Take 17 g (1 capful) by mouth daily as needed for constipation    Cystic fibrosis of the lung (H), Constipation, unspecified constipation type       traZODone 100 MG tablet    DESYREL    30 tablet    Take 1 tablet (100 mg) by mouth At Bedtime    Insomnia, unspecified type       vitamin E 400 UNIT capsule     60 capsule    TAKE 1 CAPSULE BY MOUTH TWICE A DAY    CF (cystic fibrosis) (H), Pancreatic insufficiency       * Notice:  This list has 9 medication(s) that are the same as other medications prescribed for you. Read the directions carefully, and ask your doctor or other care provider to review them with  you.

## 2018-08-07 NOTE — PROGRESS NOTES
"HISTORY OF PRESENT ILLNESS:  Mamie Rice is a 22 year old female with a history of cystic fibrosis who present today for routine instillation of Meropenem into the maxillary sinuses. She has no new symptoms.    PHYSICAL EXAMINATION:    Ht 1.59 m (5' 2.6\")  Wt 77.1 kg (170 lb)  BMI 30.5 kg/m2  Constitutional:  The patient was unaccompanied, well-groomed, and in no acute distress.     Nose: Rigid scope used for visualization to instill 2 ml of Meropenem reconstitution into each maxillary sinus cavity. No adverse events during procedure. Patient tolerated it well. No crusts noted in either nare today. Polyp in left nare superior to scare band present.      PROCEDURE:  Patient underwent Meropenem instillation as stated above. 2 ml of meropenem instilled in each maxillary sinus.     MEDICATION: Meropenem  ROUTE: Nasal instillation   SITE: Nasal   DOSE: 500 mg  LOT #: [id number]  :  [employer]  EXPIRATION DATE:  06/2019  NDC#: 44538-094-74     Medication reconstituted with Normal Saline     The following medication was given:      MEDICATION: 0.9% Sodium Chloride 10 mL Vial  ROUTE: Nasal instillation   SITE: Nose  DOSE: 4 mL  LOT #: [id number]  :  [employer]  EXP: 01/2020  NDC: 63323-186-10  Med waste: 6 mL - single use vial    ASSESSMENT AND PLAN:  Mamie Rice is a 22 year old female with a history of cystic fibrosis who routinely receives Meropenem instillation in both maxillary sinus cavities. Patient underwent 2 ml of Meropenem reconstitution instillation in each maxillary sinus today and tolerated the procedure well. She had no adverse events. Patient will return in 1 month for repeat treatment.      Patrizia Monroe PA-C  Otolaryngology - Head & Neck Surgery  [phone]        CC:  Mariela Haile MD  "

## 2018-08-07 NOTE — NURSING NOTE
"Chief Complaint   Patient presents with     RECHECK     merrem injection     Height 1.59 m (5' 2.6\"), weight 77.1 kg (170 lb).    Jacky Rodriguez LPN    "

## 2018-08-07 NOTE — MR AVS SNAPSHOT
MRN:[id number]                      After Visit Summary   8/7/2018    Mamie Rice    MRN: [id number]           Visit Information        Provider Department      8/7/2018 11:00 AM Johana Thomas RD M [facility] Surgical Weight Management        Your next 10 appointments already scheduled     Aug 07, 2018 10:15 AM CDT   (Arrive by 10:00 AM)   Return Weight Management Visit with MD LOKESH Deluna [facility] Medical Weight Management ([facility])    [address] [place] [address]   [phone]            Aug 07, 2018 11:00 AM CDT   (Arrive by 10:45 AM)   NUTRITION VISIT with CRISTINA Goodson [facility] Surgical Weight Management ([facility])    [address] [place] [address]   [phone]            Aug 09, 2018  9:45 AM CDT   Adult Med Follow UP with DANIEL Mtz CNS   Psychiatry Clinic ([facility])    [place] [id number]  [address] [address]   [phone]            Sep 04, 2018 10:30 AM CDT   (Arrive by 10:15 AM)   Return Visit with VANESA Flanagan [facility] Ear Nose and Throat ([facility])    [address] [place] [address]   [phone]            Nov 06, 2018  7:45 AM CST   XR CHEST 2 VIEWS with UCXR1   [facility] Xray ([facility])    [address] [place] [address]   [phone]           Please bring a list of your current medicines to your exam. (Include vitamins, minerals and over-thecounter medicines.) Leave your valuables at home.  Tell your doctor if there is a chance you may be pregnant.  You do not need to do anything special for this exam.            Nov 06, 2018 11:00 AM CST   PFT VISIT with JENNIFER CAMPA [facility] Pulmonary Function Testing ([facility])    [address]  3rd Floor  [facility] [address]   [phone]            Nov 06, 2018 11:20 AM CST   (Arrive by 11:05 AM)   RETURN CYSTIC FIBROSIS VISIT with Gavi Allison MD   [facility] ([facility])    [address]  [address]  [facility] [address]   [phone]            Nov 13, 2018 11:45 AM CST   (Arrive by 11:30 AM)   Return Weight Management Visit with Randa Ho MD   [facility] Medical Weight Management ([facility])    [address]  4th Floor  [facility] [address] [phone]              Care Instructions    Nutrition Goals  1) May replace one meal per day with 16 oz of skim milk or protein shake  *Protein Shake Criteria: no more than 210 Calories, at least 20 grams of protein, and less than 10 grams of sugar     Meal Replacement Shake Options:   [employer] smoothie (160 Calories, 20 g protein)   Premier Protein (160 Calories, 30 g protein)  Slim Fast Advanced Nutrition (180 Calories, 20 g protein)  Muscle Milk, lactose-free, 17 oz bottle (210 Calories, 30 g protein)  Integrated Supplements, no artificial sugars (110 Calories, 20 g protein)  [employer] Protein Bars (190 Calories, 20 g protein)  No Cow Protein Bar, gluten, dairy, and soy free (200 Calories, 20 g protein)    2) use snacks off 100 calorie snack list, limit to once per day  3) Weight loss    Follow up with RD in three months    Johana Thomas RD, LD  If you need to schedule or reschedule with a dietitian please call [phone] or [phone].          [email or web address] Information     [employer] gives you secure access to your electronic health record. If you see a primary care provider, you can also send messages to your care team and make appointments. If you have questions, please call your primary care clinic.  If you do not have a primary care provider, please call [phone] and they  will assist you.      [employer] is an electronic gateway that provides easy, online access to your medical records. With [employer], you can request a clinic appointment, read your test results, renew a prescription or communicate with your care team.     To access your existing account, please contact your [facility] or call [phone] for assistance.        Care EveryWhere ID     This is your Care EveryWhere ID. This could be used by other organizations to access your [place] medical records  [id number]        Equal Access to Services     [facility] : Hadjayant yuo Soyesenia, waaxda luqadaha, qaybta kaalmaheron mccarthy, david cardoso. So [facility] [phone].    ATENCIÓN: Si habla español, tiene a hidalgo disposición servicios gratuitos de asistencia lingüística. Llame al [phone].    We comply with applicable federal civil rights laws and [place] laws. We do not discriminate on the basis of race, color, national origin, age, disability, sex, sexual orientation, or gender identity.

## 2018-08-07 NOTE — MR AVS SNAPSHOT
After Visit Summary   8/7/2018    Mamie Rice    MRN: [id number]           Patient Information     Date Of Birth          1995        Visit Information        Provider Department      8/7/2018 8:00 AM Patrizia Monroe PA-C M [facility] Ear Nose and Throat        Today's Diagnoses     Chronic maxillary sinusitis    -  1       Follow-ups after your visit        Your next 10 appointments already scheduled     Aug 07, 2018 10:15 AM CDT   (Arrive by 10:00 AM)   Return Weight Management Visit with MD LOKESH Deluna [facility] Medical Weight Management ([facility])    [address] [address]   [phone]            Aug 07, 2018 11:00 AM CDT   (Arrive by 10:45 AM)   NUTRITION VISIT with Johana Thomas RD   [facility] ([facility])    [address] [place] [address]  [address] [facility] [address]   [phone]            Aug 09, 2018  9:45 AM CDT   Adult Med Follow UP with DANIEL Mtz CNS   Psychiatry Clinic ([facility])    [place] [id number]  [address] [address]   [phone]            Sep 04, 2018 10:30 AM CDT   (Arrive by 10:15 AM)   Return Visit with VANESA Flanagan [facility] Ear Nose and Throat ([facility])    [address] [place] [address]  [address] [facility] [address]   [phone]            Nov 06, 2018  7:45 AM CST   XR CHEST 2 VIEWS with UCXR1   [facility] ([facility])    [address] [place] [address]  [address] [facility] [address] [phone]           Please bring a list of your current medicines to your exam. (Include vitamins, minerals and over-thecounter medicines.) Leave your valuables at home.  Tell your doctor if there is a chance you may be pregnant.  You do not need to do anything special for this exam.  "           Nov 06, 2018 11:00 AM CST   PFT VISIT with JENNIFER DE LA ROSA   [facility] Pulmonary Function Testing ([facility])    [address]  3rd Floor  [facility] [address] [phone]            Nov 06, 2018 11:20 AM CST   (Arrive by 11:05 AM)   RETURN CYSTIC FIBROSIS VISIT with Gavi Allison MD   [facility] ([facility])    [address]  [address]  [facility] [address] [phone]              Who to contact     Please call your clinic at [phone] to:    Ask questions about your health    Make or cancel appointments    Discuss your medicines    Learn about your test results    Speak to your doctor            Additional Information About Your Visit        [email or web address] Information     [employer] gives you secure access to your electronic health record. If you see a primary care provider, you can also send messages to your care team and make appointments. If you have questions, please call your primary care clinic.  If you do not have a primary care provider, please call [phone] and they will assist you.      [employer] is an electronic gateway that provides easy, online access to your medical records. With [employer], you can request a clinic appointment, read your test results, renew a prescription or communicate with your care team.     To access your existing account, please contact your [facility] or call [phone] for assistance.        Care EveryWhere ID     This is your Care EveryWhere ID. This could be used by other organizations to access your [facility] medical records  [id number]        Your Vitals Were     Height BMI (Body Mass Index)                1.59 m (5' 2.6\") 30.5 kg/m2           Blood Pressure from Last 3 Encounters:   07/18/18 118/81   05/30/18 120/77   05/01/18 110/71    Weight from Last 3 Encounters:   08/07/18 77.1 kg (170 lb)   07/18/18 77.1 kg (169 " lb 15.6 oz)   07/06/18 75.3 kg (166 lb)              We Performed the Following     NASAL ENDOSCOPY, DIAGNOSTIC     NASAL/SINUS SCOPE W PREETI SOLER        Primary Care Provider Office Phone # Fax #    Malik De La Rosa [phone]       [address] [facility] [address]        Equal Access to Services     [facility] : Hadii aad ku hadasho Soomaali, waaxda luqadaha, qaybta kaalmada adeegyada, waxay idiin hayaan adeeg kharadeepika laveronica . So [facility] [phone].    ATENCIÓN: Si habla espj carlos, tiene a hidalgo disposición servicios gratuitos de asistencia lingüística. [facility] [phone].    We comply with applicable federal civil rights laws and [place] laws. We do not discriminate on the basis of race, color, national origin, age, disability, sex, sexual orientation, or gender identity.            Thank you!     Thank you for choosing [facility] EAR NOSE AND TH[facility]AT  for your care. Our goal is always to provide you with excellent care. Hearing back from our patients is one way we can continue to improve our services. Please take a few minutes to complete the written survey that you may receive in the mail after your visit with us. Thank you!             Your Updated Medication List - Protect others around you: Learn how to safely use, store and throw away your medicines at www.disposemymeds.org.          This list is accurate as of 8/7/18  8:49 AM.  Always use your most recent med list.                   Brand Name Dispense Instructions for use Diagnosis    acetaminophen 325 MG tablet    TYLENOL    100 tablet    Take 2 tablets (650 mg) by mouth every 4 hours as needed for other (mild pain)    Chronic pain of left knee       acetylcysteine 20 % nebulizer solution    MUCOMYST    360 mL    INHALE ONE 4ML NEB INTO LUNGS VIA NEBULIZER TWO TIMES A DAY, MAY INCREASE TO 3-4 TIMES A DAY WITH INCREASE COUGH/COLD SYMPTOMS    CF (cystic fibrosis) (H)       * albuterol (2.5 MG/3ML)  0.083% neb solution     360 mL    Take 1 vial (2.5 mg) by nebulization 4 times daily    CF (cystic fibrosis) (H)       * albuterol 108 (90 Base) MCG/ACT Inhaler    PROAIR HFA/PROVENTIL HFA/VENTOLIN HFA    1 Inhaler    Inhale 2 puffs into the lungs every 6 hours as needed for shortness of breath / dyspnea or wheezing    Cystic fibrosis with pulmonary manifestations (H), Sinusitis, chronic, Diabetes mellitus type 1 (H)       aluminum chloride 20 % external solution    DRYSOL    60 mL    Apply topically At Bedtime Everyother night to every 3rd night. With impovement, decrease to 1-2 times weekly. Irritation may occur.    Focal hyperhidrosis       * amphetamine-dextroamphetamine 20 MG per 24 hr capsule    ADDERALL XR    30 capsule    Take 1 capsule (20 mg) by mouth daily    Attention deficit hyperactivity disorder (ADHD), predominantly inattentive type       * amphetamine-dextroamphetamine 20 MG per 24 hr capsule    ADDERALL XR    30 capsule    Take 1 capsule (20 mg) by mouth daily    Attention deficit hyperactivity disorder (ADHD), predominantly inattentive type       * amphetamine-dextroamphetamine 20 MG per 24 hr capsule    ADDERALL XR    30 capsule    Take 1 capsule (20 mg) by mouth daily    Attention deficit hyperactivity disorder (ADHD), predominantly inattentive type       ascorbic acid 500 MG tablet    VITAMIN C    100 tablet    TAKE ONE TABLET BY MOUTH TWICE A DAY    Cystic fibrosis with pulmonary manifestations (H)       azithromycin 500 MG tablet    ZITHROMAX    36 tablet    TAKE ONE TABLET BY MOUTH ON MONDAY, WEDNESDAY AND FRIDAY    CF (cystic fibrosis) (H)       beta carotene 43444 UNIT capsule     36 capsule    TAKE ONE CAPSULE BY MOUTH ON MON, WED AND FRIDAY MORNING    Cystic fibrosis with pulmonary manifestations (H)       blood glucose monitoring test strip    ACCU-CHEK SMARTVIEW    1 Month    Use to test blood sugar 4 times daily or as directed.    Type I (juvenile type) diabetes mellitus without  mention of complication, not stated as uncontrolled       buPROPion 150 MG 12 hr tablet    WELLBUTRIN SR    60 tablet    Take 1 tablet (150 mg) by mouth 2 times daily    Major depressive disorder, recurrent episode, mild (H)       busPIRone 15 MG tablet    BUSPAR    60 tablet    Take one half tablet daily in the am and afternoon for one week then one tablet twice daily    MECHE (generalized anxiety disorder)       cetirizine 10 MG tablet    zyrTEC    30 tablet    Take 1 tablet (10 mg) by mouth every evening    Allergic rhinitis due to [employer] house dust mite, Urticaria, chronic       cholecalciferol 1000 UNIT tablet    vitamin D3    100 tablet    TAKE 1 TABLET BY MOUTH EVERY DAY    CF (cystic fibrosis) (H), Exocrine pancreatic insufficiency       CLARAVIS 40 MG capsule   Generic drug:  ISOtretinoin      TK 2 PO D        DEPO-PROVERA IM           dornase alpha 1 MG/ML neb solution    PULMOZYME    75 mL    Inhale 2.5 mg into the lungs daily    Cystic fibrosis with pulmonary manifestations (H)       FLUoxetine 40 MG capsule    PROZAC    60 capsule    Take 2 capsules (80 mg) by mouth daily    Cystic fibrosis with pulmonary manifestations (H)       GLYCOPYRROLATE PO      Take 1 mg by mouth 2 times daily        hydrOXYzine 25 MG tablet    ATARAX     Take one tablet by mouth at bedtime for insomnia    Chronic pain of left knee       MEPHYTON 5 MG tablet   Generic drug:  phytonadione     4 tablet    TAKE ONE TABLET BY MOUTH ONCE A WEEK    Cystic fibrosis with pulmonary manifestations (H), Cystic fibrosis with pulmonary manifestations (H), Aspergillosis (H), Pancreatic insufficiency       * meropenem 500 MG vial    MERREM    60 each         * meropenem 500 MG vial    MERREM    1 each    Give x 1 today        * meropenem 500 MG vial    MERREM    60 each    500 mg by Nasal Instillation route every 8 hours    Chronic maxillary sinusitis, Cystic fibrosis of the lung (H)       * meropenem 500 MG vial    MERREM    60 each    500 mg  by Nasal Instillation route once for 1 dose        methylcellulose (laxative) Powd    CITRUCEL    479 g    Start with 1 heaping tablespoon. Increase as needed, 1 heaping tablespoon at a time, up to 3 times per day.    Other constipation       montelukast 10 MG tablet    SINGULAIR    30 tablet    TAKE 1 TABLET BY MOUTH DAILY AT BEDTIME    CF (cystic fibrosis) (H)       MULTIVITAMIN PO      Take 1 tablet by mouth daily.        mupirocin 2 % ointment    BACTROBAN     USE BID TO AFFECTED AREA ON THE LIPS        naltrexone 50 MG tablet    DEPADE;REVIA    90 tablet    Take 1 tablet.  Time it one to two hours prior to worst cravings.    Class 1 obesity due to excess calories without serious comorbidity with body mass index (BMI) of 30.0 to 30.9 in adult       omeprazole 20 MG CR capsule    priLOSEC    60 capsule    TAKE 1 CAPSULE BY MOUTH TWICE A DAY    CF (cystic fibrosis) (H)       polyethylene glycol powder    MIRALAX    119 g    Take 17 g (1 capful) by mouth daily as needed for constipation    Cystic fibrosis of the lung (H), Constipation, unspecified constipation type       traZODone 100 MG tablet    DESYREL    30 tablet    Take 1 tablet (100 mg) by mouth At Bedtime    Insomnia, unspecified type       vitamin E 400 UNIT capsule     60 capsule    TAKE 1 CAPSULE BY MOUTH TWICE A DAY    CF (cystic fibrosis) (H), Pancreatic insufficiency       * Notice:  This list has 9 medication(s) that are the same as other medications prescribed for you. Read the directions carefully, and ask your doctor or other care provider to review them with you.

## 2018-08-09 ENCOUNTER — OFFICE VISIT (OUTPATIENT)
Dept: PSYCHIATRY | Facility: CLINIC | Age: 23
End: 2018-08-09
Attending: CLINICAL NURSE SPECIALIST
Payer: COMMERCIAL

## 2018-08-09 VITALS
WEIGHT: 171.8 LBS | DIASTOLIC BLOOD PRESSURE: 55 MMHG | HEART RATE: 90 BPM | SYSTOLIC BLOOD PRESSURE: 109 MMHG | BODY MASS INDEX: 30.82 KG/M2

## 2018-08-09 DIAGNOSIS — E84.0 CYSTIC FIBROSIS WITH PULMONARY MANIFESTATIONS (H): ICD-10-CM

## 2018-08-09 DIAGNOSIS — F34.1 PERSISTENT DEPRESSIVE DISORDER: Primary | ICD-10-CM

## 2018-08-09 DIAGNOSIS — F90.2 ATTENTION DEFICIT HYPERACTIVITY DISORDER (ADHD), COMBINED TYPE: ICD-10-CM

## 2018-08-09 DIAGNOSIS — G47.00 INSOMNIA, UNSPECIFIED TYPE: ICD-10-CM

## 2018-08-09 DIAGNOSIS — M25.562 CHRONIC PAIN OF LEFT KNEE: ICD-10-CM

## 2018-08-09 DIAGNOSIS — F90.0 ATTENTION DEFICIT HYPERACTIVITY DISORDER (ADHD), PREDOMINANTLY INATTENTIVE TYPE: ICD-10-CM

## 2018-08-09 DIAGNOSIS — G89.29 CHRONIC PAIN OF LEFT KNEE: ICD-10-CM

## 2018-08-09 DIAGNOSIS — F41.1 GAD (GENERALIZED ANXIETY DISORDER): ICD-10-CM

## 2018-08-09 DIAGNOSIS — F33.0 MILD EPISODE OF RECURRENT MAJOR DEPRESSIVE DISORDER (H): ICD-10-CM

## 2018-08-09 DIAGNOSIS — F33.0 MAJOR DEPRESSIVE DISORDER, RECURRENT EPISODE, MILD (H): ICD-10-CM

## 2018-08-09 PROCEDURE — G0463 HOSPITAL OUTPT CLINIC VISIT: HCPCS | Mod: ZF

## 2018-08-09 RX ORDER — TRAZODONE HYDROCHLORIDE 100 MG/1
100 TABLET ORAL AT BEDTIME
Qty: 30 TABLET | Refills: 5 | Status: SHIPPED | OUTPATIENT
Start: 2018-08-09 | End: 2019-05-09

## 2018-08-09 RX ORDER — DEXTROAMPHETAMINE SACCHARATE, AMPHETAMINE ASPARTATE MONOHYDRATE, DEXTROAMPHETAMINE SULFATE AND AMPHETAMINE SULFATE 5; 5; 5; 5 MG/1; MG/1; MG/1; MG/1
20 CAPSULE, EXTENDED RELEASE ORAL DAILY
Qty: 30 CAPSULE | Refills: 0 | Status: SHIPPED | OUTPATIENT
Start: 2018-08-09 | End: 2019-02-04

## 2018-08-09 RX ORDER — DEXTROAMPHETAMINE SACCHARATE, AMPHETAMINE ASPARTATE MONOHYDRATE, DEXTROAMPHETAMINE SULFATE AND AMPHETAMINE SULFATE 5; 5; 5; 5 MG/1; MG/1; MG/1; MG/1
20 CAPSULE, EXTENDED RELEASE ORAL DAILY
Qty: 30 CAPSULE | Refills: 0 | Status: SHIPPED | OUTPATIENT
Start: 2018-10-04 | End: 2019-02-13

## 2018-08-09 RX ORDER — DEXTROAMPHETAMINE SACCHARATE, AMPHETAMINE ASPARTATE MONOHYDRATE, DEXTROAMPHETAMINE SULFATE AND AMPHETAMINE SULFATE 5; 5; 5; 5 MG/1; MG/1; MG/1; MG/1
20 CAPSULE, EXTENDED RELEASE ORAL DAILY
Qty: 30 CAPSULE | Refills: 0 | Status: SHIPPED | OUTPATIENT
Start: 2018-09-06 | End: 2019-02-13

## 2018-08-09 RX ORDER — HYDROXYZINE HYDROCHLORIDE 25 MG/1
TABLET, FILM COATED ORAL
Qty: 30 TABLET | Refills: 5 | Status: SHIPPED | OUTPATIENT
Start: 2018-08-09 | End: 2019-02-13 | Stop reason: ALTCHOICE

## 2018-08-09 RX ORDER — FLUOXETINE 40 MG/1
80 CAPSULE ORAL DAILY
Qty: 60 CAPSULE | Refills: 5 | Status: SHIPPED | OUTPATIENT
Start: 2018-08-09 | End: 2019-02-13

## 2018-08-09 RX ORDER — BUPROPION HYDROCHLORIDE 150 MG/1
150 TABLET, EXTENDED RELEASE ORAL 2 TIMES DAILY
Qty: 60 TABLET | Refills: 5 | Status: SHIPPED | OUTPATIENT
Start: 2018-08-09 | End: 2019-05-09

## 2018-08-09 RX ORDER — BUSPIRONE HYDROCHLORIDE 15 MG/1
15 TABLET ORAL 2 TIMES DAILY
Qty: 60 TABLET | Refills: 5 | Status: SHIPPED | OUTPATIENT
Start: 2018-08-09 | End: 2019-02-13

## 2018-08-09 ASSESSMENT — PAIN SCALES - GENERAL: PAINLEVEL: NO PAIN (0)

## 2018-08-09 NOTE — PROGRESS NOTES
Outpatient Psychiatry Progress Note     Provider: DANIEL Mtz CNS  Date: 2018  Service:  Medication follow up with counseling.   Patient Identification: Mamie Rice  : 1995   MRN: [id number]    Mamie Rice is a 22 year old year old female who presents for ongoing psychiatric care.  Mamie Rice was last seen in clinic on 5/15/18.   At that time,   Assessment & Plan       Mamie Rice is seen today for follow up and reports she is interested in further treatment of anxiety and agrees with plan to try Buspar.      Diagnosis  ADHD  Major depressive disorder, recurrent, mild  Anxiety     Plan:  Medication: Continue current medications and add Buspar 7.5mg bid for one then 15mg bid  OTC Recommendations: none  Lab Orders:  none  Referrals: none  Release of Information: none  Future Treatment Considerations:per symptoms  Return for Follow Up:10 weeks      ____________________________________________________________________________________________________________________________________________    2018  Today Mamie reports she did notice improvement with the addition Buspar. She doesn't have to leave the room as much when out with family.   Work has been busy and not overly anxious. 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  Side effects of medication include: none reported  Psychiatric Review of Systems:  Depression: In the last 2 weeks per PHQ 9 several days anhedonia, feeling depressed, appetite disturbance, feelings of failure.  More than 1/2 days sleep disturbance, fatigue, concentration problems.  Anxiety : see subjective  Quiana na   Psychosis  na.   ADHD stable    Review of Medical Systems:  Sleep: stable  Energy: stable  Concentration: stable  Appetite: stable  GI Concerns: no new concerns  Cardiac concerns: no concerns  Neurological concerns:  no new concerns  Other medical concerns: no new concerns  Current Substance Use:  Alcohol:denies  Other drugs:denies  Caffeine:no change  Nicotine: none  Past Medical History:   Past Medical History:   Diagnosis Date     ADHD (attention deficit hyperactivity disorder)      Anxiety      Aspergillosis, with pneumonia (H)     fugus found caused chest pain     Chronic infection     CF, MRSA.,      Chronic sinusitis      Constipation, chronic      Cystic fibrosis with pulmonary manifestations (H) 12/19/2011     Cystic fibrosis without mention of meconium ileus     SWEAT TEST:Date: 2/17/1994   Laboratory[facility]  296 mg, 104 mmol/L ClSample #2  295 mg, 104 mmol/L Cl GENOTYPING:Date: 10/15/2007,  Laboratory: [facility]: [id number]     Depressive disorder      Diabetes     no meds currently     Dysthymic disorder      Exocrine pancreatic insufficiency      Gastro-oesophageal reflux disease      Hip pain, right      MRSA (methicillin resistant Staphylococcus aureus) carrier      Pancreatic disease      Patient Active Problem List   Diagnosis     Hip joint pain     Aspergillosis (H)     Chronic maxillary sinusitis     Hypoglycemia     Type 1 diabetes mellitus (H)     Cystic fibrosis of the lung (H)     Chronic constipation     Frontal sinusitis     Overactive child     Back pain     Pancreatic insufficiency     Non morbid obesity due to excess calories     Acne vulgaris     Cystic fibrosis (H)     Diabetes mellitus, type 2 (H)     Persistent depressive disorder     Mild episode of recurrent major depressive disorder (H)     Insomnia, unspecified type     MECHE (generalized anxiety disorder)     Attention deficit hyperactivity disorder (ADHD), combined type       Allergies:   Allergies   Allergen Reactions     Vancomycin Hives     Redmens skin rash   Redmens skin rash           Current Medications     Current [facility]patient Prescriptions [facility] in [facility]   Medication Sig Dispense Refill     acetaminophen (TYLENOL)  325 MG tablet Take 2 tablets (650 mg) by mouth every 4 hours as needed for other (mild pain) 100 tablet 0     acetylcysteine (MUCOMYST) 20 % nebulizer solution INHALE ONE 4ML NEB INTO LUNGS VIA NEBULIZER TWO TIMES A DAY, MAY INCREASE TO 3-4 TIMES A DAY WITH INCREASE COUGH/COLD SYMPTOMS 360 mL 11     albuterol (2.5 MG/3ML) 0.083% neb solution Take 1 vial (2.5 mg) by nebulization 4 times daily 360 mL 11     albuterol (PROAIR HFA/PROVENTIL HFA/VENTOLIN HFA) 108 (90 Base) MCG/ACT Inhaler Inhale 2 puffs into the lungs every 6 hours as needed for shortness of breath / dyspnea or wheezing 1 Inhaler 12     aluminum chloride (DRYSOL) 20 % external solution Apply topically At Bedtime Everyother night to every 3rd night. With impovement, decrease to 1-2 times weekly. Irritation may occur. 60 mL 3     amphetamine-dextroamphetamine (ADDERALL XR) 20 MG per 24 hr capsule Take 1 capsule (20 mg) by mouth daily 30 capsule 0     amphetamine-dextroamphetamine (ADDERALL XR) 20 MG per 24 hr capsule Take 1 capsule (20 mg) by mouth daily 30 capsule 0     amphetamine-dextroamphetamine (ADDERALL XR) 20 MG per 24 hr capsule Take 1 capsule (20 mg) by mouth daily 30 capsule 0     ascorbic acid (VITAMIN C) 500 MG tablet TAKE ONE TABLET BY MOUTH TWICE A  tablet 3     azithromycin (ZITHROMAX) 500 MG tablet TAKE ONE TABLET BY MOUTH ON MONDAY, WEDNESDAY AND FRIDAY 36 tablet 4     beta carotene 92479 UNIT capsule TAKE ONE CAPSULE BY MOUTH ON MON, WED AND FRIDAY MORNING 36 capsule 4     blood glucose (ACCU-CHEK SMARTVIEW) test strip Use to test blood sugar 4 times daily or as directed. 1 Month 12     buPROPion (WELLBUTRIN SR) 150 MG 12 hr tablet Take 1 tablet (150 mg) by mouth 2 times daily 60 tablet 5     busPIRone (BUSPAR) 15 MG tablet Take one half tablet daily in the am and afternoon for one week then one tablet twice daily 60 tablet 2     cetirizine (ZYRTEC) 10 MG tablet Take 1 tablet (10 mg) by mouth every evening 30 tablet 3     CLARAVIS  40 MG capsule TK 2 PO D  2     dornase alpha (PULMOZYME) 1 MG/ML neb solution Inhale 2.5 mg into the lungs daily 75 mL 3     FLUoxetine (PROZAC) 40 MG capsule Take 2 capsules (80 mg) by mouth daily 60 capsule 5     GLYCOPYRROLATE PO Take 1 mg by mouth 2 times daily        hydrOXYzine (ATARAX) 25 MG tablet Take one tablet by mouth at bedtime for insomnia       MedroxyPROGESTERone Acetate (DEPO-PROVERA IM)        MEPHYTON 5 MG tablet TAKE ONE TABLET BY MOUTH ONCE A WEEK 4 tablet 11     meropenem (MERREM) 500 MG vial 500 mg by Nasal Instillation route once for 1 dose 60 each      meropenem (MERREM) 500 MG vial 500 mg by Nasal Instillation route every 8 hours 60 each      meropenem (MERREM) 500 MG vial Give x 1 today 1 each      meropenem (MERREM) 500 MG vial  60 each      methylcellulose, laxative, (CITRUCEL) POWD Start with 1 heaping tablespoon. Increase as needed, 1 heaping tablespoon at a time, up to 3 times per day. 479 g 3     montelukast (SINGULAIR) 10 MG tablet TAKE 1 TABLET BY MOUTH DAILY AT BEDTIME 30 tablet 11     Multiple Vitamin (MULTIVITAMIN OR) Take 1 tablet by mouth daily.       mupirocin (BACTROBAN) 2 % ointment USE BID TO AFFECTED AREA ON THE LIPS  3     naltrexone (DEPADE;REVIA) 50 MG tablet Take 1 tablet.  Time it one to two hours prior to worst cravings. 90 tablet 2     omeprazole (PRILOSEC) 20 MG CR capsule TAKE 1 CAPSULE BY MOUTH TWICE A DAY 60 capsule 11     polyethylene glycol (MIRALAX) powder Take 17 g (1 capful) by mouth daily as needed for constipation 119 g 3     traZODone (DESYREL) 100 MG tablet Take 1 tablet (100 mg) by mouth At Bedtime 30 tablet 5     VITAMIN D3 1000 UNITS tablet TAKE 1 TABLET BY MOUTH EVERY  tablet 3     vitamin E 400 UNIT capsule TAKE 1 CAPSULE BY MOUTH TWICE A DAY 60 capsule 11     Current Facility-Administered Medications Ordered in Epic   Medication Dose Route Frequency Provider Last Rate Last Dose     albuterol (PROAIR HFA, PROVENTIL HFA, VENTOLIN HFA)  "inhaler    Citlali Denny MD   2 puff at 10/16/15 0939        Mental Status Exam     Appearance:  Casually dressed and Well groomed  Behavior/relationship to examiner/demeanor:  Cooperative  Orientation: Oriented to person, place, time and situation  Psychomotor: normal form  Speech Rate:  Normal  Speech Spontaneity:  Normal  Mood:  \"good\"  Affect:  Appropriate/mood-congruent  Thought Process (Associations):  Goal directed  Thought Content:  no overt psychosis, denies suicidal ideation, intent or thoughts and patient does not appear to be responding to internal stimuli  Abnormal Perception:  None  Attention/Concentration:  Normal  Insight:  Good  Judgment:  Good      Results     Vital signs: /55  Pulse 90  Wt 77.9 kg (171 lb 12.8 oz)  BMI 30.82 kg/m2    Laboratory Data:  no new data reviewed    Assessment & Plan      Mamie Rice is seen today for follow up and reports she has been feeling well with improved anxiety and no side effects with the addition of Buspar    Diagnosis  ADHD  Major depressive disorder, recurrent, mild  Anxiety    Plan:  Medication: continue current medication  OTC Recommendations: none  Lab Orders:  none  Referrals: none  Release of Information: none  Future Treatment Considerations:per symptoms  Return for Follow Up:6 months, sooner if changes needed.  Will call for [facility] in 3 months for another 3 scripts.   The risks, benefits, alternatives and side effects have been discussed and are understood by the patient. The patient understands the risks of using street drugs or alcohol. There are no medical contraindications, the patient agrees to treatment, and has the capacity to do so. The patient understands to call 911 or come to the nearest ED if life threatening or urgent symptoms present.  In addition time was spent counseling the patient and/or coordinating care regarding review of social and occupational functioning.  In addition patient was counseled on " health and wellness practices to augment medication treatment of symptoms. See note for details.    Kristina Kilgore, APRN CNS 8/9/2018

## 2018-08-09 NOTE — MR AVS SNAPSHOT
After Visit Summary   8/9/2018    Mamie Rice    MRN: [id number]           Patient Information     Date Of Birth          1995        Visit Information        Provider Department      8/9/2018 9:45 AM Kristina Kilgore APRN [facility]        Today's Diagnoses     Persistent depressive disorder    -  1    Mild episode of recurrent major depressive disorder (H)        Insomnia, unspecified type        MECHE (generalized anxiety disorder)        Attention deficit hyperactivity disorder (ADHD), combined type        Major depressive disorder, recurrent episode, mild (H)        Cystic fibrosis with pulmonary manifestations (H)        Chronic pain of left knee        Attention deficit hyperactivity disorder (ADHD), predominantly inattentive type           Follow-ups after your visit        Your next 10 appointments already scheduled     Sep 04, 2018 10:30 AM CDT   (Arrive by 10:15 AM)   Return Visit with Patrizia Monroe PA-C   [facility] ([facility])    [address] [place] [address] [id number] [phone]            Nov 06, 2018  7:45 AM CST   XR CHEST 2 VIEWS with UCXR1   [facility] ([facility])    [address] [place] [address] [id number] [phone]           Please bring a list of your current medicines to your exam. (Include vitamins, minerals and over-thecounter medicines.) Leave your valuables at home.  Tell your doctor if there is a chance you may be pregnant.  You do not need to do anything special for this exam.            Nov 06, 2018 11:00 AM CST   PFT VISIT with [facility] ([facility])    [address] [place] [address] [id number] [phone]            Nov 06, 2018 11:20 AM CST   (Arrive by 11:05 AM)   RETURN CYSTIC FIBROSIS VISIT with Gavi Muñiz  MD [place]   [facility] ([facility])    [address]  [address]  [facility] [id number] [phone]            Nov 13, 2018 11:45 AM CST   (Arrive by 11:30 AM)   Return Weight Management Visit with Randa Ho MD   [facility] ([facility])    [address]  [address]  [facility] [place] [facility] [address] [phone]              Who to contact     Please call your clinic at [phone] to:    Ask questions about your health    Make or cancel appointments    Discuss your medicines    Learn about your test results    Speak to your doctor            Additional Information About Your Visit        [email or web address] Information     [email or web address] gives you secure access to your electronic health record. If you see a primary care provider, you can also send messages to your care team and make appointments. If you have questions, please call your primary care clinic.  If you do not have a primary care provider, please call [phone] and they will assist you.      [email or web address] is an electronic gateway that provides easy, online access to your medical records. With [email or web address], you can request a clinic appointment, read your test results, renew a prescription or communicate with your care team.     To access your existing account, please contact your [facility] or call [phone] for assistance.        Care EveryWhere ID     This is your Care EveryWhere ID. This could be used by other organizations to access your [place] medical records  [id number]        Your Vitals Were     Pulse BMI (Body Mass Index)                90 30.82 kg/m2           Blood Pressure from Last 3 Encounters:   08/09/18 109/55   08/07/18 113/68   07/18/18 118/81    Weight from Last 3 Encounters:   08/09/18 77.9 kg (171 lb 12.8 oz)   08/07/18 77.8 kg (171 lb 9.6 oz)   08/07/18 77.1 kg (170 lb)               Today, you had the following     No orders found for display         Today's Medication Changes          These changes are accurate as of 8/9/18 11:59 PM.  If you have any questions, ask your nurse or doctor.               These medicines have changed or have updated prescriptions.        Dose/Directions    * amphetamine-dextroamphetamine 20 MG per 24 hr capsule   Commonly known as:  ADDERALL XR   This may have changed:  Another medication with the same name was changed. Make sure you understand how and when to take each.   Used for:  Attention deficit hyperactivity disorder (ADHD), predominantly inattentive type   Changed by:  Kristina Kilgore APRN CNS        Dose:  20 mg   Take 1 capsule (20 mg) by mouth daily   Quantity:  30 capsule   Refills:  0       * amphetamine-dextroamphetamine 20 MG per 24 hr capsule   Commonly known as:  ADDERALL XR   This may have changed:  These instructions start on 9/6/2018. If you are unsure what to do until then, ask your doctor or other care provider.   Used for:  Attention deficit hyperactivity disorder (ADHD), predominantly inattentive type   Changed by:  Kristina Kilgore APRN CNS        Dose:  20 mg   Start taking on:  9/6/2018   Take 1 capsule (20 mg) by mouth daily   Quantity:  30 capsule   Refills:  0       * amphetamine-dextroamphetamine 20 MG per 24 hr capsule   Commonly known as:  ADDERALL XR   This may have changed:  These instructions start on 10/4/2018. If you are unsure what to do until then, ask your doctor or other care provider.   Used for:  Attention deficit hyperactivity disorder (ADHD), predominantly inattentive type   Changed by:  Kristina Kilgore APRN CNS        Dose:  20 mg   Start taking on:  10/4/2018   Take 1 capsule (20 mg) by mouth daily   Quantity:  30 capsule   Refills:  0       busPIRone 15 MG tablet   Commonly known as:  BUSPAR   This may have changed:    - how much to take  - how to take this  - when to take this  - additional  instructions   Used for:  MECHE (generalized anxiety disorder)   Changed by:  Kristina Kilgore APRN CNS        Dose:  15 mg   Take 1 tablet (15 mg) by mouth 2 times daily   Quantity:  60 tablet   Refills:  5       * Notice:  This list has 3 medication(s) that are the same as other medications prescribed for you. Read the directions carefully, and ask your doctor or other care provider to review them with you.         Where to get your medicines      These medications were sent to [facility] - [address] [address]     Phone:  [phone]     buPROPion 150 MG 12 hr tablet    busPIRone 15 MG tablet    FLUoxetine 40 MG capsule    hydrOXYzine 25 MG tablet    traZODone 100 MG tablet         Some of these will need a paper prescription and others can be bought over the counter.  Ask your nurse if you have questions.     Bring a paper prescription for each of these medications     amphetamine-dextroamphetamine 20 MG per 24 hr capsule    amphetamine-dextroamphetamine 20 MG per 24 hr capsule    amphetamine-dextroamphetamine 20 MG per 24 hr capsule                Primary Care Provider Office Phone # Fax #    Malik De La Rosa [phone]       [address] [facility] [address]        Equal Access to Services     LUZMARIA MARTINEZ AH: Vinita yuo Soyesenia, waaxda luqadaha, qaybta kaalmada adeegyada, david cardoso. So [facility] [phone].    ATENCIÓN: Si habla español, tiene a hidalgo disposición servicios gratuitos de asistencia lingüística. stephen al [phone].    We comply with applicable federal civil rights laws and [place] laws. We do not discriminate on the basis of race, color, national origin, age, disability, sex, sexual orientation, or gender identity.            Thank you!     Thank you for choosing [facility]  for your care. Our goal is always to provide you with  excellent care. Hearing back from our patients is one way we can continue to improve our services. Please take a few minutes to complete the written survey that you may receive in the mail after your visit with us. Thank you!             Your Updated Medication List - Protect others around you: Learn how to safely use, store and throw away your medicines at www.disposemymeds.org.          This list is accurate as of 8/9/18 11:59 PM.  Always use your most recent med list.                   Brand Name Dispense Instructions for use Diagnosis    acetaminophen 325 MG tablet    TYLENOL    100 tablet    Take 2 tablets (650 mg) by mouth every 4 hours as needed for other (mild pain)    Chronic pain of left knee       acetylcysteine 20 % nebulizer solution    MUCOMYST    360 mL    INHALE ONE 4ML NEB INTO LUNGS VIA NEBULIZER TWO TIMES A DAY, MAY INCREASE TO 3-4 TIMES A DAY WITH INCREASE COUGH/COLD SYMPTOMS    CF (cystic fibrosis) (H)       * albuterol (2.5 MG/3ML) 0.083% neb solution     360 mL    Take 1 vial (2.5 mg) by nebulization 4 times daily    CF (cystic fibrosis) (H)       * albuterol 108 (90 Base) MCG/ACT inhaler    PROAIR HFA/PROVENTIL HFA/VENTOLIN HFA    1 Inhaler    Inhale 2 puffs into the lungs every 6 hours as needed for shortness of breath / dyspnea or wheezing    Cystic fibrosis with pulmonary manifestations (H), Sinusitis, chronic, Diabetes mellitus type 1 (H)       aluminum chloride 20 % external solution    DRYSOL    60 mL    Apply topically At Bedtime Everyother night to every 3rd night. With impovement, decrease to 1-2 times weekly. Irritation may occur.    Focal hyperhidrosis       * amphetamine-dextroamphetamine 20 MG per 24 hr capsule    ADDERALL XR    30 capsule    Take 1 capsule (20 mg) by mouth daily    Attention deficit hyperactivity disorder (ADHD), predominantly inattentive type       * amphetamine-dextroamphetamine 20 MG per 24 hr capsule   Start taking on:  9/6/2018    ADDERALL XR    30 capsule     Take 1 capsule (20 mg) by mouth daily    Attention deficit hyperactivity disorder (ADHD), predominantly inattentive type       * amphetamine-dextroamphetamine 20 MG per 24 hr capsule   Start taking on:  10/4/2018    ADDERALL XR    30 capsule    Take 1 capsule (20 mg) by mouth daily    Attention deficit hyperactivity disorder (ADHD), predominantly inattentive type       ascorbic acid 500 MG tablet    VITAMIN C    100 tablet    TAKE ONE TABLET BY MOUTH TWICE A DAY    Cystic fibrosis with pulmonary manifestations (H)       azithromycin 500 MG tablet    ZITHROMAX    36 tablet    TAKE ONE TABLET BY MOUTH ON MONDAY, WEDNESDAY AND FRIDAY    CF (cystic fibrosis) (H)       beta carotene 90215 UNIT capsule     36 capsule    TAKE ONE CAPSULE BY MOUTH ON MON, WED AND FRIDAY MORNING    Cystic fibrosis with pulmonary manifestations (H)       blood glucose monitoring test strip    ACCU-CHEK SMARTVIEW    1 Month    Use to test blood sugar 4 times daily or as directed.    Type I (juvenile type) diabetes mellitus without mention of complication, not stated as uncontrolled       buPROPion 150 MG 12 hr tablet    WELLBUTRIN SR    60 tablet    Take 1 tablet (150 mg) by mouth 2 times daily    Major depressive disorder, recurrent episode, mild (H)       busPIRone 15 MG tablet    BUSPAR    60 tablet    Take 1 tablet (15 mg) by mouth 2 times daily    MECHE (generalized anxiety disorder)       cetirizine 10 MG tablet    zyrTEC    30 tablet    Take 1 tablet (10 mg) by mouth every evening    Allergic rhinitis due to [employer] house dust mite, Urticaria, chronic       cholecalciferol 1000 UNIT tablet    vitamin D3    100 tablet    TAKE 1 TABLET BY MOUTH EVERY DAY    CF (cystic fibrosis) (H), Exocrine pancreatic insufficiency       CLARAVIS 40 MG capsule   Generic drug:  ISOtretinoin      TK 2 PO D        DEPO-PROVERA IM           dornase alpha 1 MG/ML neb solution    PULMOZYME    75 mL    Inhale 2.5 mg into the lungs daily    Cystic fibrosis  with pulmonary manifestations (H)       FLUoxetine 40 MG capsule    PROZAC    60 capsule    Take 2 capsules (80 mg) by mouth daily    Cystic fibrosis with pulmonary manifestations (H)       GLYCOPYRROLATE PO      Take 1 mg by mouth 2 times daily        hydrOXYzine 25 MG tablet    ATARAX    30 tablet    Take one tablet by mouth at bedtime for insomnia    Chronic pain of left knee       MEPHYTON 5 MG tablet   Generic drug:  phytonadione     4 tablet    TAKE ONE TABLET BY MOUTH ONCE A WEEK    Cystic fibrosis with pulmonary manifestations (H), Cystic fibrosis with pulmonary manifestations (H), Aspergillosis (H), Pancreatic insufficiency       * meropenem 500 MG vial    MERREM    60 each         * meropenem 500 MG vial    MERREM    1 each    Give x 1 today        * meropenem 500 MG vial    MERREM    60 each    500 mg by Nasal Instillation route every 8 hours    Chronic maxillary sinusitis, Cystic fibrosis of the lung (H)       * meropenem 500 MG vial    MERREM    60 each    500 mg by Nasal Instillation route once for 1 dose        methylcellulose (laxative) Powd    CITRUCEL    479 g    Start with 1 heaping tablespoon. Increase as needed, 1 heaping tablespoon at a time, up to 3 times per day.    Other constipation       montelukast 10 MG tablet    SINGULAIR    30 tablet    TAKE 1 TABLET BY MOUTH DAILY AT BEDTIME    CF (cystic fibrosis) (H)       MULTIVITAMIN PO      Take 1 tablet by mouth daily.        mupirocin 2 % ointment    BACTROBAN     USE BID TO AFFECTED AREA ON THE LIPS        naltrexone 50 MG tablet    DEPADE;REVIA    90 tablet    Take 1 tablet.  Time it one to two hours prior to worst cravings.    Class 1 obesity due to excess calories without serious comorbidity with body mass index (BMI) of 30.0 to 30.9 in adult       omeprazole 20 MG CR capsule    priLOSEC    60 capsule    TAKE 1 CAPSULE BY MOUTH TWICE A DAY    CF (cystic fibrosis) (H)       polyethylene glycol powder    MIRALAX    119 g    Take 17 g (1  capful) by mouth daily as needed for constipation    Cystic fibrosis of the lung (H), Constipation, unspecified constipation type       traZODone 100 MG tablet    DESYREL    30 tablet    Take 1 tablet (100 mg) by mouth At Bedtime    Insomnia, unspecified type       vitamin E 400 UNIT capsule     60 capsule    TAKE 1 CAPSULE BY MOUTH TWICE A DAY    CF (cystic fibrosis) (H), Pancreatic insufficiency       * Notice:  This list has 9 medication(s) that are the same as other medications prescribed for you. Read the directions carefully, and ask your doctor or other care provider to review them with you.

## 2018-08-09 NOTE — TELEPHONE ENCOUNTER
Called and left message for pharmacist clarifying dosing instructions. Advised for patient to take up to 1 full 50mg tablet in 24 hours period (not to exceed). Direct number left for further questions.

## 2018-08-10 ASSESSMENT — PATIENT HEALTH QUESTIONNAIRE - PHQ9: SUM OF ALL RESPONSES TO PHQ QUESTIONS 1-9: 12

## 2018-08-29 ENCOUNTER — DOCUMENTATION ONLY (OUTPATIENT)
Dept: PULMONOLOGY | Facility: CLINIC | Age: 23
End: 2018-08-29

## 2018-08-29 DIAGNOSIS — E84.9 CF (CYSTIC FIBROSIS) (H): ICD-10-CM

## 2018-08-29 DIAGNOSIS — K86.89 PANCREATIC INSUFFICIENCY: ICD-10-CM

## 2018-08-29 RX ORDER — VITAMIN E 268 MG
CAPSULE ORAL
Qty: 100 CAPSULE | Refills: 11 | Status: SHIPPED | OUTPATIENT
Start: 2018-08-29 | End: 2019-10-18

## 2018-08-29 NOTE — NURSING NOTE
"I met with Mamie and her mother today for a monarch vest fitting. Mamie is under 5'3\" and will need shoulder pads and fitting with hill rom. 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 She would benefit from access to therapy while traveling for dance team, and for personal trips like camping. Mamie wore the monarch for 20 minutes at various settings. I explained the settings, operation, and ordering process. Mamie would like to proceed in getting the monarch vest. We will continue to support her as needed.   "

## 2018-08-30 DIAGNOSIS — E84.9 CF (CYSTIC FIBROSIS) (H): ICD-10-CM

## 2018-09-04 RX ORDER — AZITHROMYCIN 500 MG/1
TABLET, FILM COATED ORAL
Qty: 36 TABLET | Refills: 4 | Status: SHIPPED | OUTPATIENT
Start: 2018-09-04 | End: 2019-09-05

## 2018-09-06 DIAGNOSIS — E84.9 CF (CYSTIC FIBROSIS) (H): ICD-10-CM

## 2018-10-24 DIAGNOSIS — E84.0 CYSTIC FIBROSIS WITH PULMONARY MANIFESTATIONS (H): ICD-10-CM

## 2018-10-24 RX ORDER — ASCORBIC ACID 500 MG
TABLET ORAL
Qty: 100 TABLET | Refills: 3 | Status: SHIPPED | OUTPATIENT
Start: 2018-10-24 | End: 2019-07-01

## 2018-11-05 ENCOUNTER — PATIENT OUTREACH (OUTPATIENT)
Dept: CARE COORDINATION | Facility: CLINIC | Age: 23
End: 2018-11-05

## 2018-11-06 ENCOUNTER — RADIANT APPOINTMENT (OUTPATIENT)
Dept: GENERAL RADIOLOGY | Facility: CLINIC | Age: 23
End: 2018-11-06
Payer: COMMERCIAL

## 2018-11-06 ENCOUNTER — OFFICE VISIT (OUTPATIENT)
Dept: PULMONOLOGY | Facility: CLINIC | Age: 23
End: 2018-11-06
Attending: INTERNAL MEDICINE
Payer: COMMERCIAL

## 2018-11-06 VITALS
RESPIRATION RATE: 16 BRPM | DIASTOLIC BLOOD PRESSURE: 77 MMHG | OXYGEN SATURATION: 97 % | SYSTOLIC BLOOD PRESSURE: 115 MMHG | WEIGHT: 171.3 LBS | BODY MASS INDEX: 30.35 KG/M2 | HEIGHT: 63 IN | HEART RATE: 94 BPM

## 2018-11-06 DIAGNOSIS — E84.0 CYSTIC FIBROSIS WITH PULMONARY MANIFESTATIONS (H): ICD-10-CM

## 2018-11-06 DIAGNOSIS — E84.9 CYSTIC FIBROSIS (H): Primary | ICD-10-CM

## 2018-11-06 DIAGNOSIS — E84.0 CYSTIC FIBROSIS OF THE LUNG (H): Primary | ICD-10-CM

## 2018-11-06 LAB
EXPTIME-PRE: 5.65 SEC
FEF2575-%PRED-PRE: 112 %
FEF2575-PRE: 4.06 L/SEC
FEF2575-PRED: 3.61 L/SEC
FEFMAX-%PRED-PRE: 135 %
FEFMAX-PRE: 9 L/SEC
FEFMAX-PRED: 6.64 L/SEC
FEV1-%PRED-PRE: 97 %
FEV1-PRE: 2.98 L
FEV1FEV6-PRE: 87 %
FEV1FEV6-PRED: 86 %
FEV1FVC-PRE: 88 %
FEV1FVC-PRED: 85 %
FIFMAX-PRE: 4.76 L/SEC
FVC-%PRED-PRE: 95 %
FVC-PRE: 3.41 L
FVC-PRED: 3.55 L

## 2018-11-06 PROCEDURE — G0463 HOSPITAL OUTPT CLINIC VISIT: HCPCS | Mod: ZF

## 2018-11-06 ASSESSMENT — PAIN SCALES - GENERAL: PAINLEVEL: NO PAIN (0)

## 2018-11-06 NOTE — LETTER
11/6/2018       RE: Mamie Rice  [address] [facility] [address]     Dear Colleague,    Thank you for referring your patient, Mamie Rice, to the [facility] at [facility]. Please see a copy of my visit note below.    [facility]  November 6, 2018         Assessment and Plan:   Mamie Rice is a 23 year old female with cystic fibrosis.    1. CF lung disease with normal spirometry:  Mamie reports that she has been feeling well.  She has no specific concerns related to her lungs.  She does continue to show evidence of Staph aureus in her sputum.  At this time, I would recommend:   -- That she continue on her present bronchial drainage and nebulized therapies.     2.  Cystic fibrosis sinus disease.  Mamie admits that she has not been as diligent with her sinus maintenance.  She had been seeing a Dr. Haile every month.  She skipped a couple of months and now notices some increasing congestion.  She will make an appointment with her in the next couple of months.     3.  History of abnormal glucose tolerance.  Mamie has not been checking blood sugars but reports no symptoms that are concerning to her.  We will do an oral glucose tolerance test sometime in the next couple of months.     4.  Weight maintenance.  Mamie is a bit disappointed that her weight has not decreased.  She does remain on naltrexone and [facility].  I defer to them for management of this.     5.  Mood.  Mamie is followed by Psychiatry at our [facility].  She is on several medications which they manage.     6.  Psychosocial.  Mamie continues to live at home with her mom.  She reports that the relationship is good.  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     7. Pancreatic Insufficiency:  The patient has no  new symptoms consistent with worsening malabsorption.    - continue the present dose of pancreatic enzymes  - continue vitamin supplementation.    HCM: 12/2018  Immunizations: 11/2/2018  Colonoscopy: NA    Gavi Allison MD MPH  Associate Professor of Medicine  Pulmonary, Allergy, Critical Care and Sleep Medicine      Interval History:     Mamie does report that her cough frequency and sputum volume are stable.  Her sputum is green in color.  Her shortness of breath is unchanged.  She performs 2 vest therapies per day.          Review of Systems:     CONSTITUTIONAL: no fever, no chills, no change in weight, no change in energy, no change in appetite    INTEGUMENTARY/SKIN: no rash, no obvious new lesions    ENT/MOUTH: no sore throat, no new sinus pain, no new nasal drainage, increased congestion, no hearing loss, no ear ringing     RESPIRATORY: see interval history    CV: no chest pain, no palpitations, no peripheral edema, no orthopnea, no PND    GI: no nausea, no vomiting, no change in stools, no fatty stools, no GERD, no abdominal pain    : no dysuria, no urinary frequency    MUSCULOSKELETAL: no myalgias, no arthralgias    ENDOCRINE: no excessive thirst    NEURO:  No headache, no numbness, no tingling    SLEEP: no issues    PSYCHIATRIC: mood stable          Past Medical and Surgical History:     Past Medical History:   Diagnosis Date     ADHD (attention deficit hyperactivity disorder)      Anxiety      Aspergillosis, with pneumonia (H)     fugus found caused chest pain     Chronic infection     CF, MRSA.,      Chronic sinusitis      Constipation, chronic      Cystic fibrosis with pulmonary manifestations (H) 12/19/2011     Cystic fibrosis without mention of meconium ileus     SWEAT TEST:Date: 2/17/1994   [facility]  296 mg, 104 mmol/L ClSample #2  295 mg, 104 mmol/L Cl GENOTYPING:Date: 10/15/2007,  Laboratory: [facility]: [id number]     Depressive disorder      Diabetes     no meds  currently     Dysthymic disorder      Exocrine pancreatic insufficiency      Gastro-oesophageal reflux disease      Hip pain, right      MRSA (methicillin resistant Staphylococcus aureus) carrier      Pancreatic disease      Past Surgical History:   Procedure Laterality Date     ARTHROSCOPY HIP, OSTEOPLASTY FEMUR PROXIMAL, COMBINED  3/11/2013    Procedure: COMBINED ARTHROSCOPY HIP, OSTEOPLASTY FEMUR PROXIMAL;  Right Hip Arthroscopy, Labral  Debridement.    surgeon request choice anesthesia/admit to [facility] after surgery;  Surgeon: Omkar Austin MD;  [facility]: [facility]     ARTHROSCOPY KNEE WITH MEDIAL MENISCECTOMY Left 1/31/2017    Procedure: ARTHROSCOPY KNEE WITH MEDIAL MENISCECTOMY;  Surgeon: Jethro Coyle MD;  [facility]: [facility] OR     bronchoscopies       BRONCHOSCOPY       EXAM UNDER ANESTHESIA ANUS N/A 5/10/2016    Procedure: EXAM UNDER ANESTHESIA ANUS;  Surgeon: Chet Gaviria MD;  [facility]     EXAM UNDER ANESTHESIA, RESTORATIONS, EXTRACTION(S) DENTAL COMPLEX, COMBINED  5/13/2013    Procedure: COMBINED EXAM UNDER ANESTHESIA, RESTORATIONS, EXTRACTION(S) DENTAL COMPLEX;  Dental Exam, Radiographs, Restorations. Single Extraction  Tooth #2. Restorations x 3;  Surgeon: Danilo Ortiz DDS;  Location: [facility] OR     HC KNEE SCOPE, DIAGNOSTIC      Arthroscopy, Knee- left     INJECT BOTOX N/A 5/10/2016    Procedure: INJECT BOTOX;  Surgeon: Chet Gaviria MD;  [facility]     left hip labral tear  5/11/2011    left hip arthroscopy with labral debridement and synovectomy     meniscus repair       OPTICAL TRACKING SYSTEM ENDOSCOPIC SINUS SURGERY  10/14/2011    Procedure:OPTICAL TRACKING SYSTEM ENDOSCOPIC SINUS SURGERY; FESS (functional endoscopic sinus surgery) with Image Guidance, bronchial lavage and cultures; Surgeon:JUAN JOSE GARCIA; Location:[facility] OR     OPTICAL TRACKING SYSTEM ENDOSCOPIC SINUS SURGERY  5/18/2012    Procedure:OPTICAL TRACKING SYSTEM ENDOSCOPIC SINUS SURGERY; Right   and Left Image Guided Functional Endoscopic Sinus Surgery With  Frontal Approach, [employer]; Surgeon:GOYO KUO; Location:[facility] OR     OPTICAL TRACKING SYSTEM ENDOSCOPIC SINUS SURGERY  9/26/2012    Procedure: OPTICAL TRACKING SYSTEM ENDOSCOPIC SINUS SURGERY;  Stealth Guided Bilateral Functional Endoscopic Sinus Surgery *Latex Safe*;  Surgeon: oGyo Kuo MD;  [facility]     OPTICAL TRACKING SYSTEM ENDOSCOPIC SINUS SURGERY Bilateral 10/16/2015    Procedure: OPTICAL TRACKING SYSTEM ENDOSCOPIC SINUS SURGERY;  Surgeon: Mariela Haile MD;  [facility]     ORTHOPEDIC SURGERY      left hip tear repair 2010     SINUS SURGERY             Family History:     Family History   Problem Relation Age of Onset     Cancer Paternal Grandmother      Skin Cancer Paternal Grandmother      Other Cancer Paternal Grandmother      Skin     Obesity Paternal Grandmother      Cancer Paternal Grandfather      PGF had throat cancer (he was a smoker)     Other Cancer Paternal Grandfather      Anxiety Disorder Paternal Grandfather      Thyroid Disease Mother      ,     Obesity Other      Anesthesia Reaction No family hx of      Blood Disease No family hx of      Colon Polyps No family hx of      Crohn Disease No family hx of      Ulcerative Colitis No family hx of      Colon Cancer No family hx of      Melanoma No family hx of             Social History:     Social History     Social History     Marital status: Single     Spouse name: N/A     Number of children: N/A     Years of education: N/A     Occupational History     Not on file.     Social History Main Topics     Smoking status: Never Smoker     Smokeless tobacco: Never Used      Comment: one person at home smokes outside     Alcohol use No     Drug use: No     Sexual activity: No     Other Topics Concern     Blood Transfusions No     Exercise Yes     Social History Narrative    Mamie lives with mother in [place], [place].  There is a cat in the home, but Mamie does not have any  litterbox duties.  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  She gets essentially no exercise because of the tingling in her feet (says it bothers her even to stand).        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        8/2015 no change in social situation        2/15/2016 Pt is single and lives with mother and stepfather.            Medications:     Current Outpatient Prescriptions   Medication     acetaminophen (TYLENOL) 325 MG tablet     acetylcysteine (MUCOMYST) 20 % nebulizer solution     albuterol (2.5 MG/3ML) 0.083% neb solution     albuterol (PROAIR HFA/PROVENTIL HFA/VENTOLIN HFA) 108 (90 Base) MCG/ACT Inhaler     aluminum chloride (DRYSOL) 20 % external solution     amphetamine-dextroamphetamine (ADDERALL XR) 20 MG per 24 hr capsule     amphetamine-dextroamphetamine (ADDERALL XR) 20 MG per 24 hr capsule     amphetamine-dextroamphetamine (ADDERALL XR) 20 MG per 24 hr capsule     ascorbic acid (VITAMIN C) 500 MG tablet     azithromycin (ZITHROMAX) 500 MG tablet     beta carotene 25595 UNIT capsule     blood glucose (ACCU-CHEK SMARTVIEW) test strip     buPROPion (WELLBUTRIN SR) 150 MG 12 hr tablet     busPIRone (BUSPAR) 15 MG tablet     cetirizine (ZYRTEC) 10 MG tablet     FLUoxetine (PROZAC) 40 MG capsule     GLYCOPYRROLATE PO     hydrOXYzine (ATARAX) 25 MG tablet     MedroxyPROGESTERone Acetate (DEPO-PROVERA IM)     MEPHYTON 5 MG tablet     meropenem (MERREM) 500 MG vial     meropenem (MERREM) 500 MG vial     meropenem (MERREM) 500 MG vial     meropenem (MERREM) 500 MG vial     methylcellulose, laxative, (CITRUCEL) POWD     montelukast (SINGULAIR) 10 MG tablet     Multiple Vitamin (MULTIVITAMIN OR)     naltrexone (DEPADE;REVIA) 50 MG tablet     omeprazole (PRILOSEC) 20 MG CR capsule     polyethylene glycol (MIRALAX) powder     traZODone (DESYREL) 100 MG tablet     VITAMIN D3 1000 UNITS tablet     vitamin E 400 UNIT capsule  "    No current facility-administered medications for this visit.      Facility-Administered Medications Ordered in Other Visits   Medication     albuterol (PROAIR HFA, PROVENTIL HFA, VENTOLIN HFA) inhaler            Physical Exam:   /77  Pulse 94  Resp 16  Ht 1.588 m (5' 2.5\")  Wt 77.7 kg (171 lb 4.8 oz)  SpO2 97%  BMI 30.83 kg/m2    Constitutional:   Awake, alert and in no apparent distress     Eyes:   nonicteric     ENT:   oral mucosa moist without lesions, normal tm bilaterally, bilateral mucosa normal     Neck:   Supple without supraclavicular or cervical lymphadenopathy     Lungs:   Good air flow.  No crackles. No rhonchi.  No wheezes.     Cardiovascular:   Normal S1 and S2.  RRR.  No murmur, gallop or rub.     Abdomen:   NABS, soft, nontender, nondistended.       Musculoskeletal:   No edema, digital clubbing present     Neurologic:   Alert and conversant.     Skin:   Warm, dry.  No rash on limited exam.             Data:   All laboratory and imaging data reviewed.    Cystic Fibrosis Culture  Specimen Description   Date Value Ref Range Status   11/06/2018 Sputum  Final   07/18/2018 Throat  Final   04/18/2018 Midstream Urine  Final    Culture Micro   Date Value Ref Range Status   11/06/2018 Moderate growth  Normal roberto carlos    Preliminary   11/06/2018 (A)  Preliminary    Heavy growth  Staphylococcus aureus  This isolate is presumed to be clindamycin resistant based on detection of inducible   clindamycin resistance. Erythromycin and clindamycin are resistant, therefore, they are   not recommended for use.     11/06/2018 (A)  Preliminary    Heavy growth  Strain 2  Staphylococcus aureus  Susceptibility testing in progress     11/06/2018 (A)  Preliminary    Heavy growth  Strain 3  Staphylococcus aureus  This isolate is presumed to be clindamycin resistant based on detection of inducible   clindamycin resistance. Erythromycin and clindamycin are resistant, therefore, they are   not recommended for use.      "     Recent Results (from the past 168 hour(s))   General PFT Lab (Please always keep checked)    Collection Time: 11/06/18 10:11 AM   Result Value Ref Range    FVC-Pred 3.55 L    FVC-Pre 3.41 L    FVC-%Pred-Pre 95 %    FEV1-Pre 2.98 L    FEV1-%Pred-Pre 97 %    FEV1FVC-Pred 85 %    FEV1FVC-Pre 88 %    FEFMax-Pred 6.64 L/sec    FEFMax-Pre 9.00 L/sec    FEFMax-%Pred-Pre 135 %    FEF2575-Pred 3.61 L/sec    FEF2575-Pre 4.06 L/sec    MNG4122-%Pred-Pre 112 %    ExpTime-Pre 5.65 sec    FIFMax-Pre 4.76 L/sec    FEV1FEV6-Pred 86 %    FEV1FEV6-Pre 87 %   Cystic Fibrosis Culture Aerob Bacterial    Collection Time: 11/06/18 12:20 PM   Result Value Ref Range    Specimen Description Sputum     Culture Micro Moderate growth  Normal roberto carlos       Culture Micro (A)      Heavy growth  Staphylococcus aureus  This isolate is presumed to be clindamycin resistant based on detection of inducible   clindamycin resistance. Erythromycin and clindamycin are resistant, therefore, they are   not recommended for use.      Culture Micro (A)      Heavy growth  Strain 2  Staphylococcus aureus  Susceptibility testing in progress      Culture Micro (A)      Heavy growth  Strain 3  Staphylococcus aureus  This isolate is presumed to be clindamycin resistant based on detection of inducible   clindamycin resistance. Erythromycin and clindamycin are resistant, therefore, they are   not recommended for use.         Susceptibility    Staphylococcus aureus - ROCHELLE     CLINDAMYCIN  Resistant ug/mL     ERYTHROMYCIN >=8 Resistant ug/mL     GENTAMICIN <=0.5 Sensitive ug/mL     OXACILLIN 0.5 Sensitive ug/mL     PENICILLIN  Resistant ug/mL     TETRACYCLINE <=1 Sensitive ug/mL     Trimethoprim/Sulfa <=0.5/9.5 Sensitive ug/mL     VANCOMYCIN 1 Sensitive ug/mL    Staphylococcus aureus - ROCHELLE     CLINDAMYCIN  Resistant ug/mL     ERYTHROMYCIN >=8 Resistant ug/mL     GENTAMICIN <=0.5 Sensitive ug/mL     OXACILLIN 0.5 Sensitive ug/mL     PENICILLIN  Resistant ug/mL      TETRACYCLINE <=1 Sensitive ug/mL     Trimethoprim/Sulfa <=0.5/9.5 Sensitive ug/mL     VANCOMYCIN 1 Sensitive ug/mL       PFT: The spirometry is normal.  When compared to 7/18/2018, the FEV1 and FVC have little change.      Pulmonary exacerbation: absent        Again, thank you for allowing me to participate in the care of your patient.      Sincerely,    Gavi Allison MD

## 2018-11-06 NOTE — PROGRESS NOTES
[facility]  November 6, 2018         Assessment and Plan:   Mamie Rice is a 23 year old female with cystic fibrosis.    1. CF lung disease with normal spirometry:  Mamie reports that she has been feeling well.  She has no specific concerns related to her lungs.  She does continue to show evidence of Staph aureus in her sputum.  At this time, I would recommend:   -- That she continue on her present bronchial drainage and nebulized therapies.     2.  Cystic fibrosis sinus disease.  Mamie admits that she has not been as diligent with her sinus maintenance.  She had been seeing a Dr. Haile every month.  She skipped a couple of months and now notices some increasing congestion.  She will make an appointment with her in the next couple of months.     3.  History of abnormal glucose tolerance.  Mamie has not been checking blood sugars but reports no symptoms that are concerning to her.  We will do an oral glucose tolerance test sometime in the next couple of months.     4.  Weight maintenance.  Mamie is a bit disappointed that her weight has not decreased.  She does remain on naltrexone and [facility].  I defer to them for management of this.     5.  Mood.  Mamie is followed by Psychiatry at our [facility].  She is on several medications which they manage.     6.  Psychosocial.  Mamie continues to live at home with her mom.  She reports that the relationship is good.  She does continue to  dance at the local high school and work in  at the local [employer].     7. Pancreatic Insufficiency:  The patient has no new symptoms consistent with worsening malabsorption.    - continue the present dose of pancreatic enzymes  - continue vitamin supplementation.    HCM: 12/2018  Immunizations: 11/2/2018  Colonoscopy: NA    Gavi Allison MD MPH  Associate Professor of Medicine  Pulmonary, Allergy, Critical Care and  Sleep Medicine      Interval History:     Mamie does report that her cough frequency and sputum volume are stable.  Her sputum is green in color.  Her shortness of breath is unchanged.  She performs 2 vest therapies per day.          Review of Systems:     CONSTITUTIONAL: no fever, no chills, no change in weight, no change in energy, no change in appetite    INTEGUMENTARY/SKIN: no rash, no obvious new lesions    ENT/MOUTH: no sore throat, no new sinus pain, no new nasal drainage, increased congestion, no hearing loss, no ear ringing     RESPIRATORY: see interval history    CV: no chest pain, no palpitations, no peripheral edema, no orthopnea, no PND    GI: no nausea, no vomiting, no change in stools, no fatty stools, no GERD, no abdominal pain    : no dysuria, no urinary frequency    MUSCULOSKELETAL: no myalgias, no arthralgias    ENDOCRINE: no excessive thirst    NEURO:  No headache, no numbness, no tingling    SLEEP: no issues    PSYCHIATRIC: mood stable          Past Medical and Surgical History:     Past Medical History:   Diagnosis Date     ADHD (attention deficit hyperactivity disorder)      Anxiety      Aspergillosis, with pneumonia (H)     fugus found caused chest pain     Chronic infection     CF, MRSA.,      Chronic sinusitis      Constipation, chronic      Cystic fibrosis with pulmonary manifestations (H) 12/19/2011     Cystic fibrosis without mention of meconium ileus     SWEAT TEST:Date: 2/17/1994   [facility]  296 mg, 104 mmol/L ClSample #2  295 mg, 104 mmol/L Cl GENOTYPING:Date: 10/15/2007,  Laboratory: [facility]: [id number]     Depressive disorder      Diabetes     no meds currently     Dysthymic disorder      Exocrine pancreatic insufficiency      Gastro-oesophageal reflux disease      Hip pain, right      MRSA (methicillin resistant Staphylococcus aureus) carrier      Pancreatic disease      Past Surgical History:   Procedure Laterality Date     ARTHROSCOPY HIP,  OSTEOPLASTY FEMUR PROXIMAL, COMBINED  3/11/2013    Procedure: COMBINED ARTHROSCOPY HIP, OSTEOPLASTY FEMUR PROXIMAL;  Right Hip Arthroscopy, Labral  Debridement.    surgeon request choice anesthesia/admit to [facility] after surgery;  Surgeon: Omkar Austin MD;  Location: [facility] OR     ARTHROSCOPY KNEE WITH MEDIAL MENISCECTOMY Left 1/31/2017    Procedure: ARTHROSCOPY KNEE WITH MEDIAL MENISCECTOMY;  Surgeon: Jethro Coyle MD;  Location: [facility] OR     bronchoscopies       BRONCHOSCOPY       EXAM UNDER ANESTHESIA ANUS N/A 5/10/2016    Procedure: EXAM UNDER ANESTHESIA ANUS;  Surgeon: Chet Gaviria MD;  [facility]     EXAM UNDER ANESTHESIA, RESTORATIONS, EXTRACTION(S) DENTAL COMPLEX, COMBINED  5/13/2013    Procedure: COMBINED EXAM UNDER ANESTHESIA, RESTORATIONS, EXTRACTION(S) DENTAL COMPLEX;  Dental Exam, Radiographs, Restorations. Single Extraction  Tooth #2. Restorations x 3;  Surgeon: Danilo Ortiz DDS;  Location: [facility] OR     HC KNEE SCOPE, DIAGNOSTIC      Arthroscopy, Knee- left     INJECT BOTOX N/A 5/10/2016    Procedure: INJECT BOTOX;  Surgeon: Chet Gaviria MD;  [facility]     left hip labral tear  5/11/2011    left hip arthroscopy with labral debridement and synovectomy     meniscus repair       OPTICAL TRACKING SYSTEM ENDOSCOPIC SINUS SURGERY  10/14/2011    Procedure:OPTICAL TRACKING SYSTEM ENDOSCOPIC SINUS SURGERY; FESS (functional endoscopic sinus surgery) with Image Guidance, bronchial lavage and cultures; Surgeon:JUAN JOSE GARCIA; Location:[facility] OR     OPTICAL TRACKING SYSTEM ENDOSCOPIC SINUS SURGERY  5/18/2012    Procedure:OPTICAL TRACKING SYSTEM ENDOSCOPIC SINUS SURGERY; Right  and Left Image Guided Functional Endoscopic Sinus Surgery With  Frontal Approach, [employer]; Surgeon:JUAN JOSE GARCIA; Location:[facility] OR     OPTICAL TRACKING SYSTEM ENDOSCOPIC SINUS SURGERY  9/26/2012    Procedure: OPTICAL TRACKING SYSTEM ENDOSCOPIC SINUS SURGERY;  Stealth Guided Bilateral Functional  Endoscopic Sinus Surgery *Latex Safe*;  Surgeon: Goyo Kuo MD;  Location:  OR     OPTICAL TRACKING SYSTEM ENDOSCOPIC SINUS SURGERY Bilateral 10/16/2015    Procedure: OPTICAL TRACKING SYSTEM ENDOSCOPIC SINUS SURGERY;  Surgeon: Mariela Haile MD;  Location:  OR     ORTHOPEDIC SURGERY      left hip tear repair 2010     SINUS SURGERY             Family History:     Family History   Problem Relation Age of Onset     Cancer Paternal Grandmother      Skin Cancer Paternal Grandmother      Other Cancer Paternal Grandmother      Skin     Obesity Paternal Grandmother      Cancer Paternal Grandfather      PGF had throat cancer (he was a smoker)     Other Cancer Paternal Grandfather      Anxiety Disorder Paternal Grandfather      Thyroid Disease Mother      ,     Obesity Other      Anesthesia Reaction No family hx of      Blood Disease No family hx of      Colon Polyps No family hx of      Crohn Disease No family hx of      Ulcerative Colitis No family hx of      Colon Cancer No family hx of      Melanoma No family hx of             Social History:     Social History     Social History     Marital status: Single     Spouse name: N/A     Number of children: N/A     Years of education: N/A     Occupational History     Not on file.     Social History Main Topics     Smoking status: Never Smoker     Smokeless tobacco: Never Used      Comment: one person at home smokes outside     Alcohol use No     Drug use: No     Sexual activity: No     Other Topics Concern     Blood Transfusions No     Exercise Yes     Social History Narrative    Mamie lives with mother in [place], [place].  There is a cat in the home, but Mamie does not have any litterbox duties.  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  She gets essentially no exercise because of the tingling in her feet (says it bothers her even to stand).        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  "       8/2015 no change in social situation        2/15/2016 Pt is single and lives with mother and stepfather.            Medications:     Current Outpatient Prescriptions   Medication     acetaminophen (TYLENOL) 325 MG tablet     acetylcysteine (MUCOMYST) 20 % nebulizer solution     albuterol (2.5 MG/3ML) 0.083% neb solution     albuterol (PROAIR HFA/PROVENTIL HFA/VENTOLIN HFA) 108 (90 Base) MCG/ACT Inhaler     aluminum chloride (DRYSOL) 20 % external solution     amphetamine-dextroamphetamine (ADDERALL XR) 20 MG per 24 hr capsule     amphetamine-dextroamphetamine (ADDERALL XR) 20 MG per 24 hr capsule     amphetamine-dextroamphetamine (ADDERALL XR) 20 MG per 24 hr capsule     ascorbic acid (VITAMIN C) 500 MG tablet     azithromycin (ZITHROMAX) 500 MG tablet     beta carotene 80179 UNIT capsule     blood glucose (ACCU-CHEK SMARTVIEW) test strip     buPROPion (WELLBUTRIN SR) 150 MG 12 hr tablet     busPIRone (BUSPAR) 15 MG tablet     cetirizine (ZYRTEC) 10 MG tablet     FLUoxetine (PROZAC) 40 MG capsule     GLYCOPYRROLATE PO     hydrOXYzine (ATARAX) 25 MG tablet     MedroxyPROGESTERone Acetate (DEPO-PROVERA IM)     MEPHYTON 5 MG tablet     meropenem (MERREM) 500 MG vial     meropenem (MERREM) 500 MG vial     meropenem (MERREM) 500 MG vial     meropenem (MERREM) 500 MG vial     methylcellulose, laxative, (CITRUCEL) POWD     montelukast (SINGULAIR) 10 MG tablet     Multiple Vitamin (MULTIVITAMIN OR)     naltrexone (DEPADE;REVIA) 50 MG tablet     omeprazole (PRILOSEC) 20 MG CR capsule     polyethylene glycol (MIRALAX) powder     traZODone (DESYREL) 100 MG tablet     VITAMIN D3 1000 UNITS tablet     vitamin E 400 UNIT capsule     No current facility-administered medications for this visit.      Facility-Administered Medications Ordered in Other Visits   Medication     albuterol (PROAIR HFA, PROVENTIL HFA, VENTOLIN HFA) inhaler            Physical Exam:   /77  Pulse 94  Resp 16  Ht 1.588 m (5' 2.5\")  Wt 77.7 kg " (171 lb 4.8 oz)  SpO2 97%  BMI 30.83 kg/m2    Constitutional:   Awake, alert and in no apparent distress     Eyes:   nonicteric     ENT:   oral mucosa moist without lesions, normal tm bilaterally, bilateral mucosa normal     Neck:   Supple without supraclavicular or cervical lymphadenopathy     Lungs:   Good air flow.  No crackles. No rhonchi.  No wheezes.     Cardiovascular:   Normal S1 and S2.  RRR.  No murmur, gallop or rub.     Abdomen:   NABS, soft, nontender, nondistended.       Musculoskeletal:   No edema, digital clubbing present     Neurologic:   Alert and conversant.     Skin:   Warm, dry.  No rash on limited exam.             Data:   All laboratory and imaging data reviewed.    Cystic Fibrosis Culture  Specimen Description   Date Value Ref Range Status   11/06/2018 Sputum  Final   07/18/2018 Throat  Final   04/18/2018 Midstream Urine  Final    Culture Micro   Date Value Ref Range Status   11/06/2018 Moderate growth  Normal roberto carlos    Preliminary   11/06/2018 (A)  Preliminary    Heavy growth  Staphylococcus aureus  This isolate is presumed to be clindamycin resistant based on detection of inducible   clindamycin resistance. Erythromycin and clindamycin are resistant, therefore, they are   not recommended for use.     11/06/2018 (A)  Preliminary    Heavy growth  Strain 2  Staphylococcus aureus  Susceptibility testing in progress     11/06/2018 (A)  Preliminary    Heavy growth  Strain 3  Staphylococcus aureus  This isolate is presumed to be clindamycin resistant based on detection of inducible   clindamycin resistance. Erythromycin and clindamycin are resistant, therefore, they are   not recommended for use.          Recent Results (from the past 168 hour(s))   General PFT Lab (Please always keep checked)    Collection Time: 11/06/18 10:11 AM   Result Value Ref Range    FVC-Pred 3.55 L    FVC-Pre 3.41 L    FVC-%Pred-Pre 95 %    FEV1-Pre 2.98 L    FEV1-%Pred-Pre 97 %    FEV1FVC-Pred 85 %    FEV1FVC-Pre 88 %     FEFMax-Pred 6.64 L/sec    FEFMax-Pre 9.00 L/sec    FEFMax-%Pred-Pre 135 %    FEF2575-Pred 3.61 L/sec    FEF2575-Pre 4.06 L/sec    MZP7949-%Pred-Pre 112 %    ExpTime-Pre 5.65 sec    FIFMax-Pre 4.76 L/sec    FEV1FEV6-Pred 86 %    FEV1FEV6-Pre 87 %   Cystic Fibrosis Culture Aerob Bacterial    Collection Time: 11/06/18 12:20 PM   Result Value Ref Range    Specimen Description Sputum     Culture Micro Moderate growth  Normal roberto carlos       Culture Micro (A)      Heavy growth  Staphylococcus aureus  This isolate is presumed to be clindamycin resistant based on detection of inducible   clindamycin resistance. Erythromycin and clindamycin are resistant, therefore, they are   not recommended for use.      Culture Micro (A)      Heavy growth  Strain 2  Staphylococcus aureus  Susceptibility testing in progress      Culture Micro (A)      Heavy growth  Strain 3  Staphylococcus aureus  This isolate is presumed to be clindamycin resistant based on detection of inducible   clindamycin resistance. Erythromycin and clindamycin are resistant, therefore, they are   not recommended for use.         Susceptibility    Staphylococcus aureus - ROCHELLE     CLINDAMYCIN  Resistant ug/mL     ERYTHROMYCIN >=8 Resistant ug/mL     GENTAMICIN <=0.5 Sensitive ug/mL     OXACILLIN 0.5 Sensitive ug/mL     PENICILLIN  Resistant ug/mL     TETRACYCLINE <=1 Sensitive ug/mL     Trimethoprim/Sulfa <=0.5/9.5 Sensitive ug/mL     VANCOMYCIN 1 Sensitive ug/mL    Staphylococcus aureus - ROCHELLE     CLINDAMYCIN  Resistant ug/mL     ERYTHROMYCIN >=8 Resistant ug/mL     GENTAMICIN <=0.5 Sensitive ug/mL     OXACILLIN 0.5 Sensitive ug/mL     PENICILLIN  Resistant ug/mL     TETRACYCLINE <=1 Sensitive ug/mL     Trimethoprim/Sulfa <=0.5/9.5 Sensitive ug/mL     VANCOMYCIN 1 Sensitive ug/mL       PFT: The spirometry is normal.  When compared to 7/18/2018, the FEV1 and FVC have little change.      Pulmonary exacerbation: absent

## 2018-11-06 NOTE — MR AVS SNAPSHOT
After Visit Summary   11/6/2018    Mamie Rice    MRN: [id number]           Patient Information     Date Of Birth          1995        Visit Information        Provider Department      11/6/2018 11:20 AM Gavi Allison MD [facility]        Today's Diagnoses     Cystic fibrosis (H)    -  1      Care Instructions    Cystic Fibrosis Self-Care Plan    RECOMMENDATIONS:   Great job!!  Share the modulator information with your mom.  Annual studies in a month.    Haile follow up.    YOUR GOAL:  Enjoy the Holidays!!    CF Nurse line:          Burton Anderson   [phone]            CF Resp Therapist: Jhoana Trinh            [phone]   CF Dietitians:           Amy Andino            [phone]                       Theresa Ceja                       [phone]   CF Diabetes Nurse: Rhonda Esteves             [phone]    CF Social Workers:  Deyanira Figueroa             [phone]                Rosette Burnett            [phone]  CF Pharmacist:        Gris Adame                              [phone]  [email or web address]         MRN: [id number]   Clinic Date: November 6, 2018   Patient: Mamie Rice     Annual Studies:   IGG   Date Value Ref Range Status   08/02/2017 689 (L) 695 - 1620 mg/dL Final     Insulin   Date Value Ref Range Status   03/30/2015 81 mU/L Final     Comment:     Reference Range:  0-20     There are no preventive care reminders to display for this patient.      Pulmonary Function Tests  FEV1: amount of air you can blow out in 1 second  FVC: total amount of air you can take in and blow out    Your Goals:         PFT Latest Ref Rng & Units 11/6/2018   FVC L 3.41   FEV1 L 2.98   FVC% % 95   FEV1% % 97          Airway Clearance: The Most Important Way to Keep Your Lungs Healthy  Vest Settings:    Hill-Rom Frequencies: 8, 9, 10 Pressure 10 Then, Frequencies 18, 19, 20 Pressure 6      RespirTech: Quick Start with  Pressure of     Do each frequency for 5 minutes; Deflate vest after each frequency & cough 3 times before beginning the next setting.    Vest and Neb Therapy should be done 2 times/day.    Good Nutrition Can Improve Lung Function and Overall Health     Take ALL of your vitamins with food     Take 1/2 of your enzymes before EVERY meal/snack and the other 1/2 mid-meal/snack    Wt Readings from Last 3 Encounters:   11/06/18 77.7 kg (171 lb 4.8 oz)   08/07/18 77.8 kg (171 lb 9.6 oz)   08/07/18 77.1 kg (170 lb)       Body mass index is 30.83 kg/(m^2).         National CF Foundation Recommendations for BMI in CF Adults: Women: at least 22 Men: at least 23        Controlling Blood Sugars Helps Prevent Lung Infections & Improves Nutrition  Test blood sugar:     In the morning before eating (goal is )     2 hours after a meal (goal is less than 150)     When pre-meal glucose is greater than 150 add correction     At bedtime (if less than 100 eat a snack with 15 grams of carbohydrates  Last A1C Results:   Hemoglobin A1C   Date Value Ref Range Status   08/02/2017 5.5 4.3 - 6.0 % Final         If diabetic, measure A1C every 6 months. Goal: Under 7%    Staying Healthy    Research:  If you are interested in learning about research opportunities or have questions, please contact the CF Research Team at [phone] or [email or web address].      CF Foundation:  Compass is a personalized resource service to help you with the insurance, financial, legal and other issues you are facing.  It's free, confidential and available to anyone with CF.  Ask your  for more information or contact Compass directly at 839-COMPASS ([phone]) or compass@cff.org, or learn more at cff.org/compass.                             Follow-ups after your visit        Follow-up notes from your care team     Return in about 3 months (around 2/6/2019), or schilling follow up, annual studies one month.      Your next 10 appointments already  scheduled     Nov 13, 2018 11:45 AM CST   (Arrive by 11:30 AM)   Return Weight Management Visit with Randa Ho MD   [facility] ([facility])    [address]  [address] [facility] [address] [phone]              Future tests that were ordered for you today     Open Future Orders        Priority Expected Expires Ordered    Vitamin D Deficiency Routine 12/6/2018 11/6/2019 11/6/2018    CBC with platelets differential Routine 12/6/2018 11/6/2019 11/6/2018    Erythrocyte sedimentation rate auto Routine 12/6/2018 11/6/2019 11/6/2018    Routine UA with microscopic Routine 12/6/2018 11/6/2019 11/6/2018    Albumin Random Urine Quantitative with Creat Ratio Routine 12/6/2018 11/6/2019 11/6/2018    Nocardia culture Routine 12/6/2018 11/6/2019 11/6/2018    Fungus Culture, non-blood Routine 12/6/2018 11/6/2019 11/6/2018    AFB Stain Non Blood Routine 12/6/2018 11/6/2019 11/6/2018    AFB Culture Non Blood Routine 12/6/2018 11/6/2019 11/6/2018    Cystic Fibrosis Culture Aerob Bacterial Routine 12/6/2018 11/6/2019 11/6/2018    Spirometry, Breathing Capacity Routine 12/6/2018 11/6/2019 11/6/2018    ALT Routine 12/6/2018 11/6/2019 11/6/2018    Basic metabolic panel Routine 12/6/2018 11/6/2019 11/6/2018    Lipid Profile Routine 12/6/2018 11/6/2019 11/6/2018    Albumin level Routine 12/6/2018 11/6/2019 11/6/2018    Alkaline phosphatase Routine 12/6/2018 11/6/2019 11/6/2018    AST Routine 12/6/2018 11/6/2019 11/6/2018    Iron Routine 12/6/2018 11/6/2019 11/6/2018    GGT Routine 12/6/2018 11/6/2019 11/6/2018    Hemoglobin A1c Routine 12/6/2018 11/6/2019 11/6/2018    IgA Routine 12/6/2018 11/6/2019 11/6/2018    IgG Routine 12/6/2018 11/6/2019 11/6/2018    IgM Routine 12/6/2018 11/6/2019 11/6/2018    IgE Routine 12/6/2018 11/6/2019 11/6/2018    INR Routine 12/6/2018 11/6/2019 11/6/2018    Magnesium Routine 12/6/2018 11/6/2019 11/6/2018    Phosphorus Routine  "12/6/2018 11/6/2019 11/6/2018    Protein total Routine 12/6/2018 11/6/2019 11/6/2018    TSH with free T4 reflex Routine 12/6/2018 11/6/2019 11/6/2018    Vitamin A Routine 12/6/2018 11/6/2019 11/6/2018    Vitamin E Routine 12/6/2018 11/6/2019 11/6/2018            Who to contact     If you have questions or need follow up information about today's clinic visit or your schedule please contact [facility] directly at [phone].  Normal or non-critical lab and imaging results will be communicated to you by [employer], letter or phone within 4 business days after the clinic has received the results. If you do not hear from us within 7 days, please contact the clinic through [employer]Got or phone. If you have a critical or abnormal lab result, we will notify you by phone as soon as possible.  Submit refill requests through [email or web address] or call your pharmacy and they will forward the refill request to us. Please allow 3 business days for your refill to be completed.          Additional Information About Your Visit        [employer] Information     [email or web address] gives you secure access to your electronic health record. If you see a primary care provider, you can also send messages to your care team and make appointments. If you have questions, please call your primary care clinic.  If you do not have a primary care provider, please call [phone] and they will assist you.        Care EveryWhere ID     This is your Care EveryWhere ID. This could be used by other organizations to access your [facility] medical records  [id number]        Your Vitals Were     Pulse Respirations Height Pulse Oximetry BMI (Body Mass Index)       94 16 1.588 m (5' 2.5\") 97% 30.83 kg/m2        Blood Pressure from Last 3 Encounters:   11/06/18 115/77   08/07/18 113/68   07/18/18 118/81    Weight from Last 3 Encounters:   11/06/18 77.7 kg (171 lb 4.8 oz)   08/07/18 77.8 kg (171 lb 9.6 oz)   08/07/18 77.1 kg (170 lb)               "   Today's Medication Changes          These changes are accurate as of 11/6/18 12:35 PM.  If you have any questions, ask your nurse or doctor.               Stop taking these medicines if you haven't already. Please contact your care team if you have questions.     CLARAVIS 40 MG capsule   Generic drug:  ISOtretinoin   Stopped by:  Gavi Allison MD           dornase alpha 1 MG/ML neb solution   Commonly known as:  PULMOZYME   Stopped by:  Gavi Allison MD           mupirocin 2 % ointment   Commonly known as:  BACTROBAN   Stopped by:  Gavi Allison MD                    Primary Care Provider Office Phone # Fax #    Malik De La Rosa [phone]       [address] [facility] [address]        Equal Access to Services     [facility] : Hadii martin good hadasho Soomaali, waaxda luqadaha, qaybta kaalmada adeegyada, david rutledge . So [facility] [phone].    ATENCIÓN: Si habla español, tiene a hidalgo disposición servicios gratuitos de asistencia lingüística. [facility] [phone].    We comply with applicable federal civil rights laws and [place] laws. We do not discriminate on the basis of race, color, national origin, age, disability, sex, sexual orientation, or gender identity.            Thank you!     Thank you for choosing [facility]  for your care. Our goal is always to provide you with excellent care. Hearing back from our patients is one way we can continue to improve our services. Please take a few minutes to complete the written survey that you may receive in the mail after your visit with us. Thank you!             Your Updated Medication List - Protect others around you: Learn how to safely use, store and throw away your medicines at www.disposemymeds.org.          This list is accurate as of 11/6/18 12:35 PM.  Always use your most recent med list.                    Brand Name Dispense Instructions for use Diagnosis    acetaminophen 325 MG tablet    TYLENOL    100 tablet    Take 2 tablets (650 mg) by mouth every 4 hours as needed for other (mild pain)    Chronic pain of left knee       acetylcysteine 20 % nebulizer solution    MUCOMYST    360 mL    INHALE ONE 4ML NEB INTO LUNGS VIA NEBULIZER TWO TIMES A DAY, MAY INCREASE TO 3-4 TIMES A DAY WITH INCREASE COUGH/COLD SYMPTOMS    CF (cystic fibrosis) (H)       * albuterol (2.5 MG/3ML) 0.083% neb solution     360 mL    Take 1 vial (2.5 mg) by nebulization 4 times daily    CF (cystic fibrosis) (H)       * albuterol 108 (90 Base) MCG/ACT inhaler    PROAIR HFA/PROVENTIL HFA/VENTOLIN HFA    1 Inhaler    Inhale 2 puffs into the lungs every 6 hours as needed for shortness of breath / dyspnea or wheezing    Cystic fibrosis with pulmonary manifestations (H), Sinusitis, chronic, Diabetes mellitus type 1 (H)       aluminum chloride 20 % external solution    DRYSOL    60 mL    Apply topically At Bedtime Everyother night to every 3rd night. With impovement, decrease to 1-2 times weekly. Irritation may occur.    Focal hyperhidrosis       * amphetamine-dextroamphetamine 20 MG per 24 hr capsule    ADDERALL XR    30 capsule    Take 1 capsule (20 mg) by mouth daily    Attention deficit hyperactivity disorder (ADHD), predominantly inattentive type       * amphetamine-dextroamphetamine 20 MG per 24 hr capsule    ADDERALL XR    30 capsule    Take 1 capsule (20 mg) by mouth daily    Attention deficit hyperactivity disorder (ADHD), predominantly inattentive type       * amphetamine-dextroamphetamine 20 MG per 24 hr capsule    ADDERALL XR    30 capsule    Take 1 capsule (20 mg) by mouth daily    Attention deficit hyperactivity disorder (ADHD), predominantly inattentive type       ascorbic acid 500 MG tablet    VITAMIN C    100 tablet    TAKE ONE TABLET BY MOUTH TWICE A DAY    Cystic fibrosis with pulmonary manifestations (H)       azithromycin 500 MG  tablet    ZITHROMAX    36 tablet    TAKE 1 TABLET BY MOUTH ON MONDAYS, WEDNESDAYS AND FRIDAYS    CF (cystic fibrosis) (H)       beta carotene 04128 UNIT capsule     36 capsule    TAKE ONE CAPSULE BY MOUTH ON MON, WED AND FRIDAY MORNING    Cystic fibrosis with pulmonary manifestations (H)       blood glucose monitoring test strip    ACCU-CHEK SMARTVIEW    1 Month    Use to test blood sugar 4 times daily or as directed.    Type I (juvenile type) diabetes mellitus without mention of complication, not stated as uncontrolled       buPROPion 150 MG 12 hr tablet    WELLBUTRIN SR    60 tablet    Take 1 tablet (150 mg) by mouth 2 times daily    Major depressive disorder, recurrent episode, mild (H)       busPIRone 15 MG tablet    BUSPAR    60 tablet    Take 1 tablet (15 mg) by mouth 2 times daily    MECHE (generalized anxiety disorder)       cetirizine 10 MG tablet    zyrTEC    30 tablet    Take 1 tablet (10 mg) by mouth every evening    Allergic rhinitis due to [employer] house dust mite, Urticaria, chronic       cholecalciferol 1000 UNIT tablet    vitamin D3    100 tablet    TAKE 1 TABLET BY MOUTH EVERY DAY    CF (cystic fibrosis) (H), Exocrine pancreatic insufficiency       DEPO-PROVERA IM           FLUoxetine 40 MG capsule    PROZAC    60 capsule    Take 2 capsules (80 mg) by mouth daily    Cystic fibrosis with pulmonary manifestations (H)       GLYCOPYRROLATE PO      Take 1 mg by mouth 2 times daily        hydrOXYzine 25 MG tablet    ATARAX    30 tablet    Take one tablet by mouth at bedtime for insomnia    Chronic pain of left knee       MEPHYTON 5 MG tablet   Generic drug:  phytonadione     4 tablet    TAKE ONE TABLET BY MOUTH ONCE A WEEK    Cystic fibrosis with pulmonary manifestations (H), Cystic fibrosis with pulmonary manifestations (H), Aspergillosis (H), Pancreatic insufficiency       * meropenem 500 MG vial    MERREM    60 each         * meropenem 500 MG vial    MERREM    1 each    Give x 1 today        * meropenem  500 MG vial    MERREM    60 each    500 mg by Nasal Instillation route every 8 hours    Chronic maxillary sinusitis, Cystic fibrosis of the lung (H)       * meropenem 500 MG vial    MERREM    60 each    500 mg by Nasal Instillation route once for 1 dose        methylcellulose (laxative) Powd    CITRUCEL    479 g    Start with 1 heaping tablespoon. Increase as needed, 1 heaping tablespoon at a time, up to 3 times per day.    Other constipation       montelukast 10 MG tablet    SINGULAIR    30 tablet    TAKE 1 TABLET BY MOUTH DAILY AT BEDTIME    CF (cystic fibrosis) (H)       MULTIVITAMIN PO      Take 1 tablet by mouth daily.        naltrexone 50 MG tablet    DEPADE;REVIA    90 tablet    Take 1 tablet.  Time it one to two hours prior to worst cravings.    Class 1 obesity due to excess calories without serious comorbidity with body mass index (BMI) of 30.0 to 30.9 in adult       omeprazole 20 MG CR capsule    priLOSEC    60 capsule    TAKE ONE CAPSULE BY MOUTH TWICE A DAY    CF (cystic fibrosis) (H)       polyethylene glycol powder    MIRALAX    119 g    Take 17 g (1 capful) by mouth daily as needed for constipation    Cystic fibrosis of the lung (H), Constipation, unspecified constipation type       traZODone 100 MG tablet    DESYREL    30 tablet    Take 1 tablet (100 mg) by mouth At Bedtime    Insomnia, unspecified type       vitamin E 400 UNIT capsule     100 capsule    TAKE ONE CAPSULE BY MOUTH TWICE A DAY    CF (cystic fibrosis) (H), Pancreatic insufficiency       * Notice:  This list has 9 medication(s) that are the same as other medications prescribed for you. Read the directions carefully, and ask your doctor or other care provider to review them with you.

## 2018-11-09 DIAGNOSIS — E84.9 CF (CYSTIC FIBROSIS) (H): ICD-10-CM

## 2018-11-09 RX ORDER — ALBUTEROL SULFATE 0.83 MG/ML
SOLUTION RESPIRATORY (INHALATION)
Qty: 360 ML | Refills: 11 | Status: SHIPPED | OUTPATIENT
Start: 2018-11-09 | End: 2020-02-04

## 2018-11-11 LAB
BACTERIA SPEC CULT: ABNORMAL
SPECIMEN SOURCE: ABNORMAL

## 2018-11-13 ENCOUNTER — OFFICE VISIT (OUTPATIENT)
Dept: ENDOCRINOLOGY | Facility: CLINIC | Age: 23
End: 2018-11-13
Payer: MEDICAID

## 2018-11-13 VITALS
BODY MASS INDEX: 30.41 KG/M2 | HEART RATE: 95 BPM | OXYGEN SATURATION: 97 % | HEIGHT: 63 IN | WEIGHT: 171.6 LBS | SYSTOLIC BLOOD PRESSURE: 116 MMHG | DIASTOLIC BLOOD PRESSURE: 65 MMHG

## 2018-11-13 DIAGNOSIS — E66.811 CLASS 1 OBESITY DUE TO EXCESS CALORIES WITHOUT SERIOUS COMORBIDITY WITH BODY MASS INDEX (BMI) OF 30.0 TO 30.9 IN ADULT: ICD-10-CM

## 2018-11-13 DIAGNOSIS — E66.09 CLASS 1 OBESITY DUE TO EXCESS CALORIES WITHOUT SERIOUS COMORBIDITY WITH BODY MASS INDEX (BMI) OF 30.0 TO 30.9 IN ADULT: ICD-10-CM

## 2018-11-13 RX ORDER — NALTREXONE HYDROCHLORIDE 50 MG/1
TABLET, FILM COATED ORAL
Qty: 90 TABLET | Refills: 2 | Status: SHIPPED | OUTPATIENT
Start: 2018-11-13 | End: 2019-08-06

## 2018-11-13 RX ORDER — TOPIRAMATE 25 MG/1
TABLET, FILM COATED ORAL
Qty: 180 TABLET | Refills: 2 | Status: SHIPPED | OUTPATIENT
Start: 2018-11-13 | End: 2019-07-15

## 2018-11-13 ASSESSMENT — ENCOUNTER SYMPTOMS
BRUISES/BLEEDS EASILY: 0
SLEEP DISTURBANCES DUE TO BREATHING: 0
NAUSEA: 0
ARTHRALGIAS: 1
BREAST PAIN: 0
NECK MASS: 0
LEG PAIN: 0
POSTURAL DYSPNEA: 0
SWOLLEN GLANDS: 0
JAUNDICE: 0
WHEEZING: 0
CHILLS: 0
EYE REDNESS: 0
EYE IRRITATION: 0
JOINT SWELLING: 1
HYPOTENSION: 0
WEIGHT GAIN: 0
HEADACHES: 0
WEIGHT LOSS: 0
DECREASED LIBIDO: 0
SHORTNESS OF BREATH: 0
DISTURBANCES IN COORDINATION: 0
HEARTBURN: 0
RECTAL BLEEDING: 0
EYE PAIN: 0
LIGHT-HEADEDNESS: 0
EXERCISE INTOLERANCE: 0
INSOMNIA: 0
HYPERTENSION: 0
DIARRHEA: 0
RECTAL PAIN: 0
HALLUCINATIONS: 0
BOWEL INCONTINENCE: 0
HEMATURIA: 0
POLYPHAGIA: 0
FLANK PAIN: 0
ABDOMINAL PAIN: 0
CONSTIPATION: 0
MEMORY LOSS: 0
TREMORS: 0
VOMITING: 0
SINUS CONGESTION: 0
NERVOUS/ANXIOUS: 0
DEPRESSION: 0
SORE THROAT: 0
SKIN CHANGES: 0
ALTERED TEMPERATURE REGULATION: 1
CLAUDICATION: 0
TASTE DISTURBANCE: 0
WEAKNESS: 0
DECREASED CONCENTRATION: 0
STIFFNESS: 1
TINGLING: 0
POLYDIPSIA: 1
COUGH DISTURBING SLEEP: 0
TACHYCARDIA: 0
BLOATING: 0
COUGH: 0
DYSURIA: 0
SNORES LOUDLY: 0
NUMBNESS: 0
SINUS PAIN: 0
NECK PAIN: 0
MYALGIAS: 1
SEIZURES: 0
BACK PAIN: 0
FATIGUE: 0
BREAST MASS: 0
DIZZINESS: 0
PALPITATIONS: 0
LOSS OF CONSCIOUSNESS: 0
ORTHOPNEA: 0
DIFFICULTY URINATING: 0
RESPIRATORY PAIN: 0
HEMOPTYSIS: 0
TROUBLE SWALLOWING: 0
HOARSE VOICE: 0
SPEECH CHANGE: 0
SYNCOPE: 0
POOR WOUND HEALING: 0
INCREASED ENERGY: 1
SPUTUM PRODUCTION: 0
LEG SWELLING: 0
EYE WATERING: 0
EXTREMITY NUMBNESS: 0
PARALYSIS: 0
BLOOD IN STOOL: 0
HOT FLASHES: 0
MUSCLE WEAKNESS: 1
NIGHT SWEATS: 1
DYSPNEA ON EXERTION: 0
DECREASED APPETITE: 0
SMELL DISTURBANCE: 0
NAIL CHANGES: 0
DOUBLE VISION: 0
PANIC: 0
FEVER: 0
MUSCLE CRAMPS: 0

## 2018-11-13 NOTE — MR AVS SNAPSHOT
After Visit Summary   11/13/2018    Mamie Rice    MRN: [id number]           Patient Information     Date Of Birth          1995        Visit Information        Provider Department      11/13/2018 11:45 AM Randa Ho MD [facility] Medical Weight Management        Today's Diagnoses     Class 1 obesity due to excess calories without serious comorbidity with body mass index (BMI) of 30.0 to 30.9 in adult          Care Instructions    - add on topiramate  Start topiramate: Take 25 mg (one tab) at bedtime for one week, then increase to 50 mg (two tabs) at bedtime, then increase to 75 mg (3 tabs) thereafter.    - continue with naltrexone 50 mg at midday     - see me in 3 months    If you have any questions, please do not hesitate to call Weight management clinic at [phone] or [phone].    Sincerely,    Randa Ho MD  Endocrinology              Follow-ups after your visit        Your next 10 appointments already scheduled     Feb 12, 2019  7:00 AM CST   Infusion 180 with UC SPEC INFUSION, UC 44 ATC   [facility] ([facility])    [address]  [address]  [facility] [address] [phone]            Feb 12, 2019 10:00 AM CST   PFT VISIT with JENNIFER PFL JOSSELIN   [facility] Pulmonary Function Testing ([facility])    [address]  3rd Floor  [facility] [address] [phone]            Feb 12, 2019 10:40 AM CST   (Arrive by 10:25 AM)   RETURN CYSTIC FIBROSIS VISIT with Gavi Allison MD   [facility] ([facility])    [address] [place] [address]  [address]  [facility] [address] [phone]              Who to contact     Please call your clinic at [phone] to:    Ask questions about your health    Make or cancel appointments    Discuss your medicines    Learn about your test results    Speak  "to your doctor            Additional Information About Your Visit        MyChart Information     [employer] gives you secure access to your electronic health record. If you see a primary care provider, you can also send messages to your care team and make appointments. If you have questions, please call your primary care clinic.  If you do not have a primary care provider, please call [phone] and they will assist you.      [employer] is an electronic gateway that provides easy, online access to your medical records. With [employer], you can request a clinic appointment, read your test results, renew a prescription or communicate with your care team.     To access your existing account, please contact your [facility] or call [phone] for assistance.        Care EveryWhere ID     This is your Care EveryWhere ID. This could be used by other organizations to access your [place] medical records  [id number]        Your Vitals Were     Pulse Height Pulse Oximetry BMI (Body Mass Index)          95 5' 2.5\" 97% 30.89 kg/m2         Blood Pressure from Last 3 Encounters:   11/13/18 116/65   11/06/18 115/77   08/07/18 113/68    Weight from Last 3 Encounters:   11/13/18 171 lb 9.6 oz   11/06/18 171 lb 4.8 oz   08/07/18 171 lb 9.6 oz              Today, you had the following     No orders found for display       Primary Care Provider Office Phone # Fax #    Malik De La Rosa [phone]       [address] [place] [address]        Equal Access to Services     LUZMARIA MARTINEZ : Hadii aad ku hadasho Soomaali, waaxda luqadaha, qaybta kaalmada adeegyada, david rutledge . So [facility] [phone].    ATENCIÓN: Si habla español, tiene a hidalgo disposición servicios gratuitos de asistencia lingüística. Llame al [phone].    We comply with applicable federal civil rights laws and [place] laws. We do not discriminate on the " basis of race, color, national origin, age, disability, sex, sexual orientation, or gender identity.            Thank you!     Thank you for choosing [facility] MEDICAL WEIGHT MANAGEMENT  for your care. Our goal is always to provide you with excellent care. Hearing back from our patients is one way we can continue to improve our services. Please take a few minutes to complete the written survey that you may receive in the mail after your visit with us. Thank you!             Your Updated Medication List - Protect others around you: Learn how to safely use, store and throw away your medicines at www.disposemymeds.org.          This list is accurate as of 11/13/18 12:12 PM.  Always use your most recent med list.                   Brand Name Dispense Instructions for use Diagnosis    acetaminophen 325 MG tablet    TYLENOL    100 tablet    Take 2 tablets (650 mg) by mouth every 4 hours as needed for other (mild pain)    Chronic pain of left knee       acetylcysteine 20 % nebulizer solution    MUCOMYST    360 mL    INHALE ONE 4ML NEB INTO LUNGS VIA NEBULIZER TWO TIMES A DAY, MAY INCREASE TO 3-4 TIMES A DAY WITH INCREASE COUGH/COLD SYMPTOMS    CF (cystic fibrosis) (H)       * albuterol (2.5 MG/3ML) 0.083% neb solution     360 mL    Take 1 vial (2.5 mg) by nebulization 4 times daily    CF (cystic fibrosis) (H)       * albuterol 108 (90 Base) MCG/ACT inhaler    PROAIR HFA/PROVENTIL HFA/VENTOLIN HFA    1 Inhaler    Inhale 2 puffs into the lungs every 6 hours as needed for shortness of breath / dyspnea or wheezing    Cystic fibrosis with pulmonary manifestations (H), Sinusitis, chronic, Diabetes mellitus type 1 (H)       * albuterol (2.5 MG/3ML) 0.083% neb solution     360 mL    INHALE 1 NEB VIA NEBULIZER FOUR TIMES A DAY    CF (cystic fibrosis) (H)       aluminum chloride 20 % external solution    DRYSOL    60 mL    Apply topically At Bedtime Everyother night to every 3rd night. With impovement, decrease to 1-2 times weekly.  Irritation may occur.    Focal hyperhidrosis       * amphetamine-dextroamphetamine 20 MG per 24 hr capsule    ADDERALL XR    30 capsule    Take 1 capsule (20 mg) by mouth daily    Attention deficit hyperactivity disorder (ADHD), predominantly inattentive type       * amphetamine-dextroamphetamine 20 MG per 24 hr capsule    ADDERALL XR    30 capsule    Take 1 capsule (20 mg) by mouth daily    Attention deficit hyperactivity disorder (ADHD), predominantly inattentive type       * amphetamine-dextroamphetamine 20 MG per 24 hr capsule    ADDERALL XR    30 capsule    Take 1 capsule (20 mg) by mouth daily    Attention deficit hyperactivity disorder (ADHD), predominantly inattentive type       ascorbic acid 500 MG tablet    VITAMIN C    100 tablet    TAKE ONE TABLET BY MOUTH TWICE A DAY    Cystic fibrosis with pulmonary manifestations (H)       azithromycin 500 MG tablet    ZITHROMAX    36 tablet    TAKE 1 TABLET BY MOUTH ON MONDAYS, WEDNESDAYS AND FRIDAYS    CF (cystic fibrosis) (H)       beta carotene 30424 UNIT capsule     36 capsule    TAKE ONE CAPSULE BY MOUTH ON MON, WED AND FRIDAY MORNING    Cystic fibrosis with pulmonary manifestations (H)       blood glucose monitoring test strip    ACCU-CHEK SMARTVIEW    1 Month    Use to test blood sugar 4 times daily or as directed.    Type I (juvenile type) diabetes mellitus without mention of complication, not stated as uncontrolled       buPROPion 150 MG 12 hr tablet    WELLBUTRIN SR    60 tablet    Take 1 tablet (150 mg) by mouth 2 times daily    Major depressive disorder, recurrent episode, mild (H)       busPIRone 15 MG tablet    BUSPAR    60 tablet    Take 1 tablet (15 mg) by mouth 2 times daily    MECHE (generalized anxiety disorder)       cetirizine 10 MG tablet    zyrTEC    30 tablet    Take 1 tablet (10 mg) by mouth every evening    Allergic rhinitis due to [employer] house dust mite, Urticaria, chronic       cholecalciferol 1000 UNIT tablet    vitamin D3    100 tablet     TAKE 1 TABLET BY MOUTH EVERY DAY    CF (cystic fibrosis) (H), Exocrine pancreatic insufficiency       DEPO-PROVERA IM           FLUoxetine 40 MG capsule    PROZAC    60 capsule    Take 2 capsules (80 mg) by mouth daily    Cystic fibrosis with pulmonary manifestations (H)       GLYCOPYRROLATE PO      Take 1 mg by mouth 2 times daily        hydrOXYzine 25 MG tablet    ATARAX    30 tablet    Take one tablet by mouth at bedtime for insomnia    Chronic pain of left knee       MEPHYTON 5 MG tablet   Generic drug:  phytonadione     4 tablet    TAKE ONE TABLET BY MOUTH ONCE A WEEK    Cystic fibrosis with pulmonary manifestations (H), Cystic fibrosis with pulmonary manifestations (H), Aspergillosis (H), Pancreatic insufficiency       * meropenem 500 MG vial    MERREM    60 each         * meropenem 500 MG vial    MERREM    1 each    Give x 1 today        * meropenem 500 MG vial    MERREM    60 each    500 mg by Nasal Instillation route every 8 hours    Chronic maxillary sinusitis, Cystic fibrosis of the lung (H)       * meropenem 500 MG vial    MERREM    60 each    500 mg by Nasal Instillation route once for 1 dose        methylcellulose (laxative) Powd    CITRUCEL    479 g    Start with 1 heaping tablespoon. Increase as needed, 1 heaping tablespoon at a time, up to 3 times per day.    Other constipation       montelukast 10 MG tablet    SINGULAIR    30 tablet    TAKE 1 TABLET BY MOUTH DAILY AT BEDTIME    CF (cystic fibrosis) (H)       MULTIVITAMIN PO      Take 1 tablet by mouth daily.        naltrexone 50 MG tablet    DEPADE;REVIA    90 tablet    Take 1 tablet.  Time it one to two hours prior to worst cravings.    Class 1 obesity due to excess calories without serious comorbidity with body mass index (BMI) of 30.0 to 30.9 in adult       omeprazole 20 MG CR capsule    priLOSEC    60 capsule    TAKE ONE CAPSULE BY MOUTH TWICE A DAY    CF (cystic fibrosis) (H)       polyethylene glycol powder    MIRALAX    119 g    Take 17 g  (1 capful) by mouth daily as needed for constipation    Cystic fibrosis of the lung (H), Constipation, unspecified constipation type       traZODone 100 MG tablet    DESYREL    30 tablet    Take 1 tablet (100 mg) by mouth At Bedtime    Insomnia, unspecified type       vitamin E 400 UNIT capsule     100 capsule    TAKE ONE CAPSULE BY MOUTH TWICE A DAY    CF (cystic fibrosis) (H), Pancreatic insufficiency       * Notice:  This list has 10 medication(s) that are the same as other medications prescribed for you. Read the directions carefully, and ask your doctor or other care provider to review them with you.

## 2018-11-13 NOTE — PATIENT INSTRUCTIONS
- add on topiramate  Start topiramate: Take 25 mg (one tab) at bedtime for one week, then increase to 50 mg (two tabs) at bedtime, then increase to 75 mg (3 tabs) thereafter.    - continue with naltrexone 50 mg at midday     - see me in 3 months    If you have any questions, please do not hesitate to call Weight management clinic at [phone] or [phone].    Sincerely,    Randa Ho MD  Endocrinology

## 2018-11-13 NOTE — NURSING NOTE
"  Chief Complaint   Patient presents with     Weight Problem     RMWM     Vitals:    11/13/18 1156   BP: 116/65   Pulse: 95   SpO2: 97%   Weight: 171 lb 9.6 oz   Height: 5' 2.5\"     Body mass index is 30.89 kg/(m^2).  eLsia Greene CMA    "

## 2018-11-13 NOTE — LETTER
"2018       RE: Mamie Rice  [address] [facility] [address]     Dear Colleague,    Thank you for referring your patient, Mamie Rice, to the [facility] MEDICAL WEIGHT MANAGEMENT at [facility]. Please see a copy of my visit note below.        Return Medical Weight Management Note     Mamie Rice  MRN:  [id number]  :  1995  STEPHEN:  2018    Dear Dr. De La Rosa,     I had the pleasure of seeing your patient Mamie Rice.  She is a 23 year old female who I am continuing to see for treatment of obesity. She has CF, CFRD, depression, joints pain, ADHD    INTERVAL History  Last seen by me on 2018. She is currently on naltrexone 50 mg daily along with bupropion 150 mg bid for the past year. She maintained weight around 170 lbs. She feel that naltrexone is not as effective anymore. However, she feels no struggle with appetite or craving. She cut down junk food and milk. 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 She does not eat at home very often.     She is trying to walk a dog daily and be active at work.    She is also on Adderall for ADHD and Bupropion for depression.    Diet recall:  BF: banana or skip meal  Lunch: soup or ramen noodle   Supper: meat, vegetable (corn, pea, carrot), sandwich  Snack: fruit snack.    CURRENT WEIGHT:   171 lbs 9.6 oz    Wt Readings from Last 4 Encounters:   18 77.8 kg (171 lb 9.6 oz)   18 77.7 kg (171 lb 4.8 oz)   18 77.8 kg (171 lb 9.6 oz)   18 77.1 kg (170 lb)     Height:  5' 2.5\"  Body Mass Index:  Body mass index is 30.89 kg/(m^2).  Vitals:  B/P: 124/81, P: 100    Initial consult weight was 182 on 10/14/2016.  Weight change since last seen on 2018 is gain 1 pounds.   Total loss is 14 pounds.      Review of Systems     Constitutional:  Positive for night sweats and increased energy. Negative for fever, chills, weight loss, weight gain, fatigue, decreased appetite, " recent stressors, height loss, post-operative complications, incisional pain, hallucinations, hyperactivity and confused.   HENT:  Negative for ear pain, hearing loss, tinnitus, nosebleeds, trouble swallowing, hoarse voice, mouth sores, sore throat, ear discharge, tooth pain, gum tenderness, taste disturbance, smell disturbance, hearing aid, bleeding gums, dry mouth, sinus pain, sinus congestion and neck mass.    Eyes:  Negative for double vision, pain, redness, eye pain, decreased vision, eye watering, eye bulging, eye dryness, flashing lights, spots, floaters, strabismus, tunnel vision, jaundice and eye irritation.   Respiratory:   Negative for cough, hemoptysis, sputum production, shortness of breath, wheezing, sleep disturbances due to breathing, snores loudly, respiratory pain, dyspnea on exertion, cough disturbing sleep and postural dyspnea.    Cardiovascular:  Negative for chest pain, dyspnea on exertion, palpitations, orthopnea, claudication, leg swelling, fingers/toes turn blue, hypertension, hypotension, syncope, history of heart murmur, chest pain on exertion, chest pain at rest, pacemaker, few scattered varicosities, leg pain, sleep disturbances due to breathing, tachycardia, light-headedness, exercise intolerance and edema.   Gastrointestinal:  Negative for heartburn, nausea, vomiting, abdominal pain, diarrhea, constipation, blood in stool, melena, rectal pain, bloating, hemorrhoids, bowel incontinence, jaundice, rectal bleeding, coffee ground emesis and change in stool.   Genitourinary:  Negative for bladder incontinence, dysuria, urgency, hematuria, flank pain, vaginal discharge, difficulty urinating, genital sores, dyspareunia, decreased libido, nocturia, voiding less frequently, arousal difficulty, abnormal vaginal bleeding, excessive menstruation, menstrual changes, hot flashes, vaginal dryness and postmenopausal bleeding.   Musculoskeletal:  Positive for myalgias, joint swelling, arthralgias,  stiffness and muscle weakness. Negative for back pain, muscle cramps, neck pain, bone pain and fracture.   Skin:  Negative for nail changes, itching, poor wound healing, rash, hair changes, skin changes, acne, warts, poor wound healing, scarring, flaky skin, Raynaud's phenomenon, sensitivity to sunlight and skin thickening.   Neurological:  Negative for dizziness, tingling, tremors, speech change, seizures, loss of consciousness, weakness, light-headedness, numbness, headaches, disturbances in coordination, extremity numbness, memory loss, difficulty walking and paralysis.   Endo/Heme:  Negative for anemia, swollen glands and bruises/bleeds easily.   Psychiatric/Behavioral:  Negative for depression, hallucinations, memory loss, decreased concentration, mood swings and panic attacks.    Breast:  Negative for breast discharge, breast mass, breast pain and nipple retraction.   Endocrine:  Positive for altered temperature regulation and polydipsia.Negative for polyphagia, unwanted hair growth and change in facial hair.      MEDICATIONS:   Current Outpatient Prescriptions   Medication Sig Dispense Refill     acetaminophen (TYLENOL) 325 MG tablet Take 2 tablets (650 mg) by mouth every 4 hours as needed for other (mild pain) 100 tablet 0     acetylcysteine (MUCOMYST) 20 % nebulizer solution INHALE ONE 4ML NEB INTO LUNGS VIA NEBULIZER TWO TIMES A DAY, MAY INCREASE TO 3-4 TIMES A DAY WITH INCREASE COUGH/COLD SYMPTOMS 360 mL 11     albuterol (2.5 MG/3ML) 0.083% neb solution INHALE 1 NEB VIA NEBULIZER FOUR TIMES A  mL 11     albuterol (2.5 MG/3ML) 0.083% neb solution Take 1 vial (2.5 mg) by nebulization 4 times daily 360 mL 11     albuterol (PROAIR HFA/PROVENTIL HFA/VENTOLIN HFA) 108 (90 Base) MCG/ACT Inhaler Inhale 2 puffs into the lungs every 6 hours as needed for shortness of breath / dyspnea or wheezing 1 Inhaler 12     aluminum chloride (DRYSOL) 20 % external solution Apply topically At Bedtime Everyother night to  every 3rd night. With impovement, decrease to 1-2 times weekly. Irritation may occur. 60 mL 3     amphetamine-dextroamphetamine (ADDERALL XR) 20 MG per 24 hr capsule Take 1 capsule (20 mg) by mouth daily 30 capsule 0     amphetamine-dextroamphetamine (ADDERALL XR) 20 MG per 24 hr capsule Take 1 capsule (20 mg) by mouth daily 30 capsule 0     amphetamine-dextroamphetamine (ADDERALL XR) 20 MG per 24 hr capsule Take 1 capsule (20 mg) by mouth daily 30 capsule 0     ascorbic acid (VITAMIN C) 500 MG tablet TAKE ONE TABLET BY MOUTH TWICE A  tablet 3     azithromycin (ZITHROMAX) 500 MG tablet TAKE 1 TABLET BY MOUTH ON MONDAYS, WEDNESDAYS AND FRIDAYS 36 tablet 4     beta carotene 95564 UNIT capsule TAKE ONE CAPSULE BY MOUTH ON MON, WED AND FRIDAY MORNING 36 capsule 4     blood glucose (ACCU-CHEK SMARTVIEW) test strip Use to test blood sugar 4 times daily or as directed. 1 Month 12     buPROPion (WELLBUTRIN SR) 150 MG 12 hr tablet Take 1 tablet (150 mg) by mouth 2 times daily 60 tablet 5     busPIRone (BUSPAR) 15 MG tablet Take 1 tablet (15 mg) by mouth 2 times daily 60 tablet 5     cetirizine (ZYRTEC) 10 MG tablet Take 1 tablet (10 mg) by mouth every evening 30 tablet 3     FLUoxetine (PROZAC) 40 MG capsule Take 2 capsules (80 mg) by mouth daily 60 capsule 5     GLYCOPYRROLATE PO Take 1 mg by mouth 2 times daily        hydrOXYzine (ATARAX) 25 MG tablet Take one tablet by mouth at bedtime for insomnia 30 tablet 5     MedroxyPROGESTERone Acetate (DEPO-PROVERA IM)        MEPHYTON 5 MG tablet TAKE ONE TABLET BY MOUTH ONCE A WEEK 4 tablet 11     meropenem (MERREM) 500 MG vial 500 mg by Nasal Instillation route once for 1 dose 60 each      meropenem (MERREM) 500 MG vial 500 mg by Nasal Instillation route every 8 hours 60 each      meropenem (MERREM) 500 MG vial Give x 1 today 1 each      meropenem (MERREM) 500 MG vial  60 each      methylcellulose, laxative, (CITRUCEL) POWD Start with 1 heaping tablespoon. Increase as  needed, 1 heaping tablespoon at a time, up to 3 times per day. 479 g 3     montelukast (SINGULAIR) 10 MG tablet TAKE 1 TABLET BY MOUTH DAILY AT BEDTIME 30 tablet 11     Multiple Vitamin (MULTIVITAMIN OR) Take 1 tablet by mouth daily.       naltrexone (DEPADE;REVIA) 50 MG tablet Take 1 tablet.  Time it one to two hours prior to worst cravings. 90 tablet 2     omeprazole (PRILOSEC) 20 MG CR capsule TAKE ONE CAPSULE BY MOUTH TWICE A DAY 60 capsule 11     polyethylene glycol (MIRALAX) powder Take 17 g (1 capful) by mouth daily as needed for constipation 119 g 3     traZODone (DESYREL) 100 MG tablet Take 1 tablet (100 mg) by mouth At Bedtime 30 tablet 5     VITAMIN D3 1000 UNITS tablet TAKE 1 TABLET BY MOUTH EVERY  tablet 3     vitamin E 400 UNIT capsule TAKE ONE CAPSULE BY MOUTH TWICE A  capsule 11       Weight Loss Medication History Reviewed With Patient 11/2/2018   Which weight loss medications are you currently taking on a regular basis?  -   If you are not taking a weight loss medication that was prescribed to you, please indicate why: -   Are you having any side effects from the weight loss medication that we have prescribed you? No     ASSESSMENT:    Mamie Rice is a 23 year old female who I am continuing to see for treatment of obesity.  She has CF, CFRD, depression, joints pain, ADHD    Currently on naltrexone and bupropion --  less snacking  Still eats high carb meal    PLAN:  - continue naltrexone 50 mg daily -- recommend to take it in the evening when she tends to snack  - add on topiramate titration from 25 mg up to 75 mg daily  - consider meal replacement with modified liquid diet -- protein shake for breakfast  - reinforced food plan - focus on no between meal snacking, aggressive lowering of starches and cheese, increase protein and vegetable    FOLLOW-UP:    RTC 3-4 months with me    I spent a total of 15 minutes face-to-face with Mamie Rice during today's office visit. Over 50% of  this time was spent counseling the patient and/or coordinating care regarding    Sincerely,    Randa Ho MD

## 2018-11-13 NOTE — PROGRESS NOTES
"    Return Medical Weight Management Note     Mamie Rice  MRN:  [id number]  :  1995  STEPHEN:  2018    Dear Dr. De La Rosa,     I had the pleasure of seeing your patient Mamie Rice.  She is a 23 year old female who I am continuing to see for treatment of obesity. She has CF, CFRD, depression, joints pain, ADHD    INTERVAL History  Last seen by me on 2018. She is currently on naltrexone 50 mg daily along with bupropion 150 mg bid for the past year. She maintained weight around 170 lbs. She feel that naltrexone is not as effective anymore. However, she feels no struggle with appetite or craving. She cut down junk food and milk. 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 She does not eat at home very often.     She is trying to walk a dog daily and be active at work.    She is also on Adderall for ADHD and Bupropion for depression.    Diet recall:  BF: banana or skip meal  Lunch: soup or ramen noodle   Supper: meat, vegetable (corn, pea, carrot), sandwich  Snack: fruit snack.    CURRENT WEIGHT:   171 lbs 9.6 oz    Wt Readings from Last 4 Encounters:   18 77.8 kg (171 lb 9.6 oz)   18 77.7 kg (171 lb 4.8 oz)   18 77.8 kg (171 lb 9.6 oz)   18 77.1 kg (170 lb)     Height:  5' 2.5\"  Body Mass Index:  Body mass index is 30.89 kg/(m^2).  Vitals:  B/P: 124/81, P: 100    Initial consult weight was 182 on 10/14/2016.  Weight change since last seen on 2018 is gain 1 pounds.   Total loss is 14 pounds.      Review of Systems     Constitutional:  Positive for night sweats and increased energy. Negative for fever, chills, weight loss, weight gain, fatigue, decreased appetite, recent stressors, height loss, post-operative complications, incisional pain, hallucinations, hyperactivity and confused.   HENT:  Negative for ear pain, hearing loss, tinnitus, nosebleeds, trouble swallowing, hoarse voice, mouth sores, sore throat, ear discharge, tooth pain, gum tenderness, " taste disturbance, smell disturbance, hearing aid, bleeding gums, dry mouth, sinus pain, sinus congestion and neck mass.    Eyes:  Negative for double vision, pain, redness, eye pain, decreased vision, eye watering, eye bulging, eye dryness, flashing lights, spots, floaters, strabismus, tunnel vision, jaundice and eye irritation.   Respiratory:   Negative for cough, hemoptysis, sputum production, shortness of breath, wheezing, sleep disturbances due to breathing, snores loudly, respiratory pain, dyspnea on exertion, cough disturbing sleep and postural dyspnea.    Cardiovascular:  Negative for chest pain, dyspnea on exertion, palpitations, orthopnea, claudication, leg swelling, fingers/toes turn blue, hypertension, hypotension, syncope, history of heart murmur, chest pain on exertion, chest pain at rest, pacemaker, few scattered varicosities, leg pain, sleep disturbances due to breathing, tachycardia, light-headedness, exercise intolerance and edema.   Gastrointestinal:  Negative for heartburn, nausea, vomiting, abdominal pain, diarrhea, constipation, blood in stool, melena, rectal pain, bloating, hemorrhoids, bowel incontinence, jaundice, rectal bleeding, coffee ground emesis and change in stool.   Genitourinary:  Negative for bladder incontinence, dysuria, urgency, hematuria, flank pain, vaginal discharge, difficulty urinating, genital sores, dyspareunia, decreased libido, nocturia, voiding less frequently, arousal difficulty, abnormal vaginal bleeding, excessive menstruation, menstrual changes, hot flashes, vaginal dryness and postmenopausal bleeding.   Musculoskeletal:  Positive for myalgias, joint swelling, arthralgias, stiffness and muscle weakness. Negative for back pain, muscle cramps, neck pain, bone pain and fracture.   Skin:  Negative for nail changes, itching, poor wound healing, rash, hair changes, skin changes, acne, warts, poor wound healing, scarring, flaky skin, Raynaud's phenomenon, sensitivity to  sunlight and skin thickening.   Neurological:  Negative for dizziness, tingling, tremors, speech change, seizures, loss of consciousness, weakness, light-headedness, numbness, headaches, disturbances in coordination, extremity numbness, memory loss, difficulty walking and paralysis.   Endo/Heme:  Negative for anemia, swollen glands and bruises/bleeds easily.   Psychiatric/Behavioral:  Negative for depression, hallucinations, memory loss, decreased concentration, mood swings and panic attacks.    Breast:  Negative for breast discharge, breast mass, breast pain and nipple retraction.   Endocrine:  Positive for altered temperature regulation and polydipsia.Negative for polyphagia, unwanted hair growth and change in facial hair.      MEDICATIONS:   Current Outpatient Prescriptions   Medication Sig Dispense Refill     acetaminophen (TYLENOL) 325 MG tablet Take 2 tablets (650 mg) by mouth every 4 hours as needed for other (mild pain) 100 tablet 0     acetylcysteine (MUCOMYST) 20 % nebulizer solution INHALE ONE 4ML NEB INTO LUNGS VIA NEBULIZER TWO TIMES A DAY, MAY INCREASE TO 3-4 TIMES A DAY WITH INCREASE COUGH/COLD SYMPTOMS 360 mL 11     albuterol (2.5 MG/3ML) 0.083% neb solution INHALE 1 NEB VIA NEBULIZER FOUR TIMES A  mL 11     albuterol (2.5 MG/3ML) 0.083% neb solution Take 1 vial (2.5 mg) by nebulization 4 times daily 360 mL 11     albuterol (PROAIR HFA/PROVENTIL HFA/VENTOLIN HFA) 108 (90 Base) MCG/ACT Inhaler Inhale 2 puffs into the lungs every 6 hours as needed for shortness of breath / dyspnea or wheezing 1 Inhaler 12     aluminum chloride (DRYSOL) 20 % external solution Apply topically At Bedtime Everyother night to every 3rd night. With impovement, decrease to 1-2 times weekly. Irritation may occur. 60 mL 3     amphetamine-dextroamphetamine (ADDERALL XR) 20 MG per 24 hr capsule Take 1 capsule (20 mg) by mouth daily 30 capsule 0     amphetamine-dextroamphetamine (ADDERALL XR) 20 MG per 24 hr capsule Take 1  capsule (20 mg) by mouth daily 30 capsule 0     amphetamine-dextroamphetamine (ADDERALL XR) 20 MG per 24 hr capsule Take 1 capsule (20 mg) by mouth daily 30 capsule 0     ascorbic acid (VITAMIN C) 500 MG tablet TAKE ONE TABLET BY MOUTH TWICE A  tablet 3     azithromycin (ZITHROMAX) 500 MG tablet TAKE 1 TABLET BY MOUTH ON MONDAYS, WEDNESDAYS AND FRIDAYS 36 tablet 4     beta carotene 08107 UNIT capsule TAKE ONE CAPSULE BY MOUTH ON MON, WED AND FRIDAY MORNING 36 capsule 4     blood glucose (ACCU-CHEK SMARTVIEW) test strip Use to test blood sugar 4 times daily or as directed. 1 Month 12     buPROPion (WELLBUTRIN SR) 150 MG 12 hr tablet Take 1 tablet (150 mg) by mouth 2 times daily 60 tablet 5     busPIRone (BUSPAR) 15 MG tablet Take 1 tablet (15 mg) by mouth 2 times daily 60 tablet 5     cetirizine (ZYRTEC) 10 MG tablet Take 1 tablet (10 mg) by mouth every evening 30 tablet 3     FLUoxetine (PROZAC) 40 MG capsule Take 2 capsules (80 mg) by mouth daily 60 capsule 5     GLYCOPYRROLATE PO Take 1 mg by mouth 2 times daily        hydrOXYzine (ATARAX) 25 MG tablet Take one tablet by mouth at bedtime for insomnia 30 tablet 5     MedroxyPROGESTERone Acetate (DEPO-PROVERA IM)        MEPHYTON 5 MG tablet TAKE ONE TABLET BY MOUTH ONCE A WEEK 4 tablet 11     meropenem (MERREM) 500 MG vial 500 mg by Nasal Instillation route once for 1 dose 60 each      meropenem (MERREM) 500 MG vial 500 mg by Nasal Instillation route every 8 hours 60 each      meropenem (MERREM) 500 MG vial Give x 1 today 1 each      meropenem (MERREM) 500 MG vial  60 each      methylcellulose, laxative, (CITRUCEL) POWD Start with 1 heaping tablespoon. Increase as needed, 1 heaping tablespoon at a time, up to 3 times per day. 479 g 3     montelukast (SINGULAIR) 10 MG tablet TAKE 1 TABLET BY MOUTH DAILY AT BEDTIME 30 tablet 11     Multiple Vitamin (MULTIVITAMIN OR) Take 1 tablet by mouth daily.       naltrexone (DEPADE;REVIA) 50 MG tablet Take 1 tablet.  Time  it one to two hours prior to worst cravings. 90 tablet 2     omeprazole (PRILOSEC) 20 MG CR capsule TAKE ONE CAPSULE BY MOUTH TWICE A DAY 60 capsule 11     polyethylene glycol (MIRALAX) powder Take 17 g (1 capful) by mouth daily as needed for constipation 119 g 3     traZODone (DESYREL) 100 MG tablet Take 1 tablet (100 mg) by mouth At Bedtime 30 tablet 5     VITAMIN D3 1000 UNITS tablet TAKE 1 TABLET BY MOUTH EVERY  tablet 3     vitamin E 400 UNIT capsule TAKE ONE CAPSULE BY MOUTH TWICE A  capsule 11       Weight Loss Medication History Reviewed With Patient 11/2/2018   Which weight loss medications are you currently taking on a regular basis?  -   If you are not taking a weight loss medication that was prescribed to you, please indicate why: -   Are you having any side effects from the weight loss medication that we have prescribed you? No     ASSESSMENT:    Mamie Rice is a 23 year old female who I am continuing to see for treatment of obesity.  She has CF, CFRD, depression, joints pain, ADHD    Currently on naltrexone and bupropion --  less snacking  Still eats high carb meal    PLAN:  - continue naltrexone 50 mg daily -- recommend to take it in the evening when she tends to snack  - add on topiramate titration from 25 mg up to 75 mg daily  - consider meal replacement with modified liquid diet -- protein shake for breakfast  - reinforced food plan - focus on no between meal snacking, aggressive lowering of starches and cheese, increase protein and vegetable    FOLLOW-UP:    RTC 3-4 months with me    I spent a total of 15 minutes face-to-face with Mamie Rice during today's office visit. Over 50% of this time was spent counseling the patient and/or coordinating care regarding    Sincerely,    Randa Ho MD

## 2018-11-28 DIAGNOSIS — E84.9 CF (CYSTIC FIBROSIS) (H): ICD-10-CM

## 2018-11-28 RX ORDER — MONTELUKAST SODIUM 10 MG/1
TABLET ORAL
Qty: 30 TABLET | Refills: 11 | Status: SHIPPED | OUTPATIENT
Start: 2018-11-28 | End: 2019-12-20

## 2018-12-04 DIAGNOSIS — E84.0 CYSTIC FIBROSIS WITH PULMONARY MANIFESTATIONS (H): Primary | ICD-10-CM

## 2018-12-04 RX ORDER — AMOXICILLIN AND CLAVULANATE POTASSIUM 500; 125 MG/1; MG/1
1 TABLET, FILM COATED ORAL 2 TIMES DAILY
Qty: 14 TABLET | Refills: 0 | Status: SHIPPED | OUTPATIENT
Start: 2018-12-04 | End: 2019-05-09

## 2018-12-06 DIAGNOSIS — E84.0 CYSTIC FIBROSIS WITH PULMONARY MANIFESTATIONS (H): Primary | ICD-10-CM

## 2018-12-06 RX ORDER — DOXYCYCLINE 100 MG/1
100 CAPSULE ORAL 2 TIMES DAILY
Qty: 42 CAPSULE | Refills: 0 | Status: SHIPPED | OUTPATIENT
Start: 2018-12-06 | End: 2018-12-27

## 2019-01-09 DIAGNOSIS — E84.9 CF (CYSTIC FIBROSIS) (H): ICD-10-CM

## 2019-01-09 RX ORDER — ACETYLCYSTEINE 200 MG/ML
SOLUTION ORAL; RESPIRATORY (INHALATION)
Qty: 360 ML | Refills: 11 | Status: SHIPPED | OUTPATIENT
Start: 2019-01-09 | End: 2020-01-13

## 2019-01-10 DIAGNOSIS — K86.81 EXOCRINE PANCREATIC INSUFFICIENCY: ICD-10-CM

## 2019-01-10 DIAGNOSIS — E84.9 CF (CYSTIC FIBROSIS) (H): ICD-10-CM

## 2019-01-10 RX ORDER — CHOLECALCIFEROL (VITAMIN D3) 25 MCG
TABLET ORAL
Qty: 100 TABLET | Refills: 3 | Status: SHIPPED | OUTPATIENT
Start: 2019-01-10 | End: 2020-04-02

## 2019-01-28 ENCOUNTER — TELEPHONE (OUTPATIENT)
Dept: PSYCHIATRY | Facility: CLINIC | Age: 24
End: 2019-01-28

## 2019-01-28 NOTE — TELEPHONE ENCOUNTER
"Per the [place] , medication was last refilled 11/28/18, 10/25/18, and 9/24/18.     As of 8/9/2018 office note: \"Return for Follow Up:6 months, sooner if changes needed.  Will call for Adderall Scripts in 3 months for another 3 scripts.\"    Placed phone call to pt at [phone] to schedule a follow-up appointment with Kristina Kilgore. Left a  requesting a return phone call.     "

## 2019-01-28 NOTE — TELEPHONE ENCOUNTER
Last Seen 8/9/18  RTC 6 months  Cancel None  No-Show None    Next Appt None    Incoming Refill From [facility], [place], [place] via fax    Medication Requested amphetamine-dextroamphetamine (ADDERALL XR) 20 MG per 24 hr capsule    Directions Take 1 capsule (20 mg) by mouth daily    Qty 30     Last Refill 10/4/18      Message routed to Nathalia Bonner RN

## 2019-01-31 NOTE — TELEPHONE ENCOUNTER
Kristina Kilgore APRN CNS   You 12 minutes ago (5:24 PM)      Do you know if she plans to come in and pick it up or have it mailed. If she plans to come in it would just be easier to see her at that time.   Kristina (Routing comment)    -Spoke with Kristina in-person regarding this. Per Kristina, hold on refilling Adderall as pt is due to return to clinic for a follow-up appointment.

## 2019-02-04 DIAGNOSIS — F90.0 ATTENTION DEFICIT HYPERACTIVITY DISORDER (ADHD), PREDOMINANTLY INATTENTIVE TYPE: ICD-10-CM

## 2019-02-04 RX ORDER — DEXTROAMPHETAMINE SACCHARATE, AMPHETAMINE ASPARTATE MONOHYDRATE, DEXTROAMPHETAMINE SULFATE AND AMPHETAMINE SULFATE 5; 5; 5; 5 MG/1; MG/1; MG/1; MG/1
20 CAPSULE, EXTENDED RELEASE ORAL DAILY
Qty: 30 CAPSULE | Refills: 0 | Status: SHIPPED | OUTPATIENT
Start: 2019-02-04 | End: 2019-02-13

## 2019-02-04 NOTE — TELEPHONE ENCOUNTER
See encounter from 1/28/19 for further details regarding this request. Pt is now scheduled for a follow-up appointment on 2/13 with Kristina.     Writer printed a 30 day script of Adderall XR 20mg and placed in provider's folder to sign. Message routed to provider.

## 2019-02-04 NOTE — TELEPHONE ENCOUNTER
Health Call Center    Phone Message    May a detailed message be left on voicemail: yes    Reason for Call: Medication Refill Request    Name of medication being requested: Adderall  Provider who prescribed the medication: CHRISTO Ko  Pharmacy: Please mail script to [facility] in [place]  Date medication is needed: asap (patient has been out of her med for 3 weeks, she has an appt scheduled for 2/13)      Action Taken: Message routed to:  Other: Nathalia Bonner RNCC

## 2019-02-05 NOTE — TELEPHONE ENCOUNTER
Received one signed script back from Kristina Kilgore.     Placed in envelope addressed to [facility] in [place], [place], per pt request. Envelope placed in outgoing mail.    Call to pt, ZHANNA informing her that rx has been placed in the mail and reminded of her next appointment time with Kristina.

## 2019-02-13 ENCOUNTER — OFFICE VISIT (OUTPATIENT)
Dept: PSYCHIATRY | Facility: CLINIC | Age: 24
End: 2019-02-13
Attending: CLINICAL NURSE SPECIALIST
Payer: COMMERCIAL

## 2019-02-13 VITALS
BODY MASS INDEX: 31.39 KG/M2 | DIASTOLIC BLOOD PRESSURE: 74 MMHG | WEIGHT: 174.4 LBS | HEART RATE: 102 BPM | SYSTOLIC BLOOD PRESSURE: 114 MMHG

## 2019-02-13 DIAGNOSIS — F90.0 ATTENTION DEFICIT HYPERACTIVITY DISORDER (ADHD), PREDOMINANTLY INATTENTIVE TYPE: ICD-10-CM

## 2019-02-13 DIAGNOSIS — G47.00 INSOMNIA, UNSPECIFIED TYPE: ICD-10-CM

## 2019-02-13 DIAGNOSIS — F33.0 MAJOR DEPRESSIVE DISORDER, RECURRENT EPISODE, MILD (H): Primary | ICD-10-CM

## 2019-02-13 DIAGNOSIS — F41.9 ANXIETY: ICD-10-CM

## 2019-02-13 DIAGNOSIS — E84.0 CYSTIC FIBROSIS WITH PULMONARY MANIFESTATIONS (H): ICD-10-CM

## 2019-02-13 DIAGNOSIS — F41.1 GAD (GENERALIZED ANXIETY DISORDER): ICD-10-CM

## 2019-02-13 PROCEDURE — G0463 HOSPITAL OUTPT CLINIC VISIT: HCPCS | Mod: ZF

## 2019-02-13 RX ORDER — FLUOXETINE 40 MG/1
80 CAPSULE ORAL DAILY
Qty: 60 CAPSULE | Refills: 5 | Status: SHIPPED | OUTPATIENT
Start: 2019-02-13 | End: 2019-08-14

## 2019-02-13 RX ORDER — DEXTROAMPHETAMINE SACCHARATE, AMPHETAMINE ASPARTATE MONOHYDRATE, DEXTROAMPHETAMINE SULFATE AND AMPHETAMINE SULFATE 5; 5; 5; 5 MG/1; MG/1; MG/1; MG/1
20 CAPSULE, EXTENDED RELEASE ORAL DAILY
Qty: 30 CAPSULE | Refills: 0 | Status: SHIPPED | OUTPATIENT
Start: 2019-04-29 | End: 2019-05-09

## 2019-02-13 RX ORDER — DEXTROAMPHETAMINE SACCHARATE, AMPHETAMINE ASPARTATE MONOHYDRATE, DEXTROAMPHETAMINE SULFATE AND AMPHETAMINE SULFATE 5; 5; 5; 5 MG/1; MG/1; MG/1; MG/1
20 CAPSULE, EXTENDED RELEASE ORAL DAILY
Qty: 30 CAPSULE | Refills: 0 | Status: SHIPPED | OUTPATIENT
Start: 2019-04-01 | End: 2019-05-09

## 2019-02-13 RX ORDER — DEXTROAMPHETAMINE SACCHARATE, AMPHETAMINE ASPARTATE MONOHYDRATE, DEXTROAMPHETAMINE SULFATE AND AMPHETAMINE SULFATE 5; 5; 5; 5 MG/1; MG/1; MG/1; MG/1
20 CAPSULE, EXTENDED RELEASE ORAL DAILY
Qty: 30 CAPSULE | Refills: 0 | Status: SHIPPED | OUTPATIENT
Start: 2019-03-04 | End: 2019-05-09

## 2019-02-13 RX ORDER — BUSPIRONE HYDROCHLORIDE 15 MG/1
15 TABLET ORAL 2 TIMES DAILY
Qty: 60 TABLET | Refills: 5 | Status: SHIPPED | OUTPATIENT
Start: 2019-02-13 | End: 2019-08-14

## 2019-02-13 ASSESSMENT — PAIN SCALES - GENERAL: PAINLEVEL: NO PAIN (0)

## 2019-02-13 ASSESSMENT — PATIENT HEALTH QUESTIONNAIRE - PHQ9: SUM OF ALL RESPONSES TO PHQ QUESTIONS 1-9: 11

## 2019-02-13 NOTE — PATIENT INSTRUCTIONS
Thank you for coming to the [facility].    Lab Testing:  If you had lab testing today and your results are reassuring or normal they will be mailed to you or sent through [employer] within 7 days.   If the lab tests need quick action we will call you with the results.  The phone number we will call with results is # [phone] (home) . If this is not the best number please call our clinic and change the number.    Medication Refills:  If you need any refills please call your pharmacy and they will contact us. Our fax number for refills is [phone]. Please allow three business for refill processing.   If you need to  your refill at a new pharmacy, please contact the new pharmacy directly. The new pharmacy will help you get your medications transferred.     Scheduling:  If you have any concerns about today's visit or wish to schedule another appointment please call our office during normal business hours [phone] (8-5:00 M-F)    Contact Us:  Please call [phone] during business hours (8-5:00 M-F).  If after clinic hours, or on the weekend, please call  [phone].    Financial Assistance [phone]  [employer] [facility] Billing [phone]  [employer] Billing [phone]  Medical Records [phone]      MENTAL HEALTH CRISIS NUMBERS:  [facility]:   [facility] - [phone]   Crisis Residence [facility] - [phone]   Walk-In Counseling [facility] [phone]   COPE 24/7 [place] Mobile Team for Adults - [[phone]]; Child - [[phone]]     Crisis Connection - [phone]     [facility]:   [facility] - [phone]   Walk-in counseling [facility] - [phone]   Walk-in counseling [facility] - [phone]   Crisis Residence [facility] - [phone]   Urgent Care Adult Mental Health:   --Drop-in, 24/7 crisis line, and [place] Mobile Team [[phone]]    CRISIS TEXT  LINE: Text [phone] from anywhere, anytime, any crisis 24/7;    OR SEE www.crisistextline.org     Poison Control Center - [phone]    CHILD: [facility] needs assessment team - [phone]     [facility] - [phone]; or [facility] [employer] [facility] - [phone]    If you have a medical emergency please call 911or go to the nearest ER.                    _____________________________________________    Again thank you for choosing [facility] and please let us know how we can best partner with you to improve you and your family's health.  You may be receiving a survey in the mail regarding this appointment. We would love to have your feedback, both positive and negative, so please fill out the survey and return it using the provided envelope. The survey is done by an external company, so your answers are anonymous.

## 2019-02-13 NOTE — PROGRESS NOTES
Outpatient Psychiatry Progress Note     Provider: DANIEL Mtz CNS  Date: 2019  Service:  Medication follow up with counseling.   Patient Identification: Mamie Rice  : 1995   MRN: [id number]    Mamie Rice is a 23 year old year old female who presents for ongoing psychiatric care.  Mamie Rice was last seen in clinic on 18.   At that time,   Assessment & Plan       Mamie Rice is seen today for follow up and reports she has been feeling well with improved anxiety and no side effects with the addition of Buspar     Diagnosis  ADHD  Major depressive disorder, recurrent, mild  Anxiety     Plan:  Medication: continue current medication  OTC Recommendations: none  Lab Orders:  none  Referrals: none  Release of Information: none  Future Treatment Considerations:per symptoms  Return for Follow Up:6 months, sooner if changes needed.  Will call for [facility] [employer] [facility] in 3 months for another 3 scripts.      ____________________________________________________________________________________________________________________________________________    2019  Today Mamie reports she is feeling ok but quit her job at [employer] in the [employer].  She is looking for more work. Was sick a lot while working there.   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  Since not working is sleeping more  Side effects of medication include: none known  Psychiatric Review of Systems:  Depression: In the last 2 weeks per PHQ-9 score:   PHQ-9 SCORE 2019   PHQ-9 Total Score 11       Anxiety : obsessive about order - can't leave if something needs to be done which sometimes causes difficulty with getting places.   Quiana na   Psychosis  na.   ADHD stable    Review of Medical Systems:  Sleep: increased since not working  Energy: mild  Concentration: moderate but stable  Appetite: mild, varies too much or too little  GI Concerns: stable  Cardiac concerns: none  Neurological concerns:  none  Other medical concerns: no new concerns  Current Substance Use:  Alcohol:denies frequent use or abuse  Other drugs:none  Caffeine:no change - diet soda  Nicotine: none  Past Medical History:   Past Medical History:   Diagnosis Date     ADHD (attention deficit hyperactivity disorder)      Anxiety      Aspergillosis, with pneumonia (H)     fugus found caused chest pain     Chronic infection     CF, MRSA.,      Chronic sinusitis      Constipation, chronic      Cystic fibrosis with pulmonary manifestations (H) 12/19/2011     Cystic fibrosis without mention of meconium ileus     SWEAT TEST:Date: 2/17/1994   Laboratory[facility]  296 mg, 104 mmol/L ClSample #2  295 mg, 104 mmol/L Cl GENOTYPING:Date: 10/15/2007,  Laboratory: [facility]: [id number]     Depressive disorder      Diabetes     no meds currently     Dysthymic disorder      Exocrine pancreatic insufficiency      Gastro-oesophageal reflux disease      Hip pain, right      MRSA (methicillin resistant Staphylococcus aureus) carrier      Pancreatic disease      Patient Active Problem List   Diagnosis     Hip joint pain     Aspergillosis (H)     Chronic maxillary sinusitis     Hypoglycemia     Type 1 diabetes mellitus (H)     Cystic fibrosis of the lung (H)     Chronic constipation     Frontal sinusitis     Overactive child     Back pain     Pancreatic insufficiency     Non morbid obesity due to excess calories     Acne vulgaris     Cystic fibrosis (H)     Diabetes mellitus, type 2 (H)     Persistent depressive disorder     Mild episode of recurrent major depressive disorder (H)     Insomnia, unspecified type     MECHE (generalized anxiety disorder)     Attention deficit hyperactivity disorder (ADHD), combined type       Allergies:   Allergies   Allergen Reactions     Vancomycin Hives     Redmens skin rash   Redmens skin rash      Augmentin Rash          Current Medications     Current Outpatient Medications Ordered in Epic   Medication Sig Dispense  Refill     acetaminophen (TYLENOL) 325 MG tablet Take 2 tablets (650 mg) by mouth every 4 hours as needed for other (mild pain) 100 tablet 0     acetylcysteine (MUCOMYST) 20 % neb solution INHALE ONE 4ML NEB INTO LUNGS VIA NEBULIZER TWO TIMES A DAY, MAY INCREASE TO 3-4 TIMES A DAY WITH INCREASE COUGH/COLD SYMPTOMS 360 mL 11     albuterol (2.5 MG/3ML) 0.083% neb solution INHALE 1 NEB VIA NEBULIZER FOUR TIMES A  mL 11     albuterol (2.5 MG/3ML) 0.083% neb solution Take 1 vial (2.5 mg) by nebulization 4 times daily 360 mL 11     albuterol (PROAIR HFA/PROVENTIL HFA/VENTOLIN HFA) 108 (90 Base) MCG/ACT Inhaler Inhale 2 puffs into the lungs every 6 hours as needed for shortness of breath / dyspnea or wheezing 1 Inhaler 12     aluminum chloride (DRYSOL) 20 % external solution Apply topically At Bedtime Everyother night to every 3rd night. With impovement, decrease to 1-2 times weekly. Irritation may occur. 60 mL 3     amphetamine-dextroamphetamine (ADDERALL XR) 20 MG 24 hr capsule Take 1 capsule (20 mg) by mouth daily 30 capsule 0     amphetamine-dextroamphetamine (ADDERALL XR) 20 MG per 24 hr capsule Take 1 capsule (20 mg) by mouth daily 30 capsule 0     amphetamine-dextroamphetamine (ADDERALL XR) 20 MG per 24 hr capsule Take 1 capsule (20 mg) by mouth daily (Patient not taking: Reported on 11/13/2018) 30 capsule 0     ascorbic acid (VITAMIN C) 500 MG tablet TAKE ONE TABLET BY MOUTH TWICE A  tablet 3     azithromycin (ZITHROMAX) 500 MG tablet TAKE 1 TABLET BY MOUTH ON MONDAYS, WEDNESDAYS AND FRIDAYS 36 tablet 4     beta carotene 90744 UNIT capsule TAKE ONE CAPSULE BY MOUTH ON MON, WED AND FRIDAY MORNING 36 capsule 4     blood glucose (ACCU-CHEK SMARTVIEW) test strip Use to test blood sugar 4 times daily or as directed. 1 Month 12     buPROPion (WELLBUTRIN SR) 150 MG 12 hr tablet Take 1 tablet (150 mg) by mouth 2 times daily 60 tablet 5     busPIRone (BUSPAR) 15 MG tablet Take 1 tablet (15 mg) by mouth 2 times  daily 60 tablet 5     cetirizine (ZYRTEC) 10 MG tablet Take 1 tablet (10 mg) by mouth every evening 30 tablet 3     FLUoxetine (PROZAC) 40 MG capsule Take 2 capsules (80 mg) by mouth daily 60 capsule 5     GLYCOPYRROLATE PO Take 1 mg by mouth 2 times daily        hydrOXYzine (ATARAX) 25 MG tablet Take one tablet by mouth at bedtime for insomnia 30 tablet 5     MedroxyPROGESTERone Acetate (DEPO-PROVERA IM)        MEPHYTON 5 MG tablet TAKE ONE TABLET BY MOUTH ONCE A WEEK 4 tablet 11     meropenem (MERREM) 500 MG vial 500 mg by Nasal Instillation route once for 1 dose 60 each      meropenem (MERREM) 500 MG vial 500 mg by Nasal Instillation route every 8 hours 60 each      meropenem (MERREM) 500 MG vial Give x 1 today 1 each      meropenem (MERREM) 500 MG vial  60 each      methylcellulose, laxative, (CITRUCEL) POWD Start with 1 heaping tablespoon. Increase as needed, 1 heaping tablespoon at a time, up to 3 times per day. 479 g 3     montelukast (SINGULAIR) 10 MG tablet TAKE ONE TABLET BY MOUTH AT BEDTIME 30 tablet 11     Multiple Vitamin (MULTIVITAMIN OR) Take 1 tablet by mouth daily.       naltrexone (DEPADE;REVIA) 50 MG tablet Take 1 tablet.  Time it one to two hours prior to worst cravings. 90 tablet 2     omeprazole (PRILOSEC) 20 MG CR capsule TAKE ONE CAPSULE BY MOUTH TWICE A DAY 60 capsule 11     polyethylene glycol (MIRALAX) powder Take 17 g (1 capful) by mouth daily as needed for constipation 119 g 3     topiramate (TOPAMAX) 25 MG tablet 25 mg at bedtime for 1 week, 50 mg at bedtime for 1 week and 75 mg daily at bedtime thereafter 180 tablet 2     traZODone (DESYREL) 100 MG tablet Take 1 tablet (100 mg) by mouth At Bedtime 30 tablet 5     VITAMIN D3 1000 units tablet TAKE ONE TABLET BY MOUTH EVERY  tablet 3     vitamin E 400 UNIT capsule TAKE ONE CAPSULE BY MOUTH TWICE A  capsule 11     Current Facility-Administered Medications Ordered in Epic   Medication Dose Route Frequency Provider Last Rate  "Last Dose     albuterol (PROAIR HFA, PROVENTIL HFA, VENTOLIN HFA) inhaler    Citlali Denny MD   2 puff at 10/16/15 0939        Mental Status Exam     Appearance:  Casually dressed and Well groomed  Behavior/relationship to examiner/demeanor:  Cooperative and Reduced eye contact  Orientation: Oriented to person, place, time and situation  Psychomotor: normal form  Speech Rate:  Normal  Speech Spontaneity:  Mild poverty and latency  Mood:  \"okay\"  Affect:  Appropriate/mood-congruent  Thought Process (Associations):  Goal directed  Thought Content:  no overt psychosis, denies suicidal ideation, intent or thoughts and patient does not appear to be responding to internal stimuli  Abnormal Perception:  None  Attention/Concentration:  Normal  Insight:  Good  Judgment:  Good      Results     Vital signs: /74   Pulse 102   Wt 79.1 kg (174 lb 6.4 oz)   BMI 31.39 kg/m      Laboratory Data:  reviewed from other providers No concerns with psych medications.    Assessment & Plan      Mamie Rice is seen today for follow up and reports her mood has been stable. Looking for a different job in  with less exposure to communicable illness than previous job.    Diagnosis  Encounter Diagnoses   Name Primary?     Major depressive disorder, recurrent episode, mild (H) Yes     Attention deficit hyperactivity disorder (ADHD), predominantly inattentive type      MECHE (generalized anxiety disorder)      Insomnia, unspecified type      Anxiety        Plan:  Medication: no change  OTC Recommendations: none  Lab Orders:  none  Referrals: none  Release of Information: none  Future Treatment Considerations:per symptoms  Return for Follow Up: 6 months.   The risks, benefits, alternatives and side effects have been discussed and are understood by the patient. The patient understands the risks of using street drugs or alcohol. There are no medical contraindications, the patient agrees to treatment, and has the " capacity to do so. The patient understands to call 911 or come to the nearest ED if life threatening or urgent symptoms present.  In addition time was spent counseling the patient and/or coordinating care regarding review of social and occupational functioning.  In addition patient was counseled on health and wellness practices to augment medication treatment of symptoms. See note for details.    Kristina Kilgore, APRN CNS 2/13/2019

## 2019-02-13 NOTE — NURSING NOTE
Chief Complaint   Patient presents with     Recheck Medication     Persistent depressive disorder

## 2019-03-06 NOTE — PROGRESS NOTES
[facility]  [facility]  March 7, 2019         Assessment and Plan:   Mamie Rice is a 23 year old female with cystic fibrosis who is seen today in clinic for routine follow up.     1. CF lung disease with normal spirometry: feels at her baseline other than sinus symptoms for which she has not yet see ENT. Has missed a few vests secondary to her work schedule, but now back to vesting BID. Sating 97% on room air, PFTs improved to near her best. Previous cultures + for MSSA. No evidence of exacerbation at this time.  - Continue nebs and vesting BID  - On chronic azithromycin    2. Pancreatic insufficiency: denies new symptoms consistent with worsening malabsorption. Weight is above Foundation goal.  - Continue enzymes and vitamin supplementation     3.  Cystic fibrosis sinus disease: last surgery was multiple years ago. Grisel ongoing congestion, sinus headaches, rhinorrhea and PND. 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 Haile. Overdue for f/u. Cannot tolerating sinus rinses.  - Resume Flonase 2 sprays twice/day her  - Schedule f/u with Dr. Haile    4. H/o abnormal glucose tolerance: doesn't typically check BS, notes more low than highs, hasn't had any symptoms recently.   - Due for OGTT     5.  Weight maintenance: following with the Weight management clinic. Weight is relatively unchanged.   - Continue naltrexone     6. Depression/anxiety: mood has improved since quitting her job at the [employer]. Follows with Psychiatry on the [place] [facility].  - Continue Adderall, Wellbutrin, Buspar, Prozac and trazodone    7. Possible GERD: notes clearing of her throat all day, not worse in the am or evening. Doesn't know if she's been on a PPI, but states that if it's on her medication list, she's probably taking it.  - Confirm dose of omeprazole, if not on it, resume 20 mg BID, otherwise increase to 40 mg BID  - May need f/u with  Gastroenterology     RTC: in 3 months with routine cultures and spirometry  Annual studies due: due now, will reschedule to [facility] with f/u with Dr. Mic Gray PA-C  Pulmonary, Allergy, Critical Care and Sleep Medicine        Interval History:   Feeling have a bit of a sore throat and her sinuses hurt. Notes increased congestion and drainage, both runny n ose and PND. Notes some headaches as well. No fever or chills. Normal cough, here and there with vesting only, mainly dry. No congestion, no tightness or shortness of breath. No chest pain or palpitations. No GI complaints, stools are 2-3 day. Typically vests BID.          Review of Systems:   Please see HPI. Otherwise, complete 10 point ROS negative.           Past Medical and Surgical History:     Past Medical History:   Diagnosis Date     ADHD (attention deficit hyperactivity disorder)      Anxiety      Aspergillosis, with pneumonia (H)     fugus found caused chest pain     Chronic infection     CF, MRSA.,      Chronic sinusitis      Constipation, chronic      Cystic fibrosis with pulmonary manifestations (H) 12/19/2011     Cystic fibrosis without mention of meconium ileus     SWEAT TEST:Date: 2/17/1994   Laboratory: [facility]  296 mg, 104 mmol/L ClSample #2  295 mg, 104 mmol/L Cl GENOTYPING:Date: 10/15/2007,  Laboratory: [facility]: [id number]     Depressive disorder      Diabetes     no meds currently     Dysthymic disorder      Exocrine pancreatic insufficiency      Gastro-oesophageal reflux disease      Hip pain, right      MRSA (methicillin resistant Staphylococcus aureus) carrier      Pancreatic disease      Past Surgical History:   Procedure Laterality Date     ARTHROSCOPY HIP, OSTEOPLASTY FEMUR PROXIMAL, COMBINED  3/11/2013    Procedure: COMBINED ARTHROSCOPY HIP, OSTEOPLASTY FEMUR PROXIMAL;  Right Hip Arthroscopy, Labral  Debridement.    surgeon request choice anesthesia/admit to [facility] after surgery;  Surgeon: Norman  Omkar CAMPA MD;  [facility]     ARTHROSCOPY KNEE WITH MEDIAL MENISCECTOMY Left 1/31/2017    Procedure: ARTHROSCOPY KNEE WITH MEDIAL MENISCECTOMY;  Surgeon: Jethro Coyle MD;  [facility] OR     bronchoscopies       BRONCHOSCOPY       EXAM UNDER ANESTHESIA ANUS N/A 5/10/2016    Procedure: EXAM UNDER ANESTHESIA ANUS;  Surgeon: Chet Gaviria MD;  [facility]     EXAM UNDER ANESTHESIA, RESTORATIONS, EXTRACTION(S) DENTAL COMPLEX, COMBINED  5/13/2013    Procedure: COMBINED EXAM UNDER ANESTHESIA, RESTORATIONS, EXTRACTION(S) DENTAL COMPLEX;  Dental Exam, Radiographs, Restorations. Single Extraction  Tooth #2. Restorations x 3;  Surgeon: Danilo Ortiz DDS;  [facility] OR     HC KNEE SCOPE, DIAGNOSTIC      Arthroscopy, Knee- left     INJECT BOTOX N/A 5/10/2016    Procedure: INJECT BOTOX;  Surgeon: Chet Gaviria MD;  [facility]     left hip labral tear  5/11/2011    left hip arthroscopy with labral debridement and synovectomy     meniscus repair       OPTICAL TRACKING SYSTEM ENDOSCOPIC SINUS SURGERY  10/14/2011    Procedure:OPTICAL TRACKING SYSTEM ENDOSCOPIC SINUS SURGERY; FESS (functional endoscopic sinus surgery) with Image Guidance, bronchial lavage and cultures; Surgeon:GOYO KUO; [facility] OR     OPTICAL TRACKING SYSTEM ENDOSCOPIC SINUS SURGERY  5/18/2012    Procedure:OPTICAL TRACKING SYSTEM ENDOSCOPIC SINUS SURGERY; Right  and Left Image Guided Functional Endoscopic Sinus Surgery With  Frontal Approach, [employer]; Surgeon:GOYO KUO; [facility]     OPTICAL TRACKING SYSTEM ENDOSCOPIC SINUS SURGERY  9/26/2012    Procedure: OPTICAL TRACKING SYSTEM ENDOSCOPIC SINUS SURGERY;  Stealth Guided Bilateral Functional Endoscopic Sinus Surgery *Latex Safe*;  Surgeon: Goyo Kuo MD;  [facility]     OPTICAL TRACKING SYSTEM ENDOSCOPIC SINUS SURGERY Bilateral 10/16/2015    Procedure: OPTICAL TRACKING SYSTEM ENDOSCOPIC SINUS SURGERY;  Surgeon: Mariela Haile,  MD;  [facility]     ORTHOPEDIC SURGERY      left hip tear repair 2010     SINUS SURGERY             Family History:     Family History   Problem Relation Age of Onset     Cancer Paternal Grandmother      Skin Cancer Paternal Grandmother      Other Cancer Paternal Grandmother         Skin     Obesity Paternal Grandmother      Cancer Paternal Grandfather         PGF had throat cancer (he was a smoker)     Other Cancer Paternal Grandfather      Anxiety Disorder Paternal Grandfather      Thyroid Disease Mother         ,     Obesity Other      Anesthesia Reaction No family hx of      Blood Disease No family hx of      Colon Polyps No family hx of      Crohn's Disease No family hx of      Ulcerative Colitis No family hx of      Colon Cancer No family hx of      Melanoma No family hx of             Social History:     Social History     Socioeconomic History     Marital status: Single     Spouse name: Not on file     Number of children: Not on file     Years of education: Not on file     Highest education level: Not on file   Occupational History     Not on file   Social Needs     Financial resource strain: Not on file     Food insecurity:     Worry: Not on file     Inability: Not on file     Transportation needs:     Medical: Not on file     Non-medical: Not on file   Tobacco Use     Smoking status: Never Smoker     Smokeless tobacco: Never Used     Tobacco comment: one person at home smokes outside   Substance and Sexual Activity     Alcohol use: No     Alcohol/week: 0.0 oz     Drug use: No     Sexual activity: No   Lifestyle     Physical activity:     Days per week: Not on file     Minutes per session: Not on file     Stress: Not on file   Relationships     Social connections:     Talks on phone: Not on file     Gets together: Not on file     Attends Worship service: Not on file     Active member of club or organization: Not on file     Attends meetings of clubs or organizations: Not on file     Relationship  status: Not on file     Intimate partner violence:     Fear of current or ex partner: Not on file     Emotionally abused: Not on file     Physically abused: Not on file     Forced sexual activity: Not on file   Other Topics Concern      Service Not Asked     Blood Transfusions No     Caffeine Concern Not Asked     Occupational Exposure Not Asked     Hobby Hazards Not Asked     Sleep Concern Not Asked     Stress Concern Not Asked     Weight Concern Not Asked     Special Diet Not Asked     Back Care Not Asked     Exercise Yes     Bike Helmet Not Asked     Seat Belt Not Asked     Self-Exams Not Asked     Parent/sibling w/ CABG, MI or angioplasty before 65F 55M? Not Asked   Social History Narrative    Mamie lives with mother in [place], [place].  There is a cat in the home, but Mamie does not have any litterbox duties.  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  She gets essentially no exercise because of the tingling in her feet (says it bothers her even to stand).        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        8/2015 no change in social situation        2/15/2016 Pt is single and lives with mother and stepfather.            Medications:     Current Outpatient Medications   Medication     fluticasone (FLONASE) 50 MCG/ACT nasal spray     omeprazole (PRILOSEC) 40 MG DR capsule     acetaminophen (TYLENOL) 325 MG tablet     acetylcysteine (MUCOMYST) 20 % neb solution     albuterol (2.5 MG/3ML) 0.083% neb solution     albuterol (PROAIR HFA/PROVENTIL HFA/VENTOLIN HFA) 108 (90 Base) MCG/ACT Inhaler     [START ON 4/29/2019] amphetamine-dextroamphetamine (ADDERALL XR) 20 MG 24 hr capsule     [START ON 4/1/2019] amphetamine-dextroamphetamine (ADDERALL XR) 20 MG 24 hr capsule     amphetamine-dextroamphetamine (ADDERALL XR) 20 MG 24 hr capsule     ascorbic acid (VITAMIN C) 500 MG tablet     azithromycin (ZITHROMAX) 500 MG tablet      "beta carotene 89902 UNIT capsule     blood glucose (ACCU-CHEK SMARTVIEW) test strip     buPROPion (WELLBUTRIN SR) 150 MG 12 hr tablet     busPIRone (BUSPAR) 15 MG tablet     cetirizine (ZYRTEC) 10 MG tablet     FLUoxetine (PROZAC) 40 MG capsule     GLYCOPYRROLATE PO     MedroxyPROGESTERone Acetate (DEPO-PROVERA IM)     MEPHYTON 5 MG tablet     meropenem (MERREM) 500 MG vial     montelukast (SINGULAIR) 10 MG tablet     Multiple Vitamin (MULTIVITAMIN OR)     naltrexone (DEPADE;REVIA) 50 MG tablet     polyethylene glycol (MIRALAX) powder     topiramate (TOPAMAX) 25 MG tablet     traZODone (DESYREL) 100 MG tablet     VITAMIN D3 1000 units tablet     vitamin E 400 UNIT capsule     No current facility-administered medications for this visit.      Facility-Administered Medications Ordered in Other Visits   Medication     albuterol (PROAIR HFA, PROVENTIL HFA, VENTOLIN HFA) inhaler            Physical Exam:   /76 (BP Location: Right arm, Patient Position: Chair, Cuff Size: Adult Regular)   Pulse 83   Resp 16   Ht 1.588 m (5' 2.52\")   Wt 78.9 kg (174 lb)   SpO2 97%   BMI 31.30 kg/m      GENERAL: alert, NAD  HEENT: NCAT, EOMI, anicteric sclera, no nasal edema or erythema; canals and TMs clear; no oral mucosal edema or erythema  Neck: no cervical or supraclavicular adenopathy  Respiratory: good air flow, no crackles, rhonchi or wheezing  CV: RRR, S1S2, no murmurs noted  Abdomen: normoactive BS, soft and non tender without organomegaly  Lymph: no edema  Neuro: AAO X 3, CN 2-12 grossly intact  Psychiatric: normal affect, good eye contact  Skin: no rash, jaundice or lesions on limited exam         Data:   All laboratory and imaging data reviewed.      Cystic Fibrosis Culture  Specimen Description   Date Value Ref Range Status   11/06/2018 Sputum  Final   07/18/2018 Throat  Final   04/18/2018 Midstream Urine  Final    Culture Micro   Date Value Ref Range Status   11/06/2018 Moderate growth  Normal roberto carlos    Final "   11/06/2018 (A)  Final    Heavy growth  Staphylococcus aureus  This isolate is presumed to be clindamycin resistant based on detection of inducible   clindamycin resistance. Erythromycin and clindamycin are resistant, therefore, they are   not recommended for use.     11/06/2018 (A)  Final    Heavy growth  Strain 2  Staphylococcus aureus  Organism failed to thrive for susceptibility testing     11/06/2018 (A)  Final    Heavy growth  Strain 3  Staphylococcus aureus  This isolate is presumed to be clindamycin resistant based on detection of inducible   clindamycin resistance. Erythromycin and clindamycin are resistant, therefore, they are   not recommended for use.          PFT interpretation:  Maneuver: valid and meets ATS guidelines  Normal spirometry

## 2019-03-07 ENCOUNTER — OFFICE VISIT (OUTPATIENT)
Dept: PHARMACY | Facility: CLINIC | Age: 24
End: 2019-03-07

## 2019-03-07 ENCOUNTER — OFFICE VISIT (OUTPATIENT)
Dept: PULMONOLOGY | Facility: CLINIC | Age: 24
End: 2019-03-07
Attending: PHYSICIAN ASSISTANT
Payer: COMMERCIAL

## 2019-03-07 VITALS
SYSTOLIC BLOOD PRESSURE: 118 MMHG | BODY MASS INDEX: 30.83 KG/M2 | OXYGEN SATURATION: 97 % | WEIGHT: 174 LBS | RESPIRATION RATE: 16 BRPM | HEART RATE: 83 BPM | DIASTOLIC BLOOD PRESSURE: 76 MMHG | HEIGHT: 63 IN

## 2019-03-07 DIAGNOSIS — E84.0 CYSTIC FIBROSIS OF THE LUNG (H): Primary | ICD-10-CM

## 2019-03-07 DIAGNOSIS — F90.9 OVERACTIVE CHILD: ICD-10-CM

## 2019-03-07 DIAGNOSIS — E66.09 NON MORBID OBESITY DUE TO EXCESS CALORIES: ICD-10-CM

## 2019-03-07 DIAGNOSIS — E84.9 CYSTIC FIBROSIS (H): Primary | ICD-10-CM

## 2019-03-07 DIAGNOSIS — J32.0 CHRONIC MAXILLARY SINUSITIS: ICD-10-CM

## 2019-03-07 DIAGNOSIS — K21.9 GASTROESOPHAGEAL REFLUX DISEASE, ESOPHAGITIS PRESENCE NOT SPECIFIED: ICD-10-CM

## 2019-03-07 DIAGNOSIS — E84.9 CF (CYSTIC FIBROSIS) (H): ICD-10-CM

## 2019-03-07 DIAGNOSIS — F32.9 MAJOR DEPRESSIVE DISORDER: ICD-10-CM

## 2019-03-07 DIAGNOSIS — K21.00 GASTROESOPHAGEAL REFLUX DISEASE WITH ESOPHAGITIS: ICD-10-CM

## 2019-03-07 DIAGNOSIS — F33.1 MODERATE EPISODE OF RECURRENT MAJOR DEPRESSIVE DISORDER (H): ICD-10-CM

## 2019-03-07 PROCEDURE — G0463 HOSPITAL OUTPT CLINIC VISIT: HCPCS | Mod: ZF

## 2019-03-07 PROCEDURE — 87070 CULTURE OTHR SPECIMN AEROBIC: CPT | Performed by: PHYSICIAN ASSISTANT

## 2019-03-07 PROCEDURE — 87186 SC STD MICRODIL/AGAR DIL: CPT | Performed by: PHYSICIAN ASSISTANT

## 2019-03-07 PROCEDURE — 99607 MTMS BY PHARM ADDL 15 MIN: CPT | Performed by: PHARMACIST

## 2019-03-07 PROCEDURE — 87077 CULTURE AEROBIC IDENTIFY: CPT | Performed by: PHYSICIAN ASSISTANT

## 2019-03-07 PROCEDURE — 99605 MTMS BY PHARM NP 15 MIN: CPT | Performed by: PHARMACIST

## 2019-03-07 RX ORDER — OMEPRAZOLE 40 MG/1
40 CAPSULE, DELAYED RELEASE ORAL 2 TIMES DAILY
Qty: 60 CAPSULE | Refills: 3 | Status: SHIPPED | OUTPATIENT
Start: 2019-03-07 | End: 2019-07-19

## 2019-03-07 RX ORDER — FLUTICASONE PROPIONATE 50 MCG
2 SPRAY, SUSPENSION (ML) NASAL 2 TIMES DAILY
Qty: 15.8 ML | Refills: 3 | Status: SHIPPED | OUTPATIENT
Start: 2019-03-07 | End: 2021-06-08

## 2019-03-07 ASSESSMENT — PAIN SCALES - GENERAL: PAINLEVEL: NO PAIN (0)

## 2019-03-07 ASSESSMENT — MIFFLIN-ST. JEOR: SCORE: 1495.77

## 2019-03-07 NOTE — PATIENT INSTRUCTIONS
Cystic Fibrosis Self-Care Plan       Patient: Mamie Rice   MRN: [id number]   Clinic Date: March 7, 2019     RECOMMENDATIONS:  1. Continue nebulizers and vest therapy.   2. Increase Prilosec to 40 mg twice/day and monitor throat clearing.  3.  Resume Flonase 2 sprays twice/day.     Annual Studies:   IGG   Date Value Ref Range Status   08/02/2017 689 (L) 695 - 1,620 mg/dL Final     Insulin   Date Value Ref Range Status   03/30/2015 81 mU/L Final     Comment:     Reference Range:  0-20     There are no preventive care reminders to display for this patient.    Pulmonary Function Tests  FEV1: amount of air you can blow out in 1 second  FVC: total amount of air you can take in and blow out    Your Goals:         PFT Latest Ref Rng & Units 3/7/2019   FVC L 3.59   FEV1 L 3.10   FVC% % 101   FEV1% % 101          Airway Clearance: The Most Important Way to Keep Your Lungs Healthy  Vest Settings:    Hill-Rom Frequencies: 8, 9, 10 Pressure 10 Then, Frequencies 18, 19, 20 Pressure 6      RespirTech: Quick Start with Pressure of     Do each frequency for 5 minutes; Deflate vest after each frequency & cough 3 times before beginning the next setting.    Vest and Neb Therapy should be done 2 times/day.    Good Nutrition Can Improve Lung Function and Overall Health     Take ALL of your vitamins with food     Take 1/2 of your enzymes before EVERY meal/snack and the other 1/2 mid-meal/snack    Wt Readings from Last 3 Encounters:   03/07/19 78.9 kg (174 lb)   11/13/18 77.8 kg (171 lb 9.6 oz)   11/06/18 77.7 kg (171 lb 4.8 oz)       Body mass index is 31.3 kg/m .         National CF Foundation Recommendations for BMI in CF Adults: Women: at least 22 Men: at least 23        Controlling Blood Sugars Helps Prevent Lung Infections & Improves Nutrition  Test blood sugar:     In the morning before eating (goal is )     2 hours after a meal (goal is less than 150)     When pre-meal glucose is greater than 150 add correction      At bedtime (if less than 100 eat a snack with 15 grams of carbohydrates  Last A1C Results:   Hemoglobin A1C   Date Value Ref Range Status   08/02/2017 5.5 4.3 - 6.0 % Final         If diabetic, measure A1C every 6 months. Goal is under 7%.    Staying Healthy    Research: If you are interested in learning about research opportunities or have questions, please contact Mariana Smith at [phone] or [email or web address].       Foundation: Compass is a personalized resource service to help you with the insurance, financial, legal and other issues you are facing.  It's free, confidential and available to anyone with CF.  Ask your  for more information or contact Compass directly at 953-COMPASS ([phone]) or compass@cff.org, or learn more at cff.org/compass.       CF Nurse Line:  Burton Anderson: [phone]   Jhoana Trinh, RT: [phone]     Theresa Ceja and Amy Andino , Dieticians: [phone]     Rhonda Esteves, Diabetes Nurse: [phone]    Deyanira Will: [phone] or Rosette Burnett [phone], Social Workers   [email or web address]

## 2019-03-07 NOTE — PROGRESS NOTES
SUBJECTIVE/OBJECTIVE:                           Mamei Rice is a 23 year old female coming in for routine clinic visit.      Allergies/ADRs: Reviewed in Epic  Tobacco: No tobacco use  Alcohol: not currently using  PMH: Reviewed in Epic  CF Genotype: [id number]    Medication Adherence  Medication adherence flowsheet 3/10/2019   Patient medication administration: Has assistance with medications   Patient estimated adherence level: 89-76%   Pharmacist assessment of adherence: Good   Patient reported doses missed per week: 0-1   Pharmacy MPR: Not available   Facilitators to medication adherence  Schedule/routine   Reports generally good adherence, other than occasional missed doses when she is busy.  She uses a bill box which either she or her mom sets up each week.       Medication Access  Medication access flowsheet 3/10/2019   Number of pharmacies used: 2   Pharmacy: [facility]   Enrolled in [facility]? Yes   Patient reported barriers to accessing medications: No issues reported by patient   Fills acetylcysteine at [facility] and everything else at [facility].  Her mom still orders her medications for her.       CF  Inhaled medications   Bronchodilator: albuterol   Mucolytic: acetylcysteine  Antibiotic: not indicated  Oral medications   Azithromycin: taking  CFTR modulator: Not Indicated   Other: Singulair, multivitamin, ascorbic acid, beta carotene, vitamin D/E, Mepthyton 5 mg weekly  Pulmonary symptoms are stable  PFTs are increased  Current FEV1 101%  Cultures: sputum cultures grow Staph  Current exacerbation: no    Sinusitis: Has not been taking Flonase and is overdue for meropenem installation.  Does take cetirizine every day.  Symptoms are increased with more congestion and headaches.    GERD: Taking omeprazole 20 mg BID.  Reports clearing her throat all day, which may indicate increased symptoms of GERD.    ADHD: Well controlled on Adderal XR 20 mg daily    DepressionAnxiety:   "Current medications include: Fluoxetine 80 mg once daily, Bupropion 150mg twice daily, Buspar 15 mg BID and trazodone 100 mg at bedtime. Patient reports her mood is stable and she denies side effects.  PHQ-9 SCORE 5/26/2016 5/12/2017 7/24/2018   PHQ-9 Total Score 7 9 10   Some encounter information is confidential and restricted. Go to Review Flowsheets activity to see all data.       Weight Management:  Working with medical weight management clinic.  Current medications include naltrexone 50 mg once daily (in the evening when she tends to snack), and topiramate 75 mg at bedtime.  She feels this regimen has been effective in decreasing her appetite and her weight has been stable.    Weight/BMI:  Estimated body mass index is 31.3 kg/m  as calculated from the following:    Height as of an earlier encounter on 3/7/19: 5' 2.52\" (1.588 m).    Weight as of an earlier encounter on 3/7/19: 174 lb (78.9 kg).      ASSESSMENT:                             Current medications were reviewed today.     CF: Stable.    Sinusitis:  Needs improvement.  Patient would benefit from resuming Flonase and meropenem installations per ENT.    GERD:  Needs improvement.  Patient would benefit from trying a higher dose of omeprazole to see if symptoms improve.    ADHD: Stable    Depression/Anxiety: Stable.     Weight Mangement: Stable    PLAN:                            Per Rabia Gray PA-C:    1. Increase ompeprazole to 40 mg twice daily.    2. Resume Flonase 2 sprays each nostril twice daily.  Make an appointment with ENT.    I encouraged Mamie to start ordering some of her own medications.  Suggested she start with acetylcysteine since this is one medication she fills at [facility] and would be a good one to start ordering on her own.  Explained text messaging option as well.    I spent 30 minutes with this patient today.      Will follow up in 1 year or sooner if needed.    The patient was provided a summary of these " recommendations in the AVS from CF care team visit.    Gris GibbonsD  CF Medication Therapy Management Pharmacist  [facility] [place]  [phone]

## 2019-03-07 NOTE — LETTER
3/7/2019       RE: Mamie Rice  [address] [facility] [address]     Dear Colleague,    Thank you for referring your patient, Mmaie Rice, to the [facility] at [facility]. Please see a copy of my visit note below.    [facility]  March 7, 2019         Assessment and Plan:   Mamie Rice is a 23 year old female with cystic fibrosis who is seen today in clinic for routine follow up.     1. CF lung disease with normal spirometry: feels at her baseline other than sinus symptoms for which she has not yet see ENT. Has missed a few vests secondary to her work schedule, but now back to vesting BID. Sating 97% on room air, PFTs improved to near her best. Previous cultures + for MSSA. No evidence of exacerbation at this time.  - Continue nebs and vesting BID  - On chronic azithromycin    2. Pancreatic insufficiency: denies new symptoms consistent with worsening malabsorption. Weight is above Foundation goal.  - Continue enzymes and vitamin supplementation     3.  Cystic fibrosis sinus disease: last surgery was multiple years ago. Grisel ongoing congestion, sinus headaches, rhinorrhea and PND. 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 Haile. Overdue for f/u. Cannot tolerating sinus rinses.  - Resume Flonase 2 sprays twice/day her  - Schedule f/u with Dr. Haile    4. H/o abnormal glucose tolerance: doesn't typically check BS, notes more low than highs, hasn't had any symptoms recently.   - Due for OGTT     5.  Weight maintenance: following with the Weight management clinic. Weight is relatively unchanged.   - Continue naltrexone     6. Depression/anxiety: mood has improved since quitting her job at the [employer]. Follows with Psychiatry on the [facility].  - Continue Adderall, Wellbutrin, Buspar, Prozac and trazodone    7. Possible GERD: notes  clearing of her throat all day, not worse in the am or evening. Doesn't know if she's been on a PPI, but states that if it's on her medication list, she's probably taking it.  - Confirm dose of omeprazole, if not on it, resume 20 mg BID, otherwise increase to 40 mg BID  - May need f/u with Gastroenterology     RTC: in 3 months with routine cultures and spirometry  Annual studies due: due now, [facility] Dr[facility] Mic Gray PA-C  Pulmonary, Allergy, Critical Care and Sleep Medicine        Interval History:   Feeling have a bit of a sore throat and her sinuses hurt. Notes increased congestion and drainage, both runny n ose and PND. Notes some headaches as well. No fever or chills. Normal cough, here and there with vesting only, mainly dry. No congestion, no tightness or shortness of breath. No chest pain or palpitations. No GI complaints, stools are 2-3 day. Typically vests BID.          Review of Systems:   Please see HPI. Otherwise, complete 10 point ROS negative.           Past Medical and Surgical History:     Past Medical History:   Diagnosis Date     ADHD (attention deficit hyperactivity disorder)      Anxiety      Aspergillosis, with pneumonia (H)     fugus found caused chest pain     Chronic infection     CF, MRSA.,      Chronic sinusitis      Constipation, chronic      Cystic fibrosis with pulmonary manifestations (H) 12/19/2011     Cystic fibrosis without mention of meconium ileus     SWEAT TEST:Date: 2/17/1994   [facility]  296 mg, 104 mmol/L ClSample #2  295 mg, 104 mmol/L Cl GENOTYPING:Date: 10/15/2007,  Laboratory: [facility]otype: [id number]     Depressive disorder      Diabetes     no meds currently     Dysthymic disorder      Exocrine pancreatic insufficiency      Gastro-oesophageal reflux disease      Hip pain, right      MRSA (methicillin resistant Staphylococcus aureus) carrier      Pancreatic disease      Past Surgical History:   Procedure  Laterality Date     ARTHROSCOPY HIP, OSTEOPLASTY FEMUR PROXIMAL, COMBINED  3/11/2013    Procedure: COMBINED ARTHROSCOPY HIP, OSTEOPLASTY FEMUR PROXIMAL;  Right Hip Arthroscopy, Labral  Debridement.    surgeon request choice anesthesia/admit to [facility] after surgery;  Surgeon: Omkar Austin MD;  [facility]     ARTHROSCOPY KNEE WITH MEDIAL MENISCECTOMY Left 1/31/2017    Procedure: ARTHROSCOPY KNEE WITH MEDIAL MENISCECTOMY;  Surgeon: Jethro Coyle MD;  [facility]: [facility] OR     bronchoscopies       BRONCHOSCOPY       EXAM UNDER ANESTHESIA ANUS N/A 5/10/2016    Procedure: EXAM UNDER ANESTHESIA ANUS;  Surgeon: Chet Gaviria MD;  [facility]     EXAM UNDER ANESTHESIA, RESTORATIONS, EXTRACTION(S) DENTAL COMPLEX, COMBINED  5/13/2013    Procedure: COMBINED EXAM UNDER ANESTHESIA, RESTORATIONS, EXTRACTION(S) DENTAL COMPLEX;  Dental Exam, Radiographs, Restorations. Single Extraction  Tooth #2. Restorations x 3;  Surgeon: Danilo Ortiz DDS;  Location: [facility] OR     HC KNEE SCOPE, DIAGNOSTIC      Arthroscopy, Knee- left     INJECT BOTOX N/A 5/10/2016    Procedure: INJECT BOTOX;  Surgeon: Chet Gaviria MD;  [facility]     left hip labral tear  5/11/2011    left hip arthroscopy with labral debridement and synovectomy     meniscus repair       OPTICAL TRACKING SYSTEM ENDOSCOPIC SINUS SURGERY  10/14/2011    Procedure:OPTICAL TRACKING SYSTEM ENDOSCOPIC SINUS SURGERY; FESS (functional endoscopic sinus surgery) with Image Guidance, bronchial lavage and cultures; Surgeon:JUAN JOSE GARCIA; Location:[facility] OR     OPTICAL TRACKING SYSTEM ENDOSCOPIC SINUS SURGERY  5/18/2012    Procedure:OPTICAL TRACKING SYSTEM ENDOSCOPIC SINUS SURGERY; Right  and Left Image Guided Functional Endoscopic Sinus Surgery With  Frontal Approach, [employer]; Surgeon:JUAN JOSE GARCIA; [facility] OR     OPTICAL TRACKING SYSTEM ENDOSCOPIC SINUS SURGERY  9/26/2012    Procedure: OPTICAL TRACKING SYSTEM ENDOSCOPIC SINUS SURGERY;   Stealth Guided Bilateral Functional Endoscopic Sinus Surgery *Latex Safe*;  Surgeon: Goyo Kuo MD;  Location:  OR     OPTICAL TRACKING SYSTEM ENDOSCOPIC SINUS SURGERY Bilateral 10/16/2015    Procedure: OPTICAL TRACKING SYSTEM ENDOSCOPIC SINUS SURGERY;  Surgeon: Mariela Haile MD;  Location:  OR     ORTHOPEDIC SURGERY      left hip tear repair 2010     SINUS SURGERY             Family History:     Family History   Problem Relation Age of Onset     Cancer Paternal Grandmother      Skin Cancer Paternal Grandmother      Other Cancer Paternal Grandmother         Skin     Obesity Paternal Grandmother      Cancer Paternal Grandfather         PGF had throat cancer (he was a smoker)     Other Cancer Paternal Grandfather      Anxiety Disorder Paternal Grandfather      Thyroid Disease Mother         ,     Obesity Other      Anesthesia Reaction No family hx of      Blood Disease No family hx of      Colon Polyps No family hx of      Crohn's Disease No family hx of      Ulcerative Colitis No family hx of      Colon Cancer No family hx of      Melanoma No family hx of             Social History:     Social History     Socioeconomic History     Marital status: Single     Spouse name: Not on file     Number of children: Not on file     Years of education: Not on file     Highest education level: Not on file   Occupational History     Not on file   Social Needs     Financial resource strain: Not on file     Food insecurity:     Worry: Not on file     Inability: Not on file     Transportation needs:     Medical: Not on file     Non-medical: Not on file   Tobacco Use     Smoking status: Never Smoker     Smokeless tobacco: Never Used     Tobacco comment: one person at home smokes outside   Substance and Sexual Activity     Alcohol use: No     Alcohol/week: 0.0 oz     Drug use: No     Sexual activity: No   Lifestyle     Physical activity:     Days per week: Not on file     Minutes per session: Not on file     Stress: Not on  file   Relationships     Social connections:     Talks on phone: Not on file     Gets together: Not on file     Attends Oriental orthodox service: Not on file     Active member of club or organization: Not on file     Attends meetings of clubs or organizations: Not on file     Relationship status: Not on file     Intimate partner violence:     Fear of current or ex partner: Not on file     Emotionally abused: Not on file     Physically abused: Not on file     Forced sexual activity: Not on file   Other Topics Concern      Service Not Asked     Blood Transfusions No     Caffeine Concern Not Asked     Occupational Exposure Not Asked     Hobby Hazards Not Asked     Sleep Concern Not Asked     Stress Concern Not Asked     Weight Concern Not Asked     Special Diet Not Asked     Back Care Not Asked     Exercise Yes     Bike Helmet Not Asked     Seat Belt Not Asked     Self-Exams Not Asked     Parent/sibling w/ CABG, MI or angioplasty before 65F 55M? Not Asked   Social History Narrative    Mamie lives with mother in [place], [place].  There is a cat in the home, but Mamie does not have any litterbox duties.  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  She gets essentially no exercise because of the tingling in her feet (says it bothers her even to stand).        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        8/2015 no change in social situation        2/15/2016 Pt is single and lives with mother and stepfather.            Medications:     Current Outpatient Medications   Medication     fluticasone (FLONASE) 50 MCG/ACT nasal spray     omeprazole (PRILOSEC) 40 MG DR capsule     acetaminophen (TYLENOL) 325 MG tablet     acetylcysteine (MUCOMYST) 20 % neb solution     albuterol (2.5 MG/3ML) 0.083% neb solution     albuterol (PROAIR HFA/PROVENTIL HFA/VENTOLIN HFA) 108 (90 Base) MCG/ACT Inhaler     [START ON 4/29/2019] amphetamine-dextroamphetamine  "(ADDERALL XR) 20 MG 24 hr capsule     [START ON 4/1/2019] amphetamine-dextroamphetamine (ADDERALL XR) 20 MG 24 hr capsule     amphetamine-dextroamphetamine (ADDERALL XR) 20 MG 24 hr capsule     ascorbic acid (VITAMIN C) 500 MG tablet     azithromycin (ZITHROMAX) 500 MG tablet     beta carotene 99435 UNIT capsule     blood glucose (ACCU-CHEK SMARTVIEW) test strip     buPROPion (WELLBUTRIN SR) 150 MG 12 hr tablet     busPIRone (BUSPAR) 15 MG tablet     cetirizine (ZYRTEC) 10 MG tablet     FLUoxetine (PROZAC) 40 MG capsule     GLYCOPYRROLATE PO     MedroxyPROGESTERone Acetate (DEPO-PROVERA IM)     MEPHYTON 5 MG tablet     meropenem (MERREM) 500 MG vial     montelukast (SINGULAIR) 10 MG tablet     Multiple Vitamin (MULTIVITAMIN OR)     naltrexone (DEPADE;REVIA) 50 MG tablet     polyethylene glycol (MIRALAX) powder     topiramate (TOPAMAX) 25 MG tablet     traZODone (DESYREL) 100 MG tablet     VITAMIN D3 1000 units tablet     vitamin E 400 UNIT capsule     No current facility-administered medications for this visit.      Facility-Administered Medications Ordered in Other Visits   Medication     albuterol (PROAIR HFA, PROVENTIL HFA, VENTOLIN HFA) inhaler            Physical Exam:   /76 (BP Location: Right arm, Patient Position: Chair, Cuff Size: Adult Regular)   Pulse 83   Resp 16   Ht 1.588 m (5' 2.52\")   Wt 78.9 kg (174 lb)   SpO2 97%   BMI 31.30 kg/m       GENERAL: alert, NAD  HEENT: NCAT, EOMI, anicteric sclera, no nasal edema or erythema; canals and TMs clear; no oral mucosal edema or erythema  Neck: no cervical or supraclavicular adenopathy  Respiratory: good air flow, no crackles, rhonchi or wheezing  CV: RRR, S1S2, no murmurs noted  Abdomen: normoactive BS, soft and non tender without organomegaly  Lymph: no edema  Neuro: AAO X 3, CN 2-12 grossly intact  Psychiatric: normal affect, good eye contact  Skin: no rash, jaundice or lesions on limited exam         Data:   All laboratory and imaging data " reviewed.      Cystic Fibrosis Culture  Specimen Description   Date Value Ref Range Status   11/06/2018 Sputum  Final   07/18/2018 Throat  Final   04/18/2018 Midstream Urine  Final    Culture Micro   Date Value Ref Range Status   11/06/2018 Moderate growth  Normal roberto carlos    Final   11/06/2018 (A)  Final    Heavy growth  Staphylococcus aureus  This isolate is presumed to be clindamycin resistant based on detection of inducible   clindamycin resistance. Erythromycin and clindamycin are resistant, therefore, they are   not recommended for use.     11/06/2018 (A)  Final    Heavy growth  Strain 2  Staphylococcus aureus  Organism failed to thrive for susceptibility testing     11/06/2018 (A)  Final    Heavy growth  Strain 3  Staphylococcus aureus  This isolate is presumed to be clindamycin resistant based on detection of inducible   clindamycin resistance. Erythromycin and clindamycin are resistant, therefore, they are   not recommended for use.          PFT interpretation:  Maneuver: valid and meets ATS guidelines  Normal spirometry    Respiratory Therapist Note:    Vest    Brand: Hill-Rom - traditional   Settings: Hill Rom: Frequencies 8, 9, 10 at pressure 10 then frequencies 18, 19, 20 at pressure 6.   Cough Pause: No     Vest Garment Size: Adult Medium   Last Fitting Date:    Frequency of therapy: 10-14 times per week   Concerns:     Exercise: active lifestyle, no specific exercise program    Alternative Airway Clearance: n/a    Nebulized Medications   Bronchodilators: Albuterol   Mucolytic: Mucomyst   Antibiotics: none     Review Cleaning: Yes. Rinse only    Education and Transition Information   Correct order of inhaled medications: Yes   Mechanism of Action of inhaled medications: Yes   Frequency of inhaled medications: Yes   Dosage of inhaled medications: Yes   Other:     Home Care:   Nebulizer Cups (Brand/Type): disposable   Nebulizer Compressor    Year Purchased: unknown   Home Care Company:     [facility], Phone: [phone], Fax: [phone]    Plan of Care and Goals for next visit: Reviewed process for proper cleaning of nebulizer supplies.    Again, thank you for allowing me to participate in the care of your patient.      Sincerely,    Rabia Gray PA-C

## 2019-03-07 NOTE — PROGRESS NOTES
Respiratory Therapist Note:    Vest    Brand: Hill-Rom - traditional   Settings: Hill Rom: Frequencies 8, 9, 10 at pressure 10 then frequencies 18, 19, 20 at pressure 6.   Cough Pause: No     Vest Garment Size: Adult Medium   Last Fitting Date:    Frequency of therapy: 10-14 times per week   Concerns:     Exercise: active lifestyle, no specific exercise program    Alternative Airway Clearance: n/a      Nebulized Medications   Bronchodilators: Albuterol   Mucolytic: Mucomyst   Antibiotics: none     Review Cleaning: Yes. Rinse only    Education and Transition Information   Correct order of inhaled medications: Yes   Mechanism of Action of inhaled medications: Yes   Frequency of inhaled medications: Yes   Dosage of inhaled medications: Yes   Other:     Home Care:   Nebulizer Cups (Brand/Type): disposable   Nebulizer Compressor    Year Purchased: unknown   Home Care Company:     [facility], Phone: [phone], Fax: [phone]    Plan of Care and Goals for next visit: Reviewed process for proper cleaning of nebulizer supplies.

## 2019-03-09 LAB
EXPTIME-PRE: 6.24 SEC
FEF2575-%PRED-PRE: 108 %
FEF2575-PRE: 3.93 L/SEC
FEF2575-PRED: 3.62 L/SEC
FEFMAX-%PRED-PRE: 144 %
FEFMAX-PRE: 9.62 L/SEC
FEFMAX-PRED: 6.63 L/SEC
FEV1-%PRED-PRE: 101 %
FEV1-PRE: 3.1 L
FEV1FEV6-PRE: 86 %
FEV1FEV6-PRED: 86 %
FEV1FVC-PRE: 86 %
FEV1FVC-PRED: 87 %
FIFMAX-PRE: 4.29 L/SEC
FVC-%PRED-PRE: 101 %
FVC-PRE: 3.59 L
FVC-PRED: 3.55 L

## 2019-03-12 LAB
BACTERIA SPEC CULT: ABNORMAL
BACTERIA SPEC CULT: ABNORMAL
Lab: ABNORMAL
SPECIMEN SOURCE: ABNORMAL

## 2019-03-14 ENCOUNTER — OFFICE VISIT (OUTPATIENT)
Dept: OTOLARYNGOLOGY | Facility: CLINIC | Age: 24
End: 2019-03-14
Payer: MEDICAID

## 2019-03-14 VITALS
DIASTOLIC BLOOD PRESSURE: 73 MMHG | HEART RATE: 83 BPM | SYSTOLIC BLOOD PRESSURE: 117 MMHG | WEIGHT: 171 LBS | HEIGHT: 62 IN | BODY MASS INDEX: 31.47 KG/M2

## 2019-03-14 DIAGNOSIS — J32.0 CHRONIC MAXILLARY SINUSITIS: Primary | ICD-10-CM

## 2019-03-14 DIAGNOSIS — J32.9 CHRONIC SINUSITIS, UNSPECIFIED LOCATION: ICD-10-CM

## 2019-03-14 RX ORDER — MEROPENEM 500 MG/1
500 INJECTION, POWDER, FOR SOLUTION INTRAVENOUS ONCE
Status: COMPLETED | OUTPATIENT
Start: 2019-03-14 | End: 2019-03-14

## 2019-03-14 RX ADMIN — MEROPENEM 500 MG: 500 INJECTION, POWDER, FOR SOLUTION INTRAVENOUS at 14:56

## 2019-03-14 ASSESSMENT — MIFFLIN-ST. JEOR: SCORE: 1477.15

## 2019-03-14 ASSESSMENT — PAIN SCALES - GENERAL: PAINLEVEL: MILD PAIN (2)

## 2019-03-14 NOTE — LETTER
"3/14/2019       RE: Mamie Rice  [address] [facility] [address]     Dear Colleague,    Thank you for referring your patient, Mamie Rice, to the [facility] EAR NOSE AND THROAT at [facility]. Please see a copy of my visit note below.    HISTORY OF PRESENT ILLNESS:  Mamie Rice is a 23-year-old female with a history of cystic fibrosis who presents today for routine instillation of meropenem into the maxillary sinuses.  She has no new symptoms.  She states that it has been quite a few months since she seen us as work has been really busy for her.  She is not performing routine sinus irrigations.    REVIEW OF SYSTEMS:   ENT ROS 3/13/2019   Constitutional Weight gain, Weight loss   Neurology Headache   Psychology -   Eyes -   Ears, Nose, Throat Nasal congestion or drainage, Sore throat, Hoarseness   Cardiopulmonary Cough   Gastrointestinal/Genitourinary -   Musculoskeletal -   Allergy/Immunology -   Endocrine -   Other -     10 point ROS neg other than the symptoms noted above in the HPI.    PHYSICAL EXAMINATION:    /73   Pulse 83   Ht 1.58 m (5' 2.21\")   Wt 77.6 kg (171 lb)   BMI 31.07 kg/m     Constitutional:  The patient was unaccompanied, well-groomed, and in no acute distress.       PROCEDURE:  NASAL ENDOSCOPY: Procedure indicated to examine the middle meatus and posterior nasal passages to instill meropenem. Verbal consent obtained. Topical anesthetic/decongestant solution applied. A rigid endoscope was passed into each nasal cavity separately. Findings are as follows:   Left middle meatus: Widely patent. Minimal amounts of purulent drainage.  There is a scar band in medial to the middle turbinate.  Just superior to the scar band is a stable nasal polyp.  Using suction, I suctioned the left sinus and was able to clear some purulent drainage.  Then I instilled about 2 cc of meropenem.   Left posterior nasal cavity:  No masses, lesions or abnormal " tissue.   Right middle meatus: Widely patent.  Scar band and lateral to the middle turbinate.  Using suction I removed some purulent drainage from the penis.  Then I instilled about 2 cc of meropenem.   Right posterior nasal cavity:  No masses, lesions or abnormal tissue   Nasopharynx:  Clear, no lesions, crusting or inflammation    ASSESSMENT AND PLAN:  Mamie Rice is a 23 year old female with a history of cystic fibrosis who routinely receives meropenem instillations in both maxillary sinus cavities.  Today patient underwent 2 mL's of meropenem reconstitution instillation each maxillary sinus.  She tolerated the procedure well.  No adverse events.  She will return in 1 month for repeat treatment.    Patrizia Monroe PA-C  Otolaryngology - Head & Neck Surgery  [phone]      CC:  Mariela Haile MD  [facility]  Otolaryngology, Rhinology  [facility] 952

## 2019-03-14 NOTE — PROGRESS NOTES
"HISTORY OF PRESENT ILLNESS:  Mamie Rice is a 23-year-old female with a history of cystic fibrosis who presents today for routine instillation of meropenem into the maxillary sinuses.  She has no new symptoms.  She states that it has been quite a few months since she seen us as work has been really busy for her.  She is not performing routine sinus irrigations.    REVIEW OF SYSTEMS:   ENT ROS 3/13/2019   Constitutional Weight gain, Weight loss   Neurology Headache   Psychology -   Eyes -   Ears, Nose, Throat Nasal congestion or drainage, Sore throat, Hoarseness   Cardiopulmonary Cough   Gastrointestinal/Genitourinary -   Musculoskeletal -   Allergy/Immunology -   Endocrine -   Other -     10 point ROS neg other than the symptoms noted above in the HPI.    PHYSICAL EXAMINATION:    /73   Pulse 83   Ht 1.58 m (5' 2.21\")   Wt 77.6 kg (171 lb)   BMI 31.07 kg/m    Constitutional:  The patient was unaccompanied, well-groomed, and in no acute distress.       PROCEDURE:  NASAL ENDOSCOPY: Procedure indicated to examine the middle meatus and posterior nasal passages to instill meropenem. Verbal consent obtained. Topical anesthetic/decongestant solution applied. A rigid endoscope was passed into each nasal cavity separately. Findings are as follows:   Left middle meatus: Widely patent. Minimal amounts of purulent drainage.  There is a scar band in medial to the middle turbinate.  Just superior to the scar band is a stable nasal polyp.  Using suction, I suctioned the left sinus and was able to clear some purulent drainage.  Then I instilled about 2 cc of meropenem.   Left posterior nasal cavity:  No masses, lesions or abnormal tissue.   Right middle meatus: Widely patent.  Scar band and lateral to the middle turbinate.  Using suction I removed some purulent drainage from the penis.  Then I instilled about 2 cc of meropenem.   Right posterior nasal cavity:  No masses, lesions or abnormal tissue   Nasopharynx:  " Clear, no lesions, crusting or inflammation    ASSESSMENT AND PLAN:  Mamie Rice is a 23 year old female with a history of cystic fibrosis who routinely receives meropenem instillations in both maxillary sinus cavities.  Today patient underwent 2 mL's of meropenem reconstitution instillation each maxillary sinus.  She tolerated the procedure well.  No adverse events.  She will return in 1 month for repeat treatment.    Patrizia Monroe PA-C  Otolaryngology - Head & Neck Surgery  [phone]      CC:  Mariela Haile MD  [facility]  Otolaryngology, Rhinology  [facility] 600

## 2019-03-14 NOTE — NURSING NOTE
"Chief Complaint   Patient presents with     RECHECK     chronic maxillary sinusitus      Blood pressure 117/73, pulse 83, height 1.58 m (5' 2.21\"), weight 77.6 kg (171 lb).    Jacky Rodriguez LPN    "

## 2019-04-18 ENCOUNTER — OFFICE VISIT (OUTPATIENT)
Dept: OTOLARYNGOLOGY | Facility: CLINIC | Age: 24
End: 2019-04-18
Payer: MEDICAID

## 2019-04-18 VITALS
BODY MASS INDEX: 31.28 KG/M2 | HEART RATE: 71 BPM | SYSTOLIC BLOOD PRESSURE: 114 MMHG | DIASTOLIC BLOOD PRESSURE: 79 MMHG | WEIGHT: 170 LBS | OXYGEN SATURATION: 99 % | HEIGHT: 62 IN

## 2019-04-18 DIAGNOSIS — J32.0 CHRONIC MAXILLARY SINUSITIS: Primary | ICD-10-CM

## 2019-04-18 RX ORDER — MEROPENEM 500 MG/1
500 INJECTION, POWDER, FOR SOLUTION INTRAVENOUS ONCE
Status: COMPLETED | OUTPATIENT
Start: 2019-04-18 | End: 2019-04-18

## 2019-04-18 RX ADMIN — MEROPENEM 500 MG: 500 INJECTION, POWDER, FOR SOLUTION INTRAVENOUS at 17:09

## 2019-04-18 ASSESSMENT — MIFFLIN-ST. JEOR: SCORE: 1469.36

## 2019-04-18 ASSESSMENT — PAIN SCALES - GENERAL: PAINLEVEL: NO PAIN (0)

## 2019-04-18 NOTE — LETTER
"4/18/2019       RE: Mamie Rice  [address] [facility] [address]     Dear Colleague,    Thank you for referring your patient, Mamie Rice, to the [facility] EAR NOSE AND THROAT at [facility]. Please see a copy of my visit note below.    HISTORY OF PRESENT ILLNESS:  Mamie Rice is a 23-year-old female with a history of cystic fibrosis who presents today for routine instillation of meropenem into the maxillary sinuses.  She has no new symptoms.      REVIEW OF SYSTEMS:   ENT ROS 4/18/2019   Constitutional -   Neurology Headache   Psychology -   Eyes -   Ears, Nose, Throat Nasal congestion or drainage   Cardiopulmonary -   Gastrointestinal/Genitourinary -   Musculoskeletal -   Allergy/Immunology -   Endocrine -   Other -     10 point ROS neg other than the symptoms noted above in the HPI.    PHYSICAL EXAMINATION:    /79   Pulse 71   Ht 1.575 m (5' 2\")   Wt 77.1 kg (170 lb)   SpO2 99%   BMI 31.09 kg/m     Constitutional:  The patient was unaccompanied, well-groomed, and in no acute distress.       PROCEDURE:  NASAL ENDOSCOPY: Procedure indicated to examine the middle meatus and posterior nasal passages to instill meropenem. Verbal consent obtained. Topical anesthetic/decongestant solution applied. A rigid endoscope was passed into each nasal cavity separately. Findings are as follows:   Left middle meatus: Minimal amounts of purulent drainage.  There is a scar band between the middle septum & the middle turbinate.  Just superior to the scar band is a stable nasal polyp. I instilled about 2 cc of meropenem into the maxillary sinus.   Left posterior nasal cavity:  No masses, lesions or abnormal tissue.   Right middle meatus: Widely patent. I instilled about 2 cc of meropenem into the maxillary sinus.   Right posterior nasal cavity:  No masses, lesions or abnormal tissue   Nasopharynx:  Clear, no lesions, crusting or inflammation    ASSESSMENT AND " PLAN:  Mamie Rice is a 23 year old female with a history of cystic fibrosis who routinely receives meropenem instillations in both maxillary sinus cavities.  Today patient underwent 2 mL's of meropenem reconstitution instillation into each maxillary sinus.  She tolerated the procedure well.  No adverse events.  She will return in 1 month for repeat treatment.    Patrizia Monroe PA-C  Otolaryngology - Head & Neck Surgery  [phone]      CC:  Mariela Haile MD  [facility]  Otolaryngology, Rhinology  [facility] 888

## 2019-04-18 NOTE — PROGRESS NOTES
"HISTORY OF PRESENT ILLNESS:  Mamie Rice is a 23-year-old female with a history of cystic fibrosis who presents today for routine instillation of meropenem into the maxillary sinuses.  She has no new symptoms.      REVIEW OF SYSTEMS:   ENT ROS 4/18/2019   Constitutional -   Neurology Headache   Psychology -   Eyes -   Ears, Nose, Throat Nasal congestion or drainage   Cardiopulmonary -   Gastrointestinal/Genitourinary -   Musculoskeletal -   Allergy/Immunology -   Endocrine -   Other -     10 point ROS neg other than the symptoms noted above in the HPI.    PHYSICAL EXAMINATION:    /79   Pulse 71   Ht 1.575 m (5' 2\")   Wt 77.1 kg (170 lb)   SpO2 99%   BMI 31.09 kg/m    Constitutional:  The patient was unaccompanied, well-groomed, and in no acute distress.       PROCEDURE:  NASAL ENDOSCOPY: Procedure indicated to examine the middle meatus and posterior nasal passages to instill meropenem. Verbal consent obtained. Topical anesthetic/decongestant solution applied. A rigid endoscope was passed into each nasal cavity separately. Findings are as follows:   Left middle meatus: Minimal amounts of purulent drainage.  There is a scar band between the middle septum & the middle turbinate.  Just superior to the scar band is a stable nasal polyp. I instilled about 2 cc of meropenem into the maxillary sinus.   Left posterior nasal cavity:  No masses, lesions or abnormal tissue.   Right middle meatus: Widely patent. I instilled about 2 cc of meropenem into the maxillary sinus.   Right posterior nasal cavity:  No masses, lesions or abnormal tissue   Nasopharynx:  Clear, no lesions, crusting or inflammation    ASSESSMENT AND PLAN:  Mamie Rice is a 23 year old female with a history of cystic fibrosis who routinely receives meropenem instillations in both maxillary sinus cavities.  Today patient underwent 2 mL's of meropenem reconstitution instillation into each maxillary sinus.  She tolerated the procedure well.  No " adverse events.  She will return in 1 month for repeat treatment.    Patrizia Monroe PA-C  Otolaryngology - Head & Neck Surgery  [phone]      CC:  Mariela Haile MD  [facility]  Otolaryngology, Rhinology  [facility] 625

## 2019-05-06 DIAGNOSIS — F90.0 ATTENTION DEFICIT HYPERACTIVITY DISORDER (ADHD), PREDOMINANTLY INATTENTIVE TYPE: ICD-10-CM

## 2019-05-06 DIAGNOSIS — F33.0 MAJOR DEPRESSIVE DISORDER, RECURRENT EPISODE, MILD (H): ICD-10-CM

## 2019-05-06 DIAGNOSIS — M25.562 CHRONIC PAIN OF LEFT KNEE: ICD-10-CM

## 2019-05-06 DIAGNOSIS — G47.00 INSOMNIA, UNSPECIFIED TYPE: ICD-10-CM

## 2019-05-06 DIAGNOSIS — G89.29 CHRONIC PAIN OF LEFT KNEE: ICD-10-CM

## 2019-05-06 RX ORDER — HYDROXYZINE HYDROCHLORIDE 25 MG/1
TABLET, FILM COATED ORAL
Qty: 30 TABLET | Refills: 5 | Status: CANCELLED | OUTPATIENT
Start: 2019-05-06

## 2019-05-08 NOTE — TELEPHONE ENCOUNTER
I don't recall for sure but I think that I was referring to Buspar as the alternate therapy since I had started that the appointment before ( 8/2018). Since she started Buspar and also uses Trazodone and was sleeping more than needed I might have either recommended that she stop taking it. The other possibility is that she told me she had not been using it since Buspar was started but since none of that is in my note I am only guessing.  I think it would be best to talk to Mamie directly and see if she thinks that she needs it and if so it can be refilled. I looks like she will be needing Adderall, Wellbutrin and Trazodone.  Should I also those now while we are taking care of the Hydroxyzine?    Kristina Kilgore CNS, APRN

## 2019-05-08 NOTE — TELEPHONE ENCOUNTER
"Message   Received: Today   Message Contents   Laurie Sterling RN Moccio, Emily, RN   Caller: Unspecified (2 days ago,  2:37 PM)             Hi,     Sorry this hydroxyzine refill request looks like it was discontinued but without notation of why discontinued?   The [facility]  note of 2/13/2019 states medications unchanged, but this is definitely discontinued in that encounter.   \"Medication: no change   OTC Recommendations: none   Lab Orders:  none   Referrals: none   Release of Information: none   Future Treatment Considerations:per symptoms   Return for Follow Up: 6 months.\"     Thanks- just want to be sure she has the right medication.   ODELL Sterling R.N., EUGENE   [facility]  CENTRAL TRIAGE/ MED REFILLS      Medication: hydrOXYzine (ATARAX) 25 MG tablet   Sig: Take one tablet by mouth at bedtime for insomnia    Hydroxyzine 25 tablet was discontinued on 2/13/19 by Kristina Kilgore, with reason being \"alternate therapy.\" Note also states: \"Since not working is sleeping more.\"    Placed phone call to pt to verify whether she is taking the medication; no answer.    Placed phone call to pharmacy [facility] - [place], [place] - [address] [place] [address] and confirmed the pt last filled the medication on 4/2/19, 2/27/19, and 1/12/19.    "

## 2019-05-09 ENCOUNTER — OFFICE VISIT (OUTPATIENT)
Dept: OTOLARYNGOLOGY | Facility: CLINIC | Age: 24
End: 2019-05-09
Payer: MEDICAID

## 2019-05-09 VITALS
HEART RATE: 85 BPM | SYSTOLIC BLOOD PRESSURE: 112 MMHG | HEIGHT: 62 IN | RESPIRATION RATE: 16 BRPM | BODY MASS INDEX: 32.39 KG/M2 | WEIGHT: 176 LBS | DIASTOLIC BLOOD PRESSURE: 74 MMHG | TEMPERATURE: 98.7 F

## 2019-05-09 DIAGNOSIS — J32.4 CHRONIC PANSINUSITIS: Primary | ICD-10-CM

## 2019-05-09 RX ORDER — DEXTROAMPHETAMINE SACCHARATE, AMPHETAMINE ASPARTATE MONOHYDRATE, DEXTROAMPHETAMINE SULFATE AND AMPHETAMINE SULFATE 5; 5; 5; 5 MG/1; MG/1; MG/1; MG/1
20 CAPSULE, EXTENDED RELEASE ORAL DAILY
Qty: 30 CAPSULE | Refills: 0 | Status: SHIPPED | OUTPATIENT
Start: 2019-07-22 | End: 2019-08-14

## 2019-05-09 RX ORDER — BUPROPION HYDROCHLORIDE 150 MG/1
150 TABLET, EXTENDED RELEASE ORAL 2 TIMES DAILY
Qty: 60 TABLET | Refills: 3 | Status: SHIPPED | OUTPATIENT
Start: 2019-05-09 | End: 2019-08-14 | Stop reason: ALTCHOICE

## 2019-05-09 RX ORDER — TRAZODONE HYDROCHLORIDE 100 MG/1
100 TABLET ORAL AT BEDTIME
Qty: 30 TABLET | Refills: 3 | Status: SHIPPED | OUTPATIENT
Start: 2019-05-09 | End: 2019-08-14

## 2019-05-09 RX ORDER — DEXTROAMPHETAMINE SACCHARATE, AMPHETAMINE ASPARTATE MONOHYDRATE, DEXTROAMPHETAMINE SULFATE AND AMPHETAMINE SULFATE 5; 5; 5; 5 MG/1; MG/1; MG/1; MG/1
20 CAPSULE, EXTENDED RELEASE ORAL DAILY
Qty: 30 CAPSULE | Refills: 0 | Status: SHIPPED | OUTPATIENT
Start: 2019-05-27 | End: 2019-08-14

## 2019-05-09 RX ORDER — MEROPENEM 500 MG/1
500 INJECTION, POWDER, FOR SOLUTION INTRAVENOUS ONCE
Status: COMPLETED | OUTPATIENT
Start: 2019-05-09 | End: 2019-05-09

## 2019-05-09 RX ORDER — HYDROXYZINE HYDROCHLORIDE 25 MG/1
25 TABLET, FILM COATED ORAL AT BEDTIME
Qty: 30 TABLET | Refills: 3 | Status: SHIPPED | OUTPATIENT
Start: 2019-05-09 | End: 2019-09-26

## 2019-05-09 RX ORDER — DEXTROAMPHETAMINE SACCHARATE, AMPHETAMINE ASPARTATE MONOHYDRATE, DEXTROAMPHETAMINE SULFATE AND AMPHETAMINE SULFATE 5; 5; 5; 5 MG/1; MG/1; MG/1; MG/1
20 CAPSULE, EXTENDED RELEASE ORAL DAILY
Qty: 30 CAPSULE | Refills: 0 | Status: SHIPPED | OUTPATIENT
Start: 2019-06-24 | End: 2019-08-14

## 2019-05-09 RX ADMIN — MEROPENEM 500 MG: 500 INJECTION, POWDER, FOR SOLUTION INTRAVENOUS at 16:19

## 2019-05-09 ASSESSMENT — PAIN SCALES - GENERAL: PAINLEVEL: NO PAIN (0)

## 2019-05-09 ASSESSMENT — MIFFLIN-ST. JEOR: SCORE: 1493.58

## 2019-05-09 NOTE — TELEPHONE ENCOUNTER
"Spoke with pt. She believes she is taking the hydroxyzine 25mg at night for sleep. Regarding sleep, the pt states it is \"good here and there.\" She reports sleeping 5-6 hours/night on average and wakes up regularly throughout the night. She would like the medication refilled.    Regarding other medications, the pt is unsure if refills are needed at this time since her mom is responsible for filling her weekly pill boxes for her. She did confirm she just refilled Adderall yesterday.    Writer pended a refill of Hydroxyzine 25mg PO at bedtime, #30, 3 refills to cover pt through next appt.     Regarding her Adderall XR 20mg, the medication was last filled on 5/7/19. The pt was given 3 rxs on 2/13/19 and according to the [facility], has only used 2 of these rxs thus far.  "

## 2019-05-09 NOTE — PROGRESS NOTES
"HISTORY OF PRESENT ILLNESS:  Mamie Rice is a 23-year-old female with a history of cystic fibrosis who presents today for routine instillation of meropenem into the bilateral maxillary sinuses.  She has no new complaints.    REVIEW OF SYSTEMS:   ENT ROS 5/6/2019   Constitutional -   Neurology Headache   Psychology -   Eyes -   Ears, Nose, Throat Nasal congestion or drainage   Cardiopulmonary -   Gastrointestinal/Genitourinary -   Musculoskeletal Back pain   Allergy/Immunology -   Endocrine -   Other -     10 point ROS neg other than the symptoms noted above in the HPI.    PHYSICAL EXAMINATION:    /74   Pulse 85   Temp 98.7  F (37.1  C)   Resp 16   Ht 1.57 m (5' 1.81\")   Wt 79.8 kg (176 lb)   BMI 32.39 kg/m    Constitutional:  The patient was unaccompanied, well-groomed, and in no acute distress.       PROCEDURE:  NASAL ENDOSCOPY: Procedure indicated to examine the middle meatus and posterior nasal passages to instill meropenem. Verbal consent obtained. Topical anesthetic/decongestant solution applied to left nasal cavity. A rigid endoscope was passed into each nasal cavity separately. Findings are as follows:   Left middle meatus: Mucosa is inflamed.  Stable scar band between the middle septum and middle turbinate with a nasal polyp just superior to this.  I instilled 2 cc of meropenem into the maxillary sinus.   Right middle meatus: Mucosa is healthy and cavity is widely patent.  I instilled 2 cc of meropenem into the maxillary sinus.     ASSESSMENT AND PLAN:  Mamie Rice is a 23 year old female with a history of cystic fibrosis who routinely receives meropenem instillations in both maxillary sinus cavities.  Today patient underwent 2 mL's of meropenem reconstitution instillation into each maxillary sinus.  She tolerated the procedure well.  No adverse events.  She will return in about 6 weeks to see Dr. Haile for repeat treatment.    Patrizia Monroe PA-C  Otolaryngology - Head & Neck " Surgery  [phone]

## 2019-05-09 NOTE — LETTER
"5/9/2019       RE: Mamie Rice  [address] [facility] [address]     Dear Colleague,    Thank you for referring your patient, Mamie Rice, to the [facility]SE [facility] TH[facility] Please see a copy of my visit note below.    HISTORY OF PRESENT ILLNESS:  Mamie Rice is a 23-year-old female with a history of cystic fibrosis who presents today for routine instillation of meropenem into the bilateral maxillary sinuses.  She has no new complaints.    REVIEW OF SYSTEMS:   ENT ROS 5/6/2019   Constitutional -   Neurology Headache   Psychology -   Eyes -   Ears, Nose, Throat Nasal congestion or drainage   Cardiopulmonary -   Gastrointestinal/Genitourinary -   Musculoskeletal Back pain   Allergy/Immunology -   Endocrine -   Other -     10 point ROS neg other than the symptoms noted above in the HPI.    PHYSICAL EXAMINATION:    /74   Pulse 85   Temp 98.7  F (37.1  C)   Resp 16   Ht 1.57 m (5' 1.81\")   Wt 79.8 kg (176 lb)   BMI 32.39 kg/m     Constitutional:  The patient was unaccompanied, well-groomed, and in no acute distress.       PROCEDURE:  NASAL ENDOSCOPY: Procedure indicated to examine the middle meatus and posterior nasal passages to instill meropenem. Verbal consent obtained. Topical anesthetic/decongestant solution applied to left nasal cavity. A rigid endoscope was passed into each nasal cavity separately. Findings are as follows:   Left middle meatus: Mucosa is inflamed.  Stable scar band between the middle septum and middle turbinate with a nasal polyp just superior to this.  I instilled 2 cc of meropenem into the maxillary sinus.   Right middle meatus: Mucosa is healthy and cavity is widely patent.  I instilled 2 cc of meropenem into the maxillary sinus.     ASSESSMENT AND PLAN:  Mamie Rice is a 23 year old female with a history of cystic fibrosis who routinely receives meropenem instillations in both maxillary sinus cavities.  " Today patient underwent 2 mL's of meropenem reconstitution instillation into each maxillary sinus.  She tolerated the procedure well.  No adverse events.  She will return in about 6 weeks to see Dr. Haile for repeat treatment.    Patrizia Monroe PA-C  Otolaryngology - Head & Neck Surgery  [phone]

## 2019-05-09 NOTE — TELEPHONE ENCOUNTER
Kristina Kilgore, APRN CNS  You 7 minutes ago (12:42 PM)      I did refill the other medication with 3 additional refills and left Adderall scripts in your folder.   Kristina oscar    Received 3 signed scripts for Adderall XR 20mg from Kristina Kilgore. Scripts placed in envelope addressed to pt's pharmacy [facility] - [address] Envelope placed in outgoing mail.

## 2019-05-09 NOTE — NURSING NOTE
"Chief Complaint   Patient presents with     RECHECK     meropenum instillation      Blood pressure 112/74, pulse 85, temperature 98.7  F (37.1  C), resp. rate 16, height 1.57 m (5' 1.81\"), weight 79.8 kg (176 lb).    Jacky Rodriguez LPN    "

## 2019-05-10 DIAGNOSIS — K86.89 PANCREATIC INSUFFICIENCY: ICD-10-CM

## 2019-05-10 DIAGNOSIS — B44.9 ASPERGILLOSIS (H): ICD-10-CM

## 2019-05-10 DIAGNOSIS — E84.0 CYSTIC FIBROSIS WITH PULMONARY MANIFESTATIONS (H): ICD-10-CM

## 2019-05-10 RX ORDER — PHYTONADIONE 5 MG/1
TABLET ORAL
Qty: 4 TABLET | Refills: 11 | Status: SHIPPED | OUTPATIENT
Start: 2019-05-10 | End: 2020-05-18

## 2019-05-28 DIAGNOSIS — E84.0 CYSTIC FIBROSIS WITH PULMONARY MANIFESTATIONS (H): ICD-10-CM

## 2019-05-28 RX ORDER — SULFAMETHOXAZOLE/TRIMETHOPRIM 800-160 MG
2 TABLET ORAL 2 TIMES DAILY
Qty: 56 TABLET | Refills: 0 | Status: SHIPPED | OUTPATIENT
Start: 2019-05-28 | End: 2019-11-12

## 2019-05-28 NOTE — PROGRESS NOTES
Pt sent a mychart messages stating she is having fevers, sore throat and sinus pressure.  Spoke with Dr Allison and plan to give pt 2 week course of bactrim.  VM left with pt with the plan.

## 2019-06-05 ENCOUNTER — OFFICE VISIT (OUTPATIENT)
Dept: PULMONOLOGY | Facility: CLINIC | Age: 24
End: 2019-06-05
Attending: INTERNAL MEDICINE
Payer: COMMERCIAL

## 2019-06-05 VITALS
WEIGHT: 170.42 LBS | TEMPERATURE: 98.5 F | SYSTOLIC BLOOD PRESSURE: 108 MMHG | HEART RATE: 103 BPM | DIASTOLIC BLOOD PRESSURE: 73 MMHG | RESPIRATION RATE: 16 BRPM | HEIGHT: 62 IN | OXYGEN SATURATION: 97 % | BODY MASS INDEX: 31.36 KG/M2

## 2019-06-05 DIAGNOSIS — E16.2 HYPOGLYCEMIA: ICD-10-CM

## 2019-06-05 DIAGNOSIS — E11.65 TYPE 2 DIABETES MELLITUS WITH HYPERGLYCEMIA, WITHOUT LONG-TERM CURRENT USE OF INSULIN (H): ICD-10-CM

## 2019-06-05 DIAGNOSIS — K86.89 PANCREATIC INSUFFICIENCY: ICD-10-CM

## 2019-06-05 DIAGNOSIS — E84.9 CYSTIC FIBROSIS (H): Primary | ICD-10-CM

## 2019-06-05 DIAGNOSIS — E84.9 CF (CYSTIC FIBROSIS) (H): Primary | ICD-10-CM

## 2019-06-05 DIAGNOSIS — E66.09 NON MORBID OBESITY DUE TO EXCESS CALORIES: ICD-10-CM

## 2019-06-05 LAB
EXPTIME-PRE: 7.03 SEC
FEF2575-%PRED-PRE: 127 %
FEF2575-PRE: 4.62 L/SEC
FEF2575-PRED: 3.62 L/SEC
FEFMAX-%PRED-PRE: 143 %
FEFMAX-PRE: 9.52 L/SEC
FEFMAX-PRED: 6.63 L/SEC
FEV1-%PRED-PRE: 105 %
FEV1-PRE: 3.23 L
FEV1FEV6-PRE: 89 %
FEV1FEV6-PRED: 86 %
FEV1FVC-PRE: 89 %
FEV1FVC-PRED: 87 %
FIFMAX-PRE: 5.25 L/SEC
FVC-%PRED-PRE: 102 %
FVC-PRE: 3.64 L
FVC-PRED: 3.55 L

## 2019-06-05 PROCEDURE — 97803 MED NUTRITION INDIV SUBSEQ: CPT | Mod: 59 | Performed by: DIETITIAN, REGISTERED

## 2019-06-05 PROCEDURE — G0463 HOSPITAL OUTPT CLINIC VISIT: HCPCS | Mod: ZF

## 2019-06-05 PROCEDURE — 87070 CULTURE OTHR SPECIMN AEROBIC: CPT | Performed by: INTERNAL MEDICINE

## 2019-06-05 PROCEDURE — 87077 CULTURE AEROBIC IDENTIFY: CPT | Performed by: INTERNAL MEDICINE

## 2019-06-05 PROCEDURE — 87186 SC STD MICRODIL/AGAR DIL: CPT | Performed by: INTERNAL MEDICINE

## 2019-06-05 ASSESSMENT — MIFFLIN-ST. JEOR
SCORE: 1493.23
SCORE: 1468.23

## 2019-06-05 ASSESSMENT — PAIN SCALES - GENERAL: PAINLEVEL: NO PAIN (0)

## 2019-06-05 NOTE — PROGRESS NOTES
"CF Annual Nutrition Assessment    Reason for Assessment  Assessed during Dr. Gavi Allison' clinic r/t increased nutrition risk with diagnosis of CF per protocol.    Nutrition Significant PMH  Mild Lung Disease   Pancreatic Insufficient (fecal elastase done 2013)  CFRD - sees CF Endocrinologist, Dr. 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    Food Insecurity:  No    Anthropometric Assessment  Height: 5' 1.81\"/157 cm  IBW based on BMI 22 for females and 23 for males per CF Foundation recs: 54.6 kg  Today's Weight: 170 lbs 6.65 oz/77.3 kg (actual weight)  %IBW: 142%  Body mass index is 31.36 kg/m .  Current weight is considered: Overweight/Obese and above recommended weight range for CF     Wt Readings from Last 25 Encounters:   06/05/19 77.3 kg (170 lb 6.7 oz)   05/09/19 79.8 kg (176 lb)   04/18/19 77.1 kg (170 lb)   03/14/19 77.6 kg (171 lb)   03/07/19 78.9 kg (174 lb)   11/13/18 77.8 kg (171 lb 9.6 oz)   11/06/18 77.7 kg (171 lb 4.8 oz)   08/07/18 77.8 kg (171 lb 9.6 oz)   08/07/18 77.1 kg (170 lb)   07/18/18 77.1 kg (169 lb 15.6 oz)   07/06/18 75.3 kg (166 lb)   05/30/18 76.2 kg (168 lb)   05/01/18 75.8 kg (167 lb 1.6 oz)   04/18/18 75.3 kg (166 lb)   03/21/18 75.3 kg (166 lb)   01/17/18 75 kg (165 lb 5.5 oz)   01/17/18 77.6 kg (171 lb)   11/01/17 77.9 kg (171 lb 11.8 oz)   10/11/17 78.9 kg (174 lb)   09/26/17 78.8 kg (173 lb 11.2 oz)   09/06/17 78.9 kg (174 lb)   08/02/17 80.3 kg (177 lb)   08/02/17 79.7 kg (175 lb 11.3 oz)   06/05/17 79.9 kg (176 lb 2.4 oz)   05/01/17 82.1 kg (181 lb)       Comments:  Weight has stayed fairly stable for about a year. Mamie continues to follow " with the [facility] clinic team and is taking medications to support weight control.  She was able to loose >6 kg from August 2016-January 2018 or ~8% total body weight.  She now sees Dr. Randa Ho who she is very happy with. She is currently taking naltrexone and bupropion for weight loss, as prescribed by Dr. Tolentino.  She states this helps with her cravings and snacking.      Physical Activity/Exercise:  Due to knee and back pain issues Mamie tends to do very little physical activity outside of ADL's, no formal exercise regimen.  She is seeing a Chiropractor but has not done physical therapy.  She plans to see our CF physical therapist today.     Pancreatic Enzymes  Although tested as pancreatic insufficient pt does not take enzymes, stopped taking them in 2015 as she felt they had little affect on her GI symptoms. Ok'd this with CF MD prior to stopping.      Diet History and Assessment  Diet Preferences/Allergies/Intolerances: Regular diet  Appetite Stimulant Rx:  No, on medications for appetite suppression for weight loss per [facility]  Intake Recall/Comments:   Intake pattern seems to be less regular than previous, however still having three meals per day.  Snacking is a bit more than previous, particularly when at school. She is drinking milk with meals but staying with skim milk and not having more than 3 glasses per day.  At [facility] clinic they recommended she replace her breakfast meal with a protein shake, she has started making a smoothie but it is only carbs (juice + banana + strawberries).  She has some protein powders/shakes at home that she could add to this shake but hasn't so far.  Throughout the rest of the day she drinks bottled [employer] drinks (likely frappuccino), Diet Dr Pepper and milk.      Calcium:  Loves milk, as we have discussed her drinking >1 gallon milk/day contributing to weight loss she is trying to limit to two to three 16-oz glasses per day (skim).   Salt: Adequate, added to  meals  Hydration:  Does not like plain water, will drink juice or diet soda or milk.  Resistant to suggestions of drinking other lower sugar options.  Supplements: No    MALNUTRITION  % Intake:  No decreased intake noted  % Weight Loss:  Weight loss does not meet criteria for malnutrition (intentional)  Subcutaneous Fat Loss:  None observed  Muscle Loss:  None observed  Fluid Accumulation/Edema:  None noted    Malnutrition Diagnosis: Patient does not meet two of the above criteria necessary for diagnosing malnutrition    Estimated Energy and Protein Needs  Estimation based on weight loss with Mild lung disease and mild pancreatic insufficiency.    0584-4355 kcals/day =  25-30 kcal/kg For weight loss (increase to 30-35 kcal/day for maintenance)   70-90 g protein/day = 1.2-1.5 g/kg (increase to 1.5-2 g/kg- pancreatic insufficient)    Laboratory Assessment  Date: 8/2/17  Total 25-Hydroxyvitamin D: WNL  Vitamin A: WNL  Vitamin E: WNL  Iron: WNL  Lipid Panel: Low HDL, high TGL    Current Vitamin/Mineral Prescriptions: Multivitamin, Vitamin D 1000 International Units, Vitamin E 400 International Units, Betacarotene 25,000 International Units 3x/week, Vitamin B12 1 mL IMJ 1x/month, Vitamin K weekly and Vitamin C 250 mg BID    Comments:  Pt states she is taking all of the above, could not remember exactly the amounts but the supplements above are per her current prescriptions.  She is taking B12 less frequently now.    CF Related Diabetes Evaluation  Hgb A1C: 5.5%   Date: 8/2/17  FSBG range: Normal per pt  Insulin: No.  Trial with basal insulin in the past which pt said did not go well in her opinion  Carbohydrate Counting: No    Reports low blood sugars at work on a daily basis. She has not followed up with endocrine as recommended, worried about being put back on insulin.     NUTRITION DIAGNOSIS  Overweight/obese r/t excessive energy intake and lack of physical activity AEB BMI >30 kg/m2 with pt-reported chronic  overeating requiring intervention via [facility] clinic.   INTERVENTIONS/RECOMMENDATIONS  1) Weight management:  Reinforced goals for weight and structured nutrition intake, congratulated pt on her weight loss thus far and we reviewed her weight graph for the past 2 years.  Offered support via phone & MyChart.  2) Annual studies:  Planned for these to be done next visit including OGTT.  Vitamin/mineral supplementation regimen reviewed 1:1.    3) Diabetes:  Pt not in great diabetic control, we discussed this for the majority of her visit.  She is consuming high amounts of sugar, especially from beverages, which is limiting her ability to control hypoglycemic episodes/reactive hypoglycemia.  She is reluctant to change her beverages but is willing to work toward more small, frequent meals and add protein and fat/fiber to her morning smoothie.  We discussed her purchasing plain kefir (no added sugar) and adding this to her smoothie as well as subtracting some of the juice. Encouraged this same practice at other meal/snack times, reviewed some options and examples. Lastly, I encouraged her to make a follow-up appt with Endocrine at her next opportunity.     Patient Understanding: Good  Expected Compliance: Good  Follow-Up Plans: Per protocol    GOALS: (2017)  1) Limit daily milk intake to 2-3 16-oz glasses of skim per day.  2) Begin balancing meals and snacks with added protein, fiber, fat and decrease sugar.   3) Weight to trend downward to goal of BMI <30 kg/m2 within the next 6-12 months.       FOLLOW-UP/MONITORING:  Visit patient within 6-12 month(s)    Time Spent In Face-to-Face Patient Interactions:  30 minutes (time spent in diabetes consultation)     Theresa Ceja MS, RD, LD (pager [phone])  Cystic Fibrosis/Lung Transplant Dietitian      -Available Mon-Thurs 8 AM-4:30 PM. On Fridays please contact pager [phone] (Amy Andino RD) and on weekends/holidays contact coverage dietitian at pager [phone] (inpatient use  only).

## 2019-06-05 NOTE — PROGRESS NOTES
[facility]  June 5, 2019         Assessment and Plan:   Mamie Rice is a 23 year old female with cystic fibrosis.    1. CF lung disease with normal spirometry: Mamie reports from a pulmonary point of view that she is doing well.  She recently was exposed to croup from a family member and started on a course of Bactrim therapy.  This resulted in resolution of some mild symptoms that she was having.  Today, she shows evidence of normal pulmonary function.  Maime historically has grown out normal roberto carlos or Staph aureus.  At this time, I have recommended:   -- That she continue on her present bronchial drainage therapy.   -- She continue on her present nebulized therapies.    2.  Abnormal glucose tolerance.  Mamie continues to work hTeresa Ceja, our dietitian, regarding her dietary intake and management of her blood sugars.     3.  Cystic fibrosis sinus disease.  Mamie has intermittent symptomatology.  She is seen fairly consistently by ENT.  I defer to them for management.     4.  Weight management.  Mamie is followed in the Weight Management Clinic here.  She denies any recent weight loss.     5.  Psychosocial.  Mamie continues to live at home.  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  She reports that this is going very well.  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     6. Pancreatic Insufficiency:  The patient has no new symptoms consistent with worsening malabsorption.    - continue the present dose of pancreatic enzymes  - continue vitamin supplementation.    HCM: due this year.  Immunizations: prevnar in the fall  Colonoscopy: MAMI Allison MD MPH  Associate Professor of Medicine  Pulmonary, Allergy, Critical Care and Sleep Medicine      Interval History:     Mamie produces sputum that is green in color.  Her cough frequency and sputum volume are unchanged.  Her activity tolerance remains good.  She is performing 2 vest  therapies per day.          Review of Systems:     CONSTITUTIONAL: no fever, no chills, no change in weight, no change in energy, no change in appetite    INTEGUMENTARY/SKIN: no rash, no obvious new lesions    ENT/MOUTH: no sore throat, no new sinus pain, no new nasal drainage, no hearing loss, + ear popping     RESPIRATORY: see interval history    CV: no chest pain, no palpitations    GI: no nausea, no vomiting, no change in stools, no fatty stools, no GERD, no abdominal pain    : no dysuria, no urinary frequency    MUSCULOSKELETAL: no myalgias, no arthralgias    ENDOCRINE: no excessive thirst, blood sugars variable    NEURO:  No headache, no numbness, no tingling    SLEEP: no issues    PSYCHIATRIC: mood stable          Past Medical and Surgical History:     Past Medical History:   Diagnosis Date     ADHD (attention deficit hyperactivity disorder)      Anxiety      Aspergillosis, with pneumonia (H)     fugus found caused chest pain     Chronic infection     CF, MRSA.,      Chronic sinusitis      Constipation, chronic      Cystic fibrosis with pulmonary manifestations (H) 12/19/2011     Cystic fibrosis without mention of meconium ileus     SWEAT TEST:Date: 2/17/1994   [facility]  296 mg, 104 mmol/L ClSample #2  295 mg, 104 mmol/L Cl GENOTYPING:Date: 10/15/2007,  Laboratory: [facility]: [id number]     Depressive disorder      Diabetes     no meds currently     Dysthymic disorder      Exocrine pancreatic insufficiency      Gastro-oesophageal reflux disease      Hip pain, right      MRSA (methicillin resistant Staphylococcus aureus) carrier      Pancreatic disease      Past Surgical History:   Procedure Laterality Date     ARTHROSCOPY HIP, OSTEOPLASTY FEMUR PROXIMAL, COMBINED  3/11/2013    Procedure: COMBINED ARTHROSCOPY HIP, OSTEOPLASTY FEMUR PROXIMAL;  Right Hip Arthroscopy, Labral  Debridement.    surgeon request choice anesthesia/admit to [facility] after surgery;  Surgeon: Omkar Austin  MD LOKESH;  [facility]     ARTHROSCOPY KNEE WITH MEDIAL MENISCECTOMY Left 1/31/2017    Procedure: ARTHROSCOPY KNEE WITH MEDIAL MENISCECTOMY;  Surgeon: Jethro Coyle MD;  [facility] OR     bronchoscopies       BRONCHOSCOPY       EXAM UNDER ANESTHESIA ANUS N/A 5/10/2016    Procedure: EXAM UNDER ANESTHESIA ANUS;  Surgeon: Chet Gaviria MD;  [facility]     EXAM UNDER ANESTHESIA, RESTORATIONS, EXTRACTION(S) DENTAL COMPLEX, COMBINED  5/13/2013    Procedure: COMBINED EXAM UNDER ANESTHESIA, RESTORATIONS, EXTRACTION(S) DENTAL COMPLEX;  Dental Exam, Radiographs, Restorations. Single Extraction  Tooth #2. Restorations x 3;  Surgeon: Danilo Ortiz DDS;  [facility]: [facility] OR     HC KNEE SCOPE, DIAGNOSTIC      Arthroscopy, Knee- left     INJECT BOTOX N/A 5/10/2016    Procedure: INJECT BOTOX;  Surgeon: Chet Gaviria MD;  [facility]     left hip labral tear  5/11/2011    left hip arthroscopy with labral debridement and synovectomy     meniscus repair       OPTICAL TRACKING SYSTEM ENDOSCOPIC SINUS SURGERY  10/14/2011    Procedure:OPTICAL TRACKING SYSTEM ENDOSCOPIC SINUS SURGERY; FESS (functional endoscopic sinus surgery) with Image Guidance, bronchial lavage and cultures; Surgeon:GOYO KUO; Location:[facility] OR     OPTICAL TRACKING SYSTEM ENDOSCOPIC SINUS SURGERY  5/18/2012    Procedure:OPTICAL TRACKING SYSTEM ENDOSCOPIC SINUS SURGERY; Right  and Left Image Guided Functional Endoscopic Sinus Surgery With  Frontal Approach, [employer]; Surgeon:GOYO KUO; [facility]     OPTICAL TRACKING SYSTEM ENDOSCOPIC SINUS SURGERY  9/26/2012    Procedure: OPTICAL TRACKING SYSTEM ENDOSCOPIC SINUS SURGERY;  Stealth Guided Bilateral Functional Endoscopic Sinus Surgery *Latex Safe*;  Surgeon: Goyo Kuo MD;  [facility]     OPTICAL TRACKING SYSTEM ENDOSCOPIC SINUS SURGERY Bilateral 10/16/2015    Procedure: OPTICAL TRACKING SYSTEM ENDOSCOPIC SINUS SURGERY;  Surgeon: Mariela Haile MD;   [facility]     ORTHOPEDIC SURGERY      left hip tear repair 2010     SINUS SURGERY             Family History:     Family History   Problem Relation Age of Onset     Cancer Paternal Grandmother      Skin Cancer Paternal Grandmother      Other Cancer Paternal Grandmother         Skin     Obesity Paternal Grandmother      Cancer Paternal Grandfather         PGF had throat cancer (he was a smoker)     Other Cancer Paternal Grandfather      Anxiety Disorder Paternal Grandfather      Thyroid Disease Mother         ,     Obesity Other      Anesthesia Reaction No family hx of      Blood Disease No family hx of      Colon Polyps No family hx of      Crohn's Disease No family hx of      Ulcerative Colitis No family hx of      Colon Cancer No family hx of      Melanoma No family hx of             Social History:     Social History     Socioeconomic History     Marital status: Single     Spouse name: Not on file     Number of children: Not on file     Years of education: Not on file     Highest education level: Not on file   Occupational History     Not on file   Social Needs     Financial resource strain: Not on file     Food insecurity:     Worry: Not on file     Inability: Not on file     Transportation needs:     Medical: Not on file     Non-medical: Not on file   Tobacco Use     Smoking status: Never Smoker     Smokeless tobacco: Never Used     Tobacco comment: one person at home smokes outside   Substance and Sexual Activity     Alcohol use: No     Alcohol/week: 0.0 oz     Drug use: No     Sexual activity: Never   Lifestyle     Physical activity:     Days per week: Not on file     Minutes per session: Not on file     Stress: Not on file   Relationships     Social connections:     Talks on phone: Not on file     Gets together: Not on file     Attends Church service: Not on file     Active member of club or organization: Not on file     Attends meetings of clubs or organizations: Not on file     Relationship  status: Not on file     Intimate partner violence:     Fear of current or ex partner: Not on file     Emotionally abused: Not on file     Physically abused: Not on file     Forced sexual activity: Not on file   Other Topics Concern      Service Not Asked     Blood Transfusions No     Caffeine Concern Not Asked     Occupational Exposure Not Asked     Hobby Hazards Not Asked     Sleep Concern Not Asked     Stress Concern Not Asked     Weight Concern Not Asked     Special Diet Not Asked     Back Care Not Asked     Exercise Yes     Bike Helmet Not Asked     Seat Belt Not Asked     Self-Exams Not Asked     Parent/sibling w/ CABG, MI or angioplasty before 65F 55M? Not Asked   Social History Narrative    Mamie lives with mother in [place], [place].  There is a cat in the home, but Mamie does not have any litterbox duties.  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  She gets essentially no exercise because of the tingling in her feet (says it bothers her even to stand).        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        8/2015 no change in social situation        2/15/2016 Pt is single and lives with mother and stepfather.            Medications:     Current Outpatient Medications   Medication     acetaminophen (TYLENOL) 325 MG tablet     acetylcysteine (MUCOMYST) 20 % neb solution     albuterol (2.5 MG/3ML) 0.083% neb solution     albuterol (PROAIR HFA/PROVENTIL HFA/VENTOLIN HFA) 108 (90 Base) MCG/ACT Inhaler     [START ON 7/22/2019] amphetamine-dextroamphetamine (ADDERALL XR) 20 MG 24 hr capsule     [START ON 6/24/2019] amphetamine-dextroamphetamine (ADDERALL XR) 20 MG 24 hr capsule     amphetamine-dextroamphetamine (ADDERALL XR) 20 MG 24 hr capsule     ascorbic acid (VITAMIN C) 500 MG tablet     azithromycin (ZITHROMAX) 500 MG tablet     beta carotene 95484 UNIT capsule     blood glucose (ACCU-CHEK SMARTVIEW) test strip      "buPROPion (WELLBUTRIN SR) 150 MG 12 hr tablet     busPIRone (BUSPAR) 15 MG tablet     cetirizine (ZYRTEC) 10 MG tablet     FLUoxetine (PROZAC) 40 MG capsule     fluticasone (FLONASE) 50 MCG/ACT nasal spray     GLYCOPYRROLATE PO     hydrOXYzine (ATARAX) 25 MG tablet     MedroxyPROGESTERone Acetate (DEPO-PROVERA IM)     meropenem (MERREM) 500 MG vial     montelukast (SINGULAIR) 10 MG tablet     Multiple Vitamin (MULTIVITAMIN OR)     naltrexone (DEPADE;REVIA) 50 MG tablet     omeprazole (PRILOSEC) 40 MG DR capsule     phytonadione (MEPHYTON/VIT K) 5 MG tablet     polyethylene glycol (MIRALAX) powder     sulfamethoxazole-trimethoprim (BACTRIM DS/SEPTRA DS) 800-160 MG tablet     topiramate (TOPAMAX) 25 MG tablet     traZODone (DESYREL) 100 MG tablet     VITAMIN D3 1000 units tablet     vitamin E 400 UNIT capsule     No current facility-administered medications for this visit.             Physical Exam:   /73 (BP Location: Right arm, Patient Position: Chair, Cuff Size: Adult Regular)   Pulse 103   Temp 98.5  F (36.9  C) (Oral)   Resp 16   Ht 1.57 m (5' 1.81\")   Wt 77.3 kg (170 lb 6.7 oz)   SpO2 97%   BMI 31.36 kg/m      Constitutional:   Awake, alert and in no apparent distress     Eyes:   nonicteric     ENT:   oral mucosa moist without lesions, normal tm bilaterally, bilateral mucosal edema      Neck:   Supple without supraclavicular or cervical lymphadenopathy     Lungs:   Good air flow.  No crackles. No rhonchi.  No wheezes.     Cardiovascular:   Normal S1 and S2.  RRR.  No murmur, gallop or rub.     Abdomen:   NABS, soft, nontender, nondistended.       Musculoskeletal:   No edema, digital clubbing present     Neurologic:   Alert and conversant.     Skin:   Warm, dry.  No rash on limited exam.             Data:   All laboratory and imaging data reviewed.    Cystic Fibrosis Culture  Specimen Description   Date Value Ref Range Status   06/05/2019 Throat  Final   03/07/2019 Throat  Final   11/06/2018 Sputum  " Final    Culture Micro   Date Value Ref Range Status   06/05/2019 Light growth  Normal roberto carlos    Preliminary   06/05/2019 Culture in progress  Preliminary   03/07/2019 (A)  Final    Moderate growth  Staphylococcus aureus  This isolate is presumed to be clindamycin resistant based on detection of inducible   clindamycin resistance. Erythromycin and clindamycin are resistant, therefore, they are   not recommended for use.     03/07/2019 (A)  Final    Moderate growth  Strain 2  Staphylococcus aureus  This isolate is presumed to be clindamycin resistant based on detection of inducible   clindamycin resistance. Erythromycin and clindamycin are resistant, therefore, they are   not recommended for use.          Recent Results (from the past 168 hour(s))   General PFT Lab (Please always keep checked)    Collection Time: 06/05/19  8:58 AM   Result Value Ref Range    FVC-Pred 3.55 L    FVC-Pre 3.64 L    FVC-%Pred-Pre 102 %    FEV1-Pre 3.23 L    FEV1-%Pred-Pre 105 %    FEV1FVC-Pred 87 %    FEV1FVC-Pre 89 %    FEFMax-Pred 6.63 L/sec    FEFMax-Pre 9.52 L/sec    FEFMax-%Pred-Pre 143 %    FEF2575-Pred 3.62 L/sec    FEF2575-Pre 4.62 L/sec    NGO6604-%Pred-Pre 127 %    ExpTime-Pre 7.03 sec    FIFMax-Pre 5.25 L/sec    FEV1FEV6-Pred 86 %    FEV1FEV6-Pre 89 %   Cystic Fibrosis Culture Aerob Bacterial    Collection Time: 06/05/19  9:23 AM   Result Value Ref Range    Specimen Description Throat     Special Requests Specimen collected in eSwab transport (white cap)     Culture Micro Light growth  Normal roberto carlos       Culture Micro Culture in progress        PFT: The spirometry is normal.  When compared to 3/7/2019, the FEV1 and FVC have little change.  The decrease in FVC may represent air trapping v. restrictive physiology.  Lung volumes would be necessary to determine.    Pulmonary exacerbation: absent

## 2019-06-05 NOTE — LETTER
6/5/2019       RE: Mamie Rice  [address] [facility] [address]     Dear Colleague,    Thank you for referring your patient, Mamie Rice, to the [facility] at [facility]. Please see a copy of my visit note below.    [facility]  June 5, 2019         Assessment and Plan:   Mamie Rice is a 23 year old female with cystic fibrosis.    1. CF lung disease with normal spirometry: Mamie reports from a pulmonary point of view that she is doing well.  She recently was exposed to croup from a family member and started on a course of Bactrim therapy.  This resulted in resolution of some mild symptoms that she was having.  Today, she shows evidence of normal pulmonary function.  Mamie historically has grown out normal roberto carlos or Staph aureus.  At this time, I have recommended:   -- That she continue on her present bronchial drainage therapy.   -- She continue on her present nebulized therapies.    2.  Abnormal glucose tolerance.  Mamie continues to work Theresa Ceja, our dietitian, regarding her dietary intake and management of her blood sugars.     3.  Cystic fibrosis sinus disease.  Mamie has intermittent symptomatology.  She is seen fairly consistently by ENT.  I defer to them for management.     4.  Weight management.  Mamie is followed in the Weight Management Clinic here.  She denies any recent weight loss.     5.  Psychosocial.  Mamie continues to live at home.  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  She reports that this is going very well.  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     6. Pancreatic Insufficiency:  The patient has no new symptoms consistent with worsening malabsorption.    - continue the present dose of pancreatic enzymes  - continue vitamin supplementation.    HCM: due this year.  Immunizations: prevnar in the fall  Colonoscopy: MAMI Gatica  MADHURI Allison MD MPH  Associate Professor of Medicine  Pulmonary, Allergy, Critical Care and Sleep Medicine      Interval History:     Mamie produces sputum that is green in color.  Her cough frequency and sputum volume are unchanged.  Her activity tolerance remains good.  She is performing 2 vest therapies per day.          Review of Systems:     CONSTITUTIONAL: no fever, no chills, no change in weight, no change in energy, no change in appetite    INTEGUMENTARY/SKIN: no rash, no obvious new lesions    ENT/MOUTH: no sore throat, no new sinus pain, no new nasal drainage, no hearing loss, + ear popping     RESPIRATORY: see interval history    CV: no chest pain, no palpitations    GI: no nausea, no vomiting, no change in stools, no fatty stools, no GERD, no abdominal pain    : no dysuria, no urinary frequency    MUSCULOSKELETAL: no myalgias, no arthralgias    ENDOCRINE: no excessive thirst, blood sugars variable    NEURO:  No headache, no numbness, no tingling    SLEEP: no issues    PSYCHIATRIC: mood stable          Past Medical and Surgical History:     Past Medical History:   Diagnosis Date     ADHD (attention deficit hyperactivity disorder)      Anxiety      Aspergillosis, with pneumonia (H)     fugus found caused chest pain     Chronic infection     CF, MRSA.,      Chronic sinusitis      Constipation, chronic      Cystic fibrosis with pulmonary manifestations (H) 12/19/2011     Cystic fibrosis without mention of meconium ileus     SWEAT TEST:Date: 2/17/1994   Laboratory[facility]  296 mg, 104 mmol/L ClSample #2  295 mg, 104 mmol/L Cl GENOTYPING:Date: 10/15/2007,  Laboratory: [facility]ryGenotype: [id number]     Depressive disorder      Diabetes     no meds currently     Dysthymic disorder      Exocrine pancreatic insufficiency      Gastro-oesophageal reflux disease      Hip pain, right      MRSA (methicillin resistant Staphylococcus aureus) carrier      Pancreatic disease      Past Surgical History:    Procedure Laterality Date     ARTHROSCOPY HIP, OSTEOPLASTY FEMUR PROXIMAL, COMBINED  3/11/2013    Procedure: COMBINED ARTHROSCOPY HIP, OSTEOPLASTY FEMUR PROXIMAL;  Right Hip Arthroscopy, Labral  Debridement.    surgeon request choice anesthesia/admit to [facility] after surgery;  Surgeon: Omkar Austin MD;  [facility]     ARTHROSCOPY KNEE WITH MEDIAL MENISCECTOMY Left 1/31/2017    Procedure: ARTHROSCOPY KNEE WITH MEDIAL MENISCECTOMY;  Surgeon: Jethro Coyle MD;  [facility] OR     bronchoscopies       BRONCHOSCOPY       EXAM UNDER ANESTHESIA ANUS N/A 5/10/2016    Procedure: EXAM UNDER ANESTHESIA ANUS;  Surgeon: Chet Gaviria MD;  [facility]     EXAM UNDER ANESTHESIA, RESTORATIONS, EXTRACTION(S) DENTAL COMPLEX, COMBINED  5/13/2013    Procedure: COMBINED EXAM UNDER ANESTHESIA, RESTORATIONS, EXTRACTION(S) DENTAL COMPLEX;  Dental Exam, Radiographs, Restorations. Single Extraction  Tooth #2. Restorations x 3;  Surgeon: Danilo Ortiz DDS;  Location: [facility] OR     HC KNEE SCOPE, DIAGNOSTIC      Arthroscopy, Knee- left     INJECT BOTOX N/A 5/10/2016    Procedure: INJECT BOTOX;  Surgeon: Chet Gaviria MD;  [facility]     left hip labral tear  5/11/2011    left hip arthroscopy with labral debridement and synovectomy     meniscus repair       OPTICAL TRACKING SYSTEM ENDOSCOPIC SINUS SURGERY  10/14/2011    Procedure:OPTICAL TRACKING SYSTEM ENDOSCOPIC SINUS SURGERY; FESS (functional endoscopic sinus surgery) with Image Guidance, bronchial lavage and cultures; Surgeon:JUAN JOSE GARCIA; [facility] OR     OPTICAL TRACKING SYSTEM ENDOSCOPIC SINUS SURGERY  5/18/2012    Procedure:OPTICAL TRACKING SYSTEM ENDOSCOPIC SINUS SURGERY; Right  and Left Image Guided Functional Endoscopic Sinus Surgery With  Frontal Approach, [employer]; Surgeon:JUAN JOSE GARCIA; [facility] OR     OPTICAL TRACKING SYSTEM ENDOSCOPIC SINUS SURGERY  9/26/2012    Procedure: OPTICAL TRACKING SYSTEM ENDOSCOPIC  SINUS SURGERY;  Stealth Guided Bilateral Functional Endoscopic Sinus Surgery *Latex Safe*;  Surgeon: Goyo Kuo MD;  [facility]     OPTICAL TRACKING SYSTEM ENDOSCOPIC SINUS SURGERY Bilateral 10/16/2015    Procedure: OPTICAL TRACKING SYSTEM ENDOSCOPIC SINUS SURGERY;  Surgeon: Mariela Haile MD;  Location:  OR     ORTHOPEDIC SURGERY      left hip tear repair 2010     SINUS SURGERY             Family History:     Family History   Problem Relation Age of Onset     Cancer Paternal Grandmother      Skin Cancer Paternal Grandmother      Other Cancer Paternal Grandmother         Skin     Obesity Paternal Grandmother      Cancer Paternal Grandfather         PGF had throat cancer (he was a smoker)     Other Cancer Paternal Grandfather      Anxiety Disorder Paternal Grandfather      Thyroid Disease Mother         ,     Obesity Other      Anesthesia Reaction No family hx of      Blood Disease No family hx of      Colon Polyps No family hx of      Crohn's Disease No family hx of      Ulcerative Colitis No family hx of      Colon Cancer No family hx of      Melanoma No family hx of             Social History:     Social History     Socioeconomic History     Marital status: Single     Spouse name: Not on file     Number of children: Not on file     Years of education: Not on file     Highest education level: Not on file   Occupational History     Not on file   Social Needs     Financial resource strain: Not on file     Food insecurity:     Worry: Not on file     Inability: Not on file     Transportation needs:     Medical: Not on file     Non-medical: Not on file   Tobacco Use     Smoking status: Never Smoker     Smokeless tobacco: Never Used     Tobacco comment: one person at home smokes outside   Substance and Sexual Activity     Alcohol use: No     Alcohol/week: 0.0 oz     Drug use: No     Sexual activity: Never   Lifestyle     Physical activity:     Days per week: Not on file     Minutes per session: Not on file      Stress: Not on file   Relationships     Social connections:     Talks on phone: Not on file     Gets together: Not on file     Attends Druze service: Not on file     Active member of club or organization: Not on file     Attends meetings of clubs or organizations: Not on file     Relationship status: Not on file     Intimate partner violence:     Fear of current or ex partner: Not on file     Emotionally abused: Not on file     Physically abused: Not on file     Forced sexual activity: Not on file   Other Topics Concern      Service Not Asked     Blood Transfusions No     Caffeine Concern Not Asked     Occupational Exposure Not Asked     Hobby Hazards Not Asked     Sleep Concern Not Asked     Stress Concern Not Asked     Weight Concern Not Asked     Special Diet Not Asked     Back Care Not Asked     Exercise Yes     Bike Helmet Not Asked     Seat Belt Not Asked     Self-Exams Not Asked     Parent/sibling w/ CABG, MI or angioplasty before 65F 55M? Not Asked   Social History Narrative    Mamie lives with mother in [place], [place].  There is a cat in the home, but Mamie does not have any litterbox duties.  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  She gets essentially no exercise because of the tingling in her feet (says it bothers her even to stand).        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        8/2015 no change in social situation        2/15/2016 Pt is single and lives with mother and stepfather.            Medications:     Current Outpatient Medications   Medication     acetaminophen (TYLENOL) 325 MG tablet     acetylcysteine (MUCOMYST) 20 % neb solution     albuterol (2.5 MG/3ML) 0.083% neb solution     albuterol (PROAIR HFA/PROVENTIL HFA/VENTOLIN HFA) 108 (90 Base) MCG/ACT Inhaler     [START ON 7/22/2019] amphetamine-dextroamphetamine (ADDERALL XR) 20 MG 24 hr capsule     [START ON 6/24/2019]  "amphetamine-dextroamphetamine (ADDERALL XR) 20 MG 24 hr capsule     amphetamine-dextroamphetamine (ADDERALL XR) 20 MG 24 hr capsule     ascorbic acid (VITAMIN C) 500 MG tablet     azithromycin (ZITHROMAX) 500 MG tablet     beta carotene 45909 UNIT capsule     blood glucose (ACCU-CHEK SMARTVIEW) test strip     buPROPion (WELLBUTRIN SR) 150 MG 12 hr tablet     busPIRone (BUSPAR) 15 MG tablet     cetirizine (ZYRTEC) 10 MG tablet     FLUoxetine (PROZAC) 40 MG capsule     fluticasone (FLONASE) 50 MCG/ACT nasal spray     GLYCOPYRROLATE PO     hydrOXYzine (ATARAX) 25 MG tablet     MedroxyPROGESTERone Acetate (DEPO-PROVERA IM)     meropenem (MERREM) 500 MG vial     montelukast (SINGULAIR) 10 MG tablet     Multiple Vitamin (MULTIVITAMIN OR)     naltrexone (DEPADE;REVIA) 50 MG tablet     omeprazole (PRILOSEC) 40 MG DR capsule     phytonadione (MEPHYTON/VIT K) 5 MG tablet     polyethylene glycol (MIRALAX) powder     sulfamethoxazole-trimethoprim (BACTRIM DS/SEPTRA DS) 800-160 MG tablet     topiramate (TOPAMAX) 25 MG tablet     traZODone (DESYREL) 100 MG tablet     VITAMIN D3 1000 units tablet     vitamin E 400 UNIT capsule     No current facility-administered medications for this visit.             Physical Exam:   /73 (BP Location: Right arm, Patient Position: Chair, Cuff Size: Adult Regular)   Pulse 103   Temp 98.5  F (36.9  C) (Oral)   Resp 16   Ht 1.57 m (5' 1.81\")   Wt 77.3 kg (170 lb 6.7 oz)   SpO2 97%   BMI 31.36 kg/m       Constitutional:   Awake, alert and in no apparent distress     Eyes:   nonicteric     ENT:   oral mucosa moist without lesions, normal tm bilaterally, bilateral mucosal edema      Neck:   Supple without supraclavicular or cervical lymphadenopathy     Lungs:   Good air flow.  No crackles. No rhonchi.  No wheezes.     Cardiovascular:   Normal S1 and S2.  RRR.  No murmur, gallop or rub.     Abdomen:   NABS, soft, nontender, nondistended.       Musculoskeletal:   No edema, digital clubbing " present     Neurologic:   Alert and conversant.     Skin:   Warm, dry.  No rash on limited exam.             Data:   All laboratory and imaging data reviewed.    Cystic Fibrosis Culture  Specimen Description   Date Value Ref Range Status   06/05/2019 Throat  Final   03/07/2019 Throat  Final   11/06/2018 Sputum  Final    Culture Micro   Date Value Ref Range Status   06/05/2019 Light growth  Normal roberto carlos    Preliminary   06/05/2019 Culture in progress  Preliminary   03/07/2019 (A)  Final    Moderate growth  Staphylococcus aureus  This isolate is presumed to be clindamycin resistant based on detection of inducible   clindamycin resistance. Erythromycin and clindamycin are resistant, therefore, they are   not recommended for use.     03/07/2019 (A)  Final    Moderate growth  Strain 2  Staphylococcus aureus  This isolate is presumed to be clindamycin resistant based on detection of inducible   clindamycin resistance. Erythromycin and clindamycin are resistant, therefore, they are   not recommended for use.          Recent Results (from the past 168 hour(s))   General PFT Lab (Please always keep checked)    Collection Time: 06/05/19  8:58 AM   Result Value Ref Range    FVC-Pred 3.55 L    FVC-Pre 3.64 L    FVC-%Pred-Pre 102 %    FEV1-Pre 3.23 L    FEV1-%Pred-Pre 105 %    FEV1FVC-Pred 87 %    FEV1FVC-Pre 89 %    FEFMax-Pred 6.63 L/sec    FEFMax-Pre 9.52 L/sec    FEFMax-%Pred-Pre 143 %    FEF2575-Pred 3.62 L/sec    FEF2575-Pre 4.62 L/sec    DNF1214-%Pred-Pre 127 %    ExpTime-Pre 7.03 sec    FIFMax-Pre 5.25 L/sec    FEV1FEV6-Pred 86 %    FEV1FEV6-Pre 89 %   Cystic Fibrosis Culture Aerob Bacterial    Collection Time: 06/05/19  9:23 AM   Result Value Ref Range    Specimen Description Throat     Special Requests Specimen collected in eSwab transport (white cap)     Culture Micro Light growth  Normal roberto carlos       Culture Micro Culture in progress        PFT: The spirometry is normal.  When compared to 3/7/2019, the FEV1 and FVC  "have little change.  The decrease in FVC may represent air trapping v. restrictive physiology.  Lung volumes would be necessary to determine.    Pulmonary exacerbation: absent        CF Annual Nutrition Assessment    Reason for Assessment  Assessed during Dr. Gavi Allison' clinic r/t increased nutrition risk with diagnosis of CF per protocol.    Nutrition Significant PMH  Mild Lung Disease   Pancreatic Insufficient (fecal elastase done 2013)  CFRD - sees CF Endocrinologist, Dr. 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    Food Insecurity:  No    Anthropometric Assessment  Height: 5' 1.81\"/157 cm  IBW based on BMI 22 for females and 23 for males per CF Foundation recs: 54.6 kg  Today's Weight: 170 lbs 6.65 oz/77.3 kg (actual weight)  %IBW: 142%  Body mass index is 31.36 kg/m .  Current weight is considered: Overweight/Obese and above recommended weight range for CF     Wt Readings from Last 25 Encounters:   06/05/19 77.3 kg (170 lb 6.7 oz)   05/09/19 79.8 kg (176 lb)   04/18/19 77.1 kg (170 lb)   03/14/19 77.6 kg (171 lb)   03/07/19 78.9 kg (174 lb)   11/13/18 77.8 kg (171 lb 9.6 oz)   11/06/18 77.7 kg (171 lb 4.8 oz)   08/07/18 77.8 kg (171 lb 9.6 oz)   08/07/18 77.1 kg (170 lb)   07/18/18 77.1 kg (169 lb 15.6 oz)   07/06/18 75.3 kg (166 lb)   05/30/18 76.2 kg (168 lb)   05/01/18 75.8 kg (167 lb 1.6 oz)   04/18/18 75.3 kg (166 lb)   03/21/18 75.3 kg (166 lb)   01/17/18 75 kg (165 lb 5.5 oz)   01/17/18 77.6 kg (171 lb)   11/01/17 77.9 kg (171 lb 11.8 oz)   10/11/17 78.9 kg (174 lb)   09/26/17 78.8 kg (173 lb 11.2 oz)   09/06/17 78.9 kg (174 lb)   08/02/17 80.3 kg (177 lb)   08/02/17 " 79.7 kg (175 lb 11.3 oz)   06/05/17 79.9 kg (176 lb 2.4 oz)   05/01/17 82.1 kg (181 lb)       Comments:  Weight has stayed fairly stable for about a year. Mamie continues to follow with the [facility] clinic team and is taking medications to support weight control.  She was able to loose >6 kg from August 2016-January 2018 or ~8% total body weight.  She now sees Dr. Randa Ho who she is very happy with. She is currently taking naltrexone and bupropion for weight loss, as prescribed by Dr. Tolentino.  She states this helps with her cravings and snacking.      Physical Activity/Exercise:  Due to knee and back pain issues Mamie tends to do very little physical activity outside of ADL's, no formal exercise regimen.  She is seeing a Chiropractor but has not done physical therapy.  She plans to see our CF physical therapist today.     Pancreatic Enzymes  Although tested as pancreatic insufficient pt does not take enzymes, stopped taking them in 2015 as she felt they had little affect on her GI symptoms. Ok'd this with CF MD prior to stopping.      Diet History and Assessment  Diet Preferences/Allergies/Intolerances: Regular diet  Appetite Stimulant Rx:  No, on medications for appetite suppression for weight loss per [facility]  Intake Recall/Comments:   Intake pattern seems to be less regular than previous, however still having three meals per day.  Snacking is a bit more than previous, particularly when at school. She is drinking milk with meals but staying with skim milk and not having more than 3 glasses per day.  At [facility] clinic they recommended she replace her breakfast meal with a protein shake, she has started making a smoothie but it is only carbs (juice + banana + strawberries).  She has some protein powders/shakes at home that she could add to this shake but hasn't so far.  Throughout the rest of the day she drinks bottled [employer] drinks (likely frappuccino), Diet Dr Pepper and milk.      Calcium:  Loves milk, as we  have discussed her drinking >1 gallon milk/day contributing to weight loss she is trying to limit to two to three 16-oz glasses per day (skim).   Salt: Adequate, added to meals  Hydration:  Does not like plain water, will drink juice or diet soda or milk.  Resistant to suggestions of drinking other lower sugar options.  Supplements: No    MALNUTRITION  % Intake:  No decreased intake noted  % Weight Loss:  Weight loss does not meet criteria for malnutrition (intentional)  Subcutaneous Fat Loss:  None observed  Muscle Loss:  None observed  Fluid Accumulation/Edema:  None noted    Malnutrition Diagnosis: Patient does not meet two of the above criteria necessary for diagnosing malnutrition    Estimated Energy and Protein Needs  Estimation based on weight loss with Mild lung disease and mild pancreatic insufficiency.    3021-1951 kcals/day =  25-30 kcal/kg For weight loss (increase to 30-35 kcal/day for maintenance)   70-90 g protein/day = 1.2-1.5 g/kg (increase to 1.5-2 g/kg- pancreatic insufficient)    Laboratory Assessment  Date: 8/2/17  Total 25-Hydroxyvitamin D: WNL  Vitamin A: WNL  Vitamin E: WNL  Iron: WNL  Lipid Panel: Low HDL, high TGL    Current Vitamin/Mineral Prescriptions: Multivitamin, Vitamin D 1000 International Units, Vitamin E 400 International Units, Betacarotene 25,000 International Units 3x/week, Vitamin B12 1 mL IMJ 1x/month, Vitamin K weekly and Vitamin C 250 mg BID    Comments:  Pt states she is taking all of the above, could not remember exactly the amounts but the supplements above are per her current prescriptions.  She is taking B12 less frequently now.    CF Related Diabetes Evaluation  Hgb A1C: 5.5%   Date: 8/2/17  FSBG range: Normal per pt  Insulin: No.  Trial with basal insulin in the past which pt said did not go well in her opinion  Carbohydrate Counting: No    Reports low blood sugars at work on a daily basis. She has not followed up with endocrine as recommended, worried about being  put back on insulin.     NUTRITION DIAGNOSIS  Overweight/obese r/t excessive energy intake and lack of physical activity AEB BMI >30 kg/m2 with pt-reported chronic overeating requiring intervention via [facility] clinic.   INTERVENTIONS/RECOMMENDATIONS  1) Weight management:  Reinforced goals for weight and structured nutrition intake, congratulated pt on her weight loss thus far and we reviewed her weight graph for the past 2 years.  Offered support via phone & MyChart.  2) Annual studies:  Planned for these to be done next visit including OGTT.  Vitamin/mineral supplementation regimen reviewed 1:1.    3) Diabetes:  Pt not in great diabetic control, we discussed this for the majority of her visit.  She is consuming high amounts of sugar, especially from beverages, which is limiting her ability to control hypoglycemic episodes/reactive hypoglycemia.  She is reluctant to change her beverages but is willing to work toward more small, frequent meals and add protein and fat/fiber to her morning smoothie.  We discussed her purchasing plain kefir (no added sugar) and adding this to her smoothie as well as subtracting some of the juice. Encouraged this same practice at other meal/snack times, reviewed some options and examples. Lastly, I encouraged her to make a follow-up appt with Endocrine at her next opportunity.     Patient Understanding: Good  Expected Compliance: Good  Follow-Up Plans: Per protocol    GOALS: (2017)  1) Limit daily milk intake to 2-3 16-oz glasses of skim per day.  2) Begin balancing meals and snacks with added protein, fiber, fat and decrease sugar.   3) Weight to trend downward to goal of BMI <30 kg/m2 within the next 6-12 months.       FOLLOW-UP/MONITORING:  Visit patient within 6-12 month(s)    Time Spent In Face-to-Face Patient Interactions:  30 minutes (time spent in diabetes consultation)     Theresa Ceja MS, RD, LD (pager [phone])  Cystic Fibrosis/Lung Transplant Dietitian      -Available Mon-Thurs  8 AM-4:30 PM. On Fridays please contact pager [phone] (Amy Andino RD) and on weekends/holidays contact coverage dietitian at pager [phone] (inpatient use only).     Respiratory Therapist Note:    Vest    Brand: Hill-Rom - traditional   Settings: Hill Rom: Frequencies 8, 9, 10 at pressure 10 then frequencies 18, 19, 20 at pressure 6.   Cough Pause: Yes.    Vest Garment Size: Adult Medium   Last Fitting Date: 2019   Frequency of therapy: 12 times per week   Concerns: needs to increase coughing    Exercise: none    Alternative Airway Clearance:       Nebulized Medications   Bronchodilators: Albuterol   Mucolytic: Mucomyst   Antibiotics:    Additional Inhaled Medications:    Spacer Use:      Review Cleaning: Yes. Top rack of .    Education and Transition Information   Correct order of inhaled medications: Yes   Mechanism of Action of inhaled medications: Yes   Frequency of inhaled medications: Yes   Dosage of inhaled medications: Yes   Other:     Home Care:   Nebulizer Cups (Brand/Type): disposable cups   Nebulizer Compressor    Year Purchased: 2014   Home Care Company:     [facility], Phone: [phone], Fax: [phone]    Oxygen:  [facility]   Site:    Date Completed:     Plan of Care and Goals for next visit: Increase coughing between settings        Again, thank you for allowing me to participate in the care of your patient.      Sincerely,    Gavi Allison MD

## 2019-06-05 NOTE — PROGRESS NOTES
Respiratory Therapist Note:    Vest    Brand: Hill-Rom - traditional   Settings: Hill Rom: Frequencies 8, 9, 10 at pressure 10 then frequencies 18, 19, 20 at pressure 6.   Cough Pause: Yes.    Vest Garment Size: Adult Medium   Last Fitting Date: 2019   Frequency of therapy: 12 times per week   Concerns: needs to increase coughing    Exercise: none    Alternative Airway Clearance:       Nebulized Medications   Bronchodilators: Albuterol   Mucolytic: Mucomyst   Antibiotics:    Additional Inhaled Medications:    Spacer Use:      Review Cleaning: Yes. Top rack of .    Education and Transition Information   Correct order of inhaled medications: Yes   Mechanism of Action of inhaled medications: Yes   Frequency of inhaled medications: Yes   Dosage of inhaled medications: Yes   Other:     Home Care:   Nebulizer Cups (Brand/Type): disposable cups   Nebulizer Compressor    Year Purchased: 2014   Home Care Company:     [facility], Phone: [phone], Fax: [phone]    Oxygen:  [facility]   Site:    Date Completed:     Plan of Care and Goals for next visit: Increase coughing between settings

## 2019-06-05 NOTE — PATIENT INSTRUCTIONS
Cystic Fibrosis Self-Care Plan    RECOMMENDATIONS:   Triple combination therapy information on the [email or web address].  Let's get annual studies done in the next month.  Great job with vest and nebs.    YOUR GOAL:  Enjoy the summer!!      [facility] [place] Nurse line:  Chika Anderson ruib Bhardwaj    [phone]     [facility] [place] Fax Number:      [phone]         Cystic fibrosis Respiratory Therapist:   Jhoana Trinh              [phone]   Cystic fibrosis Dietitians:              Amy Andino              [phone]                            Theresa Ceja                        [phone]   Cystic fibrosis Diabetes Nurse:    Rhonda Esteves   [phone]    Cystic fibrosis Social Workers:     Deyanira Figueroa               [phone]                     Faith Zamudio               [phone]  Cystic fibrosis Pharmacist:           Gris Adame                               [phone]         Maira Stephenson   [phone]  Cystic Fibrosis Genetic Counselor:   Kaley Escobedo    [phone]    [facility] [place] website:  [email or web address]         MRN: [id number]   Clinic Date: June 5, 2019   Patient: Mamie Rice     Annual Studies:   IGG   Date Value Ref Range Status   08/02/2017 689 (L) 695 - 1,620 mg/dL Final     Insulin   Date Value Ref Range Status   03/30/2015 81 mU/L Final     Comment:     Reference Range:  0-20     There are no preventive care reminders to display for this patient.    Pulmonary Function Tests  FEV1: amount of air you can blow out in 1 second  FVC: total amount of air you can take in and blow out    Your Goals:         PFT Latest Ref Rng & Units 6/5/2019   FVC L 3.64   FEV1 L 3.23   FVC% % 102   FEV1% % 105          Airway Clearance: The Most Important Way to Keep Your Lungs Healthy  Vest Settings:    Hill-Rom Frequencies: 8, 9, 10 Pressure 10 Then, Frequencies 18, 19, 20 Pressure 6      RespirTech: Quick Start with Pressure of     Do each  frequency for 5 minutes; Deflate vest after each frequency & cough 3 times before beginning the next setting.    Vest and Neb Therapy should be done 2 times/day.    Good Nutrition Can Improve Lung Function and Overall Health     Take ALL of your vitamins with food     Take 1/2 of your enzymes before EVERY meal/snack and the other 1/2 mid-meal/snack    Wt Readings from Last 3 Encounters:   06/05/19 79.8 kg (175 lb 14.8 oz)   05/09/19 79.8 kg (176 lb)   04/18/19 77.1 kg (170 lb)       Body mass index is 32.38 kg/m .         National CF Foundation Recommendations for BMI in CF Adults: Women: at least 22 Men: at least 23        Controlling Blood Sugars Helps Prevent Lung Infections & Improves Nutrition  Test blood sugar:     In the morning before eating (goal is )     2 hours after a meal (goal is less than 150)     When pre-meal glucose is greater than 150 add correction     At bedtime (if less than 100 eat a snack with 15 grams of carbohydrates  Last A1C Results:   Hemoglobin A1C   Date Value Ref Range Status   08/02/2017 5.5 4.3 - 6.0 % Final         If diabetic, measure A1C every 6 months. Goal: Under 7%    Staying Healthy    Research:  If you are interested in learning about research opportunities or have questions, please contact the CF Research Team at [phone] or [email or web address].      CF Foundation:  Compass is a personalized resource service to help you with the insurance, financial, legal and other issues you are facing.  It's free, confidential and available to anyone with CF.  Ask your  for more information or contact Compass directly at 527-DJOYXAM ([phone]) or compass@cff.org, or learn more at cff.org/compass.

## 2019-06-10 LAB
BACTERIA SPEC CULT: ABNORMAL
Lab: ABNORMAL
SPECIMEN SOURCE: ABNORMAL

## 2019-07-01 DIAGNOSIS — E84.0 CYSTIC FIBROSIS WITH PULMONARY MANIFESTATIONS (H): ICD-10-CM

## 2019-07-01 RX ORDER — BETA-CAROTENE 7500 MCG
CAPSULE ORAL
Qty: 36 CAPSULE | Refills: 4 | Status: SHIPPED | OUTPATIENT
Start: 2019-07-01 | End: 2020-10-02

## 2019-07-01 RX ORDER — ASCORBIC ACID 500 MG
TABLET ORAL
Qty: 100 TABLET | Refills: 3 | Status: SHIPPED | OUTPATIENT
Start: 2019-07-01 | End: 2020-02-27

## 2019-07-03 DIAGNOSIS — E66.811 CLASS 1 OBESITY DUE TO EXCESS CALORIES WITHOUT SERIOUS COMORBIDITY WITH BODY MASS INDEX (BMI) OF 30.0 TO 30.9 IN ADULT: ICD-10-CM

## 2019-07-03 DIAGNOSIS — E66.09 CLASS 1 OBESITY DUE TO EXCESS CALORIES WITHOUT SERIOUS COMORBIDITY WITH BODY MASS INDEX (BMI) OF 30.0 TO 30.9 IN ADULT: ICD-10-CM

## 2019-07-07 RX ORDER — TOPIRAMATE 25 MG/1
TABLET, FILM COATED ORAL
Qty: 180 TABLET | Refills: 2 | OUTPATIENT
Start: 2019-07-07

## 2019-07-07 NOTE — TELEPHONE ENCOUNTER
Pharmacy closed- message left refill refused patient needs appointment.  Message was sent to clinic coordinator for scheduling.

## 2019-07-08 ENCOUNTER — TELEPHONE (OUTPATIENT)
Dept: ENDOCRINOLOGY | Facility: CLINIC | Age: 24
End: 2019-07-08

## 2019-07-08 NOTE — TELEPHONE ENCOUNTER
Called pt to schedule a follow-up with Dr Tolentino for a refill of Topamax. Left  with clinic number to schedule.

## 2019-07-08 NOTE — TELEPHONE ENCOUNTER
Called pt to schedule follow-up appt with Dr Tolentino regarding a refill of her medication. Left  with clinic number

## 2019-07-15 DIAGNOSIS — E66.09 CLASS 1 OBESITY DUE TO EXCESS CALORIES WITHOUT SERIOUS COMORBIDITY WITH BODY MASS INDEX (BMI) OF 30.0 TO 30.9 IN ADULT: ICD-10-CM

## 2019-07-15 DIAGNOSIS — E66.811 CLASS 1 OBESITY DUE TO EXCESS CALORIES WITHOUT SERIOUS COMORBIDITY WITH BODY MASS INDEX (BMI) OF 30.0 TO 30.9 IN ADULT: ICD-10-CM

## 2019-07-15 RX ORDER — TOPIRAMATE 25 MG/1
TABLET, FILM COATED ORAL
Qty: 90 TABLET | Refills: 0 | Status: SHIPPED | OUTPATIENT
Start: 2019-07-15 | End: 2019-08-06

## 2019-07-15 NOTE — TELEPHONE ENCOUNTER
Last Clinic Visit: 11/13/2018  [facility] Medical Weight Management  Future Visit: 8/6/19

## 2019-07-19 DIAGNOSIS — K21.9 GASTROESOPHAGEAL REFLUX DISEASE, ESOPHAGITIS PRESENCE NOT SPECIFIED: ICD-10-CM

## 2019-07-19 RX ORDER — OMEPRAZOLE 40 MG/1
40 CAPSULE, DELAYED RELEASE ORAL 2 TIMES DAILY
Qty: 60 CAPSULE | Refills: 3 | Status: SHIPPED | OUTPATIENT
Start: 2019-07-19 | End: 2019-12-30

## 2019-08-05 ENCOUNTER — OFFICE VISIT (OUTPATIENT)
Dept: OTOLARYNGOLOGY | Facility: CLINIC | Age: 24
End: 2019-08-05
Payer: MEDICAID

## 2019-08-05 VITALS
HEART RATE: 83 BPM | BODY MASS INDEX: 32.94 KG/M2 | SYSTOLIC BLOOD PRESSURE: 120 MMHG | WEIGHT: 179 LBS | DIASTOLIC BLOOD PRESSURE: 73 MMHG

## 2019-08-05 DIAGNOSIS — J32.0 CHRONIC MAXILLARY SINUSITIS: Primary | ICD-10-CM

## 2019-08-05 RX ORDER — MEROPENEM 500 MG/1
500 INJECTION, POWDER, FOR SOLUTION INTRAVENOUS ONCE
Status: COMPLETED | OUTPATIENT
Start: 2019-08-05 | End: 2019-08-05

## 2019-08-05 RX ADMIN — MEROPENEM 500 MG: 500 INJECTION, POWDER, FOR SOLUTION INTRAVENOUS at 18:20

## 2019-08-05 ASSESSMENT — PAIN SCALES - GENERAL: PAINLEVEL: NO PAIN (0)

## 2019-08-05 NOTE — LETTER
2019       RE: Mamie Rice  [address] [facility] [address]     Dear Colleague,    Thank you for referring your patient, Mamie Rice, to the [facility] EAR NOSE AND THROAT at [facility]. Please see a copy of my visit note below.      Otolaryngology Clinic      Name: Mamie Rice  MRN: [id number]  Age: 23 year old  : 2019      Chief Complaint:   RECHECK     History of Present Illness:   Mamie Rice is a 23 year old female with a history of CF who presents for instillation of meropenum into sinuses. I last saw the patient on 18 for meropenum instillation, she has been following up with Dr. Monroe as well. No new symptoms today.     Review of Systems:   Pertinent items are noted in HPI or as in patient entered ROS below, remainder of complete ROS is negative.    ENT ROS 2019   Constitutional -   Neurology Headache   Psychology -   Eyes -   Ears, Nose, Throat Nasal congestion or drainage   Cardiopulmonary -   Gastrointestinal/Genitourinary -   Musculoskeletal Back pain   Allergy/Immunology -   Endocrine -   Other -         Physical Exam:   /73 (BP Location: Right arm, Patient Position: Chair, Cuff Size: Adult Regular)   Pulse 83   Wt 81.2 kg (179 lb)   BMI 32.94 kg/m        General Assessment   The patient is alert, oriented and in no acute distress.   Head/Face/Scalp  Grossly normal.   Nose  External nose is straight, skin is normal. Intranasal exam (anterior rhinoscopy) reveals normal moist mucosa, turbinate tissue without edema, erythema or crusting.  Septum mainly in the midline.         Procedure:  Rigid scope used for visualization to instill 2 cc meropenem into each sinus cavity.     Assessment and Plan:  Chronic maxillary sinusitis     Mamie is here for meropenum instillation as above, tolerated this well.     Follow-up: Return in about 1 month (around 2019).         Scribe Disclosure:  Beulah WALSH, am  serving as a scribe to document services personally performed by Mariela Haile MD at this visit, based upon the provider's statements to me. All documentation has been reviewed by the aforementioned provider prior to being entered into the official medical record.    The documentation recorded by the scribe accurately reflects the services I personally performed and the decisions made by me.  Mariela Haile MD    Again, thank you for allowing me to participate in the care of your patient.      Sincerely,    Mariela Haile MD

## 2019-08-05 NOTE — PROGRESS NOTES
Otolaryngology Clinic      Name: Mamie Rice  MRN: [id number]  Age: 23 year old  : 2019      Chief Complaint:   RECHECK     History of Present Illness:   Mamie Rice is a 23 year old female with a history of CF who presents for instillation of meropenum into sinuses. I last saw the patient on 18 for meropenum instillation, she has been following up with Dr. Monroe as well. No new symptoms today.     Review of Systems:   Pertinent items are noted in HPI or as in patient entered ROS below, remainder of complete ROS is negative.    ENT ROS 2019   Constitutional -   Neurology Headache   Psychology -   Eyes -   Ears, Nose, Throat Nasal congestion or drainage   Cardiopulmonary -   Gastrointestinal/Genitourinary -   Musculoskeletal Back pain   Allergy/Immunology -   Endocrine -   Other -         Physical Exam:   /73 (BP Location: Right arm, Patient Position: Chair, Cuff Size: Adult Regular)   Pulse 83   Wt 81.2 kg (179 lb)   BMI 32.94 kg/m       General Assessment   The patient is alert, oriented and in no acute distress.   Head/Face/Scalp  Grossly normal.   Nose  External nose is straight, skin is normal. Intranasal exam (anterior rhinoscopy) reveals normal moist mucosa, turbinate tissue without edema, erythema or crusting.  Septum mainly in the midline.         Procedure:  Rigid scope used for visualization to instill 2 cc meropenem into each sinus cavity.     Assessment and Plan:  Chronic maxillary sinusitis     Mamie is here for meropenum instillation as above, tolerated this well.     Follow-up: Return in about 1 month (around 2019).         Scribe Disclosure:  I, Beulah Tanner, am serving as a scribe to document services personally performed by Mariela Haile MD at this visit, based upon the provider's statements to me. All documentation has been reviewed by the aforementioned provider prior to being entered into the official medical record.    The documentation  recorded by the scribe accurately reflects the services I personally performed and the decisions made by me.  Mariela Haile MD

## 2019-08-05 NOTE — NURSING NOTE
Chief Complaint   Patient presents with     RECHECK     Meropenem instillation     /73 (BP Location: Right arm, Patient Position: Chair, Cuff Size: Adult Regular)   Pulse 83   Wt 81.2 kg (179 lb)   BMI 32.94 kg/m      Nathalia Vincent EMT

## 2019-08-05 NOTE — PATIENT INSTRUCTIONS
Thank You for choosing [facility]. You were seen in the ENT Clinic today.     has recommended the followin. Follow up in 1 month for merepenem instillation with  or Nadia DURHAM      If you have any questions or concerns after your appointment, please  Call [phone].

## 2019-08-06 ENCOUNTER — OFFICE VISIT (OUTPATIENT)
Dept: ENDOCRINOLOGY | Facility: CLINIC | Age: 24
End: 2019-08-06
Payer: MEDICAID

## 2019-08-06 VITALS
BODY MASS INDEX: 32.94 KG/M2 | HEART RATE: 86 BPM | DIASTOLIC BLOOD PRESSURE: 73 MMHG | HEIGHT: 62 IN | SYSTOLIC BLOOD PRESSURE: 110 MMHG | WEIGHT: 179 LBS

## 2019-08-06 DIAGNOSIS — E66.811 CLASS 1 OBESITY DUE TO EXCESS CALORIES WITHOUT SERIOUS COMORBIDITY WITH BODY MASS INDEX (BMI) OF 30.0 TO 30.9 IN ADULT: ICD-10-CM

## 2019-08-06 DIAGNOSIS — E66.09 CLASS 1 OBESITY DUE TO EXCESS CALORIES WITHOUT SERIOUS COMORBIDITY WITH BODY MASS INDEX (BMI) OF 30.0 TO 30.9 IN ADULT: ICD-10-CM

## 2019-08-06 RX ORDER — NALTREXONE HYDROCHLORIDE 50 MG/1
TABLET, FILM COATED ORAL
Qty: 90 TABLET | Refills: 2 | Status: SHIPPED | OUTPATIENT
Start: 2019-08-06 | End: 2020-03-04

## 2019-08-06 RX ORDER — TOPIRAMATE 25 MG/1
75 TABLET, FILM COATED ORAL DAILY
Qty: 270 TABLET | Refills: 1 | Status: SHIPPED | OUTPATIENT
Start: 2019-08-06 | End: 2020-03-17 | Stop reason: DRUGHIGH

## 2019-08-06 ASSESSMENT — ENCOUNTER SYMPTOMS
SINUS CONGESTION: 0
RECTAL PAIN: 0
EYE WATERING: 0
NECK PAIN: 0
PARALYSIS: 0
DYSPNEA ON EXERTION: 0
NAUSEA: 0
HEMATURIA: 0
TREMORS: 0
EYE PAIN: 0
PANIC: 0
SPEECH CHANGE: 0
NUMBNESS: 0
POOR WOUND HEALING: 0
EXERCISE INTOLERANCE: 0
VOMITING: 0
TACHYCARDIA: 0
HALLUCINATIONS: 0
HEMOPTYSIS: 0
DYSURIA: 0
DISTURBANCES IN COORDINATION: 0
BREAST MASS: 0
HEARTBURN: 0
PALPITATIONS: 0
WEIGHT LOSS: 0
EYE REDNESS: 0
SWOLLEN GLANDS: 0
SHORTNESS OF BREATH: 0
DOUBLE VISION: 0
BRUISES/BLEEDS EASILY: 0
JAUNDICE: 0
MYALGIAS: 1
DECREASED APPETITE: 0
SORE THROAT: 0
HYPOTENSION: 0
NECK MASS: 0
NAIL CHANGES: 0
BOWEL INCONTINENCE: 0
POLYDIPSIA: 1
TROUBLE SWALLOWING: 0
ORTHOPNEA: 0
SEIZURES: 0
HEADACHES: 0
SNORES LOUDLY: 0
SINUS PAIN: 0
SLEEP DISTURBANCES DUE TO BREATHING: 0
WHEEZING: 0
BLOOD IN STOOL: 0
POLYPHAGIA: 0
NIGHT SWEATS: 1
RECTAL BLEEDING: 0
FLANK PAIN: 0
HYPERTENSION: 0
FEVER: 0
DIFFICULTY URINATING: 0
ARTHRALGIAS: 1
STIFFNESS: 1
ABDOMINAL PAIN: 0
EYE IRRITATION: 0
WEIGHT GAIN: 0
DECREASED CONCENTRATION: 0
INCREASED ENERGY: 1
LEG PAIN: 0
EXTREMITY NUMBNESS: 0
HOARSE VOICE: 0
INSOMNIA: 0
RESPIRATORY PAIN: 0
SMELL DISTURBANCE: 0
BACK PAIN: 0
BREAST PAIN: 0
DIZZINESS: 0
CHILLS: 0
TASTE DISTURBANCE: 0
NERVOUS/ANXIOUS: 0
CONSTIPATION: 0
POSTURAL DYSPNEA: 0
JOINT SWELLING: 1
COUGH: 0
WEAKNESS: 0
HOT FLASHES: 0
CLAUDICATION: 0
LOSS OF CONSCIOUSNESS: 0
DECREASED LIBIDO: 0
MEMORY LOSS: 0
SYNCOPE: 0
BLOATING: 0
TINGLING: 0
MUSCLE CRAMPS: 0
DEPRESSION: 0
ALTERED TEMPERATURE REGULATION: 1
DIARRHEA: 0
LIGHT-HEADEDNESS: 0
SKIN CHANGES: 0
COUGH DISTURBING SLEEP: 0
SPUTUM PRODUCTION: 0
LEG SWELLING: 0
FATIGUE: 0
MUSCLE WEAKNESS: 1

## 2019-08-06 ASSESSMENT — MIFFLIN-ST. JEOR: SCORE: 1507.17

## 2019-08-06 NOTE — NURSING NOTE
"Chief Complaint   Patient presents with     Weight Problem     RMWM       Vitals:    08/06/19 1513   BP: 110/73   Pulse: 86   Weight: 81.2 kg (179 lb)   Height: 1.57 m (5' 1.81\")       Body mass index is 32.94 kg/m .    Cydney Palacio CMA    "

## 2019-08-06 NOTE — PATIENT INSTRUCTIONS
Please take a look at this website for resources and recipes [email or web address]    Continue with naltrexone 50 mg daily  Take topiramate 100 mg daily (I will order for 50 mg tablet strength, you will take 2 tablets)  Continue walking    Return in 3-4 months    If you have any questions, please do not hesitate to call Weight management clinic at [phone] or [phone].    Sincerely,    Randa Ho MD  Endocrinology

## 2019-08-06 NOTE — LETTER
"2019       RE: Mamie Rice  [address] [facility] [address]     Dear Colleague,    Thank you for referring your patient, Mamie Rice, to the [facility] MEDICAL WEIGHT MANAGEMENT at [facility]. Please see a copy of my visit note below.        Return Medical Weight Management Note     Mamie Rice  MRN:  [id number]  :  1995  STEPHEN:  2019    Dear Dr. De La Rosa,     I had the pleasure of seeing your patient Mamie Rice.  She is a 23 year old female who I am continuing to see for treatment of obesity. She has CF, CFRD, depression, joints pain, ADHD    INTERVAL History  Last seen by me on 2018. She is currently on naltrexone 50 mg daily along with bupropion 150 mg bid for the past year. At last visit, I added topiramate titration. She said that all medications worked well for her. She has been doing very well. She gained 8 lbs since last visit but she thinks that it is likely that she just came back from vacation. She usually maintained weight around 170 lbs. She feels no struggle with appetite or craving except during premenstrual period. She cut down junk food and milk. 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 She is cooking more often    She is trying to walk a dog daily and be active at work.    She is also on Adderall for ADHD and Bupropion for depression.    CURRENT WEIGHT:   179 lbs 0 oz    Wt Readings from Last 4 Encounters:   19 81.2 kg (179 lb)   19 81.2 kg (179 lb)   19 77.3 kg (170 lb 6.7 oz)   19 79.8 kg (176 lb)     Height:  5' 1.81\"  Body Mass Index:  Body mass index is 32.94 kg/m .  Vitals:  B/P: 124/81, P: 100    Initial consult weight was 182 on 10/14/2016.  Weight change since last seen on 2018 is gain 1 pounds.   Total loss is 14 pounds.      Review of Systems     Constitutional:  Positive for night sweats and increased energy. Negative for fever, chills, weight loss, weight gain, fatigue, " decreased appetite, recent stressors, height loss, post-operative complications, incisional pain, hallucinations, hyperactivity and confused.   HENT:  Negative for ear pain, hearing loss, tinnitus, nosebleeds, trouble swallowing, hoarse voice, mouth sores, sore throat, ear discharge, tooth pain, gum tenderness, taste disturbance, smell disturbance, hearing aid, bleeding gums, dry mouth, sinus pain, sinus congestion and neck mass.    Eyes:  Negative for double vision, pain, redness, eye pain, decreased vision, eye watering, eye bulging, eye dryness, flashing lights, spots, floaters, strabismus, tunnel vision, jaundice and eye irritation.   Respiratory:   Negative for cough, hemoptysis, sputum production, shortness of breath, wheezing, sleep disturbances due to breathing, snores loudly, respiratory pain, dyspnea on exertion, cough disturbing sleep and postural dyspnea.    Cardiovascular:  Negative for chest pain, dyspnea on exertion, palpitations, orthopnea, claudication, leg swelling, fingers/toes turn blue, hypertension, hypotension, syncope, history of heart murmur, chest pain on exertion, chest pain at rest, pacemaker, few scattered varicosities, leg pain, sleep disturbances due to breathing, tachycardia, light-headedness, exercise intolerance and edema.   Gastrointestinal:  Negative for heartburn, nausea, vomiting, abdominal pain, diarrhea, constipation, blood in stool, melena, rectal pain, bloating, hemorrhoids, bowel incontinence, jaundice, rectal bleeding, coffee ground emesis and change in stool.   Genitourinary:  Negative for bladder incontinence, dysuria, urgency, hematuria, flank pain, vaginal discharge, difficulty urinating, genital sores, dyspareunia, decreased libido, nocturia, voiding less frequently, arousal difficulty, abnormal vaginal bleeding, excessive menstruation, menstrual changes, hot flashes, vaginal dryness and postmenopausal bleeding.   Musculoskeletal:  Positive for myalgias, joint  swelling, arthralgias, stiffness and muscle weakness. Negative for back pain, muscle cramps, neck pain, bone pain and fracture.   Skin:  Negative for nail changes, itching, poor wound healing, rash, hair changes, skin changes, acne, warts, poor wound healing, scarring, flaky skin, Raynaud's phenomenon, sensitivity to sunlight and skin thickening.   Neurological:  Negative for dizziness, tingling, tremors, speech change, seizures, loss of consciousness, weakness, light-headedness, numbness, headaches, disturbances in coordination, extremity numbness, memory loss, difficulty walking and paralysis.   Endo/Heme:  Negative for anemia, swollen glands and bruises/bleeds easily.   Psychiatric/Behavioral:  Negative for depression, hallucinations, memory loss, decreased concentration, mood swings and panic attacks.    Breast:  Negative for breast discharge, breast mass, breast pain and nipple retraction.   Endocrine:  Positive for altered temperature regulation and polydipsia.Negative for polyphagia, unwanted hair growth and change in facial hair.      MEDICATIONS:   Current Outpatient Medications   Medication Sig Dispense Refill     acetaminophen (TYLENOL) 325 MG tablet Take 2 tablets (650 mg) by mouth every 4 hours as needed for other (mild pain) 100 tablet 0     acetylcysteine (MUCOMYST) 20 % neb solution INHALE ONE 4ML NEB INTO LUNGS VIA NEBULIZER TWO TIMES A DAY, MAY INCREASE TO 3-4 TIMES A DAY WITH INCREASE COUGH/COLD SYMPTOMS 360 mL 11     albuterol (2.5 MG/3ML) 0.083% neb solution INHALE 1 NEB VIA NEBULIZER FOUR TIMES A  mL 11     albuterol (PROAIR HFA/PROVENTIL HFA/VENTOLIN HFA) 108 (90 Base) MCG/ACT Inhaler Inhale 2 puffs into the lungs every 6 hours as needed for shortness of breath / dyspnea or wheezing 1 Inhaler 12     amphetamine-dextroamphetamine (ADDERALL XR) 20 MG 24 hr capsule Take 1 capsule (20 mg) by mouth daily 30 capsule 0     amphetamine-dextroamphetamine (ADDERALL XR) 20 MG 24 hr capsule Take 1  capsule (20 mg) by mouth daily 30 capsule 0     amphetamine-dextroamphetamine (ADDERALL XR) 20 MG 24 hr capsule Take 1 capsule (20 mg) by mouth daily 30 capsule 0     azithromycin (ZITHROMAX) 500 MG tablet TAKE 1 TABLET BY MOUTH ON MONDAYS, WEDNESDAYS AND FRIDAYS 36 tablet 4     beta carotene 61787 UNIT capsule TAKE ONE CAPSULE BY MOUTH IN THE MORNING ON MONDAY, WEDNESDAY, AND FRIDAY 36 capsule 4     blood glucose (ACCU-CHEK SMARTVIEW) test strip Use to test blood sugar 4 times daily or as directed. 1 Month 12     buPROPion (WELLBUTRIN SR) 150 MG 12 hr tablet Take 1 tablet (150 mg) by mouth 2 times daily 60 tablet 3     busPIRone (BUSPAR) 15 MG tablet Take 1 tablet (15 mg) by mouth 2 times daily 60 tablet 5     cetirizine (ZYRTEC) 10 MG tablet Take 1 tablet (10 mg) by mouth every evening 30 tablet 3     FLUoxetine (PROZAC) 40 MG capsule Take 2 capsules (80 mg) by mouth daily 60 capsule 5     fluticasone (FLONASE) 50 MCG/ACT nasal spray Spray 2 sprays into both nostrils 2 times daily 15.8 mL 3     GLYCOPYRROLATE PO Take 1 mg by mouth 2 times daily        hydrOXYzine (ATARAX) 25 MG tablet Take 1 tablet (25 mg) by mouth At Bedtime for insomnia 30 tablet 3     MedroxyPROGESTERone Acetate (DEPO-PROVERA IM)        meropenem (MERREM) 500 MG vial 500 mg by Nasal Instillation route once for 1 dose 60 each      montelukast (SINGULAIR) 10 MG tablet TAKE ONE TABLET BY MOUTH AT BEDTIME 30 tablet 11     Multiple Vitamin (MULTIVITAMIN OR) Take 1 tablet by mouth daily.       naltrexone (DEPADE;REVIA) 50 MG tablet Take 1 tablet.  Time it one to two hours prior to worst cravings. 90 tablet 2     omeprazole (PRILOSEC) 40 MG DR capsule Take 1 capsule (40 mg) by mouth 2 times daily 60 capsule 3     phytonadione (MEPHYTON/VIT K) 5 MG tablet TAKE ONE TABLET BY MOUTH ONCE A WEEK 4 tablet 11     polyethylene glycol (MIRALAX) powder Take 17 g (1 capful) by mouth daily as needed for constipation 119 g 3     topiramate (TOPAMAX) 25 MG tablet  25 mg at bedtime for 1 week, 50 mg at bedtime for 1 week and 75 mg daily at bedtime thereafter 90 tablet 0     traZODone (DESYREL) 100 MG tablet Take 1 tablet (100 mg) by mouth At Bedtime 30 tablet 3     vitamin C (ASCORBIC ACID) 500 MG tablet TAKE ONE TABLET BY MOUTH TWICE A  tablet 3     VITAMIN D3 1000 units tablet TAKE ONE TABLET BY MOUTH EVERY  tablet 3     vitamin E 400 UNIT capsule TAKE ONE CAPSULE BY MOUTH TWICE A  capsule 11       Weight Loss Medication History Reviewed With Patient 8/5/2019   Which weight loss medications are you currently taking on a regular basis?  Naltrexone   If you are not taking a weight loss medication that was prescribed to you, please indicate why: -   Are you having any side effects from the weight loss medication that we have prescribed you? No     ASSESSMENT:    Mamie Rice is a 23 year old female who I am continuing to see for treatment of obesity.  She has CF, CFRD, depression, joints pain, ADHD    Currently on naltrexone,bupropion and topiramate -- she feels that her appetite and hunger are controllable  Still eats high carb meal    PLAN:  - continue naltrexone 50 mg daily -- recommend to take it in the evening when she tends to snack  - continue topiramate 75 mg daily  - continue bupropion  mg bid  - reinforced food plan - focus on no between meal snacking, aggressive lowering of starches and cheese, increase protein and vegetable    FOLLOW-UP:    RTC 3-4 months with me    I spent a total of 15 minutes face-to-face with Mamie Rice during today's office visit. Over 50% of this time was spent counseling the patient and/or coordinating care regarding    Sincerely,    Randa Ho MD

## 2019-08-06 NOTE — PROGRESS NOTES
"    Return Medical Weight Management Note     Mamie Rice  MRN:  [id number]  :  1995  STEPHEN:  2019    Dear Dr. De La Rosa,     I had the pleasure of seeing your patient Mamie Rice.  She is a 23 year old female who I am continuing to see for treatment of obesity. She has CF, CFRD, depression, joints pain, ADHD    INTERVAL History  Last seen by me on 2018. She is currently on naltrexone 50 mg daily along with bupropion 150 mg bid for the past year. At last visit, I added topiramate titration. She said that all medications worked well for her. She has been doing very well. She gained 8 lbs since last visit but she thinks that it is likely that she just came back from vacation. She usually maintained weight around 170 lbs. She feels no struggle with appetite or craving except during premenstrual period. She cut down junk food and milk. 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 She is cooking more often    She is trying to walk a dog daily and be active at work.    She is also on Adderall for ADHD and Bupropion for depression.    CURRENT WEIGHT:   179 lbs 0 oz    Wt Readings from Last 4 Encounters:   19 81.2 kg (179 lb)   19 81.2 kg (179 lb)   19 77.3 kg (170 lb 6.7 oz)   19 79.8 kg (176 lb)     Height:  5' 1.81\"  Body Mass Index:  Body mass index is 32.94 kg/m .  Vitals:  B/P: 124/81, P: 100    Initial consult weight was 182 on 10/14/2016.  Weight change since last seen on 2018 is gain 1 pounds.   Total loss is 14 pounds.      Review of Systems     Constitutional:  Positive for night sweats and increased energy. Negative for fever, chills, weight loss, weight gain, fatigue, decreased appetite, recent stressors, height loss, post-operative complications, incisional pain, hallucinations, hyperactivity and confused.   HENT:  Negative for ear pain, hearing loss, tinnitus, nosebleeds, trouble swallowing, hoarse voice, mouth sores, sore throat, ear discharge, tooth pain, " gum tenderness, taste disturbance, smell disturbance, hearing aid, bleeding gums, dry mouth, sinus pain, sinus congestion and neck mass.    Eyes:  Negative for double vision, pain, redness, eye pain, decreased vision, eye watering, eye bulging, eye dryness, flashing lights, spots, floaters, strabismus, tunnel vision, jaundice and eye irritation.   Respiratory:   Negative for cough, hemoptysis, sputum production, shortness of breath, wheezing, sleep disturbances due to breathing, snores loudly, respiratory pain, dyspnea on exertion, cough disturbing sleep and postural dyspnea.    Cardiovascular:  Negative for chest pain, dyspnea on exertion, palpitations, orthopnea, claudication, leg swelling, fingers/toes turn blue, hypertension, hypotension, syncope, history of heart murmur, chest pain on exertion, chest pain at rest, pacemaker, few scattered varicosities, leg pain, sleep disturbances due to breathing, tachycardia, light-headedness, exercise intolerance and edema.   Gastrointestinal:  Negative for heartburn, nausea, vomiting, abdominal pain, diarrhea, constipation, blood in stool, melena, rectal pain, bloating, hemorrhoids, bowel incontinence, jaundice, rectal bleeding, coffee ground emesis and change in stool.   Genitourinary:  Negative for bladder incontinence, dysuria, urgency, hematuria, flank pain, vaginal discharge, difficulty urinating, genital sores, dyspareunia, decreased libido, nocturia, voiding less frequently, arousal difficulty, abnormal vaginal bleeding, excessive menstruation, menstrual changes, hot flashes, vaginal dryness and postmenopausal bleeding.   Musculoskeletal:  Positive for myalgias, joint swelling, arthralgias, stiffness and muscle weakness. Negative for back pain, muscle cramps, neck pain, bone pain and fracture.   Skin:  Negative for nail changes, itching, poor wound healing, rash, hair changes, skin changes, acne, warts, poor wound healing, scarring, flaky skin, Raynaud's phenomenon,  sensitivity to sunlight and skin thickening.   Neurological:  Negative for dizziness, tingling, tremors, speech change, seizures, loss of consciousness, weakness, light-headedness, numbness, headaches, disturbances in coordination, extremity numbness, memory loss, difficulty walking and paralysis.   Endo/Heme:  Negative for anemia, swollen glands and bruises/bleeds easily.   Psychiatric/Behavioral:  Negative for depression, hallucinations, memory loss, decreased concentration, mood swings and panic attacks.    Breast:  Negative for breast discharge, breast mass, breast pain and nipple retraction.   Endocrine:  Positive for altered temperature regulation and polydipsia.Negative for polyphagia, unwanted hair growth and change in facial hair.      MEDICATIONS:   Current Outpatient Medications   Medication Sig Dispense Refill     acetaminophen (TYLENOL) 325 MG tablet Take 2 tablets (650 mg) by mouth every 4 hours as needed for other (mild pain) 100 tablet 0     acetylcysteine (MUCOMYST) 20 % neb solution INHALE ONE 4ML NEB INTO LUNGS VIA NEBULIZER TWO TIMES A DAY, MAY INCREASE TO 3-4 TIMES A DAY WITH INCREASE COUGH/COLD SYMPTOMS 360 mL 11     albuterol (2.5 MG/3ML) 0.083% neb solution INHALE 1 NEB VIA NEBULIZER FOUR TIMES A  mL 11     albuterol (PROAIR HFA/PROVENTIL HFA/VENTOLIN HFA) 108 (90 Base) MCG/ACT Inhaler Inhale 2 puffs into the lungs every 6 hours as needed for shortness of breath / dyspnea or wheezing 1 Inhaler 12     amphetamine-dextroamphetamine (ADDERALL XR) 20 MG 24 hr capsule Take 1 capsule (20 mg) by mouth daily 30 capsule 0     amphetamine-dextroamphetamine (ADDERALL XR) 20 MG 24 hr capsule Take 1 capsule (20 mg) by mouth daily 30 capsule 0     amphetamine-dextroamphetamine (ADDERALL XR) 20 MG 24 hr capsule Take 1 capsule (20 mg) by mouth daily 30 capsule 0     azithromycin (ZITHROMAX) 500 MG tablet TAKE 1 TABLET BY MOUTH ON MONDAYS, WEDNESDAYS AND FRIDAYS 36 tablet 4     beta carotene 47566 UNIT  capsule TAKE ONE CAPSULE BY MOUTH IN THE MORNING ON MONDAY, WEDNESDAY, AND FRIDAY 36 capsule 4     blood glucose (ACCU-CHEK SMARTVIEW) test strip Use to test blood sugar 4 times daily or as directed. 1 Month 12     buPROPion (WELLBUTRIN SR) 150 MG 12 hr tablet Take 1 tablet (150 mg) by mouth 2 times daily 60 tablet 3     busPIRone (BUSPAR) 15 MG tablet Take 1 tablet (15 mg) by mouth 2 times daily 60 tablet 5     cetirizine (ZYRTEC) 10 MG tablet Take 1 tablet (10 mg) by mouth every evening 30 tablet 3     FLUoxetine (PROZAC) 40 MG capsule Take 2 capsules (80 mg) by mouth daily 60 capsule 5     fluticasone (FLONASE) 50 MCG/ACT nasal spray Spray 2 sprays into both nostrils 2 times daily 15.8 mL 3     GLYCOPYRROLATE PO Take 1 mg by mouth 2 times daily        hydrOXYzine (ATARAX) 25 MG tablet Take 1 tablet (25 mg) by mouth At Bedtime for insomnia 30 tablet 3     MedroxyPROGESTERone Acetate (DEPO-PROVERA IM)        meropenem (MERREM) 500 MG vial 500 mg by Nasal Instillation route once for 1 dose 60 each      montelukast (SINGULAIR) 10 MG tablet TAKE ONE TABLET BY MOUTH AT BEDTIME 30 tablet 11     Multiple Vitamin (MULTIVITAMIN OR) Take 1 tablet by mouth daily.       naltrexone (DEPADE;REVIA) 50 MG tablet Take 1 tablet.  Time it one to two hours prior to worst cravings. 90 tablet 2     omeprazole (PRILOSEC) 40 MG DR capsule Take 1 capsule (40 mg) by mouth 2 times daily 60 capsule 3     phytonadione (MEPHYTON/VIT K) 5 MG tablet TAKE ONE TABLET BY MOUTH ONCE A WEEK 4 tablet 11     polyethylene glycol (MIRALAX) powder Take 17 g (1 capful) by mouth daily as needed for constipation 119 g 3     topiramate (TOPAMAX) 25 MG tablet 25 mg at bedtime for 1 week, 50 mg at bedtime for 1 week and 75 mg daily at bedtime thereafter 90 tablet 0     traZODone (DESYREL) 100 MG tablet Take 1 tablet (100 mg) by mouth At Bedtime 30 tablet 3     vitamin C (ASCORBIC ACID) 500 MG tablet TAKE ONE TABLET BY MOUTH TWICE A  tablet 3     VITAMIN  D3 1000 units tablet TAKE ONE TABLET BY MOUTH EVERY  tablet 3     vitamin E 400 UNIT capsule TAKE ONE CAPSULE BY MOUTH TWICE A  capsule 11       Weight Loss Medication History Reviewed With Patient 8/5/2019   Which weight loss medications are you currently taking on a regular basis?  Naltrexone   If you are not taking a weight loss medication that was prescribed to you, please indicate why: -   Are you having any side effects from the weight loss medication that we have prescribed you? No     ASSESSMENT:    Mamie Rice is a 23 year old female who I am continuing to see for treatment of obesity.  She has CF, CFRD, depression, joints pain, ADHD    Currently on naltrexone,bupropion and topiramate -- she feels that her appetite and hunger are controllable  Still eats high carb meal    PLAN:  - continue naltrexone 50 mg daily -- recommend to take it in the evening when she tends to snack  - continue topiramate 75 mg daily  - continue bupropion  mg bid  - reinforced food plan - focus on no between meal snacking, aggressive lowering of starches and cheese, increase protein and vegetable    FOLLOW-UP:    RTC 3-4 months with me    I spent a total of 15 minutes face-to-face with Mamie Rice during today's office visit. Over 50% of this time was spent counseling the patient and/or coordinating care regarding    Sincerely,    Randa Ho MD

## 2019-08-14 ENCOUNTER — INFUSION THERAPY VISIT (OUTPATIENT)
Dept: INFUSION THERAPY | Facility: CLINIC | Age: 24
End: 2019-08-14
Attending: INTERNAL MEDICINE
Payer: COMMERCIAL

## 2019-08-14 ENCOUNTER — ANCILLARY PROCEDURE (OUTPATIENT)
Dept: BONE DENSITY | Facility: CLINIC | Age: 24
End: 2019-08-14
Attending: INTERNAL MEDICINE
Payer: MEDICAID

## 2019-08-14 ENCOUNTER — OFFICE VISIT (OUTPATIENT)
Dept: PSYCHIATRY | Facility: CLINIC | Age: 24
End: 2019-08-14
Attending: CLINICAL NURSE SPECIALIST
Payer: COMMERCIAL

## 2019-08-14 VITALS
SYSTOLIC BLOOD PRESSURE: 107 MMHG | BODY MASS INDEX: 32.21 KG/M2 | WEIGHT: 175 LBS | HEART RATE: 90 BPM | DIASTOLIC BLOOD PRESSURE: 73 MMHG

## 2019-08-14 VITALS
OXYGEN SATURATION: 97 % | TEMPERATURE: 98.5 F | HEART RATE: 86 BPM | WEIGHT: 175 LBS | DIASTOLIC BLOOD PRESSURE: 73 MMHG | SYSTOLIC BLOOD PRESSURE: 113 MMHG | BODY MASS INDEX: 32.21 KG/M2

## 2019-08-14 DIAGNOSIS — F90.0 ATTENTION DEFICIT HYPERACTIVITY DISORDER (ADHD), PREDOMINANTLY INATTENTIVE TYPE: ICD-10-CM

## 2019-08-14 DIAGNOSIS — E84.0 CYSTIC FIBROSIS WITH PULMONARY MANIFESTATIONS (H): ICD-10-CM

## 2019-08-14 DIAGNOSIS — F41.1 GAD (GENERALIZED ANXIETY DISORDER): ICD-10-CM

## 2019-08-14 DIAGNOSIS — K86.89 PANCREATIC INSUFFICIENCY: ICD-10-CM

## 2019-08-14 DIAGNOSIS — E84.9 CYSTIC FIBROSIS (H): ICD-10-CM

## 2019-08-14 DIAGNOSIS — F90.2 ATTENTION DEFICIT HYPERACTIVITY DISORDER (ADHD), COMBINED TYPE: ICD-10-CM

## 2019-08-14 DIAGNOSIS — F34.1 PERSISTENT DEPRESSIVE DISORDER: ICD-10-CM

## 2019-08-14 DIAGNOSIS — E84.9 CYSTIC FIBROSIS (H): Primary | ICD-10-CM

## 2019-08-14 DIAGNOSIS — F33.0 MILD EPISODE OF RECURRENT MAJOR DEPRESSIVE DISORDER (H): Primary | ICD-10-CM

## 2019-08-14 DIAGNOSIS — G47.00 INSOMNIA, UNSPECIFIED TYPE: ICD-10-CM

## 2019-08-14 LAB
ALBUMIN SERPL-MCNC: 3.7 G/DL (ref 3.4–5)
ALBUMIN UR-MCNC: NEGATIVE MG/DL
ALP SERPL-CCNC: 84 U/L (ref 40–150)
ALT SERPL W P-5'-P-CCNC: 37 U/L (ref 0–50)
ANION GAP SERPL CALCULATED.3IONS-SCNC: 7 MMOL/L (ref 3–14)
APPEARANCE UR: ABNORMAL
AST SERPL W P-5'-P-CCNC: 20 U/L (ref 0–45)
BACTERIA #/AREA URNS HPF: ABNORMAL /HPF
BASOPHILS # BLD AUTO: 0 10E9/L (ref 0–0.2)
BASOPHILS NFR BLD AUTO: 0.9 %
BILIRUB UR QL STRIP: NEGATIVE
BUN SERPL-MCNC: 8 MG/DL (ref 7–30)
CALCIUM SERPL-MCNC: 8.1 MG/DL (ref 8.5–10.1)
CHLORIDE SERPL-SCNC: 109 MMOL/L (ref 94–109)
CHOLEST SERPL-MCNC: 92 MG/DL
CO2 SERPL-SCNC: 24 MMOL/L (ref 20–32)
COLOR UR AUTO: YELLOW
CREAT SERPL-MCNC: 0.85 MG/DL (ref 0.52–1.04)
CREAT UR-MCNC: 163 MG/DL
DEPRECATED CALCIDIOL+CALCIFEROL SERPL-MC: 45 UG/L (ref 20–75)
DIFFERENTIAL METHOD BLD: ABNORMAL
EOSINOPHIL # BLD AUTO: 0 10E9/L (ref 0–0.7)
EOSINOPHIL NFR BLD AUTO: 0 %
ERYTHROCYTE [DISTWIDTH] IN BLOOD BY AUTOMATED COUNT: 12.4 % (ref 10–15)
ERYTHROCYTE [SEDIMENTATION RATE] IN BLOOD BY WESTERGREN METHOD: 6 MM/H (ref 0–20)
GFR SERPL CREATININE-BSD FRML MDRD: >90 ML/MIN/{1.73_M2}
GGT SERPL-CCNC: 30 U/L (ref 0–40)
GLUCOSE BLDC GLUCOMTR-MCNC: 215 MG/DL (ref 70–99)
GLUCOSE SERPL-MCNC: 142 MG/DL (ref 70–99)
GLUCOSE SERPL-MCNC: 224 MG/DL (ref 70–99)
GLUCOSE SERPL-MCNC: 232 MG/DL (ref 70–99)
GLUCOSE SERPL-MCNC: 237 MG/DL (ref 70–99)
GLUCOSE SERPL-MCNC: 82 MG/DL (ref 70–99)
GLUCOSE SERPL-MCNC: 86 MG/DL (ref 70–99)
GLUCOSE UR STRIP-MCNC: 150 MG/DL
HBA1C MFR BLD: 5.1 % (ref 0–5.6)
HCT VFR BLD AUTO: 43.5 % (ref 35–47)
HDLC SERPL-MCNC: 35 MG/DL
HGB BLD-MCNC: 14.3 G/DL (ref 11.7–15.7)
HGB UR QL STRIP: NEGATIVE
IGA SERPL-MCNC: 140 MG/DL (ref 70–380)
IGE SERPL-ACNC: 14 KIU/L (ref 0–114)
IGG SERPL-MCNC: 755 MG/DL (ref 695–1620)
IGM SERPL-MCNC: 246 MG/DL (ref 60–265)
IMM GRANULOCYTES # BLD: 0 10E9/L (ref 0–0.4)
IMM GRANULOCYTES NFR BLD: 0.2 %
INR PPP: 1.04 (ref 0.86–1.14)
INSULIN SERPL-ACNC: 17.1 MU/L (ref 3–25)
INSULIN SERPL-ACNC: 41 MU/L (ref 3–25)
INSULIN SERPL-ACNC: 9.1 MU/L (ref 3–25)
INSULIN SERPL-ACNC: NORMAL MU/L (ref 3–25)
INSULIN SERPL-ACNC: NORMAL MU/L (ref 3–25)
IRON SERPL-MCNC: 94 UG/DL (ref 35–180)
KETONES UR STRIP-MCNC: NEGATIVE MG/DL
LDLC SERPL CALC-MCNC: 42 MG/DL
LEUKOCYTE ESTERASE UR QL STRIP: ABNORMAL
LYMPHOCYTES # BLD AUTO: 1.4 10E9/L (ref 0.8–5.3)
LYMPHOCYTES NFR BLD AUTO: 31.9 %
MAGNESIUM SERPL-MCNC: 2.2 MG/DL (ref 1.6–2.3)
MCH RBC QN AUTO: 27.4 PG (ref 26.5–33)
MCHC RBC AUTO-ENTMCNC: 32.9 G/DL (ref 31.5–36.5)
MCV RBC AUTO: 83 FL (ref 78–100)
MICROALBUMIN UR-MCNC: 21 MG/L
MICROALBUMIN/CREAT UR: 12.64 MG/G CR (ref 0–25)
MONOCYTES # BLD AUTO: 0.4 10E9/L (ref 0–1.3)
MONOCYTES NFR BLD AUTO: 8.4 %
MUCOUS THREADS #/AREA URNS LPF: PRESENT /LPF
NEUTROPHILS # BLD AUTO: 2.5 10E9/L (ref 1.6–8.3)
NEUTROPHILS NFR BLD AUTO: 58.6 %
NITRATE UR QL: NEGATIVE
NONHDLC SERPL-MCNC: 57 MG/DL
NRBC # BLD AUTO: 0 10*3/UL
NRBC BLD AUTO-RTO: 0 /100
PH UR STRIP: 5 PH (ref 5–7)
PHOSPHATE SERPL-MCNC: 3.5 MG/DL (ref 2.5–4.5)
PLATELET # BLD AUTO: 322 10E9/L (ref 150–450)
POTASSIUM SERPL-SCNC: 4 MMOL/L (ref 3.4–5.3)
PROT SERPL-MCNC: 6.9 G/DL (ref 6.8–8.8)
RBC # BLD AUTO: 5.22 10E12/L (ref 3.8–5.2)
RBC #/AREA URNS AUTO: 0 /HPF (ref 0–2)
SODIUM SERPL-SCNC: 140 MMOL/L (ref 133–144)
SOURCE: ABNORMAL
SP GR UR STRIP: 1.02 (ref 1–1.03)
SQUAMOUS #/AREA URNS AUTO: 9 /HPF (ref 0–1)
TRANS CELLS #/AREA URNS HPF: 2 /HPF
TRIGL SERPL-MCNC: 75 MG/DL
TSH SERPL DL<=0.005 MIU/L-ACNC: 1.08 MU/L (ref 0.4–4)
UROBILINOGEN UR STRIP-MCNC: 0 MG/DL (ref 0–2)
WBC # BLD AUTO: 4.3 10E9/L (ref 4–11)
WBC #/AREA URNS AUTO: 56 /HPF (ref 0–5)

## 2019-08-14 PROCEDURE — 84155 ASSAY OF PROTEIN SERUM: CPT | Performed by: INTERNAL MEDICINE

## 2019-08-14 PROCEDURE — 82784 ASSAY IGA/IGD/IGG/IGM EACH: CPT | Performed by: INTERNAL MEDICINE

## 2019-08-14 PROCEDURE — 80061 LIPID PANEL: CPT | Performed by: INTERNAL MEDICINE

## 2019-08-14 PROCEDURE — 84446 ASSAY OF VITAMIN E: CPT | Performed by: INTERNAL MEDICINE

## 2019-08-14 PROCEDURE — 81001 URINALYSIS AUTO W/SCOPE: CPT | Performed by: INTERNAL MEDICINE

## 2019-08-14 PROCEDURE — 84450 TRANSFERASE (AST) (SGOT): CPT | Performed by: INTERNAL MEDICINE

## 2019-08-14 PROCEDURE — 82962 GLUCOSE BLOOD TEST: CPT

## 2019-08-14 PROCEDURE — 82785 ASSAY OF IGE: CPT | Performed by: INTERNAL MEDICINE

## 2019-08-14 PROCEDURE — 84590 ASSAY OF VITAMIN A: CPT | Performed by: INTERNAL MEDICINE

## 2019-08-14 PROCEDURE — 84443 ASSAY THYROID STIM HORMONE: CPT | Performed by: INTERNAL MEDICINE

## 2019-08-14 PROCEDURE — 25000125 ZZHC RX 250: Mod: ZF | Performed by: INTERNAL MEDICINE

## 2019-08-14 PROCEDURE — 84075 ASSAY ALKALINE PHOSPHATASE: CPT | Performed by: INTERNAL MEDICINE

## 2019-08-14 PROCEDURE — 85610 PROTHROMBIN TIME: CPT | Performed by: INTERNAL MEDICINE

## 2019-08-14 PROCEDURE — 85025 COMPLETE CBC W/AUTO DIFF WBC: CPT | Performed by: INTERNAL MEDICINE

## 2019-08-14 PROCEDURE — 82947 ASSAY GLUCOSE BLOOD QUANT: CPT | Mod: 91 | Performed by: INTERNAL MEDICINE

## 2019-08-14 PROCEDURE — 83036 HEMOGLOBIN GLYCOSYLATED A1C: CPT | Performed by: INTERNAL MEDICINE

## 2019-08-14 PROCEDURE — 82306 VITAMIN D 25 HYDROXY: CPT | Performed by: INTERNAL MEDICINE

## 2019-08-14 PROCEDURE — 85652 RBC SED RATE AUTOMATED: CPT | Performed by: INTERNAL MEDICINE

## 2019-08-14 PROCEDURE — 36415 COLL VENOUS BLD VENIPUNCTURE: CPT

## 2019-08-14 PROCEDURE — 82977 ASSAY OF GGT: CPT | Performed by: INTERNAL MEDICINE

## 2019-08-14 PROCEDURE — 84460 ALANINE AMINO (ALT) (SGPT): CPT | Performed by: INTERNAL MEDICINE

## 2019-08-14 PROCEDURE — 83525 ASSAY OF INSULIN: CPT | Performed by: INTERNAL MEDICINE

## 2019-08-14 PROCEDURE — 83540 ASSAY OF IRON: CPT | Performed by: INTERNAL MEDICINE

## 2019-08-14 PROCEDURE — G0463 HOSPITAL OUTPT CLINIC VISIT: HCPCS | Mod: ZF

## 2019-08-14 PROCEDURE — 80069 RENAL FUNCTION PANEL: CPT | Performed by: INTERNAL MEDICINE

## 2019-08-14 PROCEDURE — 82043 UR ALBUMIN QUANTITATIVE: CPT | Performed by: INTERNAL MEDICINE

## 2019-08-14 PROCEDURE — 83735 ASSAY OF MAGNESIUM: CPT | Performed by: INTERNAL MEDICINE

## 2019-08-14 RX ORDER — BUPROPION HYDROCHLORIDE 300 MG/1
300 TABLET ORAL EVERY MORNING
Qty: 30 TABLET | Refills: 5 | Status: SHIPPED | OUTPATIENT
Start: 2019-08-14 | End: 2020-03-17

## 2019-08-14 RX ORDER — FLUOXETINE 40 MG/1
80 CAPSULE ORAL DAILY
Qty: 60 CAPSULE | Refills: 5 | Status: SHIPPED | OUTPATIENT
Start: 2019-08-14 | End: 2020-03-31

## 2019-08-14 RX ORDER — DEXTROAMPHETAMINE SACCHARATE, AMPHETAMINE ASPARTATE MONOHYDRATE, DEXTROAMPHETAMINE SULFATE AND AMPHETAMINE SULFATE 5; 5; 5; 5 MG/1; MG/1; MG/1; MG/1
20 CAPSULE, EXTENDED RELEASE ORAL DAILY
Qty: 30 CAPSULE | Refills: 0 | Status: SHIPPED | OUTPATIENT
Start: 2019-10-09 | End: 2020-03-31

## 2019-08-14 RX ORDER — DEXTROAMPHETAMINE SACCHARATE, AMPHETAMINE ASPARTATE MONOHYDRATE, DEXTROAMPHETAMINE SULFATE AND AMPHETAMINE SULFATE 5; 5; 5; 5 MG/1; MG/1; MG/1; MG/1
20 CAPSULE, EXTENDED RELEASE ORAL DAILY
Qty: 30 CAPSULE | Refills: 0 | Status: SHIPPED | OUTPATIENT
Start: 2019-08-14 | End: 2020-03-03

## 2019-08-14 RX ORDER — TRAZODONE HYDROCHLORIDE 100 MG/1
100 TABLET ORAL AT BEDTIME
Qty: 30 TABLET | Refills: 5 | Status: SHIPPED | OUTPATIENT
Start: 2019-08-14 | End: 2020-03-31

## 2019-08-14 RX ORDER — DEXTROAMPHETAMINE SACCHARATE, AMPHETAMINE ASPARTATE MONOHYDRATE, DEXTROAMPHETAMINE SULFATE AND AMPHETAMINE SULFATE 5; 5; 5; 5 MG/1; MG/1; MG/1; MG/1
20 CAPSULE, EXTENDED RELEASE ORAL DAILY
Qty: 30 CAPSULE | Refills: 0 | Status: SHIPPED | OUTPATIENT
Start: 2019-09-11 | End: 2020-03-31

## 2019-08-14 RX ORDER — BUSPIRONE HYDROCHLORIDE 15 MG/1
15 TABLET ORAL 2 TIMES DAILY
Qty: 60 TABLET | Refills: 5 | Status: SHIPPED | OUTPATIENT
Start: 2019-08-14 | End: 2020-03-31

## 2019-08-14 RX ADMIN — Medication 75 G: at 08:18

## 2019-08-14 ASSESSMENT — PATIENT HEALTH QUESTIONNAIRE - PHQ9: SUM OF ALL RESPONSES TO PHQ QUESTIONS 1-9: 11

## 2019-08-14 ASSESSMENT — PAIN SCALES - GENERAL
PAINLEVEL: NO PAIN (0)
PAINLEVEL: NO PAIN (0)

## 2019-08-14 NOTE — PROGRESS NOTES
Outpatient Psychiatry Progress Note     Provider: DANIEL Mtz CNS  Date: 2019  Service:  Medication follow up with counseling.   Patient Identification: Mamie Rice  : 1995   MRN: [id number]    Mamie Rice is a 23 year old year old female who presents for ongoing psychiatric care.  Mamie Rice was last seen in clinic on 19.   At that time,   Assessment & Plan       Mamie Rice is seen today for follow up and reports her mood has been stable. Looking for a different job in  with less exposure to communicable illness than previous job.     Diagnosis       Encounter Diagnoses   Name Primary?     Major depressive disorder, recurrent episode, mild (H) Yes     Attention deficit hyperactivity disorder (ADHD), predominantly inattentive type       MECHE (generalized anxiety disorder)       Insomnia, unspecified type       Anxiety           Plan:  Medication: no change  OTC Recommendations: none  Lab Orders:  none  Referrals: none  Release of Information: none  Future Treatment Considerations:per symptoms  Return for Follow Up: 6 months      ____________________________________________________________________________________________________________________________________________    2019  Today Mamie reports 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  Mood has been good.  Sleep is stable. Mom helps with medication set up.  Thinks that she has been taking the second dose of Wellbutrin at bedtime. Is open to changing to XR and so 300mg in the am  Side effects of medication include: none  Psychiatric Review of Systems:  Depression: In the last 2 weeks per PHQ-9 score:   PHQ-9 SCORE 2019   PHQ-9 Total Score 11       Anxiety : stable  Quiana na   Psychosis  na.   ADHD stable    Review of Medical Systems:  Sleep: stable  Energy: stable - mild  Concentration: stable  Appetite: stable  GI Concerns: no new concerns  Cardiac  concerns: no new concerns  Neurological concerns: no new concerns  Other medical concerns: no new concerns. Was seen for follow up for CF today  Current Substance Use:  Alcohol:denies frequent use or abuse  Other drugs:denies  Caffeine:has cut back  Nicotine: none  Past Medical History:   Past Medical History:   Diagnosis Date     ADHD (attention deficit hyperactivity disorder)      Anxiety      Aspergillosis, with pneumonia (H)     fugus found caused chest pain     Chronic infection     CF, MRSA.,      Chronic sinusitis      Constipation, chronic      Cystic fibrosis with pulmonary manifestations (H) 12/19/2011     Cystic fibrosis without mention of meconium ileus     SWEAT TEST:Date: 2/17/1994   [facility]  296 mg, 104 mmol/L ClSample #2  295 mg, 104 mmol/L Cl GENOTYPING:Date: 10/15/2007,  Laboratory: [facility]: [id number]     Depressive disorder      Diabetes     no meds currently     Dysthymic disorder      Exocrine pancreatic insufficiency      Gastro-oesophageal reflux disease      Hip pain, right      MRSA (methicillin resistant Staphylococcus aureus) carrier      Pancreatic disease      Patient Active Problem List   Diagnosis     Hip joint pain     Aspergillosis (H)     Chronic maxillary sinusitis     Hypoglycemia     Type 1 diabetes mellitus (H)     Cystic fibrosis of the lung (H)     Chronic constipation     Frontal sinusitis     Overactive child     Back pain     Pancreatic insufficiency     Non morbid obesity due to excess calories     Acne vulgaris     Cystic fibrosis (H)     Diabetes mellitus, type 2 (H)     Persistent depressive disorder     Mild episode of recurrent major depressive disorder (H)     Insomnia, unspecified type     MECHE (generalized anxiety disorder)     Attention deficit hyperactivity disorder (ADHD), combined type       Allergies:   Allergies   Allergen Reactions     Vancomycin Hives     Redmens skin rash   Redmens skin rash      Augmentin Rash           Current Medications     Current Outpatient Medications Ordered in Epic   Medication Sig Dispense Refill     acetaminophen (TYLENOL) 325 MG tablet Take 2 tablets (650 mg) by mouth every 4 hours as needed for other (mild pain) 100 tablet 0     acetylcysteine (MUCOMYST) 20 % neb solution INHALE ONE 4ML NEB INTO LUNGS VIA NEBULIZER TWO TIMES A DAY, MAY INCREASE TO 3-4 TIMES A DAY WITH INCREASE COUGH/COLD SYMPTOMS 360 mL 11     albuterol (2.5 MG/3ML) 0.083% neb solution INHALE 1 NEB VIA NEBULIZER FOUR TIMES A  mL 11     albuterol (PROAIR HFA/PROVENTIL HFA/VENTOLIN HFA) 108 (90 Base) MCG/ACT Inhaler Inhale 2 puffs into the lungs every 6 hours as needed for shortness of breath / dyspnea or wheezing 1 Inhaler 12     amphetamine-dextroamphetamine (ADDERALL XR) 20 MG 24 hr capsule Take 1 capsule (20 mg) by mouth daily 30 capsule 0     amphetamine-dextroamphetamine (ADDERALL XR) 20 MG 24 hr capsule Take 1 capsule (20 mg) by mouth daily 30 capsule 0     amphetamine-dextroamphetamine (ADDERALL XR) 20 MG 24 hr capsule Take 1 capsule (20 mg) by mouth daily 30 capsule 0     azithromycin (ZITHROMAX) 500 MG tablet TAKE 1 TABLET BY MOUTH ON MONDAYS, WEDNESDAYS AND FRIDAYS 36 tablet 4     beta carotene 62227 UNIT capsule TAKE ONE CAPSULE BY MOUTH IN THE MORNING ON MONDAY, WEDNESDAY, AND FRIDAY 36 capsule 4     blood glucose (ACCU-CHEK SMARTVIEW) test strip Use to test blood sugar 4 times daily or as directed. 1 Month 12     buPROPion (WELLBUTRIN SR) 150 MG 12 hr tablet Take 1 tablet (150 mg) by mouth 2 times daily 60 tablet 3     busPIRone (BUSPAR) 15 MG tablet Take 1 tablet (15 mg) by mouth 2 times daily 60 tablet 5     cetirizine (ZYRTEC) 10 MG tablet Take 1 tablet (10 mg) by mouth every evening 30 tablet 3     FLUoxetine (PROZAC) 40 MG capsule Take 2 capsules (80 mg) by mouth daily 60 capsule 5     fluticasone (FLONASE) 50 MCG/ACT nasal spray Spray 2 sprays into both nostrils 2 times daily 15.8 mL 3     GLYCOPYRROLATE PO  "Take 1 mg by mouth 2 times daily        hydrOXYzine (ATARAX) 25 MG tablet Take 1 tablet (25 mg) by mouth At Bedtime for insomnia 30 tablet 3     MedroxyPROGESTERone Acetate (DEPO-PROVERA IM)        meropenem (MERREM) 500 MG vial 500 mg by Nasal Instillation route once for 1 dose 60 each      montelukast (SINGULAIR) 10 MG tablet TAKE ONE TABLET BY MOUTH AT BEDTIME 30 tablet 11     Multiple Vitamin (MULTIVITAMIN OR) Take 1 tablet by mouth daily.       naltrexone (DEPADE/REVIA) 50 MG tablet Take 1 tablet.  Time it one to two hours prior to worst cravings. 90 tablet 2     omeprazole (PRILOSEC) 40 MG DR capsule Take 1 capsule (40 mg) by mouth 2 times daily 60 capsule 3     phytonadione (MEPHYTON/VIT K) 5 MG tablet TAKE ONE TABLET BY MOUTH ONCE A WEEK 4 tablet 11     polyethylene glycol (MIRALAX) powder Take 17 g (1 capful) by mouth daily as needed for constipation 119 g 3     topiramate (TOPAMAX) 25 MG tablet Take 3 tablets (75 mg) by mouth daily 270 tablet 1     traZODone (DESYREL) 100 MG tablet Take 1 tablet (100 mg) by mouth At Bedtime 30 tablet 3     vitamin C (ASCORBIC ACID) 500 MG tablet TAKE ONE TABLET BY MOUTH TWICE A  tablet 3     VITAMIN D3 1000 units tablet TAKE ONE TABLET BY MOUTH EVERY  tablet 3     vitamin E 400 UNIT capsule TAKE ONE CAPSULE BY MOUTH TWICE A  capsule 11     No current Epic-ordered facility-administered medications on file.         Mental Status Exam     Appearance:  Casually dressed and Well groomed  Behavior/relationship to examiner/demeanor:  Cooperative  Orientation: Oriented to person, place, time and situation  Psychomotor: normal form  Speech Rate:  Normal  Speech Spontaneity:  Normal  Mood:  \"good\"  Affect:  Appropriate/mood-congruent  Thought Process (Associations):  Goal directed  Thought Content:  no overt psychosis, denies suicidal ideation, intent or thoughts and patient does not appear to be responding to internal stimuli  Abnormal Perception:  " None  Attention/Concentration:  Normal  Insight:  Good  Judgment:  Good      Results     Vital signs: /73   Pulse 90   Wt 79.4 kg (175 lb)   BMI 32.21 kg/m      Laboratory Data:  reviewed results from CF clinic, No concerns with psychiatric medications or symptoms    Assessment & Plan      Mamie Rice is seen today for follow up and reports her mood is stable at this time and would like to continue current medication but agrees to change Wellbutrin to XL 300mg in the am instead of SR 150mg bid    Diagnosis  Encounter Diagnoses   Name Primary?     Mild episode of recurrent major depressive disorder (H) Yes     Insomnia, unspecified type      MECHE (generalized anxiety disorder)      Attention deficit hyperactivity disorder (ADHD), combined type      Persistent depressive disorder        Plan:  Medication: Continue all medication except change Wellbutrin to XL for once daily dosing  OTC Recommendations: none  Lab Orders:  none  Referrals: none  Release of Information: none  Future Treatment Considerations:per symptoms  Return for Follow Up:6 months   The risks, benefits, alternatives and side effects have been discussed and are understood by the patient. The patient understands the risks of using street drugs or alcohol. There are no medical contraindications, the patient agrees to treatment, and has the capacity to do so. The patient understands to call 911 or come to the nearest ED if life threatening or urgent symptoms present.  In addition time was spent counseling the patient and/or coordinating care regarding review of social and occupational functioning.  In addition patient was counseled on health and wellness practices to augment medication treatment of symptoms. See note for details.    Kristina Kilgore, DANIEL CNS 8/14/2019

## 2019-08-14 NOTE — NURSING NOTE
Chief Complaint   Patient presents with     Recheck Medication     Major depressive disorder, recurrent episode, mild

## 2019-08-14 NOTE — PROGRESS NOTES
Mamie is here for a glucose tolerance test for a history of Cystic Fibrosis.  Orders written by Dr. Allison on 08/14/2019.  Mamie arrived NPO, last eating at last evening.  IV placed in left AC without difficulty by JACE Bliss LPN.  Requested labs drawn.  Glucose and Insulin levels drawn at -0- 814 am, +30 848 am, +60 918 am, +90 948 am, and + 120 1018 am.  Mamie started drinking the Glucola at 818 am and completed within 10 minutes.  Post blood sugar tested and was 215.  Snack was offered to patient prior to discharge but declined.   PIV discontinued without difficulty.    Mamie will follow up, as planned, with her CF team for test results and all future appointments.     JACE Bliss LPN    Administrations This Visit     glucose tolerence test (GLUCOLA) 25% oral liquid 75 g     Admin Date  08/14/2019 Action  Given Dose  75 g Route  Oral Administered By  Krystle Bliss LPN

## 2019-08-15 ENCOUNTER — TELEPHONE (OUTPATIENT)
Dept: PULMONOLOGY | Facility: CLINIC | Age: 24
End: 2019-08-15

## 2019-08-15 DIAGNOSIS — E84.9 CF (CYSTIC FIBROSIS) (H): Primary | ICD-10-CM

## 2019-08-15 NOTE — TELEPHONE ENCOUNTER
Spoke with patient and she agrees to plan. She'll call Joan to schedule appointment with Dr. Prieto.     ----- Message from Gavi Allison MD sent at 8/15/2019  6:49 AM CDT -----  Good morning.  Mamie needs a repeat UA/UC when she comes to clinic.  She also needs an appointment with Dr. Prieto for CFRD.    Thank you,    Gavi

## 2019-08-16 LAB
A-TOCOPHEROL VIT E SERPL-MCNC: 6 MG/L (ref 5.5–18)
ANNOTATION COMMENT IMP: NORMAL
BETA+GAMMA TOCOPHEROL SERPL-MCNC: 0.2 MG/L (ref 0–6)
RETINYL PALMITATE SERPL-MCNC: <0.02 MG/L (ref 0–0.1)
VIT A SERPL-MCNC: 0.72 MG/L (ref 0.3–1.2)

## 2019-09-05 DIAGNOSIS — E84.9 CF (CYSTIC FIBROSIS) (H): ICD-10-CM

## 2019-09-05 RX ORDER — AZITHROMYCIN 500 MG/1
TABLET, FILM COATED ORAL
Qty: 36 TABLET | Refills: 4 | Status: SHIPPED | OUTPATIENT
Start: 2019-09-05 | End: 2020-10-02

## 2019-09-06 DIAGNOSIS — E84.0 CYSTIC FIBROSIS WITH PULMONARY MANIFESTATIONS (H): Primary | ICD-10-CM

## 2019-09-12 NOTE — TELEPHONE ENCOUNTER
RECORDS RECEIVED FROM: [facility]   DATE RECEIVED: 9/12/19   NOTES (FOR ALL VISITS) STATUS DETAILS   OFFICE NOTES from referring provider Penny Allison, Gavi Muñiz MD - 8/14/19   OFFICE NOTES from other specialist Epic Pulm: Keegan (6/5/19)  Endo: [place] (8/6/19)  Pharm D: Deep (3/7/19)   ED NOTES NA    OPERATIVE REPORT  (thyroid, pituitary, adrenal, parathyroid) NA    MEDICATION LIST Epic 9/12/19   IMAGING      DEXASCAN PACS 8/14/19, 8/2/17   MRI (BRAIN) NA    XR (Chest) PACS 11/6/18, 8/2/17   CT (HEAD/NECK/CHEST/ABDOMEN) NA    NUCLEAR  NA    ULTRASOUND (HEAD/NECK) NA    LABS     DIABETES: HBGA1C, CREATININE, FASTING LIPIDS, MICROALBUMIN URINE, POTASSIUM, TSH, T4    THYROID: TSH, T4, CBC, THYRODLONULIN, TOTAL T3, FREE T4, CALCITONIN, CEA [facility] 8/14/19

## 2019-09-26 DIAGNOSIS — G47.00 INSOMNIA, UNSPECIFIED TYPE: ICD-10-CM

## 2019-09-30 RX ORDER — HYDROXYZINE HYDROCHLORIDE 25 MG/1
25 TABLET, FILM COATED ORAL AT BEDTIME
Qty: 30 TABLET | Refills: 0 | Status: SHIPPED | OUTPATIENT
Start: 2019-09-30 | End: 2019-11-06

## 2019-09-30 NOTE — TELEPHONE ENCOUNTER
Medication requested: hydrOXYzine (ATARAX) 25 MG   Last refilled: 5/9/19  Qty: 30 : 3      Last seen:8/14/19  RTC: 6 MOS  Cancel: 0  No-show: 0  Next appt: NONE  Refill decision: 30 day eloina refill sent to the pharmacy - including instructions for patient to call the clinic and schedule an appointment.   Scheduling has been notified to contact the pt for appointment.

## 2019-10-04 ENCOUNTER — HEALTH MAINTENANCE LETTER (OUTPATIENT)
Age: 24
End: 2019-10-04

## 2019-10-18 DIAGNOSIS — E84.9 CF (CYSTIC FIBROSIS) (H): ICD-10-CM

## 2019-10-18 DIAGNOSIS — K86.89 PANCREATIC INSUFFICIENCY: ICD-10-CM

## 2019-11-06 DIAGNOSIS — G47.00 INSOMNIA, UNSPECIFIED TYPE: ICD-10-CM

## 2019-11-06 RX ORDER — HYDROXYZINE HYDROCHLORIDE 25 MG/1
25 TABLET, FILM COATED ORAL
Qty: 30 TABLET | Refills: 0 | Status: SHIPPED | OUTPATIENT
Start: 2019-11-06 | End: 2019-12-13

## 2019-11-07 NOTE — TELEPHONE ENCOUNTER
Medication requested: hydrOXYzine (ATARAX) 25 MG tablet  Last refilled: 9/30/19  Qty: 30      Last seen: 8/14/19  RTC: 6 months  Cancel: 0  No-show: 0  Next appt: 0    Refill decision:   30 day eloina refill sent to the pharmacy - including instructions for patient to call the clinic and schedule an appointment.  Scheduling has been notified to contact the pt for appointment.

## 2019-11-11 ASSESSMENT — ENCOUNTER SYMPTOMS
MYALGIAS: 1
ARTHRALGIAS: 1
STIFFNESS: 1
INSOMNIA: 1
NECK PAIN: 0
HEMATURIA: 0
FLANK PAIN: 0
DIFFICULTY URINATING: 1
DEPRESSION: 1
DECREASED CONCENTRATION: 1
MUSCLE CRAMPS: 1
BACK PAIN: 0
MUSCLE WEAKNESS: 1
NERVOUS/ANXIOUS: 0
JOINT SWELLING: 1
DYSURIA: 0
PANIC: 0

## 2019-11-12 ENCOUNTER — OFFICE VISIT (OUTPATIENT)
Dept: PHARMACY | Facility: CLINIC | Age: 24
End: 2019-11-12
Payer: MEDICAID

## 2019-11-12 ENCOUNTER — ALLIED HEALTH/NURSE VISIT (OUTPATIENT)
Dept: CARE COORDINATION | Facility: CLINIC | Age: 24
End: 2019-11-12

## 2019-11-12 ENCOUNTER — PRE VISIT (OUTPATIENT)
Dept: ENDOCRINOLOGY | Facility: CLINIC | Age: 24
End: 2019-11-12

## 2019-11-12 ENCOUNTER — TELEPHONE (OUTPATIENT)
Dept: PULMONOLOGY | Facility: CLINIC | Age: 24
End: 2019-11-12

## 2019-11-12 ENCOUNTER — OFFICE VISIT (OUTPATIENT)
Dept: ENDOCRINOLOGY | Facility: CLINIC | Age: 24
End: 2019-11-12
Attending: INTERNAL MEDICINE
Payer: MEDICAID

## 2019-11-12 ENCOUNTER — OFFICE VISIT (OUTPATIENT)
Dept: PULMONOLOGY | Facility: CLINIC | Age: 24
End: 2019-11-12
Attending: INTERNAL MEDICINE
Payer: MEDICAID

## 2019-11-12 VITALS
HEIGHT: 62 IN | DIASTOLIC BLOOD PRESSURE: 77 MMHG | BODY MASS INDEX: 32.07 KG/M2 | WEIGHT: 174.3 LBS | HEART RATE: 90 BPM | SYSTOLIC BLOOD PRESSURE: 115 MMHG

## 2019-11-12 VITALS
SYSTOLIC BLOOD PRESSURE: 118 MMHG | WEIGHT: 174.6 LBS | BODY MASS INDEX: 32.13 KG/M2 | RESPIRATION RATE: 17 BRPM | DIASTOLIC BLOOD PRESSURE: 75 MMHG | OXYGEN SATURATION: 98 % | HEART RATE: 93 BPM | HEIGHT: 62 IN

## 2019-11-12 DIAGNOSIS — E84.9 CYSTIC FIBROSIS (H): Primary | ICD-10-CM

## 2019-11-12 DIAGNOSIS — Z13.9 RISK AND FUNCTIONAL ASSESSMENT: Primary | ICD-10-CM

## 2019-11-12 DIAGNOSIS — E84.0 CYSTIC FIBROSIS OF THE LUNG (H): Primary | ICD-10-CM

## 2019-11-12 DIAGNOSIS — E84.0 CYSTIC FIBROSIS OF THE LUNG (H): ICD-10-CM

## 2019-11-12 DIAGNOSIS — E66.811 CLASS 1 OBESITY DUE TO EXCESS CALORIES WITHOUT SERIOUS COMORBIDITY WITH BODY MASS INDEX (BMI) OF 30.0 TO 30.9 IN ADULT: Primary | ICD-10-CM

## 2019-11-12 DIAGNOSIS — E66.09 CLASS 1 OBESITY DUE TO EXCESS CALORIES WITHOUT SERIOUS COMORBIDITY WITH BODY MASS INDEX (BMI) OF 30.0 TO 30.9 IN ADULT: Primary | ICD-10-CM

## 2019-11-12 LAB
ALBUMIN SERPL-MCNC: 3.6 G/DL (ref 3.4–5)
ALBUMIN UR-MCNC: NEGATIVE MG/DL
ALP SERPL-CCNC: 95 U/L (ref 40–150)
ALT SERPL W P-5'-P-CCNC: 68 U/L (ref 0–50)
APPEARANCE UR: ABNORMAL
AST SERPL W P-5'-P-CCNC: 18 U/L (ref 0–45)
BILIRUB DIRECT SERPL-MCNC: <0.1 MG/DL (ref 0–0.2)
BILIRUB SERPL-MCNC: 0.2 MG/DL (ref 0.2–1.3)
BILIRUB UR QL STRIP: NEGATIVE
CK SERPL-CCNC: 78 U/L (ref 30–225)
COLOR UR AUTO: YELLOW
EXPTIME-PRE: 5.81 SEC
FEF2575-%PRED-PRE: 123 %
FEF2575-PRE: 4.41 L/SEC
FEF2575-PRED: 3.58 L/SEC
FEFMAX-%PRED-PRE: 127 %
FEFMAX-PRE: 8.51 L/SEC
FEFMAX-PRED: 6.65 L/SEC
FEV1-%PRED-PRE: 102 %
FEV1-PRE: 3.13 L
FEV1FEV6-PRE: 89 %
FEV1FEV6-PRED: 86 %
FEV1FVC-PRE: 89 %
FEV1FVC-PRED: 86 %
FIFMAX-PRE: 4.29 L/SEC
FVC-%PRED-PRE: 98 %
FVC-PRE: 3.5 L
FVC-PRED: 3.55 L
GLUCOSE UR STRIP-MCNC: NEGATIVE MG/DL
HBA1C MFR BLD: 5.4 % (ref 4.3–6)
HGB UR QL STRIP: ABNORMAL
KETONES UR STRIP-MCNC: NEGATIVE MG/DL
LEUKOCYTE ESTERASE UR QL STRIP: ABNORMAL
MUCOUS THREADS #/AREA URNS LPF: PRESENT /LPF
NITRATE UR QL: NEGATIVE
PH UR STRIP: 6 PH (ref 5–7)
PROT SERPL-MCNC: 7.1 G/DL (ref 6.8–8.8)
RBC #/AREA URNS AUTO: 5 /HPF (ref 0–2)
SOURCE: ABNORMAL
SP GR UR STRIP: 1.01 (ref 1–1.03)
SQUAMOUS #/AREA URNS AUTO: 5 /HPF (ref 0–1)
TRANS CELLS #/AREA URNS HPF: 3 /HPF
UROBILINOGEN UR STRIP-MCNC: 0 MG/DL (ref 0–2)
WBC #/AREA URNS AUTO: 21 /HPF (ref 0–5)

## 2019-11-12 PROCEDURE — 87086 URINE CULTURE/COLONY COUNT: CPT | Performed by: INTERNAL MEDICINE

## 2019-11-12 PROCEDURE — 87186 SC STD MICRODIL/AGAR DIL: CPT | Performed by: INTERNAL MEDICINE

## 2019-11-12 PROCEDURE — 82550 ASSAY OF CK (CPK): CPT | Performed by: INTERNAL MEDICINE

## 2019-11-12 PROCEDURE — 81001 URINALYSIS AUTO W/SCOPE: CPT | Performed by: INTERNAL MEDICINE

## 2019-11-12 PROCEDURE — 99606 MTMS BY PHARM EST 15 MIN: CPT | Performed by: PHARMACIST

## 2019-11-12 PROCEDURE — 80076 HEPATIC FUNCTION PANEL: CPT | Performed by: INTERNAL MEDICINE

## 2019-11-12 PROCEDURE — 87070 CULTURE OTHR SPECIMN AEROBIC: CPT | Performed by: INTERNAL MEDICINE

## 2019-11-12 PROCEDURE — 36415 COLL VENOUS BLD VENIPUNCTURE: CPT | Performed by: INTERNAL MEDICINE

## 2019-11-12 PROCEDURE — G0463 HOSPITAL OUTPT CLINIC VISIT: HCPCS | Mod: ZF

## 2019-11-12 PROCEDURE — 87077 CULTURE AEROBIC IDENTIFY: CPT | Performed by: INTERNAL MEDICINE

## 2019-11-12 ASSESSMENT — ANXIETY QUESTIONNAIRES
GAD7 TOTAL SCORE: 11
IF YOU CHECKED OFF ANY PROBLEMS ON THIS QUESTIONNAIRE, HOW DIFFICULT HAVE THESE PROBLEMS MADE IT FOR YOU TO DO YOUR WORK, TAKE CARE OF THINGS AT HOME, OR GET ALONG WITH OTHER PEOPLE: SOMEWHAT DIFFICULT
7. FEELING AFRAID AS IF SOMETHING AWFUL MIGHT HAPPEN: SEVERAL DAYS
3. WORRYING TOO MUCH ABOUT DIFFERENT THINGS: MORE THAN HALF THE DAYS
2. NOT BEING ABLE TO STOP OR CONTROL WORRYING: MORE THAN HALF THE DAYS
1. FEELING NERVOUS, ANXIOUS, OR ON EDGE: SEVERAL DAYS
6. BECOMING EASILY ANNOYED OR IRRITABLE: MORE THAN HALF THE DAYS
5. BEING SO RESTLESS THAT IT IS HARD TO SIT STILL: SEVERAL DAYS

## 2019-11-12 ASSESSMENT — PAIN SCALES - GENERAL
PAINLEVEL: NO PAIN (0)
PAINLEVEL: NO PAIN (0)

## 2019-11-12 ASSESSMENT — PATIENT HEALTH QUESTIONNAIRE - PHQ9
5. POOR APPETITE OR OVEREATING: MORE THAN HALF THE DAYS
SUM OF ALL RESPONSES TO PHQ QUESTIONS 1-9: 9

## 2019-11-12 ASSESSMENT — MIFFLIN-ST. JEOR
SCORE: 1482.08
SCORE: 1480.7

## 2019-11-12 NOTE — NURSING NOTE
Chief Complaint   Patient presents with     RECHECK     Cystic Fibrosis     Medications reviewed and vital signs taken.   Ariane Person, NEEL

## 2019-11-12 NOTE — LETTER
11/12/2019       RE: Mamie Rice  [address] [facility] [address]     Dear Colleague,    Thank you for referring your patient, Mamie Rice, to the [facility] ENDOCRINOLOGY at [facility]. Please see a copy of my visit note below.    Endocrinology Clinic Visit 11/12/2019  NAME:  Mamie Rice  PCP:  Jethro Kramer  MRN:  [id number]  Reason for Consult:  CF related diabetes  Requesting Provider:  Gavi Allison    Chief Complaint     Chief Complaint   Patient presents with     Consult     dm 2       History of Present Illness     Mamie Rice is a 24 year old female who is seen in clinic for diabetes management.   She is new to me today.    Patient saw Dr. Prieto in Feb 2017 for a consultation for CFRD, diagnosed in 2015 based on abnormal OGTT.   She was seen by Dr. Luque, and treated with Novolog (2-3 units) before meals.   Her HbA1c has always been in the 5 range. A1c when she saw Dr. Prieto in 2017 was 5.5% on no medications.    On 8/14/19 she has a 75g OGTT. Baseline (fasting) BG was 82. Post-glucose values: peaked at 237 mg/dl at 2 hours.    Her HbA1c today is 5.4%.     The patient mainly reports Hypoglycemia symptoms (LH, headache). Resolves when she eats carbs.   No pattern to hypoglycemia.No LOC.    She also is seen in the medical weight management clinic by Dr. Randa Ho and is on a combination of bupropion, naltrexone and topiramate.     Diabetes Care:   Eyes: last eye exam >1 yr ago, no DR  Kidneys: urine microalbumine was 12.56 Ag 2019  Nerves: history of neuropathy more related to vitamin B12 deficiency, now better  Smoking: no  No macrovascular, lipid or BP issues.     Associated Signs/Symptoms  Hypoglycemia: feels lows at <70  Hyperglycemia: increased thirst.  Neuropathy: no numbness or pain in extremities  Vascular Symtpoms: no claudication or edema  Angina/CHF: not chest pain, no CRUM, no orthopnea  Ulcers: No  Amputations:  No    Current treatment strategy: none    Blood Glucose Monitoring: Meter downloaded today in clinic and data reviewed as such:  Average B with range     Diet: watching her diet as she is losing weight    Exercise: dance competitions    Weight:   Wt Readings from Last 4 Encounters:   19 79.1 kg (174 lb 4.8 oz)   19 79.2 kg (174 lb 9.7 oz)   19 79.4 kg (175 lb)   19 81.2 kg (179 lb)       Problem List     Patient Active Problem List   Diagnosis     Hip joint pain     Aspergillosis (H)     Chronic maxillary sinusitis     Hypoglycemia     Type 1 diabetes mellitus (H)     Cystic fibrosis of the lung (H)     Chronic constipation     Frontal sinusitis     Overactive child     Back pain     Pancreatic insufficiency     Non morbid obesity due to excess calories     Acne vulgaris     Cystic fibrosis (H)     Diabetes mellitus, type 2 (H)     Persistent depressive disorder     Mild episode of recurrent major depressive disorder (H)     Insomnia, unspecified type     MECHE (generalized anxiety disorder)     Attention deficit hyperactivity disorder (ADHD), combined type        Medications     Current Outpatient Medications   Medication     acetaminophen (TYLENOL) 325 MG tablet     acetylcysteine (MUCOMYST) 20 % neb solution     albuterol (2.5 MG/3ML) 0.083% neb solution     albuterol (PROAIR HFA/PROVENTIL HFA/VENTOLIN HFA) 108 (90 Base) MCG/ACT Inhaler     amphetamine-dextroamphetamine (ADDERALL XR) 20 MG 24 hr capsule     azithromycin (ZITHROMAX) 500 MG tablet     beta carotene 23549 UNIT capsule     blood glucose (ACCU-CHEK SMARTVIEW) test strip     buPROPion (WELLBUTRIN XL) 300 MG 24 hr tablet     busPIRone (BUSPAR) 15 MG tablet     cetirizine (ZYRTEC) 10 MG tablet     FLUoxetine (PROZAC) 40 MG capsule     fluticasone (FLONASE) 50 MCG/ACT nasal spray     GLYCOPYRROLATE PO     hydrOXYzine (ATARAX) 25 MG tablet     MedroxyPROGESTERone Acetate (DEPO-PROVERA IM)     meropenem (MERREM) 500 MG vial      montelukast (SINGULAIR) 10 MG tablet     Multiple Vitamin (MULTIVITAMIN OR)     naltrexone (DEPADE/REVIA) 50 MG tablet     omeprazole (PRILOSEC) 40 MG DR capsule     phytonadione (MEPHYTON/VIT K) 5 MG tablet     polyethylene glycol (MIRALAX) powder     topiramate (TOPAMAX) 25 MG tablet     traZODone (DESYREL) 100 MG tablet     vitamin C (ASCORBIC ACID) 500 MG tablet     VITAMIN D3 1000 units tablet     VITAMIN E 400 units capsule     amphetamine-dextroamphetamine (ADDERALL XR) 20 MG 24 hr capsule     amphetamine-dextroamphetamine (ADDERALL XR) 20 MG 24 hr capsule     No current facility-administered medications for this visit.         Allergies     Allergies   Allergen Reactions     Vancomycin Hives     Redmens skin rash   Redmens skin rash      Augmentin Rash       Medical / Surgical History     Past Medical History:   Diagnosis Date     ADHD (attention deficit hyperactivity disorder)      Anxiety      Aspergillosis, with pneumonia (H)     fugus found caused chest pain     Chronic infection     CF, MRSA.,      Chronic sinusitis      Constipation, chronic      Cystic fibrosis with pulmonary manifestations (H) 12/19/2011     Cystic fibrosis without mention of meconium ileus     SWEAT TEST:Date: 2/17/1994   [facility]  296 mg, 104 mmol/L ClSample #2  295 mg, 104 mmol/L Cl GENOTYPING:Date: 10/15/2007,  Laboratory: [facility]: [id number]     Depressive disorder      Diabetes     no meds currently     Dysthymic disorder      Exocrine pancreatic insufficiency      Gastro-oesophageal reflux disease      Hip pain, right      MRSA (methicillin resistant Staphylococcus aureus) carrier      Pancreatic disease      Past Surgical History:   Procedure Laterality Date     ARTHROSCOPY HIP, OSTEOPLASTY FEMUR PROXIMAL, COMBINED  3/11/2013    Procedure: COMBINED ARTHROSCOPY HIP, OSTEOPLASTY FEMUR PROXIMAL;  Right Hip Arthroscopy, Labral  Debridement.    surgeon request choice anesthesia/admit to [facility]  after surgery;  Surgeon: Omkar Austin MD;  Location: [facility]     ARTHROSCOPY KNEE WITH MEDIAL MENISCECTOMY Left 1/31/2017    Procedure: ARTHROSCOPY KNEE WITH MEDIAL MENISCECTOMY;  Surgeon: Jethro Coyle MD;  Location: [facility] OR     bronchoscopies       BRONCHOSCOPY       EXAM UNDER ANESTHESIA ANUS N/A 5/10/2016    Procedure: EXAM UNDER ANESTHESIA ANUS;  Surgeon: Chet Gaviria MD;  [facility]     EXAM UNDER ANESTHESIA, RESTORATIONS, EXTRACTION(S) DENTAL COMPLEX, COMBINED  5/13/2013    Procedure: COMBINED EXAM UNDER ANESTHESIA, RESTORATIONS, EXTRACTION(S) DENTAL COMPLEX;  Dental Exam, Radiographs, Restorations. Single Extraction  Tooth #2. Restorations x 3;  Surgeon: Danilo Ortiz DDS;  Location: [facility] OR     HC KNEE SCOPE, DIAGNOSTIC      Arthroscopy, Knee- left     INJECT BOTOX N/A 5/10/2016    Procedure: INJECT BOTOX;  Surgeon: Chet Gaviria MD;  [facility]     left hip labral tear  5/11/2011    left hip arthroscopy with labral debridement and synovectomy     meniscus repair       OPTICAL TRACKING SYSTEM ENDOSCOPIC SINUS SURGERY  10/14/2011    Procedure:OPTICAL TRACKING SYSTEM ENDOSCOPIC SINUS SURGERY; FESS (functional endoscopic sinus surgery) with Image Guidance, bronchial lavage and cultures; Surgeon:GOYO KUO; Location:[facility] OR     OPTICAL TRACKING SYSTEM ENDOSCOPIC SINUS SURGERY  5/18/2012    Procedure:OPTICAL TRACKING SYSTEM ENDOSCOPIC SINUS SURGERY; Right  and Left Image Guided Functional Endoscopic Sinus Surgery With  Frontal Approach, [employer]; Surgeon:GOYO KUO; Location:[facility] OR     OPTICAL TRACKING SYSTEM ENDOSCOPIC SINUS SURGERY  9/26/2012    Procedure: OPTICAL TRACKING SYSTEM ENDOSCOPIC SINUS SURGERY;  Stealth Guided Bilateral Functional Endoscopic Sinus Surgery *Latex Safe*;  Surgeon: Goyo Kuo MD;  [facility]     OPTICAL TRACKING SYSTEM ENDOSCOPIC SINUS SURGERY Bilateral 10/16/2015    Procedure: OPTICAL TRACKING SYSTEM ENDOSCOPIC SINUS  SURGERY;  Surgeon: Mariela Haile MD;  [facility]     ORTHOPEDIC SURGERY      left hip tear repair 2010     SINUS SURGERY         Social History     Social History     Socioeconomic History     Marital status: Single     Spouse name: Not on file     Number of children: Not on file     Years of education: Not on file     Highest education level: Not on file   Occupational History     Not on file   Social Needs     Financial resource strain: Not on file     Food insecurity:     Worry: Not on file     Inability: Not on file     Transportation needs:     Medical: Not on file     Non-medical: Not on file   Tobacco Use     Smoking status: Never Smoker     Smokeless tobacco: Never Used     Tobacco comment: one person at home smokes outside   Substance and Sexual Activity     Alcohol use: No     Alcohol/week: 0.0 standard drinks     Drug use: No     Sexual activity: Never   Lifestyle     Physical activity:     Days per week: Not on file     Minutes per session: Not on file     Stress: Not on file   Relationships     Social connections:     Talks on phone: Not on file     Gets together: Not on file     Attends Jainism service: Not on file     Active member of club or organization: Not on file     Attends meetings of clubs or organizations: Not on file     Relationship status: Not on file     Intimate partner violence:     Fear of current or ex partner: Not on file     Emotionally abused: Not on file     Physically abused: Not on file     Forced sexual activity: Not on file   Other Topics Concern      Service Not Asked     Blood Transfusions No     Caffeine Concern Not Asked     Occupational Exposure Not Asked     Hobby Hazards Not Asked     Sleep Concern Not Asked     Stress Concern Not Asked     Weight Concern Not Asked     Special Diet Not Asked     Back Care Not Asked     Exercise Yes     Bike Helmet Not Asked     Seat Belt Not Asked     Self-Exams Not Asked     Parent/sibling w/ CABG, MI or angioplasty  before 65F 55M? Not Asked   Social History Narrative    Mamie lives with mother in [place], [place].  There is a cat in the home, but Mamie does not have any litterbox duties.  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  She gets essentially no exercise because of the tingling in her feet (says it bothers her even to stand).        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        8/2015 no change in social situation        2/15/2016 Pt is single and lives with mother and stepfather.       Family History     Family History   Problem Relation Age of Onset     Cancer Paternal Grandmother      Skin Cancer Paternal Grandmother      Other Cancer Paternal Grandmother         Skin     Obesity Paternal Grandmother      Cancer Paternal Grandfather         PGF had throat cancer (he was a smoker)     Other Cancer Paternal Grandfather      Anxiety Disorder Paternal Grandfather      Thyroid Disease Mother         ,     Obesity Other      Anesthesia Reaction No family hx of      Blood Disease No family hx of      Colon Polyps No family hx of      Crohn's Disease No family hx of      Ulcerative Colitis No family hx of      Colon Cancer No family hx of      Melanoma No family hx of        ROS     Constitutional: no fevers, chills, night sweats. No weight loss or fatigue. Good appetite  Eyes: no vision changes, no eye redness, no diplopia  Ears, Nose, mouth, throat: no hearing changes, no tinnitus, no rhinorrhea, no nasal congestion  Cardiovascular: no chest pain, no orthopnea or PND, no edema, no palpitations  Respiratory: no dyspnea, no cough, no sputum, no wheezing  Gastrointestinal: no nausea, no vomiting, no abdominal pain, no diarrhea, no constipation  Genitourinary: no dysuria, no frequency, no urgency, no nocturia  Musculoskeletal: no joint pains, no back pain, no cramps, no fractures  Skin: no rash, no itching, no dryness, no ulcers, no hair  "loss  Neurological: no headache, no weakness, no numbness, no dizziness, no tremors  Psychiatric: no anxiety, no sadness  Hematologic/lymphatic: no easy bruising, no bleeding, no palor    Physical Exam   /77   Pulse 90   Ht 1.57 m (5' 1.8\")   Wt 79.1 kg (174 lb 4.8 oz)   BMI 32.09 kg/m        General: Comfortable, no obvious distress, normal body habitus  Eyes: Sclera anicteric, moist conjunctiva  HENT: Atraumatic, oropharynx clear, moist mucous membranes with no mucosal ulcerations  Neck: Trachea midline, supple. Thyroid: Thyroid is normal in size and texture  CV: Regular rhythm, normal rate. No murmurs auscultated  Resp: Clear to auscultation bilaterally, good effort  Abdomen:  Soft, non tender, non distended. Bowel sounds heard. No organomegaly.  Skin: No rashes, lesions, or subcutaneous nodules.   Psych: Alert and oriented x 3. Appropriate affect, good insight  Extremities: No peripheral edema  Foot exam:  Pulses:  Dorsalis pedis: present bilaterally  Posterior tibial: present bilaterally  Foot exam:  Vibration/Light touch: sensation present bilaterally  Skin of foot: intact bilaterally  Musculoskeletal: Appropriate muscle bulk and strength  Lymphatic: No cervical lymphadenopathy  Neuro: Moves all four extremities. No focal deficits on limited exam. Gait normal.       Labs/Imaging and Outside Records     Pertinent Labs were reviewed and updated in EPIC.    Summary of recent findings:   Lab Results   Component Value Date    A1C 5.1 08/14/2019    A1C 5.5 08/02/2017    A1C 5.6 05/04/2017    A1C 5.5 02/07/2017    A1C 5.5 08/02/2016       TSH   Date Value Ref Range Status   08/14/2019 1.08 0.40 - 4.00 mU/L Final   08/02/2017 1.66 0.40 - 4.00 mU/L Final   05/04/2017 2.91 0.40 - 4.00 mU/L Final   08/02/2016 1.36 0.40 - 4.00 mU/L Final   03/30/2015 1.69 0.40 - 4.00 mU/L Final     Comment:     Effective 7/30/2014, the reference range for this assay has changed to reflect   new instrumentation/methodology.   "     T4 Free   Date Value Ref Range Status   10/18/2011 1.21 0.70 - 1.85 ng/dL Final       Creatinine   Date Value Ref Range Status   08/14/2019 0.85 0.52 - 1.04 mg/dL Final       Recent Labs   Lab Test 08/14/19  0814 10/16/17  1543  08/02/17  0654  03/12/14  0830 03/21/13  0735   CHOL 92  --   --  112   < > 97 105   HDL 35*  --   --  31*   < > 33* 28*   LDL 42  --   --  49   < > 50 54   TRIG 75 125   < > 160*   < > 69 114   CHOLHDLRATIO  --   --   --   --   --  3.0 3.7    < > = values in this interval not displayed.       Impression / Plan     1. Diabetes Mellitus: Cystic Fibrosis-related    Current glycemic control can be considered good.  She does have glucose intolerance.   She also has reactive hypoglycemia, and in the past did not do well with insulin due to hypoglycemia    Patient is interested in CGM such as DEXCOM    I discussed the case with Dr. Prieto, and he will follow up with her with regards to possibly enrolling in a clinical trial with CGM.    For now, treatment was not advised, given prior history of hypoglycemia, and relatively stable course of her CF.      2. Diabetes Complications: With none.     Test and/or medications prescribed today:  Orders Placed This Encounter   Procedures     Hemoglobin A1c POCT         Follow up: in CFRD clinic      Joel Washington MD  Endocrinology, Diabetes and Metabolism  [facility]

## 2019-11-12 NOTE — PROGRESS NOTES
Endocrinology Clinic Visit 2019  NAME:  Mamie Rice  PCP:  Jethro Kramer  MRN:  [id number]  Reason for Consult:  CF related diabetes  Requesting Provider:  Gavi Allison    Chief Complaint     Chief Complaint   Patient presents with     Consult     dm 2       History of Present Illness     Mamie Rice is a 24 year old female who is seen in clinic for diabetes management.   She is new to me today.    Patient saw Dr. Prieto in 2017 for a consultation for CFRD, diagnosed in  based on abnormal OGTT.   She was seen by Dr. Luque, and treated with Novolog (2-3 units) before meals.   Her HbA1c has always been in the 5 range. A1c when she saw Dr. Prieto in  was 5.5% on no medications.    On 19 she has a 75g OGTT. Baseline (fasting) BG was 82. Post-glucose values: peaked at 237 mg/dl at 2 hours.    Her HbA1c today is 5.4%.     The patient mainly reports Hypoglycemia symptoms (LH, headache). Resolves when she eats carbs.   No pattern to hypoglycemia.No LOC.    She also is seen in the medical weight management clinic by Dr. Randa Ho and is on a combination of bupropion, naltrexone and topiramate.     Diabetes Care:   Eyes: last eye exam >1 yr ago, no DR  Kidneys: urine microalbumine was 12.56 Ag 2019  Nerves: history of neuropathy more related to vitamin B12 deficiency, now better  Smoking: no  No macrovascular, lipid or BP issues.     Associated Signs/Symptoms  Hypoglycemia: feels lows at <70  Hyperglycemia: increased thirst.  Neuropathy: no numbness or pain in extremities  Vascular Symtpoms: no claudication or edema  Angina/CHF: not chest pain, no CRUM, no orthopnea  Ulcers: No  Amputations: No    Current treatment strategy: none    Blood Glucose Monitoring: Meter downloaded today in clinic and data reviewed as such:  Average B with range     Diet: watching her diet as she is losing weight    Exercise: dance competitions    Weight:   Wt Readings from Last 4  Encounters:   11/12/19 79.1 kg (174 lb 4.8 oz)   11/12/19 79.2 kg (174 lb 9.7 oz)   08/14/19 79.4 kg (175 lb)   08/06/19 81.2 kg (179 lb)       Problem List     Patient Active Problem List   Diagnosis     Hip joint pain     Aspergillosis (H)     Chronic maxillary sinusitis     Hypoglycemia     Type 1 diabetes mellitus (H)     Cystic fibrosis of the lung (H)     Chronic constipation     Frontal sinusitis     Overactive child     Back pain     Pancreatic insufficiency     Non morbid obesity due to excess calories     Acne vulgaris     Cystic fibrosis (H)     Diabetes mellitus, type 2 (H)     Persistent depressive disorder     Mild episode of recurrent major depressive disorder (H)     Insomnia, unspecified type     MECHE (generalized anxiety disorder)     Attention deficit hyperactivity disorder (ADHD), combined type        Medications     Current Outpatient Medications   Medication     acetaminophen (TYLENOL) 325 MG tablet     acetylcysteine (MUCOMYST) 20 % neb solution     albuterol (2.5 MG/3ML) 0.083% neb solution     albuterol (PROAIR HFA/PROVENTIL HFA/VENTOLIN HFA) 108 (90 Base) MCG/ACT Inhaler     amphetamine-dextroamphetamine (ADDERALL XR) 20 MG 24 hr capsule     azithromycin (ZITHROMAX) 500 MG tablet     beta carotene 06952 UNIT capsule     blood glucose (ACCU-CHEK SMARTVIEW) test strip     buPROPion (WELLBUTRIN XL) 300 MG 24 hr tablet     busPIRone (BUSPAR) 15 MG tablet     cetirizine (ZYRTEC) 10 MG tablet     FLUoxetine (PROZAC) 40 MG capsule     fluticasone (FLONASE) 50 MCG/ACT nasal spray     GLYCOPYRROLATE PO     hydrOXYzine (ATARAX) 25 MG tablet     MedroxyPROGESTERone Acetate (DEPO-PROVERA IM)     meropenem (MERREM) 500 MG vial     montelukast (SINGULAIR) 10 MG tablet     Multiple Vitamin (MULTIVITAMIN OR)     naltrexone (DEPADE/REVIA) 50 MG tablet     omeprazole (PRILOSEC) 40 MG DR capsule     phytonadione (MEPHYTON/VIT K) 5 MG tablet     polyethylene glycol (MIRALAX) powder     topiramate (TOPAMAX) 25  MG tablet     traZODone (DESYREL) 100 MG tablet     vitamin C (ASCORBIC ACID) 500 MG tablet     VITAMIN D3 1000 units tablet     VITAMIN E 400 units capsule     amphetamine-dextroamphetamine (ADDERALL XR) 20 MG 24 hr capsule     amphetamine-dextroamphetamine (ADDERALL XR) 20 MG 24 hr capsule     No current facility-administered medications for this visit.         Allergies     Allergies   Allergen Reactions     Vancomycin Hives     Redmens skin rash   Redmens skin rash      Augmentin Rash       Medical / Surgical History     Past Medical History:   Diagnosis Date     ADHD (attention deficit hyperactivity disorder)      Anxiety      Aspergillosis, with pneumonia (H)     fugus found caused chest pain     Chronic infection     CF, MRSA.,      Chronic sinusitis      Constipation, chronic      Cystic fibrosis with pulmonary manifestations (H) 12/19/2011     Cystic fibrosis without mention of meconium ileus     SWEAT TEST:Date: 2/17/1994   [facility]  296 mg, 104 mmol/L ClSample #2  295 mg, 104 mmol/L Cl GENOTYPING:Date: 10/15/2007,  Laboratory: [facility]: [id number]     Depressive disorder      Diabetes     no meds currently     Dysthymic disorder      Exocrine pancreatic insufficiency      Gastro-oesophageal reflux disease      Hip pain, right      MRSA (methicillin resistant Staphylococcus aureus) carrier      Pancreatic disease      Past Surgical History:   Procedure Laterality Date     ARTHROSCOPY HIP, OSTEOPLASTY FEMUR PROXIMAL, COMBINED  3/11/2013    Procedure: COMBINED ARTHROSCOPY HIP, OSTEOPLASTY FEMUR PROXIMAL;  Right Hip Arthroscopy, Labral  Debridement.    surgeon request choice anesthesia/admit to [facility] after surgery;  Surgeon: Omkar Austin MD;  [facility]     ARTHROSCOPY KNEE WITH MEDIAL MENISCECTOMY Left 1/31/2017    Procedure: ARTHROSCOPY KNEE WITH MEDIAL MENISCECTOMY;  Surgeon: Jethro Coyle MD;  [facility]     bronchoscopies       BRONCHOSCOPY        EXAM UNDER ANESTHESIA ANUS N/A 5/10/2016    Procedure: EXAM UNDER ANESTHESIA ANUS;  Surgeon: Chet Gaviria MD;  [facility]     EXAM UNDER ANESTHESIA, RESTORATIONS, EXTRACTION(S) DENTAL COMPLEX, COMBINED  5/13/2013    Procedure: COMBINED EXAM UNDER ANESTHESIA, RESTORATIONS, EXTRACTION(S) DENTAL COMPLEX;  Dental Exam, Radiographs, Restorations. Single Extraction  Tooth #2. Restorations x 3;  Surgeon: Danilo Ortiz DDS;  [facility]     HC KNEE SCOPE, DIAGNOSTIC      Arthroscopy, Knee- left     INJECT BOTOX N/A 5/10/2016    Procedure: INJECT BOTOX;  Surgeon: Chet Gaviria MD;  [facility]     left hip labral tear  5/11/2011    left hip arthroscopy with labral debridement and synovectomy     meniscus repair       OPTICAL TRACKING SYSTEM ENDOSCOPIC SINUS SURGERY  10/14/2011    Procedure:OPTICAL TRACKING SYSTEM ENDOSCOPIC SINUS SURGERY; FESS (functional endoscopic sinus surgery) with Image Guidance, bronchial lavage and cultures; Surgeon:GOYO KUO; Location[facility]     OPTICAL TRACKING SYSTEM ENDOSCOPIC SINUS SURGERY  5/18/2012    Procedure:OPTICAL TRACKING SYSTEM ENDOSCOPIC SINUS SURGERY; Right  and Left Image Guided Functional Endoscopic Sinus Surgery With  Frontal Approach, [employer]; Surgeon:GOYO KUO; [facility]     OPTICAL TRACKING SYSTEM ENDOSCOPIC SINUS SURGERY  9/26/2012    Procedure: OPTICAL TRACKING SYSTEM ENDOSCOPIC SINUS SURGERY;  Stealth Guided Bilateral Functional Endoscopic Sinus Surgery *Latex Safe*;  Surgeon: Goyo Kuo MD;  [facility]     OPTICAL TRACKING SYSTEM ENDOSCOPIC SINUS SURGERY Bilateral 10/16/2015    Procedure: OPTICAL TRACKING SYSTEM ENDOSCOPIC SINUS SURGERY;  Surgeon: Mariela Haile MD;  [facility]     ORTHOPEDIC SURGERY      left hip tear repair 2010     SINUS SURGERY         Social History     Social History     Socioeconomic History     Marital status: Single     Spouse name: Not on file     Number of children: Not on  file     Years of education: Not on file     Highest education level: Not on file   Occupational History     Not on file   Social Needs     Financial resource strain: Not on file     Food insecurity:     Worry: Not on file     Inability: Not on file     Transportation needs:     Medical: Not on file     Non-medical: Not on file   Tobacco Use     Smoking status: Never Smoker     Smokeless tobacco: Never Used     Tobacco comment: one person at home smokes outside   Substance and Sexual Activity     Alcohol use: No     Alcohol/week: 0.0 standard drinks     Drug use: No     Sexual activity: Never   Lifestyle     Physical activity:     Days per week: Not on file     Minutes per session: Not on file     Stress: Not on file   Relationships     Social connections:     Talks on phone: Not on file     Gets together: Not on file     Attends Anglican service: Not on file     Active member of club or organization: Not on file     Attends meetings of clubs or organizations: Not on file     Relationship status: Not on file     Intimate partner violence:     Fear of current or ex partner: Not on file     Emotionally abused: Not on file     Physically abused: Not on file     Forced sexual activity: Not on file   Other Topics Concern      Service Not Asked     Blood Transfusions No     Caffeine Concern Not Asked     Occupational Exposure Not Asked     Hobby Hazards Not Asked     Sleep Concern Not Asked     Stress Concern Not Asked     Weight Concern Not Asked     Special Diet Not Asked     Back Care Not Asked     Exercise Yes     Bike Helmet Not Asked     Seat Belt Not Asked     Self-Exams Not Asked     Parent/sibling w/ CABG, MI or angioplasty before 65F 55M? Not Asked   Social History Narrative    Mamie lives with mother in [place], [place].  There is a cat in the home, but Mamie does not have any litterbox duties.  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  She gets essentially no exercise because of the tingling  "in her feet (says it bothers her even to stand).        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        8/2015 no change in social situation        2/15/2016 Pt is single and lives with mother and stepfather.       Family History     Family History   Problem Relation Age of Onset     Cancer Paternal Grandmother      Skin Cancer Paternal Grandmother      Other Cancer Paternal Grandmother         Skin     Obesity Paternal Grandmother      Cancer Paternal Grandfather         PGF had throat cancer (he was a smoker)     Other Cancer Paternal Grandfather      Anxiety Disorder Paternal Grandfather      Thyroid Disease Mother         ,     Obesity Other      Anesthesia Reaction No family hx of      Blood Disease No family hx of      Colon Polyps No family hx of      Crohn's Disease No family hx of      Ulcerative Colitis No family hx of      Colon Cancer No family hx of      Melanoma No family hx of        ROS     Constitutional: no fevers, chills, night sweats. No weight loss or fatigue. Good appetite  Eyes: no vision changes, no eye redness, no diplopia  Ears, Nose, mouth, throat: no hearing changes, no tinnitus, no rhinorrhea, no nasal congestion  Cardiovascular: no chest pain, no orthopnea or PND, no edema, no palpitations  Respiratory: no dyspnea, no cough, no sputum, no wheezing  Gastrointestinal: no nausea, no vomiting, no abdominal pain, no diarrhea, no constipation  Genitourinary: no dysuria, no frequency, no urgency, no nocturia  Musculoskeletal: no joint pains, no back pain, no cramps, no fractures  Skin: no rash, no itching, no dryness, no ulcers, no hair loss  Neurological: no headache, no weakness, no numbness, no dizziness, no tremors  Psychiatric: no anxiety, no sadness  Hematologic/lymphatic: no easy bruising, no bleeding, no palor    Physical Exam   /77   Pulse 90   Ht 1.57 m (5' 1.8\")   Wt 79.1 kg (174 lb 4.8 oz)   BMI 32.09 " kg/m       General: Comfortable, no obvious distress, normal body habitus  Eyes: Sclera anicteric, moist conjunctiva  HENT: Atraumatic, oropharynx clear, moist mucous membranes with no mucosal ulcerations  Neck: Trachea midline, supple. Thyroid: Thyroid is normal in size and texture  CV: Regular rhythm, normal rate. No murmurs auscultated  Resp: Clear to auscultation bilaterally, good effort  Abdomen:  Soft, non tender, non distended. Bowel sounds heard. No organomegaly.  Skin: No rashes, lesions, or subcutaneous nodules.   Psych: Alert and oriented x 3. Appropriate affect, good insight  Extremities: No peripheral edema  Foot exam:  Pulses:  Dorsalis pedis: present bilaterally  Posterior tibial: present bilaterally  Foot exam:  Vibration/Light touch: sensation present bilaterally  Skin of foot: intact bilaterally  Musculoskeletal: Appropriate muscle bulk and strength  Lymphatic: No cervical lymphadenopathy  Neuro: Moves all four extremities. No focal deficits on limited exam. Gait normal.       Labs/Imaging and Outside Records     Pertinent Labs were reviewed and updated in EPIC.    Summary of recent findings:   Lab Results   Component Value Date    A1C 5.1 08/14/2019    A1C 5.5 08/02/2017    A1C 5.6 05/04/2017    A1C 5.5 02/07/2017    A1C 5.5 08/02/2016       TSH   Date Value Ref Range Status   08/14/2019 1.08 0.40 - 4.00 mU/L Final   08/02/2017 1.66 0.40 - 4.00 mU/L Final   05/04/2017 2.91 0.40 - 4.00 mU/L Final   08/02/2016 1.36 0.40 - 4.00 mU/L Final   03/30/2015 1.69 0.40 - 4.00 mU/L Final     Comment:     Effective 7/30/2014, the reference range for this assay has changed to reflect   new instrumentation/methodology.       T4 Free   Date Value Ref Range Status   10/18/2011 1.21 0.70 - 1.85 ng/dL Final       Creatinine   Date Value Ref Range Status   08/14/2019 0.85 0.52 - 1.04 mg/dL Final       Recent Labs   Lab Test 08/14/19  0814 10/16/17  1543  08/02/17  0654  03/12/14  0830 03/21/13  0735   CHOL 92  --    --  112   < > 97 105   HDL 35*  --   --  31*   < > 33* 28*   LDL 42  --   --  49   < > 50 54   TRIG 75 125   < > 160*   < > 69 114   CHOLHDLRATIO  --   --   --   --   --  3.0 3.7    < > = values in this interval not displayed.       Impression / Plan     1. Diabetes Mellitus: Cystic Fibrosis-related    Current glycemic control can be considered good.  She does have glucose intolerance.   She also has reactive hypoglycemia, and in the past did not do well with insulin due to hypoglycemia    Patient is interested in CGM such as DEXCOM    I discussed the case with Dr. Prieto, and he will follow up with her with regards to possibly enrolling in a clinical trial with CGM.    For now, treatment was not advised, given prior history of hypoglycemia, and relatively stable course of her CF.      2. Diabetes Complications: With none.     Test and/or medications prescribed today:  Orders Placed This Encounter   Procedures     Hemoglobin A1c POCT         Follow up: in CFRD clinic      Joel Washington MD  Endocrinology, Diabetes and Metabolism  [facility]

## 2019-11-12 NOTE — PATIENT INSTRUCTIONS
Cystic Fibrosis Self-Care Plan       MRN: [id number]   Clinic Date: November 12, 2019   Patient: Mamie Rice     RECOMMENDATIONS:   Great job!  Good luck with surgery.  Will start the process to start trikafta.  Hepatic panel, CK and urine today.  Urology referral.    YOUR GOAL:  Enjoy the Holidays!!    CF Nurse Line:  Caitie Anderson and Lashae: [phone]   Jhoana Trinh, RT: [phone]     Amy Andino: [phone] or Theresa Ceja, Dieticians: [phone]   Rhonda Esteves, Diabetes Nurse: [phone]      Deyanira Figueroa: [phone] or Rosette Burnett [phone], Social Workers   [email or web address]    Annual Studies:   IGG   Date Value Ref Range Status   08/14/2019 755 695 - 1,620 mg/dL Final     Insulin   Date Value Ref Range Status   08/14/2019 41.0 (H) 3 - 25 mU/L Final     There are no preventive care reminders to display for this patient.      Pulmonary Function Tests  FEV1: amount of air you can blow out in 1 second  FVC: total amount of air you can take in and blow out    Your Goals:         PFT Latest Ref Rng & Units 11/12/2019   FVC L 3.50   FEV1 L 3.13   FVC% % 98   FEV1% % 102          Airway Clearance: The Most Important Way to Keep Your Lungs Healthy  Vest Settings:    Hill-Rom Frequencies: 8, 9, 10 Pressure 10 Then, Frequencies 18, 19, 20 Pressure 6      RespirTech: Quick Start with Pressure of     Do each frequency for 5 minutes; Deflate vest after each frequency & cough 3 times before beginning the next setting.    Vest and Neb Therapy should be done 2 times/day.    Good Nutrition Can Improve Lung Function and Overall Health     Take ALL of your vitamins with food     Take 1/2 of your enzymes before EVERY meal/snack and the other 1/2 mid-meal/snack    Wt Readings from Last 3 Encounters:   11/12/19 79.2 kg (174 lb 9.7 oz)   08/14/19 79.4 kg (175 lb)   08/06/19 81.2 kg (179 lb)       Body mass index is 32.14 kg/m .         National CF Foundation Recommendations for BMI in CF  Adults: Women: at least 22 Men: at least 23        Controlling Blood Sugars Helps Prevent Lung Infections & Improves Nutrition  Test blood sugar:     In the morning before eating (goal is )     2 hours after a meal (goal is less than 150)     When pre-meal glucose is greater than 150 add correction     At bedtime (if less than 100 eat a snack with 15 grams of carbohydrates  Last A1C Results:   Hemoglobin A1C   Date Value Ref Range Status   08/14/2019 5.1 0 - 5.6 % Final     Comment:     Normal <5.7% Prediabetes 5.7-6.4%  Diabetes 6.5% or higher - adopted from ADA   consensus guidelines.           If diabetic, measure A1C every 6 months. Goal: Under 7%    Staying Healthy    Research:  If you are interested in learning about research opportunities or have questions, please contact Mariana Smith at [phone] or [email or web address].      CF Foundation:  Compass is a personalized resource service to help you with the insurance, financial, legal and other issues you are facing.  It's free, confidential and available to anyone with CF.  Ask your  for more information or contact Compass directly at 518-COMPASS ([phone]) or compass@cff.org, or learn more at cff.org/compass.

## 2019-11-12 NOTE — PROGRESS NOTES
[facility]  November 12, 2019         Assessment and Plan:   Mamie Rice is a 24 year old female with cystic fibrosis.    1. CF lung disease with normal spirometry:  Mamie continues to show evidence of excellent stability in her pulmonary function as well as her pulmonary symptomatology.  She does continue to show evidence of Staph in her sputum.  At this time, I would recommend:   -- That she continue on her present bronchial drainage and nebulized therapies.     2.  CF-related sinus disease.  Mamie has been followed by Dr. Haile.  She has not had recent visits because of stable sinus symptoms.  I would recommend that she continue to follow up with her as needed.     3.  Abnormal glucose tolerance.  Mamie again shows an abnormal glucose tolerance test.  However, her A1c does remain normal.  She is scheduled to see Dr. Prieto today to address this.     4.  Left knee arthroscopic surgery.  Mamie is due to have her fourth surgery on this knee related to meniscus tear.  I defer to her primary for management of this.       5.  Genitourinary.  Mamie describes incomplete bladder emptying and has had a normal urinalysis in the past.  I have referred her on to Urology for further evaluation.     6.  Psychosocial.  Mamie continues to live at home with her mom.  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  She reports that in general things are going well.     7. Pancreatic sufficiency:  The patient has no new symptoms consistent with worsening malabsorption.    - the patient dose not take pancreatic enzymes  - continue vitamin supplementation.    8. CFTR modulator therapy:  Mamie and I discussed the risks and benefits of trikafta.  We will move forward with the prescription process.    Annual studies due: 8/2020  Immunizations:  Flu 10/2019 in [place]  Colonoscopy: NA    Gavi Allison MD MPH  Associate Professor of Medicine  Pulmonary,  Allergy, Critical Care and Sleep Medicine      Interval History:     Mamie does report that her cough frequency and sputum volume are stable.  Her activity tolerance remains good.  She does perform 2 vest therapies per day.          Review of Systems:     CONSTITUTIONAL: no fever, no chills, no sweats, no change in weight, no change in energy, no change in appetite    INTEGUMENTARY/SKIN: no rash, no obvious new lesions    ENT/MOUTH: no sore throat, no new sinus pain, no new nasal drainage, no ear ringing     RESPIRATORY: see interval history    CV: no chest pain, no palpitations, no peripheral edema    GI: no nausea, no vomiting, no change in stools, no fatty stools, no GERD    : no dysuria, no urinary frequency, + difficulty with bladder emptying    MUSCULOSKELETAL: left knee pain    ENDOCRINE: no excessive thirst    NEURO:  No headache, no numbness, no tingling    SLEEP: no issues    PSYCHIATRIC: mood stable          Past Medical and Surgical History:     Past Medical History:   Diagnosis Date     ADHD (attention deficit hyperactivity disorder)      Anxiety      Aspergillosis, with pneumonia (H)     fugus found caused chest pain     Chronic infection     CF, MRSA.,      Chronic sinusitis      Constipation, chronic      Cystic fibrosis with pulmonary manifestations (H) 12/19/2011     Cystic fibrosis without mention of meconium ileus     SWEAT TEST:Date: 2/17/1994   [facility]  296 mg, 104 mmol/L ClSample #2  295 mg, 104 mmol/L Cl GENOTYPING:Date: 10/15/2007,  Laboratory: [facility]: [id number]     Depressive disorder      Diabetes     no meds currently     Dysthymic disorder      Exocrine pancreatic insufficiency      Gastro-oesophageal reflux disease      Hip pain, right      MRSA (methicillin resistant Staphylococcus aureus) carrier      Pancreatic disease      Past Surgical History:   Procedure Laterality Date     ARTHROSCOPY HIP, OSTEOPLASTY FEMUR PROXIMAL, COMBINED  3/11/2013     Procedure: COMBINED ARTHROSCOPY HIP, OSTEOPLASTY FEMUR PROXIMAL;  Right Hip Arthroscopy, Labral  Debridement.    surgeon request choice anesthesia/admit to [facility] after surgery;  Surgeon: Omkar Austin MD;  [facility]: [facility] OR     ARTHROSCOPY KNEE WITH MEDIAL MENISCECTOMY Left 1/31/2017    Procedure: ARTHROSCOPY KNEE WITH MEDIAL MENISCECTOMY;  Surgeon: Jethro Coyle MD;  Location: [facility] OR     bronchoscopies       BRONCHOSCOPY       EXAM UNDER ANESTHESIA ANUS N/A 5/10/2016    Procedure: EXAM UNDER ANESTHESIA ANUS;  Surgeon: Chet Gaviria MD;  [facility]     EXAM UNDER ANESTHESIA, RESTORATIONS, EXTRACTION(S) DENTAL COMPLEX, COMBINED  5/13/2013    Procedure: COMBINED EXAM UNDER ANESTHESIA, RESTORATIONS, EXTRACTION(S) DENTAL COMPLEX;  Dental Exam, Radiographs, Restorations. Single Extraction  Tooth #2. Restorations x 3;  Surgeon: Danilo Ortiz DDS;  Location: [facility] OR     HC KNEE SCOPE, DIAGNOSTIC      Arthroscopy, Knee- left     INJECT BOTOX N/A 5/10/2016    Procedure: INJECT BOTOX;  Surgeon: Chet Gaviria MD;  [facility]     left hip labral tear  5/11/2011    left hip arthroscopy with labral debridement and synovectomy     meniscus repair       OPTICAL TRACKING SYSTEM ENDOSCOPIC SINUS SURGERY  10/14/2011    Procedure:OPTICAL TRACKING SYSTEM ENDOSCOPIC SINUS SURGERY; FESS (functional endoscopic sinus surgery) with Image Guidance, bronchial lavage and cultures; Surgeon:JUAN JOSE GARCIA; Location:[facility] OR     OPTICAL TRACKING SYSTEM ENDOSCOPIC SINUS SURGERY  5/18/2012    Procedure:OPTICAL TRACKING SYSTEM ENDOSCOPIC SINUS SURGERY; Right  and Left Image Guided Functional Endoscopic Sinus Surgery With  Frontal Approach, [employer]; Surgeon:JUAN JOSE GARCIA; Location[facility] OR     OPTICAL TRACKING SYSTEM ENDOSCOPIC SINUS SURGERY  9/26/2012    Procedure: OPTICAL TRACKING SYSTEM ENDOSCOPIC SINUS SURGERY;  Stealth Guided Bilateral Functional Endoscopic Sinus Surgery *Latex Safe*;  Surgeon:  Gyoo Kuo MD;  Location:  OR     OPTICAL TRACKING SYSTEM ENDOSCOPIC SINUS SURGERY Bilateral 10/16/2015    Procedure: OPTICAL TRACKING SYSTEM ENDOSCOPIC SINUS SURGERY;  Surgeon: Mariela Haile MD;  Location:  OR     ORTHOPEDIC SURGERY      left hip tear repair 2010     SINUS SURGERY             Family History:     Family History   Problem Relation Age of Onset     Cancer Paternal Grandmother      Skin Cancer Paternal Grandmother      Other Cancer Paternal Grandmother         Skin     Obesity Paternal Grandmother      Cancer Paternal Grandfather         PGF had throat cancer (he was a smoker)     Other Cancer Paternal Grandfather      Anxiety Disorder Paternal Grandfather      Thyroid Disease Mother         ,     Obesity Other      Anesthesia Reaction No family hx of      Blood Disease No family hx of      Colon Polyps No family hx of      Crohn's Disease No family hx of      Ulcerative Colitis No family hx of      Colon Cancer No family hx of      Melanoma No family hx of             Social History:     Social History     Socioeconomic History     Marital status: Single     Spouse name: Not on file     Number of children: Not on file     Years of education: Not on file     Highest education level: Not on file   Occupational History     Not on file   Social Needs     Financial resource strain: Not on file     Food insecurity:     Worry: Not on file     Inability: Not on file     Transportation needs:     Medical: Not on file     Non-medical: Not on file   Tobacco Use     Smoking status: Never Smoker     Smokeless tobacco: Never Used     Tobacco comment: one person at home smokes outside   Substance and Sexual Activity     Alcohol use: No     Alcohol/week: 0.0 standard drinks     Drug use: No     Sexual activity: Never   Lifestyle     Physical activity:     Days per week: Not on file     Minutes per session: Not on file     Stress: Not on file   Relationships     Social connections:     Talks on phone: Not on  file     Gets together: Not on file     Attends Druze service: Not on file     Active member of club or organization: Not on file     Attends meetings of clubs or organizations: Not on file     Relationship status: Not on file     Intimate partner violence:     Fear of current or ex partner: Not on file     Emotionally abused: Not on file     Physically abused: Not on file     Forced sexual activity: Not on file   Other Topics Concern      Service Not Asked     Blood Transfusions No     Caffeine Concern Not Asked     Occupational Exposure Not Asked     Hobby Hazards Not Asked     Sleep Concern Not Asked     Stress Concern Not Asked     Weight Concern Not Asked     Special Diet Not Asked     Back Care Not Asked     Exercise Yes     Bike Helmet Not Asked     Seat Belt Not Asked     Self-Exams Not Asked     Parent/sibling w/ CABG, MI or angioplasty before 65F 55M? Not Asked   Social History Narrative    Mamie lives with mother in [place], [place].  There is a cat in the home, but Mamie does not have any litterbox duties.  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  She gets essentially no exercise because of the tingling in her feet (says it bothers her even to stand).        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        8/2015 no change in social situation        2/15/2016 Pt is single and lives with mother and stepfather.            Medications:     Current Outpatient Medications   Medication     acetaminophen (TYLENOL) 325 MG tablet     acetylcysteine (MUCOMYST) 20 % neb solution     albuterol (2.5 MG/3ML) 0.083% neb solution     albuterol (PROAIR HFA/PROVENTIL HFA/VENTOLIN HFA) 108 (90 Base) MCG/ACT Inhaler     amphetamine-dextroamphetamine (ADDERALL XR) 20 MG 24 hr capsule     azithromycin (ZITHROMAX) 500 MG tablet     beta carotene 69078 UNIT capsule     blood glucose (ACCU-CHEK SMARTVIEW) test strip     buPROPion  "(WELLBUTRIN XL) 300 MG 24 hr tablet     busPIRone (BUSPAR) 15 MG tablet     cetirizine (ZYRTEC) 10 MG tablet     FLUoxetine (PROZAC) 40 MG capsule     fluticasone (FLONASE) 50 MCG/ACT nasal spray     GLYCOPYRROLATE PO     hydrOXYzine (ATARAX) 25 MG tablet     MedroxyPROGESTERone Acetate (DEPO-PROVERA IM)     meropenem (MERREM) 500 MG vial     montelukast (SINGULAIR) 10 MG tablet     Multiple Vitamin (MULTIVITAMIN OR)     naltrexone (DEPADE/REVIA) 50 MG tablet     omeprazole (PRILOSEC) 40 MG DR capsule     phytonadione (MEPHYTON/VIT K) 5 MG tablet     polyethylene glycol (MIRALAX) powder     topiramate (TOPAMAX) 25 MG tablet     traZODone (DESYREL) 100 MG tablet     vitamin C (ASCORBIC ACID) 500 MG tablet     VITAMIN D3 1000 units tablet     VITAMIN E 400 units capsule     amphetamine-dextroamphetamine (ADDERALL XR) 20 MG 24 hr capsule     amphetamine-dextroamphetamine (ADDERALL XR) 20 MG 24 hr capsule     No current facility-administered medications for this visit.             Physical Exam:   /75   Pulse 93   Resp 17   Ht 1.57 m (5' 1.8\")   Wt 79.2 kg (174 lb 9.7 oz)   SpO2 98%   BMI 32.14 kg/m      Constitutional:   Awake, alert and in no apparent distress     Eyes:   nonicteric     ENT:   oral mucosa moist without lesions, normal tm bilaterally, bilateral mucosal edema      Neck:   Supple without supraclavicular or cervical lymphadenopathy     Lungs:   Good air flow.  No crackles. No rhonchi.  No wheezes.     Cardiovascular:   Normal S1 and S2.  RRR.  No murmur, gallop or rub.     Abdomen:   NABS, soft, nontender, nondistended.      Musculoskeletal:   No edema, digital clubbing present     Neurologic:   Alert and conversant.     Skin:   Warm, dry.  No rash on limited exam.             Data:   All laboratory and imaging data reviewed.    Cystic Fibrosis Culture  Specimen Description   Date Value Ref Range Status   11/12/2019 Midstream Urine  Final   11/12/2019 Throat  Final   06/05/2019 Throat  Final "    Culture Micro   Date Value Ref Range Status   11/12/2019 PENDING  Preliminary   11/12/2019 PENDING  Preliminary   06/05/2019 Light growth  Normal roberto carlos    Final   06/05/2019 (A)  Final    Light growth  Staphylococcus aureus  This isolate is presumed to be clindamycin resistant based on detection of inducible   clindamycin resistance. Erythromycin and clindamycin are resistant, therefore, they are   not recommended for use.     06/05/2019 (A)  Final    Light growth  Strain 2  Staphylococcus aureus  This isolate is presumed to be clindamycin resistant based on detection of inducible   clindamycin resistance. Erythromycin and clindamycin are resistant, therefore, they are   not recommended for use.          Recent Results (from the past 168 hour(s))   Hemoglobin A1c POCT    Collection Time: 11/12/19 12:00 AM   Result Value Ref Range    Hemoglobin A1C 5.4 4.3 - 6 %   General PFT Lab (Please always keep checked)    Collection Time: 11/12/19  7:30 AM   Result Value Ref Range    FVC-Pred 3.55 L    FVC-Pre 3.50 L    FVC-%Pred-Pre 98 %    FEV1-Pre 3.13 L    FEV1-%Pred-Pre 102 %    FEV1FVC-Pred 86 %    FEV1FVC-Pre 89 %    FEFMax-Pred 6.65 L/sec    FEFMax-Pre 8.51 L/sec    FEFMax-%Pred-Pre 127 %    FEF2575-Pred 3.58 L/sec    FEF2575-Pre 4.41 L/sec    YOP9985-%Pred-Pre 123 %    ExpTime-Pre 5.81 sec    FIFMax-Pre 4.29 L/sec    FEV1FEV6-Pred 86 %    FEV1FEV6-Pre 89 %   Cystic Fibrosis Culture Aerob Bacterial    Collection Time: 11/12/19  7:55 AM   Result Value Ref Range    Specimen Description Throat     Special Requests Specimen collected in eSwab transport (white cap)     Culture Micro PENDING    CK total    Collection Time: 11/12/19  9:47 AM   Result Value Ref Range    CK Total 78 30 - 225 U/L   Hepatic panel    Collection Time: 11/12/19  9:47 AM   Result Value Ref Range    Bilirubin Direct <0.1 0.0 - 0.2 mg/dL    Bilirubin Total 0.2 0.2 - 1.3 mg/dL    Albumin 3.6 3.4 - 5.0 g/dL    Protein Total 7.1 6.8 - 8.8 g/dL     Alkaline Phosphatase 95 40 - 150 U/L    ALT 68 (H) 0 - 50 U/L    AST 18 0 - 45 U/L   UA with Microscopic reflex to Culture    Collection Time: 11/12/19  9:52 AM   Result Value Ref Range    Color Urine Yellow     Appearance Urine Slightly Cloudy     Glucose Urine Negative NEG^Negative mg/dL    Bilirubin Urine Negative NEG^Negative    Ketones Urine Negative NEG^Negative mg/dL    Specific Gravity Urine 1.013 1.003 - 1.035    Blood Urine Small (A) NEG^Negative    pH Urine 6.0 5.0 - 7.0 pH    Protein Albumin Urine Negative NEG^Negative mg/dL    Urobilinogen mg/dL 0.0 0.0 - 2.0 mg/dL    Nitrite Urine Negative NEG^Negative    Leukocyte Esterase Urine Moderate (A) NEG^Negative    Source Midstream Urine     WBC Urine 21 (H) 0 - 5 /HPF    RBC Urine 5 (H) 0 - 2 /HPF    Squamous Epithelial /HPF Urine 5 (H) 0 - 1 /HPF    Transitional Epi 3 (A) FEW^Few /HPF    Mucous Urine Present (A) NEG^Negative /LPF   Urine Culture Aerobic Bacterial    Collection Time: 11/12/19  9:52 AM   Result Value Ref Range    Specimen Description Midstream Urine     Special Requests Specimen received in preservative     Culture Micro PENDING        @[email or web address](prottotal:4,albumin:4,bilitotal:4,alkphos:4,ast:4,alt:4, cktotal:4)@    PFT: The spirometry is normal.  When compared to 6/5/2019, the FEV1 and FVC have little change.      Pulmonary exacerbation: absent

## 2019-11-12 NOTE — PROGRESS NOTES
SUBJECTIVE/OBJECTIVE:                Mamie Rice is a 24 year old female seen for a follow-up visit for Medication Therapy Management.  She was referred to me from Dr. Allison.     Chief Complaint: Follow up from [facility] visit on 3/7/19 with Gris Adame.  Focused follow-up on CF/CFTR modulator today.   Tobacco:  reports that she has never smoked. She has never used smokeless tobacco.  Alcohol: not currently using    Medication Adherence/Access:  no issues reported    CF/CFTR: Patient is eligible for treatment with CFTR modulator therapy. Most appropriate choice for patient of currently available CFTR modulators is: elexacaftor/tezacaftor/ivacaftor based on age, CFTR mutation genotype, past medical history and current medications. Patient was previously naive to CFTR modulator.    Recommended dose two orange elexacaftor 100mg/tezacaftor 50mg/ivacaftor 75mg in the morning and one blue ivacaftor 150mg tablet in the evening    Dose should be given with age-appropriate fat containing food. Provided appropriate examples of fat-containing foods to patient (e.g. Whole milk, cheese, avocado, peanut butter).    Patient will not need dilated eye exam prior to initiation.    Baseline LFTs drawn and are pending Recommend continuing to monitor LFTs quarterly for the first year of treatment then annually therafter.  Additional recommended monitoring CK on same schedule as LFT.    Educated patient on potential side effects, including headache, GI disturbances, and rash.  Education provided on how to administer medication, what to do if a dose is missed, monitoring prior to and while on therapy, medications/foods to avoid, and fat containing food ideas.  Discussed with patient how to obtain CFTR modulator from specialty pharmacy and informed patient they will need to bring home supply if hospitalized. Patient was engaged in teaching and verbalized understanding.  Asked Mamie to please let us know of any other medication changes that  other providers make to ensure we can check for drug-drug interactions.     Patient enrolled in [employer] [facility], paperwork signed in clinic today .        ASSESSMENT:                  Medication Adherence: excellent, no issues identified    CF: Stable. Patient would benefit from starting Trikafta once labs are returned and WNL.         PLAN:                  Once your labs are resulted I will let you know the plan for starting Trikafta. We will submit for insurance today. Once approved you will take 2 orange tabs in the morning and 1 blue tab in the evening with fat containing foods.     I spent 15 minutes with this patient today. All changes were made via verbal approval with Dr. Gavi lAlison. A copy of the visit note was provided to the patient's referring provider.     Will follow up in 3 months to assess response to Trikafta.    The patient declined a summary of these recommendations as an after visit summary.    Maira Stephenson PharmD  CF Clinic Pharmacist  Phone: [phone]  E-mail: [email or web address]

## 2019-11-12 NOTE — LETTER
11/12/2019      RE: Mamie Rice  [address] [facility] [address]       [facility]  November 12, 2019         Assessment and Plan:   Mamie Rice is a 24 year old female with cystic fibrosis.    1. CF lung disease with normal spirometry:  Mamie continues to show evidence of excellent stability in her pulmonary function as well as her pulmonary symptomatology.  She does continue to show evidence of Staph in her sputum.  At this time, I would recommend:   -- That she continue on her present bronchial drainage and nebulized therapies.     2.  CF-related sinus disease.  Mamie has been followed by Dr. Haile.  She has not had recent visits because of stable sinus symptoms.  I would recommend that she continue to follow up with her as needed.     3.  Abnormal glucose tolerance.  Mamie again shows an abnormal glucose tolerance test.  However, her A1c does remain normal.  She is scheduled to see Dr. Prieto today to address this.     4.  Left knee arthroscopic surgery.  Mamie is due to have her fourth surgery on this knee related to meniscus tear.  I defer to her primary for management of this.       5.  Genitourinary.  Mamie describes incomplete bladder emptying and has had a normal urinalysis in the past.  I have referred her on to Urology for further evaluation.     6.  Psychosocial.  Mamie continues to live at home with her mom.  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  She reports that in general things are going well.     7. Pancreatic sufficiency:  The patient has no new symptoms consistent with worsening malabsorption.    - the patient dose not take pancreatic enzymes  - continue vitamin supplementation.    8. CFTR modulator therapy:  Mamie and I discussed the risks and benefits of trikafta.  We will move forward with the prescription process.    Annual studies due: 8/2020  Immunizations:  Flu 10/2019 in [place]  Falls  Colonoscopy: NA    Gavi Allison MD MPH  Associate Professor of Medicine  Pulmonary, Allergy, Critical Care and Sleep Medicine      Interval History:     Mamie does report that her cough frequency and sputum volume are stable.  Her activity tolerance remains good.  She does perform 2 vest therapies per day.          Review of Systems:     CONSTITUTIONAL: no fever, no chills, no sweats, no change in weight, no change in energy, no change in appetite    INTEGUMENTARY/SKIN: no rash, no obvious new lesions    ENT/MOUTH: no sore throat, no new sinus pain, no new nasal drainage, no ear ringing     RESPIRATORY: see interval history    CV: no chest pain, no palpitations, no peripheral edema    GI: no nausea, no vomiting, no change in stools, no fatty stools, no GERD    : no dysuria, no urinary frequency, + difficulty with bladder emptying    MUSCULOSKELETAL: left knee pain    ENDOCRINE: no excessive thirst    NEURO:  No headache, no numbness, no tingling    SLEEP: no issues    PSYCHIATRIC: mood stable          Past Medical and Surgical History:     Past Medical History:   Diagnosis Date     ADHD (attention deficit hyperactivity disorder)      Anxiety      Aspergillosis, with pneumonia (H)     fugus found caused chest pain     Chronic infection     CF, MRSA.,      Chronic sinusitis      Constipation, chronic      Cystic fibrosis with pulmonary manifestations (H) 12/19/2011     Cystic fibrosis without mention of meconium ileus     SWEAT TEST:Date: 2/17/1994   [facility]  296 mg, 104 mmol/L ClSample #2  295 mg, 104 mmol/L Cl GENOTYPING:Date: 10/15/2007,  Laboratory: [facility]: [id number]     Depressive disorder      Diabetes     no meds currently     Dysthymic disorder      Exocrine pancreatic insufficiency      Gastro-oesophageal reflux disease      Hip pain, right      MRSA (methicillin resistant Staphylococcus aureus) carrier      Pancreatic disease      Past Surgical History:    Procedure Laterality Date     ARTHROSCOPY HIP, OSTEOPLASTY FEMUR PROXIMAL, COMBINED  3/11/2013    Procedure: COMBINED ARTHROSCOPY HIP, OSTEOPLASTY FEMUR PROXIMAL;  Right Hip Arthroscopy, Labral  Debridement.    surgeon request choice anesthesia/admit to [facility] after surgery;  Surgeon: Omkar Austin MD;  [facility]     ARTHROSCOPY KNEE WITH MEDIAL MENISCECTOMY Left 1/31/2017    Procedure: ARTHROSCOPY KNEE WITH MEDIAL MENISCECTOMY;  Surgeon: Jethro Coyle MD;  [facility] OR     bronchoscopies       BRONCHOSCOPY       EXAM UNDER ANESTHESIA ANUS N/A 5/10/2016    Procedure: EXAM UNDER ANESTHESIA ANUS;  Surgeon: Chet Gaviria MD;  [facility]     EXAM UNDER ANESTHESIA, RESTORATIONS, EXTRACTION(S) DENTAL COMPLEX, COMBINED  5/13/2013    Procedure: COMBINED EXAM UNDER ANESTHESIA, RESTORATIONS, EXTRACTION(S) DENTAL COMPLEX;  Dental Exam, Radiographs, Restorations. Single Extraction  Tooth #2. Restorations x 3;  Surgeon: Danilo Ortiz DDS;  [facility] OR     HC KNEE SCOPE, DIAGNOSTIC      Arthroscopy, Knee- left     INJECT BOTOX N/A 5/10/2016    Procedure: INJECT BOTOX;  Surgeon: Chet Gaviria MD;  [facility]     left hip labral tear  5/11/2011    left hip arthroscopy with labral debridement and synovectomy     meniscus repair       OPTICAL TRACKING SYSTEM ENDOSCOPIC SINUS SURGERY  10/14/2011    Procedure:OPTICAL TRACKING SYSTEM ENDOSCOPIC SINUS SURGERY; FESS (functional endoscopic sinus surgery) with Image Guidance, bronchial lavage and cultures; Surgeon:JUAN JOSE GARCIA; Location:[facility] OR     OPTICAL TRACKING SYSTEM ENDOSCOPIC SINUS SURGERY  5/18/2012    Procedure:OPTICAL TRACKING SYSTEM ENDOSCOPIC SINUS SURGERY; Right  and Left Image Guided Functional Endoscopic Sinus Surgery With  Frontal Approach, [employer]; Surgeon:JUAN JOSE GARCIA; [facility] OR     OPTICAL TRACKING SYSTEM ENDOSCOPIC SINUS SURGERY  9/26/2012    Procedure: OPTICAL TRACKING SYSTEM ENDOSCOPIC  SINUS SURGERY;  Stealth Guided Bilateral Functional Endoscopic Sinus Surgery *Latex Safe*;  Surgeon: Goyo Kuo MD;  [facility]     OPTICAL TRACKING SYSTEM ENDOSCOPIC SINUS SURGERY Bilateral 10/16/2015    Procedure: OPTICAL TRACKING SYSTEM ENDOSCOPIC SINUS SURGERY;  Surgeon: Mariela Haile MD;  Location:  OR     ORTHOPEDIC SURGERY      left hip tear repair 2010     SINUS SURGERY             Family History:     Family History   Problem Relation Age of Onset     Cancer Paternal Grandmother      Skin Cancer Paternal Grandmother      Other Cancer Paternal Grandmother         Skin     Obesity Paternal Grandmother      Cancer Paternal Grandfather         PGF had throat cancer (he was a smoker)     Other Cancer Paternal Grandfather      Anxiety Disorder Paternal Grandfather      Thyroid Disease Mother         ,     Obesity Other      Anesthesia Reaction No family hx of      Blood Disease No family hx of      Colon Polyps No family hx of      Crohn's Disease No family hx of      Ulcerative Colitis No family hx of      Colon Cancer No family hx of      Melanoma No family hx of             Social History:     Social History     Socioeconomic History     Marital status: Single     Spouse name: Not on file     Number of children: Not on file     Years of education: Not on file     Highest education level: Not on file   Occupational History     Not on file   Social Needs     Financial resource strain: Not on file     Food insecurity:     Worry: Not on file     Inability: Not on file     Transportation needs:     Medical: Not on file     Non-medical: Not on file   Tobacco Use     Smoking status: Never Smoker     Smokeless tobacco: Never Used     Tobacco comment: one person at home smokes outside   Substance and Sexual Activity     Alcohol use: No     Alcohol/week: 0.0 standard drinks     Drug use: No     Sexual activity: Never   Lifestyle     Physical activity:     Days per week: Not on file     Minutes per session:  Not on file     Stress: Not on file   Relationships     Social connections:     Talks on phone: Not on file     Gets together: Not on file     Attends Congregation service: Not on file     Active member of club or organization: Not on file     Attends meetings of clubs or organizations: Not on file     Relationship status: Not on file     Intimate partner violence:     Fear of current or ex partner: Not on file     Emotionally abused: Not on file     Physically abused: Not on file     Forced sexual activity: Not on file   Other Topics Concern      Service Not Asked     Blood Transfusions No     Caffeine Concern Not Asked     Occupational Exposure Not Asked     Hobby Hazards Not Asked     Sleep Concern Not Asked     Stress Concern Not Asked     Weight Concern Not Asked     Special Diet Not Asked     Back Care Not Asked     Exercise Yes     Bike Helmet Not Asked     Seat Belt Not Asked     Self-Exams Not Asked     Parent/sibling w/ CABG, MI or angioplasty before 65F 55M? Not Asked   Social History Narrative    Mamie lives with mother in [place], [place].  There is a cat in the home, but Mamie does not have any litterbox duties.  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  She gets essentially no exercise because of the tingling in her feet (says it bothers her even to stand).        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        8/2015 no change in social situation        2/15/2016 Pt is single and lives with mother and stepfather.            Medications:     Current Outpatient Medications   Medication     acetaminophen (TYLENOL) 325 MG tablet     acetylcysteine (MUCOMYST) 20 % neb solution     albuterol (2.5 MG/3ML) 0.083% neb solution     albuterol (PROAIR HFA/PROVENTIL HFA/VENTOLIN HFA) 108 (90 Base) MCG/ACT Inhaler     amphetamine-dextroamphetamine (ADDERALL XR) 20 MG 24 hr capsule     azithromycin (ZITHROMAX) 500 MG tablet      "beta carotene 69107 UNIT capsule     blood glucose (ACCU-CHEK SMARTVIEW) test strip     buPROPion (WELLBUTRIN XL) 300 MG 24 hr tablet     busPIRone (BUSPAR) 15 MG tablet     cetirizine (ZYRTEC) 10 MG tablet     FLUoxetine (PROZAC) 40 MG capsule     fluticasone (FLONASE) 50 MCG/ACT nasal spray     GLYCOPYRROLATE PO     hydrOXYzine (ATARAX) 25 MG tablet     MedroxyPROGESTERone Acetate (DEPO-PROVERA IM)     meropenem (MERREM) 500 MG vial     montelukast (SINGULAIR) 10 MG tablet     Multiple Vitamin (MULTIVITAMIN OR)     naltrexone (DEPADE/REVIA) 50 MG tablet     omeprazole (PRILOSEC) 40 MG DR capsule     phytonadione (MEPHYTON/VIT K) 5 MG tablet     polyethylene glycol (MIRALAX) powder     topiramate (TOPAMAX) 25 MG tablet     traZODone (DESYREL) 100 MG tablet     vitamin C (ASCORBIC ACID) 500 MG tablet     VITAMIN D3 1000 units tablet     VITAMIN E 400 units capsule     amphetamine-dextroamphetamine (ADDERALL XR) 20 MG 24 hr capsule     amphetamine-dextroamphetamine (ADDERALL XR) 20 MG 24 hr capsule     No current facility-administered medications for this visit.             Physical Exam:   /75   Pulse 93   Resp 17   Ht 1.57 m (5' 1.8\")   Wt 79.2 kg (174 lb 9.7 oz)   SpO2 98%   BMI 32.14 kg/m       Constitutional:   Awake, alert and in no apparent distress     Eyes:   nonicteric     ENT:   oral mucosa moist without lesions, normal tm bilaterally, bilateral mucosal edema      Neck:   Supple without supraclavicular or cervical lymphadenopathy     Lungs:   Good air flow.  No crackles. No rhonchi.  No wheezes.     Cardiovascular:   Normal S1 and S2.  RRR.  No murmur, gallop or rub.     Abdomen:   NABS, soft, nontender, nondistended.      Musculoskeletal:   No edema, digital clubbing present     Neurologic:   Alert and conversant.     Skin:   Warm, dry.  No rash on limited exam.             Data:   All laboratory and imaging data reviewed.    Cystic Fibrosis Culture  Specimen Description   Date Value Ref Range " Status   11/12/2019 Midstream Urine  Final   11/12/2019 Throat  Final   06/05/2019 Throat  Final    Culture Micro   Date Value Ref Range Status   11/12/2019 PENDING  Preliminary   11/12/2019 PENDING  Preliminary   06/05/2019 Light growth  Normal roberto carlos    Final   06/05/2019 (A)  Final    Light growth  Staphylococcus aureus  This isolate is presumed to be clindamycin resistant based on detection of inducible   clindamycin resistance. Erythromycin and clindamycin are resistant, therefore, they are   not recommended for use.     06/05/2019 (A)  Final    Light growth  Strain 2  Staphylococcus aureus  This isolate is presumed to be clindamycin resistant based on detection of inducible   clindamycin resistance. Erythromycin and clindamycin are resistant, therefore, they are   not recommended for use.          Recent Results (from the past 168 hour(s))   Hemoglobin A1c POCT    Collection Time: 11/12/19 12:00 AM   Result Value Ref Range    Hemoglobin A1C 5.4 4.3 - 6 %   General PFT Lab (Please always keep checked)    Collection Time: 11/12/19  7:30 AM   Result Value Ref Range    FVC-Pred 3.55 L    FVC-Pre 3.50 L    FVC-%Pred-Pre 98 %    FEV1-Pre 3.13 L    FEV1-%Pred-Pre 102 %    FEV1FVC-Pred 86 %    FEV1FVC-Pre 89 %    FEFMax-Pred 6.65 L/sec    FEFMax-Pre 8.51 L/sec    FEFMax-%Pred-Pre 127 %    FEF2575-Pred 3.58 L/sec    FEF2575-Pre 4.41 L/sec    ONI0053-%Pred-Pre 123 %    ExpTime-Pre 5.81 sec    FIFMax-Pre 4.29 L/sec    FEV1FEV6-Pred 86 %    FEV1FEV6-Pre 89 %   Cystic Fibrosis Culture Aerob Bacterial    Collection Time: 11/12/19  7:55 AM   Result Value Ref Range    Specimen Description Throat     Special Requests Specimen collected in eSwab transport (white cap)     Culture Micro PENDING    CK total    Collection Time: 11/12/19  9:47 AM   Result Value Ref Range    CK Total 78 30 - 225 U/L   Hepatic panel    Collection Time: 11/12/19  9:47 AM   Result Value Ref Range    Bilirubin Direct <0.1 0.0 - 0.2 mg/dL    Bilirubin  Total 0.2 0.2 - 1.3 mg/dL    Albumin 3.6 3.4 - 5.0 g/dL    Protein Total 7.1 6.8 - 8.8 g/dL    Alkaline Phosphatase 95 40 - 150 U/L    ALT 68 (H) 0 - 50 U/L    AST 18 0 - 45 U/L   UA with Microscopic reflex to Culture    Collection Time: 11/12/19  9:52 AM   Result Value Ref Range    Color Urine Yellow     Appearance Urine Slightly Cloudy     Glucose Urine Negative NEG^Negative mg/dL    Bilirubin Urine Negative NEG^Negative    Ketones Urine Negative NEG^Negative mg/dL    Specific Gravity Urine 1.013 1.003 - 1.035    Blood Urine Small (A) NEG^Negative    pH Urine 6.0 5.0 - 7.0 pH    Protein Albumin Urine Negative NEG^Negative mg/dL    Urobilinogen mg/dL 0.0 0.0 - 2.0 mg/dL    Nitrite Urine Negative NEG^Negative    Leukocyte Esterase Urine Moderate (A) NEG^Negative    Source Midstream Urine     WBC Urine 21 (H) 0 - 5 /HPF    RBC Urine 5 (H) 0 - 2 /HPF    Squamous Epithelial /HPF Urine 5 (H) 0 - 1 /HPF    Transitional Epi 3 (A) FEW^Few /HPF    Mucous Urine Present (A) NEG^Negative /LPF   Urine Culture Aerobic Bacterial    Collection Time: 11/12/19  9:52 AM   Result Value Ref Range    Specimen Description Midstream Urine     Special Requests Specimen received in preservative     Culture Micro PENDING        @[email or web address](prottotal:4,albumin:4,bilitotal:4,alkphos:4,ast:4,alt:4, cktotal:4)@    PFT: The spirometry is normal.  When compared to 6/5/2019, the FEV1 and FVC have little change.      Pulmonary exacerbation: absent        Gavi Allison MD

## 2019-11-12 NOTE — LETTER
11/12/2019       RE: Mamie Rice  [address] [facility] [id number]     Dear Colleague,    Thank you for referring your patient, Mamie Rice, to the [facility] at [facility]. Please see a copy of my visit note below.    [facility]  November 12, 2019         Assessment and Plan:   Mamie Rice is a 24 year old female with cystic fibrosis.    1. CF lung disease with normal spirometry:  Mamie continues to show evidence of excellent stability in her pulmonary function as well as her pulmonary symptomatology.  She does continue to show evidence of Staph in her sputum.  At this time, I would recommend:   -- That she continue on her present bronchial drainage and nebulized therapies.     2.  CF-related sinus disease.  Mamie has been followed by Dr. Haile.  She has not had recent visits because of stable sinus symptoms.  I would recommend that she continue to follow up with her as needed.     3.  Abnormal glucose tolerance.  Mamie again shows an abnormal glucose tolerance test.  However, her A1c does remain normal.  She is scheduled to see Dr. Prieto today to address this.     4.  Left knee arthroscopic surgery.  Mamie is due to have her fourth surgery on this knee related to meniscus tear.  I defer to her primary for management of this.       5.  Genitourinary.  Mamie describes incomplete bladder emptying and has had a normal urinalysis in the past.  I have referred her on to Urology for further evaluation.     6.  Psychosocial.  Mamie continues to live at home with her mom.  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  She reports that in general things are going well.     7. Pancreatic sufficiency:  The patient has no new symptoms consistent with worsening malabsorption.    - the patient dose not take pancreatic enzymes  - continue vitamin supplementation.    8.  CFTR modulator therapy:  Mamie and I discussed the risks and benefits of trikafta.  We will move forward with the prescription process.    Annual studies due: 8/2020  Immunizations:  Flu 10/2019 in [place]  Colonoscopy: NA    Gavi Allison MD MPH  Associate Professor of Medicine  Pulmonary, Allergy, Critical Care and Sleep Medicine      Interval History:     Mamie does report that her cough frequency and sputum volume are stable.  Her activity tolerance remains good.  She does perform 2 vest therapies per day.          Review of Systems:     CONSTITUTIONAL: no fever, no chills, no sweats, no change in weight, no change in energy, no change in appetite    INTEGUMENTARY/SKIN: no rash, no obvious new lesions    ENT/MOUTH: no sore throat, no new sinus pain, no new nasal drainage, no ear ringing     RESPIRATORY: see interval history    CV: no chest pain, no palpitations, no peripheral edema    GI: no nausea, no vomiting, no change in stools, no fatty stools, no GERD    : no dysuria, no urinary frequency, + difficulty with bladder emptying    MUSCULOSKELETAL: left knee pain    ENDOCRINE: no excessive thirst    NEURO:  No headache, no numbness, no tingling    SLEEP: no issues    PSYCHIATRIC: mood stable          Past Medical and Surgical History:     Past Medical History:   Diagnosis Date     ADHD (attention deficit hyperactivity disorder)      Anxiety      Aspergillosis, with pneumonia (H)     fugus found caused chest pain     Chronic infection     CF, MRSA.,      Chronic sinusitis      Constipation, chronic      Cystic fibrosis with pulmonary manifestations (H) 12/19/2011     Cystic fibrosis without mention of meconium ileus     SWEAT TEST:Date: 2/17/1994   [facility]  296 mg, 104 mmol/L ClSample #2  295 mg, 104 mmol/L Cl GENOTYPING:Date: 10/15/2007,  Laboratory: [facility]: [id number]     Depressive disorder      Diabetes     no meds currently     Dysthymic disorder      Exocrine  pancreatic insufficiency      Gastro-oesophageal reflux disease      Hip pain, right      MRSA (methicillin resistant Staphylococcus aureus) carrier      Pancreatic disease      Past Surgical History:   Procedure Laterality Date     ARTHROSCOPY HIP, OSTEOPLASTY FEMUR PROXIMAL, COMBINED  3/11/2013    Procedure: COMBINED ARTHROSCOPY HIP, OSTEOPLASTY FEMUR PROXIMAL;  Right Hip Arthroscopy, Labral  Debridement.    surgeon request choice anesthesia/admit to [facility] after surgery;  Surgeon: Omkar Austin MD;  [facility]     ARTHROSCOPY KNEE WITH MEDIAL MENISCECTOMY Left 1/31/2017    Procedure: ARTHROSCOPY KNEE WITH MEDIAL MENISCECTOMY;  Surgeon: Jethro Coyle MD;  Location: [facility] OR     bronchoscopies       BRONCHOSCOPY       EXAM UNDER ANESTHESIA ANUS N/A 5/10/2016    Procedure: EXAM UNDER ANESTHESIA ANUS;  Surgeon: Chet Gaviria MD;  [facility]     EXAM UNDER ANESTHESIA, RESTORATIONS, EXTRACTION(S) DENTAL COMPLEX, COMBINED  5/13/2013    Procedure: COMBINED EXAM UNDER ANESTHESIA, RESTORATIONS, EXTRACTION(S) DENTAL COMPLEX;  Dental Exam, Radiographs, Restorations. Single Extraction  Tooth #2. Restorations x 3;  Surgeon: Danilo Ortiz DDS;  Location: [facility] OR     HC KNEE SCOPE, DIAGNOSTIC      Arthroscopy, Knee- left     INJECT BOTOX N/A 5/10/2016    Procedure: INJECT BOTOX;  Surgeon: Chet Gaviria MD;  [facility]     left hip labral tear  5/11/2011    left hip arthroscopy with labral debridement and synovectomy     meniscus repair       OPTICAL TRACKING SYSTEM ENDOSCOPIC SINUS SURGERY  10/14/2011    Procedure:OPTICAL TRACKING SYSTEM ENDOSCOPIC SINUS SURGERY; FESS (functional endoscopic sinus surgery) with Image Guidance, bronchial lavage and cultures; Surgeon:JUAN JOSE GARCIA; Location:[facility] OR     OPTICAL TRACKING SYSTEM ENDOSCOPIC SINUS SURGERY  5/18/2012    Procedure:OPTICAL TRACKING SYSTEM ENDOSCOPIC SINUS SURGERY; Right  and Left Image Guided Functional Endoscopic  Sinus Surgery With  Frontal Approach, [employer]; Surgeon:GOYO KUO; Location[facility] OR     OPTICAL TRACKING SYSTEM ENDOSCOPIC SINUS SURGERY  9/26/2012    Procedure: OPTICAL TRACKING SYSTEM ENDOSCOPIC SINUS SURGERY;  Stealth Guided Bilateral Functional Endoscopic Sinus Surgery *Latex Safe*;  Surgeon: Goyo Kuo MD;  [facility]     OPTICAL TRACKING SYSTEM ENDOSCOPIC SINUS SURGERY Bilateral 10/16/2015    Procedure: OPTICAL TRACKING SYSTEM ENDOSCOPIC SINUS SURGERY;  Surgeon: Mariela Haile MD;  [facility]     ORTHOPEDIC SURGERY      left hip tear repair 2010     SINUS SURGERY             Family History:     Family History   Problem Relation Age of Onset     Cancer Paternal Grandmother      Skin Cancer Paternal Grandmother      Other Cancer Paternal Grandmother         Skin     Obesity Paternal Grandmother      Cancer Paternal Grandfather         PGF had throat cancer (he was a smoker)     Other Cancer Paternal Grandfather      Anxiety Disorder Paternal Grandfather      Thyroid Disease Mother         ,     Obesity Other      Anesthesia Reaction No family hx of      Blood Disease No family hx of      Colon Polyps No family hx of      Crohn's Disease No family hx of      Ulcerative Colitis No family hx of      Colon Cancer No family hx of      Melanoma No family hx of             Social History:     Social History     Socioeconomic History     Marital status: Single     Spouse name: Not on file     Number of children: Not on file     Years of education: Not on file     Highest education level: Not on file   Occupational History     Not on file   Social Needs     Financial resource strain: Not on file     Food insecurity:     Worry: Not on file     Inability: Not on file     Transportation needs:     Medical: Not on file     Non-medical: Not on file   Tobacco Use     Smoking status: Never Smoker     Smokeless tobacco: Never Used     Tobacco comment: one person at home smokes outside   Substance and Sexual  Activity     Alcohol use: No     Alcohol/week: 0.0 standard drinks     Drug use: No     Sexual activity: Never   Lifestyle     Physical activity:     Days per week: Not on file     Minutes per session: Not on file     Stress: Not on file   Relationships     Social connections:     Talks on phone: Not on file     Gets together: Not on file     Attends [employer] service: Not on file     Active member of club or organization: Not on file     Attends meetings of clubs or organizations: Not on file     Relationship status: Not on file     Intimate partner violence:     Fear of current or ex partner: Not on file     Emotionally abused: Not on file     Physically abused: Not on file     Forced sexual activity: Not on file   Other Topics Concern      Service Not Asked     Blood Transfusions No     Caffeine Concern Not Asked     Occupational Exposure Not Asked     Hobby Hazards Not Asked     Sleep Concern Not Asked     Stress Concern Not Asked     Weight Concern Not Asked     Special Diet Not Asked     Back Care Not Asked     Exercise Yes     Bike Helmet Not Asked     Seat Belt Not Asked     Self-Exams Not Asked     Parent/sibling w/ CABG, MI or angioplasty before 65F 55M? Not Asked   Social History Narrative    Mamie lives with mother in [place], [place].  There is a cat in the home, but Mamie does not have any litterbox duties.  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  She gets essentially no exercise because of the tingling in her feet (says it bothers her even to stand).        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        8/2015 no change in social situation        2/15/2016 Pt is single and lives with mother and stepfather.            Medications:     Current Outpatient Medications   Medication     acetaminophen (TYLENOL) 325 MG tablet     acetylcysteine (MUCOMYST) 20 % neb solution     albuterol (2.5 MG/3ML) 0.083% neb solution  "    albuterol (PROAIR HFA/PROVENTIL HFA/VENTOLIN HFA) 108 (90 Base) MCG/ACT Inhaler     amphetamine-dextroamphetamine (ADDERALL XR) 20 MG 24 hr capsule     azithromycin (ZITHROMAX) 500 MG tablet     beta carotene 20917 UNIT capsule     blood glucose (ACCU-CHEK SMARTVIEW) test strip     buPROPion (WELLBUTRIN XL) 300 MG 24 hr tablet     busPIRone (BUSPAR) 15 MG tablet     cetirizine (ZYRTEC) 10 MG tablet     FLUoxetine (PROZAC) 40 MG capsule     fluticasone (FLONASE) 50 MCG/ACT nasal spray     GLYCOPYRROLATE PO     hydrOXYzine (ATARAX) 25 MG tablet     MedroxyPROGESTERone Acetate (DEPO-PROVERA IM)     meropenem (MERREM) 500 MG vial     montelukast (SINGULAIR) 10 MG tablet     Multiple Vitamin (MULTIVITAMIN OR)     naltrexone (DEPADE/REVIA) 50 MG tablet     omeprazole (PRILOSEC) 40 MG DR capsule     phytonadione (MEPHYTON/VIT K) 5 MG tablet     polyethylene glycol (MIRALAX) powder     topiramate (TOPAMAX) 25 MG tablet     traZODone (DESYREL) 100 MG tablet     vitamin C (ASCORBIC ACID) 500 MG tablet     VITAMIN D3 1000 units tablet     VITAMIN E 400 units capsule     amphetamine-dextroamphetamine (ADDERALL XR) 20 MG 24 hr capsule     amphetamine-dextroamphetamine (ADDERALL XR) 20 MG 24 hr capsule     No current facility-administered medications for this visit.             Physical Exam:   /75   Pulse 93   Resp 17   Ht 1.57 m (5' 1.8\")   Wt 79.2 kg (174 lb 9.7 oz)   SpO2 98%   BMI 32.14 kg/m       Constitutional:   Awake, alert and in no apparent distress     Eyes:   nonicteric     ENT:   oral mucosa moist without lesions, normal tm bilaterally, bilateral mucosal edema      Neck:   Supple without supraclavicular or cervical lymphadenopathy     Lungs:   Good air flow.  No crackles. No rhonchi.  No wheezes.     Cardiovascular:   Normal S1 and S2.  RRR.  No murmur, gallop or rub.     Abdomen:   NABS, soft, nontender, nondistended.      Musculoskeletal:   No edema, digital clubbing present     Neurologic:   Alert " and conversant.     Skin:   Warm, dry.  No rash on limited exam.             Data:   All laboratory and imaging data reviewed.    Cystic Fibrosis Culture  Specimen Description   Date Value Ref Range Status   11/12/2019 Midstream Urine  Final   11/12/2019 Throat  Final   06/05/2019 Throat  Final    Culture Micro   Date Value Ref Range Status   11/12/2019 PENDING  Preliminary   11/12/2019 PENDING  Preliminary   06/05/2019 Light growth  Normal roberto carlos    Final   06/05/2019 (A)  Final    Light growth  Staphylococcus aureus  This isolate is presumed to be clindamycin resistant based on detection of inducible   clindamycin resistance. Erythromycin and clindamycin are resistant, therefore, they are   not recommended for use.     06/05/2019 (A)  Final    Light growth  Strain 2  Staphylococcus aureus  This isolate is presumed to be clindamycin resistant based on detection of inducible   clindamycin resistance. Erythromycin and clindamycin are resistant, therefore, they are   not recommended for use.          Recent Results (from the past 168 hour(s))   Hemoglobin A1c POCT    Collection Time: 11/12/19 12:00 AM   Result Value Ref Range    Hemoglobin A1C 5.4 4.3 - 6 %   General PFT Lab (Please always keep checked)    Collection Time: 11/12/19  7:30 AM   Result Value Ref Range    FVC-Pred 3.55 L    FVC-Pre 3.50 L    FVC-%Pred-Pre 98 %    FEV1-Pre 3.13 L    FEV1-%Pred-Pre 102 %    FEV1FVC-Pred 86 %    FEV1FVC-Pre 89 %    FEFMax-Pred 6.65 L/sec    FEFMax-Pre 8.51 L/sec    FEFMax-%Pred-Pre 127 %    FEF2575-Pred 3.58 L/sec    FEF2575-Pre 4.41 L/sec    LAN4052-%Pred-Pre 123 %    ExpTime-Pre 5.81 sec    FIFMax-Pre 4.29 L/sec    FEV1FEV6-Pred 86 %    FEV1FEV6-Pre 89 %   Cystic Fibrosis Culture Aerob Bacterial    Collection Time: 11/12/19  7:55 AM   Result Value Ref Range    Specimen Description Throat     Special Requests Specimen collected in eSwab transport (white cap)     Culture Micro PENDING    CK total    Collection Time: 11/12/19   9:47 AM   Result Value Ref Range    CK Total 78 30 - 225 U/L   Hepatic panel    Collection Time: 11/12/19  9:47 AM   Result Value Ref Range    Bilirubin Direct <0.1 0.0 - 0.2 mg/dL    Bilirubin Total 0.2 0.2 - 1.3 mg/dL    Albumin 3.6 3.4 - 5.0 g/dL    Protein Total 7.1 6.8 - 8.8 g/dL    Alkaline Phosphatase 95 40 - 150 U/L    ALT 68 (H) 0 - 50 U/L    AST 18 0 - 45 U/L   UA with Microscopic reflex to Culture    Collection Time: 11/12/19  9:52 AM   Result Value Ref Range    Color Urine Yellow     Appearance Urine Slightly Cloudy     Glucose Urine Negative NEG^Negative mg/dL    Bilirubin Urine Negative NEG^Negative    Ketones Urine Negative NEG^Negative mg/dL    Specific Gravity Urine 1.013 1.003 - 1.035    Blood Urine Small (A) NEG^Negative    pH Urine 6.0 5.0 - 7.0 pH    Protein Albumin Urine Negative NEG^Negative mg/dL    Urobilinogen mg/dL 0.0 0.0 - 2.0 mg/dL    Nitrite Urine Negative NEG^Negative    Leukocyte Esterase Urine Moderate (A) NEG^Negative    Source Midstream Urine     WBC Urine 21 (H) 0 - 5 /HPF    RBC Urine 5 (H) 0 - 2 /HPF    Squamous Epithelial /HPF Urine 5 (H) 0 - 1 /HPF    Transitional Epi 3 (A) FEW^Few /HPF    Mucous Urine Present (A) NEG^Negative /LPF   Urine Culture Aerobic Bacterial    Collection Time: 11/12/19  9:52 AM   Result Value Ref Range    Specimen Description Midstream Urine     Special Requests Specimen received in preservative     Culture Micro PENDING        @[email or web address](prottotal:4,albumin:4,bilitotal:4,alkphos:4,ast:4,alt:4, cktotal:4)@    PFT: The spirometry is normal.  When compared to 6/5/2019, the FEV1 and FVC have little change.      Pulmonary exacerbation: absent        Again, thank you for allowing me to participate in the care of your patient.      Sincerely,    Gavi Allison MD

## 2019-11-12 NOTE — LETTER
Mamie Rice  [address] [place] [id number]    Dear Ms. Moralezmarino    I discussed your case with Dr. Prieto after you left, and he recommended that you hold off on getting the DEXCOM until you discuss with him and his CDE Rhonda Esteves. There is a clinical trial that he is running, in which subjects use the continuous sensor. He has all your information and will be contacting you with more details. If you do not hear from him, please reach out to him using [employer] (Dr. Hugo Prieto) or call our clinic at [phone].    Sincerely,    Joel Washington MD  Endocrinology, Diabetes and Metabolism  [facility]

## 2019-11-12 NOTE — PROGRESS NOTES
"Adult Cystic Fibrosis Program  Annual Psychosocial Assessment    Presenting Information:  Mamie is a 24-year-old woman with cystic fibrosis, presenting in CF clinic for a regular follow up with primary CF provider, Dr. Allison.  Met with Mmaie to complete an updated annual psychosocial assessment.  Her mom Jennifer accompanied her to her clinic visit today.     Living situation:  Mamie lives with her mother and stepfather in [place], [place].  Her sister (Chayo) and her sister's 3 children (Montiel-age 11, Max-age 2 and Brinly-age 2 months) are also currently staying with her.  They explained that Chayo has her own apartment but has a 2 month old that she delivered via emergent  and thus she needs a lot of help at this time.  Prior to that Chayo's 11 year old son did live with them \"90% of the time\" and he is like a brother to Mamie.  Mamie reports that it has been stressful to her having Chayo and her 3 children at the home.   Mamie normally enjoys living with her mom and step-dad and has no current plans to move out.  There is a cat and a dog in the home.    Family Constellation:  Mamie's parents  when she was [age] year old.  After the divorce she was raised by her mother, who later remarried.  She had visitation with her father while growing up and he currently lives about 20 miles away from her.  Currently Mamie has very limited contact with her father and prefers it this way, she will sometimes see him at family gatherings as she is still close with his side of the family but reports that he does not make a lot of effort to engage with her.  Mamie has a 41-year-old half-brother through her father, whom she had previously only met twice but now communicates with only through Facebook.  Her half-sister Chayo is 31 years old.  She also has 29-year-old christen who lives nearby.  Mamie does not know of any family members with CF.  She has never been  and does not have children.  " "    Social Support:  Mamie reports good social support. She identifies her mother as her strongest source of support.  She draws additional support from co-workers and friends. Mamie is not currently in a significant relationship and reports that she is \"too busy\" right now for dating.  She reports that she also has support from her josi community and identifies as Gnosticism.  She has some connection to the CF community through social media and has one friend with CF that she has communicated via online throughout the years.      Adjustment to Illness:  Mamie was diagnosed with CF when she was about 5 months old.  Her mom recalls that on the day of her Pentecostal she \"turned gray and her eyes rolled to the back of her head\", when she brought Mamie in her O2 sats were 70% and the CF diagnosis came shortly after.  Mamie has received most of her CF care through the [facility], there was a short period of time that she sought care at [facility] because her mom didn't feel that the chest pain that Mamie was reporting was being thoroughly investigated.  Mamie reports that her mother instilled a strong foundation of doing routine CF care.  She had a consistent therapy routine and they brought the vest machine on vacations.  She noted that her mother discussed therapy as \"part of life\" and the best way to reduce complications.  Mamie reports going through more complications during high school and they believe she had reactions to the air quality in her school building.  She often felt ill and missed school.  She recalls having a couple of PICC lines during that time.  She reports that her school accommodated her through letting her go home early and make up work through other avenues.  She feels her increased health complications caused her to lose some friends because some people didn't know how to handle her illness.    Mamie describes her current health status as \"good\".  Clinically, she has CF lung disease with normal " spirometry, CF sinus disease and abnormal glucose tolerance and she will be meeting with Endocrinology today.  She is pancreatic sufficient and does not take enzymes.  She also follows regularly with Dermatology, Weight Management Clinic, ENT, Ortho and recently established care with the Psychiatry clinic on the [facility].  She reports that 85% of the time she vests twice per day and 15% of the time she vests once per day.  She denies any health problems that interfere with activities of daily living other than recent problems with her knee from a torn maniacus.  She does struggle with exercise but has been trying to do more walking recently.      Mamie describes herself as very open about having CF, growing up it was never a secret and they participated in [employer] raising events and their community knew.    Health Care Directive:  Mamie has previously received Health Care Directive education.  Reviewed HCD information today.  Mamie has stated in the past she does not want her father to act as a health care agent for her, since they do not have a close relationship.  Mamie believes that her father would have no problem deferring health care decisions to her mom, which is who she would want to make decisions on her behalf if she were unable to make decisions for herself.  Encouraged her to consider completing a HCD, however she declined at this time.    Education:  Mamie completed high school but missed a lot of school due to health issues. She tried a semester of college classes, but had difficulty keeping up, which she feels was partially due to untreated ADHD (see below).  At this point, she has no specific plans for further education.       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  She reports that she works 30 hrs  "or less per week.  She is not sure if she is benefits eligible and plans to discuss this with HR.  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     Finances:  Mamie receives income from wages and parental support. She denies having any specific financial concerns.      Insurance:  Mamie recently turned 24 and is no longer eligible to remain under her parental employer policy.  They had called the  office requesting forms to be completed indicating that Mamie was disabled to be able to remain under her parental policy.  The CF team explained that Mamie is not considered to be disabled so these forms were not completed.  Jennifer expressed some frustration with this but in the end understands the reasoning.  Mamie does continue to qualify financially for MA and continues with MA insurance coverage.      Mental Health:  Mamie has been diagnosed with depression, anxiety and ADHD.  Mamie has described feelings of depression, starting in 9th grade. She initially had difficulty expressing her feelings, but got help about a year later, receiving both counseling and medication.  Mamie struggled with symptoms of ADHD for many years and notes a strong family history of this disorder.  She \"got by\" in younger years due to having a lot of support in school but was formally diagnosed ADHD and treated with Adderall in 2015 after doing poorly in college classes.   She has continued to receive consistent psychiatric care.  Her psychotropic medications are prescribed through [facility].  Her current psychotropic medications are: Adderall, Atarax, Prozac, Wellbutrin and Buspar. She is not currently seeing a therapist.  She describes her recent mood as \"good\" and she feels she has been stable from a mental health standpoint.    Mamie completed the following " screens:  MECHE-7 Score: 11, indicating moderate symptoms of anxiety and described as somewhat in daily functioning.    PHQ-9 Score: 9, indicating mild symptoms of depression and described as not difficult at all in daily functioning.      Mamie agreed with the scores above. She denied any additional symptoms not addressed on these screens. She denies any suicidal ideation.  She reports symptoms including having trouble sleeping and sitting still, worry, irritability and feeling tired.  She feels that these symptoms are manageable and her baseline functioning, she feels that her busy schedule impacts these symptoms but at the same time, she enjoys being busy.  She declined needing further mental health resources at this time.    Chemical Health:  Mamie denies the use of tobacco products and denies second hand smoke exposure.  She denies the use of marijuana or other illegal drug use. She reports rare alcohol use.    Leisure Activities/Interests:  Mamie enjoys spending time with family and friends, going out to eat, shopping and sleeping.  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    Intervention:  -Psychosocial Assessment  -Mental Health Screening   -Supportive counseling     Assessment:  Mamie was open in her responses and appeared to be in good spirits.  Two big changes for her this year include getting a new job at a  and coming off of her parents employer based insurance due to turning 24.  She does have a lot of medical appointments due to seeing multiple specialty providers and reports adherence and manageable work/life/disease balance.  She reports some increased stress at home since her sister and her children have been staying with them for additional support.  She continues care at [facility] for management of her psychiatric medications and feels this is  going well.  No additional concerns expressed/noted. Recommend f/u as noted in the plan below.     Plan:  -Continue to follow through intermittent clinic consult to assess mood and coping.   -Complete psychosocial assessment annually.    TAY Juarez, [facility]  Adult Cystic Fibrosis   Ph: [phone]  Pager: [phone]

## 2019-11-13 LAB
BACTERIA SPEC CULT: NORMAL
Lab: NORMAL
SPECIMEN SOURCE: NORMAL

## 2019-11-13 ASSESSMENT — ANXIETY QUESTIONNAIRES: GAD7 TOTAL SCORE: 11

## 2019-11-15 NOTE — TELEPHONE ENCOUNTER
MEDICAL RECORDS REQUEST   [facility]  Urology Clinic  [address] [place] [address]  PHONE: [phone]  Fax: [phone]        FUTURE VISIT INFORMATION                                                   Mamie Rice : 1995 scheduled for future visit at [facility]    APPOINTMENT INFORMATION:    Date: 19    Provider:  Gayle Canales MD     Reason for Visit/Diagnosis: Incomplete bladder emptying     REFERRAL INFORMATION:    Referring provider:  LINDA LOPEZ    Specialty: MD    Referring providers clinic:  [facility]     Clinic contact number:  [phone]    RECORDS REQUESTED FOR VISIT                                                     NOTES  STATUS/DETAILS   OFFICE NOTE from referring provider  yes   OFFICE NOTE from other specialist  no   DISCHARGE SUMMARY from hospital  no   DISCHARGE REPORT from the ER  no   OPERATIVE REPORT  no   MEDICATION LIST  no   LABS     URINALYSIS (UA)  yes     PRE-VISIT CHECKLIST      Record collection complete Yes- All recs in epic    Appointment appropriately scheduled           (right time/right provider) Yes   MyChart activation Yes   Questionnaire complete If no, please explain: In process      Completed by: Chayo Palacio

## 2019-11-17 LAB
BACTERIA SPEC CULT: ABNORMAL
BACTERIA SPEC CULT: ABNORMAL
Lab: ABNORMAL
SPECIMEN SOURCE: ABNORMAL

## 2019-11-18 NOTE — TELEPHONE ENCOUNTER
Medication Appeal Initiation    We have initiated an appeal for the requested medication:  Medication: trikafta- pending  Appeal Start Date:  11/18/2019  Insurance Company: [place] Medicaid ([facility]) - Phone [phone] Fax [phone]  Comments:       Sent chart notes showing she has been seen at least 2 times this year, along with FEV1, BMI,and liver function.

## 2019-11-18 NOTE — TELEPHONE ENCOUNTER
PA Initiation    Medication: trikafta- pending  Insurance Company: [place] Medicaid ([facility]) - Phone [phone] Fax [phone]  Pharmacy Filling the Rx: [facility] - [place], [place] - [address]  Filling Pharmacy Phone:    Filling Pharmacy Fax:    Start Date: 11/18/2019

## 2019-11-18 NOTE — TELEPHONE ENCOUNTER
PRIOR AUTHORIZATION DENIED    Medication: trikafta- pending    Denial Date: 11/18/2019    Denial Rational: see denial letter below    Appeal Information: sent appeal.

## 2019-11-19 NOTE — TELEPHONE ENCOUNTER
MEDICATION APPEAL APPROVED    Medication: Trikafta- Approved   Authorization Effective Date:  11/18/19   Authorization Expiration Date:  11/30/19  Approved Dose/Quantity: 100/50/75 and 150mg  Reference #:   [id number]  Insurance Company: [place] Medicaid ([facility]) - Phone [phone] Fax [phone]  Expected CoPay:  $2     CoPay Card Available: No    Foundation Assistance Needed:    Which Pharmacy is filling the prescription (Not needed for infusion/clinic administered): [facility] - [place], [place] - [address]

## 2019-11-20 ENCOUNTER — EXTERNAL ORDER RESULTS (OUTPATIENT)
Dept: PULMONOLOGY | Facility: CLINIC | Age: 24
End: 2019-11-20

## 2019-11-20 ENCOUNTER — MYC REFILL (OUTPATIENT)
Dept: EDUCATION SERVICES | Facility: CLINIC | Age: 24
End: 2019-11-20

## 2019-11-20 DIAGNOSIS — E10.9 DIABETES MELLITUS TYPE I (H): ICD-10-CM

## 2019-11-20 LAB
ALBUMIN SERPL-MCNC: 4.4 G/DL
ALP SERPL-CCNC: 88 U/L
ALT SERPL-CCNC: 28 U/L
AST SERPL-CCNC: 19 U/L
BILIRUB SERPL-MCNC: 0.3 MG/DL
BILIRUBIN, DIRECT: <0.2 MG/DL (ref 0–0.5)
PROTEIN TOTAL (EXTERNAL): 6.5

## 2019-11-22 DIAGNOSIS — E84.9 CYSTIC FIBROSIS (H): Primary | ICD-10-CM

## 2019-12-03 ENCOUNTER — TELEPHONE (OUTPATIENT)
Dept: PULMONOLOGY | Facility: CLINIC | Age: 24
End: 2019-12-03

## 2019-12-03 NOTE — TELEPHONE ENCOUNTER
PA Initiation    Medication: elexacaftor-tezacaftor-ivacaftor & ivacaftor (TRIKAFTA) 100-50-75 & 150 MG tablet pack- pa pending   Insurance Company: [employer] [facility] [employer] - Phone [phone] Fax [phone]  Pharmacy Filling the Rx: [facility] - [place], [place] - [facility] [address]  Filling Pharmacy Phone: [phone]  Filling Pharmacy Fax: [phone]  Start Date: 12/3/2019

## 2019-12-03 NOTE — TELEPHONE ENCOUNTER
recv'd fax from insurance to send over additional information     Sending over sweat test results with chart notes

## 2019-12-09 ENCOUNTER — PRE VISIT (OUTPATIENT)
Dept: UROLOGY | Facility: CLINIC | Age: 24
End: 2019-12-09

## 2019-12-09 PROBLEM — M25.569 KNEE PAIN: Status: ACTIVE | Noted: 2019-10-16

## 2019-12-09 NOTE — TELEPHONE ENCOUNTER
Reason for Visit: Cosult    Diagnosis: incomplete bladder emptying, CF    Orders/Procedures/Records: in system    Contact Patient: n/a    Rooming Requirements: UA dip / PVR      Merary Hennessy LPN  12/09/19  9:56 AM

## 2019-12-10 NOTE — TELEPHONE ENCOUNTER
Prior Authorization Approval    Authorization Effective Date: 12/3/2019  Authorization Expiration Date: 6/3/2020  Medication: elexacaftor-tezacaftor-ivacaftor & ivacaftor (TRIKAFTA) 100-50-75 & 150 MG tablet pack- pa approved  Approved Dose/Quantity: UD  Reference #:     Insurance Company: [employer] - Phone [phone] Fax [phone]  Expected CoPay:       CoPay Card Available:      Foundation Assistance Needed:    Which Pharmacy is filling the prescription (Not needed for infusion/clinic administered): [facility] - [place], [place] - [address]  Pharmacy Notified: Yes  Patient Notified: Yes

## 2019-12-13 DIAGNOSIS — G47.00 INSOMNIA, UNSPECIFIED TYPE: ICD-10-CM

## 2019-12-16 RX ORDER — HYDROXYZINE HYDROCHLORIDE 25 MG/1
25 TABLET, FILM COATED ORAL
Qty: 30 TABLET | Refills: 2 | Status: SHIPPED | OUTPATIENT
Start: 2019-12-16 | End: 2020-03-31

## 2019-12-16 NOTE — TELEPHONE ENCOUNTER
Medication requested: HYDROXYZINE HCL 25MG TABS  Last refilled: 11/7/19  Qty: 30      Last seen: 8/14/19  RTC: 6 months  Cancel: 0  No-show: 0  Next appt: 2/24/20    Refill decision:   Refilled for 30 days per protocol.

## 2019-12-17 ASSESSMENT — ENCOUNTER SYMPTOMS
SINUS PAIN: 1
SMELL DISTURBANCE: 0
DIFFICULTY URINATING: 1
TROUBLE SWALLOWING: 0
DYSURIA: 0
HEMATURIA: 0
HOARSE VOICE: 1
SORE THROAT: 1
SINUS CONGESTION: 1
TASTE DISTURBANCE: 0
NECK MASS: 0
FLANK PAIN: 0

## 2019-12-18 ENCOUNTER — PRE VISIT (OUTPATIENT)
Dept: UROLOGY | Facility: CLINIC | Age: 24
End: 2019-12-18

## 2019-12-18 ENCOUNTER — OFFICE VISIT (OUTPATIENT)
Dept: UROLOGY | Facility: CLINIC | Age: 24
End: 2019-12-18
Attending: INTERNAL MEDICINE
Payer: COMMERCIAL

## 2019-12-18 VITALS
WEIGHT: 174 LBS | HEART RATE: 92 BPM | DIASTOLIC BLOOD PRESSURE: 69 MMHG | BODY MASS INDEX: 32.02 KG/M2 | HEIGHT: 62 IN | SYSTOLIC BLOOD PRESSURE: 108 MMHG

## 2019-12-18 DIAGNOSIS — R35.0 FREQUENCY OF URINATION: Primary | ICD-10-CM

## 2019-12-18 DIAGNOSIS — R39.15 URINARY URGENCY: ICD-10-CM

## 2019-12-18 RX ORDER — MEDROXYPROGESTERONE ACETATE 150 MG/ML
150 INJECTION, SUSPENSION INTRAMUSCULAR
COMMUNITY
Start: 2019-08-29

## 2019-12-18 RX ORDER — HYDROCODONE BITARTRATE AND ACETAMINOPHEN 5; 325 MG/1; MG/1
1-2 TABLET ORAL
COMMUNITY
Start: 2019-11-25 | End: 2020-03-04

## 2019-12-18 ASSESSMENT — PATIENT HEALTH QUESTIONNAIRE - PHQ9
SUM OF ALL RESPONSES TO PHQ QUESTIONS 1-9: 4
SUM OF ALL RESPONSES TO PHQ QUESTIONS 1-9: 4
10. IF YOU CHECKED OFF ANY PROBLEMS, HOW DIFFICULT HAVE THESE PROBLEMS MADE IT FOR YOU TO DO YOUR WORK, TAKE CARE OF THINGS AT HOME, OR GET ALONG WITH OTHER PEOPLE: SOMEWHAT DIFFICULT

## 2019-12-18 ASSESSMENT — MIFFLIN-ST. JEOR: SCORE: 1479.34

## 2019-12-18 ASSESSMENT — PAIN SCALES - GENERAL: PAINLEVEL: NO PAIN (0)

## 2019-12-18 NOTE — LETTER
12/18/2019       RE: Mamie Rice  [address] [facility] [address]     Dear Colleague,    Thank you for referring your patient, Mamie Rice, to the [facility] UROLOGY AND INST FOR PROSTATE AND UROLOGIC CANCERS at [facility]. Please see a copy of my visit note below.    CC: Urinary urgency    HPI:  Mamie Rice is a 24 year old female with a history of cystic fibrosis and T1DM, being seen in consultation today for urinary urgency. She states her symptoms are worse at night immediately after lying down and she finds it difficult to fall asleep due to the sensation of needing to urinate. She also reports occasional small-volume urinary leakage at night after dreaming. Has some ugency during the day but this is not nearly as bothersome to her. No daytime incontinence. No gross hematuria. She has had UTIs in the past but not recurrently. She also has a sensation of incomplete emptying but her PVR is 0 today.     She doesn't drink much water throughout the day. She has 1 cup of coffee per morning but no other caffeinated beverages. She does not take any diuretic medications. She has had constipation issues previously but none recently. She has 2-3 bowel movements every day.     Past Medical History:   Diagnosis Date     ADHD (attention deficit hyperactivity disorder)      Anxiety      Aspergillosis, with pneumonia (H)     fugus found caused chest pain     Chronic infection     CF, MRSA.,      Chronic sinusitis      Constipation, chronic      Cystic fibrosis with pulmonary manifestations (H) 12/19/2011     Cystic fibrosis without mention of meconium ileus     SWEAT TEST:Date: 2/17/1994   Laboratory: [facility]  296 mg, 104 mmol/L ClSample #2  295 mg, 104 mmol/L Cl GENOTYPING:Date: 10/15/2007,  Laboratory: [facility]ryGenotype: [id number]     Depressive disorder      Diabetes     no meds currently     Dysthymic disorder      Exocrine pancreatic insufficiency       Gastro-oesophageal reflux disease      Hip pain, right      MRSA (methicillin resistant Staphylococcus aureus) carrier      Pancreatic disease        Past Surgical History:   Procedure Laterality Date     ARTHROSCOPY HIP, OSTEOPLASTY FEMUR PROXIMAL, COMBINED  3/11/2013    Procedure: COMBINED ARTHROSCOPY HIP, OSTEOPLASTY FEMUR PROXIMAL;  Right Hip Arthroscopy, Labral  Debridement.    surgeon request choice anesthesia/admit to [facility] after surgery;  Surgeon: Omkar Austin MD;  [facility]: [facility] OR     ARTHROSCOPY KNEE WITH MEDIAL MENISCECTOMY Left 1/31/2017    Procedure: ARTHROSCOPY KNEE WITH MEDIAL MENISCECTOMY;  Surgeon: Jethro Coyle MD;  Location: [facility] OR     bronchoscopies       BRONCHOSCOPY       EXAM UNDER ANESTHESIA ANUS N/A 5/10/2016    Procedure: EXAM UNDER ANESTHESIA ANUS;  Surgeon: Chet Gaviria MD;  [facility] OR     EXAM UNDER ANESTHESIA, RESTORATIONS, EXTRACTION(S) DENTAL COMPLEX, COMBINED  5/13/2013    Procedure: COMBINED EXAM UNDER ANESTHESIA, RESTORATIONS, EXTRACTION(S) DENTAL COMPLEX;  Dental Exam, Radiographs, Restorations. Single Extraction  Tooth #2. Restorations x 3;  Surgeon: Danilo Ortiz DDS;  Location: [facility] OR     HC KNEE SCOPE, DIAGNOSTIC      Arthroscopy, Knee- left     INJECT BOTOX N/A 5/10/2016    Procedure: INJECT BOTOX;  Surgeon: Chet Gaviria MD;  [facility] OR     left hip labral tear  5/11/2011    left hip arthroscopy with labral debridement and synovectomy     meniscus repair       OPTICAL TRACKING SYSTEM ENDOSCOPIC SINUS SURGERY  10/14/2011    Procedure:OPTICAL TRACKING SYSTEM ENDOSCOPIC SINUS SURGERY; FESS (functional endoscopic sinus surgery) with Image Guidance, bronchial lavage and cultures; Surgeon:JUAN JOSE GARCIA; Location:[facility] OR     OPTICAL TRACKING SYSTEM ENDOSCOPIC SINUS SURGERY  5/18/2012    Procedure:OPTICAL TRACKING SYSTEM ENDOSCOPIC SINUS SURGERY; Right  and Left Image Guided Functional Endoscopic Sinus Surgery With  Frontal  Approach, [place]x; Surgeon:GOYO KUO; Location:[facility] OR     OPTICAL TRACKING SYSTEM ENDOSCOPIC SINUS SURGERY  9/26/2012    Procedure: OPTICAL TRACKING SYSTEM ENDOSCOPIC SINUS SURGERY;  Stealth Guided Bilateral Functional Endoscopic Sinus Surgery *Latex Safe*;  Surgeon: Goyo Kuo MD;  [facility] OR     OPTICAL TRACKING SYSTEM ENDOSCOPIC SINUS SURGERY Bilateral 10/16/2015    Procedure: OPTICAL TRACKING SYSTEM ENDOSCOPIC SINUS SURGERY;  Surgeon: Mariela Haile MD;  [facility] OR     ORTHOPEDIC SURGERY      left hip tear repair 2010     SINUS SURGERY         Meds, Allergies, FHx and SHx reviewed per nurse's intake note.    ROS is negative on a 14 point scale except for what was listed above.  All other positive and pertinent information is mentioned in the HPI.    PEx:   There were no vitals taken for this visit.  Data Unavailable, There is no height or weight on file to calculate BMI., 0 lbs 0 oz  Gen appearance:  Well groomed  HEENT:  EOMI, AT NC  Psych:  Normal Affect  Neuro:  A/O X 3  Skin:  Warm to touch  Resp:  No increased respiratory effort  Vasc:  RRR  lymph:  No LE edema  Abd:  Soft/NT, ND, no palpable masses  Musk:  Full ROM in extremities    RU: 0 mL on bladder scan by nursing.    UA: UA RESULTS:  Recent Labs   Lab Test 11/12/19  0952   COLOR Yellow   APPEARANCE Slightly Cloudy   URINEGLC Negative   URINEBILI Negative   URINEKETONE Negative   SG 1.013   UBLD Small*   URINEPH 6.0   PROTEIN Negative   NITRITE Negative   LEUKEST Moderate*   RBCU 5*   WBCU 21*       External Order Results on 11/20/2019   Component Date Value Ref Range Status     Bilirubin Total 11/20/2019 0.3  mg/dL Final     Bilirubin Direct 11/20/2019 <0.2  0 - 0.5 mg/dL Final     AST 11/20/2019 19  U/L Final     ALT 11/20/2019 28  U/L Final     Alkaline Phosphatase 11/20/2019 88  U/L Final     Albumin 11/20/2019 4.4  g/dL Final     Protein Total (External) 11/20/2019 6.5   Final     ASSESSMENT and PLAN:  This is a 24 year old  female with a history of CF who presents today for consultation regarding urinary urgency- particularly at night. She was also noted to have microhematuria (5 rbc/hpf) on UA 11/2019 which is present today. We discussed several conservative measures we could try for her urgency-type symptoms, including pelvic floor PT and biofeedback. Anticholinergics may be an option to consider in the future, however we would hesitate to start this given the symptoms of dry mouth and constipation in a patient with CF who may be more sensitive to these side effects.    - Referral for pelvic floor physical therapy sent today. Biofeedback may also be helpful  - Return to clinic in 3 months to reassess symptoms.     Maira Maurice MD  PGY-2 Urology  Pager [phone]    Patient was seen, evaluated and plan was formulated in conjunction with me and I agree with the above.  Gayle Canales MD        Answers for HPI/ROS submitted by the patient on 12/17/2019   General Symptoms: No  Skin Symptoms: No  HENT Symptoms: Yes  EYE SYMPTOMS: No  HEART SYMPTOMS: No  LUNG SYMPTOMS: No  INTESTINAL SYMPTOMS: No  URINARY SYMPTOMS: Yes  GYNECOLOGIC SYMPTOMS: No  BREAST SYMPTOMS: No  SKELETAL SYMPTOMS: No  BLOOD SYMPTOMS: No  NERVOUS SYSTEM SYMPTOMS: No  MENTAL HEALTH SYMPTOMS: No  Ear pain: No  Ear discharge: No  Hearing loss: No  Tinnitus: No  Nosebleeds: No  Congestion: Yes  Sinus pain: Yes  Trouble swallowing: No   Voice hoarseness: Yes  Mouth sores: No  Sore throat: Yes  Tooth pain: No  Gum tenderness: No  Bleeding gums: No  Change in taste: No  Change in sense of smell: No  Dry mouth: Yes  Hearing aid used: No  Neck lump: No  Trouble holding urine or incontinence: Yes  Pain or burning: No  Trouble starting or stopping: Yes  Increased frequency of urination: Yes  Blood in urine: No  Decreased frequency of urination: No  Frequent nighttime urination: Yes  Flank pain: No  Difficulty emptying bladder: Yes  If you checked off any problems, how difficult have  these problems made it for you to do your work, take care of things at home, or get along with other people?: Somewhat difficult  PHQ9 TOTAL SCORE: 4      Again, thank you for allowing me to participate in the care of your patient.      Sincerely,    Gayle Canales MD

## 2019-12-18 NOTE — NURSING NOTE
"Chief Complaint   Patient presents with     Consult     hesitancy, abnormal UAs, referred from CF physician       Blood pressure 108/69, pulse 92, height 1.57 m (5' 1.8\"), weight 78.9 kg (174 lb). Body mass index is 32.03 kg/m .    Patient Active Problem List   Diagnosis     Hip joint pain     Aspergillosis (H)     Chronic maxillary sinusitis     Hypoglycemia     Type 1 diabetes mellitus (H)     Cystic fibrosis of the lung (H)     Chronic constipation     Frontal sinusitis     Overactive child     Back pain     Pancreatic insufficiency     Non morbid obesity due to excess calories     Acne vulgaris     Cystic fibrosis (H)     Diabetes mellitus, type 2 (H)     Persistent depressive disorder     Mild episode of recurrent major depressive disorder (H)     Insomnia, unspecified type     MECHE (generalized anxiety disorder)     Attention deficit hyperactivity disorder (ADHD), combined type     Knee pain       Allergies   Allergen Reactions     Vancomycin Hives     Redmens skin rash   Redmens skin rash      Augmentin Rash       Current Outpatient Medications   Medication Sig Dispense Refill     acetaminophen (TYLENOL) 325 MG tablet Take 2 tablets (650 mg) by mouth every 4 hours as needed for other (mild pain) 100 tablet 0     acetylcysteine (MUCOMYST) 20 % neb solution INHALE ONE 4ML NEB INTO LUNGS VIA NEBULIZER TWO TIMES A DAY, MAY INCREASE TO 3-4 TIMES A DAY WITH INCREASE COUGH/COLD SYMPTOMS 360 mL 11     albuterol (2.5 MG/3ML) 0.083% neb solution INHALE 1 NEB VIA NEBULIZER FOUR TIMES A  mL 11     albuterol (PROAIR HFA/PROVENTIL HFA/VENTOLIN HFA) 108 (90 Base) MCG/ACT Inhaler Inhale 2 puffs into the lungs every 6 hours as needed for shortness of breath / dyspnea or wheezing 1 Inhaler 12     amphetamine-dextroamphetamine (ADDERALL XR) 20 MG 24 hr capsule Take 1 capsule (20 mg) by mouth daily 30 capsule 0     amphetamine-dextroamphetamine (ADDERALL XR) 20 MG 24 hr capsule Take 1 capsule (20 mg) by mouth daily " (Patient not taking: Reported on 11/12/2019) 30 capsule 0     amphetamine-dextroamphetamine (ADDERALL XR) 20 MG 24 hr capsule Take 1 capsule (20 mg) by mouth daily (Patient not taking: Reported on 11/12/2019) 30 capsule 0     azithromycin (ZITHROMAX) 500 MG tablet TAKE ONE TABLET BY MOUTH ON MONDAYS, WEDNESDAYS, AND FRIDAYS AS DIRECTED 36 tablet 4     beta carotene 17827 UNIT capsule TAKE ONE CAPSULE BY MOUTH IN THE MORNING ON MONDAY, WEDNESDAY, AND FRIDAY 36 capsule 4     blood glucose (ACCU-CHEK SMARTVIEW) test strip Use to test blood sugar 4 times daily or as directed. 400 each 1     buPROPion (WELLBUTRIN XL) 300 MG 24 hr tablet Take 1 tablet (300 mg) by mouth every morning Please alert patient to change to one tablet daily 30 tablet 5     busPIRone (BUSPAR) 15 MG tablet Take 1 tablet (15 mg) by mouth 2 times daily 60 tablet 5     cetirizine (ZYRTEC) 10 MG tablet Take 1 tablet (10 mg) by mouth every evening 30 tablet 3     elexacaftor-tezacaftor-ivacaftor & ivacaftor (TRIKAFTA) 100-50-75 & 150 MG tablet pack Take 2 orange tablets in the morning and 1 light blue tablet in the evening. Swallow whole with fat-containing food. 84 tablet 3     FLUoxetine (PROZAC) 40 MG capsule Take 2 capsules (80 mg) by mouth daily 60 capsule 5     fluticasone (FLONASE) 50 MCG/ACT nasal spray Spray 2 sprays into both nostrils 2 times daily 15.8 mL 3     GLYCOPYRROLATE PO Take 1 mg by mouth 2 times daily        HYDROcodone-acetaminophen (NORCO) 5-325 MG tablet Take 1-2 tablets by mouth       hydrOXYzine (ATARAX) 25 MG tablet Take 1 tablet (25 mg) by mouth nightly as needed (sleep) 30 tablet 2     medroxyPROGESTERone (DEPO-PROVERA) 150 MG/ML IM injection Inject 150 mg into the muscle       MedroxyPROGESTERone Acetate (DEPO-PROVERA IM)        meropenem (MERREM) 500 MG vial 500 mg by Nasal Instillation route once for 1 dose 60 each      montelukast (SINGULAIR) 10 MG tablet TAKE ONE TABLET BY MOUTH AT BEDTIME 30 tablet 11     Multiple  Vitamin (MULTIVITAMIN OR) Take 1 tablet by mouth daily.       naltrexone (DEPADE/REVIA) 50 MG tablet Take 1 tablet.  Time it one to two hours prior to worst cravings. 90 tablet 2     omeprazole (PRILOSEC) 40 MG DR capsule Take 1 capsule (40 mg) by mouth 2 times daily 60 capsule 3     phytonadione (MEPHYTON/VIT K) 5 MG tablet TAKE ONE TABLET BY MOUTH ONCE A WEEK 4 tablet 11     polyethylene glycol (MIRALAX) powder Take 17 g (1 capful) by mouth daily as needed for constipation 119 g 3     topiramate (TOPAMAX) 25 MG tablet Take 3 tablets (75 mg) by mouth daily 270 tablet 1     traZODone (DESYREL) 100 MG tablet Take 1 tablet (100 mg) by mouth At Bedtime 30 tablet 5     vitamin C (ASCORBIC ACID) 500 MG tablet TAKE ONE TABLET BY MOUTH TWICE A  tablet 3     VITAMIN D3 1000 units tablet TAKE ONE TABLET BY MOUTH EVERY  tablet 3     VITAMIN E 400 units capsule TAKE ONE CAPSULE BY MOUTH TWICE A  capsule 11       Social History     Tobacco Use     Smoking status: Never Smoker     Smokeless tobacco: Never Used     Tobacco comment: one person at home smokes outside   Substance Use Topics     Alcohol use: No     Alcohol/week: 0.0 standard drinks     Drug use: No       Merary Hennessy LPN  12/18/2019  11:19 AM

## 2019-12-18 NOTE — PROGRESS NOTES
CC: Urinary urgency    HPI:  Mamie Rice is a 24 year old female with a history of cystic fibrosis and T1DM, being seen in consultation today for urinary urgency. She states her symptoms are worse at night immediately after lying down and she finds it difficult to fall asleep due to the sensation of needing to urinate. She also reports occasional small-volume urinary leakage at night after dreaming. Has some ugency during the day but this is not nearly as bothersome to her. No daytime incontinence. No gross hematuria. She has had UTIs in the past but not recurrently. She also has a sensation of incomplete emptying but her PVR is 0 today.     She doesn't drink much water throughout the day. She has 1 cup of coffee per morning but no other caffeinated beverages. She does not take any diuretic medications. She has had constipation issues previously but none recently. She has 2-3 bowel movements every day.     Past Medical History:   Diagnosis Date     ADHD (attention deficit hyperactivity disorder)      Anxiety      Aspergillosis, with pneumonia (H)     fugus found caused chest pain     Chronic infection     CF, MRSA.,      Chronic sinusitis      Constipation, chronic      Cystic fibrosis with pulmonary manifestations (H) 12/19/2011     Cystic fibrosis without mention of meconium ileus     SWEAT TEST:Date: 2/17/1994   [facility]  296 mg, 104 mmol/L ClSample #2  295 mg, 104 mmol/L Cl GENOTYPING:Date: 10/15/2007,  Laboratory: [facility]: [id number]     Depressive disorder      Diabetes     no meds currently     Dysthymic disorder      Exocrine pancreatic insufficiency      Gastro-oesophageal reflux disease      Hip pain, right      MRSA (methicillin resistant Staphylococcus aureus) carrier      Pancreatic disease        Past Surgical History:   Procedure Laterality Date     ARTHROSCOPY HIP, OSTEOPLASTY FEMUR PROXIMAL, COMBINED  3/11/2013    Procedure: COMBINED ARTHROSCOPY HIP, OSTEOPLASTY  FEMUR PROXIMAL;  Right Hip Arthroscopy, Labral  Debridement.    surgeon request choice anesthesia/admit to [facility] after surgery;  Surgeon: Omkar Austin MD;  [facility]     ARTHROSCOPY KNEE WITH MEDIAL MENISCECTOMY Left 1/31/2017    Procedure: ARTHROSCOPY KNEE WITH MEDIAL MENISCECTOMY;  Surgeon: Jethro Coyle MD;  Location: [facility]     bronchoscopies       BRONCHOSCOPY       EXAM UNDER ANESTHESIA ANUS N/A 5/10/2016    Procedure: EXAM UNDER ANESTHESIA ANUS;  Surgeon: Chet Gaviria MD;  [facility]     EXAM UNDER ANESTHESIA, RESTORATIONS, EXTRACTION(S) DENTAL COMPLEX, COMBINED  5/13/2013    Procedure: COMBINED EXAM UNDER ANESTHESIA, RESTORATIONS, EXTRACTION(S) DENTAL COMPLEX;  Dental Exam, Radiographs, Restorations. Single Extraction  Tooth #2. Restorations x 3;  Surgeon: Danilo Ortiz DDS;  [facility]     HC KNEE SCOPE, DIAGNOSTIC      Arthroscopy, Knee- left     INJECT BOTOX N/A 5/10/2016    Procedure: INJECT BOTOX;  Surgeon: Chet Gaviria MD;  [facility]     left hip labral tear  5/11/2011    left hip arthroscopy with labral debridement and synovectomy     meniscus repair       OPTICAL TRACKING SYSTEM ENDOSCOPIC SINUS SURGERY  10/14/2011    Procedure:OPTICAL TRACKING SYSTEM ENDOSCOPIC SINUS SURGERY; FESS (functional endoscopic sinus surgery) with Image Guidance, bronchial lavage and cultures; Surgeon:GOYO KUO; Location[facility]     OPTICAL TRACKING SYSTEM ENDOSCOPIC SINUS SURGERY  5/18/2012    Procedure:OPTICAL TRACKING SYSTEM ENDOSCOPIC SINUS SURGERY; Right  and Left Image Guided Functional Endoscopic Sinus Surgery With  Frontal Approach, [employer]; Surgeon:GOYO KUO; [facility]     OPTICAL TRACKING SYSTEM ENDOSCOPIC SINUS SURGERY  9/26/2012    Procedure: OPTICAL TRACKING SYSTEM ENDOSCOPIC SINUS SURGERY;  Stealth Guided Bilateral Functional Endoscopic Sinus Surgery *Latex Safe*;  Surgeon: Goyo Kuo MD;  [facility]     OPTICAL  TRACKING SYSTEM ENDOSCOPIC SINUS SURGERY Bilateral 10/16/2015    Procedure: OPTICAL TRACKING SYSTEM ENDOSCOPIC SINUS SURGERY;  Surgeon: Mariela Haile MD;  [facility]     ORTHOPEDIC SURGERY      left hip tear repair 2010     SINUS SURGERY         Meds, Allergies, FHx and SHx reviewed per nurse's intake note.    ROS is negative on a 14 point scale except for what was listed above.  All other positive and pertinent information is mentioned in the HPI.    PEx:   There were no vitals taken for this visit.  Data Unavailable, There is no height or weight on file to calculate BMI., 0 lbs 0 oz  Gen appearance:  Well groomed  HEENT:  EOMI, AT NC  Psych:  Normal Affect  Neuro:  A/O X 3  Skin:  Warm to touch  Resp:  No increased respiratory effort  Vasc:  RRR  lymph:  No LE edema  Abd:  Soft/NT, ND, no palpable masses  Musk:  Full ROM in extremities    RU: 0 mL on bladder scan by nursing.    UA: UA RESULTS:  Recent Labs   Lab Test 11/12/19  0952   COLOR Yellow   APPEARANCE Slightly Cloudy   URINEGLC Negative   URINEBILI Negative   URINEKETONE Negative   SG 1.013   UBLD Small*   URINEPH 6.0   PROTEIN Negative   NITRITE Negative   LEUKEST Moderate*   RBCU 5*   WBCU 21*       External Order Results on 11/20/2019   Component Date Value Ref Range Status     Bilirubin Total 11/20/2019 0.3  mg/dL Final     Bilirubin Direct 11/20/2019 <0.2  0 - 0.5 mg/dL Final     AST 11/20/2019 19  U/L Final     ALT 11/20/2019 28  U/L Final     Alkaline Phosphatase 11/20/2019 88  U/L Final     Albumin 11/20/2019 4.4  g/dL Final     Protein Total (External) 11/20/2019 6.5   Final     ASSESSMENT and PLAN:  This is a 24 year old female with a history of CF who presents today for consultation regarding urinary urgency- particularly at night. She was also noted to have microhematuria (5 rbc/hpf) on UA 11/2019 which is present today. We discussed several conservative measures we could try for her urgency-type symptoms, including pelvic floor PT and  biofeedback. Anticholinergics may be an option to consider in the future, however we would hesitate to start this given the symptoms of dry mouth and constipation in a patient with CF who may be more sensitive to these side effects.    - Referral for pelvic floor physical therapy sent today. Biofeedback may also be helpful  - Return to clinic in 3 months to reassess symptoms.     Maira Maurice MD  PGY-2 Urology  Pager [phone]    Patient was seen, evaluated and plan was formulated in conjunction with me and I agree with the above.  Gayle Canales MD        Answers for HPI/ROS submitted by the patient on 12/17/2019   General Symptoms: No  Skin Symptoms: No  HENT Symptoms: Yes  EYE SYMPTOMS: No  HEART SYMPTOMS: No  LUNG SYMPTOMS: No  INTESTINAL SYMPTOMS: No  URINARY SYMPTOMS: Yes  GYNECOLOGIC SYMPTOMS: No  BREAST SYMPTOMS: No  SKELETAL SYMPTOMS: No  BLOOD SYMPTOMS: No  NERVOUS SYSTEM SYMPTOMS: No  MENTAL HEALTH SYMPTOMS: No  Ear pain: No  Ear discharge: No  Hearing loss: No  Tinnitus: No  Nosebleeds: No  Congestion: Yes  Sinus pain: Yes  Trouble swallowing: No   Voice hoarseness: Yes  Mouth sores: No  Sore throat: Yes  Tooth pain: No  Gum tenderness: No  Bleeding gums: No  Change in taste: No  Change in sense of smell: No  Dry mouth: Yes  Hearing aid used: No  Neck lump: No  Trouble holding urine or incontinence: Yes  Pain or burning: No  Trouble starting or stopping: Yes  Increased frequency of urination: Yes  Blood in urine: No  Decreased frequency of urination: No  Frequent nighttime urination: Yes  Flank pain: No  Difficulty emptying bladder: Yes  If you checked off any problems, how difficult have these problems made it for you to do your work, take care of things at home, or get along with other people?: Somewhat difficult  PHQ9 TOTAL SCORE: 4

## 2019-12-20 ENCOUNTER — TELEPHONE (OUTPATIENT)
Dept: PULMONOLOGY | Facility: CLINIC | Age: 24
End: 2019-12-20

## 2019-12-20 DIAGNOSIS — E84.9 CF (CYSTIC FIBROSIS) (H): ICD-10-CM

## 2019-12-20 RX ORDER — MONTELUKAST SODIUM 10 MG/1
TABLET ORAL
Qty: 30 TABLET | Refills: 11 | Status: SHIPPED | OUTPATIENT
Start: 2019-12-20 | End: 2021-01-08

## 2019-12-20 NOTE — TELEPHONE ENCOUNTER
Patient left VM stating she's been exposed to strep throat and wanting abx. States she now has sore throat and slight temp. Called patient back and left her VM that she needs to be seen locally by a provider (PCP, urgent care or [facility]) for further assessment and treatment.   
DISPLAY PLAN FREE TEXT

## 2019-12-30 DIAGNOSIS — K21.9 GASTROESOPHAGEAL REFLUX DISEASE, ESOPHAGITIS PRESENCE NOT SPECIFIED: ICD-10-CM

## 2019-12-30 RX ORDER — OMEPRAZOLE 40 MG/1
40 CAPSULE, DELAYED RELEASE ORAL 2 TIMES DAILY
Qty: 60 CAPSULE | Refills: 11 | Status: SHIPPED | OUTPATIENT
Start: 2019-12-30 | End: 2021-01-19

## 2020-01-11 DIAGNOSIS — E84.9 CF (CYSTIC FIBROSIS) (H): ICD-10-CM

## 2020-01-13 ENCOUNTER — TELEPHONE (OUTPATIENT)
Dept: PULMONOLOGY | Facility: CLINIC | Age: 25
End: 2020-01-13

## 2020-01-13 RX ORDER — ACETYLCYSTEINE 200 MG/ML
SOLUTION ORAL; RESPIRATORY (INHALATION)
Qty: 360 ML | Refills: 11 | Status: SHIPPED | OUTPATIENT
Start: 2020-01-13 | End: 2021-03-30

## 2020-01-13 NOTE — TELEPHONE ENCOUNTER
Prior Authorization Approval    Authorization Effective Date: 1/13/2020  Authorization Expiration Date: 1/13/2021  Medication: azithromycin (approved)  Approved Dose/Quantity: 12 per 28 days  Reference #:     Insurance Company: [employer] - Phone [phone] Fax [phone]  Expected CoPay:       CoPay Card Available:      Foundation Assistance Needed:    Which Pharmacy is filling the prescription (Not needed for infusion/clinic administered): [facility] - [place], [place] - [address] [place] [address]  Pharmacy Notified: Yes  Patient Notified:

## 2020-01-23 DIAGNOSIS — E84.9 CF (CYSTIC FIBROSIS) (H): ICD-10-CM

## 2020-01-23 RX ORDER — ALBUTEROL SULFATE 0.83 MG/ML
SOLUTION RESPIRATORY (INHALATION)
Qty: 360 ML | Refills: 11 | OUTPATIENT
Start: 2020-01-23

## 2020-02-19 ENCOUNTER — MYC MEDICAL ADVICE (OUTPATIENT)
Dept: OTOLARYNGOLOGY | Facility: CLINIC | Age: 25
End: 2020-02-19

## 2020-02-20 NOTE — TELEPHONE ENCOUNTER
Per conversation with supervisor Myra Finn, patient rescheduled with Dr. Bean at 11:30am on 2/28/2020. Patient is in agreement with this plan.    Nathalia Vincent, EMT

## 2020-02-26 DIAGNOSIS — E66.09 CLASS 1 OBESITY DUE TO EXCESS CALORIES WITHOUT SERIOUS COMORBIDITY WITH BODY MASS INDEX (BMI) OF 30.0 TO 30.9 IN ADULT: ICD-10-CM

## 2020-02-26 DIAGNOSIS — E66.811 CLASS 1 OBESITY DUE TO EXCESS CALORIES WITHOUT SERIOUS COMORBIDITY WITH BODY MASS INDEX (BMI) OF 30.0 TO 30.9 IN ADULT: ICD-10-CM

## 2020-02-26 DIAGNOSIS — E84.0 CYSTIC FIBROSIS WITH PULMONARY MANIFESTATIONS (H): ICD-10-CM

## 2020-02-26 RX ORDER — NALTREXONE HYDROCHLORIDE 50 MG/1
TABLET, FILM COATED ORAL
Qty: 90 TABLET | Refills: 2 | Status: CANCELLED | OUTPATIENT
Start: 2020-02-26

## 2020-02-27 DIAGNOSIS — J32.0 CHRONIC MAXILLARY SINUSITIS: Primary | ICD-10-CM

## 2020-02-27 RX ORDER — ASCORBIC ACID 500 MG
TABLET ORAL
Qty: 100 TABLET | Refills: 3 | Status: SHIPPED | OUTPATIENT
Start: 2020-02-27 | End: 2020-09-21

## 2020-02-27 NOTE — TELEPHONE ENCOUNTER
Appointment with Dr. Tolentino on 3/17/2020.  Called and left message for patient for clarification of narcotic pain pill on med list. Direct number left for call back. refill will be held until patient calls back.

## 2020-02-28 ENCOUNTER — OFFICE VISIT (OUTPATIENT)
Dept: OTOLARYNGOLOGY | Facility: CLINIC | Age: 25
End: 2020-02-28
Payer: COMMERCIAL

## 2020-02-28 VITALS
BODY MASS INDEX: 32.76 KG/M2 | SYSTOLIC BLOOD PRESSURE: 121 MMHG | HEART RATE: 95 BPM | HEIGHT: 62 IN | WEIGHT: 178 LBS | DIASTOLIC BLOOD PRESSURE: 77 MMHG

## 2020-02-28 DIAGNOSIS — J32.0 CHRONIC MAXILLARY SINUSITIS: Primary | ICD-10-CM

## 2020-02-28 DIAGNOSIS — J32.2 CHRONIC ETHMOIDAL SINUSITIS: ICD-10-CM

## 2020-02-28 DIAGNOSIS — E84.9 CYSTIC FIBROSIS (H): ICD-10-CM

## 2020-02-28 RX ORDER — MEROPENEM 500 MG/1
500 INJECTION, POWDER, FOR SOLUTION INTRAVENOUS ONCE
Status: COMPLETED | OUTPATIENT
Start: 2020-02-28 | End: 2020-02-28

## 2020-02-28 RX ADMIN — MEROPENEM 500 MG: 500 INJECTION, POWDER, FOR SOLUTION INTRAVENOUS at 13:28

## 2020-02-28 ASSESSMENT — MIFFLIN-ST. JEOR: SCORE: 1497.48

## 2020-02-28 ASSESSMENT — PAIN SCALES - GENERAL: PAINLEVEL: NO PAIN (0)

## 2020-02-28 NOTE — LETTER
2/28/2020       RE: Mamie Rice  [address] [facility] [id number]     Dear Colleague,    Thank you for referring your patient, Mamie Rice, to the [facility] at [facility]. Please see a copy of my visit note below.    [facility]   New Patient Visit      Encounter date: February 28, 2020    Referring Provider: Mariela Haile MD  [address]  [place] [address]    Chief Complaint: meropenem instillation    History of Present Illness: Mamie Rice is a 24-year-old female with a history of cystic fibrosis and chronic maxillary sinusitis s/p ESS in 2015  who presents for meropenem instillation. She reports that meropenem has been very helpful for her bothersome congestion, which is worse on the left side. She was previously receiving this on a monthly basis, but has not been following up since August 2019. Her lung function is okay right now. She works as a special ed para.    [place] [facility]:  DATE OF SURGERY:  10/16/2015.        PREOPERATIVE DIAGNOSIS:  Chronic pansinusitis.      POSTOPERATIVE DIAGNOSIS:  Chronic pansinusitis.      PROCEDURES:   1.  Endoscopic image-guided revision sinus surgery.   2.  Bilateral revision of middle meatus maxillary antrostomies.   3.  Bilateral inferior meatal maxillary antrostomies.   4.  Limited anterior ethmoidectomy on the left.   5.  Endoscopic limited septoplasty for access for maxillary antrostomies on the left.     Review of systems: A 14-point review of systems has been conducted and was negative for any notable symptoms, except as dictated in the history of present illness.     Past Medical History:   Diagnosis Date     ADHD (attention deficit hyperactivity disorder)      Anxiety      Aspergillosis, with pneumonia (H)     fugus found caused chest pain     Chronic infection     CF, MRSA.,      Chronic sinusitis      Constipation, chronic      Cystic fibrosis with  pulmonary manifestations (H) 12/19/2011     Cystic fibrosis without mention of meconium ileus     SWEAT TEST:Date: 2/17/1994   Laboratory: [facility]  296 mg, 104 mmol/L ClSample #2  295 mg, 104 mmol/L Cl GENOTYPING:Date: 10/15/2007,  Laboratory: AmbryGenotype: [id number]     Depressive disorder      Diabetes     no meds currently     Dysthymic disorder      Exocrine pancreatic insufficiency      Gastro-oesophageal reflux disease      Hip pain, right      MRSA (methicillin resistant Staphylococcus aureus) carrier      Pancreatic disease         Past Surgical History:   Procedure Laterality Date     ARTHROSCOPY HIP, OSTEOPLASTY FEMUR PROXIMAL, COMBINED  3/11/2013    Procedure: COMBINED ARTHROSCOPY HIP, OSTEOPLASTY FEMUR PROXIMAL;  Right Hip Arthroscopy, Labral  Debridement.    surgeon request choice anesthesia/admit to [facility] after surgery;  Surgeon: Omkar Austin MD;  [facility]     ARTHROSCOPY KNEE WITH MEDIAL MENISCECTOMY Left 1/31/2017    Procedure: ARTHROSCOPY KNEE WITH MEDIAL MENISCECTOMY;  Surgeon: Jethro Coyle MD;  [facility]     bronchoscopies       BRONCHOSCOPY       EXAM UNDER ANESTHESIA ANUS N/A 5/10/2016    Procedure: EXAM UNDER ANESTHESIA ANUS;  Surgeon: Chet Gaviria MD;  [facility]     EXAM UNDER ANESTHESIA, RESTORATIONS, EXTRACTION(S) DENTAL COMPLEX, COMBINED  5/13/2013    Procedure: COMBINED EXAM UNDER ANESTHESIA, RESTORATIONS, EXTRACTION(S) DENTAL COMPLEX;  Dental Exam, Radiographs, Restorations. Single Extraction  Tooth #2. Restorations x 3;  Surgeon: Danilo Ortiz DDS;  [facility]     HC KNEE SCOPE, DIAGNOSTIC      Arthroscopy, Knee- left     INJECT BOTOX N/A 5/10/2016    Procedure: INJECT BOTOX;  Surgeon: Chet Gaviria MD;  [facility]     left hip labral tear  5/11/2011    left hip arthroscopy with labral debridement and synovectomy     meniscus repair       OPTICAL TRACKING SYSTEM ENDOSCOPIC SINUS SURGERY   10/14/2011    Procedure:OPTICAL TRACKING SYSTEM ENDOSCOPIC SINUS SURGERY; FESS (functional endoscopic sinus surgery) with Image Guidance, bronchial lavage and cultures; Surgeon:GOYO KUO; [facility]     OPTICAL TRACKING SYSTEM ENDOSCOPIC SINUS SURGERY  5/18/2012    Procedure:OPTICAL TRACKING SYSTEM ENDOSCOPIC SINUS SURGERY; Right  and Left Image Guided Functional Endoscopic Sinus Surgery With  Frontal Approach, Landmarx; Surgeon:GOYO KUO; [facility]     OPTICAL TRACKING SYSTEM ENDOSCOPIC SINUS SURGERY  9/26/2012    Procedure: OPTICAL TRACKING SYSTEM ENDOSCOPIC SINUS SURGERY;  Stealth Guided Bilateral Functional Endoscopic Sinus Surgery *Latex Safe*;  Surgeon: Goyo Kuo MD;  [facility]     OPTICAL TRACKING SYSTEM ENDOSCOPIC SINUS SURGERY Bilateral 10/16/2015    Procedure: OPTICAL TRACKING SYSTEM ENDOSCOPIC SINUS SURGERY;  Surgeon: Mariela Haile MD;  [facility]     ORTHOPEDIC SURGERY      left hip tear repair 2010     SINUS SURGERY          Family History   Problem Relation Age of Onset     Cancer Paternal Grandmother      Skin Cancer Paternal Grandmother      Other Cancer Paternal Grandmother         Skin     Obesity Paternal Grandmother      Cancer Paternal Grandfather         PGF had throat cancer (he was a smoker)     Other Cancer Paternal Grandfather      Anxiety Disorder Paternal Grandfather      Thyroid Disease Mother         ,     Hypertension Mother      Obesity Other      Anesthesia Reaction No family hx of      Blood Disease No family hx of      Colon Polyps No family hx of      Crohn's Disease No family hx of      Ulcerative Colitis No family hx of      Colon Cancer No family hx of      Melanoma No family hx of         Social History     Socioeconomic History     Marital status: Single     Spouse name: Not on file     Number of children: Not on file     Years of education: Not on file     Highest education level: Not on file   Occupational History     Not on file   Social  Needs     Financial resource strain: Not on file     Food insecurity:     Worry: Not on file     Inability: Not on file     Transportation needs:     Medical: Not on file     Non-medical: Not on file   Tobacco Use     Smoking status: Never Smoker     Smokeless tobacco: Never Used     Tobacco comment: one person at home smokes outside   Substance and Sexual Activity     Alcohol use: No     Alcohol/week: 0.0 standard drinks     Drug use: No     Sexual activity: Never   Lifestyle     Physical activity:     Days per week: Not on file     Minutes per session: Not on file     Stress: Not on file   Relationships     Social connections:     Talks on phone: Not on file     Gets together: Not on file     Attends Anabaptism service: Not on file     Active member of club or organization: Not on file     Attends meetings of clubs or organizations: Not on file     Relationship status: Not on file     Intimate partner violence:     Fear of current or ex partner: Not on file     Emotionally abused: Not on file     Physically abused: Not on file     Forced sexual activity: Not on file   Other Topics Concern      Service Not Asked     Blood Transfusions No     Caffeine Concern Not Asked     Occupational Exposure Not Asked     Hobby Hazards Not Asked     Sleep Concern Not Asked     Stress Concern Not Asked     Weight Concern Not Asked     Special Diet Not Asked     Back Care Not Asked     Exercise Yes     Bike Helmet Not Asked     Seat Belt Not Asked     Self-Exams Not Asked     Parent/sibling w/ CABG, MI or angioplasty before 65F 55M? Yes   Social History Narrative    Mamie lives with mother in [place], [place].  There is a cat in the home, but Mamie does not have any litterbox duties.  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  She gets essentially no exercise because of the tingling in her feet (says it bothers her even to stand).        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        8/2015 no change in social situation        2/15/2016 Pt is single and lives with mother and stepfather.        Physical Exam:  Vital signs: There were no vitals taken for this visit.   General Appearance: No acute distress, appropriate demeanor, conversant  Eyes: moist conjunctivae; EOMI; pupils symmetric; visual acuity grossly intact; no proptosis  Head: normocephalic; overall symmetric appearance without deformity  Face: overall symmetric without deformity; HB I/VI  Nose: No external deformity; septum deviated to the left causing greater than 70% obstruction; crusting on left septum  Lungs: symmetric chest rise; no wheezing  CV: Good distal perfusion; normal hear rate  Extremities: No deformity  Neurologic Exam: Cranial nerves II-XII are grossly intact; no focal deficit    Procedure Note  Procedure performed: Rigid nasal endoscopy  Indication: To evaluate for sinonasal pathology not visualized on routine anterior rhinoscopy  Anesthesia: patient declined  Description of procedure: A 30 degree, 3 mm rigid endoscope was inserted into bilateral nasal cavities and the nasal valves, nasal cavity, middle meatus, sphenoethmoid recess, and nasopharynx were thoroughly evaluated for evidence of obstruction, edema, purulence, polyps and/or mass/lesion.     [place]f-Derrick Endoscopic Scoring System  Endoscopic observation Right Left   Polyps in middle meatus (0 = absent, 1 = restricted to middle meatus, 2 = Beyond middle meatus) 0 0   Discharge (0 = absent, 1 = thin and clear, 2 = thick, purulent) 1 1   Edema (0 = absent, 1 = mild-moderate, 2 = moderate-severe) 1 1   Crusting (0 = absent, 1 = mild-moderate, 2 = moderate-severe) 0 1   Scarring (0= absent, 1 = mild-moderate, 2 = moderate-severe) 0 1   Total 2 4     Findings  RT: max open; contraction of ethmoids; limited crusting or purulence  LT: max contraction; mucopurulent drainage and crusting from LT max    The patient tolerated the  procedure well without complication.     Laboratory Review:  n/a    Imaging Review:  n/a    Pathology Review:  n/a    Assessment/Medical Decision Making:  Mamie Rice is a 24-year-old female with a history of cystic fibrosis and chronic maxillary sinusitis s/p ESS in 2015 who presents for meropenem instillation. I performed endoscopy followed by administering meropenem into bilat middle meatuses under endoscopic visualization, which she tolerated well.     Plan:  Follow-up with myself or Dr. Haile as needed    Isak Bean MD    [facility]  Rhinology  Endoscopic Skull Base Surgery  [facility]  Department of Otolaryngology - Head & Neck Surgery      Scribe Disclosure:  I, Jossie Antunez, am serving as a scribe to document services personally performed by Isak Bean MD at this visit, based upon the provider's statements to me. All documentation has been reviewed by the aforementioned provider prior to being entered into the official medical record.    Portions of this medical record were completed by a scribe. UPON MY REVIEW AND AUTHENTICATION BY ELECTRONIC SIGNATURE, this confirms (a) I performed the applicable clinical services, and (b) the record is accurate.       Again, thank you for allowing me to participate in the care of your patient.      Sincerely,    Isak Bean MD

## 2020-02-28 NOTE — PROGRESS NOTES
[facility]                   New Patient Visit      Encounter date: February 28, 2020    Referring Provider: Mariela Haile MD  [address]  [place] [address]    Chief Complaint: meropenem instillation    History of Present Illness: Mamie Rice is a 24-year-old female with a history of cystic fibrosis and chronic maxillary sinusitis s/p ESS in 2015  who presents for meropenem instillation. She reports that meropenem has been very helpful for her bothersome congestion, which is worse on the left side. She was previously receiving this on a monthly basis, but has not been following up since August 2019. Her lung function is okay right now. She works as a special ed para.    [place] Operative History:  DATE OF SURGERY:  10/16/2015.        PREOPERATIVE DIAGNOSIS:  Chronic pansinusitis.      POSTOPERATIVE DIAGNOSIS:  Chronic pansinusitis.      PROCEDURES:   1.  Endoscopic image-guided revision sinus surgery.   2.  Bilateral revision of middle meatus maxillary antrostomies.   3.  Bilateral inferior meatal maxillary antrostomies.   4.  Limited anterior ethmoidectomy on the left.   5.  Endoscopic limited septoplasty for access for maxillary antrostomies on the left.     Review of systems: A 14-point review of systems has been conducted and was negative for any notable symptoms, except as dictated in the history of present illness.     Past Medical History:   Diagnosis Date     ADHD (attention deficit hyperactivity disorder)      Anxiety      Aspergillosis, with pneumonia (H)     fugus found caused chest pain     Chronic infection     CF, MRSA.,      Chronic sinusitis      Constipation, chronic      Cystic fibrosis with pulmonary manifestations (H) 12/19/2011     Cystic fibrosis without mention of meconium ileus     SWEAT TEST:Date: 2/17/1994   Laboratory: [facility]ample #1  296 mg, 104 mmol/L ClSample #2  295 mg, 104 mmol/L Cl GENOTYPING:Date: 10/15/2007,  Laboratory:  AmbryGenotype: [id number]     Depressive disorder      Diabetes     no meds currently     Dysthymic disorder      Exocrine pancreatic insufficiency      Gastro-oesophageal reflux disease      Hip pain, right      MRSA (methicillin resistant Staphylococcus aureus) carrier      Pancreatic disease         Past Surgical History:   Procedure Laterality Date     ARTHROSCOPY HIP, OSTEOPLASTY FEMUR PROXIMAL, COMBINED  3/11/2013    Procedure: COMBINED ARTHROSCOPY HIP, OSTEOPLASTY FEMUR PROXIMAL;  Right Hip Arthroscopy, Labral  Debridement.    surgeon request choice anesthesia/admit to [facility] after surgery;  Surgeon: Omkar Austin MD;  [facility]     ARTHROSCOPY KNEE WITH MEDIAL MENISCECTOMY Left 1/31/2017    Procedure: ARTHROSCOPY KNEE WITH MEDIAL MENISCECTOMY;  Surgeon: Jethro Coyle MD;  [facility]     bronchoscopies       BRONCHOSCOPY       EXAM UNDER ANESTHESIA ANUS N/A 5/10/2016    Procedure: EXAM UNDER ANESTHESIA ANUS;  Surgeon: Chet Gaviria MD;  [facility]     EXAM UNDER ANESTHESIA, RESTORATIONS, EXTRACTION(S) DENTAL COMPLEX, COMBINED  5/13/2013    Procedure: COMBINED EXAM UNDER ANESTHESIA, RESTORATIONS, EXTRACTION(S) DENTAL COMPLEX;  Dental Exam, Radiographs, Restorations. Single Extraction  Tooth #2. Restorations x 3;  Surgeon: Danilo Ortiz DDS;  [facility]     HC KNEE SCOPE, DIAGNOSTIC      Arthroscopy, Knee- left     INJECT BOTOX N/A 5/10/2016    Procedure: INJECT BOTOX;  Surgeon: Chet Gaviria MD;  [facility]     left hip labral tear  5/11/2011    left hip arthroscopy with labral debridement and synovectomy     meniscus repair       OPTICAL TRACKING SYSTEM ENDOSCOPIC SINUS SURGERY  10/14/2011    Procedure:OPTICAL TRACKING SYSTEM ENDOSCOPIC SINUS SURGERY; FESS (functional endoscopic sinus surgery) with Image Guidance, bronchial lavage and cultures; Surgeon:JUAN JOSE GARCIA; [facility]     OPTICAL TRACKING SYSTEM ENDOSCOPIC SINUS  SURGERY  5/18/2012    Procedure:OPTICAL TRACKING SYSTEM ENDOSCOPIC SINUS SURGERY; Right  and Left Image Guided Functional Endoscopic Sinus Surgery With  Frontal Approach, Landmarx; Surgeon:GOYO KUO; [facility]     OPTICAL TRACKING SYSTEM ENDOSCOPIC SINUS SURGERY  9/26/2012    Procedure: OPTICAL TRACKING SYSTEM ENDOSCOPIC SINUS SURGERY;  Stealth Guided Bilateral Functional Endoscopic Sinus Surgery *Latex Safe*;  Surgeon: Goyo Kuo MD;  [facility]     OPTICAL TRACKING SYSTEM ENDOSCOPIC SINUS SURGERY Bilateral 10/16/2015    Procedure: OPTICAL TRACKING SYSTEM ENDOSCOPIC SINUS SURGERY;  Surgeon: Mariela Haile MD;  [facility]     ORTHOPEDIC SURGERY      left hip tear repair 2010     SINUS SURGERY          Family History   Problem Relation Age of Onset     Cancer Paternal Grandmother      Skin Cancer Paternal Grandmother      Other Cancer Paternal Grandmother         Skin     Obesity Paternal Grandmother      Cancer Paternal Grandfather         PGF had throat cancer (he was a smoker)     Other Cancer Paternal Grandfather      Anxiety Disorder Paternal Grandfather      Thyroid Disease Mother         ,     Hypertension Mother      Obesity Other      Anesthesia Reaction No family hx of      Blood Disease No family hx of      Colon Polyps No family hx of      Crohn's Disease No family hx of      Ulcerative Colitis No family hx of      Colon Cancer No family hx of      Melanoma No family hx of         Social History     Socioeconomic History     Marital status: Single     Spouse name: Not on file     Number of children: Not on file     Years of education: Not on file     Highest education level: Not on file   Occupational History     Not on file   Social Needs     Financial resource strain: Not on file     Food insecurity:     Worry: Not on file     Inability: Not on file     Transportation needs:     Medical: Not on file     Non-medical: Not on file   Tobacco Use     Smoking status: Never Smoker      Smokeless tobacco: Never Used     Tobacco comment: one person at home smokes outside   Substance and Sexual Activity     Alcohol use: No     Alcohol/week: 0.0 standard drinks     Drug use: No     Sexual activity: Never   Lifestyle     Physical activity:     Days per week: Not on file     Minutes per session: Not on file     Stress: Not on file   Relationships     Social connections:     Talks on phone: Not on file     Gets together: Not on file     Attends Roman Catholic service: Not on file     Active member of club or organization: Not on file     Attends meetings of clubs or organizations: Not on file     Relationship status: Not on file     Intimate partner violence:     Fear of current or ex partner: Not on file     Emotionally abused: Not on file     Physically abused: Not on file     Forced sexual activity: Not on file   Other Topics Concern      Service Not Asked     Blood Transfusions No     Caffeine Concern Not Asked     Occupational Exposure Not Asked     Hobby Hazards Not Asked     Sleep Concern Not Asked     Stress Concern Not Asked     Weight Concern Not Asked     Special Diet Not Asked     Back Care Not Asked     Exercise Yes     Bike Helmet Not Asked     Seat Belt Not Asked     Self-Exams Not Asked     Parent/sibling w/ CABG, MI or angioplasty before 65F 55M? Yes   Social History Narrative    Mamie lives with mother in [place], [place].  There is a cat in the home, but Mamie does not have any litterbox duties.  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  She gets essentially no exercise because of the tingling in her feet (says it bothers her even to stand).        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        8/2015 no change in social situation        2/15/2016 Pt is single and lives with mother and stepfather.        Physical Exam:  Vital signs: There were no vitals taken for this visit.   General Appearance: No  acute distress, appropriate demeanor, conversant  Eyes: moist conjunctivae; EOMI; pupils symmetric; visual acuity grossly intact; no proptosis  Head: normocephalic; overall symmetric appearance without deformity  Face: overall symmetric without deformity; HB I/VI  Nose: No external deformity; septum deviated to the left causing greater than 70% obstruction; crusting on left septum  Lungs: symmetric chest rise; no wheezing  CV: Good distal perfusion; normal hear rate  Extremities: No deformity  Neurologic Exam: Cranial nerves II-XII are grossly intact; no focal deficit    Procedure Note  Procedure performed: Rigid nasal endoscopy  Indication: To evaluate for sinonasal pathology not visualized on routine anterior rhinoscopy  Anesthesia: patient declined  Description of procedure: A 30 degree, 3 mm rigid endoscope was inserted into bilateral nasal cavities and the nasal valves, nasal cavity, middle meatus, sphenoethmoid recess, and nasopharynx were thoroughly evaluated for evidence of obstruction, edema, purulence, polyps and/or mass/lesion.     [place]-Derrick Endoscopic Scoring System  Endoscopic observation Right Left   Polyps in middle meatus (0 = absent, 1 = restricted to middle meatus, 2 = Beyond middle meatus) 0 0   Discharge (0 = absent, 1 = thin and clear, 2 = thick, purulent) 1 1   Edema (0 = absent, 1 = mild-moderate, 2 = moderate-severe) 1 1   Crusting (0 = absent, 1 = mild-moderate, 2 = moderate-severe) 0 1   Scarring (0= absent, 1 = mild-moderate, 2 = moderate-severe) 0 1   Total 2 4     Findings  RT: max open; contraction of ethmoids; limited crusting or purulence  LT: max contraction; mucopurulent drainage and crusting from LT max    The patient tolerated the procedure well without complication.     Laboratory Review:  n/a    Imaging Review:  n/a    Pathology Review:  n/a    Assessment/Medical Decision Making:  Mamie Rice is a 24-year-old female with a history of cystic fibrosis and chronic maxillary  sinusitis s/p ESS in 2015 who presents for meropenem instillation. I performed endoscopy followed by administering meropenem into bilat middle meatuses under endoscopic visualization, which she tolerated well.     Plan:  Follow-up with myself or Dr. Haile as needed    Isak Bean MD    [facility]  Rhinology  Endoscopic Skull Base Surgery  [facility]  Department of Otolaryngology - Head & Neck Surgery      Scribe Disclosure:  I, Jossie Donovandeandre, am serving as a scribe to document services personally performed by Isak Bean MD at this visit, based upon the provider's statements to me. All documentation has been reviewed by the aforementioned provider prior to being entered into the official medical record.    Portions of this medical record were completed by a scribe. UPON MY REVIEW AND AUTHENTICATION BY ELECTRONIC SIGNATURE, this confirms (a) I performed the applicable clinical services, and (b) the record is accurate.

## 2020-02-28 NOTE — NURSING NOTE
"Chief Complaint   Patient presents with     Consult     Meropenem instillation         Blood pressure 121/77, pulse 95, height 1.57 m (5' 1.8\"), weight 80.7 kg (178 lb).    Nathalia Vincent, EMT    "

## 2020-03-03 ENCOUNTER — MYC REFILL (OUTPATIENT)
Dept: ENDOCRINOLOGY | Facility: CLINIC | Age: 25
End: 2020-03-03

## 2020-03-03 ENCOUNTER — MYC REFILL (OUTPATIENT)
Dept: PSYCHIATRY | Facility: CLINIC | Age: 25
End: 2020-03-03

## 2020-03-03 DIAGNOSIS — F90.0 ATTENTION DEFICIT HYPERACTIVITY DISORDER (ADHD), PREDOMINANTLY INATTENTIVE TYPE: ICD-10-CM

## 2020-03-03 DIAGNOSIS — E66.09 CLASS 1 OBESITY DUE TO EXCESS CALORIES WITHOUT SERIOUS COMORBIDITY WITH BODY MASS INDEX (BMI) OF 30.0 TO 30.9 IN ADULT: ICD-10-CM

## 2020-03-03 DIAGNOSIS — E66.811 CLASS 1 OBESITY DUE TO EXCESS CALORIES WITHOUT SERIOUS COMORBIDITY WITH BODY MASS INDEX (BMI) OF 30.0 TO 30.9 IN ADULT: ICD-10-CM

## 2020-03-03 RX ORDER — NALTREXONE HYDROCHLORIDE 50 MG/1
TABLET, FILM COATED ORAL
Qty: 90 TABLET | Refills: 2 | Status: CANCELLED | OUTPATIENT
Start: 2020-03-03

## 2020-03-03 RX ORDER — DEXTROAMPHETAMINE SACCHARATE, AMPHETAMINE ASPARTATE MONOHYDRATE, DEXTROAMPHETAMINE SULFATE AND AMPHETAMINE SULFATE 5; 5; 5; 5 MG/1; MG/1; MG/1; MG/1
20 CAPSULE, EXTENDED RELEASE ORAL DAILY
Qty: 30 CAPSULE | Refills: 0 | Status: CANCELLED | OUTPATIENT
Start: 2020-03-03

## 2020-03-04 RX ORDER — NALTREXONE HYDROCHLORIDE 50 MG/1
TABLET, FILM COATED ORAL
Qty: 30 TABLET | Refills: 0 | Status: SHIPPED | OUTPATIENT
Start: 2020-03-04 | End: 2020-03-17

## 2020-03-04 RX ORDER — DEXTROAMPHETAMINE SACCHARATE, AMPHETAMINE ASPARTATE MONOHYDRATE, DEXTROAMPHETAMINE SULFATE AND AMPHETAMINE SULFATE 5; 5; 5; 5 MG/1; MG/1; MG/1; MG/1
20 CAPSULE, EXTENDED RELEASE ORAL DAILY
Qty: 30 CAPSULE | Refills: 0 | Status: SHIPPED | OUTPATIENT
Start: 2020-03-04 | End: 2020-03-31

## 2020-03-04 NOTE — TELEPHONE ENCOUNTER
Last seen: 8/14/19  RTC: 6 months  Cancel: 2/24/20  No-show: None  Next appt: 3/31/20     Medication requested: amphetamine-dextroamphetamine (ADDERALL XR) 20 MG 24 hr capsule  Directions: Take 1 capsule (20 mg) by mouth daily   Qty: 30  Last refilled: 1/24/20, 12/23/19, 11/6/19 per [place]      Medication pended for a 30-day supply and routed to provider for review.

## 2020-03-04 NOTE — TELEPHONE ENCOUNTER
Call back from patient verifying that she is no longer taking narcotic medication listed on medication list. Sending refill of Naltrexone to pharmacy for 30 days. Patient has appointment with Dr. Tolentino on 3/17/2020.

## 2020-03-17 ENCOUNTER — TELEPHONE (OUTPATIENT)
Dept: ENDOCRINOLOGY | Facility: CLINIC | Age: 25
End: 2020-03-17

## 2020-03-17 ENCOUNTER — VIRTUAL VISIT (OUTPATIENT)
Dept: ENDOCRINOLOGY | Facility: CLINIC | Age: 25
End: 2020-03-17
Payer: COMMERCIAL

## 2020-03-17 DIAGNOSIS — E66.811 CLASS 1 OBESITY DUE TO EXCESS CALORIES WITHOUT SERIOUS COMORBIDITY WITH BODY MASS INDEX (BMI) OF 30.0 TO 30.9 IN ADULT: Primary | ICD-10-CM

## 2020-03-17 DIAGNOSIS — F34.1 PERSISTENT DEPRESSIVE DISORDER: ICD-10-CM

## 2020-03-17 DIAGNOSIS — E66.09 CLASS 1 OBESITY DUE TO EXCESS CALORIES WITHOUT SERIOUS COMORBIDITY WITH BODY MASS INDEX (BMI) OF 30.0 TO 30.9 IN ADULT: Primary | ICD-10-CM

## 2020-03-17 DIAGNOSIS — F33.0 MILD EPISODE OF RECURRENT MAJOR DEPRESSIVE DISORDER (H): ICD-10-CM

## 2020-03-17 RX ORDER — NALTREXONE HYDROCHLORIDE 50 MG/1
TABLET, FILM COATED ORAL
Qty: 90 TABLET | Refills: 1 | Status: SHIPPED | OUTPATIENT
Start: 2020-03-17 | End: 2020-10-09

## 2020-03-17 RX ORDER — BUPROPION HYDROCHLORIDE 300 MG/1
300 TABLET ORAL EVERY MORNING
Qty: 90 TABLET | Refills: 3 | Status: SHIPPED | OUTPATIENT
Start: 2020-03-17 | End: 2021-04-15

## 2020-03-17 RX ORDER — TOPIRAMATE 100 MG/1
100 TABLET, FILM COATED ORAL DAILY
Qty: 90 TABLET | Refills: 1 | Status: SHIPPED | OUTPATIENT
Start: 2020-03-17 | End: 2020-10-09

## 2020-03-17 ASSESSMENT — ENCOUNTER SYMPTOMS
LIGHT-HEADEDNESS: 0
POLYPHAGIA: 0
BLOOD IN STOOL: 0
JOINT SWELLING: 1
NECK MASS: 0
MEMORY LOSS: 0
MYALGIAS: 1
DEPRESSION: 0
POLYDIPSIA: 1
DIZZINESS: 0
BREAST PAIN: 0
BOWEL INCONTINENCE: 0
SORE THROAT: 0
NECK PAIN: 0
VOMITING: 0
FATIGUE: 0
PALPITATIONS: 0
RECTAL PAIN: 0
FEVER: 0
SHORTNESS OF BREATH: 0
SKIN CHANGES: 0
DYSURIA: 0
WEIGHT GAIN: 0
TACHYCARDIA: 0
SNORES LOUDLY: 0
COUGH DISTURBING SLEEP: 0
SPUTUM PRODUCTION: 0
EYE REDNESS: 0
SINUS CONGESTION: 0
DECREASED APPETITE: 0
RESPIRATORY PAIN: 0
DYSPNEA ON EXERTION: 0
BRUISES/BLEEDS EASILY: 0
EXERCISE INTOLERANCE: 0
MUSCLE CRAMPS: 0
SMELL DISTURBANCE: 0
DECREASED CONCENTRATION: 0
DIFFICULTY URINATING: 0
NAUSEA: 0
PARALYSIS: 0
DECREASED LIBIDO: 0
NAIL CHANGES: 0
SWOLLEN GLANDS: 0
HEARTBURN: 0
ALTERED TEMPERATURE REGULATION: 1
LEG PAIN: 0
CHILLS: 0
HOARSE VOICE: 0
WEAKNESS: 0
HOT FLASHES: 0
SEIZURES: 0
INCREASED ENERGY: 1
STIFFNESS: 1
SPEECH CHANGE: 0
CLAUDICATION: 0
RECTAL BLEEDING: 0
ARTHRALGIAS: 1
ABDOMINAL PAIN: 0
DOUBLE VISION: 0
COUGH: 0
JAUNDICE: 0
TREMORS: 0
WHEEZING: 0
FLANK PAIN: 0
PANIC: 0
NIGHT SWEATS: 1
LOSS OF CONSCIOUSNESS: 0
EYE PAIN: 0
DIARRHEA: 0
EYE WATERING: 0
NUMBNESS: 0
BREAST MASS: 0
HEMATURIA: 0
POSTURAL DYSPNEA: 0
HEMOPTYSIS: 0
HYPOTENSION: 0
LEG SWELLING: 0
MUSCLE WEAKNESS: 1
CONSTIPATION: 0
HYPERTENSION: 0
INSOMNIA: 0
BACK PAIN: 0
TINGLING: 0
TASTE DISTURBANCE: 0
BLOATING: 0
POOR WOUND HEALING: 0
HEADACHES: 0
TROUBLE SWALLOWING: 0
WEIGHT LOSS: 0
DISTURBANCES IN COORDINATION: 0
NERVOUS/ANXIOUS: 0
EXTREMITY NUMBNESS: 0
HALLUCINATIONS: 0
EYE IRRITATION: 0
SLEEP DISTURBANCES DUE TO BREATHING: 0
SYNCOPE: 0
ORTHOPNEA: 0
SINUS PAIN: 0

## 2020-03-17 NOTE — PROGRESS NOTES
Telephone Visit Return Medical Weight Management Note     Mamie Rice  MRN:  [id number]  :  1995  STEPHEN:  3/17/2020    Dear Dr. De La Rosa,     I had the pleasure of seeing your patient Mamie Rice.  She is a 24 year old female who I am continuing to see for treatment of obesity. She has CF, CFRD, depression, joints pain, ADHD    INTERVAL History  Last seen by me on 2019. She is currently on naltrexone 50 mg daily along with bupropion 150 mg bid and topiramate 75 mg at bedtime. She does not feel different after adding topiramate. Otherwise, she is feeling well without any side effects of all medications. She maintained weight at 178-179 lbs over the past 8 months. She exercises at the gym regularly. She denied increased appetite or craving except during premenstrual period. 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     She is also on Adderall for ADHD and Bupropion for depression.    CURRENT WEIGHT:     Wt Readings from Last 4 Encounters:   20 80.7 kg (178 lb)   19 78.9 kg (174 lb)   19 79.1 kg (174 lb 4.8 oz)   19 79.2 kg (174 lb 9.7 oz)     Height:  Data Unavailable  Body Mass Index:  There is no height or weight on file to calculate BMI.  Vitals:  B/P: 124/81, P: 100    Initial consult weight was 182 on 10/14/2016.  Weight change since last seen on 2019 is lost 1 pounds.   Total loss is 15 pounds.      Review of Systems     Constitutional:  Positive for night sweats and increased energy. Negative for fever, chills, weight loss, weight gain, fatigue, decreased appetite, recent stressors, height loss, post-operative complications, incisional pain, hallucinations, hyperactivity and confused.   HENT:  Negative for ear pain, hearing loss, tinnitus, nosebleeds, trouble swallowing, hoarse voice, mouth sores, sore throat, ear discharge, tooth pain, gum tenderness, taste disturbance, smell disturbance, hearing aid, bleeding gums, dry mouth, sinus pain, sinus congestion and  neck mass.    Eyes:  Negative for double vision, pain, redness, eye pain, decreased vision, eye watering, eye bulging, eye dryness, flashing lights, spots, floaters, strabismus, tunnel vision, jaundice and eye irritation.   Respiratory:   Negative for cough, hemoptysis, sputum production, shortness of breath, wheezing, sleep disturbances due to breathing, snores loudly, respiratory pain, dyspnea on exertion, cough disturbing sleep and postural dyspnea.    Cardiovascular:  Negative for chest pain, dyspnea on exertion, palpitations, orthopnea, claudication, leg swelling, fingers/toes turn blue, hypertension, hypotension, syncope, history of heart murmur, chest pain on exertion, chest pain at rest, pacemaker, few scattered varicosities, leg pain, sleep disturbances due to breathing, tachycardia, light-headedness, exercise intolerance and edema.   Gastrointestinal:  Negative for heartburn, nausea, vomiting, abdominal pain, diarrhea, constipation, blood in stool, melena, rectal pain, bloating, hemorrhoids, bowel incontinence, jaundice, rectal bleeding, coffee ground emesis and change in stool.   Genitourinary:  Negative for bladder incontinence, dysuria, urgency, hematuria, flank pain, vaginal discharge, difficulty urinating, genital sores, dyspareunia, decreased libido, nocturia, voiding less frequently, arousal difficulty, abnormal vaginal bleeding, excessive menstruation, menstrual changes, hot flashes, vaginal dryness and postmenopausal bleeding.   Musculoskeletal:  Positive for myalgias, joint swelling, arthralgias, stiffness and muscle weakness. Negative for back pain, muscle cramps, neck pain, bone pain and fracture.   Skin:  Negative for nail changes, itching, poor wound healing, rash, hair changes, skin changes, acne, warts, poor wound healing, scarring, flaky skin, Raynaud's phenomenon, sensitivity to sunlight and skin thickening.   Neurological:  Negative for dizziness, tingling, tremors, speech change,  seizures, loss of consciousness, weakness, light-headedness, numbness, headaches, disturbances in coordination, extremity numbness, memory loss, difficulty walking and paralysis.   Endo/Heme:  Negative for anemia, swollen glands and bruises/bleeds easily.   Psychiatric/Behavioral:  Negative for depression, hallucinations, memory loss, decreased concentration, mood swings and panic attacks.    Breast:  Negative for breast discharge, breast mass, breast pain and nipple retraction.   Endocrine:  Positive for altered temperature regulation and polydipsia.Negative for polyphagia, unwanted hair growth and change in facial hair.      MEDICATIONS:   Current Outpatient Medications   Medication Sig Dispense Refill     acetaminophen (TYLENOL) 325 MG tablet Take 2 tablets (650 mg) by mouth every 4 hours as needed for other (mild pain) 100 tablet 0     acetylcysteine (MUCOMYST) 20 % neb solution INHALE ONE 4ML NEB INTO LUNGS VIA NEBULIZER TWO TIMES A DAY, MAY INCREASE TO 3-4 TIMES A DAY WITH INCREASE COUGH/COLD SYMPTOMS 360 mL 11     albuterol (PROAIR HFA/PROVENTIL HFA/VENTOLIN HFA) 108 (90 Base) MCG/ACT Inhaler Inhale 2 puffs into the lungs every 6 hours as needed for shortness of breath / dyspnea or wheezing 1 Inhaler 12     albuterol (PROVENTIL) (2.5 MG/3ML) 0.083% neb solution Take 1 vial (2.5 mg) by nebulization every 4 hours as needed for shortness of breath / dyspnea or wheezing 360 mL 11     amphetamine-dextroamphetamine (ADDERALL XR) 20 MG 24 hr capsule Take 1 capsule (20 mg) by mouth daily 30 capsule 0     amphetamine-dextroamphetamine (ADDERALL XR) 20 MG 24 hr capsule Take 1 capsule (20 mg) by mouth daily 30 capsule 0     amphetamine-dextroamphetamine (ADDERALL XR) 20 MG 24 hr capsule Take 1 capsule (20 mg) by mouth daily (Patient not taking: Reported on 11/12/2019) 30 capsule 0     azithromycin (ZITHROMAX) 500 MG tablet TAKE ONE TABLET BY MOUTH ON MONDAYS, WEDNESDAYS, AND FRIDAYS AS DIRECTED 36 tablet 4     beta  carotene 08730 UNIT capsule TAKE ONE CAPSULE BY MOUTH IN THE MORNING ON MONDAY, WEDNESDAY, AND FRIDAY 36 capsule 4     blood glucose (ACCU-CHEK SMARTVIEW) test strip Use to test blood sugar 4 times daily or as directed. 400 each 1     buPROPion (WELLBUTRIN XL) 300 MG 24 hr tablet Take 1 tablet (300 mg) by mouth every morning Please alert patient to change to one tablet daily 30 tablet 5     busPIRone (BUSPAR) 15 MG tablet Take 1 tablet (15 mg) by mouth 2 times daily 60 tablet 5     cetirizine (ZYRTEC) 10 MG tablet Take 1 tablet (10 mg) by mouth every evening 30 tablet 3     elexacaftor-tezacaftor-ivacaftor & ivacaftor (TRIKAFTA) 100-50-75 & 150 MG tablet pack Take 2 orange tablets in the morning and 1 light blue tablet in the evening. Swallow whole with fat-containing food. 84 tablet 3     FLUoxetine (PROZAC) 40 MG capsule Take 2 capsules (80 mg) by mouth daily 60 capsule 5     fluticasone (FLONASE) 50 MCG/ACT nasal spray Spray 2 sprays into both nostrils 2 times daily 15.8 mL 3     GLYCOPYRROLATE PO Take 1 mg by mouth 2 times daily        hydrOXYzine (ATARAX) 25 MG tablet Take 1 tablet (25 mg) by mouth nightly as needed (sleep) 30 tablet 2     medroxyPROGESTERone (DEPO-PROVERA) 150 MG/ML IM injection Inject 150 mg into the muscle       MedroxyPROGESTERone Acetate (DEPO-PROVERA IM)        meropenem (MERREM) 500 MG vial 500 mg by Nasal Instillation route once for 1 dose 60 each      montelukast (SINGULAIR) 10 MG tablet TAKE ONE TABLET BY MOUTH AT BEDTIME 30 tablet 11     Multiple Vitamin (MULTIVITAMIN OR) Take 1 tablet by mouth daily.       naltrexone (DEPADE/REVIA) 50 MG tablet Take 1 tablet.  Time it one to two hours prior to worst cravings. 30 tablet 0     omeprazole (PRILOSEC) 40 MG DR capsule Take 1 capsule (40 mg) by mouth 2 times daily 60 capsule 11     phytonadione (MEPHYTON/VIT K) 5 MG tablet TAKE ONE TABLET BY MOUTH ONCE A WEEK 4 tablet 11     polyethylene glycol (MIRALAX) powder Take 17 g (1 capful) by  mouth daily as needed for constipation 119 g 3     traZODone (DESYREL) 100 MG tablet Take 1 tablet (100 mg) by mouth At Bedtime 30 tablet 5     vitamin C (ASCORBIC ACID) 500 MG tablet TAKE ONE TABLET BY MOUTH TWICE A  tablet 3     VITAMIN D3 1000 units tablet TAKE ONE TABLET BY MOUTH EVERY  tablet 3     VITAMIN E 400 units capsule TAKE ONE CAPSULE BY MOUTH TWICE A  capsule 11       Weight Loss Medication History Reviewed With Patient 3/12/2020   Which weight loss medications are you currently taking on a regular basis?  Naltrexone   If you are not taking a weight loss medication that was prescribed to you, please indicate why: -   Are you having any side effects from the weight loss medication that we have prescribed you? No     ASSESSMENT:    Mamie Rice is a 24 year old female who I am continuing to see for treatment of obesity.   She has CF, CFRD, depression, joints pain, ADHD    Currently on naltrexone,bupropion and topiramate -- she feels that her appetite and hunger are controllable  Maintain weight around 178-179 lbs  Exercise more regularly    PLAN:  - increase topiramate to 100 mg daily  - continue naltrexone 50 mg daily -- recommend to take it in the evening when she tends to snack  - continue bupropion  mg bid  - reinforced food plan - focus on no between meal snacking, aggressive lowering of starches and cheese, increase protein and vegetable    FOLLOW-UP:    RTC 2-3 months with me    Total time on the phone visit: 5 minutes  Time start: 11:48 am  Time end: 11:53 am    Sincerely,    Randa Ho MD

## 2020-03-30 DIAGNOSIS — E84.9 CYSTIC FIBROSIS (H): ICD-10-CM

## 2020-03-30 ASSESSMENT — ENCOUNTER SYMPTOMS
FEVER: 0
POLYPHAGIA: 0
HEMATURIA: 0
DIFFICULTY URINATING: 1
DECREASED APPETITE: 0
CHILLS: 1
POLYDIPSIA: 1
FATIGUE: 0
DYSURIA: 0
HALLUCINATIONS: 0
NIGHT SWEATS: 1
FLANK PAIN: 0
ALTERED TEMPERATURE REGULATION: 1
WEIGHT LOSS: 0
INCREASED ENERGY: 1
WEIGHT GAIN: 1

## 2020-03-31 ENCOUNTER — VIRTUAL VISIT (OUTPATIENT)
Dept: ENDOCRINOLOGY | Facility: CLINIC | Age: 25
End: 2020-03-31
Attending: INTERNAL MEDICINE
Payer: COMMERCIAL

## 2020-03-31 ENCOUNTER — VIRTUAL VISIT (OUTPATIENT)
Dept: PSYCHIATRY | Facility: CLINIC | Age: 25
End: 2020-03-31
Attending: CLINICAL NURSE SPECIALIST
Payer: COMMERCIAL

## 2020-03-31 DIAGNOSIS — E84.0 CYSTIC FIBROSIS WITH PULMONARY MANIFESTATIONS (H): ICD-10-CM

## 2020-03-31 DIAGNOSIS — G47.00 INSOMNIA, UNSPECIFIED TYPE: ICD-10-CM

## 2020-03-31 DIAGNOSIS — F41.1 GAD (GENERALIZED ANXIETY DISORDER): ICD-10-CM

## 2020-03-31 DIAGNOSIS — F90.0 ATTENTION DEFICIT HYPERACTIVITY DISORDER (ADHD), PREDOMINANTLY INATTENTIVE TYPE: ICD-10-CM

## 2020-03-31 DIAGNOSIS — E16.2 HYPOGLYCEMIA: Primary | ICD-10-CM

## 2020-03-31 RX ORDER — DEXTROAMPHETAMINE SACCHARATE, AMPHETAMINE ASPARTATE MONOHYDRATE, DEXTROAMPHETAMINE SULFATE AND AMPHETAMINE SULFATE 5; 5; 5; 5 MG/1; MG/1; MG/1; MG/1
20 CAPSULE, EXTENDED RELEASE ORAL DAILY
Qty: 30 CAPSULE | Refills: 0 | Status: SHIPPED | OUTPATIENT
Start: 2020-04-01 | End: 2020-06-30

## 2020-03-31 RX ORDER — TRAZODONE HYDROCHLORIDE 100 MG/1
100 TABLET ORAL AT BEDTIME
Qty: 30 TABLET | Refills: 5 | Status: SHIPPED | OUTPATIENT
Start: 2020-03-31 | End: 2020-06-30

## 2020-03-31 RX ORDER — DEXTROAMPHETAMINE SACCHARATE, AMPHETAMINE ASPARTATE MONOHYDRATE, DEXTROAMPHETAMINE SULFATE AND AMPHETAMINE SULFATE 5; 5; 5; 5 MG/1; MG/1; MG/1; MG/1
20 CAPSULE, EXTENDED RELEASE ORAL DAILY
Qty: 30 CAPSULE | Refills: 0 | Status: SHIPPED | OUTPATIENT
Start: 2020-05-27 | End: 2020-11-23

## 2020-03-31 RX ORDER — BUSPIRONE HYDROCHLORIDE 15 MG/1
15 TABLET ORAL 2 TIMES DAILY
Qty: 60 TABLET | Refills: 5 | Status: SHIPPED | OUTPATIENT
Start: 2020-03-31 | End: 2020-06-30

## 2020-03-31 RX ORDER — DEXTROAMPHETAMINE SACCHARATE, AMPHETAMINE ASPARTATE MONOHYDRATE, DEXTROAMPHETAMINE SULFATE AND AMPHETAMINE SULFATE 5; 5; 5; 5 MG/1; MG/1; MG/1; MG/1
20 CAPSULE, EXTENDED RELEASE ORAL DAILY
Qty: 30 CAPSULE | Refills: 0 | Status: SHIPPED | OUTPATIENT
Start: 2020-04-29 | End: 2020-06-30

## 2020-03-31 RX ORDER — FLUOXETINE 40 MG/1
80 CAPSULE ORAL DAILY
Qty: 60 CAPSULE | Refills: 5 | Status: SHIPPED | OUTPATIENT
Start: 2020-03-31 | End: 2020-06-30

## 2020-03-31 RX ORDER — ELEXACAFTOR, TEZACAFTOR, AND IVACAFTOR 100-50-75
KIT ORAL
Qty: 84 TABLET | Refills: 0 | Status: SHIPPED | OUTPATIENT
Start: 2020-03-31 | End: 2020-04-08

## 2020-03-31 RX ORDER — HYDROXYZINE HYDROCHLORIDE 25 MG/1
25 TABLET, FILM COATED ORAL
Qty: 30 TABLET | Refills: 2 | Status: SHIPPED | OUTPATIENT
Start: 2020-03-31 | End: 2020-06-30

## 2020-03-31 ASSESSMENT — PATIENT HEALTH QUESTIONNAIRE - PHQ9: SUM OF ALL RESPONSES TO PHQ QUESTIONS 1-9: 7

## 2020-03-31 NOTE — PROGRESS NOTES
"Outpatient Psychiatry Progress Note     Provider: DANIEL Mtz CNS  Date: 3/31/2020  Service:  Virtual Medication follow up with counseling conducted by phone. Mamie Rice verbally consents to telehealth visit. Reason for telehealth visit is due to COVID 19 mandate.  Patient Identification: Mamie Rice  : 1995   MRN: [id number]    Mamie Rice is a 24 year old year old female who presents for ongoing psychiatric care.  Mamie Rice was last seen 19.   At that time,   Assessment & Plan       Mamie Rice is i for follow up and reports her mood is stable at this time and would like to continue current medication but agrees to change Wellbutrin to XL 300mg in the am instead of SR 150mg bid     Diagnosis       Encounter Diagnoses   Name Primary?     Mild episode of recurrent major depressive disorder (H) Yes     Insomnia, unspecified type       MECHE (generalized anxiety disorder)       Attention deficit hyperactivity disorder (ADHD), combined type       Persistent depressive disorder           Plan:  Medication: Continue all medication except change Wellbutrin to XL for once daily dosing  OTC Recommendations: none  Lab Orders:  none  Referrals: none  Release of Information: none  Future Treatment Considerations:per symptoms  Return for Follow Up:6 months      ____________________________________________________________________________________________________________________________________________    2020  Mamie Rice is a 24 year old female who is being evaluated via a billable telephone visit.      The patient has been notified of following:     \"This telephone visit will be conducted via a call between you and your physician/provider. We have found that certain health care needs can be provided without the need for a physical exam.  This service lets us provide the care you need with a short phone conversation.  If a prescription is necessary we can send it directly to " "your pharmacy.  If lab work is needed we can place an order for that and you can then stop by our lab to have the test done at a later time.    Telephone visits are billed at different rates depending on your insurance coverage. During this emergency period, for some insurers they may be billed the same as an in-person visit.  Please reach out to your insurance provider with any questions.    If during the course of the call the physician/provider feels a telephone visit is not appropriate, you will not be charged for this service.\"    Patient has given verbal consent for Telephone visit?  Yes    Start time: 8:19 am  End time: 8:39 am  Total time: 20 minutes  Today Mamie reports she is off work and home. Her mood has been stable.  Anxiety is also stable. Health has been good. AT home could be busier but is with mom and not as isolated.  Side effects of medication include: denies  Psychiatric Review of Systems:  Depression:   Most recent PHQ 9   PHQ-9 SCORE 11/12/2019 12/18/2019 3/31/2020   PHQ-9 Total Score MyChart - 4 (Minimal depression) -   PHQ-9 Total Score 9 4 7      Anxiety : stabe  Quiana na   Psychosis  na.   ADHD stable    Review of Medical Systems:  Sleep: stable  Energy: stable  Concentration: stable  Appetite: stable  GI Concerns: no new concerns  Cardiac concerns: none  Neurological concerns: no new concerns  Other medical concerns: no new concerns  Current Substance Use:  Alcohol:denies   Other drugs: denies  Past Medical History:   Past Medical History:   Diagnosis Date     ADHD (attention deficit hyperactivity disorder)      Anxiety      Aspergillosis, with pneumonia (H)     fugus found caused chest pain     Chronic infection     CF, MRSA.,      Chronic sinusitis      Constipation, chronic      Cystic fibrosis with pulmonary manifestations (H) 12/19/2011     Cystic fibrosis without mention of meconium ileus     SWEAT TEST:Date: 2/17/1994   [facility]  296 mg, 104 mmol/L ClSample #2  " 295 mg, 104 mmol/L Cl GENOTYPING:Date: 10/15/2007,  Laboratory: [facility]: [id number]     Depressive disorder      Diabetes     no meds currently     Dysthymic disorder      Exocrine pancreatic insufficiency      Gastro-oesophageal reflux disease      Hip pain, right      MRSA (methicillin resistant Staphylococcus aureus) carrier      Pancreatic disease      Patient Active Problem List   Diagnosis     Hip joint pain     Aspergillosis (H)     Chronic maxillary sinusitis     Hypoglycemia     Type 1 diabetes mellitus (H)     Cystic fibrosis of the lung (H)     Chronic constipation     Frontal sinusitis     Overactive child     Back pain     Pancreatic insufficiency     Non morbid obesity due to excess calories     Acne vulgaris     Cystic fibrosis (H)     Diabetes mellitus, type 2 (H)     Persistent depressive disorder     Mild episode of recurrent major depressive disorder (H)     Insomnia, unspecified type     MECHE (generalized anxiety disorder)     Attention deficit hyperactivity disorder (ADHD), combined type     Knee pain       Allergies:   Allergies   Allergen Reactions     Vancomycin Hives     Redmens skin rash   Redmens skin rash      Augmentin Rash          Current Medications     Current Outpatient Medications Ordered in Epic   Medication Sig Dispense Refill     acetaminophen (TYLENOL) 325 MG tablet Take 2 tablets (650 mg) by mouth every 4 hours as needed for other (mild pain) 100 tablet 0     acetylcysteine (MUCOMYST) 20 % neb solution INHALE ONE 4ML NEB INTO LUNGS VIA NEBULIZER TWO TIMES A DAY, MAY INCREASE TO 3-4 TIMES A DAY WITH INCREASE COUGH/COLD SYMPTOMS 360 mL 11     albuterol (PROAIR HFA/PROVENTIL HFA/VENTOLIN HFA) 108 (90 Base) MCG/ACT Inhaler Inhale 2 puffs into the lungs every 6 hours as needed for shortness of breath / dyspnea or wheezing 1 Inhaler 12     albuterol (PROVENTIL) (2.5 MG/3ML) 0.083% neb solution Take 1 vial (2.5 mg) by nebulization every 4 hours as needed for shortness of  breath / dyspnea or wheezing 360 mL 11     amphetamine-dextroamphetamine (ADDERALL XR) 20 MG 24 hr capsule Take 1 capsule (20 mg) by mouth daily 30 capsule 0     amphetamine-dextroamphetamine (ADDERALL XR) 20 MG 24 hr capsule Take 1 capsule (20 mg) by mouth daily 30 capsule 0     amphetamine-dextroamphetamine (ADDERALL XR) 20 MG 24 hr capsule Take 1 capsule (20 mg) by mouth daily (Patient not taking: Reported on 11/12/2019) 30 capsule 0     azithromycin (ZITHROMAX) 500 MG tablet TAKE ONE TABLET BY MOUTH ON MONDAYS, WEDNESDAYS, AND FRIDAYS AS DIRECTED 36 tablet 4     beta carotene 00759 UNIT capsule TAKE ONE CAPSULE BY MOUTH IN THE MORNING ON MONDAY, WEDNESDAY, AND FRIDAY 36 capsule 4     blood glucose (ACCU-CHEK SMARTVIEW) test strip Use to test blood sugar 4 times daily or as directed. 400 each 1     buPROPion (WELLBUTRIN XL) 300 MG 24 hr tablet Take 1 tablet (300 mg) by mouth every morning Please alert patient to change to one tablet daily 90 tablet 3     busPIRone (BUSPAR) 15 MG tablet Take 1 tablet (15 mg) by mouth 2 times daily 60 tablet 5     cetirizine (ZYRTEC) 10 MG tablet Take 1 tablet (10 mg) by mouth every evening 30 tablet 3     elexacaftor-tezacaftor-ivacaftor & ivacaftor (TRIKAFTA) 100-50-75 & 150 MG tablet pack Take 2 orange tablets in the morning and 1 light blue tablet in the evening. Swallow whole with fat-containing food. 84 tablet 3     FLUoxetine (PROZAC) 40 MG capsule Take 2 capsules (80 mg) by mouth daily 60 capsule 5     fluticasone (FLONASE) 50 MCG/ACT nasal spray Spray 2 sprays into both nostrils 2 times daily 15.8 mL 3     GLYCOPYRROLATE PO Take 1 mg by mouth 2 times daily        hydrOXYzine (ATARAX) 25 MG tablet Take 1 tablet (25 mg) by mouth nightly as needed (sleep) 30 tablet 2     medroxyPROGESTERone (DEPO-PROVERA) 150 MG/ML IM injection Inject 150 mg into the muscle       MedroxyPROGESTERone Acetate (DEPO-PROVERA IM)        meropenem (MERREM) 500 MG vial 500 mg by Nasal Instillation  "route once for 1 dose 60 each      montelukast (SINGULAIR) 10 MG tablet TAKE ONE TABLET BY MOUTH AT BEDTIME 30 tablet 11     Multiple Vitamin (MULTIVITAMIN OR) Take 1 tablet by mouth daily.       naltrexone (DEPADE/REVIA) 50 MG tablet Take 1 tablet.  Time it one to two hours prior to worst cravings. 90 tablet 1     omeprazole (PRILOSEC) 40 MG DR capsule Take 1 capsule (40 mg) by mouth 2 times daily 60 capsule 11     phytonadione (MEPHYTON/VIT K) 5 MG tablet TAKE ONE TABLET BY MOUTH ONCE A WEEK 4 tablet 11     polyethylene glycol (MIRALAX) powder Take 17 g (1 capful) by mouth daily as needed for constipation 119 g 3     topiramate (TOPAMAX) 100 MG tablet Take 1 tablet (100 mg) by mouth daily 90 tablet 1     traZODone (DESYREL) 100 MG tablet Take 1 tablet (100 mg) by mouth At Bedtime 30 tablet 5     vitamin C (ASCORBIC ACID) 500 MG tablet TAKE ONE TABLET BY MOUTH TWICE A  tablet 3     VITAMIN D3 1000 units tablet TAKE ONE TABLET BY MOUTH EVERY  tablet 3     VITAMIN E 400 units capsule TAKE ONE CAPSULE BY MOUTH TWICE A  capsule 11     No current Epic-ordered facility-administered medications on file.         Mental Status Exam     Behavior/relationship to examiner/demeanor:  Cooperative  Orientation: Oriented to person, place, time and situation  Speech Rate:  Normal  Speech Spontaneity:  Normal  Mood:  \"okay\"  Affect:  Appropriate/mood-congruent  Thought Process (Associations):  Logical and Goal directed  Thought Content:  no overt psychosis, denies suicidal ideation, intent or thoughts and patient does not appear to be responding to internal stimuli  Abnormal Perception:  None  Attention/Concentration:  Normal  Insight:  Good  Judgment:  Good      Results     Vital signs: Reviewed previous recorded vitals. Significant concerns include:  none    Laboratory Data:  no new labs reviewed    Assessment & Plan      Mamie Rice is Interviewed by phone  today for follow up and reports her mood is " stable and health has been good. Do to lack of providers in [place] and speciality providers in [place] [facility] she would like to stay in our clinic. She may consider transferring with me to [facility] if needed.    Diagnosis  Encounter Diagnoses   Name Primary?     MECHE (generalized anxiety disorder)      Insomnia, unspecified type      Attention deficit hyperactivity disorder (ADHD), predominantly inattentive type      Cystic fibrosis with pulmonary manifestations (H)        Plan:  Medication: Continue current medications  Lab Orders:  none  Referrals: other NP in our clinic  Return for Follow Up:June   The risks, benefits, alternatives and side effects have been discussed and are understood by the patient. The patient understands the risks of using street drugs or alcohol. There are no medical contraindications, the patient agrees to treatment, and has the capacity to do so. The patient understands to call 911 or come to the nearest ED if life threatening or urgent symptoms present.  In addition time was spent counseling the patient and/or coordinating care regarding review of social and occupational functioning.  In addition patient was counseled on health and wellness practices to augment medication treatment of symptoms. See note for details.    Kristina Kilgore, APRN CNS 3/31/2020

## 2020-03-31 NOTE — PROGRESS NOTES
"Due to the COVID 19 pandemic this visit was converted to a telephone/virtual  visit in order to help prevent spread of infection in this high risk patient and the general population     Mamie Rice is a 24 year old female who is being evaluated via a billable telephone visit.      The patient has been notified of following:     \"This telephone visit will be conducted via a call between you and your physician/provider. We have found that certain health care needs can be provided without the need for a physical exam.  This service lets us provide the care you need with a short phone conversation.  If a prescription is necessary we can send it directly to your pharmacy.  If lab work is needed we can place an order for that and you can then stop by our lab to have the test done at a later time.    If during the course of the call the physician/provider feels a telephone visit is not appropriate, you will not be charged for this service.\"     Patient has given verbal consent for Telephone visit?  Yes    I had last seen Sonya in 2017.  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  She has not been on insulin for many years due to concern about hypoglycemia.  She has been following with my colleagues in the weight management clinic.    She feels frustrated due to longstanding symptoms attributed to low blood glucose.  She does not check blood glucose on a regular basis but reports checking it occasionally when she is symptomatic.  She is reports symptoms of lightheadedness about once a day which she attributes to low blood sugar.  She also sometimes gets sweaty and hungry during these episodes.  She reports that her blood glucose is in the 50s when she is symptomatic.  She feels that the symptoms are not related to food.  She has never had severe hypoglycemia.  She also reports symptoms of increased urination  and increased thirst over the last 1 year.    She was also started on " [employer].  She has not noticed any difference in her symptoms since starting the triple modulator therapy.    ALLERGIES  Vancomycin and Augmentin    Review of Systems   Answers for HPI/ROS submitted by the patient on 3/30/2020   General Symptoms: Yes  Skin Symptoms: No  HENT Symptoms: No  EYE SYMPTOMS: No  HEART SYMPTOMS: No  LUNG SYMPTOMS: No  INTESTINAL SYMPTOMS: No  URINARY SYMPTOMS: Yes  GYNECOLOGIC SYMPTOMS: No  BREAST SYMPTOMS: No  SKELETAL SYMPTOMS: No  BLOOD SYMPTOMS: No  NERVOUS SYSTEM SYMPTOMS: No  MENTAL HEALTH SYMPTOMS: No  Loss of appetite: No  Weight loss: No  Weight gain: Yes  Night sweats: Yes  Increased stress: Yes  Feeling hot or cold when others believe the temperature is normal: Yes  Loss of height: No  Post-operative complications: No  Surgical site pain: No  Change in or Loss of Energy: Yes  Hyperactivity: No  Confusion: No  Trouble holding urine or incontinence: Yes  Increased frequency of urination: Yes  Decreased frequency of urination: No  Frequent nighttime urination: Yes    Objective   Reported vitals:  There were no vitals taken for this visit.   Laboratory data was reviewed in epic.  Her hemoglobin A1c was 5.4% in November 2019    Assessment/Plan:  Cystic fibrosis related diabetes: She has been off insulin for several years due to concern about hypoglycemia.  She does not check blood glucose enough to get an idea about glycemic control but her A1c are in range.    Reactive hypoglycemia: Longstanding symptoms, patient reports that in the past symptoms have not responded to dietary adjustments.  Discussed that we will try to see if insurance will approve a diagnostic Dexcom CGM for work-up of hypoglycemia.  Patient was counseled that she should keep a strict food diary while wearing Dexcom CGM.  If Dexcom is not approved, would try keeping a food diary with frequent daily fingerstick blood glucose monitoring but FSBG be less feasible to do for the patient.    Return to clinic with CGM or  fingerstick blood glucose data along with food records in 4 to 8 weeks.    Phone call duration: 15 minutes    Hugo Prieto MD     Note: Chart documentation done in part with Dragon Voice Recognition software. Although reviewed after completion, some word and grammatical errors may remain.  Please consider this when interpreting information in this chart

## 2020-03-31 NOTE — LETTER
"3/31/2020       RE: Mamie Rice  [address] [facility] [address]     Dear Colleague,    Thank you for referring your patient, Mamie Rice, to the [facility] at [facility]. Please see a copy of my visit note below.    Due to the COVID 19 pandemic this visit was converted to a telephone/virtual  visit in order to help prevent spread of infection in this high risk patient and the general population     Mamie Rice is a 24 year old female who is being evaluated via a billable telephone visit.      The patient has been notified of following:     \"This telephone visit will be conducted via a call between you and your physician/provider. We have found that certain health care needs can be provided without the need for a physical exam.  This service lets us provide the care you need with a short phone conversation.  If a prescription is necessary we can send it directly to your pharmacy.  If lab work is needed we can place an order for that and you can then stop by our lab to have the test done at a later time.    If during the course of the call the physician/provider feels a telephone visit is not appropriate, you will not be charged for this service.\"     Patient has given verbal consent for Telephone visit?  Yes    I had last seen Sonya in 2017.  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  She has not been on insulin for many years due to concern about hypoglycemia.  She has been following with my colleagues in the weight management clinic.    She feels frustrated due to longstanding symptoms attributed to low blood glucose.  She does not check blood glucose on a regular basis but reports checking it occasionally when she is symptomatic.  She is reports symptoms of lightheadedness about once a day which she attributes to low blood sugar.  She also sometimes gets sweaty and hungry during these " episodes.  She reports that her blood glucose is in the 50s when she is symptomatic.  She feels that the symptoms are not related to food.  She has never had severe hypoglycemia.  She also reports symptoms of increased urination  and increased thirst over the last 1 year.    She was also started on Trikefta.  She has not noticed any difference in her symptoms since starting the triple modulator therapy.    ALLERGIES  Vancomycin and Augmentin    Review of Systems   Answers for HPI/ROS submitted by the patient on 3/30/2020   General Symptoms: Yes  Skin Symptoms: No  HENT Symptoms: No  EYE SYMPTOMS: No  HEART SYMPTOMS: No  LUNG SYMPTOMS: No  INTESTINAL SYMPTOMS: No  URINARY SYMPTOMS: Yes  GYNECOLOGIC SYMPTOMS: No  BREAST SYMPTOMS: No  SKELETAL SYMPTOMS: No  BLOOD SYMPTOMS: No  NERVOUS SYSTEM SYMPTOMS: No  MENTAL HEALTH SYMPTOMS: No  Loss of appetite: No  Weight loss: No  Weight gain: Yes  Night sweats: Yes  Increased stress: Yes  Feeling hot or cold when others believe the temperature is normal: Yes  Loss of height: No  Post-operative complications: No  Surgical site pain: No  Change in or Loss of Energy: Yes  Hyperactivity: No  Confusion: No  Trouble holding urine or incontinence: Yes  Increased frequency of urination: Yes  Decreased frequency of urination: No  Frequent nighttime urination: Yes    Objective   Reported vitals:  There were no vitals taken for this visit.   Laboratory data was reviewed in epic.  Her hemoglobin A1c was 5.4% in November 2019    Assessment/Plan:  Cystic fibrosis related diabetes: She has been off insulin for several years due to concern about hypoglycemia.  She does not check blood glucose enough to get an idea about glycemic control but her A1c are in range.    Reactive hypoglycemia: Longstanding symptoms, patient reports that in the past symptoms have not responded to dietary adjustments.  Discussed that we will try to see if insurance will approve a diagnostic Dexcom CGM for work-up of  hypoglycemia.  Patient was counseled that she should keep a strict food diary while wearing Dexcom CGM.  If Dexcom is not approved, would try keeping a food diary with frequent daily fingerstick blood glucose monitoring but FSBG be less feasible to do for the patient.    Return to clinic with CGM or fingerstick blood glucose data along with food records in 4 to 8 weeks.    Phone call duration: 15 minutes    Hugo Prieto MD     Note: Chart documentation done in part with Dragon Voice Recognition software. Although reviewed after completion, some word and grammatical errors may remain.  Please consider this when interpreting information in this chart      Again, thank you for allowing me to participate in the care of your patient.      Sincerely,    Hugo Prieto MD

## 2020-04-01 DIAGNOSIS — K86.81 EXOCRINE PANCREATIC INSUFFICIENCY: ICD-10-CM

## 2020-04-01 DIAGNOSIS — F33.0 MILD EPISODE OF RECURRENT MAJOR DEPRESSIVE DISORDER (H): ICD-10-CM

## 2020-04-01 DIAGNOSIS — E84.9 CF (CYSTIC FIBROSIS) (H): ICD-10-CM

## 2020-04-01 DIAGNOSIS — F34.1 PERSISTENT DEPRESSIVE DISORDER: ICD-10-CM

## 2020-04-01 RX ORDER — BUPROPION HYDROCHLORIDE 300 MG/1
TABLET ORAL
Qty: 30 TABLET | Refills: 5 | OUTPATIENT
Start: 2020-04-01

## 2020-04-01 RX ORDER — HYDROXYZINE HYDROCHLORIDE 25 MG/1
TABLET, FILM COATED ORAL
Qty: 30 TABLET | Refills: 2 | OUTPATIENT
Start: 2020-04-01

## 2020-04-01 RX ORDER — FLUOXETINE 40 MG/1
CAPSULE ORAL
Qty: 60 CAPSULE | Refills: 5 | OUTPATIENT
Start: 2020-04-01

## 2020-04-02 ENCOUNTER — ALLIED HEALTH/NURSE VISIT (OUTPATIENT)
Dept: PHARMACY | Facility: CLINIC | Age: 25
End: 2020-04-02
Payer: COMMERCIAL

## 2020-04-02 DIAGNOSIS — E84.0 CYSTIC FIBROSIS OF THE LUNG (H): Primary | ICD-10-CM

## 2020-04-02 PROCEDURE — 99606 MTMS BY PHARM EST 15 MIN: CPT | Performed by: PHARMACIST

## 2020-04-02 RX ORDER — CHOLECALCIFEROL (VITAMIN D3) 25 MCG
TABLET ORAL
Qty: 100 TABLET | Refills: 3 | Status: SHIPPED | OUTPATIENT
Start: 2020-04-02 | End: 2021-04-07

## 2020-04-02 NOTE — PROGRESS NOTES
MTM ENCOUNTER  SUBJECTIVE/OBJECTIVE:                           Mamie Rice is a 24 year old female called for a follow-up visit. She was referred to me from Dr. Allison.  Today's visit is a follow-up MTM visit from 11/12/19 to follow-up on CFTR modulator therapy.     Patient consented to a telehealth visit: yes    Chief Complaint: CFTR modulator efficacy and lab follow-up.  Personal Healthcare Goals: n/a    Allergies/ADRs: Reviewed in Epic  Tobacco:  reports that she has never smoked. She has never used smokeless tobacco.  Alcohol: none    PMH: Reviewed in Epic    Medication Adherence/Access: no issues reported    CF/CFTR modulator therapy: Mamie started Trikafta in late November 2019.  She reports she has been feeling well since then, denies any side effects or concerns at this time.  We are not able to get PFTs at this time due to COVID- policy however will assess this at the next possible opportunity.   She will need follow-up hepatic panel and CK prior to authorizing more refills - Mamie will get these drawn next week or the week after at the [facility], she has to go in for her birth control shot then already.  Orders were faxed today.    Today's Vitals: There were no vitals taken for this visit.      ASSESSMENT:                              Medication Adherence: excellent, no issues identified    CF/CFTR modulator: Stable, no changes at this time. Will assess labs when resulted in 1-2 weeks.      PLAN:                            Please get your labs drawn in 1-2 weeks. Once resulted I will let you know the plan and we can send more Trikafta refills at that time.     Keep up the great work and stay healthy!    I spent 15 minutes with this patient today. No changes to be made today.     Will follow up in 1-2 weeks on lab values.    The patient declined a summary of these recommendations.     Maira Stephenson, Bharat  CF Clinic Pharmacist  Phone: [phone]  E-mail:  [email or web address]

## 2020-04-07 ENCOUNTER — TRANSFERRED RECORDS (OUTPATIENT)
Dept: HEALTH INFORMATION MANAGEMENT | Facility: CLINIC | Age: 25
End: 2020-04-07

## 2020-04-07 LAB
ALT SERPL-CCNC: 18 U/L (ref 7–45)
AST SERPL-CCNC: 16 U/L (ref 8–43)

## 2020-04-08 ENCOUNTER — CLINICAL UPDATE (OUTPATIENT)
Dept: PHARMACY | Facility: CLINIC | Age: 25
End: 2020-04-08
Payer: COMMERCIAL

## 2020-04-08 DIAGNOSIS — E84.9 CYSTIC FIBROSIS (H): Primary | ICD-10-CM

## 2020-04-08 DIAGNOSIS — E84.9 CYSTIC FIBROSIS (H): ICD-10-CM

## 2020-04-08 PROCEDURE — 99207 ZZC NO CHARGE LOS: CPT | Performed by: PHARMACIST

## 2020-04-08 RX ORDER — ELEXACAFTOR, TEZACAFTOR, AND IVACAFTOR 100-50-75
KIT ORAL
Qty: 84 TABLET | Refills: 3 | Status: SHIPPED | OUTPATIENT
Start: 2020-04-08 | End: 2020-07-15

## 2020-04-08 NOTE — PROGRESS NOTES
Clinical Update:                                                    At the request of Dr. Allison, a chart review was conducted for Mamie Rice.    Reason for Chart Review: Trikafta lab monitoing    Discussion: Mamie had labs drawn at [facility] in [place], per recommended Trikafta monitoring (hepatic panel and CK).  All results are within normal limits.  Outside lab report will be scanned into Epic.    Plan:  1. Continue Trikafta  2. Repeat hepatic panel and CK in 3 months.  This can be done locally if routine CF clinic visits are still virtual visits.  3. Additional Trikafta refills sent to Mamie's pharmacy ([facility])    Patient notified of plan via [employer] message.    Gris Adame PharmD  CF Medication Therapy Management Pharmacist  [facility]  [phone]

## 2020-05-18 DIAGNOSIS — K86.89 PANCREATIC INSUFFICIENCY: ICD-10-CM

## 2020-05-18 DIAGNOSIS — E84.0 CYSTIC FIBROSIS WITH PULMONARY MANIFESTATIONS (H): ICD-10-CM

## 2020-05-18 DIAGNOSIS — B44.9 ASPERGILLOSIS (H): ICD-10-CM

## 2020-05-18 RX ORDER — PHYTONADIONE 5 MG/1
TABLET ORAL
Qty: 4 TABLET | Refills: 11 | Status: SHIPPED | OUTPATIENT
Start: 2020-05-18 | End: 2021-05-28

## 2020-05-18 NOTE — PROGRESS NOTES
"dm 2  Jj Allen CMA      Week of__/__/__ Date  _5_/8__  Date  _5_/_9_ DATE Date  __/__ Date  __/__ Date  __/__ Date  __/__    Time BG Time BG Time BG Time BG Time BG Time BG Time BG    5:10PM 58 5:04PM 90                                                                  Week of__/__/__ Date  __/__  Date  __/__ Date  __/__ Date  __/__ Date  __/__ Date  __/__ Date  __/__    Time BG Time BG Time BG Time BG Time BG Time BG Time BG                                                                             Mamie Rice is a 24 year old female who is being evaluated via a billable video visit.      The patient has been notified of following:     \"This video visit will be conducted via a call between you and your physician/provider. We have found that certain health care needs can be provided without the need for an in-person physical exam.  This service lets us provide the care you need with a video conversation.  If a prescription is necessary we can send it directly to your pharmacy.  If lab work is needed we can place an order for that and you can then stop by our lab to have the test done at a later time.    Video visits are billed at different rates depending on your insurance coverage.  Please reach out to your insurance provider with any questions.    If during the course of the call the physician/provider feels a video visit is not appropriate, you will not be charged for this service.\"    Patient has given verbal consent for Video visit? Yes    [facility] you [facility] to [facility]    Patient would like the video invitation sent by: Send to e-mail at: [email or web address]    Will anyone else be joining your video visit? No      [facility] Return Consultation:  Diabetes  :   Patient: Mamie Rice MRN# [id number]   YOB: 1995 Age: 24 year old   Date of Visit: 05/19/2020  Dear Dr. Allison:    I had the pleasure of seeing your patient, Mamie Rice in the [facility] " Clinic, [facility], for a return consultation regarding CFRD and reactive hypoglycemia.           Problem list:     Patient Active Problem List    Diagnosis Date Noted     Knee pain 10/16/2019     Priority: Medium     Added automatically from request for surgery [id number]       Mild episode of recurrent major depressive disorder (H) 05/15/2018     Priority: Medium     Insomnia, unspecified type 05/15/2018     Priority: Medium     MECHE (generalized anxiety disorder) 05/15/2018     Priority: Medium     Attention deficit hyperactivity disorder (ADHD), combined type 05/15/2018     Priority: Medium     Diabetes mellitus, type 2 (H) 12/07/2016     Priority: Medium     Overview:   Diabetes Mellitus Type 2  Per External Records       Acne vulgaris 10/19/2016     Priority: Medium     Non morbid obesity due to excess calories 08/24/2016     Priority: Medium     Persistent depressive disorder 02/29/2016     Priority: Medium     Overview:   Disorder Depressive Persistent (Formerly Dysthymia) NOS       Pancreatic insufficiency 11/13/2014     Priority: Medium     Fecal elastase < 50       Back pain 10/22/2014     Priority: Medium     Overactive child 06/13/2013     Priority: Medium     Overview:   ADHD  6/13/13  Well-managed with Adderall.  HENRIETTA I NAVJOT         Frontal sinusitis 05/10/2012     Priority: Medium     Chronic constipation 03/04/2012     Priority: Medium     Cystic fibrosis of the lung (H) 12/19/2011     Priority: Medium     SWEAT TEST:  Date: 2/17/1994          Laboratory: [facility]  Sample #1  296 mg           104 mmol/L Cl  Sample #2  295 mg           104 mmol/L Cl     GENOTYPING:  Date: 10/15/2007               Laboratory: [facility]  Genotype: [id number]  Poly T Variant:  [  ]/[  ]         Type 1 diabetes mellitus (H) 11/09/2011     Priority: Medium     Problem list name updated by automated process. Provider to review       Hypoglycemia 10/28/2011     Priority: Medium     Reactive  hypoglycemia  updating diagnosis code for icd10 cutover       Chronic maxillary sinusitis 09/08/2011     Priority: Medium     Aspergillosis (H) 07/21/2011     Priority: Medium     Hip joint pain 04/11/2011     Priority: Medium     Cystic fibrosis (H) 02/27/2011     Priority: Medium            HPI:   Mamie is a 24 year old female with Cystic Fibrosis Related Diabetes Mellitus (CFRD).    History was obtained from the patient and the medical record.  I have reviewed the notes of the pulmonary care team entered into the medical record since I last saw the patient.    Results for MAMIE DAVIS (MRN [id number]) as of 5/19/2020 09:58   Ref. Range 8/14/2019 08:14 8/14/2019 08:14 8/14/2019 08:48 8/14/2019 09:18 8/14/2019 09:48 8/14/2019 10:18   Insulin Latest Ref Range: 3 - 25 mU/L 9.1  17.1 Unsatisfactory specimen - hemolyzed Canceled, Test credited 41.0 (H)   Glucose Latest Ref Range: 70 - 99 mg/dL 86 82 142 (H) 224 (H) 232 (H) 237 (H)     24-year-old female with history of cystic fibrosis related diabetes and reactive hypoglycemia.    She follows with Dr. Tolentino for weight management. She is on on Trikefta.  She has not noticed any difference in her symptoms since starting the triple modulator therapy.  Her weight has been stable.    She denies any acute complaints.  She has been off insulin for several years due to concern about hypoglycemia.  She also has history of reactive hypoglycemia.  She is off work during this COVID pandemic and reports that she snacks frequently during the day.  Reports that her frequency of reactive hypoglycemia has reduced due to frequent snacking and feels that it is not much of an issue right now.  She finds it difficult to check fingerstick blood glucose and does not usually monitor blood glucose at home.  Her last OGTT showed glucose readings in the diabetes range.    A1c:  No recent A1C, last A1c was 5.4%    Current insulin regimen: None          Past Medical History:     Past Medical  History:   Diagnosis Date     ADHD (attention deficit hyperactivity disorder)      Anxiety      Aspergillosis, with pneumonia (H)     fugus found caused chest pain     Chronic infection     CF, MRSA.,      Chronic sinusitis      Constipation, chronic      Cystic fibrosis with pulmonary manifestations (H) 12/19/2011     Cystic fibrosis without mention of meconium ileus     SWEAT TEST:Date: 2/17/1994   [facility]  296 mg, 104 mmol/L ClSample #2  295 mg, 104 mmol/L Cl GENOTYPING:Date: 10/15/2007,  Laboratory: [facility]: [id number]     Depressive disorder      Diabetes     no meds currently     Dysthymic disorder      Exocrine pancreatic insufficiency      Gastro-oesophageal reflux disease      Hip pain, right      MRSA (methicillin resistant Staphylococcus aureus) carrier      Pancreatic disease             Past Surgical History:     Past Surgical History:   Procedure Laterality Date     ARTHROSCOPY HIP, OSTEOPLASTY FEMUR PROXIMAL, COMBINED  3/11/2013    Procedure: COMBINED ARTHROSCOPY HIP, OSTEOPLASTY FEMUR PROXIMAL;  Right Hip Arthroscopy, Labral  Debridement.    surgeon request choice anesthesia/admit to [facility] after surgery;  Surgeon: Omkar Austin MD;  [facility]     ARTHROSCOPY KNEE WITH MEDIAL MENISCECTOMY Left 1/31/2017    Procedure: ARTHROSCOPY KNEE WITH MEDIAL MENISCECTOMY;  Surgeon: Jethro Coyle MD;  [facility]     bronchoscopies       BRONCHOSCOPY       EXAM UNDER ANESTHESIA ANUS N/A 5/10/2016    Procedure: EXAM UNDER ANESTHESIA ANUS;  Surgeon: Chet Gaviria MD;  [facility]     EXAM UNDER ANESTHESIA, RESTORATIONS, EXTRACTION(S) DENTAL COMPLEX, COMBINED  5/13/2013    Procedure: COMBINED EXAM UNDER ANESTHESIA, RESTORATIONS, EXTRACTION(S) DENTAL COMPLEX;  Dental Exam, Radiographs, Restorations. Single Extraction  Tooth #2. Restorations x 3;  Surgeon: Danilo Ortiz DDS;  [facility]: [facility] OR     HC KNEE SCOPE, DIAGNOSTIC      Arthroscopy,  Knee- left     INJECT BOTOX N/A 5/10/2016    Procedure: INJECT BOTOX;  Surgeon: Chet Gaviria MD;  [facility]     left hip labral tear  5/11/2011    left hip arthroscopy with labral debridement and synovectomy     meniscus repair       OPTICAL TRACKING SYSTEM ENDOSCOPIC SINUS SURGERY  10/14/2011    Procedure:OPTICAL TRACKING SYSTEM ENDOSCOPIC SINUS SURGERY; FESS (functional endoscopic sinus surgery) with Image Guidance, bronchial lavage and cultures; Surgeon:GOYO KUO; [facility]     OPTICAL TRACKING SYSTEM ENDOSCOPIC SINUS SURGERY  5/18/2012    Procedure:OPTICAL TRACKING SYSTEM ENDOSCOPIC SINUS SURGERY; Right  and Left Image Guided Functional Endoscopic Sinus Surgery With  Frontal Approach, [facility]; Surgeon:GOYO KUO; [facility]     OPTICAL TRACKING SYSTEM ENDOSCOPIC SINUS SURGERY  9/26/2012    Procedure: OPTICAL TRACKING SYSTEM ENDOSCOPIC SINUS SURGERY;  Stealth Guided Bilateral Functional Endoscopic Sinus Surgery *Latex Safe*;  Surgeon: Goyo Kuo MD;  [facility]     OPTICAL TRACKING SYSTEM ENDOSCOPIC SINUS SURGERY Bilateral 10/16/2015    Procedure: OPTICAL TRACKING SYSTEM ENDOSCOPIC SINUS SURGERY;  Surgeon: Mariela Haile MD;  [facility]     ORTHOPEDIC SURGERY      left hip tear repair 2010     SINUS SURGERY                 Social History:     Social History     Social History Narrative    Mamie lives with mother in [place], [place].  There is a cat in the home, but Mamie does not have any litterbox duties.  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  She gets essentially no exercise because of the tingling in her feet (says it bothers her even to stand).        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        8/2015 no change in social situation        2/15/2016 Pt is single and lives with mother and stepfather.              Family History:     Family History   Problem Relation Age of  Onset     Cancer Paternal Grandmother      Skin Cancer Paternal Grandmother      Other Cancer Paternal Grandmother         Skin     Obesity Paternal Grandmother      Cancer Paternal Grandfather         PGF had throat cancer (he was a smoker)     Other Cancer Paternal Grandfather      Anxiety Disorder Paternal Grandfather      Thyroid Disease Mother         ,     Hypertension Mother      Obesity Other      Anesthesia Reaction No family hx of      Blood Disease No family hx of      Colon Polyps No family hx of      Crohn's Disease No family hx of      Ulcerative Colitis No family hx of      Colon Cancer No family hx of      Melanoma No family hx of             Allergies:     Allergies   Allergen Reactions     Vancomycin Hives     Redmens skin rash   Redmens skin rash      Augmentin Rash             Medications:     Current Outpatient Rx   Medication Sig Dispense Refill     acetaminophen (TYLENOL) 325 MG tablet Take 2 tablets (650 mg) by mouth every 4 hours as needed for other (mild pain) 100 tablet 0     acetylcysteine (MUCOMYST) 20 % neb solution INHALE ONE 4ML NEB INTO LUNGS VIA NEBULIZER TWO TIMES A DAY, MAY INCREASE TO 3-4 TIMES A DAY WITH INCREASE COUGH/COLD SYMPTOMS 360 mL 11     albuterol (PROAIR HFA/PROVENTIL HFA/VENTOLIN HFA) 108 (90 Base) MCG/ACT Inhaler Inhale 2 puffs into the lungs every 6 hours as needed for shortness of breath / dyspnea or wheezing 1 Inhaler 12     albuterol (PROVENTIL) (2.5 MG/3ML) 0.083% neb solution Take 1 vial (2.5 mg) by nebulization every 4 hours as needed for shortness of breath / dyspnea or wheezing 360 mL 11     [START ON 5/27/2020] amphetamine-dextroamphetamine (ADDERALL XR) 20 MG 24 hr capsule Take 1 capsule (20 mg) by mouth daily 30 capsule 0     amphetamine-dextroamphetamine (ADDERALL XR) 20 MG 24 hr capsule Take 1 capsule (20 mg) by mouth daily 30 capsule 0     amphetamine-dextroamphetamine (ADDERALL XR) 20 MG 24 hr capsule Take 1 capsule (20 mg) by mouth daily 30 capsule 0      azithromycin (ZITHROMAX) 500 MG tablet TAKE ONE TABLET BY MOUTH ON MONDAYS, WEDNESDAYS, AND FRIDAYS AS DIRECTED 36 tablet 4     beta carotene 48475 UNIT capsule TAKE ONE CAPSULE BY MOUTH IN THE MORNING ON MONDAY, WEDNESDAY, AND FRIDAY 36 capsule 4     blood glucose (ACCU-CHEK SMARTVIEW) test strip Use to test blood sugar 4 times daily or as directed. 400 each 1     buPROPion (WELLBUTRIN XL) 300 MG 24 hr tablet Take 1 tablet (300 mg) by mouth every morning Please alert patient to change to one tablet daily 90 tablet 3     busPIRone (BUSPAR) 15 MG tablet Take 1 tablet (15 mg) by mouth 2 times daily 60 tablet 5     cetirizine (ZYRTEC) 10 MG tablet Take 1 tablet (10 mg) by mouth every evening 30 tablet 3     elexacaftor-tezacaftor-ivacaftor & ivacaftor (TRIKAFTA) 100-50-75 & 150 MG tablet pack TAKE TWO ORANGE TABLETS BY MOUTH IN THE MORNING AND ONE BLUE TABLET IN THE EVENING AS DIRECTED ON PACKAGE.  TAKE WITH FAT CONTAINING FOOD. 84 tablet 3     FLUoxetine (PROZAC) 40 MG capsule Take 2 capsules (80 mg) by mouth daily 60 capsule 5     fluticasone (FLONASE) 50 MCG/ACT nasal spray Spray 2 sprays into both nostrils 2 times daily (Patient taking differently: Spray 2 sprays into both nostrils as needed ) 15.8 mL 3     GLYCOPYRROLATE PO Take 1 mg by mouth 2 times daily        hydrOXYzine (ATARAX) 25 MG tablet Take 1 tablet (25 mg) by mouth nightly as needed (sleep) 30 tablet 2     medroxyPROGESTERone (DEPO-PROVERA) 150 MG/ML IM injection Inject 150 mg into the muscle       MedroxyPROGESTERone Acetate (DEPO-PROVERA IM)        meropenem (MERREM) 500 MG vial 500 mg by Nasal Instillation route once for 1 dose 60 each      montelukast (SINGULAIR) 10 MG tablet TAKE ONE TABLET BY MOUTH AT BEDTIME 30 tablet 11     Multiple Vitamin (MULTIVITAMIN OR) Take 1 tablet by mouth daily.       naltrexone (DEPADE/REVIA) 50 MG tablet Take 1 tablet.  Time it one to two hours prior to worst cravings. 90 tablet 1     omeprazole (PRILOSEC) 40 MG  DR capsule Take 1 capsule (40 mg) by mouth 2 times daily 60 capsule 11     polyethylene glycol (MIRALAX) powder Take 17 g (1 capful) by mouth daily as needed for constipation 119 g 3     topiramate (TOPAMAX) 100 MG tablet Take 1 tablet (100 mg) by mouth daily 90 tablet 1     traZODone (DESYREL) 100 MG tablet Take 1 tablet (100 mg) by mouth At Bedtime 30 tablet 5     vitamin C (ASCORBIC ACID) 500 MG tablet TAKE ONE TABLET BY MOUTH TWICE A  tablet 3     VITAMIN D3 25 MCG (1000 UT) tablet TAKE ONE TABLET BY MOUTH EVERY  tablet 3     VITAMIN E 400 units capsule TAKE ONE CAPSULE BY MOUTH TWICE A  capsule 11     phytonadione (MEPHYTON) 5 MG tablet TAKE ONE TABLET BY MOUTH ONCE A WEEK 4 tablet 11             Review of Systems:     Comprehensive ROS negative other than the symptoms noted above in the HPI.          Physical Exam:   Not applicable        Laboratory results:     TSH   Date Value Ref Range Status   08/14/2019 1.08 0.40 - 4.00 mU/L Final     Testosterone Total   Date Value Ref Range Status   08/02/2017 10 8 - 60 ng/dL Final     Comment:     This test was developed and its performance characteristics determined by the   [facility],  Special Chemistry Laboratory. It has   not been cleared or approved by the FDA. The laboratory is regulated under   CLIA   as qualified to perform high-complexity testing. This test is used for   clinical   purposes. It should not be regarded as investigational or for research.       Cholesterol   Date Value Ref Range Status   08/14/2019 92 <200 mg/dL Final     Albumin Urine mg/L   Date Value Ref Range Status   08/14/2019 21 mg/L Final     Triglycerides   Date Value Ref Range Status   08/14/2019 75 <150 mg/dL Final     HDL Cholesterol   Date Value Ref Range Status   08/14/2019 35 (L) >49 mg/dL Final     LDL Cholesterol Calculated   Date Value Ref Range Status   08/14/2019 42 <100 mg/dL Final     Comment:     Desirable:       <100 mg/dl      Cholesterol/HDL Ratio   Date Value Ref Range Status   03/12/2014 3.0 0.0 - 5.0 Final     Non HDL Cholesterol   Date Value Ref Range Status   08/14/2019 57 <130 mg/dL Final     A1C      5.5   8/2/2016  A1C      6.0   8/11/2015  A1C      5.8   5/14/2015  A1C      5.7   4/16/2015  A1C      5.5   3/30/2015 HEMOGLOBINA1      5.2   7/21/2011  HEMOGLOBINA1      5.6   4/21/2011        CF  Diabetes Health Maintenance    Date of Diabetes Diagnosis: on OGTT ~ 2014    Special Notes (if any): follows with Dr. Tolentino for obesity     Date Last Eye Exam:      Date Last Dental Appointment:     Dates of Episodes Severe* Hypoglycemia (month/year, cumulative, ongoing, assess each visit):    *Severe=patient unconscious, seizure, unable to help self    Last 25-Vitamin D (every year): 38 (2016)    Last DXA, lowest Z-score (every 2 years): Z-score of 0.0, Aug 2019       ?Bisphosphonates (yes/no):     Last Urine Microalbumin (every year):      No results found for: MICROALBUMIN    Last Testosterone:   Testosterone Total   Date Value Ref Range Status   08/02/2017 10 8 - 60 ng/dL Final     Comment:     This test was developed and its performance characteristics determined by the   [facility],  Special Chemistry Laboratory. It has   not been cleared or approved by the FDA. The laboratory is regulated under   CLIA   as qualified to perform high-complexity testing. This test is used for   clinical   purposes. It should not be regarded as investigational or for research.        On testosterone therapy (yes/no)?     Date of Last Diabetes Visit:         Assessment and Plan:   Mamie is a 24 year old female with early CFRD, reactive hypoglycemia and obesity.      Assessment/Plan:  Cystic fibrosis related diabetes: She has been off insulin for several years due to concern about hypoglycemia.  She does not check blood glucose enough to get an idea about glycemic control but her A1c have been in range.  Will have diabetes  educator reach out to her regarding Dexcom CGM.  Patient is interested in getting a continuous glucose monitor.  This will give information about both hyperglycemia as well as reactive hypoglycemia.  Counseled that if CGM is not approved,  she should check blood sugar 4 times daily for 3 to 4 days and send for review    Reactive hypoglycemia: Symptoms have been less frequent, while she is at home and not working.  She attributes improvement in reactive hypoglycemia symptoms to  frequent snacking throughout the day.      Hugo Prieto MD     Note: Chart documentation done in part with Dragon Voice Recognition software. Although reviewed after completion, some word and grammatical errors may remain.  Please consider this when interpreting information in this chart    Video-Visit Details    Type of service:  Telephone visit    Visit was initiated as a video visit but we had technical issues and had to switch to a telephone call  Phone call duration: 9 minute  Originating Location (pt. Location): Home    Distant Location (provider location):  [facility]     Platform used for Video Visit: [employer]    Hugo Prieto MD

## 2020-05-19 ENCOUNTER — MYC MEDICAL ADVICE (OUTPATIENT)
Dept: EDUCATION SERVICES | Facility: CLINIC | Age: 25
End: 2020-05-19

## 2020-05-19 ENCOUNTER — VIRTUAL VISIT (OUTPATIENT)
Dept: ENDOCRINOLOGY | Facility: CLINIC | Age: 25
End: 2020-05-19
Attending: INTERNAL MEDICINE
Payer: COMMERCIAL

## 2020-05-19 DIAGNOSIS — E84.8 DIABETES MELLITUS RELATED TO CF (CYSTIC FIBROSIS) (H): Primary | ICD-10-CM

## 2020-05-19 DIAGNOSIS — E08.9 DIABETES MELLITUS RELATED TO CF (CYSTIC FIBROSIS) (H): Primary | ICD-10-CM

## 2020-05-21 LAB
ALBUMIN SERPL-MCNC: 4.1 G/DL
ALP SERPL-CCNC: 56 U/L
BILIRUB SERPL-MCNC: <0.2 MG/DL
CK TOTAL: 62 U/L
PROT SERPL-MCNC: 6.3 G/DL

## 2020-05-26 ENCOUNTER — VIRTUAL VISIT (OUTPATIENT)
Dept: PULMONOLOGY | Facility: CLINIC | Age: 25
End: 2020-05-26
Attending: INTERNAL MEDICINE
Payer: COMMERCIAL

## 2020-05-26 DIAGNOSIS — E84.0 CYSTIC FIBROSIS WITH PULMONARY MANIFESTATIONS (H): Primary | ICD-10-CM

## 2020-05-26 DIAGNOSIS — E11.65 TYPE 2 DIABETES MELLITUS WITH HYPERGLYCEMIA, WITHOUT LONG-TERM CURRENT USE OF INSULIN (H): ICD-10-CM

## 2020-05-26 DIAGNOSIS — E66.09 NON MORBID OBESITY DUE TO EXCESS CALORIES: ICD-10-CM

## 2020-05-26 DIAGNOSIS — E84.9 CYSTIC FIBROSIS (H): Primary | ICD-10-CM

## 2020-05-26 DIAGNOSIS — K86.89 PANCREATIC INSUFFICIENCY: ICD-10-CM

## 2020-05-26 DIAGNOSIS — E16.2 HYPOGLYCEMIA: ICD-10-CM

## 2020-05-26 PROCEDURE — 97803 MED NUTRITION INDIV SUBSEQ: CPT | Mod: 95,ZF | Performed by: DIETITIAN, REGISTERED

## 2020-05-26 NOTE — LETTER
"    5/26/2020         RE: Mamie Rice  [address] [place] [id number]        Dear Colleague,    Thank you for referring your patient, Mamie Rice, to the [facility]. Please see a copy of my visit note below.    Mamie Rice is a 24 year old female who is being evaluated via a billable video visit.      The patient has been notified of following:     \"This video visit will be conducted via a call between you and your physician/provider. We have found that certain health care needs can be provided without the need for an in-person physical exam.  This service lets us provide the care you need with a video conversation.  If a prescription is necessary we can send it directly to your pharmacy.  If lab work is needed we can place an order for that and you can then stop by our lab to have the test done at a later time.    Video visits are billed at different rates depending on your insurance coverage.  Please reach out to your insurance provider with any questions.    If during the course of the call the physician/provider feels a video visit is not appropriate, you will not be charged for this service.\"    Patient has given verbal consent for Video visit? Yes    How would you like to obtain your AVS? [facility]    Patient would like the video invitation sent by: Text to cell phone: [phone]    Will anyone else be joining your video visit? No    [facility]  May 26, 2020         Assessment and Plan:   Mamie Rice is a 24 year old female with cystic fibrosis.    1. CF lung disease with h/o normal: Mamie reports that from a pulmonary point of view that she has been doing well.  She she does continue to produce some sputum but it is fairly minimal.  She also remain adherent with her bronchial drainage and nebulized therapy.  Mamie historically has grown out staph in her sputum.  She has historically also had " very stable pulmonary function.  At this time I recommended:  --She continue on her present bronchial drainage and nebulized therapy    2. Pancreatic sufficiency:  The patient has no new symptoms consistent with worsening malabsorption.    - does not take pancreatic enzymes  - continue vitamin supplementation.    3.  Abnormal glucose tolerance: Mamie reports that she is had one episode of hypoglycemia.  She reports in general though she has noticed no significant glucose dysregulation.  We will continue to follow this for now.    4.  CFTR modulator therapy: Mamie does remain on trikafta therapy she is tolerating this well.  She is due to have repeat hepatic panel in July.  We will arrange to have these done in the [facility] in [place].    5.  Psychosocial: Mamie continues to live at home with her mom.  She has not been at work for a few months.  She does plan to work return to work on June 4.  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  She will wear a mask at the entire time.  Additionally her family does plan on traveling together to [place] in June in order to spend time away from [place].    6.  Facial acne: Mamie is struggling with some worsening facial acne.  She is can try some over-the-counter products to see if these help.    Gavi Allison MD MPH  Associate Professor of Medicine  Pulmonary, Allergy, Critical Care and Sleep Medicine      Interval History:     Mamie again does have some minimal cough.  She rarely produces sputum.  She has no shortness of breath.  She does continue to perform 2 vest therapies per day.  Mamie historically has been very adherent to her cares.         Review of Systems:     CONSTITUTIONAL: no fever, no chills, no sweats, no change in weight, no change in energy, no change in appetite, more motivated to walk    INTEGUMENTARY/SKIN: facial acne    ENT/MOUTH: no sore throat, no new sinus pain, +  nasal drainage and congestion,  no ear ringing     RESPIRATORY: see interval history    CV: no chest pain, no palpitations, no peripheral edema, no orthopnea, no PND    GI: no nausea, no vomiting, no change in stools, no fatty stools, no GERD, no abdominal pain    : no dysuria, no urinary frequency    MUSCULOSKELETAL: no myalgias, no arthralgias    ENDOCRINE: no excessive thirst    NEURO:  No headache, no numbness, no tingling    SLEEP: no issues    PSYCHIATRIC: mood depends of the day          Past Medical and Surgical History:     Past Medical History:   Diagnosis Date     ADHD (attention deficit hyperactivity disorder)      Anxiety      Aspergillosis, with pneumonia (H)     fugus found caused chest pain     Chronic infection     CF, MRSA.,      Chronic sinusitis      Constipation, chronic      Cystic fibrosis with pulmonary manifestations (H) 12/19/2011     Cystic fibrosis without mention of meconium ileus     SWEAT TEST:Date: 2/17/1994   [facility]  296 mg, 104 mmol/L ClSample #2  295 mg, 104 mmol/L Cl GENOTYPING:Date: 10/15/2007,  Laboratory: [facility]: [id number]     Depressive disorder      Diabetes     no meds currently     Dysthymic disorder      Exocrine pancreatic insufficiency      Gastro-oesophageal reflux disease      Hip pain, right      MRSA (methicillin resistant Staphylococcus aureus) carrier      Pancreatic disease      Past Surgical History:   Procedure Laterality Date     ARTHROSCOPY HIP, OSTEOPLASTY FEMUR PROXIMAL, COMBINED  3/11/2013    Procedure: COMBINED ARTHROSCOPY HIP, OSTEOPLASTY FEMUR PROXIMAL;  Right Hip Arthroscopy, Labral  Debridement.    surgeon request choice anesthesia/admit to [facility] after surgery;  Surgeon: Omkar Austin MD;  [facility]     ARTHROSCOPY KNEE WITH MEDIAL MENISCECTOMY Left 1/31/2017    Procedure: ARTHROSCOPY KNEE WITH MEDIAL MENISCECTOMY;  Surgeon: Jethro Coyle MD;  [facility]     bronchoscopies       BRONCHOSCOPY       EXAM UNDER  ANESTHESIA ANUS N/A 5/10/2016    Procedure: EXAM UNDER ANESTHESIA ANUS;  Surgeon: Chet Gaviria MD;  [facility]     EXAM UNDER ANESTHESIA, RESTORATIONS, EXTRACTION(S) DENTAL COMPLEX, COMBINED  5/13/2013    Procedure: COMBINED EXAM UNDER ANESTHESIA, RESTORATIONS, EXTRACTION(S) DENTAL COMPLEX;  Dental Exam, Radiographs, Restorations. Single Extraction  Tooth #2. Restorations x 3;  Surgeon: Danilo Ortiz DDS;  [facility] OR     HC KNEE SCOPE, DIAGNOSTIC      Arthroscopy, Knee- left     INJECT BOTOX N/A 5/10/2016    Procedure: INJECT BOTOX;  Surgeon: Chet Gaviria MD;  [facility]     left hip labral tear  5/11/2011    left hip arthroscopy with labral debridement and synovectomy     meniscus repair       OPTICAL TRACKING SYSTEM ENDOSCOPIC SINUS SURGERY  10/14/2011    Procedure:OPTICAL TRACKING SYSTEM ENDOSCOPIC SINUS SURGERY; FESS (functional endoscopic sinus surgery) with Image Guidance, bronchial lavage and cultures; Surgeon:GOYO KUO; Location[facility] OR     OPTICAL TRACKING SYSTEM ENDOSCOPIC SINUS SURGERY  5/18/2012    Procedure:OPTICAL TRACKING SYSTEM ENDOSCOPIC SINUS SURGERY; Right  and Left Image Guided Functional Endoscopic Sinus Surgery With  Frontal Approach, [employer]; Surgeon:GOYO KUO; [facility]     OPTICAL TRACKING SYSTEM ENDOSCOPIC SINUS SURGERY  9/26/2012    Procedure: OPTICAL TRACKING SYSTEM ENDOSCOPIC SINUS SURGERY;  Stealth Guided Bilateral Functional Endoscopic Sinus Surgery *Latex Safe*;  Surgeon: Goyo Kuo MD;  [facility]     OPTICAL TRACKING SYSTEM ENDOSCOPIC SINUS SURGERY Bilateral 10/16/2015    Procedure: OPTICAL TRACKING SYSTEM ENDOSCOPIC SINUS SURGERY;  Surgeon: Mariela Haile MD;  [facility]     ORTHOPEDIC SURGERY      left hip tear repair 2010     SINUS SURGERY             Family History:     Family History   Problem Relation Age of Onset     Cancer Paternal Grandmother      Skin Cancer Paternal Grandmother      Other Cancer  Paternal Grandmother         Skin     Obesity Paternal Grandmother      Cancer Paternal Grandfather         PGF had throat cancer (he was a smoker)     Other Cancer Paternal Grandfather      Anxiety Disorder Paternal Grandfather      Thyroid Disease Mother         ,     Hypertension Mother      Obesity Other      Anesthesia Reaction No family hx of      Blood Disease No family hx of      Colon Polyps No family hx of      Crohn's Disease No family hx of      Ulcerative Colitis No family hx of      Colon Cancer No family hx of      Melanoma No family hx of             Social History:     Social History     Socioeconomic History     Marital status: Single     Spouse name: Not on file     Number of children: Not on file     Years of education: Not on file     Highest education level: Not on file   Occupational History     Not on file   Social Needs     Financial resource strain: Not on file     Food insecurity     Worry: Not on file     Inability: Not on file     Transportation needs     Medical: Not on file     Non-medical: Not on file   Tobacco Use     Smoking status: Never Smoker     Smokeless tobacco: Never Used     Tobacco comment: one person at home smokes outside   Substance and Sexual Activity     Alcohol use: No     Alcohol/week: 0.0 standard drinks     Drug use: No     Sexual activity: Never   Lifestyle     Physical activity     Days per week: Not on file     Minutes per session: Not on file     Stress: Not on file   Relationships     Social connections     Talks on phone: Not on file     Gets together: Not on file     Attends Bahai service: Not on file     Active member of club or organization: Not on file     Attends meetings of clubs or organizations: Not on file     Relationship status: Not on file     Intimate partner violence     Fear of current or ex partner: Not on file     Emotionally abused: Not on file     Physically abused: Not on file     Forced sexual activity: Not on file   Other Topics  Concern      Service Not Asked     Blood Transfusions No     Caffeine Concern Not Asked     Occupational Exposure Not Asked     Hobby Hazards Not Asked     Sleep Concern Not Asked     Stress Concern Not Asked     Weight Concern Not Asked     Special Diet Not Asked     Back Care Not Asked     Exercise Yes     Bike Helmet Not Asked     Seat Belt Not Asked     Self-Exams Not Asked     Parent/sibling w/ CABG, MI or angioplasty before 65F 55M? Yes   Social History Narrative    Maime lives with mother in [place], [place].  There is a cat in the home, but Mamie does not have any litterbox duties.  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  She gets essentially no exercise because of the tingling in her feet (says it bothers her even to stand).        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        8/2015 no change in social situation        2/15/2016 Pt is single and lives with mother and stepfather.            Medications:     Current Outpatient Medications   Medication     acetaminophen (TYLENOL) 325 MG tablet     acetylcysteine (MUCOMYST) 20 % neb solution     albuterol (PROAIR HFA/PROVENTIL HFA/VENTOLIN HFA) 108 (90 Base) MCG/ACT Inhaler     albuterol (PROVENTIL) (2.5 MG/3ML) 0.083% neb solution     amphetamine-dextroamphetamine (ADDERALL XR) 20 MG 24 hr capsule     amphetamine-dextroamphetamine (ADDERALL XR) 20 MG 24 hr capsule     amphetamine-dextroamphetamine (ADDERALL XR) 20 MG 24 hr capsule     azithromycin (ZITHROMAX) 500 MG tablet     beta carotene 97735 UNIT capsule     blood glucose (ACCU-CHEK SMARTVIEW) test strip     buPROPion (WELLBUTRIN XL) 300 MG 24 hr tablet     busPIRone (BUSPAR) 15 MG tablet     cetirizine (ZYRTEC) 10 MG tablet     elexacaftor-tezacaftor-ivacaftor & ivacaftor (TRIKAFTA) 100-50-75 & 150 MG tablet pack     FLUoxetine (PROZAC) 40 MG capsule     fluticasone (FLONASE) 50 MCG/ACT nasal spray      GLYCOPYRROLATE PO     hydrOXYzine (ATARAX) 25 MG tablet     medroxyPROGESTERone (DEPO-PROVERA) 150 MG/ML IM injection     MedroxyPROGESTERone Acetate (DEPO-PROVERA IM)     meropenem (MERREM) 500 MG vial     montelukast (SINGULAIR) 10 MG tablet     Multiple Vitamin (MULTIVITAMIN OR)     naltrexone (DEPADE/REVIA) 50 MG tablet     omeprazole (PRILOSEC) 40 MG DR capsule     phytonadione (MEPHYTON) 5 MG tablet     polyethylene glycol (MIRALAX) powder     topiramate (TOPAMAX) 100 MG tablet     traZODone (DESYREL) 100 MG tablet     vitamin C (ASCORBIC ACID) 500 MG tablet     VITAMIN D3 25 MCG (1000 UT) tablet     VITAMIN E 400 units capsule     No current facility-administered medications for this visit.             Physical Exam:   There were no vitals taken for this visit.    Not performed.         Data:   All laboratory and imaging data reviewed.    Cystic Fibrosis Culture  Specimen Description   Date Value Ref Range Status   11/12/2019 Midstream Urine  Final   11/12/2019 Throat  Final   06/05/2019 Throat  Final    Culture Micro   Date Value Ref Range Status   11/12/2019   Final    <10,000 colonies/mL  urogenital roberto carlos  Susceptibility testing not routinely done     11/12/2019 Light growth  Normal roberto carlos    Final   11/12/2019 Moderate growth  Staphylococcus aureus   (A)  Final        No results found for this or any previous visit (from the past 168 hour(s)).      PFT:  Not performed    Pulmonary exacerbation: absent    Video-Visit Details    Type of service:  Video Visit    Video Start Time:  9:05  Video End Time: 9:31    Originating Location (pt. Location): Home    Distant Location (provider location):  [facility]     Platform used for Video Visit: Juan              Respiratory Therapist Note:    Vest    Brand: Hill-Rom - traditional Hill Rom: Frequencies 8, 9, 10 at pressure 10 then frequencies 18, 19, 20 at pressure 6. 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 .    Education and Transition Information   Correct order of inhaled medications: Yes   Mechanism of Action of inhaled medications: Yes   Frequency of inhaled medications: Yes   Dosage of inhaled medications: Yes   Other:     Home Care:   Nebulizer Cups (Brand/Type): disposable   Nebulizer Compressor    Year Purchased: 2014    [facility], Phone: [phone], Fax: [phone]   Nebulizer Supply Company:     [facility], Phone: [phone], Fax: [phone]    Oxygen:        Pulmonary Rehab   Site:    Date Completed:     Plan of Care and Goals for next visit: Keep up the good work        Again, thank you for allowing me to participate in the care of your patient.        Sincerely,        Gavi Allison MD

## 2020-05-26 NOTE — PROGRESS NOTES
Respiratory Therapist Note:    Vest    Brand: Hill-Rom - traditional Hill Rom: Frequencies 8, 9, 10 at pressure 10 then frequencies 18, 19, 20 at pressure 6. 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 .    Education and Transition Information   Correct order of inhaled medications: Yes   Mechanism of Action of inhaled medications: Yes   Frequency of inhaled medications: Yes   Dosage of inhaled medications: Yes   Other:     Home Care:   Nebulizer Cups (Brand/Type): disposable   Nebulizer Compressor    Year Purchased: 2014    [facility], Phone: [phone], Fax: [phone]   Nebulizer Supply Company:     [facility], Phone: [phone], Fax: [phone]    Oxygen:        Pulmonary Rehab   Site:    Date Completed:     Plan of Care and Goals for next visit: Keep up the good work

## 2020-05-26 NOTE — PROGRESS NOTES
"Mamie Rice is a 24 year old female who is being evaluated via a billable video visit.      The patient has been notified of following:     \"This video visit will be conducted via a call between you and your physician/provider. We have found that certain health care needs can be provided without the need for an in-person physical exam.  This service lets us provide the care you need with a video conversation.  If a prescription is necessary we can send it directly to your pharmacy.  If lab work is needed we can place an order for that and you can then stop by our lab to have the test done at a later time.    Video visits are billed at different rates depending on your insurance coverage.  Please reach out to your insurance provider with any questions.    If during the course of the call the physician/provider feels a video visit is not appropriate, you will not be charged for this service.\"    Patient has given verbal consent for Video visit? Yes    How would you like to obtain your AVS? Cristina    Patient would like the video invitation sent by: Text to cell phone: [phone]    Will anyone else be joining your video visit? No    [facility]  May 26, 2020         Assessment and Plan:   Mamie Rice is a 24 year old female with cystic fibrosis.    1. CF lung disease with h/o normal: Mamie reports that from a pulmonary point of view that she has been doing well.  She she does continue to produce some sputum but it is fairly minimal.  She also remain adherent with her bronchial drainage and nebulized therapy.  Mamie historically has grown out staph in her sputum.  She has historically also had very stable pulmonary function.  At this time I recommended:  --She continue on her present bronchial drainage and nebulized therapy    2. Pancreatic sufficiency:  The patient has no new symptoms consistent with worsening malabsorption.    - does not take " pancreatic enzymes  - continue vitamin supplementation.    3.  Abnormal glucose tolerance: Mamie reports that she is had one episode of hypoglycemia.  She reports in general though she has noticed no significant glucose dysregulation.  We will continue to follow this for now.    4.  CFTR modulator therapy: Mamie does remain on trikafta therapy she is tolerating this well.  She is due to have repeat hepatic panel in July.  We will arrange to have these done in the [facility] in [place].    5.  Psychosocial: Mamie continues to live at home with her mom.  She has not been at work for a few months.  She does plan to work return to work on June 4.  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  She will wear a mask at the entire time.  Additionally her family does plan on traveling together to [place] in June in order to spend time away from [place].    6.  Facial acne: Mamie is struggling with some worsening facial acne.  She is can try some over-the-counter products to see if these help.    Gavi Allison MD MPH  Associate Professor of Medicine  Pulmonary, Allergy, Critical Care and Sleep Medicine      Interval History:     Mamie again does have some minimal cough.  She rarely produces sputum.  She has no shortness of breath.  She does continue to perform 2 vest therapies per day.  Mamie historically has been very adherent to her cares.         Review of Systems:     CONSTITUTIONAL: no fever, no chills, no sweats, no change in weight, no change in energy, no change in appetite, more motivated to walk    INTEGUMENTARY/SKIN: facial acne    ENT/MOUTH: no sore throat, no new sinus pain, +  nasal drainage and congestion, no ear ringing     RESPIRATORY: see interval history    CV: no chest pain, no palpitations, no peripheral edema, no orthopnea, no PND    GI: no nausea, no vomiting, no change in stools, no fatty stools, no GERD, no abdominal pain    : no dysuria, no  urinary frequency    MUSCULOSKELETAL: no myalgias, no arthralgias    ENDOCRINE: no excessive thirst    NEURO:  No headache, no numbness, no tingling    SLEEP: no issues    PSYCHIATRIC: mood depends of the day          Past Medical and Surgical History:     Past Medical History:   Diagnosis Date     ADHD (attention deficit hyperactivity disorder)      Anxiety      Aspergillosis, with pneumonia (H)     fugus found caused chest pain     Chronic infection     CF, MRSA.,      Chronic sinusitis      Constipation, chronic      Cystic fibrosis with pulmonary manifestations (H) 12/19/2011     Cystic fibrosis without mention of meconium ileus     SWEAT TEST:Date: 2/17/1994   [facility]  296 mg, 104 mmol/L ClSample #2  295 mg, 104 mmol/L Cl GENOTYPING:Date: 10/15/2007,  Laboratory: [facility]: [id number]     Depressive disorder      Diabetes     no meds currently     Dysthymic disorder      Exocrine pancreatic insufficiency      Gastro-oesophageal reflux disease      Hip pain, right      MRSA (methicillin resistant Staphylococcus aureus) carrier      Pancreatic disease      Past Surgical History:   Procedure Laterality Date     ARTHROSCOPY HIP, OSTEOPLASTY FEMUR PROXIMAL, COMBINED  3/11/2013    Procedure: COMBINED ARTHROSCOPY HIP, OSTEOPLASTY FEMUR PROXIMAL;  Right Hip Arthroscopy, Labral  Debridement.    surgeon request choice anesthesia/admit to [facility] after surgery;  Surgeon: Omkar Austin MD;  [facility]     ARTHROSCOPY KNEE WITH MEDIAL MENISCECTOMY Left 1/31/2017    Procedure: ARTHROSCOPY KNEE WITH MEDIAL MENISCECTOMY;  Surgeon: Jethro Coyle MD;  [facility] OR     bronchoscopies       BRONCHOSCOPY       EXAM UNDER ANESTHESIA ANUS N/A 5/10/2016    Procedure: EXAM UNDER ANESTHESIA ANUS;  Surgeon: Chet Gaviria MD;  [facility]     EXAM UNDER ANESTHESIA, RESTORATIONS, EXTRACTION(S) DENTAL COMPLEX, COMBINED  5/13/2013    Procedure: COMBINED EXAM UNDER  ANESTHESIA, RESTORATIONS, EXTRACTION(S) DENTAL COMPLEX;  Dental Exam, Radiographs, Restorations. Single Extraction  Tooth #2. Restorations x 3;  Surgeon: Danilo Ortiz DDS;  [facility]     HC KNEE SCOPE, DIAGNOSTIC      Arthroscopy, Knee- left     INJECT BOTOX N/A 5/10/2016    Procedure: INJECT BOTOX;  Surgeon: Chet Gaviria MD;  [facility]     left hip labral tear  5/11/2011    left hip arthroscopy with labral debridement and synovectomy     meniscus repair       OPTICAL TRACKING SYSTEM ENDOSCOPIC SINUS SURGERY  10/14/2011    Procedure:OPTICAL TRACKING SYSTEM ENDOSCOPIC SINUS SURGERY; FESS (functional endoscopic sinus surgery) with Image Guidance, bronchial lavage and cultures; Surgeon:GOYO KUO; Location:[facility]     OPTICAL TRACKING SYSTEM ENDOSCOPIC SINUS SURGERY  5/18/2012    Procedure:OPTICAL TRACKING SYSTEM ENDOSCOPIC SINUS SURGERY; Right  and Left Image Guided Functional Endoscopic Sinus Surgery With  Frontal Approach, [employer]; Surgeon:GOYO KUO; [facility]     OPTICAL TRACKING SYSTEM ENDOSCOPIC SINUS SURGERY  9/26/2012    Procedure: OPTICAL TRACKING SYSTEM ENDOSCOPIC SINUS SURGERY;  Stealth Guided Bilateral Functional Endoscopic Sinus Surgery *Latex Safe*;  Surgeon: Goyo Kuo MD;  [facility]     OPTICAL TRACKING SYSTEM ENDOSCOPIC SINUS SURGERY Bilateral 10/16/2015    Procedure: OPTICAL TRACKING SYSTEM ENDOSCOPIC SINUS SURGERY;  Surgeon: Mariela Haile MD;  [facility]     ORTHOPEDIC SURGERY      left hip tear repair 2010     SINUS SURGERY             Family History:     Family History   Problem Relation Age of Onset     Cancer Paternal Grandmother      Skin Cancer Paternal Grandmother      Other Cancer Paternal Grandmother         Skin     Obesity Paternal Grandmother      Cancer Paternal Grandfather         PGF had throat cancer (he was a smoker)     Other Cancer Paternal Grandfather      Anxiety Disorder Paternal Grandfather      Thyroid Disease Mother          ,     Hypertension Mother      Obesity Other      Anesthesia Reaction No family hx of      Blood Disease No family hx of      Colon Polyps No family hx of      Crohn's Disease No family hx of      Ulcerative Colitis No family hx of      Colon Cancer No family hx of      Melanoma No family hx of             Social History:     Social History     Socioeconomic History     Marital status: Single     Spouse name: Not on file     Number of children: Not on file     Years of education: Not on file     Highest education level: Not on file   Occupational History     Not on file   Social Needs     Financial resource strain: Not on file     Food insecurity     Worry: Not on file     Inability: Not on file     Transportation needs     Medical: Not on file     Non-medical: Not on file   Tobacco Use     Smoking status: Never Smoker     Smokeless tobacco: Never Used     Tobacco comment: one person at home smokes outside   Substance and Sexual Activity     Alcohol use: No     Alcohol/week: 0.0 standard drinks     Drug use: No     Sexual activity: Never   Lifestyle     Physical activity     Days per week: Not on file     Minutes per session: Not on file     Stress: Not on file   Relationships     Social connections     Talks on phone: Not on file     Gets together: Not on file     Attends Holiness service: Not on file     Active member of club or organization: Not on file     Attends meetings of clubs or organizations: Not on file     Relationship status: Not on file     Intimate partner violence     Fear of current or ex partner: Not on file     Emotionally abused: Not on file     Physically abused: Not on file     Forced sexual activity: Not on file   Other Topics Concern      Service Not Asked     Blood Transfusions No     Caffeine Concern Not Asked     Occupational Exposure Not Asked     Hobby Hazards Not Asked     Sleep Concern Not Asked     Stress Concern Not Asked     Weight Concern Not Asked     Special Diet  Not Asked     Back Care Not Asked     Exercise Yes     Bike Helmet Not Asked     Seat Belt Not Asked     Self-Exams Not Asked     Parent/sibling w/ CABG, MI or angioplasty before 65F 55M? Yes   Social History Narrative    Mamie lives with mother in [place], [place].  There is a cat in the home, but Mamie does not have any litterbox duties.  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  She gets essentially no exercise because of the tingling in her feet (says it bothers her even to stand).        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        8/2015 no change in social situation        2/15/2016 Pt is single and lives with mother and stepfather.            Medications:     Current Outpatient Medications   Medication     acetaminophen (TYLENOL) 325 MG tablet     acetylcysteine (MUCOMYST) 20 % neb solution     albuterol (PROAIR HFA/PROVENTIL HFA/VENTOLIN HFA) 108 (90 Base) MCG/ACT Inhaler     albuterol (PROVENTIL) (2.5 MG/3ML) 0.083% neb solution     amphetamine-dextroamphetamine (ADDERALL XR) 20 MG 24 hr capsule     amphetamine-dextroamphetamine (ADDERALL XR) 20 MG 24 hr capsule     amphetamine-dextroamphetamine (ADDERALL XR) 20 MG 24 hr capsule     azithromycin (ZITHROMAX) 500 MG tablet     beta carotene 24932 UNIT capsule     blood glucose (ACCU-CHEK SMARTVIEW) test strip     buPROPion (WELLBUTRIN XL) 300 MG 24 hr tablet     busPIRone (BUSPAR) 15 MG tablet     cetirizine (ZYRTEC) 10 MG tablet     elexacaftor-tezacaftor-ivacaftor & ivacaftor (TRIKAFTA) 100-50-75 & 150 MG tablet pack     FLUoxetine (PROZAC) 40 MG capsule     fluticasone (FLONASE) 50 MCG/ACT nasal spray     GLYCOPYRROLATE PO     hydrOXYzine (ATARAX) 25 MG tablet     medroxyPROGESTERone (DEPO-PROVERA) 150 MG/ML IM injection     MedroxyPROGESTERone Acetate (DEPO-PROVERA IM)     meropenem (MERREM) 500 MG vial     montelukast (SINGULAIR) 10 MG tablet     Multiple Vitamin  (MULTIVITAMIN OR)     naltrexone (DEPADE/REVIA) 50 MG tablet     omeprazole (PRILOSEC) 40 MG DR capsule     phytonadione (MEPHYTON) 5 MG tablet     polyethylene glycol (MIRALAX) powder     topiramate (TOPAMAX) 100 MG tablet     traZODone (DESYREL) 100 MG tablet     vitamin C (ASCORBIC ACID) 500 MG tablet     VITAMIN D3 25 MCG (1000 UT) tablet     VITAMIN E 400 units capsule     No current facility-administered medications for this visit.             Physical Exam:   There were no vitals taken for this visit.    Not performed.         Data:   All laboratory and imaging data reviewed.    Cystic Fibrosis Culture  Specimen Description   Date Value Ref Range Status   11/12/2019 Midstream Urine  Final   11/12/2019 Throat  Final   06/05/2019 Throat  Final    Culture Micro   Date Value Ref Range Status   11/12/2019   Final    <10,000 colonies/mL  urogenital roberto carlos  Susceptibility testing not routinely done     11/12/2019 Light growth  Normal roberto carlos    Final   11/12/2019 Moderate growth  Staphylococcus aureus   (A)  Final        No results found for this or any previous visit (from the past 168 hour(s)).      PFT:  Not performed    Pulmonary exacerbation: absent    Video-Visit Details    Type of service:  Video Visit    Video Start Time:  9:05  Video End Time: 9:31    Originating Location (pt. Location): Home    Distant Location (provider location):  [facility]     Platform used for Video Visit: [employer]

## 2020-05-26 NOTE — LETTER
"5/26/2020      RE: Mamie Rice  [address] [place] [id number]       Mamie Rice is a 24 year old female who is being evaluated via a billable video visit.      The patient has been notified of following:     \"This video visit will be conducted via a call between you and your physician/provider. We have found that certain health care needs can be provided without the need for an in-person physical exam.  This service lets us provide the care you need with a video conversation.  If a prescription is necessary we can send it directly to your pharmacy.  If lab work is needed we can place an order for that and you can then stop by our lab to have the test done at a later time.    Video visits are billed at different rates depending on your insurance coverage.  Please reach out to your insurance provider with any questions.    If during the course of the call the physician/provider feels a video visit is not appropriate, you will not be charged for this service.\"    Patient has given verbal consent for Video visit? Yes    How would you like to obtain your AVS? [place]    Patient would like the video invitation sent by: Text to cell phone: [phone]    Will anyone else be joining your video visit? No    [facility]  May 26, 2020         Assessment and Plan:   Mamie Rice is a 24 year old female with cystic fibrosis.    1. CF lung disease with h/o normal: Mamie reports that from a pulmonary point of view that she has been doing well.  She she does continue to produce some sputum but it is fairly minimal.  She also remain adherent with her bronchial drainage and nebulized therapy.  Mamie historically has grown out staph in her sputum.  She has historically also had very stable pulmonary function.  At this time I recommended:  --She continue on her present bronchial drainage and nebulized therapy    2. Pancreatic sufficiency:  The patient has " no new symptoms consistent with worsening malabsorption.    - does not take pancreatic enzymes  - continue vitamin supplementation.    3.  Abnormal glucose tolerance: Mamie reports that she is had one episode of hypoglycemia.  She reports in general though she has noticed no significant glucose dysregulation.  We will continue to follow this for now.    4.  CFTR modulator therapy: Mamie does remain on trikafta therapy she is tolerating this well.  She is due to have repeat hepatic panel in July.  We will arrange to have these done in the [facility] in [place].    5.  Psychosocial: Mamie continues to live at home with her mom.  She has not been at work for a few months.  She does plan to work return to work on June 4.  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  She will wear a mask at the entire time.  Additionally her family does plan on traveling together to [place] in June in order to spend time away from [place].    6.  Facial acne: Mamie is struggling with some worsening facial acne.  She is can try some over-the-counter products to see if these help.    Gavi Allison MD MPH  Associate Professor of Medicine  Pulmonary, Allergy, Critical Care and Sleep Medicine      Interval History:     Mamie again does have some minimal cough.  She rarely produces sputum.  She has no shortness of breath.  She does continue to perform 2 vest therapies per day.  Mamie historically has been very adherent to her cares.         Review of Systems:     CONSTITUTIONAL: no fever, no chills, no sweats, no change in weight, no change in energy, no change in appetite, more motivated to walk    INTEGUMENTARY/SKIN: facial acne    ENT/MOUTH: no sore throat, no new sinus pain, +  nasal drainage and congestion, no ear ringing     RESPIRATORY: see interval history    CV: no chest pain, no palpitations, no peripheral edema, no orthopnea, no PND    GI: no nausea, no vomiting, no change in  stools, no fatty stools, no GERD, no abdominal pain    : no dysuria, no urinary frequency    MUSCULOSKELETAL: no myalgias, no arthralgias    ENDOCRINE: no excessive thirst    NEURO:  No headache, no numbness, no tingling    SLEEP: no issues    PSYCHIATRIC: mood depends of the day          Past Medical and Surgical History:     Past Medical History:   Diagnosis Date     ADHD (attention deficit hyperactivity disorder)      Anxiety      Aspergillosis, with pneumonia (H)     fugus found caused chest pain     Chronic infection     CF, MRSA.,      Chronic sinusitis      Constipation, chronic      Cystic fibrosis with pulmonary manifestations (H) 12/19/2011     Cystic fibrosis without mention of meconium ileus     SWEAT TEST:Date: 2/17/1994   [facility]  296 mg, 104 mmol/L ClSample #2  295 mg, 104 mmol/L Cl GENOTYPING:Date: 10/15/2007,  Laboratory: [facility]: [id number]     Depressive disorder      Diabetes     no meds currently     Dysthymic disorder      Exocrine pancreatic insufficiency      Gastro-oesophageal reflux disease      Hip pain, right      MRSA (methicillin resistant Staphylococcus aureus) carrier      Pancreatic disease      Past Surgical History:   Procedure Laterality Date     ARTHROSCOPY HIP, OSTEOPLASTY FEMUR PROXIMAL, COMBINED  3/11/2013    Procedure: COMBINED ARTHROSCOPY HIP, OSTEOPLASTY FEMUR PROXIMAL;  Right Hip Arthroscopy, Labral  Debridement.    surgeon request choice anesthesia/admit to [facility] after surgery;  Surgeon: Omkar Austin MD;  [facility]     ARTHROSCOPY KNEE WITH MEDIAL MENISCECTOMY Left 1/31/2017    Procedure: ARTHROSCOPY KNEE WITH MEDIAL MENISCECTOMY;  Surgeon: Jethro Coyle MD;  [facility] OR     bronchoscopies       BRONCHOSCOPY       EXAM UNDER ANESTHESIA ANUS N/A 5/10/2016    Procedure: EXAM UNDER ANESTHESIA ANUS;  Surgeon: Chet Gaviria MD;  [facility]     EXAM UNDER ANESTHESIA, RESTORATIONS, EXTRACTION(S)  DENTAL COMPLEX, COMBINED  5/13/2013    Procedure: COMBINED EXAM UNDER ANESTHESIA, RESTORATIONS, EXTRACTION(S) DENTAL COMPLEX;  Dental Exam, Radiographs, Restorations. Single Extraction  Tooth #2. Restorations x 3;  Surgeon: Danilo Ortiz DDS;  [facility] OR     HC KNEE SCOPE, DIAGNOSTIC      Arthroscopy, Knee- left     INJECT BOTOX N/A 5/10/2016    Procedure: INJECT BOTOX;  Surgeon: Chet Gaviria MD;  [facility]     left hip labral tear  5/11/2011    left hip arthroscopy with labral debridement and synovectomy     meniscus repair       OPTICAL TRACKING SYSTEM ENDOSCOPIC SINUS SURGERY  10/14/2011    Procedure:OPTICAL TRACKING SYSTEM ENDOSCOPIC SINUS SURGERY; FESS (functional endoscopic sinus surgery) with Image Guidance, bronchial lavage and cultures; Surgeon:GOYO KUO; [facility]     OPTICAL TRACKING SYSTEM ENDOSCOPIC SINUS SURGERY  5/18/2012    Procedure:OPTICAL TRACKING SYSTEM ENDOSCOPIC SINUS SURGERY; Right  and Left Image Guided Functional Endoscopic Sinus Surgery With  Frontal Approach, Landmarx; Surgeon:GOYO KUO; [facility]     OPTICAL TRACKING SYSTEM ENDOSCOPIC SINUS SURGERY  9/26/2012    Procedure: OPTICAL TRACKING SYSTEM ENDOSCOPIC SINUS SURGERY;  Stealth Guided Bilateral Functional Endoscopic Sinus Surgery *Latex Safe*;  Surgeon: Goyo Kuo MD;  [facility]     OPTICAL TRACKING SYSTEM ENDOSCOPIC SINUS SURGERY Bilateral 10/16/2015    Procedure: OPTICAL TRACKING SYSTEM ENDOSCOPIC SINUS SURGERY;  Surgeon: Mariela Haile MD;  [facility]     ORTHOPEDIC SURGERY      left hip tear repair 2010     SINUS SURGERY             Family History:     Family History   Problem Relation Age of Onset     Cancer Paternal Grandmother      Skin Cancer Paternal Grandmother      Other Cancer Paternal Grandmother         Skin     Obesity Paternal Grandmother      Cancer Paternal Grandfather         PGF had throat cancer (he was a smoker)     Other Cancer Paternal Grandfather       Anxiety Disorder Paternal Grandfather      Thyroid Disease Mother         ,     Hypertension Mother      Obesity Other      Anesthesia Reaction No family hx of      Blood Disease No family hx of      Colon Polyps No family hx of      Crohn's Disease No family hx of      Ulcerative Colitis No family hx of      Colon Cancer No family hx of      Melanoma No family hx of             Social History:     Social History     Socioeconomic History     Marital status: Single     Spouse name: Not on file     Number of children: Not on file     Years of education: Not on file     Highest education level: Not on file   Occupational History     Not on file   Social Needs     Financial resource strain: Not on file     Food insecurity     Worry: Not on file     Inability: Not on file     Transportation needs     Medical: Not on file     Non-medical: Not on file   Tobacco Use     Smoking status: Never Smoker     Smokeless tobacco: Never Used     Tobacco comment: one person at home smokes outside   Substance and Sexual Activity     Alcohol use: No     Alcohol/week: 0.0 standard drinks     Drug use: No     Sexual activity: Never   Lifestyle     Physical activity     Days per week: Not on file     Minutes per session: Not on file     Stress: Not on file   Relationships     Social connections     Talks on phone: Not on file     Gets together: Not on file     Attends [employer] service: Not on file     Active member of club or organization: Not on file     Attends meetings of clubs or organizations: Not on file     Relationship status: Not on file     Intimate partner violence     Fear of current or ex partner: Not on file     Emotionally abused: Not on file     Physically abused: Not on file     Forced sexual activity: Not on file   Other Topics Concern      Service Not Asked     Blood Transfusions No     Caffeine Concern Not Asked     Occupational Exposure Not Asked     Hobby Hazards Not Asked     Sleep Concern Not Asked      Stress Concern Not Asked     Weight Concern Not Asked     Special Diet Not Asked     Back Care Not Asked     Exercise Yes     Bike Helmet Not Asked     Seat Belt Not Asked     Self-Exams Not Asked     Parent/sibling w/ CABG, MI or angioplasty before 65F 55M? Yes   Social History Narrative    Mamie lives with mother in [place], [place].  There is a cat in the home, but Mamie does not have any litterbox duties.  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  She gets essentially no exercise because of the tingling in her feet (says it bothers her even to stand).        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        8/2015 no change in social situation        2/15/2016 Pt is single and lives with mother and stepfather.            Medications:     Current Outpatient Medications   Medication     acetaminophen (TYLENOL) 325 MG tablet     acetylcysteine (MUCOMYST) 20 % neb solution     albuterol (PROAIR HFA/PROVENTIL HFA/VENTOLIN HFA) 108 (90 Base) MCG/ACT Inhaler     albuterol (PROVENTIL) (2.5 MG/3ML) 0.083% neb solution     amphetamine-dextroamphetamine (ADDERALL XR) 20 MG 24 hr capsule     amphetamine-dextroamphetamine (ADDERALL XR) 20 MG 24 hr capsule     amphetamine-dextroamphetamine (ADDERALL XR) 20 MG 24 hr capsule     azithromycin (ZITHROMAX) 500 MG tablet     beta carotene 37532 UNIT capsule     blood glucose (ACCU-CHEK SMARTVIEW) test strip     buPROPion (WELLBUTRIN XL) 300 MG 24 hr tablet     busPIRone (BUSPAR) 15 MG tablet     cetirizine (ZYRTEC) 10 MG tablet     elexacaftor-tezacaftor-ivacaftor & ivacaftor (TRIKAFTA) 100-50-75 & 150 MG tablet pack     FLUoxetine (PROZAC) 40 MG capsule     fluticasone (FLONASE) 50 MCG/ACT nasal spray     GLYCOPYRROLATE PO     hydrOXYzine (ATARAX) 25 MG tablet     medroxyPROGESTERone (DEPO-PROVERA) 150 MG/ML IM injection     MedroxyPROGESTERone Acetate (DEPO-PROVERA IM)     meropenem (MERREM) 500 MG  vial     montelukast (SINGULAIR) 10 MG tablet     Multiple Vitamin (MULTIVITAMIN OR)     naltrexone (DEPADE/REVIA) 50 MG tablet     omeprazole (PRILOSEC) 40 MG DR capsule     phytonadione (MEPHYTON) 5 MG tablet     polyethylene glycol (MIRALAX) powder     topiramate (TOPAMAX) 100 MG tablet     traZODone (DESYREL) 100 MG tablet     vitamin C (ASCORBIC ACID) 500 MG tablet     VITAMIN D3 25 MCG (1000 UT) tablet     VITAMIN E 400 units capsule     No current facility-administered medications for this visit.             Physical Exam:   There were no vitals taken for this visit.    Not performed.         Data:   All laboratory and imaging data reviewed.    Cystic Fibrosis Culture  Specimen Description   Date Value Ref Range Status   11/12/2019 Midstream Urine  Final   11/12/2019 Throat  Final   06/05/2019 Throat  Final    Culture Micro   Date Value Ref Range Status   11/12/2019   Final    <10,000 colonies/mL  urogenital roberto carlos  Susceptibility testing not routinely done     11/12/2019 Light growth  Normal roberto carlos    Final   11/12/2019 Moderate growth  Staphylococcus aureus   (A)  Final        No results found for this or any previous visit (from the past 168 hour(s)).      PFT:  Not performed    Pulmonary exacerbation: absent    Video-Visit Details    Type of service:  Video Visit    Video Start Time:  9:05  Video End Time: 9:31    Originating Location (pt. Location): Home    Distant Location (provider location):  [facility]     Platform used for Video Visit: Juan              Respiratory Therapist Note:    Vest    Brand: Hill-Rom - traditional Hill Rom: Frequencies 8, 9, 10 at pressure 10 then frequencies 18, 19, 20 at pressure 6. 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 .    Education and Transition Information   Correct order of inhaled medications: Yes   Mechanism of Action of inhaled medications: Yes   Frequency of inhaled medications: Yes   Dosage of inhaled medications: Yes   Other:     Home Care:   Nebulizer Cups (Brand/Type): disposable   Nebulizer Compressor    Year Purchased: 2014    [facility], Phone: [phone], Fax: [phone]   Nebulizer Supply Company:     [facility], Phone: [phone], Fax: [phone]    Oxygen:        Pulmonary Rehab   Site:    Date Completed:     Plan of Care and Goals for next visit: Keep up the good work        Gavi Allison MD

## 2020-05-26 NOTE — PATIENT INSTRUCTIONS
Cystic Fibrosis Self-Care Plan    RECOMMENDATIONS:   Mamie, It was great to see you today.  I am glad that you are safe at home.  I know that you will do well at staying safe at work.  The plan from today:  --I will ask Rhonda to communicate with you about the glucose monitor  --you will need repeat hepatic panel in July--will arrange for [facility]  Great job with vest and nebs!    YOUR GOAL:  Take care and stay safe!      [facility] Nurse line:  Gale Anderson    [phone]     [facility] Fax Number:      [phone]         Cystic Fibrosis Respiratory Therapists:   Jhoana Trinh              [phone]          Sharon Gutiérrez   [phone]  Cystic Fibrosis Dietitians:              Amy Andino              [phone]                            Theresa Ceja                        [phone]   Cystic Fibrosis Diabetes Nurse:    Rhonda Esteves   [phone]    Cystic Fibrosis Social Workers:     Deyanira Figueroa               [phone]                     Faith Zamudio               [phone]  Cystic Fibrosis Pharmacists:           Gris Adame                               [phone]         Maira Stephenson   [phone]  Cystic Fibrosis Genetic Counselor:   Kaley Escobedo    [phone]    [facility] website:  [email or web address]         MRN: [id number]   Clinic Date: May 26, 2020   Patient: Mamie Rice     Annual Studies:   IGG   Date Value Ref Range Status   08/14/2019 755 695 - 1,620 mg/dL Final     Insulin   Date Value Ref Range Status   08/14/2019 41.0 (H) 3 - 25 mU/L Final     There are no preventive care reminders to display for this patient.    Pulmonary Function Tests  FEV1: amount of air you can blow out in 1 second  FVC: total amount of air you can take in and blow out    Your Goals:         PFT Latest Ref Rng & Units 11/12/2019   FVC L 3.50   FEV1 L 3.13   FVC% % 98   FEV1% % 102          Airway  Clearance: The Most Important Way to Keep Your Lungs Healthy  Vest Settings:    Hill-Rom Frequencies: 8, 9, 10 Pressure 10 Then, Frequencies 18, 19, 20 Pressure 6      RespirTech: Quick Start with Pressure of     Do each frequency for 5 minutes; Deflate vest after each frequency & cough 3 times before beginning the next setting.    Vest and Neb Therapy should be done 2 times/day.    Good Nutrition Can Improve Lung Function and Overall Health     Take ALL of your vitamins with food     Take 1/2 of your enzymes before EVERY meal/snack and the other 1/2 mid-meal/snack    Wt Readings from Last 3 Encounters:   02/28/20 80.7 kg (178 lb)   12/18/19 78.9 kg (174 lb)   11/12/19 79.1 kg (174 lb 4.8 oz)       There is no height or weight on file to calculate BMI.         National CF Foundation Recommendations for BMI in CF Adults: Women: at least 22 Men: at least 23        Controlling Blood Sugars Helps Prevent Lung Infections & Improves Nutrition  Test blood sugar:     In the morning before eating (goal is )     2 hours after a meal (goal is less than 150)     When pre-meal glucose is greater than 150 add correction     At bedtime (if less than 100 eat a snack with 15 grams of carbohydrates  Last A1C Results:   Hemoglobin A1C   Date Value Ref Range Status   08/14/2019 5.1 0 - 5.6 % Final     Comment:     Normal <5.7% Prediabetes 5.7-6.4%  Diabetes 6.5% or higher - adopted from ADA   consensus guidelines.           If diabetic, measure A1C every 6 months. Goal: Under 7%    Staying Healthy    Research:  If you are interested in learning about research opportunities or have questions, please contact the CF Research Team at [phone] or [email or web address].      CF Foundation:  Compass is a personalized resource service to help you with the insurance, financial, legal and other issues you are facing.  It's free, confidential and available to anyone with CF.  Ask your  for more information or contact  Compass directly at [facility] ([phone]) or [email or web address], or learn more at [email or web address].

## 2020-05-26 NOTE — LETTER
"    5/26/2020         RE: Mamie Rice  [address] [facility] [address]        Dear Colleague,    Thank you for referring your patient, Mamie Rice, to the [facility]. Please see a copy of my visit note below.    CF Annual Nutrition Assessment    Reason for Assessment  Assessed during Dr. Gavi Allison' clinic r/t increased nutrition risk with diagnosis of CF per protocol.    Mamie Rice is a 24 year old female who is being evaluated via a billable telephone visit.      The patient has been notified of following:     \"This telephone visit will be conducted via a call between you and your physician/provider. We have found that certain health care needs can be provided without the need for a physical exam.  This service lets us provide the care you need with a short phone conversation.  If a prescription is necessary we can send it directly to your pharmacy.  If lab work is needed we can place an order for that and you can then stop by our lab to have the test done at a later time.    Telephone visits are billed at different rates depending on your insurance coverage. During this emergency period, for some insurers they may be billed the same as an in-person visit.  Please reach out to your insurance provider with any questions.    If during the course of the call the physician/provider feels a telephone visit is not appropriate, you will not be charged for this service.\"    Patient has given verbal consent for Telephone visit?  Yes    Nutrition Significant PMH  Mild Lung Disease   Pancreatic Insufficient (fecal elastase done 2013)  CFRD - sees CF Endocrinologist, Dr. 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 Mamie continues to follow with the [facility] clinic team and is taking medications to support weight control.  She was able to loose >6 kg from August 2016-January 2018 or ~8% total body weight.  She now sees Dr. Randa Ho who she is very happy with. She is currently taking naltrexone and bupropion for weight loss, as prescribed by Dr. Tolentino.  She states this continues to helps with her cravings and snacking.   Wt Readings from Last 10 Encounters:   02/28/20 80.7 kg (178 lb)   12/18/19 78.9 kg (174 lb)   11/12/19 79.1 kg (174 lb 4.8 oz)   11/12/19 79.2 kg (174 lb 9.7 oz)   08/14/19 79.4 kg (175 lb)   08/06/19 81.2 kg (179 lb)   08/05/19 81.2 kg (179 lb)   06/05/19 77.3 kg (170 lb 6.7 oz)   05/09/19 79.8 kg (176 lb)   04/18/19 77.1 kg (170 lb)     Physical Activity/Exercise:  Pt reports walking for 15-20 minutes 3-4 days per week.     MALNUTRITION  % Intake:  No decreased intake noted  % Weight Loss:  None noted  Subcutaneous Fat Loss:  Unable to assess d/t telehealth visit   Muscle Loss:  Unable to assess d/t telehealth visit   Fluid Accumulation/Edema:  None noted  Malnutrition Diagnosis: Patient does not meet two of the above criteria necessary for diagnosing malnutrition    Pancreatic Enzymes  Although tested as pancreatic insufficient pt does not take enzymes, stopped taking them in 2015 as she felt they had little affect " on her GI symptoms. Ok'd this with CF MD prior to stopping.      Diet History and Assessment  Diet Preferences/Allergies.Intolerances:Regular diet  Appetite Stimulant Rx:  No, on medications for appetite suppression for weight loss per [facility]  Intake Recall/Comments:  Eats TID meals with TID snacks. No longer drinks shake for breakfast like previously recommended by MW. Mamie eats/preps meals with her mom and often eats the same food that's prepared for her nieces and nephews that are often over. Pt often eats a high CHO containing snack right before bed.       B - Brandie Krispies or Fruity Jocelyn with skim milk   L - mac and cheese, frozen pizza, sandwiches, waffles  D - Spaghetti, grilled chicken, fish sticks, hot dish, and or salad  Snacks - fruit snacks, granola bars, popcorn     Calcium: BID glasses of skim milk + milk on cereal in AM + cheese throughout the day   Salt: salty snacks, boxed food, cheese, sauces/seasoning, Gatorade     Hydration: 1 medium bottle of Gatorade, 2 glasses of skim milk, 1 bottle of Dr. Pepper daily, 2 glasses of apple juice daily, 1 glass water daily     Supplements:  None     Estimated Energy and Protein Needs  Estimation based on weight loss with Mild lung disease and mild pancreatic insufficiency.     4252-1186 kcals/day =  25-30 kcal/kg For weight loss (increase to 30-35 kcal/day for maintenance)   70-90 g protein/day = 1.2-1.5 g/kg (increase to 1.5-2 g/kg- pancreatic insufficient)    Laboratory Assessment    Vitamin A   Date Value Ref Range Status   08/14/2019 0.72 0.30 - 1.20 mg/L Final     Vitamin D Deficiency screening   Date Value Ref Range Status   08/14/2019 45 20 - 75 ug/L Final     Comment:     Season, race, dietary intake, and treatment affect the concentration of   25-hydroxy-Vitamin D. Values may decrease during winter months and increase   during summer months. Values 20-29 ug/L may indicate Vitamin D insufficiency   and values <20 ug/L may indicate Vitamin D  deficiency.  Vitamin D determination is routinely performed by an immunoassay specific for   25 hydroxyvitamin D3.  If an individual is on vitamin D2 (ergocalciferol)   supplementation, please specify 25 OH vitamin D2 and D3 level determination by   LCMSMS test [id number].       Vitamin E   Date Value Ref Range Status   08/14/2019 6.0 5.5 - 18.0 mg/L Final     Comment:     (Note)  Test developed and characteristics determined by [facility]. See Compliance Statement B: [employer]       Iron   Date Value Ref Range Status   08/14/2019 94 35 - 180 ug/dL Final     Cholesterol   Date Value Ref Range Status   08/14/2019 92 <200 mg/dL Final     Triglycerides   Date Value Ref Range Status   08/14/2019 75 <150 mg/dL Final     HDL Cholesterol   Date Value Ref Range Status   08/14/2019 35 (L) >49 mg/dL Final     LDL Cholesterol Calculated   Date Value Ref Range Status   08/14/2019 42 <100 mg/dL Final     Comment:     Desirable:       <100 mg/dl     VLDL-Cholesterol   Date Value Ref Range Status   03/12/2014 14 0 - 30 mg/dL Final     Cholesterol/HDL Ratio   Date Value Ref Range Status   03/12/2014 3.0 0.0 - 5.0 Final       DEXA: 8/14/19 WNL    Current Vitamin/Mineral Prescription R/T deficiency/malabsorption: Multivitamin, Vitamin D 1000 International Units, Vitamin E 400 International Units BID, Betacarotene 25,000 International Units 3x/week, Vitamin B12 1 mL IMJ 1x/month, Vitamin K weekly and Vitamin C 250 mg BID  Comments:  Pt stares she is taking all of the above as prescribed. She uses a pill box that she sets up weekly to insure that she doesn't forget anything.     CF Related Diabetes Evaluation  Hgb A1C: 5.4%  Date: 11/12/19  FSBG range: Normal per pt  Insulin: No. Dex com sensor trial starting soon.   Carbohydrate Counting: No    Pt reports low BG after being physically active. About once every 2 months she will wake up in the middle of the night, check the BG, it's low, and will eat a snack.      Endo  following and pursuing options for DexCom coverage.      NUTRITION DIAGNOSIS  Overweight/obese r/t excessive energy intake and lack of physical activity AEB BMI >30 kg/m2 with pt-reported chronic overeating requiring intervention via [facility] clinic.   INTERVENTIONS/RECOMMENDATIONS  1) Weight management:  Reinforced goals for weight and structured nutrition intake, congratulated pt on her weight loss thus far and we reviewed her weight graph for the past 2 years.  RD helped Mamie set the following goals: 1) decrease from one bottle to one can of soda per day, 2) decreased juice intake from 2 glasses to 1 glass per day, and 3) walk for 20 min >5 days per week. Encouraged pt to take a moment before she eats a meal or snack to identify if she really is hungry or if she is eating for another reason. If she isn't actually hungry, recommended that the pt develop other activities she would do instead of snacking. Offered support via phone & MyChart.  2) Annual studies: Vitamin/mineral supplementation regimen reviewed 1:1 with no changes at this time. Praised pt for keeping a pill box and for high level of compliance. RD will review annual studies as they are available and make modifications according.    3) Diabetes:  Pt not in great diabetic control.  She is consuming high amounts of sugar, especially from beverages, which is limiting her ability to control hypoglycemic episodes/reactive hypoglycemia.  She is willing to set the above goals for weight management.  Encouraged pt to incorporate fiber and protein into every meal and snack and stressed the importance of fitting these nutrients into her HS snack, if she chooses to continue eating it, to prevent her going low in the middle of the night.     Patient Understanding: Good  Expected Compliance: Good  Follow-Up Plans: Per protocol     GOALS:   1) Limit daily milk intake to 2-3 16-oz glasses of skim per day.  2) Limit soda and juice consumption as noted in the weight  management goals   3) Begin balancing meals and snacks with added protein, fiber, fat and decrease sugar.   4) Weight to trend downward to goal of BMI <30 kg/m2 within the next 6-12 months.     FOLLOW-UP/MONITORING:  Visit patient within 6-12 month(s)    Phone Call Duration: 30 min    Layne Greenberg RD, LD (pager [phone])  Cystic Fibrosis/Lung Transplant Dietitian      -Available Mon-Wednesday 8 AM-4:30 PM. On Thursdays and Fridays please contact pager [phone] (Amy Andino RD) and on weekends/holidays contact coverage dietitian at pager [phone] (inpatient use only).     Again, thank you for allowing me to participate in the care of your patient.        Sincerely,        Theresa Ceja RD

## 2020-05-28 NOTE — TELEPHONE ENCOUNTER
Form submitted on-line for DexCom.  A message pops up that this item will most likely not be covered.  Will schedule Mamie for the professional version when clinics open up and risk is not so great.  Left a message on Mamie's phone to discuss this.  Contact info left. Rhonda Esteves RN,CDE

## 2020-05-28 NOTE — PROGRESS NOTES
"CF Annual Nutrition Assessment    Reason for Assessment  Assessed during Dr. Gavi Allison' clinic r/t increased nutrition risk with diagnosis of CF per protocol.    Mamie Rice is a 24 year old female who is being evaluated via a billable telephone visit.      The patient has been notified of following:     \"This telephone visit will be conducted via a call between you and your physician/provider. We have found that certain health care needs can be provided without the need for a physical exam.  This service lets us provide the care you need with a short phone conversation.  If a prescription is necessary we can send it directly to your pharmacy.  If lab work is needed we can place an order for that and you can then stop by our lab to have the test done at a later time.    Telephone visits are billed at different rates depending on your insurance coverage. During this emergency period, for some insurers they may be billed the same as an in-person visit.  Please reach out to your insurance provider with any questions.    If during the course of the call the physician/provider feels a telephone visit is not appropriate, you will not be charged for this service.\"    Patient has given verbal consent for Telephone visit?  Yes    Nutrition Significant PMH  Mild Lung Disease   Pancreatic Insufficient (fecal elastase done 2013)  CFRD - sees CF Endocrinologist, Dr. 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 Mamie continues to follow with the [facility] clinic team and is taking medications to support weight control.  She was able to loose >6 kg from August 2016-January 2018 or ~8% total body weight.  She now sees Dr. Randa Ho who she is very happy with. She is currently taking naltrexone and bupropion for weight loss, as prescribed by Dr. Tolentino.  She states this continues to helps with her cravings and snacking.   Wt Readings from Last 10 Encounters:   02/28/20 80.7 kg (178 lb)   12/18/19 78.9 kg (174 lb)   11/12/19 79.1 kg (174 lb 4.8 oz)   11/12/19 79.2 kg (174 lb 9.7 oz)   08/14/19 79.4 kg (175 lb)   08/06/19 81.2 kg (179 lb)   08/05/19 81.2 kg (179 lb)   06/05/19 77.3 kg (170 lb 6.7 oz)   05/09/19 79.8 kg (176 lb)   04/18/19 77.1 kg (170 lb)     Physical Activity/Exercise:  Pt reports walking for 15-20 minutes 3-4 days per week.     MALNUTRITION  % Intake:  No decreased intake noted  % Weight Loss:  None noted  Subcutaneous Fat Loss:  Unable to assess d/t telehealth visit   Muscle Loss:  Unable to assess d/t telehealth visit   Fluid Accumulation/Edema:  None noted  Malnutrition Diagnosis: Patient does not meet two of the above criteria necessary for diagnosing malnutrition    Pancreatic Enzymes  Although tested as pancreatic insufficient pt does not take enzymes, stopped taking them in 2015 as she felt they had little affect on her GI symptoms. Ok'd this with CF MD prior to stopping.      Diet History and Assessment  Diet Preferences/Allergies.Intolerances:Regular diet  Appetite Stimulant Rx:  No, on medications for appetite suppression for weight loss per [facility]  Intake Recall/Comments:   Eats TID meals with TID snacks. No longer drinks shake for breakfast like previously recommended by MWM. Mamie eats/preps meals with her mom and often eats the same food that's prepared for her nieces and nephews that are often over. Pt often eats a high CHO containing snack right before bed.       B - Brandie Krispies or Fruity Jocelyn with skim milk   L - mac and cheese, frozen pizza, sandwiches, waffles  D - Spaghetti, grilled chicken, fish sticks, hot dish, and or salad  Snacks - fruit snacks, granola bars, popcorn     Calcium: BID glasses of skim milk + milk on cereal in AM + cheese throughout the day   Salt: salty snacks, boxed food, cheese, sauces/seasoning, Gatorade     Hydration: 1 medium bottle of Gatorade, 2 glasses of skim milk, 1 bottle of Dr. Pepper daily, 2 glasses of apple juice daily, 1 glass water daily     Supplements:  None     Estimated Energy and Protein Needs  Estimation based on weight loss with Mild lung disease and mild pancreatic insufficiency.     4966-1611 kcals/day =  25-30 kcal/kg For weight loss (increase to 30-35 kcal/day for maintenance)   70-90 g protein/day = 1.2-1.5 g/kg (increase to 1.5-2 g/kg- pancreatic insufficient)    Laboratory Assessment    Vitamin A   Date Value Ref Range Status   08/14/2019 0.72 0.30 - 1.20 mg/L Final     Vitamin D Deficiency screening   Date Value Ref Range Status   08/14/2019 45 20 - 75 ug/L Final     Comment:     Season, race, dietary intake, and treatment affect the concentration of   25-hydroxy-Vitamin D. Values may decrease during winter months and increase   during summer months. Values 20-29 ug/L may indicate Vitamin D insufficiency   and values <20 ug/L may indicate Vitamin D deficiency.  Vitamin D determination is routinely performed by an immunoassay specific for   25 hydroxyvitamin D3.  If an individual is on vitamin D2 (ergocalciferol)   supplementation, please specify 25 OH vitamin D2 and D3 level determination by   LCMSMS test [id number].        Vitamin E   Date Value Ref Range Status   08/14/2019 6.0 5.5 - 18.0 mg/L Final     Comment:     (Note)  Test developed and characteristics determined by [facility]. See Compliance Statement B: [employer]       Iron   Date Value Ref Range Status   08/14/2019 94 35 - 180 ug/dL Final     Cholesterol   Date Value Ref Range Status   08/14/2019 92 <200 mg/dL Final     Triglycerides   Date Value Ref Range Status   08/14/2019 75 <150 mg/dL Final     HDL Cholesterol   Date Value Ref Range Status   08/14/2019 35 (L) >49 mg/dL Final     LDL Cholesterol Calculated   Date Value Ref Range Status   08/14/2019 42 <100 mg/dL Final     Comment:     Desirable:       <100 mg/dl     VLDL-Cholesterol   Date Value Ref Range Status   03/12/2014 14 0 - 30 mg/dL Final     Cholesterol/HDL Ratio   Date Value Ref Range Status   03/12/2014 3.0 0.0 - 5.0 Final       DEXA: 8/14/19 WNL    Current Vitamin/Mineral Prescription R/T deficiency/malabsorption: Multivitamin, Vitamin D 1000 International Units, Vitamin E 400 International Units BID, Betacarotene 25,000 International Units 3x/week, Vitamin B12 1 mL IMJ 1x/month, Vitamin K weekly and Vitamin C 250 mg BID  Comments:  Pt stares she is taking all of the above as prescribed. She uses a pill box that she sets up weekly to insure that she doesn't forget anything.     CF Related Diabetes Evaluation  Hgb A1C: 5.4%  Date: 11/12/19  FSBG range: Normal per pt  Insulin: No. Dex com sensor trial starting soon.   Carbohydrate Counting: No    Pt reports low BG after being physically active. About once every 2 months she will wake up in the middle of the night, check the BG, it's low, and will eat a snack.      Endo following and pursuing options for DexCom coverage.      NUTRITION DIAGNOSIS  Overweight/obese r/t excessive energy intake and lack of physical activity AEB BMI >30 kg/m2 with pt-reported chronic overeating requiring intervention via [facility] clinic.    INTERVENTIONS/RECOMMENDATIONS  1) Weight management:  Reinforced goals for weight and structured nutrition intake, congratulated pt on her weight loss thus far and we reviewed her weight graph for the past 2 years.  RD helped Mamie set the following goals: 1) decrease from one bottle to one can of soda per day, 2) decreased juice intake from 2 glasses to 1 glass per day, and 3) walk for 20 min >5 days per week. Encouraged pt to take a moment before she eats a meal or snack to identify if she really is hungry or if she is eating for another reason. If she isn't actually hungry, recommended that the pt develop other activities she would do instead of snacking. Offered support via phone & MyChart.  2) Annual studies: Vitamin/mineral supplementation regimen reviewed 1:1 with no changes at this time. Praised pt for keeping a pill box and for high level of compliance. RD will review annual studies as they are available and make modifications according.    3) Diabetes:  Pt not in great diabetic control.  She is consuming high amounts of sugar, especially from beverages, which is limiting her ability to control hypoglycemic episodes/reactive hypoglycemia.  She is willing to set the above goals for weight management.  Encouraged pt to incorporate fiber and protein into every meal and snack and stressed the importance of fitting these nutrients into her HS snack, if she chooses to continue eating it, to prevent her going low in the middle of the night.     Patient Understanding: Good  Expected Compliance: Good  Follow-Up Plans: Per protocol     GOALS:   1) Limit daily milk intake to 2-3 16-oz glasses of skim per day.  2) Limit soda and juice consumption as noted in the weight management goals   3) Begin balancing meals and snacks with added protein, fiber, fat and decrease sugar.   4) Weight to trend downward to goal of BMI <30 kg/m2 within the next 6-12 months.     FOLLOW-UP/MONITORING:  Visit patient within 6-12  month(s)    Phone Call Duration: 30 min    Layne Greenberg RD, LD (pager [phone])  Cystic Fibrosis/Lung Transplant Dietitian      -Available Mon-Wednesday 8 AM-4:30 PM. On Thursdays and Fridays please contact pager [phone] (Amy Andino RD) and on weekends/holidays contact coverage dietitian at pager [phone] (inpatient use only).

## 2020-05-29 DIAGNOSIS — G47.00 INSOMNIA, UNSPECIFIED TYPE: ICD-10-CM

## 2020-05-29 RX ORDER — HYDROXYZINE HYDROCHLORIDE 25 MG/1
TABLET, FILM COATED ORAL
Qty: 30 TABLET | Refills: 2 | OUTPATIENT
Start: 2020-05-29

## 2020-06-02 ENCOUNTER — TELEPHONE (OUTPATIENT)
Dept: PULMONOLOGY | Facility: CLINIC | Age: 25
End: 2020-06-02

## 2020-06-02 NOTE — TELEPHONE ENCOUNTER
Prior Authorization Approval    Authorization Effective Date:    Authorization Expiration Date:    Medication: elexacaftor-tezacaftor-ivacaftor & ivacaftor (TRIKAFTA) 100-50-75 & 150 MG tablet pack - pa approved  Approved Dose/Quantity: UD  Reference #:     Insurance Company: [employer] - Phone [phone] Fax [phone]  Expected CoPay:       CoPay Card Available:      Foundation Assistance Needed:    Which Pharmacy is filling the prescription (Not needed for infusion/clinic administered): [facility] - [place], [place] - [address]  Pharmacy Notified: Yes  Patient Notified: NoComment:  peoactive pa

## 2020-06-02 NOTE — TELEPHONE ENCOUNTER
PA Initiation    Medication: elexacaftor-tezacaftor-ivacaftor & ivacaftor (TRIKAFTA) 100-50-75 & 150 MG tablet pack - pa pending  Insurance Company: [employer] [facility] [employer] - Phone [phone] Fax [phone]  Pharmacy Filling the Rx: [facility] - [place], [place] - [facility] [address]  Filling Pharmacy Phone: [phone]  Filling Pharmacy Fax: [phone]  Start Date: 6/2/2020

## 2020-06-10 ENCOUNTER — MYC REFILL (OUTPATIENT)
Dept: EDUCATION SERVICES | Facility: CLINIC | Age: 25
End: 2020-06-10

## 2020-06-10 DIAGNOSIS — E10.9 DIABETES MELLITUS TYPE I (H): ICD-10-CM

## 2020-06-11 RX ORDER — BLOOD SUGAR DIAGNOSTIC
STRIP MISCELLANEOUS
Qty: 400 EACH | Refills: 3 | Status: SHIPPED | OUTPATIENT
Start: 2020-06-11 | End: 2023-01-17 | Stop reason: ALTCHOICE

## 2020-06-23 ENCOUNTER — ALLIED HEALTH/NURSE VISIT (OUTPATIENT)
Dept: EDUCATION SERVICES | Facility: CLINIC | Age: 25
End: 2020-06-23
Payer: COMMERCIAL

## 2020-06-23 DIAGNOSIS — E10.9 DIABETES MELLITUS TYPE I (H): Primary | ICD-10-CM

## 2020-06-23 NOTE — PROGRESS NOTES
DIABETES EDUCATION NOTE:    Mamie Rice presents today for education related to Cystic Fibrosis Related Diabetes (CFRD).  Patient is being treated with:  diet, exercise and SMBG.  She is accompanied by self    PATIENT CONCERNS RELATED TO DIABETES SELF MANAGEMENT:   Frequent episodes of hypoglycemia    ASSESSMENT:  CFRD    Current Diabetes Management per Patient:  Taking diabetes medications? yes:       Monitoring  Patient glucose self monitoring as follows: two times daily.   BG meter: Unknown meter     BG values are: in and out of goal  Patient's most recent   Lab Results   Component Value Date    A1C 5.1 08/14/2019    is not meeting goal of <7.0    Exercise: sporadic or irregular exercise    Nutrition:   Has been taught to carb count in the past    Hypoglycemia:   Patient is at risk of hypoglycemia? Yes                    EDUCATION and INSTRUCTION PROVIDED AT THIS VISIT:    Purpose and procedure for placing sensor explained and Mamie Rice verbalized understanding.  Sensor Lot Number:[id number]  Sensor Exp Date:12/6/20  Transmitter ID:[id number]   SN:[id number]    Patient's role in data gathering explained, including:    Need for careful food records, which include time foods eaten, carbohydrate content, insulin doses and times, and any contributing factors like stress and exercise.    Comments for any episodes of hypoglycemia    Process for removing sensor and returning to diabetes education center.      Contact information provided in case of questions or problems.      Need to check BG before each meal and at bedtime and record values on food record.    Need to calibrate sensor at least every 12 hours by entering BG into Dexcom .      Provided with food records and sensor agreement signed.    Sensor placed successfully in left arm without difficulty and transmitter attached.    Instructed to avoid Tylenol while sensor in  Instructed to avoid contact sports that may damage sensor.    Follow up  appointment made to review report.   6/30/20 at 11 am    Patient verbalized and demonstrated understanding of instructions and was given a written copy of goals and instructions.      Time spent providing diabetes self management education today was  30 minutes.     Patient-stated goal written and given to Mamie Rice.  Verbalized and demonstrated understanding of instructions.     PLAN:  See patient instructions  AVS printed and given to patient    FOLLOW-UP:    Is scheduled in 1 week 6/30/20 at 11 am    Any diabetes medication dose changes were made via the CDE Protocol and Collaborative Practice Agreement [facility]  A copy of this encounter was provided to patient's referring provider.

## 2020-06-23 NOTE — PATIENT INSTRUCTIONS
"1. After 2 hours, you will be prompted to enter 2 blood sugar readings to calibrate the . Take two different samples (from different fingers). Wash your hands well before calibrating.    2. Check blood sugar once at least every 12 hours and enter it into the Dexcom  to calibrate. Checking blood sugar before meals and/or at bedtime is recommended. Blood sugar levels must be between  for the  to accept the calibration.    3. Record what you eat at meals and snacks with portion eaten. Record amount of carbohydrate grams in the Dexcom  when you eat to help track response to food intake. Record exercise type and time. Record medications you take and the time they are taken.     4. When monitor reads that \"Sensor needs to be Changed,\" go into menu function and hit \"STOP\" (NOT shutdown)    5. If battery power is low (will see indicator on monitor) and need to recharge the monitor, only charge for 1-3 hours.  DO NOT charge for more than 3 hours since it can damage monitor!    6. Avoid taking Tylenol while wearing the Dexcom, as it can cause inaccurate glucose readings.     7. Return all equipment and any food/exercise/medication logs to the endocrine clinic    8. Follow-up appointment for review of sensor reports schedule for 11 am on 6/30/20.    Rhonda Esteves RN,CDE  [address] [address]  Phone: [phone]  [email or web address]      "

## 2020-06-29 DIAGNOSIS — G47.00 INSOMNIA, UNSPECIFIED TYPE: ICD-10-CM

## 2020-06-30 ENCOUNTER — ALLIED HEALTH/NURSE VISIT (OUTPATIENT)
Dept: EDUCATION SERVICES | Facility: CLINIC | Age: 25
End: 2020-06-30
Payer: COMMERCIAL

## 2020-06-30 ENCOUNTER — OFFICE VISIT (OUTPATIENT)
Dept: PSYCHIATRY | Facility: CLINIC | Age: 25
End: 2020-06-30
Attending: NURSE PRACTITIONER
Payer: COMMERCIAL

## 2020-06-30 DIAGNOSIS — F33.0 MILD EPISODE OF RECURRENT MAJOR DEPRESSIVE DISORDER (H): ICD-10-CM

## 2020-06-30 DIAGNOSIS — F90.0 ATTENTION DEFICIT HYPERACTIVITY DISORDER (ADHD), PREDOMINANTLY INATTENTIVE TYPE: Primary | ICD-10-CM

## 2020-06-30 DIAGNOSIS — E10.9 DIABETES MELLITUS TYPE I (H): Primary | ICD-10-CM

## 2020-06-30 DIAGNOSIS — G47.00 INSOMNIA, UNSPECIFIED TYPE: ICD-10-CM

## 2020-06-30 DIAGNOSIS — F41.1 GAD (GENERALIZED ANXIETY DISORDER): ICD-10-CM

## 2020-06-30 RX ORDER — DEXTROAMPHETAMINE SACCHARATE, AMPHETAMINE ASPARTATE MONOHYDRATE, DEXTROAMPHETAMINE SULFATE AND AMPHETAMINE SULFATE 5; 5; 5; 5 MG/1; MG/1; MG/1; MG/1
20 CAPSULE, EXTENDED RELEASE ORAL DAILY
Qty: 30 CAPSULE | Refills: 0 | Status: SHIPPED | OUTPATIENT
Start: 2020-07-10 | End: 2020-08-09

## 2020-06-30 RX ORDER — BUSPIRONE HYDROCHLORIDE 15 MG/1
15 TABLET ORAL 2 TIMES DAILY
Qty: 120 TABLET | Refills: 0 | Status: SHIPPED | OUTPATIENT
Start: 2020-06-30 | End: 2020-11-13

## 2020-06-30 RX ORDER — DEXTROAMPHETAMINE SACCHARATE, AMPHETAMINE ASPARTATE MONOHYDRATE, DEXTROAMPHETAMINE SULFATE AND AMPHETAMINE SULFATE 5; 5; 5; 5 MG/1; MG/1; MG/1; MG/1
20 CAPSULE, EXTENDED RELEASE ORAL DAILY
Qty: 30 CAPSULE | Refills: 0 | Status: SHIPPED | OUTPATIENT
Start: 2020-09-09 | End: 2020-10-09

## 2020-06-30 RX ORDER — TRAZODONE HYDROCHLORIDE 100 MG/1
100 TABLET ORAL AT BEDTIME
Qty: 90 TABLET | Refills: 0 | Status: SHIPPED | OUTPATIENT
Start: 2020-06-30 | End: 2020-10-29

## 2020-06-30 RX ORDER — HYDROXYZINE HYDROCHLORIDE 25 MG/1
25 TABLET, FILM COATED ORAL
Qty: 90 TABLET | Refills: 0 | Status: SHIPPED | OUTPATIENT
Start: 2020-06-30 | End: 2020-10-08

## 2020-06-30 RX ORDER — DEXTROAMPHETAMINE SACCHARATE, AMPHETAMINE ASPARTATE MONOHYDRATE, DEXTROAMPHETAMINE SULFATE AND AMPHETAMINE SULFATE 5; 5; 5; 5 MG/1; MG/1; MG/1; MG/1
20 CAPSULE, EXTENDED RELEASE ORAL DAILY
Qty: 30 CAPSULE | Refills: 0 | Status: SHIPPED | OUTPATIENT
Start: 2020-08-10 | End: 2020-09-09

## 2020-06-30 RX ORDER — FLUOXETINE 40 MG/1
80 CAPSULE ORAL DAILY
Qty: 180 CAPSULE | Refills: 0 | Status: SHIPPED | OUTPATIENT
Start: 2020-06-30 | End: 2020-10-13

## 2020-06-30 NOTE — PROGRESS NOTES
"VIDEO VISIT  Mamie Rice is a 24 year old patient who is being evaluated via a billable video visit.      The patient has been notified of following:   \"We have found that certain health care needs can be provided without the need for an in-person physical exam. This service lets us provide the care you need with a video conversation. If a prescription is necessary we can send it directly to your pharmacy. If lab work is needed we can place an order for that and you can then stop by our lab to have the test done at a later time. Insurers are generally covering virtual visits as they would in-office visits so billing should not be different than normal.  If for some reason you do get billed incorrectly, you should contact the billing office to correct it and that number is in the AVS .    Patient has given verbal consent for video visit?: Yes   How would you like to obtain your AVS?: PayLease  AVS SmartPhrase [PsychAVS] has been placed in 'Patient Instructions': Yes      Video- Visit Details  Type of service:  video visit for medication management  Time of service:    Date:  06/30/2020    Video Start Time:  4:01 PM        Video End Time:  1620    Originating Site (patient location):  [facility]   Location- Patient's home  Telemedicine Visit: The patient's condition can be safely assessed and treated via synchronous audio and visual telemedicine encounter.      Reason for Telemedicine Visit: Due to COVID 19 pandemic, clinic switching all appointments to telemedicine       Distant Site (Provider Location): Provider Remote Setting    Consent:  The patient/guardian has verbally consented to: the potential risks and benefits of telemedicine (video visit) versus in person care; bill my insurance or make self-payment for services provided; and responsibility for payment of non-covered services.     Mode of Communication:  Video Conference via Doxy.    As the provider I attest to compliance with applicable laws and " regulations related to telemedicine.    Psychiatry Transfer of Care Progress Note                                                                  Patient Name: Mamie Rice  YOB: 1995  MRN: [id number]  Date of Service:  6/30/2020  Last Seen:3/31/2020    Mamie Rice is a 24 year old female who uses the name Mamie and pronoun kenneth.        Mamie Rice is a 24 year old year old adult with MDD, MECHE and ADHD, who presents for transfer of psychiatric care from [facility].  Mamie Rice was last seen on 3/31/2020.   At that time,     Plan:  Medication: Continue current medications  Lab Orders:  none  Referrals: other NP in our clinic  Return for Follow Up:June    Pertinent Background:  Diagnoses include MDD, MECHE and ADHD.  Psych critical item history includes [no critical items]. Medical complication includes Cystic Fibrosis, DM I    Previous medication trial: Phentamine, Trazodone (150 mg oversedating, 50 mg ineffective)    Naltrexone and Topamax are managed by weight management clinic.    Interim History                                                                                                        4, 4     Since the last visit, reports doing well.  Denies SI, sIB or HI.Taking Vistaril 25 mg every night for sleep with Trazodone 100 mg.  Usually goes to bed around 9pm and waking up 4:45am when working and 7am when not working.  Feels rested when waking up. Takes Adderall XR daily without any drug holiday.  Feels anxiety and depression are optimally managed.  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  Shares classroom with other teacher, but both students and teacher have been the same and this will continue.  Feels safe to back to work.  Pt also had a vacation to [place] for 1 week with flight 2 weeks ago and does not feel ill.    Denies  any symptoms suggestive of hypomania or psychosis.    Current Suicidality/Hx of Suicide Attempts: Denies both  CoCominent Medical concerns: Denies    Medication Side Effects: The patient denies all medication side effects.    Medical Review of Systems     Apart from the symptoms mentioned int he HPI, the 14 point review of systems, including constitutional, HEENT, cardiovascular, respiratory, gastrointestinal, genitourinary, musculoskeletal, integumentary, endocrine, neurological, hematologic and allergic is entirely negative.     Pregnant: None. Nursing: None, Contraception: DMPA    Substance Use   Denies frequent or abuse of alcohol.  Denies use of any other substance.  Rarely drinks.       Medical / Surgical History                                                                                                                  Patient Active Problem List   Diagnosis     Hip joint pain     Aspergillosis (H)     Chronic maxillary sinusitis     Hypoglycemia     Type 1 diabetes mellitus (H)     Cystic fibrosis of the lung (H)     Chronic constipation     Frontal sinusitis     Overactive child     Back pain     Pancreatic insufficiency     Non morbid obesity due to excess calories     Acne vulgaris     Cystic fibrosis (H)     Diabetes mellitus, type 2 (H)     Persistent depressive disorder     Mild episode of recurrent major depressive disorder (H)     Insomnia, unspecified type     MECHE (generalized anxiety disorder)     Attention deficit hyperactivity disorder (ADHD), combined type     Knee pain       Past Surgical History:   Procedure Laterality Date     ARTHROSCOPY HIP, OSTEOPLASTY FEMUR PROXIMAL, COMBINED  3/11/2013    Procedure: COMBINED ARTHROSCOPY HIP, OSTEOPLASTY FEMUR PROXIMAL;  Right Hip Arthroscopy, Labral  Debridement.    surgeon request choice anesthesia/admit to [facility] after surgery;  Surgeon: Omkar Austin MD;  [facility]     ARTHROSCOPY KNEE WITH MEDIAL MENISCECTOMY Left 1/31/2017     Procedure: ARTHROSCOPY KNEE WITH MEDIAL MENISCECTOMY;  Surgeon: Jethro Coyle MD;  [facility]     bronchoscopies       BRONCHOSCOPY       EXAM UNDER ANESTHESIA ANUS N/A 5/10/2016    Procedure: EXAM UNDER ANESTHESIA ANUS;  Surgeon: Chet Gaviria MD;  [facility]     EXAM UNDER ANESTHESIA, RESTORATIONS, EXTRACTION(S) DENTAL COMPLEX, COMBINED  5/13/2013    Procedure: COMBINED EXAM UNDER ANESTHESIA, RESTORATIONS, EXTRACTION(S) DENTAL COMPLEX;  Dental Exam, Radiographs, Restorations. Single Extraction  Tooth #2. Restorations x 3;  Surgeon: Danilo Ortiz DDS;  [facility]     HC KNEE SCOPE, DIAGNOSTIC      Arthroscopy, Knee- left     INJECT BOTOX N/A 5/10/2016    Procedure: INJECT BOTOX;  Surgeon: Chet Gaviria MD;  [facility]     left hip labral tear  5/11/2011    left hip arthroscopy with labral debridement and synovectomy     meniscus repair       OPTICAL TRACKING SYSTEM ENDOSCOPIC SINUS SURGERY  10/14/2011    Procedure:OPTICAL TRACKING SYSTEM ENDOSCOPIC SINUS SURGERY; FESS (functional endoscopic sinus surgery) with Image Guidance, bronchial lavage and cultures; Surgeon:GOYO KUO; [facility]     OPTICAL TRACKING SYSTEM ENDOSCOPIC SINUS SURGERY  5/18/2012    Procedure:OPTICAL TRACKING SYSTEM ENDOSCOPIC SINUS SURGERY; Right  and Left Image Guided Functional Endoscopic Sinus Surgery With  Frontal Approach, [employer]; Surgeon:GOYO KUO; [facility]     OPTICAL TRACKING SYSTEM ENDOSCOPIC SINUS SURGERY  9/26/2012    Procedure: OPTICAL TRACKING SYSTEM ENDOSCOPIC SINUS SURGERY;  Stealth Guided Bilateral Functional Endoscopic Sinus Surgery *Latex Safe*;  Surgeon: Goyo Kuo MD;  [facility]     OPTICAL TRACKING SYSTEM ENDOSCOPIC SINUS SURGERY Bilateral 10/16/2015    Procedure: OPTICAL TRACKING SYSTEM ENDOSCOPIC SINUS SURGERY;  Surgeon: Mariela Haile MD;  [facility]     ORTHOPEDIC SURGERY      left hip tear repair 2010     SINUS SURGERY           Social/ Family History                                  [per patient report]                                 1ea,1ea     Living arrangements: lives with parents and feels safe  Social Support:parents, friends, maternal grandparents  Access to gun: denies  Denies any trauma hx, numerous hospitalizations during childhood, does not consider this as traumatic  Works as a day care provider, dance coach and substitute .      Allergy                                Vancomycin and Augmentin    Current Medications                                                                                                       Current Outpatient Medications   Medication Sig Dispense Refill     acetaminophen (TYLENOL) 325 MG tablet Take 2 tablets (650 mg) by mouth every 4 hours as needed for other (mild pain) 100 tablet 0     acetylcysteine (MUCOMYST) 20 % neb solution INHALE ONE 4ML NEB INTO LUNGS VIA NEBULIZER TWO TIMES A DAY, MAY INCREASE TO 3-4 TIMES A DAY WITH INCREASE COUGH/COLD SYMPTOMS 360 mL 11     albuterol (PROAIR HFA/PROVENTIL HFA/VENTOLIN HFA) 108 (90 Base) MCG/ACT Inhaler Inhale 2 puffs into the lungs every 6 hours as needed for shortness of breath / dyspnea or wheezing 1 Inhaler 12     albuterol (PROVENTIL) (2.5 MG/3ML) 0.083% neb solution Take 1 vial (2.5 mg) by nebulization every 4 hours as needed for shortness of breath / dyspnea or wheezing 360 mL 11     amphetamine-dextroamphetamine (ADDERALL XR) 20 MG 24 hr capsule Take 1 capsule (20 mg) by mouth daily 30 capsule 0     amphetamine-dextroamphetamine (ADDERALL XR) 20 MG 24 hr capsule Take 1 capsule (20 mg) by mouth daily 30 capsule 0     amphetamine-dextroamphetamine (ADDERALL XR) 20 MG 24 hr capsule Take 1 capsule (20 mg) by mouth daily 30 capsule 0     azithromycin (ZITHROMAX) 500 MG tablet TAKE ONE TABLET BY MOUTH ON MONDAYS, WEDNESDAYS, AND FRIDAYS AS DIRECTED 36 tablet 4     beta carotene 37408 UNIT capsule TAKE ONE CAPSULE BY MOUTH IN THE  MORNING ON MONDAY, WEDNESDAY, AND FRIDAY 36 capsule 4     blood glucose (ACCU-CHEK SMARTVIEW) test strip Use to test blood sugar 4 times daily or as directed. 400 each 3     buPROPion (WELLBUTRIN XL) 300 MG 24 hr tablet Take 1 tablet (300 mg) by mouth every morning Please alert patient to change to one tablet daily 90 tablet 3     busPIRone (BUSPAR) 15 MG tablet Take 1 tablet (15 mg) by mouth 2 times daily 60 tablet 5     cetirizine (ZYRTEC) 10 MG tablet Take 1 tablet (10 mg) by mouth every evening 30 tablet 3     elexacaftor-tezacaftor-ivacaftor & ivacaftor (TRIKAFTA) 100-50-75 & 150 MG tablet pack TAKE TWO ORANGE TABLETS BY MOUTH IN THE MORNING AND ONE BLUE TABLET IN THE EVENING AS DIRECTED ON PACKAGE.  TAKE WITH FAT CONTAINING FOOD. 84 tablet 3     FLUoxetine (PROZAC) 40 MG capsule Take 2 capsules (80 mg) by mouth daily 60 capsule 5     fluticasone (FLONASE) 50 MCG/ACT nasal spray Spray 2 sprays into both nostrils 2 times daily (Patient taking differently: Spray 2 sprays into both nostrils as needed ) 15.8 mL 3     GLYCOPYRROLATE PO Take 1 mg by mouth 2 times daily        hydrOXYzine (ATARAX) 25 MG tablet Take 1 tablet (25 mg) by mouth nightly as needed (sleep) 30 tablet 2     medroxyPROGESTERone (DEPO-PROVERA) 150 MG/ML IM injection Inject 150 mg into the muscle       MedroxyPROGESTERone Acetate (DEPO-PROVERA IM)        meropenem (MERREM) 500 MG vial 500 mg by Nasal Instillation route once for 1 dose 60 each      montelukast (SINGULAIR) 10 MG tablet TAKE ONE TABLET BY MOUTH AT BEDTIME 30 tablet 11     Multiple Vitamin (MULTIVITAMIN OR) Take 1 tablet by mouth daily.       naltrexone (DEPADE/REVIA) 50 MG tablet Take 1 tablet.  Time it one to two hours prior to worst cravings. 90 tablet 1     omeprazole (PRILOSEC) 40 MG DR capsule Take 1 capsule (40 mg) by mouth 2 times daily 60 capsule 11     phytonadione (MEPHYTON) 5 MG tablet TAKE ONE TABLET BY MOUTH ONCE A WEEK 4 tablet 11     polyethylene glycol (MIRALAX)  "powder Take 17 g (1 capful) by mouth daily as needed for constipation 119 g 3     topiramate (TOPAMAX) 100 MG tablet Take 1 tablet (100 mg) by mouth daily 90 tablet 1     traZODone (DESYREL) 100 MG tablet Take 1 tablet (100 mg) by mouth At Bedtime 30 tablet 5     vitamin C (ASCORBIC ACID) 500 MG tablet TAKE ONE TABLET BY MOUTH TWICE A  tablet 3     VITAMIN D3 25 MCG (1000 UT) tablet TAKE ONE TABLET BY MOUTH EVERY  tablet 3     VITAMIN E 400 units capsule TAKE ONE CAPSULE BY MOUTH TWICE A  capsule 11        Mental Status Exam                                                                                   9, 14 cog        Alertness: alert  and oriented  Appearance:  Casually dressed and Well groomed  Behavior/Demeanor: cooperative and calm, with good  eye contact   Speech: regular rate and rhythm  Mood :  \"good\"  Affect: full range and appropriate; was congruent to mood; was congruent to content  Thought Process (Associations):  Logical, Linear and Goal directed  Thought process (Rate):  Normal  Thought content:  no overt psychosis, denies suicidal ideation, intent or thoughts and patient does not appear to be responding to internal stimuli  Perception:  Reports none;  Denies depersonalization and derealization  Attention/Concentration:  Normal  Memory:  Immediate recall intact and Short-term memory intact  Language: intact  Fund of Knowledge/Intelligence:  Average  Abstraction:  Normal  Insight:  Fair  Judgment:  Fair  Cognition: (6) does  appear grossly intact; formal cognitive testing was not done      Physical Exam     Motor activity/EPS:  Normal  Gait:  Normal  Psychomotor: normal or unremarkable    Labs and Results      Pertinent findings on review include: Review of records with relevant information reported in the HPI.  Reviewed pt's past medical record and obtained collateral information.    [place] PRESCRIPTION MONITORING PROGRAM [] was checked today:  indicates Addrall XR 6/10/2020, " 4/1/2020 and 3/4/2020..    PHQ9 Today:  N/A  PHQ 11/12/2019 12/18/2019 3/31/2020   PHQ-9 Total Score 9 4 7   Q9: Thoughts of better off dead/self-harm past 2 weeks Not at all Not at all Not at all       MECHE 7 Today: N/A      Recent Labs   Lab Test 08/14/19  0814 08/02/17  0654 05/04/17  0626   CR 0.85 0.77 0.75   GFRESTIMATED >90 >90  Non  GFR Calc   >90  Non  GFR Calc       Recent Labs   Lab Test 04/07/20 11/20/19  1301   AST 16 19   ALT 18 28   ALKPHOS 56 88     0808/2012:     PSYCHOTROPIC DRUG INTERACTIONS:    Adderall---Buspar---Prozac---Trazodone---Wellbutrin: Concurrent use of AMPHETAMINES and SEROTONERGIC AGENTS may result in increased risk of serotonin syndrome.   Adderall---Prozac---Omeprazole: Concurrent use of AMPHETAMINES and SEROTONERGIC AGENTS THAT INHIBIT CYP2D6 may result in increased amphetamine exposure  Buspar---Trazodone---Vistaril---Zyrtec---Topamax: Concurrent use of TRAZODONE and SEROTONERGIC CENTRAL NERVOUS SYSTEM DEPRESSANTS may result in increased risk of CNS depression.   Prozac---Trazodone: Concurrent use of TRAZODONE and SEROTONERGIC DRUGS THAT PROLONG QT INTERVAL may result in increased risk of QT-interval prolongation.   Prozac---Wellbutrin: Concurrent use of BUPROPION and SELECTED SEIZURE THRESHOLD LOWERING CYP2D6 SUBSTRATES may result in increased exposure of CYP2D6 substrates; increased risk of seizure.  Glycopyrrolate---Vistaril: Concurrent use of GLYCOPYRROLATE and ANTICHOLINERGICS may result in increased risk of anticholinergic side effects .    Glycopyrrolate---Wellbutrin---Vistaril: Concurrent use of BUPROPION and AGENTS LOWERING SEIZURE THRESHOLD may result in lower seizure threshold  Vistaril---Zithromax---Trazodone: Concurrent use of HYDROXYZINE and QT PROLONGING AGENTS may result in increased risk of QT-interval prolongation.   Buspar---Prozac: Concurrent use of FLUOXETINE and BUSPIRONE may result in worsening of psychiatric symptoms.      MANAGEMENT:  Monitoring for adverse effects, routine vitals, periodic EKGs and patient is aware of risks    Impression/Assessment     Mamie Rice is a 24 year old adult  who presents for transfer of care.  Pt appears to be stable in her mood and anxiety, denies SI, SIB or HI.  Pt reports doing well with current medication regimen and wants to continue.  Discussed ok to continue on current medication regimen for now, but recommended for EKG in the future due to couple of medication interactions in the future.      Diagnosis                                                                   MDD  MECHE  ADHD    Treatment Recommendation & Plan       Medication Ordered/Consults/Labs/tests Ordered:     Medication: continue on current medication regimen  OTC Recommendations: none  Lab Orders:  EKG in the future  Referrals: none  Release of Information: none  Future Treatment Considerations: per symptoms.   Return for Follow Up: in 6 months per pt's request    -Discussed safety plan for suicidal thoughts  -Discussed plan for suicidality  -Discussed available emergency services  -Patient agrees with the treatment plan  -Encouraged to continue outpatient therapy to gain more coping mechanism for stress.      -Pt understood that after stopping Naltrexone, they may be more sensitive to the effects of heroin and any other narcotics.  The amount of heroin or narcotics pt may have been using on a routine basis before pt started naltrexone might now cause overdose and death and pt fully understands the nature and seriousness of this possible consequences.  If patient is not sure if they can avoid opioid use, pt understands that pt can be referred to alternative treatment programs such as methadone maintenance, which is an effective treatment for opioid dependence and has a reduced risk of fatal overdose.      Treatment Risk Statement: Discussed with the patient my impressions, as well as recommended studies. I educated patient  on the differential diagnosis and prognosis. I discussed with the patient the risks and benefits of medications versus no interventions, including efficacy, dose, possible side effects and length of treatment and the importance of medication compliance.  The patient understands the risks, benefits, adverse effects and alternatives. Agrees to treatment with the capacity to do so. No medical contraindications to treatment. The patient also understands the risks of using street drugs or alcohol.    CRISIS NUMBERS:   Provided routinely in AVS.       Jessie Pitts CNP,  6/30/2020

## 2020-06-30 NOTE — PROGRESS NOTES
Diabetes Self-Management Education & Support  Professional Continuous Glucose Monitor Insertion    SUBJECTIVE/OBJECTIVE:     Mamie Rice presents for professional Continuous Glucose Monitor Insertion.     Patient comments/concerns: low blood sugar    Lab Results:  Lab Results   Component Value Date    A1C 5.1 08/14/2019      Lab Results   Component Value Date     08/14/2019       Medication:  No diabetes medication      ASSESSMENT:    CGM study indicated for:   Frequent episodes of hypoglycemia.    INTERVENTION:   Sensor started today.   The lot number is [id number]  The SN is [id number]  The exp date is: 8/31/2020     Sensor was inserted in Mamie's left arm with no resistance or bleeding at insertion site.      Pt will plan to wear the sensor through 7/14/20    WRITTEN AND VERBAL INFORMATION GIVEN TO SUPPORT UNDERSTANDING OF:      Patient verbalizes understanding of how to remove sensor, if needed, and all instructions provided.     Educational and other materials:  Food/exercise/medication log sheets  Contact information    PLAN:  Pt was given instructions for tracking BG, medications, food intake and activity.  Patient to return all items associated with the professional Continuous Glucose Monitor System.  See Patient Instructions, AVS printed and provided to patient today.    Follow-up:    Follow up on 7/14 at 12:30 pm  Time spent in DSMT: 20 minutes   Time spent in CGM insertion: 21 minutes, in addition to time spent in DSMT  Encounter Type: Individual

## 2020-06-30 NOTE — PATIENT INSTRUCTIONS
-Continue on current medication regimen    Your next appointment is scheduled on 12/22 (Tue) at 4:30pm.    To access your telemedicine visit:     Open a web browser, like Cater to u, and type [email or web address]     You will see a box asking you to check in to let Jessie Pitts know that you are here.     Type in your name and press Check In. That will let Jessie see you in the virtual waiting room. At your scheduled appointment time, your provider will initiate the visit and connect you.     When your visit is done, you can simply close the browser window.        Please Note:  Ideally, you will connect from a desktop, laptop, or tablet with a WiFi connection. Your computer/tablet must have a camera and microphone. You can use a cell phone, if it has a camera, and if you can connect to WiFi. However, if you connect your phone over a cellular network, it is of lower quality and less reliable.    Thank you for coming to the [facility].    Lab Testing:  **If you had lab testing today and your results are reassuring or normal they will be mailed to you or sent through [employer] within 7 days.   **If the lab tests need quick action we will call you with the results.  The phone number we will call with results is # [phone] (home) . If this is not the best number please call our clinic and change the number.    Medication Refills:  If you need any refills please call your pharmacy and they will contact us. Please allow three business for refill processing.   If you need to  your refill at a new pharmacy, please contact the new pharmacy directly. The new pharmacy will help you get your medications transferred.     Scheduling:  If you have any concerns about today's visit or wish to schedule another appointment please call our office during normal business hours [phone] (8-5:00 M-F)    Contact Us:  Please call [phone] during business hours (8-5:00 M-F).  If after clinic hours, or on the weekend,  please call  [phone].      MENTAL HEALTH CRISIS NUMBERS:  [facility]:   [facility] - [phone]   Crisis Residence [facility] - [phone]   Walk-In Counseling Center [facility] - [phone]   COPE 24/7 [place] Mobile Team for Adults - [[phone]]; Child - [[phone]]     Crisis Connection - [phone]     [facility] - [phone]   Walk-in counseling [facility] - [phone]   Walk-in counseling [facility] - [phone]   Crisis Residence [facility] - [phone]   Urgent Care Adult Mental Health:   --Drop-in, 24/7 crisis line, and [place] Mobile Team [[phone]]    CRISIS TEXT LINE: Text [phone] from anywhere, anytime, any crisis 24/7;    OR SEE www.crisistextline.org     Poison Control Center - [phone]    CHILD: [facility] needs assessment team - [phone]     [facility] Lifeline - [phone]; or [facility] Lif[facility] - [phone]    If you have a medical emergency please call 571or go to the nearest ER.                    _____________________________________________    Again thank you for choosing [facility] and please let us know how we can best partner with you to improve you and your family's health.  You may be receiving a survey in the mail regarding this appointment. We would love to have your feedback, both positive and negative, so please fill out the survey and return it using the provided envolpe. The survey is done by an external company, so your answers are anonymous.

## 2020-07-02 RX ORDER — HYDROXYZINE HYDROCHLORIDE 25 MG/1
TABLET, FILM COATED ORAL
Qty: 30 TABLET | Refills: 2 | OUTPATIENT
Start: 2020-07-02

## 2020-07-14 ENCOUNTER — ALLIED HEALTH/NURSE VISIT (OUTPATIENT)
Dept: EDUCATION SERVICES | Facility: CLINIC | Age: 25
End: 2020-07-14
Payer: COMMERCIAL

## 2020-07-14 DIAGNOSIS — E10.9 DIABETES MELLITUS TYPE I (H): Primary | ICD-10-CM

## 2020-07-14 NOTE — PROGRESS NOTES
"LibrePro Continuous Glucose Monitor Interpretation     Patient History:   1. Type of Diabetes: CFRD  2. Duration of diabetes or year of diagnosis: \"many years\"  3. Current treatment regimen none       *Abbreviated insulin dose documentation key: Insulin Trade Name (ioldtmbvf-ityln-byjlyo-bedtime) - i.e. Humalog 5-5-5-0 (Humalog 5 units at breakfast, 5 units at lunch, and 5 units at dinner).  4. Most Recent A1c Result:    Lab Results   Component Value Date    A1C 5.1 2019     5. Indication/s for LibrePro study:  Symptomatic hypoglycemia almost every day.  Statistics:   1. Sensor worn for 14 days.  2. Glucose excursions:     3. Estimated average glucose: 101 mg/dL                        Data evaluation:   1. Sensor modal day evaluation shows the followin. Consistent day-to-day patterns noted: pattern of daytime hypoglycemia  2. pattern of nocturnal hypoglycemia.  3. Low glucose events: 36 events average duration 119 minutes            Patient's Logbook shows the following:   Unable to review, Mamie forgot them at home and says she will send them over.     Assessment and Plan:  Symptomatic hypoglycemia.  She has tried insulin for this in the past and she dropped too low and the insulin was stopped.  She thinks was around five years ago.  She is willing to try insulin again.  Insulin teaching done in case insulin is added.    Mamie was scheduled for follow up with Dr. Prieto  at 10:55 am  Time Spent: 30 minutes  Any diabetes medication dose changes were made via the CDE Protocol and Collaborative Practice Agreement with the patient's referring provider. A copy of this encounter was shared with the provider.                "

## 2020-07-15 ENCOUNTER — CLINICAL UPDATE (OUTPATIENT)
Dept: PHARMACY | Facility: CLINIC | Age: 25
End: 2020-07-15
Payer: COMMERCIAL

## 2020-07-15 DIAGNOSIS — E84.9 CYSTIC FIBROSIS (H): Primary | ICD-10-CM

## 2020-07-15 DIAGNOSIS — E84.9 CYSTIC FIBROSIS (H): ICD-10-CM

## 2020-07-15 PROCEDURE — 99207 ZZC NO CHARGE LOS: CPT | Performed by: PHARMACIST

## 2020-07-15 RX ORDER — ELEXACAFTOR, TEZACAFTOR, AND IVACAFTOR 100-50-75
KIT ORAL
Qty: 84 TABLET | Refills: 2 | Status: SHIPPED | OUTPATIENT
Start: 2020-07-15 | End: 2020-10-26

## 2020-07-15 NOTE — PROGRESS NOTES
Clinical Update:                                                    At the request of Dr. Allison, a chart review was conducted for Mamie Rice.    Reason for Chart Review: Lab review and Trikafta update    Discussion: Mamie had labs drawn per recommended Trikafta monitoring.  All results are within normal limits.    Trikafta adherence appears to be good - compliance rate is 93.46% per [facility] GPS.    Plan:  1. Continue Trikafta  2. Repeat hepatic panel and CK in 3 months  3. Trikafta prescription renewed for 3 months    Patient notified via [employer].    Gris Adame PharmD  CF Medication Therapy Management Pharmacist  [facility]  [phone]

## 2020-09-09 ENCOUNTER — TELEPHONE (OUTPATIENT)
Dept: ENDOCRINOLOGY | Facility: CLINIC | Age: 25
End: 2020-09-09

## 2020-09-09 DIAGNOSIS — E66.09 CLASS 1 OBESITY DUE TO EXCESS CALORIES WITHOUT SERIOUS COMORBIDITY WITH BODY MASS INDEX (BMI) OF 30.0 TO 30.9 IN ADULT: ICD-10-CM

## 2020-09-09 DIAGNOSIS — E66.811 CLASS 1 OBESITY DUE TO EXCESS CALORIES WITHOUT SERIOUS COMORBIDITY WITH BODY MASS INDEX (BMI) OF 30.0 TO 30.9 IN ADULT: ICD-10-CM

## 2020-09-13 RX ORDER — TOPIRAMATE 100 MG/1
TABLET, FILM COATED ORAL
Qty: 90 TABLET | Refills: 1 | OUTPATIENT
Start: 2020-09-13

## 2020-09-14 ENCOUNTER — TELEPHONE (OUTPATIENT)
Dept: ENDOCRINOLOGY | Facility: CLINIC | Age: 25
End: 2020-09-14

## 2020-09-14 NOTE — TELEPHONE ENCOUNTER
TOPIRAMATE 100MG TABS   Last Written Prescription Date:  3/17/20  Last Fill Quantity: 90,   # refills: 1  Last Office Visit : 3/17/20  Future Office visit:  None 2-3 months recommended     Recieved refill request for topiramate Patient needs appointment scheduled prior to any refills. Clinic Coordinator notified and will follow up with the patient as appropriate. The pharmacy has been notified that the medication will not be refilled prior to an appointment being scheduled.  Scheduling has been notified to contact the pt for appointment.

## 2020-09-14 NOTE — TELEPHONE ENCOUNTER
Refill denied at this time. Patient needs appointment. [employer] message to patient to schedule virtual visit with Dr. Tolentino.

## 2020-09-21 DIAGNOSIS — E84.0 CYSTIC FIBROSIS WITH PULMONARY MANIFESTATIONS (H): ICD-10-CM

## 2020-09-21 RX ORDER — ASCORBIC ACID 500 MG
TABLET ORAL
Qty: 100 TABLET | Refills: 3 | Status: SHIPPED | OUTPATIENT
Start: 2020-09-21 | End: 2021-04-27

## 2020-09-23 ENCOUNTER — VIRTUAL VISIT (OUTPATIENT)
Dept: PULMONOLOGY | Facility: CLINIC | Age: 25
End: 2020-09-23
Attending: INTERNAL MEDICINE
Payer: COMMERCIAL

## 2020-09-23 DIAGNOSIS — E84.0 CYSTIC FIBROSIS WITH PULMONARY MANIFESTATIONS (H): Primary | ICD-10-CM

## 2020-09-23 NOTE — PATIENT INSTRUCTIONS
Cystic Fibrosis Self-Care Plan    RECOMMENDATIONS:   Mamie, It was great to see you today.  I am glad that you are feeling well.  I am glad that you are back to work and enjoying the time there.  The plan from today:  --continue your every 3 month labs at the [facility] at the time you get your depot   --we will renew the order  --Flu shot locally  --Let's plan on an in person visit with annual studies in 3 months.  We are not doing glucose tolerance tests at this time.    YOUR GOAL:  Enjoy the start of Fall!      [facility] Nurse line:  Chika Anderson KJ and Radha [phone]     [facility] Fax Number:      [phone]         Cystic Fibrosis Respiratory Therapists:   Jhoana Trinh              [phone]          Sharon Gutiérrez   [phone]  Cystic Fibrosis Dietitians:              Amy Andino              [phone]                            Theresa Ceja                        [phone]   Cystic Fibrosis Diabetes Nurse:    Rhonda Esteves   [phone]    Cystic Fibrosis Social Workers:     Deyanira Figueroa               [phone]                     Faith Zamudio               [phone]  Cystic Fibrosis Pharmacists:           Gris Adame                               [phone]         Maira Stephenson   [phone]  Cystic Fibrosis Genetic Counselor:   Kaley Escobedo    [phone]    [facility] website:  [email or web address]         MRN: [id number]   Clinic Date: September 23, 2020   Patient: Mamie Rice     Annual Studies:   IGG   Date Value Ref Range Status   08/14/2019 755 695 - 1,620 mg/dL Final     Insulin   Date Value Ref Range Status   08/14/2019 41.0 (H) 3 - 25 mU/L Final     There are no preventive care reminders to display for this patient.    Pulmonary Function Tests  FEV1: amount of air you can blow out in 1 second  FVC: total amount of air you can take in and blow out    Your Goals:         PFT Latest Ref Rng &  Units 11/12/2019   FVC L 3.50   FEV1 L 3.13   FVC% % 98   FEV1% % 102          Airway Clearance: The Most Important Way to Keep Your Lungs Healthy  Vest Settings:    Hill-Rom Frequencies: 8, 9, 10 Pressure 10 Then, Frequencies 18, 19, 20 Pressure 6      RespirTech: Quick Start with Pressure of     Do each frequency for 5 minutes; Deflate vest after each frequency & cough 3 times before beginning the next setting.    Vest and Neb Therapy should be done 2 times/day.    Good Nutrition Can Improve Lung Function and Overall Health     Take ALL of your vitamins with food     Take 1/2 of your enzymes before EVERY meal/snack and the other 1/2 mid-meal/snack    Wt Readings from Last 3 Encounters:   02/28/20 80.7 kg (178 lb)   12/18/19 78.9 kg (174 lb)   11/12/19 79.1 kg (174 lb 4.8 oz)       There is no height or weight on file to calculate BMI.         National CF Foundation Recommendations for BMI in CF Adults: Women: at least 22 Men: at least 23        Controlling Blood Sugars Helps Prevent Lung Infections & Improves Nutrition  Test blood sugar:     In the morning before eating (goal is )     2 hours after a meal (goal is less than 150)     When pre-meal glucose is greater than 150 add correction     At bedtime (if less than 100 eat a snack with 15 grams of carbohydrates  Last A1C Results:   Hemoglobin A1C   Date Value Ref Range Status   08/14/2019 5.1 0 - 5.6 % Final     Comment:     Normal <5.7% Prediabetes 5.7-6.4%  Diabetes 6.5% or higher - adopted from ADA   consensus guidelines.           If diabetic, measure A1C every 6 months. Goal: Under 7%    Staying Healthy    Research:  If you are interested in learning about research opportunities or have questions, please contact the CF Research Team at [phone] or [email or web address].      CF Foundation:  Compass is a personalized resource service to help you with the insurance, financial, legal and other issues you are facing.  It's free, confidential and  available to anyone with CF.  Ask your  for more information or contact Compass directly at 608-COMPASS ([phone]) or compass@cff.org, or learn more at cff.org/compass.

## 2020-09-23 NOTE — LETTER
"    9/23/2020         RE: Mamie Rice  [address] [place] [id number]        Dear Colleague,    Thank you for referring your patient, Mamie Rice, to the [facility]. Please see a copy of my visit note below.    Mamie Rice is a 25 year old female who is being evaluated via a billable video visit.      The patient has been notified of following:     \"This video visit will be conducted via a call between you and your physician/provider. We have found that certain health care needs can be provided without the need for an in-person physical exam.  This service lets us provide the care you need with a video conversation.  If a prescription is necessary we can send it directly to your pharmacy.  If lab work is needed we can place an order for that and you can then stop by our lab to have the test done at a later time.    Video visits are billed at different rates depending on your insurance coverage.  Please reach out to your insurance provider with any questions.    If during the course of the call the physician/provider feels a video visit is not appropriate, you will not be charged for this service.\"    Patient has given verbal consent for Video visit? Yes  How would you like to obtain your AVS? MyChart  If you are dropped from the video visit, the video invite should be resent to: Other e-mail: [email or web address]  Will anyone else be joining your video visit? No      [facility]  September 23, 2020         Assessment and Plan:   Mamie Rice is a 25 year old female with cystic fibrosis.    1. CF lung disease with h/o normal spirometry: Mamie reports from pulmonary point of view that she is doing quite well.  She has little in the way of symptomatology.  She has historically grown out staph in her sputum.  At this time I have recommended:  --That she continue her bronchial drainage and nebulized therapy twice " daily  --That she continue to remain active with work and dog walking    2. Pancreatic sufficiency:  The patient has no new symptoms consistent with worsening malabsorption.    - Mamie does not require pancreatic enzymes  - continue vitamin supplementation.    3.  CFTR modulator therapy: Mamie remains on trikafta is tolerating well.  She has been consistent with getting her hepatic panel which has been normal.  I would recommend that she continue on full dose modulator therapy.    4.  CF sinus disease: Mamie had needed routine maintenance of her sinuses.  She does report since starting trikafta her symptoms under much better control.  She has not pursued her meropenem infusions in several months.  We will continue to follow the symptoms.    5.  Psychosocial: Mamie continues to live at home with her mother and her sister.  Her sister's children are also in the home.  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  She has not heard about what is going to happen with the dance season yet.    Annual studies due: 11/2020  Immunizations: UTD  Colonoscopy: NA    Gavi Allison MD MPH  Associate Professor of Medicine  Pulmonary, Allergy, Critical Care and Sleep Medicine      Interval History:     Mamie reports little in the way of cough or sputum production.  She denies any shortness of breath.  She does continue to perform 2 vest therapies per day.         Review of Systems:     CONSTITUTIONAL: no fever, no chills, no sweats, no change in weight, energy good most day, good appetite    INTEGUMENTARY/SKIN: no rash, no obvious new lesions    ENT/MOUTH: no sore throat, no new sinus pain, no new nasal drainage, no ear ringing     RESPIRATORY: see interval history    CV: no chest pain, no palpitations, no peripheral edema, no orthopnea, no PND    GI: no nausea, no vomiting, no change in stools, no fatty stools, no GERD, no abdominal pain    : no dysuria, no urinary frequency, no menses with  depot    MUSCULOSKELETAL: occasional stiff knee    ENDOCRINE: no excessive thirst, having lows    NEURO:  No headache, no numbness, no tingling    SLEEP: no issues    PSYCHIATRIC: mood good          Past Medical and Surgical History:     Past Medical History:   Diagnosis Date     ADHD (attention deficit hyperactivity disorder)      Anxiety      Aspergillosis, with pneumonia (H)     fugus found caused chest pain     Chronic infection     CF, MRSA.,      Chronic sinusitis      Constipation, chronic      Cystic fibrosis with pulmonary manifestations (H) 12/19/2011     Cystic fibrosis without mention of meconium ileus     SWEAT TEST:Date: 2/17/1994   [facility]  296 mg, 104 mmol/L ClSample #2  295 mg, 104 mmol/L Cl GENOTYPING:Date: 10/15/2007,  Laboratory: [facility]: [id number]     Depressive disorder      Diabetes     no meds currently     Dysthymic disorder      Exocrine pancreatic insufficiency      Gastro-oesophageal reflux disease      Hip pain, right      MRSA (methicillin resistant Staphylococcus aureus) carrier      Pancreatic disease      Past Surgical History:   Procedure Laterality Date     ARTHROSCOPY HIP, OSTEOPLASTY FEMUR PROXIMAL, COMBINED  3/11/2013    Procedure: COMBINED ARTHROSCOPY HIP, OSTEOPLASTY FEMUR PROXIMAL;  Right Hip Arthroscopy, Labral  Debridement.    surgeon request choice anesthesia/admit to [facility] after surgery;  Surgeon: Omkar Austin MD;  [facility]: [facility]     ARTHROSCOPY KNEE WITH MEDIAL MENISCECTOMY Left 1/31/2017    Procedure: ARTHROSCOPY KNEE WITH MEDIAL MENISCECTOMY;  Surgeon: Jethro Coyle MD;  [facility]: [facility]     bronchoscopies       BRONCHOSCOPY       EXAM UNDER ANESTHESIA ANUS N/A 5/10/2016    Procedure: EXAM UNDER ANESTHESIA ANUS;  Surgeon: Chet Gaviria MD;  [facility]     EXAM UNDER ANESTHESIA, RESTORATIONS, EXTRACTION(S) DENTAL COMPLEX, COMBINED  5/13/2013    Procedure: COMBINED EXAM UNDER ANESTHESIA,  RESTORATIONS, EXTRACTION(S) DENTAL COMPLEX;  Dental Exam, Radiographs, Restorations. Single Extraction  Tooth #2. Restorations x 3;  Surgeon: Danilo Ortiz DDS;  [facility]     HC KNEE SCOPE, DIAGNOSTIC      Arthroscopy, Knee- left     INJECT BOTOX N/A 5/10/2016    Procedure: INJECT BOTOX;  Surgeon: Chet Gaviria MD;  [facility]     left hip labral tear  5/11/2011    left hip arthroscopy with labral debridement and synovectomy     meniscus repair       OPTICAL TRACKING SYSTEM ENDOSCOPIC SINUS SURGERY  10/14/2011    Procedure:OPTICAL TRACKING SYSTEM ENDOSCOPIC SINUS SURGERY; FESS (functional endoscopic sinus surgery) with Image Guidance, bronchial lavage and cultures; Surgeon:GOYO KUO; Location:[facility]     OPTICAL TRACKING SYSTEM ENDOSCOPIC SINUS SURGERY  5/18/2012    Procedure:OPTICAL TRACKING SYSTEM ENDOSCOPIC SINUS SURGERY; Right  and Left Image Guided Functional Endoscopic Sinus Surgery With  Frontal Approach, [employer]; Surgeon:GOYO KUO; [facility]     OPTICAL TRACKING SYSTEM ENDOSCOPIC SINUS SURGERY  9/26/2012    Procedure: OPTICAL TRACKING SYSTEM ENDOSCOPIC SINUS SURGERY;  Stealth Guided Bilateral Functional Endoscopic Sinus Surgery *Latex Safe*;  Surgeon: Goyo Kuo MD;  [facility]     OPTICAL TRACKING SYSTEM ENDOSCOPIC SINUS SURGERY Bilateral 10/16/2015    Procedure: OPTICAL TRACKING SYSTEM ENDOSCOPIC SINUS SURGERY;  Surgeon: Mariela Haile MD;  [facility]     ORTHOPEDIC SURGERY      left hip tear repair 2010     SINUS SURGERY             Family History:     Family History   Problem Relation Age of Onset     Cancer Paternal Grandmother      Skin Cancer Paternal Grandmother      Other Cancer Paternal Grandmother         Skin     Obesity Paternal Grandmother      Cancer Paternal Grandfather         PGF had throat cancer (he was a smoker)     Other Cancer Paternal Grandfather      Anxiety Disorder Paternal Grandfather      Thyroid Disease Mother         ,      Hypertension Mother      Obesity Other      Anesthesia Reaction No family hx of      Blood Disease No family hx of      Colon Polyps No family hx of      Crohn's Disease No family hx of      Ulcerative Colitis No family hx of      Colon Cancer No family hx of      Melanoma No family hx of             Social History:     Social History     Socioeconomic History     Marital status: Single     Spouse name: Not on file     Number of children: Not on file     Years of education: Not on file     Highest education level: Not on file   Occupational History     Not on file   Social Needs     Financial resource strain: Not on file     Food insecurity     Worry: Not on file     Inability: Not on file     Transportation needs     Medical: Not on file     Non-medical: Not on file   Tobacco Use     Smoking status: Never Smoker     Smokeless tobacco: Never Used     Tobacco comment: one person at home smokes outside   Substance and Sexual Activity     Alcohol use: No     Alcohol/week: 0.0 standard drinks     Drug use: No     Sexual activity: Never   Lifestyle     Physical activity     Days per week: Not on file     Minutes per session: Not on file     Stress: Not on file   Relationships     Social connections     Talks on phone: Not on file     Gets together: Not on file     Attends Congregation service: Not on file     Active member of club or organization: Not on file     Attends meetings of clubs or organizations: Not on file     Relationship status: Not on file     Intimate partner violence     Fear of current or ex partner: Not on file     Emotionally abused: Not on file     Physically abused: Not on file     Forced sexual activity: Not on file   Other Topics Concern      Service Not Asked     Blood Transfusions No     Caffeine Concern Not Asked     Occupational Exposure Not Asked     Hobby Hazards Not Asked     Sleep Concern Not Asked     Stress Concern Not Asked     Weight Concern Not Asked     Special Diet Not Asked      Back Care Not Asked     Exercise Yes     Bike Helmet Not Asked     Seat Belt Not Asked     Self-Exams Not Asked     Parent/sibling w/ CABG, MI or angioplasty before 65F 55M? Yes   Social History Narrative    Mamie lives with mother in [place], [place].  There is a cat in the home, but Mamie does not have any litterbox duties.  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  She gets essentially no exercise because of the tingling in her feet (says it bothers her even to stand).        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        8/2015 no change in social situation        2/15/2016 Pt is single and lives with mother and stepfather.            Medications:     Current Outpatient Medications   Medication     acetaminophen (TYLENOL) 325 MG tablet     acetylcysteine (MUCOMYST) 20 % neb solution     albuterol (PROAIR HFA/PROVENTIL HFA/VENTOLIN HFA) 108 (90 Base) MCG/ACT Inhaler     albuterol (PROVENTIL) (2.5 MG/3ML) 0.083% neb solution     amphetamine-dextroamphetamine (ADDERALL XR) 20 MG 24 hr capsule     amphetamine-dextroamphetamine (ADDERALL XR) 20 MG 24 hr capsule     azithromycin (ZITHROMAX) 500 MG tablet     beta carotene 70104 UNIT capsule     blood glucose (ACCU-CHEK SMARTVIEW) test strip     buPROPion (WELLBUTRIN XL) 300 MG 24 hr tablet     busPIRone (BUSPAR) 15 MG tablet     cetirizine (ZYRTEC) 10 MG tablet     elexacaftor-tezacaftor-ivacaftor & ivacaftor (TRIKAFTA) 100-50-75 & 150 MG tablet pack     FLUoxetine (PROZAC) 40 MG capsule     fluticasone (FLONASE) 50 MCG/ACT nasal spray     GLYCOPYRROLATE PO     hydrOXYzine (ATARAX) 25 MG tablet     medroxyPROGESTERone (DEPO-PROVERA) 150 MG/ML IM injection     MedroxyPROGESTERone Acetate (DEPO-PROVERA IM)     meropenem (MERREM) 500 MG vial     montelukast (SINGULAIR) 10 MG tablet     Multiple Vitamin (MULTIVITAMIN OR)     naltrexone (DEPADE/REVIA) 50 MG tablet     omeprazole  (PRILOSEC) 40 MG DR capsule     phytonadione (MEPHYTON) 5 MG tablet     polyethylene glycol (MIRALAX) powder     topiramate (TOPAMAX) 100 MG tablet     traZODone (DESYREL) 100 MG tablet     vitamin C (ASCORBIC ACID) 500 MG tablet     VITAMIN D3 25 MCG (1000 UT) tablet     VITAMIN E 400 units capsule     No current facility-administered medications for this visit.             Physical Exam:   There were no vitals taken for this visit.    Not performed         Data:   All laboratory and imaging data reviewed.    Cystic Fibrosis Culture  Specimen Description   Date Value Ref Range Status   11/12/2019 Midstream Urine  Final   11/12/2019 Throat  Final   06/05/2019 Throat  Final    Culture Micro   Date Value Ref Range Status   11/12/2019   Final    <10,000 colonies/mL  urogenital roberto carlos  Susceptibility testing not routinely done     11/12/2019 Light growth  Normal roberto carlos    Final   11/12/2019 Moderate growth  Staphylococcus aureus   (A)  Final        No results found for this or any previous visit (from the past 168 hour(s)).    PFT: Not performed.    Pulmonary exacerbation: absent    Video-Visit Details    Type of service:  Video Visit    Video Start Time: 10:35  Video End Time:  10:53    Originating Location (pt. Location): Work    Distant Location (provider location):  [facility]     Platform used for Video Visit: Juan      Again, thank you for allowing me to participate in the care of your patient.        Sincerely,        Gavi Allison MD

## 2020-09-23 NOTE — PROGRESS NOTES
"Mamie Rice is a 25 year old female who is being evaluated via a billable video visit.      The patient has been notified of following:     \"This video visit will be conducted via a call between you and your physician/provider. We have found that certain health care needs can be provided without the need for an in-person physical exam.  This service lets us provide the care you need with a video conversation.  If a prescription is necessary we can send it directly to your pharmacy.  If lab work is needed we can place an order for that and you can then stop by our lab to have the test done at a later time.    Video visits are billed at different rates depending on your insurance coverage.  Please reach out to your insurance provider with any questions.    If during the course of the call the physician/provider feels a video visit is not appropriate, you will not be charged for this service.\"    Patient has given verbal consent for Video visit? Yes  How would you like to obtain your AVS? MyChart  If you are dropped from the video visit, the video invite should be resent to: Other e-mail: Nellt  Will anyone else be joining your video visit? No      [facility]  September 23, 2020         Assessment and Plan:   Mamie Rice is a 25 year old female with cystic fibrosis.    1. CF lung disease with h/o normal spirometry: Mamie reports from pulmonary point of view that she is doing quite well.  She has little in the way of symptomatology.  She has historically grown out staph in her sputum.  At this time I have recommended:  --That she continue her bronchial drainage and nebulized therapy twice daily  --That she continue to remain active with work and dog walking    2. Pancreatic sufficiency:  The patient has no new symptoms consistent with worsening malabsorption.    - Mamie does not require pancreatic enzymes  - continue vitamin supplementation.    3.  " CFTR modulator therapy: Mamie remains on trikafta is tolerating well.  She has been consistent with getting her hepatic panel which has been normal.  I would recommend that she continue on full dose modulator therapy.    4.  CF sinus disease: Mamie had needed routine maintenance of her sinuses.  She does report since starting trikafta her symptoms under much better control.  She has not pursued her meropenem infusions in several months.  We will continue to follow the symptoms.    5.  Psychosocial: Mamie continues to live at home with her mother and her sister.  Her sister's children are also in the home.  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  She has not heard about what is going to happen with the dance season yet.    Annual studies due: 11/2020  Immunizations: UTD  Colonoscopy: NA    Gavi Allison MD MPH  Associate Professor of Medicine  Pulmonary, Allergy, Critical Care and Sleep Medicine      Interval History:     Mamie reports little in the way of cough or sputum production.  She denies any shortness of breath.  She does continue to perform 2 vest therapies per day.         Review of Systems:     CONSTITUTIONAL: no fever, no chills, no sweats, no change in weight, energy good most day, good appetite    INTEGUMENTARY/SKIN: no rash, no obvious new lesions    ENT/MOUTH: no sore throat, no new sinus pain, no new nasal drainage, no ear ringing     RESPIRATORY: see interval history    CV: no chest pain, no palpitations, no peripheral edema, no orthopnea, no PND    GI: no nausea, no vomiting, no change in stools, no fatty stools, no GERD, no abdominal pain    : no dysuria, no urinary frequency, no menses with depot    MUSCULOSKELETAL: occasional stiff knee    ENDOCRINE: no excessive thirst, having lows    NEURO:  No headache, no numbness, no tingling    SLEEP: no issues    PSYCHIATRIC: mood good          Past Medical and Surgical History:     Past Medical History:   Diagnosis Date      ADHD (attention deficit hyperactivity disorder)      Anxiety      Aspergillosis, with pneumonia (H)     fugus found caused chest pain     Chronic infection     CF, MRSA.,      Chronic sinusitis      Constipation, chronic      Cystic fibrosis with pulmonary manifestations (H) 12/19/2011     Cystic fibrosis without mention of meconium ileus     SWEAT TEST:Date: 2/17/1994   [facility]  296 mg, 104 mmol/L ClSample #2  295 mg, 104 mmol/L Cl GENOTYPING:Date: 10/15/2007,  Laboratory: [facility]: [id number]     Depressive disorder      Diabetes     no meds currently     Dysthymic disorder      Exocrine pancreatic insufficiency      Gastro-oesophageal reflux disease      Hip pain, right      MRSA (methicillin resistant Staphylococcus aureus) carrier      Pancreatic disease      Past Surgical History:   Procedure Laterality Date     ARTHROSCOPY HIP, OSTEOPLASTY FEMUR PROXIMAL, COMBINED  3/11/2013    Procedure: COMBINED ARTHROSCOPY HIP, OSTEOPLASTY FEMUR PROXIMAL;  Right Hip Arthroscopy, Labral  Debridement.    surgeon request choice anesthesia/admit to [facility] after surgery;  Surgeon: Omkar Austin MD;  Location: [facility]     ARTHROSCOPY KNEE WITH MEDIAL MENISCECTOMY Left 1/31/2017    Procedure: ARTHROSCOPY KNEE WITH MEDIAL MENISCECTOMY;  Surgeon: Jethro Coyle MD;  Location: [facility]     bronchoscopies       BRONCHOSCOPY       EXAM UNDER ANESTHESIA ANUS N/A 5/10/2016    Procedure: EXAM UNDER ANESTHESIA ANUS;  Surgeon: Chet Gaviria MD;  [facility]     EXAM UNDER ANESTHESIA, RESTORATIONS, EXTRACTION(S) DENTAL COMPLEX, COMBINED  5/13/2013    Procedure: COMBINED EXAM UNDER ANESTHESIA, RESTORATIONS, EXTRACTION(S) DENTAL COMPLEX;  Dental Exam, Radiographs, Restorations. Single Extraction  Tooth #2. Restorations x 3;  Surgeon: Danilo Ortiz DDS;  Location: [facility] OR     HC KNEE SCOPE, DIAGNOSTIC      Arthroscopy, Knee- left     INJECT BOTOX N/A 5/10/2016    Procedure:  INJECT BOTOX;  Surgeon: Chet aGviria MD;  [facility]     left hip labral tear  5/11/2011    left hip arthroscopy with labral debridement and synovectomy     meniscus repair       OPTICAL TRACKING SYSTEM ENDOSCOPIC SINUS SURGERY  10/14/2011    Procedure:OPTICAL TRACKING SYSTEM ENDOSCOPIC SINUS SURGERY; FESS (functional endoscopic sinus surgery) with Image Guidance, bronchial lavage and cultures; Surgeon:GOYO KUO; Location:[facility] OR     OPTICAL TRACKING SYSTEM ENDOSCOPIC SINUS SURGERY  5/18/2012    Procedure:OPTICAL TRACKING SYSTEM ENDOSCOPIC SINUS SURGERY; Right  and Left Image Guided Functional Endoscopic Sinus Surgery With  Frontal Approach, [employer]; Surgeon:GOYO KUO; Location[facility]     OPTICAL TRACKING SYSTEM ENDOSCOPIC SINUS SURGERY  9/26/2012    Procedure: OPTICAL TRACKING SYSTEM ENDOSCOPIC SINUS SURGERY;  Stealth Guided Bilateral Functional Endoscopic Sinus Surgery *Latex Safe*;  Surgeon: Goyo Kuo MD;  [facility]     OPTICAL TRACKING SYSTEM ENDOSCOPIC SINUS SURGERY Bilateral 10/16/2015    Procedure: OPTICAL TRACKING SYSTEM ENDOSCOPIC SINUS SURGERY;  Surgeon: Mariela Haile MD;  [facility]     ORTHOPEDIC SURGERY      left hip tear repair 2010     SINUS SURGERY             Family History:     Family History   Problem Relation Age of Onset     Cancer Paternal Grandmother      Skin Cancer Paternal Grandmother      Other Cancer Paternal Grandmother         Skin     Obesity Paternal Grandmother      Cancer Paternal Grandfather         PGF had throat cancer (he was a smoker)     Other Cancer Paternal Grandfather      Anxiety Disorder Paternal Grandfather      Thyroid Disease Mother         ,     Hypertension Mother      Obesity Other      Anesthesia Reaction No family hx of      Blood Disease No family hx of      Colon Polyps No family hx of      Crohn's Disease No family hx of      Ulcerative Colitis No family hx of      Colon Cancer No family hx of      Melanoma No family hx  of             Social History:     Social History     Socioeconomic History     Marital status: Single     Spouse name: Not on file     Number of children: Not on file     Years of education: Not on file     Highest education level: Not on file   Occupational History     Not on file   Social Needs     Financial resource strain: Not on file     Food insecurity     Worry: Not on file     Inability: Not on file     Transportation needs     Medical: Not on file     Non-medical: Not on file   Tobacco Use     Smoking status: Never Smoker     Smokeless tobacco: Never Used     Tobacco comment: one person at home smokes outside   Substance and Sexual Activity     Alcohol use: No     Alcohol/week: 0.0 standard drinks     Drug use: No     Sexual activity: Never   Lifestyle     Physical activity     Days per week: Not on file     Minutes per session: Not on file     Stress: Not on file   Relationships     Social connections     Talks on phone: Not on file     Gets together: Not on file     Attends Restorationism service: Not on file     Active member of club or organization: Not on file     Attends meetings of clubs or organizations: Not on file     Relationship status: Not on file     Intimate partner violence     Fear of current or ex partner: Not on file     Emotionally abused: Not on file     Physically abused: Not on file     Forced sexual activity: Not on file   Other Topics Concern      Service Not Asked     Blood Transfusions No     Caffeine Concern Not Asked     Occupational Exposure Not Asked     Hobby Hazards Not Asked     Sleep Concern Not Asked     Stress Concern Not Asked     Weight Concern Not Asked     Special Diet Not Asked     Back Care Not Asked     Exercise Yes     Bike Helmet Not Asked     Seat Belt Not Asked     Self-Exams Not Asked     Parent/sibling w/ CABG, MI or angioplasty before 65F 55M? Yes   Social History Narrative    Mamie lives with mother in [place], [place].  There is a cat in the home, but  Mamie does not have any litterbox duties.  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  She gets essentially no exercise because of the tingling in her feet (says it bothers her even to stand).        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        8/2015 no change in social situation        2/15/2016 Pt is single and lives with mother and stepfather.            Medications:     Current Outpatient Medications   Medication     acetaminophen (TYLENOL) 325 MG tablet     acetylcysteine (MUCOMYST) 20 % neb solution     albuterol (PROAIR HFA/PROVENTIL HFA/VENTOLIN HFA) 108 (90 Base) MCG/ACT Inhaler     albuterol (PROVENTIL) (2.5 MG/3ML) 0.083% neb solution     amphetamine-dextroamphetamine (ADDERALL XR) 20 MG 24 hr capsule     amphetamine-dextroamphetamine (ADDERALL XR) 20 MG 24 hr capsule     azithromycin (ZITHROMAX) 500 MG tablet     beta carotene 97212 UNIT capsule     blood glucose (ACCU-CHEK SMARTVIEW) test strip     buPROPion (WELLBUTRIN XL) 300 MG 24 hr tablet     busPIRone (BUSPAR) 15 MG tablet     cetirizine (ZYRTEC) 10 MG tablet     elexacaftor-tezacaftor-ivacaftor & ivacaftor (TRIKAFTA) 100-50-75 & 150 MG tablet pack     FLUoxetine (PROZAC) 40 MG capsule     fluticasone (FLONASE) 50 MCG/ACT nasal spray     GLYCOPYRROLATE PO     hydrOXYzine (ATARAX) 25 MG tablet     medroxyPROGESTERone (DEPO-PROVERA) 150 MG/ML IM injection     MedroxyPROGESTERone Acetate (DEPO-PROVERA IM)     meropenem (MERREM) 500 MG vial     montelukast (SINGULAIR) 10 MG tablet     Multiple Vitamin (MULTIVITAMIN OR)     naltrexone (DEPADE/REVIA) 50 MG tablet     omeprazole (PRILOSEC) 40 MG DR capsule     phytonadione (MEPHYTON) 5 MG tablet     polyethylene glycol (MIRALAX) powder     topiramate (TOPAMAX) 100 MG tablet     traZODone (DESYREL) 100 MG tablet     vitamin C (ASCORBIC ACID) 500 MG tablet     VITAMIN D3 25 MCG (1000 UT) tablet     VITAMIN E 400 units  capsule     No current facility-administered medications for this visit.             Physical Exam:   There were no vitals taken for this visit.    Not performed         Data:   All laboratory and imaging data reviewed.    Cystic Fibrosis Culture  Specimen Description   Date Value Ref Range Status   11/12/2019 Midstream Urine  Final   11/12/2019 Throat  Final   06/05/2019 Throat  Final    Culture Micro   Date Value Ref Range Status   11/12/2019   Final    <10,000 colonies/mL  urogenital roberto carlos  Susceptibility testing not routinely done     11/12/2019 Light growth  Normal roberto carlos    Final   11/12/2019 Moderate growth  Staphylococcus aureus   (A)  Final        No results found for this or any previous visit (from the past 168 hour(s)).    PFT: Not performed.    Pulmonary exacerbation: absent    Video-Visit Details    Type of service:  Video Visit    Video Start Time: 10:35  Video End Time:  10:53    Originating Location (pt. Location): Work    Distant Location (provider location):  [facility]     Platform used for Video Visit: Codoon

## 2020-09-28 NOTE — PROGRESS NOTES
"dm 2  [facility]    Patients Glucose Data was Obtained via Phone and Located in Table Below      Week of__/__/__ Date  _9_/_14_  Date  _9_/_22_ Date  __/__ Date  __/__ Date  __/__ Date  __/__ Date  __/__    Time BG Time BG Time BG Time BG Time BG Time BG Time BG    8:30am 92 8:40am 65                                                                  Week of__/__/__ Date  __/__  Date  __/__ Date  __/__ Date  __/__ Date  __/__ Date  __/__ Date  __/__    Time BG Time BG Time BG Time BG Time BG Time BG Time BG                                                                         Mamie Rice is a 25 year old female who is being evaluated via a billable video visit.      The patient has been notified of following:     \"This video visit will be conducted via a call between you and your physician/provider. We have found that certain health care needs can be provided without the need for an in-person physical exam.  This service lets us provide the care you need with a video conversation.  If a prescription is necessary we can send it directly to your pharmacy.  If lab work is needed we can place an order for that and you can then stop by our lab to have the test done at a later time.    Video visits are billed at different rates depending on your insurance coverage.  Please reach out to your insurance provider with any questions.    If during the course of the call the physician/provider feels a video visit is not appropriate, you will not be charged for this service.\"    Patient has given verbal consent for Video visit? Yes  How would you like to obtain your AVS? MyChart  If you are dropped from the video visit, the video invite should be resent to: [phone]  Will anyone else be joining your video visit? No         [facility] Endocrinology Return Consultation:  Diabetes  :   Patient: Mamie Rice MRN# [id number]   YOB: 1995 Age: 25 year old   Date of Visit: 09/29/2020  Dear Dr. Allison:    I had " the pleasure of seeing your patient, Mamie Rice in the [facility], [facility], for a return consultation regarding CFRD and reactive hypoglycemia.           Problem list:     Patient Active Problem List    Diagnosis Date Noted     Knee pain 10/16/2019     Priority: Medium     Added automatically from request for surgery [id number]       Mild episode of recurrent major depressive disorder (H) 05/15/2018     Priority: Medium     Insomnia, unspecified type 05/15/2018     Priority: Medium     MECHE (generalized anxiety disorder) 05/15/2018     Priority: Medium     Attention deficit hyperactivity disorder (ADHD), combined type 05/15/2018     Priority: Medium     Diabetes mellitus, type 2 (H) 12/07/2016     Priority: Medium     Overview:   Diabetes Mellitus Type 2  Per External Records       Acne vulgaris 10/19/2016     Priority: Medium     Non morbid obesity due to excess calories 08/24/2016     Priority: Medium     Persistent depressive disorder 02/29/2016     Priority: Medium     Overview:   Disorder Depressive Persistent (Formerly Dysthymia) NOS       Pancreatic insufficiency 11/13/2014     Priority: Medium     Fecal elastase < 50       Back pain 10/22/2014     Priority: Medium     Overactive child 06/13/2013     Priority: Medium     Overview:   ADHD  6/13/13  Well-managed with Adderall.  HENRIETTA MAGALLANESIELD         Frontal sinusitis 05/10/2012     Priority: Medium     Chronic constipation 03/04/2012     Priority: Medium     Cystic fibrosis of the lung (H) 12/19/2011     Priority: Medium     SWEAT TEST:  Date: 2/17/1994          Laboratory: [facility]  Sample #1  296 mg           104 mmol/L Cl  Sample #2  295 mg           104 mmol/L Cl     GENOTYPING:  Date: 10/15/2007               Laboratory: [facility]  Genotype: [id number]  Poly T Variant:  [  ]/[  ]         Type 1 diabetes mellitus (H) 11/09/2011     Priority: Medium     Problem list name updated by automated process. Provider to review        Hypoglycemia 10/28/2011     Priority: Medium     Reactive hypoglycemia  updating diagnosis code for icd10 cutover       Chronic maxillary sinusitis 09/08/2011     Priority: Medium     Aspergillosis (H) 07/21/2011     Priority: Medium     Hip joint pain 04/11/2011     Priority: Medium     Cystic fibrosis (H) 02/27/2011     Priority: Medium            HPI:   Mamie is a 25 year old female with Cystic Fibrosis Related Diabetes Mellitus (CFRD).    History was obtained from the patient and the medical record.  I have reviewed the notes of the pulmonary care team entered into the medical record since I last saw the patient.    25 year old female with history of cystic fibrosis related diabetes and reactive hypoglycemia.    She follows with Dr. Tolentino for weight management. She is on Trikefta.  She feels her weight is stable.  Reports that anti-obesity medications help with reducing craving.  She has been off insulin for several years due to concern about hypoglycemia.  She  has history of reactive hypoglycemia. Her last OGTT showed glucose readings in the diabetes range.    Patient continues to complain about symptoms of reactive hypoglycemia.  She finds them bothersome and would like to consider interventions to improve the symptoms.  She wore a bry CGM in June- July 2020.  I reviewed the CGM data with her today.  Overall average blood sugar was 101, 82% of the readings were in range, 2% high, 11% low and 5% very low.  She reports getting symptoms of low blood sugar at least once daily and around 2 episodes of overnight hypoglycemia symptoms.  On review of CGM data the low readings appear to be reactive as they usually follow up peak glucose.  Overnight low readings were also mostly after a peak blood glucose.  During the day she manages by eating a snack when she develops symptoms.  She does not preemptively eat snacks to prevent it.  She reports symptoms of feeling hot, sweaty.  She is never had a severe  hypoglycemia episode.  She is able to self manage these episodes.    A1c:  No recent A1C, last A1c was 5.4%    Current insulin regimen: None          Past Medical History:     Past Medical History:   Diagnosis Date     ADHD (attention deficit hyperactivity disorder)      Anxiety      Aspergillosis, with pneumonia (H)     fugus found caused chest pain     Chronic infection     CF, MRSA.,      Chronic sinusitis      Constipation, chronic      Cystic fibrosis with pulmonary manifestations (H) 12/19/2011     Cystic fibrosis without mention of meconium ileus     SWEAT TEST:Date: 2/17/1994   [facility]  296 mg, 104 mmol/L ClSample #2  295 mg, 104 mmol/L Cl GENOTYPING:Date: 10/15/2007,  Laboratory: [facility]: [id number]     Depressive disorder      Diabetes     no meds currently     Dysthymic disorder      Exocrine pancreatic insufficiency      Gastro-oesophageal reflux disease      Hip pain, right      MRSA (methicillin resistant Staphylococcus aureus) carrier      Pancreatic disease             Past Surgical History:     Past Surgical History:   Procedure Laterality Date     ARTHROSCOPY HIP, OSTEOPLASTY FEMUR PROXIMAL, COMBINED  3/11/2013    Procedure: COMBINED ARTHROSCOPY HIP, OSTEOPLASTY FEMUR PROXIMAL;  Right Hip Arthroscopy, Labral  Debridement.    surgeon request choice anesthesia/admit to [facility] after surgery;  Surgeon: Omkar Austin MD;  [facility]     ARTHROSCOPY KNEE WITH MEDIAL MENISCECTOMY Left 1/31/2017    Procedure: ARTHROSCOPY KNEE WITH MEDIAL MENISCECTOMY;  Surgeon: Jethro Coyle MD;  [facility]     bronchoscopies       BRONCHOSCOPY       EXAM UNDER ANESTHESIA ANUS N/A 5/10/2016    Procedure: EXAM UNDER ANESTHESIA ANUS;  Surgeon: Chet Gaviria MD;  [facility]     EXAM UNDER ANESTHESIA, RESTORATIONS, EXTRACTION(S) DENTAL COMPLEX, COMBINED  5/13/2013    Procedure: COMBINED EXAM UNDER ANESTHESIA, RESTORATIONS, EXTRACTION(S) DENTAL  COMPLEX;  Dental Exam, Radiographs, Restorations. Single Extraction  Tooth #2. Restorations x 3;  Surgeon: Danilo Ortiz DDS;  [facility]: [facility] OR     HC KNEE SCOPE, DIAGNOSTIC      Arthroscopy, Knee- left     INJECT BOTOX N/A 5/10/2016    Procedure: INJECT BOTOX;  Surgeon: Chet Gaviria MD;  [facility]     left hip labral tear  5/11/2011    left hip arthroscopy with labral debridement and synovectomy     meniscus repair       OPTICAL TRACKING SYSTEM ENDOSCOPIC SINUS SURGERY  10/14/2011    Procedure:OPTICAL TRACKING SYSTEM ENDOSCOPIC SINUS SURGERY; FESS (functional endoscopic sinus surgery) with Image Guidance, bronchial lavage and cultures; Surgeon:GOYO KUO; [facility]     OPTICAL TRACKING SYSTEM ENDOSCOPIC SINUS SURGERY  5/18/2012    Procedure:OPTICAL TRACKING SYSTEM ENDOSCOPIC SINUS SURGERY; Right  and Left Image Guided Functional Endoscopic Sinus Surgery With  Frontal Approach, [place]; Surgeon:GOYO KUO; [facility]     OPTICAL TRACKING SYSTEM ENDOSCOPIC SINUS SURGERY  9/26/2012    Procedure: OPTICAL TRACKING SYSTEM ENDOSCOPIC SINUS SURGERY;  Stealth Guided Bilateral Functional Endoscopic Sinus Surgery *Latex Safe*;  Surgeon: Goyo Kuo MD;  [facility]     OPTICAL TRACKING SYSTEM ENDOSCOPIC SINUS SURGERY Bilateral 10/16/2015    Procedure: OPTICAL TRACKING SYSTEM ENDOSCOPIC SINUS SURGERY;  Surgeon: Mariela Haile MD;  [facility]     ORTHOPEDIC SURGERY      left hip tear repair 2010     SINUS SURGERY                 Social History:     Social History     Social History Narrative    Mamie lives with mother in [place], [place].  There is a cat in the home, but Mamie does not have any litterbox duties.  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  She gets essentially no exercise because of the tingling in her feet (says it bothers her even to stand).        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        8/2015 no change in social situation        2/15/2016 Pt is single and lives with mother and stepfather.              Family History:     Family History   Problem Relation Age of Onset     Cancer Paternal Grandmother      Skin Cancer Paternal Grandmother      Other Cancer Paternal Grandmother         Skin     Obesity Paternal Grandmother      Cancer Paternal Grandfather         PGF had throat cancer (he was a smoker)     Other Cancer Paternal Grandfather      Anxiety Disorder Paternal Grandfather      Thyroid Disease Mother         ,     Hypertension Mother      Obesity Other      Anesthesia Reaction No family hx of      Blood Disease No family hx of      Colon Polyps No family hx of      Crohn's Disease No family hx of      Ulcerative Colitis No family hx of      Colon Cancer No family hx of      Melanoma No family hx of             Allergies:     Allergies   Allergen Reactions     Vancomycin Hives     Redmens skin rash   Redmens skin rash      Augmentin Rash             Medications:     Current Outpatient Rx   Medication Sig Dispense Refill     acetaminophen (TYLENOL) 325 MG tablet Take 2 tablets (650 mg) by mouth every 4 hours as needed for other (mild pain) 100 tablet 0     acetylcysteine (MUCOMYST) 20 % neb solution INHALE ONE 4ML NEB INTO LUNGS VIA NEBULIZER TWO TIMES A DAY, MAY INCREASE TO 3-4 TIMES A DAY WITH INCREASE COUGH/COLD SYMPTOMS 360 mL 11     albuterol (PROAIR HFA/PROVENTIL HFA/VENTOLIN HFA) 108 (90 Base) MCG/ACT Inhaler Inhale 2 puffs into the lungs every 6 hours as needed for shortness of breath / dyspnea or wheezing 1 Inhaler 12     albuterol (PROVENTIL) (2.5 MG/3ML) 0.083% neb solution Take 1 vial (2.5 mg) by nebulization every 4 hours as needed for shortness of breath / dyspnea or wheezing 360 mL 11     amphetamine-dextroamphetamine (ADDERALL XR) 20 MG 24 hr capsule Take 1 capsule (20 mg) by mouth daily 30 capsule 0     amphetamine-dextroamphetamine (ADDERALL XR) 20 MG 24 hr  capsule Take 1 capsule (20 mg) by mouth daily 30 capsule 0     azithromycin (ZITHROMAX) 500 MG tablet TAKE ONE TABLET BY MOUTH ON MONDAYS, WEDNESDAYS, AND FRIDAYS AS DIRECTED 36 tablet 4     beta carotene 86221 UNIT capsule TAKE ONE CAPSULE BY MOUTH IN THE MORNING ON MONDAY, WEDNESDAY, AND FRIDAY 36 capsule 4     blood glucose (ACCU-CHEK SMARTVIEW) test strip Use to test blood sugar 4 times daily or as directed. 400 each 3     buPROPion (WELLBUTRIN XL) 300 MG 24 hr tablet Take 1 tablet (300 mg) by mouth every morning Please alert patient to change to one tablet daily 90 tablet 3     busPIRone (BUSPAR) 15 MG tablet Take 1 tablet (15 mg) by mouth 2 times daily 120 tablet 0     cetirizine (ZYRTEC) 10 MG tablet Take 1 tablet (10 mg) by mouth every evening 30 tablet 3     elexacaftor-tezacaftor-ivacaftor & ivacaftor (TRIKAFTA) 100-50-75 & 150 MG tablet pack TAKE TWO ORANGE TABLETS BY MOUTH IN THE MORNING AND ONE BLUE TABLET IN THE EVENING AS DIRECTED ON PACKAGE.  TAKE WITH FAT CONTAINING FOOD. 84 tablet 2     FLUoxetine (PROZAC) 40 MG capsule Take 2 capsules (80 mg) by mouth daily 180 capsule 0     fluticasone (FLONASE) 50 MCG/ACT nasal spray Spray 2 sprays into both nostrils 2 times daily (Patient taking differently: Spray 2 sprays into both nostrils as needed ) 15.8 mL 3     hydrOXYzine (ATARAX) 25 MG tablet Take 1 tablet (25 mg) by mouth nightly as needed (sleep) 90 tablet 0     medroxyPROGESTERone (DEPO-PROVERA) 150 MG/ML IM injection Inject 150 mg into the muscle       MedroxyPROGESTERone Acetate (DEPO-PROVERA IM)        meropenem (MERREM) 500 MG vial 500 mg by Nasal Instillation route once for 1 dose 60 each      montelukast (SINGULAIR) 10 MG tablet TAKE ONE TABLET BY MOUTH AT BEDTIME 30 tablet 11     Multiple Vitamin (MULTIVITAMIN OR) Take 1 tablet by mouth daily.       naltrexone (DEPADE/REVIA) 50 MG tablet Take 1 tablet.  Time it one to two hours prior to worst cravings. 90 tablet 1     omeprazole (PRILOSEC) 40  MG DR capsule Take 1 capsule (40 mg) by mouth 2 times daily 60 capsule 11     phytonadione (MEPHYTON) 5 MG tablet TAKE ONE TABLET BY MOUTH ONCE A WEEK 4 tablet 11     polyethylene glycol (MIRALAX) powder Take 17 g (1 capful) by mouth daily as needed for constipation 119 g 3     topiramate (TOPAMAX) 100 MG tablet Take 1 tablet (100 mg) by mouth daily 90 tablet 1     traZODone (DESYREL) 100 MG tablet Take 1 tablet (100 mg) by mouth At Bedtime 90 tablet 0     vitamin C (ASCORBIC ACID) 500 MG tablet TAKE ONE TABLET BY MOUTH TWICE A  tablet 3     VITAMIN D3 25 MCG (1000 UT) tablet TAKE ONE TABLET BY MOUTH EVERY  tablet 3     VITAMIN E 400 units capsule TAKE ONE CAPSULE BY MOUTH TWICE A  capsule 11             Review of Systems:     Comprehensive ROS negative other than the symptoms noted above in the HPI.          Physical Exam:   General: Alert and oriented        Laboratory results:     TSH   Date Value Ref Range Status   08/14/2019 1.08 0.40 - 4.00 mU/L Final     Testosterone Total   Date Value Ref Range Status   08/02/2017 10 8 - 60 ng/dL Final     Comment:     This test was developed and its performance characteristics determined by the   [facility],  Special Chemistry Laboratory. It has   not been cleared or approved by the FDA. The laboratory is regulated under   CLIA   as qualified to perform high-complexity testing. This test is used for   clinical   purposes. It should not be regarded as investigational or for research.       Cholesterol   Date Value Ref Range Status   08/14/2019 92 <200 mg/dL Final     Albumin Urine mg/L   Date Value Ref Range Status   08/14/2019 21 mg/L Final     Triglycerides   Date Value Ref Range Status   08/14/2019 75 <150 mg/dL Final     HDL Cholesterol   Date Value Ref Range Status   08/14/2019 35 (L) >49 mg/dL Final     LDL Cholesterol Calculated   Date Value Ref Range Status   08/14/2019 42 <100 mg/dL Final     Comment:     Desirable:        <100 mg/dl     Cholesterol/HDL Ratio   Date Value Ref Range Status   03/12/2014 3.0 0.0 - 5.0 Final     Non HDL Cholesterol   Date Value Ref Range Status   08/14/2019 57 <130 mg/dL Final     A1C      5.5   8/2/2016  A1C      6.0   8/11/2015  A1C      5.8   5/14/2015  A1C      5.7   4/16/2015  A1C      5.5   3/30/2015 HEMOGLOBINA1      5.2   7/21/2011  HEMOGLOBINA1      5.6   4/21/2011        CF  Diabetes Health Maintenance    Date of Diabetes Diagnosis: on OGTT ~ 2014    Special Notes (if any): follows with Dr. Tolentino for obesity     Date Last Eye Exam:      Date Last Dental Appointment:     Dates of Episodes Severe* Hypoglycemia (month/year, cumulative, ongoing, assess each visit):    *Severe=patient unconscious, seizure, unable to help self    Last 25-Vitamin D (every year): 38 (2016)    Last DXA, lowest Z-score (every 2 years): Z-score of 0.0, Aug 2019       ?Bisphosphonates (yes/no):     Last Urine Microalbumin (every year):      No results found for: MICROALBUMIN    Last Testosterone:   Testosterone Total   Date Value Ref Range Status   08/02/2017 10 8 - 60 ng/dL Final     Comment:     This test was developed and its performance characteristics determined by the   [facility],  Special Chemistry Laboratory. It has   not been cleared or approved by the FDA. The laboratory is regulated under   CLIA   as qualified to perform high-complexity testing. This test is used for   clinical   purposes. It should not be regarded as investigational or for research.        On testosterone therapy (yes/no)?     Date of Last Diabetes Visit:         Assessment and Plan:   Mamie is a 25 year old female with early CFRD, reactive hypoglycemia and obesity.      Assessment/Plan:  Cystic fibrosis related diabetes  She has been off insulin for several years due to concern about hypoglycemia.  She wore CGM in July of this year and did not have significant hyperglycemia.    Reactive hypoglycemia: She is  finding the recurrent symptoms bothersome.  We discussed intervention with modifying diet.  In the past she has tried NovoLog insulin to prevent postmeal peaks in hope to prevent reactive hypoglycemia later on.  However reports having more lows with NovoLog.  We discussed potential trial of the newly approved ultra rapid acting meal insulin.  She is agreeable.  Could do a trial of the ultra-rapid acting meal insulin along with using CGM at that time.   We will our diabetes educator contact her regarding diagnostic CGM.    Obesity: Follows with Dr. Randa Prieto MD     Note: Chart documentation done in part with Dragon Voice Recognition software. Although reviewed after completion, some word and grammatical errors may remain.  Please consider this when interpreting information in this chart    Video-Visit Details    Type of service:  Video visit  Total video visit time 25 minutes    Originating Location (pt. Location):  Work    Distant Location (provider location):  [facility]     Platform used for Video Visit: Evelin Prieto MD

## 2020-09-29 ENCOUNTER — VIRTUAL VISIT (OUTPATIENT)
Dept: ENDOCRINOLOGY | Facility: CLINIC | Age: 25
End: 2020-09-29
Attending: INTERNAL MEDICINE
Payer: COMMERCIAL

## 2020-09-29 DIAGNOSIS — E16.2 HYPOGLYCEMIA: Primary | ICD-10-CM

## 2020-09-29 NOTE — LETTER
"9/29/2020       RE: Mamie Rice  [address] [facility] [id number]     Dear Colleague,    Thank you for referring your patient, Mamie Rice, to the [facility] at [facility]. Please see a copy of my visit note below.    dm 2  Jj Allen, CMA    Patients Glucose Data was Obtained via Phone and Located in Table Below      Week of__/__/__ Date  _9_/_14_  Date  _9_/_22_ Date  __/__ Date  __/__ Date  __/__ Date  __/__ Date  __/__    Time BG Time BG Time BG Time BG Time BG Time BG Time BG    8:30am 92 8:40am 65                                                                  Week of__/__/__ Date  __/__  Date  __/__ Date  __/__ Date  __/__ Date  __/__ Date  __/__ Date  __/__    Time BG Time BG Time BG Time BG Time BG Time BG Time BG                                                                         Mamie Rice is a 25 year old female who is being evaluated via a billable video visit.      The patient has been notified of following:     \"This video visit will be conducted via a call between you and your physician/provider. We have found that certain health care needs can be provided without the need for an in-person physical exam.  This service lets us provide the care you need with a video conversation.  If a prescription is necessary we can send it directly to your pharmacy.  If lab work is needed we can place an order for that and you can then stop by our lab to have the test done at a later time.    Video visits are billed at different rates depending on your insurance coverage.  Please reach out to your insurance provider with any questions.    If during the course of the call the physician/provider feels a video visit is not appropriate, you will not be charged for this service.\"    Patient has given verbal consent for Video visit? Yes  How would you like to obtain your AVS? MyChart  If you are dropped from the video visit, " the video invite should be resent to: [phone]  Will anyone else be joining your video visit? No         CF Endocrinology Return Consultation:  Diabetes  :   Patient: Mamie Rice MRN# [id number]   YOB: 1995 Age: 25 year old   Date of Visit: 09/29/2020  Dear Dr. Allison:    I had the pleasure of seeing your patient, Mamie Rice in the [facility] Endocrinology Clinic, [facility], for a return consultation regarding CFRD and reactive hypoglycemia.           Problem list:     Patient Active Problem List    Diagnosis Date Noted     Knee pain 10/16/2019     Priority: Medium     Added automatically from request for surgery [id number]       Mild episode of recurrent major depressive disorder (H) 05/15/2018     Priority: Medium     Insomnia, unspecified type 05/15/2018     Priority: Medium     MECHE (generalized anxiety disorder) 05/15/2018     Priority: Medium     Attention deficit hyperactivity disorder (ADHD), combined type 05/15/2018     Priority: Medium     Diabetes mellitus, type 2 (H) 12/07/2016     Priority: Medium     Overview:   Diabetes Mellitus Type 2  Per External Records       Acne vulgaris 10/19/2016     Priority: Medium     Non morbid obesity due to excess calories 08/24/2016     Priority: Medium     Persistent depressive disorder 02/29/2016     Priority: Medium     Overview:   Disorder Depressive Persistent (Formerly Dysthymia) NOS       Pancreatic insufficiency 11/13/2014     Priority: Medium     Fecal elastase < 50       Back pain 10/22/2014     Priority: Medium     Overactive child 06/13/2013     Priority: Medium     Overview:   ADHD  6/13/13  Well-managed with Adderall.  HENRIETTA MAGALLANESIELD         Frontal sinusitis 05/10/2012     Priority: Medium     Chronic constipation 03/04/2012     Priority: Medium     Cystic fibrosis of the lung (H) 12/19/2011     Priority: Medium     SWEAT TEST:  Date: 2/17/1994          Laboratory: [facility]  Sample #1  296 mg           104 mmol/L  Cl  Sample #2  295 mg           104 mmol/L Cl     GENOTYPING:  Date: 10/15/2007               Laboratory: [facility]  Genotype: [id number]  Poly T Variant:  [  ]/[  ]         Type 1 diabetes mellitus (H) 11/09/2011     Priority: Medium     Problem list name updated by automated process. Provider to review       Hypoglycemia 10/28/2011     Priority: Medium     Reactive hypoglycemia  updating diagnosis code for icd10 cutover       Chronic maxillary sinusitis 09/08/2011     Priority: Medium     Aspergillosis (H) 07/21/2011     Priority: Medium     Hip joint pain 04/11/2011     Priority: Medium     Cystic fibrosis (H) 02/27/2011     Priority: Medium            HPI:   Mamie is a 25 year old female with Cystic Fibrosis Related Diabetes Mellitus (CFRD).    History was obtained from the patient and the medical record.  I have reviewed the notes of the pulmonary care team entered into the medical record since I last saw the patient.    25 year old female with history of cystic fibrosis related diabetes and reactive hypoglycemia.    She follows with Dr. Tolentino for weight management. She is on Trikefta.  She feels her weight is stable.  Reports that anti-obesity medications help with reducing craving.  She has been off insulin for several years due to concern about hypoglycemia.  She  has history of reactive hypoglycemia. Her last OGTT showed glucose readings in the diabetes range.    Patient continues to complain about symptoms of reactive hypoglycemia.  She finds them bothersome and would like to consider interventions to improve the symptoms.  She wore a bry CGM in June- July 2020.  I reviewed the CGM data with her today.  Overall average blood sugar was 101, 82% of the readings were in range, 2% high, 11% low and 5% very low.  She reports getting symptoms of low blood sugar at least once daily and around 2 episodes of overnight hypoglycemia symptoms.  On review of CGM data the low readings appear to be reactive as they  usually follow up peak glucose.  Overnight low readings were also mostly after a peak blood glucose.  During the day she manages by eating a snack when she develops symptoms.  She does not preemptively eat snacks to prevent it.  She reports symptoms of feeling hot, sweaty.  She is never had a severe hypoglycemia episode.  She is able to self manage these episodes.    A1c:  No recent A1C, last A1c was 5.4%    Current insulin regimen: None          Past Medical History:     Past Medical History:   Diagnosis Date     ADHD (attention deficit hyperactivity disorder)      Anxiety      Aspergillosis, with pneumonia (H)     fugus found caused chest pain     Chronic infection     CF, MRSA.,      Chronic sinusitis      Constipation, chronic      Cystic fibrosis with pulmonary manifestations (H) 12/19/2011     Cystic fibrosis without mention of meconium ileus     SWEAT TEST:Date: 2/17/1994   [facility]  296 mg, 104 mmol/L ClSample #2  295 mg, 104 mmol/L Cl GENOTYPING:Date: 10/15/2007,  Laboratory: [facility]: [id number]     Depressive disorder      Diabetes     no meds currently     Dysthymic disorder      Exocrine pancreatic insufficiency      Gastro-oesophageal reflux disease      Hip pain, right      MRSA (methicillin resistant Staphylococcus aureus) carrier      Pancreatic disease             Past Surgical History:     Past Surgical History:   Procedure Laterality Date     ARTHROSCOPY HIP, OSTEOPLASTY FEMUR PROXIMAL, COMBINED  3/11/2013    Procedure: COMBINED ARTHROSCOPY HIP, OSTEOPLASTY FEMUR PROXIMAL;  Right Hip Arthroscopy, Labral  Debridement.    surgeon request choice anesthesia/admit to [facility] after surgery;  Surgeon: Omkar Austin MD;  [facility]     ARTHROSCOPY KNEE WITH MEDIAL MENISCECTOMY Left 1/31/2017    Procedure: ARTHROSCOPY KNEE WITH MEDIAL MENISCECTOMY;  Surgeon: Jethro Coyle MD;  [facility] OR     bronchoscopies       BRONCHOSCOPY       EXAM UNDER  ANESTHESIA ANUS N/A 5/10/2016    Procedure: EXAM UNDER ANESTHESIA ANUS;  Surgeon: Chet Gaviria MD;  [facility]     EXAM UNDER ANESTHESIA, RESTORATIONS, EXTRACTION(S) DENTAL COMPLEX, COMBINED  5/13/2013    Procedure: COMBINED EXAM UNDER ANESTHESIA, RESTORATIONS, EXTRACTION(S) DENTAL COMPLEX;  Dental Exam, Radiographs, Restorations. Single Extraction  Tooth #2. Restorations x 3;  Surgeon: Danilo Ortiz DDS;  [facility]: [facility] OR     HC KNEE SCOPE, DIAGNOSTIC      Arthroscopy, Knee- left     INJECT BOTOX N/A 5/10/2016    Procedure: INJECT BOTOX;  Surgeon: Chet Gaviria MD;  [facility]     left hip labral tear  5/11/2011    left hip arthroscopy with labral debridement and synovectomy     meniscus repair       OPTICAL TRACKING SYSTEM ENDOSCOPIC SINUS SURGERY  10/14/2011    Procedure:OPTICAL TRACKING SYSTEM ENDOSCOPIC SINUS SURGERY; FESS (functional endoscopic sinus surgery) with Image Guidance, bronchial lavage and cultures; Surgeon:GOYO KUO; [facility] OR     OPTICAL TRACKING SYSTEM ENDOSCOPIC SINUS SURGERY  5/18/2012    Procedure:OPTICAL TRACKING SYSTEM ENDOSCOPIC SINUS SURGERY; Right  and Left Image Guided Functional Endoscopic Sinus Surgery With  Frontal Approach, [place]; Surgeon:GOYO KUO; [facility] OR     OPTICAL TRACKING SYSTEM ENDOSCOPIC SINUS SURGERY  9/26/2012    Procedure: OPTICAL TRACKING SYSTEM ENDOSCOPIC SINUS SURGERY;  Stealth Guided Bilateral Functional Endoscopic Sinus Surgery *Latex Safe*;  Surgeon: Goyo Kuo MD;  [facility]     OPTICAL TRACKING SYSTEM ENDOSCOPIC SINUS SURGERY Bilateral 10/16/2015    Procedure: OPTICAL TRACKING SYSTEM ENDOSCOPIC SINUS SURGERY;  Surgeon: Mariela Haile MD;  [facility]     ORTHOPEDIC SURGERY      left hip tear repair 2010     SINUS SURGERY                 Social History:     Social History     Social History Narrative    Mamie lives with mother in [place], [place].  There is a cat in the home, but Mamie does  not have any litterbox duties.  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  She gets essentially no exercise because of the tingling in her feet (says it bothers her even to stand).        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        8/2015 no change in social situation        2/15/2016 Pt is single and lives with mother and stepfather.              Family History:     Family History   Problem Relation Age of Onset     Cancer Paternal Grandmother      Skin Cancer Paternal Grandmother      Other Cancer Paternal Grandmother         Skin     Obesity Paternal Grandmother      Cancer Paternal Grandfather         PGF had throat cancer (he was a smoker)     Other Cancer Paternal Grandfather      Anxiety Disorder Paternal Grandfather      Thyroid Disease Mother         ,     Hypertension Mother      Obesity Other      Anesthesia Reaction No family hx of      Blood Disease No family hx of      Colon Polyps No family hx of      Crohn's Disease No family hx of      Ulcerative Colitis No family hx of      Colon Cancer No family hx of      Melanoma No family hx of             Allergies:     Allergies   Allergen Reactions     Vancomycin Hives     Redmens skin rash   Redmens skin rash      Augmentin Rash             Medications:     Current Outpatient Rx   Medication Sig Dispense Refill     acetaminophen (TYLENOL) 325 MG tablet Take 2 tablets (650 mg) by mouth every 4 hours as needed for other (mild pain) 100 tablet 0     acetylcysteine (MUCOMYST) 20 % neb solution INHALE ONE 4ML NEB INTO LUNGS VIA NEBULIZER TWO TIMES A DAY, MAY INCREASE TO 3-4 TIMES A DAY WITH INCREASE COUGH/COLD SYMPTOMS 360 mL 11     albuterol (PROAIR HFA/PROVENTIL HFA/VENTOLIN HFA) 108 (90 Base) MCG/ACT Inhaler Inhale 2 puffs into the lungs every 6 hours as needed for shortness of breath / dyspnea or wheezing 1 Inhaler 12     albuterol (PROVENTIL) (2.5 MG/3ML) 0.083% neb solution  Take 1 vial (2.5 mg) by nebulization every 4 hours as needed for shortness of breath / dyspnea or wheezing 360 mL 11     amphetamine-dextroamphetamine (ADDERALL XR) 20 MG 24 hr capsule Take 1 capsule (20 mg) by mouth daily 30 capsule 0     amphetamine-dextroamphetamine (ADDERALL XR) 20 MG 24 hr capsule Take 1 capsule (20 mg) by mouth daily 30 capsule 0     azithromycin (ZITHROMAX) 500 MG tablet TAKE ONE TABLET BY MOUTH ON MONDAYS, WEDNESDAYS, AND FRIDAYS AS DIRECTED 36 tablet 4     beta carotene 88889 UNIT capsule TAKE ONE CAPSULE BY MOUTH IN THE MORNING ON MONDAY, WEDNESDAY, AND FRIDAY 36 capsule 4     blood glucose (ACCU-CHEK SMARTVIEW) test strip Use to test blood sugar 4 times daily or as directed. 400 each 3     buPROPion (WELLBUTRIN XL) 300 MG 24 hr tablet Take 1 tablet (300 mg) by mouth every morning Please alert patient to change to one tablet daily 90 tablet 3     busPIRone (BUSPAR) 15 MG tablet Take 1 tablet (15 mg) by mouth 2 times daily 120 tablet 0     cetirizine (ZYRTEC) 10 MG tablet Take 1 tablet (10 mg) by mouth every evening 30 tablet 3     elexacaftor-tezacaftor-ivacaftor & ivacaftor (TRIKAFTA) 100-50-75 & 150 MG tablet pack TAKE TWO ORANGE TABLETS BY MOUTH IN THE MORNING AND ONE BLUE TABLET IN THE EVENING AS DIRECTED ON PACKAGE.  TAKE WITH FAT CONTAINING FOOD. 84 tablet 2     FLUoxetine (PROZAC) 40 MG capsule Take 2 capsules (80 mg) by mouth daily 180 capsule 0     fluticasone (FLONASE) 50 MCG/ACT nasal spray Spray 2 sprays into both nostrils 2 times daily (Patient taking differently: Spray 2 sprays into both nostrils as needed ) 15.8 mL 3     hydrOXYzine (ATARAX) 25 MG tablet Take 1 tablet (25 mg) by mouth nightly as needed (sleep) 90 tablet 0     medroxyPROGESTERone (DEPO-PROVERA) 150 MG/ML IM injection Inject 150 mg into the muscle       MedroxyPROGESTERone Acetate (DEPO-PROVERA IM)        meropenem (MERREM) 500 MG vial 500 mg by Nasal Instillation route once for 1 dose 60 each      montelukast  (SINGULAIR) 10 MG tablet TAKE ONE TABLET BY MOUTH AT BEDTIME 30 tablet 11     Multiple Vitamin (MULTIVITAMIN OR) Take 1 tablet by mouth daily.       naltrexone (DEPADE/REVIA) 50 MG tablet Take 1 tablet.  Time it one to two hours prior to worst cravings. 90 tablet 1     omeprazole (PRILOSEC) 40 MG DR capsule Take 1 capsule (40 mg) by mouth 2 times daily 60 capsule 11     phytonadione (MEPHYTON) 5 MG tablet TAKE ONE TABLET BY MOUTH ONCE A WEEK 4 tablet 11     polyethylene glycol (MIRALAX) powder Take 17 g (1 capful) by mouth daily as needed for constipation 119 g 3     topiramate (TOPAMAX) 100 MG tablet Take 1 tablet (100 mg) by mouth daily 90 tablet 1     traZODone (DESYREL) 100 MG tablet Take 1 tablet (100 mg) by mouth At Bedtime 90 tablet 0     vitamin C (ASCORBIC ACID) 500 MG tablet TAKE ONE TABLET BY MOUTH TWICE A  tablet 3     VITAMIN D3 25 MCG (1000 UT) tablet TAKE ONE TABLET BY MOUTH EVERY  tablet 3     VITAMIN E 400 units capsule TAKE ONE CAPSULE BY MOUTH TWICE A  capsule 11             Review of Systems:     Comprehensive ROS negative other than the symptoms noted above in the HPI.          Physical Exam:   General: Alert and oriented        Laboratory results:     TSH   Date Value Ref Range Status   08/14/2019 1.08 0.40 - 4.00 mU/L Final     Testosterone Total   Date Value Ref Range Status   08/02/2017 10 8 - 60 ng/dL Final     Comment:     This test was developed and its performance characteristics determined by the   [facility],  Special Chemistry Laboratory. It has   not been cleared or approved by the FDA. The laboratory is regulated under   CLIA   as qualified to perform high-complexity testing. This test is used for   clinical   purposes. It should not be regarded as investigational or for research.       Cholesterol   Date Value Ref Range Status   08/14/2019 92 <200 mg/dL Final     Albumin Urine mg/L   Date Value Ref Range Status   08/14/2019 21 mg/L  Final     Triglycerides   Date Value Ref Range Status   08/14/2019 75 <150 mg/dL Final     HDL Cholesterol   Date Value Ref Range Status   08/14/2019 35 (L) >49 mg/dL Final     LDL Cholesterol Calculated   Date Value Ref Range Status   08/14/2019 42 <100 mg/dL Final     Comment:     Desirable:       <100 mg/dl     Cholesterol/HDL Ratio   Date Value Ref Range Status   03/12/2014 3.0 0.0 - 5.0 Final     Non HDL Cholesterol   Date Value Ref Range Status   08/14/2019 57 <130 mg/dL Final     A1C      5.5   8/2/2016  A1C      6.0   8/11/2015  A1C      5.8   5/14/2015  A1C      5.7   4/16/2015  A1C      5.5   3/30/2015 HEMOGLOBINA1      5.2   7/21/2011  HEMOGLOBINA1      5.6   4/21/2011          Diabetes Health Maintenance    Date of Diabetes Diagnosis: on OGTT ~ 2014    Special Notes (if any): follows with Dr. Tolentino for obesity     Date Last Eye Exam:      Date Last Dental Appointment:     Dates of Episodes Severe* Hypoglycemia (month/year, cumulative, ongoing, assess each visit):    *Severe=patient unconscious, seizure, unable to help self    Last 25-Vitamin D (every year): 38 (2016)    Last DXA, lowest Z-score (every 2 years): Z-score of 0.0, Aug 2019       ?Bisphosphonates (yes/no):     Last Urine Microalbumin (every year):      No results found for: MICROALBUMIN    Last Testosterone:   Testosterone Total   Date Value Ref Range Status   08/02/2017 10 8 - 60 ng/dL Final     Comment:     This test was developed and its performance characteristics determined by the   [facility],  Special Chemistry Laboratory. It has   not been cleared or approved by the FDA. The laboratory is regulated under   CLIA   as qualified to perform high-complexity testing. This test is used for   clinical   purposes. It should not be regarded as investigational or for research.        On testosterone therapy (yes/no)?     Date of Last Diabetes Visit:         Assessment and Plan:   Mamie is a 25 year old female with  "early CFRD, reactive hypoglycemia and obesity.      Assessment/Plan:  Cystic fibrosis related diabetes  She has been off insulin for several years due to concern about hypoglycemia.  She wore CGM in July of this year and did not have significant hyperglycemia.    Reactive hypoglycemia: She is finding the recurrent symptoms bothersome.  We discussed intervention with modifying diet.  In the past she has tried NovoLog insulin to prevent postmeal peaks in hope to prevent reactive hypoglycemia later on.  However reports having more lows with NovoLog.  We discussed potential trial of the newly approved ultra rapid acting meal insulin.  She is agreeable.  Could do a trial of the ultra-rapid acting meal insulin along with using CGM at that time.   We will our diabetes educator contact her regarding diagnostic CGM.    Obesity: Follows with Dr. Randa Prieto MD     Note: Chart documentation done in part with Dragon Voice Recognition software. Although reviewed after completion, some word and grammatical errors may remain.  Please consider this when interpreting information in this chart    Video-Visit Details    Type of service:  Video visit  Total video visit time 25 minutes    Originating Location (pt. Location):  Work    Distant Location (provider location):  [facility]     Platform used for Video Visit: [employer]    Hugo Prieto MD            Chief Concern:   \"I needed to follow up from a prior visit with Dr. Prieto\".     History of Present Illness:  Mamie is a 25 year old female with Cystic Fibrosis Related Diabetes Mellitus. She states that at her last appointment with Dr. Prieto they discussed obtaining a CGM for monitoring her blood sugars, but when consulting the [employer]acom/diabetes manager said that she didn't require a CGM. Sonya said that she generally checks her blood glucose at home once per day but sometimes forgets to do so. She reports that her glucose readings " "range from , but she sometimes feels low. During these low episodes she gets a headache, weak, and foggy and then eats a granola bar or fruit snacks and feels better. She gets confused during these episodes but does not pass out or faint. She does not check her blood glucose before or after these episodes.    Her CF is currently managed with Trikefta and weight management anti-obesity medications. She reports her weight is stable and that the medications reduce her food cravings.  Mamie was diagnosed with CFRD in 2010 and placed on a trial of aspart for 6 months, but this was stopped due to frequent episodes of hypoglycemia    Mamie also revealed that she has occasional episodes of hypoglycemia overnight. She says that these episodes occur 2x per week and do not wake her up, but she feels symptoms of feeling hot, sweating and \"out of it\" when she wakes up to use the bathroom. She goes to bed at 9pm and eats last at 8pm. She reports the hypoglycemia episodes occur around 1-2am.       The patient reports a good appetite and a normal diet and walks her Wibbitz dog daily for exercise. 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     Allergies:  Mamie reported that she gets a rash with Vancomycin.     Immunizations:   Mamie reproted that her providers follow her immunizations. She has not received a flu shot for this season yet, but plans to schedule an appointment next week to do so.     Past Medical History  Childhood and Current Illness: The patient's history is significant for a diagnosis of cystic fibrosis at 6 months of age for which she is followed by a medical team and sees her CF physician every 3 months.   Surgery: She had a prior knee surgery 2 years ago  Mental Health Concerns: Mamie mentioned a history of depression that is well managed with dual combination of medications and therapy. She sees a therapist every 3 months.   Habits: She " denied using tobacco, alcohol, or recreational drugs  She is not sexually active.     Family History  Mother (52 years old): Hypertension  Father (65 years old): recently diagnosed with ALS  Sister: obesity  Maternal Grandmother (68 years old): skin cancer  Maternal Grandfather (70 years old): throat cancer  Paternal Grandmother:   Paternal Grandfather:      Review of Systems:   General: No changes in weight, fever, chills, fatigue, night sweats.   HEENT: sinus headaches 2x/week that are self treated with Advil with relief.  Eyes: no changes in vision, no blurred spots, specks, flashing lights. Last eye exam 2 years ago.  Respiratory: no cough, abnormal sputum, shortness of breath, wheezing, pleural pain, or asthma.   Cardio: No chest pain/discomfort, shortness of breath with exertion, edema, palpitations, HTN, or swelling in hands or feet.   GI: No changes in bowl movements, no constipation, diarrhea, abdominal pain, or food intolerance.   Urinary: Patient urinates once every 2 hours and says this is normal for her. No blood, discharge, pain with urination.   Female Exam: first menstrual cycle at 9 years old, no previous pap test, no changes in menstrual cycle or abnormal uterine bleeding. No prior pregnancy or STI.   Endocrine: no thyroid trouble, enlarged neck mass, heat or cold intolerance, excessive sweating, excessive thirst or hunger, no change in hat or glove size.     Physical Exam  Psych: Patient is awake, alert, and oriented 3x. Coherent speech, normal rate and volume. Coherent thoughts, no hallucinations or delusions. Mood is pleasant and calm.   Respiratory: No cough, patient is able to talk in full sentences.     Labs  CGM data from -2020 was reviewed with Dr. Prieto. Average blood glucose was 101, 82% of readings were within normal range, 2% high, 11% low, and 5% very low. Low readings appear to be after spikes of high glucose levels consistent with reactive hypoglycemia. Her  last HgA1C was 5.4 on 11/12/2019.    Assessment  Mamie is a 25 year old with CFRD. She has frequent episodes of hypoglycemia that she is able to self manage. Prior CGM results indicate reactive hypoglycemia.     1. Cystic Fibrosis Related Diabetes Mellitus  2. Reactive hypoglycemia     Plan  -Refer Mamie for dietary consultation for low carb diet to avoid simple sugars and blood glucose spikes.   -Consider trial of very fast acting insulin with meal time at dinner to control peaks and prevent troughs  -Obtain CGM during very fast acting insulin trial to monitor glucose levels    Bebo Ramos, MS3    Again, thank you for allowing me to participate in the care of your patient.      Sincerely,    Hugo Prieto MD

## 2020-09-29 NOTE — PROGRESS NOTES
"Chief Concern:   \"I needed to follow up from a prior visit with Dr. Prieto\".     History of Present Illness:  Mamie is a 25 year old female with Cystic Fibrosis Related Diabetes Mellitus. She states that at her last appointment with Dr. Prieto they discussed obtaining a CGM for monitoring her blood sugars, but when consulting the [employer]ac[employer]/diabetes manager said that she didn't require a CGM. Sonya said that she generally checks her blood glucose at home once per day but sometimes forgets to do so. She reports that her glucose readings range from , but she sometimes feels low. During these low episodes she gets a headache, weak, and foggy and then eats a granola bar or fruit snacks and feels better. She gets confused during these episodes but does not pass out or faint. She does not check her blood glucose before or after these episodes.    Her CF is currently managed with Trikefta and weight management anti-obesity medications. She reports her weight is stable and that the medications reduce her food cravings.  Mamie was diagnosed with CFRD in 2010 and placed on a trial of aspart for 6 months, but this was stopped due to frequent episodes of hypoglycemia    Mamie also revealed that she has occasional episodes of hypoglycemia overnight. She says that these episodes occur 2x per week and do not wake her up, but she feels symptoms of feeling hot, sweating and \"out of it\" when she wakes up to use the bathroom. She goes to bed at 9pm and eats last at 8pm. She reports the hypoglycemia episodes occur around 1-2am.       The patient reports a good appetite and a normal diet and walks her [email or web address] dog daily for exercise. 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     Allergies:  Mamie reported that she gets a rash with Vancomycin.     Immunizations:   Mamie reproted that her providers follow her immunizations. She has not received a flu shot for this " season yet, but plans to schedule an appointment next week to do so.     Past Medical History  Childhood and Current Illness: The patient's history is significant for a diagnosis of cystic fibrosis at 6 months of age for which she is followed by a medical team and sees her CF physician every 3 months.   Surgery: She had a prior knee surgery 2 years ago  Mental Health Concerns: Mamie mentioned a history of depression that is well managed with dual combination of medications and therapy. She sees a therapist every 3 months.   Habits: She denied using tobacco, alcohol, or recreational drugs  She is not sexually active.     Family History  Mother (52 years old): Hypertension  Father (65 years old): recently diagnosed with ALS  Sister: obesity  Maternal Grandmother (68 years old): skin cancer  Maternal Grandfather (70 years old): throat cancer  Paternal Grandmother:   Paternal Grandfather:      Review of Systems:   General: No changes in weight, fever, chills, fatigue, night sweats.   HEENT: sinus headaches 2x/week that are self treated with Advil with relief.  Eyes: no changes in vision, no blurred spots, specks, flashing lights. Last eye exam 2 years ago.  Respiratory: no cough, abnormal sputum, shortness of breath, wheezing, pleural pain, or asthma.   Cardio: No chest pain/discomfort, shortness of breath with exertion, edema, palpitations, HTN, or swelling in hands or feet.   GI: No changes in bowl movements, no constipation, diarrhea, abdominal pain, or food intolerance.   Urinary: Patient urinates once every 2 hours and says this is normal for her. No blood, discharge, pain with urination.   Female Exam: first menstrual cycle at 9 years old, no previous pap test, no changes in menstrual cycle or abnormal uterine bleeding. No prior pregnancy or STI.   Endocrine: no thyroid trouble, enlarged neck mass, heat or cold intolerance, excessive sweating, excessive thirst or hunger, no change in hat or glove  size.     Physical Exam  Psych: Patient is awake, alert, and oriented 3x. Coherent speech, normal rate and volume. Coherent thoughts, no hallucinations or delusions. Mood is pleasant and calm.   Respiratory: No cough, patient is able to talk in full sentences.     Labs  CGM data from June-July 2020 was reviewed with Dr. Prieto. Average blood glucose was 101, 82% of readings were within normal range, 2% high, 11% low, and 5% very low. Low readings appear to be after spikes of high glucose levels consistent with reactive hypoglycemia. Her last HgA1C was 5.4 on 11/12/2019.    Assessment  Mamie is a 25 year old with CFRD. She has frequent episodes of hypoglycemia that she is able to self manage. Prior CGM results indicate reactive hypoglycemia.     1. Cystic Fibrosis Related Diabetes Mellitus  2. Reactive hypoglycemia     Plan  -Refer Mamie for dietary consultation for low carb diet to avoid simple sugars and blood glucose spikes.   -Consider trial of very fast acting insulin with meal time at dinner to control peaks and prevent troughs  -Obtain CGM during very fast acting insulin trial to monitor glucose levels    Bebo Ramos, MS3

## 2020-10-02 DIAGNOSIS — E84.0 CYSTIC FIBROSIS WITH PULMONARY MANIFESTATIONS (H): ICD-10-CM

## 2020-10-02 DIAGNOSIS — E84.9 CF (CYSTIC FIBROSIS) (H): ICD-10-CM

## 2020-10-02 RX ORDER — AZITHROMYCIN 500 MG/1
TABLET, FILM COATED ORAL
Qty: 36 TABLET | Refills: 4 | Status: SHIPPED | OUTPATIENT
Start: 2020-10-02 | End: 2021-10-11

## 2020-10-02 RX ORDER — BETA-CAROTENE 7500 MCG
CAPSULE ORAL
Qty: 36 CAPSULE | Refills: 4 | Status: SHIPPED | OUTPATIENT
Start: 2020-10-02 | End: 2023-01-17

## 2020-10-07 ENCOUNTER — TRANSFERRED RECORDS (OUTPATIENT)
Dept: HEALTH INFORMATION MANAGEMENT | Facility: CLINIC | Age: 25
End: 2020-10-07

## 2020-10-07 DIAGNOSIS — G47.00 INSOMNIA, UNSPECIFIED TYPE: ICD-10-CM

## 2020-10-07 LAB
ALBUMIN SERPL-MCNC: 4 G/DL
ALP SERPL-CCNC: 70 U/L
ALT SERPL-CCNC: 17 U/L
AST SERPL-CCNC: 18 U/L
BILIRUB SERPL-MCNC: <0.2 MG/DL
BILIRUBIN DIRECT: <0.2 MG/DL
CK TOTAL: 81 U/L
PROT SERPL-MCNC: 6.2 G/DL

## 2020-10-07 RX ORDER — HYDROXYZINE HYDROCHLORIDE 25 MG/1
25 TABLET, FILM COATED ORAL
Qty: 90 TABLET | Refills: 0 | OUTPATIENT
Start: 2020-10-07

## 2020-10-08 ENCOUNTER — TELEPHONE (OUTPATIENT)
Dept: PHARMACY | Facility: CLINIC | Age: 25
End: 2020-10-08

## 2020-10-08 DIAGNOSIS — G47.00 INSOMNIA, UNSPECIFIED TYPE: ICD-10-CM

## 2020-10-08 RX ORDER — HYDROXYZINE HYDROCHLORIDE 25 MG/1
25 TABLET, FILM COATED ORAL
Qty: 90 TABLET | Refills: 0 | Status: SHIPPED | OUTPATIENT
Start: 2020-10-08 | End: 2021-01-19

## 2020-10-08 NOTE — TELEPHONE ENCOUNTER
Spoke to Mamie's mom Abigail in response to a Drivr message that was sent yesterday regarding an insurance issue.  Mamie received a letter from her insurance company stating that [facility] would fill her Trikafta. Mamie has been using [facility] for her Trikafta. She has an order that is shipping today. The prescription was covered with her usual copay. Advised Abigail that it does not appear at this time that Mamie will need to switch pharmacies. If there is an issue with insurance coverage or copay cost with future refills, we will send a new prescription to the other pharmacy.

## 2020-10-09 ENCOUNTER — VIRTUAL VISIT (OUTPATIENT)
Dept: ENDOCRINOLOGY | Facility: CLINIC | Age: 25
End: 2020-10-09
Payer: COMMERCIAL

## 2020-10-09 ENCOUNTER — TELEPHONE (OUTPATIENT)
Dept: URGENT CARE | Facility: URGENT CARE | Age: 25
End: 2020-10-09

## 2020-10-09 VITALS — WEIGHT: 170 LBS | HEIGHT: 61 IN | BODY MASS INDEX: 32.1 KG/M2

## 2020-10-09 DIAGNOSIS — E66.811 CLASS 1 OBESITY DUE TO EXCESS CALORIES WITHOUT SERIOUS COMORBIDITY WITH BODY MASS INDEX (BMI) OF 30.0 TO 30.9 IN ADULT: ICD-10-CM

## 2020-10-09 DIAGNOSIS — E66.09 CLASS 1 OBESITY DUE TO EXCESS CALORIES WITHOUT SERIOUS COMORBIDITY WITH BODY MASS INDEX (BMI) OF 30.0 TO 30.9 IN ADULT: ICD-10-CM

## 2020-10-09 PROCEDURE — 99213 OFFICE O/P EST LOW 20 MIN: CPT | Mod: 95 | Performed by: PEDIATRICS

## 2020-10-09 RX ORDER — TOPIRAMATE 100 MG/1
100 TABLET, FILM COATED ORAL DAILY
Qty: 90 TABLET | Refills: 4 | Status: SHIPPED | OUTPATIENT
Start: 2020-10-09 | End: 2021-11-16

## 2020-10-09 RX ORDER — NALTREXONE HYDROCHLORIDE 50 MG/1
TABLET, FILM COATED ORAL
Qty: 90 TABLET | Refills: 4 | Status: SHIPPED | OUTPATIENT
Start: 2020-10-09 | End: 2021-11-16

## 2020-10-09 ASSESSMENT — MIFFLIN-ST. JEOR: SCORE: 1443.49

## 2020-10-09 ASSESSMENT — PAIN SCALES - GENERAL: PAINLEVEL: NO PAIN (0)

## 2020-10-09 NOTE — LETTER
"10/9/2020       RE: Mamie Rice  [address] [facility] [id number]     Dear Colleague,    Thank you for referring your patient, Mamie Rice, to the [facility] [place] [facility]. Please see a copy of my visit note below.    Mamie Rice is a 25 year old female who is being evaluated via a billable video visit.      The patient has been notified of following:     \"This video visit will be conducted via a call between you and your physician/provider. We have found that certain health care needs can be provided without the need for an in-person physical exam.  This service lets us provide the care you need with a video conversation.  If a prescription is necessary we can send it directly to your pharmacy.  If lab work is needed we can place an order for that and you can then stop by our lab to have the test done at a later time.    Video visits are billed at different rates depending on your insurance coverage.  Please reach out to your insurance provider with any questions.    If during the course of the call the physician/provider feels a video visit is not appropriate, you will not be charged for this service.\"    Patient has given verbal consent for Video visit? Yes  How would you like to obtain your AVS? MyChart  If you are dropped from the video visit, the video invite should be resent to: Text to cell phone: [phone]  Will anyone else be joining your video visit? No      During this virtual visit the patient is located in [place], patient verifies this as the location during the entirety of this visit.       Return Medical Weight Management Note     Mamie Rice  MRN:  [id number]  :  1995  STEPHEN:  10/9/2020    Dear primary care provider,    I had the pleasure of seeing your patient Mamie Rice. She is a 25 year old female who I am continuing to see for treatment of obesity related to:       10/9/2016   I have " "the following health issues associated with obesity: Back Pain   I have the following symptoms associated with obesity: Knee Pain       INTERVAL HISTORY:  Mamie Rice was last seen in clinic on 2/28/20. She is a 25 year old female with a history of class 2 obesity, currently class 1. She also has a history of CF, CFRD (followed by Hugo Prieto), ADHD, and depression. She is here for follow up.    She states that overall things have been going well. In total, over the last 8 months she has lost 8 pounds, however, she believes that her weight loss has generally stalled over the last several months. At her appointment on 2/28/20, her topiramate was increased from 75 mg daily to 100 mg daily, which she continues to take. She takes the topiramate all at night. She also continues to remain on naltrexone 50 mg daily. She has not experienced any side effects from these medications, including no abdominal pain, paresthesias, tiredness, or issues with concentration or memory. For her history of ADHD, she is also on Adderrall XR 20 mg daily, which she gets prescribed through her psychiatrist, whom she is scheduled to see again in December. She believes that, overall, her appetite has been reasonably controlled. She does have some issues with eating quickly, and there are times when she feels as though eating can be \"non-stop.\"     Dietary Recall:  Breakfast: bowl of cereal  Lunch: she often will have school lunch but sometimes packs something like a sandwich or leftovers  Dinner: spaghetti, chicken, vegetables, salads  Snacks: she does not generally snack unless she needs something to treat a low BG.   Drinks: she drinks milk with lunch and dinner; she also has a Diet Doctor Pepper everyday.    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 She also walks her dog for about 15 minutes at a time about every other day.     CURRENT WEIGHT:   170 lbs 0 oz (self reported weight)    Initial weight " "was 182 pounds on 10/14/16  Weight at her last appointment on 3/28/20 was 178 pounds  Self reported weight today is 170 pounds  Weight loss since her last appointment on 3/28/20 is down 8 pounds  Total weight loss is 12 pounds.    Initial Weight (lbs): 182 lbs  Last Visits Weight: 80.7 kg (178 lb)  Cumulative weight loss (lbs): 12  Weight Loss Percentage: 6.59%    Changes and Difficulties 10/7/2020   I have made the following changes to my diet since my last visit: Less milk   With regards to my diet, I am still struggling with: -   I have made the following changes to my activity/exercise since my last visit: Taking more walks   With regards to my activity/exercise, I am still struggling with: Doing other exercise       VITALS:  Ht 1.549 m (5' 1\")   Wt 77.1 kg (170 lb)   BMI 32.12 kg/m       GENERAL: Healthy, alert and no distress  EYES: Eyes grossly normal to inspection.   HENT: Normal cephalic/atraumatic.   RESP: No audible wheeze, cough.  No visible retractions or increased work of breathing.    MS: No gross musculoskeletal defects noted.  Normal range of motion.    SKIN: Visible skin clear.   NEURO: Cranial nerves grossly intact.  Mentation and speech appropriate for age.  PSYCH: Mentation appears normal, affect normal/bright, judgement and insight intact, normal speech and appearance well-groomed.    MEDICATIONS:   Prescribed from the weight management clinic: naltrexone 50 mg daily, topiramate 100 mg nightly  Additional weight altering medications include: bupropion 150 mg twice a day and adderrall XR 20 mg daily    Current Outpatient Medications   Medication Sig Dispense Refill     acetaminophen (TYLENOL) 325 MG tablet Take 2 tablets (650 mg) by mouth every 4 hours as needed for other (mild pain) 100 tablet 0     acetylcysteine (MUCOMYST) 20 % neb solution INHALE ONE 4ML NEB INTO LUNGS VIA NEBULIZER TWO TIMES A DAY, MAY INCREASE TO 3-4 TIMES A DAY WITH INCREASE COUGH/COLD SYMPTOMS 360 mL 11     albuterol " (PROAIR HFA/PROVENTIL HFA/VENTOLIN HFA) 108 (90 Base) MCG/ACT Inhaler Inhale 2 puffs into the lungs every 6 hours as needed for shortness of breath / dyspnea or wheezing 1 Inhaler 12     albuterol (PROVENTIL) (2.5 MG/3ML) 0.083% neb solution Take 1 vial (2.5 mg) by nebulization every 4 hours as needed for shortness of breath / dyspnea or wheezing 360 mL 11     amphetamine-dextroamphetamine (ADDERALL XR) 20 MG 24 hr capsule Take 1 capsule (20 mg) by mouth daily 30 capsule 0     amphetamine-dextroamphetamine (ADDERALL XR) 20 MG 24 hr capsule Take 1 capsule (20 mg) by mouth daily 30 capsule 0     azithromycin (ZITHROMAX) 500 MG tablet TAKE ONE TABLET BY MOUTH ON MONDAYS, WEDNESDAYS, AND FRIDAYS AS DIRECTED 36 tablet 4     beta carotene 84862 UNIT capsule TAKE 1 CAPSULE BY MOUTH IN THE MORNING ON MONDAY, WEDNESDAY AND FRIDAY 36 capsule 4     blood glucose (ACCU-CHEK SMARTVIEW) test strip Use to test blood sugar 4 times daily or as directed. 400 each 3     buPROPion (WELLBUTRIN XL) 300 MG 24 hr tablet Take 1 tablet (300 mg) by mouth every morning Please alert patient to change to one tablet daily 90 tablet 3     busPIRone (BUSPAR) 15 MG tablet Take 1 tablet (15 mg) by mouth 2 times daily 120 tablet 0     cetirizine (ZYRTEC) 10 MG tablet Take 1 tablet (10 mg) by mouth every evening 30 tablet 3     elexacaftor-tezacaftor-ivacaftor & ivacaftor (TRIKAFTA) 100-50-75 & 150 MG tablet pack TAKE TWO ORANGE TABLETS BY MOUTH IN THE MORNING AND ONE BLUE TABLET IN THE EVENING AS DIRECTED ON PACKAGE.  TAKE WITH FAT CONTAINING FOOD. 84 tablet 2     FLUoxetine (PROZAC) 40 MG capsule Take 2 capsules (80 mg) by mouth daily 180 capsule 0     fluticasone (FLONASE) 50 MCG/ACT nasal spray Spray 2 sprays into both nostrils 2 times daily (Patient taking differently: Spray 2 sprays into both nostrils as needed ) 15.8 mL 3     hydrOXYzine (ATARAX) 25 MG tablet Take 1 tablet (25 mg) by mouth nightly as needed (sleep) 90 tablet 0      medroxyPROGESTERone (DEPO-PROVERA) 150 MG/ML IM injection Inject 150 mg into the muscle       MedroxyPROGESTERone Acetate (DEPO-PROVERA IM)        meropenem (MERREM) 500 MG vial 500 mg by Nasal Instillation route once for 1 dose 60 each      montelukast (SINGULAIR) 10 MG tablet TAKE ONE TABLET BY MOUTH AT BEDTIME 30 tablet 11     Multiple Vitamin (MULTIVITAMIN OR) Take 1 tablet by mouth daily.       naltrexone (DEPADE/REVIA) 50 MG tablet Take 1 tablet.  Time it one to two hours prior to worst cravings. 90 tablet 1     omeprazole (PRILOSEC) 40 MG DR capsule Take 1 capsule (40 mg) by mouth 2 times daily 60 capsule 11     phytonadione (MEPHYTON) 5 MG tablet TAKE ONE TABLET BY MOUTH ONCE A WEEK 4 tablet 11     polyethylene glycol (MIRALAX) powder Take 17 g (1 capful) by mouth daily as needed for constipation 119 g 3     topiramate (TOPAMAX) 100 MG tablet Take 1 tablet (100 mg) by mouth daily 90 tablet 1     traZODone (DESYREL) 100 MG tablet Take 1 tablet (100 mg) by mouth At Bedtime 90 tablet 0     vitamin C (ASCORBIC ACID) 500 MG tablet TAKE ONE TABLET BY MOUTH TWICE A  tablet 3     VITAMIN D3 25 MCG (1000 UT) tablet TAKE ONE TABLET BY MOUTH EVERY  tablet 3     VITAMIN E 400 units capsule TAKE ONE CAPSULE BY MOUTH TWICE A  capsule 11       Weight Loss Medication History Reviewed With Patient 10/7/2020   Which weight loss medications are you currently taking on a regular basis?  Naltrexone   If you are not taking a weight loss medication that was prescribed to you, please indicate why: -   Are you having any side effects from the weight loss medication that we have prescribed you? No       ASSESSMENT:   Mamie Rice is a 25 year old female with a history of class 1 obesity. She also has a history of CF, CFRD, ADHD, depression, and joint pains. Currently on naltrexone and topiramate (currently at 100 mg daily) prescribed through our clinic, in addition to bupropion 150 mg twice a day (the  combination of naltrexone and bupropion together makes up the components of contrave) and adderrall XR 20 mg daily (for ADHD, which can also help with appetite suppression). Overall, is doing well on these medications at these doses. That said, has experienced some recent stalling of weight loss over the last couple of months. Discussed additional lifestyle modifications that can help with this, and suspect that it may also help to try switching the topiramate from the evening time to the morning time (given its action duration). In the future, could consider increasing the dose of topiramate further if needed. She will also talk with her psychiatrist about any potential changes to Adderrall XR, which should be based upon control of ADHD symptoms (but can also help with appetite suppression.      Additional plans and goals, made through shared decision making, as outlined below.     PLAN:   Patient Instructions   1. Food Goal: Continue to focus on good water intake. Should consider waiting at least 15-20 minutes after your first portion to see if you are still hungry before having a second portion. Will try to pack a lunch at least 3 days a week. For breakfast, options that might be more filing than a bowl of cereal include eggs, yogurt, piece of meat (proteins)    2. Activity Goal: Will walk the dog at least 5 days a week.     3. Medications: Continue the topiramate 100 mg daily, but shift it to the morning time. Continue the naltrexone 50 mg daily. Should discuss Adderrall XR dosing at your next appointment with psychiatry as this medication can also with hunger.     FOLLOW-UP:    3 months.    Time: 20 min spent on evaluation, management, counseling, education, & motivational interviewing with greater than 50 % of the total time was spent on counseling and coordinating care    Sincerely,    Charles Park MD    Video-Visit Details    Type of service:  Video Visit    Video Start Time: 10:25 AM  Video End Time:  10:45 AM    Originating Location (pt. Location): Other at her work    Distant Location (provider location):  [facility] [place]     Platform used for Video Visit: Juan

## 2020-10-09 NOTE — PATIENT INSTRUCTIONS
1. Food Goal: Continue to focus on good water intake. Should consider waiting at least 15-20 minutes after your first portion to see if you are still hungry before having a second portion. Will try to pack a lunch at least 3 days a week. For breakfast, options that might be more filing than a bowl of cereal include eggs, yogurt, piece of meat (proteins)    2. Activity Goal: Will walk the dog at least 5 days a week.     3. Medications: Continue the topiramate 100 mg daily, but shift it to the morning time. Continue the naltrexone 50 mg daily. Should discuss Adderrall XR dosing at your next appointment with psychiatry as this medication can also with hunger.

## 2020-10-09 NOTE — PROGRESS NOTES
"Mamie Rice is a 25 year old female who is being evaluated via a billable video visit.      The patient has been notified of following:     \"This video visit will be conducted via a call between you and your physician/provider. We have found that certain health care needs can be provided without the need for an in-person physical exam.  This service lets us provide the care you need with a video conversation.  If a prescription is necessary we can send it directly to your pharmacy.  If lab work is needed we can place an order for that and you can then stop by our lab to have the test done at a later time.    Video visits are billed at different rates depending on your insurance coverage.  Please reach out to your insurance provider with any questions.    If during the course of the call the physician/provider feels a video visit is not appropriate, you will not be charged for this service.\"    Patient has given verbal consent for Video visit? Yes  How would you like to obtain your AVS? MyChart  If you are dropped from the video visit, the video invite should be resent to: Text to cell phone: [phone]  Will anyone else be joining your video visit? No      During this virtual visit the patient is located in [place], patient verifies this as the location during the entirety of this visit.       Return Medical Weight Management Note     Mamie Rice  MRN:  [id number]  :  1995  STEPHEN:  10/9/2020    Dear primary care provider,    I had the pleasure of seeing your patient Mamie Rice. She is a 25 year old female who I am continuing to see for treatment of obesity related to:       10/9/2016   I have the following health issues associated with obesity: Back Pain   I have the following symptoms associated with obesity: Knee Pain       INTERVAL HISTORY:  Mamie Rice was last seen in clinic on 20. She is a 25 year old female with a history of class 2 obesity, currently class 1. She also has a history of " "CF, CFRD (followed by [facility]), ADHD, and depression. She is here for follow up.    She states that overall things have been going well. In total, over the last 8 months she has lost 8 pounds, however, she believes that her weight loss has generally stalled over the last several months. At her appointment on 2/28/20, her topiramate was increased from 75 mg daily to 100 mg daily, which she continues to take. She takes the topiramate all at night. She also continues to remain on naltrexone 50 mg daily. She has not experienced any side effects from these medications, including no abdominal pain, paresthesias, tiredness, or issues with concentration or memory. For her history of ADHD, she is also on Adderrall XR 20 mg daily, which she gets prescribed through her psychiatrist, whom she is scheduled to see again in December. She believes that, overall, her appetite has been reasonably controlled. She does have some issues with eating quickly, and there are times when she feels as though eating can be \"non-stop.\"     Dietary Recall:  Breakfast: bowl of cereal  Lunch: she often will have school lunch but sometimes packs something like a sandwich or leftovers  Dinner: spaghetti, chicken, vegetables, salads  Snacks: she does not generally snack unless she needs something to treat a low BG.   Drinks: she drinks milk with lunch and dinner; she also has a Diet Doctor Pepper everyday.    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 She also walks her dog for about 15 minutes at a time about every other day.     CURRENT WEIGHT:   170 lbs 0 oz (self reported weight)    Initial weight was 182 pounds on 10/14/16  Weight at her last appointment on 3/28/20 was 178 pounds  Self reported weight today is 170 pounds  Weight loss since her last appointment on 3/28/20 is down 8 pounds  Total weight loss is 12 pounds.    Initial Weight (lbs): 182 lbs  Last Visits Weight: 80.7 kg (178 lb)  Cumulative " "weight loss (lbs): 12  Weight Loss Percentage: 6.59%    Changes and Difficulties 10/7/2020   I have made the following changes to my diet since my last visit: Less milk   With regards to my diet, I am still struggling with: -   I have made the following changes to my activity/exercise since my last visit: Taking more walks   With regards to my activity/exercise, I am still struggling with: Doing other exercise       VITALS:  Ht 1.549 m (5' 1\")   Wt 77.1 kg (170 lb)   BMI 32.12 kg/m       GENERAL: Healthy, alert and no distress  EYES: Eyes grossly normal to inspection.   HENT: Normal cephalic/atraumatic.   RESP: No audible wheeze, cough.  No visible retractions or increased work of breathing.    MS: No gross musculoskeletal defects noted.  Normal range of motion.    SKIN: Visible skin clear.   NEURO: Cranial nerves grossly intact.  Mentation and speech appropriate for age.  PSYCH: Mentation appears normal, affect normal/bright, judgement and insight intact, normal speech and appearance well-groomed.    MEDICATIONS:   Prescribed from the weight management clinic: naltrexone 50 mg daily, topiramate 100 mg nightly  Additional weight altering medications include: bupropion 150 mg twice a day and adderrall XR 20 mg daily    Current Outpatient Medications   Medication Sig Dispense Refill     acetaminophen (TYLENOL) 325 MG tablet Take 2 tablets (650 mg) by mouth every 4 hours as needed for other (mild pain) 100 tablet 0     acetylcysteine (MUCOMYST) 20 % neb solution INHALE ONE 4ML NEB INTO LUNGS VIA NEBULIZER TWO TIMES A DAY, MAY INCREASE TO 3-4 TIMES A DAY WITH INCREASE COUGH/COLD SYMPTOMS 360 mL 11     albuterol (PROAIR HFA/PROVENTIL HFA/VENTOLIN HFA) 108 (90 Base) MCG/ACT Inhaler Inhale 2 puffs into the lungs every 6 hours as needed for shortness of breath / dyspnea or wheezing 1 Inhaler 12     albuterol (PROVENTIL) (2.5 MG/3ML) 0.083% neb solution Take 1 vial (2.5 mg) by nebulization every 4 hours as needed for " shortness of breath / dyspnea or wheezing 360 mL 11     amphetamine-dextroamphetamine (ADDERALL XR) 20 MG 24 hr capsule Take 1 capsule (20 mg) by mouth daily 30 capsule 0     amphetamine-dextroamphetamine (ADDERALL XR) 20 MG 24 hr capsule Take 1 capsule (20 mg) by mouth daily 30 capsule 0     azithromycin (ZITHROMAX) 500 MG tablet TAKE ONE TABLET BY MOUTH ON MONDAYS, WEDNESDAYS, AND FRIDAYS AS DIRECTED 36 tablet 4     beta carotene 52705 UNIT capsule TAKE 1 CAPSULE BY MOUTH IN THE MORNING ON MONDAY, WEDNESDAY AND FRIDAY 36 capsule 4     blood glucose (ACCU-CHEK SMARTVIEW) test strip Use to test blood sugar 4 times daily or as directed. 400 each 3     buPROPion (WELLBUTRIN XL) 300 MG 24 hr tablet Take 1 tablet (300 mg) by mouth every morning Please alert patient to change to one tablet daily 90 tablet 3     busPIRone (BUSPAR) 15 MG tablet Take 1 tablet (15 mg) by mouth 2 times daily 120 tablet 0     cetirizine (ZYRTEC) 10 MG tablet Take 1 tablet (10 mg) by mouth every evening 30 tablet 3     elexacaftor-tezacaftor-ivacaftor & ivacaftor (TRIKAFTA) 100-50-75 & 150 MG tablet pack TAKE TWO ORANGE TABLETS BY MOUTH IN THE MORNING AND ONE BLUE TABLET IN THE EVENING AS DIRECTED ON PACKAGE.  TAKE WITH FAT CONTAINING FOOD. 84 tablet 2     FLUoxetine (PROZAC) 40 MG capsule Take 2 capsules (80 mg) by mouth daily 180 capsule 0     fluticasone (FLONASE) 50 MCG/ACT nasal spray Spray 2 sprays into both nostrils 2 times daily (Patient taking differently: Spray 2 sprays into both nostrils as needed ) 15.8 mL 3     hydrOXYzine (ATARAX) 25 MG tablet Take 1 tablet (25 mg) by mouth nightly as needed (sleep) 90 tablet 0     medroxyPROGESTERone (DEPO-PROVERA) 150 MG/ML IM injection Inject 150 mg into the muscle       MedroxyPROGESTERone Acetate (DEPO-PROVERA IM)        meropenem (MERREM) 500 MG vial 500 mg by Nasal Instillation route once for 1 dose 60 each      montelukast (SINGULAIR) 10 MG tablet TAKE ONE TABLET BY MOUTH AT BEDTIME 30  tablet 11     Multiple Vitamin (MULTIVITAMIN OR) Take 1 tablet by mouth daily.       naltrexone (DEPADE/REVIA) 50 MG tablet Take 1 tablet.  Time it one to two hours prior to worst cravings. 90 tablet 1     omeprazole (PRILOSEC) 40 MG DR capsule Take 1 capsule (40 mg) by mouth 2 times daily 60 capsule 11     phytonadione (MEPHYTON) 5 MG tablet TAKE ONE TABLET BY MOUTH ONCE A WEEK 4 tablet 11     polyethylene glycol (MIRALAX) powder Take 17 g (1 capful) by mouth daily as needed for constipation 119 g 3     topiramate (TOPAMAX) 100 MG tablet Take 1 tablet (100 mg) by mouth daily 90 tablet 1     traZODone (DESYREL) 100 MG tablet Take 1 tablet (100 mg) by mouth At Bedtime 90 tablet 0     vitamin C (ASCORBIC ACID) 500 MG tablet TAKE ONE TABLET BY MOUTH TWICE A  tablet 3     VITAMIN D3 25 MCG (1000 UT) tablet TAKE ONE TABLET BY MOUTH EVERY  tablet 3     VITAMIN E 400 units capsule TAKE ONE CAPSULE BY MOUTH TWICE A  capsule 11       Weight Loss Medication History Reviewed With Patient 10/7/2020   Which weight loss medications are you currently taking on a regular basis?  Naltrexone   If you are not taking a weight loss medication that was prescribed to you, please indicate why: -   Are you having any side effects from the weight loss medication that we have prescribed you? No       ASSESSMENT:   Mamie Rice is a 25 year old female with a history of class 1 obesity. She also has a history of CF, CFRD, ADHD, depression, and joint pains. Currently on naltrexone and topiramate (currently at 100 mg daily) prescribed through our clinic, in addition to bupropion 150 mg twice a day (the combination of naltrexone and bupropion together makes up the components of contrave) and adderrall XR 20 mg daily (for ADHD, which can also help with appetite suppression). Overall, is doing well on these medications at these doses. That said, has experienced some recent stalling of weight loss over the last couple of  months. Discussed additional lifestyle modifications that can help with this, and suspect that it may also help to try switching the topiramate from the evening time to the morning time (given its action duration). In the future, could consider increasing the dose of topiramate further if needed. She will also talk with her psychiatrist about any potential changes to Adderrall XR, which should be based upon control of ADHD symptoms (but can also help with appetite suppression.      Additional plans and goals, made through shared decision making, as outlined below.     PLAN:   Patient Instructions   1. Food Goal: Continue to focus on good water intake. Should consider waiting at least 15-20 minutes after your first portion to see if you are still hungry before having a second portion. Will try to pack a lunch at least 3 days a week. For breakfast, options that might be more filing than a bowl of cereal include eggs, yogurt, piece of meat (proteins)    2. Activity Goal: Will walk the dog at least 5 days a week.     3. Medications: Continue the topiramate 100 mg daily, but shift it to the morning time. Continue the naltrexone 50 mg daily. Should discuss Adderrall XR dosing at your next appointment with psychiatry as this medication can also with hunger.     FOLLOW-UP:    3 months.    Time: 20 min spent on evaluation, management, counseling, education, & motivational interviewing with greater than 50 % of the total time was spent on counseling and coordinating care    Sincerely,    Charles Park MD    Video-Visit Details    Type of service:  Video Visit    Video Start Time: 10:25 AM  Video End Time: 10:45 AM    Originating Location (pt. Location): Other at her work    Distant Location (provider location):  [facility] [place]     Platform used for Video Visit: Adify

## 2020-10-09 NOTE — NURSING NOTE
"Chief Complaint   Patient presents with     Follow Up     [facility] follow up       Vitals:    10/09/20 0949   Weight: 77.1 kg (170 lb)   Height: 1.549 m (5' 1\")       Body mass index is 32.12 kg/m .                              "

## 2020-10-12 ENCOUNTER — CLINICAL UPDATE (OUTPATIENT)
Dept: PHARMACY | Facility: CLINIC | Age: 25
End: 2020-10-12
Payer: COMMERCIAL

## 2020-10-12 DIAGNOSIS — E84.9 CYSTIC FIBROSIS (H): Primary | ICD-10-CM

## 2020-10-12 PROCEDURE — 99207 PR NO CHARGE LOS: CPT | Performed by: PHARMACIST

## 2020-10-12 NOTE — PROGRESS NOTES
Reviewed Mamie's most recent hepatic panel and CK labs from local clinic- all within normal limits, ok to continue Trikafta as she has been.  Patient notified via [employer]

## 2020-10-13 RX ORDER — TOPIRAMATE 100 MG/1
TABLET, FILM COATED ORAL
Qty: 90 TABLET | Refills: 1 | OUTPATIENT
Start: 2020-10-13

## 2020-10-13 NOTE — TELEPHONE ENCOUNTER
Received refill request for topiramate (TOPAMAX. Patient needs appointment scheduled prior to any refills. Clinic Coordinator notified and will follow up with the patient as appropriate. The pharmacy has been notified that the medication will not be refilled prior to an appointment being scheduled..

## 2020-10-14 ENCOUNTER — TELEPHONE (OUTPATIENT)
Dept: ENDOCRINOLOGY | Facility: CLINIC | Age: 25
End: 2020-10-14

## 2020-10-26 DIAGNOSIS — E84.9 CYSTIC FIBROSIS (H): ICD-10-CM

## 2020-10-26 DIAGNOSIS — G47.00 INSOMNIA, UNSPECIFIED TYPE: ICD-10-CM

## 2020-10-26 RX ORDER — ELEXACAFTOR, TEZACAFTOR, AND IVACAFTOR 100-50-75
KIT ORAL
Qty: 84 TABLET | Refills: 3 | Status: SHIPPED | OUTPATIENT
Start: 2020-10-26 | End: 2021-02-11

## 2020-10-29 RX ORDER — TRAZODONE HYDROCHLORIDE 100 MG/1
100 TABLET ORAL AT BEDTIME
Qty: 30 TABLET | Refills: 1 | Status: SHIPPED | OUTPATIENT
Start: 2020-10-29 | End: 2020-12-22

## 2020-10-30 NOTE — TELEPHONE ENCOUNTER
Medication requested: TRAZODONE HCL 100MG TABS  Last refilled: 9/21/20  Qty: 30      Last seen: 6/30/20  RTC: 6 months  Cancel: 0  No-show: 0  Next appt: 12/22/20    Refill decision:   Refilled for 30 days per protocol.    Warnings Override History for traZODone (DESYREL) 100 MG tablet [[id number]]    Overridden by Jessie Pitts APRN CNP on Oct 13, 2020 12:30 PM   Drug-Drug   1. SELECTIVE SEROTONIN REUPTAKE INHIBITORS / SEROTONERGIC NON-OPIOID CNS DEPRESSANTS [Level: Major] [Reason: Will monitor drug levels/drug effects ]   Other Orders: FLUoxetine (PROZAC) 40 MG capsule

## 2020-11-08 ENCOUNTER — HEALTH MAINTENANCE LETTER (OUTPATIENT)
Age: 25
End: 2020-11-08

## 2020-11-11 DIAGNOSIS — K86.89 PANCREATIC INSUFFICIENCY: ICD-10-CM

## 2020-11-11 DIAGNOSIS — F41.1 GAD (GENERALIZED ANXIETY DISORDER): ICD-10-CM

## 2020-11-11 DIAGNOSIS — E84.9 CF (CYSTIC FIBROSIS) (H): ICD-10-CM

## 2020-11-11 RX ORDER — VITAMIN E 268 MG
CAPSULE ORAL
Qty: 100 CAPSULE | Refills: 11 | Status: SHIPPED | OUTPATIENT
Start: 2020-11-11 | End: 2023-01-03

## 2020-11-13 RX ORDER — BUSPIRONE HYDROCHLORIDE 15 MG/1
TABLET ORAL
Qty: 60 TABLET | Refills: 5 | Status: SHIPPED | OUTPATIENT
Start: 2020-11-13 | End: 2020-12-24

## 2020-11-13 NOTE — TELEPHONE ENCOUNTER
Medication requested: BUSPIRONE HCL 15MG TABSTake 1 tablet (15 mg) by mouth 2 times daily   Last refilled: 6/30/20  Qty: 120/0      Last seen: 6/30/20 G[place]  RTC: 6 months  Cancel: 0  No-show: 0  Next appt: 12/22/20    Refill decision:   Refilled for 30 days per protocol.

## 2020-11-19 DIAGNOSIS — E84.9 CYSTIC FIBROSIS (H): ICD-10-CM

## 2020-11-19 RX ORDER — ELEXACAFTOR, TEZACAFTOR, AND IVACAFTOR 100-50-75
KIT ORAL
Qty: 84 TABLET | Refills: 3 | OUTPATIENT
Start: 2020-11-19

## 2020-11-23 ENCOUNTER — MYC REFILL (OUTPATIENT)
Dept: PSYCHIATRY | Facility: CLINIC | Age: 25
End: 2020-11-23

## 2020-11-23 DIAGNOSIS — F90.0 ATTENTION DEFICIT HYPERACTIVITY DISORDER (ADHD), PREDOMINANTLY INATTENTIVE TYPE: ICD-10-CM

## 2020-11-24 RX ORDER — DEXTROAMPHETAMINE SACCHARATE, AMPHETAMINE ASPARTATE MONOHYDRATE, DEXTROAMPHETAMINE SULFATE AND AMPHETAMINE SULFATE 5; 5; 5; 5 MG/1; MG/1; MG/1; MG/1
20 CAPSULE, EXTENDED RELEASE ORAL DAILY
Qty: 30 CAPSULE | Refills: 0 | Status: SHIPPED | OUTPATIENT
Start: 2020-11-24 | End: 2020-12-22

## 2020-11-24 NOTE — TELEPHONE ENCOUNTER
Last seen: 6/30  RTC: 6 months  Non-provider cancel: None  No-show: None  Next appt: 12/22     Incoming refill from patient via [employer]     Medication requested:   Disp Refills Start End PETE   amphetamine-dextroamphetamine (ADDERALL XR) 20 MG 24 hr capsule 30 capsule 0 5/27/2020  No   Sig - Route: Take 1 capsule (20 mg) by mouth daily    Last refilled: 10/20, 9/9, 8/11 per [place]      Medication refill approved per refill protocol.  Routed to provider to sign and send.          11/24/20 1126 Sign Jessie Pitts APRN CNP   E-Prescribing Status: Receipt confirmed by pharmacy (11/24/2020 11:26 AM CST)    Routed message to pt via [employer].

## 2020-12-22 ENCOUNTER — VIRTUAL VISIT (OUTPATIENT)
Dept: PSYCHIATRY | Facility: CLINIC | Age: 25
End: 2020-12-22
Attending: NURSE PRACTITIONER
Payer: COMMERCIAL

## 2020-12-22 DIAGNOSIS — F41.1 GAD (GENERALIZED ANXIETY DISORDER): ICD-10-CM

## 2020-12-22 DIAGNOSIS — F33.0 MILD EPISODE OF RECURRENT MAJOR DEPRESSIVE DISORDER (H): ICD-10-CM

## 2020-12-22 DIAGNOSIS — F90.0 ATTENTION DEFICIT HYPERACTIVITY DISORDER (ADHD), PREDOMINANTLY INATTENTIVE TYPE: Primary | ICD-10-CM

## 2020-12-22 DIAGNOSIS — Z79.899 MEDICATION MANAGEMENT: ICD-10-CM

## 2020-12-22 DIAGNOSIS — G47.00 INSOMNIA, UNSPECIFIED TYPE: ICD-10-CM

## 2020-12-22 DIAGNOSIS — F90.0 ATTENTION DEFICIT HYPERACTIVITY DISORDER (ADHD), PREDOMINANTLY INATTENTIVE TYPE: ICD-10-CM

## 2020-12-22 PROCEDURE — 99214 OFFICE O/P EST MOD 30 MIN: CPT | Mod: 95 | Performed by: NURSE PRACTITIONER

## 2020-12-22 RX ORDER — DEXTROAMPHETAMINE SULFATE, DEXTROAMPHETAMINE SACCHARATE, AMPHETAMINE SULFATE AND AMPHETAMINE ASPARTATE 5; 5; 5; 5 MG/1; MG/1; MG/1; MG/1
20 CAPSULE, EXTENDED RELEASE ORAL DAILY
Qty: 30 CAPSULE | Refills: 0 | Status: SHIPPED | OUTPATIENT
Start: 2020-12-22 | End: 2020-12-24

## 2020-12-22 RX ORDER — DEXTROAMPHETAMINE SACCHARATE, AMPHETAMINE ASPARTATE MONOHYDRATE, DEXTROAMPHETAMINE SULFATE AND AMPHETAMINE SULFATE 5; 5; 5; 5 MG/1; MG/1; MG/1; MG/1
20 CAPSULE, EXTENDED RELEASE ORAL DAILY
Qty: 30 CAPSULE | Refills: 0 | Status: SHIPPED | OUTPATIENT
Start: 2020-12-22 | End: 2020-12-22

## 2020-12-22 RX ORDER — DEXTROAMPHETAMINE SULFATE, DEXTROAMPHETAMINE SACCHARATE, AMPHETAMINE SULFATE AND AMPHETAMINE ASPARTATE 5; 5; 5; 5 MG/1; MG/1; MG/1; MG/1
20 CAPSULE, EXTENDED RELEASE ORAL DAILY
Qty: 30 CAPSULE | Refills: 0 | Status: SHIPPED | OUTPATIENT
Start: 2021-01-20 | End: 2021-06-08

## 2020-12-22 RX ORDER — DEXTROAMPHETAMINE SACCHARATE, AMPHETAMINE ASPARTATE MONOHYDRATE, DEXTROAMPHETAMINE SULFATE AND AMPHETAMINE SULFATE 5; 5; 5; 5 MG/1; MG/1; MG/1; MG/1
20 CAPSULE, EXTENDED RELEASE ORAL DAILY
Qty: 30 CAPSULE | Refills: 0 | Status: CANCELLED | OUTPATIENT
Start: 2020-12-22

## 2020-12-22 RX ORDER — FLUOXETINE 40 MG/1
CAPSULE ORAL
Qty: 60 CAPSULE | Refills: 1 | Status: SHIPPED | OUTPATIENT
Start: 2020-12-22 | End: 2020-12-24

## 2020-12-22 RX ORDER — TRAZODONE HYDROCHLORIDE 100 MG/1
100 TABLET ORAL AT BEDTIME
Qty: 30 TABLET | Refills: 1 | Status: SHIPPED | OUTPATIENT
Start: 2020-12-22 | End: 2020-12-24

## 2020-12-22 ASSESSMENT — PAIN SCALES - GENERAL: PAINLEVEL: NO PAIN (0)

## 2020-12-22 NOTE — TELEPHONE ENCOUNTER
Last Seen 06/30/20    RTC 6 months    Cancel 0  No-Show 0    Next Appt 12/22/20 at 1600    Incoming Refill From [phone] via Fax    Medication Requested Amphetamine-Dextroamphetamine ER 20 mg Capsules    Directions Take 1 Capsule by mouth Daily    Qty 30    Last Refill 11/24/20 Qty 30 with no refills       Medication Refill Pended Per Refill Protocol and routed to the Provider.

## 2020-12-22 NOTE — PATIENT INSTRUCTIONS
-Contninue on current medication regimen for now   -Complete EKG at your primary care as soon as you are able.  We have faxed the order to your primary care clinic.    -If EKG result is normal, follow up in 6 months, but if abnormal, will schedule appointment immediately after EKG result review    Thank you for coming to the [facility] [place] [facility].    Lab Testing:  If you had lab testing today and your results are reassuring or normal they will be mailed to you or sent through [employer] within 7 days. If the lab tests need quick action we will call you with the results. The phone number we will call with results is # [phone] (home) . If this is not the best number please call our clinic and change the number.    Medication Refills:  If you need any refills please call your pharmacy and they will contact us. Our fax number for refills is [phone]. Please allow three business for refill processing. If you need to  your refill at a new pharmacy, please contact the new pharmacy directly. The new pharmacy will help you get your medications transferred.     Scheduling:  If you have any concerns about today's visit or wish to schedule another appointment please call our office during normal business hours [phone] (8-5:00 M-F)    Contact Us:  Please call [phone] during business hours (8-5:00 M-F).  If after clinic hours, or on the weekend, please call  [phone].    Financial Assistance [phone]  [facility] Billing [phone]  Central Billing Office, [facility]: [phone]  [facility] Billing [phone]  Medical Records [phone]  [facility] Patient Bill of Rights [email or web address]       MENTAL HEALTH CRISIS NUMBERS:  For a medical emergency please call  911 or go to the nearest ER.     [facility] [place]:   [facility] -[phone]   Crisis Residence [facility] -[phone]   [facility] Center [facility] -[phone]   COPE 24/7 [facility] Mobile Team -[phone] (adults)/[phone] (child)  CHILD: [facility] needs assessment team - [phone]      [facility]:   [facility] - [phone]   Walk-in counseling [facility] - [phone]   Walk-in counseling [facility] - [phone]   Crisis Residence [facility] - [phone]  Urgent Care Adult [facility]-[phone] mobile unit/ 24/7 crisis line    National Crisis Numbers:   National Suicide Prevention Lifeline: 0-898-490-TALK (627-720-6525)  Poison Control Center - 6-594-928-2197  [employer]/resources for a list of additional resources (SOS)  Trans Lifeline a hotline for transgender people 4-511-165-2855  The [employer] a hotline for LGBT youth 3-080-134-6416  Crisis Text Line: For any crisis 24/7   To: 488809  see www.crisistextline.org  - IF MAKING A CALL FEELS TOO HARD, send a text!         Again thank you for choosing [facility] and please let us know how we can best partner with you to improve you and your family's health.    You may be receiving a survey regarding this appointment. We would love to have your feedback, both positive and negative. The survey is done by an external company, so your answers are anonymous.

## 2020-12-22 NOTE — PROGRESS NOTES
"VIDEO VISIT  Mamie Rice is a 25 year old patient who is being evaluated via a billable video visit.      The patient has been notified of following:   \"This video visit will be conducted via a call between you and your physician/provider. We have found that certain health care needs can be provided without the need for an in-person physical exam. This service lets us provide the care you need with a video conversation. If a prescription is necessary we can send it directly to your pharmacy. If lab work is needed we can place an order for that and you can then stop by our lab to have the test done at a later time. Insurers are generally covering virtual visits as they would in-office visits so billing should not be different than normal.  If for some reason you do get billed incorrectly, you should contact the billing office to correct it and that number is in the AVS .    Video Conference to be completed via:  [email or web address]    Patient has given verbal consent for video visit?:  Yes    Patient would prefer that any video invitations be sent by: Send to e-mail at: [email or web address]    How would patient like to obtain AVS?:  [employer]    AVS SmartPhrase [PsychAVS] has been placed in 'Patient Instructions':  Yes     Start Time:  1604         End Time: 1622    Telemedicine Visit: The patient's condition can be safely assessed and treated via synchronous audio and visual telemedicine encounter.      Reason for Telemedicine Visit: Due to COVID 19 pandemic, clinic switching all appointments to telemedicine     Originating Site (Patient Location): Patient's place of employment    Distant Site (Provider Location): Provider Remote Setting    Consent:  The patient/guardian has verbally consented to: the potential risks and benefits of telemedicine (video visit) versus in person care; bill my insurance or make self-payment for services provided; and responsibility for payment of non-covered services.     Mode of " Communication:  Video Conference via [email or web address]    As the provider I attest to compliance with applicable laws and regulations related to telemedicine.    Psychiatry Clinic Progress Note                                                                  Patient Name: Mamie Rice  YOB: 1995  MRN: [id number]  Date of Service:  12/22/2020  Last Seen:6/30/2020    Mamie Rice is a 25 year old person assigned female at birth, identifies as cisgender female who uses the name Mamie and pronoun kenneth.      Mamie Rice is a 25 year old year old adult who presents for ongoing psychiatric care.  Mamie Rice was last seen on 6/30/2020.     At that time,     Medication Ordered/Consults/Labs/tests Ordered:      Medication: continue on current medication regimen  OTC Recommendations: none  Lab Orders:  EKG in the future  Referrals: none  Release of Information: none  Future Treatment Considerations: per symptoms.   Return for Follow Up: in 6 months per pt's request    Pertinent Background:  Diagnoses include MDD, MECHE and ADHD.  Psych critical item history includes [no critical items]. Medical complication includes Cystic Fibrosis, DM I     Previous medication trial: Phentamine, Trazodone (150 mg oversedating, 50 mg ineffective)     Naltrexone, Wellbutrin and Topamax are managed by weight management clinic.    Interim History                                                                                                        4, 4     Since the last visit, reports doing well, denies SI, SIB or HI.  Has been sleeping 7-8 hours without taking Hydroxyzine. Pt wants to continue on current medication regimen as she has been doing well.  Has not done EKG, but notes she will not come to [facility] for a while.  Her PCP is at [facility] in [place], but her long time PCP resigned, so she needs to establish new provider.  But notes she has seen Sonya Hernandez in the clinic.    Denies any symptoms suggestive of  hypomania or psychosis.    Current Suicidality/Hx of Suicide Attempts: Denies both  CoCominent Medical concerns: Denies       Medication Side Effects: The patient denies all medication side effects.      Medical Review of Systems     Apart from the symptoms mentioned int he HPI, the 14 point review of systems, including constitutional, HEENT, cardiovascular, respiratory, gastrointestinal, genitourinary, musculoskeletal, integumentary, endocrine, neurological, hematologic and allergic is entirely negative.    Pregnant: None. Nursing: None, Contraception: DMPA      Substance Use   Denies frequent or abuse of alcohol. Rarely drinks. Denies any other substance use.     Medical / Surgical History                                                                                                                  Patient Active Problem List   Diagnosis     Hip joint pain     Aspergillosis (H)     Chronic maxillary sinusitis     Hypoglycemia     Type 1 diabetes mellitus (H)     Cystic fibrosis of the lung (H)     Chronic constipation     Frontal sinusitis     Overactive child     Back pain     Pancreatic insufficiency     Non morbid obesity due to excess calories     Acne vulgaris     Cystic fibrosis (H)     Diabetes mellitus, type 2 (H)     Persistent depressive disorder     Mild episode of recurrent major depressive disorder (H)     Insomnia, unspecified type     MECHE (generalized anxiety disorder)     Attention deficit hyperactivity disorder (ADHD), combined type     Knee pain       Past Surgical History:   Procedure Laterality Date     ARTHROSCOPY HIP, OSTEOPLASTY FEMUR PROXIMAL, COMBINED  3/11/2013    Procedure: COMBINED ARTHROSCOPY HIP, OSTEOPLASTY FEMUR PROXIMAL;  Right Hip Arthroscopy, Labral  Debridement.    surgeon request choice anesthesia/[facility] surgery;  Surgeon: Omkar Austin MD;  [facility]: [facility]     ARTHROSCOPY KNEE WITH MEDIAL MENISCECTOMY Left 1/31/2017    Procedure: ARTHROSCOPY KNEE WITH  MEDIAL MENISCECTOMY;  Surgeon: Jethro Coyle MD;  [facility]: [facility] OR     bronchoscopies       BRONCHOSCOPY       EXAM UNDER ANESTHESIA ANUS N/A 5/10/2016    Procedure: EXAM UNDER ANESTHESIA ANUS;  Surgeon: Chet Gaviria MD;  [facility]     EXAM UNDER ANESTHESIA, RESTORATIONS, EXTRACTION(S) DENTAL COMPLEX, COMBINED  5/13/2013    Procedure: COMBINED EXAM UNDER ANESTHESIA, RESTORATIONS, EXTRACTION(S) DENTAL COMPLEX;  Dental Exam, Radiographs, Restorations. Single Extraction  Tooth #2. Restorations x 3;  Surgeon: Danilo Ortiz DDS;  [facility] OR     HC KNEE SCOPE, DIAGNOSTIC      Arthroscopy, Knee- left     INJECT BOTOX N/A 5/10/2016    Procedure: INJECT BOTOX;  Surgeon: Chet Gaviria MD;  [facility]     left hip labral tear  5/11/2011    left hip arthroscopy with labral debridement and synovectomy     meniscus repair       OPTICAL TRACKING SYSTEM ENDOSCOPIC SINUS SURGERY  10/14/2011    Procedure:OPTICAL TRACKING SYSTEM ENDOSCOPIC SINUS SURGERY; FESS (functional endoscopic sinus surgery) with Image Guidance, bronchial lavage and cultures; Surgeon:GOYO KUO; Location[facility] OR     OPTICAL TRACKING SYSTEM ENDOSCOPIC SINUS SURGERY  5/18/2012    Procedure:OPTICAL TRACKING SYSTEM ENDOSCOPIC SINUS SURGERY; Right  and Left Image Guided Functional Endoscopic Sinus Surgery With  Frontal Approach, [employer]; Surgeon:GOYO KUO; [facility]     OPTICAL TRACKING SYSTEM ENDOSCOPIC SINUS SURGERY  9/26/2012    Procedure: OPTICAL TRACKING SYSTEM ENDOSCOPIC SINUS SURGERY;  Stealth Guided Bilateral Functional Endoscopic Sinus Surgery *Latex Safe*;  Surgeon: Goyo Kuo MD;  [facility]     OPTICAL TRACKING SYSTEM ENDOSCOPIC SINUS SURGERY Bilateral 10/16/2015    Procedure: OPTICAL TRACKING SYSTEM ENDOSCOPIC SINUS SURGERY;  Surgeon: Mariela Haile MD;  [facility]     ORTHOPEDIC SURGERY      left hip tear repair 2010     SINUS SURGERY          Social/ Family History                                   [per patient report]                                 1ea,1ea   Living arrangements: lives with parents and feels safe  Social Support:parents, friends, maternal grandparents  Access to gun: denies  Denies any trauma hx, numerous hospitalizations during childhood, does not consider this as traumatic  Works as a day care provider, dance coach and substitute .      Allergy                                Vancomycin and Augmentin    Current Medications                                                                                                       Current Outpatient Medications   Medication Sig Dispense Refill     acetaminophen (TYLENOL) 325 MG tablet Take 2 tablets (650 mg) by mouth every 4 hours as needed for other (mild pain) 100 tablet 0     acetylcysteine (MUCOMYST) 20 % neb solution INHALE ONE 4ML NEB INTO LUNGS VIA NEBULIZER TWO TIMES A DAY, MAY INCREASE TO 3-4 TIMES A DAY WITH INCREASE COUGH/COLD SYMPTOMS 360 mL 11     albuterol (PROAIR HFA/PROVENTIL HFA/VENTOLIN HFA) 108 (90 Base) MCG/ACT Inhaler Inhale 2 puffs into the lungs every 6 hours as needed for shortness of breath / dyspnea or wheezing 1 Inhaler 12     albuterol (PROVENTIL) (2.5 MG/3ML) 0.083% neb solution Take 1 vial (2.5 mg) by nebulization every 4 hours as needed for shortness of breath / dyspnea or wheezing 360 mL 11     amphetamine-dextroamphetamine (ADDERALL XR) 20 MG 24 hr capsule Take 1 capsule (20 mg) by mouth daily . Scheduled with provider 12/22/20. 30 capsule 0     azithromycin (ZITHROMAX) 500 MG tablet TAKE ONE TABLET BY MOUTH ON MONDAYS, WEDNESDAYS, AND FRIDAYS AS DIRECTED 36 tablet 4     beta carotene 59946 UNIT capsule TAKE 1 CAPSULE BY MOUTH IN THE MORNING ON MONDAY, WEDNESDAY AND FRIDAY 36 capsule 4     blood glucose (ACCU-CHEK SMARTVIEW) test strip Use to test blood sugar 4 times daily or as directed. 400 each 3     buPROPion (WELLBUTRIN XL) 300 MG 24 hr tablet Take 1 tablet (300 mg)  by mouth every morning Please alert patient to change to one tablet daily 90 tablet 3     busPIRone (BUSPAR) 15 MG tablet TAKE ONE TABLET BY MOUTH TWICE A DAY 60 tablet 5     cetirizine (ZYRTEC) 10 MG tablet Take 1 tablet (10 mg) by mouth every evening 30 tablet 3     elexacaftor-tezacaftor-ivacaftor & ivacaftor (TRIKAFTA) 100-50-75 & 150 MG tablet pack TAKE TWO ORANGE TABLETS BY MOUTH IN THE MORNING AND ONE BLUE TABLET IN THE EVENING AS DIRECTED ON PACKAGE.  TAKE WITH FAT CONTAINING FOOD. 84 tablet 3     FLUoxetine (PROZAC) 40 MG capsule TAKE TWO CAPSULES BY MOUTH EVERY DAY 60 capsule 3     fluticasone (FLONASE) 50 MCG/ACT nasal spray Spray 2 sprays into both nostrils 2 times daily (Patient taking differently: Spray 2 sprays into both nostrils as needed ) 15.8 mL 3     hydrOXYzine (ATARAX) 25 MG tablet Take 1 tablet (25 mg) by mouth nightly as needed (sleep) 90 tablet 0     medroxyPROGESTERone (DEPO-PROVERA) 150 MG/ML IM injection Inject 150 mg into the muscle       MedroxyPROGESTERone Acetate (DEPO-PROVERA IM)        meropenem (MERREM) 500 MG vial 500 mg by Nasal Instillation route once for 1 dose 60 each      montelukast (SINGULAIR) 10 MG tablet TAKE ONE TABLET BY MOUTH AT BEDTIME 30 tablet 11     Multiple Vitamin (MULTIVITAMIN OR) Take 1 tablet by mouth daily.       naltrexone (DEPADE/REVIA) 50 MG tablet Take 1 tablet.  Time it one to two hours prior to worst cravings. 90 tablet 4     omeprazole (PRILOSEC) 40 MG DR capsule Take 1 capsule (40 mg) by mouth 2 times daily 60 capsule 11     phytonadione (MEPHYTON) 5 MG tablet TAKE ONE TABLET BY MOUTH ONCE A WEEK 4 tablet 11     polyethylene glycol (MIRALAX) powder Take 17 g (1 capful) by mouth daily as needed for constipation 119 g 3     topiramate (TOPAMAX) 100 MG tablet Take 1 tablet (100 mg) by mouth daily 90 tablet 4     traZODone (DESYREL) 100 MG tablet Take 1 tablet (100 mg) by mouth At Bedtime 30 tablet 1     vitamin C (ASCORBIC ACID) 500 MG tablet TAKE ONE  "TABLET BY MOUTH TWICE A  tablet 3     VITAMIN D3 25 MCG (1000 UT) tablet TAKE ONE TABLET BY MOUTH EVERY  tablet 3     vitamin E (TOCOPHEROL) 400 units (180 mg) capsule TAKE ONE CAPSULE BY MOUTH TWICE A  capsule 11          Mental Status Exam                                                                                   9, 14 cog        Alertness: alert  and oriented  Appearance:  Casually dressed and Adequately groomed  Behavior/Demeanor: cooperative, pleasant and calm, with good  eye contact   Speech: regular rate and rhythm  Mood :  \"good\"  Affect: full range and appropriate; was congruent to mood; was congruent to content  Thought Process (Associations):  Logical, Linear and Goal directed  Thought process (Rate):  Normal  Thought content:  no overt psychosis, denies suicidal ideation, intent or thoughts and patient does not appear to be responding to internal stimuli  Perception:  Reports none;  Denies auditory hallucinations and visual hallucinations  Attention/Concentration:  Normal  Memory:  Immediate recall intact and Short-term memory intact  Language: intact  Fund of Knowledge/Intelligence:  Average  Abstraction:  Normal  Insight:  Fair  Judgment:  Fair  Cognition: (6) does  appear grossly intact; formal cognitive testing was not done    Physical Exam     Motor activity/EPS:  Normal  Psychomotor: normal or unremarkable    Labs and Results      Pertinent findings on review include: Review of records with relevant information reported in the HPI.  Reviewed pt's past medical record and obtained collateral information.      [place] PRESCRIPTION MONITORING PROGRAM [] was checked today:  indicates Adderall 11/24, 10/20, 9/12, 8/13, 7/8, Norco 12/5.    PHQ9 Today:  N/A  PHQ 11/12/2019 12/18/2019 3/31/2020   PHQ-9 Total Score 9 4 7   Q9: Thoughts of better off dead/self-harm past 2 weeks Not at all Not at all Not at all       MECHE 7 Today: N/A  MECHE-7 SCORE 5/12/2017 7/24/2018 11/12/2019   Total " Score 11 9 11       Recent Labs   Lab Test 08/14/19  0814 08/02/17  0654 05/04/17  0626   CR 0.85 0.77 0.75   GFRESTIMATED >90 >90  Non  GFR Calc   >90  Non  GFR Calc       Recent Labs   Lab Test 10/07/20 04/07/20   AST 18 16   ALT 17 18   ALKPHOS 70 56     08/08/2012:      PSYCHOTROPIC DRUG INTERACTIONS:    Adderall---Buspar---Prozac---Trazodone---Wellbutrin: Concurrent use of AMPHETAMINES and SEROTONERGIC AGENTS may result in increased risk of serotonin syndrome.   Adderall---Prozac---Omeprazole: Concurrent use of AMPHETAMINES and SEROTONERGIC AGENTS THAT INHIBIT CYP2D6 may result in increased amphetamine exposure  Buspar---Trazodone---Vistaril---Zyrtec---Topamax: Concurrent use of TRAZODONE and SEROTONERGIC CENTRAL NERVOUS SYSTEM DEPRESSANTS may result in increased risk of CNS depression.   Prozac---Trazodone: Concurrent use of TRAZODONE and SEROTONERGIC DRUGS THAT PROLONG QT INTERVAL may result in increased risk of QT-interval prolongation.   Prozac---Wellbutrin: Concurrent use of BUPROPION and SELECTED SEIZURE THRESHOLD LOWERING CYP2D6 SUBSTRATES may result in increased exposure of CYP2D6 substrates; increased risk of seizure.  Glycopyrrolate---Vistaril: Concurrent use of GLYCOPYRROLATE and ANTICHOLINERGICS may result in increased risk of anticholinergic side effects .    Glycopyrrolate---Wellbutrin---Vistaril: Concurrent use of BUPROPION and AGENTS LOWERING SEIZURE THRESHOLD may result in lower seizure threshold  Vistaril---Zithromax---Trazodone: Concurrent use of HYDROXYZINE and QT PROLONGING AGENTS may result in increased risk of QT-interval prolongation.   Buspar---Prozac: Concurrent use of FLUOXETINE and BUSPIRONE may result in worsening of psychiatric symptoms.      MANAGEMENT:  Monitoring for adverse effects, routine vitals, periodic EKGs and patient is aware of risks    Impression/Assessment      Mamie Rice is a 25 year old adult  who presents for med  management follow up.  Pt appears stable in her mood and anxiety, denies SI, SIB or HI.  Pt notes doing well and wants to continue current medication regimen.  Also indicates due to insurance not covering generic Adderall XR, needs to order brand name PETE.  Discussed since pt has not had EKG since 2012 and due to possible medication interactions, strongly recommended completing EKG.  If EKG is relatively WNL, ok to continue on current medication regimen, but if it's abnormal, will follow up sooner to make medication adjustment.  EKG ordered and nursing staff was instructed to fax it to her PCP.  Pt was encouraged to complete EKG within 2 months and all medications (Trazodone, Buspar, Prozac, Adderall) were refilled for 2 months.  Naltrexone, Wellbutrin and Topamax are managed by weight mgmt clinic.      Diagnosis                                                                    MDD  MECHE  ADHD    Treatment Recommendation & Plan       Medication Ordered/Consults/Labs/tests Ordered:     Medication: Contninue on current medication regimen for now   OTC Recommendations: none  Lab Orders:  EKG  Referrals: none  Release of Information: none  Future Treatment Considerations: Per symptoms.   Return for Follow Up: depends on EKG result, if WNL, in 6 months, if abnormal, as soon as EKG is done    -Discussed safety plan for suicidal thoughts  -Discussed plan for suicidality  -Discussed available emergency services  -Patient agrees with the treatment plan  -Encouraged to continue outpatient therapy to gain more coping mechanism for stress.      Treatment Risk Statement: Discussed with the patient my impressions, as well as recommended studies. I educated patient on the differential diagnosis and prognosis. I discussed with the patient the risks and benefits of medications versus no interventions, including efficacy, dose, possible side effects and length of treatment and the importance of medication compliance.  The patient  understands the risks, benefits, adverse effects and alternatives. Agrees to treatment with the capacity to do so. No medical contraindications to treatment. The patient also understands the risks of using street drugs or alcohol.   CRISIS NUMBERS:   Provided routinely in AVS.           Jessie Pitts CNP,  12/22/2020

## 2020-12-23 ENCOUNTER — TELEPHONE (OUTPATIENT)
Dept: PSYCHIATRY | Facility: CLINIC | Age: 25
End: 2020-12-23

## 2020-12-23 ENCOUNTER — TRANSFERRED RECORDS (OUTPATIENT)
Dept: HEALTH INFORMATION MANAGEMENT | Facility: CLINIC | Age: 25
End: 2020-12-23

## 2020-12-23 NOTE — TELEPHONE ENCOUNTER
----- Message from DANIEL Villagomez CNP sent at 12/22/2020  4:29 PM CST -----  Regarding: fax EKG order  Would you pls fax EKG order to [phone] [facility]    Thank you!          Writer coordinated with in-clinic staff to fax order.

## 2020-12-24 ENCOUNTER — TELEPHONE (OUTPATIENT)
Dept: PSYCHIATRY | Facility: CLINIC | Age: 25
End: 2020-12-24

## 2020-12-24 DIAGNOSIS — F33.0 MILD EPISODE OF RECURRENT MAJOR DEPRESSIVE DISORDER (H): ICD-10-CM

## 2020-12-24 DIAGNOSIS — F41.1 GAD (GENERALIZED ANXIETY DISORDER): ICD-10-CM

## 2020-12-24 DIAGNOSIS — G47.00 INSOMNIA, UNSPECIFIED TYPE: ICD-10-CM

## 2020-12-24 DIAGNOSIS — F90.0 ATTENTION DEFICIT HYPERACTIVITY DISORDER (ADHD), PREDOMINANTLY INATTENTIVE TYPE: Primary | ICD-10-CM

## 2020-12-24 DIAGNOSIS — F34.1 PERSISTENT DEPRESSIVE DISORDER: ICD-10-CM

## 2020-12-24 RX ORDER — DEXTROAMPHETAMINE SACCHARATE, AMPHETAMINE ASPARTATE MONOHYDRATE, DEXTROAMPHETAMINE SULFATE AND AMPHETAMINE SULFATE 5; 5; 5; 5 MG/1; MG/1; MG/1; MG/1
20 CAPSULE, EXTENDED RELEASE ORAL DAILY
Qty: 30 CAPSULE | Refills: 0 | Status: SHIPPED | OUTPATIENT
Start: 2021-02-19 | End: 2021-03-21

## 2020-12-24 RX ORDER — DEXTROAMPHETAMINE SACCHARATE, AMPHETAMINE ASPARTATE MONOHYDRATE, DEXTROAMPHETAMINE SULFATE AND AMPHETAMINE SULFATE 5; 5; 5; 5 MG/1; MG/1; MG/1; MG/1
20 CAPSULE, EXTENDED RELEASE ORAL DAILY
Qty: 30 CAPSULE | Refills: 0 | Status: SHIPPED | OUTPATIENT
Start: 2021-03-21 | End: 2021-04-20

## 2020-12-24 RX ORDER — BUSPIRONE HYDROCHLORIDE 15 MG/1
15 TABLET ORAL 2 TIMES DAILY
Qty: 60 TABLET | Refills: 5 | Status: SHIPPED | OUTPATIENT
Start: 2020-12-24 | End: 2021-05-25

## 2020-12-24 RX ORDER — DEXTROAMPHETAMINE SACCHARATE, AMPHETAMINE ASPARTATE MONOHYDRATE, DEXTROAMPHETAMINE SULFATE AND AMPHETAMINE SULFATE 5; 5; 5; 5 MG/1; MG/1; MG/1; MG/1
20 CAPSULE, EXTENDED RELEASE ORAL DAILY
Qty: 30 CAPSULE | Refills: 0 | Status: SHIPPED | OUTPATIENT
Start: 2021-04-20 | End: 2021-05-20

## 2020-12-24 RX ORDER — TRAZODONE HYDROCHLORIDE 100 MG/1
100 TABLET ORAL AT BEDTIME
Qty: 30 TABLET | Refills: 5 | Status: SHIPPED | OUTPATIENT
Start: 2020-12-24 | End: 2021-05-25

## 2020-12-24 RX ORDER — FLUOXETINE 40 MG/1
80 CAPSULE ORAL DAILY
Qty: 60 CAPSULE | Refills: 5 | Status: SHIPPED | OUTPATIENT
Start: 2020-12-24 | End: 2021-05-25

## 2020-12-24 NOTE — TELEPHONE ENCOUNTER
.  Ok to continue on current medication regimen.  Will inform pt.  Jessie Pitts CNP, 12/24/2020

## 2020-12-24 NOTE — TELEPHONE ENCOUNTER
On 12/23/2020, EKG results were received from [facility]. Writer sent the records to scanning on 12/24/2020, and a message was routed to the provider. CONOR Stephens, EMT

## 2021-01-08 DIAGNOSIS — E84.9 CF (CYSTIC FIBROSIS) (H): ICD-10-CM

## 2021-01-08 RX ORDER — MONTELUKAST SODIUM 10 MG/1
TABLET ORAL
Qty: 30 TABLET | Refills: 11 | Status: SHIPPED | OUTPATIENT
Start: 2021-01-08 | End: 2021-11-16

## 2021-01-15 ENCOUNTER — HEALTH MAINTENANCE LETTER (OUTPATIENT)
Age: 26
End: 2021-01-15

## 2021-01-19 DIAGNOSIS — K21.9 GERD (GASTROESOPHAGEAL REFLUX DISEASE): ICD-10-CM

## 2021-01-19 DIAGNOSIS — G47.00 INSOMNIA, UNSPECIFIED TYPE: ICD-10-CM

## 2021-01-19 DIAGNOSIS — E84.9 CF (CYSTIC FIBROSIS) (H): Primary | ICD-10-CM

## 2021-01-19 RX ORDER — HYDROXYZINE HYDROCHLORIDE 25 MG/1
25 TABLET, FILM COATED ORAL
Qty: 90 TABLET | Refills: 0 | Status: SHIPPED | OUTPATIENT
Start: 2021-01-19 | End: 2021-04-27

## 2021-01-19 RX ORDER — OMEPRAZOLE 40 MG/1
40 CAPSULE, DELAYED RELEASE ORAL 2 TIMES DAILY
Qty: 60 CAPSULE | Refills: 11 | Status: SHIPPED | OUTPATIENT
Start: 2021-01-19 | End: 2022-01-31

## 2021-01-20 ENCOUNTER — VIRTUAL VISIT (OUTPATIENT)
Dept: PULMONOLOGY | Facility: CLINIC | Age: 26
End: 2021-01-20
Attending: INTERNAL MEDICINE
Payer: COMMERCIAL

## 2021-01-20 DIAGNOSIS — E84.0 CYSTIC FIBROSIS WITH PULMONARY MANIFESTATIONS (H): Primary | ICD-10-CM

## 2021-01-20 PROCEDURE — 99213 OFFICE O/P EST LOW 20 MIN: CPT | Mod: 95 | Performed by: INTERNAL MEDICINE

## 2021-01-20 NOTE — PROGRESS NOTES
Mamie is a 25 year old who is being evaluated via a billable video visit.      How would you like to obtain your AVS? Juanharzoe  If the video visit is dropped, the invitation should be resent by: Other e-mail: [email or web address]  Will anyone else be joining your video visit? No    [facility]  January 20, 2021         Assessment and Plan:   Mamie Rice is a 25 year old female with cystic fibrosis.    1. 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  She only has chest congestion when she has been in the cold for an extended period of time.  She feels that this is manageable though.  Mamie did do home spirometry that did show stability in her pulmonary function tests.  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  She should continue to try and stay fit.    2. Pancreatic Insufficiency/GI: Mamie has no new symptoms consistent with worsening malabsorption.    - continue the present dose of pancreatic enzymes  - continue vitamin supplementation.    3.  Weight management: Mamie has struggled with weight management and does go to the weight management clinic.  She reports recently that she has had some good success with weight stability.    4.  Abnormal glucose tolerance: Mamie has had a long history of abnormal glucose tolerance.  She is currently describing episodes of hypoglycemia particularly at night.  She reports recorded blood sugars as low as 30-40.  She does not take insulin presently.  I have asked her to follow-up with Dr. Prieto regarding this.  She reports when she first started Trikafta this was much worse but is the symptoms are improving.  It has been happening weekly at night but now is only happening every few weeks.    5.  CF TR modulator  therapy: Mamie is on Trikafta and is showed evidence of stable liver function studies.  She is due for repeat hepatic panel which we will arrange to have done locally.    6.  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  She also is back to coaching the dance team.  She remains quite busy with things in her life.  She continues to live at home with her mother.  Her sister and her children are also in the home.  She does continue to work with a therapist for her mood.    Annual studies due: due now  Immunizations: UTD  Colonoscopy: NA    Gavi Allison MD MPH  Associate Professor of Medicine  Pulmonary, Allergy, Critical Care and Sleep Medicine      Interval History:     Mamie reports that she has little in the way of cough or sputum production.  She only has chest congestion with cold air exposure.  She denies any significant shortness of breath.  She does continue to perform 2 vest therapies per day.         Review of Systems:     CONSTITUTIONAL: no fever, no chills, no sweats, no change in weight, no change in energy--pretty good, no change in appetite--good    INTEGUMENTARY/SKIN: no rash, no obvious new lesions    ENT/MOUTH: no sore throat, no new sinus pain, no new nasal drainage, no new nasal congestion, no ear ringing, has not seen ENT in several months with stable symptoms     RESPIRATORY: see interval history    CV: no chest pain, no palpitations, no peripheral edema, no orthopnea, no PND    GI: no nausea, no vomiting, no change in stools, no fatty stools, no GERD    : negative    MUSCULOSKELETAL: no myalgias, no arthralgias    ENDOCRINE: see above    NEURO:  No headache, no numbness, no tingling    SLEEP: no issues    PSYCHIATRIC: mood pretty good          Past Medical and Surgical History:     Past Medical History:   Diagnosis Date     ADHD (attention deficit hyperactivity disorder)      Anxiety      Aspergillosis, with pneumonia (H)      fugus found caused chest pain     Chronic infection     CF, MRSA.,      Chronic sinusitis      Constipation, chronic      Cystic fibrosis with pulmonary manifestations (H) 12/19/2011     Cystic fibrosis without mention of meconium ileus     SWEAT TEST:Date: 2/17/1994   [facility]  296 mg, 104 mmol/L ClSample #2  295 mg, 104 mmol/L Cl GENOTYPING:Date: 10/15/2007,  Laboratory: [facility]: [id number]     Depressive disorder      Diabetes     no meds currently     Dysthymic disorder      Exocrine pancreatic insufficiency      Gastro-oesophageal reflux disease      Hip pain, right      MRSA (methicillin resistant Staphylococcus aureus) carrier      Pancreatic disease      Past Surgical History:   Procedure Laterality Date     ARTHROSCOPY HIP, OSTEOPLASTY FEMUR PROXIMAL, COMBINED  3/11/2013    Procedure: COMBINED ARTHROSCOPY HIP, OSTEOPLASTY FEMUR PROXIMAL;  Right Hip Arthroscopy, Labral  Debridement.    surgeon request choice anesthesia/admit to [facility] after surgery;  Surgeon: Omkar Austin MD;  [facility]     ARTHROSCOPY KNEE WITH MEDIAL MENISCECTOMY Left 1/31/2017    Procedure: ARTHROSCOPY KNEE WITH MEDIAL MENISCECTOMY;  Surgeon: Jethro Coyle MD;  [facility] OR     bronchoscopies       BRONCHOSCOPY       EXAM UNDER ANESTHESIA ANUS N/A 5/10/2016    Procedure: EXAM UNDER ANESTHESIA ANUS;  Surgeon: Chet Gaviria MD;  [facility]     EXAM UNDER ANESTHESIA, RESTORATIONS, EXTRACTION(S) DENTAL COMPLEX, COMBINED  5/13/2013    Procedure: COMBINED EXAM UNDER ANESTHESIA, RESTORATIONS, EXTRACTION(S) DENTAL COMPLEX;  Dental Exam, Radiographs, Restorations. Single Extraction  Tooth #2. Restorations x 3;  Surgeon: Danilo Ortiz DDS;  [facility]     HC KNEE SCOPE, DIAGNOSTIC      Arthroscopy, Knee- left     INJECT BOTOX N/A 5/10/2016    Procedure: INJECT BOTOX;  Surgeon: Chet Gaviria MD;  [facility]     left hip labral tear  5/11/2011     left hip arthroscopy with labral debridement and synovectomy     meniscus repair       OPTICAL TRACKING SYSTEM ENDOSCOPIC SINUS SURGERY  10/14/2011    Procedure:OPTICAL TRACKING SYSTEM ENDOSCOPIC SINUS SURGERY; FESS (functional endoscopic sinus surgery) with Image Guidance, bronchial lavage and cultures; Surgeon:GOYO KUO; [facility]     OPTICAL TRACKING SYSTEM ENDOSCOPIC SINUS SURGERY  5/18/2012    Procedure:OPTICAL TRACKING SYSTEM ENDOSCOPIC SINUS SURGERY; Right  and Left Image Guided Functional Endoscopic Sinus Surgery With  Frontal Approach, Landmarx; Surgeon:GOYO KUO; [facility]     OPTICAL TRACKING SYSTEM ENDOSCOPIC SINUS SURGERY  9/26/2012    Procedure: OPTICAL TRACKING SYSTEM ENDOSCOPIC SINUS SURGERY;  Stealth Guided Bilateral Functional Endoscopic Sinus Surgery *Latex Safe*;  Surgeon: Goyo Kuo MD;  [facility]     OPTICAL TRACKING SYSTEM ENDOSCOPIC SINUS SURGERY Bilateral 10/16/2015    Procedure: OPTICAL TRACKING SYSTEM ENDOSCOPIC SINUS SURGERY;  Surgeon: Mariela Haile MD;  [facility]     ORTHOPEDIC SURGERY      left hip tear repair 2010     SINUS SURGERY             Family History:     Family History   Problem Relation Age of Onset     Cancer Paternal Grandmother      Skin Cancer Paternal Grandmother      Other Cancer Paternal Grandmother         Skin     Obesity Paternal Grandmother      Cancer Paternal Grandfather         PGF had throat cancer (he was a smoker)     Other Cancer Paternal Grandfather      Anxiety Disorder Paternal Grandfather      Thyroid Disease Mother         ,     Hypertension Mother      Obesity Other      Anesthesia Reaction No family hx of      Blood Disease No family hx of      Colon Polyps No family hx of      Crohn's Disease No family hx of      Ulcerative Colitis No family hx of      Colon Cancer No family hx of      Melanoma No family hx of             Social History:     Social History     Socioeconomic History     Marital status: Single      Spouse name: Not on file     Number of children: Not on file     Years of education: Not on file     Highest education level: Not on file   Occupational History     Not on file   Social Needs     Financial resource strain: Not on file     Food insecurity     Worry: Not on file     Inability: Not on file     Transportation needs     Medical: Not on file     Non-medical: Not on file   Tobacco Use     Smoking status: Never Smoker     Smokeless tobacco: Never Used     Tobacco comment: one person at home smokes outside   Substance and Sexual Activity     Alcohol use: No     Alcohol/week: 0.0 standard drinks     Drug use: No     Sexual activity: Never   Lifestyle     Physical activity     Days per week: Not on file     Minutes per session: Not on file     Stress: Not on file   Relationships     Social connections     Talks on phone: Not on file     Gets together: Not on file     Attends Mu-ism service: Not on file     Active member of club or organization: Not on file     Attends meetings of clubs or organizations: Not on file     Relationship status: Not on file     Intimate partner violence     Fear of current or ex partner: Not on file     Emotionally abused: Not on file     Physically abused: Not on file     Forced sexual activity: Not on file   Other Topics Concern      Service Not Asked     Blood Transfusions No     Caffeine Concern Not Asked     Occupational Exposure Not Asked     Hobby Hazards Not Asked     Sleep Concern Not Asked     Stress Concern Not Asked     Weight Concern Not Asked     Special Diet Not Asked     Back Care Not Asked     Exercise Yes     Bike Helmet Not Asked     Seat Belt Not Asked     Self-Exams Not Asked     Parent/sibling w/ CABG, MI or angioplasty before 65F 55M? Yes   Social History Narrative    Mamie lives with mother in [place], [place].  There is a cat in the home, but Mamie does not have any litterbox duties.  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  She gets  essentially no exercise because of the tingling in her feet (says it bothers her even to stand).        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        8/2015 no change in social situation        2/15/2016 Pt is single and lives with mother and stepfather.            Medications:     Current Outpatient Medications   Medication     acetaminophen (TYLENOL) 325 MG tablet     acetylcysteine (MUCOMYST) 20 % neb solution     ADDERALL XR 20 MG 24 hr capsule     albuterol (PROAIR HFA/PROVENTIL HFA/VENTOLIN HFA) 108 (90 Base) MCG/ACT Inhaler     albuterol (PROVENTIL) (2.5 MG/3ML) 0.083% neb solution     [START ON 2/19/2021] amphetamine-dextroamphetamine (ADDERALL XR) 20 MG 24 hr capsule     [START ON 3/21/2021] amphetamine-dextroamphetamine (ADDERALL XR) 20 MG 24 hr capsule     [START ON 4/20/2021] amphetamine-dextroamphetamine (ADDERALL XR) 20 MG 24 hr capsule     azithromycin (ZITHROMAX) 500 MG tablet     beta carotene 99842 UNIT capsule     blood glucose (ACCU-CHEK SMARTVIEW) test strip     buPROPion (WELLBUTRIN XL) 300 MG 24 hr tablet     busPIRone (BUSPAR) 15 MG tablet     cetirizine (ZYRTEC) 10 MG tablet     elexacaftor-tezacaftor-ivacaftor & ivacaftor (TRIKAFTA) 100-50-75 & 150 MG tablet pack     FLUoxetine (PROZAC) 40 MG capsule     fluticasone (FLONASE) 50 MCG/ACT nasal spray     hydrOXYzine (ATARAX) 25 MG tablet     medroxyPROGESTERone (DEPO-PROVERA) 150 MG/ML IM injection     MedroxyPROGESTERone Acetate (DEPO-PROVERA IM)     meropenem (MERREM) 500 MG vial     montelukast (SINGULAIR) 10 MG tablet     Multiple Vitamin (MULTIVITAMIN OR)     naltrexone (DEPADE/REVIA) 50 MG tablet     omeprazole (PRILOSEC) 40 MG DR capsule     phytonadione (MEPHYTON) 5 MG tablet     polyethylene glycol (MIRALAX) powder     topiramate (TOPAMAX) 100 MG tablet     traZODone (DESYREL) 100 MG tablet     vitamin C (ASCORBIC ACID) 500 MG tablet     VITAMIN D3 25 MCG (1000  UT) tablet     vitamin E (TOCOPHEROL) 400 units (180 mg) capsule     No current facility-administered medications for this visit.             Physical Exam:   There were no vitals taken for this visit.  Briefly saw patient on video with poor connection.    GENERAL: Healthy, alert and no distress  EYES: Eyes grossly normal to inspection.  No discharge or erythema, or obvious scleral/conjunctival abnormalities.  RESP: No audible wheeze, cough, or visible cyanosis.  No visible retractions or increased work of breathing.    SKIN: Visible skin clear. No significant rash, abnormal pigmentation or lesions.  NEURO: Cranial nerves grossly intact.  Mentation and speech appropriate for age.  PSYCH: Mentation appears normal, affect normal/bright, judgement and insight intact, normal speech and appearance well-groomed.         Data:   All laboratory and imaging data reviewed.    Cystic Fibrosis Culture  Specimen Description   Date Value Ref Range Status   11/12/2019 Midstream Urine  Final   11/12/2019 Throat  Final   06/05/2019 Throat  Final    Culture Micro   Date Value Ref Range Status   11/12/2019   Final    <10,000 colonies/mL  urogenital roberto carlos  Susceptibility testing not routinely done     11/12/2019 Light growth  Normal roberto carlos    Final   11/12/2019 Moderate growth  Staphylococcus aureus   (A)  Final        No results found for this or any previous visit (from the past 168 hour(s)).    PFT: Home spirometry 1/85/2021:  The spirometry is normal.  There is little change when compared to 11/2019.    Pulmonary exacerbation: absent    40 minutes spent on the date of the encounter doing chart review, history and exam, documentation and further activities as noted above    Video Start Time: converted to phone for poor connection    Video-Visit Details    Type of service:  Video Visit    Video End Time: phone duration 24 minutes    Originating Location (pt. Location): Other work    Distant Location (provider location):   [employer]  [facility]     Platform used for Video Visit: Juan

## 2021-01-20 NOTE — PATIENT INSTRUCTIONS
Cystic Fibrosis Self-Care Plan    RECOMMENDATIONS:   Mamie, It was great to see you today.  I am glad that you are feeling well.  I am sorry to hear about your dad.  The plan from today:  --Please get your lab sometime in the next couple weeks at the local clinic  --Please try to see Dr. Jimmy morales sometime in the next few weeks for these low blood sugars  --Let us plan on doing an in person visit in 3 months so that we can check pulmonary function studies and do your annual studies labs at that time.  Great job with self cares!    YOUR GOAL:  Stay safe and enjoy the New Year!      [facility] Nurse line:  NATALEE Farr and Radha [phone]     [facility] Fax Number:      [phone]         Cystic Fibrosis Respiratory Therapists:   Jhoana Trinh              [phone]          Sharon Gutiérrez   [phone]  Cystic Fibrosis Dietitians:              Amy Andino              [phone]                            Theresa Ceja                        [phone]   Cystic Fibrosis Diabetes Nurse:    Rhonda Esteves   [phone]    Cystic Fibrosis Social Workers:     Deyanira Figueroa               [phone]                     Faith Zamudio               [phone]  Cystic Fibrosis Pharmacists:           Gris Adame                               [phone]         Maira Stephenson   [phone]  Cystic Fibrosis Genetic Counselor:   Kaley Escobedo    [phone]    [facility] website:  [email or web address]    The following are the [place] Department of Health and CDC Winter Holidays Recommendations with the [facility] modifications to the the risk activities.     https://www.cdc.gov/coronavirus/2019-ncov/daily-life-coping/holidays/winter.html    https://www.health.[facility].[place]/diseases/coronavirus/holidays.html    Celebrate the winter holidays safely this year and help stop the spread of COVID-19 by choosing lower-risk  activities.    Lower-risk activities  A small dinner with the people who live with you. See CDC: Food and Coronavirus Disease 2019 (COVID-19).  A virtual dinner and sharing recipes with friends and family.  Shopping online, rather than in person.  Preparing traditional family recipes for family and neighbors, especially those at higher risk of severe illness from COVID-19, and deliver them in a way that doesn't involve contact with others.    Avoid Medium-risk activities  A small outdoor dinner with family and friends who live in your community. See CDC: Food and Coronavirus Disease 2019 (COVID-19). Lower your risk by following CDC recommendations for hosting gatherings or cook-outs.  Small outdoor sports events, if you follow safety tips above.    Definitely avoid higher-risk activities  Avoid these activities to help stop the spread of COVID-19.    Shopping in crowded stores.  Attending or participating in crowded races or parades.  Attending large indoor gatherings with people who do not live with you.         MRN: [id number]   Clinic Date: January 20, 2021   Patient: Mamie Rice     Annual Studies:   IGG   Date Value Ref Range Status   08/14/2019 755 695 - 1,620 mg/dL Final     Insulin   Date Value Ref Range Status   08/14/2019 41.0 (H) 3 - 25 mU/L Final     There are no preventive care reminders to display for this patient.    Pulmonary Function Tests  FEV1: amount of air you can blow out in 1 second  FVC: total amount of air you can take in and blow out    Your Goals:         PFT Latest Ref Rng & Units 11/12/2019   FVC L 3.50   FEV1 L 3.13   FVC% % 98   FEV1% % 102          Airway Clearance: The Most Important Way to Keep Your Lungs Healthy  Vest Settings:    Hill-Rom Frequencies: 8, 9, 10 Pressure 10 Then, Frequencies 18, 19, 20 Pressure 6      RespirTech: Quick Start with Pressure of     Do each frequency for 5 minutes; Deflate vest after each frequency & cough 3 times before beginning the next  setting.    Vest and Neb Therapy should be done 2 times/day.    Good Nutrition Can Improve Lung Function and Overall Health     Take ALL of your vitamins with food     Take 1/2 of your enzymes before EVERY meal/snack and the other 1/2 mid-meal/snack    Wt Readings from Last 3 Encounters:   10/09/20 77.1 kg (170 lb)   02/28/20 80.7 kg (178 lb)   12/18/19 78.9 kg (174 lb)       There is no height or weight on file to calculate BMI.         National CF Foundation Recommendations for BMI in CF Adults: Women: at least 22 Men: at least 23        Controlling Blood Sugars Helps Prevent Lung Infections & Improves Nutrition  Test blood sugar:     In the morning before eating (goal is )     2 hours after a meal (goal is less than 150)     When pre-meal glucose is greater than 150 add correction     At bedtime (if less than 100 eat a snack with 15 grams of carbohydrates  Last A1C Results:   Hemoglobin A1C   Date Value Ref Range Status   08/14/2019 5.1 0 - 5.6 % Final     Comment:     Normal <5.7% Prediabetes 5.7-6.4%  Diabetes 6.5% or higher - adopted from ADA   consensus guidelines.           If diabetic, measure A1C every 6 months. Goal: Under 7%    Staying Healthy    Research:  If you are interested in learning about research opportunities or have questions, please contact the CF Research Team at [phone] or [email or web address].      CF Foundation:  Compass is a personalized resource service to help you with the insurance, financial, legal and other issues you are facing.  It's free, confidential and available to anyone with CF.  Ask your  for more information or contact Compass directly at 292-OXUKXZR ([phone]) or compass@cff.org, or learn more at cff.org/compass.

## 2021-01-20 NOTE — LETTER
1/20/2021         RE: Mamie Rice  [address] [facility] [id number]        Dear Colleague,    Thank you for referring your patient, Mamie Rice, to the [facility] [place]. Please see a copy of my visit note below.    Mamie is a 25 year old who is being evaluated via a billable video visit.      How would you like to obtain your AVS? MyChart  If the video visit is dropped, the invitation should be resent by: Other e-mail: [email or web address]  Will anyone else be joining your video visit? No    [facility]  January 20, 2021         Assessment and Plan:   Mamie Rice is a 25 year old female with cystic fibrosis.    1. 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  She only has chest congestion when she has been in the cold for an extended period of time.  She feels that this is manageable though.  Mamie did do home spirometry that did show stability in her pulmonary function tests.  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  She should continue to try and stay fit.    2. Pancreatic Insufficiency/GI: Mamie has no new symptoms consistent with worsening malabsorption.    - continue the present dose of pancreatic enzymes  - continue vitamin supplementation.    3.  Weight management: Mamie has struggled with weight management and does go to the weight management clinic.  She reports recently that she has had some good success with weight stability.    4.  Abnormal glucose tolerance: Mamie has had a long history of abnormal glucose tolerance.  She is currently describing episodes of hypoglycemia particularly at night.  She reports recorded blood sugars as low as 30-40.  She does not take  insulin presently.  I have asked her to follow-up with Dr. Prieto regarding this.  She reports when she first started Trikafta this was much worse but is the symptoms are improving.  It has been happening weekly at night but now is only happening every few weeks.    5.  CF TR modulator therapy: Mamie is on Trikafta and is showed evidence of stable liver function studies.  She is due for repeat hepatic panel which we will arrange to have done locally.    6.  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  She also is back to coaching the dance team.  She remains quite busy with things in her life.  She continues to live at home with her mother.  Her sister and her children are also in the home.  She does continue to work with a therapist for her mood.    Annual studies due: due now  Immunizations: UTD  Colonoscopy: NA    Gavi Allison MD MPH  Associate Professor of Medicine  Pulmonary, Allergy, Critical Care and Sleep Medicine      Interval History:     Mamie reports that she has little in the way of cough or sputum production.  She only has chest congestion with cold air exposure.  She denies any significant shortness of breath.  She does continue to perform 2 vest therapies per day.         Review of Systems:     CONSTITUTIONAL: no fever, no chills, no sweats, no change in weight, no change in energy--pretty good, no change in appetite--good    INTEGUMENTARY/SKIN: no rash, no obvious new lesions    ENT/MOUTH: no sore throat, no new sinus pain, no new nasal drainage, no new nasal congestion, no ear ringing, has not seen ENT in several months with stable symptoms     RESPIRATORY: see interval history    CV: no chest pain, no palpitations, no peripheral edema, no orthopnea, no PND    GI: no nausea, no vomiting, no change in stools, no fatty stools, no GERD    : negative    MUSCULOSKELETAL: no myalgias, no arthralgias    ENDOCRINE: see above    NEURO:   No headache, no numbness, no tingling    SLEEP: no issues    PSYCHIATRIC: mood pretty good          Past Medical and Surgical History:     Past Medical History:   Diagnosis Date     ADHD (attention deficit hyperactivity disorder)      Anxiety      Aspergillosis, with pneumonia (H)     fugus found caused chest pain     Chronic infection     CF, MRSA.,      Chronic sinusitis      Constipation, chronic      Cystic fibrosis with pulmonary manifestations (H) 12/19/2011     Cystic fibrosis without mention of meconium ileus     SWEAT TEST:Date: 2/17/1994   [facility]  296 mg, 104 mmol/L ClSample #2  295 mg, 104 mmol/L Cl GENOTYPING:Date: 10/15/2007,  Laboratory: [facility]: [id number]     Depressive disorder      Diabetes     no meds currently     Dysthymic disorder      Exocrine pancreatic insufficiency      Gastro-oesophageal reflux disease      Hip pain, right      MRSA (methicillin resistant Staphylococcus aureus) carrier      Pancreatic disease      Past Surgical History:   Procedure Laterality Date     ARTHROSCOPY HIP, OSTEOPLASTY FEMUR PROXIMAL, COMBINED  3/11/2013    Procedure: COMBINED ARTHROSCOPY HIP, OSTEOPLASTY FEMUR PROXIMAL;  Right Hip Arthroscopy, Labral  Debridement.    surgeon request choice anesthesia/admit to [facility] after surgery;  Surgeon: Omkar Austin MD;  Location: [facility]     ARTHROSCOPY KNEE WITH MEDIAL MENISCECTOMY Left 1/31/2017    Procedure: ARTHROSCOPY KNEE WITH MEDIAL MENISCECTOMY;  Surgeon: Jethro Coyle MD;  Location: [facility] OR     bronchoscopies       BRONCHOSCOPY       EXAM UNDER ANESTHESIA ANUS N/A 5/10/2016    Procedure: EXAM UNDER ANESTHESIA ANUS;  Surgeon: Chet Gaviria MD;  [facility]     EXAM UNDER ANESTHESIA, RESTORATIONS, EXTRACTION(S) DENTAL COMPLEX, COMBINED  5/13/2013    Procedure: COMBINED EXAM UNDER ANESTHESIA, RESTORATIONS, EXTRACTION(S) DENTAL COMPLEX;  Dental Exam, Radiographs, Restorations. Single Extraction  Tooth  #2. Restorations x 3;  Surgeon: Danilo Ortiz DDS;  Location: [facility] OR     HC KNEE SCOPE, DIAGNOSTIC      Arthroscopy, Knee- left     INJECT BOTOX N/A 5/10/2016    Procedure: INJECT BOTOX;  Surgeon: Chet Gaviria MD;  [facility]     left hip labral tear  5/11/2011    left hip arthroscopy with labral debridement and synovectomy     meniscus repair       OPTICAL TRACKING SYSTEM ENDOSCOPIC SINUS SURGERY  10/14/2011    Procedure:OPTICAL TRACKING SYSTEM ENDOSCOPIC SINUS SURGERY; FESS (functional endoscopic sinus surgery) with Image Guidance, bronchial lavage and cultures; Surgeon:GOYO KUO; Location:[facility] OR     OPTICAL TRACKING SYSTEM ENDOSCOPIC SINUS SURGERY  5/18/2012    Procedure:OPTICAL TRACKING SYSTEM ENDOSCOPIC SINUS SURGERY; Right  and Left Image Guided Functional Endoscopic Sinus Surgery With  Frontal Approach, [employer]; Surgeon:GOYO KUO; Location[facility] OR     OPTICAL TRACKING SYSTEM ENDOSCOPIC SINUS SURGERY  9/26/2012    Procedure: OPTICAL TRACKING SYSTEM ENDOSCOPIC SINUS SURGERY;  Stealth Guided Bilateral Functional Endoscopic Sinus Surgery *Latex Safe*;  Surgeon: Goyo Kuo MD;  [facility]     OPTICAL TRACKING SYSTEM ENDOSCOPIC SINUS SURGERY Bilateral 10/16/2015    Procedure: OPTICAL TRACKING SYSTEM ENDOSCOPIC SINUS SURGERY;  Surgeon: Mariela Haile MD;  [facility]     ORTHOPEDIC SURGERY      left hip tear repair 2010     SINUS SURGERY             Family History:     Family History   Problem Relation Age of Onset     Cancer Paternal Grandmother      Skin Cancer Paternal Grandmother      Other Cancer Paternal Grandmother         Skin     Obesity Paternal Grandmother      Cancer Paternal Grandfather         PGF had throat cancer (he was a smoker)     Other Cancer Paternal Grandfather      Anxiety Disorder Paternal Grandfather      Thyroid Disease Mother         ,     Hypertension Mother      Obesity Other      Anesthesia Reaction No family hx of      Blood Disease No family  hx of      Colon Polyps No family hx of      Crohn's Disease No family hx of      Ulcerative Colitis No family hx of      Colon Cancer No family hx of      Melanoma No family hx of             Social History:     Social History     Socioeconomic History     Marital status: Single     Spouse name: Not on file     Number of children: Not on file     Years of education: Not on file     Highest education level: Not on file   Occupational History     Not on file   Social Needs     Financial resource strain: Not on file     Food insecurity     Worry: Not on file     Inability: Not on file     Transportation needs     Medical: Not on file     Non-medical: Not on file   Tobacco Use     Smoking status: Never Smoker     Smokeless tobacco: Never Used     Tobacco comment: one person at home smokes outside   Substance and Sexual Activity     Alcohol use: No     Alcohol/week: 0.0 standard drinks     Drug use: No     Sexual activity: Never   Lifestyle     Physical activity     Days per week: Not on file     Minutes per session: Not on file     Stress: Not on file   Relationships     Social connections     Talks on phone: Not on file     Gets together: Not on file     Attends Confucianist service: Not on file     Active member of club or organization: Not on file     Attends meetings of clubs or organizations: Not on file     Relationship status: Not on file     Intimate partner violence     Fear of current or ex partner: Not on file     Emotionally abused: Not on file     Physically abused: Not on file     Forced sexual activity: Not on file   Other Topics Concern      Service Not Asked     Blood Transfusions No     Caffeine Concern Not Asked     Occupational Exposure Not Asked     Hobby Hazards Not Asked     Sleep Concern Not Asked     Stress Concern Not Asked     Weight Concern Not Asked     Special Diet Not Asked     Back Care Not Asked     Exercise Yes     Bike Helmet Not Asked     Seat Belt Not Asked     Self-Exams Not  Asked     Parent/sibling w/ CABG, MI or angioplasty before 65F 55M? Yes   Social History Narrative    Mamie lives with mother in [place], [place].  There is a cat in the home, but Mamie does not have any litterbox duties.  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  She gets essentially no exercise because of the tingling in her feet (says it bothers her even to stand).        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        8/2015 no change in social situation        2/15/2016 Pt is single and lives with mother and stepfather.            Medications:     Current Outpatient Medications   Medication     acetaminophen (TYLENOL) 325 MG tablet     acetylcysteine (MUCOMYST) 20 % neb solution     ADDERALL XR 20 MG 24 hr capsule     albuterol (PROAIR HFA/PROVENTIL HFA/VENTOLIN HFA) 108 (90 Base) MCG/ACT Inhaler     albuterol (PROVENTIL) (2.5 MG/3ML) 0.083% neb solution     [START ON 2/19/2021] amphetamine-dextroamphetamine (ADDERALL XR) 20 MG 24 hr capsule     [START ON 3/21/2021] amphetamine-dextroamphetamine (ADDERALL XR) 20 MG 24 hr capsule     [START ON 4/20/2021] amphetamine-dextroamphetamine (ADDERALL XR) 20 MG 24 hr capsule     azithromycin (ZITHROMAX) 500 MG tablet     beta carotene 49021 UNIT capsule     blood glucose (ACCU-CHEK SMARTVIEW) test strip     buPROPion (WELLBUTRIN XL) 300 MG 24 hr tablet     busPIRone (BUSPAR) 15 MG tablet     cetirizine (ZYRTEC) 10 MG tablet     elexacaftor-tezacaftor-ivacaftor & ivacaftor (TRIKAFTA) 100-50-75 & 150 MG tablet pack     FLUoxetine (PROZAC) 40 MG capsule     fluticasone (FLONASE) 50 MCG/ACT nasal spray     hydrOXYzine (ATARAX) 25 MG tablet     medroxyPROGESTERone (DEPO-PROVERA) 150 MG/ML IM injection     MedroxyPROGESTERone Acetate (DEPO-PROVERA IM)     meropenem (MERREM) 500 MG vial     montelukast (SINGULAIR) 10 MG tablet     Multiple Vitamin (MULTIVITAMIN OR)     naltrexone (DEPADE/REVIA) 50  MG tablet     omeprazole (PRILOSEC) 40 MG DR capsule     phytonadione (MEPHYTON) 5 MG tablet     polyethylene glycol (MIRALAX) powder     topiramate (TOPAMAX) 100 MG tablet     traZODone (DESYREL) 100 MG tablet     vitamin C (ASCORBIC ACID) 500 MG tablet     VITAMIN D3 25 MCG (1000 UT) tablet     vitamin E (TOCOPHEROL) 400 units (180 mg) capsule     No current facility-administered medications for this visit.             Physical Exam:   There were no vitals taken for this visit.  Briefly saw patient on video with poor connection.    GENERAL: Healthy, alert and no distress  EYES: Eyes grossly normal to inspection.  No discharge or erythema, or obvious scleral/conjunctival abnormalities.  RESP: No audible wheeze, cough, or visible cyanosis.  No visible retractions or increased work of breathing.    SKIN: Visible skin clear. No significant rash, abnormal pigmentation or lesions.  NEURO: Cranial nerves grossly intact.  Mentation and speech appropriate for age.  PSYCH: Mentation appears normal, affect normal/bright, judgement and insight intact, normal speech and appearance well-groomed.         Data:   All laboratory and imaging data reviewed.    Cystic Fibrosis Culture  Specimen Description   Date Value Ref Range Status   11/12/2019 Midstream Urine  Final   11/12/2019 Throat  Final   06/05/2019 Throat  Final    Culture Micro   Date Value Ref Range Status   11/12/2019   Final    <10,000 colonies/mL  urogenital roberto carlos  Susceptibility testing not routinely done     11/12/2019 Light growth  Normal roberto carlos    Final   11/12/2019 Moderate growth  Staphylococcus aureus   (A)  Final        No results found for this or any previous visit (from the past 168 hour(s)).    PFT: Home spirometry 1/85/2021:  The spirometry is normal.  There is little change when compared to 11/2019.    Pulmonary exacerbation: absent    40 minutes spent on the date of the encounter doing chart review, history and exam, documentation and further activities  as noted above    Video Start Time: converted to phone for poor connection    Video-Visit Details    Type of service:  Video Visit    Video End Time: phone duration 24 minutes    Originating Location (pt. Location): Other work    Distant Location (provider location):  [facility]     Platform used for Video Visit: [id number]        Again, thank you for allowing me to participate in the care of your patient.        Sincerely,        Gavi Allison MD     Alert and oriented, no focal deficits, no motor or sensory deficits.

## 2021-02-01 NOTE — PATIENT INSTRUCTIONS
We appreciate your assistance in coordinating your healthcare.     Please upload your insulin pump, blood sugar meter and/or continuous glucose monitor at home 1-2 days before your next diabetes-related appointment.   This will allow your provider to review your  data before your scheduled virtual visit.    To ask a question to your Endocrine care team, please send them a [employer] message, or reach them by phone at [phone]     To expedite your medication refill(s), please contact your pharmacy and have them   fax a refill request to: [phone].  *Please allow 3 business days for routine medication refills.  *Please allow 5 business days for controlled substance medication refills.    For after-hours urgent Endocrine issues, that do not require 101, please dial [phone], and ask to speak with the Endocrinologist On-Call

## 2021-02-01 NOTE — PROGRESS NOTES
CF hyperglycemia  Jj Allen CMA    Outcome for 02/01/21 11:39 AM :Patient stated they are not currently checking their glucose     Mamie is a 25 year old who is being evaluated via a billable video visit.      How would you like to obtain your AVS? Juanhart  If the video visit is dropped, the invitation should be resent by: Text to cell phone: [phone]  Will anyone else be joining your video visit? No         CF Endocrinology Return Consultation:  Diabetes  :   Patient: Mamie Rice MRN# [id number]   YOB: 1995 Age: 25 year old   Date of Visit: 02/02/2021     Dear Dr. Allison:    I had the pleasure of seeing your patient, Mamie Rice in the [facility] Endocr[facility]ology Clinic, [facility], for a return consultation regarding CFRD and reactive hypoglycemia.           Problem list:     Patient Active Problem List    Diagnosis Date Noted     Knee pain 10/16/2019     Priority: Medium     Added automatically from request for surgery [id number]       Mild episode of recurrent major depressive disorder (H) 05/15/2018     Priority: Medium     Insomnia, unspecified type 05/15/2018     Priority: Medium     MECHE (generalized anxiety disorder) 05/15/2018     Priority: Medium     Attention deficit hyperactivity disorder (ADHD), combined type 05/15/2018     Priority: Medium     Diabetes mellitus, type 2 (H) 12/07/2016     Priority: Medium     Overview:   Diabetes Mellitus Type 2  Per External Records       Acne vulgaris 10/19/2016     Priority: Medium     Non morbid obesity due to excess calories 08/24/2016     Priority: Medium     Persistent depressive disorder 02/29/2016     Priority: Medium     Overview:   Disorder Depressive Persistent (Formerly Dysthymia) NOS       Pancreatic insufficiency 11/13/2014     Priority: Medium     Fecal elastase < 50       Back pain 10/22/2014     Priority: Medium     Overactive child 06/13/2013     Priority: Medium     Overview:   ADHD  6/13/13  Well-managed with  Chris  HENRIETTA WALSH NAVJOT         Frontal sinusitis 05/10/2012     Priority: Medium     Chronic constipation 03/04/2012     Priority: Medium     Cystic fibrosis of the lung (H) 12/19/2011     Priority: Medium     SWEAT TEST:  Date: 2/17/1994          Laboratory: [facility]  Sample #1  296 mg           104 mmol/L Cl  Sample #2  295 mg           104 mmol/L Cl     GENOTYPING:  Date: 10/15/2007               Laboratory: [facility]  Genotype: [id number]  Poly T Variant:  [  ]/[  ]         Type 1 diabetes mellitus (H) 11/09/2011     Priority: Medium     Problem list name updated by automated process. Provider to review       Hypoglycemia 10/28/2011     Priority: Medium     Reactive hypoglycemia  updating diagnosis code for icd10 cutover       Chronic maxillary sinusitis 09/08/2011     Priority: Medium     Aspergillosis (H) 07/21/2011     Priority: Medium     Hip joint pain 04/11/2011     Priority: Medium     Cystic fibrosis (H) 02/27/2011     Priority: Medium            HPI:   Mamie is a 25 year old female with Cystic Fibrosis Related Diabetes Mellitus (CFRD).    25 year old female with history of cystic fibrosis related diabetes and reactive hypoglycemia.      Patient reports that she continues to have episodes of low blood sugar that tend to happen around 1-2 AM during the night.  She reports that symptoms occur 1-2 times each month.  She reports symptoms of feeling sweaty and lightheaded and has occasionally checked blood glucose and found it to be in the 40s.  Patient does not remember any particular precipitating factor or diet/snack at bedtime.  Patient reports that she sometimes takes carb containing snacks at bedtime.  She has been off insulin for several years due to concern about hypoglycemia.  She  has history of reactive hypoglycemia. Her last OGTT showed glucose readings in the diabetes range.  She is currently not checking fingerstick blood glucose.  She did wear a CGM in the summer 2020.  Which showed  overall average blood sugar was 101, 82% of the readings were in range, 2% high, 11% low and 5% very low.on the CGM data the low readings appear to be reactive as they usually follow up peak glucose.  Overnight low readings were also mostly after a peak blood glucose.  She currently does not report symptoms during the day.    She is also following with the weight management clinic and  finds it helpful.  She is on Topamax and naltrexone.    A1c:  No recent A1C    Current insulin regimen: None          Past Medical History:     Past Medical History:   Diagnosis Date     ADHD (attention deficit hyperactivity disorder)      Anxiety      Aspergillosis, with pneumonia (H)     fugus found caused chest pain     Chronic infection     CF, MRSA.,      Chronic sinusitis      Constipation, chronic      Cystic fibrosis with pulmonary manifestations (H) 12/19/2011     Cystic fibrosis without mention of meconium ileus     SWEAT TEST:Date: 2/17/1994   [facility]  296 mg, 104 mmol/L ClSample #2  295 mg, 104 mmol/L Cl GENOTYPING:Date: 10/15/2007,  Laboratory: [facility]: [id number]     Depressive disorder      Diabetes     no meds currently     Dysthymic disorder      Exocrine pancreatic insufficiency      Gastro-oesophageal reflux disease      Hip pain, right      MRSA (methicillin resistant Staphylococcus aureus) carrier      Pancreatic disease             Past Surgical History:     Past Surgical History:   Procedure Laterality Date     ARTHROSCOPY HIP, OSTEOPLASTY FEMUR PROXIMAL, COMBINED  3/11/2013    Procedure: COMBINED ARTHROSCOPY HIP, OSTEOPLASTY FEMUR PROXIMAL;  Right Hip Arthroscopy, Labral  Debridement.    surgeon request choice anesthesia/admit to [facility] after surgery;  Surgeon: Omkar Austin MD;  [facility]     ARTHROSCOPY KNEE WITH MEDIAL MENISCECTOMY Left 1/31/2017    Procedure: ARTHROSCOPY KNEE WITH MEDIAL MENISCECTOMY;  Surgeon: Jethro Coyle MD;  [facility]      bronchoscopies       BRONCHOSCOPY       EXAM UNDER ANESTHESIA ANUS N/A 5/10/2016    Procedure: EXAM UNDER ANESTHESIA ANUS;  Surgeon: Chet Gaviria MD;  [facility]     EXAM UNDER ANESTHESIA, RESTORATIONS, EXTRACTION(S) DENTAL COMPLEX, COMBINED  5/13/2013    Procedure: COMBINED EXAM UNDER ANESTHESIA, RESTORATIONS, EXTRACTION(S) DENTAL COMPLEX;  Dental Exam, Radiographs, Restorations. Single Extraction  Tooth #2. Restorations x 3;  Surgeon: Danilo Ortiz DDS;  [facility]     HC KNEE SCOPE, DIAGNOSTIC      Arthroscopy, Knee- left     INJECT BOTOX N/A 5/10/2016    Procedure: INJECT BOTOX;  Surgeon: Chet Gaviria MD;  [facility]     left hip labral tear  5/11/2011    left hip arthroscopy with labral debridement and synovectomy     meniscus repair       OPTICAL TRACKING SYSTEM ENDOSCOPIC SINUS SURGERY  10/14/2011    Procedure:OPTICAL TRACKING SYSTEM ENDOSCOPIC SINUS SURGERY; FESS (functional endoscopic sinus surgery) with Image Guidance, bronchial lavage and cultures; Surgeon:GOYO KUO; [facility] OR     OPTICAL TRACKING SYSTEM ENDOSCOPIC SINUS SURGERY  5/18/2012    Procedure:OPTICAL TRACKING SYSTEM ENDOSCOPIC SINUS SURGERY; Right  and Left Image Guided Functional Endoscopic Sinus Surgery With  Frontal Approach, [facility]; Surgeon:GOYO KUO; [facility]     OPTICAL TRACKING SYSTEM ENDOSCOPIC SINUS SURGERY  9/26/2012    Procedure: OPTICAL TRACKING SYSTEM ENDOSCOPIC SINUS SURGERY;  Stealth Guided Bilateral Functional Endoscopic Sinus Surgery *Latex Safe*;  Surgeon: Goyo Kuo MD;  [facility]     OPTICAL TRACKING SYSTEM ENDOSCOPIC SINUS SURGERY Bilateral 10/16/2015    Procedure: OPTICAL TRACKING SYSTEM ENDOSCOPIC SINUS SURGERY;  Surgeon: Mariela Haile MD;  [facility]     ORTHOPEDIC SURGERY      left hip tear repair 2010     SINUS SURGERY                 Social History:     Social History     Social History Narrative    Mamie lives with mother in [place].  There is a cat in the home, but Mamie does not have any litterbox duties.  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  She gets essentially no exercise because of the tingling in her feet (says it bothers her even to stand).        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        8/2015 no change in social situation        2/15/2016 Pt is single and lives with mother and stepfather.              Family History:     Family History   Problem Relation Age of Onset     Cancer Paternal Grandmother      Skin Cancer Paternal Grandmother      Other Cancer Paternal Grandmother         Skin     Obesity Paternal Grandmother      Cancer Paternal Grandfather         PGF had throat cancer (he was a smoker)     Other Cancer Paternal Grandfather      Anxiety Disorder Paternal Grandfather      Thyroid Disease Mother         ,     Hypertension Mother      Obesity Other      ALS Father 67        2 male cousins with ALS as well     Anesthesia Reaction No family hx of      Blood Disease No family hx of      Colon Polyps No family hx of      Crohn's Disease No family hx of      Ulcerative Colitis No family hx of      Colon Cancer No family hx of      Melanoma No family hx of             Allergies:     Allergies   Allergen Reactions     Vancomycin Hives     Redmens skin rash   Redmens skin rash      Augmentin Rash             Medications:     Current Outpatient Rx   Medication Sig Dispense Refill     acetaminophen (TYLENOL) 325 MG tablet Take 2 tablets (650 mg) by mouth every 4 hours as needed for other (mild pain) 100 tablet 0     acetylcysteine (MUCOMYST) 20 % neb solution INHALE ONE 4ML NEB INTO LUNGS VIA NEBULIZER TWO TIMES A DAY, MAY INCREASE TO 3-4 TIMES A DAY WITH INCREASE COUGH/COLD SYMPTOMS 360 mL 11     ADDERALL XR 20 MG 24 hr capsule Take 1 capsule (20 mg) by mouth daily 30 capsule 0     albuterol (PROAIR HFA/PROVENTIL HFA/VENTOLIN HFA)  108 (90 Base) MCG/ACT Inhaler Inhale 2 puffs into the lungs every 6 hours as needed for shortness of breath / dyspnea or wheezing 1 Inhaler 12     albuterol (PROVENTIL) (2.5 MG/3ML) 0.083% neb solution Take 1 vial (2.5 mg) by nebulization every 4 hours as needed for shortness of breath / dyspnea or wheezing 360 mL 11     [START ON 2/19/2021] amphetamine-dextroamphetamine (ADDERALL XR) 20 MG 24 hr capsule Take 1 capsule (20 mg) by mouth daily 30 capsule 0     [START ON 3/21/2021] amphetamine-dextroamphetamine (ADDERALL XR) 20 MG 24 hr capsule Take 1 capsule (20 mg) by mouth daily 30 capsule 0     [START ON 4/20/2021] amphetamine-dextroamphetamine (ADDERALL XR) 20 MG 24 hr capsule Take 1 capsule (20 mg) by mouth daily 30 capsule 0     azithromycin (ZITHROMAX) 500 MG tablet TAKE ONE TABLET BY MOUTH ON MONDAYS, WEDNESDAYS, AND FRIDAYS AS DIRECTED 36 tablet 4     beta carotene 96378 UNIT capsule TAKE 1 CAPSULE BY MOUTH IN THE MORNING ON MONDAY, WEDNESDAY AND FRIDAY 36 capsule 4     blood glucose (ACCU-CHEK SMARTVIEW) test strip Use to test blood sugar 4 times daily or as directed. 400 each 3     buPROPion (WELLBUTRIN XL) 300 MG 24 hr tablet Take 1 tablet (300 mg) by mouth every morning Please alert patient to change to one tablet daily 90 tablet 3     busPIRone (BUSPAR) 15 MG tablet Take 1 tablet (15 mg) by mouth 2 times daily 60 tablet 5     cetirizine (ZYRTEC) 10 MG tablet Take 1 tablet (10 mg) by mouth every evening 30 tablet 3     elexacaftor-tezacaftor-ivacaftor & ivacaftor (TRIKAFTA) 100-50-75 & 150 MG tablet pack TAKE TWO ORANGE TABLETS BY MOUTH IN THE MORNING AND ONE BLUE TABLET IN THE EVENING AS DIRECTED ON PACKAGE.  TAKE WITH FAT CONTAINING FOOD. 84 tablet 3     FLUoxetine (PROZAC) 40 MG capsule Take 2 capsules (80 mg) by mouth daily 60 capsule 5     fluticasone (FLONASE) 50 MCG/ACT nasal spray Spray 2 sprays into both nostrils 2 times daily (Patient taking differently: Spray 2 sprays into both nostrils as  needed ) 15.8 mL 3     hydrOXYzine (ATARAX) 25 MG tablet Take 1 tablet (25 mg) by mouth nightly as needed (sleep) 90 tablet 0     medroxyPROGESTERone (DEPO-PROVERA) 150 MG/ML IM injection Inject 150 mg into the muscle       MedroxyPROGESTERone Acetate (DEPO-PROVERA IM)        montelukast (SINGULAIR) 10 MG tablet TAKE ONE TABLET BY MOUTH AT BEDTIME 30 tablet 11     Multiple Vitamin (MULTIVITAMIN OR) Take 1 tablet by mouth daily.       naltrexone (DEPADE/REVIA) 50 MG tablet Take 1 tablet.  Time it one to two hours prior to worst cravings. 90 tablet 4     omeprazole (PRILOSEC) 40 MG DR capsule Take 1 capsule (40 mg) by mouth 2 times daily 60 capsule 11     phytonadione (MEPHYTON) 5 MG tablet TAKE ONE TABLET BY MOUTH ONCE A WEEK 4 tablet 11     polyethylene glycol (MIRALAX) powder Take 17 g (1 capful) by mouth daily as needed for constipation 119 g 3     topiramate (TOPAMAX) 100 MG tablet Take 1 tablet (100 mg) by mouth daily 90 tablet 4     traZODone (DESYREL) 100 MG tablet Take 1 tablet (100 mg) by mouth At Bedtime 30 tablet 5     vitamin C (ASCORBIC ACID) 500 MG tablet TAKE ONE TABLET BY MOUTH TWICE A  tablet 3     VITAMIN D3 25 MCG (1000 UT) tablet TAKE ONE TABLET BY MOUTH EVERY  tablet 3     vitamin E (TOCOPHEROL) 400 units (180 mg) capsule TAKE ONE CAPSULE BY MOUTH TWICE A  capsule 11             Review of Systems:     Comprehensive ROS negative other than the symptoms noted above in the HPI.          Physical Exam:   General: Alert and oriented        Laboratory results:     TSH   Date Value Ref Range Status   08/14/2019 1.08 0.40 - 4.00 mU/L Final     Testosterone Total   Date Value Ref Range Status   08/02/2017 10 8 - 60 ng/dL Final     Comment:     This test was developed and its performance characteristics determined by the   [facility],  Special Chemistry Laboratory. It has   not been cleared or approved by the FDA. The laboratory is regulated under   CLIA    as qualified to perform high-complexity testing. This test is used for   clinical   purposes. It should not be regarded as investigational or for research.       Cholesterol   Date Value Ref Range Status   08/14/2019 92 <200 mg/dL Final     Albumin Urine mg/L   Date Value Ref Range Status   08/14/2019 21 mg/L Final     Triglycerides   Date Value Ref Range Status   08/14/2019 75 <150 mg/dL Final     HDL Cholesterol   Date Value Ref Range Status   08/14/2019 35 (L) >49 mg/dL Final     LDL Cholesterol Calculated   Date Value Ref Range Status   08/14/2019 42 <100 mg/dL Final     Comment:     Desirable:       <100 mg/dl     Cholesterol/HDL Ratio   Date Value Ref Range Status   03/12/2014 3.0 0.0 - 5.0 Final     Non HDL Cholesterol   Date Value Ref Range Status   08/14/2019 57 <130 mg/dL Final     A1C      5.5   8/2/2016  A1C      6.0   8/11/2015  A1C      5.8   5/14/2015  A1C      5.7   4/16/2015  A1C      5.5   3/30/2015 HEMOGLOBINA1      5.2   7/21/2011  HEMOGLOBINA1      5.6   4/21/2011          Diabetes Health Maintenance    Date of Diabetes Diagnosis: on OGTT ~ 2014    Special Notes (if any): followed at the weight management clinic    Date Last Eye Exam:      Date Last Dental Appointment:     Dates of Episodes Severe* Hypoglycemia (month/year, cumulative, ongoing, assess each visit):    *Severe=patient unconscious, seizure, unable to help self    Last 25-Vitamin D (every year): 38 (2016)    Last DXA, lowest Z-score (every 2 years): Z-score of 0.0, Aug 2019       ?Bisphosphonates (yes/no):     Last Urine Microalbumin (every year):      No results found for: MICROALBUMIN    Last Testosterone:   Testosterone Total   Date Value Ref Range Status   08/02/2017 10 8 - 60 ng/dL Final     Comment:     This test was developed and its performance characteristics determined by the   [facility],  Special Chemistry Laboratory. It has   not been cleared or approved by the FDA. The laboratory is  regulated under   CLIA   as qualified to perform high-complexity testing. This test is used for   clinical   purposes. It should not be regarded as investigational or for research.        On testosterone therapy (yes/no)?     Date of Last Diabetes Visit:         Assessment and Plan:   Mamie is a 25 year old female with early CFRD, reactive hypoglycemia and obesity.      Assessment/Plan:  Cystic fibrosis related diabetes  She has been off insulin for several years due to concern about hypoglycemia.  She is currently not checking fingerstick blood glucose.  Discussed option of repeating an OGTT.  She would prefer use of CGM to monitor glucose readings.  A1c to be checked with annual labs    Reactive hypoglycemia: Patient was asked to note down the snacks/meal she had at bedtime when she develops overnight lows.  She was counseled to avoid high carb snacks at bedtime.    will have diabetes educator to look into CGM coverage.  Patient would prefer to use a personal Dexcom to monitor for hyperglycemia as well as to monitor and prevent reactive hypoglycemia.  If personal CGM is not covered we would consider doing a diagnostic CGM.    Hugo Prieto MD     Note: Chart documentation done in part with Dragon Voice Recognition software. Although reviewed after completion, some word and grammatical errors may remain.  Please consider this when interpreting information in this chart    Time: I spent 30 minutes on the date of the encounter preparing to see patient (including chart review and preparation), obtaining and or reviewing additional medical history, performing an evaluation, documenting clinical information in the electronic health record, independently interpreting results, communicating results to the patient, and/or coordinating care.    Video-Visit Details    Type of service:  Video visit  Total video visit time 20 minutes    Originating Location (pt. Location):  Work    Distant Location (provider location):  [place]  [facility]     Platform used for Video Visit: Evelin Prieto MD

## 2021-02-02 ENCOUNTER — VIRTUAL VISIT (OUTPATIENT)
Dept: ENDOCRINOLOGY | Facility: CLINIC | Age: 26
End: 2021-02-02
Attending: INTERNAL MEDICINE
Payer: COMMERCIAL

## 2021-02-02 ENCOUNTER — VIRTUAL VISIT (OUTPATIENT)
Dept: EDUCATION SERVICES | Facility: CLINIC | Age: 26
End: 2021-02-02
Payer: COMMERCIAL

## 2021-02-02 DIAGNOSIS — E08.9 DIABETES MELLITUS RELATED TO CF (CYSTIC FIBROSIS) (H): ICD-10-CM

## 2021-02-02 DIAGNOSIS — E84.8 DIABETES MELLITUS RELATED TO CF (CYSTIC FIBROSIS) (H): ICD-10-CM

## 2021-02-02 DIAGNOSIS — E11.9 DIABETES MELLITUS, TYPE 2 (H): Primary | ICD-10-CM

## 2021-02-02 DIAGNOSIS — E16.2 HYPOGLYCEMIA: Primary | ICD-10-CM

## 2021-02-02 PROCEDURE — 99214 OFFICE O/P EST MOD 30 MIN: CPT | Mod: 95 | Performed by: INTERNAL MEDICINE

## 2021-02-02 PROCEDURE — 99207 PR NO CHARGE NURSE ONLY: CPT | Mod: TEL

## 2021-02-02 NOTE — PROGRESS NOTES
DIABETES EDUCATOR NOTE:    Purpose of today's visit:  Mamie would like a glucose sensor      Subjective/Objective:  She is now having lows during the night which disrupt her sleep    Assessment/Plan:  1. Info sent to [email or web address] to see if she can direct us to a supplier  2. Follow up with Mamie after getting that information    Any diabetes medication dose changes were made via the CDE Protocol and Collaborative Practice Agreement. A copy of this encounter was provided to the patient's referring      Time spent in this visit:  15 minutes  There was no charge for the visit today.

## 2021-02-02 NOTE — LETTER
2/2/2021       RE: Mamie Rice  [address] [facility] [address]     Dear Colleague,    Thank you for referring your patient, Mamie Rice, to the [facility] [place] at [facility]. Please see a copy of my visit note below.    CF hyperglycemia  Jj Allen, [facility]    Outcome for 02/01/21 11:39 AM :Patient stated they are not currently checking their glucose     Mamie is a 25 year old who is being evaluated via a billable video visit.      How would you like to obtain your AVS? MyChart  If the video visit is dropped, the invitation should be resent by: Text to cell phone: [phone]  Will anyone else be joining your video visit? No         CF Endocrinology Return Consultation:  Diabetes  :   Patient: Mamie Rice MRN# [id number]   YOB: 1995 Age: 25 year old   Date of Visit: 02/02/2021     Dear Dr. Allison:    I had the pleasure of seeing your patient, Mamie Rice in the [facility]in[facility], for a return consultation regarding CFRD and reactive hypoglycemia.           Problem list:     Patient Active Problem List    Diagnosis Date Noted     Knee pain 10/16/2019     Priority: Medium     Added automatically from request for surgery [id number]       Mild episode of recurrent major depressive disorder (H) 05/15/2018     Priority: Medium     Insomnia, unspecified type 05/15/2018     Priority: Medium     MECHE (generalized anxiety disorder) 05/15/2018     Priority: Medium     Attention deficit hyperactivity disorder (ADHD), combined type 05/15/2018     Priority: Medium     Diabetes mellitus, type 2 (H) 12/07/2016     Priority: Medium     Overview:   Diabetes Mellitus Type 2  Per External Records       Acne vulgaris 10/19/2016     Priority: Medium     Non morbid obesity due to excess calories 08/24/2016     Priority: Medium     Persistent depressive disorder 02/29/2016     Priority: Medium      Overview:   Disorder Depressive Persistent (Formerly Dysthymia) NOS       Pancreatic insufficiency 11/13/2014     Priority: Medium     Fecal elastase < 50       Back pain 10/22/2014     Priority: Medium     Overactive child 06/13/2013     Priority: Medium     Overview:   ADHD  6/13/13  Well-managed with Adderall.  HENRIETTA MORFIN         Frontal sinusitis 05/10/2012     Priority: Medium     Chronic constipation 03/04/2012     Priority: Medium     Cystic fibrosis of the lung (H) 12/19/2011     Priority: Medium     SWEAT TEST:  Date: 2/17/1994          Laboratory: [facility]  Sample #1  296 mg           104 mmol/L Cl  Sample #2  295 mg           104 mmol/L Cl     GENOTYPING:  Date: 10/15/2007               Laboratory: [facility]  Genotype: [id number]  Poly T Variant:  [  ]/[  ]         Type 1 diabetes mellitus (H) 11/09/2011     Priority: Medium     Problem list name updated by automated process. Provider to review       Hypoglycemia 10/28/2011     Priority: Medium     Reactive hypoglycemia  updating diagnosis code for icd10 cutover       Chronic maxillary sinusitis 09/08/2011     Priority: Medium     Aspergillosis (H) 07/21/2011     Priority: Medium     Hip joint pain 04/11/2011     Priority: Medium     Cystic fibrosis (H) 02/27/2011     Priority: Medium            HPI:   Mamie is a 25 year old female with Cystic Fibrosis Related Diabetes Mellitus (CFRD).    25 year old female with history of cystic fibrosis related diabetes and reactive hypoglycemia.      Patient reports that she continues to have episodes of low blood sugar that tend to happen around 1-2 AM during the night.  She reports that symptoms occur 1-2 times each month.  She reports symptoms of feeling sweaty and lightheaded and has occasionally checked blood glucose and found it to be in the 40s.  Patient does not remember any particular precipitating factor or diet/snack at bedtime.  Patient reports that she sometimes takes carb containing snacks at  bedtime.  She has been off insulin for several years due to concern about hypoglycemia.  She  has history of reactive hypoglycemia. Her last OGTT showed glucose readings in the diabetes range.  She is currently not checking fingerstick blood glucose.  She did wear a CGM in the summer 2020.  Which showed overall average blood sugar was 101, 82% of the readings were in range, 2% high, 11% low and 5% very low.on the CGM data the low readings appear to be reactive as they usually follow up peak glucose.  Overnight low readings were also mostly after a peak blood glucose.  She currently does not report symptoms during the day.    She is also following with the weight management clinic and  finds it helpful.  She is on Topamax and naltrexone.    A1c:  No recent A1C    Current insulin regimen: None          Past Medical History:     Past Medical History:   Diagnosis Date     ADHD (attention deficit hyperactivity disorder)      Anxiety      Aspergillosis, with pneumonia (H)     fugus found caused chest pain     Chronic infection     CF, MRSA.,      Chronic sinusitis      Constipation, chronic      Cystic fibrosis with pulmonary manifestations (H) 12/19/2011     Cystic fibrosis without mention of meconium ileus     SWEAT TEST:Date: 2/17/1994   [facility]  296 mg, 104 mmol/L ClSample #2  295 mg, 104 mmol/L Cl GENOTYPING:Date: 10/15/2007,  Laboratory: [facility]: [id number]     Depressive disorder      Diabetes     no meds currently     Dysthymic disorder      Exocrine pancreatic insufficiency      Gastro-oesophageal reflux disease      Hip pain, right      MRSA (methicillin resistant Staphylococcus aureus) carrier      Pancreatic disease             Past Surgical History:     Past Surgical History:   Procedure Laterality Date     ARTHROSCOPY HIP, OSTEOPLASTY FEMUR PROXIMAL, COMBINED  3/11/2013    Procedure: COMBINED ARTHROSCOPY HIP, OSTEOPLASTY FEMUR PROXIMAL;  Right Hip Arthroscopy, Labral   Debridement.    surgeon request choice anesthesia/admit to [facility] after surgery;  Surgeon: Omkar Austin MD;  [facility]     ARTHROSCOPY KNEE WITH MEDIAL MENISCECTOMY Left 1/31/2017    Procedure: ARTHROSCOPY KNEE WITH MEDIAL MENISCECTOMY;  Surgeon: Jethro Coyle MD;  [facility] OR     bronchoscopies       BRONCHOSCOPY       EXAM UNDER ANESTHESIA ANUS N/A 5/10/2016    Procedure: EXAM UNDER ANESTHESIA ANUS;  Surgeon: Chet Gaviria MD;  [facility]     EXAM UNDER ANESTHESIA, RESTORATIONS, EXTRACTION(S) DENTAL COMPLEX, COMBINED  5/13/2013    Procedure: COMBINED EXAM UNDER ANESTHESIA, RESTORATIONS, EXTRACTION(S) DENTAL COMPLEX;  Dental Exam, Radiographs, Restorations. Single Extraction  Tooth #2. Restorations x 3;  Surgeon: Danilo Ortiz DDS;  [facility]: [facility] OR     HC KNEE SCOPE, DIAGNOSTIC      Arthroscopy, Knee- left     INJECT BOTOX N/A 5/10/2016    Procedure: INJECT BOTOX;  Surgeon: Chet Gaviria MD;  [facility]     left hip labral tear  5/11/2011    left hip arthroscopy with labral debridement and synovectomy     meniscus repair       OPTICAL TRACKING SYSTEM ENDOSCOPIC SINUS SURGERY  10/14/2011    Procedure:OPTICAL TRACKING SYSTEM ENDOSCOPIC SINUS SURGERY; FESS (functional endoscopic sinus surgery) with Image Guidance, bronchial lavage and cultures; Surgeon:GOYO KUO; [facility] OR     OPTICAL TRACKING SYSTEM ENDOSCOPIC SINUS SURGERY  5/18/2012    Procedure:OPTICAL TRACKING SYSTEM ENDOSCOPIC SINUS SURGERY; Right  and Left Image Guided Functional Endoscopic Sinus Surgery With  Frontal Approach, [place]; Surgeon:GOYO KUO; [facility] OR     OPTICAL TRACKING SYSTEM ENDOSCOPIC SINUS SURGERY  9/26/2012    Procedure: OPTICAL TRACKING SYSTEM ENDOSCOPIC SINUS SURGERY;  Stealth Guided Bilateral Functional Endoscopic Sinus Surgery *Latex Safe*;  Surgeon: Goyo Kuo MD;  [facility]     OPTICAL TRACKING SYSTEM ENDOSCOPIC SINUS SURGERY Bilateral  10/16/2015    Procedure: OPTICAL TRACKING SYSTEM ENDOSCOPIC SINUS SURGERY;  Surgeon: Mariela Haile MD;  [facility]     ORTHOPEDIC SURGERY      left hip tear repair 2010     SINUS SURGERY                 Social History:     Social History     Social History Narrative    Mamie lives with mother in [place], [place].  There is a cat in the home, but Mamie does not have any litterbox duties.  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  She gets essentially no exercise because of the tingling in her feet (says it bothers her even to stand).        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        8/2015 no change in social situation        2/15/2016 Pt is single and lives with mother and stepfather.              Family History:     Family History   Problem Relation Age of Onset     Cancer Paternal Grandmother      Skin Cancer Paternal Grandmother      Other Cancer Paternal Grandmother         Skin     Obesity Paternal Grandmother      Cancer Paternal Grandfather         PGF had throat cancer (he was a smoker)     Other Cancer Paternal Grandfather      Anxiety Disorder Paternal Grandfather      Thyroid Disease Mother         ,     Hypertension Mother      Obesity Other      ALS Father 67        2 male cousins with ALS as well     Anesthesia Reaction No family hx of      Blood Disease No family hx of      Colon Polyps No family hx of      Crohn's Disease No family hx of      Ulcerative Colitis No family hx of      Colon Cancer No family hx of      Melanoma No family hx of             Allergies:     Allergies   Allergen Reactions     Vancomycin Hives     Redmens skin rash   Redmens skin rash      Augmentin Rash             Medications:     Current Outpatient Rx   Medication Sig Dispense Refill     acetaminophen (TYLENOL) 325 MG tablet Take 2 tablets (650 mg) by mouth every 4 hours as needed for other (mild pain) 100 tablet 0      acetylcysteine (MUCOMYST) 20 % neb solution INHALE ONE 4ML NEB INTO LUNGS VIA NEBULIZER TWO TIMES A DAY, MAY INCREASE TO 3-4 TIMES A DAY WITH INCREASE COUGH/COLD SYMPTOMS 360 mL 11     ADDERALL XR 20 MG 24 hr capsule Take 1 capsule (20 mg) by mouth daily 30 capsule 0     albuterol (PROAIR HFA/PROVENTIL HFA/VENTOLIN HFA) 108 (90 Base) MCG/ACT Inhaler Inhale 2 puffs into the lungs every 6 hours as needed for shortness of breath / dyspnea or wheezing 1 Inhaler 12     albuterol (PROVENTIL) (2.5 MG/3ML) 0.083% neb solution Take 1 vial (2.5 mg) by nebulization every 4 hours as needed for shortness of breath / dyspnea or wheezing 360 mL 11     [START ON 2/19/2021] amphetamine-dextroamphetamine (ADDERALL XR) 20 MG 24 hr capsule Take 1 capsule (20 mg) by mouth daily 30 capsule 0     [START ON 3/21/2021] amphetamine-dextroamphetamine (ADDERALL XR) 20 MG 24 hr capsule Take 1 capsule (20 mg) by mouth daily 30 capsule 0     [START ON 4/20/2021] amphetamine-dextroamphetamine (ADDERALL XR) 20 MG 24 hr capsule Take 1 capsule (20 mg) by mouth daily 30 capsule 0     azithromycin (ZITHROMAX) 500 MG tablet TAKE ONE TABLET BY MOUTH ON MONDAYS, WEDNESDAYS, AND FRIDAYS AS DIRECTED 36 tablet 4     beta carotene 94065 UNIT capsule TAKE 1 CAPSULE BY MOUTH IN THE MORNING ON MONDAY, WEDNESDAY AND FRIDAY 36 capsule 4     blood glucose (ACCU-CHEK SMARTVIEW) test strip Use to test blood sugar 4 times daily or as directed. 400 each 3     buPROPion (WELLBUTRIN XL) 300 MG 24 hr tablet Take 1 tablet (300 mg) by mouth every morning Please alert patient to change to one tablet daily 90 tablet 3     busPIRone (BUSPAR) 15 MG tablet Take 1 tablet (15 mg) by mouth 2 times daily 60 tablet 5     cetirizine (ZYRTEC) 10 MG tablet Take 1 tablet (10 mg) by mouth every evening 30 tablet 3     elexacaftor-tezacaftor-ivacaftor & ivacaftor (TRIKAFTA) 100-50-75 & 150 MG tablet pack TAKE TWO ORANGE TABLETS BY MOUTH IN THE MORNING AND ONE BLUE TABLET IN THE EVENING AS  DIRECTED ON PACKAGE.  TAKE WITH FAT CONTAINING FOOD. 84 tablet 3     FLUoxetine (PROZAC) 40 MG capsule Take 2 capsules (80 mg) by mouth daily 60 capsule 5     fluticasone (FLONASE) 50 MCG/ACT nasal spray Spray 2 sprays into both nostrils 2 times daily (Patient taking differently: Spray 2 sprays into both nostrils as needed ) 15.8 mL 3     hydrOXYzine (ATARAX) 25 MG tablet Take 1 tablet (25 mg) by mouth nightly as needed (sleep) 90 tablet 0     medroxyPROGESTERone (DEPO-PROVERA) 150 MG/ML IM injection Inject 150 mg into the muscle       MedroxyPROGESTERone Acetate (DEPO-PROVERA IM)        montelukast (SINGULAIR) 10 MG tablet TAKE ONE TABLET BY MOUTH AT BEDTIME 30 tablet 11     Multiple Vitamin (MULTIVITAMIN OR) Take 1 tablet by mouth daily.       naltrexone (DEPADE/REVIA) 50 MG tablet Take 1 tablet.  Time it one to two hours prior to worst cravings. 90 tablet 4     omeprazole (PRILOSEC) 40 MG DR capsule Take 1 capsule (40 mg) by mouth 2 times daily 60 capsule 11     phytonadione (MEPHYTON) 5 MG tablet TAKE ONE TABLET BY MOUTH ONCE A WEEK 4 tablet 11     polyethylene glycol (MIRALAX) powder Take 17 g (1 capful) by mouth daily as needed for constipation 119 g 3     topiramate (TOPAMAX) 100 MG tablet Take 1 tablet (100 mg) by mouth daily 90 tablet 4     traZODone (DESYREL) 100 MG tablet Take 1 tablet (100 mg) by mouth At Bedtime 30 tablet 5     vitamin C (ASCORBIC ACID) 500 MG tablet TAKE ONE TABLET BY MOUTH TWICE A  tablet 3     VITAMIN D3 25 MCG (1000 UT) tablet TAKE ONE TABLET BY MOUTH EVERY  tablet 3     vitamin E (TOCOPHEROL) 400 units (180 mg) capsule TAKE ONE CAPSULE BY MOUTH TWICE A  capsule 11             Review of Systems:     Comprehensive ROS negative other than the symptoms noted above in the HPI.          Physical Exam:   General: Alert and oriented        Laboratory results:     TSH   Date Value Ref Range Status   08/14/2019 1.08 0.40 - 4.00 mU/L Final     Testosterone Total   Date  Value Ref Range Status   08/02/2017 10 8 - 60 ng/dL Final     Comment:     This test was developed and its performance characteristics determined by the   [facility],  Special Chemistry Laboratory. It has   not been cleared or approved by the FDA. The laboratory is regulated under   CLIA   as qualified to perform high-complexity testing. This test is used for   clinical   purposes. It should not be regarded as investigational or for research.       Cholesterol   Date Value Ref Range Status   08/14/2019 92 <200 mg/dL Final     Albumin Urine mg/L   Date Value Ref Range Status   08/14/2019 21 mg/L Final     Triglycerides   Date Value Ref Range Status   08/14/2019 75 <150 mg/dL Final     HDL Cholesterol   Date Value Ref Range Status   08/14/2019 35 (L) >49 mg/dL Final     LDL Cholesterol Calculated   Date Value Ref Range Status   08/14/2019 42 <100 mg/dL Final     Comment:     Desirable:       <100 mg/dl     Cholesterol/HDL Ratio   Date Value Ref Range Status   03/12/2014 3.0 0.0 - 5.0 Final     Non HDL Cholesterol   Date Value Ref Range Status   08/14/2019 57 <130 mg/dL Final     A1C      5.5   8/2/2016  A1C      6.0   8/11/2015  A1C      5.8   5/14/2015  A1C      5.7   4/16/2015  A1C      5.5   3/30/2015 HEMOGLOBINA1      5.2   7/21/2011  HEMOGLOBINA1      5.6   4/21/2011          Diabetes Health Maintenance    Date of Diabetes Diagnosis: on OGTT ~ 2014    Special Notes (if any): followed at the weight management clinic    Date Last Eye Exam:      Date Last Dental Appointment:     Dates of Episodes Severe* Hypoglycemia (month/year, cumulative, ongoing, assess each visit):    *Severe=patient unconscious, seizure, unable to help self    Last 25-Vitamin D (every year): 38 (2016)    Last DXA, lowest Z-score (every 2 years): Z-score of 0.0, Aug 2019       ?Bisphosphonates (yes/no):     Last Urine Microalbumin (every year):      No results found for: MICROALBUMIN    Last Testosterone:    Testosterone Total   Date Value Ref Range Status   08/02/2017 10 8 - 60 ng/dL Final     Comment:     This test was developed and its performance characteristics determined by the   [facility],  Special Chemistry Laboratory. It has   not been cleared or approved by the FDA. The laboratory is regulated under   CLIA   as qualified to perform high-complexity testing. This test is used for   clinical   purposes. It should not be regarded as investigational or for research.        On testosterone therapy (yes/no)?     Date of Last Diabetes Visit:         Assessment and Plan:   Mamie is a 25 year old female with early CFRD, reactive hypoglycemia and obesity.      Assessment/Plan:  Cystic fibrosis related diabetes  She has been off insulin for several years due to concern about hypoglycemia.  She is currently not checking fingerstick blood glucose.  Discussed option of repeating an OGTT.  She would prefer use of CGM to monitor glucose readings.  A1c to be checked with annual labs    Reactive hypoglycemia: Patient was asked to note down the snacks/meal she had at bedtime when she develops overnight lows.  She was counseled to avoid high carb snacks at bedtime.    will have diabetes educator to look into CGM coverage.  Patient would prefer to use a personal Dexcom to monitor for hyperglycemia as well as to monitor and prevent reactive hypoglycemia.  If personal CGM is not covered we would consider doing a diagnostic CGM.    Hugo Prieto MD     Note: Chart documentation done in part with Dragon Voice Recognition software. Although reviewed after completion, some word and grammatical errors may remain.  Please consider this when interpreting information in this chart    Time: I spent 30 minutes on the date of the encounter preparing to see patient (including chart review and preparation), obtaining and or reviewing additional medical history, performing an evaluation, documenting clinical  information in the electronic health record, independently interpreting results, communicating results to the patient, and/or coordinating care.    Video-Visit Details    Type of service:  Video visit  Total video visit time 20 minutes    Originating Location (pt. Location):  Work    Distant Location (provider location):  [facility]     Platform used for Video Visit: Sonos    Hugo Prieto MD

## 2021-02-08 DIAGNOSIS — E84.9 CYSTIC FIBROSIS (H): ICD-10-CM

## 2021-02-08 NOTE — TELEPHONE ENCOUNTER
Called pt, received rx for Trikafta, wondering which location to send the Rx to?  [employer] send the rx renewal,  [facility] multiple pharmarcies with the same phone number, but different address appear.          -----[facility] pharmacy requesting this refill-----    Ariane Perosn CMA

## 2021-02-11 RX ORDER — ELEXACAFTOR, TEZACAFTOR, AND IVACAFTOR 100-50-75
KIT ORAL
Qty: 84 TABLET | Refills: 5 | Status: SHIPPED | OUTPATIENT
Start: 2021-02-11 | End: 2021-07-29

## 2021-02-22 ENCOUNTER — TELEPHONE (OUTPATIENT)
Dept: PULMONOLOGY | Facility: CLINIC | Age: 26
End: 2021-02-22

## 2021-02-22 NOTE — TELEPHONE ENCOUNTER
PA Initiation    Medication: AZITHROMYCIN 12 PER 8 DAYS PENDING  Insurance Company: [facility] [employer] - Phone [phone] Fax [phone]  Pharmacy Filling the Rx: [facility] - [address]  Filling Pharmacy Phone:    Filling Pharmacy Fax:    Start Date: 2/22/2021

## 2021-02-22 NOTE — TELEPHONE ENCOUNTER
Prior Authorization Approval    Authorization Effective Date: 2/22/2021  Authorization Expiration Date: 2/22/2022  Medication: AZITHROMYCIN 12 PER 28 DAYS APPROVED  Approved Dose/Quantity: 12 PER 28 DAYS  Reference #:     Insurance Company: [employer] - Phone [phone] Fax [phone]  Expected CoPay:       CoPay Card Available:      Foundation Assistance Needed:    Which Pharmacy is filling the prescription (Not needed for infusion/clinic administered): [facility] - [place], [place] - [address] [place] [address]  Pharmacy Notified:    Patient Notified:

## 2021-03-01 ENCOUNTER — CLINICAL UPDATE (OUTPATIENT)
Dept: PHARMACY | Facility: CLINIC | Age: 26
End: 2021-03-01
Payer: COMMERCIAL

## 2021-03-01 DIAGNOSIS — E84.9 CYSTIC FIBROSIS (H): Primary | ICD-10-CM

## 2021-03-01 PROCEDURE — 99207 PR NO CHARGE LOS: CPT | Performed by: PHARMACIST

## 2021-03-01 NOTE — PROGRESS NOTES
Clinical Update:                                                    At the request of Dr. Allison, a chart review was conducted for Mamie Rice.    Reason for Chart Review: Lab review/Trikafta update    Discussion: Reviewed labs from 2/25/21 per recommended Trikafta monitoring. Hepatic panel and CK are within normal limits.    Plan:  1. Continue Trikafta  2. Repeat hepatic panel and CK in 6 months, since Mamie has been on Trikafta for one year    Gris Adame PharmD  CF Medication Therapy Management Pharmacist  [facility]  [phone]

## 2021-03-30 DIAGNOSIS — E84.9 CF (CYSTIC FIBROSIS) (H): ICD-10-CM

## 2021-03-30 RX ORDER — ACETYLCYSTEINE 200 MG/ML
SOLUTION ORAL; RESPIRATORY (INHALATION)
Qty: 360 ML | Refills: 11 | Status: SHIPPED | OUTPATIENT
Start: 2021-03-30 | End: 2022-03-31

## 2021-04-07 DIAGNOSIS — E84.9 CF (CYSTIC FIBROSIS) (H): ICD-10-CM

## 2021-04-07 DIAGNOSIS — K86.81 EXOCRINE PANCREATIC INSUFFICIENCY: ICD-10-CM

## 2021-04-07 DIAGNOSIS — F90.0 ATTENTION DEFICIT HYPERACTIVITY DISORDER (ADHD), PREDOMINANTLY INATTENTIVE TYPE: ICD-10-CM

## 2021-04-07 RX ORDER — DEXTROAMPHETAMINE SULFATE, DEXTROAMPHETAMINE SACCHARATE, AMPHETAMINE SULFATE AND AMPHETAMINE ASPARTATE 5; 5; 5; 5 MG/1; MG/1; MG/1; MG/1
CAPSULE, EXTENDED RELEASE ORAL
Qty: 30 CAPSULE | Refills: 0 | OUTPATIENT
Start: 2021-04-07

## 2021-04-07 RX ORDER — CHOLECALCIFEROL (VITAMIN D3) 25 MCG
TABLET ORAL
Qty: 100 TABLET | Refills: 3 | Status: SHIPPED | OUTPATIENT
Start: 2021-04-07 | End: 2021-11-22

## 2021-04-07 NOTE — TELEPHONE ENCOUNTER
Incoming refill from [facility] - [place], [place] - [address] via electronic request     Medication requested:   Disp Refills Start End PETE   amphetamine-dextroamphetamine (ADDERALL XR) 20 MG 24 hr capsule 30 capsule 0 3/21/2021 4/20/2021 --   Sig - Route: Take 1 capsule (20 mg) by mouth daily   Last refilled: 3/8 (12/24 order), 1/25 (12/22 order), 12/22 per [place]      Writer called pharmacy ([phone]) and confirmed that they have two orders on profile, so refill is not needed.

## 2021-04-13 DIAGNOSIS — E84.9 CF (CYSTIC FIBROSIS) (H): ICD-10-CM

## 2021-04-13 DIAGNOSIS — F34.1 PERSISTENT DEPRESSIVE DISORDER: ICD-10-CM

## 2021-04-13 DIAGNOSIS — F33.0 MILD EPISODE OF RECURRENT MAJOR DEPRESSIVE DISORDER (H): ICD-10-CM

## 2021-04-13 RX ORDER — ALBUTEROL SULFATE 0.83 MG/ML
SOLUTION RESPIRATORY (INHALATION)
Qty: 360 ML | Refills: 11 | Status: SHIPPED | OUTPATIENT
Start: 2021-04-13 | End: 2023-01-17

## 2021-04-15 NOTE — TELEPHONE ENCOUNTER
BUPROPION HCL ER (XL) 300MG TB24  Last Written Prescription Date:  3/17/2020  Last Fill Quantity: 90,   # refills: 3  Last Office Visit : 2/2/2021  Future Office visit:  None    Routing refill request to provider for review/approval because:  Continue this med for Pt care?  Would Provider like updated PHA-9 on file?  Refer to clinic for review       Iliana Severino RN  Central Triage Red Flags/Med Refills

## 2021-04-19 RX ORDER — BUPROPION HYDROCHLORIDE 300 MG/1
300 TABLET ORAL EVERY MORNING
Qty: 90 TABLET | Refills: 1 | Status: SHIPPED | OUTPATIENT
Start: 2021-04-19 | End: 2021-11-16

## 2021-04-20 DIAGNOSIS — F33.0 MILD EPISODE OF RECURRENT MAJOR DEPRESSIVE DISORDER (H): ICD-10-CM

## 2021-04-20 DIAGNOSIS — F34.1 PERSISTENT DEPRESSIVE DISORDER: ICD-10-CM

## 2021-04-22 RX ORDER — BUPROPION HYDROCHLORIDE 300 MG/1
TABLET ORAL
Qty: 30 TABLET | Refills: 11 | OUTPATIENT
Start: 2021-04-22

## 2021-04-27 DIAGNOSIS — G47.00 INSOMNIA, UNSPECIFIED TYPE: ICD-10-CM

## 2021-04-27 DIAGNOSIS — E84.0 CYSTIC FIBROSIS WITH PULMONARY MANIFESTATIONS (H): ICD-10-CM

## 2021-04-27 RX ORDER — ASCORBIC ACID 500 MG
TABLET ORAL
Qty: 100 TABLET | Refills: 3 | Status: SHIPPED | OUTPATIENT
Start: 2021-04-27 | End: 2021-10-26

## 2021-04-27 RX ORDER — HYDROXYZINE HYDROCHLORIDE 25 MG/1
25 TABLET, FILM COATED ORAL
Qty: 90 TABLET | Refills: 0 | Status: SHIPPED | OUTPATIENT
Start: 2021-04-27 | End: 2021-05-25

## 2021-05-25 ENCOUNTER — VIRTUAL VISIT (OUTPATIENT)
Dept: PSYCHIATRY | Facility: CLINIC | Age: 26
End: 2021-05-25
Attending: NURSE PRACTITIONER
Payer: COMMERCIAL

## 2021-05-25 DIAGNOSIS — F90.0 ATTENTION DEFICIT HYPERACTIVITY DISORDER (ADHD), PREDOMINANTLY INATTENTIVE TYPE: ICD-10-CM

## 2021-05-25 DIAGNOSIS — F33.0 MILD EPISODE OF RECURRENT MAJOR DEPRESSIVE DISORDER (H): ICD-10-CM

## 2021-05-25 DIAGNOSIS — G47.00 INSOMNIA, UNSPECIFIED TYPE: ICD-10-CM

## 2021-05-25 DIAGNOSIS — F41.1 GAD (GENERALIZED ANXIETY DISORDER): Primary | ICD-10-CM

## 2021-05-25 PROCEDURE — 99214 OFFICE O/P EST MOD 30 MIN: CPT | Mod: 95 | Performed by: NURSE PRACTITIONER

## 2021-05-25 RX ORDER — DEXTROAMPHETAMINE SACCHARATE, AMPHETAMINE ASPARTATE MONOHYDRATE, DEXTROAMPHETAMINE SULFATE AND AMPHETAMINE SULFATE 5; 5; 5; 5 MG/1; MG/1; MG/1; MG/1
20 CAPSULE, EXTENDED RELEASE ORAL DAILY
Qty: 30 CAPSULE | Refills: 0 | Status: SHIPPED | OUTPATIENT
Start: 2021-07-09 | End: 2021-08-08

## 2021-05-25 RX ORDER — BUSPIRONE HYDROCHLORIDE 15 MG/1
15 TABLET ORAL 2 TIMES DAILY
Qty: 60 TABLET | Refills: 5 | Status: SHIPPED | OUTPATIENT
Start: 2021-05-25 | End: 2021-11-09

## 2021-05-25 RX ORDER — TRAZODONE HYDROCHLORIDE 100 MG/1
100 TABLET ORAL AT BEDTIME
Qty: 30 TABLET | Refills: 5 | Status: SHIPPED | OUTPATIENT
Start: 2021-05-25 | End: 2021-11-09

## 2021-05-25 RX ORDER — HYDROXYZINE HYDROCHLORIDE 25 MG/1
25-50 TABLET, FILM COATED ORAL EVERY 6 HOURS PRN
Qty: 180 TABLET | Refills: 0 | Status: SHIPPED | OUTPATIENT
Start: 2021-05-25 | End: 2021-10-04

## 2021-05-25 RX ORDER — DEXTROAMPHETAMINE SACCHARATE, AMPHETAMINE ASPARTATE MONOHYDRATE, DEXTROAMPHETAMINE SULFATE AND AMPHETAMINE SULFATE 5; 5; 5; 5 MG/1; MG/1; MG/1; MG/1
20 CAPSULE, EXTENDED RELEASE ORAL DAILY
Qty: 30 CAPSULE | Refills: 0 | Status: SHIPPED | OUTPATIENT
Start: 2021-08-08 | End: 2021-09-07

## 2021-05-25 RX ORDER — DEXTROAMPHETAMINE SACCHARATE, AMPHETAMINE ASPARTATE MONOHYDRATE, DEXTROAMPHETAMINE SULFATE AND AMPHETAMINE SULFATE 5; 5; 5; 5 MG/1; MG/1; MG/1; MG/1
20 CAPSULE, EXTENDED RELEASE ORAL DAILY
Qty: 30 CAPSULE | Refills: 0 | Status: SHIPPED | OUTPATIENT
Start: 2021-06-09 | End: 2021-07-09

## 2021-05-25 RX ORDER — FLUOXETINE 40 MG/1
80 CAPSULE ORAL DAILY
Qty: 60 CAPSULE | Refills: 5 | Status: SHIPPED | OUTPATIENT
Start: 2021-05-25 | End: 2021-11-09

## 2021-05-25 NOTE — PATIENT INSTRUCTIONS
-May increase Hydroxyzine to 25-50 mg up to 4 times a day for anxiety and sleep.  Monitor for sedation.  -Continue all other medication regimen for now.    Your next appointment is scheduled on 6/10/2021 (Thu) at 8:30am.    To access your telemedicine visit:     Open a web browser, like [employer], and type [email or web address]     You will see a box asking you to check in to let Jessie Pitts know that you are here.     Type in your name and press Check In. That will let Jessie see you in the virtual waiting room. At your scheduled appointment time, your provider will initiate the visit and connect you.     When your visit is done, you can simply close the browser window.        Please Note:  Ideally, you will connect from a desktop, laptop, or tablet with a WiFi connection. Your computer/tablet must have a camera and microphone. You can use a cell phone, if it has a camera, and if you can connect to WiFi. However, if you connect your phone over a cellular network, it is of lower quality and less reliable    Thank you for coming to the [facility] [facility].    Lab Testing:  If you had lab testing today and your results are reassuring or normal they will be mailed to you or sent through [employer] within 7 days. If the lab tests need quick action we will call you with the results. The phone number we will call with results is # [phone] (home) . If this is not the best number please call our clinic and change the number.    Medication Refills:  If you need any refills please call your pharmacy and they will contact us. Our fax number for refills is [phone]. Please allow three business for refill processing. If you need to  your refill at a new pharmacy, please contact the new pharmacy directly. The new pharmacy will help you get your medications transferred.     Scheduling:  If you have any concerns about today's visit or wish to schedule another appointment  please call our office during normal business hours [phone] (8-5:00 M-F)    Contact Us:  Please call [phone] during business hours (8-5:00 M-F).  If after clinic hours, or on the weekend, please call  [phone].    Financial Assistance [phone]  [facility] Billing [phone]  Central Billing Office, [facility]: [phone]  [facility] [phone]  Medical Records [phone]      MENTAL HEALTH CRISIS NUMBERS:  For a medical emergency please call  911 or go to the nearest ER.     [facility]:   [facility] -[phone]   Crisis Residence [facility] -[phone]   Walk-In Counseling [facility] -[phone]   COPE 24/7 [facility] Mobile Team -[phone] (adults)/[phone] (child)  CHILD: [facility] needs assessment team - [phone]      [facility]:   [facility] - [phone]   Walk-in counseling [facility] - [phone]   Walk-in counseling [facility] - [phone]   Crisis Residence [facility] - [phone]  Urgent Care Adult Mental [facility]-[phone] mobile unit/ 24/7 crisis line    National Crisis Numbers:   National Suicide Prevention Lifeline: 6-992-017-TALK (491-962-5132)  Poison Control Center - 1-856.925.6194  [employer]/resources for a list of additional resources (SOS)  Trans Lifeline a hotline for transgender people 1-210.533.3797  The [employer] a hotline for LGBT youth 1-339.780.6970  Crisis Text Line: For any crisis 24/7   To: 171685  see www.crisistextline.org  - IF MAKING A CALL FEELS TOO HARD, send a text!         Again thank you for choosing [facility] and please let us know how we can best partner with you to improve you and your family's health.    You may be receiving a survey regarding this appointment. We would love to have your feedback, both positive and negative. The survey is done by  an external company, so your answers are anonymous.

## 2021-05-25 NOTE — PROGRESS NOTES
Start Time:  1600         End Time: 1621    Telemedicine Visit: The patient's condition can be safely assessed and treated via synchronous audio and visual telemedicine encounter.      Reason for Telemedicine Visit: Due to COVID 19 pandemic, clinic switching all appointments to telemedicine     Originating Site (Patient Location): Patient's place of employment    Distant Site (Provider Location): Provider Remote Setting    Consent:  The patient/guardian has verbally consented to: the potential risks and benefits of telemedicine (video visit) versus in person care; bill my insurance or make self-payment for services provided; and responsibility for payment of non-covered services.     Mode of Communication:  Video Conference via [email or web address]    As the provider I attest to compliance with applicable laws and regulations related to telemedicine.    Psychiatry Clinic Progress Note                                                                  Patient Name: Mamie Rice  YOB: 1995  MRN: [id number]  Date of Service:  5/25/2021  Last Seen:12/22/2020    Mamie Rice is a 25 year old person assigned female at birth, identifies as cisgender female who uses the name Mamie and pronoun kenneth.     Mamie Rice is a 25 year old year old adult who presents for ongoing psychiatric care.  Mamie Rice was last seen on 12/22/2020.     At that time,     Medication Ordered/Consults/Labs/tests Ordered:      Medication: Contninue on current medication regimen for now   OTC Recommendations: none  Lab Orders:  EKG  Referrals: none  Release of Information: none  Future Treatment Considerations: Per symptoms.   Return for Follow Up: depends on EKG result, if WNL, in 6 months, if abnormal, as soon as EKG is done    Pertinent Background:  Diagnoses include MDD, MECHE and ADHD.  Psych critical item history includes [no critical items]. Medical complication includes Cystic Fibrosis, DM I     Previous medication trial: Phentamine,  Trazodone (150 mg oversedating, 50 mg ineffective)     Naltrexone, Wellbutrin and Topamax are managed by weight management clinic.    Interim History                                                                                                        4, 4     On 12/24/2020, notified pt of WNL EKG result and continue current medication regimen and follow up in 6 months via [employer].    On 5/19/2021, pt contacted via [employer] requesting medication adjustment due to exacerbation of anxiety or depression.    Since the last visit,  -Notes exacerbation of anxiety x 1 month, but really significant anxiety last 2 weeks in context of significant stress at work planning for summer schedule.  -Anxiety is only significant at work day, weekend anxiety are not significant at all.  -Noting there has been numerous people involving decision making process and she has difficulties in just focusing on instructions from boss.  However, denies this is ADHD not well managed.  In fact, ADHD is managed well.  -Work has returned to all face to face, since then, significant stress as she needs to adjust.  -taking Hydroxyzine daily for sleep.  -Mood is stable, denies SI, SIB or HI.  -No changes in CF medications or management.  -No changes in weight management medications (Wellbutrin, Topamax or naltrexone)    Denies any symptoms suggestive of hypomania or psychosis.    Current Suicidality/Hx of Suicide Attempts: Denies both  CoCominent Medical concerns: Denies    Medication Side Effects: The patient denies all medication side effects.      Medical Review of Systems     Apart from the symptoms mentioned int he HPI, the 14 point review of systems, including constitutional, HEENT, cardiovascular, respiratory, gastrointestinal, genitourinary, musculoskeletal, integumentary, endocrine, neurological, hematologic and allergic is entirely negative.    Pregnant: None. Nursing: None, Contraception: DMPA    Substance Use     Denies frequent or abuse of  alcohol. Rarely drinks. Denies any other substance use.     Social/ Family History                                  [per patient report]                                 1ea,1ea   Living arrangements: lives with parents and feels safe  Social Support:parents, friends, maternal grandparents  Access to gun: denies  Denies any trauma hx, numerous hospitalizations during childhood, does not consider this as traumatic  Works as a day care provider, dance coach and substitute .      Allergy                                Vancomycin and Augmentin    Current Medications                                                                                                       Current Outpatient Medications   Medication Sig Dispense Refill     acetaminophen (TYLENOL) 325 MG tablet Take 2 tablets (650 mg) by mouth every 4 hours as needed for other (mild pain) 100 tablet 0     acetylcysteine (MUCOMYST) 20 % neb solution INHALE 4ML VIA NEBULIZER TWO TIMES A DAY. MAY INCREASE TO 3-4 TIMES A DAY WITH INCREASED COUGH / COLD SYMPTOMS. REFRIGERATE VIAL AND DISCARD 96 HOURS AFTER OPENING 360 mL 11     ADDERALL XR 20 MG 24 hr capsule Take 1 capsule (20 mg) by mouth daily 30 capsule 0     albuterol (PROAIR HFA/PROVENTIL HFA/VENTOLIN HFA) 108 (90 Base) MCG/ACT Inhaler Inhale 2 puffs into the lungs every 6 hours as needed for shortness of breath / dyspnea or wheezing 1 Inhaler 12     albuterol (PROVENTIL) (2.5 MG/3ML) 0.083% neb solution INHALE 1 VIAL ( 2.5MG) BY NEBULIZATION EVERY 4 HOURS AS NEEDED FOR FOR SHORTNESS OF BREATH OR WHEEZING 360 mL 11     azithromycin (ZITHROMAX) 500 MG tablet TAKE ONE TABLET BY MOUTH ON MONDAYS, WEDNESDAYS, AND FRIDAYS AS DIRECTED 36 tablet 4     beta carotene 01446 UNIT capsule TAKE 1 CAPSULE BY MOUTH IN THE MORNING ON MONDAY, WEDNESDAY AND FRIDAY 36 capsule 4     blood glucose (ACCU-CHEK SMARTVIEW) test strip Use to test blood sugar 4 times daily or as directed. 400 each 3     buPROPion  (WELLBUTRIN XL) 300 MG 24 hr tablet Take 1 tablet (300 mg) by mouth every morning 90 tablet 1     busPIRone (BUSPAR) 15 MG tablet Take 1 tablet (15 mg) by mouth 2 times daily 60 tablet 5     cetirizine (ZYRTEC) 10 MG tablet Take 1 tablet (10 mg) by mouth every evening 30 tablet 3     elexacaftor-tezacaftor-ivacaftor & ivacaftor (TRIKAFTA) 100-50-75 & 150 MG tablet pack TAKE TWO ORANGE TABLETS BY MOUTH IN THE MORNING AND ONE BLUE TABLET IN THE EVENING AS DIRECTED ON PACKAGE.  TAKE WITH FAT CONTAINING FOOD. 84 tablet 5     FLUoxetine (PROZAC) 40 MG capsule Take 2 capsules (80 mg) by mouth daily 60 capsule 5     fluticasone (FLONASE) 50 MCG/ACT nasal spray Spray 2 sprays into both nostrils 2 times daily (Patient taking differently: Spray 2 sprays into both nostrils as needed ) 15.8 mL 3     hydrOXYzine (ATARAX) 25 MG tablet Take 1 tablet (25 mg) by mouth nightly as needed (sleep) 90 tablet 0     medroxyPROGESTERone (DEPO-PROVERA) 150 MG/ML IM injection Inject 150 mg into the muscle       MedroxyPROGESTERone Acetate (DEPO-PROVERA IM)        montelukast (SINGULAIR) 10 MG tablet TAKE ONE TABLET BY MOUTH AT BEDTIME 30 tablet 11     Multiple Vitamin (MULTIVITAMIN OR) Take 1 tablet by mouth daily.       naltrexone (DEPADE/REVIA) 50 MG tablet Take 1 tablet.  Time it one to two hours prior to worst cravings. 90 tablet 4     omeprazole (PRILOSEC) 40 MG DR capsule Take 1 capsule (40 mg) by mouth 2 times daily 60 capsule 11     phytonadione (MEPHYTON) 5 MG tablet TAKE ONE TABLET BY MOUTH ONCE A WEEK 4 tablet 11     polyethylene glycol (MIRALAX) powder Take 17 g (1 capful) by mouth daily as needed for constipation 119 g 3     topiramate (TOPAMAX) 100 MG tablet Take 1 tablet (100 mg) by mouth daily 90 tablet 4     traZODone (DESYREL) 100 MG tablet Take 1 tablet (100 mg) by mouth At Bedtime 30 tablet 5     vitamin C (ASCORBIC ACID) 500 MG tablet TAKE ONE TABLET BY MOUTH TWICE A  tablet 3     VITAMIN D3 25 MCG (1000 UT) tablet  "TAKE ONE TABLET BY MOUTH EVERY  tablet 3     vitamin E (TOCOPHEROL) 400 units (180 mg) capsule TAKE ONE CAPSULE BY MOUTH TWICE A  capsule 11        Mental Status Exam                                                                                   9, 14 cog      Alertness: alert  and oriented  Appearance:  Casually dressed and Adequately groomed  Behavior/Demeanor: cooperative, pleasant and calm, with good  eye contact   Speech: regular rate and rhythm  Mood :  \"anxious at work\"  Affect: full range, appropriate and slightly anxious; was congruent to mood; was congruent to content  Thought Process (Associations):  Linear and Goal directed  Thought process (Rate):  Normal  Thought content:  no overt psychosis, denies suicidal ideation, intent or thoughts and patient does not appear to be responding to internal stimuli  Perception:  Reports none;  Denies auditory hallucinations and visual hallucinations  Attention/Concentration:  Normal  Memory:  Immediate recall intact, Short-term memory intact and Long-term memory intact  Language: intact  Fund of Knowledge/Intelligence:  Average  Abstraction:  Normal  Insight:  Fair  Judgment:  Fair  Cognition: (6) does  appear grossly intact; formal cognitive testing was not done    Physical Exam     Motor activity/EPS:  Normal  Gait:  Normal  Psychomotor: normal or unremarkable    Labs and Results      Pertinent findings on review include: Review of records with relevant information reported in the HPI.  Reviewed pt's past medical record and obtained collateral information.      [place] PRESCRIPTION MONITORING PROGRAM [] was checked today:  indicates Adderall 5/10, 4/7, 3/8, 1/26,12/23.    PHQ9 Today:  N/A  PHQ 11/12/2019 12/18/2019 3/31/2020   PHQ-9 Total Score 9 4 7   Q9: Thoughts of better off dead/self-harm past 2 weeks Not at all Not at all Not at all       MECHE 7 Today: N/A  MECHE-7 SCORE 5/12/2017 7/24/2018 11/12/2019   Total Score 11 9 11       Recent Labs   Lab " Test 08/14/19  0814 08/02/17  0654 05/04/17  0626   CR 0.85 0.77 0.75   GFRESTIMATED >90 >90  Non  GFR Calc   >90  Non  GFR Calc       Recent Labs   Lab Test 10/07/20 04/07/20   AST 18 16   ALT 17 18   ALKPHOS 70 56      (12/23/2020),   (08/08/2012)     PSYCHOTROPIC DRUG INTERACTIONS:    Adderall---Buspar---Prozac---Trazodone---Wellbutrin: Concurrent use of AMPHETAMINES and SEROTONERGIC AGENTS may result in increased risk of serotonin syndrome.   Adderall---Prozac---Omeprazole: Concurrent use of AMPHETAMINES and SEROTONERGIC AGENTS THAT INHIBIT CYP2D6 may result in increased amphetamine exposure  Buspar---Trazodone---Vistaril---Zyrtec---Topamax: Concurrent use of TRAZODONE and SEROTONERGIC CENTRAL NERVOUS SYSTEM DEPRESSANTS may result in increased risk of CNS depression.   Prozac---Trazodone: Concurrent use of TRAZODONE and SEROTONERGIC DRUGS THAT PROLONG QT INTERVAL may result in increased risk of QT-interval prolongation.   Prozac---Wellbutrin: Concurrent use of BUPROPION and SELECTED SEIZURE THRESHOLD LOWERING CYP2D6 SUBSTRATES may result in increased exposure of CYP2D6 substrates; increased risk of seizure.  Glycopyrrolate---Vistaril: Concurrent use of GLYCOPYRROLATE and ANTICHOLINERGICS may result in increased risk of anticholinergic side effects .    Glycopyrrolate---Wellbutrin---Vistaril: Concurrent use of BUPROPION and AGENTS LOWERING SEIZURE THRESHOLD may result in lower seizure threshold  Vistaril---Zithromax---Trazodone: Concurrent use of HYDROXYZINE and QT PROLONGING AGENTS may result in increased risk of QT-interval prolongation.   Buspar---Prozac: Concurrent use of FLUOXETINE and BUSPIRONE may result in worsening of psychiatric symptoms.      MANAGEMENT:  Monitoring for adverse effects, routine vitals, periodic EKGs and patient is aware of risks    Impression/Assessment      Mamie Rice is a 25 year old adult  who presents for med management follow up.   Pt appears stable in her mood, slightly anxious in context of work stress increase, denies SI, SIB or HI during the appointment.  Pt notes her anxiety on weekends when she is not working is well managed, but anxiety is only occurring at work.  Reviewed current medication and since she wants medication that works quickly, discussed possible increase of Hydroxyzine to 25-50 mg QID PRN for anxiety and sleep while monitoring for sedation.  Once when works stress decreases after summer schedule is set, pt's anxiety may reduce which may not need frequent Hydroxyzine.  If pt continues to use Hydroxyzine frequently, may repeat EKG due to medication interactions possibly prolonging QTC in the future.  If anxiety becomes consistent, may consider increase in Buspar in the future.  Will continue all other medications for now. Naltrexone, Wellbutrin and Topamax are managed by weight management clinic. Will also verify if pt is seeing therapist in next visit.    Diagnosis                                                                   MDD  MECHE  ADHD    Treatment Recommendation & Plan       Medication Ordered/Consults/Labs/tests Ordered:     Medication:   -May increase Hydroxyzine to 25-50 mg up to 4 times a day for anxiety and sleep.  Monitor for sedation.  -Continue all other medication regimen for now.  OTC Recommendations: none  Lab Orders:  none  Referrals: none  Release of Information: none  Future Treatment Considerations: Per symptoms.   Return for Follow Up: in 3 weeks (soonest availability)    -Discussed safety plan for suicidal thoughts  -Discussed plan for suicidality  -Discussed available emergency services  -Patient agrees with the treatment plan  -Encouraged to continue outpatient therapy to gain more coping mechanism for stress.      -Pt understood that after stopping Naltrexone, they may be more sensitive to the effects of heroin and any other narcotics.  The amount of heroin or narcotics pt may have been using  on a routine basis before pt started naltrexone might now cause overdose and death and pt fully understands the nature and seriousness of this possible consequences.  If patient is not sure if they can avoid opioid use, pt understands that pt can be referred to alternative treatment programs such as methadone maintenance, which is an effective treatment for opioid dependence and has a reduced risk of fatal overdose.      Treatment Risk Statement: Discussed with the patient my impressions, as well as recommended studies. I educated patient on the differential diagnosis and prognosis. I discussed with the patient the risks and benefits of medications versus no interventions, including efficacy, dose, possible side effects and length of treatment and the importance of medication compliance.  The patient understands the risks, benefits, adverse effects and alternatives. Agrees to treatment with the capacity to do so. No medical contraindications to treatment. The patient also understands the risks of using street drugs or alcohol.     CRISIS NUMBERS:   Provided routinely in AVS.      34 minutes spent on the date of the encounter doing chart review, history and exam, documentation and further activities per the note      Jessie Pitts, DAWIT,  5/25/2021

## 2021-05-28 DIAGNOSIS — E84.0 CYSTIC FIBROSIS WITH PULMONARY MANIFESTATIONS (H): ICD-10-CM

## 2021-05-28 DIAGNOSIS — K86.89 PANCREATIC INSUFFICIENCY: ICD-10-CM

## 2021-05-28 DIAGNOSIS — B44.9 ASPERGILLOSIS (H): ICD-10-CM

## 2021-05-28 RX ORDER — PHYTONADIONE 5 MG/1
TABLET ORAL
Qty: 4 TABLET | Refills: 11 | Status: SHIPPED | OUTPATIENT
Start: 2021-05-28 | End: 2022-06-20

## 2021-06-01 ENCOUNTER — TELEPHONE (OUTPATIENT)
Dept: ENDOCRINOLOGY | Facility: CLINIC | Age: 26
End: 2021-06-01

## 2021-06-01 NOTE — TELEPHONE ENCOUNTER
KARLOSM with call center number for pt to schedule follow up virtual visit with Dr. Tolentino. Schedule next available. [facility].

## 2021-06-07 ENCOUNTER — TELEPHONE (OUTPATIENT)
Dept: PULMONOLOGY | Facility: CLINIC | Age: 26
End: 2021-06-07

## 2021-06-07 NOTE — PATIENT INSTRUCTIONS
Cystic Fibrosis Self-Care Plan    RECOMMENDATIONS:   Mamie, It was great to see you today.  The plan from today:  --follow up with ENT  --Internal medicine follow up for health care maintenance  --Theresa to review annual studies  Great job with self cares!    YOUR GOAL:  Stay safe and well.  Enjoy the warm weather!      [facility] Nurse line:  Ying Farr [phone]     [facility] Fax Number:      [phone]         Cystic Fibrosis Respiratory Therapists:   Jhoana Trinh              [phone]          Sharon Gutiérrez   [phone]  Cystic Fibrosis Dietitians:              Amy Andino              [phone]                            Theresa Ceja                        [phone]   Cystic Fibrosis Diabetes Nurse:    Rhonda Esteves   [phone]    Cystic Fibrosis Social Workers:     Deyanira Figueroa               [phone]                     Faith Zamudio               [phone]  Cystic Fibrosis Pharmacists:           Gris Adame                               [phone]         Maira Stephenson   [phone]  Cystic Fibrosis Genetic Counselor:   Kaley Escobedo    [phone]    [facility] website:  [email or web address]    COVID VACCINES:    You are eligible for the COVID-19 vaccine. Sign up for your COVID vaccine via [employer]. Log in, select the menu bar, select schedule an appointment, and then select COVID-19 Vaccine 1st Dose. You may also schedule by calling this number [phone] however hold times can be long.       OR schedule through the [facility] Vaccine Connector at https://vaccineconnector.[place].gov/ or by calling [phone].      The best vaccine is the one that s available to you first.  All COVID-19 vaccines currently available in the [place] (Carlton & Carlton, Pfizer and Moderna) have been shown to be highly effect at preventing COVID-19.       We re still learning how vaccines will affect  the spread of COVID-19. After you ve been fully vaccinated against COVID-19, you should keep taking precautions in public places like wearing a mask, staying 6 feet apart from others, and avoiding crowds and poorly ventilated spaces until we know more.    People are considered fully vaccinated:  2 weeks after their second dose in a 2-dose series, such as the Pfizer or Moderna vaccines, or  2 weeks after a single-dose vaccine, such as Carlton & Carlton s Selina vaccine    If you ve been fully vaccinated:  You can gather indoors with fully vaccinated people without wearing a mask.  You can gather indoors with unvaccinated people from one other household (for example, visiting with relatives who all live together) without masks, unless any of those people or anyone they live with has an increased risk for severe illness from COVID-19.  If you ve been around someone who has COVID-19, you do not need to stay away from others or get tested unless you have symptoms.  However, if you live in a group setting (like a correctional or [facility] facility or group home) and are around someone who has COVID-19, you should still stay away from others for 14 days and get tested, even if you don t have symptoms.         MRN: [id number]   Clinic Date: June 7, 2021   Patient: Mamie Rice     Annual Studies:   IGG   Date Value Ref Range Status   08/14/2019 755 695 - 1,620 mg/dL Final     Insulin   Date Value Ref Range Status   08/14/2019 41.0 (H) 3 - 25 mU/L Final     There are no preventive care reminders to display for this patient.    Pulmonary Function Tests  FEV1: amount of air you can blow out in 1 second  FVC: total amount of air you can take in and blow out    Your Goals:         PFT Latest Ref Rng & Units 11/12/2019   FVC L 3.50   FEV1 L 3.13   FVC% % 98   FEV1% % 102          Airway Clearance: The Most Important Way to Keep Your Lungs Healthy  Vest Settings:    Hill-Rom Frequencies: 8, 9, 10 Pressure 10 Then, Frequencies  18, 19, 20 Pressure 6      RespirTech: Quick Start with Pressure of     Do each frequency for 5 minutes; Deflate vest after each frequency & cough 3 times before beginning the next setting.    Vest and Neb Therapy should be done 2 times/day.    Good Nutrition Can Improve Lung Function and Overall Health     Take ALL of your vitamins with food     Take 1/2 of your enzymes before EVERY meal/snack and the other 1/2 mid-meal/snack    Wt Readings from Last 3 Encounters:   10/09/20 77.1 kg (170 lb)   02/28/20 80.7 kg (178 lb)   12/18/19 78.9 kg (174 lb)       There is no height or weight on file to calculate BMI.         National CF Foundation Recommendations for BMI in CF Adults: Women: at least 22 Men: at least 23        Controlling Blood Sugars Helps Prevent Lung Infections & Improves Nutrition  Test blood sugar:     In the morning before eating (goal is )     2 hours after a meal (goal is less than 150)     When pre-meal glucose is greater than 150 add correction     At bedtime (if less than 100 eat a snack with 15 grams of carbohydrates  Last A1C Results:   Hemoglobin A1C   Date Value Ref Range Status   08/14/2019 5.1 0 - 5.6 % Final     Comment:     Normal <5.7% Prediabetes 5.7-6.4%  Diabetes 6.5% or higher - adopted from ADA   consensus guidelines.           If diabetic, measure A1C every 6 months. Goal: Under 7%    Staying Healthy    Research:  If you are interested in learning about research opportunities or have questions, please contact the CF Research Team at [phone] or [email or web address].      CF Foundation:  Compass is a personalized resource service to help you with the insurance, financial, legal and other issues you are facing.  It's free, confidential and available to anyone with CF.  Ask your  for more information or contact Compass directly at [phone] ([phone]) or compass@cff.org, or learn more at cff.org/compass.

## 2021-06-07 NOTE — TELEPHONE ENCOUNTER
PA Initiation    Medication: TRIKAFTA PA REWAL PENDING  Insurance Company: [facility] [email or web address] [place] - Phone [phone] Fax [phone]  Pharmacy Filling the Rx: [place], [place] - [address] [place] [address]  Filling Pharmacy Phone:    Filling Pharmacy Fax:    Start Date: 6/7/2021

## 2021-06-07 NOTE — PROGRESS NOTES
[facility]  [facility]  June 8, 2021         Assessment and Plan:   Mamie Rice is a 25 year old female with cystic fibrosis.    1. CF lung disease with normal spirometry; CF TR modulator therapy:  Currently feeling well, asymptomatic, minimal productive cough right now likely related to sinus disease per below. Now vesting once a day. Weight stable. Annuals done today: DEXA, spirometry, labs reviewed, stable.  -s/p moderna covid vaccine  -vest and nebs now once a day  -trikafta (LFTs done today 6/8, wnl), azithromycin    2. Pancreatic Insufficiency/GI:  The patient has no new symptoms consistent with worsening malabsorption.    - continue the present dose of pancreatic enzymes  - continue vitamin supplementation.    3. Abnormal glucose tolerance test: intermittently monitors BG at home, mostly 80s-100s. occasional hypoglycemia into 50s usually in early AM. On no medications currently, continue to monitor. A1c today 5.1%.    4. Social - father and grandfather recently passed away which has been difficult. Asking to speak with SW today regarding issues about life insurance policy.     5. Allergic rhinitis, sinus disease  -will return to ENT for meropenem    6. RHM  Vaccinations UTD, now s/p covid vaccine. No pap smear on file, due.  -internal medicine referral for HCM and pap smear    Annual studies: completed today  Immunizations: UTD  Colonoscopy: NA    Attending statement:    The patient was seen and examined by me with Dr. Bradshaw.  The case was discussed at length.  Vitals, lab results and imaging from today were reviewed.  The note reflects our joint assessment and plan.    Gavi Allison MD MPH        Interval History:     Last seen 1/2021, virtually.     Having mild productive cough right now. Says worse with allergies, having rhinorrhea/congestion. Is planning to see her ENT to resume meropenem. Doing vest about one time a day now with nebs. Denies all  other respiratory symptoms. No GI symptoms or e/o malabsorption. Grandfather passed away recently.         Review of Systems:     CONSTITUTIONAL: no fever, no chills, no sweats, no change in weight, no change in energy, no change in appetite    INTEGUMENTARY/SKIN: no rash, no obvious new lesions    ENT/MOUTH: no sore throat, no new sinus pain; having some rhinorrhea and productive cough related to allergies currently     RESPIRATORY: see interval history    CV: no chest pain, no palpitations, no peripheral edema, no orthopnea, no PND    GI: no nausea, no vomiting, no change in stools, no fatty stools, no GERD    : negative    MUSCULOSKELETAL: no myalgias, no arthralgias    ENDOCRINE: no excessive thirst, blood sugars with adequate control    NEURO:  No headache, no numbness, no tingling    SLEEP: no issues    PSYCHIATRIC: mood stable          Past Medical and Surgical History:     Past Medical History:   Diagnosis Date     ADHD (attention deficit hyperactivity disorder)      Anxiety      Aspergillosis, with pneumonia (H)     fugus found caused chest pain     Chronic infection     CF, MRSA.,      Chronic sinusitis      Constipation, chronic      Cystic fibrosis with pulmonary manifestations (H) 12/19/2011     Cystic fibrosis without mention of meconium ileus     SWEAT TEST:Date: 2/17/1994   [facility]  296 mg, 104 mmol/L ClSample #2  295 mg, 104 mmol/L Cl GENOTYPING:Date: 10/15/2007,  Laboratory: [facility]: [id number]     Depressive disorder      Diabetes     no meds currently     Dysthymic disorder      Exocrine pancreatic insufficiency      Gastro-oesophageal reflux disease      Hip pain, right      MRSA (methicillin resistant Staphylococcus aureus) carrier      Pancreatic disease      Past Surgical History:   Procedure Laterality Date     ARTHROSCOPY HIP, OSTEOPLASTY FEMUR PROXIMAL, COMBINED  3/11/2013    Procedure: COMBINED ARTHROSCOPY HIP, OSTEOPLASTY FEMUR PROXIMAL;  Right Hip  Arthroscopy, Labral  Debridement.    surgeon request choice anesthesia/admit to [facility] after surgery;  Surgeon: Omkar Austin MD;  [facility]     ARTHROSCOPY KNEE WITH MEDIAL MENISCECTOMY Left 1/31/2017    Procedure: ARTHROSCOPY KNEE WITH MEDIAL MENISCECTOMY;  Surgeon: Jethro Coyle MD;  [facility] OR     bronchoscopies       BRONCHOSCOPY       EXAM UNDER ANESTHESIA ANUS N/A 5/10/2016    Procedure: EXAM UNDER ANESTHESIA ANUS;  Surgeon: Chet Gaviria MD;  [facility]     EXAM UNDER ANESTHESIA, RESTORATIONS, EXTRACTION(S) DENTAL COMPLEX, COMBINED  5/13/2013    Procedure: COMBINED EXAM UNDER ANESTHESIA, RESTORATIONS, EXTRACTION(S) DENTAL COMPLEX;  Dental Exam, Radiographs, Restorations. Single Extraction  Tooth #2. Restorations x 3;  Surgeon: Danilo Ortiz DDS;  [facility] OR     HC KNEE SCOPE, DIAGNOSTIC      Arthroscopy, Knee- left     INJECT BOTOX N/A 5/10/2016    Procedure: INJECT BOTOX;  Surgeon: Chet Gaviria MD;  [facility]     left hip labral tear  5/11/2011    left hip arthroscopy with labral debridement and synovectomy     meniscus repair       OPTICAL TRACKING SYSTEM ENDOSCOPIC SINUS SURGERY  10/14/2011    Procedure:OPTICAL TRACKING SYSTEM ENDOSCOPIC SINUS SURGERY; FESS (functional endoscopic sinus surgery) with Image Guidance, bronchial lavage and cultures; Surgeon:GOYO KUO; [facility]     OPTICAL TRACKING SYSTEM ENDOSCOPIC SINUS SURGERY  5/18/2012    Procedure:OPTICAL TRACKING SYSTEM ENDOSCOPIC SINUS SURGERY; Right  and Left Image Guided Functional Endoscopic Sinus Surgery With  Frontal Approach, [employer]; Surgeon:GOYO KUO; [facility]     OPTICAL TRACKING SYSTEM ENDOSCOPIC SINUS SURGERY  9/26/2012    Procedure: OPTICAL TRACKING SYSTEM ENDOSCOPIC SINUS SURGERY;  Stealth Guided Bilateral Functional Endoscopic Sinus Surgery *Latex Safe*;  Surgeon: Goyo Kuo MD;  [facility]     OPTICAL TRACKING SYSTEM ENDOSCOPIC  SINUS SURGERY Bilateral 10/16/2015    Procedure: OPTICAL TRACKING SYSTEM ENDOSCOPIC SINUS SURGERY;  Surgeon: Mariela Haile MD;  [facility]     ORTHOPEDIC SURGERY      left hip tear repair 2010     SINUS SURGERY             Family History:     Family History   Problem Relation Age of Onset     Cancer Paternal Grandmother      Skin Cancer Paternal Grandmother      Other Cancer Paternal Grandmother         Skin     Obesity Paternal Grandmother      Cancer Paternal Grandfather         PGF had throat cancer (he was a smoker)     Other Cancer Paternal Grandfather      Anxiety Disorder Paternal Grandfather      Thyroid Disease Mother         ,     Hypertension Mother      Obesity Other      ALS Father 67        2 male cousins with ALS as well     Anesthesia Reaction No family hx of      Blood Disease No family hx of      Colon Polyps No family hx of      Crohn's Disease No family hx of      Ulcerative Colitis No family hx of      Colon Cancer No family hx of      Melanoma No family hx of             Social History:     Social History     Socioeconomic History     Marital status: Single     Spouse name: Not on file     Number of children: Not on file     Years of education: Not on file     Highest education level: Not on file   Occupational History     Not on file   Social Needs     Financial resource strain: Not on file     Food insecurity     Worry: Not on file     Inability: Not on file     Transportation needs     Medical: Not on file     Non-medical: Not on file   Tobacco Use     Smoking status: Never Smoker     Smokeless tobacco: Never Used     Tobacco comment: one person at home smokes outside   Substance and Sexual Activity     Alcohol use: No     Alcohol/week: 0.0 standard drinks     Drug use: No     Sexual activity: Never   Lifestyle     Physical activity     Days per week: Not on file     Minutes per session: Not on file     Stress: Not on file   Relationships     Social connections     Talks on phone: Not  on file     Gets together: Not on file     Attends Alevism service: Not on file     Active member of club or organization: Not on file     Attends meetings of clubs or organizations: Not on file     Relationship status: Not on file     Intimate partner violence     Fear of current or ex partner: Not on file     Emotionally abused: Not on file     Physically abused: Not on file     Forced sexual activity: Not on file   Other Topics Concern      Service Not Asked     Blood Transfusions No     Caffeine Concern Not Asked     Occupational Exposure Not Asked     Hobby Hazards Not Asked     Sleep Concern Not Asked     Stress Concern Not Asked     Weight Concern Not Asked     Special Diet Not Asked     Back Care Not Asked     Exercise Yes     Bike Helmet Not Asked     Seat Belt Not Asked     Self-Exams Not Asked     Parent/sibling w/ CABG, MI or angioplasty before 65F 55M? Yes   Social History Narrative    Mamie lives with mother in [place], [place].  There is a cat in the home, but Mamie does not have any litterbox duties.  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  She gets essentially no exercise because of the tingling in her feet (says it bothers her even to stand).        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        8/2015 no change in social situation        2/15/2016 Pt is single and lives with mother and stepfather.            Medications:     Current Outpatient Medications   Medication     acetylcysteine (MUCOMYST) 20 % neb solution     albuterol (PROAIR HFA/PROVENTIL HFA/VENTOLIN HFA) 108 (90 Base) MCG/ACT Inhaler     albuterol (PROVENTIL) (2.5 MG/3ML) 0.083% neb solution     amphetamine-dextroamphetamine (ADDERALL XR) 20 MG 24 hr capsule     azithromycin (ZITHROMAX) 500 MG tablet     beta carotene 92737 UNIT capsule     blood glucose (ACCU-CHEK SMARTVIEW) test strip     buPROPion (WELLBUTRIN XL) 300 MG 24 hr tablet      "busPIRone (BUSPAR) 15 MG tablet     cetirizine (ZYRTEC) 10 MG tablet     elexacaftor-tezacaftor-ivacaftor & ivacaftor (TRIKAFTA) 100-50-75 & 150 MG tablet pack     FLUoxetine (PROZAC) 40 MG capsule     hydrOXYzine (ATARAX) 25 MG tablet     medroxyPROGESTERone (DEPO-PROVERA) 150 MG/ML IM injection     montelukast (SINGULAIR) 10 MG tablet     Multiple Vitamin (MULTIVITAMIN OR)     naltrexone (DEPADE/REVIA) 50 MG tablet     omeprazole (PRILOSEC) 40 MG DR capsule     phytonadione (MEPHYTON) 5 MG tablet     polyethylene glycol (MIRALAX) powder     topiramate (TOPAMAX) 100 MG tablet     traZODone (DESYREL) 100 MG tablet     vitamin C (ASCORBIC ACID) 500 MG tablet     VITAMIN D3 25 MCG (1000 UT) tablet     vitamin E (TOCOPHEROL) 400 units (180 mg) capsule     [START ON 7/9/2021] amphetamine-dextroamphetamine (ADDERALL XR) 20 MG 24 hr capsule     [START ON 8/8/2021] amphetamine-dextroamphetamine (ADDERALL XR) 20 MG 24 hr capsule     No current facility-administered medications for this visit.             Physical Exam:   /78   Pulse 88   Resp 17   Ht 1.549 m (5' 1\")   Wt 80.3 kg (177 lb)   SpO2 99%   BMI 33.44 kg/m      Constitutional:   Awake, alert and in no apparent distress     Eyes:   nonicteric     ENT:   oral mucosa moist without lesions, normal tm bilaterally, bilateral mucosal edema and erythema     Neck:   Supple without supraclavicular or cervical lymphadenopathy     Lungs:   Good air flow.  No crackles. No rhonchi.  No wheezes.     Cardiovascular:   Normal S1 and S2.  RRR.  No murmur, gallop or rub.     Abdomen:   NABS, soft, nontender, nondistended.      Musculoskeletal:   No edema, digital clubbing present     Neurologic:   Alert and conversant.     Skin:   Warm, dry.  No rash on limited exam.             Data:   All laboratory and imaging data reviewed.    Cystic Fibrosis Culture  Specimen Description   Date Value Ref Range Status   11/12/2019 Midstream Urine  Final   11/12/2019 Throat  Final "   06/05/2019 Throat  Final    Culture Micro   Date Value Ref Range Status   11/12/2019   Final    <10,000 colonies/mL  urogenital roberto carlos  Susceptibility testing not routinely done     11/12/2019 Light growth  Normal roberto carlos    Final   11/12/2019 Moderate growth  Staphylococcus aureus   (A)  Final        Recent Results (from the past 168 hour(s))   Erythrocyte sedimentation rate auto    Collection Time: 06/08/21 10:15 AM   Result Value Ref Range    Sed Rate 6 0 - 20 mm/h   CBC with platelets differential    Collection Time: 06/08/21 10:15 AM   Result Value Ref Range    WBC 3.2 (L) 4.0 - 11.0 10e9/L    RBC Count 5.02 3.8 - 5.2 10e12/L    Hemoglobin 14.1 11.7 - 15.7 g/dL    Hematocrit 42.1 35.0 - 47.0 %    MCV 84 78 - 100 fl    MCH 28.1 26.5 - 33.0 pg    MCHC 33.5 31.5 - 36.5 g/dL    RDW 12.9 10.0 - 15.0 %    Platelet Count 290 150 - 450 10e9/L    Diff Method Automated Method     % Neutrophils 51.6 %    % Lymphocytes 36.1 %    % Monocytes 9.9 %    % Eosinophils 1.5 %    % Basophils 0.6 %    % Immature Granulocytes 0.3 %    Nucleated RBCs 0 0 /100    Absolute Neutrophil 1.7 1.6 - 8.3 10e9/L    Absolute Lymphocytes 1.2 0.8 - 5.3 10e9/L    Absolute Monocytes 0.3 0.0 - 1.3 10e9/L    Absolute Eosinophils 0.1 0.0 - 0.7 10e9/L    Absolute Basophils 0.0 0.0 - 0.2 10e9/L    Abs Immature Granulocytes 0.0 0 - 0.4 10e9/L    Absolute Nucleated RBC 0.0    Vitamin D Deficiency    Collection Time: 06/08/21 10:15 AM   Result Value Ref Range    Vitamin D Deficiency screening 37 20 - 75 ug/L   TSH with free T4 reflex    Collection Time: 06/08/21 10:15 AM   Result Value Ref Range    TSH 0.98 0.40 - 4.00 mU/L   Protein total    Collection Time: 06/08/21 10:15 AM   Result Value Ref Range    Protein Total 6.8 6.8 - 8.8 g/dL   Phosphorus    Collection Time: 06/08/21 10:15 AM   Result Value Ref Range    Phosphorus 3.0 2.5 - 4.5 mg/dL   Magnesium    Collection Time: 06/08/21 10:15 AM   Result Value Ref Range    Magnesium 2.1 1.6 - 2.3 mg/dL   INR     Collection Time: 06/08/21 10:15 AM   Result Value Ref Range    INR 1.02 0.86 - 1.14   Hemoglobin A1c    Collection Time: 06/08/21 10:15 AM   Result Value Ref Range    Hemoglobin A1C 5.1 0 - 5.6 %   GGT    Collection Time: 06/08/21 10:15 AM   Result Value Ref Range    GGT 36 0 - 40 U/L   Iron    Collection Time: 06/08/21 10:15 AM   Result Value Ref Range    Iron 102 35 - 180 ug/dL   AST    Collection Time: 06/08/21 10:15 AM   Result Value Ref Range    AST 15 0 - 45 U/L   Alkaline phosphatase    Collection Time: 06/08/21 10:15 AM   Result Value Ref Range    Alkaline Phosphatase 68 40 - 150 U/L   Albumin level    Collection Time: 06/08/21 10:15 AM   Result Value Ref Range    Albumin 3.6 3.4 - 5.0 g/dL   Lipid Profile    Collection Time: 06/08/21 10:15 AM   Result Value Ref Range    Cholesterol 138 <200 mg/dL    Triglycerides 120 <150 mg/dL    HDL Cholesterol 52 >49 mg/dL    LDL Cholesterol Calculated 62 <100 mg/dL    Non HDL Cholesterol 86 <130 mg/dL   Basic metabolic panel    Collection Time: 06/08/21 10:15 AM   Result Value Ref Range    Sodium 142 133 - 144 mmol/L    Potassium 3.6 3.4 - 5.3 mmol/L    Chloride 110 (H) 94 - 109 mmol/L    Carbon Dioxide 27 20 - 32 mmol/L    Anion Gap 4 3 - 14 mmol/L    Glucose 79 70 - 99 mg/dL    Urea Nitrogen 8 7 - 30 mg/dL    Creatinine 0.90 0.52 - 1.04 mg/dL    GFR Estimate 87 >60 mL/min/[1.73_m2]    GFR Estimate If Black >90 >60 mL/min/[1.73_m2]    Calcium 8.5 8.5 - 10.1 mg/dL   ALT    Collection Time: 06/08/21 10:15 AM   Result Value Ref Range    ALT 21 0 - 50 U/L   Albumin Random Urine Quantitative with Creat Ratio    Collection Time: 06/08/21 10:16 AM   Result Value Ref Range    Creatinine Urine 218 mg/dL    Albumin Urine mg/L 7 mg/L    Albumin Urine mg/g Cr 3.33 0 - 25 mg/g Cr   Routine UA with microscopic    Collection Time: 06/08/21 10:16 AM   Result Value Ref Range    Color Urine Jillian     Appearance Urine Cloudy     Glucose Urine Negative NEG^Negative mg/dL    Bilirubin  Urine Negative NEG^Negative    Ketones Urine Negative NEG^Negative mg/dL    Specific Gravity Urine 1.018 1.003 - 1.035    Blood Urine Negative NEG^Negative    pH Urine 7.0 5.0 - 7.0 pH    Protein Albumin Urine Negative NEG^Negative mg/dL    Urobilinogen mg/dL 0.0 0.0 - 2.0 mg/dL    Nitrite Urine Negative NEG^Negative    Leukocyte Esterase Urine Negative NEG^Negative    Source Midstream Urine     WBC Urine 1 0 - 5 /HPF    RBC Urine 1 0 - 2 /HPF    Squamous Epithelial /HPF Urine 2 (H) 0 - 1 /HPF    Amorphous Crystals Few (A) NEG^Negative /HPF   General PFT Lab (Please always keep checked)    Collection Time: 06/08/21 10:26 AM   Result Value Ref Range    FVC-Pred 3.55 L    FVC-Pre 3.66 L    FVC-%Pred-Pre 103 %    FEV1-Pre 3.26 L    FEV1-%Pred-Pre 107 %    FEV1FVC-Pred 86 %    FEV1FVC-Pre 89 %    FEFMax-Pred 6.67 L/sec    FEFMax-Pre 8.15 L/sec    FEFMax-%Pred-Pre 122 %    FEF2575-Pred 3.54 L/sec    FEF2575-Pre 4.21 L/sec    MTB7371-%Pred-Pre 118 %    ExpTime-Pre 5.61 sec    FIFMax-Pre 4.10 L/sec    FEV1FEV6-Pred 86 %    FEV1FEV6-Pre 89 %       PFT: The spirometry is normal.  When compared to 11/12/2019, the FEV1 and FVC have increased.      Pulmonary exacerbation: absent    40 minutes spent on the date of the encounter doing chart review, history and exam, documentation and further activities per the note

## 2021-06-08 ENCOUNTER — ALLIED HEALTH/NURSE VISIT (OUTPATIENT)
Dept: CARE COORDINATION | Facility: CLINIC | Age: 26
End: 2021-06-08
Payer: COMMERCIAL

## 2021-06-08 ENCOUNTER — ANCILLARY PROCEDURE (OUTPATIENT)
Dept: BONE DENSITY | Facility: CLINIC | Age: 26
End: 2021-06-08
Attending: INTERNAL MEDICINE
Payer: COMMERCIAL

## 2021-06-08 ENCOUNTER — OFFICE VISIT (OUTPATIENT)
Dept: PULMONOLOGY | Facility: CLINIC | Age: 26
End: 2021-06-08
Attending: INTERNAL MEDICINE
Payer: COMMERCIAL

## 2021-06-08 ENCOUNTER — OFFICE VISIT (OUTPATIENT)
Dept: PHARMACY | Facility: CLINIC | Age: 26
End: 2021-06-08
Payer: COMMERCIAL

## 2021-06-08 VITALS
HEART RATE: 88 BPM | WEIGHT: 177 LBS | DIASTOLIC BLOOD PRESSURE: 78 MMHG | RESPIRATION RATE: 17 BRPM | HEIGHT: 61 IN | SYSTOLIC BLOOD PRESSURE: 115 MMHG | BODY MASS INDEX: 33.42 KG/M2 | OXYGEN SATURATION: 99 %

## 2021-06-08 DIAGNOSIS — E84.0 CYSTIC FIBROSIS WITH PULMONARY MANIFESTATIONS (H): Primary | ICD-10-CM

## 2021-06-08 DIAGNOSIS — K86.89 PANCREATIC INSUFFICIENCY: ICD-10-CM

## 2021-06-08 DIAGNOSIS — E84.0 CYSTIC FIBROSIS WITH PULMONARY MANIFESTATIONS (H): ICD-10-CM

## 2021-06-08 DIAGNOSIS — E10.9 TYPE 1 DIABETES MELLITUS WITHOUT COMPLICATION (H): ICD-10-CM

## 2021-06-08 DIAGNOSIS — Z00.00 ROUTINE GENERAL MEDICAL EXAMINATION AT A HEALTH CARE FACILITY: ICD-10-CM

## 2021-06-08 DIAGNOSIS — E84.9 CYSTIC FIBROSIS (H): Primary | ICD-10-CM

## 2021-06-08 DIAGNOSIS — E84.9 CF (CYSTIC FIBROSIS) (H): Primary | ICD-10-CM

## 2021-06-08 DIAGNOSIS — E66.09 NON MORBID OBESITY DUE TO EXCESS CALORIES: ICD-10-CM

## 2021-06-08 DIAGNOSIS — Z13.9 RISK AND FUNCTIONAL ASSESSMENT: Primary | ICD-10-CM

## 2021-06-08 LAB
ALBUMIN SERPL-MCNC: 3.6 G/DL (ref 3.4–5)
ALBUMIN UR-MCNC: NEGATIVE MG/DL
ALP SERPL-CCNC: 68 U/L (ref 40–150)
ALT SERPL W P-5'-P-CCNC: 21 U/L (ref 0–50)
AMORPH CRY #/AREA URNS HPF: ABNORMAL /HPF
ANION GAP SERPL CALCULATED.3IONS-SCNC: 4 MMOL/L (ref 3–14)
APPEARANCE UR: ABNORMAL
AST SERPL W P-5'-P-CCNC: 15 U/L (ref 0–45)
BASOPHILS # BLD AUTO: 0 10E9/L (ref 0–0.2)
BASOPHILS NFR BLD AUTO: 0.6 %
BILIRUB UR QL STRIP: NEGATIVE
BUN SERPL-MCNC: 8 MG/DL (ref 7–30)
CALCIUM SERPL-MCNC: 8.5 MG/DL (ref 8.5–10.1)
CHLORIDE SERPL-SCNC: 110 MMOL/L (ref 94–109)
CHOLEST SERPL-MCNC: 138 MG/DL
CO2 SERPL-SCNC: 27 MMOL/L (ref 20–32)
COLOR UR AUTO: ABNORMAL
CREAT SERPL-MCNC: 0.9 MG/DL (ref 0.52–1.04)
CREAT UR-MCNC: 218 MG/DL
DEPRECATED CALCIDIOL+CALCIFEROL SERPL-MC: 37 UG/L (ref 20–75)
DIFFERENTIAL METHOD BLD: ABNORMAL
EOSINOPHIL # BLD AUTO: 0.1 10E9/L (ref 0–0.7)
EOSINOPHIL NFR BLD AUTO: 1.5 %
ERYTHROCYTE [DISTWIDTH] IN BLOOD BY AUTOMATED COUNT: 12.9 % (ref 10–15)
ERYTHROCYTE [SEDIMENTATION RATE] IN BLOOD BY WESTERGREN METHOD: 6 MM/H (ref 0–20)
EXPTIME-PRE: 5.61 SEC
FEF2575-%PRED-PRE: 118 %
FEF2575-PRE: 4.21 L/SEC
FEF2575-PRED: 3.54 L/SEC
FEFMAX-%PRED-PRE: 122 %
FEFMAX-PRE: 8.15 L/SEC
FEFMAX-PRED: 6.67 L/SEC
FEV1-%PRED-PRE: 107 %
FEV1-PRE: 3.26 L
FEV1FEV6-PRE: 89 %
FEV1FEV6-PRED: 86 %
FEV1FVC-PRE: 89 %
FEV1FVC-PRED: 86 %
FIFMAX-PRE: 4.1 L/SEC
FVC-%PRED-PRE: 103 %
FVC-PRE: 3.66 L
FVC-PRED: 3.55 L
GFR SERPL CREATININE-BSD FRML MDRD: 87 ML/MIN/{1.73_M2}
GGT SERPL-CCNC: 36 U/L (ref 0–40)
GLUCOSE SERPL-MCNC: 79 MG/DL (ref 70–99)
GLUCOSE UR STRIP-MCNC: NEGATIVE MG/DL
HBA1C MFR BLD: 5.1 % (ref 0–5.6)
HCT VFR BLD AUTO: 42.1 % (ref 35–47)
HDLC SERPL-MCNC: 52 MG/DL
HGB BLD-MCNC: 14.1 G/DL (ref 11.7–15.7)
HGB UR QL STRIP: NEGATIVE
IMM GRANULOCYTES # BLD: 0 10E9/L (ref 0–0.4)
IMM GRANULOCYTES NFR BLD: 0.3 %
INR PPP: 1.02 (ref 0.86–1.14)
IRON SERPL-MCNC: 102 UG/DL (ref 35–180)
KETONES UR STRIP-MCNC: NEGATIVE MG/DL
LDLC SERPL CALC-MCNC: 62 MG/DL
LEUKOCYTE ESTERASE UR QL STRIP: NEGATIVE
LYMPHOCYTES # BLD AUTO: 1.2 10E9/L (ref 0.8–5.3)
LYMPHOCYTES NFR BLD AUTO: 36.1 %
MAGNESIUM SERPL-MCNC: 2.1 MG/DL (ref 1.6–2.3)
MCH RBC QN AUTO: 28.1 PG (ref 26.5–33)
MCHC RBC AUTO-ENTMCNC: 33.5 G/DL (ref 31.5–36.5)
MCV RBC AUTO: 84 FL (ref 78–100)
MICROALBUMIN UR-MCNC: 7 MG/L
MICROALBUMIN/CREAT UR: 3.33 MG/G CR (ref 0–25)
MONOCYTES # BLD AUTO: 0.3 10E9/L (ref 0–1.3)
MONOCYTES NFR BLD AUTO: 9.9 %
NEUTROPHILS # BLD AUTO: 1.7 10E9/L (ref 1.6–8.3)
NEUTROPHILS NFR BLD AUTO: 51.6 %
NITRATE UR QL: NEGATIVE
NONHDLC SERPL-MCNC: 86 MG/DL
NRBC # BLD AUTO: 0 10*3/UL
NRBC BLD AUTO-RTO: 0 /100
PH UR STRIP: 7 PH (ref 5–7)
PHOSPHATE SERPL-MCNC: 3 MG/DL (ref 2.5–4.5)
PLATELET # BLD AUTO: 290 10E9/L (ref 150–450)
POTASSIUM SERPL-SCNC: 3.6 MMOL/L (ref 3.4–5.3)
PROT SERPL-MCNC: 6.8 G/DL (ref 6.8–8.8)
RBC # BLD AUTO: 5.02 10E12/L (ref 3.8–5.2)
RBC #/AREA URNS AUTO: 1 /HPF (ref 0–2)
SODIUM SERPL-SCNC: 142 MMOL/L (ref 133–144)
SOURCE: ABNORMAL
SP GR UR STRIP: 1.02 (ref 1–1.03)
SQUAMOUS #/AREA URNS AUTO: 2 /HPF (ref 0–1)
TRIGL SERPL-MCNC: 120 MG/DL
TSH SERPL DL<=0.005 MIU/L-ACNC: 0.98 MU/L (ref 0.4–4)
UROBILINOGEN UR STRIP-MCNC: 0 MG/DL (ref 0–2)
WBC # BLD AUTO: 3.2 10E9/L (ref 4–11)
WBC #/AREA URNS AUTO: 1 /HPF (ref 0–5)

## 2021-06-08 PROCEDURE — 83540 ASSAY OF IRON: CPT | Performed by: PATHOLOGY

## 2021-06-08 PROCEDURE — 97803 MED NUTRITION INDIV SUBSEQ: CPT | Performed by: DIETITIAN, REGISTERED

## 2021-06-08 PROCEDURE — 82784 ASSAY IGA/IGD/IGG/IGM EACH: CPT | Mod: 90 | Performed by: PATHOLOGY

## 2021-06-08 PROCEDURE — 83036 HEMOGLOBIN GLYCOSYLATED A1C: CPT | Performed by: PATHOLOGY

## 2021-06-08 PROCEDURE — 85652 RBC SED RATE AUTOMATED: CPT | Performed by: PATHOLOGY

## 2021-06-08 PROCEDURE — 82306 VITAMIN D 25 HYDROXY: CPT | Mod: 90 | Performed by: PATHOLOGY

## 2021-06-08 PROCEDURE — 85610 PROTHROMBIN TIME: CPT | Performed by: PATHOLOGY

## 2021-06-08 PROCEDURE — 84590 ASSAY OF VITAMIN A: CPT | Performed by: PATHOLOGY

## 2021-06-08 PROCEDURE — 87070 CULTURE OTHR SPECIMN AEROBIC: CPT | Performed by: INTERNAL MEDICINE

## 2021-06-08 PROCEDURE — 82043 UR ALBUMIN QUANTITATIVE: CPT | Performed by: PATHOLOGY

## 2021-06-08 PROCEDURE — 84155 ASSAY OF PROTEIN SERUM: CPT | Performed by: PATHOLOGY

## 2021-06-08 PROCEDURE — 99215 OFFICE O/P EST HI 40 MIN: CPT | Mod: 25 | Performed by: INTERNAL MEDICINE

## 2021-06-08 PROCEDURE — 80069 RENAL FUNCTION PANEL: CPT | Performed by: PATHOLOGY

## 2021-06-08 PROCEDURE — 85025 COMPLETE CBC W/AUTO DIFF WBC: CPT | Performed by: PATHOLOGY

## 2021-06-08 PROCEDURE — 82977 ASSAY OF GGT: CPT | Performed by: PATHOLOGY

## 2021-06-08 PROCEDURE — 84075 ASSAY ALKALINE PHOSPHATASE: CPT | Performed by: PATHOLOGY

## 2021-06-08 PROCEDURE — 99000 SPECIMEN HANDLING OFFICE-LAB: CPT | Performed by: PATHOLOGY

## 2021-06-08 PROCEDURE — 84403 ASSAY OF TOTAL TESTOSTERONE: CPT | Mod: 90 | Performed by: PATHOLOGY

## 2021-06-08 PROCEDURE — 80061 LIPID PANEL: CPT | Performed by: PATHOLOGY

## 2021-06-08 PROCEDURE — 77080 DXA BONE DENSITY AXIAL: CPT | Performed by: INTERNAL MEDICINE

## 2021-06-08 PROCEDURE — 99207 PR NO CHARGE LOS: CPT | Performed by: PHARMACIST

## 2021-06-08 PROCEDURE — 84460 ALANINE AMINO (ALT) (SGPT): CPT | Performed by: PATHOLOGY

## 2021-06-08 PROCEDURE — 83735 ASSAY OF MAGNESIUM: CPT | Performed by: PATHOLOGY

## 2021-06-08 PROCEDURE — 36415 COLL VENOUS BLD VENIPUNCTURE: CPT | Performed by: PATHOLOGY

## 2021-06-08 PROCEDURE — 94375 RESPIRATORY FLOW VOLUME LOOP: CPT | Performed by: INTERNAL MEDICINE

## 2021-06-08 PROCEDURE — 84450 TRANSFERASE (AST) (SGOT): CPT | Performed by: PATHOLOGY

## 2021-06-08 PROCEDURE — 81001 URINALYSIS AUTO W/SCOPE: CPT | Performed by: PATHOLOGY

## 2021-06-08 PROCEDURE — 84446 ASSAY OF VITAMIN E: CPT | Performed by: PATHOLOGY

## 2021-06-08 PROCEDURE — 84443 ASSAY THYROID STIM HORMONE: CPT | Performed by: PATHOLOGY

## 2021-06-08 PROCEDURE — 82785 ASSAY OF IGE: CPT | Mod: 90 | Performed by: PATHOLOGY

## 2021-06-08 ASSESSMENT — MIFFLIN-ST. JEOR: SCORE: 1475.25

## 2021-06-08 ASSESSMENT — PAIN SCALES - GENERAL: PAINLEVEL: NO PAIN (0)

## 2021-06-08 NOTE — LETTER
6/8/2021         RE: Mamie Rice  [address] [facility] [address]        Dear Colleague,    Thank you for referring your patient, Mamie Rice, to the [facility] [place]. Please see a copy of my visit note below.    [facility]  June 8, 2021         Assessment and Plan:   Mamie Rice is a 25 year old female with cystic fibrosis.    1. CF lung disease with normal spirometry; CF TR modulator therapy:  Currently feeling well, asymptomatic, minimal productive cough right now likely related to sinus disease per below. Now vesting once a day. Weight stable. Annuals done today: DEXA, spirometry, labs reviewed, stable.  -s/p moderna covid vaccine  -vest and nebs now once a day  -trikafta (LFTs done today 6/8, wnl), azithromycin    2. Pancreatic Insufficiency/GI:  The patient has no new symptoms consistent with worsening malabsorption.    - continue the present dose of pancreatic enzymes  - continue vitamin supplementation.    3. Abnormal glucose tolerance test: intermittently monitors BG at home, mostly 80s-100s. occasional hypoglycemia into 50s usually in early AM. On no medications currently, continue to monitor. A1c today 5.1%.    4. Social - father and grandfather recently passed away which has been difficult. Asking to speak with  today regarding issues about life insurance policy.     5. Allergic rhinitis, sinus disease  -will return to ENT for meropenem    6. RHM  Vaccinations UTD, now s/p covid vaccine. No pap smear on file, due.  -internal medicine referral for HCM and pap smear    Annual studies: completed today  Immunizations: UTD  Colonoscopy: NA    Attending statement:    The patient was seen and examined by me with Dr. Bradshaw.  The case was discussed at length.  Vitals, lab results and imaging from today were reviewed.  The note reflects our joint assessment and  plan.    Gavi Allison MD MPH        Interval History:     Last seen 1/2021, virtually.     Having mild productive cough right now. Says worse with allergies, having rhinorrhea/congestion. Is planning to see her ENT to resume meropenem. Doing vest about one time a day now with nebs. Denies all other respiratory symptoms. No GI symptoms or e/o malabsorption. Grandfather passed away recently.         Review of Systems:     CONSTITUTIONAL: no fever, no chills, no sweats, no change in weight, no change in energy, no change in appetite    INTEGUMENTARY/SKIN: no rash, no obvious new lesions    ENT/MOUTH: no sore throat, no new sinus pain; having some rhinorrhea and productive cough related to allergies currently     RESPIRATORY: see interval history    CV: no chest pain, no palpitations, no peripheral edema, no orthopnea, no PND    GI: no nausea, no vomiting, no change in stools, no fatty stools, no GERD    : negative    MUSCULOSKELETAL: no myalgias, no arthralgias    ENDOCRINE: no excessive thirst, blood sugars with adequate control    NEURO:  No headache, no numbness, no tingling    SLEEP: no issues    PSYCHIATRIC: mood stable          Past Medical and Surgical History:     Past Medical History:   Diagnosis Date     ADHD (attention deficit hyperactivity disorder)      Anxiety      Aspergillosis, with pneumonia (H)     fugus found caused chest pain     Chronic infection     CF, MRSA.,      Chronic sinusitis      Constipation, chronic      Cystic fibrosis with pulmonary manifestations (H) 12/19/2011     Cystic fibrosis without mention of meconium ileus     SWEAT TEST:Date: 2/17/1994   [facility]  296 mg, 104 mmol/L ClSample #2  295 mg, 104 mmol/L Cl GENOTYPING:Date: 10/15/2007,  Laboratory: [facility]: [id number]     Depressive disorder      Diabetes     no meds currently     Dysthymic disorder      Exocrine pancreatic insufficiency      Gastro-oesophageal reflux disease      Hip pain, right       MRSA (methicillin resistant Staphylococcus aureus) carrier      Pancreatic disease      Past Surgical History:   Procedure Laterality Date     ARTHROSCOPY HIP, OSTEOPLASTY FEMUR PROXIMAL, COMBINED  3/11/2013    Procedure: COMBINED ARTHROSCOPY HIP, OSTEOPLASTY FEMUR PROXIMAL;  Right Hip Arthroscopy, Labral  Debridement.    surgeon request choice anesthesia/admit to [facility] after surgery;  Surgeon: Omkar Austin MD;  [facility]     ARTHROSCOPY KNEE WITH MEDIAL MENISCECTOMY Left 1/31/2017    Procedure: ARTHROSCOPY KNEE WITH MEDIAL MENISCECTOMY;  Surgeon: Jethro Coyle MD;  [facility] OR     bronchoscopies       BRONCHOSCOPY       EXAM UNDER ANESTHESIA ANUS N/A 5/10/2016    Procedure: EXAM UNDER ANESTHESIA ANUS;  Surgeon: Chet Gaviria MD;  [facility]     EXAM UNDER ANESTHESIA, RESTORATIONS, EXTRACTION(S) DENTAL COMPLEX, COMBINED  5/13/2013    Procedure: COMBINED EXAM UNDER ANESTHESIA, RESTORATIONS, EXTRACTION(S) DENTAL COMPLEX;  Dental Exam, Radiographs, Restorations. Single Extraction  Tooth #2. Restorations x 3;  Surgeon: Danilo Ortiz DDS;  Location[facility] OR     HC KNEE SCOPE, DIAGNOSTIC      Arthroscopy, Knee- left     INJECT BOTOX N/A 5/10/2016    Procedure: INJECT BOTOX;  Surgeon: Chet Gaviria MD;  [facility]     left hip labral tear  5/11/2011    left hip arthroscopy with labral debridement and synovectomy     meniscus repair       OPTICAL TRACKING SYSTEM ENDOSCOPIC SINUS SURGERY  10/14/2011    Procedure:OPTICAL TRACKING SYSTEM ENDOSCOPIC SINUS SURGERY; FESS (functional endoscopic sinus surgery) with Image Guidance, bronchial lavage and cultures; Surgeon:JUAN JOSE GARCIA; Location[facility] OR     OPTICAL TRACKING SYSTEM ENDOSCOPIC SINUS SURGERY  5/18/2012    Procedure:OPTICAL TRACKING SYSTEM ENDOSCOPIC SINUS SURGERY; Right  and Left Image Guided Functional Endoscopic Sinus Surgery With  Frontal Approach, [employer]; Surgeon:JUAN JOSE GARCIA; [facility]      OPTICAL TRACKING SYSTEM ENDOSCOPIC SINUS SURGERY  9/26/2012    Procedure: OPTICAL TRACKING SYSTEM ENDOSCOPIC SINUS SURGERY;  Stealth Guided Bilateral Functional Endoscopic Sinus Surgery *Latex Safe*;  Surgeon: Goyo Kuo MD;  [facility]     OPTICAL TRACKING SYSTEM ENDOSCOPIC SINUS SURGERY Bilateral 10/16/2015    Procedure: OPTICAL TRACKING SYSTEM ENDOSCOPIC SINUS SURGERY;  Surgeon: Mariela Haile MD;  [facility]     ORTHOPEDIC SURGERY      left hip tear repair 2010     SINUS SURGERY             Family History:     Family History   Problem Relation Age of Onset     Cancer Paternal Grandmother      Skin Cancer Paternal Grandmother      Other Cancer Paternal Grandmother         Skin     Obesity Paternal Grandmother      Cancer Paternal Grandfather         PGF had throat cancer (he was a smoker)     Other Cancer Paternal Grandfather      Anxiety Disorder Paternal Grandfather      Thyroid Disease Mother         ,     Hypertension Mother      Obesity Other      ALS Father 67        2 male cousins with ALS as well     Anesthesia Reaction No family hx of      Blood Disease No family hx of      Colon Polyps No family hx of      Crohn's Disease No family hx of      Ulcerative Colitis No family hx of      Colon Cancer No family hx of      Melanoma No family hx of             Social History:     Social History     Socioeconomic History     Marital status: Single     Spouse name: Not on file     Number of children: Not on file     Years of education: Not on file     Highest education level: Not on file   Occupational History     Not on file   Social Needs     Financial resource strain: Not on file     Food insecurity     Worry: Not on file     Inability: Not on file     Transportation needs     Medical: Not on file     Non-medical: Not on file   Tobacco Use     Smoking status: Never Smoker     Smokeless tobacco: Never Used     Tobacco comment: one person at home smokes outside   Substance and Sexual Activity      Alcohol use: No     Alcohol/week: 0.0 standard drinks     Drug use: No     Sexual activity: Never   Lifestyle     Physical activity     Days per week: Not on file     Minutes per session: Not on file     Stress: Not on file   Relationships     Social connections     Talks on phone: Not on file     Gets together: Not on file     Attends Sabianism service: Not on file     Active member of club or organization: Not on file     Attends meetings of clubs or organizations: Not on file     Relationship status: Not on file     Intimate partner violence     Fear of current or ex partner: Not on file     Emotionally abused: Not on file     Physically abused: Not on file     Forced sexual activity: Not on file   Other Topics Concern      Service Not Asked     Blood Transfusions No     Caffeine Concern Not Asked     Occupational Exposure Not Asked     Hobby Hazards Not Asked     Sleep Concern Not Asked     Stress Concern Not Asked     Weight Concern Not Asked     Special Diet Not Asked     Back Care Not Asked     Exercise Yes     Bike Helmet Not Asked     Seat Belt Not Asked     Self-Exams Not Asked     Parent/sibling w/ CABG, MI or angioplasty before 65F 55M? Yes   Social History Narrative    Mamie lives with mother in [place], [place].  There is a cat in the home, but Mamie does not have any litterbox duties.  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  She gets essentially no exercise because of the tingling in her feet (says it bothers her even to stand).        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        8/2015 no change in social situation        2/15/2016 Pt is single and lives with mother and stepfather.            Medications:     Current Outpatient Medications   Medication     acetylcysteine (MUCOMYST) 20 % neb solution     albuterol (PROAIR HFA/PROVENTIL HFA/VENTOLIN HFA) 108 (90 Base) MCG/ACT Inhaler     albuterol  "(PROVENTIL) (2.5 MG/3ML) 0.083% neb solution     amphetamine-dextroamphetamine (ADDERALL XR) 20 MG 24 hr capsule     azithromycin (ZITHROMAX) 500 MG tablet     beta carotene 46517 UNIT capsule     blood glucose (ACCU-CHEK SMARTVIEW) test strip     buPROPion (WELLBUTRIN XL) 300 MG 24 hr tablet     busPIRone (BUSPAR) 15 MG tablet     cetirizine (ZYRTEC) 10 MG tablet     elexacaftor-tezacaftor-ivacaftor & ivacaftor (TRIKAFTA) 100-50-75 & 150 MG tablet pack     FLUoxetine (PROZAC) 40 MG capsule     hydrOXYzine (ATARAX) 25 MG tablet     medroxyPROGESTERone (DEPO-PROVERA) 150 MG/ML IM injection     montelukast (SINGULAIR) 10 MG tablet     Multiple Vitamin (MULTIVITAMIN OR)     naltrexone (DEPADE/REVIA) 50 MG tablet     omeprazole (PRILOSEC) 40 MG DR capsule     phytonadione (MEPHYTON) 5 MG tablet     polyethylene glycol (MIRALAX) powder     topiramate (TOPAMAX) 100 MG tablet     traZODone (DESYREL) 100 MG tablet     vitamin C (ASCORBIC ACID) 500 MG tablet     VITAMIN D3 25 MCG (1000 UT) tablet     vitamin E (TOCOPHEROL) 400 units (180 mg) capsule     [START ON 7/9/2021] amphetamine-dextroamphetamine (ADDERALL XR) 20 MG 24 hr capsule     [START ON 8/8/2021] amphetamine-dextroamphetamine (ADDERALL XR) 20 MG 24 hr capsule     No current facility-administered medications for this visit.             Physical Exam:   /78   Pulse 88   Resp 17   Ht 1.549 m (5' 1\")   Wt 80.3 kg (177 lb)   SpO2 99%   BMI 33.44 kg/m      Constitutional:   Awake, alert and in no apparent distress     Eyes:   nonicteric     ENT:   oral mucosa moist without lesions, normal tm bilaterally, bilateral mucosal edema and erythema     Neck:   Supple without supraclavicular or cervical lymphadenopathy     Lungs:   Good air flow.  No crackles. No rhonchi.  No wheezes.     Cardiovascular:   Normal S1 and S2.  RRR.  No murmur, gallop or rub.     Abdomen:   NABS, soft, nontender, nondistended.      Musculoskeletal:   No edema, digital clubbing present "     Neurologic:   Alert and conversant.     Skin:   Warm, dry.  No rash on limited exam.             Data:   All laboratory and imaging data reviewed.    Cystic Fibrosis Culture  Specimen Description   Date Value Ref Range Status   11/12/2019 Midstream Urine  Final   11/12/2019 Throat  Final   06/05/2019 Throat  Final    Culture Micro   Date Value Ref Range Status   11/12/2019   Final    <10,000 colonies/mL  urogenital roberto carlos  Susceptibility testing not routinely done     11/12/2019 Light growth  Normal roberto carlos    Final   11/12/2019 Moderate growth  Staphylococcus aureus   (A)  Final        Recent Results (from the past 168 hour(s))   Erythrocyte sedimentation rate auto    Collection Time: 06/08/21 10:15 AM   Result Value Ref Range    Sed Rate 6 0 - 20 mm/h   CBC with platelets differential    Collection Time: 06/08/21 10:15 AM   Result Value Ref Range    WBC 3.2 (L) 4.0 - 11.0 10e9/L    RBC Count 5.02 3.8 - 5.2 10e12/L    Hemoglobin 14.1 11.7 - 15.7 g/dL    Hematocrit 42.1 35.0 - 47.0 %    MCV 84 78 - 100 fl    MCH 28.1 26.5 - 33.0 pg    MCHC 33.5 31.5 - 36.5 g/dL    RDW 12.9 10.0 - 15.0 %    Platelet Count 290 150 - 450 10e9/L    Diff Method Automated Method     % Neutrophils 51.6 %    % Lymphocytes 36.1 %    % Monocytes 9.9 %    % Eosinophils 1.5 %    % Basophils 0.6 %    % Immature Granulocytes 0.3 %    Nucleated RBCs 0 0 /100    Absolute Neutrophil 1.7 1.6 - 8.3 10e9/L    Absolute Lymphocytes 1.2 0.8 - 5.3 10e9/L    Absolute Monocytes 0.3 0.0 - 1.3 10e9/L    Absolute Eosinophils 0.1 0.0 - 0.7 10e9/L    Absolute Basophils 0.0 0.0 - 0.2 10e9/L    Abs Immature Granulocytes 0.0 0 - 0.4 10e9/L    Absolute Nucleated RBC 0.0    Vitamin D Deficiency    Collection Time: 06/08/21 10:15 AM   Result Value Ref Range    Vitamin D Deficiency screening 37 20 - 75 ug/L   TSH with free T4 reflex    Collection Time: 06/08/21 10:15 AM   Result Value Ref Range    TSH 0.98 0.40 - 4.00 mU/L   Protein total    Collection Time: 06/08/21  10:15 AM   Result Value Ref Range    Protein Total 6.8 6.8 - 8.8 g/dL   Phosphorus    Collection Time: 06/08/21 10:15 AM   Result Value Ref Range    Phosphorus 3.0 2.5 - 4.5 mg/dL   Magnesium    Collection Time: 06/08/21 10:15 AM   Result Value Ref Range    Magnesium 2.1 1.6 - 2.3 mg/dL   INR    Collection Time: 06/08/21 10:15 AM   Result Value Ref Range    INR 1.02 0.86 - 1.14   Hemoglobin A1c    Collection Time: 06/08/21 10:15 AM   Result Value Ref Range    Hemoglobin A1C 5.1 0 - 5.6 %   GGT    Collection Time: 06/08/21 10:15 AM   Result Value Ref Range    GGT 36 0 - 40 U/L   Iron    Collection Time: 06/08/21 10:15 AM   Result Value Ref Range    Iron 102 35 - 180 ug/dL   AST    Collection Time: 06/08/21 10:15 AM   Result Value Ref Range    AST 15 0 - 45 U/L   Alkaline phosphatase    Collection Time: 06/08/21 10:15 AM   Result Value Ref Range    Alkaline Phosphatase 68 40 - 150 U/L   Albumin level    Collection Time: 06/08/21 10:15 AM   Result Value Ref Range    Albumin 3.6 3.4 - 5.0 g/dL   Lipid Profile    Collection Time: 06/08/21 10:15 AM   Result Value Ref Range    Cholesterol 138 <200 mg/dL    Triglycerides 120 <150 mg/dL    HDL Cholesterol 52 >49 mg/dL    LDL Cholesterol Calculated 62 <100 mg/dL    Non HDL Cholesterol 86 <130 mg/dL   Basic metabolic panel    Collection Time: 06/08/21 10:15 AM   Result Value Ref Range    Sodium 142 133 - 144 mmol/L    Potassium 3.6 3.4 - 5.3 mmol/L    Chloride 110 (H) 94 - 109 mmol/L    Carbon Dioxide 27 20 - 32 mmol/L    Anion Gap 4 3 - 14 mmol/L    Glucose 79 70 - 99 mg/dL    Urea Nitrogen 8 7 - 30 mg/dL    Creatinine 0.90 0.52 - 1.04 mg/dL    GFR Estimate 87 >60 mL/min/[1.73_m2]    GFR Estimate If Black >90 >60 mL/min/[1.73_m2]    Calcium 8.5 8.5 - 10.1 mg/dL   ALT    Collection Time: 06/08/21 10:15 AM   Result Value Ref Range    ALT 21 0 - 50 U/L   Albumin Random Urine Quantitative with Creat Ratio    Collection Time: 06/08/21 10:16 AM   Result Value Ref Range     Creatinine Urine 218 mg/dL    Albumin Urine mg/L 7 mg/L    Albumin Urine mg/g Cr 3.33 0 - 25 mg/g Cr   Routine UA with microscopic    Collection Time: 06/08/21 10:16 AM   Result Value Ref Range    Color Urine Jillian     Appearance Urine Cloudy     Glucose Urine Negative NEG^Negative mg/dL    Bilirubin Urine Negative NEG^Negative    Ketones Urine Negative NEG^Negative mg/dL    Specific Gravity Urine 1.018 1.003 - 1.035    Blood Urine Negative NEG^Negative    pH Urine 7.0 5.0 - 7.0 pH    Protein Albumin Urine Negative NEG^Negative mg/dL    Urobilinogen mg/dL 0.0 0.0 - 2.0 mg/dL    Nitrite Urine Negative NEG^Negative    Leukocyte Esterase Urine Negative NEG^Negative    Source Midstream Urine     WBC Urine 1 0 - 5 /HPF    RBC Urine 1 0 - 2 /HPF    Squamous Epithelial /HPF Urine 2 (H) 0 - 1 /HPF    Amorphous Crystals Few (A) NEG^Negative /HPF   General PFT Lab (Please always keep checked)    Collection Time: 06/08/21 10:26 AM   Result Value Ref Range    FVC-Pred 3.55 L    FVC-Pre 3.66 L    FVC-%Pred-Pre 103 %    FEV1-Pre 3.26 L    FEV1-%Pred-Pre 107 %    FEV1FVC-Pred 86 %    FEV1FVC-Pre 89 %    FEFMax-Pred 6.67 L/sec    FEFMax-Pre 8.15 L/sec    FEFMax-%Pred-Pre 122 %    FEF2575-Pred 3.54 L/sec    FEF2575-Pre 4.21 L/sec    OQT3403-%Pred-Pre 118 %    ExpTime-Pre 5.61 sec    FIFMax-Pre 4.10 L/sec    FEV1FEV6-Pred 86 %    FEV1FEV6-Pre 89 %       PFT: The spirometry is normal.  When compared to 11/12/2019, the FEV1 and FVC have increased.      Pulmonary exacerbation: absent    40 minutes spent on the date of the encounter doing chart review, history and exam, documentation and further activities per the note      CF Annual Nutrition Assessment    Reason for Assessment  Assessed during Dr. Gavi Allison' clinic r/t increased nutrition risk with diagnosis of CF per protocol.    Nutrition Significant PMH  Mild Lung Disease/Normal Lung Function     -On Trikafta  Pancreatic Insufficient (fecal elastase done 2013)  CFRD - sees CF  Endocrinologist, Dr. Prieto   GERD  Class I obesity - undergoing treatment in [facility] clinic since fall 2016 (MD and RD care)    Anthropometric Assessment  Height: 154.9 cm  IBW based on BMI 22 for females and 23 for males per CF Foundation recs: 54.6 kg  Today's Weight: 80.3 kg (actual weight) %IBW:  147%  Body mass index is 33.44 kg/m .  Weight History/Comments:   Mamie continues to follow with the [facility] clinic team and is taking medications to support weight control.  She was able to loose >6 kg from August 2016-January 2018 or ~8% total body weight.  Has not been able to lose significant weight consistently since that time, but is not gaining much over 80 kg, relatively stable within ~5 lbs. She now sees Dr. Randa Ho who she is very happy with. She is currently taking naltrexone and bupropion for weight loss, as prescribed by Dr. Tolentino.  She states this continues to helps with her cravings and snacking.     Wt Readings from Last 10 Encounters:   10/09/20 77.1 kg (170 lb)   02/28/20 80.7 kg (178 lb)   12/18/19 78.9 kg (174 lb)   11/12/19 79.1 kg (174 lb 4.8 oz)   11/12/19 79.2 kg (174 lb 9.7 oz)   08/14/19 79.4 kg (175 lb)   08/06/19 81.2 kg (179 lb)   08/05/19 81.2 kg (179 lb)   06/05/19 77.3 kg (170 lb 6.7 oz)   05/09/19 79.8 kg (176 lb)     Pancreatic Enzymes  Although tested as pancreatic insufficient (via fecal elastase) in the past pt does not take enzymes, stopped taking them in 2015 as she felt they had little affect on her GI symptoms. Ok'd this with CF MD prior to stopping.      Diet History and Assessment  Diet Preferences/Allergies/Intolerances: Regular diet, on appetite suppressive medication (Topamax, naltrexone)    Intake Recall/Comments:  Eats TID meals with TID snacks.  Mamie eats/preps meals with her mom and often eats the same food that's prepared for her nieces and nephews that are often over. Hx of eating a high CHO containing snack right before bed but has been  counseled to modify this due to overnight hypoglycemia (reactive hypoglycemia).     Calcium: BID glasses of skim milk + milk on cereal in AM + cheese throughout the day   Salt: salty snacks, boxed food, cheese, sauces/seasoning, Gatorade     Hydration: 1 medium bottle of Gatorade, 2 glasses of skim milk, 1 bottle of Dr. Pepper daily, 2 glasses of apple juice daily, 1 glass water daily   Supplements:  None     Laboratory Assessment    *Annual studies being done today prior to pt's clinic visit    DEXA: (6/8/2021)    Current Vitamin/Mineral Supplements: Multivitamin, Vitamin D 1000 International Units, Vitamin E 400 International Units BID, Betacarotene 25,000 International Units 3x/week, Vitamin B12 1 mL IMJ 1x/month, Vitamin K weekly and Vitamin C 250 mg BID    Comments:  Pt stares she is taking all of the above as prescribed. She uses a pill box that she sets up weekly to insure that she doesn't forget anything.     CF Related Diabetes Evaluation  FSBG range: Normal per pt  Insulin: No  Carbohydrate Counting: No    Pt reports low BG after being physically active. Will still occasionally wake up in the middle of the night, check the BG, it's low, and will eat a snack.  This has happened now for years.  Has been educated on avoiding high simple CHO near bedtime.     Virtual visit with endocrine team 2/2/21. Working on getting a glucose sensor.    NUTRITION DIAGNOSIS  Overweight/obese r/t excessive energy intake and lack of physical activity AEB BMI >30 kg/m2 with pt-reported chronic overeating requiring intervention via [facility] clinic.   INTERVENTIONS/RECOMMENDATIONS  1) Oral Intake/Weight: Pt reports she will return to weight management clinic soon for a visit about her diet, medications.  She is interested in taking a break from actively dieting.  Happy that Trikafta has not increased her weight greatly, focusing on maintaining weight for now and focusing less on total calories, more on healthful food choices.     2)  Enzymes: Denies any symptoms of malabsorption, will remain off enzyme therapy.     3) Vitamin/Mineral Supplementation: Vitamin levels from annual studies pending for today, reviewed vitamin/mineral supplements (as above), will communicate any recommended supplement changes to pt via [employer].     4) Diabetes:  Pt most interested in getting a sensor due to her commonly experiencing reactive glycemia.  Reviewed best practices for hypoglycemia treatment, however the hypoglycemia she experiences overnight is challenging.  A sensor would be helpful to alert her to this. Will follow.     Patient Understanding: Good  Expected Compliance: Good  Follow-Up Plans: Per Protocol     GOALS:   Weight maintenance vs reduction toward BMI <30    FOLLOW-UP/MONITORING:  Visit patient within 6-12 month(s) for annual review, support with weight loss/blood sugar control.  Continue to monitor for fat-soluble vitamin deficiencies as pt remains off pancreatic enzyme replacement therapy.     Time Spent in Face-to-Face Patient Interaction: 15 minutes (Medicare time with diabetes)    Theresa Ceja MS, RD, LD (pager [phone])  Cystic Fibrosis/Lung Transplant Dietitian      -Available Mon-Thurs 8 AM-4:30 PM. On Fridays please contact pager [phone] (Amy Andino RD) and on weekends/holidays contact coverage dietitian at pager [phone] (inpatient use only).       Respiratory Therapist Note:         Vest                Brand: Hill-Rom - traditional Hill Rom: Frequencies 8, 9, 10 at pressure 10 then frequencies 18, 19, 20 at pressure 6.                Cough Pause: Cough Pause; Yes                Vest Garment Size: Adult Large                Last Fitting Date: 2021                Frequency of therapy: 7 times per week                Concerns: I talked with Mamie about incorporating exercise in her life.I suggested that she start with yoga this fall,she was interested in that.         Exercise (purposeful and aerobic for >20 minutes each session):  NO.                Does this qualify as additional airway clearance: No         Alternative Airway Clearance:               Nebulized Medications                Bronchodilators: Albuterol                Mucolytic: Mucomyst                Antibiotics:                 Additional Inhaled Medications:                 Spacer Use: yes         Review Cleaning: Yes. Mamie uses disposable neb cups and throws them away       Education and Transition Information                Correct order of inhaled medications: Yes                Mechanism of Action of inhaled medications: Yes                Frequency of inhaled medications: Yes                Dosage of inhaled medications: Yes                Other:          Home Care:                Nebulizer Cups (Brand/Type): disposable                Nebulizer Compressor                            Year Purchased: 2020                            [facility], Phone: [phone], Fax: [phone]                Nebulizer Supply Company:                            [facility], Phone: [phone], Fax: [phone]         Oxygen:                                     Pulmonary Rehab                Site:                 Date Completed:          Plan of Care and Goals for next visit: Try to find an exercise that you enjoy      Again, thank you for allowing me to participate in the care of your patient.        Sincerely,        Gavi Allison MD

## 2021-06-08 NOTE — PROGRESS NOTES
CF Annual Nutrition Assessment    Reason for Assessment  Assessed during Dr. Gavi Allison' clinic r/t increased nutrition risk with diagnosis of CF per protocol.    Nutrition Significant PMH  Mild Lung Disease/Normal Lung Function     -On Trikafta  Pancreatic Insufficient (fecal elastase done 2013)  CFRD - sees CF Endocrinologist, Dr. Conner MITCEHLL  Class I obesity - undergoing treatment in [facility] since fall 2016 (MD and RD care)    Anthropometric Assessment  Height: 154.9 cm  IBW based on BMI 22 for females and 23 for males per CF Foundation recs: 54.6 kg  Today's Weight: 80.3 kg (actual weight) %IBW:  147%  Body mass index is 33.44 kg/m .  Weight History/Comments:   Mamie continues to follow with the [facility] clinic team and is taking medications to support weight control.  She was able to loose >6 kg from August 2016-January 2018 or ~8% total body weight.  Has not been able to lose significant weight consistently since that time, but is not gaining much over 80 kg, relatively stable within ~5 lbs. She now sees Dr. Randa Ho who she is very happy with. She is currently taking naltrexone and bupropion for weight loss, as prescribed by Dr. Tolentino.  She states this continues to helps with her cravings and snacking.     Wt Readings from Last 10 Encounters:   10/09/20 77.1 kg (170 lb)   02/28/20 80.7 kg (178 lb)   12/18/19 78.9 kg (174 lb)   11/12/19 79.1 kg (174 lb 4.8 oz)   11/12/19 79.2 kg (174 lb 9.7 oz)   08/14/19 79.4 kg (175 lb)   08/06/19 81.2 kg (179 lb)   08/05/19 81.2 kg (179 lb)   06/05/19 77.3 kg (170 lb 6.7 oz)   05/09/19 79.8 kg (176 lb)     Pancreatic Enzymes  Although tested as pancreatic insufficient (via fecal elastase) in the past pt does not take enzymes, stopped taking them in 2015 as she felt they had little affect on her GI symptoms. Ok'd this with CF MD prior to stopping.      Diet History and Assessment  Diet Preferences/Allergies/Intolerances: Regular diet,  on appetite suppressive medication (Topamax, naltrexone)    Intake Recall/Comments:  Eats TID meals with TID snacks.  Mamie eats/preps meals with her mom and often eats the same food that's prepared for her nieces and nephews that are often over. Hx of eating a high CHO containing snack right before bed but has been counseled to modify this due to overnight hypoglycemia (reactive hypoglycemia).     Calcium: BID glasses of skim milk + milk on cereal in AM + cheese throughout the day   Salt: salty snacks, boxed food, cheese, sauces/seasoning, Gatorade     Hydration: 1 medium bottle of Gatorade, 2 glasses of skim milk, 1 bottle of Dr. Pepper daily, 2 glasses of apple juice daily, 1 glass water daily   Supplements:  None     Laboratory Assessment    *Annual studies being done today prior to pt's clinic visit    DEXA: (6/8/2021)    Current Vitamin/Mineral Supplements: Multivitamin, Vitamin D 1000 International Units, Vitamin E 400 International Units BID, Betacarotene 25,000 International Units 3x/week, Vitamin B12 1 mL IMJ 1x/month, Vitamin K weekly and Vitamin C 250 mg BID    Comments:  Pt stares she is taking all of the above as prescribed. She uses a pill box that she sets up weekly to insure that she doesn't forget anything.     CF Related Diabetes Evaluation  FSBG range: Normal per pt  Insulin: No  Carbohydrate Counting: No    Pt reports low BG after being physically active. Will still occasionally wake up in the middle of the night, check the BG, it's low, and will eat a snack.  This has happened now for years.  Has been educated on avoiding high simple CHO near bedtime.     Virtual visit with endocrine team 2/2/21. Working on getting a glucose sensor.    NUTRITION DIAGNOSIS  Overweight/obese r/t excessive energy intake and lack of physical activity AEB BMI >30 kg/m2 with pt-reported chronic overeating requiring intervention via [facility].   INTERVENTIONS/RECOMMENDATIONS  1) Oral Intake/Weight: Pt reports she  will return to weight management clinic soon for a visit about her diet, medications.  She is interested in taking a break from actively dieting.  Happy that Trikafta has not increased her weight greatly, focusing on maintaining weight for now and focusing less on total calories, more on healthful food choices.     2) Enzymes: Denies any symptoms of malabsorption, will remain off enzyme therapy.     3) Vitamin/Mineral Supplementation: Vitamin levels from annual studies pending for today, reviewed vitamin/mineral supplements (as above), will communicate any recommended supplement changes to pt via [employer].     4) Diabetes:  Pt most interested in getting a sensor due to her commonly experiencing reactive glycemia.  Reviewed best practices for hypoglycemia treatment, however the hypoglycemia she experiences overnight is challenging.  A sensor would be helpful to alert her to this. Will follow.     Patient Understanding: Good  Expected Compliance: Good  Follow-Up Plans: Per Protocol     GOALS:   Weight maintenance vs reduction toward BMI <30    FOLLOW-UP/MONITORING:  Visit patient within 6-12 month(s) for annual review, support with weight loss/blood sugar control.  Continue to monitor for fat-soluble vitamin deficiencies as pt remains off pancreatic enzyme replacement therapy.     Time Spent in Face-to-Face Patient Interaction: 15 minutes (Medicare time with diabetes)    Theresa Ceja MS, RD, LD (pager [phone])  Cystic Fibrosis/Lung Transplant Dietitian      -Available Mon-Thurs 8 AM-4:30 PM. On Fridays please contact pager [phone] (Amy Andino RD) and on weekends/holidays contact coverage dietitian at pager [phone] (inpatient use only).

## 2021-06-08 NOTE — TELEPHONE ENCOUNTER
Prior Authorization Approval    Authorization Effective Date: 6/8/2021  Authorization Expiration Date: 6/8/2022  Medication: HAYLEE GERARD APPROVED  Approved Dose/Quantity: 84 PER 28 DAYS  Reference #:     Insurance Company: [employer] - Phone [phone] Fax [phone]  Expected CoPay:       CoPay Card Available:      Foundation Assistance Needed:    Which Pharmacy is filling the prescription (Not needed for infusion/clinic administered): [facility] - [place], [place] - [address] [place] [address]  Pharmacy Notified:    Patient Notified:

## 2021-06-08 NOTE — PROGRESS NOTES
Clinical Pharmacy Consult:                                                    Mamie Rice is a 25 year old female coming in for a clinical pharmacist consult.  She was referred to me from Dr. Allison.     Reason for Consult: Annual Medication Review    Discussion: Full MTM visit not completed today due to time. Updates are as follows:    1. Nebs/Vest: Mamie has been completing her nebs and vest treatments 1-2 times daily. Per visit today with Dr. Allison ok to vest just once daily given excellent PFTs. No other concerns noted today.        2. Blood Sugars: Mamie has been checking her fasting blood sugars weekly. They have been running in the 70-80s. She states she does sometimes feel low, but doesn't check her blood sugar at that time. She eats a snack when she feels low which is effective for her. She is not prescribed any insulin. No concerns today.    Lab Results   Component Value Date    A1C 5.1 06/08/2021    A1C 5.1 08/14/2019    A1C 5.5 08/02/2017    A1C 5.6 05/04/2017    A1C 5.5 02/07/2017        Plan:  1. Updated medication list  2. Continue current medications        Ping Cisneros, PharmD  Cystic Fibrosis MTM Pharmacist  [facility]  Voicemail: [phone]

## 2021-06-08 NOTE — PROGRESS NOTES
"Adult Cystic Fibrosis Program  Annual Psychosocial Assessment    Presenting Information:  Mamie is a 25-year-old woman with cystic fibrosis, who presented in CF clinic for a regular follow up with primary CF provider, Dr. Allison.  Due to time constraints, SW unable to complete visit in person on this date, however completed  annual visit over the phone on 6/11.      Living situation:  Mamie continues to live with her mother and stepfather in [place], [place].    Her sister (Chayo) and her sister's 3 children (Montiel-age 13, Max-age 3 and Brinly-age 21 months) also live in the home.  There is a cat and a dog in the home.  Mamie denies any concerns about her living situation.    Family Constellation:  Mamie's parents  when she was [age] year old.  After the divorce she was raised by her mother, who later remarried.  Mamie's dad recently passed away from [facility].  Mamie had very limited contact with her father throughout her life.  Sonya also lost her maternal grandpa this year which was a very difficult loss for the family.  She is close with her grandma who lives nearby and is grieving.  She has aunts, uncles and cousins who she has relationships with as well.  Mamie has a 41-year-old half-brother through her father, whom she had previously only met twice but now communicates with only through Facebook.  Her half-sister Chayo is 31 years old.  She also has 30-year-old stepsister who she used to be in touch with buy not recently.   Mamie does not know of any family members with CF and sometimes wishes that she did because she wishes she had someone close to her that knew how she felt.  She is not currently in a significant relationship, has never been  and does not have children.      Social Support:  Mamie reports good social support. She identifies her mother as her strongest source of support and they are very close.  She mostly has a good relationship with her sister Chayo but adds \"we are " "sisters so we also fight\".  She gets along fine with her step-dad.  She draws additional support from co-workers and friends.  She has been taking a break from Gnosticism but still considers her josi to be an important aspect of her life.  Mamie reports that she used to be more involved with the CF community when she was younger, her great aunt is still involved with fundraising and Mamie's family still participates in the CF walk in their community.  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      Adjustment to Illness:  Mamie was diagnosed with CF when she was about 5 months old.  Her mom recalls that on the day of her Jain she \"turned gray and her eyes rolled to the back of her head\", when she brought Mamie in her O2 sats were 70% and the CF diagnosis came shortly after.  Mamie has received most of her CF care through the [facility], there was a short period of time that she sought care at [facility] because her mom didn't feel that the chest pain that Mamie was reporting was being thoroughly investigated.  Mamie reports that her mother instilled a strong foundation of doing routine CF care.  She had a consistent therapy routine and they brought the vest machine on vacations.  She noted that her mother discussed therapy as \"part of life\" and the best way to reduce complications.  Mamie reports going through more complications during high school and they believe she had reactions to the air quality in her school building.  She often felt ill and missed school.  She recalls having a couple of PICC lines during that time.  She reports that her school accommodated her through letting her go home early and make up work through other avenues.  She feels her increased health complications caused her to lose some friends because some people didn't know how to handle her illness.     Mamie describes her current health status as \"good\".  Clinically, she has CF lung " "disease with normal spirometry, CF sinus disease and abnormal glucose tolerance. She does not take enzymes and stopped taking these mid-highschool.  She has also been followed by [facility] Management Clinic, ENT, Ortho and with the Psychiatry clinic on the [facility].  She takes Trikafta and feels that it's going well, and although she doesn't feel a whole lot different, she recognizes that her lung function has always been good and it has remained stable considering she reduced her vesting from 2 to 1 per day.  She admits that even though she has been given approval to do one vest per day, she still feels guilty about not doing 2.  She reports that she has not noticed any negative side effects from  Trikafta.  She is not currently exercising, she has a goal to find a type of exercise that she enjoys.    Mamie describes herself as very open about having CF, growing up it was never a secret and they participated in Xianguo raising events and their community knew.  When asked how CF has impacted her life, she stated that she looks at it as a part of who she is.  She doesn't feel like it's a burden as an adult, although maybe did when she was younger.  She does not feel that CF has impacted her QOL because she looks at herself as a healthy person with CF.    Mental Health:  Mamie has been diagnosed with depression, anxiety and ADHD.  Mamie has described feelings of depression, starting in 9th grade. She initially had difficulty expressing her feelings, but got help about a year later, receiving both counseling and medication.  Mamie struggled with symptoms of ADHD for many years and notes a strong family history of this disorder.  She \"got by\" in younger years due to having a lot of support in school but was formally diagnosed ADHD and treated with Adderall in 2015 after doing poorly in college classes.   She has continued to receive consistent psychiatric care and has been receiving her psychiatric care for the " past 4 years at the [facility].  Her current psychotropic medications are: Adderall, Atarax, Prozac, Wellbutrin and Buspar. She is not currently seeing a therapist.  When asked about therapy, she feels that she is receiving therapy through her psychiatric medication provider because they talk with her about how she is feeling and she doesn't feel she needs additional mental health resources at this time.  She reports that her anxiety medication was recently increased, she has been feeling more anxiety lately as she has been having a lot of issues with a co-worker.      Mamie completed the following screens:  MECHE-7 Score: 12, indicating moderate symptoms of anxiety and described as somewhat in daily functioning.    PHQ-9 Score: 4, indicating mild symptoms of depression and described as not difficult at all in daily functioning.      Mamie agreed with the scores above. She denied any additional symptoms not addressed on these screens. She denies any suicidal ideation.  She declined needing further mental health resources at this time.  As mentioned above, she had two losses in her family this year, her dad and grandpa.  She feels that the loss of her grandpa was much more significant as she was very close with him.    Health Care Directive:  Mamie has previously received Health Care Directive education.  She is comfortable with her mom (SALOME) making medical decisions on her behalf in the absence of a health care directive.  She declined wanting more info on HCD today.    Education:  Mamie completed high school but missed a lot of school due to health issues. She tried a semester of college classes, but had difficulty keeping up, which she feels was partially due to untreated ADHD.   At this point, she has no specific plans for further education.       Employment:  Mamie works full-time as a  with the [employer].  She is working with their summer school  program during the summer months.  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    Finances:  Mamie receives income from wages and parental support. She denies having any specific financial concerns.      Insurance:  Mamie reports that she has Medical assistance through [facility].  It appears to be a MA plan for people with disabilities (special needs basic care plan).  She believes that the premium for this plan is $35 per month.  She denies any concerns about her living situation.  Writer did inquire about eligibility for MA as she is working full-time but this plan likely has a higher income guideline than straight MA.  She did have questions about her options for life insurance and SW suggested she contact [employer] through [email or web address].  Info provided to her through [employer].    Chemical Health:  Mamie denies the use of tobacco products and denies second hand smoke exposure.  She denies the use of marijuana or other illegal drug use. She reports rare alcohol use.      Leisure Activities/Interests:  Mamie enjoys spending time with family and friends, going out to eat, shopping and sleeping.  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    Intervention:  -Psychosocial Assessment  -Mental Health Screening   -Supportive counseling- discussed ways to combat feeling guilty about not vesting twice per day and ways to reduce anxiety.    Assessment:  Mamie was open in her responses and appeared to be in good spirits.   She reports stability with her physical health and continues to treat her mental health through psychiatric medication management.  She feels she is managing adequately from a mental health standpoint.  She continues to work full-time and is enjoying her work.  She continues to report a stable living environment, strong support system with access to the  resources that she needs.  No specific concerns noted today.  She appears to be stable overall from a psychosocial perspective.  Recommend f/u as noted in the plan below.     Plan:  -Consult SW if concerns arise.  -Complete psychosocial assessment annually.    TAY Juarez, [facility]  Adult Cystic Fibrosis   Ph: [phone]  Pager: [phone]

## 2021-06-09 LAB
IGA SERPL-MCNC: 139 MG/DL (ref 84–499)
IGE SERPL-ACNC: 14 KIU/L (ref 0–114)
IGG SERPL-MCNC: 672 MG/DL (ref 610–1616)
IGM SERPL-MCNC: 302 MG/DL (ref 35–242)
TESTOST SERPL-MCNC: 26 NG/DL (ref 8–60)

## 2021-06-09 NOTE — PROGRESS NOTES
Respiratory Therapist Note:         Vest                Brand: Hill-Rom - traditional Hill Rom: Frequencies 8, 9, 10 at pressure 10 then frequencies 18, 19, 20 at pressure 6.                Cough Pause: Cough Pause; Yes                Vest Garment Size: Adult Large                Last Fitting Date: 2021                Frequency of therapy: 7 times per week                Concerns: I talked with Mamie about incorporating exercise in her life.I suggested that she start with yoga this fall,she was interested in that.         Exercise (purposeful and aerobic for >20 minutes each session): NO.                Does this qualify as additional airway clearance: No         Alternative Airway Clearance:               Nebulized Medications                Bronchodilators: Albuterol                Mucolytic: Mucomyst                Antibiotics:                 Additional Inhaled Medications:                 Spacer Use: yes         Review Cleaning: Yes. Mamie uses disposable neb cups and throws them away       Education and Transition Information                Correct order of inhaled medications: Yes                Mechanism of Action of inhaled medications: Yes                Frequency of inhaled medications: Yes                Dosage of inhaled medications: Yes                Other:          Home Care:                Nebulizer Cups (Brand/Type): disposable                Nebulizer Compressor                            Year Purchased: 2020                            [facility], Phone: [phone], Fax: [phone]                Nebulizer Supply Company:                            [facility], Phone: [phone], Fax: [phone]         Oxygen:                                     Pulmonary Rehab                Site:                 Date Completed:          Plan of Care and Goals for next visit: Try to find an exercise that you enjoy

## 2021-06-10 ENCOUNTER — VIRTUAL VISIT (OUTPATIENT)
Dept: PSYCHIATRY | Facility: CLINIC | Age: 26
End: 2021-06-10
Attending: NURSE PRACTITIONER
Payer: COMMERCIAL

## 2021-06-10 DIAGNOSIS — F90.0 ATTENTION DEFICIT HYPERACTIVITY DISORDER (ADHD), PREDOMINANTLY INATTENTIVE TYPE: ICD-10-CM

## 2021-06-10 DIAGNOSIS — F41.1 GAD (GENERALIZED ANXIETY DISORDER): Primary | ICD-10-CM

## 2021-06-10 DIAGNOSIS — F33.0 MILD EPISODE OF RECURRENT MAJOR DEPRESSIVE DISORDER (H): ICD-10-CM

## 2021-06-10 LAB
A-TOCOPHEROL VIT E SERPL-MCNC: 10.1 MG/L (ref 5.5–18)
ANNOTATION COMMENT IMP: NORMAL
BETA+GAMMA TOCOPHEROL SERPL-MCNC: 0.3 MG/L (ref 0–6)
RETINYL PALMITATE SERPL-MCNC: <0.02 MG/L (ref 0–0.1)
VIT A SERPL-MCNC: 0.67 MG/L (ref 0.3–1.2)

## 2021-06-10 PROCEDURE — 99213 OFFICE O/P EST LOW 20 MIN: CPT | Mod: 95 | Performed by: NURSE PRACTITIONER

## 2021-06-10 ASSESSMENT — PAIN SCALES - GENERAL: PAINLEVEL: NO PAIN (0)

## 2021-06-10 NOTE — PROGRESS NOTES
Start Time:  0930         End Time: 0841    Telemedicine Visit: The patient's condition can be safely assessed and treated via synchronous audio and visual telemedicine encounter.      Reason for Telemedicine Visit: Due to COVID 19 pandemic, clinic switching all appointments to telemedicine     Originating Site (Patient Location): Patient's place of employment    Distant Site (Provider Location): Provider Remote Setting    Consent:  The patient/guardian has verbally consented to: the potential risks and benefits of telemedicine (video visit) versus in person care; bill my insurance or make self-payment for services provided; and responsibility for payment of non-covered services.     Mode of Communication:  Video Conference via [email or web address]    As the provider I attest to compliance with applicable laws and regulations related to telemedicine.    Psychiatry Clinic Progress Note                                                                  Patient Name: Mamie Rice  YOB: 1995  MRN: [id number]  Date of Service:  6/10/2021  Last Seen:5/25/2021    Mamie Rice is a 25 year old person assigned female at birth, identifies as cisgender female who uses the name Mamie and pronoun kenneth.     Mamie Rice is a 25 year old year old adult who presents for ongoing psychiatric care.  Mamie Rice was last seen on 5/25/2021.     At that time,     Medication Ordered/Consults/Labs/tests Ordered:      Medication:   -May increase Hydroxyzine to 25-50 mg up to 4 times a day for anxiety and sleep.  Monitor for sedation.  -Continue all other medication regimen for now.  OTC Recommendations: none  Lab Orders:  none  Referrals: none  Release of Information: none  Future Treatment Considerations: Per symptoms.   Return for Follow Up: in 3 weeks (soonest availability)    Pertinent Background:  Diagnoses include MDD, MECHE and ADHD.  Psych critical item history includes [no critical items]. Medical complication includes  Cystic Fibrosis, DM I     Previous medication trial: Phentamine, Trazodone (150 mg oversedating, 50 mg ineffective)     Naltrexone, Wellbutrin and Topamax are managed by weight management clinic.    Interim History                                                                                                        4, 4     Since the last visit,  -Has been taking Hydroxyzine 25 mg BID, feels anxiety is little better.  Denies any sedation.  Has not tried higher dose or more frequently.  -Unsure because the school is over or medication is working, but feels this is OK though anxiety could improve further.  -Anxiety still remains significant at work day only.  -Is going to [place] 7/7-7/13 and wants to follow up after the trip.    Denies any symptoms suggestive of hypomania or psychosis.    Current Suicidality/Hx of Suicide Attempts: Denies both  CoCominent Medical concerns: Denies    Medication Side Effects: The patient denies all medication side effects.      Medical Review of Systems     Apart from the symptoms mentioned int he HPI, the 14 point review of systems, including constitutional, HEENT, cardiovascular, respiratory, gastrointestinal, genitourinary, musculoskeletal, integumentary, endocrine, neurological, hematologic and allergic is entirely negative.    Pregnant: None. Nursing: None, Contraception: DMPA    Substance Use   Denies frequent or abuse of alcohol. Denies any other substance use.     Social/ Family History                                  [per patient report]                                 1ea,1ea   Living arrangements: lives with parents and feels safe  Social Support:parents, friends, maternal grandparents  Access to gun: denies  Denies any trauma hx, numerous hospitalizations during childhood, does not consider this as traumatic  Works as a day care provider, dance coach and substitute .      Allergy                                Vancomycin and Augmentin    Current Medications                                                                                                        Current Outpatient Medications   Medication Sig Dispense Refill     acetylcysteine (MUCOMYST) 20 % neb solution INHALE 4ML VIA NEBULIZER TWO TIMES A DAY. MAY INCREASE TO 3-4 TIMES A DAY WITH INCREASED COUGH / COLD SYMPTOMS. REFRIGERATE VIAL AND DISCARD 96 HOURS AFTER OPENING 360 mL 11     albuterol (PROAIR HFA/PROVENTIL HFA/VENTOLIN HFA) 108 (90 Base) MCG/ACT Inhaler Inhale 2 puffs into the lungs every 6 hours as needed for shortness of breath / dyspnea or wheezing 1 Inhaler 12     albuterol (PROVENTIL) (2.5 MG/3ML) 0.083% neb solution INHALE 1 VIAL ( 2.5MG) BY NEBULIZATION EVERY 4 HOURS AS NEEDED FOR FOR SHORTNESS OF BREATH OR WHEEZING 360 mL 11     amphetamine-dextroamphetamine (ADDERALL XR) 20 MG 24 hr capsule Take 1 capsule (20 mg) by mouth daily 30 capsule 0     [START ON 7/9/2021] amphetamine-dextroamphetamine (ADDERALL XR) 20 MG 24 hr capsule Take 1 capsule (20 mg) by mouth daily (Patient not taking: Reported on 6/8/2021) 30 capsule 0     [START ON 8/8/2021] amphetamine-dextroamphetamine (ADDERALL XR) 20 MG 24 hr capsule Take 1 capsule (20 mg) by mouth daily (Patient not taking: Reported on 6/8/2021) 30 capsule 0     azithromycin (ZITHROMAX) 500 MG tablet TAKE ONE TABLET BY MOUTH ON MONDAYS, WEDNESDAYS, AND FRIDAYS AS DIRECTED 36 tablet 4     beta carotene 43123 UNIT capsule TAKE 1 CAPSULE BY MOUTH IN THE MORNING ON MONDAY, WEDNESDAY AND FRIDAY 36 capsule 4     blood glucose (ACCU-CHEK SMARTVIEW) test strip Use to test blood sugar 4 times daily or as directed. 400 each 3     buPROPion (WELLBUTRIN XL) 300 MG 24 hr tablet Take 1 tablet (300 mg) by mouth every morning 90 tablet 1     busPIRone (BUSPAR) 15 MG tablet Take 1 tablet (15 mg) by mouth 2 times daily 60 tablet 5     cetirizine (ZYRTEC) 10 MG tablet Take 1 tablet (10 mg) by mouth every evening 30 tablet 3     elexacaftor-tezacaftor-ivacaftor & ivacaftor  "(TRIKAFTA) 100-50-75 & 150 MG tablet pack TAKE TWO ORANGE TABLETS BY MOUTH IN THE MORNING AND ONE BLUE TABLET IN THE EVENING AS DIRECTED ON PACKAGE.  TAKE WITH FAT CONTAINING FOOD. 84 tablet 5     FLUoxetine (PROZAC) 40 MG capsule Take 2 capsules (80 mg) by mouth daily 60 capsule 5     hydrOXYzine (ATARAX) 25 MG tablet Take 1-2 tablets (25-50 mg) by mouth every 6 hours as needed for anxiety (sleep) 180 tablet 0     medroxyPROGESTERone (DEPO-PROVERA) 150 MG/ML IM injection Inject 150 mg into the muscle       montelukast (SINGULAIR) 10 MG tablet TAKE ONE TABLET BY MOUTH AT BEDTIME 30 tablet 11     Multiple Vitamin (MULTIVITAMIN OR) Take 1 tablet by mouth daily.       naltrexone (DEPADE/REVIA) 50 MG tablet Take 1 tablet.  Time it one to two hours prior to worst cravings. 90 tablet 4     omeprazole (PRILOSEC) 40 MG DR capsule Take 1 capsule (40 mg) by mouth 2 times daily 60 capsule 11     phytonadione (MEPHYTON) 5 MG tablet TAKE 1 TABLET BY MOUTH ONCE A WEEK 4 tablet 11     polyethylene glycol (MIRALAX) powder Take 17 g (1 capful) by mouth daily as needed for constipation (Patient not taking: Reported on 6/8/2021) 119 g 3     topiramate (TOPAMAX) 100 MG tablet Take 1 tablet (100 mg) by mouth daily 90 tablet 4     traZODone (DESYREL) 100 MG tablet Take 1 tablet (100 mg) by mouth At Bedtime 30 tablet 5     vitamin C (ASCORBIC ACID) 500 MG tablet TAKE ONE TABLET BY MOUTH TWICE A  tablet 3     VITAMIN D3 25 MCG (1000 UT) tablet TAKE ONE TABLET BY MOUTH EVERY  tablet 3     vitamin E (TOCOPHEROL) 400 units (180 mg) capsule TAKE ONE CAPSULE BY MOUTH TWICE A  capsule 11        Mental Status Exam                                                                                   9, 14 cog        Alertness: alert  and oriented  Appearance:  Casually dressed and Adequately groomed  Behavior/Demeanor: cooperative, pleasant and calm, with fair  eye contact   Speech: regular rate and rhythm  Mood :  \"okay\"  Affect: " full range and appropriate; was congruent to mood; was congruent to content  Thought Process (Associations):  Linear and Goal directed  Thought process (Rate):  Normal  Thought content:  no overt psychosis, denies suicidal ideation, intent or thoughts and patient does not appear to be responding to internal stimuli  Perception:  Reports none;  Denies auditory hallucinations and visual hallucinations  Attention/Concentration:  Normal  Memory:  Immediate recall intact and Short-term memory intact  Language: intact  Fund of Knowledge/Intelligence:  Average  Abstraction:  Normal  Insight:  Fair  Judgment:  Fair  Cognition: (6) does  appear grossly intact; formal cognitive testing was not done    Physical Exam     Motor activity/EPS:  Normal  Psychomotor: normal or unremarkable    Labs and Results      Pertinent findings on review include: Review of records with relevant information reported in the HPI.  Reviewed pt's past medical record and obtained collateral information.      [place] PRESCRIPTION MONITORING PROGRAM [] was checked today:  indicates no refills since last seen.    PHQ9 Today:  N/A  PHQ 11/12/2019 12/18/2019 3/31/2020   PHQ-9 Total Score 9 4 7   Q9: Thoughts of better off dead/self-harm past 2 weeks Not at all Not at all Not at all       MECHE 7 Today: N/A  MECHE-7 SCORE 5/12/2017 7/24/2018 11/12/2019   Total Score 11 9 11       Recent Labs   Lab Test 06/08/21  1015 08/14/19  0814 08/02/17  0654   CR 0.90 0.85 0.77   GFRESTIMATED 87 >90 >90  Non African American GFR Calc       Recent Labs   Lab Test 06/08/21  1015 10/07/20   AST 15 18   ALT 21 17   ALKPHOS 68 70      (12/23/2020),   (08/08/2012)     PSYCHOTROPIC DRUG INTERACTIONS:    Adderall---Buspar---Prozac---Trazodone---Wellbutrin: Concurrent use of AMPHETAMINES and SEROTONERGIC AGENTS may result in increased risk of serotonin syndrome.   Adderall---Prozac---Omeprazole: Concurrent use of AMPHETAMINES and SEROTONERGIC AGENTS THAT INHIBIT CYP2D6  may result in increased amphetamine exposure  Buspar---Trazodone---Vistaril---Zyrtec---Topamax: Concurrent use of TRAZODONE and SEROTONERGIC CENTRAL NERVOUS SYSTEM DEPRESSANTS may result in increased risk of CNS depression.   Prozac---Trazodone: Concurrent use of TRAZODONE and SEROTONERGIC DRUGS THAT PROLONG QT INTERVAL may result in increased risk of QT-interval prolongation.   Prozac---Wellbutrin: Concurrent use of BUPROPION and SELECTED SEIZURE THRESHOLD LOWERING CYP2D6 SUBSTRATES may result in increased exposure of CYP2D6 substrates; increased risk of seizure.  Glycopyrrolate---Vistaril: Concurrent use of GLYCOPYRROLATE and ANTICHOLINERGICS may result in increased risk of anticholinergic side effects .    Glycopyrrolate---Wellbutrin---Vistaril: Concurrent use of BUPROPION and AGENTS LOWERING SEIZURE THRESHOLD may result in lower seizure threshold  Vistaril---Zithromax---Trazodone: Concurrent use of HYDROXYZINE and QT PROLONGING AGENTS may result in increased risk of QT-interval prolongation.   Buspar---Prozac: Concurrent use of FLUOXETINE and BUSPIRONE may result in worsening of psychiatric symptoms.      MANAGEMENT:  Monitoring for adverse effects, routine vitals, periodic EKGs and patient is aware of risks     Impression/Assessment      Mamie Rice is a 25 year old adult  who presents for med management follow up.  Pt appears stable in her mood and anxiety, denies SI, SIB or HI during the appointment.  Pt noted some improvement of work anxiety with Hydroxyzine 25 mg BID, unsure if this is due to medication or semester is being over.  Discussed pt may take Hydroxyzine up to 50 mg QID PRN if 25 mg BID is not sufficient.  Will continue all other medications.  Will continue to monitor her need for PRN Hydroxyzine for work anxiety, but if this is becoming long term, may consider increase in Buspar to avoid possible prolongation of QTC in the future.    Diagnosis                                                                    MDD  MECHE  ADHD    Treatment Recommendation & Plan       Medication Ordered/Consults/Labs/tests Ordered:     Medication: Continue on current medication regimen.  May take Hydroxyzine up to 50 mg 4 times a day as needed for anxiety.  OTC Recommendations: none  Lab Orders:  EKG if continues on hydroxyzine  Referrals: none  Release of Information: none  Future Treatment Considerations: Per symptoms.   Return for Follow Up: in 1 month    -Discussed safety plan for suicidal thoughts  -Discussed plan for suicidality  -Discussed available emergency services  -Patient agrees with the treatment plan  -Encouraged to continue outpatient therapy to gain more coping mechanism for stress.    Treatment Risk Statement: Discussed with the patient my impressions, as well as recommended studies. I educated patient on the differential diagnosis and prognosis. I discussed with the patient the risks and benefits of medications versus no interventions, including efficacy, dose, possible side effects and length of treatment and the importance of medication compliance.  The patient understands the risks, benefits, adverse effects and alternatives. Agrees to treatment with the capacity to do so. No medical contraindications to treatment. The patient also understands the risks of using street drugs or alcohol.     CRISIS NUMBERS:   Provided routinely in AVS.      26 minutes spent on the date of the encounter doing chart review, history and exam, documentation and further activities per the note      Jessie Pitts CNP,  6/10/2021

## 2021-06-10 NOTE — PROGRESS NOTES
"VIDEO VISIT  Mamie Rice is a 25 year old patient who is being evaluated via a billable video visit.      The patient has been notified of following:   \"This video visit will be conducted via a call between you and your physician/provider. We have found that certain health care needs can be provided without the need for an in-person physical exam. This service lets us provide the care you need with a video conversation. If a prescription is necessary we can send it directly to your pharmacy. If lab work is needed we can place an order for that and you can then stop by our lab to have the test done at a later time. Insurers are generally covering virtual visits as they would in-office visits so billing should not be different than normal.  If for some reason you do get billed incorrectly, you should contact the billing office to correct it and that number is in the AVS .    Video Conference to be completed via:  [email or web address]    Patient has given verbal consent for video visit?:  Yes    Patient would prefer that any video invitations be sent by: Send to e-mail at: [email or web address]      How would patient like to obtain AVS?:  Picklivehart    AVS SmartPhrase [PsychAVS] has been placed in 'Patient Instructions':  Yes    "

## 2021-06-10 NOTE — PATIENT INSTRUCTIONS
-Continue on current medication regimen.  May take Hydroxyzine up to 50 mg 4 times a day as needed for anxiety.    -Follow up in 1 month        **For crisis resources, please see the information at the end of this document**     Patient Education      Thank you for coming to the [facility].    Lab Testing:  If you had lab testing today and your results are reassuring or normal they will be mailed to you or sent through Ed[employer] within 7 days. If the lab tests need quick action we will call you with the results. The phone number we will call with results is # [phone] (home) . If this is not the best number please call our clinic and change the number.    Medication Refills:  If you need any refills please call your pharmacy and they will contact us. Our fax number for refills is [phone]. Please allow three business for refill processing. If you need to  your refill at a new pharmacy, please contact the new pharmacy directly. The new pharmacy will help you get your medications transferred.     Scheduling:  If you have any concerns about today's visit or wish to schedule another appointment please call our office during normal business hours [phone] (8-5:00 M-F)    Contact Us:  Please call [phone] during business hours (8-5:00 M-F).  If after clinic hours, or on the weekend, please call  [phone].    Financial Assistance [phone]  [employer] [facility] Billing [phone]  Central Billing Office, [facility]: [phone]  [facility] Billing [phone]  Medical Records [phone]  [place] Patient Bill of Rights [email or web address]       MENTAL HEALTH CRISIS NUMBERS:  For a medical emergency please call  911 or go to the nearest ER.     [facility]:   [facility] -[phone]   Crisis Residence [facility] -[phone]   Walk-In Counseling  Center [facility] -[phone]   COPE 24/7 [facility] Mobile Team -[phone] (adults)/[phone] (child)  CHILD: [facility] needs assessment team - [phone]      [facility]:   [facility] - [phone]   Walk-in counseling [facility] - [phone]   Walk-in counseling [facility] - [phone]   Crisis Residence [facility] - [phone]  Urgent Care Adult Mental [facility]-[phone] mobile unit/ 24/7 crisis line    National Crisis Numbers:   National Suicide Prevention Lifeline: 7-091-658-TALK (100-763-8173)  Poison Control Center - 1-661.818.6058  [place]/resources for a list of additional resources (SOS)  Trans Lifeline a hotline for transgender people 1-178.847.3347  The [place] [employer] a hotline for LGBT youth 1-542.838.2083  Crisis Text Line: For any crisis 24/7   To: 799494  see www.crisistextline.org  - IF MAKING A CALL FEELS TOO HARD, send a text!         Again thank you for choosing [facility] and please let us know how we can best partner with you to improve you and your family's health.    You may be receiving a survey regarding this appointment. We would love to have your feedback, both positive and negative. The survey is done by an external company, so your answers are anonymous.

## 2021-06-11 ASSESSMENT — PATIENT HEALTH QUESTIONNAIRE - PHQ9
SUM OF ALL RESPONSES TO PHQ QUESTIONS 1-9: 4
5. POOR APPETITE OR OVEREATING: MORE THAN HALF THE DAYS

## 2021-06-11 ASSESSMENT — ANXIETY QUESTIONNAIRES
7. FEELING AFRAID AS IF SOMETHING AWFUL MIGHT HAPPEN: SEVERAL DAYS
1. FEELING NERVOUS, ANXIOUS, OR ON EDGE: MORE THAN HALF THE DAYS
6. BECOMING EASILY ANNOYED OR IRRITABLE: MORE THAN HALF THE DAYS
5. BEING SO RESTLESS THAT IT IS HARD TO SIT STILL: SEVERAL DAYS
2. NOT BEING ABLE TO STOP OR CONTROL WORRYING: MORE THAN HALF THE DAYS
3. WORRYING TOO MUCH ABOUT DIFFERENT THINGS: MORE THAN HALF THE DAYS
GAD7 TOTAL SCORE: 12
IF YOU CHECKED OFF ANY PROBLEMS ON THIS QUESTIONNAIRE, HOW DIFFICULT HAVE THESE PROBLEMS MADE IT FOR YOU TO DO YOUR WORK, TAKE CARE OF THINGS AT HOME, OR GET ALONG WITH OTHER PEOPLE: SOMEWHAT DIFFICULT

## 2021-06-12 ASSESSMENT — ANXIETY QUESTIONNAIRES: GAD7 TOTAL SCORE: 12

## 2021-06-13 LAB
BACTERIA SPEC CULT: NORMAL
SPECIMEN SOURCE: NORMAL

## 2021-07-01 ENCOUNTER — OFFICE VISIT (OUTPATIENT)
Dept: INTERNAL MEDICINE | Facility: CLINIC | Age: 26
End: 2021-07-01
Payer: COMMERCIAL

## 2021-07-01 VITALS
BODY MASS INDEX: 33.82 KG/M2 | DIASTOLIC BLOOD PRESSURE: 73 MMHG | OXYGEN SATURATION: 97 % | WEIGHT: 179 LBS | SYSTOLIC BLOOD PRESSURE: 111 MMHG | HEART RATE: 108 BPM

## 2021-07-01 DIAGNOSIS — Z00.00 ROUTINE GENERAL MEDICAL EXAMINATION AT A HEALTH CARE FACILITY: ICD-10-CM

## 2021-07-01 DIAGNOSIS — Z12.4 SCREENING FOR MALIGNANT NEOPLASM OF CERVIX: Primary | ICD-10-CM

## 2021-07-01 PROCEDURE — G0145 SCR C/V CYTO,THINLAYER,RESCR: HCPCS | Mod: 90 | Performed by: INTERNAL MEDICINE

## 2021-07-01 PROCEDURE — 99214 OFFICE O/P EST MOD 30 MIN: CPT | Mod: GC | Performed by: STUDENT IN AN ORGANIZED HEALTH CARE EDUCATION/TRAINING PROGRAM

## 2021-07-01 PROCEDURE — 99000 SPECIMEN HANDLING OFFICE-LAB: CPT | Performed by: INTERNAL MEDICINE

## 2021-07-01 ASSESSMENT — ANXIETY QUESTIONNAIRES
1. FEELING NERVOUS, ANXIOUS, OR ON EDGE: MORE THAN HALF THE DAYS
5. BEING SO RESTLESS THAT IT IS HARD TO SIT STILL: SEVERAL DAYS
IF YOU CHECKED OFF ANY PROBLEMS ON THIS QUESTIONNAIRE, HOW DIFFICULT HAVE THESE PROBLEMS MADE IT FOR YOU TO DO YOUR WORK, TAKE CARE OF THINGS AT HOME, OR GET ALONG WITH OTHER PEOPLE: SOMEWHAT DIFFICULT
2. NOT BEING ABLE TO STOP OR CONTROL WORRYING: SEVERAL DAYS
6. BECOMING EASILY ANNOYED OR IRRITABLE: MORE THAN HALF THE DAYS
7. FEELING AFRAID AS IF SOMETHING AWFUL MIGHT HAPPEN: NOT AT ALL
3. WORRYING TOO MUCH ABOUT DIFFERENT THINGS: SEVERAL DAYS
GAD7 TOTAL SCORE: 8

## 2021-07-01 ASSESSMENT — PATIENT HEALTH QUESTIONNAIRE - PHQ9
5. POOR APPETITE OR OVEREATING: SEVERAL DAYS
SUM OF ALL RESPONSES TO PHQ QUESTIONS 1-9: 8

## 2021-07-01 NOTE — PROGRESS NOTES
CC: establish care      HPI:  25F with PMH of CF on trikafta, pancreatic insufficienciy, abnormal glucose tolerance test presents to establish care. She has no specific health complaint or concerns today. She is due for a pap smear which we will complete today as she has never had one done.    We discussed diet, exercise regimen. She is currently working long hours and thus finds it difficult to exercise and be more healthy. She lives at home with her parents.    CF - managed by pulmonology, last seen 6/8, manages pulm regimen, nutrition, pancreatic insufficiency, bone density, etc.    Otherwise no concerns she wishes to address.    ROS:  Positive per HPI.    RHM:  Vaccinations:   Needs hep b, otherwise UTD  Screening   Pap (21-65 yrs q3 or 5y): never had, due  Women: no period, on depo  Sexually active - no   Diet: ramen, school food  Exercise: no exercise regimen  Lifestyle: tobacco use:never . Alcohol use:one . Recreational drug use: none .  Sleep: 8-9hr/day    Past Medical History:   Diagnosis Date     ADHD (attention deficit hyperactivity disorder)      Anxiety      Aspergillosis, with pneumonia (H)     fugus found caused chest pain     Chronic infection     CF, MRSA.,      Chronic sinusitis      Constipation, chronic      Cystic fibrosis with pulmonary manifestations (H) 12/19/2011     Cystic fibrosis without mention of meconium ileus     SWEAT TEST:Date: 2/17/1994   [facility]  296 mg, 104 mmol/L ClSample #2  295 mg, 104 mmol/L Cl GENOTYPING:Date: 10/15/2007,  Laboratory: [facility]: [id number]     Depressive disorder      Diabetes     no meds currently     Dysthymic disorder      Exocrine pancreatic insufficiency      Gastro-oesophageal reflux disease      Hip pain, right      MRSA (methicillin resistant Staphylococcus aureus) carrier      Pancreatic disease      Past Surgical History:   Procedure Laterality Date     ARTHROSCOPY HIP, OSTEOPLASTY FEMUR PROXIMAL, COMBINED  3/11/2013     Procedure: COMBINED ARTHROSCOPY HIP, OSTEOPLASTY FEMUR PROXIMAL;  Right Hip Arthroscopy, Labral  Debridement.    surgeon request choice anesthesia/admit to [facility] after surgery;  Surgeon: Omkar Austin MD;  Location: [facility]     ARTHROSCOPY KNEE WITH MEDIAL MENISCECTOMY Left 1/31/2017    Procedure: ARTHROSCOPY KNEE WITH MEDIAL MENISCECTOMY;  Surgeon: Jethro Coyle MD;  Location: [facility] OR     bronchoscopies       BRONCHOSCOPY       EXAM UNDER ANESTHESIA ANUS N/A 5/10/2016    Procedure: EXAM UNDER ANESTHESIA ANUS;  Surgeon: Chet Gaviria MD;  [facility]     EXAM UNDER ANESTHESIA, RESTORATIONS, EXTRACTION(S) DENTAL COMPLEX, COMBINED  5/13/2013    Procedure: COMBINED EXAM UNDER ANESTHESIA, RESTORATIONS, EXTRACTION(S) DENTAL COMPLEX;  Dental Exam, Radiographs, Restorations. Single Extraction  Tooth #2. Restorations x 3;  Surgeon: Danilo Ortiz DDS;  Location: [facility]     HC KNEE SCOPE, DIAGNOSTIC      Arthroscopy, Knee- left     INJECT BOTOX N/A 5/10/2016    Procedure: INJECT BOTOX;  Surgeon: Chet Gaviria MD;  [facility]: [facility]     left hip labral tear  5/11/2011    left hip arthroscopy with labral debridement and synovectomy     meniscus repair       OPTICAL TRACKING SYSTEM ENDOSCOPIC SINUS SURGERY  10/14/2011    Procedure:OPTICAL TRACKING SYSTEM ENDOSCOPIC SINUS SURGERY; FESS (functional endoscopic sinus surgery) with Image Guidance, bronchial lavage and cultures; Surgeon:JUAN JOSE GARCIA; Location:[facility] OR     OPTICAL TRACKING SYSTEM ENDOSCOPIC SINUS SURGERY  5/18/2012    Procedure:OPTICAL TRACKING SYSTEM ENDOSCOPIC SINUS SURGERY; Right  and Left Image Guided Functional Endoscopic Sinus Surgery With  Frontal Approach, [employer]; Surgeon:JUAN JOSE GARCIA; Location:[facility]     OPTICAL TRACKING SYSTEM ENDOSCOPIC SINUS SURGERY  9/26/2012    Procedure: OPTICAL TRACKING SYSTEM ENDOSCOPIC SINUS SURGERY;  Stealth Guided Bilateral Functional Endoscopic Sinus Surgery *Latex Safe*;  Surgeon:  Goyo Kuo MD;  Location:  OR     OPTICAL TRACKING SYSTEM ENDOSCOPIC SINUS SURGERY Bilateral 10/16/2015    Procedure: OPTICAL TRACKING SYSTEM ENDOSCOPIC SINUS SURGERY;  Surgeon: Mariela Haile MD;  Location:  OR     ORTHOPEDIC SURGERY      left hip tear repair 2010     SINUS SURGERY       Family History   Problem Relation Age of Onset     Cancer Paternal Grandmother      Skin Cancer Paternal Grandmother      Other Cancer Paternal Grandmother         Skin     Obesity Paternal Grandmother      Cancer Paternal Grandfather         PGF had throat cancer (he was a smoker)     Other Cancer Paternal Grandfather      Anxiety Disorder Paternal Grandfather      Thyroid Disease Mother         ,     Hypertension Mother      Obesity Other      ALS Father 67        2 male cousins with ALS as well     Anesthesia Reaction No family hx of      Blood Disease No family hx of      Colon Polyps No family hx of      Crohn's Disease No family hx of      Ulcerative Colitis No family hx of      Colon Cancer No family hx of      Melanoma No family hx of      Social History     Tobacco Use     Smoking status: Never Smoker     Smokeless tobacco: Never Used     Tobacco comment: one person at home smokes outside   Substance Use Topics     Alcohol use: No     Alcohol/week: 0.0 standard drinks     Drug use: No     Current Outpatient Medications   Medication Sig Dispense Refill     acetylcysteine (MUCOMYST) 20 % neb solution INHALE 4ML VIA NEBULIZER TWO TIMES A DAY. MAY INCREASE TO 3-4 TIMES A DAY WITH INCREASED COUGH / COLD SYMPTOMS. REFRIGERATE VIAL AND DISCARD 96 HOURS AFTER OPENING 360 mL 11     albuterol (PROAIR HFA/PROVENTIL HFA/VENTOLIN HFA) 108 (90 Base) MCG/ACT Inhaler Inhale 2 puffs into the lungs every 6 hours as needed for shortness of breath / dyspnea or wheezing 1 Inhaler 12     albuterol (PROVENTIL) (2.5 MG/3ML) 0.083% neb solution INHALE 1 VIAL ( 2.5MG) BY NEBULIZATION EVERY 4 HOURS AS NEEDED FOR FOR SHORTNESS OF BREATH OR  WHEEZING 360 mL 11     amphetamine-dextroamphetamine (ADDERALL XR) 20 MG 24 hr capsule Take 1 capsule (20 mg) by mouth daily 30 capsule 0     [START ON 7/9/2021] amphetamine-dextroamphetamine (ADDERALL XR) 20 MG 24 hr capsule Take 1 capsule (20 mg) by mouth daily 30 capsule 0     [START ON 8/8/2021] amphetamine-dextroamphetamine (ADDERALL XR) 20 MG 24 hr capsule Take 1 capsule (20 mg) by mouth daily 30 capsule 0     azithromycin (ZITHROMAX) 500 MG tablet TAKE ONE TABLET BY MOUTH ON MONDAYS, WEDNESDAYS, AND FRIDAYS AS DIRECTED 36 tablet 4     beta carotene 40173 UNIT capsule TAKE 1 CAPSULE BY MOUTH IN THE MORNING ON MONDAY, WEDNESDAY AND FRIDAY 36 capsule 4     blood glucose (ACCU-CHEK SMARTVIEW) test strip Use to test blood sugar 4 times daily or as directed. 400 each 3     buPROPion (WELLBUTRIN XL) 300 MG 24 hr tablet Take 1 tablet (300 mg) by mouth every morning 90 tablet 1     busPIRone (BUSPAR) 15 MG tablet Take 1 tablet (15 mg) by mouth 2 times daily 60 tablet 5     cetirizine (ZYRTEC) 10 MG tablet Take 1 tablet (10 mg) by mouth every evening 30 tablet 3     elexacaftor-tezacaftor-ivacaftor & ivacaftor (TRIKAFTA) 100-50-75 & 150 MG tablet pack TAKE TWO ORANGE TABLETS BY MOUTH IN THE MORNING AND ONE BLUE TABLET IN THE EVENING AS DIRECTED ON PACKAGE.  TAKE WITH FAT CONTAINING FOOD. 84 tablet 5     FLUoxetine (PROZAC) 40 MG capsule Take 2 capsules (80 mg) by mouth daily 60 capsule 5     hydrOXYzine (ATARAX) 25 MG tablet Take 1-2 tablets (25-50 mg) by mouth every 6 hours as needed for anxiety (sleep) 180 tablet 0     medroxyPROGESTERone (DEPO-PROVERA) 150 MG/ML IM injection Inject 150 mg into the muscle       montelukast (SINGULAIR) 10 MG tablet TAKE ONE TABLET BY MOUTH AT BEDTIME 30 tablet 11     Multiple Vitamin (MULTIVITAMIN OR) Take 1 tablet by mouth daily.       naltrexone (DEPADE/REVIA) 50 MG tablet Take 1 tablet.  Time it one to two hours prior to worst cravings. 90 tablet 4     omeprazole (PRILOSEC) 40 MG  DR capsule Take 1 capsule (40 mg) by mouth 2 times daily 60 capsule 11     phytonadione (MEPHYTON) 5 MG tablet TAKE 1 TABLET BY MOUTH ONCE A WEEK 4 tablet 11     polyethylene glycol (MIRALAX) powder Take 17 g (1 capful) by mouth daily as needed for constipation 119 g 3     topiramate (TOPAMAX) 100 MG tablet Take 1 tablet (100 mg) by mouth daily 90 tablet 4     traZODone (DESYREL) 100 MG tablet Take 1 tablet (100 mg) by mouth At Bedtime 30 tablet 5     vitamin C (ASCORBIC ACID) 500 MG tablet TAKE ONE TABLET BY MOUTH TWICE A  tablet 3     VITAMIN D3 25 MCG (1000 UT) tablet TAKE ONE TABLET BY MOUTH EVERY  tablet 3     vitamin E (TOCOPHEROL) 400 units (180 mg) capsule TAKE ONE CAPSULE BY MOUTH TWICE A  capsule 11        Allergies   Allergen Reactions     Vancomycin Hives     Redmens skin rash   Redmens skin rash      Augmentin Rash         /73 (BP Location: Right arm, Patient Position: Sitting, Cuff Size: Adult Large)   Pulse 108   Wt 81.2 kg (179 lb)   SpO2 97%   BMI 33.82 kg/m    Physical Examination:    General:  Conversant, generally healthy appearing, no acute distress  Head: atraumatic  Eyes:  Pupils 2-3 mm, sclera white, EOM's full, conjunctiva moist, no periorbital swelling    Neck:  Trachea midline, Full AROM, supple, thyroid smooth, symmetric, not enlarged, no nodules, no neck lymphadenopathy  Lungs:  Clear to auscultation throughout, no wheezes, rhonchi or rales. Normal respiratory effort and no intercostal retractions.  C/V:  Regular rate and rhythm, no murmurs, rubs or gallops.   Abdomen:  ND/NT  M/S:   No joint deformities noted.  No joint swelling.  Skin:   Normal temperature., turgor and texture. No rashes, suspicious lesions, or jaundice on exposed skin surfaces.   Extremities:  No peripheral edema, no digital cyanosis  Psych:  Alert and oriented. Appropriate affect.  Not psychomotor slowed.  No signs of anxiety or agitation.      A&P:  RHM  -pap smear completed  today  -Hepatitis b vaccine series - patient thinks she has had this, will seek records and contact us for the series if she cannot find them    Cystic fibrosis  -follows with pulmonology every 6mo for all CF related care including diabetes screening, bone density, nutrition, etc  -currently on trikafta + neb/vesting regimen    ADHD  -on adderall    MDD  -on fluoxetine, wellbutrin    Birth control  -depo provera    Plan: pap smear completed today, follow up in one year for annual visit    Margie Bradshaw MD   Resident PGY-1  Pager [phone]    This patient was discussed and seen with Dr. Ash.

## 2021-07-01 NOTE — NURSING NOTE
Chief Complaint   Patient presents with     [facility]     est care - due for pap (no concerns)        Linda Steiner MA,  at 1:09 PM on 7/1/2021.

## 2021-07-02 ASSESSMENT — ANXIETY QUESTIONNAIRES: GAD7 TOTAL SCORE: 8

## 2021-07-06 LAB
COPATH REPORT: NORMAL
PAP: NORMAL

## 2021-07-29 DIAGNOSIS — E84.9 CYSTIC FIBROSIS (H): ICD-10-CM

## 2021-07-29 RX ORDER — ELEXACAFTOR, TEZACAFTOR, AND IVACAFTOR 100-50-75
KIT ORAL
Qty: 84 TABLET | Refills: 5 | Status: SHIPPED | OUTPATIENT
Start: 2021-07-29 | End: 2022-02-04

## 2021-09-11 ENCOUNTER — HEALTH MAINTENANCE LETTER (OUTPATIENT)
Age: 26
End: 2021-09-11

## 2021-10-11 DIAGNOSIS — E84.9 CF (CYSTIC FIBROSIS) (H): ICD-10-CM

## 2021-10-11 RX ORDER — AZITHROMYCIN 500 MG/1
TABLET, FILM COATED ORAL
Qty: 36 TABLET | Refills: 4 | Status: SHIPPED | OUTPATIENT
Start: 2021-10-11 | End: 2022-05-03

## 2021-10-18 ENCOUNTER — MYC MEDICAL ADVICE (OUTPATIENT)
Dept: PSYCHIATRY | Facility: CLINIC | Age: 26
End: 2021-10-18

## 2021-10-18 DIAGNOSIS — F90.0 ATTENTION DEFICIT HYPERACTIVITY DISORDER (ADHD), PREDOMINANTLY INATTENTIVE TYPE: Primary | ICD-10-CM

## 2021-10-19 RX ORDER — DEXTROAMPHETAMINE SACCHARATE, AMPHETAMINE ASPARTATE MONOHYDRATE, DEXTROAMPHETAMINE SULFATE AND AMPHETAMINE SULFATE 5; 5; 5; 5 MG/1; MG/1; MG/1; MG/1
20 CAPSULE, EXTENDED RELEASE ORAL DAILY
Qty: 30 CAPSULE | Refills: 0 | Status: SHIPPED | OUTPATIENT
Start: 2021-10-19 | End: 2022-03-06

## 2021-10-19 NOTE — TELEPHONE ENCOUNTER
Last seen: 6/10  RTC: 1 month  Non-provider cancel: None  No-show: None  Next appt: 11/4     Incoming refill from patient via [facility]     Medication requested:  amphetamine-dextroamphetamine (ADDERALL XR) 20 MG 24 hr capsule 30 capsule 0 8/8/2021 9/7/2021 --   Sig - Route: Take 1 capsule (20 mg) by mouth daily   Last refilled: 9/22, 8/14, 7/13 (all 5/25 orders) per [facility]     Medication refill approved per refill protocol.  Routed to provider to sign and send.        10/19/21 1519 Sign Jessie Pitts APRN CNP   E-Prescribing Status: Receipt confirmed by pharmacy (10/19/2021  3:19 PM CDT)    Routed message to pt via [facility].

## 2021-10-22 ENCOUNTER — ALLIED HEALTH/NURSE VISIT (OUTPATIENT)
Dept: CARE COORDINATION | Facility: CLINIC | Age: 26
End: 2021-10-22

## 2021-10-22 ENCOUNTER — OFFICE VISIT (OUTPATIENT)
Dept: PULMONOLOGY | Facility: CLINIC | Age: 26
End: 2021-10-22
Attending: INTERNAL MEDICINE
Payer: COMMERCIAL

## 2021-10-22 VITALS
OXYGEN SATURATION: 98 % | BODY MASS INDEX: 33.13 KG/M2 | SYSTOLIC BLOOD PRESSURE: 118 MMHG | HEIGHT: 62 IN | HEART RATE: 83 BPM | DIASTOLIC BLOOD PRESSURE: 74 MMHG | WEIGHT: 180 LBS

## 2021-10-22 DIAGNOSIS — Z71.9 ENCOUNTER FOR COUNSELING: Primary | ICD-10-CM

## 2021-10-22 DIAGNOSIS — L50.8 URTICARIA, CHRONIC: ICD-10-CM

## 2021-10-22 DIAGNOSIS — E84.0 CYSTIC FIBROSIS OF THE LUNG (H): Primary | ICD-10-CM

## 2021-10-22 DIAGNOSIS — J30.89 ALLERGIC RHINITIS DUE TO AMERICAN HOUSE DUST MITE: ICD-10-CM

## 2021-10-22 DIAGNOSIS — Z23 NEED FOR VACCINATION: ICD-10-CM

## 2021-10-22 LAB
EXPTIME-PRE: 4.95 SEC
FEF2575-%PRED-PRE: 109 %
FEF2575-PRE: 3.84 L/SEC
FEF2575-PRED: 3.5 L/SEC
FEFMAX-%PRED-PRE: 140 %
FEFMAX-PRE: 9.41 L/SEC
FEFMAX-PRED: 6.68 L/SEC
FEV1-%PRED-PRE: 102 %
FEV1-PRE: 3.11 L
FEV1FEV6-PRE: 90 %
FEV1FEV6-PRED: 86 %
FEV1FVC-PRE: 90 %
FEV1FVC-PRED: 86 %
FIFMAX-PRE: 5.18 L/SEC
FVC-%PRED-PRE: 97 %
FVC-PRE: 3.47 L
FVC-PRED: 3.54 L

## 2021-10-22 PROCEDURE — G0463 HOSPITAL OUTPT CLINIC VISIT: HCPCS | Mod: 25

## 2021-10-22 PROCEDURE — 250N000011 HC RX IP 250 OP 636: Performed by: INTERNAL MEDICINE

## 2021-10-22 PROCEDURE — 87186 SC STD MICRODIL/AGAR DIL: CPT | Performed by: INTERNAL MEDICINE

## 2021-10-22 PROCEDURE — 90686 IIV4 VACC NO PRSV 0.5 ML IM: CPT | Performed by: INTERNAL MEDICINE

## 2021-10-22 PROCEDURE — 94375 RESPIRATORY FLOW VOLUME LOOP: CPT | Performed by: INTERNAL MEDICINE

## 2021-10-22 PROCEDURE — G0008 ADMIN INFLUENZA VIRUS VAC: HCPCS | Performed by: INTERNAL MEDICINE

## 2021-10-22 PROCEDURE — 99215 OFFICE O/P EST HI 40 MIN: CPT | Mod: 25 | Performed by: INTERNAL MEDICINE

## 2021-10-22 RX ORDER — CETIRIZINE HYDROCHLORIDE 10 MG/1
10 TABLET ORAL EVERY EVENING
Qty: 30 TABLET | Refills: 5 | Status: SHIPPED | OUTPATIENT
Start: 2021-10-22 | End: 2022-05-17

## 2021-10-22 RX ADMIN — INFLUENZA A VIRUS A/GUANGDONG-MAONAN/SWL1536/2019 CNIC-1909 (H1N1) ANTIGEN (FORMALDEHYDE INACTIVATED), INFLUENZA A VIRUS A/HONG KONG/2671/2019 (H3N2) ANTIGEN (FORMALDEHYDE INACTIVATED), INFLUENZA B VIRUS B/PHUKET/3073/2013 ANTIGEN (FORMALDEHYDE INACTIVATED), AND INFLUENZA B VIRUS B/WASHINGTON/02/2019 ANTIGEN (FORMALDEHYDE INACTIVATED) 0.5 ML: 15; 15; 15; 15 INJECTION, SUSPENSION INTRAMUSCULAR at 15:44

## 2021-10-22 ASSESSMENT — MIFFLIN-ST. JEOR: SCORE: 1499.88

## 2021-10-22 ASSESSMENT — PAIN SCALES - GENERAL: PAINLEVEL: NO PAIN (0)

## 2021-10-22 NOTE — NURSING NOTE
Chief Complaint   Patient presents with     Follow Up     CF Follow up      Vitals were taken and medications were reconciled.     Shreya Daniels RMA  2:38 PM

## 2021-10-22 NOTE — LETTER
10/22/2021     RE: Mamie Rice  [address] [facility] [id number]    Dear Colleague,    Thank you for referring your patient, Mamie Rice, to the [facility] [place]. Please see a copy of my visit note below.    [facility]  October 22, 2021         Assessment and Plan:   Mamie Rice is a 25 year old female with cystic fibrosis. She is here today for follow-up.     1. CF lung disease with normal spirometry; CF TR modulator therapy:  Currently feeling well, asymptomatic, no cough but she is having allergy symptoms (rhinorrhea). Now vesting once a day. Weight stable  -s/p moderna covid vaccine and encouraged to get booster once available  -vest and nebs now once a day  -trikafta (LFTs done  6/8, wnl), azithromycin    2. Pancreatic Insufficiency/GI:  The patient has no new symptoms consistent with worsening malabsorption.    - continue the present dose of pancreatic enzymes  - continue vitamin supplementation.    3. Abnormal glucose tolerance test: intermittently monitors BG at home, mostly acceptable, 80s-100s. She occasionally has hypoglycemia into 50s and is symptomatic. We will continue to monitor. Most recent HbA1c was 5.1 (6/2021).     4. Social - father and grandfather recently passed away which has been difficult. Asking to speak with  today regarding issues about life insurance policy.     5. Allergic rhinitis, sinus disease - these symptoms are mildly increased and we will start an antihistamine. She reports not needing to see ENT at this time and will follow-up as needed.   -will return to ENT prn, last visit in February 2020    6. RHM  Vaccinations UTD, now s/p covid vaccine. Pap performed 7/1/21 - negative.   Annual studies: completed 6/2021  Immunizations: UTD, flu given today   Colonoscopy: NA    RTC in 12 weeks with CF sputum and spirometry.     Laurie Gray  MD  Pulmonary, Allergy, Critical Care, and Sleep Medicine   [facility]   Pager: [phone]    40 minutes required on the day of the visit to review chart, interview and examine patient, review labs and imaging, formulate a plan, document and submit orders.            Interval History:     She was last seen in clinic in June. Today reports doing well. No change in her baseline symptoms. She does say that allergies are worse in the fall. Not currently taking an antihistamine. She denies cough, SOB (though occasionally with activity), chest tightness. Stools are at baseline. She denies regular physical activity.          Review of Systems:     CONSTITUTIONAL: no fever, no chills, no sweats, no change in weight, no change in energy, no change in appetite    INTEGUMENTARY/SKIN: no rash, no obvious new lesions    ENT/MOUTH: no sore throat, no new sinus pain; having some rhinorrhea      RESPIRATORY: see interval history    CV: no chest pain, no palpitations, no peripheral edema, no orthopnea, no PND    GI: no nausea, no vomiting, no change in stools, no fatty stools, no GERD    : negative    MUSCULOSKELETAL: no myalgias, no arthralgias    ENDOCRINE: no excessive thirst, blood sugars with adequate control    NEURO:  No headache, no numbness, no tingling    SLEEP: no issues    PSYCHIATRIC: mood stable          Past Medical and Surgical History:     Past Medical History:   Diagnosis Date     ADHD (attention deficit hyperactivity disorder)      Anxiety      Aspergillosis, with pneumonia (H)     fugus found caused chest pain     Chronic infection     CF, MRSA.,      Chronic sinusitis      Constipation, chronic      Cystic fibrosis with pulmonary manifestations (H) 12/19/2011     Cystic fibrosis without mention of meconium ileus     SWEAT TEST:Date: 2/17/1994   [facility]  296 mg, 104 mmol/L ClSample #2  295 mg, 104 mmol/L Cl GENOTYPING:Date: 10/15/2007,  Laboratory: [facility]: [id number]      Depressive disorder      Diabetes     no meds currently     Dysthymic disorder      Exocrine pancreatic insufficiency      Gastro-oesophageal reflux disease      Hip pain, right      MRSA (methicillin resistant Staphylococcus aureus) carrier      Pancreatic disease      Past Surgical History:   Procedure Laterality Date     ARTHROSCOPY HIP, OSTEOPLASTY FEMUR PROXIMAL, COMBINED  3/11/2013    Procedure: COMBINED ARTHROSCOPY HIP, OSTEOPLASTY FEMUR PROXIMAL;  Right Hip Arthroscopy, Labral  Debridement.    surgeon request choice anesthesia/admit to [facility] after surgery;  Surgeon: Omkar Austin MD;  [facility]: [facility]     ARTHROSCOPY KNEE WITH MEDIAL MENISCECTOMY Left 1/31/2017    Procedure: ARTHROSCOPY KNEE WITH MEDIAL MENISCECTOMY;  Surgeon: Jethro Coyle MD;  Location: [facility] OR     bronchoscopies       BRONCHOSCOPY       EXAM UNDER ANESTHESIA ANUS N/A 5/10/2016    Procedure: EXAM UNDER ANESTHESIA ANUS;  Surgeon: Chet Gaviria MD;  [facility]     EXAM UNDER ANESTHESIA, RESTORATIONS, EXTRACTION(S) DENTAL COMPLEX, COMBINED  5/13/2013    Procedure: COMBINED EXAM UNDER ANESTHESIA, RESTORATIONS, EXTRACTION(S) DENTAL COMPLEX;  Dental Exam, Radiographs, Restorations. Single Extraction  Tooth #2. Restorations x 3;  Surgeon: Danilo Ortiz DDS;  Location: [facility] OR     HC KNEE SCOPE, DIAGNOSTIC      Arthroscopy, Knee- left     INJECT BOTOX N/A 5/10/2016    Procedure: INJECT BOTOX;  Surgeon: Chet Gaviria MD;  [facility]     left hip labral tear  5/11/2011    left hip arthroscopy with labral debridement and synovectomy     meniscus repair       OPTICAL TRACKING SYSTEM ENDOSCOPIC SINUS SURGERY  10/14/2011    Procedure:OPTICAL TRACKING SYSTEM ENDOSCOPIC SINUS SURGERY; FESS (functional endoscopic sinus surgery) with Image Guidance, bronchial lavage and cultures; Surgeon:JUAN JOSE GARCIA; Location:[facility] OR     OPTICAL TRACKING SYSTEM ENDOSCOPIC SINUS SURGERY  5/18/2012    Procedure:OPTICAL  TRACKING SYSTEM ENDOSCOPIC SINUS SURGERY; Right  and Left Image Guided Functional Endoscopic Sinus Surgery With  Frontal Approach, [employer]; Surgeon:GOYO KUO; Location:[facility] OR     OPTICAL TRACKING SYSTEM ENDOSCOPIC SINUS SURGERY  9/26/2012    Procedure: OPTICAL TRACKING SYSTEM ENDOSCOPIC SINUS SURGERY;  Stealth Guided Bilateral Functional Endoscopic Sinus Surgery *Latex Safe*;  Surgeon: Goyo Kuo MD;  [facility]     OPTICAL TRACKING SYSTEM ENDOSCOPIC SINUS SURGERY Bilateral 10/16/2015    Procedure: OPTICAL TRACKING SYSTEM ENDOSCOPIC SINUS SURGERY;  Surgeon: Mariela Haile MD;  [facility]     ORTHOPEDIC SURGERY      left hip tear repair 2010     SINUS SURGERY             Family History:     Family History   Problem Relation Age of Onset     Cancer Paternal Grandmother      Skin Cancer Paternal Grandmother      Other Cancer Paternal Grandmother         Skin     Obesity Paternal Grandmother      Cancer Paternal Grandfather         PGF had throat cancer (he was a smoker)     Other Cancer Paternal Grandfather      Anxiety Disorder Paternal Grandfather      Thyroid Disease Mother         ,     Hypertension Mother      Obesity Other      ALS Father 67        2 male cousins with ALS as well     Anesthesia Reaction No family hx of      Blood Disease No family hx of      Colon Polyps No family hx of      Crohn's Disease No family hx of      Ulcerative Colitis No family hx of      Colon Cancer No family hx of      Melanoma No family hx of             Social History:     Social History     Socioeconomic History     Marital status: Single     Spouse name: Not on file     Number of children: Not on file     Years of education: Not on file     Highest education level: Not on file   Occupational History     Not on file   Tobacco Use     Smoking status: Never Smoker     Smokeless tobacco: Never Used     Tobacco comment: one person at home smokes outside   Substance and Sexual Activity     Alcohol use: No      Alcohol/week: 0.0 standard drinks     Drug use: No     Sexual activity: Never   Other Topics Concern      Service Not Asked     Blood Transfusions No     Caffeine Concern Not Asked     Occupational Exposure Not Asked     Hobby Hazards Not Asked     Sleep Concern Not Asked     Stress Concern Not Asked     Weight Concern Not Asked     Special Diet Not Asked     Back Care Not Asked     Exercise Yes     Bike Helmet Not Asked     Seat Belt Not Asked     Self-Exams Not Asked     Parent/sibling w/ CABG, MI or angioplasty before 65F 55M? Yes   Social History Narrative    Mamie lives with mother in [place], [place].  There is a cat in the home, but Mamie does not have any litterbox duties.  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  She gets essentially no exercise because of the tingling in her feet (says it bothers her even to stand).        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        8/2015 no change in social situation        2/15/2016 Pt is single and lives with mother and stepfather.     Social Determinants of Health     Financial Resource Strain:      Difficulty of Paying Living Expenses:    Food Insecurity:      Worried About Running Out of Food in the Last Year:      Ran Out of Food in the Last Year:    Transportation Needs:      Lack of Transportation (Medical):      Lack of Transportation (Non-Medical):    Physical Activity:      Days of Exercise per Week:      Minutes of Exercise per Session:    Stress:      Feeling of Stress :    Social Connections:      Frequency of Communication with Friends and Family:      Frequency of Social Gatherings with Friends and Family:      Attends [employer] Services:      Active Member of Clubs or Organizations:      Attends Club or Organization Meetings:      Marital Status:    Intimate Partner Violence:      Fear of Current or Ex-Partner:      Emotionally Abused:      Physically Abused:   "    Sexually Abused:             Medications:     Current Outpatient Medications   Medication     acetylcysteine (MUCOMYST) 20 % neb solution     albuterol (PROAIR HFA/PROVENTIL HFA/VENTOLIN HFA) 108 (90 Base) MCG/ACT Inhaler     albuterol (PROVENTIL) (2.5 MG/3ML) 0.083% neb solution     amphetamine-dextroamphetamine (ADDERALL XR) 20 MG 24 hr capsule     azithromycin (ZITHROMAX) 500 MG tablet     beta carotene 61603 UNIT capsule     blood glucose (ACCU-CHEK SMARTVIEW) test strip     buPROPion (WELLBUTRIN XL) 300 MG 24 hr tablet     busPIRone (BUSPAR) 15 MG tablet     cetirizine (ZYRTEC) 10 MG tablet     elexacaftor-tezacaftor-ivacaftor & ivacaftor (TRIKAFTA) 100-50-75 & 150 MG tablet pack     FLUoxetine (PROZAC) 40 MG capsule     hydrOXYzine (ATARAX) 25 MG tablet     medroxyPROGESTERone (DEPO-PROVERA) 150 MG/ML IM injection     montelukast (SINGULAIR) 10 MG tablet     Multiple Vitamin (MULTIVITAMIN OR)     naltrexone (DEPADE/REVIA) 50 MG tablet     omeprazole (PRILOSEC) 40 MG DR capsule     phytonadione (MEPHYTON) 5 MG tablet     polyethylene glycol (MIRALAX) powder     topiramate (TOPAMAX) 100 MG tablet     traZODone (DESYREL) 100 MG tablet     vitamin C (ASCORBIC ACID) 500 MG tablet     VITAMIN D3 25 MCG (1000 UT) tablet     vitamin E (TOCOPHEROL) 400 units (180 mg) capsule     No current facility-administered medications for this visit.            Physical Exam:   /74   Pulse 83   Ht 1.575 m (5' 2.01\")   Wt 81.6 kg (180 lb)   SpO2 98%   BMI 32.91 kg/m      Constitutional:   Awake, alert and in no apparent distress     Eyes:   nonicteric     ENT:   oral mucosa moist without lesions, normal tm bilaterally, bilateral mucosal edema and erythema     Neck:   Supple without supraclavicular or cervical lymphadenopathy     Lungs:   Good air flow.  No crackles. No rhonchi.  No wheezes.     Cardiovascular:   Normal S1 and S2.  RRR.  No murmur, gallop or rub.     Abdomen:   NABS, soft, nontender, nondistended.  "     Musculoskeletal:   No edema, digital clubbing present     Neurologic:   Alert and conversant.     Skin:   Warm, dry.  No rash on limited exam.             Data:   All laboratory and imaging data reviewed.    Cystic Fibrosis Culture  Specimen Description   Date Value Ref Range Status   06/08/2021 Sputum  Final   11/12/2019 Midstream Urine  Final   11/12/2019 Throat  Final    Culture Micro   Date Value Ref Range Status   06/08/2021 Heavy growth  Normal roberto carlos    Final   11/12/2019   Final    <10,000 colonies/mL  urogenital roberto carlos  Susceptibility testing not routinely done     11/12/2019 Light growth  Normal roberto carlos    Final   11/12/2019 Moderate growth  Staphylococcus aureus   (A)  Final        Recent Results (from the past 168 hour(s))   General PFT Lab (Please always keep checked)    Collection Time: 10/22/21  2:27 PM   Result Value Ref Range    FVC-Pred 3.54 L    FVC-Pre 3.47 L    FVC-%Pred-Pre 97 %    FEV1-Pre 3.11 L    FEV1-%Pred-Pre 102 %    FEV1FVC-Pred 86 %    FEV1FVC-Pre 90 %    FEFMax-Pred 6.68 L/sec    FEFMax-Pre 9.41 L/sec    FEFMax-%Pred-Pre 140 %    FEF2575-Pred 3.50 L/sec    FEF2575-Pre 3.84 L/sec    KMT7985-%Pred-Pre 109 %    ExpTime-Pre 4.95 sec    FIFMax-Pre 5.18 L/sec    FEV1FEV6-Pred 86 %    FEV1FEV6-Pre 90 %     PFT: The spirometry is normal.  When compared to 6/8/2021, the FEV1 and FVC have decreased to her baseline.      Absent    PROMIS 10 reviewed with the patient by the provider.    Again, thank you for allowing me to participate in the care of your patient.      Sincerely,    Laurie Gray MD

## 2021-10-22 NOTE — PROGRESS NOTES
[facility]  [facility]  October 22, 2021         Assessment and Plan:   Mamie Rice is a 25 year old female with cystic fibrosis. She is here today for follow-up.     1. CF lung disease with normal spirometry; CF TR modulator therapy:  Currently feeling well, asymptomatic, no cough but she is having allergy symptoms (rhinorrhea). Now vesting once a day. Weight stable  -s/p moderna covid vaccine and encouraged to get booster once available  -vest and nebs now once a day  -trikafta (LFTs done  6/8, wnl), azithromycin    2. Pancreatic Insufficiency/GI:  The patient has no new symptoms consistent with worsening malabsorption.    - continue the present dose of pancreatic enzymes  - continue vitamin supplementation.    3. Abnormal glucose tolerance test: intermittently monitors BG at home, mostly acceptable, 80s-100s. She occasionally has hypoglycemia into 50s and is symptomatic. We will continue to monitor. Most recent HbA1c was 5.1 (6/2021).     4. Social - father and grandfather recently passed away which has been difficult. Asking to speak with SW today regarding issues about life insurance policy.     5. Allergic rhinitis, sinus disease - these symptoms are mildly increased and we will start an antihistamine. She reports not needing to see ENT at this time and will follow-up as needed.   -will return to ENT prn, last visit in February 2020    6. RHM  Vaccinations UTD, now s/p covid vaccine. Pap performed 7/1/21 - negative.   Annual studies: completed 6/2021  Immunizations: UTD, flu given today   Colonoscopy: NA    RTC in 12 weeks with CF sputum and spirometry.     Laurie Gray MD  Pulmonary, Allergy, Critical Care, and Sleep Medicine   [facility]   Pager: [phone]    40 minutes required on the day of the visit to review chart, interview and examine patient, review labs and imaging, formulate a plan, document and submit orders.            Interval History:      She was last seen in clinic in June. Today reports doing well. No change in her baseline symptoms. She does say that allergies are worse in the fall. Not currently taking an antihistamine. She denies cough, SOB (though occasionally with activity), chest tightness. Stools are at baseline. She denies regular physical activity.          Review of Systems:     CONSTITUTIONAL: no fever, no chills, no sweats, no change in weight, no change in energy, no change in appetite    INTEGUMENTARY/SKIN: no rash, no obvious new lesions    ENT/MOUTH: no sore throat, no new sinus pain; having some rhinorrhea      RESPIRATORY: see interval history    CV: no chest pain, no palpitations, no peripheral edema, no orthopnea, no PND    GI: no nausea, no vomiting, no change in stools, no fatty stools, no GERD    : negative    MUSCULOSKELETAL: no myalgias, no arthralgias    ENDOCRINE: no excessive thirst, blood sugars with adequate control    NEURO:  No headache, no numbness, no tingling    SLEEP: no issues    PSYCHIATRIC: mood stable          Past Medical and Surgical History:     Past Medical History:   Diagnosis Date     ADHD (attention deficit hyperactivity disorder)      Anxiety      Aspergillosis, with pneumonia (H)     fugus found caused chest pain     Chronic infection     CF, MRSA.,      Chronic sinusitis      Constipation, chronic      Cystic fibrosis with pulmonary manifestations (H) 12/19/2011     Cystic fibrosis without mention of meconium ileus     SWEAT TEST:Date: 2/17/1994   [facility]  296 mg, 104 mmol/L ClSample #2  295 mg, 104 mmol/L Cl GENOTYPING:Date: 10/15/2007,  Laboratory: [facility]: [id number]     Depressive disorder      Diabetes     no meds currently     Dysthymic disorder      Exocrine pancreatic insufficiency      Gastro-oesophageal reflux disease      Hip pain, right      MRSA (methicillin resistant Staphylococcus aureus) carrier      Pancreatic disease      Past Surgical History:    Procedure Laterality Date     ARTHROSCOPY HIP, OSTEOPLASTY FEMUR PROXIMAL, COMBINED  3/11/2013    Procedure: COMBINED ARTHROSCOPY HIP, OSTEOPLASTY FEMUR PROXIMAL;  Right Hip Arthroscopy, Labral  Debridement.    surgeon request choice anesthesia/admit to [facility] after surgery;  Surgeon: Omkar Austin MD;  [facility]     ARTHROSCOPY KNEE WITH MEDIAL MENISCECTOMY Left 1/31/2017    Procedure: ARTHROSCOPY KNEE WITH MEDIAL MENISCECTOMY;  Surgeon: Jethro Coyle MD;  [facility] OR     bronchoscopies       BRONCHOSCOPY       EXAM UNDER ANESTHESIA ANUS N/A 5/10/2016    Procedure: EXAM UNDER ANESTHESIA ANUS;  Surgeon: Chet Gaviria MD;  [facility]     EXAM UNDER ANESTHESIA, RESTORATIONS, EXTRACTION(S) DENTAL COMPLEX, COMBINED  5/13/2013    Procedure: COMBINED EXAM UNDER ANESTHESIA, RESTORATIONS, EXTRACTION(S) DENTAL COMPLEX;  Dental Exam, Radiographs, Restorations. Single Extraction  Tooth #2. Restorations x 3;  Surgeon: Danilo Ortiz DDS;  [facility] OR     HC KNEE SCOPE, DIAGNOSTIC      Arthroscopy, Knee- left     INJECT BOTOX N/A 5/10/2016    Procedure: INJECT BOTOX;  Surgeon: Chet Gaviria MD;  [facility]     left hip labral tear  5/11/2011    left hip arthroscopy with labral debridement and synovectomy     meniscus repair       OPTICAL TRACKING SYSTEM ENDOSCOPIC SINUS SURGERY  10/14/2011    Procedure:OPTICAL TRACKING SYSTEM ENDOSCOPIC SINUS SURGERY; FESS (functional endoscopic sinus surgery) with Image Guidance, bronchial lavage and cultures; Surgeon:JUAN JOSE GARCIA; [facility] OR     OPTICAL TRACKING SYSTEM ENDOSCOPIC SINUS SURGERY  5/18/2012    Procedure:OPTICAL TRACKING SYSTEM ENDOSCOPIC SINUS SURGERY; Right  and Left Image Guided Functional Endoscopic Sinus Surgery With  Frontal Approach, [employer]; Surgeon:JUAN JOSE GARCIA; [facility]     OPTICAL TRACKING SYSTEM ENDOSCOPIC SINUS SURGERY  9/26/2012    Procedure: OPTICAL TRACKING SYSTEM ENDOSCOPIC  SINUS SURGERY;  Stealth Guided Bilateral Functional Endoscopic Sinus Surgery *Latex Safe*;  Surgeon: Goyo Kuo MD;  [facility]     OPTICAL TRACKING SYSTEM ENDOSCOPIC SINUS SURGERY Bilateral 10/16/2015    Procedure: OPTICAL TRACKING SYSTEM ENDOSCOPIC SINUS SURGERY;  Surgeon: Mariela Haile MD;  Location:  OR     ORTHOPEDIC SURGERY      left hip tear repair 2010     SINUS SURGERY             Family History:     Family History   Problem Relation Age of Onset     Cancer Paternal Grandmother      Skin Cancer Paternal Grandmother      Other Cancer Paternal Grandmother         Skin     Obesity Paternal Grandmother      Cancer Paternal Grandfather         PGF had throat cancer (he was a smoker)     Other Cancer Paternal Grandfather      Anxiety Disorder Paternal Grandfather      Thyroid Disease Mother         ,     Hypertension Mother      Obesity Other      ALS Father 67        2 male cousins with ALS as well     Anesthesia Reaction No family hx of      Blood Disease No family hx of      Colon Polyps No family hx of      Crohn's Disease No family hx of      Ulcerative Colitis No family hx of      Colon Cancer No family hx of      Melanoma No family hx of             Social History:     Social History     Socioeconomic History     Marital status: Single     Spouse name: Not on file     Number of children: Not on file     Years of education: Not on file     Highest education level: Not on file   Occupational History     Not on file   Tobacco Use     Smoking status: Never Smoker     Smokeless tobacco: Never Used     Tobacco comment: one person at home smokes outside   Substance and Sexual Activity     Alcohol use: No     Alcohol/week: 0.0 standard drinks     Drug use: No     Sexual activity: Never   Other Topics Concern      Service Not Asked     Blood Transfusions No     Caffeine Concern Not Asked     Occupational Exposure Not Asked     Hobby Hazards Not Asked     Sleep Concern Not Asked     Stress  Concern Not Asked     Weight Concern Not Asked     Special Diet Not Asked     Back Care Not Asked     Exercise Yes     Bike Helmet Not Asked     Seat Belt Not Asked     Self-Exams Not Asked     Parent/sibling w/ CABG, MI or angioplasty before 65F 55M? Yes   Social History Narrative    Mamie lives with mother in [place], [place].  There is a cat in the home, but Mamie does not have any litterbox duties.  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  She gets essentially no exercise because of the tingling in her feet (says it bothers her even to stand).        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        8/2015 no change in social situation        2/15/2016 Pt is single and lives with mother and stepfather.     Social Determinants of Health     Financial Resource Strain:      Difficulty of Paying Living Expenses:    Food Insecurity:      Worried About Running Out of Food in the Last Year:      Ran Out of Food in the Last Year:    Transportation Needs:      Lack of Transportation (Medical):      Lack of Transportation (Non-Medical):    Physical Activity:      Days of Exercise per Week:      Minutes of Exercise per Session:    Stress:      Feeling of Stress :    Social Connections:      Frequency of Communication with Friends and Family:      Frequency of Social Gatherings with Friends and Family:      Attends Amish Services:      Active Member of Clubs or Organizations:      Attends Club or Organization Meetings:      Marital Status:    Intimate Partner Violence:      Fear of Current or Ex-Partner:      Emotionally Abused:      Physically Abused:      Sexually Abused:             Medications:     Current Outpatient Medications   Medication     acetylcysteine (MUCOMYST) 20 % neb solution     albuterol (PROAIR HFA/PROVENTIL HFA/VENTOLIN HFA) 108 (90 Base) MCG/ACT Inhaler     albuterol (PROVENTIL) (2.5 MG/3ML) 0.083% neb solution      "amphetamine-dextroamphetamine (ADDERALL XR) 20 MG 24 hr capsule     azithromycin (ZITHROMAX) 500 MG tablet     beta carotene 42093 UNIT capsule     blood glucose (ACCU-CHEK SMARTVIEW) test strip     buPROPion (WELLBUTRIN XL) 300 MG 24 hr tablet     busPIRone (BUSPAR) 15 MG tablet     cetirizine (ZYRTEC) 10 MG tablet     elexacaftor-tezacaftor-ivacaftor & ivacaftor (TRIKAFTA) 100-50-75 & 150 MG tablet pack     FLUoxetine (PROZAC) 40 MG capsule     hydrOXYzine (ATARAX) 25 MG tablet     medroxyPROGESTERone (DEPO-PROVERA) 150 MG/ML IM injection     montelukast (SINGULAIR) 10 MG tablet     Multiple Vitamin (MULTIVITAMIN OR)     naltrexone (DEPADE/REVIA) 50 MG tablet     omeprazole (PRILOSEC) 40 MG DR capsule     phytonadione (MEPHYTON) 5 MG tablet     polyethylene glycol (MIRALAX) powder     topiramate (TOPAMAX) 100 MG tablet     traZODone (DESYREL) 100 MG tablet     vitamin C (ASCORBIC ACID) 500 MG tablet     VITAMIN D3 25 MCG (1000 UT) tablet     vitamin E (TOCOPHEROL) 400 units (180 mg) capsule     No current facility-administered medications for this visit.            Physical Exam:   /74   Pulse 83   Ht 1.575 m (5' 2.01\")   Wt 81.6 kg (180 lb)   SpO2 98%   BMI 32.91 kg/m      Constitutional:   Awake, alert and in no apparent distress     Eyes:   nonicteric     ENT:   oral mucosa moist without lesions, normal tm bilaterally, bilateral mucosal edema and erythema     Neck:   Supple without supraclavicular or cervical lymphadenopathy     Lungs:   Good air flow.  No crackles. No rhonchi.  No wheezes.     Cardiovascular:   Normal S1 and S2.  RRR.  No murmur, gallop or rub.     Abdomen:   NABS, soft, nontender, nondistended.      Musculoskeletal:   No edema, digital clubbing present     Neurologic:   Alert and conversant.     Skin:   Warm, dry.  No rash on limited exam.             Data:   All laboratory and imaging data reviewed.    Cystic Fibrosis Culture  Specimen Description   Date Value Ref Range Status "   06/08/2021 Sputum  Final   11/12/2019 Midstream Urine  Final   11/12/2019 Throat  Final    Culture Micro   Date Value Ref Range Status   06/08/2021 Heavy growth  Normal roberto carlos    Final   11/12/2019   Final    <10,000 colonies/mL  urogenital roberto carlos  Susceptibility testing not routinely done     11/12/2019 Light growth  Normal roberto carlos    Final   11/12/2019 Moderate growth  Staphylococcus aureus   (A)  Final        Recent Results (from the past 168 hour(s))   General PFT Lab (Please always keep checked)    Collection Time: 10/22/21  2:27 PM   Result Value Ref Range    FVC-Pred 3.54 L    FVC-Pre 3.47 L    FVC-%Pred-Pre 97 %    FEV1-Pre 3.11 L    FEV1-%Pred-Pre 102 %    FEV1FVC-Pred 86 %    FEV1FVC-Pre 90 %    FEFMax-Pred 6.68 L/sec    FEFMax-Pre 9.41 L/sec    FEFMax-%Pred-Pre 140 %    FEF2575-Pred 3.50 L/sec    FEF2575-Pre 3.84 L/sec    SOH2252-%Pred-Pre 109 %    ExpTime-Pre 4.95 sec    FIFMax-Pre 5.18 L/sec    FEV1FEV6-Pred 86 %    FEV1FEV6-Pre 90 %       PFT: The spirometry is normal.  When compared to 6/8/2021, the FEV1 and FVC have decreased to her baseline.      Absent      PROMIS 10 reviewed with the patient by the provider.

## 2021-10-22 NOTE — PROGRESS NOTES
Adult Cystic Fibrosis Program  Social Work Clinic Consult    Data:   Met with Mamie who had questions about life insurance and to review psychosocial needs and provide counseling as needed.  Her mom was present with her in clinic today.    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  We discussed life insurance at her previous clinic visit but she forgot what we discussed and wanted her mom to hear the information as well as give her the opportunity to ask questions.  SW suggested that they call CFF's: [employer] resource to discuss life insurance further.  We discussed whether she is offered benefits through her new full-time position, she believes that she is but has not looked into this closely.  SW suggested that she discuss benefits further with her HR department including health insurance, short/long term disability, sick time and life insurance if offered.      Mamie remains on straight MA per her mom, we reviewed the [place] income guidelines.  Right now it appears that Mamie is over income for [place] and [place], however her mom believes that she may be on [place]-EPD as her premium is income based and they have been submitting verified income statements for some time.  Her mom also believes that Mamie has been SMRT'd in the past and is not disabled through the SSA.  As a child, Mamie was on TEFRA and her mom believes she was SMRT'd at that time.  They want Mamie to be able to remain on MA and will reach out to SW if they run into any problems with their MA, we did discuss that she does have insurance as an option through work if needed.      Mamie and her mom denied having additional questions/concerns for SW at this time.  Verified that they have SW contact info should they have questions arise.    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  She hopes to remain on MA but income guidelines reviewed.  Unfortunately she lost both her bio dad and grandpa this year which has motivated her mom to look into life insurance options for Mamie.    Plan:   -Continue to assist with above concern(s).  -Continue to follow through regular clinic consult.  -Continue to follow for any psychosocial needs that may arise.  -Complete full psychosocial assessment annually.     TAY Juarez, [facility]  Adult Cystic Fibrosis   Ph: [phone]  Pager: [phone]

## 2021-10-22 NOTE — PATIENT INSTRUCTIONS
Cystic Fibrosis Self-Care Plan    RECOMMENDATIONS:   - continue current therapies  - start zyrtec daily for allergies; remember, if you have increased symptoms despite this we can start a nasal spray     YOUR GOAL:  Stay healthy and have a great fall!       [facility] Nurse line:  Eileen Farr Radha  [phone]     [facility] Fax Number:      [phone]         Cystic Fibrosis Respiratory Therapists:   Jhoana Trinh              [phone]          Sharon Gutiérrez   [phone]  Cystic Fibrosis Dietitians:              Amy Andino              [phone]                            Theresa Ceja                        [phone]   Cystic Fibrosis Diabetes Nurse:    Rhonda Esteves   [phone]    Cystic Fibrosis Social Workers:     Deyanira Figueroa               [phone]                     Faith Zamudio               [phone]  Cystic Fibrosis Pharmacists:           Gris Adame                               [phone]         Ping Cisneros      [phone]   Cystic Fibrosis Genetic Counselor:   Kaley Escobedo    [phone]    [facility] website:  [email or web address]    COVID VACCINES:    You are eligible for the COVID-19 vaccine. Sign up for your COVID vaccine via REQQI. Log in, select the menu bar, select schedule an appointment, and then select COVID-19 Vaccine 1st Dose. You may also schedule by calling this number [phone] however hold times can be long.       OR schedule through the [place] Department of Health Vaccine Connector at https://vaccineconnector.[place].gov/ or by calling [phone].      The best vaccine is the one that s available to you first.  All COVID-19 vaccines currently available in the [place] ([employer], Pfizer and Moderna) have been shown to be highly effect at preventing COVID-19.       We re still learning how vaccines will affect the spread of COVID-19. After you ve been fully vaccinated  against COVID-19, you should keep taking precautions in public places like wearing a mask, staying 6 feet apart from others, and avoiding crowds and poorly ventilated spaces until we know more.       MRN: [id number]   Clinic Date: October 22, 2021   Patient: Mamie Rice     Annual Studies:   IGG   Date Value Ref Range Status   06/08/2021 672 610 - 1,616 mg/dL Final     Insulin   Date Value Ref Range Status   08/14/2019 41.0 (H) 3 - 25 mU/L Final     There are no preventive care reminders to display for this patient.    Pulmonary Function Tests  FEV1: amount of air you can blow out in 1 second  FVC: total amount of air you can take in and blow out    Your Goals:         PFT Latest Ref Rng & Units 10/22/2021   FVC L 3.47   FEV1 L 3.11   FVC% % 97   FEV1% % 102          Airway Clearance: The Most Important Way to Keep Your Lungs Healthy  Vest Settings:    Hill-Rom Frequencies: 8, 9, 10 Pressure 10 Then, Frequencies 18, 19, 20 Pressure 6      RespirTech: Quick Start with Pressure of     Do each frequency for 5 minutes; Deflate vest after each frequency & cough 3 times before beginning the next setting.    Vest and Neb Therapy should be done 1 times/day.    Good Nutrition Can Improve Lung Function and Overall Health     Take ALL of your vitamins with food     Take 1/2 of your enzymes before EVERY meal/snack and the other 1/2 mid-meal/snack    Wt Readings from Last 3 Encounters:   10/22/21 81.6 kg (180 lb)   07/01/21 81.2 kg (179 lb)   06/08/21 80.3 kg (177 lb)       Body mass index is 32.91 kg/m .         National CF Foundation Recommendations for BMI in CF Adults: Women: at least 22 Men: at least 23        Controlling Blood Sugars Helps Prevent Lung Infections & Improves Nutrition  Test blood sugar:     In the morning before eating (goal is )     2 hours after a meal (goal is less than 150)     When pre-meal glucose is greater than 150 add correction     At bedtime (if less than 100 eat a snack with 15  grams of carbohydrates  Last A1C Results:   Hemoglobin A1C   Date Value Ref Range Status   06/08/2021 5.1 0 - 5.6 % Final     Comment:     Normal <5.7% Prediabetes 5.7-6.4%  Diabetes 6.5% or higher - adopted from ADA   consensus guidelines.           If diabetic, measure A1C every 6 months. Goal: Under 7%    Staying Healthy    Research:  If you are interested in learning about research opportunities or have questions, please contact the CF Research Team at [phone] or [email or web address].      CF Foundation:  [employer]ass is a personalized resource service to help you with the insurance, financial, legal and other issues you are facing.  It's free, confidential and available to anyone with CF.  Ask your  for more information or contact [employer] directly at 937-QMPRMSN ([phone]) or compass@cff.org, or learn more at cff.org/compass.

## 2021-10-25 ENCOUNTER — TELEPHONE (OUTPATIENT)
Dept: PULMONOLOGY | Facility: CLINIC | Age: 26
End: 2021-10-25

## 2021-10-25 DIAGNOSIS — F34.1 PERSISTENT DEPRESSIVE DISORDER: ICD-10-CM

## 2021-10-25 DIAGNOSIS — F33.0 MILD EPISODE OF RECURRENT MAJOR DEPRESSIVE DISORDER (H): ICD-10-CM

## 2021-10-25 NOTE — TELEPHONE ENCOUNTER
Left voicemail for patient to contact me (direct number provided) or the call center (number provided) to discuss scheduling an appointment as records indicate they are due for a follow up visit with Dr. Gray and Pulmonary Function Test for some time in mid Jan 2022 after being seen recently. As they do have a [email or web address] account, informed them that I would send them a message detailing the same information.

## 2021-10-26 DIAGNOSIS — E84.0 CYSTIC FIBROSIS WITH PULMONARY MANIFESTATIONS (H): ICD-10-CM

## 2021-10-26 RX ORDER — ASCORBIC ACID 500 MG
TABLET ORAL
Qty: 100 TABLET | Refills: 3 | Status: SHIPPED | OUTPATIENT
Start: 2021-10-26 | End: 2022-05-02

## 2021-10-27 ENCOUNTER — TELEPHONE (OUTPATIENT)
Dept: ENDOCRINOLOGY | Facility: CLINIC | Age: 26
End: 2021-10-27

## 2021-10-27 LAB
BACTERIA SPEC CULT: ABNORMAL
BACTERIA SPEC CULT: ABNORMAL

## 2021-10-27 RX ORDER — BUPROPION HYDROCHLORIDE 300 MG/1
TABLET ORAL
Qty: 90 TABLET | Refills: 1 | OUTPATIENT
Start: 2021-10-27

## 2021-10-27 NOTE — TELEPHONE ENCOUNTER
PT NEEDS APPT-  LCV: 3-17-20  Dr. Ho-  management Clinic     RTC due 2-3 M  FYI to clinic  FYI to scheduling.

## 2021-11-04 ENCOUNTER — TELEPHONE (OUTPATIENT)
Dept: PSYCHIATRY | Facility: CLINIC | Age: 26
End: 2021-11-04

## 2021-11-08 ASSESSMENT — ENCOUNTER SYMPTOMS
SORE THROAT: 0
HOARSE VOICE: 0
DECREASED LIBIDO: 0
SINUS CONGESTION: 1
HOT FLASHES: 1
SINUS PAIN: 1
SMELL DISTURBANCE: 0
NECK MASS: 0
TASTE DISTURBANCE: 0
TROUBLE SWALLOWING: 0

## 2021-11-08 NOTE — PROGRESS NOTES
Outcome for 11/08/21 7:43 AM: Sent payByMobile message. Will call later if pt does not upload.   Outcome for 11/08/21 12:29 PM: Left Voicemail    Outcome for 11/08/21 1:20 PM: Glucose Readings sent via [employer]by[employer]  Answers for HPI/ROS submitted by the patient on 11/8/2021  General Symptoms: No  Skin Symptoms: No  HENT Symptoms: Yes  EYE SYMPTOMS: No  HEART SYMPTOMS: No  LUNG SYMPTOMS: No  INTESTINAL SYMPTOMS: No  URINARY SYMPTOMS: No  GYNECOLOGIC SYMPTOMS: Yes  BREAST SYMPTOMS: No  SKELETAL SYMPTOMS: No  BLOOD SYMPTOMS: No  NERVOUS SYSTEM SYMPTOMS: No  MENTAL HEALTH SYMPTOMS: No  Ear pain: No  Ear discharge: No  Hearing loss: No  Tinnitus: No  Nosebleeds: No  Congestion: Yes  Sinus pain: Yes  Trouble swallowing: No   Voice hoarseness: No  Mouth sores: No  Sore throat: No  Tooth pain: No  Gum tenderness: No  Bleeding gums: No  Change in taste: No  Change in sense of smell: No  Dry mouth: No  Hearing aid used: No  Neck lump: No  Bleeding or spotting between periods: No  Heavy or painful periods: No  Irregular periods: No  Vaginal discharge: No  Hot flashes: Yes  Vaginal dryness: No  Genital ulcers: No  Reduced libido: No  Painful intercourse: No  Difficulty with sexual arousal: No  Post-menopausal bleeding: No

## 2021-11-09 ENCOUNTER — VIRTUAL VISIT (OUTPATIENT)
Dept: ENDOCRINOLOGY | Facility: CLINIC | Age: 26
End: 2021-11-09
Attending: INTERNAL MEDICINE
Payer: COMMERCIAL

## 2021-11-09 ENCOUNTER — VIRTUAL VISIT (OUTPATIENT)
Dept: PSYCHIATRY | Facility: CLINIC | Age: 26
End: 2021-11-09
Attending: NURSE PRACTITIONER
Payer: COMMERCIAL

## 2021-11-09 DIAGNOSIS — F41.1 GAD (GENERALIZED ANXIETY DISORDER): Primary | ICD-10-CM

## 2021-11-09 DIAGNOSIS — Z79.899 MEDICATION MANAGEMENT: ICD-10-CM

## 2021-11-09 DIAGNOSIS — F90.0 ATTENTION DEFICIT HYPERACTIVITY DISORDER (ADHD), PREDOMINANTLY INATTENTIVE TYPE: ICD-10-CM

## 2021-11-09 DIAGNOSIS — F33.41 RECURRENT MAJOR DEPRESSIVE DISORDER, IN PARTIAL REMISSION (H): ICD-10-CM

## 2021-11-09 DIAGNOSIS — E16.2 HYPOGLYCEMIA: Primary | ICD-10-CM

## 2021-11-09 DIAGNOSIS — G47.00 INSOMNIA, UNSPECIFIED TYPE: ICD-10-CM

## 2021-11-09 PROCEDURE — 99214 OFFICE O/P EST MOD 30 MIN: CPT | Mod: 95 | Performed by: INTERNAL MEDICINE

## 2021-11-09 PROCEDURE — 99214 OFFICE O/P EST MOD 30 MIN: CPT | Mod: 95 | Performed by: NURSE PRACTITIONER

## 2021-11-09 RX ORDER — DEXTROAMPHETAMINE SACCHARATE, AMPHETAMINE ASPARTATE MONOHYDRATE, DEXTROAMPHETAMINE SULFATE AND AMPHETAMINE SULFATE 5; 5; 5; 5 MG/1; MG/1; MG/1; MG/1
20 CAPSULE, EXTENDED RELEASE ORAL DAILY
Qty: 30 CAPSULE | Refills: 0 | Status: SHIPPED | OUTPATIENT
Start: 2021-11-19 | End: 2021-12-19

## 2021-11-09 RX ORDER — FLUOXETINE 40 MG/1
80 CAPSULE ORAL DAILY
Qty: 60 CAPSULE | Refills: 5 | Status: SHIPPED | OUTPATIENT
Start: 2021-11-09 | End: 2022-06-13

## 2021-11-09 RX ORDER — HYDROXYZINE HYDROCHLORIDE 25 MG/1
25-50 TABLET, FILM COATED ORAL EVERY 6 HOURS PRN
Qty: 180 TABLET | Refills: 1 | Status: SHIPPED | OUTPATIENT
Start: 2021-11-09 | End: 2022-06-22

## 2021-11-09 RX ORDER — DEXTROAMPHETAMINE SACCHARATE, AMPHETAMINE ASPARTATE MONOHYDRATE, DEXTROAMPHETAMINE SULFATE AND AMPHETAMINE SULFATE 5; 5; 5; 5 MG/1; MG/1; MG/1; MG/1
20 CAPSULE, EXTENDED RELEASE ORAL DAILY
Qty: 30 CAPSULE | Refills: 0 | Status: SHIPPED | OUTPATIENT
Start: 2022-01-18 | End: 2022-02-17

## 2021-11-09 RX ORDER — DEXTROAMPHETAMINE SACCHARATE, AMPHETAMINE ASPARTATE MONOHYDRATE, DEXTROAMPHETAMINE SULFATE AND AMPHETAMINE SULFATE 5; 5; 5; 5 MG/1; MG/1; MG/1; MG/1
20 CAPSULE, EXTENDED RELEASE ORAL DAILY
Qty: 30 CAPSULE | Refills: 0 | Status: SHIPPED | OUTPATIENT
Start: 2021-12-19 | End: 2022-01-18

## 2021-11-09 RX ORDER — BUSPIRONE HYDROCHLORIDE 15 MG/1
15 TABLET ORAL 2 TIMES DAILY
Qty: 60 TABLET | Refills: 5 | Status: SHIPPED | OUTPATIENT
Start: 2021-11-09 | End: 2022-06-06

## 2021-11-09 RX ORDER — TRAZODONE HYDROCHLORIDE 100 MG/1
100 TABLET ORAL AT BEDTIME
Qty: 30 TABLET | Refills: 5 | Status: SHIPPED | OUTPATIENT
Start: 2021-11-09 | End: 2022-06-28

## 2021-11-09 ASSESSMENT — PAIN SCALES - GENERAL: PAINLEVEL: NO PAIN (0)

## 2021-11-09 NOTE — PROGRESS NOTES
"Mamie is a 26 year old who is being evaluated via a billable video visit.      How would you like to obtain your AVS? MyChart  If the video visit is dropped, the invitation should be resent by: Text to cell phone: [phone]  Will anyone else be joining your video visit? No  {If patient encounters technical issues they should call [phone] :[id number]}    Video Start Time: {video visit start/end time for provider to select:152948}  Video-Visit Details    Type of service:  Video Visit    Video End Time:{video visit start/end time for provider to select:152948}    Originating Location (pt. Location): {video visit patient location:559685::\"Home\"}    Distant Location (provider location):  [facility]     Platform used for Video Visit: {Virtual Visit Platforms:708467::\"Novadiol\"}  "

## 2021-11-09 NOTE — PROGRESS NOTES
"VIDEO VISIT  Mamie Rice is a 26 year old patient that has consented to receive services via billable video visit.      The patient has been notified of following:   \"This video visit will be conducted via a call between you and your physician/provider. We have found that certain health care needs can be provided without the need for an in-person physical exam. This service lets us provide the care you need with a video conversation. If a prescription is necessary we can send it directly to your pharmacy. If lab work is needed we can place an order for that and you can then stop by our lab to have the test done at a later time. Insurers are generally covering virtual visits as they would in-office visits so billing should not be different than normal.  If for some reason you do get billed incorrectly, you should contact the billing office to correct it and that number is in the AVS .    Patient will join video visit via:  [employer] (Patient / guardian confirmed to join via Action[employer])    If patient attempts to join the video via [employer] at appointment start time, but is unable to, they would prefer that the provider send them a video invitation via:   Send to preferred e-mail: [email or web address]      How would patient like to obtain AVS?:  MyChart    "

## 2021-11-09 NOTE — LETTER
11/9/2021     RE: Mamie Rice  [address] [facility] [id number]     Dear Colleague,    Thank you for referring your patient, Mamie Rice, to the [facility] [place] [facility]. Please see a copy of my visit note below.    Outcome for 11/08/21 7:43 AM: Sent Gifi message. Will call later if pt does not upload.   Outcome for 11/08/21 12:29 PM: Left Voicemail    Outcome for 11/08/21 1:20 PM: Glucose Readings sent via [employer]  Answers for HPI/ROS submitted by the patient on 11/8/2021  General Symptoms: No  Skin Symptoms: No  HENT Symptoms: Yes  EYE SYMPTOMS: No  HEART SYMPTOMS: No  LUNG SYMPTOMS: No  INTESTINAL SYMPTOMS: No  URINARY SYMPTOMS: No  GYNECOLOGIC SYMPTOMS: Yes  BREAST SYMPTOMS: No  SKELETAL SYMPTOMS: No  BLOOD SYMPTOMS: No  NERVOUS SYSTEM SYMPTOMS: No  MENTAL HEALTH SYMPTOMS: No  Ear pain: No  Ear discharge: No  Hearing loss: No  Tinnitus: No  Nosebleeds: No  Congestion: Yes  Sinus pain: Yes  Trouble swallowing: No   Voice hoarseness: No  Mouth sores: No  Sore throat: No  Tooth pain: No  Gum tenderness: No  Bleeding gums: No  Change in taste: No  Change in sense of smell: No  Dry mouth: No  Hearing aid used: No  Neck lump: No  Bleeding or spotting between periods: No  Heavy or painful periods: No  Irregular periods: No  Vaginal discharge: No  Hot flashes: Yes  Vaginal dryness: No  Genital ulcers: No  Reduced libido: No  Painful intercourse: No  Difficulty with sexual arousal: No  Post-menopausal bleeding: No        CF hyperglycemia  Jj Allen CMA    Outcome for 02/01/21 11:39 AM :Patient stated they are not currently checking their glucose     Mamie is a 25 year old who is being evaluated via a billable video visit.      How would you like to obtain your AVS? [facility]  If the video visit is dropped, the invitation should be resent by: Text to cell phone: [phone]  Will anyone else be joining your video visit?  No         CF Endocrinology Return Consultation:  Diabetes  :   Patient: Mamie Rice MRN# [id number]   YOB: 1995 Age: 26 year old   Date of Visit: 11/09/2021     Dear Dr. Allison:    I had the pleasure of seeing your patient, Mamie Rice in the [facility] Endocrinology Clinic, [facility], for a return consultation regarding CFRD and reactive hypoglycemia.           Problem list:     Patient Active Problem List    Diagnosis Date Noted     Knee pain 10/16/2019     Priority: Medium     Added automatically from request for surgery [id number]       Mild episode of recurrent major depressive disorder (H) 05/15/2018     Priority: Medium     Insomnia, unspecified type 05/15/2018     Priority: Medium     MECHE (generalized anxiety disorder) 05/15/2018     Priority: Medium     Attention deficit hyperactivity disorder (ADHD), combined type 05/15/2018     Priority: Medium     Diabetes mellitus, type 2 (H) 12/07/2016     Priority: Medium     Overview:   Diabetes Mellitus Type 2  Per External Records       Acne vulgaris 10/19/2016     Priority: Medium     Non morbid obesity due to excess calories 08/24/2016     Priority: Medium     Persistent depressive disorder 02/29/2016     Priority: Medium     Overview:   Disorder Depressive Persistent (Formerly Dysthymia) NOS       Pancreatic insufficiency 11/13/2014     Priority: Medium     Fecal elastase < 50       Back pain 10/22/2014     Priority: Medium     Overactive child 06/13/2013     Priority: Medium     Overview:   ADHD  6/13/13  Well-managed with Adderall.  HENRIETTA MORFIN         Frontal sinusitis 05/10/2012     Priority: Medium     Chronic constipation 03/04/2012     Priority: Medium     Cystic fibrosis of the lung (H) 12/19/2011     Priority: Medium     SWEAT TEST:  Date: 2/17/1994          Laboratory: [facility]  Sample #1  296 mg           104 mmol/L Cl  Sample #2  295 mg           104 mmol/L Cl     GENOTYPING:  Date: 10/15/2007                Laboratory: [facility]  Genotype: [id number]  Poly T Variant:  [  ]/[  ]         Type 1 diabetes mellitus (H) 11/09/2011     Priority: Medium     Problem list name updated by automated process. Provider to review       Hypoglycemia 10/28/2011     Priority: Medium     Reactive hypoglycemia  updating diagnosis code for icd10 cutover       Chronic maxillary sinusitis 09/08/2011     Priority: Medium     Aspergillosis (H) 07/21/2011     Priority: Medium     Hip joint pain 04/11/2011     Priority: Medium     Cystic fibrosis (H) 02/27/2011     Priority: Medium            HPI:   Mamie is a 26 year old female with Cystic Fibrosis Related Diabetes Mellitus (CFRD).    26 year old female with history of cystic fibrosis related diabetes and reactive hypoglycemia.    She continues to have low blood sugars. She is averaging 1-2 low episodes that wake her at night, per week. Typically last meal around 730 PM, bed time 930 PM. Lows around 12-2 AM. She does not check her glucose levels when this occurs, but when she has she had measured levels in the 50s. Attempted food log at night, no clear trend of food leading to lows. Will also fall low at night with no evening snack. Occasionally low during the day, including morning glucose levels.     Attempted prior cgm, but was told insurance does not cover it.     A1c: 5.1% (6/2021)    Current insulin regimen: None          Past Medical History:     Past Medical History:   Diagnosis Date     ADHD (attention deficit hyperactivity disorder)      Anxiety      Aspergillosis, with pneumonia (H)     fugus found caused chest pain     Chronic infection     CF, MRSA.,      Chronic sinusitis      Constipation, chronic      Cystic fibrosis with pulmonary manifestations (H) 12/19/2011     Cystic fibrosis without mention of meconium ileus     SWEAT TEST:Date: 2/17/1994   [facility]  296 mg, 104 mmol/L ClSample #2  295 mg, 104 mmol/L Cl GENOTYPING:Date: 10/15/2007,  Laboratory:  [facility]: [id number]     Depressive disorder      Diabetes     no meds currently     Dysthymic disorder      Exocrine pancreatic insufficiency      Gastro-oesophageal reflux disease      Hip pain, right      MRSA (methicillin resistant Staphylococcus aureus) carrier      Pancreatic disease             Past Surgical History:     Past Surgical History:   Procedure Laterality Date     ARTHROSCOPY HIP, OSTEOPLASTY FEMUR PROXIMAL, COMBINED  3/11/2013    Procedure: COMBINED ARTHROSCOPY HIP, OSTEOPLASTY FEMUR PROXIMAL;  Right Hip Arthroscopy, Labral  Debridement.    surgeon request choice anesthesia/admit to [facility] after surgery;  Surgeon: Omkar Austin MD;  [facility]     ARTHROSCOPY KNEE WITH MEDIAL MENISCECTOMY Left 1/31/2017    Procedure: ARTHROSCOPY KNEE WITH MEDIAL MENISCECTOMY;  Surgeon: Jethro Coyle MD;  [facility]     bronchoscopies       BRONCHOSCOPY       EXAM UNDER ANESTHESIA ANUS N/A 5/10/2016    Procedure: EXAM UNDER ANESTHESIA ANUS;  Surgeon: Chet Gaviria MD;  [facility]     EXAM UNDER ANESTHESIA, RESTORATIONS, EXTRACTION(S) DENTAL COMPLEX, COMBINED  5/13/2013    Procedure: COMBINED EXAM UNDER ANESTHESIA, RESTORATIONS, EXTRACTION(S) DENTAL COMPLEX;  Dental Exam, Radiographs, Restorations. Single Extraction  Tooth #2. Restorations x 3;  Surgeon: Danilo Ortiz DDS;  [facility]: [facility] OR     HC KNEE SCOPE, DIAGNOSTIC      Arthroscopy, Knee- left     INJECT BOTOX N/A 5/10/2016    Procedure: INJECT BOTOX;  Surgeon: Chet Gaviria MD;  [facility]     left hip labral tear  5/11/2011    left hip arthroscopy with labral debridement and synovectomy     meniscus repair       OPTICAL TRACKING SYSTEM ENDOSCOPIC SINUS SURGERY  10/14/2011    Procedure:OPTICAL TRACKING SYSTEM ENDOSCOPIC SINUS SURGERY; FESS (functional endoscopic sinus surgery) with Image Guidance, bronchial lavage and cultures; Surgeon:JUAN JOSE GARCIA; [facility] OR     OPTICAL  TRACKING SYSTEM ENDOSCOPIC SINUS SURGERY  5/18/2012    Procedure:OPTICAL TRACKING SYSTEM ENDOSCOPIC SINUS SURGERY; Right  and Left Image Guided Functional Endoscopic Sinus Surgery With  Frontal Approach, [facility]; Surgeon:GOYO KUO; [facility] OR     OPTICAL TRACKING SYSTEM ENDOSCOPIC SINUS SURGERY  9/26/2012    Procedure: OPTICAL TRACKING SYSTEM ENDOSCOPIC SINUS SURGERY;  Stealth Guided Bilateral Functional Endoscopic Sinus Surgery *Latex Safe*;  Surgeon: Goyo Kuo MD;  [facility]     OPTICAL TRACKING SYSTEM ENDOSCOPIC SINUS SURGERY Bilateral 10/16/2015    Procedure: OPTICAL TRACKING SYSTEM ENDOSCOPIC SINUS SURGERY;  Surgeon: Mariela Haile MD;  [facility]     ORTHOPEDIC SURGERY      left hip tear repair 2010     SINUS SURGERY                 Social History:     Social History     Social History Narrative    Mamie lives with mother in [place], [place].  There is a cat in the home, but Mamie does not have any litterbox duties.  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  She gets essentially no exercise because of the tingling in her feet (says it bothers her even to stand).        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        8/2015 no change in social situation        2/15/2016 Pt is single and lives with mother and stepfather.              Family History:     Family History   Problem Relation Age of Onset     Cancer Paternal Grandmother      Skin Cancer Paternal Grandmother      Other Cancer Paternal Grandmother         Skin     Obesity Paternal Grandmother      Cancer Paternal Grandfather         PGF had throat cancer (he was a smoker)     Other Cancer Paternal Grandfather      Anxiety Disorder Paternal Grandfather      Thyroid Disease Mother         ,     Hypertension Mother      Obesity Other      ALS Father 67        2 male cousins with ALS as well     Anesthesia Reaction No family hx of      Blood Disease No  family hx of      Colon Polyps No family hx of      Crohn's Disease No family hx of      Ulcerative Colitis No family hx of      Colon Cancer No family hx of      Melanoma No family hx of             Allergies:     Allergies   Allergen Reactions     Vancomycin Hives     Redmens skin rash   Redmens skin rash      Augmentin Rash             Medications:     Current Outpatient Rx   Medication Sig Dispense Refill     acetylcysteine (MUCOMYST) 20 % neb solution INHALE 4ML VIA NEBULIZER TWO TIMES A DAY. MAY INCREASE TO 3-4 TIMES A DAY WITH INCREASED COUGH / COLD SYMPTOMS. REFRIGERATE VIAL AND DISCARD 96 HOURS AFTER OPENING 360 mL 11     albuterol (PROAIR HFA/PROVENTIL HFA/VENTOLIN HFA) 108 (90 Base) MCG/ACT Inhaler Inhale 2 puffs into the lungs every 6 hours as needed for shortness of breath / dyspnea or wheezing 1 Inhaler 12     albuterol (PROVENTIL) (2.5 MG/3ML) 0.083% neb solution INHALE 1 VIAL ( 2.5MG) BY NEBULIZATION EVERY 4 HOURS AS NEEDED FOR FOR SHORTNESS OF BREATH OR WHEEZING 360 mL 11     amphetamine-dextroamphetamine (ADDERALL XR) 20 MG 24 hr capsule Take 1 capsule (20 mg) by mouth daily 30 capsule 0     azithromycin (ZITHROMAX) 500 MG tablet TAKE 1 TABLET BY MOUTH ON MONDAYS, WEDNESDAYS AND FRIDAYS AS DIRECTED 36 tablet 4     beta carotene 88560 UNIT capsule TAKE 1 CAPSULE BY MOUTH IN THE MORNING ON MONDAY, WEDNESDAY AND FRIDAY 36 capsule 4     blood glucose (ACCU-CHEK SMARTVIEW) test strip Use to test blood sugar 4 times daily or as directed. 400 each 3     buPROPion (WELLBUTRIN XL) 300 MG 24 hr tablet Take 1 tablet (300 mg) by mouth every morning 90 tablet 1     busPIRone (BUSPAR) 15 MG tablet Take 1 tablet (15 mg) by mouth 2 times daily 60 tablet 5     cetirizine (ZYRTEC) 10 MG tablet Take 1 tablet (10 mg) by mouth every evening 30 tablet 5     elexacaftor-tezacaftor-ivacaftor & ivacaftor (TRIKAFTA) 100-50-75 & 150 MG tablet pack TAKE TWO ORANGE TABLETS BY MOUTH IN THE MORNING AND ONE BLUE TABLET IN THE  EVENING AS DIRECTED ON PACKAGE.  TAKE WITH FAT CONTAINING FOOD. 84 tablet 5     FLUoxetine (PROZAC) 40 MG capsule Take 2 capsules (80 mg) by mouth daily 60 capsule 5     hydrOXYzine (ATARAX) 25 MG tablet Take 1-2 tablets (25-50 mg) by mouth every 6 hours as needed for anxiety (sleep) 180 tablet 0     medroxyPROGESTERone (DEPO-PROVERA) 150 MG/ML IM injection Inject 150 mg into the muscle       montelukast (SINGULAIR) 10 MG tablet TAKE ONE TABLET BY MOUTH AT BEDTIME 30 tablet 11     Multiple Vitamin (MULTIVITAMIN OR) Take 1 tablet by mouth daily.       naltrexone (DEPADE/REVIA) 50 MG tablet Take 1 tablet.  Time it one to two hours prior to worst cravings. 90 tablet 4     omeprazole (PRILOSEC) 40 MG DR capsule Take 1 capsule (40 mg) by mouth 2 times daily 60 capsule 11     phytonadione (MEPHYTON) 5 MG tablet TAKE 1 TABLET BY MOUTH ONCE A WEEK 4 tablet 11     polyethylene glycol (MIRALAX) powder Take 17 g (1 capful) by mouth daily as needed for constipation 119 g 3     topiramate (TOPAMAX) 100 MG tablet Take 1 tablet (100 mg) by mouth daily 90 tablet 4     traZODone (DESYREL) 100 MG tablet Take 1 tablet (100 mg) by mouth At Bedtime 30 tablet 5     vitamin C (ASCORBIC ACID) 500 MG tablet TAKE ONE TABLET BY MOUTH TWICE A  tablet 3     VITAMIN D3 25 MCG (1000 UT) tablet TAKE ONE TABLET BY MOUTH EVERY  tablet 3     vitamin E (TOCOPHEROL) 400 units (180 mg) capsule TAKE ONE CAPSULE BY MOUTH TWICE A  capsule 11             Review of Systems:     Comprehensive ROS negative other than the symptoms noted above in the HPI.          Physical Exam:   General: Alert and oriented        Laboratory results:     TSH   Date Value Ref Range Status   06/08/2021 0.98 0.40 - 4.00 mU/L Final     Testosterone Total   Date Value Ref Range Status   06/08/2021 26 8 - 60 ng/dL Final     Comment:     This test was developed and its performance characteristics determined by [facility]   [facility].   It has not been cleared or approved by the FDA. The laboratory is regulated   under CLIA as qualified to perform high-complexity testing. This test is used   for clinical purposes. It should not be regarded as investigational or for   research.       Cholesterol   Date Value Ref Range Status   06/08/2021 138 <200 mg/dL Final     Albumin Urine mg/L   Date Value Ref Range Status   06/08/2021 7 mg/L Final     Triglycerides   Date Value Ref Range Status   06/08/2021 120 <150 mg/dL Final     HDL Cholesterol   Date Value Ref Range Status   06/08/2021 52 >49 mg/dL Final     LDL Cholesterol Calculated   Date Value Ref Range Status   06/08/2021 62 <100 mg/dL Final     Comment:     Desirable:       <100 mg/dl     Cholesterol/HDL Ratio   Date Value Ref Range Status   03/12/2014 3.0 0.0 - 5.0 Final     Non HDL Cholesterol   Date Value Ref Range Status   06/08/2021 86 <130 mg/dL Final           CF  Diabetes Health Maintenance    Date of Diabetes Diagnosis: on OGTT ~ 2014    Special Notes (if any): followed at the weight management clinic    Date Last Eye Exam:      Date Last Dental Appointment:     Dates of Episodes Severe* Hypoglycemia (month/year, cumulative, ongoing, assess each visit):    *Severe=patient unconscious, seizure, unable to help self    Last 25-Vitamin D (every year): 38 (2016)    Last DXA, lowest Z-score (every 2 years): Z-score of -1.3 (6/2021)       ?Bisphosphonates (yes/no):     Last Urine Microalbumin (every year): WNL (6/2021)     No results found for: MICROALBUMIN    Last Testosterone:   Testosterone Total   Date Value Ref Range Status   06/08/2021 26 8 - 60 ng/dL Final     Comment:     This test was developed and its performance characteristics determined by the   [facility].   It has not been cleared or approved by the FDA. The laboratory is regulated   under CLIA as qualified to perform high-complexity  testing. This test is used   for clinical purposes. It should not be regarded as investigational or for   research.        On testosterone therapy (yes/no)?     Date of Last Diabetes Visit:         Assessment and Plan:   Mamie is a 26 year old female with early CFRD, reactive hypoglycemia and obesity.      Assessment/Plan:  #Cystic fibrosis related diabetes  #reactive hypoglycemia  She has been off insulin for several years due to concern about hypoglycemia. Continues to have hypoglycemic, although unclear trend. Reporting night time and day time lows, symptomatic but unconfirmed. She will need benefit from using CGM along with keep simultaneous food diary. will discuss with diabetes education regarding CGM, consider use of sample from the company if her insurance does not cover cost.  hypoglycemia likely secondary to CF related reactive hypoglycemia, but will recheck AM cortisol to assess other causes of hypoglycemia. Ultimately CGM is most helpful for trend, then treatment with diet modification.  - AM cortisol, nonurgently  - CGM, discuss with diabetes education  Follow up with dietician and diabetes education     Patient seen and discussed with Dr. Prieto.    Chilango Sierra MD  Internal Medicine PGY-3     I have seen and examined the patient, reviewed and edited the fellow's note, and agree with the plan of care.  LINDA Cortés    Time: I spent 30 minutes on the date of the encounter preparing to see patient (including chart review and preparation), obtaining and or reviewing additional medical history, performing an evaluation, documenting clinical information in the electronic health record, independently interpreting results, communicating results to the patient, and/or coordinating care.    Video-Visit Details  Type of service:  Video visit  Total video visit time 20 minutes  Originating Location (pt. Location):  Work  Distant Location (provider location):  [facility]   Platform used for Video Visit: [employer]

## 2021-11-09 NOTE — PROGRESS NOTES
CF hyperglycemia  Jj Allen CMA    Outcome for 02/01/21 11:39 AM :Patient stated they are not currently checking their glucose     Mamie is a 25 year old who is being evaluated via a billable video visit.      How would you like to obtain your AVS? Juanhart  If the video visit is dropped, the invitation should be resent by: Text to cell phone: [phone]  Will anyone else be joining your video visit? No         CF Endocrinology Return Consultation:  Diabetes  :   Patient: Mamie Rice MRN# [id number]   YOB: 1995 Age: 26 year old   Date of Visit: 11/09/2021     Dear Dr. Allison:    I had the pleasure of seeing your patient, Mamie Rice in the [facility] Endocrinology Clinic, [facility], for a return consultation regarding CFRD and reactive hypoglycemia.           Problem list:     Patient Active Problem List    Diagnosis Date Noted     Knee pain 10/16/2019     Priority: Medium     Added automatically from request for surgery [id number]       Mild episode of recurrent major depressive disorder (H) 05/15/2018     Priority: Medium     Insomnia, unspecified type 05/15/2018     Priority: Medium     MECHE (generalized anxiety disorder) 05/15/2018     Priority: Medium     Attention deficit hyperactivity disorder (ADHD), combined type 05/15/2018     Priority: Medium     Diabetes mellitus, type 2 (H) 12/07/2016     Priority: Medium     Overview:   Diabetes Mellitus Type 2  Per External Records       Acne vulgaris 10/19/2016     Priority: Medium     Non morbid obesity due to excess calories 08/24/2016     Priority: Medium     Persistent depressive disorder 02/29/2016     Priority: Medium     Overview:   Disorder Depressive Persistent (Formerly Dysthymia) NOS       Pancreatic insufficiency 11/13/2014     Priority: Medium     Fecal elastase < 50       Back pain 10/22/2014     Priority: Medium     Overactive child 06/13/2013     Priority: Medium     Overview:   ADHD  6/13/13  Well-managed with  Addmarniel.  HENRIETTA NICO NAVJOT         Frontal sinusitis 05/10/2012     Priority: Medium     Chronic constipation 03/04/2012     Priority: Medium     Cystic fibrosis of the lung (H) 12/19/2011     Priority: Medium     SWEAT TEST:  Date: 2/17/1994          Laboratory: [facility]  Sample #1  296 mg           104 mmol/L Cl  Sample #2  295 mg           104 mmol/L Cl     GENOTYPING:  Date: 10/15/2007               Laboratory: [facility]  Genotype: [id number]  Poly T Variant:  [  ]/[  ]         Type 1 diabetes mellitus (H) 11/09/2011     Priority: Medium     Problem list name updated by automated process. Provider to review       Hypoglycemia 10/28/2011     Priority: Medium     Reactive hypoglycemia  updating diagnosis code for icd10 cutover       Chronic maxillary sinusitis 09/08/2011     Priority: Medium     Aspergillosis (H) 07/21/2011     Priority: Medium     Hip joint pain 04/11/2011     Priority: Medium     Cystic fibrosis (H) 02/27/2011     Priority: Medium            HPI:   Mamie is a 26 year old female with Cystic Fibrosis Related Diabetes Mellitus (CFRD).    26 year old female with history of cystic fibrosis related diabetes and reactive hypoglycemia.    She continues to have low blood sugars. She is averaging 1-2 low episodes that wake her at night, per week. Typically last meal around 730 PM, bed time 930 PM. Lows around 12-2 AM. She does not check her glucose levels when this occurs, but when she has she had measured levels in the 50s. Attempted food log at night, no clear trend of food leading to lows. Will also fall low at night with no evening snack. Occasionally low during the day, including morning glucose levels.     Attempted prior cgm, but was told insurance does not cover it.     A1c: 5.1% (6/2021)    Current insulin regimen: None          Past Medical History:     Past Medical History:   Diagnosis Date     ADHD (attention deficit hyperactivity disorder)      Anxiety      Aspergillosis, with pneumonia (H)      fugus found caused chest pain     Chronic infection     CF, MRSA.,      Chronic sinusitis      Constipation, chronic      Cystic fibrosis with pulmonary manifestations (H) 12/19/2011     Cystic fibrosis without mention of meconium ileus     SWEAT TEST:Date: 2/17/1994   [facility]  296 mg, 104 mmol/L ClSample #2  295 mg, 104 mmol/L Cl GENOTYPING:Date: 10/15/2007,  Laboratory: [facility]: [id number]     Depressive disorder      Diabetes     no meds currently     Dysthymic disorder      Exocrine pancreatic insufficiency      Gastro-oesophageal reflux disease      Hip pain, right      MRSA (methicillin resistant Staphylococcus aureus) carrier      Pancreatic disease             Past Surgical History:     Past Surgical History:   Procedure Laterality Date     ARTHROSCOPY HIP, OSTEOPLASTY FEMUR PROXIMAL, COMBINED  3/11/2013    Procedure: COMBINED ARTHROSCOPY HIP, OSTEOPLASTY FEMUR PROXIMAL;  Right Hip Arthroscopy, Labral  Debridement.    surgeon request choice anesthesia/admit to [facility] after surgery;  Surgeon: Omkar Austin MD;  [facility]     ARTHROSCOPY KNEE WITH MEDIAL MENISCECTOMY Left 1/31/2017    Procedure: ARTHROSCOPY KNEE WITH MEDIAL MENISCECTOMY;  Surgeon: Jethro Coyle MD;  [facility]     bronchoscopies       BRONCHOSCOPY       EXAM UNDER ANESTHESIA ANUS N/A 5/10/2016    Procedure: EXAM UNDER ANESTHESIA ANUS;  Surgeon: Chet Gaviria MD;  [facility]     EXAM UNDER ANESTHESIA, RESTORATIONS, EXTRACTION(S) DENTAL COMPLEX, COMBINED  5/13/2013    Procedure: COMBINED EXAM UNDER ANESTHESIA, RESTORATIONS, EXTRACTION(S) DENTAL COMPLEX;  Dental Exam, Radiographs, Restorations. Single Extraction  Tooth #2. Restorations x 3;  Surgeon: Danilo Ortiz DDS;  [facility]     HC KNEE SCOPE, DIAGNOSTIC      Arthroscopy, Knee- left     INJECT BOTOX N/A 5/10/2016    Procedure: INJECT BOTOX;  Surgeon: Chet Gaviria MD;  [facility]      left hip labral tear  5/11/2011    left hip arthroscopy with labral debridement and synovectomy     meniscus repair       OPTICAL TRACKING SYSTEM ENDOSCOPIC SINUS SURGERY  10/14/2011    Procedure:OPTICAL TRACKING SYSTEM ENDOSCOPIC SINUS SURGERY; FESS (functional endoscopic sinus surgery) with Image Guidance, bronchial lavage and cultures; Surgeon:GOYO KUO; [facility]     OPTICAL TRACKING SYSTEM ENDOSCOPIC SINUS SURGERY  5/18/2012    Procedure:OPTICAL TRACKING SYSTEM ENDOSCOPIC SINUS SURGERY; Right  and Left Image Guided Functional Endoscopic Sinus Surgery With  Frontal Approach, [facility]; Surgeon:GOYO KUO; [facility]     OPTICAL TRACKING SYSTEM ENDOSCOPIC SINUS SURGERY  9/26/2012    Procedure: OPTICAL TRACKING SYSTEM ENDOSCOPIC SINUS SURGERY;  Stealth Guided Bilateral Functional Endoscopic Sinus Surgery *Latex Safe*;  Surgeon: Goyo Kuo MD;  [facility]     OPTICAL TRACKING SYSTEM ENDOSCOPIC SINUS SURGERY Bilateral 10/16/2015    Procedure: OPTICAL TRACKING SYSTEM ENDOSCOPIC SINUS SURGERY;  Surgeon: Mariela Haile MD;  [facility]     ORTHOPEDIC SURGERY      left hip tear repair 2010     SINUS SURGERY                 Social History:     Social History     Social History Narrative    Mamie lives with mother in [place], [place].  There is a cat in the home, but Mamie does not have any litterbox duties.  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  She gets essentially no exercise because of the tingling in her feet (says it bothers her even to stand).        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        8/2015 no change in social situation        2/15/2016 Pt is single and lives with mother and stepfather.              Family History:     Family History   Problem Relation Age of Onset     Cancer Paternal Grandmother      Skin Cancer Paternal Grandmother      Other Cancer Paternal Grandmother         Skin      Obesity Paternal Grandmother      Cancer Paternal Grandfather         PGF had throat cancer (he was a smoker)     Other Cancer Paternal Grandfather      Anxiety Disorder Paternal Grandfather      Thyroid Disease Mother         ,     Hypertension Mother      Obesity Other      ALS Father 67        2 male cousins with ALS as well     Anesthesia Reaction No family hx of      Blood Disease No family hx of      Colon Polyps No family hx of      Crohn's Disease No family hx of      Ulcerative Colitis No family hx of      Colon Cancer No family hx of      Melanoma No family hx of             Allergies:     Allergies   Allergen Reactions     Vancomycin Hives     Redmens skin rash   Redmens skin rash      Augmentin Rash             Medications:     Current Outpatient Rx   Medication Sig Dispense Refill     acetylcysteine (MUCOMYST) 20 % neb solution INHALE 4ML VIA NEBULIZER TWO TIMES A DAY. MAY INCREASE TO 3-4 TIMES A DAY WITH INCREASED COUGH / COLD SYMPTOMS. REFRIGERATE VIAL AND DISCARD 96 HOURS AFTER OPENING 360 mL 11     albuterol (PROAIR HFA/PROVENTIL HFA/VENTOLIN HFA) 108 (90 Base) MCG/ACT Inhaler Inhale 2 puffs into the lungs every 6 hours as needed for shortness of breath / dyspnea or wheezing 1 Inhaler 12     albuterol (PROVENTIL) (2.5 MG/3ML) 0.083% neb solution INHALE 1 VIAL ( 2.5MG) BY NEBULIZATION EVERY 4 HOURS AS NEEDED FOR FOR SHORTNESS OF BREATH OR WHEEZING 360 mL 11     amphetamine-dextroamphetamine (ADDERALL XR) 20 MG 24 hr capsule Take 1 capsule (20 mg) by mouth daily 30 capsule 0     azithromycin (ZITHROMAX) 500 MG tablet TAKE 1 TABLET BY MOUTH ON MONDAYS, WEDNESDAYS AND FRIDAYS AS DIRECTED 36 tablet 4     beta carotene 96768 UNIT capsule TAKE 1 CAPSULE BY MOUTH IN THE MORNING ON MONDAY, WEDNESDAY AND FRIDAY 36 capsule 4     blood glucose (ACCU-CHEK SMARTVIEW) test strip Use to test blood sugar 4 times daily or as directed. 400 each 3     buPROPion (WELLBUTRIN XL) 300 MG 24 hr tablet Take 1 tablet (300  mg) by mouth every morning 90 tablet 1     busPIRone (BUSPAR) 15 MG tablet Take 1 tablet (15 mg) by mouth 2 times daily 60 tablet 5     cetirizine (ZYRTEC) 10 MG tablet Take 1 tablet (10 mg) by mouth every evening 30 tablet 5     elexacaftor-tezacaftor-ivacaftor & ivacaftor (TRIKAFTA) 100-50-75 & 150 MG tablet pack TAKE TWO ORANGE TABLETS BY MOUTH IN THE MORNING AND ONE BLUE TABLET IN THE EVENING AS DIRECTED ON PACKAGE.  TAKE WITH FAT CONTAINING FOOD. 84 tablet 5     FLUoxetine (PROZAC) 40 MG capsule Take 2 capsules (80 mg) by mouth daily 60 capsule 5     hydrOXYzine (ATARAX) 25 MG tablet Take 1-2 tablets (25-50 mg) by mouth every 6 hours as needed for anxiety (sleep) 180 tablet 0     medroxyPROGESTERone (DEPO-PROVERA) 150 MG/ML IM injection Inject 150 mg into the muscle       montelukast (SINGULAIR) 10 MG tablet TAKE ONE TABLET BY MOUTH AT BEDTIME 30 tablet 11     Multiple Vitamin (MULTIVITAMIN OR) Take 1 tablet by mouth daily.       naltrexone (DEPADE/REVIA) 50 MG tablet Take 1 tablet.  Time it one to two hours prior to worst cravings. 90 tablet 4     omeprazole (PRILOSEC) 40 MG DR capsule Take 1 capsule (40 mg) by mouth 2 times daily 60 capsule 11     phytonadione (MEPHYTON) 5 MG tablet TAKE 1 TABLET BY MOUTH ONCE A WEEK 4 tablet 11     polyethylene glycol (MIRALAX) powder Take 17 g (1 capful) by mouth daily as needed for constipation 119 g 3     topiramate (TOPAMAX) 100 MG tablet Take 1 tablet (100 mg) by mouth daily 90 tablet 4     traZODone (DESYREL) 100 MG tablet Take 1 tablet (100 mg) by mouth At Bedtime 30 tablet 5     vitamin C (ASCORBIC ACID) 500 MG tablet TAKE ONE TABLET BY MOUTH TWICE A  tablet 3     VITAMIN D3 25 MCG (1000 UT) tablet TAKE ONE TABLET BY MOUTH EVERY  tablet 3     vitamin E (TOCOPHEROL) 400 units (180 mg) capsule TAKE ONE CAPSULE BY MOUTH TWICE A  capsule 11             Review of Systems:     Comprehensive ROS negative other than the symptoms noted above in the HPI.           Physical Exam:   General: Alert and oriented        Laboratory results:     TSH   Date Value Ref Range Status   06/08/2021 0.98 0.40 - 4.00 mU/L Final     Testosterone Total   Date Value Ref Range Status   06/08/2021 26 8 - 60 ng/dL Final     Comment:     This test was developed and its performance characteristics determined by the   [facility], [facility].   It has not been cleared or approved by the FDA. The laboratory is regulated   under CLIA as qualified to perform high-complexity testing. This test is used   for clinical purposes. It should not be regarded as investigational or for   research.       Cholesterol   Date Value Ref Range Status   06/08/2021 138 <200 mg/dL Final     Albumin Urine mg/L   Date Value Ref Range Status   06/08/2021 7 mg/L Final     Triglycerides   Date Value Ref Range Status   06/08/2021 120 <150 mg/dL Final     HDL Cholesterol   Date Value Ref Range Status   06/08/2021 52 >49 mg/dL Final     LDL Cholesterol Calculated   Date Value Ref Range Status   06/08/2021 62 <100 mg/dL Final     Comment:     Desirable:       <100 mg/dl     Cholesterol/HDL Ratio   Date Value Ref Range Status   03/12/2014 3.0 0.0 - 5.0 Final     Non HDL Cholesterol   Date Value Ref Range Status   06/08/2021 86 <130 mg/dL Final           CF  Diabetes Health Maintenance    Date of Diabetes Diagnosis: on OGTT ~ 2014    Special Notes (if any): followed at the weight management clinic    Date Last Eye Exam:      Date Last Dental Appointment:     Dates of Episodes Severe* Hypoglycemia (month/year, cumulative, ongoing, assess each visit):    *Severe=patient unconscious, seizure, unable to help self    Last 25-Vitamin D (every year): 38 (2016)    Last DXA, lowest Z-score (every 2 years): Z-score of -1.3 (6/2021)       ?Bisphosphonates (yes/no):     Last Urine Microalbumin (every year): WNL (6/2021)     No results found for: MICROALBUMIN    Last Testosterone:    Testosterone Total   Date Value Ref Range Status   06/08/2021 26 8 - 60 ng/dL Final     Comment:     This test was developed and its performance characteristics determined by the   [facility].   It has not been cleared or approved by the FDA. The laboratory is regulated   under CLIA as qualified to perform high-complexity testing. This test is used   for clinical purposes. It should not be regarded as investigational or for   research.        On testosterone therapy (yes/no)?     Date of Last Diabetes Visit:         Assessment and Plan:   Mamie is a 26 year old female with early CFRD, reactive hypoglycemia and obesity.      Assessment/Plan:  #Cystic fibrosis related diabetes  #reactive hypoglycemia  She has been off insulin for several years due to concern about hypoglycemia. Continues to have hypoglycemic, although unclear trend. Reporting night time and day time lows, symptomatic but unconfirmed. She will need benefit from using CGM along with keep simultaneous food diary. will discuss with diabetes education regarding CGM, consider use of sample from the company if her insurance does not cover cost.  hypoglycemia likely secondary to CF related reactive hypoglycemia, but will recheck AM cortisol to assess other causes of hypoglycemia. Ultimately CGM is most helpful for trend, then treatment with diet modification.  - AM cortisol, nonurgently  - CGM, discuss with diabetes education  Follow up with dietician and diabetes education     Patient seen and discussed with Dr. Prieto.    Chilango Sierra MD  Internal Medicine PGY-3     I have seen and examined the patient, reviewed and edited the fellow's note, and agree with the plan of care.  LINDA Cortés    Time: I spent 30 minutes on the date of the encounter preparing to see patient (including chart review and preparation), obtaining and or reviewing additional medical history, performing an  evaluation, documenting clinical information in the electronic health record, independently interpreting results, communicating results to the patient, and/or coordinating care.    Video-Visit Details    Type of service:  Video visit  Total video visit time 20 minutes    Originating Location (pt. Location):  Work    Distant Location (provider location):  [facility]     Platform used for Video Visit: [employer]

## 2021-11-09 NOTE — PATIENT INSTRUCTIONS
-Continue on current medication regimen.    -Complete EKG prior to next visit.    -Follow up in 6 months.      **For crisis resources, please see the information at the end of this document**     Patient Education      Thank you for coming to the [facility] [place] [facility].    Lab Testing:  If you had lab testing today and your results are reassuring or normal they will be mailed to you or sent through "[employer] within 7 days. If the lab tests need quick action we will call you with the results. The phone number we will call with results is # [phone] (home) . If this is not the best number please call our clinic and change the number.    Medication Refills:  If you need any refills please call your pharmacy and they will contact us. Our fax number for refills is [phone]. Please allow three business for refill processing. If you need to  your refill at a new pharmacy, please contact the new pharmacy directly. The new pharmacy will help you get your medications transferred.     Scheduling:  If you have any concerns about today's visit or wish to schedule another appointment please call our office during normal business hours [phone] (8-5:00 M-F)    Contact Us:  Please call [phone] during business hours (8-5:00 M-F).  If after clinic hours, or on the weekend, please call  [phone].    Financial Assistance [phone]  [facility] Billing [phone]  Central Billing Office, [facility]: [phone]  [facility] Billing [phone]  Medical Records [phone]  [facility] Patient Bill of Rights [email or web address]       MENTAL HEALTH CRISIS NUMBERS:  For a medical emergency please call  911 or go to the nearest ER.     [facility]:   [facility] -[phone]   Crisis Residence [facility] -[phone]   Walk-In Counseling Center [facility] -[phone]   COPE 24/7  [facility] Mobile Team -[phone] (adults)/[phone] (child)  CHILD: [facility] needs assessment team - [phone]      [facility] - [phone]   Walk-in counseling [facility] - [phone]   Walk-in counseling [facility] - [phone]   [facility]idence [facility] - [phone]  Urgent Care Adult Mental [facility]-[phone] mobile unit/ 24/7 crisis line    National Crisis Numbers:   National Suicide Prevention Lifeline: 5-986-225-TALK (556-479-0237)  Poison Control Center - 1-576.543.7947  [employer]/resources for a list of additional resources (SOS)  Trans Lifeline a hotline for transgender people 8-578-593-7491  The [employer] a hotline for LGBT youth 1-978.797.6997  Crisis Text Line: For any crisis 24/7   To: 889974  see www.crisistextline.org  - IF MAKING A CALL FEELS TOO HARD, send a text!         Again thank you for choosing [facility] [place] [facility] and please let us know how we can best partner with you to improve you and your family's health.    You may be receiving a survey regarding this appointment. We would love to have your feedback, both positive and negative. The survey is done by an external company, so your answers are anonymous.

## 2021-11-09 NOTE — PROGRESS NOTES
Start Time:  1430        End Time: 1442    Telemedicine Visit: The patient's condition can be safely assessed and treated via synchronous audio and visual telemedicine encounter.      Reason for Telemedicine Visit: Due to COVID 19 pandemic, clinic switching all appointments to telemedicine     Originating Site (Patient Location): in the parked car.    Distant Site (Provider Location): Provider Remote Setting    Consent:  The patient/guardian has verbally consented to: the potential risks and benefits of telemedicine (video visit) versus in person care; bill my insurance or make self-payment for services provided; and responsibility for payment of non-covered services.     Mode of Communication:  Video Conference via [email or web address]    As the provider I attest to compliance with applicable laws and regulations related to telemedicine.    Psychiatry Clinic Progress Note                                                                  Patient Name: Mamie Rice  YOB: 1995  MRN: [id number]  Date of Service:  11/09/2021  Last Seen:6/10/2021    Mamie Rice is a 26 year old person assigned female at birth, identifies as cisgender female who uses the name Mamie and pronoun kenneth.     Mamie Rice is a 26 year old year old adult who presents for ongoing psychiatric care.  Mamie Rice was last seen on 6/10/2021.     At that time,     Medication Ordered/Consults/Labs/tests Ordered:     Medication: Continue on current medication regimen.  May take Hydroxyzine up to 50 mg 4 times a day as needed for anxiety.  OTC Recommendations: none  Lab Orders:  EKG if continues on hydroxyzine  Referrals: none  Release of Information: none  Future Treatment Considerations: Per symptoms.   Return for Follow Up: in 1 month    Pertinent Background:  Diagnoses include MDD, MECHE and ADHD.  Psych critical item history includes [no critical items]. Medical complication includes Cystic Fibrosis, DM I     Previous medication trial:  Phentamine, Trazodone (150 mg oversedating, 50 mg ineffective)     Naltrexone, Wellbutrin and Topamax are managed by weight management clinic.    Interim History                                                                                                        4, 4     On 11/4/2021, pt was no show.    Since the last visit,  -Has been doing well,  Mood and anxiety are well managed, denies SI, SIB or HI.  -Continues to take Hydroxyzine 25 mg BID.  -Wants to continue on current medication regimen as she has been doing well.  -Sleeping well.    Denies any symptoms suggestive of hypomania or psychosis.    Current Suicidality/Hx of Suicide Attempts: Denies both  CoCominent Medical concerns: Denies    Medication Side Effects: The patient denies all medication side effects.      Medical Review of Systems     Apart from the symptoms mentioned int he HPI, the 14 point review of systems, including constitutional, HEENT, cardiovascular, respiratory, gastrointestinal, genitourinary, musculoskeletal, integumentary, endocrine, neurological, hematologic and allergic is entirely negative.    Pregnant: None. Nursing: None, Contraception: DMPA    Substance Use   Denies frequent or abuse of alcohol. Denies any other substance use.     Social/ Family History                                  [per patient report]                                 1ea,1ea   Living arrangements: lives with parents and feels safe  Social Support:parents, friends, maternal grandparents  Access to gun: denies  Denies any trauma hx, numerous hospitalizations during childhood, does not consider this as traumatic  Works as a day care provider, dance coach and substitute .      Allergy                                Vancomycin and Augmentin    Current Medications                                                                                                       Current Outpatient Medications   Medication Sig Dispense Refill     acetylcysteine  (MUCOMYST) 20 % neb solution INHALE 4ML VIA NEBULIZER TWO TIMES A DAY. MAY INCREASE TO 3-4 TIMES A DAY WITH INCREASED COUGH / COLD SYMPTOMS. REFRIGERATE VIAL AND DISCARD 96 HOURS AFTER OPENING 360 mL 11     albuterol (PROAIR HFA/PROVENTIL HFA/VENTOLIN HFA) 108 (90 Base) MCG/ACT Inhaler Inhale 2 puffs into the lungs every 6 hours as needed for shortness of breath / dyspnea or wheezing 1 Inhaler 12     albuterol (PROVENTIL) (2.5 MG/3ML) 0.083% neb solution INHALE 1 VIAL ( 2.5MG) BY NEBULIZATION EVERY 4 HOURS AS NEEDED FOR FOR SHORTNESS OF BREATH OR WHEEZING 360 mL 11     amphetamine-dextroamphetamine (ADDERALL XR) 20 MG 24 hr capsule Take 1 capsule (20 mg) by mouth daily 30 capsule 0     azithromycin (ZITHROMAX) 500 MG tablet TAKE 1 TABLET BY MOUTH ON MONDAYS, WEDNESDAYS AND FRIDAYS AS DIRECTED 36 tablet 4     beta carotene 73893 UNIT capsule TAKE 1 CAPSULE BY MOUTH IN THE MORNING ON MONDAY, WEDNESDAY AND FRIDAY 36 capsule 4     blood glucose (ACCU-CHEK SMARTVIEW) test strip Use to test blood sugar 4 times daily or as directed. 400 each 3     buPROPion (WELLBUTRIN XL) 300 MG 24 hr tablet Take 1 tablet (300 mg) by mouth every morning 90 tablet 1     busPIRone (BUSPAR) 15 MG tablet Take 1 tablet (15 mg) by mouth 2 times daily 60 tablet 5     cetirizine (ZYRTEC) 10 MG tablet Take 1 tablet (10 mg) by mouth every evening 30 tablet 5     elexacaftor-tezacaftor-ivacaftor & ivacaftor (TRIKAFTA) 100-50-75 & 150 MG tablet pack TAKE TWO ORANGE TABLETS BY MOUTH IN THE MORNING AND ONE BLUE TABLET IN THE EVENING AS DIRECTED ON PACKAGE.  TAKE WITH FAT CONTAINING FOOD. 84 tablet 5     FLUoxetine (PROZAC) 40 MG capsule Take 2 capsules (80 mg) by mouth daily 60 capsule 5     hydrOXYzine (ATARAX) 25 MG tablet Take 1-2 tablets (25-50 mg) by mouth every 6 hours as needed for anxiety (sleep) 180 tablet 0     medroxyPROGESTERone (DEPO-PROVERA) 150 MG/ML IM injection Inject 150 mg into the muscle       montelukast (SINGULAIR) 10 MG tablet  "TAKE ONE TABLET BY MOUTH AT BEDTIME 30 tablet 11     Multiple Vitamin (MULTIVITAMIN OR) Take 1 tablet by mouth daily.       naltrexone (DEPADE/REVIA) 50 MG tablet Take 1 tablet.  Time it one to two hours prior to worst cravings. 90 tablet 4     omeprazole (PRILOSEC) 40 MG DR capsule Take 1 capsule (40 mg) by mouth 2 times daily 60 capsule 11     phytonadione (MEPHYTON) 5 MG tablet TAKE 1 TABLET BY MOUTH ONCE A WEEK 4 tablet 11     polyethylene glycol (MIRALAX) powder Take 17 g (1 capful) by mouth daily as needed for constipation 119 g 3     topiramate (TOPAMAX) 100 MG tablet Take 1 tablet (100 mg) by mouth daily 90 tablet 4     traZODone (DESYREL) 100 MG tablet Take 1 tablet (100 mg) by mouth At Bedtime 30 tablet 5     vitamin C (ASCORBIC ACID) 500 MG tablet TAKE ONE TABLET BY MOUTH TWICE A  tablet 3     VITAMIN D3 25 MCG (1000 UT) tablet TAKE ONE TABLET BY MOUTH EVERY  tablet 3     vitamin E (TOCOPHEROL) 400 units (180 mg) capsule TAKE ONE CAPSULE BY MOUTH TWICE A  capsule 11        Mental Status Exam                                                                                   9, 14 cog        Alertness: alert  and oriented  Appearance:  Casually dressed and Adequately groomed  Behavior/Demeanor: cooperative, pleasant and calm, with fair  eye contact   Speech: regular rate and rhythm  Mood :  \"okay\"  Affect: full range and appropriate; was congruent to mood; was congruent to content  Thought Process (Associations):  Linear and Goal directed  Thought process (Rate):  Normal  Thought content:  no overt psychosis, denies suicidal ideation, intent or thoughts and patient does not appear to be responding to internal stimuli  Perception:  Reports none;  Denies auditory hallucinations and visual hallucinations  Attention/Concentration:  Normal  Memory:  Immediate recall intact and Short-term memory intact  Language: intact  Fund of Knowledge/Intelligence:  Average  Abstraction:  Normal  Insight:  " Fair  Judgment:  Fair  Cognition: (6) does  appear grossly intact; formal cognitive testing was not done    Physical Exam     Motor activity/EPS:  Normal  Psychomotor: normal or unremarkable    Labs and Results      Pertinent findings on review include: Review of records with relevant information reported in the HPI.  Reviewed pt's past medical record and obtained collateral information.      [place] PRESCRIPTION MONITORING PROGRAM [] was checked today: Adderall 10/20,9/22,8/16,7/15.    PHQ9 Today:  N/A  PHQ 3/31/2020 6/11/2021 7/1/2021   PHQ-9 Total Score 7 4 8   Q9: Thoughts of better off dead/self-harm past 2 weeks Not at all Not at all Not at all       MECHE 7 Today: N/A  MECHE-7 SCORE 11/12/2019 6/11/2021 7/1/2021   Total Score 11 12 8       Recent Labs   Lab Test 06/08/21  1015 08/14/19  0814 08/02/17  0654   CR 0.90 0.85 0.77   GFRESTIMATED 87 >90 >90  Non African American GFR Calc       Recent Labs   Lab Test 06/08/21  1015 10/07/20  0000   AST 15 18   ALT 21 17   ALKPHOS 68 70      (12/23/2020),   (08/08/2012)     PSYCHOTROPIC DRUG INTERACTIONS:    Adderall---Buspar---Prozac---Trazodone---Wellbutrin: Concurrent use of AMPHETAMINES and SEROTONERGIC AGENTS may result in increased risk of serotonin syndrome.   Adderall---Prozac---Omeprazole: Concurrent use of AMPHETAMINES and SEROTONERGIC AGENTS THAT INHIBIT CYP2D6 may result in increased amphetamine exposure  Buspar---Trazodone---Vistaril---Zyrtec---Topamax: Concurrent use of TRAZODONE and SEROTONERGIC CENTRAL NERVOUS SYSTEM DEPRESSANTS may result in increased risk of CNS depression.   Prozac---Trazodone: Concurrent use of TRAZODONE and SEROTONERGIC DRUGS THAT PROLONG QT INTERVAL may result in increased risk of QT-interval prolongation.   Prozac---Wellbutrin: Concurrent use of BUPROPION and SELECTED SEIZURE THRESHOLD LOWERING CYP2D6 SUBSTRATES may result in increased exposure of CYP2D6 substrates; increased risk of  seizure.  Glycopyrrolate---Vistaril: Concurrent use of GLYCOPYRROLATE and ANTICHOLINERGICS may result in increased risk of anticholinergic side effects .    Glycopyrrolate---Wellbutrin---Vistaril: Concurrent use of BUPROPION and AGENTS LOWERING SEIZURE THRESHOLD may result in lower seizure threshold  Vistaril---Zithromax---Trazodone: Concurrent use of HYDROXYZINE and QT PROLONGING AGENTS may result in increased risk of QT-interval prolongation.   Buspar---Prozac: Concurrent use of FLUOXETINE and BUSPIRONE may result in worsening of psychiatric symptoms.      MANAGEMENT:  Monitoring for adverse effects, routine vitals, periodic EKGs and patient is aware of risks     Impression/Assessment      Mamie Rice is a 26 year old adult  who presents for med management follow up.  Pt appears stable in her mood and anxiety, denies SI, SIB or HI during the appointment.  Pt continues to take PRN Hydroxyzine 25 mg BID and anxiety are well managed.  Discussed due to medication interactions, recommended EKG as she continues to take Hydroxyzine.  Pt agreed to complete in next few months as she has face to face appt.  OK to continue current medication regimen for now.    Diagnosis                                                                   MDD  MECHE  ADHD    Treatment Recommendation & Plan       Medication Ordered/Consults/Labs/tests Ordered:     Medication: Continue on current medication regimen.   OTC Recommendations: none  Lab Orders:  EKG   Referrals: none  Release of Information: none  Future Treatment Considerations: Per symptoms.   Return for Follow Up: in 6 month    -Discussed safety plan for suicidal thoughts  -Discussed plan for suicidality  -Discussed available emergency services  -Patient agrees with the treatment plan  -Encouraged to continue outpatient therapy to gain more coping mechanism for stress.    Treatment Risk Statement: Discussed with the patient my impressions, as well as recommended studies. I  educated patient on the differential diagnosis and prognosis. I discussed with the patient the risks and benefits of medications versus no interventions, including efficacy, dose, possible side effects and length of treatment and the importance of medication compliance.  The patient understands the risks, benefits, adverse effects and alternatives. Agrees to treatment with the capacity to do so. No medical contraindications to treatment. The patient also understands the risks of using street drugs or alcohol.     CRISIS NUMBERS:   Provided routinely in AVS.      Jessie Pitts CNP, 11/09/2021

## 2021-11-16 ENCOUNTER — TELEPHONE (OUTPATIENT)
Dept: EDUCATION SERVICES | Facility: CLINIC | Age: 26
End: 2021-11-16
Payer: COMMERCIAL

## 2021-11-16 ENCOUNTER — TELEPHONE (OUTPATIENT)
Dept: URGENT CARE | Facility: URGENT CARE | Age: 26
End: 2021-11-16
Payer: COMMERCIAL

## 2021-11-16 DIAGNOSIS — F41.1 GAD (GENERALIZED ANXIETY DISORDER): ICD-10-CM

## 2021-11-16 DIAGNOSIS — F34.1 PERSISTENT DEPRESSIVE DISORDER: ICD-10-CM

## 2021-11-16 DIAGNOSIS — E84.8 DIABETES MELLITUS RELATED TO CF (CYSTIC FIBROSIS) (H): Primary | ICD-10-CM

## 2021-11-16 DIAGNOSIS — F33.0 MILD EPISODE OF RECURRENT MAJOR DEPRESSIVE DISORDER (H): ICD-10-CM

## 2021-11-16 DIAGNOSIS — E08.9 DIABETES MELLITUS RELATED TO CF (CYSTIC FIBROSIS) (H): Primary | ICD-10-CM

## 2021-11-16 DIAGNOSIS — E66.09 CLASS 1 OBESITY DUE TO EXCESS CALORIES WITHOUT SERIOUS COMORBIDITY WITH BODY MASS INDEX (BMI) OF 30.0 TO 30.9 IN ADULT: ICD-10-CM

## 2021-11-16 DIAGNOSIS — E84.9 CF (CYSTIC FIBROSIS) (H): ICD-10-CM

## 2021-11-16 DIAGNOSIS — E66.811 CLASS 1 OBESITY DUE TO EXCESS CALORIES WITHOUT SERIOUS COMORBIDITY WITH BODY MASS INDEX (BMI) OF 30.0 TO 30.9 IN ADULT: ICD-10-CM

## 2021-11-16 RX ORDER — BUPROPION HYDROCHLORIDE 300 MG/1
300 TABLET ORAL EVERY MORNING
Qty: 90 TABLET | Refills: 0 | Status: SHIPPED | OUTPATIENT
Start: 2021-11-16 | End: 2022-02-24

## 2021-11-16 RX ORDER — MONTELUKAST SODIUM 10 MG/1
TABLET ORAL
Qty: 30 TABLET | Refills: 11 | Status: SHIPPED | OUTPATIENT
Start: 2021-11-16 | End: 2023-01-17

## 2021-11-16 RX ORDER — TOPIRAMATE 100 MG/1
100 TABLET, FILM COATED ORAL DAILY
Qty: 90 TABLET | Refills: 0 | Status: SHIPPED | OUTPATIENT
Start: 2021-11-16 | End: 2022-01-14 | Stop reason: DRUGHIGH

## 2021-11-16 RX ORDER — NALTREXONE HYDROCHLORIDE 50 MG/1
TABLET, FILM COATED ORAL
Qty: 90 TABLET | Refills: 0 | Status: SHIPPED | OUTPATIENT
Start: 2021-11-16 | End: 2022-01-14

## 2021-11-16 NOTE — TELEPHONE ENCOUNTER
Centralized Medication Refill Team note:  Per encounter requesting Buspar( Dr Pitts/ PSY)    The pharmacy staff also said they had sent refill requests out for the following medications( not written by Dr Pitts ):    Dr Allison/ Pulmonology: Vitamin E, Montelukast.( message to CF refills sent)     Weight management medication:   Dr Park refills for:  topiramate,   Naltrexone  Dr Ho for bupropion.        naltrexone (DEPADE/REVIA) 50 MG tablet  Take 1 tablet.  Time it one to two hours prior to worst cravings  Last Written Prescription Date:  10/9/20  Last Fill Quantity: 90,   # refills: 4  Labs: AST/ AST 6 months ( done 6/8/2021)   topiramate (TOPAMAX) 100 MG tablet  Take 1 tablet (100 mg) by mouth daily - Oral  Last Written Prescription Date:  10/9/20  Last Fill Quantity: 90,   # refills: 4  Labs: Cr/K/Cl/Anion gap ( done 6/8/2021)   buPROPion (WELLBUTRIN XL) 300 MG 24 hr tablet  Last Written Prescription Date:  4/19/21  Last Fill Quantity: 90,   # refills: 1  Labs AST/ALT/ Cr ( done 6/8/2021)  Last Office Visit : 10/09/20> RTC 3 months  Future Office visit:  1/14/2022     Refilled naltrexone and buproprion to next appt per protocol> next visit is 1/14/22 Dr Park in Weight Management.  Topiramate fails as warning not previously overwritten/ (interaction with Depo provera)

## 2021-11-16 NOTE — TELEPHONE ENCOUNTER
Reason for call:  Medication   If this is a refill request, has the caller requested the refill from the pharmacy already? Yes  Will the patient be using a [facility]? No  Name of the pharmacy and phone number for the current request: [facility], [phone]     Name of the medication requested: Buspar    Other request:     Phone number to reach patient:  Home number on file [phone] (home)    Best Time:      Can we leave a detailed message on this number?  YES    Travel screening: Not Applicable

## 2021-11-16 NOTE — TELEPHONE ENCOUNTER
11/16/2021    Called pharmacy.      They have the order from 11/9/2021.  They are going to fill the script and call the Pt when it is ready for pick.     No further action needed    Iliana Severino RN  Central Triage Red Flags/Med Refills      busPIRone (BUSPAR) 15 MG tablet 60 tablet 5 11/9/2021  No   Sig - Route: Take 1 tablet (15 mg) by mouth 2 times daily - Oral   Sent to pharmacy as: busPIRone HCl 15 MG Oral Tablet (BUSPAR)   Class: E-Prescribe   Order: [id number]   E-Prescribing Status: Receipt confirmed by pharmacy (11/9/2021  2:44 PM CST)       Printout Tracking    External Result Report     Pharmacy    [facility] - [place], [place] - [address] [place] [address]

## 2021-11-18 NOTE — TELEPHONE ENCOUNTER
Phone call to Mamie as follow up to her visit with Dr. Prieto.  We discussed her hypoglycemia and we have tried to get a sensor for her in the past but it was not covered and too expensive to purchase.  At that time we tried DexCom.  Will run a prescription for a Doug 2 to see if that would be covered.      Video visit to get her started if covered. Rhonda Esteves RN,CDE

## 2021-11-22 DIAGNOSIS — E84.9 CF (CYSTIC FIBROSIS) (H): ICD-10-CM

## 2021-11-22 DIAGNOSIS — K86.81 EXOCRINE PANCREATIC INSUFFICIENCY: ICD-10-CM

## 2021-11-22 RX ORDER — VITAMIN B COMPLEX
1 TABLET ORAL DAILY
Qty: 100 TABLET | Refills: 3 | Status: SHIPPED | OUTPATIENT
Start: 2021-11-22 | End: 2023-01-03

## 2021-11-30 DIAGNOSIS — K86.81 EXOCRINE PANCREATIC INSUFFICIENCY: ICD-10-CM

## 2021-11-30 DIAGNOSIS — E84.9 CF (CYSTIC FIBROSIS) (H): Primary | ICD-10-CM

## 2021-11-30 RX ORDER — DEXTROAMPHETAMINE SULFATE, DEXTROAMPHETAMINE SACCHARATE, AMPHETAMINE SULFATE AND AMPHETAMINE ASPARTATE 5; 5; 5; 5 MG/1; MG/1; MG/1; MG/1
CAPSULE, EXTENDED RELEASE ORAL
Qty: 30 CAPSULE | Refills: 0 | OUTPATIENT
Start: 2021-11-30

## 2021-11-30 RX ORDER — VITAMIN E 268 MG
CAPSULE ORAL
Qty: 100 CAPSULE | Refills: 11 | Status: SHIPPED | OUTPATIENT
Start: 2021-11-30 | End: 2023-01-17

## 2021-11-30 NOTE — TELEPHONE ENCOUNTER
Per chart review, three rxs for amphetamine-dextroamphetamine (ADDERALL XR) 20 MG 24 hr capsule were written on 11/9/21 for start dates of: 11/19/21, 12/19/21, and 1/18//22. Refills not needed. Refill request refused.    Spoke with pharmacy. Prescriptions are on file. Medication to be filled today.

## 2021-12-01 DIAGNOSIS — E84.8 DIABETES MELLITUS RELATED TO CF (CYSTIC FIBROSIS) (H): Primary | ICD-10-CM

## 2021-12-01 DIAGNOSIS — E08.9 DIABETES MELLITUS RELATED TO CF (CYSTIC FIBROSIS) (H): Primary | ICD-10-CM

## 2021-12-02 ENCOUNTER — TELEPHONE (OUTPATIENT)
Dept: ENDOCRINOLOGY | Facility: CLINIC | Age: 26
End: 2021-12-02
Payer: COMMERCIAL

## 2021-12-11 DIAGNOSIS — G47.00 INSOMNIA, UNSPECIFIED TYPE: ICD-10-CM

## 2021-12-14 RX ORDER — TRAZODONE HYDROCHLORIDE 100 MG/1
TABLET ORAL
Qty: 30 TABLET | Refills: 5 | OUTPATIENT
Start: 2021-12-14

## 2022-01-01 ENCOUNTER — HEALTH MAINTENANCE LETTER (OUTPATIENT)
Age: 27
End: 2022-01-01

## 2022-01-14 ENCOUNTER — VIRTUAL VISIT (OUTPATIENT)
Dept: ENDOCRINOLOGY | Facility: CLINIC | Age: 27
End: 2022-01-14
Payer: COMMERCIAL

## 2022-01-14 VITALS — WEIGHT: 190 LBS | BODY MASS INDEX: 35.87 KG/M2 | HEIGHT: 61 IN

## 2022-01-14 DIAGNOSIS — E66.09 CLASS 1 OBESITY DUE TO EXCESS CALORIES WITHOUT SERIOUS COMORBIDITY WITH BODY MASS INDEX (BMI) OF 30.0 TO 30.9 IN ADULT: ICD-10-CM

## 2022-01-14 DIAGNOSIS — E66.01 MORBID OBESITY (H): Primary | ICD-10-CM

## 2022-01-14 DIAGNOSIS — E66.811 CLASS 1 OBESITY DUE TO EXCESS CALORIES WITHOUT SERIOUS COMORBIDITY WITH BODY MASS INDEX (BMI) OF 30.0 TO 30.9 IN ADULT: ICD-10-CM

## 2022-01-14 PROCEDURE — 99213 OFFICE O/P EST LOW 20 MIN: CPT | Mod: 95 | Performed by: PEDIATRICS

## 2022-01-14 RX ORDER — TOPIRAMATE 25 MG/1
TABLET, FILM COATED ORAL
Qty: 180 TABLET | Refills: 3 | Status: SHIPPED | OUTPATIENT
Start: 2022-01-14 | End: 2022-12-09

## 2022-01-14 RX ORDER — TOPIRAMATE 50 MG/1
TABLET, FILM COATED ORAL
Qty: 180 TABLET | Refills: 3 | Status: SHIPPED | OUTPATIENT
Start: 2022-01-14 | End: 2022-07-05

## 2022-01-14 RX ORDER — NALTREXONE HYDROCHLORIDE 50 MG/1
TABLET, FILM COATED ORAL
Qty: 90 TABLET | Refills: 3 | Status: SHIPPED | OUTPATIENT
Start: 2022-01-14 | End: 2022-12-09

## 2022-01-14 ASSESSMENT — MIFFLIN-ST. JEOR: SCORE: 1529.21

## 2022-01-14 ASSESSMENT — PAIN SCALES - GENERAL: PAINLEVEL: NO PAIN (0)

## 2022-01-14 NOTE — LETTER
2022       RE: Mamie Rice  [address] [facility] [id number]     Dear Colleague,    Thank you for referring your patient, Mamie Rice, to the [facility] [place] at [facility]. Please see a copy of my visit note below.    Mamie is a 26 year old who is being evaluated via a billable video visit.      How would you like to obtain your AVS? MyChart  If the video visit is dropped, the invitation should be resent by: Text to cell phone: [phone]  Will anyone else be joining your video visit? No    Return Medical Weight Management Note     Mamie Rice  MRN:  [id number]  :  1995  STEPHEN:  2022    Dear Physician No Ref-Primary,    I had the pleasure of seeing your patient Mamie Rice. She is a 26 year old female who I am continuing to see for treatment of obesity related to:       10/9/2016   I have the following health issues associated with obesity: Back Pain   I have the following symptoms associated with obesity: Knee Pain       INTERVAL HISTORY:  I last saw Mamie Rice in clinic over a year ago on 10/9/2020. At that time, continued topiramate 100 mg daily and naltrexone 50 mg daily, in addition to the Adderrall XR 20 mg daily that she has been on for a history of ADHD. She was out of medications for a few weeks, and then restarted after she got refills. She is not sure if these are helping as much anymore in terms of appetite.     She has a history of CF with CFRD, and is followed by endocrinology for this. She does have some issues with both hyperglycemia and hypoglycemia related to this, more often hypoglycemia.     Dietary Recall:  Breakfast: will eat something for breakfast if she wakes up on time  Lunch: quick options including Easy Mac  Dinner: sometimes will have a bowl of cereal; other times will have a home cooked dinner  Snacks: if issues with hypoglycemia, will have fruit snacks; also  "has granola bars  Drinks: she has 2 bottles of Star [place] Frappuccino daily (~200 calories per bottle). She also drinks a fair amount of milk, around 1/2 gallon daily.     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 She is also the  for the [employer] dance team.     CURRENT WEIGHT:   190 lbs 0 oz    Initial weight was 182 pounds on 10/14/2016  Weight at her last appointment on 10/9/2020 was 170 pounds  Weight today is 190 pounds  Weight change since her last appointment on 10/9/2020 is up 20 pounds  Total weight gain is 8 pounds.     Changes and Difficulties 10/7/2020   I have made the following changes to my diet since my last visit: Less milk   With regards to my diet, I am still struggling with: -   I have made the following changes to my activity/exercise since my last visit: Taking more walks   With regards to my activity/exercise, I am still struggling with: Doing other exercise       VITALS:  Ht 1.549 m (5' 1\")   Wt 86.2 kg (190 lb)   BMI 35.90 kg/m       GENERAL: Healthy, alert and no distress  EYES: Eyes grossly normal to inspection.   HENT: Normal cephalic/atraumatic.   RESP: No audible wheeze, cough.  No visible retractions or increased work of breathing.    MS: No gross musculoskeletal defects noted.  Normal range of motion.    SKIN: Visible skin clear.   NEURO: Cranial nerves grossly intact.  Mentation and speech appropriate for age.  PSYCH: Mentation appears normal, affect normal/bright, judgement and insight intact, normal speech and appearance well-groomed.    MEDICATIONS:   Prescribed from the weight management clinic: naltrexone 50 mg daily, topiramate 100 mg nightly  Additional weight altering medications include: bupropion  mg daily, adderrall XR 20 mg daily    Current Outpatient Medications   Medication Sig Dispense Refill     acetylcysteine (MUCOMYST) 20 % neb solution INHALE 4ML VIA NEBULIZER TWO TIMES A " DAY. MAY INCREASE TO 3-4 TIMES A DAY WITH INCREASED COUGH / COLD SYMPTOMS. REFRIGERATE VIAL AND DISCARD 96 HOURS AFTER OPENING 360 mL 11     albuterol (PROAIR HFA/PROVENTIL HFA/VENTOLIN HFA) 108 (90 Base) MCG/ACT Inhaler Inhale 2 puffs into the lungs every 6 hours as needed for shortness of breath / dyspnea or wheezing 1 Inhaler 12     albuterol (PROVENTIL) (2.5 MG/3ML) 0.083% neb solution INHALE 1 VIAL ( 2.5MG) BY NEBULIZATION EVERY 4 HOURS AS NEEDED FOR FOR SHORTNESS OF BREATH OR WHEEZING 360 mL 11     amphetamine-dextroamphetamine (ADDERALL XR) 20 MG 24 hr capsule Take 1 capsule (20 mg) by mouth daily 30 capsule 0     [START ON 1/18/2022] amphetamine-dextroamphetamine (ADDERALL XR) 20 MG 24 hr capsule Take 1 capsule (20 mg) by mouth daily 30 capsule 0     amphetamine-dextroamphetamine (ADDERALL XR) 20 MG 24 hr capsule Take 1 capsule (20 mg) by mouth daily 30 capsule 0     azithromycin (ZITHROMAX) 500 MG tablet TAKE 1 TABLET BY MOUTH ON MONDAYS, WEDNESDAYS AND FRIDAYS AS DIRECTED 36 tablet 4     beta carotene 60639 UNIT capsule TAKE 1 CAPSULE BY MOUTH IN THE MORNING ON MONDAY, WEDNESDAY AND FRIDAY 36 capsule 4     blood glucose (ACCU-CHEK SMARTVIEW) test strip Use to test blood sugar 4 times daily or as directed. 400 each 3     blood glucose (NO BRAND SPECIFIED) test strip Use to test blood sugar 1 times daily or as directed. Precision Kofi Strips to go with Doug 2 reader 50 strip 11     buPROPion (WELLBUTRIN XL) 300 MG 24 hr tablet Take 1 tablet (300 mg) by mouth every morning 90 tablet 0     busPIRone (BUSPAR) 15 MG tablet Take 1 tablet (15 mg) by mouth 2 times daily 60 tablet 5     cetirizine (ZYRTEC) 10 MG tablet Take 1 tablet (10 mg) by mouth every evening 30 tablet 5     Continuous Blood Gluc  (FREESTYLE DOUG 2 READER) KIRSTEN 1 each daily 1 each 1     Continuous Blood Gluc Sensor (FREESTYLE DOUG 2 SENSOR) MISC 1 each every 14 days 2 each 11     elexacaftor-tezacaftor-ivacaftor & ivacaftor (TRIKAFTA)  100-50-75 & 150 MG tablet pack TAKE TWO ORANGE TABLETS BY MOUTH IN THE MORNING AND ONE BLUE TABLET IN THE EVENING AS DIRECTED ON PACKAGE.  TAKE WITH FAT CONTAINING FOOD. 84 tablet 5     FLUoxetine (PROZAC) 40 MG capsule Take 2 capsules (80 mg) by mouth daily 60 capsule 5     hydrOXYzine (ATARAX) 25 MG tablet Take 1-2 tablets (25-50 mg) by mouth every 6 hours as needed for anxiety (sleep) 180 tablet 1     medroxyPROGESTERone (DEPO-PROVERA) 150 MG/ML IM injection Inject 150 mg into the muscle       montelukast (SINGULAIR) 10 MG tablet TAKE ONE TABLET BY MOUTH AT BEDTIME 30 tablet 11     Multiple Vitamin (MULTIVITAMIN OR) Take 1 tablet by mouth daily.       naltrexone (DEPADE/REVIA) 50 MG tablet Take 1 tablet.  Time it one to two hours prior to worst cravings. 90 tablet 0     omeprazole (PRILOSEC) 40 MG DR capsule Take 1 capsule (40 mg) by mouth 2 times daily 60 capsule 11     phytonadione (MEPHYTON) 5 MG tablet TAKE 1 TABLET BY MOUTH ONCE A WEEK 4 tablet 11     polyethylene glycol (MIRALAX) powder Take 17 g (1 capful) by mouth daily as needed for constipation 119 g 3     topiramate (TOPAMAX) 100 MG tablet Take 1 tablet (100 mg) by mouth daily 90 tablet 0     traZODone (DESYREL) 100 MG tablet Take 1 tablet (100 mg) by mouth At Bedtime 30 tablet 5     vitamin C (ASCORBIC ACID) 500 MG tablet TAKE ONE TABLET BY MOUTH TWICE A  tablet 3     Vitamin D3 (VITAMIN D3) 25 mcg (1000 units) tablet Take 1 tablet (25 mcg) by mouth daily 100 tablet 3     vitamin E (TOCOPHEROL) 400 units (180 mg) capsule TAKE ONE CAPSULE BY MOUTH TWICE A  capsule 11     VITAMIN E 180 MG (400 UNIT) capsule TAKE ONE CAPSULE BY MOUTH TWICE A  capsule 11     LABS:   Component      Latest Ref Rng & Units 6/8/2021   Sodium      133 - 144 mmol/L 142   Potassium      3.4 - 5.3 mmol/L 3.6   Chloride      94 - 109 mmol/L 110 (H)   Carbon Dioxide      20 - 32 mmol/L 27   Anion Gap      3 - 14 mmol/L 4   Glucose      70 - 99 mg/dL 79   Urea  Nitrogen      7 - 30 mg/dL 8   Creatinine      0.52 - 1.04 mg/dL 0.90   GFR Estimate      >60 mL/min/1.73:m2 87   GFR Estimate If Black      >60 mL/min/1.73:m2 >90   Calcium      8.5 - 10.1 mg/dL 8.5   Cholesterol      <200 mg/dL 138   Triglycerides      <150 mg/dL 120   HDL Cholesterol      >49 mg/dL 52   LDL Cholesterol Calculated      <100 mg/dL 62   Non HDL Cholesterol      <130 mg/dL 86   Hemoglobin A1C POCT      0 - 5.6 % 5.1       Weight Loss Medication History Reviewed With Patient 1/11/2022   Which weight loss medications are you currently taking on a regular basis?  Phentermine   If you are not taking a weight loss medication that was prescribed to you, please indicate why: -   Are you having any side effects from the weight loss medication that we have prescribed you? No     ASSESSMENT:   Mamie Rice is a 26 year old female with a previous history of class 1 (current class 2 based upon BMI from today) obesity. She also has a history of CF, CFRD, ADHD, and depression. Currently on topiramate 100 mg daily and naltrexone 50 mg daily (prescribed through the weight management clinic), as well as bupropion  and  Adderall XR 20 mg daily (for history of ADHD). It appears that the response to topiramate and/or naltrexone has waned to an extent over time. Therefore, we discussed additional considerations in terms of medical management, specifically:    1. Increasing dose of topiramate  2. Consideration for addition of short acting stimulant in the early afternoon to help with ADHD and hunger later in the day  3. GLP-1 agonist (however, given history of CFRD and issues with relatively hypoglycemia, possibility that this could worsen this; would touch base with her endocrinologist before considering).    Given the above, she would like to begin by increasing the dose of topiramate, which I believe is reasonable. Therefore, will increase from 100 mg daily to 150 mg daily (taken as 75 mg twice a day). Will  continue naltrexone and other medications for now. We can then plan to see her back in about 3 months for re-assessment at that time.      Additional plans and goals, made through shared decision making, as outlined below.     PLAN:   Patient Instructions   1. Food Goal:  - Will drink no more than 2 small glasses a day of milk  - Will have no more than 1 Frapocchino daily    2. Activity Goal:  - Continue to be active as you are    3. Medications:   - We will increase topiramate. The new dose will be 75 mg twice a day (one 50 mg pill and one 25 mg pill twice a day  - Continue naltrexone 50 mg daily and bupropion 300 mg daily      FOLLOW-UP:    12 weeks.    Sincerely,    Charles Pakr MD    Video Start Time: 9:02 AM  Video-Visit Details    Type of service:  Video Visit    Video End Time:9:21 AM    Originating Location (pt. Location): [place]    Distant Location (provider location):  [facility] [place]     Platform used for Video Visit: Adisn     I spent 25 minutes of total time, before, during, and after the visit reviewing previous labs and records, examining the patient, answering their questions, formulating and discussing the plan of care, reviewing resulted labs, and writing the visit note.      Again, thank you for allowing me to participate in the care of your patient.      Sincerely,    Charles Park MD

## 2022-01-14 NOTE — NURSING NOTE
"Chief Complaint   Patient presents with     RECHECK     Follow-up Weight Management       Vitals:    01/14/22 0834   Weight: 86.2 kg (190 lb)   Height: 1.549 m (5' 1\")       Body mass index is 35.9 kg/m .                          "

## 2022-01-14 NOTE — PATIENT INSTRUCTIONS
1. Food Goal:  - Will drink no more than 2 small glasses a day of milk  - Will have no more than 1 Frapocchino daily    2. Activity Goal:  - Continue to be active as you are    3. Medications:   - We will increase topiramate. The new dose will be 75 mg twice a day (one 50 mg pill and one 25 mg pill twice a day  - Continue naltrexone 50 mg daily and bupropion 300 mg daily

## 2022-01-14 NOTE — LETTER
Date:January 15, 2022      Patient was self referred, no letter generated. Do not send.        [facility] Health Information

## 2022-01-14 NOTE — PROGRESS NOTES
Mamie is a 26 year old who is being evaluated via a billable video visit.      How would you like to obtain your AVS? MyChart  If the video visit is dropped, the invitation should be resent by: Text to cell phone: [phone]  Will anyone else be joining your video visit? No    Return Medical Weight Management Note     Mamie Rice  MRN:  [id number]  :  1995  STEPHEN:  2022    Dear Physician No Ref-Primary,    I had the pleasure of seeing your patient Mamie Rice. She is a 26 year old female who I am continuing to see for treatment of obesity related to:       10/9/2016   I have the following health issues associated with obesity: Back Pain   I have the following symptoms associated with obesity: Knee Pain       INTERVAL HISTORY:  I last saw Mamie Rice in clinic over a year ago on 10/9/2020. At that time, continued topiramate 100 mg daily and naltrexone 50 mg daily, in addition to the Adderrall XR 20 mg daily that she has been on for a history of ADHD. She was out of medications for a few weeks, and then restarted after she got refills. She is not sure if these are helping as much anymore in terms of appetite.     She has a history of CF with CFRD, and is followed by endocrinology for this. She does have some issues with both hyperglycemia and hypoglycemia related to this, more often hypoglycemia.     Dietary Recall:  Breakfast: will eat something for breakfast if she wakes up on time  Lunch: quick options including Easy Mac  Dinner: sometimes will have a bowl of cereal; other times will have a home cooked dinner  Snacks: if issues with hypoglycemia, will have fruit snacks; also has granola bars  Drinks: she has 2 bottles of Star [place] Frappuccino daily (~200 calories per bottle). She also drinks a fair amount of milk, around 1/2 gallon daily.     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 She is also the  for the  [employer].     CURRENT WEIGHT:   190 lbs 0 oz    Initial weight was 182 pounds on 10/14/2016  Weight at her last appointment on 10/9/2020 was 170 pounds  Weight today is 190 pounds  Weight change since her last appointment on 10/9/2020 is up 20 pounds  Total weight gain is 8 pounds.     Changes and Difficulties 10/7/2020   I have made the following changes to my diet since my last visit: Less milk   With regards to my diet, I am still struggling with: -   I have made the following changes to my activity/exercise since my last visit: Taking more walks   With regards to my activity/exercise, I am still struggling with: Doing other exercise       VITALS:  Ht 1.549 m (5' 1\")   Wt 86.2 kg (190 lb)   BMI 35.90 kg/m       GENERAL: Healthy, alert and no distress  EYES: Eyes grossly normal to inspection.   HENT: Normal cephalic/atraumatic.   RESP: No audible wheeze, cough.  No visible retractions or increased work of breathing.    MS: No gross musculoskeletal defects noted.  Normal range of motion.    SKIN: Visible skin clear.   NEURO: Cranial nerves grossly intact.  Mentation and speech appropriate for age.  PSYCH: Mentation appears normal, affect normal/bright, judgement and insight intact, normal speech and appearance well-groomed.    MEDICATIONS:   Prescribed from the weight management clinic: naltrexone 50 mg daily, topiramate 100 mg nightly  Additional weight altering medications include: bupropion  mg daily, adderrall XR 20 mg daily    Current Outpatient Medications   Medication Sig Dispense Refill     acetylcysteine (MUCOMYST) 20 % neb solution INHALE 4ML VIA NEBULIZER TWO TIMES A DAY. MAY INCREASE TO 3-4 TIMES A DAY WITH INCREASED COUGH / COLD SYMPTOMS. REFRIGERATE VIAL AND DISCARD 96 HOURS AFTER OPENING 360 mL 11     albuterol (PROAIR HFA/PROVENTIL HFA/VENTOLIN HFA) 108 (90 Base) MCG/ACT Inhaler Inhale 2 puffs into the lungs every 6 hours as needed for shortness of breath / dyspnea or wheezing 1 " Inhaler 12     albuterol (PROVENTIL) (2.5 MG/3ML) 0.083% neb solution INHALE 1 VIAL ( 2.5MG) BY NEBULIZATION EVERY 4 HOURS AS NEEDED FOR FOR SHORTNESS OF BREATH OR WHEEZING 360 mL 11     amphetamine-dextroamphetamine (ADDERALL XR) 20 MG 24 hr capsule Take 1 capsule (20 mg) by mouth daily 30 capsule 0     [START ON 1/18/2022] amphetamine-dextroamphetamine (ADDERALL XR) 20 MG 24 hr capsule Take 1 capsule (20 mg) by mouth daily 30 capsule 0     amphetamine-dextroamphetamine (ADDERALL XR) 20 MG 24 hr capsule Take 1 capsule (20 mg) by mouth daily 30 capsule 0     azithromycin (ZITHROMAX) 500 MG tablet TAKE 1 TABLET BY MOUTH ON MONDAYS, WEDNESDAYS AND FRIDAYS AS DIRECTED 36 tablet 4     beta carotene 47204 UNIT capsule TAKE 1 CAPSULE BY MOUTH IN THE MORNING ON MONDAY, WEDNESDAY AND FRIDAY 36 capsule 4     blood glucose (ACCU-CHEK SMARTVIEW) test strip Use to test blood sugar 4 times daily or as directed. 400 each 3     blood glucose (NO BRAND SPECIFIED) test strip Use to test blood sugar 1 times daily or as directed. Precision Kofi Strips to go with Doug 2 reader 50 strip 11     buPROPion (WELLBUTRIN XL) 300 MG 24 hr tablet Take 1 tablet (300 mg) by mouth every morning 90 tablet 0     busPIRone (BUSPAR) 15 MG tablet Take 1 tablet (15 mg) by mouth 2 times daily 60 tablet 5     cetirizine (ZYRTEC) 10 MG tablet Take 1 tablet (10 mg) by mouth every evening 30 tablet 5     Continuous Blood Gluc  (FREESTYLE DOUG 2 READER) KIRSTEN 1 each daily 1 each 1     Continuous Blood Gluc Sensor (FREESTYLE DOUG 2 SENSOR) [facility] 1 each every 14 days 2 each 11     elexacaftor-tezacaftor-ivacaftor & ivacaftor (TRIKAFTA) 100-50-75 & 150 MG tablet pack TAKE TWO ORANGE TABLETS BY MOUTH IN THE MORNING AND ONE BLUE TABLET IN THE EVENING AS DIRECTED ON PACKAGE.  TAKE WITH FAT CONTAINING FOOD. 84 tablet 5     FLUoxetine (PROZAC) 40 MG capsule Take 2 capsules (80 mg) by mouth daily 60 capsule 5     hydrOXYzine (ATARAX) 25 MG tablet Take 1-2  tablets (25-50 mg) by mouth every 6 hours as needed for anxiety (sleep) 180 tablet 1     medroxyPROGESTERone (DEPO-PROVERA) 150 MG/ML IM injection Inject 150 mg into the muscle       montelukast (SINGULAIR) 10 MG tablet TAKE ONE TABLET BY MOUTH AT BEDTIME 30 tablet 11     Multiple Vitamin (MULTIVITAMIN OR) Take 1 tablet by mouth daily.       naltrexone (DEPADE/REVIA) 50 MG tablet Take 1 tablet.  Time it one to two hours prior to worst cravings. 90 tablet 0     omeprazole (PRILOSEC) 40 MG DR capsule Take 1 capsule (40 mg) by mouth 2 times daily 60 capsule 11     phytonadione (MEPHYTON) 5 MG tablet TAKE 1 TABLET BY MOUTH ONCE A WEEK 4 tablet 11     polyethylene glycol (MIRALAX) powder Take 17 g (1 capful) by mouth daily as needed for constipation 119 g 3     topiramate (TOPAMAX) 100 MG tablet Take 1 tablet (100 mg) by mouth daily 90 tablet 0     traZODone (DESYREL) 100 MG tablet Take 1 tablet (100 mg) by mouth At Bedtime 30 tablet 5     vitamin C (ASCORBIC ACID) 500 MG tablet TAKE ONE TABLET BY MOUTH TWICE A  tablet 3     Vitamin D3 (VITAMIN D3) 25 mcg (1000 units) tablet Take 1 tablet (25 mcg) by mouth daily 100 tablet 3     vitamin E (TOCOPHEROL) 400 units (180 mg) capsule TAKE ONE CAPSULE BY MOUTH TWICE A  capsule 11     VITAMIN E 180 MG (400 UNIT) capsule TAKE ONE CAPSULE BY MOUTH TWICE A  capsule 11     LABS:   Component      Latest Ref Rng & Units 6/8/2021   Sodium      133 - 144 mmol/L 142   Potassium      3.4 - 5.3 mmol/L 3.6   Chloride      94 - 109 mmol/L 110 (H)   Carbon Dioxide      20 - 32 mmol/L 27   Anion Gap      3 - 14 mmol/L 4   Glucose      70 - 99 mg/dL 79   Urea Nitrogen      7 - 30 mg/dL 8   Creatinine      0.52 - 1.04 mg/dL 0.90   GFR Estimate      >60 mL/min/1.73:m2 87   GFR Estimate If Black      >60 mL/min/1.73:m2 >90   Calcium      8.5 - 10.1 mg/dL 8.5   Cholesterol      <200 mg/dL 138   Triglycerides      <150 mg/dL 120   HDL Cholesterol      >49 mg/dL 52   LDL  Cholesterol Calculated      <100 mg/dL 62   Non HDL Cholesterol      <130 mg/dL 86   Hemoglobin A1C POCT      0 - 5.6 % 5.1       Weight Loss Medication History Reviewed With Patient 1/11/2022   Which weight loss medications are you currently taking on a regular basis?  Phentermine   If you are not taking a weight loss medication that was prescribed to you, please indicate why: -   Are you having any side effects from the weight loss medication that we have prescribed you? No     ASSESSMENT:   Mamie Rice is a 26 year old female with a previous history of class 1 (current class 2 based upon BMI from today) obesity. She also has a history of CF, CFRD, ADHD, and depression. Currently on topiramate 100 mg daily and naltrexone 50 mg daily (prescribed through the weight management clinic), as well as bupropion  and  Adderall XR 20 mg daily (for history of ADHD). It appears that the response to topiramate and/or naltrexone has waned to an extent over time. Therefore, we discussed additional considerations in terms of medical management, specifically:    1. Increasing dose of topiramate  2. Consideration for addition of short acting stimulant in the early afternoon to help with ADHD and hunger later in the day  3. GLP-1 agonist (however, given history of CFRD and issues with relatively hypoglycemia, possibility that this could worsen this; would touch base with her endocrinologist before considering).    Given the above, she would like to begin by increasing the dose of topiramate, which I believe is reasonable. Therefore, will increase from 100 mg daily to 150 mg daily (taken as 75 mg twice a day). Will continue naltrexone and other medications for now. We can then plan to see her back in about 3 months for re-assessment at that time.      Additional plans and goals, made through shared decision making, as outlined below.     PLAN:   Patient Instructions   1. Food Goal:  - Will drink no more than 2 small glasses  a day of milk  - Will have no more than 1 Frapocchino daily    2. Activity Goal:  - Continue to be active as you are    3. Medications:   - We will increase topiramate. The new dose will be 75 mg twice a day (one 50 mg pill and one 25 mg pill twice a day  - Continue naltrexone 50 mg daily and bupropion 300 mg daily      FOLLOW-UP:    12 weeks.    Sincerely,    Charles Park MD    Video Start Time: 9:02 AM  Video-Visit Details    Type of service:  Video Visit    Video End Time:9:21 AM    Originating Location (pt. Location): [place]    Distant Location (provider location):  [facility] [place]     Platform used for Video Visit: Juan WALSH spent 25 minutes of total time, before, during, and after the visit reviewing previous labs and records, examining the patient, answering their questions, formulating and discussing the plan of care, reviewing resulted labs, and writing the visit note.

## 2022-01-31 DIAGNOSIS — K21.9 GERD (GASTROESOPHAGEAL REFLUX DISEASE): ICD-10-CM

## 2022-01-31 DIAGNOSIS — E84.9 CF (CYSTIC FIBROSIS) (H): ICD-10-CM

## 2022-01-31 RX ORDER — OMEPRAZOLE 40 MG/1
40 CAPSULE, DELAYED RELEASE ORAL 2 TIMES DAILY
Qty: 60 CAPSULE | Refills: 11 | Status: SHIPPED | OUTPATIENT
Start: 2022-01-31 | End: 2022-02-04

## 2022-02-04 DIAGNOSIS — E84.9 CF (CYSTIC FIBROSIS) (H): ICD-10-CM

## 2022-02-04 DIAGNOSIS — K21.9 GERD (GASTROESOPHAGEAL REFLUX DISEASE): ICD-10-CM

## 2022-02-04 DIAGNOSIS — E84.9 CYSTIC FIBROSIS (H): ICD-10-CM

## 2022-02-04 RX ORDER — ELEXACAFTOR, TEZACAFTOR, AND IVACAFTOR 100-50-75
KIT ORAL
Qty: 84 TABLET | Refills: 5 | Status: SHIPPED | OUTPATIENT
Start: 2022-02-04 | End: 2022-07-29

## 2022-02-04 RX ORDER — OMEPRAZOLE 40 MG/1
40 CAPSULE, DELAYED RELEASE ORAL 2 TIMES DAILY
Qty: 60 CAPSULE | Refills: 11 | Status: SHIPPED | OUTPATIENT
Start: 2022-02-04 | End: 2023-02-08

## 2022-02-24 ENCOUNTER — MYC REFILL (OUTPATIENT)
Dept: PEDIATRICS | Facility: CLINIC | Age: 27
End: 2022-02-24
Payer: COMMERCIAL

## 2022-02-24 DIAGNOSIS — F34.1 PERSISTENT DEPRESSIVE DISORDER: ICD-10-CM

## 2022-02-24 DIAGNOSIS — F33.0 MILD EPISODE OF RECURRENT MAJOR DEPRESSIVE DISORDER (H): ICD-10-CM

## 2022-02-25 RX ORDER — BUPROPION HYDROCHLORIDE 300 MG/1
300 TABLET ORAL EVERY MORNING
Qty: 90 TABLET | Refills: 0 | Status: SHIPPED | OUTPATIENT
Start: 2022-02-25 | End: 2022-06-06

## 2022-03-06 ENCOUNTER — MYC REFILL (OUTPATIENT)
Dept: PSYCHIATRY | Facility: CLINIC | Age: 27
End: 2022-03-06
Payer: COMMERCIAL

## 2022-03-06 DIAGNOSIS — F90.0 ATTENTION DEFICIT HYPERACTIVITY DISORDER (ADHD), PREDOMINANTLY INATTENTIVE TYPE: ICD-10-CM

## 2022-03-07 RX ORDER — DEXTROAMPHETAMINE SACCHARATE, AMPHETAMINE ASPARTATE MONOHYDRATE, DEXTROAMPHETAMINE SULFATE AND AMPHETAMINE SULFATE 5; 5; 5; 5 MG/1; MG/1; MG/1; MG/1
20 CAPSULE, EXTENDED RELEASE ORAL DAILY
Qty: 30 CAPSULE | Refills: 0 | Status: SHIPPED | OUTPATIENT
Start: 2022-03-07 | End: 2022-04-11

## 2022-03-07 NOTE — TELEPHONE ENCOUNTER
Last seen: 11/09/21  RTC: 6 months   Cancel: none  No-show: none  Next appt: none        Medication requested: Adderall XR   Directions: Take 1 capsule (20 mg) by mouth daily  Qty: 30  Last refilled: 1/31 #30, 12/29 #30, 12/1 #30     Medication sent to provider for review

## 2022-03-07 NOTE — TELEPHONE ENCOUNTER
amphetamine-dextroamphetamine (ADDERALL XR) 20 MG 24 hr capsule      Last Written Prescription Date:  10/19/21  Last Fill Quantity: 30,   # refills: 0  Last Office Visit : 11/9/21  Future Office visit:  None noted    Routing refill request to provider for review/approval because:  Controlled medication refill request

## 2022-03-31 DIAGNOSIS — E84.9 CF (CYSTIC FIBROSIS) (H): ICD-10-CM

## 2022-03-31 RX ORDER — ACETYLCYSTEINE 200 MG/ML
SOLUTION ORAL; RESPIRATORY (INHALATION)
Qty: 360 ML | Refills: 11 | Status: SHIPPED | OUTPATIENT
Start: 2022-03-31 | End: 2024-02-27

## 2022-04-11 ENCOUNTER — MYC REFILL (OUTPATIENT)
Dept: PSYCHIATRY | Facility: CLINIC | Age: 27
End: 2022-04-11
Payer: COMMERCIAL

## 2022-04-11 DIAGNOSIS — F90.0 ATTENTION DEFICIT HYPERACTIVITY DISORDER (ADHD), PREDOMINANTLY INATTENTIVE TYPE: ICD-10-CM

## 2022-04-12 RX ORDER — DEXTROAMPHETAMINE SACCHARATE, AMPHETAMINE ASPARTATE MONOHYDRATE, DEXTROAMPHETAMINE SULFATE AND AMPHETAMINE SULFATE 5; 5; 5; 5 MG/1; MG/1; MG/1; MG/1
20 CAPSULE, EXTENDED RELEASE ORAL DAILY
Qty: 30 CAPSULE | Refills: 0 | Status: SHIPPED | OUTPATIENT
Start: 2022-04-12 | End: 2022-05-17

## 2022-04-12 NOTE — TELEPHONE ENCOUNTER
amphetamine-dextroamphetamine (ADDERALL XR) 20 MG 24 hr capsule      Last Written Prescription Date:  3/7/2  Last Fill Quantity: 30,   # refills: 0  Last Office Visit : 11/9/21  Future Office visit:  None noted    Routing refill request to provider for review/approval because:  Controlled medication refill request

## 2022-04-12 NOTE — TELEPHONE ENCOUNTER
Last seen: 11/09/2021  RTC: 6 months  Cancel: None  No-show: None  Next appt: None     Incoming refill from Patient via [employer]     Medication requested:   amphetamine-dextroamphetamine (ADDERALL XR) 20 MG 24 hr capsule 30 capsule 0 3/7/2022  No   Sig - Route: Take 1 capsule (20 mg) by mouth daily - Oral   Sent to pharmacy as: Amphetamine-Dextroamphet ER 20 MG Oral Capsule Extended Release 24 Hour (ADDERALL XR)   Class: E-Prescribe   Earliest Fill Date: 3/7/2022   Order: [id number]   E-Prescribing Status: Receipt confirmed by pharmacy (3/7/2022  4:38 PM CST)   Per [facility], last refilled: 03/09/2022 #30, 02/03/2022 #30, 12/29/2021 #30      See virtual visit on 11/09/2021: Continue on current medication regimen.      Medication sent to provider for review.

## 2022-04-27 ENCOUNTER — TELEPHONE (OUTPATIENT)
Dept: PULMONOLOGY | Facility: CLINIC | Age: 27
End: 2022-04-27
Payer: COMMERCIAL

## 2022-04-27 NOTE — TELEPHONE ENCOUNTER
PA Initiation    Medication: Azithromycin--PA Initiated  Insurance Company: [facility] [employer] - Phone [phone] Fax [phone]  Pharmacy Filling the Rx:    Filling Pharmacy Phone:    Filling Pharmacy Fax:    Start Date: 4/27/2022    KEY: [id number]

## 2022-04-27 NOTE — TELEPHONE ENCOUNTER
Prior Authorization Approval    Authorization Effective Date: 4/27/2022  Authorization Expiration Date: 4/27/2023  Medication: Azithromycin--PA Approved  Approved Dose/Quantity: UD  Reference #: KEY: [id number]   Insurance Company: [employer] - Phone [phone] Fax [phone]  Expected CoPay:       CoPay Card Available:      Foundation Assistance Needed:    Which Pharmacy is filling the prescription (Not needed for infusion/clinic administered):    Pharmacy Notified:  No  Patient Notified:  No

## 2022-04-27 NOTE — PROGRESS NOTES
Mamie is here for merrem instillation.  No new issues.    Procedure:  Rigid endoscope used for visualization, 2cc merrem instilled into each maxillary sinus using endoscopic visualization.  No unusual findings. Left polyp    A/P:  Repeat procedure in 1 month.   [Consultation] : a consultation visit [FreeTextEntry1] : sclerosing mesenteritis

## 2022-05-02 DIAGNOSIS — E84.0 CYSTIC FIBROSIS WITH PULMONARY MANIFESTATIONS (H): ICD-10-CM

## 2022-05-02 RX ORDER — ASCORBIC ACID 500 MG
TABLET ORAL
Qty: 100 TABLET | Refills: 3 | Status: SHIPPED | OUTPATIENT
Start: 2022-05-02 | End: 2022-11-07

## 2022-05-03 DIAGNOSIS — E84.9 CF (CYSTIC FIBROSIS) (H): ICD-10-CM

## 2022-05-03 RX ORDER — AZITHROMYCIN 500 MG/1
TABLET, FILM COATED ORAL
Qty: 36 TABLET | Refills: 4 | Status: SHIPPED | OUTPATIENT
Start: 2022-05-03 | End: 2023-01-23 | Stop reason: SINTOL

## 2022-05-16 ENCOUNTER — MYC MEDICAL ADVICE (OUTPATIENT)
Dept: PSYCHIATRY | Facility: CLINIC | Age: 27
End: 2022-05-16
Payer: COMMERCIAL

## 2022-05-16 DIAGNOSIS — F90.0 ATTENTION DEFICIT HYPERACTIVITY DISORDER (ADHD), PREDOMINANTLY INATTENTIVE TYPE: ICD-10-CM

## 2022-05-17 RX ORDER — DEXTROAMPHETAMINE SACCHARATE, AMPHETAMINE ASPARTATE MONOHYDRATE, DEXTROAMPHETAMINE SULFATE AND AMPHETAMINE SULFATE 5; 5; 5; 5 MG/1; MG/1; MG/1; MG/1
20 CAPSULE, EXTENDED RELEASE ORAL DAILY
Qty: 30 CAPSULE | Refills: 0 | Status: SHIPPED | OUTPATIENT
Start: 2022-05-17 | End: 2022-06-28

## 2022-05-17 RX ORDER — DEXTROAMPHETAMINE SULFATE, DEXTROAMPHETAMINE SACCHARATE, AMPHETAMINE SULFATE AND AMPHETAMINE ASPARTATE 5; 5; 5; 5 MG/1; MG/1; MG/1; MG/1
CAPSULE, EXTENDED RELEASE ORAL
Qty: 30 CAPSULE | Refills: 0 | OUTPATIENT
Start: 2022-05-17

## 2022-05-17 NOTE — TELEPHONE ENCOUNTER
amphetamine-dextroamphetamine (ADDERALL XR) 20 MG 24 hr capsule         Last Written Prescription Date:  4/12/22  Last Fill Quantity: 30,   # refills: 0  Last Office Visit : 11/9/21  Future Office visit:  None note    Routing refill request to provider for review/approval because:  Controlled medication refill request

## 2022-05-17 NOTE — TELEPHONE ENCOUNTER
- Meds refilled by provider   - Med tab changed to reflect this   - Patient notified via [employer]

## 2022-05-17 NOTE — TELEPHONE ENCOUNTER
Last seen: 11/9  RTC: 6 months   Cancel: none   No-show: none   Next appt: none     Incoming refill from patient via [employer]      Disp Refills Start End PETE   amphetamine-dextroamphetamine (ADDERALL XR) 20 MG 24 hr capsule 30 capsule 0 4/12/2022  No   Sig - Route: Take 1 capsule (20 mg) by mouth in the morning. - Oral   Sent to pharmacy as: Amphetamine-Dextroamphet ER 20 MG Oral Capsule Extended Release 24 Hour (ADDERALL XR)   Class: E-Prescribe   Earliest Fill Date: 4/12/2022   Order: [id number]   E-Prescribing Status: Receipt confirmed by pharmacy (4/12/2022  8:26 AM CDT)   Per  last refilled: 4/12 #30, 3/7 #30, 1/31 #30    Will route to provider for approval

## 2022-05-23 DIAGNOSIS — K86.89 PANCREATIC INSUFFICIENCY: ICD-10-CM

## 2022-05-23 DIAGNOSIS — E84.0 CYSTIC FIBROSIS WITH PULMONARY MANIFESTATIONS (H): ICD-10-CM

## 2022-05-23 DIAGNOSIS — E84.9 CF (CYSTIC FIBROSIS) (H): Primary | ICD-10-CM

## 2022-05-25 ENCOUNTER — MYC MEDICAL ADVICE (OUTPATIENT)
Dept: PULMONOLOGY | Facility: CLINIC | Age: 27
End: 2022-05-25
Payer: COMMERCIAL

## 2022-05-25 DIAGNOSIS — J32.9 SINUSITIS, CHRONIC: ICD-10-CM

## 2022-05-25 DIAGNOSIS — E84.0 CYSTIC FIBROSIS WITH PULMONARY MANIFESTATIONS (H): ICD-10-CM

## 2022-05-25 DIAGNOSIS — E10.9 DIABETES MELLITUS TYPE 1 (H): ICD-10-CM

## 2022-05-25 DIAGNOSIS — E84.9 CF (CYSTIC FIBROSIS) (H): ICD-10-CM

## 2022-05-25 RX ORDER — ALBUTEROL SULFATE 90 UG/1
2 AEROSOL, METERED RESPIRATORY (INHALATION) EVERY 6 HOURS PRN
Qty: 8.5 G | Refills: 11 | Status: SHIPPED | OUTPATIENT
Start: 2022-05-25 | End: 2024-02-27

## 2022-05-25 RX ORDER — ALBUTEROL SULFATE 0.83 MG/ML
SOLUTION RESPIRATORY (INHALATION)
Qty: 360 ML | Refills: 11 | Status: CANCELLED | OUTPATIENT
Start: 2022-05-25

## 2022-05-25 RX ORDER — DOXYCYCLINE 100 MG/1
100 CAPSULE ORAL 2 TIMES DAILY
Qty: 20 CAPSULE | Refills: 0 | Status: SHIPPED | OUTPATIENT
Start: 2022-05-25 | End: 2023-01-17

## 2022-05-25 NOTE — TELEPHONE ENCOUNTER
10 day course of doxy started for increased cough and chest congestion after having a cold per Dr. Allison.

## 2022-05-31 ENCOUNTER — TELEPHONE (OUTPATIENT)
Dept: PULMONOLOGY | Facility: CLINIC | Age: 27
End: 2022-05-31
Payer: COMMERCIAL

## 2022-05-31 NOTE — TELEPHONE ENCOUNTER
PA Initiation    Medication: Trikafta PA renewal pending  Insurance Company: \[facility]\"" [employer] [place] - Phone [phone] Fax [phone]  Pharmacy Filling the Rx:    Filling Pharmacy Phone:    Filling Pharmacy Fax:    Start Date: 5/31/2022    Key: [id number]

## 2022-06-02 NOTE — TELEPHONE ENCOUNTER
Prior Authorization Approval    Authorization Effective Date: 6/2/2022  Authorization Expiration Date: 6/2/2023  Medication: Trikafta PA renewal approved   Approved Dose/Quantity: 100-50-75 & 150mg / 84 for 28 day supply   Reference #: Key: [id number]   Insurance Company: [employer] - Phone [phone] Fax [phone]  Expected CoPay:       CoPay Card Available:      Foundation Assistance Needed:    Which Pharmacy is filling the prescription (Not needed for infusion/clinic administered):    Pharmacy Notified:    Patient Notified:

## 2022-06-06 ENCOUNTER — MYC REFILL (OUTPATIENT)
Dept: PEDIATRICS | Facility: CLINIC | Age: 27
End: 2022-06-06
Payer: COMMERCIAL

## 2022-06-06 DIAGNOSIS — F34.1 PERSISTENT DEPRESSIVE DISORDER: ICD-10-CM

## 2022-06-06 DIAGNOSIS — F33.0 MILD EPISODE OF RECURRENT MAJOR DEPRESSIVE DISORDER (H): ICD-10-CM

## 2022-06-06 RX ORDER — BUPROPION HYDROCHLORIDE 300 MG/1
300 TABLET ORAL EVERY MORNING
Qty: 90 TABLET | Refills: 0 | Status: SHIPPED | OUTPATIENT
Start: 2022-06-06 | End: 2022-10-03

## 2022-06-10 ENCOUNTER — DOCUMENTATION ONLY (OUTPATIENT)
Dept: OTHER | Facility: CLINIC | Age: 27
End: 2022-06-10
Payer: COMMERCIAL

## 2022-06-10 DIAGNOSIS — F33.41 RECURRENT MAJOR DEPRESSIVE DISORDER, IN PARTIAL REMISSION (H): ICD-10-CM

## 2022-06-13 ENCOUNTER — MYC MEDICAL ADVICE (OUTPATIENT)
Dept: PSYCHIATRY | Facility: CLINIC | Age: 27
End: 2022-06-13
Payer: COMMERCIAL

## 2022-06-13 RX ORDER — FLUOXETINE 40 MG/1
CAPSULE ORAL
Qty: 60 CAPSULE | Refills: 0 | Status: SHIPPED | OUTPATIENT
Start: 2022-06-13 | End: 2022-07-05

## 2022-06-13 NOTE — TELEPHONE ENCOUNTER
FLUoxetine (PROZAC) 40 MG  Last refilled: 11/9/21  Qty: 60 : 5    Last seen: 11/9/21  RTC: 1 MOS  Cancel: 0  No-show: 0  Next appt: 7/5/22  Refill pended and routed to the provider for review/determination due to  : Pt outside of RTC timeframe.

## 2022-06-18 ENCOUNTER — HEALTH MAINTENANCE LETTER (OUTPATIENT)
Age: 27
End: 2022-06-18

## 2022-06-21 ENCOUNTER — MYC MEDICAL ADVICE (OUTPATIENT)
Dept: PSYCHIATRY | Facility: CLINIC | Age: 27
End: 2022-06-21

## 2022-06-21 DIAGNOSIS — G47.00 INSOMNIA, UNSPECIFIED TYPE: ICD-10-CM

## 2022-06-22 RX ORDER — HYDROXYZINE HYDROCHLORIDE 25 MG/1
25-50 TABLET, FILM COATED ORAL EVERY 6 HOURS PRN
Qty: 180 TABLET | Refills: 0 | Status: SHIPPED | OUTPATIENT
Start: 2022-06-22 | End: 2022-07-05

## 2022-06-22 NOTE — TELEPHONE ENCOUNTER
Called [facility] and spoke to Terri regarding info below. Gave PA # and dates, they will fill qty 12 for 28 day supply and if issues will call [place].

## 2022-06-22 NOTE — TELEPHONE ENCOUNTER
Last seen: 11/9/21  RTC: 1 MOS  Cancel: 0  No-show: 0  Next appt: 7/5/22     Incoming refill from Patient via [employer]     Medication requested:   Pending Prescriptions:                       Disp   Refills    hydrOXYzine (ATARAX) 25 MG tablet         180 ta*0            Sig: Take 1-2 tablets (25-50 mg) by mouth every 6           hours as needed for anxiety (sleep)       Medication sent to provider for review.

## 2022-06-22 NOTE — TELEPHONE ENCOUNTER
Message from clinic patient is only able to get 6 tablets at a time. Called [facility] and spoke to Pricilla who stated her test claim showed 6 tablets every 30 days.     Called [employer] ([phone]) and spoke to Sabas is in place for qty 12 for 28 day supply. Her test claim paid with this qty. Pharmacy billed 6 for 30 day supply on 6/17 incorrectly. If patient did not  can rebill, if picked up fill prescription in a week correctly and if issues call [facility].     Called [facility] and while on hold for Ping hung up on.     Called [facility] and spoke

## 2022-06-27 ENCOUNTER — MYC MEDICAL ADVICE (OUTPATIENT)
Dept: PSYCHIATRY | Facility: CLINIC | Age: 27
End: 2022-06-27

## 2022-06-27 DIAGNOSIS — G47.00 INSOMNIA, UNSPECIFIED TYPE: ICD-10-CM

## 2022-06-27 DIAGNOSIS — F90.0 ATTENTION DEFICIT HYPERACTIVITY DISORDER (ADHD), PREDOMINANTLY INATTENTIVE TYPE: ICD-10-CM

## 2022-06-28 RX ORDER — TRAZODONE HYDROCHLORIDE 100 MG/1
100 TABLET ORAL AT BEDTIME
Qty: 30 TABLET | Refills: 5 | Status: SHIPPED | OUTPATIENT
Start: 2022-06-28 | End: 2022-07-05

## 2022-06-28 RX ORDER — DEXTROAMPHETAMINE SACCHARATE, AMPHETAMINE ASPARTATE MONOHYDRATE, DEXTROAMPHETAMINE SULFATE AND AMPHETAMINE SULFATE 5; 5; 5; 5 MG/1; MG/1; MG/1; MG/1
20 CAPSULE, EXTENDED RELEASE ORAL DAILY
Qty: 30 CAPSULE | Refills: 0 | Status: SHIPPED | OUTPATIENT
Start: 2022-06-28 | End: 2022-11-07

## 2022-06-28 NOTE — TELEPHONE ENCOUNTER
Last seen: 11/9/21  RTC: 1 MOS  Cancel: 0  No-show: 0  Next appt: 7/5/22     Incoming refill from Patient via mychart     Medication requested:   Pending Prescriptions:                       Disp   Refills    amphetamine-dextroamphetamine (ADDERALL X*30 cap*0            Sig: Take 1 capsule (20 mg) by mouth daily    traZODone (DESYREL) 100 MG tablet         30 tab*5            Sig: Take 1 tablet (100 mg) by mouth At Bedtime        Last refill per         Medication sent to provider for review.

## 2022-07-05 ENCOUNTER — VIRTUAL VISIT (OUTPATIENT)
Dept: PSYCHIATRY | Facility: CLINIC | Age: 27
End: 2022-07-05
Attending: NURSE PRACTITIONER
Payer: COMMERCIAL

## 2022-07-05 DIAGNOSIS — G47.00 INSOMNIA, UNSPECIFIED TYPE: Primary | ICD-10-CM

## 2022-07-05 DIAGNOSIS — F33.41 RECURRENT MAJOR DEPRESSIVE DISORDER, IN PARTIAL REMISSION (H): ICD-10-CM

## 2022-07-05 DIAGNOSIS — F90.0 ATTENTION DEFICIT HYPERACTIVITY DISORDER (ADHD), PREDOMINANTLY INATTENTIVE TYPE: ICD-10-CM

## 2022-07-05 DIAGNOSIS — F41.1 GAD (GENERALIZED ANXIETY DISORDER): ICD-10-CM

## 2022-07-05 PROCEDURE — 99214 OFFICE O/P EST MOD 30 MIN: CPT | Mod: 95 | Performed by: NURSE PRACTITIONER

## 2022-07-05 RX ORDER — DEXTROAMPHETAMINE SACCHARATE, AMPHETAMINE ASPARTATE MONOHYDRATE, DEXTROAMPHETAMINE SULFATE AND AMPHETAMINE SULFATE 5; 5; 5; 5 MG/1; MG/1; MG/1; MG/1
20 CAPSULE, EXTENDED RELEASE ORAL DAILY
Qty: 30 CAPSULE | Refills: 0 | Status: SHIPPED | OUTPATIENT
Start: 2022-08-24 | End: 2022-09-23

## 2022-07-05 RX ORDER — FLUOXETINE 40 MG/1
CAPSULE ORAL
Qty: 180 CAPSULE | Refills: 1 | Status: SHIPPED | OUTPATIENT
Start: 2022-07-05 | End: 2022-12-06

## 2022-07-05 RX ORDER — DEXTROAMPHETAMINE SACCHARATE, AMPHETAMINE ASPARTATE MONOHYDRATE, DEXTROAMPHETAMINE SULFATE AND AMPHETAMINE SULFATE 5; 5; 5; 5 MG/1; MG/1; MG/1; MG/1
20 CAPSULE, EXTENDED RELEASE ORAL DAILY
Qty: 30 CAPSULE | Refills: 0 | Status: SHIPPED | OUTPATIENT
Start: 2022-07-25 | End: 2022-08-24

## 2022-07-05 RX ORDER — DEXTROAMPHETAMINE SACCHARATE, AMPHETAMINE ASPARTATE MONOHYDRATE, DEXTROAMPHETAMINE SULFATE AND AMPHETAMINE SULFATE 5; 5; 5; 5 MG/1; MG/1; MG/1; MG/1
20 CAPSULE, EXTENDED RELEASE ORAL DAILY
Qty: 30 CAPSULE | Refills: 0 | Status: SHIPPED | OUTPATIENT
Start: 2022-09-23 | End: 2022-10-23

## 2022-07-05 RX ORDER — BUSPIRONE HYDROCHLORIDE 15 MG/1
15 TABLET ORAL 2 TIMES DAILY
Qty: 180 TABLET | Refills: 1 | Status: SHIPPED | OUTPATIENT
Start: 2022-07-05 | End: 2022-12-06

## 2022-07-05 RX ORDER — HYDROXYZINE HYDROCHLORIDE 25 MG/1
25-50 TABLET, FILM COATED ORAL EVERY 6 HOURS PRN
Qty: 60 TABLET | Refills: 1 | Status: SHIPPED | OUTPATIENT
Start: 2022-07-05 | End: 2022-11-10

## 2022-07-05 RX ORDER — TRAZODONE HYDROCHLORIDE 100 MG/1
100 TABLET ORAL AT BEDTIME
Qty: 90 TABLET | Refills: 1 | Status: SHIPPED | OUTPATIENT
Start: 2022-07-05 | End: 2022-12-06

## 2022-07-05 NOTE — PROGRESS NOTES
Mamie Rice is a 26 year old who has consented to receive services via billable video visit.      Pt will join video visit via: [employer] Booked  If there are problems joining the visit, send backup video invite via: Text to preferred phone: [phone]      Originating Location (patient location): Patient's home  Distant Location (provider location): [facility] [place] [facility]    Will anyone else be joining the video visit? No    How would you prefer to obtain AVS?: Cristina

## 2022-07-05 NOTE — PROGRESS NOTES
Start Time:  1200        End Time: 1214    Telemedicine Visit: The patient's condition can be safely assessed and treated via synchronous audio and visual telemedicine encounter.      Reason for Telemedicine Visit: Due to COVID 19 pandemic, clinic switching all appointments to telemedicine     Originating Site (Patient Location): home    Distant Site (Provider Location): Provider Remote Setting    Consent:  The patient/guardian has verbally consented to: the potential risks and benefits of telemedicine (video visit) versus in person care; bill my insurance or make self-payment for services provided; and responsibility for payment of non-covered services.     Mode of Communication:  Video Conference via Amwell    As the provider I attest to compliance with applicable laws and regulations related to telemedicine.    Psychiatry Clinic Progress Note                                                                  Patient Name: Mamie Rice  YOB: 1995  MRN: [id number]  Date of Service:  07/05/2022  Last Seen:11/9/2021    Mamie Rice is a 26 year old person assigned female at birth, identifies as cisgender female who uses the name Mamie and pronoun kenneth.     Mamie Rice is a 26 year old year old adult who presents for ongoing psychiatric care.  Mamie Rice was last seen on 11/9/2021.     At that time,     Medication Ordered/Consults/Labs/tests Ordered:     Medication: Continue on current medication regimen.   OTC Recommendations: none  Lab Orders:  EKG   Referrals: none  Release of Information: none  Future Treatment Considerations: Per symptoms.   Return for Follow Up: in 6 month    Pertinent Background:  Diagnoses include MDD, MECHE and ADHD.  Psych critical item history includes [no critical items]. Medical complication includes Cystic Fibrosis, DM I     Previous medication trial: Phentamine, Trazodone (150 mg oversedating, 50 mg ineffective)     Naltrexone, Wellbutrin and Topamax are managed by  weight management clinic.    Interim History                                                                                                        4, 4     Since the last visit,  -Has been doing well,  Mood and anxiety are well managed, denies SI, SIB or HI.  -Continues to take Hydroxyzine 25 mg BID.  Notes was not sure why she was taking the medication.  Reports initially started to take the medication when she had social anxiety.  -Wants to continue on current medication regimen as she has been doing well.  -Sleeping well.  -Has not completed EKG, but will have 8/9 appt with [facility] clinic with lab.    Denies any symptoms suggestive of hypomania or psychosis.    Current Suicidality/Hx of Suicide Attempts: Denies both  CoCominent Medical concerns: Denies    Medication Side Effects: The patient denies all medication side effects.      Medical Review of Systems     Apart from the symptoms mentioned int he HPI, the 14 point review of systems, including constitutional, HEENT, cardiovascular, respiratory, gastrointestinal, genitourinary, musculoskeletal, integumentary, endocrine, neurological, hematologic and allergic is entirely negative.    Pregnant: None. Nursing: None, Contraception: DMPA    Substance Use   Denies frequent or abuse of alcohol. Denies any other substance use.     Social/ Family History                                  [per patient report]                                 1ea,1ea   Living arrangements: lives with parents and feels safe  Social Support:parents, friends, maternal grandparents  Access to gun: denies  Denies any trauma hx, numerous hospitalizations during childhood, does not consider this as traumatic  Works as a day care provider, dance coach and substitute .      Allergy                                Vancomycin and Augmentin    Current Medications                                                                                                       Current Outpatient  Medications   Medication Sig Dispense Refill     acetylcysteine (MUCOMYST) 20 % neb solution INHALE 4ML VIA NEBULIZER TWO TIMES A DAY. MAY INCREASE TO 3-4 TIMES A DAY WITH INCREASED COUGH / COLD SYMPTOMS. REFRIGERATE VIAL AND DISCARD 96 HOURS AFTER OPENING 360 mL 11     albuterol (PROAIR HFA/PROVENTIL HFA/VENTOLIN HFA) 108 (90 Base) MCG/ACT inhaler Inhale 2 puffs into the lungs every 6 hours as needed for shortness of breath / dyspnea or wheezing 8.5 g 11     albuterol (PROVENTIL) (2.5 MG/3ML) 0.083% neb solution INHALE 1 VIAL ( 2.5MG) BY NEBULIZATION EVERY 4 HOURS AS NEEDED FOR FOR SHORTNESS OF BREATH OR WHEEZING 360 mL 11     amphetamine-dextroamphetamine (ADDERALL XR) 20 MG 24 hr capsule Take 1 capsule (20 mg) by mouth daily 30 capsule 0     azithromycin (ZITHROMAX) 500 MG tablet TAKE 1 TABLET BY MOUTH ON MONDAYS, WEDNESDAYS AND FRIDAYS AS DIRECTED 36 tablet 4     beta carotene 97854 UNIT capsule TAKE 1 CAPSULE BY MOUTH IN THE MORNING ON MONDAY, WEDNESDAY AND FRIDAY 36 capsule 4     blood glucose (ACCU-CHEK SMARTVIEW) test strip Use to test blood sugar 4 times daily or as directed. 400 each 3     blood glucose (NO BRAND SPECIFIED) test strip Use to test blood sugar 1 times daily or as directed. Precision Kofi Strips to go with Doug 2 reader 50 strip 11     buPROPion (WELLBUTRIN XL) 300 MG 24 hr tablet Take 1 tablet (300 mg) by mouth every morning 90 tablet 0     busPIRone (BUSPAR) 15 MG tablet Take 1 tablet (15 mg) by mouth 2 times daily 60 tablet 0     cetirizine (ZYRTEC) 10 MG tablet Take 1 tablet (10 mg) by mouth every evening 30 tablet 5     Continuous Blood Gluc  (FREESTYLE DOUG 2 READER) KIRSTEN 1 each daily 1 each 1     Continuous Blood Gluc Sensor (FREESTYLE DOUG 2 SENSOR) MISC 1 each every 14 days 2 each 11     doxycycline hyclate (VIBRAMYCIN) 100 MG capsule Take 1 capsule (100 mg) by mouth 2 times daily 20 capsule 0     elexacaftor-tezacaftor-ivacaftor & ivacaftor (TRIKAFTA) 100-50-75 & 150 MG  tablet pack TAKE TWO ORANGE TABLETS BY MOUTH IN THE MORNING AND ONE BLUE TABLET IN THE EVENING AS DIRECTED ON PACKAGE.  TAKE WITH FAT CONTAINING FOOD. 84 tablet 5     FLUoxetine (PROZAC) 40 MG capsule TAKE TWO CAPSULES BY MOUTH EVERY DAY . 60 capsule 0     hydrOXYzine (ATARAX) 25 MG tablet Take 1-2 tablets (25-50 mg) by mouth every 6 hours as needed for anxiety (sleep) 180 tablet 0     medroxyPROGESTERone (DEPO-PROVERA) 150 MG/ML IM injection Inject 150 mg into the muscle       montelukast (SINGULAIR) 10 MG tablet TAKE ONE TABLET BY MOUTH AT BEDTIME 30 tablet 11     Multiple Vitamin (MULTIVITAMIN OR) Take 1 tablet by mouth daily.       naltrexone (DEPADE/REVIA) 50 MG tablet Take 1 tablet.  Time it one to two hours prior to worst cravings. 90 tablet 3     omeprazole (PRILOSEC) 40 MG DR capsule Take 1 capsule (40 mg) by mouth 2 times daily 60 capsule 11     phytonadione (MEPHYTON) 5 MG tablet TAKE 1 TABLET BY MOUTH ONCE A WEEK 4 tablet 11     polyethylene glycol (MIRALAX) powder Take 17 g (1 capful) by mouth daily as needed for constipation 119 g 3     topiramate (TOPAMAX) 25 MG tablet Take one 50 mg pill and one 25 mg pill twice a day 180 tablet 3     topiramate (TOPAMAX) 50 MG tablet Take one 50 mg pill and one 25 mg pill twice a day 180 tablet 3     traZODone (DESYREL) 100 MG tablet Take 1 tablet (100 mg) by mouth At Bedtime 30 tablet 5     vitamin C (ASCORBIC ACID) 500 MG tablet TAKE ONE TABLET BY MOUTH TWICE A  tablet 3     Vitamin D3 (VITAMIN D3) 25 mcg (1000 units) tablet Take 1 tablet (25 mcg) by mouth daily 100 tablet 3     vitamin E (TOCOPHEROL) 400 units (180 mg) capsule TAKE ONE CAPSULE BY MOUTH TWICE A  capsule 11     VITAMIN E 180 MG (400 UNIT) capsule TAKE ONE CAPSULE BY MOUTH TWICE A  capsule 11        Mental Status Exam                                                                                   9, 14 cog        Alertness: alert  and oriented  Appearance:  Casually dressed  "and Adequately groomed  Behavior/Demeanor: cooperative, pleasant and calm, with fair  eye contact   Speech: regular rate and rhythm  Mood :  \"good\"  Affect: full range and appropriate; was congruent to mood; was congruent to content  Thought Process (Associations):  Linear and Goal directed  Thought process (Rate):  Normal  Thought content:  no overt psychosis, denies suicidal ideation, intent or thoughts and patient does not appear to be responding to internal stimuli  Perception:  Reports none;  Denies auditory hallucinations and visual hallucinations  Attention/Concentration:  Fair  Memory:  Immediate recall intact and Short-term memory intact  Language: intact  Fund of Knowledge/Intelligence:  Average  Abstraction:  Normal  Insight:  Fair  Judgment:  Fair  Cognition: (6) does  appear grossly intact; formal cognitive testing was not done    Physical Exam     Motor activity/EPS:  Normal  Psychomotor: normal or unremarkable    Labs and Results      Pertinent findings on review include: Review of records with relevant information reported in the HPI.  Reviewed pt's past medical record and obtained collateral information.      [place] PRESCRIPTION MONITORING [place] [] was checked today: Adderall 6/28,5/17,4/12,3/7,1/31,12/29,12/1.    PHQ9 Today:  N/A  PHQ 3/31/2020 6/11/2021 7/1/2021   PHQ-9 Total Score 7 4 8   Q9: Thoughts of better off dead/self-harm past 2 weeks Not at all Not at all Not at all       MECHE 7 Today: N/A  MECHE-7 SCORE 11/12/2019 6/11/2021 7/1/2021   Total Score 11 12 8       Recent Labs   Lab Test 06/08/21  1015 08/14/19  0814 08/02/17  0654   CR 0.90 0.85 0.77   GFRESTIMATED 87 >90 >90  Non African American GFR Calc       Recent Labs   Lab Test 06/08/21  1015 10/07/20  0000   AST 15 18   ALT 21 17   ALKPHOS 68 70      (12/23/2020),   (08/08/2012)     PSYCHOTROPIC DRUG INTERACTIONS:    Adderall---Buspar---Prozac---Trazodone---Wellbutrin: Concurrent use of AMPHETAMINES and SEROTONERGIC " AGENTS may result in increased risk of serotonin syndrome.   Adderall---Prozac---Omeprazole: Concurrent use of AMPHETAMINES and SEROTONERGIC AGENTS THAT INHIBIT CYP2D6 may result in increased amphetamine exposure  Buspar---Trazodone---Vistaril---Zyrtec---Topamax: Concurrent use of TRAZODONE and SEROTONERGIC CENTRAL NERVOUS SYSTEM DEPRESSANTS may result in increased risk of CNS depression.   Prozac---Trazodone: Concurrent use of TRAZODONE and SEROTONERGIC DRUGS THAT PROLONG QT INTERVAL may result in increased risk of QT-interval prolongation.   Prozac---Wellbutrin: Concurrent use of BUPROPION and SELECTED SEIZURE THRESHOLD LOWERING CYP2D6 SUBSTRATES may result in increased exposure of CYP2D6 substrates; increased risk of seizure.  Glycopyrrolate---Vistaril: Concurrent use of GLYCOPYRROLATE and ANTICHOLINERGICS may result in increased risk of anticholinergic side effects .    Glycopyrrolate---Wellbutrin---Vistaril: Concurrent use of BUPROPION and AGENTS LOWERING SEIZURE THRESHOLD may result in lower seizure threshold  Vistaril---Zithromax---Trazodone: Concurrent use of HYDROXYZINE and QT PROLONGING AGENTS may result in increased risk of QT-interval prolongation.   Buspar---Prozac: Concurrent use of FLUOXETINE and BUSPIRONE may result in worsening of psychiatric symptoms.      MANAGEMENT:  Monitoring for adverse effects, routine vitals, periodic EKGs and patient is aware of risks     Impression/Assessment      Mamie Rice is a 26 year old adult  who presents for med management follow up.  Pt appears stable in her mood and anxiety, denies SI, SIB or HI during the appointment.  Pt continues to take PRN Hydroxyzine 25 mg BID and anxiety are well managed.  Pt unsure why she was taking PRN Hydroxyzine.  Reiterated importance of EKG due to medication interactions, and complete EKG at 8/9  lab.  If EKG is WNL, ok to continue on current medication regimen.  But if QTC is elevated, may consider Trazodone increase and taper  off Hydroxyzine.  Discussed indication for PRN Hydroxyzine and if anxiety and sleep are well managed, pt does not need to take PRN Hydroxyzine.  Will continue on current medication regimen, but PRN Hydroxyzine is refilled 1 month with 1 refill only until pt completes EKG.    Diagnosis                                                                   MDD  MECHE  ADHD    Treatment Recommendation & Plan       Medication Ordered/Consults/Labs/tests Ordered:     Medication: Continue on current medication regimen for now. Hydroxyzine is refilled with 1 month before EKG completion.  If you do not feel anxious or sleeping well, skip Hydroxyzine.  OTC Recommendations: none  Lab Orders:  EKG already ordered.  Referrals: none  Release of Information: none  Future Treatment Considerations: Per symptoms.   Return for Follow Up: in 6 month if EKG is WNL.    -Discussed safety plan for suicidal thoughts  -Discussed plan for suicidality  -Discussed available emergency services  -Patient agrees with the treatment plan  -Encouraged to continue outpatient therapy to gain more coping mechanism for stress.    Treatment Risk Statement: Discussed with the patient my impressions, as well as recommended studies. I educated patient on the differential diagnosis and prognosis. I discussed with the patient the risks and benefits of medications versus no interventions, including efficacy, dose, possible side effects and length of treatment and the importance of medication compliance.  The patient understands the risks, benefits, adverse effects and alternatives. Agrees to treatment with the capacity to do so. No medical contraindications to treatment. The patient also understands the risks of using street drugs or alcohol.     CRISIS NUMBERS:   Provided routinely in AVS.      Jessie Pitts CNP, 07/05/2022

## 2022-07-05 NOTE — PATIENT INSTRUCTIONS
-Continue on current medication regimen for now.  Hydroxyzine is refilled with 1 month before EKG completion.  If you do not feel anxious or sleeping well, skip Hydroxyzine.    -Complete EKG at 8/9 appointment.  Make sure to make an appt to complete EKG at the lab.    Your next appointment is scheduled on 12/6/2022 (Tue) at 2:30pm.        **For crisis resources, please see the information at the end of this document**   Patient Education    Thank you for coming to the [facility] [place] [facility].     Lab Testing:  If you had lab testing today and your results are reassuring or normal they will be mailed to you or sent through EMBI within 7 days. If the lab tests need quick action we will call you with the results. The phone number we will call with results is # [phone]. If this is not the best number please call our clinic and change the number.     Medication Refills:  If you need any refills please call your pharmacy and they will contact us. Our fax number for refills is [phone].   Three business days of notice are needed for general medication refill requests.   Five business days of notice are needed for controlled substance refill requests.   If you need to change to a different pharmacy, please contact the new pharmacy directly. The new pharmacy will help you get your medications transferred.     Contact Us:  Please call [phone] during business hours (8-5:00 M-F).   If you have medication related questions after clinic hours, or on the weekend, please call [phone].     Financial Assistance [phone]   Medical Records [phone]       MENTAL HEALTH CRISIS RESOURCES:  For a emergency help, please call 911 or go to the nearest Emergency Department.     Emergency Walk-In Options:   Em[facility] Unit @ [facility] ([place]): [phone] - Specialized mental health emergency area designed to be calming  [facility] ([place]):  [phone]  [facility] ([place]): [phone]  [facility] ([place]): [phone]    [facility] Crisis Information:   [place]: [phone]  [place]: [phone]  [place] ([place]) - Adult: [phone]     Child: [phone]  [place] - Adult: [phone]     Child: [phone]  [place]: [phone]  List of all [facility] resources:   https://[place].gov/dhs/people-we-serve/adults/health-care/mental-health/resources/crisis-contacts.jsp    National Crisis Information:   Crisis Text Line: Text  [place]  to 624691  National Suicide Prevention Lifeline: 7-373-323-TALK (1-357.121.1893)       For online chat options, visit https://suicidepreventionlifeline.org/chat/  Poison Control Center: 1-995.360.9319  Trans Lifeline: 0-336-960-5557 - Hotline for transgender people of all ages  The [employer]: 3-873-617-2272 - Hotline for LGBT youth     For Non-Emergency Support:   Fast Tracker: Mental Health & Substance Use Disorder Resources -   https://www.Inquisitive Systemsn.org/

## 2022-07-08 DIAGNOSIS — F41.1 GAD (GENERALIZED ANXIETY DISORDER): ICD-10-CM

## 2022-07-11 RX ORDER — BUSPIRONE HYDROCHLORIDE 15 MG/1
TABLET ORAL
Qty: 60 TABLET | Refills: 0 | OUTPATIENT
Start: 2022-07-11

## 2022-07-29 DIAGNOSIS — E84.9 CYSTIC FIBROSIS (H): ICD-10-CM

## 2022-07-29 RX ORDER — ELEXACAFTOR, TEZACAFTOR, AND IVACAFTOR 100-50-75
KIT ORAL
Qty: 84 TABLET | Refills: 0 | Status: SHIPPED | OUTPATIENT
Start: 2022-07-29 | End: 2022-08-25

## 2022-08-09 ENCOUNTER — OFFICE VISIT (OUTPATIENT)
Dept: PULMONOLOGY | Facility: CLINIC | Age: 27
End: 2022-08-09
Attending: INTERNAL MEDICINE
Payer: COMMERCIAL

## 2022-08-09 ENCOUNTER — LAB (OUTPATIENT)
Dept: LAB | Facility: CLINIC | Age: 27
End: 2022-08-09
Attending: INTERNAL MEDICINE
Payer: COMMERCIAL

## 2022-08-09 ENCOUNTER — ANCILLARY PROCEDURE (OUTPATIENT)
Dept: GENERAL RADIOLOGY | Facility: CLINIC | Age: 27
End: 2022-08-09
Attending: INTERNAL MEDICINE
Payer: COMMERCIAL

## 2022-08-09 ENCOUNTER — ALLIED HEALTH/NURSE VISIT (OUTPATIENT)
Dept: CARE COORDINATION | Facility: CLINIC | Age: 27
End: 2022-08-09

## 2022-08-09 VITALS — OXYGEN SATURATION: 98 % | HEART RATE: 89 BPM | SYSTOLIC BLOOD PRESSURE: 106 MMHG | DIASTOLIC BLOOD PRESSURE: 56 MMHG

## 2022-08-09 DIAGNOSIS — K86.89 PANCREATIC INSUFFICIENCY: ICD-10-CM

## 2022-08-09 DIAGNOSIS — J32.4 CHRONIC PANSINUSITIS: ICD-10-CM

## 2022-08-09 DIAGNOSIS — Z13.9 RISK AND FUNCTIONAL ASSESSMENT: Primary | ICD-10-CM

## 2022-08-09 DIAGNOSIS — E84.0 CYSTIC FIBROSIS WITH PULMONARY MANIFESTATIONS (H): Primary | ICD-10-CM

## 2022-08-09 DIAGNOSIS — E84.9 CF (CYSTIC FIBROSIS) (H): ICD-10-CM

## 2022-08-09 DIAGNOSIS — R82.81 PYURIA: ICD-10-CM

## 2022-08-09 DIAGNOSIS — E84.0 CYSTIC FIBROSIS WITH PULMONARY MANIFESTATIONS (H): ICD-10-CM

## 2022-08-09 DIAGNOSIS — Z79.899 MEDICATION MANAGEMENT: ICD-10-CM

## 2022-08-09 DIAGNOSIS — E84.0 CYSTIC FIBROSIS OF THE LUNG (H): Primary | ICD-10-CM

## 2022-08-09 LAB
ALBUMIN SERPL-MCNC: 3.3 G/DL (ref 3.4–5)
ALBUMIN UR-MCNC: NEGATIVE MG/DL
ALP SERPL-CCNC: 53 U/L (ref 40–150)
ALT SERPL W P-5'-P-CCNC: 18 U/L (ref 0–50)
ANION GAP SERPL CALCULATED.3IONS-SCNC: 10 MMOL/L (ref 3–14)
APPEARANCE UR: ABNORMAL
AST SERPL W P-5'-P-CCNC: 14 U/L (ref 0–45)
BASOPHILS # BLD AUTO: 0 10E3/UL (ref 0–0.2)
BASOPHILS NFR BLD AUTO: 1 %
BILIRUB DIRECT SERPL-MCNC: <0.1 MG/DL (ref 0–0.2)
BILIRUB SERPL-MCNC: 0.2 MG/DL (ref 0.2–1.3)
BILIRUB UR QL STRIP: NEGATIVE
BUN SERPL-MCNC: 7 MG/DL (ref 7–30)
CALCIUM SERPL-MCNC: 8.3 MG/DL (ref 8.5–10.1)
CHLORIDE BLD-SCNC: 113 MMOL/L (ref 94–109)
CHOLEST SERPL-MCNC: 126 MG/DL
CK SERPL-CCNC: 75 U/L (ref 30–225)
CO2 SERPL-SCNC: 21 MMOL/L (ref 20–32)
COLOR UR AUTO: YELLOW
CREAT SERPL-MCNC: 0.87 MG/DL (ref 0.52–1.04)
CREAT UR-MCNC: 170 MG/DL
DEPRECATED CALCIDIOL+CALCIFEROL SERPL-MC: 42 UG/L (ref 20–75)
EOSINOPHIL # BLD AUTO: 0 10E3/UL (ref 0–0.7)
EOSINOPHIL NFR BLD AUTO: 0 %
ERYTHROCYTE [DISTWIDTH] IN BLOOD BY AUTOMATED COUNT: 12.9 % (ref 10–15)
EXPTIME-PRE: 5.91 SEC
FASTING STATUS PATIENT QL REPORTED: NO
FEF2575-%PRED-PRE: 99 %
FEF2575-PRE: 3.5 L/SEC
FEF2575-PRED: 3.5 L/SEC
FEFMAX-%PRED-PRE: 143 %
FEFMAX-PRE: 9.59 L/SEC
FEFMAX-PRED: 6.68 L/SEC
FEV1-%PRED-PRE: 103 %
FEV1-PRE: 3.15 L
FEV1FEV6-PRE: 86 %
FEV1FEV6-PRED: 86 %
FEV1FVC-PRE: 86 %
FEV1FVC-PRED: 86 %
FIFMAX-PRE: 4.85 L/SEC
FVC-%PRED-PRE: 102 %
FVC-PRE: 3.64 L
FVC-PRED: 3.54 L
GFR SERPL CREATININE-BSD FRML MDRD: >90 ML/MIN/1.73M2
GLUCOSE BLD-MCNC: 98 MG/DL (ref 70–99)
GLUCOSE UR STRIP-MCNC: NEGATIVE MG/DL
HBA1C MFR BLD: 5 % (ref 0–5.6)
HCT VFR BLD AUTO: 42 % (ref 35–47)
HDLC SERPL-MCNC: 45 MG/DL
HGB BLD-MCNC: 13.5 G/DL (ref 11.7–15.7)
HGB UR QL STRIP: NEGATIVE
IGE SERPL-ACNC: 7 KU/L (ref 0–114)
IMM GRANULOCYTES # BLD: 0 10E3/UL
IMM GRANULOCYTES NFR BLD: 0 %
INR PPP: 0.94 (ref 0.85–1.15)
IRON SERPL-MCNC: 75 UG/DL (ref 35–180)
KETONES UR STRIP-MCNC: NEGATIVE MG/DL
LDLC SERPL CALC-MCNC: 48 MG/DL
LEUKOCYTE ESTERASE UR QL STRIP: ABNORMAL
LYMPHOCYTES # BLD AUTO: 1.7 10E3/UL (ref 0.8–5.3)
LYMPHOCYTES NFR BLD AUTO: 41 %
MAGNESIUM SERPL-MCNC: 2.1 MG/DL (ref 1.6–2.3)
MCH RBC QN AUTO: 27.2 PG (ref 26.5–33)
MCHC RBC AUTO-ENTMCNC: 32.1 G/DL (ref 31.5–36.5)
MCV RBC AUTO: 85 FL (ref 78–100)
MICROALBUMIN UR-MCNC: 20 MG/L
MICROALBUMIN/CREAT UR: 11.76 MG/G CR (ref 0–25)
MONOCYTES # BLD AUTO: 0.5 10E3/UL (ref 0–1.3)
MONOCYTES NFR BLD AUTO: 11 %
NEUTROPHILS # BLD AUTO: 2 10E3/UL (ref 1.6–8.3)
NEUTROPHILS NFR BLD AUTO: 47 %
NITRATE UR QL: NEGATIVE
NONHDLC SERPL-MCNC: 81 MG/DL
NRBC # BLD AUTO: 0 10E3/UL
NRBC BLD AUTO-RTO: 0 /100
PH UR STRIP: 5 [PH] (ref 5–7)
PHOSPHATE SERPL-MCNC: 2.4 MG/DL (ref 2.5–4.5)
PLATELET # BLD AUTO: 284 10E3/UL (ref 150–450)
POTASSIUM BLD-SCNC: 3.8 MMOL/L (ref 3.4–5.3)
PROT SERPL-MCNC: 6.5 G/DL (ref 6.8–8.8)
RBC # BLD AUTO: 4.97 10E6/UL (ref 3.8–5.2)
RBC URINE: 1 /HPF
SODIUM SERPL-SCNC: 144 MMOL/L (ref 133–144)
SP GR UR STRIP: 1.03 (ref 1–1.03)
SQUAMOUS EPITHELIAL: 20 /HPF
TRIGL SERPL-MCNC: 167 MG/DL
TSH SERPL DL<=0.005 MIU/L-ACNC: 2.02 MU/L (ref 0.4–4)
UROBILINOGEN UR STRIP-MCNC: NORMAL MG/DL
WBC # BLD AUTO: 4.2 10E3/UL (ref 4–11)
WBC URINE: 24 /HPF

## 2022-08-09 PROCEDURE — 80050 GENERAL HEALTH PANEL: CPT | Performed by: PATHOLOGY

## 2022-08-09 PROCEDURE — 83735 ASSAY OF MAGNESIUM: CPT | Performed by: PATHOLOGY

## 2022-08-09 PROCEDURE — G0463 HOSPITAL OUTPT CLINIC VISIT: HCPCS | Mod: 25

## 2022-08-09 PROCEDURE — 84590 ASSAY OF VITAMIN A: CPT | Mod: 90 | Performed by: PATHOLOGY

## 2022-08-09 PROCEDURE — 83036 HEMOGLOBIN GLYCOSYLATED A1C: CPT | Performed by: PATHOLOGY

## 2022-08-09 PROCEDURE — 87070 CULTURE OTHR SPECIMN AEROBIC: CPT | Performed by: STUDENT IN AN ORGANIZED HEALTH CARE EDUCATION/TRAINING PROGRAM

## 2022-08-09 PROCEDURE — 36415 COLL VENOUS BLD VENIPUNCTURE: CPT | Performed by: PATHOLOGY

## 2022-08-09 PROCEDURE — 80061 LIPID PANEL: CPT | Performed by: PATHOLOGY

## 2022-08-09 PROCEDURE — 82248 BILIRUBIN DIRECT: CPT | Performed by: PATHOLOGY

## 2022-08-09 PROCEDURE — 82784 ASSAY IGA/IGD/IGG/IGM EACH: CPT | Performed by: INTERNAL MEDICINE

## 2022-08-09 PROCEDURE — 83540 ASSAY OF IRON: CPT | Performed by: PATHOLOGY

## 2022-08-09 PROCEDURE — 71046 X-RAY EXAM CHEST 2 VIEWS: CPT | Mod: GC | Performed by: RADIOLOGY

## 2022-08-09 PROCEDURE — 94375 RESPIRATORY FLOW VOLUME LOOP: CPT | Performed by: INTERNAL MEDICINE

## 2022-08-09 PROCEDURE — 99000 SPECIMEN HANDLING OFFICE-LAB: CPT | Performed by: PATHOLOGY

## 2022-08-09 PROCEDURE — 84446 ASSAY OF VITAMIN E: CPT | Mod: 90 | Performed by: PATHOLOGY

## 2022-08-09 PROCEDURE — 82043 UR ALBUMIN QUANTITATIVE: CPT | Performed by: PATHOLOGY

## 2022-08-09 PROCEDURE — 81001 URINALYSIS AUTO W/SCOPE: CPT | Performed by: PATHOLOGY

## 2022-08-09 PROCEDURE — 84100 ASSAY OF PHOSPHORUS: CPT | Performed by: PATHOLOGY

## 2022-08-09 PROCEDURE — 82785 ASSAY OF IGE: CPT | Performed by: INTERNAL MEDICINE

## 2022-08-09 PROCEDURE — 82550 ASSAY OF CK (CPK): CPT | Performed by: PATHOLOGY

## 2022-08-09 PROCEDURE — 85610 PROTHROMBIN TIME: CPT | Performed by: PATHOLOGY

## 2022-08-09 PROCEDURE — 99214 OFFICE O/P EST MOD 30 MIN: CPT | Mod: 25 | Performed by: STUDENT IN AN ORGANIZED HEALTH CARE EDUCATION/TRAINING PROGRAM

## 2022-08-09 PROCEDURE — 82306 VITAMIN D 25 HYDROXY: CPT | Performed by: INTERNAL MEDICINE

## 2022-08-09 ASSESSMENT — ANXIETY QUESTIONNAIRES
GAD7 TOTAL SCORE: 6
6. BECOMING EASILY ANNOYED OR IRRITABLE: MORE THAN HALF THE DAYS
2. NOT BEING ABLE TO STOP OR CONTROL WORRYING: SEVERAL DAYS
1. FEELING NERVOUS, ANXIOUS, OR ON EDGE: NOT AT ALL
3. WORRYING TOO MUCH ABOUT DIFFERENT THINGS: SEVERAL DAYS
7. FEELING AFRAID AS IF SOMETHING AWFUL MIGHT HAPPEN: NOT AT ALL
IF YOU CHECKED OFF ANY PROBLEMS ON THIS QUESTIONNAIRE, HOW DIFFICULT HAVE THESE PROBLEMS MADE IT FOR YOU TO DO YOUR WORK, TAKE CARE OF THINGS AT HOME, OR GET ALONG WITH OTHER PEOPLE: SOMEWHAT DIFFICULT
GAD7 TOTAL SCORE: 6
5. BEING SO RESTLESS THAT IT IS HARD TO SIT STILL: SEVERAL DAYS

## 2022-08-09 ASSESSMENT — PATIENT HEALTH QUESTIONNAIRE - PHQ9
SUM OF ALL RESPONSES TO PHQ QUESTIONS 1-9: 4
5. POOR APPETITE OR OVEREATING: SEVERAL DAYS

## 2022-08-09 NOTE — PROGRESS NOTES
"Adult Cystic Fibrosis Program  Annual Psychosocial Assessment    Presenting Information:  Mamie is a 26-year-old woman with cystic fibrosis, who presented in CF clinic for a regular follow up with primary CF provider, Dr. Allison. Met with Mamie to complete an annual psychosocial assessment. Mamie was unaccompanied in clinic during SW visit.    Living situation:  Mamie continues to live with her mother and stepfather in [place], [place].    Her sister (Chayo) and her sister's 3 children (Montiel-age 14, Max-age 4 and Brinly-age 3 yrs) also live in the home.  There is a cat and a dog in the home.  Mamie denies any concerns about her living situation but does say with her sister and sister's children it can be \"loud and rambunctious\".     Family Constellation:  Mamie's parents  when she was [age] year old. After the divorce she was raised by her mother, who later remarried.  Mamie's dad recently passed away in 2021 from [facility].  Mamie had very limited contact with her father throughout her life. Sonya also lost her maternal grandpa in 2021 which was a very difficult loss for the family.  She is close with her grandma who lives just a few blocks away, she reports that her grandma is doing okay but \"feisty\".  Mamie has a 42-year-old half-brother through her father, whom she had previously only met twice but now communicates with only through Facebook.  Her half-sister Chayo is 32 years old.  She also has 31-year-old stepsister who she used to be in touch with buy not recently.  Mamie does not know of any family members with CF and sometimes wishes that she did because she wishes she had someone close to her that knew how she felt.  She is not currently in a significant relationship, has never been  and does not have children.      Social Support:  Mamie reports good social support. She identifies her mother as her strongest source of support and they are very close.  She mostly has a good " "relationship with her sister Chayo but adds \"we are sisters so we also fight\".  She gets along fine with her step-dad.  She draws additional support from co-workers and friends. She does note that she used to be closer with her extended family members: aunts, uncles and cousins but she reports some family discord since her grandpa's death. She has been taking a break from [employer] but still considers her josi to be an important aspect of her life.  Mamie reports that she used to be more involved with the CF community when she was younger, her great aunt is still involved with fundraising and Mamie's family still participates in the CF walk in their community.  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      Adjustment to Illness:  Mamie was diagnosed with CF when she was about 5 months old.  Her mom recalls that on the day of her Sikhism she \"turned gray and her eyes rolled to the back of her head\", when she brought Mamie in her O2 sats were 70% and the CF diagnosis came shortly after.  Mamie has received most of her CF care through the [facility], there was a short period of time that she sought care at [facility] because her mom didn't feel that the chest pain that Mamie was reporting was being thoroughly investigated.  Mamie reports that her mother instilled a strong foundation of doing routine CF care.  She had a consistent therapy routine and they brought the vest machine on vacations.  She noted that her mother discussed therapy as \"part of life\" and the best way to reduce complications.  Mamie reports going through more complications during high school and they believe she had reactions to the air quality in her school building.  She often felt ill and missed school.  She recalls having a couple of PICC lines during that time.  She reports that her school accommodated her through letting her go home early and make up work through other avenues.  She feels her " "increased health complications caused her to lose some friends because some people didn't know how to handle her illness.     Mamie describes her current health status as \"good\".  Clinically, she has CF lung disease with normal spirometry, CF sinus disease and abnormal glucose tolerance. She does not take enzymes and stopped taking these mid-highschool.  She has also been followed by [facility], ENT, Ortho and with the Psychiatry clinic on the [facility].  She takes Trikafta and feels that it's going well, and although she doesn't feel a whole lot different, she recognizes that her lung function has always been good and it has remained stable considering she stopped vesting regularly in June. She has not been exercising and reports that she this is because she \"hates\" exercise and has not been able to find exercise that she enjoys.    Mamie describes herself as very open about having CF, growing up it was never a secret and they participated in fund raising events and their community knew.  When asked how CF has impacted her life, she stated that she looks at it as a part of who she is.  She doesn't feel like it's a burden as an adult, although maybe did when she was younger.  She does not feel that CF has impacted her QOL because she looks at herself as a healthy person with CF.    Mental Health:  Mamie has been diagnosed with depression, anxiety and ADHD.  Mamie has described feelings of depression, starting in 9th grade. She initially had difficulty expressing her feelings, but got help about a year later, receiving both counseling and medication.  Mamie struggled with symptoms of ADHD for many years and notes a strong family history of this disorder. She \"got by\" in younger years due to having a lot of support in school but was formally diagnosed ADHD and treated with Adderall in 2015 after doing poorly in college classes.  She has continued to receive consistent psychiatric care and has " "been receiving her psychiatric care for the past 5 years at the [facility].  Her current psychotropic medications are: Adderall, Atarax, Prozac, Wellbutrin and Buspar.    Mamie completed the following screens:  MECHE-7 Score: 6, indicating mild symptoms of anxiety and described as somewhat in daily functioning.    PHQ-9 Score: 4, indicating minimal symptoms of depression and described as not difficult at all in daily functioning.      Mamie describes her current/recent mood as: \"some days I want to scream\". She feels her OCD and anxiety symptoms continue to be the most problematic for her. She struggles with keeping the house orderly because she describes her sister and nephew as \"slobs\". She also reports some recent stressors at work related to new leadership that has caused her some problems at work. She does feel that she is treating her mental health the best she can and declines referrals for therapy, stating that she has feels her psychiatric provider does address this.    Mamie agreed with the scores above. She denied any additional symptoms not addressed on these screens. She denies any suicidal ideation.  She declined needing further mental health resources at this time and feels that she is treating it adequately.     Health Care Directive:  Mamie has previously received Health Care Directive education.  She is comfortable with her mom (SALOME) making medical decisions on her behalf in the absence of a health care directive.  She declined wanting more info on HCD today.    Education:  Mamie completed high school but missed a lot of school due to health issues. She tried a semester of college classes, but had difficulty keeping up, which she feels was partially due to untreated ADHD.   At this point, she has no specific plans for further education.       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 She does report some issues with her new boss but otherwise feels that it is a supportive work environment, they are aware of her CF diagnosis as [place] town and she herself went to school there and works with a lot of the people that she had as teachers while attending school. She reports that this is job does offer benefits including health insurance but she remains on her MA plan.    Finances:  Mamie receives income from wages and parental support. She denies having any specific financial concerns.      Insurance:  Mamie reports that she has Medical assistance through [employer] [facility] [employer].  It appears to be a MA plan for people with disabilities (special needs basic care plan).  She believes that the premium for this plan is $35 per month.  She denies any concerns about her living situation.  Writer continues to provide education eligibility for MA as she is working full-time but this plan likely has a higher income guideline than straight MA. She does have some concerns regarding her insurance coverage for chiropractic visit, she notes that she used to have no problem but lately they have been denying some of her visits. She did mentioned that she goes to the chiropractor often (Biweekly). SW explained that they may have a limit to how many of these visits are covered per year but encouraged her to call her insurance company to discuss and ask for an exception.    Chemical Health:  Mamie denies the use of tobacco products and denies second hand smoke exposure.  She denies the use of marijuana or other illegal drug use. She reports rare alcohol use.      Leisure Activities/Interests:  Mamie enjoys spending time with her family  and friends, going out to eat, shopping and sleeping.  She describes herself as very  organized.    Intervention:  -Psychosocial Assessment  -Mental Health Screening   -Supportive counseling  -Insurance counseling    Assessment:  Mamie was open in her responses and appeared to be in good spirits.   She reports stability with her physical health and continues to treat her mental health through psychiatric medication management.  She feels she is managing adequately from a mental health standpoint.  She continues to work full-time and is enjoying her work.  She continues to report a stable living environment, strong support system with access to the resources that she needs.  No specific concerns noted today.  She appears to be stable overall from a psychosocial perspective.  Recommend f/u as noted in the plan below.     Plan:  -Consult SW if concerns arise.  -Complete psychosocial assessment annually.    TAY Juarez, [facility]  Adult Cystic Fibrosis   Ph: [phone]  Pager: [phone]

## 2022-08-09 NOTE — PROGRESS NOTES
[facility]  [facility]  August 9, 2022         Assessment and Plan:   Mamie Rice is a 26 year old female with cystic fibrosis.    1. CF lung disease with normal spirometry: Continues on CFTR modulator therapy with Trikafta. Feeling well without any respiratory symptoms currently. Has not been doing any vesting or using nebs lately. Willing to try to add exercise into her schedule to promote bronchial clearance. PFTs show normal spirometry today.   - Continue Trikafta, Azithromycin   - Exercise 3x per week   - LFTs done today, wnl     2. Pancreatic Insufficiency/GI:  The patient has no new symptoms consistent with worsening malabsorption. Weight stable.  - continue the present dose of pancreatic enzymes  - continue vitamin supplementation.    3. Pyuria: chronic urinary frequency that is not wore than usual. Otherwise no new urinary symptoms - no dysuria, hematuria, urgency, odor. It appears this sample was not a totally clean catch so that may explain things. She will repeat a UA in [place] in 1 week to see if pyuria persists.   - repeat urinalysis w/ reflex to culture in 1 week (printed order provided to pt)     4. Hx abnormal glucose tolerance test: as a teenager she was diagnosed with diabetes and started on insulin for 2 weeks but suffered from extremely low BG so this was stopped. Since then she has not been on medications for DM. A1c 5.0 today. She does have a CGM which she wears during the school year to monitor her blood sugar. No highs, occasional lows which she treats by eating a snack.   - continue to monitor BG with CGM  - no need to repeat oral glucose tolerance test while using CGM     5. 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 Spending a lot of time with her grandma. Sister and her three children live at home (ages 14, 4, and almost 3). 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     6. RHM: UTD on  immunizations. Pap smear done by primary, reports this was normal.    Annual studies done today: A1c, CBC, IgE, IgG, INR, Phos/Mg, TSH, Vit A/D/E, Albumin, UA, BMP, CK, LFT, Fe, Lipids, CXR, PFT   Immunizations: UTD   Colonoscopy: Not yet due     Manasa Mazariegos MD   Pulmonary, Allergy, Critical Care and Sleep Medicine      Interval History:   Mamie is doing well overall. No new symptoms or concerns she wanted to bring up today.     This summer she is working as a nanny rather than at the school. 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     She did have a typical summer cold a few weeks ago. This seems to happen every summer. Feeling back to normal now. No new cough, sputum production, shortness of breath, sinus symptoms.     Appetite has been normal. No unanticipated weight gain or loss. No N/V/diarrhea.     In terms of blood sugars, she does not usually wear her CGM during the summer because her BG tends to be less low. Her lows tend to occur during the school year when she is busy on her feet all day with the kids, so she wears a CGM during the school year. Lately she has been checking BG every once in a while when she feels like it is low. She has not had any abnormally high values.          Review of Systems:     CONSTITUTIONAL: no fever, no chills, no sweats, no change in weight, no change in energy, no change in appetite    INTEGUMENTARY/SKIN: no rash, no obvious new lesions    ENT/MOUTH: no sore throat, no new sinus pain, no new nasal drainage, no new nasal congestion     RESPIRATORY: see interval history    CV: no chest pain, no palpitations, no peripheral edema    GI: no nausea, no vomiting, no change in stools, no fatty stools    : she feels like she pees a lot, says this is not new. No hematuria, dysuria, urgency, foul odor to urine.    MUSCULOSKELETAL: no  myalgias, no arthralgias    ENDOCRINE: as above    PSYCHIATRIC: mood stable          Past Medical and Surgical History:     Past Medical History:   Diagnosis Date     ADHD (attention deficit hyperactivity disorder)      Anxiety      Aspergillosis, with pneumonia (H)     fugus found caused chest pain     Chronic infection     CF, MRSA.,      Chronic sinusitis      Constipation, chronic      Cystic fibrosis with pulmonary manifestations (H) 12/19/2011     Cystic fibrosis without mention of meconium ileus     SWEAT TEST:Date: 2/17/1994   [facility]  296 mg, 104 mmol/L ClSample #2  295 mg, 104 mmol/L Cl GENOTYPING:Date: 10/15/2007,  Laboratory: [facility]: [id number]     Depressive disorder      Diabetes     no meds currently     Dysthymic disorder      Exocrine pancreatic insufficiency      Gastro-oesophageal reflux disease      Hip pain, right      MRSA (methicillin resistant Staphylococcus aureus) carrier      Pancreatic disease      Past Surgical History:   Procedure Laterality Date     ARTHROSCOPY HIP, OSTEOPLASTY FEMUR PROXIMAL, COMBINED  3/11/2013    Procedure: COMBINED ARTHROSCOPY HIP, OSTEOPLASTY FEMUR PROXIMAL;  Right Hip Arthroscopy, Labral  Debridement.    surgeon request choice anesthesia/admit to [facility] after surgery;  Surgeon: Omkar Austin MD;  [facility]     ARTHROSCOPY KNEE WITH MEDIAL MENISCECTOMY Left 1/31/2017    Procedure: ARTHROSCOPY KNEE WITH MEDIAL MENISCECTOMY;  Surgeon: Jethro Coyle MD;  [facility] OR     bronchoscopies       BRONCHOSCOPY       EXAM UNDER ANESTHESIA ANUS N/A 5/10/2016    Procedure: EXAM UNDER ANESTHESIA ANUS;  Surgeon: Chet Gaviria MD;  [facility]     EXAM UNDER ANESTHESIA, RESTORATIONS, EXTRACTION(S) DENTAL COMPLEX, COMBINED  5/13/2013    Procedure: COMBINED EXAM UNDER ANESTHESIA, RESTORATIONS, EXTRACTION(S) DENTAL COMPLEX;  Dental Exam, Radiographs, Restorations. Single Extraction  Tooth #2. Restorations  x 3;  Surgeon: Danilo Ortiz DDS;  [facility] OR     HC KNEE SCOPE, DIAGNOSTIC      Arthroscopy, Knee- left     INJECT BOTOX N/A 5/10/2016    Procedure: INJECT BOTOX;  Surgeon: Chet Gaviria MD;  [facility]     left hip labral tear  5/11/2011    left hip arthroscopy with labral debridement and synovectomy     meniscus repair       OPTICAL TRACKING SYSTEM ENDOSCOPIC SINUS SURGERY  10/14/2011    Procedure:OPTICAL TRACKING SYSTEM ENDOSCOPIC SINUS SURGERY; FESS (functional endoscopic sinus surgery) with Image Guidance, bronchial lavage and cultures; Surgeon:GOYO KUO; [facility] OR     OPTICAL TRACKING SYSTEM ENDOSCOPIC SINUS SURGERY  5/18/2012    Procedure:OPTICAL TRACKING SYSTEM ENDOSCOPIC SINUS SURGERY; Right  and Left Image Guided Functional Endoscopic Sinus Surgery With  Frontal Approach, [employer]; Surgeon:GOYO KUO; [facility]     OPTICAL TRACKING SYSTEM ENDOSCOPIC SINUS SURGERY  9/26/2012    Procedure: OPTICAL TRACKING SYSTEM ENDOSCOPIC SINUS SURGERY;  Stealth Guided Bilateral Functional Endoscopic Sinus Surgery *Latex Safe*;  Surgeon: Goyo Kuo MD;  [facility]     OPTICAL TRACKING SYSTEM ENDOSCOPIC SINUS SURGERY Bilateral 10/16/2015    Procedure: OPTICAL TRACKING SYSTEM ENDOSCOPIC SINUS SURGERY;  Surgeon: Mariela Haile MD;  [facility]     ORTHOPEDIC SURGERY      left hip tear repair 2010     SINUS SURGERY             Family History:     Family History   Problem Relation Age of Onset     Cancer Paternal Grandmother      Skin Cancer Paternal Grandmother      Other Cancer Paternal Grandmother         Skin     Obesity Paternal Grandmother      Cancer Paternal Grandfather         PGF had throat cancer (he was a smoker)     Other Cancer Paternal Grandfather      Anxiety Disorder Paternal Grandfather      Thyroid Disease Mother         ,     Hypertension Mother      Obesity Other      ALS Father 67        2 male cousins with ALS as well     Anesthesia Reaction No  family hx of      Blood Disease No family hx of      Colon Polyps No family hx of      Crohn's Disease No family hx of      Ulcerative Colitis No family hx of      Colon Cancer No family hx of      Melanoma No family hx of             Social History:     Social History     Socioeconomic History     Marital status: Single     Spouse name: Not on file     Number of children: Not on file     Years of education: Not on file     Highest education level: Not on file   Occupational History     Not on file   Tobacco Use     Smoking status: Never Smoker     Smokeless tobacco: Never Used     Tobacco comment: one person at home smokes outside   Substance and Sexual Activity     Alcohol use: No     Alcohol/week: 0.0 standard drinks     Drug use: No     Sexual activity: Never   Other Topics Concern      Service Not Asked     Blood Transfusions No     Caffeine Concern Not Asked     Occupational Exposure Not Asked     Hobby Hazards Not Asked     Sleep Concern Not Asked     Stress Concern Not Asked     Weight Concern Not Asked     Special Diet Not Asked     Back Care Not Asked     Exercise Yes     Bike Helmet Not Asked     Seat Belt Not Asked     Self-Exams Not Asked     Parent/sibling w/ CABG, MI or angioplasty before 65F 55M? Yes   Social History Narrative    Mamie lives with mother in [place], [place].  There is a cat in the home, but Mamie does not have any litterbox duties.  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  She gets essentially no exercise because of the tingling in her feet (says it bothers her even to stand).        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        8/2015 no change in social situation        2/15/2016 Pt is single and lives with mother and stepfather.     Social Determinants of Health     Financial Resource Strain: Not on file   Food Insecurity: Not on file   Transportation Needs: Not on file   Physical Activity:  Not on file   Stress: Not on file   Social Connections: Not on file   Intimate Partner Violence: Not on file   Housing Stability: Not on file            Medications:     Current Outpatient Medications   Medication     acetylcysteine (MUCOMYST) 20 % neb solution     albuterol (PROAIR HFA/PROVENTIL HFA/VENTOLIN HFA) 108 (90 Base) MCG/ACT inhaler     albuterol (PROVENTIL) (2.5 MG/3ML) 0.083% neb solution     amphetamine-dextroamphetamine (ADDERALL XR) 20 MG 24 hr capsule     [START ON 8/24/2022] amphetamine-dextroamphetamine (ADDERALL XR) 20 MG 24 hr capsule     [START ON 9/23/2022] amphetamine-dextroamphetamine (ADDERALL XR) 20 MG 24 hr capsule     amphetamine-dextroamphetamine (ADDERALL XR) 20 MG 24 hr capsule     azithromycin (ZITHROMAX) 500 MG tablet     beta carotene 51978 UNIT capsule     blood glucose (ACCU-CHEK SMARTVIEW) test strip     blood glucose (NO BRAND SPECIFIED) test strip     buPROPion (WELLBUTRIN XL) 300 MG 24 hr tablet     busPIRone (BUSPAR) 15 MG tablet     cetirizine (ZYRTEC) 10 MG tablet     Continuous Blood Gluc  (FREESTYLE WYATT 2 READER) KIRSTEN     Continuous Blood Gluc Sensor (FREESTYLE WYATT 2 SENSOR) MISC     doxycycline hyclate (VIBRAMYCIN) 100 MG capsule     elexacaftor-tezacaftor-ivacaftor & ivacaftor (TRIKAFTA) 100-50-75 & 150 MG tablet pack     FLUoxetine (PROZAC) 40 MG capsule     hydrOXYzine (ATARAX) 25 MG tablet     medroxyPROGESTERone (DEPO-PROVERA) 150 MG/ML IM injection     Multiple Vitamin (MULTIVITAMIN OR)     naltrexone (DEPADE/REVIA) 50 MG tablet     omeprazole (PRILOSEC) 40 MG DR capsule     phytonadione (MEPHYTON) 5 MG tablet     polyethylene glycol (MIRALAX) powder     topiramate (TOPAMAX) 25 MG tablet     traZODone (DESYREL) 100 MG tablet     vitamin C (ASCORBIC ACID) 500 MG tablet     Vitamin D3 (VITAMIN D3) 25 mcg (1000 units) tablet     vitamin E (TOCOPHEROL) 400 units (180 mg) capsule     VITAMIN E 180 MG (400 UNIT) capsule     montelukast (SINGULAIR) 10 MG  tablet     No current facility-administered medications for this visit.            Physical Exam:   /56   Pulse 89   SpO2 98%     Constitutional:   Awake, alert and in no apparent distress     Eyes:   nonicteric     Neck:   Supple     Lungs:   Good air flow.  No crackles. No rhonchi.  No wheezes.     Cardiovascular:   Normal S1 and S2.  RRR.  No murmur, gallop or rub.     Abdomen:   NABS, soft, nontender, nondistended.      Musculoskeletal:   No edema; digital clubbing present     Neurologic:   Alert and conversant.     Skin:   Warm, dry.  No rash on limited exam.             Data:   All laboratory and imaging data reviewed.    Cystic Fibrosis Culture  Specimen Description   Date Value Ref Range Status   06/08/2021 Sputum  Final   11/12/2019 Midstream Urine  Final   11/12/2019 Throat  Final    Culture Micro   Date Value Ref Range Status   06/08/2021 Heavy growth  Normal roberto carlos    Final   11/12/2019   Final    <10,000 colonies/mL  urogenital roberto carlos  Susceptibility testing not routinely done     11/12/2019 Light growth  Normal roberto carlos    Final   11/12/2019 Moderate growth  Staphylococcus aureus   (A)  Final        Recent Results (from the past 168 hour(s))   EKG 12-lead complete with read (future)    Collection Time: 08/09/22  7:49 AM   Result Value Ref Range    Systolic Blood Pressure  mmHg    Diastolic Blood Pressure  mmHg    Ventricular Rate 95 BPM    Atrial Rate 95 BPM    KS Interval 136 ms    QRS Duration 78 ms     ms    QTc 497 ms    P Axis 28 degrees    R AXIS 27 degrees    T Axis 48 degrees    Interpretation ECG       Sinus rhythm  Normal ECG  When compared with ECG of 08-AUG-2012 22:23,  Vent. rate has increased BY  34 BPM  QT has lengthened     Hemoglobin A1c    Collection Time: 08/09/22  8:25 AM   Result Value Ref Range    Hemoglobin A1C 5.0 0.0 - 5.6 %   INR    Collection Time: 08/09/22  8:26 AM   Result Value Ref Range    INR 0.94 0.85 - 1.15   Phosphorus    Collection Time: 08/09/22  8:26 AM    Result Value Ref Range    Phosphorus 2.4 (L) 2.5 - 4.5 mg/dL   TSH with free T4 reflex    Collection Time: 08/09/22  8:26 AM   Result Value Ref Range    TSH 2.02 0.40 - 4.00 mU/L   CBC with platelets and differential    Collection Time: 08/09/22  8:26 AM   Result Value Ref Range    WBC Count 4.2 4.0 - 11.0 10e3/uL    RBC Count 4.97 3.80 - 5.20 10e6/uL    Hemoglobin 13.5 11.7 - 15.7 g/dL    Hematocrit 42.0 35.0 - 47.0 %    MCV 85 78 - 100 fL    MCH 27.2 26.5 - 33.0 pg    MCHC 32.1 31.5 - 36.5 g/dL    RDW 12.9 10.0 - 15.0 %    Platelet Count 284 150 - 450 10e3/uL    % Neutrophils 47 %    % Lymphocytes 41 %    % Monocytes 11 %    % Eosinophils 0 %    % Basophils 1 %    % Immature Granulocytes 0 %    NRBCs per 100 WBC 0 <1 /100    Absolute Neutrophils 2.0 1.6 - 8.3 10e3/uL    Absolute Lymphocytes 1.7 0.8 - 5.3 10e3/uL    Absolute Monocytes 0.5 0.0 - 1.3 10e3/uL    Absolute Eosinophils 0.0 0.0 - 0.7 10e3/uL    Absolute Basophils 0.0 0.0 - 0.2 10e3/uL    Absolute Immature Granulocytes 0.0 <=0.4 10e3/uL    Absolute NRBCs 0.0 10e3/uL   Albumin Random Urine Quantitative with Creat Ratio    Collection Time: 08/09/22  8:29 AM   Result Value Ref Range    Creatinine Urine mg/dL 170 mg/dL    Albumin Urine mg/L 20 mg/L    Albumin Urine mg/g Cr 11.76 0.00 - 25.00 mg/g Cr   UA with Microscopic    Collection Time: 08/09/22  8:29 AM   Result Value Ref Range    Color Urine Yellow Colorless, Straw, Light Yellow, Yellow    Appearance Urine Slightly Cloudy (A) Clear    Glucose Urine Negative Negative mg/dL    Bilirubin Urine Negative Negative    Ketones Urine Negative Negative mg/dL    Specific Gravity Urine 1.030 1.003 - 1.035    Blood Urine Negative Negative    pH Urine 5.0 5.0 - 7.0    Protein Albumin Urine Negative Negative mg/dL    Urobilinogen Urine Normal Normal, 2.0 mg/dL    Nitrite Urine Negative Negative    Leukocyte Esterase Urine Trace (A) Negative    RBC Urine 1 <=2 /HPF    WBC Urine 24 (H) <=5 /HPF    Squamous Epithelials  Urine 20 (H) <=1 /HPF   Basic metabolic panel    Collection Time: 08/09/22  8:49 AM   Result Value Ref Range    Sodium 144 133 - 144 mmol/L    Potassium 3.8 3.4 - 5.3 mmol/L    Chloride 113 (H) 94 - 109 mmol/L    Carbon Dioxide (CO2) 21 20 - 32 mmol/L    Anion Gap 10 3 - 14 mmol/L    Urea Nitrogen 7 7 - 30 mg/dL    Creatinine 0.87 0.52 - 1.04 mg/dL    Calcium 8.3 (L) 8.5 - 10.1 mg/dL    Glucose 98 70 - 99 mg/dL    GFR Estimate >90 >60 mL/min/1.73m2   CK total    Collection Time: 08/09/22  8:49 AM   Result Value Ref Range    CK 75 30 - 225 U/L   Hepatic function panel    Collection Time: 08/09/22  8:49 AM   Result Value Ref Range    Bilirubin Total 0.2 0.2 - 1.3 mg/dL    Bilirubin Direct <0.1 0.0 - 0.2 mg/dL    Protein Total 6.5 (L) 6.8 - 8.8 g/dL    Albumin 3.3 (L) 3.4 - 5.0 g/dL    Alkaline Phosphatase 53 40 - 150 U/L    AST 14 0 - 45 U/L    ALT 18 0 - 50 U/L   Iron    Collection Time: 08/09/22  8:49 AM   Result Value Ref Range    Iron 75 35 - 180 ug/dL   Lipid Profile    Collection Time: 08/09/22  8:49 AM   Result Value Ref Range    Cholesterol 126 <200 mg/dL    Triglycerides 167 (H) <150 mg/dL    Direct Measure HDL 45 (L) >=50 mg/dL    LDL Cholesterol Calculated 48 <=100 mg/dL    Non HDL Cholesterol 81 <130 mg/dL    Patient Fasting > 8hrs? No    Magnesium    Collection Time: 08/09/22  8:49 AM   Result Value Ref Range    Magnesium 2.1 1.6 - 2.3 mg/dL   General PFT Lab (Please always keep checked)    Collection Time: 08/09/22  9:06 AM   Result Value Ref Range    FVC-Pred 3.54 L    FVC-Pre 3.64 L    FVC-%Pred-Pre 102 %    FEV1-Pre 3.15 L    FEV1-%Pred-Pre 103 %    FEV1FVC-Pred 86 %    FEV1FVC-Pre 86 %    FEFMax-Pred 6.68 L/sec    FEFMax-Pre 9.59 L/sec    FEFMax-%Pred-Pre 143 %    FEF2575-Pred 3.50 L/sec    FEF2575-Pre 3.50 L/sec    PKK1024-%Pred-Pre 99 %    ExpTime-Pre 5.91 sec    FIFMax-Pre 4.85 L/sec    FEV1FEV6-Pred 86 %    FEV1FEV6-Pre 86 %     PFT: normal spirometry   Pulmonary exacerbation: absent

## 2022-08-09 NOTE — PATIENT INSTRUCTIONS
Cystic Fibrosis Self-Care Plan    RECOMMENDATIONS:   Help us provide the best possible care. If you receive a questionnaire from the CF Foundation about your clinic experience today please fill it out.  It should take less than 5 minutes. Let us know what we are doing well and how we can improve.  Mamie, It was great to see you today.  The plan from today:  - Exercise 3x per week, 30 min per time  - Continue your medications as you are   - Repeat urine study in 1 week  - No need to do oral glucose test     YOUR GOAL:  Stay safe and well.  Enjoy the summer weather!      [facility] Nurse line:  Ying Farr  [phone]     [facility] Fax Number:      [phone]         Cystic Fibrosis Respiratory Therapists:   Jhoana Trinh              [phone]          Sharon Gutiérrez   [phone]  Cystic Fibrosis Dietitians:              Amy Andino              [phone]                            Theresa Ceja                        [phone]   Cystic Fibrosis Diabetes Nurse:    Rhonda Esteves   [phone]    Cystic Fibrosis Social Workers:     Deyanira Figueroa               [phone]                     Faith Zamudio               [phone]  Cystic Fibrosis Pharmacists:           Gris Adame                               [phone]         Ping Cisneros      [phone]   Cystic Fibrosis Genetic Counselor:   Kaley Escobedo    [phone]    [facility] website:  [email or web address]    COVID VACCINES:    You are eligible for the COVID-19 vaccine. Sign up for your COVID vaccine via [employer]. Log in, select the menu bar, select schedule an appointment, and then select COVID-19 Vaccine 1st Dose. You may also schedule by calling this number [phone] however hold times can be long.       OR schedule through the [place] [facility] Vaccine Connector at https://vaccineconnector.[place].gov/ or by calling [phone].      The best  vaccine is the one that s available to you first.  All COVID-19 vaccines currently available in the [place] ([employer], Pfizer and Moderna) have been shown to be highly effect at preventing COVID-19.       We re still learning how vaccines will affect the spread of COVID-19. After you ve been fully vaccinated against COVID-19, you should keep taking precautions in public places like wearing a mask, staying 6 feet apart from others, and avoiding crowds and poorly ventilated spaces until we know more.       MRN: [id number]   Clinic Date: August 9, 2022   Patient: Mamie Rice     Annual Studies:   IGG   Date Value Ref Range Status   06/08/2021 672 610 - 1,616 mg/dL Final     Insulin   Date Value Ref Range Status   08/14/2019 41.0 (H) 3 - 25 mU/L Final     There are no preventive care reminders to display for this patient.    Pulmonary Function Tests  FEV1: amount of air you can blow out in 1 second  FVC: total amount of air you can take in and blow out    Your Goals:         PFT Latest Ref Rng & Units 10/22/2021   FVC L 3.47   FEV1 L 3.11   FVC% % 97   FEV1% % 102          Airway Clearance: The Most Important Way to Keep Your Lungs Healthy  Vest Settings:   Hill-Rom Frequencies: 8, 9, 10 Pressure 10 Then, Frequencies 18, 19, 20 Pressure 6     RespirTech: Quick Start with Pressure of     Do each frequency for 5 minutes; Deflate vest after each frequency & cough 3 times before beginning the next setting.    Vest and Neb Therapy should be done 1-2 times/day.    Good Nutrition Can Improve Lung Function and Overall Health    Take ALL of your vitamins with food    Take 1/2 of your enzymes before EVERY meal/snack and the other 1/2 mid-meal/snack    Wt Readings from Last 3 Encounters:   01/14/22 86.2 kg (190 lb)   10/22/21 81.6 kg (180 lb)   07/01/21 81.2 kg (179 lb)       There is no height or weight on file to calculate BMI.         National CF Foundation Recommendations for BMI in CF Adults: Women: at  least 22 Men: at least 23        Controlling Blood Sugars Helps Prevent Lung Infections & Improves Nutrition  Test blood sugar:    In the morning before eating (goal is )    2 hours after a meal (goal is less than 150)    When pre-meal glucose is greater than 150 add correction    At bedtime (if less than 100 eat a snack with 15 grams of carbohydrates  Last A1C Results:   Hemoglobin A1C   Date Value Ref Range Status   06/08/2021 5.1 0 - 5.6 % Final     Comment:     Normal <5.7% Prediabetes 5.7-6.4%  Diabetes 6.5% or higher - adopted from ADA   consensus guidelines.           If diabetic, measure A1C every 6 months. Goal: Under 7%    Staying Healthy  Research:  If you are interested in learning about research opportunities or have questions, please contact the CF Research Team at [phone] or [email or web address].    CF Foundation:  Compass is a personalized resource service to help you with the insurance, financial, legal and other issues you are facing.  It's free, confidential and available to anyone with CF.  Ask your  for more information or contact Compass directly at 988-COMPASS ([phone]) or compass@[email or web address], or learn more at [email or web address].

## 2022-08-09 NOTE — PROGRESS NOTES
CF Annual Nutrition Assessment    Reason for Assessment  Assessed during Dr. Gavi Allison' clinic r/t increased nutrition risk with diagnosis of CF per protocol.    Nutrition Significant PMH  Mild Lung Disease/Normal Lung Function     -On Trikafta  Pancreatic Insufficient (fecal elastase done 2013)  CFRD - sees CF Endocrinologist, Dr. Conner MITCHELL  Class I obesity - undergoing treatment in [facility] since fall 2016 (MD and RD care)    Anthropometric Assessment  Height: 154.9 cm  IBW based on BMI 22 for females and 23 for males per [facility] recs: 54.6 kg  Today's Weight: 85.5 kg %IBW:  157%  Body Mass Index is 34.5 kg/m2    Weight History/Comments:   Mamie continues to follow with the [facility] clinic team and is taking medications to support weight control.  Has not had an appt in awhile but stays in contact and plans to go back soon for follow-up.  She was able to loose >6 kg from August 2016-January 2018 or ~8% total body weight.  She now sees Dr. Randa Ho who she is very happy with. She is currently taking naltrexone and bupropion for weight loss, as prescribed by Dr. Tolentino.  She states this helps somewhat with her cravings and snacking but the effectiveness seems to be waning over time.     Wt Readings from Last 10 Encounters:   01/14/22 86.2 kg (190 lb)   10/22/21 81.6 kg (180 lb)   07/01/21 81.2 kg (179 lb)   06/08/21 80.3 kg (177 lb)   10/09/20 77.1 kg (170 lb)   02/28/20 80.7 kg (178 lb)   12/18/19 78.9 kg (174 lb)   11/12/19 79.1 kg (174 lb 4.8 oz)   11/12/19 79.2 kg (174 lb 9.7 oz)   08/14/19 79.4 kg (175 lb)     Pancreatic Enzymes  Although tested as pancreatic insufficient (via fecal elastase) in the past pt does not take enzymes, stopped taking them in 2015 as she felt they had little affect on her GI symptoms. Ok'd this with CF MD prior to stopping.      Diet History and Assessment  Diet Preferences/Allergies/Intolerances: Regular diet, on appetite suppressive  medication (Topamax, naltrexone)    Intake Recall/Comments:  Eats TID meals with TID snacks.  Mamie eats/preps meals with her mom and often eats the same food that's prepared for her nieces and nephews that are often over. Hx of eating a high CHO containing snack right before bed but has been counseled to modify this due to overnight hypoglycemia (reactive hypoglycemia).     Calcium: BID glasses of skim milk + milk on cereal in AM + cheese throughout the day   Salt: salty snacks, boxed food, cheese, sauces/seasoning, Gatorade     Hydration: 1 medium bottle of Gatorade, 2 glasses of skim milk, 1 bottle of Dr. Pepper daily, 2 glasses of apple juice daily, 1 glass water daily   Supplements:  None     Laboratory Assessment    *Annual studies being done today prior to pt's clinic visit    DEXA: (6/8/2021) WNL    Current Vitamin/Mineral Supplements: Multivitamin, Vitamin D 1000 International Units, Vitamin E 400 International Units BID, Betacarotene 25,000 International Units 3x/week, Vitamin B12 1 mL IMJ 1x/month, Vitamin K weekly and Vitamin C 250 mg BID    Comments:  Pt stares she is taking all of the above as prescribed. She uses a pill box that she sets up weekly to insure that she doesn't forget anything. These are either covered by insurance or affordable OOP (ex: Vitamin E about $6/month).    CF Related Diabetes Evaluation  FSBG range: Normal per pt  Insulin: No  Carbohydrate Counting: No    Pt reports low BG after being physically active. Will still occasionally wake up in the middle of the night, check the BG, it's low, and will eat a snack.  This has happened now for years.  Has been educated on avoiding high simple CHO near bedtime.       NUTRITION DIAGNOSIS  Overweight/obese r/t excessive energy intake and lack of physical activity AEB BMI >30 kg/m2 with pt-reported chronic overeating requiring intervention via [facility] clinic.   INTERVENTIONS/RECOMMENDATIONS  1) Oral Intake/Weight: Pt reports she will return to  weight management clinic soon for a visit about her diet, medications.  She is interested in taking a break from actively dieting.  Happy that Trikafta has not increased her weight greatly, focusing on maintaining weight for now and focusing less on total calories, more on healthful food choices.     2) Enzymes: Denies any symptoms of malabsorption, will remain off enzyme therapy.     3) Vitamin/Mineral Supplementation: Vitamin levels from annual studies pending for today, reviewed vitamin/mineral supplements (as above), will communicate any recommended supplement changes to pt via [employer].     4) Diabetes:  Pt most interested in getting a sensor due to her commonly experiencing reactive glycemia.  Reviewed best practices for hypoglycemia treatment, however the hypoglycemia she experiences overnight is challenging.  A sensor would be helpful to alert her to this. Will follow.     Patient Understanding: Good  Expected Compliance: Good  Follow-Up Plans: Per Protocol     GOALS:   Weight maintenance vs reduction toward BMI <30    FOLLOW-UP/MONITORING:  Visit patient within 6-12 month(s) for annual review, support with weight loss/blood sugar control.  Continue to monitor for fat-soluble vitamin deficiencies as pt remains off pancreatic enzyme replacement therapy.     Time Spent in Face-to-Face Patient Interaction: 15 minutes (Medicare time with diabetes)    Theresa Ceja MS, RD, LD (pager [phone])  Cystic Fibrosis/Lung Transplant Dietitian      -Available Mon-Thurs 8 AM-4:30 PM. On Fridays please contact pager [phone] (Amy Andino RD) and on weekends/holidays contact coverage dietitian at pager [phone] (inpatient use only).

## 2022-08-09 NOTE — NURSING NOTE
"Mamie Rice is a 26 year old year old who is being seen for Cystic Fibrosis (F/u)      Medications reviewed and Vital signs taken.    Specimen Collection Type: Throat Swab    Order(s) placed: CF Aerobic Bacterial    *IF AFB order placed - please enter \"PRIORITIZE AFB\" to order comments.       No results found for: ACIDFAST      Lab Results   Component Value Date    AFBSMS  08/02/2017     Negative for acid fast bacteria  Less than 5ml of specimen received.  A minimum of 5 mL of sputum or fluid is recommended for recovery of acid fast   bacilli (AFB).  Volumes less than 5 mL are suboptimal and may compromise   recovery of AFB from culture.  Assayed at [facility] [address]           Vitals were taken and medications were reconciled.     DARREL Mcmahan      "

## 2022-08-09 NOTE — LETTER
8/9/2022         RE: Mamie Rice  [address]  [facility] [address]        Dear Colleague,    Thank you for referring your patient, Mamie Rice, to the [facility] [place]. Please see a copy of my visit note below.    [facility]  August 9, 2022         Assessment and Plan:   Mamie Rice is a 26 year old female with cystic fibrosis.    1. CF lung disease with normal spirometry: Continues on CFTR modulator therapy with Trikafta. Feeling well without any respiratory symptoms currently. Has not been doing any vesting or using nebs lately. Willing to try to add exercise into her schedule to promote bronchial clearance. PFTs show normal spirometry today.   - Continue Trikafta, Azithromycin   - Exercise 3x per week   - LFTs done today, wnl     2. Pancreatic Insufficiency/GI:  The patient has no new symptoms consistent with worsening malabsorption. Weight stable.  - continue the present dose of pancreatic enzymes  - continue vitamin supplementation.    3. Pyuria: chronic urinary frequency that is not wore than usual. Otherwise no new urinary symptoms - no dysuria, hematuria, urgency, odor. It appears this sample was not a totally clean catch so that may explain things. She will repeat a UA in [place] in 1 week to see if pyuria persists.   - repeat urinalysis w/ reflex to culture in 1 week (printed order provided to pt)     4. Hx abnormal glucose tolerance test: as a teenager she was diagnosed with diabetes and started on insulin for 2 weeks but suffered from extremely low BG so this was stopped. Since then she has not been on medications for DM. A1c 5.0 today. She does have a CGM which she wears during the school year to monitor her blood sugar. No highs, occasional lows which she treats by eating a snack.   - continue to monitor BG with CGM  - no need to repeat oral glucose tolerance  test while using CGM     5. 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 Spending a lot of time with her grandma. Sister and her three children live at home (ages 14, 4, and almost 3). 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     6. RHM: UTD on immunizations. Pap smear done by primary, reports this was normal.    Annual studies done today: A1c, CBC, IgE, IgG, INR, Phos/Mg, TSH, Vit A/D/E, Albumin, UA, BMP, CK, LFT, Fe, Lipids, CXR, PFT   Immunizations: UTD   Colonoscopy: Not yet due     Manasa Mazariegos MD   Pulmonary, Allergy, Critical Care and Sleep Medicine      Interval History:   Mamie is doing well overall. No new symptoms or concerns she wanted to bring up today.     This summer she is working as a nanny rather than at the school. 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     She did have a typical summer cold a few weeks ago. This seems to happen every summer. Feeling back to normal now. No new cough, sputum production, shortness of breath, sinus symptoms.     Appetite has been normal. No unanticipated weight gain or loss. No N/V/diarrhea.     In terms of blood sugars, she does not usually wear her CGM during the summer because her BG tends to be less low. Her lows tend to occur during the school year when she is busy on her feet all day with the kids, so she wears a CGM during the school year. Lately she has been checking BG every once in a while when she feels like it is low. She has not had any abnormally high values.          Review of Systems:     CONSTITUTIONAL: no fever, no chills, no sweats, no change in weight, no change in energy, no change in appetite    INTEGUMENTARY/SKIN: no rash, no obvious new lesions    ENT/MOUTH: no sore throat, no new sinus pain, no new nasal drainage, no new nasal congestion      RESPIRATORY: see interval history    CV: no chest pain, no palpitations, no peripheral edema    GI: no nausea, no vomiting, no change in stools, no fatty stools    : she feels like she pees a lot, says this is not new. No hematuria, dysuria, urgency, foul odor to urine.    MUSCULOSKELETAL: no myalgias, no arthralgias    ENDOCRINE: as above    PSYCHIATRIC: mood stable          Past Medical and Surgical History:     Past Medical History:   Diagnosis Date     ADHD (attention deficit hyperactivity disorder)      Anxiety      Aspergillosis, with pneumonia (H)     fugus found caused chest pain     Chronic infection     CF, MRSA.,      Chronic sinusitis      Constipation, chronic      Cystic fibrosis with pulmonary manifestations (H) 12/19/2011     Cystic fibrosis without mention of meconium ileus     SWEAT TEST:Date: 2/17/1994   Labor[facility]  296 mg, 104 mmol/L ClSample #2  295 mg, 104 mmol/L Cl GENOTYPING:Date: 10/15/2007,  Laboratory: [facility]: [id number]     Depressive disorder      Diabetes     no meds currently     Dysthymic disorder      Exocrine pancreatic insufficiency      Gastro-oesophageal reflux disease      Hip pain, right      MRSA (methicillin resistant Staphylococcus aureus) carrier      Pancreatic disease      Past Surgical History:   Procedure Laterality Date     ARTHROSCOPY HIP, OSTEOPLASTY FEMUR PROXIMAL, COMBINED  3/11/2013    Procedure: COMBINED ARTHROSCOPY HIP, OSTEOPLASTY FEMUR PROXIMAL;  Right Hip Arthroscopy, Labral  Debridement.    surgeon request choice anesthesia/admit to [facility] after surgery;  Surgeon: Omkar Austin MD;  [facility]: [facility]     ARTHROSCOPY KNEE WITH MEDIAL MENISCECTOMY Left 1/31/2017    Procedure: ARTHROSCOPY KNEE WITH MEDIAL MENISCECTOMY;  Surgeon: Jethro Coyle MD;  [facility] OR     bronchoscopies       BRONCHOSCOPY       EXAM UNDER ANESTHESIA ANUS N/A 5/10/2016    Procedure: EXAM UNDER ANESTHESIA ANUS;  Surgeon: Everette  Chet Dobbins MD;  [facility]     EXAM UNDER ANESTHESIA, RESTORATIONS, EXTRACTION(S) DENTAL COMPLEX, COMBINED  5/13/2013    Procedure: COMBINED EXAM UNDER ANESTHESIA, RESTORATIONS, EXTRACTION(S) DENTAL COMPLEX;  Dental Exam, Radiographs, Restorations. Single Extraction  Tooth #2. Restorations x 3;  Surgeon: Danilo Ortiz DDS;  Location: [facility] OR     HC KNEE SCOPE, DIAGNOSTIC      Arthroscopy, Knee- left     INJECT BOTOX N/A 5/10/2016    Procedure: INJECT BOTOX;  Surgeon: Chet Gaviria MD;  [facility]     left hip labral tear  5/11/2011    left hip arthroscopy with labral debridement and synovectomy     meniscus repair       OPTICAL TRACKING SYSTEM ENDOSCOPIC SINUS SURGERY  10/14/2011    Procedure:OPTICAL TRACKING SYSTEM ENDOSCOPIC SINUS SURGERY; FESS (functional endoscopic sinus surgery) with Image Guidance, bronchial lavage and cultures; Surgeon:GOYO KUO; Location:[facility] OR     OPTICAL TRACKING SYSTEM ENDOSCOPIC SINUS SURGERY  5/18/2012    Procedure:OPTICAL TRACKING SYSTEM ENDOSCOPIC SINUS SURGERY; Right  and Left Image Guided Functional Endoscopic Sinus Surgery With  Frontal Approach, [employer]; Surgeon:GOYO KUO; [facility] OR     OPTICAL TRACKING SYSTEM ENDOSCOPIC SINUS SURGERY  9/26/2012    Procedure: OPTICAL TRACKING SYSTEM ENDOSCOPIC SINUS SURGERY;  Stealth Guided Bilateral Functional Endoscopic Sinus Surgery *Latex Safe*;  Surgeon: Goyo Kuo MD;  [facility]     OPTICAL TRACKING SYSTEM ENDOSCOPIC SINUS SURGERY Bilateral 10/16/2015    Procedure: OPTICAL TRACKING SYSTEM ENDOSCOPIC SINUS SURGERY;  Surgeon: Mariela Haile MD;  [facility]     ORTHOPEDIC SURGERY      left hip tear repair 2010     SINUS SURGERY             Family History:     Family History   Problem Relation Age of Onset     Cancer Paternal Grandmother      Skin Cancer Paternal Grandmother      Other Cancer Paternal Grandmother         Skin     Obesity Paternal Grandmother      Cancer Paternal  Grandfather         PGF had throat cancer (he was a smoker)     Other Cancer Paternal Grandfather      Anxiety Disorder Paternal Grandfather      Thyroid Disease Mother         ,     Hypertension Mother      Obesity Other      ALS Father 67        2 male cousins with ALS as well     Anesthesia Reaction No family hx of      Blood Disease No family hx of      Colon Polyps No family hx of      Crohn's Disease No family hx of      Ulcerative Colitis No family hx of      Colon Cancer No family hx of      Melanoma No family hx of             Social History:     Social History     Socioeconomic History     Marital status: Single     Spouse name: Not on file     Number of children: Not on file     Years of education: Not on file     Highest education level: Not on file   Occupational History     Not on file   Tobacco Use     Smoking status: Never Smoker     Smokeless tobacco: Never Used     Tobacco comment: one person at home smokes outside   Substance and Sexual Activity     Alcohol use: No     Alcohol/week: 0.0 standard drinks     Drug use: No     Sexual activity: Never   Other Topics Concern      Service Not Asked     Blood Transfusions No     Caffeine Concern Not Asked     Occupational Exposure Not Asked     Hobby Hazards Not Asked     Sleep Concern Not Asked     Stress Concern Not Asked     Weight Concern Not Asked     Special Diet Not Asked     Back Care Not Asked     Exercise Yes     Bike Helmet Not Asked     Seat Belt Not Asked     Self-Exams Not Asked     Parent/sibling w/ CABG, MI or angioplasty before 65F 55M? Yes   Social History Narrative    Mamie lives with mother in [place], [place].  There is a cat in the home, but Mamie does not have any litterbox duties.  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  She gets essentially no exercise because of the tingling in her feet (says it bothers her even to stand).        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        8/2015 no change in social situation        2/15/2016 Pt is single and lives with mother and stepfather.     Social Determinants of Health     Financial Resource Strain: Not on file   Food Insecurity: Not on file   Transportation Needs: Not on file   Physical Activity: Not on file   Stress: Not on file   Social Connections: Not on file   Intimate Partner Violence: Not on file   Housing Stability: Not on file            Medications:     Current Outpatient Medications   Medication     acetylcysteine (MUCOMYST) 20 % neb solution     albuterol (PROAIR HFA/PROVENTIL HFA/VENTOLIN HFA) 108 (90 Base) MCG/ACT inhaler     albuterol (PROVENTIL) (2.5 MG/3ML) 0.083% neb solution     amphetamine-dextroamphetamine (ADDERALL XR) 20 MG 24 hr capsule     [START ON 8/24/2022] amphetamine-dextroamphetamine (ADDERALL XR) 20 MG 24 hr capsule     [START ON 9/23/2022] amphetamine-dextroamphetamine (ADDERALL XR) 20 MG 24 hr capsule     amphetamine-dextroamphetamine (ADDERALL XR) 20 MG 24 hr capsule     azithromycin (ZITHROMAX) 500 MG tablet     beta carotene 39424 UNIT capsule     blood glucose (ACCU-CHEK SMARTVIEW) test strip     blood glucose (NO BRAND SPECIFIED) test strip     buPROPion (WELLBUTRIN XL) 300 MG 24 hr tablet     busPIRone (BUSPAR) 15 MG tablet     cetirizine (ZYRTEC) 10 MG tablet     Continuous Blood Gluc  (FREESTYLE WYATT 2 READER) KIRSTEN     Continuous Blood Gluc Sensor (FREESTYLE WYATT 2 SENSOR) MISC     doxycycline hyclate (VIBRAMYCIN) 100 MG capsule     elexacaftor-tezacaftor-ivacaftor & ivacaftor (TRIKAFTA) 100-50-75 & 150 MG tablet pack     FLUoxetine (PROZAC) 40 MG capsule     hydrOXYzine (ATARAX) 25 MG tablet     medroxyPROGESTERone (DEPO-PROVERA) 150 MG/ML IM injection     Multiple Vitamin (MULTIVITAMIN OR)     naltrexone (DEPADE/REVIA) 50 MG tablet     omeprazole (PRILOSEC) 40 MG DR capsule     phytonadione (MEPHYTON) 5 MG tablet     polyethylene glycol (MIRALAX)  powder     topiramate (TOPAMAX) 25 MG tablet     traZODone (DESYREL) 100 MG tablet     vitamin C (ASCORBIC ACID) 500 MG tablet     Vitamin D3 (VITAMIN D3) 25 mcg (1000 units) tablet     vitamin E (TOCOPHEROL) 400 units (180 mg) capsule     VITAMIN E 180 MG (400 UNIT) capsule     montelukast (SINGULAIR) 10 MG tablet     No current facility-administered medications for this visit.            Physical Exam:   /56   Pulse 89   SpO2 98%     Constitutional:   Awake, alert and in no apparent distress     Eyes:   nonicteric     Neck:   Supple     Lungs:   Good air flow.  No crackles. No rhonchi.  No wheezes.     Cardiovascular:   Normal S1 and S2.  RRR.  No murmur, gallop or rub.     Abdomen:   NABS, soft, nontender, nondistended.      Musculoskeletal:   No edema; digital clubbing present     Neurologic:   Alert and conversant.     Skin:   Warm, dry.  No rash on limited exam.             Data:   All laboratory and imaging data reviewed.    Cystic Fibrosis Culture  Specimen Description   Date Value Ref Range Status   06/08/2021 Sputum  Final   11/12/2019 Midstream Urine  Final   11/12/2019 Throat  Final    Culture Micro   Date Value Ref Range Status   06/08/2021 Heavy growth  Normal roberto carlos    Final   11/12/2019   Final    <10,000 colonies/mL  urogenital roberto carlos  Susceptibility testing not routinely done     11/12/2019 Light growth  Normal roberto carlos    Final   11/12/2019 Moderate growth  Staphylococcus aureus   (A)  Final        Recent Results (from the past 168 hour(s))   EKG 12-lead complete with read (future)    Collection Time: 08/09/22  7:49 AM   Result Value Ref Range    Systolic Blood Pressure  mmHg    Diastolic Blood Pressure  mmHg    Ventricular Rate 95 BPM    Atrial Rate 95 BPM    PA Interval 136 ms    QRS Duration 78 ms     ms    QTc 497 ms    P Axis 28 degrees    R AXIS 27 degrees    T Axis 48 degrees    Interpretation ECG       Sinus rhythm  Normal ECG  When compared with ECG of 08-AUG-2012 22:23,  Vent.  rate has increased BY  34 BPM  QT has lengthened     Hemoglobin A1c    Collection Time: 08/09/22  8:25 AM   Result Value Ref Range    Hemoglobin A1C 5.0 0.0 - 5.6 %   INR    Collection Time: 08/09/22  8:26 AM   Result Value Ref Range    INR 0.94 0.85 - 1.15   Phosphorus    Collection Time: 08/09/22  8:26 AM   Result Value Ref Range    Phosphorus 2.4 (L) 2.5 - 4.5 mg/dL   TSH with free T4 reflex    Collection Time: 08/09/22  8:26 AM   Result Value Ref Range    TSH 2.02 0.40 - 4.00 mU/L   CBC with platelets and differential    Collection Time: 08/09/22  8:26 AM   Result Value Ref Range    WBC Count 4.2 4.0 - 11.0 10e3/uL    RBC Count 4.97 3.80 - 5.20 10e6/uL    Hemoglobin 13.5 11.7 - 15.7 g/dL    Hematocrit 42.0 35.0 - 47.0 %    MCV 85 78 - 100 fL    MCH 27.2 26.5 - 33.0 pg    MCHC 32.1 31.5 - 36.5 g/dL    RDW 12.9 10.0 - 15.0 %    Platelet Count 284 150 - 450 10e3/uL    % Neutrophils 47 %    % Lymphocytes 41 %    % Monocytes 11 %    % Eosinophils 0 %    % Basophils 1 %    % Immature Granulocytes 0 %    NRBCs per 100 WBC 0 <1 /100    Absolute Neutrophils 2.0 1.6 - 8.3 10e3/uL    Absolute Lymphocytes 1.7 0.8 - 5.3 10e3/uL    Absolute Monocytes 0.5 0.0 - 1.3 10e3/uL    Absolute Eosinophils 0.0 0.0 - 0.7 10e3/uL    Absolute Basophils 0.0 0.0 - 0.2 10e3/uL    Absolute Immature Granulocytes 0.0 <=0.4 10e3/uL    Absolute NRBCs 0.0 10e3/uL   Albumin Random Urine Quantitative with Creat Ratio    Collection Time: 08/09/22  8:29 AM   Result Value Ref Range    Creatinine Urine mg/dL 170 mg/dL    Albumin Urine mg/L 20 mg/L    Albumin Urine mg/g Cr 11.76 0.00 - 25.00 mg/g Cr   UA with Microscopic    Collection Time: 08/09/22  8:29 AM   Result Value Ref Range    Color Urine Yellow Colorless, Straw, Light Yellow, Yellow    Appearance Urine Slightly Cloudy (A) Clear    Glucose Urine Negative Negative mg/dL    Bilirubin Urine Negative Negative    Ketones Urine Negative Negative mg/dL    Specific Gravity Urine 1.030 1.003 - 1.035     Blood Urine Negative Negative    pH Urine 5.0 5.0 - 7.0    Protein Albumin Urine Negative Negative mg/dL    Urobilinogen Urine Normal Normal, 2.0 mg/dL    Nitrite Urine Negative Negative    Leukocyte Esterase Urine Trace (A) Negative    RBC Urine 1 <=2 /HPF    WBC Urine 24 (H) <=5 /HPF    Squamous Epithelials Urine 20 (H) <=1 /HPF   Basic metabolic panel    Collection Time: 08/09/22  8:49 AM   Result Value Ref Range    Sodium 144 133 - 144 mmol/L    Potassium 3.8 3.4 - 5.3 mmol/L    Chloride 113 (H) 94 - 109 mmol/L    Carbon Dioxide (CO2) 21 20 - 32 mmol/L    Anion Gap 10 3 - 14 mmol/L    Urea Nitrogen 7 7 - 30 mg/dL    Creatinine 0.87 0.52 - 1.04 mg/dL    Calcium 8.3 (L) 8.5 - 10.1 mg/dL    Glucose 98 70 - 99 mg/dL    GFR Estimate >90 >60 mL/min/1.73m2   CK total    Collection Time: 08/09/22  8:49 AM   Result Value Ref Range    CK 75 30 - 225 U/L   Hepatic function panel    Collection Time: 08/09/22  8:49 AM   Result Value Ref Range    Bilirubin Total 0.2 0.2 - 1.3 mg/dL    Bilirubin Direct <0.1 0.0 - 0.2 mg/dL    Protein Total 6.5 (L) 6.8 - 8.8 g/dL    Albumin 3.3 (L) 3.4 - 5.0 g/dL    Alkaline Phosphatase 53 40 - 150 U/L    AST 14 0 - 45 U/L    ALT 18 0 - 50 U/L   Iron    Collection Time: 08/09/22  8:49 AM   Result Value Ref Range    Iron 75 35 - 180 ug/dL   Lipid Profile    Collection Time: 08/09/22  8:49 AM   Result Value Ref Range    Cholesterol 126 <200 mg/dL    Triglycerides 167 (H) <150 mg/dL    Direct Measure HDL 45 (L) >=50 mg/dL    LDL Cholesterol Calculated 48 <=100 mg/dL    Non HDL Cholesterol 81 <130 mg/dL    Patient Fasting > 8hrs? No    Magnesium    Collection Time: 08/09/22  8:49 AM   Result Value Ref Range    Magnesium 2.1 1.6 - 2.3 mg/dL   General PFT Lab (Please always keep checked)    Collection Time: 08/09/22  9:06 AM   Result Value Ref Range    FVC-Pred 3.54 L    FVC-Pre 3.64 L    FVC-%Pred-Pre 102 %    FEV1-Pre 3.15 L    FEV1-%Pred-Pre 103 %    FEV1FVC-Pred 86 %    FEV1FVC-Pre 86 %     FEFMax-Pred 6.68 L/sec    FEFMax-Pre 9.59 L/sec    FEFMax-%Pred-Pre 143 %    FEF2575-Pred 3.50 L/sec    FEF2575-Pre 3.50 L/sec    [id number]-%Pred-Pre 99 %    ExpTime-Pre 5.91 sec    FIFMax-Pre 4.85 L/sec    FEV1FEV6-Pred 86 %    FEV1FEV6-Pre 86 %     PFT: normal spirometry   Pulmonary exacerbation: absent        Attestation with edits by Gavi Allison MD at 8/9/2022  2:11 PM (Updated):  Recent Results (from the past 168 hour(s))   EKG 12-lead complete with read (future)    Collection Time: 08/09/22  7:49 AM   Result Value Ref Range    Systolic Blood Pressure  mmHg    Diastolic Blood Pressure  mmHg    Ventricular Rate 95 BPM    Atrial Rate 95 BPM    KS Interval 136 ms    QRS Duration 78 ms     ms    QTc 497 ms    P Axis 28 degrees    R AXIS 27 degrees    T Axis 48 degrees    Interpretation ECG       Sinus rhythm  Normal ECG  When compared with ECG of 08-AUG-2012 22:23,  Vent. rate has increased BY  34 BPM  QT has lengthened     Hemoglobin A1c    Collection Time: 08/09/22  8:25 AM   Result Value Ref Range    Hemoglobin A1C 5.0 0.0 - 5.6 %   INR    Collection Time: 08/09/22  8:26 AM   Result Value Ref Range    INR 0.94 0.85 - 1.15   Phosphorus    Collection Time: 08/09/22  8:26 AM   Result Value Ref Range    Phosphorus 2.4 (L) 2.5 - 4.5 mg/dL   TSH with free T4 reflex    Collection Time: 08/09/22  8:26 AM   Result Value Ref Range    TSH 2.02 0.40 - 4.00 mU/L   Vitamin D Deficiency    Collection Time: 08/09/22  8:26 AM   Result Value Ref Range    Vitamin D, Total (25-Hydroxy) 42 20 - 75 ug/L   CBC with platelets and differential    Collection Time: 08/09/22  8:26 AM   Result Value Ref Range    WBC Count 4.2 4.0 - 11.0 10e3/uL    RBC Count 4.97 3.80 - 5.20 10e6/uL    Hemoglobin 13.5 11.7 - 15.7 g/dL    Hematocrit 42.0 35.0 - 47.0 %    MCV 85 78 - 100 fL    MCH 27.2 26.5 - 33.0 pg    MCHC 32.1 31.5 - 36.5 g/dL    RDW 12.9 10.0 - 15.0 %    Platelet Count 284 150 - 450 10e3/uL    % Neutrophils 47 %    %  Lymphocytes 41 %    % Monocytes 11 %    % Eosinophils 0 %    % Basophils 1 %    % Immature Granulocytes 0 %    NRBCs per 100 WBC 0 <1 /100    Absolute Neutrophils 2.0 1.6 - 8.3 10e3/uL    Absolute Lymphocytes 1.7 0.8 - 5.3 10e3/uL    Absolute Monocytes 0.5 0.0 - 1.3 10e3/uL    Absolute Eosinophils 0.0 0.0 - 0.7 10e3/uL    Absolute Basophils 0.0 0.0 - 0.2 10e3/uL    Absolute Immature Granulocytes 0.0 <=0.4 10e3/uL    Absolute NRBCs 0.0 10e3/uL   Albumin Random Urine Quantitative with Creat Ratio    Collection Time: 08/09/22  8:29 AM   Result Value Ref Range    Creatinine Urine mg/dL 170 mg/dL    Albumin Urine mg/L 20 mg/L    Albumin Urine mg/g Cr 11.76 0.00 - 25.00 mg/g Cr   UA with Microscopic    Collection Time: 08/09/22  8:29 AM   Result Value Ref Range    Color Urine Yellow Colorless, Straw, Light Yellow, Yellow    Appearance Urine Slightly Cloudy (A) Clear    Glucose Urine Negative Negative mg/dL    Bilirubin Urine Negative Negative    Ketones Urine Negative Negative mg/dL    Specific Gravity Urine 1.030 1.003 - 1.035    Blood Urine Negative Negative    pH Urine 5.0 5.0 - 7.0    Protein Albumin Urine Negative Negative mg/dL    Urobilinogen Urine Normal Normal, 2.0 mg/dL    Nitrite Urine Negative Negative    Leukocyte Esterase Urine Trace (A) Negative    RBC Urine 1 <=2 /HPF    WBC Urine 24 (H) <=5 /HPF    Squamous Epithelials Urine 20 (H) <=1 /HPF   Basic metabolic panel    Collection Time: 08/09/22  8:49 AM   Result Value Ref Range    Sodium 144 133 - 144 mmol/L    Potassium 3.8 3.4 - 5.3 mmol/L    Chloride 113 (H) 94 - 109 mmol/L    Carbon Dioxide (CO2) 21 20 - 32 mmol/L    Anion Gap 10 3 - 14 mmol/L    Urea Nitrogen 7 7 - 30 mg/dL    Creatinine 0.87 0.52 - 1.04 mg/dL    Calcium 8.3 (L) 8.5 - 10.1 mg/dL    Glucose 98 70 - 99 mg/dL    GFR Estimate >90 >60 mL/min/1.73m2   CK total    Collection Time: 08/09/22  8:49 AM   Result Value Ref Range    CK 75 30 - 225 U/L   Hepatic function panel    Collection Time:  08/09/22  8:49 AM   Result Value Ref Range    Bilirubin Total 0.2 0.2 - 1.3 mg/dL    Bilirubin Direct <0.1 0.0 - 0.2 mg/dL    Protein Total 6.5 (L) 6.8 - 8.8 g/dL    Albumin 3.3 (L) 3.4 - 5.0 g/dL    Alkaline Phosphatase 53 40 - 150 U/L    AST 14 0 - 45 U/L    ALT 18 0 - 50 U/L   Iron    Collection Time: 08/09/22  8:49 AM   Result Value Ref Range    Iron 75 35 - 180 ug/dL   Lipid Profile    Collection Time: 08/09/22  8:49 AM   Result Value Ref Range    Cholesterol 126 <200 mg/dL    Triglycerides 167 (H) <150 mg/dL    Direct Measure HDL 45 (L) >=50 mg/dL    LDL Cholesterol Calculated 48 <=100 mg/dL    Non HDL Cholesterol 81 <130 mg/dL    Patient Fasting > 8hrs? No    Magnesium    Collection Time: 08/09/22  8:49 AM   Result Value Ref Range    Magnesium 2.1 1.6 - 2.3 mg/dL   General PFT Lab (Please always keep checked)    Collection Time: 08/09/22  9:06 AM   Result Value Ref Range    FVC-Pred 3.54 L    FVC-Pre 3.64 L    FVC-%Pred-Pre 102 %    FEV1-Pre 3.15 L    FEV1-%Pred-Pre 103 %    FEV1FVC-Pred 86 %    FEV1FVC-Pre 86 %    FEFMax-Pred 6.68 L/sec    FEFMax-Pre 9.59 L/sec    FEFMax-%Pred-Pre 143 %    FEF2575-Pred 3.50 L/sec    FEF2575-Pre 3.50 L/sec    [id number]-%Pred-Pre 99 %    ExpTime-Pre 5.91 sec    FIFMax-Pre 4.85 L/sec    FEV1FEV6-Pred 86 %    FEV1FEV6-Pre 86 %       PFT: The spirometry is normal.  When compared to 10/22/2021 there is little change in the FEV1 and FVC.      Attending statement:    The patient was seen and examined by me with Dr. Mazariegos.  The case was discussed at length.    Vitals, lab results and imaging from today were reviewed.  The note reflects our joint assessment and plan.  Mamie is doing well with stable PFTs.  I have encouraged to engage in exercise at least 3 days per week.  Gavi Allison MD MPH      CF Annual Nutrition Assessment    Reason for Assessment  Assessed during Dr. Gavi Allison' clinic r/t increased nutrition risk with diagnosis of CF per protocol.    Nutrition  Significant PMH  Mild Lung Disease/Normal Lung Function     -On Trikafta  Pancreatic Insufficient (fecal elastase done 2013)  CFRD - sees CF Endocrinologist, Dr. Conner MITCHELL  Class I obesity - undergoing treatment in [facility] since fall 2016 (MD and RD care)    Anthropometric Assessment  Height: 154.9 cm  IBW based on BMI 22 for females and 23 for males per [facility] recs: 54.6 kg  Today's Weight: 85.5 kg %IBW:  157%  Body Mass Index is 34.5 kg/m2    Weight History/Comments:   Mamie continues to follow with the [facility] clinic team and is taking medications to support weight control.  Has not had an appt in awhile but stays in contact and plans to go back soon for follow-up.  She was able to loose >6 kg from August 2016-January 2018 or ~8% total body weight.  She now sees Dr. Randa Ho who she is very happy with. She is currently taking naltrexone and bupropion for weight loss, as prescribed by Dr. Tolentino.  She states this helps somewhat with her cravings and snacking but the effectiveness seems to be waning over time.     Wt Readings from Last 10 Encounters:   01/14/22 86.2 kg (190 lb)   10/22/21 81.6 kg (180 lb)   07/01/21 81.2 kg (179 lb)   06/08/21 80.3 kg (177 lb)   10/09/20 77.1 kg (170 lb)   02/28/20 80.7 kg (178 lb)   12/18/19 78.9 kg (174 lb)   11/12/19 79.1 kg (174 lb 4.8 oz)   11/12/19 79.2 kg (174 lb 9.7 oz)   08/14/19 79.4 kg (175 lb)     Pancreatic Enzymes  Although tested as pancreatic insufficient (via fecal elastase) in the past pt does not take enzymes, stopped taking them in 2015 as she felt they had little affect on her GI symptoms. Ok'd this with CF MD prior to stopping.      Diet History and Assessment  Diet Preferences/Allergies/Intolerances: Regular diet, on appetite suppressive medication (Topamax, naltrexone)    Intake Recall/Comments:  Eats TID meals with TID snacks.  Mamie eats/preps meals with her mom and often eats the same food that's prepared for  her nieces and nephews that are often over. Hx of eating a high CHO containing snack right before bed but has been counseled to modify this due to overnight hypoglycemia (reactive hypoglycemia).     Calcium: BID glasses of skim milk + milk on cereal in AM + cheese throughout the day   Salt: salty snacks, boxed food, cheese, sauces/seasoning, Gatorade     Hydration: 1 medium bottle of Gatorade, 2 glasses of skim milk, 1 bottle of Dr. Pepper daily, 2 glasses of apple juice daily, 1 glass water daily   Supplements:  None     Laboratory Assessment    *Annual studies being done today prior to pt's clinic visit    DEXA: (6/8/2021) WNL    Current Vitamin/Mineral Supplements: Multivitamin, Vitamin D 1000 International Units, Vitamin E 400 International Units BID, Betacarotene 25,000 International Units 3x/week, Vitamin B12 1 mL IMJ 1x/month, Vitamin K weekly and Vitamin C 250 mg BID    Comments:  Pt stares she is taking all of the above as prescribed. She uses a pill box that she sets up weekly to insure that she doesn't forget anything. These are either covered by insurance or affordable OOP (ex: Vitamin E about $6/month).    CF Related Diabetes Evaluation  FSBG range: Normal per pt  Insulin: No  Carbohydrate Counting: No    Pt reports low BG after being physically active. Will still occasionally wake up in the middle of the night, check the BG, it's low, and will eat a snack.  This has happened now for years.  Has been educated on avoiding high simple CHO near bedtime.       NUTRITION DIAGNOSIS  Overweight/obese r/t excessive energy intake and lack of physical activity AEB BMI >30 kg/m2 with pt-reported chronic overeating requiring intervention via [facility] clinic.   INTERVENTIONS/RECOMMENDATIONS  1) Oral Intake/Weight: Pt reports she will return to weight management clinic soon for a visit about her diet, medications.  She is interested in taking a break from actively dieting.  Happy that Trikafta has not increased her weight  greatly, focusing on maintaining weight for now and focusing less on total calories, more on healthful food choices.     2) Enzymes: Denies any symptoms of malabsorption, will remain off enzyme therapy.     3) Vitamin/Mineral Supplementation: Vitamin levels from annual studies pending for today, reviewed vitamin/mineral supplements (as above), will communicate any recommended supplement changes to pt via [facility].     4) Diabetes:  Pt most interested in getting a sensor due to her commonly experiencing reactive glycemia.  Reviewed best practices for hypoglycemia treatment, however the hypoglycemia she experiences overnight is challenging.  A sensor would be helpful to alert her to this. Will follow.     Patient Understanding: Good  Expected Compliance: Good  Follow-Up Plans: Per Protocol     GOALS:   Weight maintenance vs reduction toward BMI <30    FOLLOW-UP/MONITORING:  Visit patient within 6-12 month(s) for annual review, support with weight loss/blood sugar control.  Continue to monitor for fat-soluble vitamin deficiencies as pt remains off pancreatic enzyme replacement therapy.     Time Spent in Face-to-Face Patient Interaction: 15 minutes (Medicare time with diabetes)    Theresa Ceja MS, RD, LD (pager [phone])  Cystic Fibrosis/Lung Transplant Dietitian      -Available Mon-Thurs 8 AM-4:30 PM. On Fridays please contact pager [phone] (Amy Andino RD) and on weekends/holidays contact coverage dietitian at pager [phone] (inpatient use only).       Respiratory Therapist Note:         Vest                Brand: Hill-Rom - traditional Hill Rom: Frequencies 8, 9, 10 at pressure 10 then frequencies 18, 19, 20 at pressure 6.                Cough Pause: Cough Pause; Yes                Vest Garment Size: Adult Large                Last Fitting Date:                 Frequency of therapy: 0 times per week                Concerns: Patient using vest therapy very sporadically (~1 time monthly).         Exercise  (purposeful and aerobic for >20 minutes each session): NO.                Does this qualify as additional airway clearance: No         Alternative Airway Clearance:               Nebulized Medications                Bronchodilators: Albuterol                Mucolytic: Mucomyst                Antibiotics:                 Additional Inhaled Medications: none       Review Cleaning: No         Education and Transition Information                Correct order of inhaled medications: Yes                Mechanism of Action of inhaled medications: Yes                Frequency of inhaled medications: Yes                Dosage of inhaled medications: Yes                Other:          Home Care:                Nebulizer Cups (Brand/Type): disposable                Nebulizer Compressor                            Year Purchased: 2020                             [facility], Phone: [phone], Fax: [phone]                Nebulizer Supply Company:                            [facility], Phone: [phone], Fax: [phone]         Oxygen: none                      Pulmonary Rehab: none       Plan of Care and Goals for next visit: We again talked about the importance of airway clearance - either traditional or any exercise that elevates the heart rate for greater than 20 minutes 3 times weekly. 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 Will continue to address.       Again, thank you for allowing me to participate in the care of your patient.        Sincerely,        Manasa Mazariegos MD

## 2022-08-09 NOTE — PROGRESS NOTES
Respiratory Therapist Note:         Vest                Brand: Hill-Rom - traditional Hill Rom: Frequencies 8, 9, 10 at pressure 10 then frequencies 18, 19, 20 at pressure 6.                Cough Pause: Cough Pause; Yes                Vest Garment Size: Adult Large                Last Fitting Date:                 Frequency of therapy: 0 times per week                Concerns: Patient using vest therapy very sporadically (~1 time monthly).         Exercise (purposeful and aerobic for >20 minutes each session): NO.                Does this qualify as additional airway clearance: No         Alternative Airway Clearance:               Nebulized Medications                Bronchodilators: Albuterol                Mucolytic: Mucomyst                Antibiotics:                 Additional Inhaled Medications: none       Review Cleaning: No         Education and Transition Information                Correct order of inhaled medications: Yes                Mechanism of Action of inhaled medications: Yes                Frequency of inhaled medications: Yes                Dosage of inhaled medications: Yes                Other:          Home Care:                Nebulizer Cups (Brand/Type): disposable                Nebulizer Compressor                            Year Purchased: 2020                             [facility], Phone: [phone], Fax: [phone]                Nebulizer Supply Company:                            [facility], Phone: [phone], Fax: [phone]         Oxygen: none                      Pulmonary Rehab: none       Plan of Care and Goals for next visit: We again talked about the importance of airway clearance - either traditional or any exercise that elevates the heart rate for greater than 20 minutes 3 times weekly. 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 Will continue to address.

## 2022-08-10 LAB
ATRIAL RATE - MUSE: 95 BPM
DIASTOLIC BLOOD PRESSURE - MUSE: NORMAL MMHG
IGG SERPL-MCNC: 621 MG/DL (ref 610–1616)
INTERPRETATION ECG - MUSE: NORMAL
P AXIS - MUSE: 28 DEGREES
PR INTERVAL - MUSE: 136 MS
QRS DURATION - MUSE: 78 MS
QT - MUSE: 396 MS
QTC - MUSE: 497 MS
R AXIS - MUSE: 27 DEGREES
SYSTOLIC BLOOD PRESSURE - MUSE: NORMAL MMHG
T AXIS - MUSE: 48 DEGREES
VENTRICULAR RATE- MUSE: 95 BPM

## 2022-08-11 DIAGNOSIS — Z79.899 MEDICATION MANAGEMENT: Primary | ICD-10-CM

## 2022-08-12 LAB
A-TOCOPHEROL VIT E SERPL-MCNC: 9 MG/L
ANNOTATION COMMENT IMP: NORMAL
BETA+GAMMA TOCOPHEROL SERPL-MCNC: 0.6 MG/L
RETINYL PALMITATE SERPL-MCNC: 0.03 MG/L
VIT A SERPL-MCNC: 0.67 MG/L

## 2022-08-14 LAB — BACTERIA SPEC CULT: NORMAL

## 2022-08-23 ENCOUNTER — CLINICAL UPDATE (OUTPATIENT)
Dept: PHARMACY | Facility: CLINIC | Age: 27
End: 2022-08-23
Payer: COMMERCIAL

## 2022-08-23 DIAGNOSIS — E84.9 CYSTIC FIBROSIS (H): Primary | ICD-10-CM

## 2022-08-23 PROCEDURE — 99207 PR NO CHARGE LOS: CPT | Performed by: PHARMACIST

## 2022-08-23 NOTE — PROGRESS NOTES
Clinical Update:                                                    A chart review was conducted for Mamie Rice.    Reason for Chart Review: Trikafta Annual Lab Monitoring     Discussion: Mamie has been on Trikafta since 11/19/2019.  Per chart review, patient continues full dose Trikafta    Labs were reviewed from 8/9/22 at [facility] . All labs are within normal limits as they were previously.    Lab Results   Component Value Date    ALT 18 08/09/2022    AST 14 08/09/2022    BILITOTAL 0.2 08/09/2022    DBIL <0.1 08/09/2022    CKT 75 08/09/2022     Plan:  1. Continue Trikafta   2. Recheck hepatic panel and CK in 1 year or with next annual labs        Padma Alonso, PharmD   Medication Therapy Management   Cystic Fibrosis Pharmacist  Pager: [phone]

## 2022-08-25 DIAGNOSIS — E84.9 CYSTIC FIBROSIS (H): ICD-10-CM

## 2022-08-25 RX ORDER — ELEXACAFTOR, TEZACAFTOR, AND IVACAFTOR 100-50-75
KIT ORAL
Qty: 84 TABLET | Refills: 11 | Status: SHIPPED | OUTPATIENT
Start: 2022-08-25 | End: 2023-08-09

## 2022-09-30 ENCOUNTER — TELEPHONE (OUTPATIENT)
Dept: PULMONOLOGY | Facility: CLINIC | Age: 27
End: 2022-09-30

## 2022-10-03 ENCOUNTER — TELEPHONE (OUTPATIENT)
Dept: ENDOCRINOLOGY | Facility: CLINIC | Age: 27
End: 2022-10-03

## 2022-10-03 DIAGNOSIS — F33.0 MILD EPISODE OF RECURRENT MAJOR DEPRESSIVE DISORDER (H): ICD-10-CM

## 2022-10-03 DIAGNOSIS — F34.1 PERSISTENT DEPRESSIVE DISORDER: ICD-10-CM

## 2022-10-03 RX ORDER — BUPROPION HYDROCHLORIDE 300 MG/1
300 TABLET ORAL EVERY MORNING
Qty: 90 TABLET | Refills: 0 | Status: SHIPPED | OUTPATIENT
Start: 2022-10-03 | End: 2022-12-09

## 2022-10-03 NOTE — TELEPHONE ENCOUNTER
Pt needs buPROPian refill, and now has soonest [facility] appt: 12/9    Please call if any issues:  [phone]  **OK to leave detailed VM

## 2022-10-03 NOTE — TELEPHONE ENCOUNTER
buPROPion (WELLBUTRIN XL) 300 MG 24 hr tablet    Last Written Prescription Date:  6/6/22  Last Fill Quantity: 90,   # refills: 0  Last Office Visit : 1/14/22 [facility]  Future Office visit:  12/9/22     Sent per protocol. Labs 8/9/22    Pt needs buPROPian refill, and now has soonest [facility] appt: 12/9     Please call if any issues:  [phone]  **OK to leave detailed VM

## 2022-10-15 NOTE — LETTER
10/11/2017       RE: Mamie Rice  [address] [place] [id number]     Dear Colleague,    Thank you for referring your patient, Mamie Rice, to the [facility] EAR NO[facility] AND THROAT at [facility]. Please see a copy of my visit note below.    HISTORY OF PRESENT ILLNESS:  Mamie returns for meropenem instillation.  Her sinus symptoms are stable.  She does have a sore on the end of her nose and some skin lesions.  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  In any event, she is here for meropenem instillation.       PROCEDURE:  In order to see the maxillary antrum, a 0 degree rigid endoscope was necessary.  The endoscope was passed into her nasal cavity and 2 cc of meropenem is sprayed into the maxillary antrum on the left.  The procedure is repeated on the right side.  She tolerates this well.      PLAN:  I will see her back again in a month for repeat treatment.      HB/ms       Again, thank you for allowing me to participate in the care of your patient.      Sincerely,    Mariela Haile MD       yes

## 2022-10-30 ENCOUNTER — HEALTH MAINTENANCE LETTER (OUTPATIENT)
Age: 27
End: 2022-10-30

## 2022-11-06 DIAGNOSIS — E84.0 CYSTIC FIBROSIS WITH PULMONARY MANIFESTATIONS (H): ICD-10-CM

## 2022-11-07 DIAGNOSIS — F90.0 ATTENTION DEFICIT HYPERACTIVITY DISORDER (ADHD), PREDOMINANTLY INATTENTIVE TYPE: ICD-10-CM

## 2022-11-07 RX ORDER — ASCORBIC ACID 500 MG
TABLET ORAL
Qty: 100 TABLET | Refills: 3 | Status: SHIPPED | OUTPATIENT
Start: 2022-11-07 | End: 2023-05-11

## 2022-11-07 RX ORDER — DEXTROAMPHETAMINE SACCHARATE, AMPHETAMINE ASPARTATE MONOHYDRATE, DEXTROAMPHETAMINE SULFATE AND AMPHETAMINE SULFATE 5; 5; 5; 5 MG/1; MG/1; MG/1; MG/1
20 CAPSULE, EXTENDED RELEASE ORAL DAILY
Qty: 30 CAPSULE | Refills: 0 | Status: SHIPPED | OUTPATIENT
Start: 2022-11-07 | End: 2022-12-06

## 2022-11-07 NOTE — TELEPHONE ENCOUNTER
Last Seen: 7/5/22  RTC: 6 mo  Cancel: 0  No-Show: 0  Next Appt: 12/6/22    Incoming Refill From patient via MyChart    Medication Requested: amphetamine-dextroamphetamine (ADDERALL XR) 20 MG 24 hr capsule  Directions: Take 1 capsule (20 mg) by mouth daily  Qty: 30  Last Refill: 10/1/22    Medication Refill Pended Per Refill Protocol

## 2022-11-07 NOTE — TELEPHONE ENCOUNTER
Medication requested: ADDERALL XR 20MG CP24  Last refilled: 6/28/22  Qty: 30      Last seen: 7/5/22  RTC: 6 months  Cancel: 0  No-show: 0  Next appt: 12/6/22    Refill decision:   Refill pended and routed to the provider for review/determination due to   Controlled substance

## 2022-11-09 ENCOUNTER — MYC MEDICAL ADVICE (OUTPATIENT)
Dept: PSYCHIATRY | Facility: CLINIC | Age: 27
End: 2022-11-09

## 2022-11-09 DIAGNOSIS — G47.00 INSOMNIA, UNSPECIFIED TYPE: ICD-10-CM

## 2022-11-10 RX ORDER — HYDROXYZINE HYDROCHLORIDE 25 MG/1
25-50 TABLET, FILM COATED ORAL EVERY 6 HOURS PRN
Qty: 60 TABLET | Refills: 0 | Status: SHIPPED | OUTPATIENT
Start: 2022-11-10 | End: 2022-12-06

## 2022-11-10 NOTE — TELEPHONE ENCOUNTER
Last seen: 07/05/2022  RTC: 12/6/2022 (Tue) at 2:30pm  Cancel: None  No-show: None  Next appt: 12/06/2022     Incoming refill from Patient via [facility]    Medication requested:   Pending Prescriptions:                       Disp   Refills    hydrOXYzine (ATARAX) 25 MG tablet         60 tab*0            Sig: Take 1-2 tablets (25-50 mg) by mouth every 6           hours as needed for anxiety (sleep)      From chart note:   -Continue on current medication regimen for now.  Hydroxyzine is refilled with 1 month before EKG completion.  If you do not feel anxious or sleeping well, skip Hydroxyzine.     Medication refill approved per refill protocol.

## 2022-12-06 ENCOUNTER — VIRTUAL VISIT (OUTPATIENT)
Dept: PSYCHIATRY | Facility: CLINIC | Age: 27
End: 2022-12-06
Attending: NURSE PRACTITIONER
Payer: COMMERCIAL

## 2022-12-06 DIAGNOSIS — G47.00 INSOMNIA, UNSPECIFIED TYPE: ICD-10-CM

## 2022-12-06 DIAGNOSIS — F33.41 RECURRENT MAJOR DEPRESSIVE DISORDER, IN PARTIAL REMISSION (H): ICD-10-CM

## 2022-12-06 DIAGNOSIS — F90.0 ATTENTION DEFICIT HYPERACTIVITY DISORDER (ADHD), PREDOMINANTLY INATTENTIVE TYPE: ICD-10-CM

## 2022-12-06 DIAGNOSIS — F41.1 GAD (GENERALIZED ANXIETY DISORDER): Primary | ICD-10-CM

## 2022-12-06 PROCEDURE — 99213 OFFICE O/P EST LOW 20 MIN: CPT | Mod: 95 | Performed by: NURSE PRACTITIONER

## 2022-12-06 RX ORDER — GABAPENTIN 100 MG/1
100-300 CAPSULE ORAL 3 TIMES DAILY PRN
Qty: 270 CAPSULE | Refills: 1 | Status: SHIPPED | OUTPATIENT
Start: 2022-12-06 | End: 2022-12-23

## 2022-12-06 RX ORDER — HYDROXYZINE HYDROCHLORIDE 25 MG/1
25-50 TABLET, FILM COATED ORAL EVERY 6 HOURS PRN
Qty: 60 TABLET | Refills: 0 | Status: SHIPPED | OUTPATIENT
Start: 2022-12-06 | End: 2022-12-23

## 2022-12-06 RX ORDER — DEXTROAMPHETAMINE SACCHARATE, AMPHETAMINE ASPARTATE MONOHYDRATE, DEXTROAMPHETAMINE SULFATE AND AMPHETAMINE SULFATE 5; 5; 5; 5 MG/1; MG/1; MG/1; MG/1
20 CAPSULE, EXTENDED RELEASE ORAL DAILY
Qty: 30 CAPSULE | Refills: 0 | Status: SHIPPED | OUTPATIENT
Start: 2023-01-06 | End: 2023-01-17

## 2022-12-06 RX ORDER — DEXTROAMPHETAMINE SACCHARATE, AMPHETAMINE ASPARTATE MONOHYDRATE, DEXTROAMPHETAMINE SULFATE AND AMPHETAMINE SULFATE 5; 5; 5; 5 MG/1; MG/1; MG/1; MG/1
20 CAPSULE, EXTENDED RELEASE ORAL DAILY
Qty: 30 CAPSULE | Refills: 0 | Status: SHIPPED | OUTPATIENT
Start: 2022-12-06 | End: 2022-12-23

## 2022-12-06 RX ORDER — TRAZODONE HYDROCHLORIDE 100 MG/1
100 TABLET ORAL AT BEDTIME
Qty: 90 TABLET | Refills: 1 | Status: SHIPPED | OUTPATIENT
Start: 2022-12-06 | End: 2023-05-04

## 2022-12-06 RX ORDER — BUSPIRONE HYDROCHLORIDE 15 MG/1
15 TABLET ORAL 2 TIMES DAILY
Qty: 180 TABLET | Refills: 1 | Status: SHIPPED | OUTPATIENT
Start: 2022-12-06 | End: 2023-05-04 | Stop reason: DRUGHIGH

## 2022-12-06 RX ORDER — DEXTROAMPHETAMINE SACCHARATE, AMPHETAMINE ASPARTATE MONOHYDRATE, DEXTROAMPHETAMINE SULFATE AND AMPHETAMINE SULFATE 5; 5; 5; 5 MG/1; MG/1; MG/1; MG/1
20 CAPSULE, EXTENDED RELEASE ORAL DAILY
Qty: 30 CAPSULE | Refills: 0 | Status: SHIPPED | OUTPATIENT
Start: 2023-02-06 | End: 2023-01-17

## 2022-12-06 RX ORDER — FLUOXETINE 40 MG/1
CAPSULE ORAL
Qty: 180 CAPSULE | Refills: 1 | Status: SHIPPED | OUTPATIENT
Start: 2022-12-06 | End: 2023-05-04

## 2022-12-06 ASSESSMENT — PATIENT HEALTH QUESTIONNAIRE - PHQ9
SUM OF ALL RESPONSES TO PHQ QUESTIONS 1-9: 5
SUM OF ALL RESPONSES TO PHQ QUESTIONS 1-9: 5
10. IF YOU CHECKED OFF ANY PROBLEMS, HOW DIFFICULT HAVE THESE PROBLEMS MADE IT FOR YOU TO DO YOUR WORK, TAKE CARE OF THINGS AT HOME, OR GET ALONG WITH OTHER PEOPLE: NOT DIFFICULT AT ALL

## 2022-12-06 NOTE — PROGRESS NOTES
Start Time:  1430        End Time: 1441    Telemedicine Visit: The patient's condition can be safely assessed and treated via synchronous audio and visual telemedicine encounter.      Reason for Telemedicine Visit: Due to COVID 19 pandemic, clinic switching all appointments to telemedicine     Originating Site (Patient Location): at work    Distant Site (Provider Location): Provider Remote Setting    Consent:  The patient/guardian has verbally consented to: the potential risks and benefits of telemedicine (video visit) versus in person care; bill my insurance or make self-payment for services provided; and responsibility for payment of non-covered services.     Mode of Communication:  Video Conference via [employer]    As the provider I attest to compliance with applicable laws and regulations related to telemedicine.    Psychiatry Clinic Progress Note                                                                  Patient Name: Mamie Rice  YOB: 1995  MRN: [id number]  Date of Service:  12/06/2022  Last Seen:7/5/2022    Mamie Rice is a 27 year old person assigned female at birth, identifies as cisgender female who uses the name Mamie and pronoun kenneth.     Mamie Rice is a 27 year old year old adult who presents for ongoing psychiatric care.  Mamie Rice was last seen on 7/5/2022.     At that time,       Medication Ordered/Consults/Labs/tests Ordered:     Medication: Continue on current medication regimen for now. Hydroxyzine is refilled with 1 month before EKG completion.  If you do not feel anxious or sleeping well, skip Hydroxyzine.  OTC Recommendations: none  Lab Orders:  EKG already ordered.  Referrals: none  Release of Information: none  Future Treatment Considerations: Per symptoms.   Return for Follow Up: in 6 month if EKG is WNL.    Pertinent Background:  Diagnoses include MDD, MECHE and ADHD.  Psych critical item history includes [no critical items]. Medical complication includes  Cystic Fibrosis, DM I     Previous medication trial: Phentamine, Trazodone (150 mg oversedating, 50 mg ineffective)     Naltrexone, Wellbutrin and Topamax are managed by weight management clinic.    Interim History                                                                                                        4, 4     Since the last visit,  -Has been doing well,  Mood and anxiety are well managed, denies SI, SIB or HI.  -Continues to take Hydroxyzine 25 mg BID. Anxiety was fluctuating recently, but this is ok now.  -Plans to complete EKG in 3/2023 when she is following up with [facility] clinic.  -Open to try Gabapentin as she has not tried the medication.      Denies any symptoms suggestive of hypomania or psychosis.    Current Suicidality/Hx of Suicide Attempts: Denies both  CoCominent Medical concerns: Denies    Medication Side Effects: The patient denies all medication side effects.      Medical Review of Systems     Apart from the symptoms mentioned int he HPI, the 14 point review of systems, including constitutional, HEENT, cardiovascular, respiratory, gastrointestinal, genitourinary, musculoskeletal, integumentary, endocrine, neurological, hematologic and allergic is entirely negative.    Pregnant: None. Nursing: None, Contraception: DMPA    Substance Use   Denies frequent or abuse of alcohol. Denies any other substance use.     Social/ Family History                                  [per patient report]                                 1ea,1ea   Living arrangements: lives with parents and feels safe  Social Support:parents, friends, maternal grandparents  Access to gun: denies  Denies any trauma hx, numerous hospitalizations during childhood, does not consider this as traumatic  Works as a day care provider, dance coach and substitute .      Allergy                                Vancomycin and Augmentin    Current Medications                                                                                                        Current Outpatient Medications   Medication Sig Dispense Refill     acetylcysteine (MUCOMYST) 20 % neb solution INHALE 4ML VIA NEBULIZER TWO TIMES A DAY. MAY INCREASE TO 3-4 TIMES A DAY WITH INCREASED COUGH / COLD SYMPTOMS. REFRIGERATE VIAL AND DISCARD 96 HOURS AFTER OPENING 360 mL 11     albuterol (PROAIR HFA/PROVENTIL HFA/VENTOLIN HFA) 108 (90 Base) MCG/ACT inhaler Inhale 2 puffs into the lungs every 6 hours as needed for shortness of breath / dyspnea or wheezing 8.5 g 11     albuterol (PROVENTIL) (2.5 MG/3ML) 0.083% neb solution INHALE 1 VIAL ( 2.5MG) BY NEBULIZATION EVERY 4 HOURS AS NEEDED FOR FOR SHORTNESS OF BREATH OR WHEEZING 360 mL 11     amphetamine-dextroamphetamine (ADDERALL XR) 20 MG 24 hr capsule Take 1 capsule (20 mg) by mouth daily 30 capsule 0     azithromycin (ZITHROMAX) 500 MG tablet TAKE 1 TABLET BY MOUTH ON MONDAYS, WEDNESDAYS AND FRIDAYS AS DIRECTED 36 tablet 4     beta carotene 37373 UNIT capsule TAKE 1 CAPSULE BY MOUTH IN THE MORNING ON MONDAY, WEDNESDAY AND FRIDAY 36 capsule 4     blood glucose (ACCU-CHEK SMARTVIEW) test strip Use to test blood sugar 4 times daily or as directed. 400 each 3     blood glucose (NO BRAND SPECIFIED) test strip Use to test blood sugar 1 times daily or as directed. Precision Kofi Strips to go with Doug 2 reader 50 strip 11     buPROPion (WELLBUTRIN XL) 300 MG 24 hr tablet Take 1 tablet (300 mg) by mouth every morning 90 tablet 0     busPIRone (BUSPAR) 15 MG tablet Take 1 tablet (15 mg) by mouth 2 times daily 180 tablet 1     cetirizine (ZYRTEC) 10 MG tablet Take 1 tablet (10 mg) by mouth every evening 30 tablet 5     Continuous Blood Gluc  (FREESTYLE DOUG 2 READER) KIRSTEN 1 each daily 1 each 1     Continuous Blood Gluc Sensor (FREESTYLE DOUG 2 SENSOR) MISC 1 each every 14 days 2 each 11     doxycycline hyclate (VIBRAMYCIN) 100 MG capsule Take 1 capsule (100 mg) by mouth 2 times daily 20 capsule 0      elexacaftor-tezacaftor-ivacaftor & ivacaftor (TRIKAFTA) 100-50-75 & 150 MG tablet pack TAKE TWO ORANGE TABLETS BY MOUTH IN THE MORNING AND ONE BLUE TABLET IN THE EVENING AS DIRECTED ON PACKAGE.  TAKE WITH FAT CONTAINING FOOD. 84 tablet 11     FLUoxetine (PROZAC) 40 MG capsule TAKE TWO CAPSULES BY MOUTH EVERY DAY . 180 capsule 1     hydrOXYzine (ATARAX) 25 MG tablet Take 1-2 tablets (25-50 mg) by mouth every 6 hours as needed for anxiety (sleep) 60 tablet 0     medroxyPROGESTERone (DEPO-PROVERA) 150 MG/ML IM injection Inject 150 mg into the muscle       montelukast (SINGULAIR) 10 MG tablet TAKE ONE TABLET BY MOUTH AT BEDTIME 30 tablet 11     Multiple Vitamin (MULTIVITAMIN OR) Take 1 tablet by mouth daily.       naltrexone (DEPADE/REVIA) 50 MG tablet Take 1 tablet.  Time it one to two hours prior to worst cravings. 90 tablet 3     omeprazole (PRILOSEC) 40 MG DR capsule Take 1 capsule (40 mg) by mouth 2 times daily 60 capsule 11     phytonadione (MEPHYTON) 5 MG tablet TAKE 1 TABLET BY MOUTH ONCE A WEEK 4 tablet 11     polyethylene glycol (MIRALAX) powder Take 17 g (1 capful) by mouth daily as needed for constipation 119 g 3     topiramate (TOPAMAX) 25 MG tablet Take one 50 mg pill and one 25 mg pill twice a day 180 tablet 3     traZODone (DESYREL) 100 MG tablet Take 1 tablet (100 mg) by mouth At Bedtime 90 tablet 1     vitamin C (ASCORBIC ACID) 500 MG tablet TAKE 1 TABLET BY MOUTH TWICE A  tablet 3     Vitamin D3 (VITAMIN D3) 25 mcg (1000 units) tablet Take 1 tablet (25 mcg) by mouth daily 100 tablet 3     vitamin E (TOCOPHEROL) 400 units (180 mg) capsule TAKE ONE CAPSULE BY MOUTH TWICE A  capsule 11     VITAMIN E 180 MG (400 UNIT) capsule TAKE ONE CAPSULE BY MOUTH TWICE A  capsule 11        Mental Status Exam                                                                                   9, 14 cog        Alertness: alert  and oriented  Appearance:  Casually dressed and Adequately  "groomed  Behavior/Demeanor: cooperative, pleasant and calm, with fair  eye contact   Speech: regular rate and rhythm  Mood :  \"ok\"  Affect: appropriate and euthymic; was congruent to mood; was congruent to content  Thought Process (Associations):  Linear and Goal directed  Thought process (Rate):  Normal  Thought content:  no overt psychosis, denies suicidal ideation, intent or thoughts and patient does not appear to be responding to internal stimuli  Perception:  Reports none;  Denies auditory hallucinations and visual hallucinations  Attention/Concentration:  Fair  Memory:  Immediate recall intact and Short-term memory intact  Language: intact  Fund of Knowledge/Intelligence:  Average  Abstraction:  Normal  Insight:  Good, Fair  Judgment:  Good, Fair  Cognition: (6) does  appear grossly intact; formal cognitive testing was not done    Physical Exam     Motor activity/EPS:  Normal  Psychomotor: normal or unremarkable    Labs and Results      Pertinent findings on review include: Review of records with relevant information reported in the HPI.  Reviewed pt's past medical record and obtained collateral information.      [place] PRESCRIPTION MONITORING PROGRAM [] was checked today: Adderall 11/7,10/1,8/31,7/25.    Answers for HPI/ROS submitted by the patient on 12/6/2022  If you checked off any problems, how difficult have these problems made it for you to do your work, take care of things at home, or get along with other people?: Not difficult at all  PHQ9 TOTAL SCORE: 5      PHQ 6/11/2021 7/1/2021 8/9/2022   PHQ-9 Total Score 4 8 4   Q9: Thoughts of better off dead/self-harm past 2 weeks Not at all Not at all Not at all       MECHE-7 SCORE 6/11/2021 7/1/2021 8/9/2022   Total Score 12 8 6       Recent Labs   Lab Test 08/09/22  0849 06/08/21  1015 08/14/19  0814   CR 0.87 0.90 0.85   GFRESTIMATED >90 87 >90     Recent Labs   Lab Test 08/09/22  0849 06/08/21  1015   AST 14 15   ALT 18 21   ALKPHOS 53 68      " (8/9/2022), 447 (12/23/2020),  436 (08/08/2012)     PSYCHOTROPIC DRUG INTERACTIONS:    Adderall---Buspar---Prozac---Trazodone---Wellbutrin: Concurrent use of AMPHETAMINES and SEROTONERGIC AGENTS may result in increased risk of serotonin syndrome.   Adderall---Prozac---Omeprazole: Concurrent use of AMPHETAMINES and SEROTONERGIC AGENTS THAT INHIBIT CYP2D6 may result in increased amphetamine exposure  Buspar---Trazodone---Vistaril---Zyrtec---Topamax: Concurrent use of TRAZODONE and SEROTONERGIC CENTRAL NERVOUS SYSTEM DEPRESSANTS may result in increased risk of CNS depression.   Prozac---Trazodone: Concurrent use of TRAZODONE and SEROTONERGIC DRUGS THAT PROLONG QT INTERVAL may result in increased risk of QT-interval prolongation.   Prozac---Wellbutrin: Concurrent use of BUPROPION and SELECTED SEIZURE THRESHOLD LOWERING CYP2D6 SUBSTRATES may result in increased exposure of CYP2D6 substrates; increased risk of seizure.  Glycopyrrolate---Vistaril: Concurrent use of GLYCOPYRROLATE and ANTICHOLINERGICS may result in increased risk of anticholinergic side effects .    Glycopyrrolate---Wellbutrin---Vistaril: Concurrent use of BUPROPION and AGENTS LOWERING SEIZURE THRESHOLD may result in lower seizure threshold  Vistaril---Zithromax---Trazodone: Concurrent use of HYDROXYZINE and QT PROLONGING AGENTS may result in increased risk of QT-interval prolongation.   Buspar---Prozac: Concurrent use of FLUOXETINE and BUSPIRONE may result in worsening of psychiatric symptoms.      MANAGEMENT:  Monitoring for adverse effects, routine vitals, periodic EKGs and patient is aware of risks     Impression/Assessment      Mamie Rice is a 27 year old adult  who presents for med management follow up.  Pt appears stable in her mood and anxiety, denies SI, SIB or HI during the appointment.  Pt continues to take PRN Hydroxyzine 25 mg BID and anxiety are well managed now.  Reiterated elevated QTC and pt will repeat EKG when she comes to  clinic  in 3/2023. For the meantime, discussed combination of medications that she is taking may be causing this.  Recommended trial of Gabapentin instead of Hydroxyzine as Gabapentin does not affect QTC. Gabapentin may also help with sleep, then pt may not need to take Trazodone as well in the future.  Pt will try Gabapentin 100-300 mg TID PRN for anxiety and sleep. Will continue all other medications for now.    Diagnosis                                                                   MDD  MECHE  ADHD    Treatment Recommendation & Plan       Medication Ordered/Consults/Labs/tests Ordered:     Medication:   -May take Gabapentin 100-300 mg up to 3 times a day for anxiety and sleep.  Try Gabapentin instead of Hydroxyzine due to elevated EKG result. Monitor for brain fog and sedation. If this causes oversedation at bedtime, please contact [facility] so that they may adjust your Trazodone dose.  -Continue all other medication regimen.  OTC Recommendations: none  Lab Orders:  EKG already ordered.  Referrals: none  Release of Information: none  Future Treatment Considerations: Per symptoms.   Return for Follow Up: in 2 weeks    -Discussed safety plan for suicidal thoughts  -Discussed plan for suicidality  -Discussed available emergency services  -Patient agrees with the treatment plan  -Encouraged to continue outpatient therapy to gain more coping mechanism for stress.    Treatment Risk Statement: Discussed with the patient my impressions, as well as recommended studies. I educated patient on the differential diagnosis and prognosis. I discussed with the patient the risks and benefits of medications versus no interventions, including efficacy, dose, possible side effects and length of treatment and the importance of medication compliance.  The patient understands the risks, benefits, adverse effects and alternatives. Agrees to treatment with the capacity to do so. No medical contraindications to treatment. The patient also  understands the risks of using street drugs or alcohol.     CRISIS NUMBERS:   Provided routinely in AVS.      Jessie Pitts CNP, 12/06/2022

## 2022-12-06 NOTE — PATIENT INSTRUCTIONS
-May take Gabapentin 100-300 mg up to 3 times a day for anxiety and sleep.  Try Gabapentin instead of Hydroxyzine due to elevated EKG result. Monitor for brain fog and sedation. If this causes oversedation at bedtime, please contact [facility] so that they may adjust your Trazodone dose.  -Continue all other medication regimen.    Your next appointment is scheduled on 12/23/2022 (Fri) at 10am.      **For crisis resources, please see the information at the end of this document**   Patient Education    Thank you for coming to the [facility] [place] [facility].     Lab Testing:  If you had lab testing today and your results are reassuring or normal they will be mailed to you or sent through Mozy within 7 days. If the lab tests need quick action we will call you with the results. The phone number we will call with results is # [phone]. If this is not the best number please call our clinic and change the number.     Medication Refills:  If you need any refills please call your pharmacy and they will contact us. Our fax number for refills is [phone].   Three business days of notice are needed for general medication refill requests.   Five business days of notice are needed for controlled substance refill requests.   If you need to change to a different pharmacy, please contact the new pharmacy directly. The new pharmacy will help you get your medications transferred.     Contact Us:  Please call [phone] during business hours (8-5:00 M-F).   If you have medication related questions after clinic hours, or on the weekend, please call [phone].     Financial Assistance [phone]   Medical Records [phone]       MENTAL HEALTH CRISIS RESOURCES:  For a emergency help, please call 911 or go to the nearest Emergency Department.     Emergency Walk-In Options:   [facility] Unit @ [facility] ([place]): [phone] - Specialized mental health emergency area designed to be  [facility] ([place]): [phone]  [facility] ([place]): [phone]  [facility] ([place]): [phone]    County Crisis Information:   [place]: [phone]  [place]: [phone]  [place] ([place]) - Adult: [phone]     Child: [phone]  [place] - Adult: [phone]     Child: [phone]  [place]: [phone]  List of all [facility] resources:   https://[place].gov/dhs/people-we-serve/adults/health-care/mental-health/resources/crisis-contacts.jsp    National Crisis Information:   Crisis Text Line: Text  [place]  to 405142  Suicide & Crisis Lifeline: 988  National Suicide Prevention Lifeline: 5-099-964-TALK (1-103.739.6470)       For online chat options, visit https://suicidepreventionlifeline.org/chat/  Poison Control Center: 1-985.468.8827  Trans Lifeline: 1-181.699.5576 - Hotline for transgender people of all ages  The [place] [employer]: 9-862-055-9802 - Hotline for LGBT youth     For Non-Emergency Support:   Fast Tracker: Mental Health & Substance Use Disorder Resources -   https://www.NORCATtrackServeronn.org/

## 2022-12-06 NOTE — PROGRESS NOTES
Mamie Rice is a 27 year old who is being evaluated via a billable video visit.      Pt will join video visit via: [employer]  If there are problems joining the visit, send backup video invite via: Text to preferred phone: [phone]    Reason for telehealth visit: Patient has requested telehealth visit    Originating location (patient location): Patient's place of employment    Will anyone else be joining the visit? No      Answers for HPI/ROS submitted by the patient on 12/6/2022  If you checked off any problems, how difficult have these problems made it for you to do your work, take care of things at home, or get along with other people?: Not difficult at all  PHQ9 TOTAL SCORE: 5

## 2022-12-07 RX ORDER — HYDROXYZINE HYDROCHLORIDE 25 MG/1
TABLET, FILM COATED ORAL
Qty: 60 TABLET | Refills: 0 | OUTPATIENT
Start: 2022-12-07

## 2022-12-09 ENCOUNTER — VIRTUAL VISIT (OUTPATIENT)
Dept: ENDOCRINOLOGY | Facility: CLINIC | Age: 27
End: 2022-12-09
Payer: COMMERCIAL

## 2022-12-09 VITALS — BODY MASS INDEX: 33.99 KG/M2 | HEIGHT: 61 IN | WEIGHT: 180 LBS

## 2022-12-09 DIAGNOSIS — E66.811 CLASS 1 OBESITY DUE TO EXCESS CALORIES WITHOUT SERIOUS COMORBIDITY WITH BODY MASS INDEX (BMI) OF 30.0 TO 30.9 IN ADULT: ICD-10-CM

## 2022-12-09 DIAGNOSIS — E66.01 MORBID OBESITY (H): Primary | ICD-10-CM

## 2022-12-09 DIAGNOSIS — F33.0 MILD EPISODE OF RECURRENT MAJOR DEPRESSIVE DISORDER (H): ICD-10-CM

## 2022-12-09 DIAGNOSIS — E66.09 CLASS 1 OBESITY DUE TO EXCESS CALORIES WITHOUT SERIOUS COMORBIDITY WITH BODY MASS INDEX (BMI) OF 30.0 TO 30.9 IN ADULT: ICD-10-CM

## 2022-12-09 DIAGNOSIS — F34.1 PERSISTENT DEPRESSIVE DISORDER: ICD-10-CM

## 2022-12-09 PROCEDURE — 99213 OFFICE O/P EST LOW 20 MIN: CPT | Mod: 95 | Performed by: PEDIATRICS

## 2022-12-09 RX ORDER — TOPIRAMATE 25 MG/1
TABLET, FILM COATED ORAL
Qty: 180 TABLET | Refills: 3 | Status: SHIPPED | OUTPATIENT
Start: 2022-12-09 | End: 2023-05-12

## 2022-12-09 RX ORDER — BUPROPION HYDROCHLORIDE 300 MG/1
300 TABLET ORAL EVERY MORNING
Qty: 90 TABLET | Refills: 3 | Status: SHIPPED | OUTPATIENT
Start: 2022-12-09 | End: 2023-04-10

## 2022-12-09 RX ORDER — NALTREXONE HYDROCHLORIDE 50 MG/1
TABLET, FILM COATED ORAL
Qty: 90 TABLET | Refills: 3 | Status: SHIPPED | OUTPATIENT
Start: 2022-12-09 | End: 2023-05-12

## 2022-12-09 RX ORDER — TOPIRAMATE 50 MG/1
TABLET, FILM COATED ORAL
Qty: 180 TABLET | Refills: 3 | Status: SHIPPED | OUTPATIENT
Start: 2022-12-09 | End: 2023-05-12

## 2022-12-09 ASSESSMENT — PAIN SCALES - GENERAL: PAINLEVEL: NO PAIN (0)

## 2022-12-09 NOTE — LETTER
Date:December 12, 2022      Provider requested that no letter be sent. Do not send.       [facility]

## 2022-12-09 NOTE — PROGRESS NOTES
Mamie is a 27 year old who is being evaluated via a billable video visit.      How would you like to obtain your AVS? MyChart  If the video visit is dropped, the invitation should be resent by: Text to cell phone: [phone]  Will anyone else be joining your video visit? No  During this virtual visit the patient is located in [place], patient verifies this as the location during the entirety of this visit.         Return Medical Weight Management Note     Mamie Rice  MRN:  [id number]  :  1995  STEPHEN:  2022    Dear primary care provider:     I had the pleasure of seeing your patient Mamie Rice. She is a 27 year old female who I am continuing to see for treatment of obesity related to:       10/9/2016   I have the following health issues associated with obesity: Back Pain   I have the following symptoms associated with obesity: Knee Pain       INTERVAL HISTORY:  I last saw Mamie Rice on 2022. At that time, continued topiramate 75 mg twice a day, naltrexone 50 mg daily, and bupropion  mg daily. She also continues to remain on Adderrall XR 20 mg daily for a history of ADHD. She has overall been doing well on this regimen.     Dietary Recall:  Breakfast: she often will have the school breakfast  Lunch: often will pack lunch from home (options including soup or a sandwich); she will sometimes have the school lunch  Dinner: options including casserole, eggs, and potatoes  Snacks: she has around 2-3 snacks a day, with options including granola bar, fruit snacks  Drinks: she is now only having 1 Starbucks Frappuccino a day; she has decreased milk (only 1 cup a day); will occasionally have a diet soda.     In terms of physical activity, she works as a  at a school, and is therefore extremely active at work with the kids.     CURRENT WEIGHT:   180 lbs 0 oz    Initial weight was 182 pounds on 10/14/2016  Weight at last appointment on 2022 was 190 pounds  Weight today is 180  "pounds  Weight change since last visit 1/14/2022 is down 10 pounds  Total weight loss is 2 pounds    Changes and Difficulties 12/6/2022   I have made the following changes to my diet since my last visit: One less cup of milk with supper   With regards to my diet, I am still struggling with: -   I have made the following changes to my activity/exercise since my last visit: Starting walking daily   With regards to my activity/exercise, I am still struggling with: -       VITALS:  Ht 1.549 m (5' 1\")   Wt 81.6 kg (180 lb)   BMI 34.01 kg/m       GENERAL: Healthy, alert and no distress  EYES: Eyes grossly normal to inspection.   HENT: Normal cephalic/atraumatic.   RESP: No audible wheeze, cough.  No visible retractions or increased work of breathing.    MS: No gross musculoskeletal defects noted.  Normal range of motion.    SKIN: Visible skin clear.   NEURO: Cranial nerves grossly intact.  Mentation and speech appropriate for age.  PSYCH: Mentation appears normal, affect normal/bright, judgement and insight intact, normal speech and appearance well-groomed.    MEDICATIONS:   Current Outpatient Medications   Medication Sig Dispense Refill     acetylcysteine (MUCOMYST) 20 % neb solution INHALE 4ML VIA NEBULIZER TWO TIMES A DAY. MAY INCREASE TO 3-4 TIMES A DAY WITH INCREASED COUGH / COLD SYMPTOMS. REFRIGERATE VIAL AND DISCARD 96 HOURS AFTER OPENING 360 mL 11     albuterol (PROAIR HFA/PROVENTIL HFA/VENTOLIN HFA) 108 (90 Base) MCG/ACT inhaler Inhale 2 puffs into the lungs every 6 hours as needed for shortness of breath / dyspnea or wheezing 8.5 g 11     albuterol (PROVENTIL) (2.5 MG/3ML) 0.083% neb solution INHALE 1 VIAL ( 2.5MG) BY NEBULIZATION EVERY 4 HOURS AS NEEDED FOR FOR SHORTNESS OF BREATH OR WHEEZING 360 mL 11     amphetamine-dextroamphetamine (ADDERALL XR) 20 MG 24 hr capsule Take 1 capsule (20 mg) by mouth daily for 30 days 30 capsule 0     [START ON 1/6/2023] amphetamine-dextroamphetamine (ADDERALL XR) 20 MG 24 hr " capsule Take 1 capsule (20 mg) by mouth daily for 30 days 30 capsule 0     [START ON 2/6/2023] amphetamine-dextroamphetamine (ADDERALL XR) 20 MG 24 hr capsule Take 1 capsule (20 mg) by mouth daily for 30 days 30 capsule 0     azithromycin (ZITHROMAX) 500 MG tablet TAKE 1 TABLET BY MOUTH ON MONDAYS, WEDNESDAYS AND FRIDAYS AS DIRECTED 36 tablet 4     beta carotene 08833 UNIT capsule TAKE 1 CAPSULE BY MOUTH IN THE MORNING ON MONDAY, WEDNESDAY AND FRIDAY 36 capsule 4     blood glucose (ACCU-CHEK SMARTVIEW) test strip Use to test blood sugar 4 times daily or as directed. 400 each 3     blood glucose (NO BRAND SPECIFIED) test strip Use to test blood sugar 1 times daily or as directed. Precision Kofi Strips to go with Doug 2 reader 50 strip 11     buPROPion (WELLBUTRIN XL) 300 MG 24 hr tablet Take 1 tablet (300 mg) by mouth every morning 90 tablet 0     busPIRone (BUSPAR) 15 MG tablet Take 1 tablet (15 mg) by mouth 2 times daily 180 tablet 1     cetirizine (ZYRTEC) 10 MG tablet Take 1 tablet (10 mg) by mouth every evening 30 tablet 5     Continuous Blood Gluc  (FREESTYLE DOUG 2 READER) KIRSTEN 1 each daily 1 each 1     Continuous Blood Gluc Sensor (FREESTYLE DOUG 2 SENSOR) MISC 1 each every 14 days 2 each 11     doxycycline hyclate (VIBRAMYCIN) 100 MG capsule Take 1 capsule (100 mg) by mouth 2 times daily 20 capsule 0     elexacaftor-tezacaftor-ivacaftor & ivacaftor (TRIKAFTA) 100-50-75 & 150 MG tablet pack TAKE TWO ORANGE TABLETS BY MOUTH IN THE MORNING AND ONE BLUE TABLET IN THE EVENING AS DIRECTED ON PACKAGE.  TAKE WITH FAT CONTAINING FOOD. 84 tablet 11     FLUoxetine (PROZAC) 40 MG capsule TAKE TWO CAPSULES BY MOUTH EVERY DAY . 180 capsule 1     gabapentin (NEURONTIN) 100 MG capsule Take 1-3 capsules (100-300 mg) by mouth 3 times daily as needed (anxiety and sleep) 270 capsule 1     hydrOXYzine (ATARAX) 25 MG tablet Take 1-2 tablets (25-50 mg) by mouth every 6 hours as needed for anxiety (sleep) 60 tablet 0      medroxyPROGESTERone (DEPO-PROVERA) 150 MG/ML IM injection Inject 150 mg into the muscle       Multiple Vitamin (MULTIVITAMIN OR) Take 1 tablet by mouth daily.       naltrexone (DEPADE/REVIA) 50 MG tablet Take 1 tablet.  Time it one to two hours prior to worst cravings. 90 tablet 3     omeprazole (PRILOSEC) 40 MG DR capsule Take 1 capsule (40 mg) by mouth 2 times daily 60 capsule 11     phytonadione (MEPHYTON) 5 MG tablet TAKE 1 TABLET BY MOUTH ONCE A WEEK 4 tablet 11     polyethylene glycol (MIRALAX) powder Take 17 g (1 capful) by mouth daily as needed for constipation 119 g 3     topiramate (TOPAMAX) 25 MG tablet Take one 50 mg pill and one 25 mg pill twice a day 180 tablet 3     traZODone (DESYREL) 100 MG tablet Take 1 tablet (100 mg) by mouth At Bedtime 90 tablet 1     vitamin C (ASCORBIC ACID) 500 MG tablet TAKE 1 TABLET BY MOUTH TWICE A  tablet 3     Vitamin D3 (VITAMIN D3) 25 mcg (1000 units) tablet Take 1 tablet (25 mcg) by mouth daily 100 tablet 3     vitamin E (TOCOPHEROL) 400 units (180 mg) capsule TAKE ONE CAPSULE BY MOUTH TWICE A  capsule 11     VITAMIN E 180 MG (400 UNIT) capsule TAKE ONE CAPSULE BY MOUTH TWICE A  capsule 11     montelukast (SINGULAIR) 10 MG tablet TAKE ONE TABLET BY MOUTH AT BEDTIME 30 tablet 11       Weight Loss Medication History Reviewed With Patient 12/6/2022   Which weight loss medications are you currently taking on a regular basis?  Naltrexone, Topamax (topiramate), Bupropion   If you are not taking a weight loss medication that was prescribed to you, please indicate why: -   Are you having any side effects from the weight loss medication that we have prescribed you? No     LABS:  Component      Latest Ref Rng & Units 8/9/2022   Sodium      133 - 144 mmol/L 144   Potassium      3.4 - 5.3 mmol/L 3.8   Chloride      94 - 109 mmol/L 113 (H)   Carbon Dioxide      20 - 32 mmol/L 21   Anion Gap      3 - 14 mmol/L 10   Urea Nitrogen      7 - 30 mg/dL 7    Creatinine      0.52 - 1.04 mg/dL 0.87   Calcium      8.5 - 10.1 mg/dL 8.3 (L)   Glucose      70 - 99 mg/dL 98   GFR Estimate      >60 mL/min/1.73m2 >90   Bilirubin Total      0.2 - 1.3 mg/dL 0.2   Bilirubin Direct      0.0 - 0.2 mg/dL <0.1   Protein Total      6.8 - 8.8 g/dL 6.5 (L)   Albumin      3.4 - 5.0 g/dL 3.3 (L)   Alkaline Phosphatase      40 - 150 U/L 53   AST      0 - 45 U/L 14   ALT      0 - 50 U/L 18   Cholesterol      <200 mg/dL 126   Triglycerides      <150 mg/dL 167 (H)   HDL Cholesterol      >=50 mg/dL 45 (L)   LDL Cholesterol Calculated      <=100 mg/dL 48   Non HDL Cholesterol      <130 mg/dL 81   Patient Fasting > 8hrs?       No   Hemoglobin A1C      0.0 - 5.6 % 5.0   TSH      0.40 - 4.00 mU/L 2.02     ASSESSMENT:   Mamie Rice is a 27 year old female with a history of class 1 obesity (defined as BMI 30-35 kg/m2). She  also has a history of CF, CFRD, ADHD, and depression. Currently on topiramate, naltrexone, and buproion. Overall doing well. Therefore, will plan to continue the current regimen.      Additional plans and goals, made through shared decision making, as outlined below.        PLAN:   Patient Instructions   1. Food Goal:  - Will have no more than 1 glass milk at dinner   - Will have no more than 1 Frapocchino daily     2. Activity Goal:  - Continue to be active as you are     3. Medications:   - Continue topiramate 75 mg twice a day (one 50 mg pill and one 25 mg pill twice a day)  - Continue naltrexone 50 mg daily  - Continue bupropion 300 mg daily    4. We can plan to see you again in clinic in around 6 months. If any questions or concerns before that time, please let us know.      FOLLOW-UP:    4-6 months.     Sincerely,    Charles Park MD    Video-Visit Details    Video Start Time: 1:00 PM    Type of service:  Video Visit    Video End Time:1:12 PM    Originating Location (pt. Location): Home    Distant Location (provider location):  On-site    Platform used for Video  Visit: Juan     I spent 20 minutes of total time, before, during, and after the visit reviewing previous labs and records, examining the patient, answering their questions, formulating and discussing the plan of care, reviewing resulted labs, and writing the visit note.

## 2022-12-09 NOTE — LETTER
2022       RE: Mamie Rice  [address] [facility] [address]     Dear Colleague,    Thank you for referring your patient, Mamie Rice, to the [facility] [place] [facility]. Please see a copy of my visit note below.    Mamie is a 27 year old who is being evaluated via a billable video visit.      How would you like to obtain your AVS? MyChart  If the video visit is dropped, the invitation should be resent by: Text to cell phone: [phone]  Will anyone else be joining your video visit? No  During this virtual visit the patient is located in [place], patient verifies this as the location during the entirety of this visit.         Return Medical Weight Management Note     Mamie Rice  MRN:  [id number]  :  1995  STEPHEN:  2022    Dear primary care provider:     I had the pleasure of seeing your patient Mamie Rice. She is a 27 year old female who I am continuing to see for treatment of obesity related to:       10/9/2016   I have the following health issues associated with obesity: Back Pain   I have the following symptoms associated with obesity: Knee Pain       INTERVAL HISTORY:  I last saw Mamie Rice on 2022. At that time, continued topiramate 75 mg twice a day, naltrexone 50 mg daily, and bupropion  mg daily. She also continues to remain on Adderrall XR 20 mg daily for a history of ADHD. She has overall been doing well on this regimen.     Dietary Recall:  Breakfast: she often will have the school breakfast  Lunch: often will pack lunch from home (options including soup or a sandwich); she will sometimes have the school lunch  Dinner: options including casserole, eggs, and potatoes  Snacks: she has around 2-3 snacks a day, with options including granola bar, fruit snacks  Drinks: she is now only having 1 Starbucks Frappuccino a day; she has decreased milk (only 1 cup a day); will  "occasionally have a diet soda.     In terms of physical activity, she works as a  at a school, and is therefore extremely active at work with the kids.     CURRENT WEIGHT:   180 lbs 0 oz    Initial weight was 182 pounds on 10/14/2016  Weight at last appointment on 1/14/2022 was 190 pounds  Weight today is 180 pounds  Weight change since last visit 1/14/2022 is down 10 pounds  Total weight loss is 2 pounds    Changes and Difficulties 12/6/2022   I have made the following changes to my diet since my last visit: One less cup of milk with supper   With regards to my diet, I am still struggling with: -   I have made the following changes to my activity/exercise since my last visit: Starting walking daily   With regards to my activity/exercise, I am still struggling with: -       VITALS:  Ht 1.549 m (5' 1\")   Wt 81.6 kg (180 lb)   BMI 34.01 kg/m       GENERAL: Healthy, alert and no distress  EYES: Eyes grossly normal to inspection.   HENT: Normal cephalic/atraumatic.   RESP: No audible wheeze, cough.  No visible retractions or increased work of breathing.    MS: No gross musculoskeletal defects noted.  Normal range of motion.    SKIN: Visible skin clear.   NEURO: Cranial nerves grossly intact.  Mentation and speech appropriate for age.  PSYCH: Mentation appears normal, affect normal/bright, judgement and insight intact, normal speech and appearance well-groomed.    MEDICATIONS:   Current Outpatient Medications   Medication Sig Dispense Refill     acetylcysteine (MUCOMYST) 20 % neb solution INHALE 4ML VIA NEBULIZER TWO TIMES A DAY. MAY INCREASE TO 3-4 TIMES A DAY WITH INCREASED COUGH / COLD SYMPTOMS. REFRIGERATE VIAL AND DISCARD 96 HOURS AFTER OPENING 360 mL 11     albuterol (PROAIR HFA/PROVENTIL HFA/VENTOLIN HFA) 108 (90 Base) MCG/ACT inhaler Inhale 2 puffs into the lungs every 6 hours as needed for shortness of breath / dyspnea or wheezing 8.5 g 11     albuterol (PROVENTIL) (2.5 MG/3ML) 0.083% neb " solution INHALE 1 VIAL ( 2.5MG) BY NEBULIZATION EVERY 4 HOURS AS NEEDED FOR FOR SHORTNESS OF BREATH OR WHEEZING 360 mL 11     amphetamine-dextroamphetamine (ADDERALL XR) 20 MG 24 hr capsule Take 1 capsule (20 mg) by mouth daily for 30 days 30 capsule 0     [START ON 1/6/2023] amphetamine-dextroamphetamine (ADDERALL XR) 20 MG 24 hr capsule Take 1 capsule (20 mg) by mouth daily for 30 days 30 capsule 0     [START ON 2/6/2023] amphetamine-dextroamphetamine (ADDERALL XR) 20 MG 24 hr capsule Take 1 capsule (20 mg) by mouth daily for 30 days 30 capsule 0     azithromycin (ZITHROMAX) 500 MG tablet TAKE 1 TABLET BY MOUTH ON MONDAYS, WEDNESDAYS AND FRIDAYS AS DIRECTED 36 tablet 4     beta carotene 25345 UNIT capsule TAKE 1 CAPSULE BY MOUTH IN THE MORNING ON MONDAY, WEDNESDAY AND FRIDAY 36 capsule 4     blood glucose (ACCU-CHEK SMARTVIEW) test strip Use to test blood sugar 4 times daily or as directed. 400 each 3     blood glucose (NO BRAND SPECIFIED) test strip Use to test blood sugar 1 times daily or as directed. Precision Kofi Strips to go with Doug 2 reader 50 strip 11     buPROPion (WELLBUTRIN XL) 300 MG 24 hr tablet Take 1 tablet (300 mg) by mouth every morning 90 tablet 0     busPIRone (BUSPAR) 15 MG tablet Take 1 tablet (15 mg) by mouth 2 times daily 180 tablet 1     cetirizine (ZYRTEC) 10 MG tablet Take 1 tablet (10 mg) by mouth every evening 30 tablet 5     Continuous Blood Gluc  (FREESTYLE DOUG 2 READER) KIRSTEN 1 each daily 1 each 1     Continuous Blood Gluc Sensor (FREESTYLE DOUG 2 SENSOR) MISC 1 each every 14 days 2 each 11     doxycycline hyclate (VIBRAMYCIN) 100 MG capsule Take 1 capsule (100 mg) by mouth 2 times daily 20 capsule 0     elexacaftor-tezacaftor-ivacaftor & ivacaftor (TRIKAFTA) 100-50-75 & 150 MG tablet pack TAKE TWO ORANGE TABLETS BY MOUTH IN THE MORNING AND ONE BLUE TABLET IN THE EVENING AS DIRECTED ON PACKAGE.  TAKE WITH FAT CONTAINING FOOD. 84 tablet 11     FLUoxetine (PROZAC) 40 MG  capsule TAKE TWO CAPSULES BY MOUTH EVERY DAY . 180 capsule 1     gabapentin (NEURONTIN) 100 MG capsule Take 1-3 capsules (100-300 mg) by mouth 3 times daily as needed (anxiety and sleep) 270 capsule 1     hydrOXYzine (ATARAX) 25 MG tablet Take 1-2 tablets (25-50 mg) by mouth every 6 hours as needed for anxiety (sleep) 60 tablet 0     medroxyPROGESTERone (DEPO-PROVERA) 150 MG/ML IM injection Inject 150 mg into the muscle       Multiple Vitamin (MULTIVITAMIN OR) Take 1 tablet by mouth daily.       naltrexone (DEPADE/REVIA) 50 MG tablet Take 1 tablet.  Time it one to two hours prior to worst cravings. 90 tablet 3     omeprazole (PRILOSEC) 40 MG DR capsule Take 1 capsule (40 mg) by mouth 2 times daily 60 capsule 11     phytonadione (MEPHYTON) 5 MG tablet TAKE 1 TABLET BY MOUTH ONCE A WEEK 4 tablet 11     polyethylene glycol (MIRALAX) powder Take 17 g (1 capful) by mouth daily as needed for constipation 119 g 3     topiramate (TOPAMAX) 25 MG tablet Take one 50 mg pill and one 25 mg pill twice a day 180 tablet 3     traZODone (DESYREL) 100 MG tablet Take 1 tablet (100 mg) by mouth At Bedtime 90 tablet 1     vitamin C (ASCORBIC ACID) 500 MG tablet TAKE 1 TABLET BY MOUTH TWICE A  tablet 3     Vitamin D3 (VITAMIN D3) 25 mcg (1000 units) tablet Take 1 tablet (25 mcg) by mouth daily 100 tablet 3     vitamin E (TOCOPHEROL) 400 units (180 mg) capsule TAKE ONE CAPSULE BY MOUTH TWICE A  capsule 11     VITAMIN E 180 MG (400 UNIT) capsule TAKE ONE CAPSULE BY MOUTH TWICE A  capsule 11     montelukast (SINGULAIR) 10 MG tablet TAKE ONE TABLET BY MOUTH AT BEDTIME 30 tablet 11       Weight Loss Medication History Reviewed With Patient 12/6/2022   Which weight loss medications are you currently taking on a regular basis?  Naltrexone, Topamax (topiramate), Bupropion   If you are not taking a weight loss medication that was prescribed to you, please indicate why: -   Are you having any side effects from the weight loss  medication that we have prescribed you? No     LABS:  Component      Latest Ref Rng & Units 8/9/2022   Sodium      133 - 144 mmol/L 144   Potassium      3.4 - 5.3 mmol/L 3.8   Chloride      94 - 109 mmol/L 113 (H)   Carbon Dioxide      20 - 32 mmol/L 21   Anion Gap      3 - 14 mmol/L 10   Urea Nitrogen      7 - 30 mg/dL 7   Creatinine      0.52 - 1.04 mg/dL 0.87   Calcium      8.5 - 10.1 mg/dL 8.3 (L)   Glucose      70 - 99 mg/dL 98   GFR Estimate      >60 mL/min/1.73m2 >90   Bilirubin Total      0.2 - 1.3 mg/dL 0.2   Bilirubin Direct      0.0 - 0.2 mg/dL <0.1   Protein Total      6.8 - 8.8 g/dL 6.5 (L)   Albumin      3.4 - 5.0 g/dL 3.3 (L)   Alkaline Phosphatase      40 - 150 U/L 53   AST      0 - 45 U/L 14   ALT      0 - 50 U/L 18   Cholesterol      <200 mg/dL 126   Triglycerides      <150 mg/dL 167 (H)   HDL Cholesterol      >=50 mg/dL 45 (L)   LDL Cholesterol Calculated      <=100 mg/dL 48   Non HDL Cholesterol      <130 mg/dL 81   Patient Fasting > 8hrs?       No   Hemoglobin A1C      0.0 - 5.6 % 5.0   TSH      0.40 - 4.00 mU/L 2.02     ASSESSMENT:   Mamie Rice is a 27 year old female with a history of class 1 obesity (defined as BMI 30-35 kg/m2). She  also has a history of CF, CFRD, ADHD, and depression. Currently on topiramate, naltrexone, and buproion. Overall doing well. Therefore, will plan to continue the current regimen.      Additional plans and goals, made through shared decision making, as outlined below.        PLAN:   Patient Instructions   1. Food Goal:  - Will have no more than 1 glass milk at dinner   - Will have no more than 1 Frapocchino daily     2. Activity Goal:  - Continue to be active as you are     3. Medications:   - Continue topiramate 75 mg twice a day (one 50 mg pill and one 25 mg pill twice a day)  - Continue naltrexone 50 mg daily  - Continue bupropion 300 mg daily    4. We can plan to see you again in clinic in around 6 months. If any questions or concerns before that time,  please let us know.      FOLLOW-UP:    4-6 months.     Sincerely,    Charles Park MD    Video-Visit Details    Video Start Time: 1:00 PM    Type of service:  Video Visit    Video End Time:1:12 PM    Originating Location (pt. Location): Home    Distant Location (provider location):  On-site    Platform used for Video Visit: Juan     I spent 20 minutes of total time, before, during, and after the visit reviewing previous labs and records, examining the patient, answering their questions, formulating and discussing the plan of care, reviewing resulted labs, and writing the visit note.          Again, thank you for allowing me to participate in the care of your patient.      Sincerely,    Charles Park MD

## 2022-12-09 NOTE — NURSING NOTE
"(   Chief Complaint   Patient presents with     RECHECK     Return MW    )    ( Weight: 81.6 kg (180 lb) (Patient reported) )  ( Height: 154.9 cm (5' 1\") )  ( BMI (Calculated): 34.01 )  (   )  (   )  (   )  (   )  (   )  (   )    (   )  (   )  (   )  (   )  (   )  (   )  (   )    (   Patient Active Problem List   Diagnosis     Hip joint pain     Aspergillosis (H)     Chronic maxillary sinusitis     Hypoglycemia     Type 1 diabetes mellitus (H)     Cystic fibrosis of the lung (H)     Chronic constipation     Frontal sinusitis     Overactive child     Back pain     Pancreatic insufficiency     Non morbid obesity due to excess calories     Acne vulgaris     Cystic fibrosis (H)     Diabetes mellitus, type 2 (H)     Persistent depressive disorder     Mild episode of recurrent major depressive disorder (H)     Insomnia, unspecified type     MECHE (generalized anxiety disorder)     Attention deficit hyperactivity disorder (ADHD), combined type     Knee pain     Morbid obesity (H)    )  (   Current Outpatient Medications   Medication Sig Dispense Refill     acetylcysteine (MUCOMYST) 20 % neb solution INHALE 4ML VIA NEBULIZER TWO TIMES A DAY. MAY INCREASE TO 3-4 TIMES A DAY WITH INCREASED COUGH / COLD SYMPTOMS. REFRIGERATE VIAL AND DISCARD 96 HOURS AFTER OPENING 360 mL 11     albuterol (PROAIR HFA/PROVENTIL HFA/VENTOLIN HFA) 108 (90 Base) MCG/ACT inhaler Inhale 2 puffs into the lungs every 6 hours as needed for shortness of breath / dyspnea or wheezing 8.5 g 11     albuterol (PROVENTIL) (2.5 MG/3ML) 0.083% neb solution INHALE 1 VIAL ( 2.5MG) BY NEBULIZATION EVERY 4 HOURS AS NEEDED FOR FOR SHORTNESS OF BREATH OR WHEEZING 360 mL 11     amphetamine-dextroamphetamine (ADDERALL XR) 20 MG 24 hr capsule Take 1 capsule (20 mg) by mouth daily for 30 days 30 capsule 0     [START ON 1/6/2023] amphetamine-dextroamphetamine (ADDERALL XR) 20 MG 24 hr capsule Take 1 capsule (20 mg) by mouth daily for 30 days 30 capsule 0     [START ON " 2/6/2023] amphetamine-dextroamphetamine (ADDERALL XR) 20 MG 24 hr capsule Take 1 capsule (20 mg) by mouth daily for 30 days 30 capsule 0     azithromycin (ZITHROMAX) 500 MG tablet TAKE 1 TABLET BY MOUTH ON MONDAYS, WEDNESDAYS AND FRIDAYS AS DIRECTED 36 tablet 4     beta carotene 44814 UNIT capsule TAKE 1 CAPSULE BY MOUTH IN THE MORNING ON MONDAY, WEDNESDAY AND FRIDAY 36 capsule 4     blood glucose (ACCU-CHEK SMARTVIEW) test strip Use to test blood sugar 4 times daily or as directed. 400 each 3     blood glucose (NO BRAND SPECIFIED) test strip Use to test blood sugar 1 times daily or as directed. Precision Kofi Strips to go with Doug 2 reader 50 strip 11     buPROPion (WELLBUTRIN XL) 300 MG 24 hr tablet Take 1 tablet (300 mg) by mouth every morning 90 tablet 0     busPIRone (BUSPAR) 15 MG tablet Take 1 tablet (15 mg) by mouth 2 times daily 180 tablet 1     cetirizine (ZYRTEC) 10 MG tablet Take 1 tablet (10 mg) by mouth every evening 30 tablet 5     Continuous Blood Gluc  (FREESTYLE DOUG 2 READER) KIRSTEN 1 each daily 1 each 1     Continuous Blood Gluc Sensor (FREESTYLE DOUG 2 SENSOR) MISC 1 each every 14 days 2 each 11     doxycycline hyclate (VIBRAMYCIN) 100 MG capsule Take 1 capsule (100 mg) by mouth 2 times daily 20 capsule 0     elexacaftor-tezacaftor-ivacaftor & ivacaftor (TRIKAFTA) 100-50-75 & 150 MG tablet pack TAKE TWO ORANGE TABLETS BY MOUTH IN THE MORNING AND ONE BLUE TABLET IN THE EVENING AS DIRECTED ON PACKAGE.  TAKE WITH FAT CONTAINING FOOD. 84 tablet 11     FLUoxetine (PROZAC) 40 MG capsule TAKE TWO CAPSULES BY MOUTH EVERY DAY . 180 capsule 1     gabapentin (NEURONTIN) 100 MG capsule Take 1-3 capsules (100-300 mg) by mouth 3 times daily as needed (anxiety and sleep) 270 capsule 1     hydrOXYzine (ATARAX) 25 MG tablet Take 1-2 tablets (25-50 mg) by mouth every 6 hours as needed for anxiety (sleep) 60 tablet 0     medroxyPROGESTERone (DEPO-PROVERA) 150 MG/ML IM injection Inject 150 mg into the  muscle       Multiple Vitamin (MULTIVITAMIN OR) Take 1 tablet by mouth daily.       naltrexone (DEPADE/REVIA) 50 MG tablet Take 1 tablet.  Time it one to two hours prior to worst cravings. 90 tablet 3     omeprazole (PRILOSEC) 40 MG DR capsule Take 1 capsule (40 mg) by mouth 2 times daily 60 capsule 11     phytonadione (MEPHYTON) 5 MG tablet TAKE 1 TABLET BY MOUTH ONCE A WEEK 4 tablet 11     polyethylene glycol (MIRALAX) powder Take 17 g (1 capful) by mouth daily as needed for constipation 119 g 3     topiramate (TOPAMAX) 25 MG tablet Take one 50 mg pill and one 25 mg pill twice a day 180 tablet 3     traZODone (DESYREL) 100 MG tablet Take 1 tablet (100 mg) by mouth At Bedtime 90 tablet 1     vitamin C (ASCORBIC ACID) 500 MG tablet TAKE 1 TABLET BY MOUTH TWICE A  tablet 3     Vitamin D3 (VITAMIN D3) 25 mcg (1000 units) tablet Take 1 tablet (25 mcg) by mouth daily 100 tablet 3     vitamin E (TOCOPHEROL) 400 units (180 mg) capsule TAKE ONE CAPSULE BY MOUTH TWICE A  capsule 11     VITAMIN E 180 MG (400 UNIT) capsule TAKE ONE CAPSULE BY MOUTH TWICE A  capsule 11     montelukast (SINGULAIR) 10 MG tablet TAKE ONE TABLET BY MOUTH AT BEDTIME 30 tablet 11    )  ( Diabetes Eval:    )    ( Pain Eval:  No Pain (0) )    ( Wound Eval:       )    (   History   Smoking Status     Never   Smokeless Tobacco     Never    )    ( Signed By:  Mikayla Yusuf, EMT; December 9, 2022; 11:42 AM )

## 2022-12-23 ENCOUNTER — VIRTUAL VISIT (OUTPATIENT)
Dept: PSYCHIATRY | Facility: CLINIC | Age: 27
End: 2022-12-23
Attending: NURSE PRACTITIONER
Payer: COMMERCIAL

## 2022-12-23 DIAGNOSIS — F90.0 ATTENTION DEFICIT HYPERACTIVITY DISORDER (ADHD), PREDOMINANTLY INATTENTIVE TYPE: ICD-10-CM

## 2022-12-23 DIAGNOSIS — G47.00 INSOMNIA, UNSPECIFIED TYPE: Primary | ICD-10-CM

## 2022-12-23 PROCEDURE — 99213 OFFICE O/P EST LOW 20 MIN: CPT | Mod: 95 | Performed by: NURSE PRACTITIONER

## 2022-12-23 RX ORDER — DEXTROAMPHETAMINE SACCHARATE, AMPHETAMINE ASPARTATE MONOHYDRATE, DEXTROAMPHETAMINE SULFATE AND AMPHETAMINE SULFATE 5; 5; 5; 5 MG/1; MG/1; MG/1; MG/1
20 CAPSULE, EXTENDED RELEASE ORAL DAILY
Qty: 30 CAPSULE | Refills: 0 | Status: SHIPPED | OUTPATIENT
Start: 2023-03-08 | End: 2023-01-17

## 2022-12-23 RX ORDER — HYDROXYZINE HYDROCHLORIDE 25 MG/1
25-50 TABLET, FILM COATED ORAL EVERY 6 HOURS PRN
Qty: 60 TABLET | Refills: 2 | Status: SHIPPED | OUTPATIENT
Start: 2022-12-23 | End: 2023-04-13

## 2022-12-23 NOTE — PROGRESS NOTES
Mamie Rice is a 27 year old who is being evaluated via a billable video visit.      Pt will join video visit via: [employer]  If there are problems joining the visit, send backup video invite via: Send to preferred e-mail: [email or web address]    Reason for telehealth visit: Patient has requested telehealth visit    Originating location (patient location): Patient's home    Will anyone else be joining the visit? No      Start Time:  1000      End Time: 1006    Telemedicine Visit: The patient's condition can be safely assessed and treated via synchronous audio and visual telemedicine encounter.      Reason for Telemedicine Visit: Due to COVID 19 pandemic, clinic switching all appointments to telemedicine     Originating Site (Patient Location): at work    Distant Site (Provider Location): Provider Remote Setting    Consent:  The patient/guardian has verbally consented to: the potential risks and benefits of telemedicine (video visit) versus in person care; bill my insurance or make self-payment for services provided; and responsibility for payment of non-covered services.     Mode of Communication:  Video Conference via [employer]    As the provider I attest to compliance with applicable laws and regulations related to telemedicine.    Psychiatry Clinic Progress Note                                                                  Patient Name: Mamie Rice  YOB: 1995  MRN: [id number]  Date of Service:  12/23/2022  Last Seen:12/6/2022    Mamie Rice is a 27 year old person assigned female at birth, identifies as cisgender female who uses the name Mamie and pronoun kenneth.     Mamie Rice is a 27 year old year old adult who presents for ongoing psychiatric care.  Mamie Rice was last seen on 12/6/2022.     At that time,     Medication Ordered/Consults/Labs/tests Ordered:     Medication:   -May take Gabapentin 100-300 mg up to 3 times a day for anxiety and sleep.  Try Gabapentin instead of  Hydroxyzine due to elevated EKG result. Monitor for brain fog and sedation. If this causes oversedation at bedtime, please contact [facility] so that they may adjust your Trazodone dose.  -Continue all other medication regimen.  OTC Recommendations: none  Lab Orders:  EKG already ordered.  Referrals: none  Release of Information: none  Future Treatment Considerations: Per symptoms.   Return for Follow Up: in 2 weeks    Pertinent Background:  Diagnoses include MDD, MECHE and ADHD.  Psych critical item history includes [no critical items]. Medical complication includes Cystic Fibrosis, DM I     Previous medication trial: Phentamine, Trazodone (150 mg oversedating, 50 mg ineffective), Gabapentin (insomnia)     Naltrexone, Wellbutrin and Topamax are managed by weight management clinic.    Interim History                                                                                                        4, 4     Since the last visit,  -Tried Gabapentin few times, but this caused insomnia, so stopped Gabapentin.   -After discontinuing Gabapentin and restarted Hydroxyzine 25 mg BID, insomnia stopped.  -Mood and anxiety are well managed, denies SI, SIB or HI.  -Does not have an appt for EKG, but plans to complete it when having appt with [facility] clinic on 3/14.    Denies any symptoms suggestive of hypomania or psychosis.    Current Suicidality/Hx of Suicide Attempts: Denies both  CoCominent Medical concerns: Denies    Medication Side Effects: The patient denies all medication side effects.      Medical Review of Systems     Apart from the symptoms mentioned int he HPI, the 14 point review of systems, including constitutional, HEENT, cardiovascular, respiratory, gastrointestinal, genitourinary, musculoskeletal, integumentary, endocrine, neurological, hematologic and allergic is entirely negative.    Pregnant: None. Nursing: None, Contraception: DMPA    Substance Use   Denies frequent or abuse of alcohol. Denies any other substance  use.     Social/ Family History                                  [per patient report]                                 1ea,1ea   Living arrangements: lives with parents and feels safe  Social Support:parents, friends, maternal grandparents  Access to gun: denies  Denies any trauma hx, numerous hospitalizations during childhood, does not consider this as traumatic  Works as a day care provider, dance coach and substitute .      Allergy                                Vancomycin and Augmentin    Current Medications                                                                                                       Current Outpatient Medications   Medication Sig Dispense Refill     acetylcysteine (MUCOMYST) 20 % neb solution INHALE 4ML VIA NEBULIZER TWO TIMES A DAY. MAY INCREASE TO 3-4 TIMES A DAY WITH INCREASED COUGH / COLD SYMPTOMS. REFRIGERATE VIAL AND DISCARD 96 HOURS AFTER OPENING 360 mL 11     albuterol (PROAIR HFA/PROVENTIL HFA/VENTOLIN HFA) 108 (90 Base) MCG/ACT inhaler Inhale 2 puffs into the lungs every 6 hours as needed for shortness of breath / dyspnea or wheezing 8.5 g 11     albuterol (PROVENTIL) (2.5 MG/3ML) 0.083% neb solution INHALE 1 VIAL ( 2.5MG) BY NEBULIZATION EVERY 4 HOURS AS NEEDED FOR FOR SHORTNESS OF BREATH OR WHEEZING 360 mL 11     amphetamine-dextroamphetamine (ADDERALL XR) 20 MG 24 hr capsule Take 1 capsule (20 mg) by mouth daily for 30 days 30 capsule 0     [START ON 1/6/2023] amphetamine-dextroamphetamine (ADDERALL XR) 20 MG 24 hr capsule Take 1 capsule (20 mg) by mouth daily for 30 days 30 capsule 0     [START ON 2/6/2023] amphetamine-dextroamphetamine (ADDERALL XR) 20 MG 24 hr capsule Take 1 capsule (20 mg) by mouth daily for 30 days 30 capsule 0     azithromycin (ZITHROMAX) 500 MG tablet TAKE 1 TABLET BY MOUTH ON MONDAYS, WEDNESDAYS AND FRIDAYS AS DIRECTED 36 tablet 4     beta carotene 92257 UNIT capsule TAKE 1 CAPSULE BY MOUTH IN THE MORNING ON MONDAY, WEDNESDAY AND  FRIDAY 36 capsule 4     blood glucose (ACCU-CHEK SMARTVIEW) test strip Use to test blood sugar 4 times daily or as directed. 400 each 3     blood glucose (NO BRAND SPECIFIED) test strip Use to test blood sugar 1 times daily or as directed. Precision Kofi Strips to go with Doug 2 reader 50 strip 11     buPROPion (WELLBUTRIN XL) 300 MG 24 hr tablet Take 1 tablet (300 mg) by mouth every morning 90 tablet 3     busPIRone (BUSPAR) 15 MG tablet Take 1 tablet (15 mg) by mouth 2 times daily 180 tablet 1     cetirizine (ZYRTEC) 10 MG tablet Take 1 tablet (10 mg) by mouth every evening 30 tablet 5     Continuous Blood Gluc  (FREESTYLE DOUG 2 READER) KIRSTEN 1 each daily 1 each 1     Continuous Blood Gluc Sensor (FREESTYLE DOUG 2 SENSOR) MISC 1 each every 14 days 2 each 11     doxycycline hyclate (VIBRAMYCIN) 100 MG capsule Take 1 capsule (100 mg) by mouth 2 times daily 20 capsule 0     elexacaftor-tezacaftor-ivacaftor & ivacaftor (TRIKAFTA) 100-50-75 & 150 MG tablet pack TAKE TWO ORANGE TABLETS BY MOUTH IN THE MORNING AND ONE BLUE TABLET IN THE EVENING AS DIRECTED ON PACKAGE.  TAKE WITH FAT CONTAINING FOOD. 84 tablet 11     FLUoxetine (PROZAC) 40 MG capsule TAKE TWO CAPSULES BY MOUTH EVERY DAY . 180 capsule 1     gabapentin (NEURONTIN) 100 MG capsule Take 1-3 capsules (100-300 mg) by mouth 3 times daily as needed (anxiety and sleep) 270 capsule 1     hydrOXYzine (ATARAX) 25 MG tablet Take 1-2 tablets (25-50 mg) by mouth every 6 hours as needed for anxiety (sleep) 60 tablet 0     medroxyPROGESTERone (DEPO-PROVERA) 150 MG/ML IM injection Inject 150 mg into the muscle       montelukast (SINGULAIR) 10 MG tablet TAKE ONE TABLET BY MOUTH AT BEDTIME 30 tablet 11     Multiple Vitamin (MULTIVITAMIN OR) Take 1 tablet by mouth daily.       naltrexone (DEPADE/REVIA) 50 MG tablet Take 1 tablet.  Time it one to two hours prior to worst cravings. 90 tablet 3     omeprazole (PRILOSEC) 40 MG DR capsule Take 1 capsule (40 mg) by mouth 2  "times daily 60 capsule 11     phytonadione (MEPHYTON) 5 MG tablet TAKE 1 TABLET BY MOUTH ONCE A WEEK 4 tablet 11     polyethylene glycol (MIRALAX) powder Take 17 g (1 capful) by mouth daily as needed for constipation 119 g 3     topiramate (TOPAMAX) 25 MG tablet Take one 50 mg pill and one 25 mg pill twice a day 180 tablet 3     topiramate (TOPAMAX) 50 MG tablet Take one 50 mg pill and one 25 mg pill twice a day 180 tablet 3     traZODone (DESYREL) 100 MG tablet Take 1 tablet (100 mg) by mouth At Bedtime 90 tablet 1     vitamin C (ASCORBIC ACID) 500 MG tablet TAKE 1 TABLET BY MOUTH TWICE A  tablet 3     Vitamin D3 (VITAMIN D3) 25 mcg (1000 units) tablet Take 1 tablet (25 mcg) by mouth daily 100 tablet 3     vitamin E (TOCOPHEROL) 400 units (180 mg) capsule TAKE ONE CAPSULE BY MOUTH TWICE A  capsule 11     VITAMIN E 180 MG (400 UNIT) capsule TAKE ONE CAPSULE BY MOUTH TWICE A  capsule 11        Mental Status Exam                                                                                   9, 14 cog        Alertness: alert  and oriented  Appearance:  Casually dressed and Adequately groomed  Behavior/Demeanor: cooperative, pleasant and calm, with good eye contact   Speech: regular rate and rhythm  Mood :  \"ok\"  Affect: appropriate and euthymic; was congruent to mood; was congruent to content  Thought Process (Associations):  Linear and Goal directed  Thought process (Rate):  Normal  Thought content:  no overt psychosis, denies suicidal ideation, intent or thoughts and patient does not appear to be responding to internal stimuli  Perception:  Reports none;  Denies auditory hallucinations and visual hallucinations  Attention/Concentration:  Fair  Memory:  Immediate recall intact and Short-term memory intact  Language: intact  Fund of Knowledge/Intelligence:  Average  Abstraction:  Normal  Insight:  Good, Fair  Judgment:  Good, Fair  Cognition: (6) does  appear grossly intact; formal cognitive " testing was not done    Physical Exam     Motor activity/EPS:  Normal  Psychomotor: normal or unremarkable    Labs and Results      Pertinent findings on review include: Review of records with relevant information reported in the HPI.  Reviewed pt's past medical record and obtained collateral information.      [place] PRESCRIPTION MONITORING PROGRAM [] was checked today: Adderall 12/6, Gabapentin 12/6.    Answers for HPI/ROS submitted by the patient on 12/6/2022  If you checked off any problems, how difficult have these problems made it for you to do your work, take care of things at home, or get along with other people?: Not difficult at all  PHQ9 TOTAL SCORE: 5      PHQ 7/1/2021 8/9/2022 12/6/2022   PHQ-9 Total Score 8 4 5   Q9: Thoughts of better off dead/self-harm past 2 weeks Not at all Not at all Not at all       MECHE-7 SCORE 6/11/2021 7/1/2021 8/9/2022   Total Score 12 8 6       Recent Labs   Lab Test 08/09/22  0849 06/08/21  1015 08/14/19  0814   CR 0.87 0.90 0.85   GFRESTIMATED >90 87 >90     Recent Labs   Lab Test 08/09/22  0849 06/08/21  1015   AST 14 15   ALT 18 21   ALKPHOS 53 68      (8/9/2022), 447 (12/23/2020),  436 (08/08/2012)     PSYCHOTROPIC DRUG INTERACTIONS:    Adderall---Buspar---Prozac---Trazodone---Wellbutrin: Concurrent use of AMPHETAMINES and SEROTONERGIC AGENTS may result in increased risk of serotonin syndrome.   Adderall---Prozac---Omeprazole: Concurrent use of AMPHETAMINES and SEROTONERGIC AGENTS THAT INHIBIT CYP2D6 may result in increased amphetamine exposure  Buspar---Trazodone---Vistaril---Zyrtec---Topamax: Concurrent use of TRAZODONE and SEROTONERGIC CENTRAL NERVOUS SYSTEM DEPRESSANTS may result in increased risk of CNS depression.   Prozac---Trazodone: Concurrent use of TRAZODONE and SEROTONERGIC DRUGS THAT PROLONG QT INTERVAL may result in increased risk of QT-interval prolongation.   Prozac---Wellbutrin: Concurrent use of BUPROPION and SELECTED SEIZURE THRESHOLD LOWERING  CYP2D6 SUBSTRATES may result in increased exposure of CYP2D6 substrates; increased risk of seizure.  Glycopyrrolate---Vistaril: Concurrent use of GLYCOPYRROLATE and ANTICHOLINERGICS may result in increased risk of anticholinergic side effects .    Glycopyrrolate---Wellbutrin---Vistaril: Concurrent use of BUPROPION and AGENTS LOWERING SEIZURE THRESHOLD may result in lower seizure threshold  Vistaril---Zithromax---Trazodone: Concurrent use of HYDROXYZINE and QT PROLONGING AGENTS may result in increased risk of QT-interval prolongation.   Buspar---Prozac: Concurrent use of FLUOXETINE and BUSPIRONE may result in worsening of psychiatric symptoms.      MANAGEMENT:  Monitoring for adverse effects, routine vitals, periodic EKGs and patient is aware of risks     Impression/Assessment      Mamie Rice is a 27 year old adult  who presents for med management follow up.  Pt appears stable in her mood and anxiety, denies SI, SIB or HI during the appointment. Pt tried Gabapentin few times, but noted it caused insomnia and stopped the medication and returned to Hydroxyzine 25 mg BID PRN daily and insomnia resolved. Gabapentin is discontinued as pt stopped the medication. Pt wants to continue on current medication regimen as she feels stable. Ok to continue on current medication regimen for now, but reiterated previous QTC evaluation and risk of QTC prolongation due to medication interaction. If QTC continues to be elevated, but wants to continue on current medication regimen, pt may need to follow up with PCP or cardiology.    Diagnosis                                                                   MDD  MECHE  ADHD    Treatment Recommendation & Plan       Medication Ordered/Consults/Labs/tests Ordered:     Medication:   -Gabapentin is discontinued as you are not taking the medication.  -Contnue all other medication regimen.  OTC Recommendations: none  Lab Orders:  EKG already ordered.  Referrals: none  Release of Information:  none  Future Treatment Considerations: Per symptoms.   Return for Follow Up: in 3 months after EKG completion    -Discussed safety plan for suicidal thoughts  -Discussed plan for suicidality  -Discussed available emergency services  -Patient agrees with the treatment plan  -Encouraged to continue outpatient therapy to gain more coping mechanism for stress.    Treatment Risk Statement: Discussed with the patient my impressions, as well as recommended studies. I educated patient on the differential diagnosis and prognosis. I discussed with the patient the risks and benefits of medications versus no interventions, including efficacy, dose, possible side effects and length of treatment and the importance of medication compliance.  The patient understands the risks, benefits, adverse effects and alternatives. Agrees to treatment with the capacity to do so. No medical contraindications to treatment. The patient also understands the risks of using street drugs or alcohol.     CRISIS NUMBERS:   Provided routinely in AVS.      Jessie Pitts CNP, 12/23/2022

## 2022-12-23 NOTE — PATIENT INSTRUCTIONS
-Gabapentin is discontinued as you are not taking the medication.  -Contnue all other medication regimen.    -Complete EKG before next appt.    Your next appointment is scheduled on 3/29/2023 (Wed) at 3:30pm.      **For crisis resources, please see the information at the end of this document**   Patient Education    Thank you for coming to the [facility] [place].     Lab Testing:  If you had lab testing today and your results are reassuring or normal they will be mailed to you or sent through [employer] within 7 days. If the lab tests need quick action we will call you with the results. The phone number we will call with results is # [phone]. If this is not the best number please call our clinic and change the number.     Medication Refills:  If you need any refills please call your pharmacy and they will contact us. Our fax number for refills is [phone].   Three business days of notice are needed for general medication refill requests.   Five business days of notice are needed for controlled substance refill requests.   If you need to change to a different pharmacy, please contact the new pharmacy directly. The new pharmacy will help you get your medications transferred.     Contact Us:  Please call [phone] during business hours (8-5:00 M-F).   If you have medication related questions after clinic hours, or on the weekend, please call [phone].     Financial Assistance [phone]   Medical Records [phone]       MENTAL HEALTH CRISIS RESOURCES:  For a emergency help, please call 911 or go to the nearest Emergency Department.     Emergency Walk-In Options:   [facility] Unit @ [facility] ([place]): [phone] - Specialized mental health emergency area designed to be calming  [facility] ([place]): [phone]  [facility] ([place]): [phone]  [facility]):  [phone]    [facility] Crisis Information:   [place]: [phone]  [place]: [phone]  [place] ([place]) - Adult: [phone]     Child: [phone]  [place] - Adult: [phone]     Child: [phone]  [place]: [phone]  List of all [facility] resources:   https://[place].gov/dhs/people-we-serve/adults/health-care/mental-health/resources/crisis-contacts.jsp    National Crisis Information:   Crisis Text Line: Text  [place]  to 958849  Suicide & Crisis Lifeline: 988  National Suicide Prevention Lifeline: 3-895-361-TALK (1-703.966.5416)       For online chat options, visit https://suicidepreventionlifeline.org/chat/  Poison Control Center: 1-883.360.3445  Trans Lifeline: 1-233.506.8690 - Hotline for transgender people of all ages  The [place] [employer]: 0-408-653-0414 - Hotline for LGBT youth     For Non-Emergency Support:   Fast Tracker: Mental Health & Substance Use Disorder Resources -   https://www.[employer]trackDNA Responsen.org/

## 2023-01-03 DIAGNOSIS — K86.89 PANCREATIC INSUFFICIENCY: ICD-10-CM

## 2023-01-03 DIAGNOSIS — K86.81 EXOCRINE PANCREATIC INSUFFICIENCY: ICD-10-CM

## 2023-01-03 DIAGNOSIS — E84.9 CF (CYSTIC FIBROSIS) (H): ICD-10-CM

## 2023-01-03 DIAGNOSIS — E84.0 CYSTIC FIBROSIS WITH PULMONARY MANIFESTATIONS (H): ICD-10-CM

## 2023-01-03 RX ORDER — VITAMIN E 268 MG
400 CAPSULE ORAL 2 TIMES DAILY
Qty: 180 CAPSULE | Refills: 3 | Status: SHIPPED | OUTPATIENT
Start: 2023-01-03 | End: 2024-01-08

## 2023-01-03 RX ORDER — VITAMIN B COMPLEX
1 TABLET ORAL DAILY
Qty: 90 TABLET | Refills: 3 | Status: SHIPPED | OUTPATIENT
Start: 2023-01-03 | End: 2024-01-08

## 2023-01-15 ENCOUNTER — MYC MEDICAL ADVICE (OUTPATIENT)
Dept: PSYCHIATRY | Facility: CLINIC | Age: 28
End: 2023-01-15

## 2023-01-16 NOTE — TELEPHONE ENCOUNTER
Per chart review, refills sent on 12/6/22 for 90 d/s with 1 refill (6 month total) for both trazodone 50 mg tablets and fluoxetine 40 mg capsule. Duplicate

## 2023-01-17 ENCOUNTER — OFFICE VISIT (OUTPATIENT)
Dept: PULMONOLOGY | Facility: CLINIC | Age: 28
End: 2023-01-17
Attending: INTERNAL MEDICINE
Payer: COMMERCIAL

## 2023-01-17 ENCOUNTER — OFFICE VISIT (OUTPATIENT)
Dept: PHARMACY | Facility: CLINIC | Age: 28
End: 2023-01-17
Payer: COMMERCIAL

## 2023-01-17 ENCOUNTER — MYC MEDICAL ADVICE (OUTPATIENT)
Dept: PHARMACY | Facility: CLINIC | Age: 28
End: 2023-01-17

## 2023-01-17 VITALS
HEART RATE: 87 BPM | OXYGEN SATURATION: 100 % | RESPIRATION RATE: 17 BRPM | WEIGHT: 184.97 LBS | BODY MASS INDEX: 34.04 KG/M2 | SYSTOLIC BLOOD PRESSURE: 113 MMHG | HEIGHT: 62 IN | DIASTOLIC BLOOD PRESSURE: 77 MMHG

## 2023-01-17 DIAGNOSIS — Z79.899 MEDICATION MANAGEMENT: ICD-10-CM

## 2023-01-17 DIAGNOSIS — K86.89 PANCREATIC INSUFFICIENCY: ICD-10-CM

## 2023-01-17 DIAGNOSIS — E84.9 CYSTIC FIBROSIS (H): Primary | ICD-10-CM

## 2023-01-17 DIAGNOSIS — Z71.85 VACCINE COUNSELING: ICD-10-CM

## 2023-01-17 DIAGNOSIS — F33.0 MILD EPISODE OF RECURRENT MAJOR DEPRESSIVE DISORDER (H): ICD-10-CM

## 2023-01-17 DIAGNOSIS — F90.2 ATTENTION DEFICIT HYPERACTIVITY DISORDER (ADHD), COMBINED TYPE: ICD-10-CM

## 2023-01-17 DIAGNOSIS — E08.9 DIABETES MELLITUS DUE TO CYSTIC FIBROSIS (H): ICD-10-CM

## 2023-01-17 DIAGNOSIS — Z30.9 ENCOUNTER FOR CONTRACEPTIVE MANAGEMENT, UNSPECIFIED TYPE: ICD-10-CM

## 2023-01-17 DIAGNOSIS — E84.0 CYSTIC FIBROSIS WITH PULMONARY MANIFESTATIONS (H): Primary | ICD-10-CM

## 2023-01-17 DIAGNOSIS — E10.69 TYPE 1 DIABETES MELLITUS WITH OTHER SPECIFIED COMPLICATION (H): Primary | ICD-10-CM

## 2023-01-17 DIAGNOSIS — E84.0 CYSTIC FIBROSIS OF THE LUNG (H): Primary | ICD-10-CM

## 2023-01-17 DIAGNOSIS — E84.9 CF (CYSTIC FIBROSIS) (H): ICD-10-CM

## 2023-01-17 DIAGNOSIS — E84.9 DIABETES MELLITUS DUE TO CYSTIC FIBROSIS (H): ICD-10-CM

## 2023-01-17 DIAGNOSIS — E66.01 MORBID OBESITY (H): ICD-10-CM

## 2023-01-17 LAB
EXPTIME-PRE: 4.96 SEC
FEF2575-%PRED-PRE: 114 %
FEF2575-PRE: 3.78 L/SEC
FEF2575-PRED: 3.29 L/SEC
FEFMAX-%PRED-PRE: 144 %
FEFMAX-PRE: 9.69 L/SEC
FEFMAX-PRED: 6.69 L/SEC
FEV1-%PRED-PRE: 112 %
FEV1-PRE: 3.17 L
FEV1FEV6-PRE: 88 %
FEV1FEV6-PRED: 86 %
FEV1FVC-PRE: 88 %
FEV1FVC-PRED: 86 %
FIFMAX-PRE: 5.13 L/SEC
FVC-%PRED-PRE: 110 %
FVC-PRE: 3.6 L
FVC-PRED: 3.26 L

## 2023-01-17 PROCEDURE — 250N000011 HC RX IP 250 OP 636: Performed by: INTERNAL MEDICINE

## 2023-01-17 PROCEDURE — 90636 HEP A/HEP B VACC ADULT IM: CPT | Performed by: INTERNAL MEDICINE

## 2023-01-17 PROCEDURE — 87070 CULTURE OTHR SPECIMN AEROBIC: CPT | Performed by: INTERNAL MEDICINE

## 2023-01-17 PROCEDURE — 99214 OFFICE O/P EST MOD 30 MIN: CPT | Mod: 25 | Performed by: INTERNAL MEDICINE

## 2023-01-17 PROCEDURE — G0463 HOSPITAL OUTPT CLINIC VISIT: HCPCS | Performed by: INTERNAL MEDICINE

## 2023-01-17 PROCEDURE — 94375 RESPIRATORY FLOW VOLUME LOOP: CPT | Performed by: INTERNAL MEDICINE

## 2023-01-17 PROCEDURE — 99207 PR NO CHARGE LOS: CPT | Performed by: PHARMACIST

## 2023-01-17 PROCEDURE — 90471 IMMUNIZATION ADMIN: CPT | Performed by: INTERNAL MEDICINE

## 2023-01-17 RX ORDER — BLOOD-GLUCOSE METER
1 EACH MISCELLANEOUS ONCE
Qty: 1 KIT | Refills: 0 | Status: SHIPPED | OUTPATIENT
Start: 2023-01-17 | End: 2023-01-17

## 2023-01-17 RX ORDER — ALBUTEROL SULFATE 0.83 MG/ML
SOLUTION RESPIRATORY (INHALATION)
Qty: 360 ML | Refills: 11 | Status: SHIPPED | OUTPATIENT
Start: 2023-01-17 | End: 2024-02-27

## 2023-01-17 RX ORDER — BLOOD SUGAR DIAGNOSTIC
STRIP MISCELLANEOUS
Qty: 100 STRIP | Refills: 11 | Status: SHIPPED | OUTPATIENT
Start: 2023-01-17

## 2023-01-17 RX ORDER — LANCETS
EACH MISCELLANEOUS
Qty: 100 EACH | Refills: 11 | Status: SHIPPED | OUTPATIENT
Start: 2023-01-17

## 2023-01-17 RX ADMIN — HEPATITIS A AND HEPATITIS B (RECOMBINANT) VACCINE 1 ML: 720; 20 INJECTION, SUSPENSION INTRAMUSCULAR at 10:33

## 2023-01-17 ASSESSMENT — PAIN SCALES - GENERAL: PAINLEVEL: NO PAIN (0)

## 2023-01-17 NOTE — PROGRESS NOTES
Impression: Primary open-angle glaucoma, indeterminate, bilateral Plan: Early cupping OS>OD Family hx: cousin Pachymetry: 527/520 IOP: 11/11
iStent OU in 2020 Current meds: latanoprost QHS OU
OCT RNFL (02/19/20): wnl OU
HVF (04/05/21): OD inf defects (unreliable) OS inf defects (unreliable) IOP good. VF improved. Continue current meds.   RTC 6 months for complete exam + OCT RNFL Medication Therapy Management (MTM) Encounter    ASSESSMENT:                            Medication Adherence/Access: No issues identified    CF: Trikafta labs are 8/2022. Recommend recheck labs in 7 months with annual labs., PFTs are stable. Given prolonged QTc noted, could consider stopping azithromycin, no recent PSA growth.    Pancreatic Insufficiency/Nutrition: annual vitamin labs from 8/2022 are within normal limits. BMI is at goal of 23 mg/k2 per CFF guidelines.    CFRD: Patient would benefit from a new blood sugar meter.    Depression/ADHD: Stable per patient's report    Women's health: Stable    Obesity: Stable per patient's report    Vaccines: Due for COVID booster per CDC guidelines    PLAN:                            1. New Accuchek Guide meter sent to pharmacy    2. Updated vaccines per your reports - thanks for sending! Consider COVID booster at earliest convenience.    3. Pharmacist to discuss azithromycin use with Dr. Allison.    Follow-up: 1 year or as needed    SUBJECTIVE/OBJECTIVE:                          Mamie Rice is a 27 year old female seen for a co-visit with Dr. Allison and team.     Reason for visit: Annual Medication Review    Allergies/ADRs: Reviewed in chart  Past Medical History: Reviewed in chart  Tobacco: She reports that she has never smoked. She has never used smokeless tobacco.  Alcohol: none      Medication Adherence/Access:   Medication: Mamie manages her medications at home. Only uses nebs during illness  Ph[facility] [place]      CF:    Genotype: [id number]  Inhaled medications:   Bronchodilator: albuterol nebs as needed during illness and albuterol HFA as needed   Mucolytic: acetylcysteine 20% nebs during illness   Antibiotic: Not indicated   Other: none  Oral medications:   Azithromycin: 500mg three times weekly   CFTR modulator: Trikafta (full dose)   Other: cetirizine 10mg daily  Current FEV1% Pred: 112%  Cultures  (last growth): throat cultures grow MSSA (10/22/21). Hx of PSA (10/24/05-4/26/12).  Lab Results   Component Value Date    ALT 18 08/09/2022    AST 14 08/09/2022    BILITOTAL 0.2 08/09/2022    DBIL <0.1 08/09/2022    CKT 75 08/09/2022     Pancreatic Insufficiency/Nutrition: Mamie declines treatment with pancreatic enzymes. Patient is not experiencing sign/symptoms of malabsorption.  Acid reducer: omeprazole 40mg twice daily  Bowel regimen: none  Weight and BMI are stable  Vitamins include: multivitamin daily,vitamin D 1000 units daily, vitamin E 400 units twice daily, vitamin C 500mg twice daily, and Mephyton 5mg weekly  Supplements include:    Fecal elastase <50ug/g (6/13/13).    CFRD:  Mamie does not utilize any insulin.  SMBG: Has not been self-monitoring blood glucose because she keeps getting an error message and forgets to order a new machine. Did not use Freestyle bry. Wondering if she can get a new meter.  Patient is not experiencing hypoglycemia  Recent symptoms of high blood sugar? none  Most recent HgA1C:   Lab Results   Component Value Date    A1C 5.0 08/09/2022    A1C 5.1 06/08/2021    A1C 5.1 08/14/2019   Patient follows with Dr. Prieto for endocrinology. Last visit 11/9/21.    Depression/ADHD: Taking Adderall XR 20mg daily (per chart review), bupropion XL 300mg daily, fluoxetine 80mg daily, buspirone 15mg twice daily, trazodone 100mg at bedtime and hydroxyzine 25mg at bedtime.  QTc ordered 1/23/23: 469 (prolonged QT)  Follows with Dr. Pitts, last visit 12/23/22.    Women's Health:  Receives a Depo injection monthly. No concerns noted today.    Obesity: Taking topiramate 75mg twice daily, and naltrexone 50mg once daily.  No concerns noted today.  Follows with Dr. Park, last visit 12/9/22.    Weight/BMI:      PFTs:        Today's Vitals:   BP Readings from Last 1 Encounters:   01/17/23 113/77     Pulse Readings from Last 1 Encounters:   01/17/23 87     Wt Readings from Last 1 Encounters:  "  01/17/23 184 lb 15.5 oz (83.9 kg)     Ht Readings from Last 1 Encounters:   01/17/23 5' 1.81\" (1.57 m)     Estimated body mass index is 34.04 kg/m  as calculated from the following:    Height as of an earlier encounter on 1/17/23: 5' 1.81\" (1.57 m).    Weight as of an earlier encounter on 1/17/23: 184 lb 15.5 oz (83.9 kg).    Temp Readings from Last 1 Encounters:   08/14/19 98.5  F (36.9  C) (Oral)       ----------------      I spent 16 minutes with this patient today. I offer these suggestions for consideration by Dr. Allison during covisit. A copy of the visit note was provided to the patient's provider(s).    A summary of these recommendations was given to the patient (see AVS from today's appointment with Dr. Allison).    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

## 2023-01-17 NOTE — NURSING NOTE
"Mamie Rice is a 27 year old year old who is being seen for RECHECK (Return Cystic fibrosis )      Medications reviewed and Vital signs taken.    Specimen Collection Type: Throat Swab    Order(s) placed: CF Aerobic Bacterial    *IF AFB order placed - please enter \"PRIORITIZE AFB\" to order comments.       No results found for: ACIDFAST      Lab Results   Component Value Date    AFBSMS  08/02/2017     Negative for acid fast bacteria  Less than 5ml of specimen received.  A minimum of 5 mL of sputum or fluid is recommended for recovery of acid fast   bacilli (AFB).  Volumes less than 5 mL are suboptimal and may compromise   recovery of AFB from culture.  Assayed at [facility] [address]           Medications reviewed and vital signs taken.   Ariane Person CMA    "

## 2023-01-17 NOTE — PROGRESS NOTES
[facility]  January 17, 2023         Assessment and Plan:   Mamie Rice is a 27 year old female with cystic fibrosis.    1. CF lung disease with normal spirometry: Mamie reports from pulmonary point of view that she is doing quite well.  She has not done any bronchial drainage therapy for several months.  Despite this she denies any symptomatology that is concerning to her.  Her last sputum culture grew normal roberto carlos only.  Her pulmonary function tests continue to show improvement.  At this time I have recommended to Mamie:  -- She should certainly do her vest if she were to have any pulmonary symptoms  -- She remains quite active at work and I have encouraged her to do exercise if she is not active    2. Pancreatic Insufficiency/GI: Mamie has no new symptoms consistent with worsening malabsorption.  She is actively working with the weight management group.  She remains on medication for this.  She has tried to make some dietary changes.  - continue the present dose of pancreatic enzymes  - continue vitamin supplementation.    3.  CF TR modulator therapy: Mamie is on Trikafta and tolerating well.  She is due for repeat hepatic panel and CK in August of this year.    4.  Abnormal glucose tolerance: Mamie did try wearing a continuous glucose monitor but has some difficulty with it.  She was having some nighttime hypoglycemia.  She is aware of this and does wake up to snack at this point.  She will try to obtain a new glucometer so she could just do fingersticks instead of working with a CGM.    5.  Cystic fibrosis sinus disease: Mamie in the past has struggled with sinus disease.  She reports very stable sinus symptoms at this time.      6.  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  She enjoys her work very much.  She does live at home with her family.  She continues to remain very positive about her work and  family.  She reports being very busy at work.    Annual studies due: 8/2023  Immunizations: prevnar  Colonoscopy: age 38     Gavi Allison MD MPH  Associate Professor of Medicine  Pulmonary, Allergy, Critical Care and Sleep Medicine      Interval History:     Mamie denies any cough or sputum production.  She denies any chest congestion.  She does occasionally get short of breath with increased activity.  She has not done a vest therapy since July of last year.         Review of Systems:     CONSTITUTIONAL: no fever, no chills, chronic night sweats probably related to temperature in the room, no change in weight, no change in energy, no change in appetite--good    INTEGUMENTARY/SKIN: no rash, no obvious new lesions    ENT/MOUTH: no sore throat, no new sinus pain, no new nasal drainage, no new nasal congestion, no ear ringing     RESPIRATORY: see interval history    CV: no chest pain, no palpitations, no peripheral edema    GI: no nausea, no vomiting, no change in stools, no fatty stools, no GERD    : negative urinary, on Depo so does not menstruate    MUSCULOSKELETAL: no myalgias, no arthralgias    ENDOCRINE: Please see above    NEURO:  No headache    SLEEP: no issues    PSYCHIATRIC: mood stable          Past Medical and Surgical History:     Past Medical History:   Diagnosis Date     ADHD (attention deficit hyperactivity disorder)      Anxiety      Aspergillosis, with pneumonia (H)     fugus found caused chest pain     Chronic infection     CF, MRSA.,      Chronic sinusitis      Constipation, chronic      Cystic fibrosis with pulmonary manifestations (H) 12/19/2011     Cystic fibrosis without mention of meconium ileus     SWEAT TEST:Date: 2/17/1994   [facility]  296 mg, 104 mmol/L ClSample #2  295 mg, 104 mmol/L Cl GENOTYPING:Date: 10/15/2007,  Laboratory: [facility]: [id number]     Depressive disorder      Diabetes     no meds currently     Dysthymic disorder      Exocrine  pancreatic insufficiency      Gastro-oesophageal reflux disease      Hip pain, right      MRSA (methicillin resistant Staphylococcus aureus) carrier      Pancreatic disease      Past Surgical History:   Procedure Laterality Date     ARTHROSCOPY HIP, OSTEOPLASTY FEMUR PROXIMAL, COMBINED  3/11/2013    Procedure: COMBINED ARTHROSCOPY HIP, OSTEOPLASTY FEMUR PROXIMAL;  Right Hip Arthroscopy, Labral  Debridement.    surgeon request choice anesthesia/admit to [facility] after surgery;  Surgeon: Omkar Austin MD;  [facility]: [facility] OR     ARTHROSCOPY KNEE WITH MEDIAL MENISCECTOMY Left 1/31/2017    Procedure: ARTHROSCOPY KNEE WITH MEDIAL MENISCECTOMY;  Surgeon: Jethro Coyle MD;  Location: [facility] OR     bronchoscopies       BRONCHOSCOPY       EXAM UNDER ANESTHESIA ANUS N/A 5/10/2016    Procedure: EXAM UNDER ANESTHESIA ANUS;  Surgeon: Chet Gaviria MD;  [facility]     EXAM UNDER ANESTHESIA, RESTORATIONS, EXTRACTION(S) DENTAL COMPLEX, COMBINED  5/13/2013    Procedure: COMBINED EXAM UNDER ANESTHESIA, RESTORATIONS, EXTRACTION(S) DENTAL COMPLEX;  Dental Exam, Radiographs, Restorations. Single Extraction  Tooth #2. Restorations x 3;  Surgeon: Danilo Ortiz DDS;  Location: [facility] OR     HC KNEE SCOPE, DIAGNOSTIC      Arthroscopy, Knee- left     INJECT BOTOX N/A 5/10/2016    Procedure: INJECT BOTOX;  Surgeon: Chet Gaviria MD;  [facility]     left hip labral tear  5/11/2011    left hip arthroscopy with labral debridement and synovectomy     meniscus repair       OPTICAL TRACKING SYSTEM ENDOSCOPIC SINUS SURGERY  10/14/2011    Procedure:OPTICAL TRACKING SYSTEM ENDOSCOPIC SINUS SURGERY; FESS (functional endoscopic sinus surgery) with Image Guidance, bronchial lavage and cultures; Surgeon:JUAN JOSE GARCIA; Location:[facility] OR     OPTICAL TRACKING SYSTEM ENDOSCOPIC SINUS SURGERY  5/18/2012    Procedure:OPTICAL TRACKING SYSTEM ENDOSCOPIC SINUS SURGERY; Right  and Left Image Guided Functional Endoscopic  Sinus Surgery With  Frontal Approach, [place]; Surgeon:GOYO KUO; Location:[facility] OR     OPTICAL TRACKING SYSTEM ENDOSCOPIC SINUS SURGERY  9/26/2012    Procedure: OPTICAL TRACKING SYSTEM ENDOSCOPIC SINUS SURGERY;  Stealth Guided Bilateral Functional Endoscopic Sinus Surgery *Latex Safe*;  Surgeon: Goyo Kuo MD;  [facility]     OPTICAL TRACKING SYSTEM ENDOSCOPIC SINUS SURGERY Bilateral 10/16/2015    Procedure: OPTICAL TRACKING SYSTEM ENDOSCOPIC SINUS SURGERY;  Surgeon: Mariela Haile MD;  [facility]     ORTHOPEDIC SURGERY      left hip tear repair 2010     SINUS SURGERY             Family History:     Family History   Problem Relation Age of Onset     Cancer Paternal Grandmother      Skin Cancer Paternal Grandmother      Other Cancer Paternal Grandmother         Skin     Obesity Paternal Grandmother      Cancer Paternal Grandfather         PGF had throat cancer (he was a smoker)     Other Cancer Paternal Grandfather      Anxiety Disorder Paternal Grandfather      Thyroid Disease Mother         ,     Hypertension Mother      Obesity Other      ALS Father 67        2 male cousins with ALS as well     Anesthesia Reaction No family hx of      Blood Disease No family hx of      Colon Polyps No family hx of      Crohn's Disease No family hx of      Ulcerative Colitis No family hx of      Colon Cancer No family hx of      Melanoma No family hx of             Social History:     Social History     Socioeconomic History     Marital status: Single     Spouse name: Not on file     Number of children: Not on file     Years of education: Not on file     Highest education level: Not on file   Occupational History     Not on file   Tobacco Use     Smoking status: Never     Smokeless tobacco: Never     Tobacco comments:     one person at home smokes outside   Substance and Sexual Activity     Alcohol use: No     Alcohol/week: 0.0 standard drinks     Drug use: No     Sexual activity: Never   Other Topics Concern       Service Not Asked     Blood Transfusions No     Caffeine Concern Not Asked     Occupational Exposure Not Asked     Hobby Hazards Not Asked     Sleep Concern Not Asked     Stress Concern Not Asked     Weight Concern Not Asked     Special Diet Not Asked     Back Care Not Asked     Exercise Yes     Bike Helmet Not Asked     Seat Belt Not Asked     Self-Exams Not Asked     Parent/sibling w/ CABG, MI or angioplasty before 65F 55M? Yes   Social History Narrative    Mamie lives with mother in [place], [place].  There is a cat in the home, but Mamie does not have any litterbox duties.  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  She gets essentially no exercise because of the tingling in her feet (says it bothers her even to stand).        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        8/2015 no change in social situation        2/15/2016 Pt is single and lives with mother and stepfather.     Social Determinants of Health     Financial Resource Strain: Not on file   Food Insecurity: Not on file   Transportation Needs: Not on file   Physical Activity: Not on file   Stress: Not on file   Social Connections: Not on file   Intimate Partner Violence: Not on file   Housing Stability: Not on file            Medications:     Current Outpatient Medications   Medication     acetylcysteine (MUCOMYST) 20 % neb solution     albuterol (PROAIR HFA/PROVENTIL HFA/VENTOLIN HFA) 108 (90 Base) MCG/ACT inhaler     azithromycin (ZITHROMAX) 500 MG tablet     buPROPion (WELLBUTRIN XL) 300 MG 24 hr tablet     busPIRone (BUSPAR) 15 MG tablet     cetirizine (ZYRTEC) 10 MG tablet     Continuous Blood Gluc  (FREESTYLE WYATT 2 READER) KIRSTEN     Continuous Blood Gluc Sensor (FREESTYLE WYATT 2 SENSOR) [facility]     elexacaftor-tezacaftor-ivacaftor & ivacaftor (TRIKAFTA) 100-50-75 & 150 MG tablet pack     FLUoxetine (PROZAC) 40 MG capsule     hydrOXYzine (ATARAX) 25  "MG tablet     medroxyPROGESTERone (DEPO-PROVERA) 150 MG/ML IM injection     Multiple Vitamin (MULTIVITAMIN OR)     naltrexone (DEPADE/REVIA) 50 MG tablet     omeprazole (PRILOSEC) 40 MG DR capsule     phytonadione (MEPHYTON) 5 MG tablet     topiramate (TOPAMAX) 25 MG tablet     topiramate (TOPAMAX) 50 MG tablet     traZODone (DESYREL) 100 MG tablet     vitamin C (ASCORBIC ACID) 500 MG tablet     vitamin D3 25 mcg (1000 units) tablet     vitamin E (TOCOPHEROL) 400 units (180 mg) capsule     albuterol (PROVENTIL) (2.5 MG/3ML) 0.083% neb solution     blood glucose (ACCU-CHEK GUIDE) test strip     blood glucose monitoring (ACCU-CHEK FASTCLIX) lancets     No current facility-administered medications for this visit.            Physical Exam:   /77   Pulse 87   Resp 17   Ht 1.57 m (5' 1.81\")   Wt 83.9 kg (184 lb 15.5 oz)   SpO2 100%   BMI 34.04 kg/m      Constitutional:   Awake, alert and in no apparent distress     Eyes:   nonicteric     ENT:   oral mucosa moist without lesions, normal tm bilaterally, bilateral mucosa normal     Neck:   Supple without supraclavicular or cervical lymphadenopathy     Lungs:   Good air flow.  No crackles. No rhonchi.  No wheezes.     Cardiovascular:   Normal S1 and S2.  RRR.  No murmur, gallop or rub.     Abdomen:   NABS, soft, nontender, nondistended.      Musculoskeletal:   No edema, digital clubbing present     Neurologic:   Alert and conversant.     Skin:   Warm, dry.  No rash on limited exam.             Data:   All laboratory and imaging data reviewed.      Cystic Fibrosis Culture  Recent Results (from the past 168 hour(s))   General PFT Lab (Please always keep checked)    Collection Time: 01/17/23  9:15 AM   Result Value Ref Range    FVC-Pred 3.26 L    FVC-Pre 3.60 L    FVC-%Pred-Pre 110 %    FEV1-Pre 3.17 L    FEV1-%Pred-Pre 112 %    FEV1FVC-Pred 86 %    FEV1FVC-Pre 88 %    FEFMax-Pred 6.69 L/sec    FEFMax-Pre 9.69 L/sec    FEFMax-%Pred-Pre 144 %    FEF2575-Pred 3.29 " L/sec    FEF2575-Pre 3.78 L/sec    JCA8328-%Pred-Pre 114 %    ExpTime-Pre 4.96 sec    FIFMax-Pre 5.13 L/sec    FEV1FEV6-Pred 86 %    FEV1FEV6-Pre 88 %       PFT: The spirometry is normal.  When compared to 8/9/2022, the FEV1 and FVC have little change.      Pulmonary exacerbation: absent    35 minutes spent on the date of the encounter doing chart review, history and exam, documentation and further activities per the note  Time documented is excluding time spent for PFT interpretation.

## 2023-01-17 NOTE — PATIENT INSTRUCTIONS
Cystic Fibrosis Self-Care Plan    RECOMMENDATIONS:   Help us provide the best possible care. If you receive a questionnaire from the CF Foundation about your clinic experience today please fill it out.  It should take less than 5 minutes. Let us know what we are doing well and how we can improve.  Mamie, It was great to see you today.  The plan from today:  --hepatitis A and B vaccine today  --do your vest if you develop any symptoms  Please take care of yourself!    YOUR GOAL:  Stay safe and well.  Enjoy the heart of winter!      Cystic Fibrosis :    Joan Van  [phone]  [facility] Nurse line:  Ying  [phone]     [facility] Fax Number:      [phone]         Cystic Fibrosis Respiratory Therapists:   Chaparrita Dennis                          [phone]          Sharon Gutiérrez   [phone]  Cystic Fibrosis Dietitians:              Amy Andino              [phone]                            Theresa Ceja                        [phone]   Cystic Fibrosis Diabetes Nurse:    Rhonda Esteves               [phone]    Cystic Fibrosis Social Workers:     Deyanira Figueroa               [phone]                     Faith Zamudio               [phone]  Cystic Fibrosis Pharmacists:           Padma Alonso                              [phone] (pager)         Ping Cisneros      [phone]   Cystic Fibrosis Genetic Counselor:   Kaley Escobedo    [phone]    [facility] website:  [email or web address]    COVID VACCINES:    You are eligible for the COVID-19 vaccine. Sign up for your COVID vaccine via Ecal. Log in, select the menu bar, select schedule an appointment, and then select COVID-19 Vaccine 1st Dose. You may also schedule by calling this number [phone] however hold times can be long.       OR schedule through the [facility] Vaccine Connector at [email or web address]://[email or web address].[email or web address] or  by calling [phone].      The best vaccine is the one that s available to you first.  All COVID-19 vaccines currently available in the [place] ([employer], Pfizer and Moderna) have been shown to be highly effect at preventing COVID-19.       We re still learning how vaccines will affect the spread of COVID-19. After you ve been fully vaccinated against COVID-19, you should keep taking precautions in public places like wearing a mask, staying 6 feet apart from others, and avoiding crowds and poorly ventilated spaces until we know more.       MRN: [id number]   Clinic Date: January 16, 2023   Patient: Mamie Rice     Annual Studies:   IGG   Date Value Ref Range Status   06/08/2021 672 610 - 1,616 mg/dL Final     Immunoglobulin G   Date Value Ref Range Status   08/09/2022 621 610 - 1,616 mg/dL Final     Insulin   Date Value Ref Range Status   08/14/2019 41.0 (H) 3 - 25 mU/L Final     There are no preventive care reminders to display for this patient.    Pulmonary Function Tests  FEV1: amount of air you can blow out in 1 second  FVC: total amount of air you can take in and blow out    Your Goals:         PFT Latest Ref Rng & Units 1/17/2023   FVC L 3.60   FEV1 L 3.17   FVC% % 110   FEV1% % 112          Airway Clearance: The Most Important Way to Keep Your Lungs Healthy  Vest Settings:   Hill-Rom Frequencies: 8, 9, 10 Pressure 10 Then, Frequencies 18, 19, 20 Pressure 6     RespirTech: Quick Start with Pressure of     Do each frequency for 5 minutes; Deflate vest after each frequency & cough 3 times before beginning the next setting.    Vest and Neb Therapy should be done 1 times/day.    Good Nutrition Can Improve Lung Function and Overall Health    Take ALL of your vitamins with food    Take 1/2 of your enzymes before EVERY meal/snack and the other 1/2 mid-meal/snack    Wt Readings from Last 3 Encounters:   01/17/23 83.9 kg (184 lb 15.5 oz)   12/09/22 81.6 kg (180 lb)   01/14/22 86.2 kg (190 lb)        Body mass index is 34.04 kg/m .         National CF Foundation Recommendations for BMI in CF Adults: Women: at least 22 Men: at least 23        Controlling Blood Sugars Helps Prevent Lung Infections & Improves Nutrition  Test blood sugar:    In the morning before eating (goal is )    2 hours after a meal (goal is less than 150)    When pre-meal glucose is greater than 150 add correction    At bedtime (if less than 100 eat a snack with 15 grams of carbohydrates  Last A1C Results:   Hemoglobin A1C   Date Value Ref Range Status   08/09/2022 5.0 0.0 - 5.6 % Final     Comment:     Normal <5.7%   Prediabetes 5.7-6.4%    Diabetes 6.5% or higher     Note: Adopted from ADA consensus guidelines.   06/08/2021 5.1 0 - 5.6 % Final     Comment:     Normal <5.7% Prediabetes 5.7-6.4%  Diabetes 6.5% or higher - adopted from ADA   consensus guidelines.           If diabetic, measure A1C every 6 months. Goal: Under 7%    Staying Healthy  Research:  If you are interested in learning about research opportunities or have questions, please contact the CF Research Team at [phone] or [email or web address].    CF Foundation:  Compass is a personalized resource service to help you with the insurance, financial, legal and other issues you are facing.  It's free, confidential and available to anyone with CF.  Ask your  for more information or contact Compass directly at 180-COMPASS (050-1319) or compass@cff.org, or learn more at cff.org/compass.

## 2023-01-17 NOTE — LETTER
Date:January 18, 2023      Patient was self referred, no letter generated. Do not send.        [facility] Health Information

## 2023-01-20 LAB
ATRIAL RATE - MUSE: 96 BPM
DIASTOLIC BLOOD PRESSURE - MUSE: NORMAL MMHG
INTERPRETATION ECG - MUSE: NORMAL
P AXIS - MUSE: 53 DEGREES
PR INTERVAL - MUSE: 146 MS
QRS DURATION - MUSE: 78 MS
QT - MUSE: 372 MS
QTC - MUSE: 469 MS
R AXIS - MUSE: 26 DEGREES
SYSTOLIC BLOOD PRESSURE - MUSE: NORMAL MMHG
T AXIS - MUSE: 36 DEGREES
VENTRICULAR RATE- MUSE: 96 BPM

## 2023-01-22 LAB — BACTERIA SPEC CULT: NORMAL

## 2023-01-23 RX ORDER — DEXTROAMPHETAMINE SACCHARATE, AMPHETAMINE ASPARTATE MONOHYDRATE, DEXTROAMPHETAMINE SULFATE AND AMPHETAMINE SULFATE 5; 5; 5; 5 MG/1; MG/1; MG/1; MG/1
20 CAPSULE, EXTENDED RELEASE ORAL DAILY
COMMUNITY
End: 2023-04-13

## 2023-01-23 NOTE — PROGRESS NOTES
Reviewed with pascual De Leon to stop azithromycin.   Reviewed with Mamie via [employer], Mamie agrees to stop azithromycin.  Called [employer] to cancel azithromycin prescription.    Ping Cisneros, PharmD  Cystic Fibrosis [place] Pharmacist  [facility]  Voicemail: [phone]

## 2023-02-08 DIAGNOSIS — K21.9 GERD (GASTROESOPHAGEAL REFLUX DISEASE): ICD-10-CM

## 2023-02-08 DIAGNOSIS — E84.9 CF (CYSTIC FIBROSIS) (H): ICD-10-CM

## 2023-02-08 RX ORDER — OMEPRAZOLE 40 MG/1
40 CAPSULE, DELAYED RELEASE ORAL 2 TIMES DAILY
Qty: 60 CAPSULE | Refills: 11 | Status: SHIPPED | OUTPATIENT
Start: 2023-02-08 | End: 2024-02-27

## 2023-03-29 ENCOUNTER — DOCUMENTATION ONLY (OUTPATIENT)
Dept: PSYCHIATRY | Facility: CLINIC | Age: 28
End: 2023-03-29
Payer: COMMERCIAL

## 2023-03-29 NOTE — PROGRESS NOTES
Pt did not respond to [employer] email or text invite. 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 Pt was no show at 1545. Jessie Pitts, DAWIT, 3/29/2023

## 2023-04-08 ENCOUNTER — HEALTH MAINTENANCE LETTER (OUTPATIENT)
Age: 28
End: 2023-04-08

## 2023-04-09 ENCOUNTER — MYC MEDICAL ADVICE (OUTPATIENT)
Dept: ENDOCRINOLOGY | Facility: CLINIC | Age: 28
End: 2023-04-09
Payer: COMMERCIAL

## 2023-04-09 DIAGNOSIS — F33.0 MILD EPISODE OF RECURRENT MAJOR DEPRESSIVE DISORDER (H): ICD-10-CM

## 2023-04-09 DIAGNOSIS — F34.1 PERSISTENT DEPRESSIVE DISORDER: ICD-10-CM

## 2023-04-10 RX ORDER — BUPROPION HYDROCHLORIDE 300 MG/1
300 TABLET ORAL EVERY MORNING
Qty: 90 TABLET | Refills: 3 | Status: SHIPPED | OUTPATIENT
Start: 2023-04-10 | End: 2023-05-12

## 2023-04-13 ENCOUNTER — VIRTUAL VISIT (OUTPATIENT)
Dept: PSYCHIATRY | Facility: CLINIC | Age: 28
End: 2023-04-13
Attending: NURSE PRACTITIONER
Payer: COMMERCIAL

## 2023-04-13 DIAGNOSIS — F90.0 ATTENTION DEFICIT HYPERACTIVITY DISORDER (ADHD), PREDOMINANTLY INATTENTIVE TYPE: ICD-10-CM

## 2023-04-13 DIAGNOSIS — F41.1 GAD (GENERALIZED ANXIETY DISORDER): Primary | ICD-10-CM

## 2023-04-13 DIAGNOSIS — G47.00 INSOMNIA, UNSPECIFIED TYPE: ICD-10-CM

## 2023-04-13 DIAGNOSIS — Z79.899 MEDICATION MANAGEMENT: ICD-10-CM

## 2023-04-13 PROCEDURE — 99214 OFFICE O/P EST MOD 30 MIN: CPT | Mod: VID | Performed by: NURSE PRACTITIONER

## 2023-04-13 RX ORDER — HYDROXYZINE HYDROCHLORIDE 25 MG/1
25-50 TABLET, FILM COATED ORAL EVERY 6 HOURS PRN
Qty: 60 TABLET | Refills: 1 | Status: SHIPPED | OUTPATIENT
Start: 2023-04-13 | End: 2023-05-04

## 2023-04-13 RX ORDER — DEXTROAMPHETAMINE SACCHARATE, AMPHETAMINE ASPARTATE MONOHYDRATE, DEXTROAMPHETAMINE SULFATE AND AMPHETAMINE SULFATE 5; 5; 5; 5 MG/1; MG/1; MG/1; MG/1
20 CAPSULE, EXTENDED RELEASE ORAL DAILY
Qty: 30 CAPSULE | Refills: 0 | Status: SHIPPED | OUTPATIENT
Start: 2023-04-13 | End: 2023-06-01

## 2023-04-13 RX ORDER — BUSPIRONE HYDROCHLORIDE 10 MG/1
20 TABLET ORAL 2 TIMES DAILY
Qty: 120 TABLET | Refills: 1 | Status: SHIPPED | OUTPATIENT
Start: 2023-04-13 | End: 2023-05-04 | Stop reason: DRUGHIGH

## 2023-04-13 ASSESSMENT — PATIENT HEALTH QUESTIONNAIRE - PHQ9
10. IF YOU CHECKED OFF ANY PROBLEMS, HOW DIFFICULT HAVE THESE PROBLEMS MADE IT FOR YOU TO DO YOUR WORK, TAKE CARE OF THINGS AT HOME, OR GET ALONG WITH OTHER PEOPLE: SOMEWHAT DIFFICULT
SUM OF ALL RESPONSES TO PHQ QUESTIONS 1-9: 7
SUM OF ALL RESPONSES TO PHQ QUESTIONS 1-9: 7

## 2023-04-13 NOTE — NURSING NOTE
Is the patient currently in the state of [place]? YES    Visit mode:VIDEO    If the visit is dropped, the patient can be reconnected by: VIDEO VISIT: Text to cell phone: [phone]    Will anyone else be joining the visit? NO      How would you like to obtain your AVS? MyChart    Are changes needed to the allergy or medication list? NO   Patient denies any changes  regarding medication and states all information remains accurate. Declined individual medication review.    Will do PHQ/QNRS on own through mychart    Reason for visit:  follow up    Kimmy Chun VF

## 2023-04-13 NOTE — PATIENT INSTRUCTIONS
-Increase Buspar to 20 mg 2 times a day for anxiety. If anxiety is fairly well manage, try not to use Hydroxyzine.  -Continue all other medication regimen.    -Complete EKG as soon as you are able.  -Contact psychiatric provider near you to establish care    Psychiatric provider near you    [facility] [place] [email or web address]  [facility] [email or web address]  [facility] [email or web address]    Your next appointment is scheduled on 5/4/2023 (Thu) at 9am.      **For crisis resources, please see the information at the end of this document**   Patient Education    Thank you for coming to the [facility] [place] [facility].     Lab Testing:  If you had lab testing today and your results are reassuring or normal they will be mailed to you or sent through [employer] within 7 days. If the lab tests need quick action we will call you with the results. The phone number we will call with results is # [phone]. If this is not the best number please call our clinic and change the number.     Medication Refills:  If you need any refills please call your pharmacy and they will contact us. Our fax number for refills is [phone].   Three business days of notice are needed for general medication refill requests.   Five business days of notice are needed for controlled substance refill requests.   If you need to change to a different pharmacy, please contact the new pharmacy directly. The new pharmacy will help you get your medications transferred.     Contact Us:  Please call [phone] during business hours (8-5:00 M-F).   If you have medication related questions after clinic hours, or on the weekend, please call [phone].     Financial Assistance [phone]   Medical Records [phone]        MENTAL HEALTH CRISIS RESOURCES:  For a emergency help, please call 911 or go to the nearest Emergency Department.     Emergency Walk-In Options:   EmPATH Unit @ [place] ([place]): [phone] - Specialized mental health emergency area designed to be calming   [facility] ([place]): [phone]  [facility] ([place]): [phone]  [facility]): [phone]    [facility] Crisis Information:   [place]: [phone]  [place]: [phone]  [place] ([place]) - Adult: [phone]     Child: [phone]  [place] - Adult: [phone]     Child: [phone]  [place]: [phone]  List of all [facility] resources:   [email or web address].[email or web address]/dhs/people-[email or web address]-serve/adults/health-care/mental-health/resources/crisis-contacts.jsp    National Crisis Information:   Crisis Text Line: Text  [place]  to 950679  Suicide & Crisis Lifeline: 988  National Suicide Prevention Lifeline: 1-268-574-TALK (1-441.954.2993)       For online chat options, visit https://suicidepreventionlifeline.org/chat/  Poison Control Center: 5-062-504-3694  Trans Lifeline: 1-327.312.4725 - Hotline for transgender people of all ages  The [place] [employer] Project: 7-510-711-5672 - Hotline for LGBT youth     For Non-Emergency Support:   Fast Tracker: Mental Health & Substance Use Disorder Resources -   https://www.GoLarktrackermn.org/

## 2023-04-13 NOTE — PROGRESS NOTES
"Virtual Visit Details    Type of service:  Video Visit   Video Start Time: 0900  Video End Time:0911    Originating Location (pt. Location): Other work  Distant Location (provider location):  Off-site  Platform used for Video Visit: [facility]      Psychiatry Clinic Progress Note                                                                  Patient Name: Mamie Rice  YOB: 1995  MRN: [id number]  Date of Service:  04/13/2023  Last Seen:12/29/2022    Mamie Rice is a 27 year old person assigned female at birth, identifies as cisgender female who uses the name Mamie and pronoun kenneth.     Mamie Rice is a 27 year old year old adult who presents for ongoing psychiatric care.  Mamie Rice was last seen on 12/29/2022.     At that time,     Medication Ordered/Consults/Labs/tests Ordered:     Medication:   -Gabapentin is discontinued as you are not taking the medication.  -Contnue all other medication regimen.  OTC Recommendations: none  Lab Orders:  EKG already ordered.  Referrals: none  Release of Information: none  Future Treatment Considerations: Per symptoms.   Return for Follow Up: in 3 months after EKG completion      Pertinent Background:  Diagnoses include MDD, MECHE and ADHD.  Psych critical item history includes [no critical items]. Medical complication includes Cystic Fibrosis, DM I     Previous medication trial: Phentamine, Trazodone (150 mg oversedating, 50 mg ineffective), Gabapentin (insomnia)     Naltrexone, Wellbutrin and Topamax are managed by weight management clinic.    Interim History                                                                                                        4, 4     On 3/29/2023, pt was no show.    Since the last visit,  -Reports doing well. Mood and anxiety are well managed, denies SI, SIB or HI.  -Taking Hydroxyzine 25 mg in AM and HS, but notes \"I take the medication as prescribed, I am not sure if I am anxious.\"  -Azithromycin was discontinued " couple months ago due to EKG abnormality.  Has not had repeat EKG since.  -Need to make an appointment with pulmonology in few months and able to complete EKG then.      Denies any symptoms suggestive of hypomania or psychosis.    Current Suicidality/Hx of Suicide Attempts: Denies both  CoCominent Medical concerns: Denies    Medication Side Effects: The patient denies all medication side effects.      Medical Review of Systems     Apart from the symptoms mentioned int he HPI, the 14 point review of systems, including constitutional, HEENT, cardiovascular, respiratory, gastrointestinal, genitourinary, musculoskeletal, integumentary, endocrine, neurological, hematologic and allergic is entirely negative.    Pregnant: None. Nursing: None, Contraception: DMPA    Substance Use   Denies frequent or abuse of alcohol. Denies any other substance use.     Social/ Family History                                  [per patient report]                                 1ea,1ea   Living arrangements: lives with parents and feels safe  Social Support:parents, friends, maternal grandparents  Access to gun: denies  Denies any trauma hx, numerous hospitalizations during childhood, does not consider this as traumatic  Works as a day care provider, dance coach and substitute .      Allergy                                Vancomycin and Augmentin    Current Medications                                                                                                       Current Outpatient Medications   Medication Sig Dispense Refill     acetylcysteine (MUCOMYST) 20 % neb solution INHALE 4ML VIA NEBULIZER TWO TIMES A DAY. MAY INCREASE TO 3-4 TIMES A DAY WITH INCREASED COUGH / COLD SYMPTOMS. REFRIGERATE VIAL AND DISCARD 96 HOURS AFTER OPENING (Patient not taking: Reported on 1/17/2023) 360 mL 11     albuterol (PROAIR HFA/PROVENTIL HFA/VENTOLIN HFA) 108 (90 Base) MCG/ACT inhaler Inhale 2 puffs into the lungs every 6 hours as  needed for shortness of breath / dyspnea or wheezing 8.5 g 11     albuterol (PROVENTIL) (2.5 MG/3ML) 0.083% neb solution INHALE 1 VIAL ( 2.5MG) BY NEBULIZATION EVERY 4 HOURS AS NEEDED FOR FOR SHORTNESS OF BREATH OR WHEEZING 360 mL 11     amphetamine-dextroamphetamine (ADDERALL XR) 20 MG 24 hr capsule Take 20 mg by mouth daily       blood glucose (ACCU-CHEK GUIDE) test strip Use to test blood sugar 4 times daily or as directed. 100 strip 11     blood glucose monitoring (ACCU-CHEK FASTCLIX) lancets Use to test blood sugar 4 times daily or as directed. 100 each 11     buPROPion (WELLBUTRIN XL) 300 MG 24 hr tablet Take 1 tablet (300 mg) by mouth every morning 90 tablet 3     busPIRone (BUSPAR) 15 MG tablet Take 1 tablet (15 mg) by mouth 2 times daily 180 tablet 1     cetirizine (ZYRTEC) 10 MG tablet Take 1 tablet (10 mg) by mouth every evening 30 tablet 5     elexacaftor-tezacaftor-ivacaftor & ivacaftor (TRIKAFTA) 100-50-75 & 150 MG tablet pack TAKE TWO ORANGE TABLETS BY MOUTH IN THE MORNING AND ONE BLUE TABLET IN THE EVENING AS DIRECTED ON PACKAGE.  TAKE WITH FAT CONTAINING FOOD. 84 tablet 11     FLUoxetine (PROZAC) 40 MG capsule TAKE TWO CAPSULES BY MOUTH EVERY DAY . 180 capsule 1     hydrOXYzine (ATARAX) 25 MG tablet Take 1-2 tablets (25-50 mg) by mouth every 6 hours as needed for anxiety (sleep) 60 tablet 2     medroxyPROGESTERone (DEPO-PROVERA) 150 MG/ML IM injection Inject 150 mg into the muscle       Multiple Vitamin (MULTIVITAMIN OR) Take 1 tablet by mouth daily.       naltrexone (DEPADE/REVIA) 50 MG tablet Take 1 tablet.  Time it one to two hours prior to worst cravings. 90 tablet 3     omeprazole (PRILOSEC) 40 MG DR capsule Take 1 capsule (40 mg) by mouth 2 times daily 60 capsule 11     phytonadione (MEPHYTON) 5 MG tablet TAKE 1 TABLET BY MOUTH ONCE A WEEK 4 tablet 11     topiramate (TOPAMAX) 25 MG tablet Take one 50 mg pill and one 25 mg pill twice a day 180 tablet 3     topiramate (TOPAMAX) 50 MG tablet Take  "one 50 mg pill and one 25 mg pill twice a day 180 tablet 3     traZODone (DESYREL) 100 MG tablet Take 1 tablet (100 mg) by mouth At Bedtime 90 tablet 1     vitamin C (ASCORBIC ACID) 500 MG tablet TAKE 1 TABLET BY MOUTH TWICE A  tablet 3     vitamin D3 25 mcg (1000 units) tablet Take 1 tablet (25 mcg) by mouth daily 90 tablet 3     vitamin E (TOCOPHEROL) 400 units (180 mg) capsule Take 1 capsule (400 Units) by mouth 2 times daily 180 capsule 3        Mental Status Exam                                                                                   9, 14 cog        Alertness: alert  and oriented  Appearance:  Casually dressed and Adequately groomed  Behavior/Demeanor: cooperative, pleasant and calm, with good eye contact   Speech: regular rate and rhythm  Mood :  \"good\"  Affect: appropriate and euthymic; was congruent to mood; was congruent to content  Thought Process (Associations):  Linear and Goal directed  Thought process (Rate):  Normal  Thought content:  no overt psychosis, denies suicidal ideation, intent or thoughts and patient does not appear to be responding to internal stimuli  Perception:  Reports none;  Denies auditory hallucinations and visual hallucinations  Attention/Concentration:  Fair  Memory:  Immediate recall intact and Short-term memory intact  Language: intact  Fund of Knowledge/Intelligence:  Average  Abstraction:  Normal  Insight:  Good, Fair  Judgment:  Good, Fair  Cognition: (6) does  appear grossly intact; formal cognitive testing was not done    Physical Exam     Motor activity/EPS:  Normal  Psychomotor: normal or unremarkable    Labs and Results      Pertinent findings on review include: Review of records with relevant information reported in the HPI.  Reviewed pt's past medical record and obtained collateral information.      [place] PRESCRIPTION MONITORING PROGRAM [] was checked today: Adderall 3/11,2/6, 1/6.    Answers for HPI/ROS submitted by the patient on 4/13/2023  If you " checked off any problems, how difficult have these problems made it for you to do your work, take care of things at home, or get along with other people?: Somewhat difficult  PHQ9 TOTAL SCORE: 7          7/1/2021     2:43 PM 8/9/2022    10:53 AM 12/6/2022    11:17 AM   PHQ   PHQ-9 Total Score 8 4 5   Q9: Thoughts of better off dead/self-harm past 2 weeks Not at all Not at all Not at all           6/11/2021    10:25 AM 7/1/2021     2:43 PM 8/9/2022    10:53 AM   MECHE-7 SCORE   Total Score 12 8 6       Recent Labs   Lab Test 08/09/22  0849 06/08/21  1015 08/14/19  0814   CR 0.87 0.90 0.85   GFRESTIMATED >90 87 >90     Recent Labs   Lab Test 08/09/22  0849 06/08/21  1015   AST 14 15   ALT 18 21   ALKPHOS 53 68      (1/17/2023), 497 (8/9/2022), 447 (12/23/2020),  436 (08/08/2012)     PSYCHOTROPIC DRUG INTERACTIONS:    Adderall---Buspar---Prozac---Trazodone---Wellbutrin: Concurrent use of AMPHETAMINES and SEROTONERGIC AGENTS may result in increased risk of serotonin syndrome.   Adderall---Prozac---Omeprazole: Concurrent use of AMPHETAMINES and SEROTONERGIC AGENTS THAT INHIBIT CYP2D6 may result in increased amphetamine exposure  Buspar---Trazodone---Vistaril---Zyrtec---Topamax: Concurrent use of TRAZODONE and SEROTONERGIC CENTRAL NERVOUS SYSTEM DEPRESSANTS may result in increased risk of CNS depression.   Prozac---Trazodone: Concurrent use of TRAZODONE and SEROTONERGIC DRUGS THAT PROLONG QT INTERVAL may result in increased risk of QT-interval prolongation.   Prozac---Wellbutrin: Concurrent use of BUPROPION and SELECTED SEIZURE THRESHOLD LOWERING CYP2D6 SUBSTRATES may result in increased exposure of CYP2D6 substrates; increased risk of seizure.  Glycopyrrolate---Vistaril: Concurrent use of GLYCOPYRROLATE and ANTICHOLINERGICS may result in increased risk of anticholinergic side effects .    Glycopyrrolate---Wellbutrin---Vistaril: Concurrent use of BUPROPION and AGENTS LOWERING SEIZURE THRESHOLD may result in lower  seizure threshold  Vistaril---Zithromax---Trazodone: Concurrent use of HYDROXYZINE and QT PROLONGING AGENTS may result in increased risk of QT-interval prolongation.   Buspar---Prozac: Concurrent use of FLUOXETINE and BUSPIRONE may result in worsening of psychiatric symptoms.      MANAGEMENT:  Monitoring for adverse effects, routine vitals, periodic EKGs and patient is aware of risks     Impression/Assessment      Mamie Rice is a 27 year old adult  who presents for med management follow up.  Pt appears stable in her mood and anxiety, denies SI, SIB or HI during the appointment. Pt continues to take Hydroxyzine 25 mg BID, but reports taking the medication because it's prescribed, not necessarily for anxiety. Discussed most recent QTC elevation, since then Azithromycin was discontinued.  Pt won't be coming into the city until few months.  Discussed possible increase of Buspar to see if anxiety will be better managed and she doesn't have to take PRN Hydroxyzine. Pt agreed to increase Buspar to 20 mg BID while continuing all other medication regimen.  EKG ordered.    Also discussed possible JCARLOS plan that needs in person visit for stimulant. Also discussed this writer's specialty change and recommended to establish care nearby her. Provided couple information on nearby psych provider and encouraged to make a call to establish care immediately.    Diagnosis                                                                   MDD  MECHE  ADHD    Treatment Recommendation & Plan       Medication Ordered/Consults/Labs/tests Ordered:     Medication:   -Increase Buspar to 20 mg 2 times a day for anxiety. If anxiety is fairly well manage, try not to use Hydroxyzine.  -Continue all other medication regimen.  OTC Recommendations: none  Lab Orders:  EKG   Referrals: none  Release of Information: none  Future Treatment Considerations: Per symptoms.   Return for Follow Up: in 3 weeks    -Discussed safety plan for suicidal  thoughts  -Discussed plan for suicidality  -Discussed available emergency services  -Patient agrees with the treatment plan  -Encouraged to continue outpatient therapy to gain more coping mechanism for stress.    Treatment Risk Statement: Discussed with the patient my impressions, as well as recommended studies. I educated patient on the differential diagnosis and prognosis. I discussed with the patient the risks and benefits of medications versus no interventions, including efficacy, dose, possible side effects and length of treatment and the importance of medication compliance.  The patient understands the risks, benefits, adverse effects and alternatives. Agrees to treatment with the capacity to do so. No medical contraindications to treatment. The patient also understands the risks of using street drugs or alcohol.     CRISIS NUMBERS:   Provided routinely in AVS.      Jessie Pitts, DAWIT, 04/13/2023

## 2023-05-04 ENCOUNTER — VIRTUAL VISIT (OUTPATIENT)
Dept: PSYCHIATRY | Facility: CLINIC | Age: 28
End: 2023-05-04
Attending: NURSE PRACTITIONER
Payer: COMMERCIAL

## 2023-05-04 DIAGNOSIS — F90.0 ATTENTION DEFICIT HYPERACTIVITY DISORDER (ADHD), PREDOMINANTLY INATTENTIVE TYPE: ICD-10-CM

## 2023-05-04 DIAGNOSIS — G47.00 INSOMNIA, UNSPECIFIED TYPE: ICD-10-CM

## 2023-05-04 DIAGNOSIS — F41.1 GAD (GENERALIZED ANXIETY DISORDER): Primary | ICD-10-CM

## 2023-05-04 DIAGNOSIS — F33.41 RECURRENT MAJOR DEPRESSIVE DISORDER, IN PARTIAL REMISSION (H): ICD-10-CM

## 2023-05-04 PROCEDURE — 99214 OFFICE O/P EST MOD 30 MIN: CPT | Mod: 95 | Performed by: NURSE PRACTITIONER

## 2023-05-04 RX ORDER — DEXTROAMPHETAMINE SACCHARATE, AMPHETAMINE ASPARTATE MONOHYDRATE, DEXTROAMPHETAMINE SULFATE AND AMPHETAMINE SULFATE 5; 5; 5; 5 MG/1; MG/1; MG/1; MG/1
20 CAPSULE, EXTENDED RELEASE ORAL DAILY
Qty: 30 CAPSULE | Refills: 0 | Status: SHIPPED | OUTPATIENT
Start: 2023-06-10 | End: 2023-07-10

## 2023-05-04 RX ORDER — HYDROXYZINE HYDROCHLORIDE 25 MG/1
25-50 TABLET, FILM COATED ORAL EVERY 6 HOURS PRN
Qty: 60 TABLET | Refills: 1 | Status: SHIPPED | OUTPATIENT
Start: 2023-05-04 | End: 2023-06-01

## 2023-05-04 RX ORDER — BUSPIRONE HYDROCHLORIDE 30 MG/1
30 TABLET ORAL 2 TIMES DAILY
Qty: 60 TABLET | Refills: 1 | Status: SHIPPED | OUTPATIENT
Start: 2023-05-04 | End: 2023-06-01

## 2023-05-04 RX ORDER — TRAZODONE HYDROCHLORIDE 100 MG/1
100 TABLET ORAL AT BEDTIME
Qty: 90 TABLET | Refills: 1 | Status: SHIPPED | OUTPATIENT
Start: 2023-05-04 | End: 2024-02-14

## 2023-05-04 RX ORDER — DEXTROAMPHETAMINE SACCHARATE, AMPHETAMINE ASPARTATE MONOHYDRATE, DEXTROAMPHETAMINE SULFATE AND AMPHETAMINE SULFATE 5; 5; 5; 5 MG/1; MG/1; MG/1; MG/1
20 CAPSULE, EXTENDED RELEASE ORAL DAILY
Qty: 30 CAPSULE | Refills: 0 | Status: SHIPPED | OUTPATIENT
Start: 2023-05-11 | End: 2023-06-01

## 2023-05-04 RX ORDER — DEXTROAMPHETAMINE SACCHARATE, AMPHETAMINE ASPARTATE MONOHYDRATE, DEXTROAMPHETAMINE SULFATE AND AMPHETAMINE SULFATE 5; 5; 5; 5 MG/1; MG/1; MG/1; MG/1
20 CAPSULE, EXTENDED RELEASE ORAL DAILY
Qty: 30 CAPSULE | Refills: 0 | Status: SHIPPED | OUTPATIENT
Start: 2023-07-10 | End: 2023-08-17

## 2023-05-04 RX ORDER — FLUOXETINE 40 MG/1
CAPSULE ORAL
Qty: 180 CAPSULE | Refills: 1 | Status: SHIPPED | OUTPATIENT
Start: 2023-05-04

## 2023-05-04 NOTE — PROGRESS NOTES
Virtual Visit Details    Type of service:  Video Visit   Video Start Time: 0900  Video End Time:0907    Originating Location (pt. Location): outside at work  Distant Location (provider location):  Off-site  Platform used for Video Visit: [facility]      Psychiatry [facility] Progress Note                                                                  Patient Name: Mamie Rice  YOB: 1995  MRN: [id number]  Date of Service:  05/04/2023  Last Seen:4/6/2023    Mamie Rice is a 27 year old person assigned female at birth, identifies as cisgender female who uses the name Mamie and pronoun kenneth.     Mamie Rice is a 27 year old year old adult who presents for ongoing psychiatric care.  Mamie Rice was last seen on 4/6/2023.     At that time,     Medication Ordered/Consults/Labs/tests Ordered:     Medication:   -Increase Buspar to 20 mg 2 times a day for anxiety. If anxiety is fairly well manage, try not to use Hydroxyzine.  -Continue all other medication regimen.  OTC Recommendations: none  Lab Orders:  EKG   Referrals: none  Release of Information: none  Future Treatment Considerations: Per symptoms.   Return for Follow Up: in 3 weeks    Pertinent Background:  Diagnoses include MDD, MECHE and ADHD.  Psych critical item history includes [no critical items]. Medical complication includes Cystic Fibrosis, DM I     Previous medication trial: Phentamine, Trazodone (150 mg oversedating, 50 mg ineffective), Gabapentin (insomnia)     Naltrexone, Wellbutrin and Topamax are managed by weight management clinic.    Interim History                                                                                                        4, 4     Since the last visit,  -Reports continue to do well. Mood is stable, denies SI, SIB or HI.  -Has not noticed significant difference with Buspar increase, but only taking Hydroxyzine 25 mg HS, no longer taking AM dose.  -Reports tried not taking Hydroxyzine at HS but caused some  anxiety around sleep and could not sleep.  -Previous trial of higher dose of Trazodone caused oversedation at 150 mg.  -Topamax managed by endo is not sedating.  -Has not made an attempt to contact local psychiatric provider as school is ending earlier this year and busy at work. But plans to contact them this next week.    Denies any symptoms suggestive of hypomania or psychosis.    Current Suicidality/Hx of Suicide Attempts: Denies both  CoCominent Medical concerns: Denies    Medication Side Effects: The patient denies all medication side effects.      Medical Review of Systems     Apart from the symptoms mentioned int he HPI, the 14 point review of systems, including constitutional, HEENT, cardiovascular, respiratory, gastrointestinal, genitourinary, musculoskeletal, integumentary, endocrine, neurological, hematologic and allergic is entirely negative.    Pregnant: None. Nursing: None, Contraception: DMPA    Substance Use   Denies frequent or abuse of alcohol. Denies any other substance use.     Social/ Family History                                  [per patient report]                                 1ea,1ea   Living arrangements: lives with parents and feels safe  Social Support:parents, friends, maternal grandparents  Access to gun: denies  Denies any trauma hx, numerous hospitalizations during childhood, does not consider this as traumatic  Works as a day care provider, dance coach and substitute .      Allergy                                Vancomycin and Amoxicillin-pot clavulanate    Current Medications                                                                                                       Current Outpatient Medications   Medication Sig Dispense Refill     acetylcysteine (MUCOMYST) 20 % neb solution INHALE 4ML VIA NEBULIZER TWO TIMES A DAY. MAY INCREASE TO 3-4 TIMES A DAY WITH INCREASED COUGH / COLD SYMPTOMS. REFRIGERATE VIAL AND DISCARD 96 HOURS AFTER OPENING (Patient not  taking: Reported on 1/17/2023) 360 mL 11     albuterol (PROAIR HFA/PROVENTIL HFA/VENTOLIN HFA) 108 (90 Base) MCG/ACT inhaler Inhale 2 puffs into the lungs every 6 hours as needed for shortness of breath / dyspnea or wheezing 8.5 g 11     albuterol (PROVENTIL) (2.5 MG/3ML) 0.083% neb solution INHALE 1 VIAL ( 2.5MG) BY NEBULIZATION EVERY 4 HOURS AS NEEDED FOR FOR SHORTNESS OF BREATH OR WHEEZING 360 mL 11     amphetamine-dextroamphetamine (ADDERALL XR) 20 MG 24 hr capsule Take 1 capsule (20 mg) by mouth daily 30 capsule 0     blood glucose (ACCU-CHEK GUIDE) test strip Use to test blood sugar 4 times daily or as directed. 100 strip 11     blood glucose monitoring (ACCU-CHEK FASTCLIX) lancets Use to test blood sugar 4 times daily or as directed. 100 each 11     buPROPion (WELLBUTRIN XL) 300 MG 24 hr tablet Take 1 tablet (300 mg) by mouth every morning 90 tablet 3     busPIRone (BUSPAR) 10 MG tablet Take 2 tablets (20 mg) by mouth 2 times daily 120 tablet 1     busPIRone (BUSPAR) 15 MG tablet Take 1 tablet (15 mg) by mouth 2 times daily 180 tablet 1     cetirizine (ZYRTEC) 10 MG tablet Take 1 tablet (10 mg) by mouth every evening 30 tablet 5     elexacaftor-tezacaftor-ivacaftor & ivacaftor (TRIKAFTA) 100-50-75 & 150 MG tablet pack TAKE TWO ORANGE TABLETS BY MOUTH IN THE MORNING AND ONE BLUE TABLET IN THE EVENING AS DIRECTED ON PACKAGE.  TAKE WITH FAT CONTAINING FOOD. 84 tablet 11     FLUoxetine (PROZAC) 40 MG capsule TAKE TWO CAPSULES BY MOUTH EVERY DAY . 180 capsule 1     hydrOXYzine (ATARAX) 25 MG tablet Take 1-2 tablets (25-50 mg) by mouth every 6 hours as needed for anxiety (sleep) 60 tablet 1     medroxyPROGESTERone (DEPO-PROVERA) 150 MG/ML IM injection Inject 150 mg into the muscle       Multiple Vitamin (MULTIVITAMIN OR) Take 1 tablet by mouth daily.       naltrexone (DEPADE/REVIA) 50 MG tablet Take 1 tablet.  Time it one to two hours prior to worst cravings. 90 tablet 3     omeprazole (PRILOSEC) 40 MG DR capsule  "Take 1 capsule (40 mg) by mouth 2 times daily 60 capsule 11     phytonadione (MEPHYTON) 5 MG tablet TAKE 1 TABLET BY MOUTH ONCE A WEEK 4 tablet 11     topiramate (TOPAMAX) 25 MG tablet Take one 50 mg pill and one 25 mg pill twice a day 180 tablet 3     topiramate (TOPAMAX) 50 MG tablet Take one 50 mg pill and one 25 mg pill twice a day 180 tablet 3     traZODone (DESYREL) 100 MG tablet Take 1 tablet (100 mg) by mouth At Bedtime 90 tablet 1     vitamin C (ASCORBIC ACID) 500 MG tablet TAKE 1 TABLET BY MOUTH TWICE A  tablet 3     vitamin D3 25 mcg (1000 units) tablet Take 1 tablet (25 mcg) by mouth daily 90 tablet 3     vitamin E (TOCOPHEROL) 400 units (180 mg) capsule Take 1 capsule (400 Units) by mouth 2 times daily 180 capsule 3        Mental Status Exam                                                                                   9, 14 cog        Alertness: alert  and oriented  Appearance:  Casually dressed and Adequately groomed  Behavior/Demeanor: cooperative, pleasant and calm, with good eye contact   Speech: regular rate and rhythm  Mood :  \"good\"  Affect: appropriate and euthymic; was congruent to mood; was congruent to content  Thought Process (Associations):  Linear and Goal directed  Thought process (Rate):  Normal  Thought content:  no overt psychosis, denies suicidal ideation, intent or thoughts and patient does not appear to be responding to internal stimuli  Perception:  Reports none;  Denies auditory hallucinations and visual hallucinations  Attention/Concentration:  Fair  Memory:  Immediate recall intact and Short-term memory intact  Language: intact  Fund of Knowledge/Intelligence:  Average  Abstraction:  Normal  Insight:  Good, Fair  Judgment:  Good, Fair  Cognition: (6) does  appear grossly intact; formal cognitive testing was not done    Physical Exam     Motor activity/EPS:  Normal  Psychomotor: normal or unremarkable    Labs and Results      Pertinent findings on review include: " Review of records with relevant information reported in the HPI.  Reviewed pt's past medical record and obtained collateral information.      [place] PRESCRIPTION MONITORING PROGRAM [] was checked today: Adderall 4/13 8/9/2022    10:53 AM 12/6/2022    11:17 AM 4/13/2023     8:50 AM   PHQ   PHQ-9 Total Score 4 5 7   Q9: Thoughts of better off dead/self-harm past 2 weeks Not at all Not at all Not at all           6/11/2021    10:25 AM 7/1/2021     2:43 PM 8/9/2022    10:53 AM   MECHE-7 SCORE   Total Score 12 8 6       Recent Labs   Lab Test 08/09/22  0849 06/08/21  1015 08/14/19  0814   CR 0.87 0.90 0.85   GFRESTIMATED >90 87 >90     Recent Labs   Lab Test 08/09/22  0849 06/08/21  1015   AST 14 15   ALT 18 21   ALKPHOS 53 68      (1/17/2023), 497 (8/9/2022), 447 (12/23/2020),  436 (08/08/2012)     PSYCHOTROPIC DRUG INTERACTIONS:    Adderall---Buspar---Prozac---Trazodone---Wellbutrin: Concurrent use of AMPHETAMINES and SEROTONERGIC AGENTS may result in increased risk of serotonin syndrome.   Adderall---Prozac---Omeprazole: Concurrent use of AMPHETAMINES and SEROTONERGIC AGENTS THAT INHIBIT CYP2D6 may result in increased amphetamine exposure  Buspar---Trazodone---Vistaril---Zyrtec---Topamax: Concurrent use of TRAZODONE and SEROTONERGIC CENTRAL NERVOUS SYSTEM DEPRESSANTS may result in increased risk of CNS depression.   Prozac---Trazodone: Concurrent use of TRAZODONE and SEROTONERGIC DRUGS THAT PROLONG QT INTERVAL may result in increased risk of QT-interval prolongation.   Prozac---Wellbutrin: Concurrent use of BUPROPION and SELECTED SEIZURE THRESHOLD LOWERING CYP2D6 SUBSTRATES may result in increased exposure of CYP2D6 substrates; increased risk of seizure.  Glycopyrrolate---Vistaril: Concurrent use of GLYCOPYRROLATE and ANTICHOLINERGICS may result in increased risk of anticholinergic side effects .    Glycopyrrolate---Wellbutrin---Vistaril: Concurrent use of BUPROPION and AGENTS LOWERING SEIZURE THRESHOLD  may result in lower seizure threshold  Vistaril---Zithromax---Trazodone: Concurrent use of HYDROXYZINE and QT PROLONGING AGENTS may result in increased risk of QT-interval prolongation.   Buspar---Prozac: Concurrent use of FLUOXETINE and BUSPIRONE may result in worsening of psychiatric symptoms.      MANAGEMENT:  Monitoring for adverse effects, routine vitals, periodic EKGs and patient is aware of risks     Impression/Assessment      Mamie Rice is a 27 year old adult  who presents for med management follow up.  Pt appears stable in her mood and anxiety, denies SI, SIB or HI during the appointment. Pt noted daytime anxiety has been well managed without PRN Hydroxyzine, but when she stopped HS Hydroxyzine, she could not sleep due to anxiety around not being able to sleep.  Pt's previous trial on Trazodone caused oversedation. Thus, discussed to possibly increase Buspar even at HS to augment Trazodone to see if this would cause more sedation and also to manage HS anxiety around sleep. Pt agreed to increase Buspar to 30 mg BID (did not want to try different AM and HS dose).  Will continue all other medication regimen while monitoring for oversedation.    Pt was encouraged to contact local psychiatry service as soon as she is able to transfer care.    Diagnosis                                                                   MDD  MECHE  ADHD    Treatment Recommendation & Plan       Medication Ordered/Consults/Labs/tests Ordered:     Medication:   -Increase Buspar to 30 mg 2 times a day for anxiety. If anxiety is fairly well manage, try not to use Hydroxyzine at bedtime.  -Continue all other medication regimen.  OTC Recommendations: none  Lab Orders:  EKG already ordered  Referrals: none  Release of Information: none  Future Treatment Considerations: Per symptoms.   Return for Follow Up: in 4 weeks    -Discussed safety plan for suicidal thoughts  -Discussed plan for suicidality  -Discussed available emergency  services  -Patient agrees with the treatment plan  -Encouraged to continue outpatient therapy to gain more coping mechanism for stress.    Treatment Risk Statement: Discussed with the patient my impressions, as well as recommended studies. I educated patient on the differential diagnosis and prognosis. I discussed with the patient the risks and benefits of medications versus no interventions, including efficacy, dose, possible side effects and length of treatment and the importance of medication compliance.  The patient understands the risks, benefits, adverse effects and alternatives. Agrees to treatment with the capacity to do so. No medical contraindications to treatment. The patient also understands the risks of using street drugs or alcohol.     CRISIS NUMBERS:   Provided routinely in AVS.      Jessie Pitts CNP, 05/04/2023

## 2023-05-04 NOTE — NURSING NOTE
Is the patient currently in the state of [place]? YES    Visit mode:VIDEO    If the visit is dropped, the patient can be reconnected by: VIDEO VISIT: Text to cell phone: [phone]    Will anyone else be joining the visit? NO      How would you like to obtain your AVS? MyChart    Are changes needed to the allergy or medication list? NO    Reason for visit: Video Visit

## 2023-05-04 NOTE — PATIENT INSTRUCTIONS
-Increase Buspar to 30 mg 2 times a day for anxiety. If anxiety is fairly well manage, try not to use Hydroxyzine at bedtime.  -Continue all other medication regimen.    Your next appointment is scheduled on 6/1/2023 (Thu) at 1:30pm.    Thank you for coming to the [facility] [place] [facility].    Lab Testing:  If you had lab testing today and your results are reassuring or normal they will be mailed to you or sent through [employer] within 7 days. If the lab tests need quick action we will call you with the results. The phone number we will call with results is # [phone] (home) . If this is not the best number please call our clinic and change the number.    Medication Refills:  If you need any refills please call your pharmacy and they will contact us. Our fax number for refills is [phone]. Please allow three business for refill processing. If you need to  your refill at a new pharmacy, please contact the new pharmacy directly. The new pharmacy will help you get your medications transferred.     Scheduling:  If you have any concerns about today's visit or wish to schedule another appointment please call our office during normal business hours [phone] (8-5:00 M-F)    Contact Us:  Please call [phone] during business hours (8-5:00 M-F).  If after clinic hours, or on the weekend, please call  [phone].    Financial Assistance [phone]  [facility] Billing [phone]  Central Billing Office, [facility]: [phone]  [place] Billing [phone]  Medical Records [phone]      MENTAL HEALTH CRISIS NUMBERS:  For a medical emergency please call  911 or go to the nearest ER.     [facility]:   [facility] -[phone]   Crisis Residence [facility] -[phone]   Walk-In Counseling Center [facility] -[phone]   COPE 24/7 [place] Mobile Team -[phone] (adults)/[phone] (child)  CHILD: [facility] needs assessment team  - [phone]      [facility]:   [facility] - [phone]   Walk-in counseling [facility] - [phone]   Walk-in counseling [facility] - [phone]   Crisis Residence [facility] Residence - [phone]  Urgent Care Adult Mental [facility]-[phone] mobile unit/ 24/7 crisis line    National Crisis Numbers:   National Suicide Prevention Lifeline: 8-346-191-TALK (502-215-5833)  Poison Control Center - 4-824-574-7547  [employer]/resources for a list of additional resources (SOS)  Trans Lifeline a hotline for transgender people 1-604.402.2899  The [employer] a hotline for LGBT youth 2-751-630-6878  Crisis Text Line: For any crisis 24/7   To: 486089  see www.crisistextline.org  - IF MAKING A CALL FEELS TOO HARD, send a text!         Again thank you for choosing [facility] M[facility] & ADDI[facility] and please let us know how we can best partner with you to improve you and your family's health.    You may be receiving a survey regarding this appointment. We would love to have your feedback, both positive and negative. The survey is done by an external company, so your answers are anonymous.

## 2023-05-04 NOTE — PROGRESS NOTES
"Virtual Visit Details    Type of service:  Video Visit   Video Start Time: {video visit start/end time for provider to select:537269}  Video End Time:{video visit start/end time for provider to select:176586}    Originating Location (pt. Location): {video visit patient location:884822::\"Home\"}  {PROVIDER LOCATION On-site should be selected for visits conducted from your clinic location or adjoining [facility] hospital, academic office, or other nearby [facility] building. Off-site should be selected for all other provider locations, including home:289053}  Distant Location (provider location):  {virtual location provider:502670}  Platform used for Video Visit: {Virtual Visit Platforms:873542::\"[employer]\"}  "

## 2023-05-10 DIAGNOSIS — E84.0 CYSTIC FIBROSIS WITH PULMONARY MANIFESTATIONS (H): ICD-10-CM

## 2023-05-11 RX ORDER — ASCORBIC ACID 500 MG
TABLET ORAL
Qty: 100 TABLET | Refills: 3 | Status: SHIPPED | OUTPATIENT
Start: 2023-05-11 | End: 2023-11-14

## 2023-05-12 ENCOUNTER — VIRTUAL VISIT (OUTPATIENT)
Dept: ENDOCRINOLOGY | Facility: CLINIC | Age: 28
End: 2023-05-12
Payer: COMMERCIAL

## 2023-05-12 VITALS — HEIGHT: 61 IN | WEIGHT: 185 LBS | BODY MASS INDEX: 34.93 KG/M2

## 2023-05-12 DIAGNOSIS — E66.09 CLASS 1 OBESITY DUE TO EXCESS CALORIES WITHOUT SERIOUS COMORBIDITY WITH BODY MASS INDEX (BMI) OF 30.0 TO 30.9 IN ADULT: Primary | ICD-10-CM

## 2023-05-12 DIAGNOSIS — F33.0 MILD EPISODE OF RECURRENT MAJOR DEPRESSIVE DISORDER (H): ICD-10-CM

## 2023-05-12 DIAGNOSIS — E66.01 MORBID OBESITY (H): ICD-10-CM

## 2023-05-12 DIAGNOSIS — F34.1 PERSISTENT DEPRESSIVE DISORDER: ICD-10-CM

## 2023-05-12 DIAGNOSIS — E66.811 CLASS 1 OBESITY DUE TO EXCESS CALORIES WITHOUT SERIOUS COMORBIDITY WITH BODY MASS INDEX (BMI) OF 30.0 TO 30.9 IN ADULT: Primary | ICD-10-CM

## 2023-05-12 PROCEDURE — 99213 OFFICE O/P EST LOW 20 MIN: CPT | Mod: VID | Performed by: PEDIATRICS

## 2023-05-12 RX ORDER — TOPIRAMATE 50 MG/1
TABLET, FILM COATED ORAL
Qty: 180 TABLET | Refills: 3 | Status: SHIPPED | OUTPATIENT
Start: 2023-05-12 | End: 2024-07-15

## 2023-05-12 RX ORDER — BUPROPION HYDROCHLORIDE 300 MG/1
300 TABLET ORAL EVERY MORNING
Qty: 90 TABLET | Refills: 3 | Status: SHIPPED | OUTPATIENT
Start: 2023-05-12 | End: 2024-02-14

## 2023-05-12 RX ORDER — TOPIRAMATE 25 MG/1
TABLET, FILM COATED ORAL
Qty: 180 TABLET | Refills: 3 | Status: SHIPPED | OUTPATIENT
Start: 2023-05-12 | End: 2024-07-15

## 2023-05-12 RX ORDER — NALTREXONE HYDROCHLORIDE 50 MG/1
TABLET, FILM COATED ORAL
Qty: 90 TABLET | Refills: 3 | Status: SHIPPED | OUTPATIENT
Start: 2023-05-12 | End: 2024-02-14

## 2023-05-12 NOTE — NURSING NOTE
"(   Chief Complaint   Patient presents with     Follow Up    )    ( Weight: 185 lb )  ( Height: 5' 1\" )  ( BMI (Calculated): 34.96 )  (   )  (   )  (   )  (   )  (   )  (   )    (   )  (   )  (   )  (   )  (   )  (   )  (   )    (   Patient Active Problem List   Diagnosis     Hip joint pain     Aspergillosis (H)     Chronic maxillary sinusitis     Hypoglycemia     Type 1 diabetes mellitus (H)     Cystic fibrosis of the lung (H)     Chronic constipation     Frontal sinusitis     Overactive child     Back pain     Pancreatic insufficiency     Non morbid obesity due to excess calories     Acne vulgaris     Cystic fibrosis (H)     Diabetes mellitus, type 2 (H)     Persistent depressive disorder     Mild episode of recurrent major depressive disorder (H)     Insomnia, unspecified type     MECHE (generalized anxiety disorder)     Attention deficit hyperactivity disorder (ADHD), combined type     Knee pain     Morbid obesity (H)    )  (   Current Outpatient Medications   Medication Sig Dispense Refill     albuterol (PROAIR HFA/PROVENTIL HFA/VENTOLIN HFA) 108 (90 Base) MCG/ACT inhaler Inhale 2 puffs into the lungs every 6 hours as needed for shortness of breath / dyspnea or wheezing 8.5 g 11     albuterol (PROVENTIL) (2.5 MG/3ML) 0.083% neb solution INHALE 1 VIAL ( 2.5MG) BY NEBULIZATION EVERY 4 HOURS AS NEEDED FOR FOR SHORTNESS OF BREATH OR WHEEZING 360 mL 11     amphetamine-dextroamphetamine (ADDERALL XR) 20 MG 24 hr capsule Take 1 capsule (20 mg) by mouth daily for 30 days 30 capsule 0     [START ON 6/10/2023] amphetamine-dextroamphetamine (ADDERALL XR) 20 MG 24 hr capsule Take 1 capsule (20 mg) by mouth daily for 30 days 30 capsule 0     [START ON 7/10/2023] amphetamine-dextroamphetamine (ADDERALL XR) 20 MG 24 hr capsule Take 1 capsule (20 mg) by mouth daily for 30 days 30 capsule 0     amphetamine-dextroamphetamine (ADDERALL XR) 20 MG 24 hr capsule Take 1 capsule (20 mg) by mouth daily 30 capsule 0     blood glucose " (ACCU-CHEK GUIDE) test strip Use to test blood sugar 4 times daily or as directed. 100 strip 11     blood glucose monitoring (ACCU-CHEK FASTCLIX) lancets Use to test blood sugar 4 times daily or as directed. 100 each 11     buPROPion (WELLBUTRIN XL) 300 MG 24 hr tablet Take 1 tablet (300 mg) by mouth every morning 90 tablet 3     busPIRone HCl (BUSPAR) 30 MG tablet Take 1 tablet (30 mg) by mouth 2 times daily 60 tablet 1     cetirizine (ZYRTEC) 10 MG tablet Take 1 tablet (10 mg) by mouth every evening 30 tablet 5     elexacaftor-tezacaftor-ivacaftor & ivacaftor (TRIKAFTA) 100-50-75 & 150 MG tablet pack TAKE TWO ORANGE TABLETS BY MOUTH IN THE MORNING AND ONE BLUE TABLET IN THE EVENING AS DIRECTED ON PACKAGE.  TAKE WITH FAT CONTAINING FOOD. 84 tablet 11     FLUoxetine (PROZAC) 40 MG capsule TAKE TWO CAPSULES BY MOUTH EVERY DAY . 180 capsule 1     hydrOXYzine (ATARAX) 25 MG tablet Take 1-2 tablets (25-50 mg) by mouth every 6 hours as needed for anxiety (sleep) 60 tablet 1     medroxyPROGESTERone (DEPO-PROVERA) 150 MG/ML IM injection Inject 150 mg into the muscle       Multiple Vitamin (MULTIVITAMIN OR) Take 1 tablet by mouth daily.       naltrexone (DEPADE/REVIA) 50 MG tablet Take 1 tablet.  Time it one to two hours prior to worst cravings. 90 tablet 3     omeprazole (PRILOSEC) 40 MG DR capsule Take 1 capsule (40 mg) by mouth 2 times daily 60 capsule 11     phytonadione (MEPHYTON) 5 MG tablet TAKE 1 TABLET BY MOUTH ONCE A WEEK 4 tablet 11     topiramate (TOPAMAX) 25 MG tablet Take one 50 mg pill and one 25 mg pill twice a day 180 tablet 3     topiramate (TOPAMAX) 50 MG tablet Take one 50 mg pill and one 25 mg pill twice a day 180 tablet 3     traZODone (DESYREL) 100 MG tablet Take 1 tablet (100 mg) by mouth At Bedtime 90 tablet 1     vitamin C (ASCORBIC ACID) 500 MG tablet TAKE ONE TABLET BY MOUTH TWICE A  tablet 3     vitamin D3 25 mcg (1000 units) tablet Take 1 tablet (25 mcg) by mouth daily 90 tablet 3      vitamin E (TOCOPHEROL) 400 units (180 mg) capsule Take 1 capsule (400 Units) by mouth 2 times daily 180 capsule 3     acetylcysteine (MUCOMYST) 20 % neb solution INHALE 4ML VIA NEBULIZER TWO TIMES A DAY. MAY INCREASE TO 3-4 TIMES A DAY WITH INCREASED COUGH / COLD SYMPTOMS. REFRIGERATE VIAL AND DISCARD 96 HOURS AFTER OPENING (Patient not taking: Reported on 1/17/2023) 360 mL 11    )  ( Diabetes Eval:    )    ( Pain Eval:  Data Unavailable )    ( Wound Eval:       )    (   History   Smoking Status     Never   Smokeless Tobacco     Never    )    ( Signed By:  Corinne Jimenez; May 12, 2023; 11:53 AM )

## 2023-05-12 NOTE — PATIENT INSTRUCTIONS
1. Food Goal:  - Will have no more than 1 glass milk at dinner   - Will have no more than 1 Frapocchino daily     2. Activity Goal:  - Continue to be active as you are     3. Medications:   - Continue topiramate 75 mg twice a day (one 50 mg pill and one 25 mg pill twice a day)  - Continue naltrexone 50 mg daily  - Continue bupropion 300 mg daily     4. We can plan to see you again in clinic in around 6 months. If any questions or concerns before that time, please let us know.

## 2023-05-12 NOTE — PROGRESS NOTES
"Mamie Rice is a 27 year old who is being evaluated via a billable video visit.      How would you like to obtain your AVS? MyChart  If the video visit is dropped, the invitation should be resent by: Text to cell phone: [phone]  Will anyone else be joining your video visit? No      During this virtual visit the patient is located in [place], patient verifies this as the location during the entirety of this visit.         Return Medical Weight Management Note     Mamie Rice  MRN:  [id number]  :  1995  STEPHEN:  2023    Dear Physician No Ref-Primary,    I had the pleasure of seeing your patient Mamie Rice. She is a 27 year old female who I am continuing to see for treatment of obesity related to:        10/9/2016     8:21 PM   --   I have the following health issues associated with obesity Back Pain   I have the following symptoms associated with obesity Knee Pain       INTERVAL HISTORY:  I last saw Mamie Rice on 2022. Continues to remain on topiramate 75 mg twice a day, naltrexone 50 mg daily, bupropion 300 mg daily, and Adderrall 20 mg daily. Has been doing well overall. Diet is similar to before. In terms of physical activity, has been walking and running after children at school more often.     CURRENT WEIGHT:   185 lbs 0 oz    Initial Weight (lbs): 184 lbs  Last Visits Weight: 81.6 kg (180 lb)  Cumulative weight loss (lbs): -1  Weight Loss Percentage: -0.54%     Initial weight was 182 pounds on   Weight at last visit on 2022 was 180 pounds  Weight today is 185 pounds  Weight change since last seen 2022 is up 5 pounds  Total weight gain is 3 pounds        2022    11:21 AM   Changes and Difficulties   I have made the following changes to my diet since my last visit: One less cup of milk with supper   I have made the following changes to my activity/exercise since my last visit: Starting walking daily       VITALS:  Ht 1.549 m (5' 1\")   Wt 83.9 kg (185 lb)   BMI " 34.96 kg/m     GENERAL: Healthy, alert and no distress  EYES: Eyes grossly normal to inspection.   HENT: Normal cephalic/atraumatic.   RESP: No audible wheeze, cough.  No visible retractions or increased work of breathing.    MS: No gross musculoskeletal defects noted.  Normal range of motion.    SKIN: Visible skin clear.   NEURO: Cranial nerves grossly intact.  Mentation and speech appropriate for age.  PSYCH: Mentation appears normal, affect normal/bright, judgement and insight intact, normal speech and appearance well-groomed.      MEDICATIONS:   Current Outpatient Medications   Medication Sig Dispense Refill     albuterol (PROAIR HFA/PROVENTIL HFA/VENTOLIN HFA) 108 (90 Base) MCG/ACT inhaler Inhale 2 puffs into the lungs every 6 hours as needed for shortness of breath / dyspnea or wheezing 8.5 g 11     albuterol (PROVENTIL) (2.5 MG/3ML) 0.083% neb solution INHALE 1 VIAL ( 2.5MG) BY NEBULIZATION EVERY 4 HOURS AS NEEDED FOR FOR SHORTNESS OF BREATH OR WHEEZING 360 mL 11     amphetamine-dextroamphetamine (ADDERALL XR) 20 MG 24 hr capsule Take 1 capsule (20 mg) by mouth daily for 30 days 30 capsule 0     [START ON 6/10/2023] amphetamine-dextroamphetamine (ADDERALL XR) 20 MG 24 hr capsule Take 1 capsule (20 mg) by mouth daily for 30 days 30 capsule 0     [START ON 7/10/2023] amphetamine-dextroamphetamine (ADDERALL XR) 20 MG 24 hr capsule Take 1 capsule (20 mg) by mouth daily for 30 days 30 capsule 0     amphetamine-dextroamphetamine (ADDERALL XR) 20 MG 24 hr capsule Take 1 capsule (20 mg) by mouth daily 30 capsule 0     blood glucose (ACCU-CHEK GUIDE) test strip Use to test blood sugar 4 times daily or as directed. 100 strip 11     blood glucose monitoring (ACCU-CHEK FASTCLIX) lancets Use to test blood sugar 4 times daily or as directed. 100 each 11     buPROPion (WELLBUTRIN XL) 300 MG 24 hr tablet Take 1 tablet (300 mg) by mouth every morning 90 tablet 3     busPIRone HCl (BUSPAR) 30 MG tablet Take 1 tablet (30 mg) by  mouth 2 times daily 60 tablet 1     cetirizine (ZYRTEC) 10 MG tablet Take 1 tablet (10 mg) by mouth every evening 30 tablet 5     elexacaftor-tezacaftor-ivacaftor & ivacaftor (TRIKAFTA) 100-50-75 & 150 MG tablet pack TAKE TWO ORANGE TABLETS BY MOUTH IN THE MORNING AND ONE BLUE TABLET IN THE EVENING AS DIRECTED ON PACKAGE.  TAKE WITH FAT CONTAINING FOOD. 84 tablet 11     FLUoxetine (PROZAC) 40 MG capsule TAKE TWO CAPSULES BY MOUTH EVERY DAY . 180 capsule 1     hydrOXYzine (ATARAX) 25 MG tablet Take 1-2 tablets (25-50 mg) by mouth every 6 hours as needed for anxiety (sleep) 60 tablet 1     medroxyPROGESTERone (DEPO-PROVERA) 150 MG/ML IM injection Inject 150 mg into the muscle       Multiple Vitamin (MULTIVITAMIN OR) Take 1 tablet by mouth daily.       naltrexone (DEPADE/REVIA) 50 MG tablet Take 1 tablet.  Time it one to two hours prior to worst cravings. 90 tablet 3     omeprazole (PRILOSEC) 40 MG DR capsule Take 1 capsule (40 mg) by mouth 2 times daily 60 capsule 11     phytonadione (MEPHYTON) 5 MG tablet TAKE 1 TABLET BY MOUTH ONCE A WEEK 4 tablet 11     topiramate (TOPAMAX) 25 MG tablet Take one 50 mg pill and one 25 mg pill twice a day 180 tablet 3     topiramate (TOPAMAX) 50 MG tablet Take one 50 mg pill and one 25 mg pill twice a day 180 tablet 3     traZODone (DESYREL) 100 MG tablet Take 1 tablet (100 mg) by mouth At Bedtime 90 tablet 1     vitamin C (ASCORBIC ACID) 500 MG tablet TAKE ONE TABLET BY MOUTH TWICE A  tablet 3     vitamin D3 25 mcg (1000 units) tablet Take 1 tablet (25 mcg) by mouth daily 90 tablet 3     vitamin E (TOCOPHEROL) 400 units (180 mg) capsule Take 1 capsule (400 Units) by mouth 2 times daily 180 capsule 3     acetylcysteine (MUCOMYST) 20 % neb solution INHALE 4ML VIA NEBULIZER TWO TIMES A DAY. MAY INCREASE TO 3-4 TIMES A DAY WITH INCREASED COUGH / COLD SYMPTOMS. REFRIGERATE VIAL AND DISCARD 96 HOURS AFTER OPENING (Patient not taking: Reported on 1/17/2023) 360 mL 11            5/5/2023     9:01 AM   Weight Loss Medication History Reviewed With Patient   Which weight loss medications are you currently taking on a regular basis? Naltrexone    Bupropion     LABS:  Component      Latest Ref Rng 8/9/2022  8:49 AM   Sodium      133 - 144 mmol/L 144    Potassium      3.4 - 5.3 mmol/L 3.8    Chloride      94 - 109 mmol/L 113 (H)    Carbon Dioxide      20 - 32 mmol/L 21    Anion Gap      3 - 14 mmol/L 10    Urea Nitrogen      7 - 30 mg/dL 7    Creatinine      0.52 - 1.04 mg/dL 0.87    Calcium      8.5 - 10.1 mg/dL 8.3 (L)    Glucose      70 - 99 mg/dL 98    GFR Estimate      >60 mL/min/1.73m2 >90    Bilirubin Total      0.2 - 1.3 mg/dL 0.2    Bilirubin Direct      0.0 - 0.2 mg/dL <0.1    Protein Total      6.8 - 8.8 g/dL 6.5 (L)    Albumin      3.4 - 5.0 g/dL 3.3 (L)    Alkaline Phosphatase      40 - 150 U/L 53    AST      0 - 45 U/L 14    ALT      0 - 50 U/L 18    Cholesterol      <200 mg/dL 126    Triglycerides      <150 mg/dL 167 (H)    HDL Cholesterol      >=50 mg/dL 45 (L)    LDL Cholesterol Calculated      <=100 mg/dL 48    Non HDL Cholesterol      <130 mg/dL 81    Patient Fasting? No         ASSESSMENT:   Mamie Rice is a 27 year old female with a history of class 1 obesity (defined as BMI 30-35 kg/m2). She  also has a history of CF, CFRD, ADHD, and depression. Currently on topiramate, naltrexone, and buproion. Overall doing well. Therefore, will plan to continue the current regimen.      Additional plans and goals, made through shared decision making, as outlined below.    PLAN:   Patient Instructions   1. Food Goal:  - Will have no more than 1 glass milk at dinner   - Will have no more than 1 Frapocchino daily     2. Activity Goal:  - Continue to be active as you are     3. Medications:   - Continue topiramate 75 mg twice a day (one 50 mg pill and one 25 mg pill twice a day)  - Continue naltrexone 50 mg daily  - Continue bupropion 300 mg daily     4. We can plan to see you again in  clinic in around 6 months. If any questions or concerns before that time, please let us know.    FOLLOW-UP:    6 months.    Sincerely,    Charles Park MD    Video-Visit Details  Video Start Time: 12:50 PM    Type of service:  Video Visit    Video End Time:12:55 PM    Originating Location (pt. Location): Home  Distant Location (provider location):  [facility] [place]  Platform used for Video Visit: Juan WALSH spent 15 minutes of total time, before, during, and after the visit reviewing previous labs and records, examining the patient, answering their questions, formulating and discussing the plan of care, reviewing resulted labs, and writing the visit note.

## 2023-05-12 NOTE — LETTER
2023       RE: Mamie Rice  [address] [facility] [address]     Dear Colleague,    Thank you for referring your patient, Mamie Rice, to the [facility] [place] [facility]. Please see a copy of my visit note below.    Mamie Rice is a 27 year old who is being evaluated via a billable video visit.      How would you like to obtain your AVS? MyChart  If the video visit is dropped, the invitation should be resent by: Text to cell phone: [phone]  Will anyone else be joining your video visit? No      During this virtual visit the patient is located in [place], patient verifies this as the location during the entirety of this visit.         Return Medical Weight Management Note     Mamie Rice  MRN:  [id number]  :  1995  STEPHEN:  2023    Dear Physician No Ref-Primary,    I had the pleasure of seeing your patient Mamie Rice. She is a 27 year old female who I am continuing to see for treatment of obesity related to:        10/9/2016     8:21 PM   --   I have the following health issues associated with obesity Back Pain   I have the following symptoms associated with obesity Knee Pain       INTERVAL HISTORY:  I last saw Mamie Rice on 2022. Continues to remain on topiramate 75 mg twice a day, naltrexone 50 mg daily, bupropion 300 mg daily, and Adderrall 20 mg daily. Has been doing well overall. Diet is similar to before. In terms of physical activity, has been walking and running after children at school more often.     CURRENT WEIGHT:   185 lbs 0 oz    Initial Weight (lbs): 184 lbs  Last Visits Weight: 81.6 kg (180 lb)  Cumulative weight loss (lbs): -1  Weight Loss Percentage: -0.54%     Initial weight was 182 pounds on   Weight at last visit on 2022 was 180 pounds  Weight today is 185 pounds  Weight change since last seen 2022 is up 5 pounds  Total weight gain is 3  "pounds        12/6/2022    11:21 AM   Changes and Difficulties   I have made the following changes to my diet since my last visit: One less cup of milk with supper   I have made the following changes to my activity/exercise since my last visit: Starting walking daily       VITALS:  Ht 1.549 m (5' 1\")   Wt 83.9 kg (185 lb)   BMI 34.96 kg/m     GENERAL: Healthy, alert and no distress  EYES: Eyes grossly normal to inspection.   HENT: Normal cephalic/atraumatic.   RESP: No audible wheeze, cough.  No visible retractions or increased work of breathing.    MS: No gross musculoskeletal defects noted.  Normal range of motion.    SKIN: Visible skin clear.   NEURO: Cranial nerves grossly intact.  Mentation and speech appropriate for age.  PSYCH: Mentation appears normal, affect normal/bright, judgement and insight intact, normal speech and appearance well-groomed.      MEDICATIONS:   Current Outpatient Medications   Medication Sig Dispense Refill    albuterol (PROAIR HFA/PROVENTIL HFA/VENTOLIN HFA) 108 (90 Base) MCG/ACT inhaler Inhale 2 puffs into the lungs every 6 hours as needed for shortness of breath / dyspnea or wheezing 8.5 g 11    albuterol (PROVENTIL) (2.5 MG/3ML) 0.083% neb solution INHALE 1 VIAL ( 2.5MG) BY NEBULIZATION EVERY 4 HOURS AS NEEDED FOR FOR SHORTNESS OF BREATH OR WHEEZING 360 mL 11    amphetamine-dextroamphetamine (ADDERALL XR) 20 MG 24 hr capsule Take 1 capsule (20 mg) by mouth daily for 30 days 30 capsule 0    [START ON 6/10/2023] amphetamine-dextroamphetamine (ADDERALL XR) 20 MG 24 hr capsule Take 1 capsule (20 mg) by mouth daily for 30 days 30 capsule 0    [START ON 7/10/2023] amphetamine-dextroamphetamine (ADDERALL XR) 20 MG 24 hr capsule Take 1 capsule (20 mg) by mouth daily for 30 days 30 capsule 0    amphetamine-dextroamphetamine (ADDERALL XR) 20 MG 24 hr capsule Take 1 capsule (20 mg) by mouth daily 30 capsule 0    blood glucose (ACCU-CHEK GUIDE) test strip Use to test blood sugar 4 times daily " or as directed. 100 strip 11    blood glucose monitoring (ACCU-CHEK FASTCLIX) lancets Use to test blood sugar 4 times daily or as directed. 100 each 11    buPROPion (WELLBUTRIN XL) 300 MG 24 hr tablet Take 1 tablet (300 mg) by mouth every morning 90 tablet 3    busPIRone HCl (BUSPAR) 30 MG tablet Take 1 tablet (30 mg) by mouth 2 times daily 60 tablet 1    cetirizine (ZYRTEC) 10 MG tablet Take 1 tablet (10 mg) by mouth every evening 30 tablet 5    elexacaftor-tezacaftor-ivacaftor & ivacaftor (TRIKAFTA) 100-50-75 & 150 MG tablet pack TAKE TWO ORANGE TABLETS BY MOUTH IN THE MORNING AND ONE BLUE TABLET IN THE EVENING AS DIRECTED ON PACKAGE.  TAKE WITH FAT CONTAINING FOOD. 84 tablet 11    FLUoxetine (PROZAC) 40 MG capsule TAKE TWO CAPSULES BY MOUTH EVERY DAY . 180 capsule 1    hydrOXYzine (ATARAX) 25 MG tablet Take 1-2 tablets (25-50 mg) by mouth every 6 hours as needed for anxiety (sleep) 60 tablet 1    medroxyPROGESTERone (DEPO-PROVERA) 150 MG/ML IM injection Inject 150 mg into the muscle      Multiple Vitamin (MULTIVITAMIN OR) Take 1 tablet by mouth daily.      naltrexone (DEPADE/REVIA) 50 MG tablet Take 1 tablet.  Time it one to two hours prior to worst cravings. 90 tablet 3    omeprazole (PRILOSEC) 40 MG DR capsule Take 1 capsule (40 mg) by mouth 2 times daily 60 capsule 11    phytonadione (MEPHYTON) 5 MG tablet TAKE 1 TABLET BY MOUTH ONCE A WEEK 4 tablet 11    topiramate (TOPAMAX) 25 MG tablet Take one 50 mg pill and one 25 mg pill twice a day 180 tablet 3    topiramate (TOPAMAX) 50 MG tablet Take one 50 mg pill and one 25 mg pill twice a day 180 tablet 3    traZODone (DESYREL) 100 MG tablet Take 1 tablet (100 mg) by mouth At Bedtime 90 tablet 1    vitamin C (ASCORBIC ACID) 500 MG tablet TAKE ONE TABLET BY MOUTH TWICE A  tablet 3    vitamin D3 25 mcg (1000 units) tablet Take 1 tablet (25 mcg) by mouth daily 90 tablet 3    vitamin E (TOCOPHEROL) 400 units (180 mg) capsule Take 1 capsule (400 Units) by mouth 2  times daily 180 capsule 3    acetylcysteine (MUCOMYST) 20 % neb solution INHALE 4ML VIA NEBULIZER TWO TIMES A DAY. MAY INCREASE TO 3-4 TIMES A DAY WITH INCREASED COUGH / COLD SYMPTOMS. REFRIGERATE VIAL AND DISCARD 96 HOURS AFTER OPENING (Patient not taking: Reported on 1/17/2023) 360 mL 11           5/5/2023     9:01 AM   Weight Loss Medication History Reviewed With Patient   Which weight loss medications are you currently taking on a regular basis? Naltrexone    Bupropion     LABS:  Component      Latest Ref Rng 8/9/2022  8:49 AM   Sodium      133 - 144 mmol/L 144    Potassium      3.4 - 5.3 mmol/L 3.8    Chloride      94 - 109 mmol/L 113 (H)    Carbon Dioxide      20 - 32 mmol/L 21    Anion Gap      3 - 14 mmol/L 10    Urea Nitrogen      7 - 30 mg/dL 7    Creatinine      0.52 - 1.04 mg/dL 0.87    Calcium      8.5 - 10.1 mg/dL 8.3 (L)    Glucose      70 - 99 mg/dL 98    GFR Estimate      >60 mL/min/1.73m2 >90    Bilirubin Total      0.2 - 1.3 mg/dL 0.2    Bilirubin Direct      0.0 - 0.2 mg/dL <0.1    Protein Total      6.8 - 8.8 g/dL 6.5 (L)    Albumin      3.4 - 5.0 g/dL 3.3 (L)    Alkaline Phosphatase      40 - 150 U/L 53    AST      0 - 45 U/L 14    ALT      0 - 50 U/L 18    Cholesterol      <200 mg/dL 126    Triglycerides      <150 mg/dL 167 (H)    HDL Cholesterol      >=50 mg/dL 45 (L)    LDL Cholesterol Calculated      <=100 mg/dL 48    Non HDL Cholesterol      <130 mg/dL 81    Patient Fasting? No         ASSESSMENT:   Mamie Rice is a 27 year old female with a history of class 1 obesity (defined as BMI 30-35 kg/m2). She  also has a history of CF, CFRD, ADHD, and depression. Currently on topiramate, naltrexone, and buproion. Overall doing well. Therefore, will plan to continue the current regimen.      Additional plans and goals, made through shared decision making, as outlined below.    PLAN:   Patient Instructions   1. Food Goal:  - Will have no more than 1 glass milk at dinner   - Will have no more than  1 Frapocchino daily     2. Activity Goal:  - Continue to be active as you are     3. Medications:   - Continue topiramate 75 mg twice a day (one 50 mg pill and one 25 mg pill twice a day)  - Continue naltrexone 50 mg daily  - Continue bupropion 300 mg daily     4. We can plan to see you again in clinic in around 6 months. If any questions or concerns before that time, please let us know.    FOLLOW-UP:    6 months.    Sincerely,    Charles Park MD    Video-Visit Details  Video Start Time: 12:50 PM    Type of service:  Video Visit    Video End Time:12:55 PM    Originating Location (pt. Location): Home  Distant Location (provider location):  [facility] [place]  Platform used for Video Visit: Juan WALSH spent 15 minutes of total time, before, during, and after the visit reviewing previous labs and records, examining the patient, answering their questions, formulating and discussing the plan of care, reviewing resulted labs, and writing the visit note.

## 2023-05-14 DIAGNOSIS — B44.9 ASPERGILLOSIS (H): ICD-10-CM

## 2023-05-14 DIAGNOSIS — E84.0 CYSTIC FIBROSIS WITH PULMONARY MANIFESTATIONS (H): ICD-10-CM

## 2023-05-14 DIAGNOSIS — K86.89 PANCREATIC INSUFFICIENCY: ICD-10-CM

## 2023-05-15 ENCOUNTER — TELEPHONE (OUTPATIENT)
Dept: PULMONOLOGY | Facility: CLINIC | Age: 28
End: 2023-05-15

## 2023-05-15 RX ORDER — PHYTONADIONE 5 MG/1
TABLET ORAL
Qty: 4 TABLET | Refills: 11 | Status: SHIPPED | OUTPATIENT
Start: 2023-05-15 | End: 2024-05-24

## 2023-05-15 NOTE — TELEPHONE ENCOUNTER
PA Initiation    Medication: Trikafta PA pending  Insurance Company:    Pharmacy Filling the Rx:    Filling Pharmacy Phone:    Filling Pharmacy Fax:    Start Date:      [id number]

## 2023-05-25 NOTE — TELEPHONE ENCOUNTER
Prior Authorization Approval    Medication:  Trikafta   Authorization Effective Date: 5/15/2023  Authorization Expiration Date: 5/15/2024  Approved Dose/Quantity: 100-50-75 & 150mg / 84 for 28 ds   Reference #: [id number]   Insurance Company: Other (see comments)Comment:  [place] [employer]  Expected CoPay:       CoPay Card Available:      Financial Assistance Needed:   Which Pharmacy is filling the prescription: [id number] [place], [place] - [address]  Pharmacy Notified:    Patient Notified:

## 2023-05-31 ASSESSMENT — PATIENT HEALTH QUESTIONNAIRE - PHQ9
SUM OF ALL RESPONSES TO PHQ QUESTIONS 1-9: 3
10. IF YOU CHECKED OFF ANY PROBLEMS, HOW DIFFICULT HAVE THESE PROBLEMS MADE IT FOR YOU TO DO YOUR WORK, TAKE CARE OF THINGS AT HOME, OR GET ALONG WITH OTHER PEOPLE: SOMEWHAT DIFFICULT
SUM OF ALL RESPONSES TO PHQ QUESTIONS 1-9: 3

## 2023-06-01 ENCOUNTER — VIRTUAL VISIT (OUTPATIENT)
Dept: PSYCHIATRY | Facility: CLINIC | Age: 28
End: 2023-06-01
Attending: NURSE PRACTITIONER
Payer: COMMERCIAL

## 2023-06-01 DIAGNOSIS — Z79.899 MEDICATION MANAGEMENT: ICD-10-CM

## 2023-06-01 DIAGNOSIS — G47.00 INSOMNIA, UNSPECIFIED TYPE: ICD-10-CM

## 2023-06-01 DIAGNOSIS — F41.1 GAD (GENERALIZED ANXIETY DISORDER): Primary | ICD-10-CM

## 2023-06-01 PROCEDURE — 99214 OFFICE O/P EST MOD 30 MIN: CPT | Mod: VID | Performed by: NURSE PRACTITIONER

## 2023-06-01 RX ORDER — HYDROXYZINE HYDROCHLORIDE 25 MG/1
25-50 TABLET, FILM COATED ORAL
Qty: 60 TABLET | Refills: 1 | Status: SHIPPED | OUTPATIENT
Start: 2023-06-01

## 2023-06-01 RX ORDER — BUSPIRONE HYDROCHLORIDE 30 MG/1
30 TABLET ORAL 2 TIMES DAILY
Qty: 180 TABLET | Refills: 1 | Status: SHIPPED | OUTPATIENT
Start: 2023-06-01

## 2023-06-01 NOTE — PATIENT INSTRUCTIONS
-Continue on current medication regimen for now.  Complete EKG as soon as you are able, if the result is relatively normal, ok to continue on current medication regimen.    -If EKG is normal, transfer to local psychiatric provider.      **For crisis resources, please see the information at the end of this document**   Patient Education    Thank you for coming to the [facility] [place] [facility].     Lab Testing:  If you had lab testing today and your results are reassuring or normal they will be mailed to you or sent through [facility] within 7 days. If the lab tests need quick action we will call you with the results. The phone number we will call with results is # [phone]. If this is not the best number please call our clinic and change the number.     Medication Refills:  If you need any refills please call your pharmacy and they will contact us. Our fax number for refills is [phone].   Three business days of notice are needed for general medication refill requests.   Five business days of notice are needed for controlled substance refill requests.   If you need to change to a different pharmacy, please contact the new pharmacy directly. The new pharmacy will help you get your medications transferred.     Contact Us:  Please call [phone] during business hours (8-5:00 M-F).   If you have medication related questions after clinic hours, or on the weekend, please call [phone].     Financial Assistance [phone]   Medical Records [phone]       MENTAL HEALTH CRISIS RESOURCES:  For a emergency help, please call 911 or go to the nearest Emergency Department.     Emergency Walk-In Options:   [facility] Unit @ [place] [facility] ([place]): [phone] - Specialized mental health emergency area designed to be calming  [facility] [place] ([place]): [phone]  [facility] ([place]): [phone]  [facility]  [place]): [phone]    [facility] Crisis Information:   [place]: [phone]  [place]: [phone]  [place] ([place]) - Adult: [phone]     Child: [phone]  [place] - Adult: [phone]     Child: [phone]  [place]: [phone]  List of all [facility] resources:   https://[place].[facility]/dhs/people-we-serve/adults/health-care/mental-health/resources/crisis-contacts.jsp    National Crisis Information:   Crisis Text Line: Text  [place]  to 390028  Suicide & Crisis Lifeline: 988  National Suicide Prevention Lifeline: 4-091-121-TALK (1-649.155.6234)       For online chat options, visit https://suicidepreventionlifeline.org/chat/  Poison Control Center: 1-137.413.2802  Trans Lifeline: 1-350.829.1306 - Hotline for transgender people of all ages  The [place] [employer]: 8-526-730-0890 - Hotline for LGBT youth     For Non-Emergency Support:   Fast Tracker: Mental Health & Substance Use Disorder Resources -   https://www.[employer].org/

## 2023-06-01 NOTE — PROGRESS NOTES
Virtual Visit Details    Type of service:  Video Visit   Video Start Time: 1330  Video End Time:1339    Originating Location (pt. Location): store  Distant Location (provider location):  Off-site  Platform used for Video Visit: [facility]      Psychiatry Clinic Progress Note                                                                  Patient Name: Mamie Rice  YOB: 1995  MRN: [id number]  Date of Service:  06/01/2023  Last Seen:5/4/2023    Mamie Rice is a 27 year old person assigned female at birth, identifies as cisgender female who uses the name Mamie and pronoun kenneth.     Mamie Rice is a 27 year old year old adult who presents for ongoing psychiatric care.  Mamie Rice was last seen on 5/4/2023.     At that time,     Medication Ordered/Consults/Labs/tests Ordered:     Medication:   -Increase Buspar to 30 mg 2 times a day for anxiety. If anxiety is fairly well manage, try not to use Hydroxyzine at bedtime.  -Continue all other medication regimen.  OTC Recommendations: none  Lab Orders:  EKG already ordered  Referrals: none  Release of Information: none  Future Treatment Considerations: Per symptoms.   Return for Follow Up: in 4 weeks    Pertinent Background:  Diagnoses include MDD, MECHE and ADHD.  Psych critical item history includes [no critical items]. Medical complication includes Cystic Fibrosis, DM I     Previous medication trial: Phentamine, Trazodone (150 mg oversedating, 50 mg ineffective), Gabapentin (insomnia)     Naltrexone, Wellbutrin and Topamax are managed by weight management clinic.    Interim History                                                                                                        4, 4     Since the last visit,  --Reports continue to do well. Mood is stable, denies SI, SIB or HI.  -Has not noticed significant difference with Buspar increase, but only taking Hydroxyzine 25 mg HS, no longer taking AM dose.  -Anxiety has been fairly well managed.  -Has  not tried to sleep without Hydroxyzine at HS as she knows she can't sleep.  -Contacted local psych provider, but has not heard back. Plan to follow up this week.  -Can complete EKG at [facility]. Doesn't have specific PCP.    Denies any symptoms suggestive of hypomania or psychosis.    Current Suicidality/Hx of Suicide Attempts: Denies both  CoCominent Medical concerns: Denies    Medication Side Effects: The patient denies all medication side effects.      Medical Review of Systems     Apart from the symptoms mentioned int he HPI, the 14 point review of systems, including constitutional, HEENT, cardiovascular, respiratory, gastrointestinal, genitourinary, musculoskeletal, integumentary, endocrine, neurological, hematologic and allergic is entirely negative.    Pregnant: None. Nursing: None, Contraception: DMPA    Substance Use   Denies frequent or abuse of alcohol. Denies any other substance use.     Social/ Family History                                  [per patient report]                                 1ea,1ea   Living arrangements: lives with parents and feels safe  Social Support:parents, friends, maternal grandparents  Access to gun: denies  Denies any trauma hx, numerous hospitalizations during childhood, does not consider this as traumatic  Works as a day care provider, dance coach and substitute .      Allergy                                Vancomycin and Amoxicillin-pot clavulanate    Current Medications                                                                                                       Current Outpatient Medications   Medication Sig Dispense Refill     acetylcysteine (MUCOMYST) 20 % neb solution INHALE 4ML VIA NEBULIZER TWO TIMES A DAY. MAY INCREASE TO 3-4 TIMES A DAY WITH INCREASED COUGH / COLD SYMPTOMS. REFRIGERATE VIAL AND DISCARD 96 HOURS AFTER OPENING (Patient not taking: Reported on 1/17/2023) 360 mL 11     albuterol (PROAIR HFA/PROVENTIL HFA/VENTOLIN HFA)  108 (90 Base) MCG/ACT inhaler Inhale 2 puffs into the lungs every 6 hours as needed for shortness of breath / dyspnea or wheezing 8.5 g 11     albuterol (PROVENTIL) (2.5 MG/3ML) 0.083% neb solution INHALE 1 VIAL ( 2.5MG) BY NEBULIZATION EVERY 4 HOURS AS NEEDED FOR FOR SHORTNESS OF BREATH OR WHEEZING 360 mL 11     amphetamine-dextroamphetamine (ADDERALL XR) 20 MG 24 hr capsule Take 1 capsule (20 mg) by mouth daily for 30 days 30 capsule 0     [START ON 6/10/2023] amphetamine-dextroamphetamine (ADDERALL XR) 20 MG 24 hr capsule Take 1 capsule (20 mg) by mouth daily for 30 days 30 capsule 0     [START ON 7/10/2023] amphetamine-dextroamphetamine (ADDERALL XR) 20 MG 24 hr capsule Take 1 capsule (20 mg) by mouth daily for 30 days 30 capsule 0     amphetamine-dextroamphetamine (ADDERALL XR) 20 MG 24 hr capsule Take 1 capsule (20 mg) by mouth daily 30 capsule 0     blood glucose (ACCU-CHEK GUIDE) test strip Use to test blood sugar 4 times daily or as directed. 100 strip 11     blood glucose monitoring (ACCU-CHEK FASTCLIX) lancets Use to test blood sugar 4 times daily or as directed. 100 each 11     buPROPion (WELLBUTRIN XL) 300 MG 24 hr tablet Take 1 tablet (300 mg) by mouth every morning 90 tablet 3     busPIRone HCl (BUSPAR) 30 MG tablet Take 1 tablet (30 mg) by mouth 2 times daily 60 tablet 1     cetirizine (ZYRTEC) 10 MG tablet Take 1 tablet (10 mg) by mouth every evening 30 tablet 5     elexacaftor-tezacaftor-ivacaftor & ivacaftor (TRIKAFTA) 100-50-75 & 150 MG tablet pack TAKE TWO ORANGE TABLETS BY MOUTH IN THE MORNING AND ONE BLUE TABLET IN THE EVENING AS DIRECTED ON PACKAGE.  TAKE WITH FAT CONTAINING FOOD. 84 tablet 11     FLUoxetine (PROZAC) 40 MG capsule TAKE TWO CAPSULES BY MOUTH EVERY DAY . 180 capsule 1     hydrOXYzine (ATARAX) 25 MG tablet Take 1-2 tablets (25-50 mg) by mouth every 6 hours as needed for anxiety (sleep) 60 tablet 1     medroxyPROGESTERone (DEPO-PROVERA) 150 MG/ML IM injection Inject 150 mg into  "the muscle       Multiple Vitamin (MULTIVITAMIN OR) Take 1 tablet by mouth daily.       naltrexone (DEPADE/REVIA) 50 MG tablet Take 1 tablet.  Time it one to two hours prior to worst cravings. 90 tablet 3     omeprazole (PRILOSEC) 40 MG DR capsule Take 1 capsule (40 mg) by mouth 2 times daily 60 capsule 11     phytonadione (MEPHYTON) 5 MG tablet TAKE ONE TABLET BY MOUTH ONCE A WEEK 4 tablet 11     topiramate (TOPAMAX) 25 MG tablet Take one 50 mg pill and one 25 mg pill twice a day 180 tablet 3     topiramate (TOPAMAX) 50 MG tablet Take one 50 mg pill and one 25 mg pill twice a day 180 tablet 3     traZODone (DESYREL) 100 MG tablet Take 1 tablet (100 mg) by mouth At Bedtime 90 tablet 1     vitamin C (ASCORBIC ACID) 500 MG tablet TAKE ONE TABLET BY MOUTH TWICE A  tablet 3     vitamin D3 25 mcg (1000 units) tablet Take 1 tablet (25 mcg) by mouth daily 90 tablet 3     vitamin E (TOCOPHEROL) 400 units (180 mg) capsule Take 1 capsule (400 Units) by mouth 2 times daily 180 capsule 3        Mental Status Exam                                                                                   9, 14 cog        Alertness: alert  and oriented  Appearance:  Casually dressed and Adequately groomed  Behavior/Demeanor: cooperative, pleasant and calm, with good eye contact   Speech: regular rate and rhythm  Mood :  \"good\"  Affect: appropriate and euthymic; was congruent to mood; was congruent to content  Thought Process (Associations):  Linear and Goal directed  Thought process (Rate):  Normal  Thought content:  no overt psychosis, denies suicidal ideation, intent or thoughts and patient does not appear to be responding to internal stimuli  Perception:  Reports none;  Denies auditory hallucinations and visual hallucinations  Attention/Concentration:  Fair  Memory:  Immediate recall intact and Short-term memory intact  Language: intact  Fund of Knowledge/Intelligence:  Average  Abstraction:  Normal  Insight:  Good, " Fair  Judgment:  Good, Fair  Cognition: (6) does  appear grossly intact; formal cognitive testing was not done    Physical Exam     Motor activity/EPS:  Normal  Gait: Normal  Psychomotor: normal or unremarkable    Labs and Results      Pertinent findings on review include: Review of records with relevant information reported in the HPI.  Reviewed pt's past medical record and obtained collateral information.      [place] PRESCRIPTION MONITORING PROGRAM [] was checked today: Adderall 5/11.        12/6/2022    11:17 AM 4/13/2023     8:50 AM 5/31/2023     7:17 PM   PHQ   PHQ-9 Total Score 5 7 3   Q9: Thoughts of better off dead/self-harm past 2 weeks Not at all Not at all Not at all           6/11/2021    10:25 AM 7/1/2021     2:43 PM 8/9/2022    10:53 AM   MECHE-7 SCORE   Total Score 12 8 6       Recent Labs   Lab Test 08/09/22  0849 06/08/21  1015 08/14/19  0814   CR 0.87 0.90 0.85   GFRESTIMATED >90 87 >90     Recent Labs   Lab Test 08/09/22  0849 06/08/21  1015   AST 14 15   ALT 18 21   ALKPHOS 53 68      (1/17/2023), 497 (8/9/2022), 447 (12/23/2020),  436 (08/08/2012)     PSYCHOTROPIC DRUG INTERACTIONS:    Adderall---Buspar---Prozac---Trazodone---Wellbutrin: Concurrent use of AMPHETAMINES and SEROTONERGIC AGENTS may result in increased risk of serotonin syndrome.   Adderall---Prozac---Omeprazole: Concurrent use of AMPHETAMINES and SEROTONERGIC AGENTS THAT INHIBIT CYP2D6 may result in increased amphetamine exposure  Buspar---Trazodone---Vistaril---Zyrtec---Topamax: Concurrent use of TRAZODONE and SEROTONERGIC CENTRAL NERVOUS SYSTEM DEPRESSANTS may result in increased risk of CNS depression.   Prozac---Trazodone: Concurrent use of TRAZODONE and SEROTONERGIC DRUGS THAT PROLONG QT INTERVAL may result in increased risk of QT-interval prolongation.   Prozac---Wellbutrin: Concurrent use of BUPROPION and SELECTED SEIZURE THRESHOLD LOWERING CYP2D6 SUBSTRATES may result in increased exposure of CYP2D6 substrates;  increased risk of seizure.  Glycopyrrolate---Vistaril: Concurrent use of GLYCOPYRROLATE and ANTICHOLINERGICS may result in increased risk of anticholinergic side effects .    Glycopyrrolate---Wellbutrin---Vistaril: Concurrent use of BUPROPION and AGENTS LOWERING SEIZURE THRESHOLD may result in lower seizure threshold  Vistaril---Trazodone: Concurrent use of HYDROXYZINE and QT PROLONGING AGENTS may result in increased risk of QT-interval prolongation.   Buspar---Prozac: Concurrent use of FLUOXETINE and BUSPIRONE may result in worsening of psychiatric symptoms.      MANAGEMENT:  Monitoring for adverse effects, routine vitals, periodic EKGs and patient is aware of risks     Impression/Assessment      Mamie Rice is a 27 year old adult  who presents for med management follow up.  Pt appears stable in her mood and anxiety, denies SI, SIB or HI during the appointment. Pt reports anxiety is well managed with increased Buspar, but continues to take Hydroxyzine 25 mg HS as she believes she can't sleep without Hydroxyzine at night. Discussed possible QTC prolongation, but Azithromycin was discontinued by pulm due to QTC elevation and since pt stopped AM Hydroxyzine, reasonable to repeat EKG and if this is relatively WNL, may continue on current medication regimen. But if it's is elevated, may consider to adjust Trazodone (150 mg caused oversedation and 50 mg was not sufficient in the past) or adjust Prozac in the future. [facility]  Nursing staff delegated to fax the order. Discussed if EKG is relatively WNL, will transfer care, if significantly elevated, will see her couple times to adjust the medication.    Reiterated the need to contact local psychiatry service to transfer care as soon as possible.    Diagnosis                                                                   MDD  MECHE  ADHD    Treatment Recommendation & Plan       Medication Ordered/Consults/Labs/tests  Ordered:     Medication: Continue on current medication regimen for now.  Complete EKG as soon as you are able, if the result is relatively normal, ok to continue on current medication regimen.  OTC Recommendations: none  Lab Orders:  EKG   Referrals: none  Release of Information: none  Future Treatment Considerations: Per symptoms.   Return for Follow Up: if EKG is normal, transfer to local psych service or PCP, if EKG is elevated, to follow up couple more times for medication adjustment.    -Discussed safety plan for suicidal thoughts  -Discussed plan for suicidality  -Discussed available emergency services  -Patient agrees with the treatment plan  -Encouraged to continue outpatient therapy to gain more coping mechanism for stress.    Treatment Risk Statement: Discussed with the patient my impressions, as well as recommended studies. I educated patient on the differential diagnosis and prognosis. I discussed with the patient the risks and benefits of medications versus no interventions, including efficacy, dose, possible side effects and length of treatment and the importance of medication compliance.  The patient understands the risks, benefits, adverse effects and alternatives. Agrees to treatment with the capacity to do so. No medical contraindications to treatment. The patient also understands the risks of using street drugs or alcohol.     CRISIS NUMBERS:   Provided routinely in AVS.      Jessie Pitts CNP, 06/01/2023

## 2023-06-01 NOTE — NURSING NOTE
Is the patient currently in the state of [place]? YES    Visit mode:VIDEO    If the visit is dropped, the patient can be reconnected by: VIDEO VISIT: Text to cell phone: [phone]    Will anyone else be joining the visit? NO      How would you like to obtain your AVS? MyChart    Are changes needed to the allergy or medication list? NO    Reason for visit: RECHECK

## 2023-06-05 ENCOUNTER — MYC MEDICAL ADVICE (OUTPATIENT)
Dept: PSYCHIATRY | Facility: CLINIC | Age: 28
End: 2023-06-05
Payer: COMMERCIAL

## 2023-07-05 ENCOUNTER — OFFICE VISIT (OUTPATIENT)
Dept: PULMONOLOGY | Facility: CLINIC | Age: 28
End: 2023-07-05
Attending: INTERNAL MEDICINE
Payer: COMMERCIAL

## 2023-07-05 ENCOUNTER — ALLIED HEALTH/NURSE VISIT (OUTPATIENT)
Dept: CARE COORDINATION | Facility: CLINIC | Age: 28
End: 2023-07-05

## 2023-07-05 VITALS — SYSTOLIC BLOOD PRESSURE: 115 MMHG | OXYGEN SATURATION: 98 % | DIASTOLIC BLOOD PRESSURE: 76 MMHG | HEART RATE: 95 BPM

## 2023-07-05 DIAGNOSIS — E84.0 CYSTIC FIBROSIS OF THE LUNG (H): Primary | ICD-10-CM

## 2023-07-05 DIAGNOSIS — Z71.9 ENCOUNTER FOR COUNSELING: Primary | ICD-10-CM

## 2023-07-05 DIAGNOSIS — K86.89 PANCREATIC INSUFFICIENCY: ICD-10-CM

## 2023-07-05 DIAGNOSIS — Z79.899 MEDICATION MANAGEMENT: ICD-10-CM

## 2023-07-05 DIAGNOSIS — E84.0 CYSTIC FIBROSIS WITH PULMONARY MANIFESTATIONS (H): Primary | ICD-10-CM

## 2023-07-05 LAB
EXPTIME-PRE: 5.33 SEC
FEF2575-%PRED-PRE: 120 %
FEF2575-PRE: 3.97 L/SEC
FEF2575-PRED: 3.29 L/SEC
FEFMAX-%PRED-PRE: 124 %
FEFMAX-PRE: 8.29 L/SEC
FEFMAX-PRED: 6.69 L/SEC
FEV1-%PRED-PRE: 110 %
FEV1-PRE: 3.12 L
FEV1FEV6-PRE: 88 %
FEV1FEV6-PRED: 86 %
FEV1FVC-PRE: 88 %
FEV1FVC-PRED: 86 %
FIFMAX-PRE: 3.99 L/SEC
FVC-%PRED-PRE: 109 %
FVC-PRE: 3.56 L
FVC-PRED: 3.26 L

## 2023-07-05 PROCEDURE — G0463 HOSPITAL OUTPT CLINIC VISIT: HCPCS | Performed by: INTERNAL MEDICINE

## 2023-07-05 PROCEDURE — 87070 CULTURE OTHR SPECIMN AEROBIC: CPT | Performed by: INTERNAL MEDICINE

## 2023-07-05 PROCEDURE — 94375 RESPIRATORY FLOW VOLUME LOOP: CPT | Performed by: INTERNAL MEDICINE

## 2023-07-05 PROCEDURE — 87116 MYCOBACTERIA CULTURE: CPT | Performed by: INTERNAL MEDICINE

## 2023-07-05 PROCEDURE — 93010 ELECTROCARDIOGRAM REPORT: CPT | Performed by: INTERNAL MEDICINE

## 2023-07-05 PROCEDURE — 93005 ELECTROCARDIOGRAM TRACING: CPT | Mod: RTG

## 2023-07-05 PROCEDURE — 97803 MED NUTRITION INDIV SUBSEQ: CPT | Mod: XU | Performed by: DIETITIAN, REGISTERED

## 2023-07-05 PROCEDURE — 99214 OFFICE O/P EST MOD 30 MIN: CPT | Mod: 25 | Performed by: INTERNAL MEDICINE

## 2023-07-05 PROCEDURE — 87077 CULTURE AEROBIC IDENTIFY: CPT | Performed by: INTERNAL MEDICINE

## 2023-07-05 ASSESSMENT — PAIN SCALES - GENERAL: PAINLEVEL: NO PAIN (0)

## 2023-07-05 NOTE — NURSING NOTE
"Mamie Rice is a 27 year old year old who is being seen for Cystic Fibrosis (CF Follow up )      Medications reviewed and Vital signs taken.    Specimen Collection Type: Both (Sputum and Throat Swab)    Order(s) placed: AFB (Acid Fast Bacilli) and CF Aerobic Bacterial    *IF AFB order placed - please enter \"PRIORITIZE AFB\" to order comments.       No results found for: ACIDFAST      Lab Results   Component Value Date    [facility]  [facility]     Negative for acid fast bacteria  Less than 5ml of specimen received.  A minimum of 5 mL of sputum or fluid is recommended for recovery of acid fast   bacilli (AFB).  Volumes less than 5 mL are suboptimal and may compromise   recovery of AFB from culture.  Assayed at [facility] [address]             Vitals were taken and medications were reconciled.     Shreya ROJO  10:22 AM      "

## 2023-07-05 NOTE — PROGRESS NOTES
CF Annual Nutrition Assessment    Reason for Assessment  Assessed during Dr. Brynn Gray's clinic r/t increased nutrition risk with diagnosis of CF per protocol.    Nutrition Significant PMH  Mild Lung Disease/Normal Lung Function     -On Trikafta  Pancreatic Insufficient (fecal elastase done 2013)  CFRD - sees CF Endocrinologist, Dr. Conner MITCHELL  Class I obesity - undergoing treatment in [facility] since fall 2016 (MD and RD care)    Anthropometric Assessment  Height: 154.9 cm  IBW based on BMI 22 for females and 23 for males per CF Foundation recs: 54.6 kg  Today's Weight: 83.9 kg (185 lb)  Body Mass Index is 35 kg/m2    Weight History/Comments:   Mamie continues to follow with the [facility] clinic team and is taking medications to support weight control. Last seen in May 2023 with Dr. Park. She is currently taking topiramate, naltrexone, and bupropion for weight loss, as prescribed.  She states this helps somewhat with her cravings and snacking but the effectiveness seems to be waning over time.     Wt Readings from Last 10 Encounters:   05/12/23 83.9 kg (185 lb)   01/17/23 83.9 kg (184 lb 15.5 oz)   12/09/22 81.6 kg (180 lb)   01/14/22 86.2 kg (190 lb)   10/22/21 81.6 kg (180 lb)   07/01/21 81.2 kg (179 lb)   06/08/21 80.3 kg (177 lb)   10/09/20 77.1 kg (170 lb)   02/28/20 80.7 kg (178 lb)   12/18/19 78.9 kg (174 lb)     Pancreatic Enzymes  Although tested as pancreatic insufficient (via fecal elastase) in the past pt does not take enzymes, stopped taking them in 2015 as she felt they had little affect on her GI symptoms. Ok'd this with CF MD prior to stopping.      Diet History and Assessment  Diet Preferences/Allergies/Intolerances: Regular diet, on appetite suppressive medication (Topamax, naltrexone)    Intake Recall/Comments:  Eats TID meals with TID snacks. Hx of eating a high CHO containing snack right before bed but has been counseled to modify this due to overnight hypoglycemia  (reactive hypoglycemia).     Calcium: BID glasses of skim milk + milk on cereal in AM + cheese throughout the day   Salt: salty snacks, boxed food, cheese, sauces/seasoning, Gatorade     Hydration: 1 medium bottle of Gatorade, 2 glasses of skim milk, 1 bottle of diet Dr. Pepper daily, 2 glasses of apple juice daily, mostly water  Supplements:  None     Laboratory Assessment    Annual Labs: WNL (8/9/22), elevated triglyercides    DEXA: (6/8/2021) WNL    Current Vitamin/Mineral Supplements: Multivitamin, Vitamin D 1000 International Units, Vitamin E 400 International Units BID, Betacarotene 25,000 International Units 3x/week, Vitamin K weekly and Vitamin C 250 mg BID    Comments:  Pt states she is taking all of the above as prescribed. She uses a pill box that she sets up weekly to insure that she doesn't forget anything. These are either covered by insurance or affordable OOP (ex: Vitamin E about $6/month).    CF Related Diabetes Evaluation  FSBG range: Normal per pt  Insulin: No  Carbohydrate Counting: No    Pt reports low BG after being physically active. Will still occasionally wake up in the middle of the night, check the BG, it's low, and will eat a snack.  This has happened now for years.  Has been educated on avoiding high simple CHO near bedtime. 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    NUTRITION DIAGNOIS  Overweight/obese r/t excessive energy intake and lack of physical activity AEB BMI >30 kg/m2 with pt-reported chronic overeating requiring intervention via [facility] clinic.   INTERVENTIONS/RECOMMENDATIONS  1) Oral Intake/Weight: Pt reports she will return to weight management clinic soon for a visit about her diet, medications. Happy that Trikafta has not increased her weight greatly, focusing  on maintaining weight for now and focusing less on total calories, more on healthful food choices.     2) Enzymes: Denies any symptoms of malabsorption, will remain off enzyme therapy.     3) Vitamin/Mineral Supplementation: Vitamin levels from annual studies pending for today, reviewed vitamin/mineral supplements (as above), will communicate any recommended supplement changes to pt via [employer].     4) Diabetes: Reviewed best practices for hypoglycemia treatment, however the hypoglycemia she experiences overnight is challenging. Will follow.     Patient Understanding: Good  Expected Compliance: Good  Follow-Up Plans: Per Protocol  GOALS:   Weight maintenance vs reduction toward BMI <30  FOLLOW-UP/MONITORING:  Visit patient within 6-12 month(s) for annual review, support with weight loss/blood sugar control.  Continue to monitor for fat-soluble vitamin deficiencies as pt remains off pancreatic enzyme replacement therapy.     Time Spent in Face-to-Face Patient Interaction: 15 minutes (Medicare time with diabetes)    Ellie Martinez MS, RDN, LDN  Cystic Fibrosis & Pulmonary Dietitian  [facility]

## 2023-07-05 NOTE — PATIENT INSTRUCTIONS
"Cystic Fibrosis Self-Care Plan    RECOMMENDATIONS:   Help us provide the best possible care. If you receive a questionnaire from the CF Foundation about your clinic experience today please fill it out.  It should take less than 5 minutes. Let us know what we are doing well and how we can improve.  Mamie,     It was very nice seeing you! Very good job taking care of yourself.     We discussed the following:   - continue current therapies  - we will make sure your psychiatrist can see your EKG results   - increase regular physical activity with walking   - start vest/neb therapy when feeling congested, or with cough  - increase flonase nasal spray to once daily; also use netipot to help clear sinuses  - if ongoing post-nasal drainage despite above, trial an over the counter antihistamine; avoid the \"D\" formulation   - annual studies including oral glucose tolerance test at next visit     YOUR GOAL:  Have a wonderful summer!       Cystic Fibrosis :    Joan Van  [phone]  [facility] Nurse line:  Sg ALBRIGHT   [phone]     [facility] Fax Number:      [phone]         Cystic Fibrosis Respiratory Therapists:   Chaparrita Dennis                          [phone]          Sharon Gutiérrez   [phone]  Cystic Fibrosis Dietitians:              Amy Andino              [phone]                            Theresa Ceja                        [phone]   Cystic Fibrosis Diabetes Nurse:    Rhonda Esteves               [phone]    Cystic Fibrosis Social Workers:     Deyanira Figueroa               [phone]                     Faith Zamudio               [phone]  Cystic Fibrosis Pharmacists:           Padma Alonso                              [phone] (pager)         Ping Cisneros      [phone]   Cystic Fibrosis Genetic Counselor:   Kaley Escobedo    [phone]    [facility] website:  [email or web address]    We have " recently learned about an albuterol neb solution shortage due to a manufacturing delay. There is still a small supply coming in but not enough to meet the current demand. We do not yet have an estimate for when this will become widely available again.    We our asking our CF community to do the following:    Please take time to check your supply of albuterol neb solution at home. We recommend keeping at least a 2-week supply of albuterol nebs at home in case of illness.    2.  If you have an albuterol inhaler at home, you can use 4 puffs of the inhaler with a spacer in place of the nebulized albuterol at the start of your treatments. It is important to use a spacer for the best technique. If you do not have a spacer at home or have questions on technique, we will be happy to review and send one to your home address. Please also take a moment to check that your albuterol HFA inhaler is available and not .  inhalers may be less effective as the medication loses its potency or power. In some instances your team may suggest another alternative instead of an albuterol inhaler.    3.  Please take care in requesting refills. Albuterol neb solution is a life-saving medication for patients having severe asthma attacks and other emergency respiratory conditions. Let s work together to make sure that albuterol neb solution is available to those who need it urgently.    Reach out to your care team with questions and to confirm your planned alternative for albuterol nebs. [facility] the fastest way to connect. If possible, please reserve the nursing line for more urgent concerns as we are short on nursing staff.    Here are some reputable sites with more  information:    https://www.cidrap.[facility]/resilient-drug-supply/us-liquid-albuterol-tpsopvkf-xnhfmmrq-iwfpvd-after-major-supplier-shuts-down    https://www.[employer]/2023/03/03/health/albuterol-shortage    https://www.[facility].org/drug-shortages/current-shortages/drug-shortage-detail.aspx?ml=407&loginreturnUrl=SSOCheckOnkaren       MRN: [id number]   Clinic Date: July 5, 2023   Patient: Mamie Rice     Annual Studies:   IGG   Date Value Ref Range Status   06/08/2021 672 610 - 1,616 mg/dL Final     Immunoglobulin G   Date Value Ref Range Status   08/09/2022 621 610 - 1,616 mg/dL Final     Insulin   Date Value Ref Range Status   08/14/2019 41.0 (H) 3 - 25 mU/L Final     There are no preventive care reminders to display for this patient.    Pulmonary Function Tests  FEV1: amount of air you can blow out in 1 second  FVC: total amount of air you can take in and blow out    Your Goals:             Latest Ref Rng & Units 7/5/2023     9:59 AM   PFT   FVC L 3.56  P   FEV1 L 3.12  P   FVC% % 109  P   FEV1% % 110  P      P Preliminary result          Airway Clearance: The Most Important Way to Keep Your Lungs Healthy  Vest Settings:   Hill-Rom Frequencies: 8, 9, 10 Pressure 10 Then, Frequencies 18, 19, 20 Pressure 6     RespirTech: Quick Start with Pressure of     Do each frequency for 5 minutes; Deflate vest after each frequency & cough 3 times before beginning the next setting.    Vest and Neb Therapy should be done 1 times/day.    Good Nutrition Can Improve Lung Function and Overall Health    Take ALL of your vitamins with food    Take 1/2 of your enzymes before EVERY meal/snack and the other 1/2 mid-meal/snack    Wt Readings from Last 3 Encounters:   05/12/23 83.9 kg (185 lb)   01/17/23 83.9 kg (184 lb 15.5 oz)   12/09/22 81.6 kg (180 lb)       There is no height or weight on file to calculate BMI.         National CF Foundation Recommendations for BMI in CF Adults: Women: at least 22 Men: at least 23        Controlling  Blood Sugars Helps Prevent Lung Infections & Improves Nutrition  Test blood sugar:    In the morning before eating (goal is )    2 hours after a meal (goal is less than 150)    When pre-meal glucose is greater than 150 add correction    At bedtime (if less than 100 eat a snack with 15 grams of carbohydrates  Last A1C Results:   Hemoglobin A1C   Date Value Ref Range Status   08/09/2022 5.0 0.0 - 5.6 % Final     Comment:     Normal <5.7%   Prediabetes 5.7-6.4%    Diabetes 6.5% or higher     Note: Adopted from ADA consensus guidelines.   06/08/2021 5.1 0 - 5.6 % Final     Comment:     Normal <5.7% Prediabetes 5.7-6.4%  Diabetes 6.5% or higher - adopted from ADA   consensus guidelines.           If diabetic, measure A1C every 6 months. Goal: Under 7%    Staying Healthy  Research:  If you are interested in learning about research opportunities or have questions, please contact the CF Research Team at [phone] or [email or web address].    CF Foundation:  Compass is a personalized resource service to help you with the insurance, financial, legal and other issues you are facing.  It's free, confidential and available to anyone with CF.  Ask your  for more information or contact Compass directly at [phone]-YBXZYHQ ([phone]) or compass@cff.org, or learn more at cff.org/compass.

## 2023-07-05 NOTE — PROGRESS NOTES
[facility]  July 5, 2023         Assessment and Plan:   Mamie Rice is a 27 year old female with cystic fibrosis.    1. CF lung disease with normal spirometry: Mamie currently feels well overall but is having increased post-nasal drainage. Discussed using a non-D antihistamine, regular nasal sprays/toileting (flonase/netipot) and initiating airway clearance as needed.  Her last sputum culture grew normal roberto carlos only.  Her pulmonary function tests continue to show improvement.  At this time I have recommended to Mamie:  -- She should  do her vest with any pulmonary symptoms  -- She remains quite active at work and I have encouraged her to do exercise if she is not active  -- Nasal toileting including flonase 1-2x daily, netipot daily; start non-D formulation antihistamine for allergies (previously used zyrtec)    2. Pancreatic Insufficiency/GI: Mamie has no new symptoms consistent with worsening malabsorption.  She is actively working with the weight management group.  She remains on medication for this.  She has tried to make some dietary changes.  - continue the present dose of pancreatic enzymes  - continue vitamin supplementation.    3.  CF TR modulator therapy: Mamie is on Trikafta and tolerating well.  She is due for repeat hepatic panel and CK with annual studies at next visit.     4.  Abnormal glucose tolerance: Mamie did try wearing a continuous glucose monitor but has some difficulty with it.  She reports episodes of hypoglycemia causing symptoms during the schoolyear when she has less time to snack.       5.  Cystic fibrosis sinus disease: Mamie in the past has struggled with sinus disease and is currently having nasal drainage with likely allergy component.   -- Nasal toileting including flonase 1-2x daily, netipot daily; start non-D formulation antihistamine for allergies (previously used zyrtec)    6.  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  She enjoys her work very much.  She does live at home with her family.  She continues to remain very positive about her work and family.      Annual studies due: 10/23 - Will need additional support at this time as she does experience anxiety with bloodwork.     Immunizations: due for prevnar 13, and covid booster  Colonoscopy: age 38   DEXA: Z score -1.3 (2021), repeat in 2-4 years per CFF guidelines    RTC ~ 12 weeks with annual studies.   Laurie Gray MD  Pulmonary, Allergy, Critical Care, and Sleep Medicine   [facility]   Pager: [phone]    This note was created using dictation software and may contain errors.  Please contact the creator for any clarifications that are needed.    I have spent 30 minutes on the day of the visit to review the chart, interview and examine the patient, review labs and imaging, formulate a plan, document and submit orders. Time documented is excluding time spent for PFT interpretation        Interval History:   Mamie is overall feeling well. She has had increased nasal drainage and cough for about a week. She is using flonase prn, and netipot daily. Previously used antihistamine but not now. No chest tightness but pain with cough. No fevers, chills, night sweats or sick contacts, no sore throat. She hasn't been vesting regularly but did vest once because she felt more congested and this helped. Used albuterol/mcomyst. Cough and nasal srainage x 1 week. Sometimes comes up. Flonase. Prn.     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 No episodes of hypoglycemia. No GI complaints.     Prior history:     Mamie denies any cough or sputum production.  She denies any chest congestion.  She does occasionally get short of breath with increased activity.  She has not done a vest therapy since July of last  year.         Review of Systems:     CONSTITUTIONAL: no fever, no chills, chronic night sweats probably related to temperature in the room, no change in weight, no change in energy, no change in appetite--good    INTEGUMENTARY/SKIN: no rash, no obvious new lesions    ENT/MOUTH: no sore throat, no new sinus pain, + nasal drainage, no new nasal congestion, no ear ringing     RESPIRATORY: see interval history    CV: no chest pain, no palpitations, no peripheral edema    GI: no nausea, no vomiting, no change in stools, no fatty stools, no GERD    : negative urinary, on Depo so does not menstruate    MUSCULOSKELETAL: no myalgias, no arthralgias    ENDOCRINE: Please see above    NEURO:  No headache    SLEEP: no issues    PSYCHIATRIC: mood stable          Past Medical and Surgical History:     Past Medical History:   Diagnosis Date     ADHD (attention deficit hyperactivity disorder)      Anxiety      Aspergillosis, with pneumonia (H)     fugus found caused chest pain     Chronic infection     CF, MRSA.,      Chronic sinusitis      Constipation, chronic      Cystic fibrosis with pulmonary manifestations (H) 12/19/2011     Cystic fibrosis without mention of meconium ileus     SWEAT TEST:Date: 2/17/1994   [facility]  296 mg, 104 mmol/L ClSample #2  295 mg, 104 mmol/L Cl GENOTYPING:Date: 10/15/2007,  Laboratory: [facility]: [id number]     Depressive disorder      Diabetes     no meds currently     Dysthymic disorder      Exocrine pancreatic insufficiency      Gastro-oesophageal reflux disease      Hip pain, right      MRSA (methicillin resistant Staphylococcus aureus) carrier      Pancreatic disease      Past Surgical History:   Procedure Laterality Date     ARTHROSCOPY HIP, OSTEOPLASTY FEMUR PROXIMAL, COMBINED  3/11/2013    Procedure: COMBINED ARTHROSCOPY HIP, OSTEOPLASTY FEMUR PROXIMAL;  Right Hip Arthroscopy, Labral  Debridement.    surgeon request choice anesthesia/admit to [facility] after surgery;   Surgeon: Omkar Austin MD;  [facility] OR     ARTHROSCOPY KNEE WITH MEDIAL MENISCECTOMY Left 1/31/2017    Procedure: ARTHROSCOPY KNEE WITH MEDIAL MENISCECTOMY;  Surgeon: Jethro Coyle MD;  Location: [facility] OR     bronchoscopies       BRONCHOSCOPY       EXAM UNDER ANESTHESIA ANUS N/A 5/10/2016    Procedure: EXAM UNDER ANESTHESIA ANUS;  Surgeon: Chet Gaviria MD;  [facility]     EXAM UNDER ANESTHESIA, RESTORATIONS, EXTRACTION(S) DENTAL COMPLEX, COMBINED  5/13/2013    Procedure: COMBINED EXAM UNDER ANESTHESIA, RESTORATIONS, EXTRACTION(S) DENTAL COMPLEX;  Dental Exam, Radiographs, Restorations. Single Extraction  Tooth #2. Restorations x 3;  Surgeon: Danilo Ortiz DDS;  [facility]: [facility] OR     HC KNEE SCOPE, DIAGNOSTIC      Arthroscopy, Knee- left     INJECT BOTOX N/A 5/10/2016    Procedure: INJECT BOTOX;  Surgeon: Chet Gaviria MD;  [facility]     left hip labral tear  5/11/2011    left hip arthroscopy with labral debridement and synovectomy     meniscus repair       OPTICAL TRACKING SYSTEM ENDOSCOPIC SINUS SURGERY  10/14/2011    Procedure:OPTICAL TRACKING SYSTEM ENDOSCOPIC SINUS SURGERY; FESS (functional endoscopic sinus surgery) with Image Guidance, bronchial lavage and cultures; Surgeon:GOYO KUO; Location:[facility] OR     OPTICAL TRACKING SYSTEM ENDOSCOPIC SINUS SURGERY  5/18/2012    Procedure:OPTICAL TRACKING SYSTEM ENDOSCOPIC SINUS SURGERY; Right  and Left Image Guided Functional Endoscopic Sinus Surgery With  Frontal Approach, Landmarx; Surgeon:GOYO KUO; Location:[facility] OR     OPTICAL TRACKING SYSTEM ENDOSCOPIC SINUS SURGERY  9/26/2012    Procedure: OPTICAL TRACKING SYSTEM ENDOSCOPIC SINUS SURGERY;  Stealth Guided Bilateral Functional Endoscopic Sinus Surgery *Latex Safe*;  Surgeon: Goyo Kuo MD;  [facility]     OPTICAL TRACKING SYSTEM ENDOSCOPIC SINUS SURGERY Bilateral 10/16/2015    Procedure: OPTICAL TRACKING SYSTEM ENDOSCOPIC SINUS SURGERY;   Surgeon: Mariela Haile MD;  [facility]     ORTHOPEDIC SURGERY      left hip tear repair 2010     SINUS SURGERY             Family History:     Family History   Problem Relation Age of Onset     Cancer Paternal Grandmother      Skin Cancer Paternal Grandmother      Other Cancer Paternal Grandmother         Skin     Obesity Paternal Grandmother      Cancer Paternal Grandfather         PGF had throat cancer (he was a smoker)     Other Cancer Paternal Grandfather      Anxiety Disorder Paternal Grandfather      Thyroid Disease Mother         ,     Hypertension Mother      Obesity Other      ALS Father 67        2 male cousins with ALS as well     Anesthesia Reaction No family hx of      Blood Disease No family hx of      Colon Polyps No family hx of      Crohn's Disease No family hx of      Ulcerative Colitis No family hx of      Colon Cancer No family hx of      Melanoma No family hx of             Social History:     Social History     Socioeconomic History     Marital status: Single     Spouse name: Not on file     Number of children: Not on file     Years of education: Not on file     Highest education level: Not on file   Occupational History     Not on file   Tobacco Use     Smoking status: Never     Smokeless tobacco: Never     Tobacco comments:     one person at home smokes outside   Substance and Sexual Activity     Alcohol use: No     Alcohol/week: 0.0 standard drinks of alcohol     Drug use: No     Sexual activity: Never   Other Topics Concern      Service Not Asked     Blood Transfusions No     Caffeine Concern Not Asked     Occupational Exposure Not Asked     Hobby Hazards Not Asked     Sleep Concern Not Asked     Stress Concern Not Asked     Weight Concern Not Asked     Special Diet Not Asked     Back Care Not Asked     Exercise Yes     Bike Helmet Not Asked     Seat Belt Not Asked     Self-Exams Not Asked     Parent/sibling w/ CABG, MI or angioplasty before 65F 55M? Yes   Social History  Narrative    Mamie lives with mother in [place], [place].  There is a cat in the home, but Mamie does not have any litterbox duties.  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  She gets essentially no exercise because of the tingling in her feet (says it bothers her even to stand).        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        8/2015 no change in social situation        2/15/2016 Pt is single and lives with mother and stepfather.     Social Determinants of Health     Financial Resource Strain: Not on file   Food Insecurity: Not on file   Transportation Needs: Not on file   Physical Activity: Not on file   Stress: Not on file   Social Connections: Not on file   Intimate Partner Violence: Not on file   Housing Stability: Not on file            Medications:     Current Outpatient Medications   Medication     acetylcysteine (MUCOMYST) 20 % neb solution     albuterol (PROAIR HFA/PROVENTIL HFA/VENTOLIN HFA) 108 (90 Base) MCG/ACT inhaler     albuterol (PROVENTIL) (2.5 MG/3ML) 0.083% neb solution     amphetamine-dextroamphetamine (ADDERALL XR) 20 MG 24 hr capsule     [START ON 7/10/2023] amphetamine-dextroamphetamine (ADDERALL XR) 20 MG 24 hr capsule     blood glucose (ACCU-CHEK GUIDE) test strip     blood glucose monitoring (ACCU-CHEK FASTCLIX) lancets     buPROPion (WELLBUTRIN XL) 300 MG 24 hr tablet     busPIRone HCl (BUSPAR) 30 MG tablet     cetirizine (ZYRTEC) 10 MG tablet     elexacaftor-tezacaftor-ivacaftor & ivacaftor (TRIKAFTA) 100-50-75 & 150 MG tablet pack     FLUoxetine (PROZAC) 40 MG capsule     hydrOXYzine (ATARAX) 25 MG tablet     medroxyPROGESTERone (DEPO-PROVERA) 150 MG/ML IM injection     Multiple Vitamin (MULTIVITAMIN OR)     naltrexone (DEPADE/REVIA) 50 MG tablet     omeprazole (PRILOSEC) 40 MG DR capsule     phytonadione (MEPHYTON) 5 MG tablet     topiramate (TOPAMAX) 25 MG tablet     topiramate (TOPAMAX) 50 MG  tablet     traZODone (DESYREL) 100 MG tablet     vitamin C (ASCORBIC ACID) 500 MG tablet     vitamin D3 25 mcg (1000 units) tablet     vitamin E (TOCOPHEROL) 400 units (180 mg) capsule     No current facility-administered medications for this visit.            Physical Exam:   /76   Pulse 95   SpO2 98%     Constitutional:   Awake, alert and in no apparent distress, pleasant female     Eyes:   nonicteric     ENT:   oral mucosa moist without lesions, normal tm bilaterally, bilateral mucosa normal     Neck:   Supple without supraclavicular or cervical lymphadenopathy     Lungs:   Good air flow.  No crackles. No rhonchi.  No wheezes.     Cardiovascular:   Normal S1 and S2.  RRR.  No murmur, gallop or rub.     Abdomen:   NABS, soft, nontender, nondistended.      Musculoskeletal:   No edema, digital clubbing present     Neurologic:   Alert and conversant.     Skin:   Warm, dry.  No rash on limited exam.             Data:   All laboratory and imaging data reviewed.      Cystic Fibrosis Culture  Recent Results (from the past 168 hour(s))   General PFT Lab (Please always keep checked)    Collection Time: 07/05/23  9:59 AM   Result Value Ref Range    FVC-Pred 3.26 L    FVC-Pre 3.56 L    FVC-%Pred-Pre 109 %    FEV1-Pre 3.12 L    FEV1-%Pred-Pre 110 %    FEV1FVC-Pred 86 %    FEV1FVC-Pre 88 %    FEFMax-Pred 6.69 L/sec    FEFMax-Pre 8.29 L/sec    FEFMax-%Pred-Pre 124 %    FEF2575-Pred 3.29 L/sec    FEF2575-Pre 3.97 L/sec    CHL5246-%Pred-Pre 120 %    ExpTime-Pre 5.33 sec    FIFMax-Pre 3.99 L/sec    FEV1FEV6-Pred 86 %    FEV1FEV6-Pre 88 %       PFT: The spirometry is normal.  When compared to 1/17/23, the FEV1 and FVC have little change.      Pulmonary exacerbation: absent

## 2023-07-05 NOTE — PROGRESS NOTES
Adult Cystic Fibrosis Program  Social Work Clinic Consult    Data:   Met with Mamie to introduce self as new , review role, and provide contact information. Mamie shared that she understood the scope of the SWer role, and did not have any social work specific questions. Mamie did ask for support from her team in getting a psychiatry referral, as her previous provider is no longer working with her, and she would like to connect with a new provider before her med refills are due. Mamie did ask for support in getting a printed copy of her Psychiatry referral before leaving clinic. SWer assisted Mamie with locating Psychiatry referral from past providers in order to continue care at a new clinic. Psychiatric Nurse was contacted and asked to mail referral forms to new clinic per Mamie's request.      Intervention:  -Introduction to SW and SW role  -Paperwork assistance (Assisted with Psychiatry referral)      Assessment: Mamie appeared to be in good spirits and open to the  introduction. Mamie appeared to understand the scope of the role of the SWer on her team. Mamie did seem concerned about getting psychiatric care set up with a new provider as she wanted to ensure she did not have a gap in filling her mental health medications, but this was addressed in clinic today. Mamie was aware that she can reach out to SWer and CF team at any time if further questions arise.     Plan:   -Continue to follow through regular clinic consult.  -Continue to follow for any psychosocial needs that may arise.  -Complete full psychosocial assessment annually.       Anahy Saez [facility]  Adult Cystic Fibrosis   Ph: [phone], Pager: [phone]

## 2023-07-05 NOTE — LETTER
7/5/2023         RE: Mamie Rice  [address] [facility] [address]        Dear Colleague,    Thank you for referring your patient, Mamie Rice, to the [facility] [place]. Please see a copy of my visit note below.    [facility]  July 5, 2023         Assessment and Plan:   Mamie Rice is a 27 year old female with cystic fibrosis.    1. CF lung disease with normal spirometry: Mamie currently feels well overall but is having increased post-nasal drainage. Discussed using a non-D antihistamine, regular nasal sprays/toileting (flonase/netipot) and initiating airway clearance as needed.  Her last sputum culture grew normal roberto carlos only.  Her pulmonary function tests continue to show improvement.  At this time I have recommended to Mamie:  -- She should  do her vest with any pulmonary symptoms  -- She remains quite active at work and I have encouraged her to do exercise if she is not active  -- Nasal toileting including flonase 1-2x daily, netipot daily; start non-D formulation antihistamine for allergies (previously used zyrtec)    2. Pancreatic Insufficiency/GI: Mamie has no new symptoms consistent with worsening malabsorption.  She is actively working with the weight management group.  She remains on medication for this.  She has tried to make some dietary changes.  - continue the present dose of pancreatic enzymes  - continue vitamin supplementation.    3.  CF TR modulator therapy: Mamie is on Trikafta and tolerating well.  She is due for repeat hepatic panel and CK with annual studies at next visit.     4.  Abnormal glucose tolerance: Mamie did try wearing a continuous glucose monitor but has some difficulty with it.  She reports episodes of hypoglycemia causing symptoms during the schoolyear when she has less time to snack.       5.  Cystic fibrosis sinus disease: Mamie in the past  has struggled with sinus disease and is currently having nasal drainage with likely allergy component.   -- Nasal toileting including flonase 1-2x daily, netipot daily; start non-D formulation antihistamine for allergies (previously used zyrtec)    6.  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  She enjoys her work very much.  She does live at home with her family.  She continues to remain very positive about her work and family.      Annual studies due: 10/23 - Will need additional support at this time as she does experience anxiety with bloodwork.     Immunizations: due for prevnar 13, and covid booster  Colonoscopy: age 38   DEXA: Z score -1.3 (2021), repeat in 2-4 years per CFF guidelines    RTC ~ 12 weeks with annual studies.   Laurie Gray MD  Pulmonary, Allergy, Critical Care, and Sleep Medicine   [facility]   Pager: [phone]    This note was created using dictation software and may contain errors.  Please contact the creator for any clarifications that are needed.    I have spent 30 minutes on the day of the visit to review the chart, interview and examine the patient, review labs and imaging, formulate a plan, document and submit orders. Time documented is excluding time spent for PFT interpretation        Interval History:   Mamie is overall feeling well. She has had increased nasal drainage and cough for about a week. She is using flonase prn, and netipot daily. Previously used antihistamine but not now. No chest tightness but pain with cough. No fevers, chills, night sweats or sick contacts, no sore throat. She hasn't been vesting regularly but did vest once because she felt more congested and this helped. Used albuterol/mcomyst. Cough and nasal srainage x 1 week. Sometimes comes up. Flonase. Prn.     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 No episodes of hypoglycemia. No GI complaints.     Prior history:     Mamie denies any cough or sputum production.  She denies any chest congestion.  She does occasionally get short of breath with increased activity.  She has not done a vest therapy since July of last year.         Review of Systems:     CONSTITUTIONAL: no fever, no chills, chronic night sweats probably related to temperature in the room, no change in weight, no change in energy, no change in appetite--good    INTEGUMENTARY/SKIN: no rash, no obvious new lesions    ENT/MOUTH: no sore throat, no new sinus pain, + nasal drainage, no new nasal congestion, no ear ringing     RESPIRATORY: see interval history    CV: no chest pain, no palpitations, no peripheral edema    GI: no nausea, no vomiting, no change in stools, no fatty stools, no GERD    : negative urinary, on Depo so does not menstruate    MUSCULOSKELETAL: no myalgias, no arthralgias    ENDOCRINE: Please see above    NEURO:  No headache    SLEEP: no issues    PSYCHIATRIC: mood stable          Past Medical and Surgical History:     Past Medical History:   Diagnosis Date    ADHD (attention deficit hyperactivity disorder)     Anxiety     Aspergillosis, with pneumonia (H)     fugus found caused chest pain    Chronic infection     CF, MRSA.,     Chronic sinusitis     Constipation, chronic     Cystic fibrosis with pulmonary manifestations (H) 12/19/2011    Cystic fibrosis without mention of meconium ileus     SWEAT TEST:Date: 2/17/1994   Laboratory: [facility]  296 mg, 104 mmol/L ClSample #2  295 mg, 104 mmol/L Cl GENOTYPING:Date: 10/15/2007,  Laboratory: [facility]: [id number]    Depressive disorder     Diabetes     no meds currently    Dysthymic disorder     Exocrine pancreatic insufficiency     Gastro-oesophageal reflux disease     Hip pain, right     MRSA (methicillin resistant Staphylococcus aureus) carrier     Pancreatic disease      Past Surgical History:   Procedure  Laterality Date    ARTHROSCOPY HIP, OSTEOPLASTY FEMUR PROXIMAL, COMBINED  3/11/2013    Procedure: COMBINED ARTHROSCOPY HIP, OSTEOPLASTY FEMUR PROXIMAL;  Right Hip Arthroscopy, Labral  Debridement.    surgeon request choice anesthesia/admit to [facility] after surgery;  Surgeon: Omkar Austin MD;  [facility] OR    ARTHROSCOPY KNEE WITH MEDIAL MENISCECTOMY Left 1/31/2017    Procedure: ARTHROSCOPY KNEE WITH MEDIAL MENISCECTOMY;  Surgeon: Jethro Coyle MD;  Location: [facility] OR    bronchoscopies      BRONCHOSCOPY      EXAM UNDER ANESTHESIA ANUS N/A 5/10/2016    Procedure: EXAM UNDER ANESTHESIA ANUS;  Surgeon: Chet Gaviria MD;  [facility]    EXAM UNDER ANESTHESIA, RESTORATIONS, EXTRACTION(S) DENTAL COMPLEX, COMBINED  5/13/2013    Procedure: COMBINED EXAM UNDER ANESTHESIA, RESTORATIONS, EXTRACTION(S) DENTAL COMPLEX;  Dental Exam, Radiographs, Restorations. Single Extraction  Tooth #2. Restorations x 3;  Surgeon: Danilo Ortiz DDS;  Location: [facility] OR    HC KNEE SCOPE, DIAGNOSTIC      Arthroscopy, Knee- left    INJECT BOTOX N/A 5/10/2016    Procedure: INJECT BOTOX;  Surgeon: Chet Gaviria MD;  [facility]    left hip labral tear  5/11/2011    left hip arthroscopy with labral debridement and synovectomy    meniscus repair      OPTICAL TRACKING SYSTEM ENDOSCOPIC SINUS SURGERY  10/14/2011    Procedure:OPTICAL TRACKING SYSTEM ENDOSCOPIC SINUS SURGERY; FESS (functional endoscopic sinus surgery) with Image Guidance, bronchial lavage and cultures; Surgeon:JUAN JOSE GARCIA; Location:[facility] OR    OPTICAL TRACKING SYSTEM ENDOSCOPIC SINUS SURGERY  5/18/2012    Procedure:OPTICAL TRACKING SYSTEM ENDOSCOPIC SINUS SURGERY; Right  and Left Image Guided Functional Endoscopic Sinus Surgery With  Frontal Approach, [employer]x; Surgeon:JUAN JOSE GARCIA; Location:[facility] OR    OPTICAL TRACKING SYSTEM ENDOSCOPIC SINUS SURGERY  9/26/2012    Procedure: OPTICAL TRACKING SYSTEM ENDOSCOPIC SINUS SURGERY;  Stealth  Guided Bilateral Functional Endoscopic Sinus Surgery *Latex Safe*;  Surgeon: Goyo Kuo MD;  Location:  OR    OPTICAL TRACKING SYSTEM ENDOSCOPIC SINUS SURGERY Bilateral 10/16/2015    Procedure: OPTICAL TRACKING SYSTEM ENDOSCOPIC SINUS SURGERY;  Surgeon: Mariela Haile MD;  Location:  OR    ORTHOPEDIC SURGERY      left hip tear repair 2010    SINUS SURGERY             Family History:     Family History   Problem Relation Age of Onset    Cancer Paternal Grandmother     Skin Cancer Paternal Grandmother     Other Cancer Paternal Grandmother         Skin    Obesity Paternal Grandmother     Cancer Paternal Grandfather         PGF had throat cancer (he was a smoker)    Other Cancer Paternal Grandfather     Anxiety Disorder Paternal Grandfather     Thyroid Disease Mother         ,    Hypertension Mother     Obesity Other     ALS Father 67        2 male cousins with ALS as well    Anesthesia Reaction No family hx of     Blood Disease No family hx of     Colon Polyps No family hx of     Crohn's Disease No family hx of     Ulcerative Colitis No family hx of     Colon Cancer No family hx of     Melanoma No family hx of             Social History:     Social History     Socioeconomic History    Marital status: Single     Spouse name: Not on file    Number of children: Not on file    Years of education: Not on file    Highest education level: Not on file   Occupational History    Not on file   Tobacco Use    Smoking status: Never    Smokeless tobacco: Never    Tobacco comments:     one person at home smokes outside   Substance and Sexual Activity    Alcohol use: No     Alcohol/week: 0.0 standard drinks of alcohol    Drug use: No    Sexual activity: Never   Other Topics Concern     Service Not Asked    Blood Transfusions No    Caffeine Concern Not Asked    Occupational Exposure Not Asked    Hobby Hazards Not Asked    Sleep Concern Not Asked    Stress Concern Not Asked    Weight Concern Not Asked    Special Diet  Not Asked    Back Care Not Asked    Exercise Yes    Bike Helmet Not Asked    Seat Belt Not Asked    Self-Exams Not Asked    Parent/sibling w/ CABG, MI or angioplasty before 65F 55M? Yes   Social History Narrative    Mamie lives with mother in [place], [place].  There is a cat in the home, but Mamie does not have any litterbox duties.  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  She gets essentially no exercise because of the tingling in her feet (says it bothers her even to stand).        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        8/2015 no change in social situation        2/15/2016 Pt is single and lives with mother and stepfather.     Social Determinants of Health     Financial Resource Strain: Not on file   Food Insecurity: Not on file   Transportation Needs: Not on file   Physical Activity: Not on file   Stress: Not on file   Social Connections: Not on file   Intimate Partner Violence: Not on file   Housing Stability: Not on file            Medications:     Current Outpatient Medications   Medication    acetylcysteine (MUCOMYST) 20 % neb solution    albuterol (PROAIR HFA/PROVENTIL HFA/VENTOLIN HFA) 108 (90 Base) MCG/ACT inhaler    albuterol (PROVENTIL) (2.5 MG/3ML) 0.083% neb solution    amphetamine-dextroamphetamine (ADDERALL XR) 20 MG 24 hr capsule    [START ON 7/10/2023] amphetamine-dextroamphetamine (ADDERALL XR) 20 MG 24 hr capsule    blood glucose (ACCU-CHEK GUIDE) test strip    blood glucose monitoring (ACCU-CHEK FASTCLIX) lancets    buPROPion (WELLBUTRIN XL) 300 MG 24 hr tablet    busPIRone HCl (BUSPAR) 30 MG tablet    cetirizine (ZYRTEC) 10 MG tablet    elexacaftor-tezacaftor-ivacaftor & ivacaftor (TRIKAFTA) 100-50-75 & 150 MG tablet pack    FLUoxetine (PROZAC) 40 MG capsule    hydrOXYzine (ATARAX) 25 MG tablet    medroxyPROGESTERone (DEPO-PROVERA) 150 MG/ML IM injection    Multiple Vitamin (MULTIVITAMIN OR)     naltrexone (DEPADE/REVIA) 50 MG tablet    omeprazole (PRILOSEC) 40 MG DR capsule    phytonadione (MEPHYTON) 5 MG tablet    topiramate (TOPAMAX) 25 MG tablet    topiramate (TOPAMAX) 50 MG tablet    traZODone (DESYREL) 100 MG tablet    vitamin C (ASCORBIC ACID) 500 MG tablet    vitamin D3 25 mcg (1000 units) tablet    vitamin E (TOCOPHEROL) 400 units (180 mg) capsule     No current facility-administered medications for this visit.            Physical Exam:   /76   Pulse 95   SpO2 98%     Constitutional:   Awake, alert and in no apparent distress, pleasant female     Eyes:   nonicteric     ENT:   oral mucosa moist without lesions, normal tm bilaterally, bilateral mucosa normal     Neck:   Supple without supraclavicular or cervical lymphadenopathy     Lungs:   Good air flow.  No crackles. No rhonchi.  No wheezes.     Cardiovascular:   Normal S1 and S2.  RRR.  No murmur, gallop or rub.     Abdomen:   NABS, soft, nontender, nondistended.      Musculoskeletal:   No edema, digital clubbing present     Neurologic:   Alert and conversant.     Skin:   Warm, dry.  No rash on limited exam.             Data:   All laboratory and imaging data reviewed.      Cystic Fibrosis Culture  Recent Results (from the past 168 hour(s))   General PFT Lab (Please always keep checked)    Collection Time: 07/05/23  9:59 AM   Result Value Ref Range    FVC-Pred 3.26 L    FVC-Pre 3.56 L    FVC-%Pred-Pre 109 %    FEV1-Pre 3.12 L    FEV1-%Pred-Pre 110 %    FEV1FVC-Pred 86 %    FEV1FVC-Pre 88 %    FEFMax-Pred 6.69 L/sec    FEFMax-Pre 8.29 L/sec    FEFMax-%Pred-Pre 124 %    FEF2575-Pred 3.29 L/sec    FEF2575-Pre 3.97 L/sec    LNW8794-%Pred-Pre 120 %    ExpTime-Pre 5.33 sec    FIFMax-Pre 3.99 L/sec    FEV1FEV6-Pred 86 %    FEV1FEV6-Pre 88 %       PFT: The spirometry is normal.  When compared to 1/17/23, the FEV1 and FVC have little change.      Pulmonary exacerbation: absent        CF Annual Nutrition Assessment    Reason for Assessment  Assessed  during Dr. Brynn Gray's clinic r/t increased nutrition risk with diagnosis of CF per protocol.    Nutrition Significant PMH  Mild Lung Disease/Normal Lung Function     -On Trikafta  Pancreatic Insufficient (fecal elastase done 2013)  CFRD - sees CF Endocrinologist, Dr. Conner MITCHELL  Class I obesity - undergoing treatment in [facility] since fall 2016 (MD and RD care)    Anthropometric Assessment  Height: 154.9 cm  IBW based on BMI 22 for females and 23 for males per CF Foundation recs: 54.6 kg  Today's Weight: 83.9 kg (185 lb)  Body Mass Index is 35 kg/m2    Weight History/Comments:   Mamie continues to follow with the [facility] clinic team and is taking medications to support weight control. Last seen in May 2023 with Dr. Park. She is currently taking topiramate, naltrexone, and bupropion for weight loss, as prescribed.  She states this helps somewhat with her cravings and snacking but the effectiveness seems to be waning over time.     Wt Readings from Last 10 Encounters:   05/12/23 83.9 kg (185 lb)   01/17/23 83.9 kg (184 lb 15.5 oz)   12/09/22 81.6 kg (180 lb)   01/14/22 86.2 kg (190 lb)   10/22/21 81.6 kg (180 lb)   07/01/21 81.2 kg (179 lb)   06/08/21 80.3 kg (177 lb)   10/09/20 77.1 kg (170 lb)   02/28/20 80.7 kg (178 lb)   12/18/19 78.9 kg (174 lb)     Pancreatic Enzymes  Although tested as pancreatic insufficient (via fecal elastase) in the past pt does not take enzymes, stopped taking them in 2015 as she felt they had little affect on her GI symptoms. Ok'd this with CF MD prior to stopping.      Diet History and Assessment  Diet Preferences/Allergies/Intolerances: Regular diet, on appetite suppressive medication (Topamax, naltrexone)    Intake Recall/Comments:  Eats TID meals with TID snacks. Hx of eating a high CHO containing snack right before bed but has been counseled to modify this due to overnight hypoglycemia (reactive hypoglycemia).     Calcium: BID glasses of skim milk + milk  on cereal in AM + cheese throughout the day   Salt: salty snacks, boxed food, cheese, sauces/seasoning, Gatorade     Hydration: 1 medium bottle of Gatorade, 2 glasses of skim milk, 1 bottle of diet Dr. Pepper daily, 2 glasses of apple juice daily, mostly water  Supplements:  None     Laboratory Assessment    Annual Labs: WNL (8/9/22), elevated triglyercides    DEXA: (6/8/2021) WNL    Current Vitamin/Mineral Supplements: Multivitamin, Vitamin D 1000 International Units, Vitamin E 400 International Units BID, Betacarotene 25,000 International Units 3x/week, Vitamin K weekly and Vitamin C 250 mg BID    Comments:  Pt states she is taking all of the above as prescribed. She uses a pill box that she sets up weekly to insure that she doesn't forget anything. These are either covered by insurance or affordable OOP (ex: Vitamin E about $6/month).    CF Related Diabetes Evaluation  FSBG range: Normal per pt  Insulin: No  Carbohydrate Counting: No    Pt reports low BG after being physically active. Will still occasionally wake up in the middle of the night, check the BG, it's low, and will eat a snack.  This has happened now for years.  Has been educated on avoiding high simple CHO near bedtime. 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    NUTRITION DIAGNOIS  Overweight/obese r/t excessive energy intake and lack of physical activity AEB BMI >30 kg/m2 with pt-reported chronic overeating requiring intervention via [facility] clinic.   INTERVENTIONS/RECOMMENDATIONS  1) Oral Intake/Weight: Pt reports she will return to weight management clinic soon for a visit about her diet, medications. Happy that Trikafta has not increased her weight greatly, focusing on maintaining weight for now and focusing less on total calories,  more on healthful food choices.     2) Enzymes: Denies any symptoms of malabsorption, will remain off enzyme therapy.     3) Vitamin/Mineral Supplementation: Vitamin levels from annual studies pending for today, reviewed vitamin/mineral supplements (as above), will communicate any recommended supplement changes to pt via [facility].     4) Diabetes: Reviewed best practices for hypoglycemia treatment, however the hypoglycemia she experiences overnight is challenging. Will follow.     Patient Understanding: Good  Expected Compliance: Good  Follow-Up Plans: Per Protocol  GOALS:   Weight maintenance vs reduction toward BMI <30  FOLLOW-UP/MONITORING:  Visit patient within 6-12 month(s) for annual review, support with weight loss/blood sugar control.  Continue to monitor for fat-soluble vitamin deficiencies as pt remains off pancreatic enzyme replacement therapy.     Time Spent in Face-to-Face Patient Interaction: 15 minutes (Medicare time with diabetes)    Ellie Martinez MS, RDN, LDN  Cystic Fibrosis & Pulmonary Dietitian  [facility]

## 2023-07-06 ENCOUNTER — MYC MEDICAL ADVICE (OUTPATIENT)
Dept: PSYCHIATRY | Facility: CLINIC | Age: 28
End: 2023-07-06
Payer: COMMERCIAL

## 2023-07-07 LAB
ATRIAL RATE - MUSE: 93 BPM
DIASTOLIC BLOOD PRESSURE - MUSE: NORMAL MMHG
INTERPRETATION ECG - MUSE: NORMAL
P AXIS - MUSE: 56 DEGREES
PR INTERVAL - MUSE: 144 MS
QRS DURATION - MUSE: 78 MS
QT - MUSE: 362 MS
QTC - MUSE: 450 MS
R AXIS - MUSE: 27 DEGREES
SYSTOLIC BLOOD PRESSURE - MUSE: NORMAL MMHG
T AXIS - MUSE: 34 DEGREES
VENTRICULAR RATE- MUSE: 93 BPM

## 2023-07-10 LAB
BACTERIA SPEC CULT: ABNORMAL
BACTERIA SPEC CULT: ABNORMAL

## 2023-07-10 NOTE — TELEPHONE ENCOUNTER
Writer received completed and signed BLADIMIR from Mamie. This writer completed the [facility]'s Referral form. All pieces of information were faxed to [facility] at [phone] and quick disclosure note entered.    Myrtle Billy RN on 7/10/2023 at 8:14 AM     None Done

## 2023-07-12 NOTE — TELEPHONE ENCOUNTER
Writer was informed the clinic received a fax from [facility] stating the records were received, but the referral form was not. Writer resent this to the number in the previous message. Received confirmation it went through successfully.    Myrtle Billy RN on 7/12/2023 at 1:58 PM

## 2023-08-09 ENCOUNTER — TELEPHONE (OUTPATIENT)
Dept: PULMONOLOGY | Facility: CLINIC | Age: 28
End: 2023-08-09
Payer: COMMERCIAL

## 2023-08-09 DIAGNOSIS — E84.9 CYSTIC FIBROSIS (H): ICD-10-CM

## 2023-08-09 RX ORDER — ELEXACAFTOR, TEZACAFTOR, AND IVACAFTOR 100-50-75
KIT ORAL
Qty: 84 TABLET | Refills: 5 | Status: SHIPPED | OUTPATIENT
Start: 2023-08-09 | End: 2024-01-05

## 2023-08-09 NOTE — TELEPHONE ENCOUNTER
[facility] Call Center    Phone Message    May a detailed message be left on voicemail: yes     Reason for Call: Medication Refill Request    Has the patient contacted the pharmacy for the refill? Yes   Name of medication being requested: elexacaftor-tezacaftor-ivacaftor & ivacaftor (TRIKAFTA) 100-50-75 & 150 MG tablet pack   Provider who prescribed the medication: Gavi Allison MD   Pharmacy:   [facility] [id number]     Date medication is needed: asap      Action Taken: Other: Pulm    Travel Screening: Not Applicable

## 2023-08-09 NOTE — TELEPHONE ENCOUNTER
This order has been sent.    Padma Alonso, PharmD   Medication Therapy Management   Cystic Fibrosis Pharmacist

## 2023-08-14 DIAGNOSIS — F90.0 ATTENTION DEFICIT HYPERACTIVITY DISORDER (ADHD), PREDOMINANTLY INATTENTIVE TYPE: ICD-10-CM

## 2023-08-16 ENCOUNTER — MYC MEDICAL ADVICE (OUTPATIENT)
Dept: PSYCHIATRY | Facility: CLINIC | Age: 28
End: 2023-08-16
Payer: COMMERCIAL

## 2023-08-16 DIAGNOSIS — F90.0 ATTENTION DEFICIT HYPERACTIVITY DISORDER (ADHD), PREDOMINANTLY INATTENTIVE TYPE: ICD-10-CM

## 2023-08-16 NOTE — TELEPHONE ENCOUNTER
Will send my chart to patient if she establish care with [place] per encounter on 7/12/2023.    Anisa Watters on 8/16/2023 at 6:40 AM

## 2023-08-17 DIAGNOSIS — F90.0 ATTENTION DEFICIT HYPERACTIVITY DISORDER (ADHD), PREDOMINANTLY INATTENTIVE TYPE: ICD-10-CM

## 2023-08-17 RX ORDER — DEXTROAMPHETAMINE SACCHARATE, AMPHETAMINE ASPARTATE MONOHYDRATE, DEXTROAMPHETAMINE SULFATE AND AMPHETAMINE SULFATE 5; 5; 5; 5 MG/1; MG/1; MG/1; MG/1
CAPSULE, EXTENDED RELEASE ORAL
Qty: 30 CAPSULE | Refills: 0 | Status: SHIPPED | OUTPATIENT
Start: 2023-08-17

## 2023-08-17 RX ORDER — DEXTROAMPHETAMINE SULFATE, DEXTROAMPHETAMINE SACCHARATE, AMPHETAMINE SULFATE AND AMPHETAMINE ASPARTATE 5; 5; 5; 5 MG/1; MG/1; MG/1; MG/1
CAPSULE, EXTENDED RELEASE ORAL
Qty: 30 CAPSULE | Refills: 0 | Status: SHIPPED | OUTPATIENT
Start: 2023-08-17 | End: 2023-08-17

## 2023-08-17 RX ORDER — DEXTROAMPHETAMINE SACCHARATE, AMPHETAMINE ASPARTATE MONOHYDRATE, DEXTROAMPHETAMINE SULFATE AND AMPHETAMINE SULFATE 5; 5; 5; 5 MG/1; MG/1; MG/1; MG/1
20 CAPSULE, EXTENDED RELEASE ORAL DAILY
Qty: 30 CAPSULE | Refills: 0 | Status: CANCELLED | OUTPATIENT
Start: 2023-08-17

## 2023-08-17 NOTE — TELEPHONE ENCOUNTER
[facility] Call Center    Phone Message    May a detailed message be left on voicemail: yes     Reason for Call: Other: Pharmacist called requesting that the order for the patient's prescription of Adderall XR 20mg be resent to their pharmacy at [facility]. The pharmacy has been having technical difficulties on their end preventing them from processing this prescription.      Action Taken: Message routed to:  Other: [facility] psychiatry nurse [place]    Travel Screening: Not Applicable

## 2023-08-17 NOTE — TELEPHONE ENCOUNTER
Jessie ,    Please see my chart on 8/16/2023. Looks like patient is scheduled with  a new provider, but not able to get in until September. She would like to bridge the refill .    Anisa Watters on 8/17/2023 at 9:54 AM

## 2023-08-30 LAB
ACID FAST STAIN (ARUP): NORMAL

## 2023-09-03 ENCOUNTER — HEALTH MAINTENANCE LETTER (OUTPATIENT)
Age: 28
End: 2023-09-03

## 2023-09-19 ENCOUNTER — TELEPHONE (OUTPATIENT)
Dept: PSYCHIATRY | Facility: CLINIC | Age: 28
End: 2023-09-19
Payer: COMMERCIAL

## 2023-10-24 DIAGNOSIS — F90.0 ATTENTION DEFICIT HYPERACTIVITY DISORDER (ADHD), PREDOMINANTLY INATTENTIVE TYPE: ICD-10-CM

## 2023-10-25 RX ORDER — DEXTROAMPHETAMINE SACCHARATE, AMPHETAMINE ASPARTATE MONOHYDRATE, DEXTROAMPHETAMINE SULFATE AND AMPHETAMINE SULFATE 5; 5; 5; 5 MG/1; MG/1; MG/1; MG/1
CAPSULE, EXTENDED RELEASE ORAL
Qty: 30 CAPSULE | Refills: 0 | OUTPATIENT
Start: 2023-10-25

## 2023-10-25 NOTE — TELEPHONE ENCOUNTER
Medication requested:   ADDERALL XR 20MG CP24   Last refilled: 8/17/23  Qty: 30/0      Last seen: 6/1/23 Gisselle  RTC: 4 weeks  Cancel: 0  No-show: 0  Next appt: 0    Refill decision:   Controlled medication  Per chart she  was referred to [place]? Visit 9/12/23 per 8/16/23 MyChart encounter.

## 2023-10-25 NOTE — TELEPHONE ENCOUNTER
Writer sent patient a [employer] message advising her to reach out to PCP or [place] for refills.

## 2023-10-25 NOTE — TELEPHONE ENCOUNTER
Pt planned to transfer to PCP or psych at [facility]. No further refills. Jessie Pitts, CNP, 10/25/2023

## 2023-10-25 NOTE — TELEPHONE ENCOUNTER
Per [place]  ADDERALL XR 20MG CP24   9/12 #30, 8/17 #30, 7/17 #30    Writer routed to provider for approval.

## 2023-11-14 DIAGNOSIS — E84.0 CYSTIC FIBROSIS WITH PULMONARY MANIFESTATIONS (H): ICD-10-CM

## 2023-11-14 RX ORDER — ASCORBIC ACID 500 MG
500 TABLET ORAL 2 TIMES DAILY
Qty: 100 TABLET | Refills: 3 | Status: SHIPPED | OUTPATIENT
Start: 2023-11-14 | End: 2024-05-24

## 2023-12-12 DIAGNOSIS — E66.09 CLASS 1 OBESITY DUE TO EXCESS CALORIES WITHOUT SERIOUS COMORBIDITY WITH BODY MASS INDEX (BMI) OF 30.0 TO 30.9 IN ADULT: ICD-10-CM

## 2023-12-12 DIAGNOSIS — E66.811 CLASS 1 OBESITY DUE TO EXCESS CALORIES WITHOUT SERIOUS COMORBIDITY WITH BODY MASS INDEX (BMI) OF 30.0 TO 30.9 IN ADULT: ICD-10-CM

## 2024-01-05 DIAGNOSIS — E84.9 CYSTIC FIBROSIS (H): ICD-10-CM

## 2024-01-05 RX ORDER — ELEXACAFTOR, TEZACAFTOR, AND IVACAFTOR 100-50-75
KIT ORAL
Qty: 84 TABLET | Refills: 0 | Status: SHIPPED | OUTPATIENT
Start: 2024-01-05 | End: 2024-02-27

## 2024-01-06 DIAGNOSIS — K86.89 PANCREATIC INSUFFICIENCY: ICD-10-CM

## 2024-01-06 DIAGNOSIS — K86.81 EXOCRINE PANCREATIC INSUFFICIENCY: ICD-10-CM

## 2024-01-06 DIAGNOSIS — E84.9 CF (CYSTIC FIBROSIS) (H): ICD-10-CM

## 2024-01-08 RX ORDER — VITAMIN E 268 MG
1 CAPSULE ORAL 2 TIMES DAILY
Qty: 180 CAPSULE | Refills: 3 | Status: SHIPPED | OUTPATIENT
Start: 2024-01-08 | End: 2024-01-10

## 2024-01-08 RX ORDER — CHOLECALCIFEROL (VITAMIN D3) 25 MCG
1 TABLET ORAL DAILY
Qty: 90 TABLET | Refills: 3 | Status: SHIPPED | OUTPATIENT
Start: 2024-01-08 | End: 2024-06-11

## 2024-01-10 ENCOUNTER — MYC REFILL (OUTPATIENT)
Dept: PULMONOLOGY | Facility: CLINIC | Age: 29
End: 2024-01-10
Payer: COMMERCIAL

## 2024-01-10 DIAGNOSIS — E84.9 CF (CYSTIC FIBROSIS) (H): ICD-10-CM

## 2024-01-10 DIAGNOSIS — K86.89 PANCREATIC INSUFFICIENCY: ICD-10-CM

## 2024-01-11 RX ORDER — VITAMIN E 268 MG
1 CAPSULE ORAL 2 TIMES DAILY
Qty: 180 CAPSULE | Refills: 3 | Status: SHIPPED | OUTPATIENT
Start: 2024-01-11

## 2024-01-12 ENCOUNTER — TELEPHONE (OUTPATIENT)
Dept: PULMONOLOGY | Facility: CLINIC | Age: 29
End: 2024-01-12
Payer: COMMERCIAL

## 2024-01-19 NOTE — TELEPHONE ENCOUNTER
PA Initiation    Medication: VITAMIN E 180 MG (400 UNIT) PO CAPS  [place] [facility] [place] [phone] Fax [phone]  Pharmacy Filling the Rx: [facility] - [address]  Filling Pharmacy Phone: [phone]  Filling Pharmacy Fax: [phone]  Start Date: 1/19/2024

## 2024-01-22 NOTE — TELEPHONE ENCOUNTER
Prior Authorization Not Needed per Insurance    Medication: VITAMIN E 180 MG (400 UNIT) PO CAPS  Insurance Company: [facility] - Phone [phone] Fax [phone]  Expected CoPay: $    Pharmacy Filling the Rx: [facility] - [address]  Pharmacy Notified: YES  Patient Notified: **Instructed pharmacy to notify patient when script is ready to /ship.**

## 2024-01-28 DIAGNOSIS — G47.00 INSOMNIA, UNSPECIFIED TYPE: ICD-10-CM

## 2024-01-29 RX ORDER — HYDROXYZINE HYDROCHLORIDE 25 MG/1
25-50 TABLET, FILM COATED ORAL
Qty: 60 TABLET | Refills: 0 | OUTPATIENT
Start: 2024-01-29

## 2024-01-29 NOTE — TELEPHONE ENCOUNTER
Date of Last Office Visit: 6/1/2023  [facility]     Jessie Pitts, APRN CNP     Date of Next Office Visit: 0  No shows since last visit: 0  Cancellations since last visit: 0  ------------------------------  Medication requested:  hydrOXYzine (ATARAX) 25 MG tablet  Date last ordered: 6/1/2023 Qty: 60 Refills: 1             Sig - Route: Take 1-2 tablets (25-50 mg) by mouth nightly as needed (sleep) - Oral  Sent to pharmacy as: hydrOXYzine HCl 25 MG Oral Tablet (ATARAX)  Class: E-Prescribe  ------------------------------     Lapse in medication adherence greater than 5 days?: prn med  If yes, call patient and gather details: -  Medication refill request verified as identical to current order?: yes       Last Visit Treatment Plan     1) PSYCHOTROPIC MEDICATIONS:  busPIRone HCl (BUSPAR) 30 MG tablet  1 tablet (30 mg) by mouth 2 times daily    hydrOXYzine (ATARAX) 25 MG tablet 1-2 tablets (25-50 mg) by mouth nightly as needed   FLUoxetine (PROZAC) 40 MG capsule 2 capsule qday  traZODone (DESYREL) 100 MG tablet 1 tab at HS    2) THERAPY:  n/a     3) MONITORING:  Complete EKG as soon as you are able, if the result is relatively normal, ok to continue on current medication regimen.     4) REFERRALS:  none      5) RTC:  if EKG is normal, transfer to local psych service or PCP, if EKG is elevated, to follow up couple more times for medication adjustment.          Medication unable to be refilled by RN due to criteria not met as indicated.                 []Eligibility - not seen in the last year              []Supervision - no future appointment              []Compliance - no shows, cancellations or lapse in therapy              []Verification - order discrepancy              []Controlled medication              []Medication not included in policy              []90-day supply request              [x]Other: Family medicine to take over prescribing Hien Messina M.D.

## 2024-02-04 ENCOUNTER — MYC REFILL (OUTPATIENT)
Dept: PSYCHIATRY | Facility: CLINIC | Age: 29
End: 2024-02-04
Payer: COMMERCIAL

## 2024-02-04 DIAGNOSIS — F41.1 GAD (GENERALIZED ANXIETY DISORDER): ICD-10-CM

## 2024-02-04 RX ORDER — BUSPIRONE HYDROCHLORIDE 30 MG/1
30 TABLET ORAL 2 TIMES DAILY
Qty: 180 TABLET | Refills: 1 | Status: CANCELLED | OUTPATIENT
Start: 2024-02-04

## 2024-02-12 DIAGNOSIS — E84.9 CF (CYSTIC FIBROSIS) (H): Primary | ICD-10-CM

## 2024-02-12 DIAGNOSIS — K86.89 PANCREATIC INSUFFICIENCY: ICD-10-CM

## 2024-02-13 ENCOUNTER — APPOINTMENT (OUTPATIENT)
Dept: GENERAL RADIOLOGY | Facility: CLINIC | Age: 29
End: 2024-02-13
Attending: EMERGENCY MEDICINE
Payer: COMMERCIAL

## 2024-02-13 ENCOUNTER — HOSPITAL ENCOUNTER (INPATIENT)
Facility: CLINIC | Age: 29
LOS: 2 days | Discharge: HOME | End: 2024-02-15
Attending: EMERGENCY MEDICINE | Admitting: INTERNAL MEDICINE
Payer: COMMERCIAL

## 2024-02-13 DIAGNOSIS — F34.1 PERSISTENT DEPRESSIVE DISORDER: ICD-10-CM

## 2024-02-13 DIAGNOSIS — J10.1 INFLUENZA A: ICD-10-CM

## 2024-02-13 DIAGNOSIS — F33.0 MILD EPISODE OF RECURRENT MAJOR DEPRESSIVE DISORDER (H): ICD-10-CM

## 2024-02-13 DIAGNOSIS — J18.9 PNEUMONIA DUE TO INFECTIOUS ORGANISM, UNSPECIFIED LATERALITY, UNSPECIFIED PART OF LUNG: ICD-10-CM

## 2024-02-13 DIAGNOSIS — E84.0 CYSTIC FIBROSIS OF THE LUNG (H): Primary | ICD-10-CM

## 2024-02-13 LAB
ALBUMIN SERPL BCG-MCNC: 3.8 G/DL (ref 3.5–5.2)
ALP SERPL-CCNC: 62 U/L (ref 40–150)
ALT SERPL W P-5'-P-CCNC: 20 U/L (ref 0–50)
ANION GAP SERPL CALCULATED.3IONS-SCNC: 13 MMOL/L (ref 7–15)
AST SERPL W P-5'-P-CCNC: 42 U/L (ref 0–45)
BASOPHILS # BLD AUTO: 0 10E3/UL (ref 0–0.2)
BASOPHILS NFR BLD AUTO: 0 %
BILIRUB SERPL-MCNC: 0.3 MG/DL
BUN SERPL-MCNC: 10.7 MG/DL (ref 6–20)
C PNEUM DNA SPEC QL NAA+PROBE: NOT DETECTED
CALCIUM SERPL-MCNC: 9 MG/DL (ref 8.6–10)
CHLORIDE SERPL-SCNC: 100 MMOL/L (ref 98–107)
CREAT SERPL-MCNC: 0.73 MG/DL (ref 0.51–0.95)
DEPRECATED HCO3 PLAS-SCNC: 24 MMOL/L (ref 22–29)
EGFRCR SERPLBLD CKD-EPI 2021: >90 ML/MIN/1.73M2
EOSINOPHIL # BLD AUTO: 0.2 10E3/UL (ref 0–0.7)
EOSINOPHIL NFR BLD AUTO: 2 %
ERYTHROCYTE [DISTWIDTH] IN BLOOD BY AUTOMATED COUNT: 13.6 % (ref 10–15)
FLUAV H1 2009 PAND RNA SPEC QL NAA+PROBE: DETECTED
FLUAV H1 RNA SPEC QL NAA+PROBE: NOT DETECTED
FLUAV H3 RNA SPEC QL NAA+PROBE: NOT DETECTED
FLUAV RNA SPEC QL NAA+PROBE: DETECTED
FLUBV RNA SPEC QL NAA+PROBE: NOT DETECTED
GLUCOSE SERPL-MCNC: 99 MG/DL (ref 70–99)
HADV DNA SPEC QL NAA+PROBE: NOT DETECTED
HCOV PNL SPEC NAA+PROBE: NOT DETECTED
HCT VFR BLD AUTO: 47 % (ref 35–47)
HGB BLD-MCNC: 14.7 G/DL (ref 11.7–15.7)
HMPV RNA SPEC QL NAA+PROBE: NOT DETECTED
HPIV1 RNA SPEC QL NAA+PROBE: NOT DETECTED
HPIV2 RNA SPEC QL NAA+PROBE: NOT DETECTED
HPIV3 RNA SPEC QL NAA+PROBE: NOT DETECTED
HPIV4 RNA SPEC QL NAA+PROBE: NOT DETECTED
IMM GRANULOCYTES # BLD: 0 10E3/UL
IMM GRANULOCYTES NFR BLD: 0 %
LYMPHOCYTES # BLD AUTO: 0.9 10E3/UL (ref 0.8–5.3)
LYMPHOCYTES NFR BLD AUTO: 11 %
M PNEUMO DNA SPEC QL NAA+PROBE: NOT DETECTED
MCH RBC QN AUTO: 27.2 PG (ref 26.5–33)
MCHC RBC AUTO-ENTMCNC: 31.3 G/DL (ref 31.5–36.5)
MCV RBC AUTO: 87 FL (ref 78–100)
MONOCYTES # BLD AUTO: 0.7 10E3/UL (ref 0–1.3)
MONOCYTES NFR BLD AUTO: 8 %
MRSA DNA SPEC QL NAA+PROBE: NEGATIVE
NEUTROPHILS # BLD AUTO: 6.5 10E3/UL (ref 1.6–8.3)
NEUTROPHILS NFR BLD AUTO: 79 %
NRBC # BLD AUTO: 0 10E3/UL
NRBC BLD AUTO-RTO: 0 /100
PLATELET # BLD AUTO: 281 10E3/UL (ref 150–450)
POTASSIUM SERPL-SCNC: 4.4 MMOL/L (ref 3.4–5.3)
PROCALCITONIN SERPL IA-MCNC: 0.22 NG/ML
PROT SERPL-MCNC: 7.1 G/DL (ref 6.4–8.3)
RBC # BLD AUTO: 5.41 10E6/UL (ref 3.8–5.2)
RSV RNA SPEC QL NAA+PROBE: NOT DETECTED
RSV RNA SPEC QL NAA+PROBE: NOT DETECTED
RV+EV RNA SPEC QL NAA+PROBE: NOT DETECTED
SA TARGET DNA: POSITIVE
SODIUM SERPL-SCNC: 137 MMOL/L (ref 135–145)
WBC # BLD AUTO: 8.3 10E3/UL (ref 4–11)

## 2024-02-13 PROCEDURE — 84145 PROCALCITONIN (PCT): CPT

## 2024-02-13 PROCEDURE — 87641 MR-STAPH DNA AMP PROBE: CPT | Performed by: EMERGENCY MEDICINE

## 2024-02-13 PROCEDURE — 258N000003 HC RX IP 258 OP 636: Performed by: EMERGENCY MEDICINE

## 2024-02-13 PROCEDURE — 80053 COMPREHEN METABOLIC PANEL: CPT | Performed by: EMERGENCY MEDICINE

## 2024-02-13 PROCEDURE — 99285 EMERGENCY DEPT VISIT HI MDM: CPT | Performed by: EMERGENCY MEDICINE

## 2024-02-13 PROCEDURE — 85025 COMPLETE CBC W/AUTO DIFF WBC: CPT | Performed by: EMERGENCY MEDICINE

## 2024-02-13 PROCEDURE — 71046 X-RAY EXAM CHEST 2 VIEWS: CPT | Mod: 26 | Performed by: STUDENT IN AN ORGANIZED HEALTH CARE EDUCATION/TRAINING PROGRAM

## 2024-02-13 PROCEDURE — 999N000248 HC STATISTIC IV INSERT WITH US BY RN

## 2024-02-13 PROCEDURE — 36415 COLL VENOUS BLD VENIPUNCTURE: CPT | Performed by: EMERGENCY MEDICINE

## 2024-02-13 PROCEDURE — 87640 STAPH A DNA AMP PROBE: CPT | Performed by: EMERGENCY MEDICINE

## 2024-02-13 PROCEDURE — 99285 EMERGENCY DEPT VISIT HI MDM: CPT | Mod: 25 | Performed by: EMERGENCY MEDICINE

## 2024-02-13 PROCEDURE — 96365 THER/PROPH/DIAG IV INF INIT: CPT | Performed by: EMERGENCY MEDICINE

## 2024-02-13 PROCEDURE — 99222 1ST HOSP IP/OBS MODERATE 55: CPT | Mod: GC | Performed by: INTERNAL MEDICINE

## 2024-02-13 PROCEDURE — 120N000002 HC R&B MED SURG/OB UMMC

## 2024-02-13 PROCEDURE — 71046 X-RAY EXAM CHEST 2 VIEWS: CPT

## 2024-02-13 PROCEDURE — 250N000011 HC RX IP 250 OP 636: Performed by: EMERGENCY MEDICINE

## 2024-02-13 PROCEDURE — 87633 RESP VIRUS 12-25 TARGETS: CPT | Performed by: EMERGENCY MEDICINE

## 2024-02-13 RX ORDER — HYDROXYZINE HYDROCHLORIDE 25 MG/1
25-50 TABLET, FILM COATED ORAL
Status: DISCONTINUED | OUTPATIENT
Start: 2024-02-13 | End: 2024-02-15 | Stop reason: HOSPADM

## 2024-02-13 RX ORDER — ACETAMINOPHEN 325 MG/1
975 TABLET ORAL 3 TIMES DAILY
Status: DISCONTINUED | OUTPATIENT
Start: 2024-02-13 | End: 2024-02-15 | Stop reason: HOSPADM

## 2024-02-13 RX ORDER — CETIRIZINE HYDROCHLORIDE 10 MG/1
10 TABLET ORAL EVERY EVENING
Status: DISCONTINUED | OUTPATIENT
Start: 2024-02-13 | End: 2024-02-15 | Stop reason: HOSPADM

## 2024-02-13 RX ORDER — OSELTAMIVIR PHOSPHATE 75 MG/1
75 CAPSULE ORAL 2 TIMES DAILY
Status: DISCONTINUED | OUTPATIENT
Start: 2024-02-13 | End: 2024-02-15 | Stop reason: HOSPADM

## 2024-02-13 RX ORDER — POLYETHYLENE GLYCOL 3350 17 G/17G
17 POWDER, FOR SOLUTION ORAL 2 TIMES DAILY PRN
Status: DISCONTINUED | OUTPATIENT
Start: 2024-02-13 | End: 2024-02-15 | Stop reason: HOSPADM

## 2024-02-13 RX ORDER — CEFEPIME HYDROCHLORIDE 2 G/1
2 INJECTION, POWDER, FOR SOLUTION INTRAVENOUS EVERY 8 HOURS
Status: DISCONTINUED | OUTPATIENT
Start: 2024-02-14 | End: 2024-02-13

## 2024-02-13 RX ORDER — ONDANSETRON 2 MG/ML
4 INJECTION INTRAMUSCULAR; INTRAVENOUS EVERY 6 HOURS PRN
Status: DISCONTINUED | OUTPATIENT
Start: 2024-02-13 | End: 2024-02-15 | Stop reason: HOSPADM

## 2024-02-13 RX ORDER — TRAZODONE HYDROCHLORIDE 150 MG/1
150 TABLET ORAL AT BEDTIME
Status: DISCONTINUED | OUTPATIENT
Start: 2024-02-13 | End: 2024-02-15 | Stop reason: HOSPADM

## 2024-02-13 RX ORDER — GUAIFENESIN 200 MG/10ML
200 LIQUID ORAL EVERY 4 HOURS PRN
Status: DISCONTINUED | OUTPATIENT
Start: 2024-02-13 | End: 2024-02-15 | Stop reason: HOSPADM

## 2024-02-13 RX ORDER — ONDANSETRON 4 MG/1
4 TABLET, ORALLY DISINTEGRATING ORAL EVERY 6 HOURS PRN
Status: DISCONTINUED | OUTPATIENT
Start: 2024-02-13 | End: 2024-02-15 | Stop reason: HOSPADM

## 2024-02-13 RX ORDER — NALTREXONE HYDROCHLORIDE 50 MG/1
50 TABLET, FILM COATED ORAL DAILY
Status: DISCONTINUED | OUTPATIENT
Start: 2024-02-14 | End: 2024-02-15 | Stop reason: HOSPADM

## 2024-02-13 RX ORDER — BUSPIRONE HYDROCHLORIDE 15 MG/1
30 TABLET ORAL 2 TIMES DAILY
Status: DISCONTINUED | OUTPATIENT
Start: 2024-02-13 | End: 2024-02-15 | Stop reason: HOSPADM

## 2024-02-13 RX ORDER — BUPROPION HYDROCHLORIDE 150 MG/1
300 TABLET ORAL EVERY MORNING
Status: DISCONTINUED | OUTPATIENT
Start: 2024-02-14 | End: 2024-02-14

## 2024-02-13 RX ORDER — OSELTAMIVIR PHOSPHATE 75 MG/1
75 CAPSULE ORAL ONCE
Qty: 1 CAPSULE | Refills: 0 | Status: DISCONTINUED | OUTPATIENT
Start: 2024-02-13 | End: 2024-02-13

## 2024-02-13 RX ORDER — ALBUTEROL SULFATE 0.83 MG/ML
2.5 SOLUTION RESPIRATORY (INHALATION)
Status: DISCONTINUED | OUTPATIENT
Start: 2024-02-13 | End: 2024-02-14

## 2024-02-13 RX ORDER — ALBUTEROL SULFATE 90 UG/1
2 AEROSOL, METERED RESPIRATORY (INHALATION) EVERY 6 HOURS PRN
Status: DISCONTINUED | OUTPATIENT
Start: 2024-02-13 | End: 2024-02-15 | Stop reason: HOSPADM

## 2024-02-13 RX ORDER — CEFEPIME HYDROCHLORIDE 2 G/1
2 INJECTION, POWDER, FOR SOLUTION INTRAVENOUS ONCE
Status: COMPLETED | OUTPATIENT
Start: 2024-02-13 | End: 2024-02-13

## 2024-02-13 RX ORDER — CALCIUM CARBONATE 500 MG/1
1000 TABLET, CHEWABLE ORAL 4 TIMES DAILY PRN
Status: DISCONTINUED | OUTPATIENT
Start: 2024-02-13 | End: 2024-02-15 | Stop reason: HOSPADM

## 2024-02-13 RX ORDER — PANTOPRAZOLE SODIUM 40 MG/1
40 TABLET, DELAYED RELEASE ORAL
Status: DISCONTINUED | OUTPATIENT
Start: 2024-02-14 | End: 2024-02-15 | Stop reason: HOSPADM

## 2024-02-13 RX ORDER — TRAZODONE HYDROCHLORIDE 100 MG/1
100 TABLET ORAL AT BEDTIME
Status: DISCONTINUED | OUTPATIENT
Start: 2024-02-13 | End: 2024-02-13

## 2024-02-13 RX ORDER — DEXTROAMPHETAMINE SACCHARATE, AMPHETAMINE ASPARTATE MONOHYDRATE, DEXTROAMPHETAMINE SULFATE AND AMPHETAMINE SULFATE 5; 5; 5; 5 MG/1; MG/1; MG/1; MG/1
20 CAPSULE, EXTENDED RELEASE ORAL DAILY
Status: DISCONTINUED | OUTPATIENT
Start: 2024-02-14 | End: 2024-02-15 | Stop reason: HOSPADM

## 2024-02-13 RX ORDER — CEFTRIAXONE 2 G/1
2 INJECTION, POWDER, FOR SOLUTION INTRAMUSCULAR; INTRAVENOUS EVERY 24 HOURS
Status: DISCONTINUED | OUTPATIENT
Start: 2024-02-14 | End: 2024-02-15 | Stop reason: HOSPADM

## 2024-02-13 RX ORDER — LIDOCAINE 40 MG/G
CREAM TOPICAL
Status: DISCONTINUED | OUTPATIENT
Start: 2024-02-13 | End: 2024-02-15 | Stop reason: HOSPADM

## 2024-02-13 RX ORDER — FLUOXETINE 40 MG/1
80 CAPSULE ORAL DAILY
Status: DISCONTINUED | OUTPATIENT
Start: 2024-02-14 | End: 2024-02-15 | Stop reason: HOSPADM

## 2024-02-13 RX ORDER — ALBUTEROL SULFATE 0.83 MG/ML
2.5 SOLUTION RESPIRATORY (INHALATION) EVERY 4 HOURS PRN
Status: DISCONTINUED | OUTPATIENT
Start: 2024-02-13 | End: 2024-02-13

## 2024-02-13 RX ORDER — TOPIRAMATE 25 MG/1
75 TABLET, FILM COATED ORAL 2 TIMES DAILY
Status: DISCONTINUED | OUTPATIENT
Start: 2024-02-13 | End: 2024-02-15 | Stop reason: HOSPADM

## 2024-02-13 RX ADMIN — SODIUM CHLORIDE 1000 ML: 9 INJECTION, SOLUTION INTRAVENOUS at 18:24

## 2024-02-13 RX ADMIN — CEFEPIME HYDROCHLORIDE 2 G: 2 INJECTION, POWDER, FOR SOLUTION INTRAVENOUS at 18:24

## 2024-02-13 ASSESSMENT — ACTIVITIES OF DAILY LIVING (ADL)
ADLS_ACUITY_SCORE: 35
ADLS_ACUITY_SCORE: 35
ADLS_ACUITY_SCORE: 33
ADLS_ACUITY_SCORE: 35

## 2024-02-13 NOTE — ED PROVIDER NOTES
ED PROVIDER NOTE  February 13, 2024  History     Chief Complaint   Patient presents with    Shortness of Breath    Chest Pain    Pharyngitis    Headache    Fever     HPI  Mamie Rice is a 28 year old female who has a history significant for cystic fibrosis arriving today to the emergency department due to concern of fever, cough and chest discomfort.  Patient ports onset of symptoms now 3 days ago with onset of initially nonproductive cough, nasal congestion, rhinorrhea.  She has developed a persistent headache.  She has had objective fevers to 100.7 at home.  Overall patient has been more fatigued sleeping much of the day.  Mother does report that patient has grown out Pseudomonas in the past as well as aspergilosis.  Patient reports no recent sick contacts.  No lower extremity edema, erythema.  No history of DVT or PE.  No history of known cardiac pathology.  She reports no new rash, GI symptoms such as nausea, vomiting, diarrhea.  No generalized myalgias or arthralgias.  Patient did test negative for COVID at home yesterday.      Past Medical History  Past Medical History:   Diagnosis Date    ADHD (attention deficit hyperactivity disorder)     Anxiety     Aspergillosis, with pneumonia (H)     fugus found caused chest pain    Chronic infection     CF, MRSA.,     Chronic sinusitis     Constipation, chronic     Cystic fibrosis with pulmonary manifestations (H) 12/19/2011    Cystic fibrosis without mention of meconium ileus     SWEAT TEST:Date: 2/17/1994   [facility]  296 mg, 104 mmol/L ClSample #2  295 mg, 104 mmol/L Cl GENOTYPING:Date: 10/15/2007,  Laboratory: [facility]: [id number]    Depressive disorder     Diabetes     no meds currently    Dysthymic disorder     Exocrine pancreatic insufficiency     Gastro-oesophageal reflux disease     Hip pain, right     MRSA (methicillin resistant Staphylococcus aureus) carrier     Pancreatic disease      Past Surgical History:   Procedure  Laterality Date    ARTHROSCOPY HIP, OSTEOPLASTY FEMUR PROXIMAL, COMBINED  3/11/2013    Procedure: COMBINED ARTHROSCOPY HIP, OSTEOPLASTY FEMUR PROXIMAL;  Right Hip Arthroscopy, Labral  Debridement.    surgeon request choice anesthesia/admit to [facility] after surgery;  Surgeon: Omkar Austin MD;  [facility]: [facility]    ARTHROSCOPY KNEE WITH MEDIAL MENISCECTOMY Left 1/31/2017    Procedure: ARTHROSCOPY KNEE WITH MEDIAL MENISCECTOMY;  Surgeon: Jethro Coyle MD;  Location: [facility] OR    bronchoscopies      BRONCHOSCOPY      EXAM UNDER ANESTHESIA ANUS N/A 5/10/2016    Procedure: EXAM UNDER ANESTHESIA ANUS;  Surgeon: Chet Gaviria MD;  [facility]    EXAM UNDER ANESTHESIA, RESTORATIONS, EXTRACTION(S) DENTAL COMPLEX, COMBINED  5/13/2013    Procedure: COMBINED EXAM UNDER ANESTHESIA, RESTORATIONS, EXTRACTION(S) DENTAL COMPLEX;  Dental Exam, Radiographs, Restorations. Single Extraction  Tooth #2. Restorations x 3;  Surgeon: Danilo Ortiz DDS;  Location: [facility] OR    HC KNEE SCOPE, DIAGNOSTIC      Arthroscopy, Knee- left    INJECT BOTOX N/A 5/10/2016    Procedure: INJECT BOTOX;  Surgeon: Chet Gaviria MD;  [facility]    left hip labral tear  5/11/2011    left hip arthroscopy with labral debridement and synovectomy    meniscus repair      OPTICAL TRACKING SYSTEM ENDOSCOPIC SINUS SURGERY  10/14/2011    Procedure:OPTICAL TRACKING SYSTEM ENDOSCOPIC SINUS SURGERY; FESS (functional endoscopic sinus surgery) with Image Guidance, bronchial lavage and cultures; Surgeon:JUAN JOSE GARCIA; Location:[facility] OR    OPTICAL TRACKING SYSTEM ENDOSCOPIC SINUS SURGERY  5/18/2012    Procedure:OPTICAL TRACKING SYSTEM ENDOSCOPIC SINUS SURGERY; Right  and Left Image Guided Functional Endoscopic Sinus Surgery With  Frontal Approach, [employer]; Surgeon:JUAN JOSE GARCIA; Location[facility]    OPTICAL TRACKING SYSTEM ENDOSCOPIC SINUS SURGERY  9/26/2012    Procedure: OPTICAL TRACKING SYSTEM ENDOSCOPIC SINUS SURGERY;  Stealth  Guided Bilateral Functional Endoscopic Sinus Surgery *Latex Safe*;  Surgeon: Goyo Kuo MD;  [facility]    OPTICAL TRACKING SYSTEM ENDOSCOPIC SINUS SURGERY Bilateral 10/16/2015    Procedure: OPTICAL TRACKING SYSTEM ENDOSCOPIC SINUS SURGERY;  Surgeon: Mariela Haile MD;  [facility]    ORTHOPEDIC SURGERY      left hip tear repair 2010    SINUS SURGERY       acetylcysteine (MUCOMYST) 20 % neb solution  albuterol (PROAIR HFA/PROVENTIL HFA/VENTOLIN HFA) 108 (90 Base) MCG/ACT inhaler  albuterol (PROVENTIL) (2.5 MG/3ML) 0.083% neb solution  amphetamine-dextroamphetamine (ADDERALL XR) 20 MG 24 hr capsule  blood glucose (ACCU-CHEK GUIDE) test strip  blood glucose monitoring (ACCU-CHEK FASTCLIX) lancets  buPROPion (WELLBUTRIN XL) 300 MG 24 hr tablet  busPIRone HCl (BUSPAR) 30 MG tablet  cetirizine (ZYRTEC) 10 MG tablet  elexacaftor-tezacaftor-ivacaftor & ivacaftor (TRIKAFTA) 100-50-75 & 150 MG tablet pack  FLUoxetine (PROZAC) 40 MG capsule  hydrOXYzine (ATARAX) 25 MG tablet  medroxyPROGESTERone (DEPO-PROVERA) 150 MG/ML IM injection  Multiple Vitamin (MULTIVITAMIN OR)  naltrexone (DEPADE/REVIA) 50 MG tablet  omeprazole (PRILOSEC) 40 MG DR capsule  phytonadione (MEPHYTON) 5 MG tablet  topiramate (TOPAMAX) 25 MG tablet  topiramate (TOPAMAX) 50 MG tablet  traZODone (DESYREL) 100 MG tablet  vitamin C (ASCORBIC ACID) 500 MG tablet  VITAMIN D3 25 MCG (1000 UT) tablet  Vitamin E 180 MG (400 UNIT) CAPS      Allergies   Allergen Reactions    Vancomycin Hives     Redmens skin rash   Redmens skin rash     Amoxicillin-Pot Clavulanate Rash     Family History  Family History   Problem Relation Age of Onset    Cancer Paternal Grandmother     Skin Cancer Paternal Grandmother     Other Cancer Paternal Grandmother         Skin    Obesity Paternal Grandmother     Cancer Paternal Grandfather         PGF had throat cancer (he was a smoker)    Other Cancer Paternal Grandfather     Anxiety Disorder Paternal Grandfather     Thyroid  "Disease Mother         ,    Hypertension Mother     Obesity Other     ALS Father 67        2 male cousins with ALS as well    Anesthesia Reaction No family hx of     Blood Disease No family hx of     Colon Polyps No family hx of     Crohn's Disease No family hx of     Ulcerative Colitis No family hx of     Colon Cancer No family hx of     Melanoma No family hx of      Social History   Social History     Tobacco Use    Smoking status: Never    Smokeless tobacco: Never    Tobacco comments:     one person at home smokes outside   Substance Use Topics    Alcohol use: No     Alcohol/week: 0.0 standard drinks of alcohol    Drug use: No         A medically appropriate review of systems was performed with pertinent positives and negatives noted in the HPI, and all other systems negative.      Physical Exam   BP: 96/70  Pulse: 108  Temp: 98.6  F (37  C)  Resp: 16  Height: 154.9 cm (5' 1\")  Weight: 83.9 kg (185 lb)  SpO2: 97 %      Physical Exam  Vitals and nursing note reviewed.   Constitutional:       General: She is in acute distress.      Appearance: Normal appearance. She is not ill-appearing, toxic-appearing or diaphoretic.   HENT:      Head: Normocephalic and atraumatic.      Right Ear: External ear normal.      Left Ear: External ear normal.      Nose: Nose normal. No congestion.      Mouth/Throat:      Mouth: Mucous membranes are moist.      Pharynx: Oropharynx is clear. No oropharyngeal exudate.   Eyes:      Extraocular Movements: Extraocular movements intact.      Conjunctiva/sclera: Conjunctivae normal.      Pupils: Pupils are equal, round, and reactive to light.   Cardiovascular:      Rate and Rhythm: Tachycardia present.      Pulses: Normal pulses.      Heart sounds: Normal heart sounds. No murmur heard.     No friction rub.   Pulmonary:      Effort: Pulmonary effort is normal. Tachypnea present. No respiratory distress.      Breath sounds: No stridor. No wheezing, rhonchi or rales.   Musculoskeletal:         " General: No deformity or signs of injury. Normal range of motion.      Cervical back: Normal range of motion.   Skin:     General: Skin is warm.      Capillary Refill: Capillary refill takes less than 2 seconds.      Coloration: Skin is not pale.      Findings: No bruising or erythema.   Neurological:      General: No focal deficit present.      Mental Status: She is alert and oriented to person, place, and time.      Cranial Nerves: No cranial nerve deficit.   Psychiatric:         Mood and Affect: Mood normal.         Behavior: Behavior normal.         ED Course        Procedures         Results for orders placed or performed during the hospital encounter of 02/13/24 (from the past 24 hour(s))   CBC with platelets differential    Narrative    The following orders were created for panel order CBC with platelets differential.  Procedure                               Abnormality         Status                     ---------                               -----------         ------                     CBC with platelets and d...[[id number]]  Abnormal            Final result                 Please view results for these tests on the individual orders.   Comprehensive metabolic panel   Result Value Ref Range    Sodium 137 135 - 145 mmol/L    Potassium 4.4 3.4 - 5.3 mmol/L    Carbon Dioxide (CO2) 24 22 - 29 mmol/L    Anion Gap 13 7 - 15 mmol/L    Urea Nitrogen 10.7 6.0 - 20.0 mg/dL    Creatinine 0.73 0.51 - 0.95 mg/dL    GFR Estimate >90 >60 mL/min/1.73m2    Calcium 9.0 8.6 - 10.0 mg/dL    Chloride 100 98 - 107 mmol/L    Glucose 99 70 - 99 mg/dL    Alkaline Phosphatase 62 40 - 150 U/L    AST 42 0 - 45 U/L    ALT 20 0 - 50 U/L    Protein Total 7.1 6.4 - 8.3 g/dL    Albumin 3.8 3.5 - 5.2 g/dL    Bilirubin Total 0.3 <=1.2 mg/dL   CBC with platelets and differential   Result Value Ref Range    WBC Count 8.3 4.0 - 11.0 10e3/uL    RBC Count 5.41 (H) 3.80 - 5.20 10e6/uL    Hemoglobin 14.7 11.7 - 15.7 g/dL    Hematocrit 47.0 35.0 -  47.0 %    MCV 87 78 - 100 fL    MCH 27.2 26.5 - 33.0 pg    MCHC 31.3 (L) 31.5 - 36.5 g/dL    RDW 13.6 10.0 - 15.0 %    Platelet Count 281 150 - 450 10e3/uL    % Neutrophils 79 %    % Lymphocytes 11 %    % Monocytes 8 %    % Eosinophils 2 %    % Basophils 0 %    % Immature Granulocytes 0 %    NRBCs per 100 WBC 0 <1 /100    Absolute Neutrophils 6.5 1.6 - 8.3 10e3/uL    Absolute Lymphocytes 0.9 0.8 - 5.3 10e3/uL    Absolute Monocytes 0.7 0.0 - 1.3 10e3/uL    Absolute Eosinophils 0.2 0.0 - 0.7 10e3/uL    Absolute Basophils 0.0 0.0 - 0.2 10e3/uL    Absolute Immature Granulocytes 0.0 <=0.4 10e3/uL    Absolute NRBCs 0.0 10e3/uL   Respiratory Panel PCR    Specimen: Nasopharyngeal; Swab   Result Value Ref Range    Adenovirus Not Detected Not Detected    Coronavirus Not Detected Not Detected    Human Metapneumovirus Not Detected Not Detected    Human Rhin/Enterovirus Not Detected Not Detected    Influenza A Detected (A) Not Detected    Influenza A, H1 Not Detected Not Detected    Influenza A 2009 H1N1 Detected (A) Not Detected    Influenza A, H3 Not Detected Not Detected    Influenza B Not Detected Not Detected    Parainfluenza Virus 1 Not Detected Not Detected    Parainfluenza Virus 2 Not Detected Not Detected    Parainfluenza Virus 3 Not Detected Not Detected    Parainfluenza Virus 4 Not Detected Not Detected    Respiratory Syncytial Virus A Not Detected Not Detected    Respiratory Syncytial Virus B Not Detected Not Detected    Chlamydia Pneumoniae Not Detected Not Detected    Mycoplasma Pneumoniae Not Detected Not Detected    Narrative    The [employer] Respiratory Panel is a qualitative nucleic acid, multiplex, in vitro diagnostic test for the simultaneous detection and identification of multiple respiratory viral and bacterial nucleic acids in nasopharyngeal swabs collected in viral transport media from individual exhibiting signs and symptoms of respiratory infection. The assay has received FDA approval for the testing of  nasopharyngeal (NP) swabs only. The [facility] at [facility] has validated the performance characteristics for bronchial alveolar lavage specimens. This test is used for clinical purposes and should not be regarded as investigational or for research. This laboratory is certified under the Clinical Laboratory Improvement Amendments of 1988 (CLIA-88) as qualified to perform high complexity clinical laboratory testing.    MRSA MSSA PCR, Nasal Swab    Specimen: Nares, Bilateral; Swab   Result Value Ref Range    MRSA Target DNA Negative Negative    SA Target DNA Positive     Narrative    The Tip or Skip  Xpert SA Nasal Complete assay performed in the "[employer]  Dx System is a qualitative in vitro diagnostic test designed for rapid detection of Staphylococcus aureus (SA) and methicillin-resistant Staphylococcus aureus (MRSA) from nasal swabs in patients at risk for nasal colonization. The test utilizes automated real-time polymerase chain reaction (PCR) to detect MRSA/SA DNA. The Xpert SA Nasal Complete assay is intended to aid in the prevention and control of MRSA/SA infections in healthcare settings. The assay is not intended to diagnose, guide or monitor treatment for MRSA/SA infections, or provide results of susceptibility to methicillin. A negative result does not preclude MRSA/SA nasal colonization.    XR Chest 2 Views    Narrative    Exam: XR CHEST 2 VIEWS, 2/13/2024 5:26 PM    Indication: SOB, cough, fever, CF patient    Comparison: Chest radiograph 8/9/2022    Findings:   Upright PA and lateral chest radiograph. Trachea is midline.  Cardiomediastinal silhouette is within normal limits. Chronic changes  of cystic fibrosis with bronchiectasis, not significantly changed from  prior. Increased bilateral hilar and left lower lung perihilar  opacities. No pleural effusion or pneumothorax. No obstructive bowel  gas pattern.       Impression    Impression:  New patchy basilar opacity  best seen on lateral  projection likely in the left lower lobe, concerning for infection.  Slight increased perihilar streakiness possibly atelectasis, edema  versus infection. Similar chronic changes of cystic fibrosis, compared  to radiograph from 8/9/2022.    I have personally reviewed the examination and initial interpretation  and I agree with the findings.    DARLING MARIE MD         SYSTEM ID:  [id number]     *Note: Due to a large number of results and/or encounters for the requested time period, some results have not been displayed. A complete set of results can be found in Results Review.     Medications   pharmacy alert - intermittent dosing (has no administration in time range)   sodium chloride 0.9% BOLUS 1,000 mL (1,000 mLs Intravenous $New Bag 2/13/24 1824)   ceFEPIme (MAXIPIME) 2 g vial to attach to  mL bag for ADULTS or 50 mL bag for PEDS (2 g Intravenous $New Bag 2/13/24 1824)             Critical care was not performed.     Medical Decision Making  The patient's presentation was of moderate complexity (an acute complicated injury).    The patient's evaluation involved:  review of external note(s) from 3+ sources (see separate area of note for details)  review of 3+ test result(s) ordered prior to this encounter (see separate area of note for details)  ordering and/or review of 3+ test(s) in this encounter (see separate area of note for details)    The patient's management necessitated high risk (a decision regarding hospitalization).    Assessments & Plan (with Medical Decision Making)     Mamie Rice is a 28 year old female who has a history significant for cystic fibrosis arriving today to the emergency department due to concern of fever, cough and chest discomfort.  Upon arrival patient to be alert.  Presently afebrile and hemodynamically stable with soft systolic blood pressures at 96 systolically.  Mild tachycardia appreciated.  Patient appears to be uncomfortable is nontoxic.  She  is presently speaking full sentence with SpO2 in the mid to upper 90s on room air.  By auscultation she is moving air well with no significant rales or wheezes.  I have reviewed her prior records including pulmonology notes, allergy history.  I would plan for chest x-ray as well as broadened nasopharyngeal swab.  Unclear if viral versus bacterial infection but would be concerned with the latter secondary to underlying CF or at least high risk for development of bacterial infection.  I would plan for laboratory studies, hydration and additional symptomatic treatment with possible need for overnight hospitalization.  Based on review of chart we will initiate cefepime.  Will perform a MRSA swab of the nares to aid in decision of need of broaden antibiotic coverage.    X-ray does reveal new patchy basilar opacity concerning for infectious process.  Nasal swab positive for influenza A which likely is explaining symptoms.  I would plan to add Tamiflu to regimen.  Otherwise no significant anemia, leukocytosis or electrolyte abnormality.    In the interim influenza testing has come back positive.  Would initiate Tamiflu as well.  I did hear back from the pulmonology service would agree with plan for overnight observation under medical service for continued close management.  At this time believe patient is appropriate for floor level of care for initial treatment.    I have reviewed the nursing notes.    I have reviewed the findings, diagnosis, plan and need for follow up with the patient.    New Prescriptions    No medications on file       Final diagnoses:   Pneumonia due to infectious organism, unspecified laterality, unspecified part of lung   Influenza A       GILBERT TAFOYA MD    2/13/2024   [facility] EMERGENCY DEPARTMENT     Gilbert Tafoya MD  02/13/24 [id number]

## 2024-02-13 NOTE — LETTER
February 15, 2024      RE: Mamie Rice      To whom it may concern,    Mamie Rice : 1995 was hospitalized at the [facility] from 24 to Thursday 2/15/24 for positive influenza A and cystic fibrosis pulmonary exacerbation. Please excuse her from scheduled work from 23 to 24. Following discharge she will be able to return to work on 24 with no restrictions.      She will be re-evaluated in clinic on 24.      Sincerely,      Ping Baker MD  [facility]   [phone]

## 2024-02-13 NOTE — ED TRIAGE NOTES
"Patient presents to the ED for evaluation on SOB w/ lung pain, sore throat, and headache since Saturday. Patient reports t-max at home 100.7.     BP 96/70   Pulse 108   Temp 98.6  F (37  C) (Oral)   Resp 16   Ht 1.549 m (5' 1\")   Wt 83.9 kg (185 lb)   SpO2 97%   BMI 34.96 kg/m         Triage Assessment (Adult)       Row Name 02/13/24 1632          Triage Assessment    Airway WDL WDL        Respiratory WDL    Respiratory WDL WDL        Skin Circulation/Temperature WDL    Skin Circulation/Temperature WDL WDL        Cardiac WDL    Cardiac WDL WDL        Peripheral/Neurovascular WDL    Peripheral Neurovascular WDL WDL        Cognitive/Neuro/Behavioral WDL    Cognitive/Neuro/Behavioral WDL WDL                     "

## 2024-02-14 LAB
ANION GAP SERPL CALCULATED.3IONS-SCNC: 13 MMOL/L (ref 7–15)
BUN SERPL-MCNC: 9.7 MG/DL (ref 6–20)
CALCIUM SERPL-MCNC: 8 MG/DL (ref 8.6–10)
CHLORIDE SERPL-SCNC: 109 MMOL/L (ref 98–107)
CK SERPL-CCNC: 37 U/L (ref 26–192)
CREAT SERPL-MCNC: 0.59 MG/DL (ref 0.51–0.95)
DEPRECATED HCO3 PLAS-SCNC: 17 MMOL/L (ref 22–29)
EGFRCR SERPLBLD CKD-EPI 2021: >90 ML/MIN/1.73M2
ERYTHROCYTE [DISTWIDTH] IN BLOOD BY AUTOMATED COUNT: 13.4 % (ref 10–15)
GLUCOSE SERPL-MCNC: 75 MG/DL (ref 70–99)
GRAM STAIN RESULT: NORMAL
HCT VFR BLD AUTO: 39.3 % (ref 35–47)
HGB BLD-MCNC: 12.9 G/DL (ref 11.7–15.7)
INR PPP: 0.95 (ref 0.85–1.15)
LACTATE SERPL-SCNC: 0.8 MMOL/L (ref 0.7–2)
MCH RBC QN AUTO: 26.9 PG (ref 26.5–33)
MCHC RBC AUTO-ENTMCNC: 32.8 G/DL (ref 31.5–36.5)
MCV RBC AUTO: 82 FL (ref 78–100)
PLATELET # BLD AUTO: 181 10E3/UL (ref 150–450)
POTASSIUM SERPL-SCNC: 3.7 MMOL/L (ref 3.4–5.3)
RBC # BLD AUTO: 4.79 10E6/UL (ref 3.8–5.2)
SODIUM SERPL-SCNC: 139 MMOL/L (ref 135–145)
WBC # BLD AUTO: 4.8 10E3/UL (ref 4–11)

## 2024-02-14 PROCEDURE — 87102 FUNGUS ISOLATION CULTURE: CPT | Performed by: NURSE PRACTITIONER

## 2024-02-14 PROCEDURE — 87106 FUNGI IDENTIFICATION YEAST: CPT | Performed by: NURSE PRACTITIONER

## 2024-02-14 PROCEDURE — 272N000098

## 2024-02-14 PROCEDURE — 36415 COLL VENOUS BLD VENIPUNCTURE: CPT

## 2024-02-14 PROCEDURE — 94669 MECHANICAL CHEST WALL OSCILL: CPT

## 2024-02-14 PROCEDURE — 250N000009 HC RX 250: Performed by: NURSE PRACTITIONER

## 2024-02-14 PROCEDURE — 87205 SMEAR GRAM STAIN: CPT | Performed by: INTERNAL MEDICINE

## 2024-02-14 PROCEDURE — 250N000013 HC RX MED GY IP 250 OP 250 PS 637

## 2024-02-14 PROCEDURE — 94640 AIRWAY INHALATION TREATMENT: CPT | Mod: 76

## 2024-02-14 PROCEDURE — 250N000011 HC RX IP 250 OP 636: Mod: JZ | Performed by: NURSE PRACTITIONER

## 2024-02-14 PROCEDURE — 36415 COLL VENOUS BLD VENIPUNCTURE: CPT | Performed by: NURSE PRACTITIONER

## 2024-02-14 PROCEDURE — 85027 COMPLETE CBC AUTOMATED: CPT

## 2024-02-14 PROCEDURE — 999N000157 HC STATISTIC RCP TIME EA 10 MIN

## 2024-02-14 PROCEDURE — 83605 ASSAY OF LACTIC ACID: CPT

## 2024-02-14 PROCEDURE — 258N000003 HC RX IP 258 OP 636

## 2024-02-14 PROCEDURE — 120N000002 HC R&B MED SURG/OB UMMC

## 2024-02-14 PROCEDURE — 99232 SBSQ HOSP IP/OBS MODERATE 35: CPT | Mod: GC | Performed by: INTERNAL MEDICINE

## 2024-02-14 PROCEDURE — 250N000013 HC RX MED GY IP 250 OP 250 PS 637: Performed by: NURSE PRACTITIONER

## 2024-02-14 PROCEDURE — 87206 SMEAR FLUORESCENT/ACID STAI: CPT | Performed by: NURSE PRACTITIONER

## 2024-02-14 PROCEDURE — 82550 ASSAY OF CK (CPK): CPT | Performed by: NURSE PRACTITIONER

## 2024-02-14 PROCEDURE — 99222 1ST HOSP IP/OBS MODERATE 55: CPT | Mod: FS | Performed by: NURSE PRACTITIONER

## 2024-02-14 PROCEDURE — 87116 MYCOBACTERIA CULTURE: CPT | Performed by: NURSE PRACTITIONER

## 2024-02-14 PROCEDURE — 250N000011 HC RX IP 250 OP 636

## 2024-02-14 PROCEDURE — 80048 BASIC METABOLIC PNL TOTAL CA: CPT

## 2024-02-14 PROCEDURE — 85610 PROTHROMBIN TIME: CPT | Performed by: NURSE PRACTITIONER

## 2024-02-14 PROCEDURE — 99207 PR NO BILLABLE SERVICE THIS VISIT: CPT | Performed by: NURSE PRACTITIONER

## 2024-02-14 PROCEDURE — 94640 AIRWAY INHALATION TREATMENT: CPT

## 2024-02-14 RX ORDER — AZITHROMYCIN 250 MG/1
250 TABLET, FILM COATED ORAL DAILY
Qty: 4 TABLET | Refills: 0 | Status: CANCELLED | OUTPATIENT
Start: 2024-02-14 | End: 2024-02-18

## 2024-02-14 RX ORDER — FLUTICASONE PROPIONATE 50 MCG
1 SPRAY, SUSPENSION (ML) NASAL DAILY
Status: DISCONTINUED | OUTPATIENT
Start: 2024-02-14 | End: 2024-02-15 | Stop reason: HOSPADM

## 2024-02-14 RX ORDER — ACETYLCYSTEINE 200 MG/ML
2 SOLUTION ORAL; RESPIRATORY (INHALATION) 4 TIMES DAILY
Status: DISCONTINUED | OUTPATIENT
Start: 2024-02-14 | End: 2024-02-15 | Stop reason: HOSPADM

## 2024-02-14 RX ORDER — NALTREXONE HYDROCHLORIDE 50 MG/1
50 TABLET, FILM COATED ORAL DAILY
COMMUNITY
End: 2024-07-15

## 2024-02-14 RX ORDER — ALBUTEROL SULFATE 0.83 MG/ML
2.5 SOLUTION RESPIRATORY (INHALATION) 4 TIMES DAILY
Status: DISCONTINUED | OUTPATIENT
Start: 2024-02-14 | End: 2024-02-15 | Stop reason: HOSPADM

## 2024-02-14 RX ORDER — OSELTAMIVIR PHOSPHATE 75 MG/1
75 CAPSULE ORAL 2 TIMES DAILY
Qty: 8 CAPSULE | Refills: 0 | Status: CANCELLED | OUTPATIENT
Start: 2024-02-14

## 2024-02-14 RX ORDER — TRAZODONE HYDROCHLORIDE 150 MG/1
150 TABLET ORAL AT BEDTIME
COMMUNITY
Start: 2024-01-12

## 2024-02-14 RX ADMIN — BUSPIRONE HYDROCHLORIDE 30 MG: 30 TABLET ORAL at 00:15

## 2024-02-14 RX ADMIN — TRAZODONE HYDROCHLORIDE 150 MG: 150 TABLET ORAL at 22:26

## 2024-02-14 RX ADMIN — SODIUM CHLORIDE 1000 ML: 9 INJECTION, SOLUTION INTRAVENOUS at 00:09

## 2024-02-14 RX ADMIN — ALBUTEROL SULFATE 2.5 MG: 2.5 SOLUTION RESPIRATORY (INHALATION) at 11:58

## 2024-02-14 RX ADMIN — TRAZODONE HYDROCHLORIDE 150 MG: 150 TABLET ORAL at 00:15

## 2024-02-14 RX ADMIN — DEXTROAMPHETAMINE SULFATE, DEXTROAMPHETAMINE SACCHARATE, AMPHETAMINE SULFATE AND AMPHETAMINE ASPARTATE 20 MG: 5; 5; 5; 5 CAPSULE, EXTENDED RELEASE ORAL at 11:29

## 2024-02-14 RX ADMIN — ALBUTEROL SULFATE 2.5 MG: 2.5 SOLUTION RESPIRATORY (INHALATION) at 08:32

## 2024-02-14 RX ADMIN — ACETYLCYSTEINE 2 ML: 200 SOLUTION ORAL; RESPIRATORY (INHALATION) at 19:24

## 2024-02-14 RX ADMIN — OSELTAMIVIR PHOSPHATE 75 MG: 75 CAPSULE ORAL at 07:47

## 2024-02-14 RX ADMIN — AZITHROMYCIN 500 MG: 500 INJECTION, POWDER, LYOPHILIZED, FOR SOLUTION INTRAVENOUS at 23:44

## 2024-02-14 RX ADMIN — FLUOXETINE HYDROCHLORIDE 80 MG: 40 CAPSULE ORAL at 07:47

## 2024-02-14 RX ADMIN — FLUTICASONE PROPIONATE 1 SPRAY: 50 SPRAY, METERED NASAL at 17:04

## 2024-02-14 RX ADMIN — ACETAMINOPHEN 975 MG: 325 TABLET, FILM COATED ORAL at 00:14

## 2024-02-14 RX ADMIN — ACETYLCYSTEINE 2 ML: 200 SOLUTION ORAL; RESPIRATORY (INHALATION) at 11:59

## 2024-02-14 RX ADMIN — CEFTRIAXONE SODIUM 2 G: 2 INJECTION, POWDER, FOR SOLUTION INTRAMUSCULAR; INTRAVENOUS at 07:46

## 2024-02-14 RX ADMIN — PHYTONADIONE 5 MG: 10 INJECTION, EMULSION INTRAMUSCULAR; INTRAVENOUS; SUBCUTANEOUS at 17:05

## 2024-02-14 RX ADMIN — BUSPIRONE HYDROCHLORIDE 30 MG: 30 TABLET ORAL at 20:31

## 2024-02-14 RX ADMIN — ALBUTEROL SULFATE 2.5 MG: 2.5 SOLUTION RESPIRATORY (INHALATION) at 19:24

## 2024-02-14 RX ADMIN — ACETYLCYSTEINE 2 ML: 200 SOLUTION ORAL; RESPIRATORY (INHALATION) at 16:00

## 2024-02-14 RX ADMIN — AZITHROMYCIN 500 MG: 500 INJECTION, POWDER, LYOPHILIZED, FOR SOLUTION INTRAVENOUS at 00:10

## 2024-02-14 RX ADMIN — TOPIRAMATE 75 MG: 25 TABLET, FILM COATED ORAL at 00:13

## 2024-02-14 RX ADMIN — BUSPIRONE HYDROCHLORIDE 30 MG: 30 TABLET ORAL at 07:47

## 2024-02-14 RX ADMIN — ACETAMINOPHEN 975 MG: 325 TABLET, FILM COATED ORAL at 07:56

## 2024-02-14 RX ADMIN — TOPIRAMATE 75 MG: 25 TABLET, FILM COATED ORAL at 20:31

## 2024-02-14 RX ADMIN — TOPIRAMATE 75 MG: 25 TABLET, FILM COATED ORAL at 07:46

## 2024-02-14 RX ADMIN — PANTOPRAZOLE SODIUM 40 MG: 40 TABLET, DELAYED RELEASE ORAL at 17:04

## 2024-02-14 RX ADMIN — OSELTAMIVIR PHOSPHATE 75 MG: 75 CAPSULE ORAL at 00:14

## 2024-02-14 RX ADMIN — OSELTAMIVIR PHOSPHATE 75 MG: 75 CAPSULE ORAL at 20:31

## 2024-02-14 RX ADMIN — CETIRIZINE HYDROCHLORIDE 10 MG: 10 TABLET, FILM COATED ORAL at 20:31

## 2024-02-14 RX ADMIN — NALTREXONE HYDROCHLORIDE 50 MG: 50 TABLET, FILM COATED ORAL at 07:47

## 2024-02-14 RX ADMIN — PANTOPRAZOLE SODIUM 40 MG: 40 TABLET, DELAYED RELEASE ORAL at 07:47

## 2024-02-14 RX ADMIN — BUPROPION HYDROCHLORIDE 300 MG: 150 TABLET, FILM COATED, EXTENDED RELEASE ORAL at 07:46

## 2024-02-14 RX ADMIN — ALBUTEROL SULFATE 2.5 MG: 2.5 SOLUTION RESPIRATORY (INHALATION) at 16:00

## 2024-02-14 RX ADMIN — ACETAMINOPHEN 975 MG: 325 TABLET, FILM COATED ORAL at 17:04

## 2024-02-14 RX ADMIN — CETIRIZINE HYDROCHLORIDE 10 MG: 10 TABLET, FILM COATED ORAL at 00:15

## 2024-02-14 RX ADMIN — ACETYLCYSTEINE 2 ML: 200 SOLUTION ORAL; RESPIRATORY (INHALATION) at 08:32

## 2024-02-14 ASSESSMENT — ACTIVITIES OF DAILY LIVING (ADL)
ADLS_ACUITY_SCORE: 35
ADLS_ACUITY_SCORE: 18
ADLS_ACUITY_SCORE: 35
ADLS_ACUITY_SCORE: 35

## 2024-02-14 NOTE — CONSULTS
Pulmonary Medicine  Cystic Fibrosis - Lung Transplant Team  Initial Consultation  2024      Patient: Mamie Rice  MRN: [id number]  : 1995 (age 28 year old)  Admission date: 2024  Primary Care Provider: No Ref-Primary, Physician    Assessment & Plan:     Mamie Rice is a 28 year old female with a history of CF lung disease with normal spirometry, pancreatic Insufficiency, and cystic fibrosis sinus disease.  The patient was admitted on 2024 for influenza A infection and CF pulmonary exacerbation. Potential discharge home tomorrow vs tonight (tonight only if unable to tolerate sleeping in ER hallway) if no further hemoptysis.     Influenza A infection:  CF pulmonary exacerbation:   LLL Pneumonia:  Hemoptysis: Presents with 4 days of malaise, chest tightness, CRUM, cough (thick green, intermittently blood mixed), sinus congestion, and bilateral rib/chest pain.  Typically does not do airway clearance therapy (exercises) but has been vesting BID since feeling ill.  Colonized with MSSA, MRSA (last ), PsA (last ) with additional h/o HIB and achromobacter on cultures. Respiratory panel with Influenza A.  No leukocytosis or fever. CXR () new patchy basilar opacity left lower lobe, and slight increased perihilar streaky opacities (personally reviewed), concerning for infection. Remains on RA.   - Sputum culture (bacterial, fungal, AFB, Nocardia) () pending  - ABX: Continue ceftriaxone () while inpatient and transition to PO cefdinir upon discharge to complete 2 week total course based on recent cultures.  Recommend Azithromycin 500 x3 days for atypical coverage  - Oseltamivir () plan for 5 day course  - Vitamin K 5 mg PO weekly, recommend daily x3 days (-) given hemoptysis  - Vesting QID with nebs: Albuterol QID, Mucomyst QID  - PFTs deferred while inpatient given influenza A  - Follow up CF pulmonary clinic in 2 weeks (CF CC working on scheduling)    CFTR  modulation: Tolerating Trikafta well.  LFTs stable on admission  - CK ordered  - Continue Trikafta (home supply)    Cystic fibrosis sinus disease: H/o multiple endoscopic sinus surgeries (last 2015). Chronic symptoms worsened with acute illness (influenza A) as above  - Flonase daily (2/14), continue nasal rinses ([facility]) BID    Exocrine pancreatic insufficiency: No signs of malabsorption.  Body mass index is 34.96 kg/m .  - High kcal / high protein diet  - PTA  vitamins per CF RD    We appreciate the excellent care provided by the [facility] team.  Recommendations communicated via phone and this note.  Will continue to follow along closely, please do not hesitate to call with any questions or concerns.    Patient discussed with Dr. Samuel Lopez, APRN, CNP   Inpatient Nurse Practitioner  Pulmonary CF/Transplant     Chief Complaint:     malaise, chest tightness, CRUM, cough (thick green, intermittently blood mixed), sinus congestion, and bilateral rib/chest pain.     History of Present Illness:     History obtained from patient and chart review.    Mamie Rice is a 28 year old female with a history of CF lung disease with normal spirometry, pancreatic Insufficiency, and cystic fibrosis sinus disease.    Presents with 4 days of malaise, chest tightness, CRUM, cough (thick green, intermittently blood mixed), sinus congestion, and bilateral rib/chest pain.  The patient was admitted on 2/13/2024 for influenza A infection and CF pulmonary exacerbation. Potential discharge home tomorrow vs tonight (only if unable to tolerate sleeping in ER hallway) if no further hemoptysis. Typically does not do airway clearance therapy (exercises) but has been vesting BID since feeling ill.  Chronic sinus symptoms increased since feeling ill.  Intermittent HA, ongoing nasal drainage. No sore throat or ear pain. Denies reflux.  No abdominal pain or bloating. Stools normal and regular. No dysuria. No swelling, rashes,  or wounds.     Review of Systems:     Complete ROS negative except as noted in HPI.    Medical and Surgical History:     Past Medical History:   Diagnosis Date    ADHD (attention deficit hyperactivity disorder)     Anxiety     Aspergillosis, with pneumonia (H)     fugus found caused chest pain    Chronic infection     CF, MRSA.,     Chronic sinusitis     Constipation, chronic     Cystic fibrosis with pulmonary manifestations (H) 12/19/2011    Cystic fibrosis without mention of meconium ileus     SWEAT TEST:Date: 2/17/1994   [facility]  296 mg, 104 mmol/L ClSample #2  295 mg, 104 mmol/L Cl GENOTYPING:Date: 10/15/2007,  Laboratory: [facility]: [id number]    Depressive disorder     Diabetes     no meds currently    Dysthymic disorder     Exocrine pancreatic insufficiency     Gastro-oesophageal reflux disease     Hip pain, right     MRSA (methicillin resistant Staphylococcus aureus) carrier     Pancreatic disease      Past Surgical History:   Procedure Laterality Date    ARTHROSCOPY HIP, OSTEOPLASTY FEMUR PROXIMAL, COMBINED  3/11/2013    Procedure: COMBINED ARTHROSCOPY HIP, OSTEOPLASTY FEMUR PROXIMAL;  Right Hip Arthroscopy, Labral  Debridement.    surgeon request choice anesthesia/admit to [facility] after surgery;  Surgeon: Omkar Austin MD;  [facility]    ARTHROSCOPY KNEE WITH MEDIAL MENISCECTOMY Left 1/31/2017    Procedure: ARTHROSCOPY KNEE WITH MEDIAL MENISCECTOMY;  Surgeon: Jethro Coyle MD;  Location: [facility] OR    bronchoscopies      BRONCHOSCOPY      EXAM UNDER ANESTHESIA ANUS N/A 5/10/2016    Procedure: EXAM UNDER ANESTHESIA ANUS;  Surgeon: Chet Gaviria MD;  [facility]    EXAM UNDER ANESTHESIA, RESTORATIONS, EXTRACTION(S) DENTAL COMPLEX, COMBINED  5/13/2013    Procedure: COMBINED EXAM UNDER ANESTHESIA, RESTORATIONS, EXTRACTION(S) DENTAL COMPLEX;  Dental Exam, Radiographs, Restorations. Single Extraction  Tooth #2. Restorations x 3;  Surgeon: Angel  CHRISTI Carrasco;  Location: [facility] OR    HC KNEE SCOPE, DIAGNOSTIC      Arthroscopy, Knee- left    INJECT BOTOX N/A 5/10/2016    Procedure: INJECT BOTOX;  Surgeon: Chet Gaviria MD;  [facility]    left hip labral tear  5/11/2011    left hip arthroscopy with labral debridement and synovectomy    meniscus repair      OPTICAL TRACKING SYSTEM ENDOSCOPIC SINUS SURGERY  10/14/2011    Procedure:OPTICAL TRACKING SYSTEM ENDOSCOPIC SINUS SURGERY; FESS (functional endoscopic sinus surgery) with Image Guidance, bronchial lavage and cultures; Surgeon:GOYO KUO; Location:[facility] OR    OPTICAL TRACKING SYSTEM ENDOSCOPIC SINUS SURGERY  5/18/2012    Procedure:OPTICAL TRACKING SYSTEM ENDOSCOPIC SINUS SURGERY; Right  and Left Image Guided Functional Endoscopic Sinus Surgery With  Frontal Approach, [employer]; Surgeon:GOYO KUO; [facility]    OPTICAL TRACKING SYSTEM ENDOSCOPIC SINUS SURGERY  9/26/2012    Procedure: OPTICAL TRACKING SYSTEM ENDOSCOPIC SINUS SURGERY;  Stealth Guided Bilateral Functional Endoscopic Sinus Surgery *Latex Safe*;  Surgeon: Goyo Kuo MD;  [facility]    OPTICAL TRACKING SYSTEM ENDOSCOPIC SINUS SURGERY Bilateral 10/16/2015    Procedure: OPTICAL TRACKING SYSTEM ENDOSCOPIC SINUS SURGERY;  Surgeon: Mariela Haile MD;  [facility]    ORTHOPEDIC SURGERY      left hip tear repair 2010    SINUS SURGERY       Social and Family History:     Social History     Socioeconomic History    Marital status: Single     Spouse name: Not on file    Number of children: Not on file    Years of education: Not on file    Highest education level: Not on file   Occupational History    Not on file   Tobacco Use    Smoking status: Never    Smokeless tobacco: Never    Tobacco comments:     one person at home smokes outside   Substance and Sexual Activity    Alcohol use: No     Alcohol/week: 0.0 standard drinks of alcohol    Drug use: No    Sexual activity: Never   Other Topics Concern     Service Not  Asked    Blood Transfusions No    Caffeine Concern Not Asked    Occupational Exposure Not Asked    Hobby Hazards Not Asked    Sleep Concern Not Asked    Stress Concern Not Asked    Weight Concern Not Asked    Special Diet Not Asked    Back Care Not Asked    Exercise Yes    Bike Helmet Not Asked    Seat Belt Not Asked    Self-Exams Not Asked    Parent/sibling w/ CABG, MI or angioplasty before 65F 55M? Yes   Social History Narrative    Mamie lives with mother in [place], [place].  There is a cat in the home, but Mamie does not have any litterbox duties.  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  She gets essentially no exercise because of the tingling in her feet (says it bothers her even to stand).        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        8/2015 no change in social situation        2/15/2016 Pt is single and lives with mother and stepfather.     Social Determinants of Health     Financial Resource Strain: Not on file   Food Insecurity: Not on file   Transportation Needs: Not on file   Physical Activity: Not on file   Stress: Not on file   Social Connections: Not on file   Interpersonal Safety: Not on file   Housing Stability: Not on file     Family History   Problem Relation Age of Onset    Cancer Paternal Grandmother     Skin Cancer Paternal Grandmother     Other Cancer Paternal Grandmother         Skin    Obesity Paternal Grandmother     Cancer Paternal Grandfather         PGF had throat cancer (he was a smoker)    Other Cancer Paternal Grandfather     Anxiety Disorder Paternal Grandfather     Thyroid Disease Mother         ,    Hypertension Mother     Obesity Other     ALS Father 67        2 male cousins with ALS as well    Anesthesia Reaction No family hx of     Blood Disease No family hx of     Colon Polyps No family hx of     Crohn's Disease No family hx of     Ulcerative Colitis No family hx of     Colon Cancer No  "family hx of     Melanoma No family hx of      Allergies and Home Medications:     Allergies   Allergen Reactions    Vancomycin Hives     Redmens skin rash   Redmens skin rash     Amoxicillin-Pot Clavulanate Rash     (Not in a hospital admission)    Current Scheduled Meds   acetaminophen  975 mg Oral TID    acetylcysteine  2 mL Nebulization 4x Daily    albuterol  2.5 mg Nebulization 4x Daily    amphetamine-dextroamphetamine  20 mg Oral Daily    azithromycin  500 mg Intravenous Q24H    buPROPion  300 mg Oral QAM    busPIRone HCl  30 mg Oral BID    cefTRIAXone  2 g Intravenous Q24H    cetirizine  10 mg Oral QPM    elexacaftor-tezacaftor-ivacaftor & ivacaftor  2 tablet Oral QAM    And    elexacaftor-tezacaftor-ivacaftor & ivacaftor  1 tablet Oral QPM    FLUoxetine  80 mg Oral Daily    naltrexone  50 mg Oral Daily    oseltamivir  75 mg Oral BID    pantoprazole  40 mg Oral BID AC    sodium chloride (PF)  3 mL Intracatheter Q8H    topiramate  75 mg Oral BID    traZODone  150 mg Oral At Bedtime      Current PRN Meds  albuterol, sore throat, calcium carbonate, guaiFENesin, hydrOXYzine HCl, lidocaine 4%, lidocaine (buffered or not buffered), melatonin, ondansetron **OR** ondansetron, polyethylene glycol, sodium chloride (PF)     Physical Exam:     All notes, images, and labs from past 24 hours (at minimum) were reviewed.    Vital signs:  Temp: 98.3  F (36.8  C) Temp src: Oral BP: 119/71 Pulse: 84   Resp: 18 SpO2: 98 % O2 Device: None (Room air)   Height: 154.9 cm (5' 1\") Weight: 83.9 kg (185 lb)  I/O: No intake or output data in the 24 hours ending 02/14/24 1334    Constitutional: sitting up in ER cot with in hallway, mother is present, in no apparent distress.   HEENT: Eyes with pink conjunctivae, anicteric.  Oral mucosa moist without lesions.   PULM: Diminished air flow bilaterally.  LLL crackles, no rhonchi, no wheezes.  Non-labored breathing on RA.  CV: Normal S1 and S2.  RRR.  No murmur, gallop, or rub.  No peripheral " "edema.   ABD: NABS, soft, nontender, nondistended.    MSK: Moves all extremities.  No apparent muscle wasting.   NEURO: Alert and conversant.   SKIN: Warm, dry.  No rash on limited exam.   PSYCH: Mood stable.     Data:     LABS    CMP:   Recent Labs   Lab 02/14/24  0720 02/13/24  1712    137   POTASSIUM 3.7 4.4   CHLORIDE 109* 100   CO2 17* 24   ANIONGAP 13 13   GLC 75 99   BUN 9.7 10.7   CR 0.59 0.73   GFRESTIMATED >90 >90   ALAN 8.0* 9.0   PROTTOTAL  --  7.1   ALBUMIN  --  3.8   BILITOTAL  --  0.3   ALKPHOS  --  62   AST  --  42   ALT  --  20     CBC:   Recent Labs   Lab 02/14/24  0720 02/13/24  1712   WBC 4.8 8.3   RBC 4.79 5.41*   HGB 12.9 14.7   HCT 39.3 47.0   MCV 82 87   MCH 26.9 27.2   MCHC 32.8 31.3*   RDW 13.4 13.6    281       INR: No lab results found in last 7 days.    Glucose:   Recent Labs   Lab 02/14/24  0720 02/13/24  1712   GLC 75 99       Blood Gas: No lab results found in last 7 days.    Culture Data No results for input(s): \"CULT\" in the last 168 hours.    Virology Data:   Lab Results   Component Value Date    INFLUA Negative 01/19/2011    FLUAH1 Not Detected 02/13/2024    FLUAH3 Not Detected 02/13/2024    [id number] Detected (A) 02/13/2024    IFLUB Not Detected 02/13/2024    RSVA Not Detected 02/13/2024    RSVB Not Detected 02/13/2024    PIV1 Not Detected 02/13/2024    PIV2 Not Detected 02/13/2024    PIV3 Not Detected 02/13/2024    HMPV Not Detected 02/13/2024       Most Recent [facility] Pulmonary Function Testing (FVC/FEV1 only)  FVC-Pre   Date Value Ref Range Status   07/05/2023 3.56 L    01/17/2023 3.60 L    08/09/2022 3.64 L    10/22/2021 3.47 L      FVC-%Pred-Pre   Date Value Ref Range Status   07/05/2023 109 %    01/17/2023 110 %    08/09/2022 102 %    10/22/2021 97 %      FEV1-Pre   Date Value Ref Range Status   07/05/2023 3.12 L    01/17/2023 3.17 L    08/09/2022 3.15 L    10/22/2021 3.11 L      FEV1-%Pred-Pre   Date Value Ref Range Status   07/05/2023 110 %    01/17/2023 112 %  "   08/09/2022 103 %    10/22/2021 102 %        IMAGING    Recent Results (from the past 48 hour(s))   XR Chest 2 Views    Narrative    Exam: XR CHEST 2 VIEWS, 2/13/2024 5:26 PM    Indication: SOB, cough, fever, CF patient    Comparison: Chest radiograph 8/9/2022    Findings:   Upright PA and lateral chest radiograph. Trachea is midline.  Cardiomediastinal silhouette is within normal limits. Chronic changes  of cystic fibrosis with bronchiectasis, not significantly changed from  prior. Increased bilateral hilar and left lower lung perihilar  opacities. No pleural effusion or pneumothorax. No obstructive bowel  gas pattern.       Impression    Impression:  New patchy basilar opacity best seen on lateral  projection likely in the left lower lobe, concerning for infection.  Slight increased perihilar streakiness possibly atelectasis, edema  versus infection. Similar chronic changes of cystic fibrosis, compared  to radiograph from 8/9/2022.    I have personally reviewed the examination and initial interpretation  and I agree with the findings.    DARLING MARIE MD         SYSTEM ID:  [id number]

## 2024-02-14 NOTE — UTILIZATION REVIEW
[facility] Utilization Review  Admission Status; Secondary Review Determination     Admission Date: 2/13/2024  4:36 PM      Under the authority of the Utilization Management Committee, the utilization review process indicated a secondary review on the above patient.  The review outcome is based on review of the medical records, discussions with staff, and applying clinical experience noted on the date of the review.        (X)      Inpatient Status Appropriate - This patient's medical care is consistent with medical management for inpatient care and reasonable inpatient medical practice.          RATIONALE FOR DETERMINATION   28-year-old female with history of CF well-controlled on Trikafta, depression, anxiety, insomnia, admitted with productive cough, dyspnea and generalized malaise found to have influenza A.  Started on Tamiflu, IV ceftriaxone and Zithromax, Tylenol with lozenges, with therapy.  Complex immunocompromised patient who needs IV antibiotics, Tamiflu, vitamin K for hemoptysis, vest therapy, and admitted at risk of acute decompensation, with anticipated length of stay more than 2 midnight, and ongoing interventions with close monitoring in the hospital, recommend continue inpatient status      The severity of illness, intensity of service provided, expected LOS and risk for adverse outcome make the care complex, high risk and appropriate for hospital admission.The patient requires hospital based medical care which is anticipated to require a stay of 2 or more midnights.        The information on this document is developed by the utilization review team in order for the business office to ensure compliance.  This only denotes the appropriateness of proper admission status and does not reflect the quality of care rendered.              Sincerely,       Alyssa Nielsen MD  Physician Advisor  Utilization Review-[place]    Phone: [phone]

## 2024-02-14 NOTE — DISCHARGE SUMMARY
"[facility]  Discharge Summary - Medicine & Pediatrics       Date of Admission:  2/13/2024  Date of Discharge:  2/15/2024  Discharging Provider: Dr. Valentine  Discharge Service: Medicine Service, [facility] TEAM 4    Discharge Diagnoses   Viral pneumonia 2/2 Influenza A  Superimposed bacterial pneumonia  Cystic fibrosis, well-controlled on Trikafta  GERD  ADHD  Depression  Anxiety  Insomnia    Clinically Significant Risk Factors     # Obesity: Estimated body mass index is 34.96 kg/m  as calculated from the following:    Height as of this encounter: 1.549 m (5' 1\").    Weight as of this encounter: 83.9 kg (185 lb).       Follow-ups Needed After Discharge   The patient should follow-up in pulmonology clinic in 2 weeks (2/27).     Unresulted Labs Ordered in the Past 30 Days of this Admission       Date and Time Order Name Status Description    2/14/2024  1:26 PM Acid-Fast Bacilli Culture and Stain In process     2/14/2024  9:49 AM Nocardia species culture In process     2/14/2024  9:49 AM Acid-Fast Bacilli Culture and Stain In process     2/14/2024  9:49 AM Fungal or Yeast Culture Routine In process         These results will be followed up by pulmonology.     Discharge Disposition   Discharged to home  Condition at discharge: Stable    Hospital Course   Mamie Rice was admitted on 2/13/2024 for evaluation of a 3 day history of productive cough, dyspnea, admitted with CF exacerbation secondary to influenza A  and concern for potentially superimposed bacterial pneumonia. She was treated with antibiotics, Tamiflu, and seen by the CF team here. She remained on room air, was discharged home on oral antibiotics and plan to continue them until follow up with CF team on 2/27.     CF Pulm exacerbation   Influenza with Viral pneumonia 2/2 influenza A with possible superimposed bacterial pneumonia   Hemoptysis   Patient with known history of CF who follows with pulmonology at [facility]. Last " seen 7/2023 with normal spirometry. Well controlled on Trikafta with intermittent albuterol inhaler use. Smptoms developed > 48 hours prior to admission, started on Tamiflu. CF pulmonology followed this hospital stay. Did have some hemoptysis while admitted so treated with daily Vit K.  - S/p Ceftriaxone x2 days, transitioned to Cefdinir on discharge to continue until Pulm follow up   - S/p 3d course of Azithromycin   - Continue Tamiflu through 2/18 to complete 5 day course   - Continue vesting treatment TID while sick (albuterol and Mucomyst TID) upon discharge  - Vitamin K 5 mg daily x3 days (2/14-2/16) then resume PTA regimen of weekly qSaturday  - Follow up CF pulmonary clinic scheduled 2/27 with repeat CXR and PFTs    GERD   The patient was continued on PTA omeprazole BID.     ADHD  Depression   Anxiety   Insomnia   The patient was continued on PTA buspirone, fluoxetine, naltrexone, topiramate, trazodone, adderall, and hydroxyzine.    Consultations This Hospital Stay   NURSING TO CONSULT FOR VASCULAR ACCESS CARE IP CONSULT  RESPIRATORY CARE IP CONSULT  PULMONARY CF/TRANSPLANT ADULT IP CONSULT    Code Status   Full Code       The patient was staffed with Dr. Valentine.     Resident/Fellow Attestation   I, Roslyn Suarez, was present with the medical/[employer] student who participated in the service and in the documentation of the note.  I have verified the history and personally performed the physical exam and medical decision making.  I agree with the assessment and plan of care as documented in the note.      Date of Service (when I saw the patient): 2/15/2024    Roslyn Davis [address] [place] [address] EMERGENCY DEPARTMENT  [address] [facility] [address]  Phone: [phone]    Physician Attestation   I saw and evaluated this patient prior to discharge.  I discussed the patient with the resident/fellow and agree with plan of care as documented in the note.      I personally reviewed vital signs,  medications, labs, and imaging.    I personally spent 40 minutes on discharge activities.    Parth Valentine MD  Date of Service (when I saw the patient): 2/15/24   ______________________________________________________________________    Physical Exam   Vital Signs: Temp: 98.3  F (36.8  C) Temp src: Oral BP: 119/71 Pulse: 84   Resp: 18 SpO2: 98 % O2 Device: None (Room air)    Weight: 185 lbs 0 oz    General Appearance: Awake, alert, appearance improved from prior visits  Respiratory: CTAB, good air flow, intermittent productive cough, less congested  Cardiovascular: RRR, no m/r/g  GI: Soft, non-tender, non-distended, normoactive bowel sounds  Skin: No rashes or bruises on exposed skin         Primary Care Physician   Physician No Ref-Primary    Discharge Orders   No discharge procedures on file.    Significant Results and Procedures   Most Recent 3 CBC's:  Recent Labs   Lab Test 02/14/24  0720 02/13/24  1712 08/09/22  0826   WBC 4.8 8.3 4.2   HGB 12.9 14.7 13.5   MCV 82 87 85    281 284     Most Recent 3 BMP's:  Recent Labs   Lab Test 02/14/24  0720 02/13/24  1712 08/09/22  0849    137 144   POTASSIUM 3.7 4.4 3.8   CHLORIDE 109* 100 113*   CO2 17* 24 21   BUN 9.7 10.7 7   CR 0.59 0.73 0.87   ANIONGAP 13 13 10   ALAN 8.0* 9.0 8.3*   GLC 75 99 98     7-Day Micro Results       Collected Updated Procedure Result Status      02/14/2024 1327 02/14/2024 1349 Fungal or Yeast Culture Routine [[id number]]   Sputum from Throat    In process Component Value   No component results               02/14/2024 1327 02/14/2024 1349 Acid-Fast Bacilli Culture and Stain [[id number]]    Sputum from Throat    In process Component Value   No component results            02/14/2024 1327 02/14/2024 1349 Nocardia species culture [[id number]]   Sputum from Throat    In process Component Value   No component results               02/14/2024 1327 02/14/2024 1349 Acid-Fast Bacilli Culture and Stain [[id number]]   Sputum from  Throat    In process Component Value   No component results            02/13/2024 1716 02/13/2024 1935 Respiratory Panel PCR [[id number]]    (Abnormal)   Swab from Nasopharyngeal    Final result Component Value   Adenovirus Not Detected   Coronavirus Not Detected   This test detects Coronavirus 229E, HKU1, NL63 and OC43 but does not distinguish between them. It does not detect MERS ( Respiratory Syndrome), SARS (Severe Acute Respiratory Syndrome) or 2019-nCoV (Novel 2019) Coronavirus.   Human Metapneumovirus Not Detected   Human Rhin/Enterovirus Not Detected   Influenza A Detected   Influenza A, H1 Not Detected   Influenza A 2009 H1N1 Detected   Influenza A, H3 Not Detected   Influenza B Not Detected   Parainfluenza Virus 1 Not Detected   Parainfluenza Virus 2 Not Detected   Parainfluenza Virus 3 Not Detected   Parainfluenza Virus 4 Not Detected   Respiratory Syncytial Virus A Not Detected   Respiratory Syncytial Virus B Not Detected   Chlamydia Pneumoniae Not Detected   Mycoplasma Pneumoniae Not Detected            02/13/2024 1716 02/13/2024 1901 MRSA MSSA PCR, Nasal Swab [[id number]]    Swab from Nares, Bilateral    Final result Component Value   MRSA Target DNA Negative   SA Target DNA Positive                    Discharge Medications   Current Discharge Medication List        CONTINUE these medications which have NOT CHANGED    Details   acetylcysteine (MUCOMYST) 20 % neb solution INHALE 4ML VIA NEBULIZER TWO TIMES A DAY. MAY INCREASE TO 3-4 TIMES A DAY WITH INCREASED COUGH / COLD SYMPTOMS. REFRIGERATE VIAL AND DISCARD 96 HOURS AFTER OPENING  Qty: 360 mL, Refills: 11    Associated Diagnoses: CF (cystic fibrosis) (H)      albuterol (PROAIR HFA/PROVENTIL HFA/VENTOLIN HFA) 108 (90 Base) MCG/ACT inhaler Inhale 2 puffs into the lungs every 6 hours as needed for shortness of breath / dyspnea or wheezing  Qty: 8.5 g, Refills: 11    Comments: Pharmacy may dispense brand covered by insurance (Proair, or  proventil or ventolin or generic albuterol inhaler)  Associated Diagnoses: Cystic fibrosis with pulmonary manifestations (H); Sinusitis, chronic; Diabetes mellitus type 1 (H)      albuterol (PROVENTIL) (2.5 MG/3ML) 0.083% neb solution INHALE 1 VIAL ( 2.5MG) BY NEBULIZATION EVERY 4 HOURS AS NEEDED FOR FOR SHORTNESS OF BREATH OR WHEEZING  Qty: 360 mL, Refills: 11    Associated Diagnoses: CF (cystic fibrosis) (H)      amphetamine-dextroamphetamine (ADDERALL XR) 20 MG 24 hr capsule TAKE ONE CAPSULE BY MOUTH ONCE EVERY DAY [FOR FILL ON OR AFTER 7-7-23]  Qty: 30 capsule, Refills: 0    Associated Diagnoses: Attention deficit hyperactivity disorder (ADHD), predominantly inattentive type      blood glucose (ACCU-CHEK GUIDE) test strip Use to test blood sugar 4 times daily or as directed.  Qty: 100 strip, Refills: 11    Associated Diagnoses: Type 1 diabetes mellitus with other specified complication (H)      blood glucose monitoring (ACCU-CHEK FASTCLIX) lancets Use to test blood sugar 4 times daily or as directed.  Qty: 100 each, Refills: 11    Associated Diagnoses: Type 1 diabetes mellitus with other specified complication (H)      busPIRone HCl (BUSPAR) 30 MG tablet Take 1 tablet (30 mg) by mouth 2 times daily  Qty: 180 tablet, Refills: 1    Associated Diagnoses: MECHE (generalized anxiety disorder)      cetirizine (ZYRTEC) 10 MG tablet Take 1 tablet (10 mg) by mouth every evening  Qty: 30 tablet, Refills: 5    Associated Diagnoses: Allergic rhinitis due to [employer] house dust mite; Urticaria, chronic      elexacaftor-tezacaftor-ivacaftor & ivacaftor (TRIKAFTA) 100-50-75 & 150 MG tablet pack TAKE TWO ORANGE TABLETS BY MOUTH IN THE MORNING AND ONE BLUE TABLET IN THE EVENING AS DIRECTED ON PACKAGE.  TAKE WITH FAT CONTAINING FOOD. Please schedule appointment and labs for additional refills, thanks!  Qty: 84 tablet, Refills: 0    Comments: 1/5/24: patient needs appt for additional refills  Associated Diagnoses: Cystic fibrosis  (H)      FLUoxetine (PROZAC) 40 MG capsule TAKE TWO CAPSULES BY MOUTH EVERY DAY .  Qty: 180 capsule, Refills: 1    Associated Diagnoses: Recurrent major depressive disorder, in partial remission (H24)      hydrOXYzine (ATARAX) 25 MG tablet Take 1-2 tablets (25-50 mg) by mouth nightly as needed (sleep)  Qty: 60 tablet, Refills: 1    Comments: This is just a change of order. thanks  Associated Diagnoses: Insomnia, unspecified type      medroxyPROGESTERone (DEPO-PROVERA) 150 MG/ML IM injection Inject 150 mg into the muscle every 3 months      naltrexone (DEPADE/REVIA) 50 MG tablet Take 50 mg by mouth daily      omeprazole (PRILOSEC) 40 MG DR capsule Take 1 capsule (40 mg) by mouth 2 times daily  Qty: 60 capsule, Refills: 11    Associated Diagnoses: CF (cystic fibrosis) (H); GERD (gastroesophageal reflux disease)      phytonadione (MEPHYTON) 5 MG tablet TAKE ONE TABLET BY MOUTH ONCE A WEEK  Qty: 4 tablet, Refills: 11    Associated Diagnoses: Cystic fibrosis with pulmonary manifestations (H); Cystic fibrosis with pulmonary manifestations (H); Aspergillosis (H); Pancreatic insufficiency      !! topiramate (TOPAMAX) 25 MG tablet Take one 50 mg pill and one 25 mg pill twice a day  Qty: 180 tablet, Refills: 3    Associated Diagnoses: Morbid obesity (H)      !! topiramate (TOPAMAX) 50 MG tablet Take one 50 mg pill and one 25 mg pill twice a day  Qty: 180 tablet, Refills: 3    Associated Diagnoses: Morbid obesity (H); Persistent depressive disorder      traZODone (DESYREL) 150 MG tablet Take 150 mg by mouth at bedtime      vitamin C (ASCORBIC ACID) 500 MG tablet TAKE 1 TABLET BY MOUTH TWICE A DAY  Qty: 100 tablet, Refills: 3    Associated Diagnoses: Cystic fibrosis with pulmonary manifestations (H)      VITAMIN D3 25 MCG (1000 UT) tablet TAKE ONE TABLET BY MOUTH EVERY DAY  Qty: 90 tablet, Refills: 3    Associated Diagnoses: CF (cystic fibrosis) (H); Exocrine pancreatic insufficiency      Vitamin E 180 MG (400 UNIT) CAPS Take 1  capsule (400 Units) by mouth 2 times daily  Qty: 180 capsule, Refills: 3    Associated Diagnoses: CF (cystic fibrosis) (H); Pancreatic insufficiency       !! - Potential duplicate medications found. Please discuss with provider.        STOP taking these medications       buPROPion (WELLBUTRIN XL) 300 MG 24 hr tablet Comments:   Reason for Stopping:             Allergies   Allergies   Allergen Reactions    Vancomycin Hives     Redmens skin rash   Redmens skin rash     Amoxicillin-Pot Clavulanate Rash

## 2024-02-14 NOTE — MEDICATION SCRIBE - ADMISSION MEDICATION HISTORY
Medication Scribe Admission Medication History    Admission medication history is complete. The information provided in this note is only as accurate as the sources available at the time of the update.    Information Source(s): Patient and CareEverywhere/SureScripts via in-person    Pertinent Information: none    Changes made to PTA medication list:  Added: None  Deleted: Bupropion 300 mg,   Changed: trazodone 100 mg -->  trazodone 150 mg, Naltrexone 50 mg (no frequency) --> Naltrexone 50 mg (every day), Depo-Provera (no frequency) --> Depo-Provera (L47lbco),     Allergies reviewed with patient and updates made in EHR: yes    Medication History Completed By: Concepcion Chowdhury 2/14/2024 12:19 AM    Prior to Admission medications    Medication Sig Last Dose Taking? Auth Provider Long Term End Date   acetylcysteine (MUCOMYST) 20 % neb solution INHALE 4ML VIA NEBULIZER TWO TIMES A DAY. MAY INCREASE TO 3-4 TIMES A DAY WITH INCREASED COUGH / COLD SYMPTOMS. REFRIGERATE VIAL AND DISCARD 96 HOURS AFTER OPENING Past Week at unknown Yes Gavi Allison MD     albuterol (PROAIR HFA/PROVENTIL HFA/VENTOLIN HFA) 108 (90 Base) MCG/ACT inhaler Inhale 2 puffs into the lungs every 6 hours as needed for shortness of breath / dyspnea or wheezing Past Week at unknown Yes Gavi Allison MD Yes    albuterol (PROVENTIL) (2.5 MG/3ML) 0.083% neb solution INHALE 1 VIAL ( 2.5MG) BY NEBULIZATION EVERY 4 HOURS AS NEEDED FOR FOR SHORTNESS OF BREATH OR WHEEZING Past Week at unknown Yes Gavi Allison MD Yes    amphetamine-dextroamphetamine (ADDERALL XR) 20 MG 24 hr capsule TAKE ONE CAPSULE BY MOUTH ONCE EVERY DAY [FOR FILL ON OR AFTER 7-7-23] Past Week at unknown Yes Elizabet Duong MD     blood glucose (ACCU-CHEK GUIDE) test strip Use to test blood sugar 4 times daily or as directed.  Yes Gavi Allison MD     blood glucose monitoring (ACCU-CHEK FASTCLIX) lancets Use to test blood sugar 4 times daily or  as directed.  Yes Gavi Allison MD     busPIRone HCl (BUSPAR) 30 MG tablet Take 1 tablet (30 mg) by mouth 2 times daily 2/12/2024 at pm Yes Jessie Pitts APRN CNP Yes    cetirizine (ZYRTEC) 10 MG tablet Take 1 tablet (10 mg) by mouth every evening 2/12/2024 at pm Yes Laurie Gray MD     elexacaftor-tezacaftor-ivacaftor & ivacaftor (TRIKAFTA) 100-50-75 & 150 MG tablet pack TAKE TWO ORANGE TABLETS BY MOUTH IN THE MORNING AND ONE BLUE TABLET IN THE EVENING AS DIRECTED ON PACKAGE.  TAKE WITH FAT CONTAINING FOOD. Please schedule appointment and labs for additional refills, thanks! 2/12/2024 at pm Yes Laurie Gray MD     FLUoxetine (PROZAC) 40 MG capsule TAKE TWO CAPSULES BY MOUTH EVERY DAY . 2/12/2024 at unknown Yes Jessie Pitts APRN CNP Yes    hydrOXYzine (ATARAX) 25 MG tablet Take 1-2 tablets (25-50 mg) by mouth nightly as needed (sleep) Past Week at unknown Yes Jessie Pitts APRN CNP     medroxyPROGESTERone (DEPO-PROVERA) 150 MG/ML IM injection Inject 150 mg into the muscle every 3 months 1/1/2024 at unknown Yes Reported, Patient Yes    naltrexone (DEPADE/REVIA) 50 MG tablet Take 50 mg by mouth daily 2/12/2024 at unknown Yes Reported, Patient No    omeprazole (PRILOSEC) 40 MG DR capsule Take 1 capsule (40 mg) by mouth 2 times daily 2/12/2024 at pm Yes Gavi Allison MD     phytonadione (MEPHYTON) 5 MG tablet TAKE ONE TABLET BY MOUTH ONCE A WEEK 2/10/2024 at unknown Yes Gavi Allison MD     topiramate (TOPAMAX) 25 MG tablet Take one 50 mg pill and one 25 mg pill twice a day 2/12/2024 at pm Yes Charles Park MD Yes    topiramate (TOPAMAX) 50 MG tablet Take one 50 mg pill and one 25 mg pill twice a day 2/12/2024 at pm Yes Charles Park MD Yes    traZODone (DESYREL) 150 MG tablet Take 150 mg by mouth at bedtime 2/12/2024 at pm Yes Reported, Patient No    vitamin C (ASCORBIC ACID) 500 MG tablet TAKE 1 TABLET BY MOUTH TWICE A DAY 2/12/2024 at  pm Yes Gavi Allison MD     VITAMIN D3 25 MCG (1000 UT) tablet TAKE ONE TABLET BY MOUTH EVERY DAY 2/12/2024 at unknown Yes Gavi Allison MD     Vitamin E 180 MG (400 UNIT) CAPS Take 1 capsule (400 Units) by mouth 2 times daily 2/12/2024 at pm Yes Gavi Allison MD

## 2024-02-14 NOTE — PROGRESS NOTES
Resident/Fellow Attestation   I, Naomy Epps DO, was present with the medical/[employer] student who participated in the service and in the documentation of the note.  I have verified the history and personally performed the physical exam and medical decision making.  I agree with the assessment and plan of care as documented in the note.      Date of Service (when I saw the patient): 02/14/24    [facility]    Progress Note - Medicine Service, [facility] TEAM 4       Date of Admission:  2/13/2024    Assessment & Plan   Mamie Rice is a 28 year old female admitted on 2/13/2024. She has a history of CF well controlled on trikafta, depression, anxiety, insomnia and is admitted for further evaluation of a 3 day history of productive cough, dyspnea and generalized malaise found to have influenza A c/f viral pneumonia and potentially superimposed bacterial pneumonia.     Viral pneumonia 2/2 influenza A with possible superimposed bacterial pneumonia iso history of CF well-controlled on Trikafta  Patient with known history of CF who follows with pulmonology at [facility]. Last seen 7/2023 with normal spirometry. Well controlled on Trikafta with intermittent albuterol inhaler use, last hospitalized in high school. Prior sputum cultures with growth of pseudomonas in 2007, hemophilus influenza in 7/2023. Presenting with productive cough (with intermittent streaks of blood) with progressive dyspnea, generalized malaise, fevers x 72 hours. Vitals notable for mild hypotension 96/70 and tachycardia 108, afebrile. Labs without leukocytosis. Viral panel positive for Influenza A. MRSA nares negative. Chest x-ray notable for new patchy opacity in the LLL with perihilar streakiness. Received 1 bolus of NS and started on tamiflu and cefepime in ED. Presentation suspicious for influenza pneumonia however cannot rule out superimposed bacterial pneumonia. While patient has known history of CF,  patient has not grown pseudomonas in her sputum cultures since 2007 and patient is doing clinically well without evidence of hypoxia, leukocytosis, and with normal lactate/procal. Therefore discontinued cefepime and initiated CAP coverage with ceftriaxone/azithromycin. While symptoms technically developed > 48 hours ago, will continue tamiflu given history of CF.   - CF pulmonology consult, appreciate recs   - Abx per below   - 5 mg Vit K po x 3 d then transition to home regimen   - F/u in pulmonology clinic 2 weeks after d/c  - F/u sputum cultures (needs to be collected)   - Antibiotics               - S/p cefepime in ED   - Tamiflu BID, continue for 5 day course  - Ceftriaxone/azithromycin  > Discharge on cefdinir for 2 week course  > Discharge on azithromycin for 5 day course   - Symptom support               - 1 L of NS              - Throat lozenges               - Tylenol 975 mg Q8HR               - Vesting therapy QID    - Flonase prn   - Cystic fibrosis medications               - Continue PTA Trikafta               - Continue PTA albuterol nebs Q4HR scheduled                    - Continue PTA albuterol inhaler PRN               - Continue PTA cetirizine      GERD   - Continue PTA omeprazole BID      ADHD  Depression   Anxiety   Insomnia   - Continue PTA buspirone   - Continue PTA bupropion   - Continue PTA fluoxetine   - Continue PTA naltrexone   - Continue PTA topiramate   - Continue PTA trazodone   - Continue PTA adderall   - Continue PTA hydroxyzine         Diet: Combination Diet Regular Diet Adult    DVT Prophylaxis: Low Risk/Ambulatory with no VTE prophylaxis indicated  Swan Catheter: Not present  Fluids: None  Lines: None     Cardiac Monitoring: None  Code Status: Full Code      Clinically Significant Risk Factors Present on Admission          # Hypocalcemia: Lowest Ca = 8 mg/dL in last 2 days, will monitor and replace as appropriate              # Obesity: Estimated body mass index is 34.96 kg/m  as  "calculated from the following:    Height as of this encounter: 1.549 m (5' 1\").    Weight as of this encounter: 83.9 kg (185 lb).              Disposition Plan      Expected Discharge Date: 02/15/2024                The patient's care was discussed with the Attending Physician, Dr. Valentine .    Ford Vincent  Medical Student  Medicine Service, [facility] TEAM 4  [facility]  Securely message with [employer] (more info)  Text page via [facility] Paging/Directory   See signed in provider for up to date coverage information  ______________________________________________________________________    Interval History   No acute events overnight. Patient has been sick since Saturday night (2/10) with productive cough (green sputum + streaks of blood), dyspnea, malaise, and subjective fever. Has also had nausea with intermittent abdominal pain. CF is generally well-controlled on Trikafta, she has not been hospitalized for this since high school. 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 Mom present at bedside.     Physical Exam   Vital Signs: Temp: 98.3  F (36.8  C) Temp src: Oral BP: 119/71 Pulse: 84   Resp: 18 SpO2: (P) 98 % O2 Device: (P) None (Room air)    Weight: 185 lbs 0 oz    General Appearance: Awake, alert, appears fatigued  Respiratory: CTAB, good air flow, intermittent productive cough, congested  Cardiovascular: RRR, no m/r/g  GI: Soft, non-tender, non-distended, normoactive bowel sounds  Skin: No rashes or bruises on exposed skin    "

## 2024-02-14 NOTE — H&P
[facility]    History and Physical - Medicine Service, [facility] TEAM        Date of Admission:  2/13/2024    Assessment & Plan      Mamie Rice is a 28 year old female with past medical history notable for CF well controlled on trikafta, depression, anxiety, insomnia who presents to the ED due to productive cough, dyspnea and generalized malaise found to have influenza A and admitted to medicine for further evaluation.     #Shortness of breath, productive cough   #Influenza A   #History of cystic fibrosis   Patient with known history of cystic fibrosis who follows with pulmonology at [facility]. Last seen 7/2023 with normal spirometry. Well controlled on Trikafta with intermittent albuterol inhaler use. Prior sputum cultures with growth of pseudomonas in 2007, hemophilus influenza in 7/2023. Presenting with productive cough (with intermittent streaks of blood) with progressive dyspnea, generalized malaise fevers x 72 hours. Vitals notable for mild hypotension 96/70 and tachycardia 108, afebrile. Labs without leukocytosis. Viral panel positive for Influenza A. MRSA nares negative. Chest x-ray notable for new patchy opacity in the LLL with perihilar streakiness. Received 1 bolus of NS and started on tamiflu and cefepime in ED. Presentation suspicious for influenza pneumonia however cannot rule out superimposed bacterial pneumonia. While patient has known history of CF, patient has not grown pseudomonas in her sputum cultures since 2007 and patient is doing clinically well without evidence of hypoxia, leukocytosis, therefore will discontinue cefepime and initiate CAP coverage overnight. While symptoms technically developed > 48 hours ago, will continue tamiflu with consideration to discontinue in the AM. Will also plan for CF pulmonology evaluation in AM.   - CF pulmonology consult, appreciate recommendations   - Sputum cultures pending   - Lactic acid, procalctonin pending  "  - Antibiotics    - S/p cefepime in ED   - Tamiflu BID     - Consider discontinuation in AM given symptom onset > 48 hours   - Ceftriaxone/azithromycin   - Symptom support    - 1 L of NS   - Throat lozenges    - Tylenol 975 mg Q8HR    - Vesting therapy QID   - Cystic fibrosis medications     - Continue PTA Trikafta    - Continue PTA albuterol nebs Q4HR scheduled     - Continue PTA albuterol inhaler PRN    - Continue PTA cetirizine     #GERD   - Continue PTA omeprazole BID     #ADHD  #Depression   #Anxiety   #Insomnia   - Continue PTA buspirone   - Continue PTA bupropion   - Continue PTA fluoxetine   - Continue PTA naltrexone   - Continue PTA topiramate   - Continue PTA trazodone   - Continue PTA adderall   - Continue PTA hydroxyzine           Diet: Combination Diet Regular Diet Adult    DVT Prophylaxis: mechanical   Swan Catheter: Not present  Fluids: s/p 1 bolus in ED, another bolus now   Lines: None     Cardiac Monitoring: None  Code Status: Full Code      Clinically Significant Risk Factors Present on Admission                       # Obesity: Estimated body mass index is 34.96 kg/m  as calculated from the following:    Height as of this encounter: 1.549 m (5' 1\").    Weight as of this encounter: 83.9 kg (185 lb).              Disposition Plan      Expected Discharge Date: 02/15/2024                The patient's care was discussed with the attending, Dr. Lacy Augustine,   Medicine Service, [facility]  Securely message with [employer] (more info)  Text page via [employer] Paging/Directory   See signed in provider for up to date coverage information  ______________________________________________________________________    Chief Complaint   Dyspnea, productive cough     History obtained from patient and chart review     History of Present Illness   Mamie Rice is a 28 year old female with past medical history of cystic fibrosis, anxiety, " depression, insomnia who presented to the ED on 2/13 due to progressive dyspnea with productive cough. Patient reports that she developed a cough on Saturday evening 2/10. Cough is productive in nature with green-yellow colored mucus with intermittent tinges of blood. Patient reports associated generalized malaise and body aches, for which she has been sleeping for the past 48 hours. She reports fevers of 100.7 at home, for which she has been taking tylenol at home. She has had poor PO intake over the past 48 hours. Last meal was a couple bites of chicken noodle soup yesterday afternoon. She denies any headaches, chest pain, abdominal pain, nausea, vomiting, diarrhea. She reports that her cystic fibrosis is well controlled on Trikafta. She has used her albuterol nebulizer over the pat few days, but normally does not need to use her inhalers on a regular basis. She lives at home with her family. She works as a  and suspects that she picked up a cold at the school. No alcohol use, smoking or recreational drug use. No recent hospitalizations for shortness of breath/productive cough.     Vitals on arrival notable for blood pressure 96/70, afebrile, , RR 16, saturating well at 97% on room air. No leukocytosis. Viral panel positive for influenza A, MRSA nares negative. Chest x-ray with new patchy basilar opacities in LLL with perihilar streakiness. Given cefepime, tamiflu, and 1 bolus of NS in the ED. Admitted to medicine for further evaluation.       Past Medical History    Past Medical History:   Diagnosis Date    ADHD (attention deficit hyperactivity disorder)     Anxiety     Aspergillosis, with pneumonia (H)     fugus found caused chest pain    Chronic infection     CF, MRSA.,     Chronic sinusitis     Constipation, chronic     Cystic fibrosis with pulmonary manifestations (H) 12/19/2011    Cystic fibrosis without mention of meconium ileus     SWEAT TEST:Date: 2/17/1994   Labor[facility]  #1  296 mg, 104 mmol/L ClSample #2  295 mg, 104 mmol/L Cl GENOTYPING:Date: 10/15/2007,  Laboratory: [facility]: [id number]    Depressive disorder     Diabetes     no meds currently    Dysthymic disorder     Exocrine pancreatic insufficiency     Gastro-oesophageal reflux disease     Hip pain, right     MRSA (methicillin resistant Staphylococcus aureus) carrier     Pancreatic disease        Past Surgical History   Past Surgical History:   Procedure Laterality Date    ARTHROSCOPY HIP, OSTEOPLASTY FEMUR PROXIMAL, COMBINED  3/11/2013    Procedure: COMBINED ARTHROSCOPY HIP, OSTEOPLASTY FEMUR PROXIMAL;  Right Hip Arthroscopy, Labral  Debridement.    surgeon request choice anesthesia/admit to [facility] after surgery;  Surgeon: Omkar Austin MD;  [facility]: [facility] OR    ARTHROSCOPY KNEE WITH MEDIAL MENISCECTOMY Left 1/31/2017    Procedure: ARTHROSCOPY KNEE WITH MEDIAL MENISCECTOMY;  Surgeon: Jethro Coyle MD;  Location: [facility] OR    bronchoscopies      BRONCHOSCOPY      EXAM UNDER ANESTHESIA ANUS N/A 5/10/2016    Procedure: EXAM UNDER ANESTHESIA ANUS;  Surgeon: Chet Gaviria MD;  [facility]    EXAM UNDER ANESTHESIA, RESTORATIONS, EXTRACTION(S) DENTAL COMPLEX, COMBINED  5/13/2013    Procedure: COMBINED EXAM UNDER ANESTHESIA, RESTORATIONS, EXTRACTION(S) DENTAL COMPLEX;  Dental Exam, Radiographs, Restorations. Single Extraction  Tooth #2. Restorations x 3;  Surgeon: Danilo Ortiz DDS;  Location: [facility] OR    HC KNEE SCOPE, DIAGNOSTIC      Arthroscopy, Knee- left    INJECT BOTOX N/A 5/10/2016    Procedure: INJECT BOTOX;  Surgeon: Chet Gaviria MD;  [facility]    left hip labral tear  5/11/2011    left hip arthroscopy with labral debridement and synovectomy    meniscus repair      OPTICAL TRACKING SYSTEM ENDOSCOPIC SINUS SURGERY  10/14/2011    Procedure:OPTICAL TRACKING SYSTEM ENDOSCOPIC SINUS SURGERY; FESS (functional endoscopic sinus surgery) with Image Guidance, bronchial  lavage and cultures; Surgeon:GOYO KUO; Location:[facility] OR    OPTICAL TRACKING SYSTEM ENDOSCOPIC SINUS SURGERY  5/18/2012    Procedure:OPTICAL TRACKING SYSTEM ENDOSCOPIC SINUS SURGERY; Right  and Left Image Guided Functional Endoscopic Sinus Surgery With  Frontal Approach, [place]; Surgeon:GOYO KUO; Location[facility] OR    OPTICAL TRACKING SYSTEM ENDOSCOPIC SINUS SURGERY  9/26/2012    Procedure: OPTICAL TRACKING SYSTEM ENDOSCOPIC SINUS SURGERY;  Stealth Guided Bilateral Functional Endoscopic Sinus Surgery *Latex Safe*;  Surgeon: Goyo Kuo MD;  [facility]    OPTICAL TRACKING SYSTEM ENDOSCOPIC SINUS SURGERY Bilateral 10/16/2015    Procedure: OPTICAL TRACKING SYSTEM ENDOSCOPIC SINUS SURGERY;  Surgeon: Mariela Haile MD;  [facility]    ORTHOPEDIC SURGERY      left hip tear repair 2010    SINUS SURGERY         Prior to Admission Medications   Prior to Admission Medications   Prescriptions Last Dose Informant Patient Reported? Taking?   FLUoxetine (PROZAC) 40 MG capsule   No No   Sig: TAKE TWO CAPSULES BY MOUTH EVERY DAY .   Multiple Vitamin (MULTIVITAMIN OR)  Mother Yes No   Sig: Take 1 tablet by mouth daily.   VITAMIN D3 25 MCG (1000 UT) tablet   No No   Sig: TAKE ONE TABLET BY MOUTH EVERY DAY   Vitamin E 180 MG (400 UNIT) CAPS   No No   Sig: Take 1 capsule (400 Units) by mouth 2 times daily   acetylcysteine (MUCOMYST) 20 % neb solution   No No   Sig: INHALE 4ML VIA NEBULIZER TWO TIMES A DAY. MAY INCREASE TO 3-4 TIMES A DAY WITH INCREASED COUGH / COLD SYMPTOMS. REFRIGERATE VIAL AND DISCARD 96 HOURS AFTER OPENING   Patient not taking: Reported on 1/17/2023   albuterol (PROAIR HFA/PROVENTIL HFA/VENTOLIN HFA) 108 (90 Base) MCG/ACT inhaler   No No   Sig: Inhale 2 puffs into the lungs every 6 hours as needed for shortness of breath / dyspnea or wheezing   albuterol (PROVENTIL) (2.5 MG/3ML) 0.083% neb solution   No No   Sig: INHALE 1 VIAL ( 2.5MG) BY NEBULIZATION EVERY 4 HOURS AS NEEDED FOR FOR SHORTNESS OF  BREATH OR WHEEZING   amphetamine-dextroamphetamine (ADDERALL XR) 20 MG 24 hr capsule   No No   Sig: TAKE ONE CAPSULE BY MOUTH ONCE EVERY DAY [FOR FILL ON OR AFTER 7-7-23]   blood glucose (ACCU-CHEK GUIDE) test strip   No No   Sig: Use to test blood sugar 4 times daily or as directed.   blood glucose monitoring (ACCU-CHEK FASTCLIX) lancets   No No   Sig: Use to test blood sugar 4 times daily or as directed.   buPROPion (WELLBUTRIN XL) 300 MG 24 hr tablet   No No   Sig: Take 1 tablet (300 mg) by mouth every morning   busPIRone HCl (BUSPAR) 30 MG tablet   No No   Sig: Take 1 tablet (30 mg) by mouth 2 times daily   cetirizine (ZYRTEC) 10 MG tablet   No No   Sig: Take 1 tablet (10 mg) by mouth every evening   elexacaftor-tezacaftor-ivacaftor & ivacaftor (TRIKAFTA) 100-50-75 & 150 MG tablet pack   No No   Sig: TAKE TWO ORANGE TABLETS BY MOUTH IN THE MORNING AND ONE BLUE TABLET IN THE EVENING AS DIRECTED ON PACKAGE.  TAKE WITH FAT CONTAINING FOOD. Please schedule appointment and labs for additional refills, thanks!   hydrOXYzine (ATARAX) 25 MG tablet   No No   Sig: Take 1-2 tablets (25-50 mg) by mouth nightly as needed (sleep)   medroxyPROGESTERone (DEPO-PROVERA) 150 MG/ML IM injection   Yes No   Sig: Inject 150 mg into the muscle   naltrexone (DEPADE/REVIA) 50 MG tablet   No No   Sig: Take 1 tablet.  Time it one to two hours prior to worst cravings.   omeprazole (PRILOSEC) 40 MG DR capsule   No No   Sig: Take 1 capsule (40 mg) by mouth 2 times daily   phytonadione (MEPHYTON) 5 MG tablet   No No   Sig: TAKE ONE TABLET BY MOUTH ONCE A WEEK   topiramate (TOPAMAX) 25 MG tablet   No No   Sig: Take one 50 mg pill and one 25 mg pill twice a day   topiramate (TOPAMAX) 50 MG tablet   No No   Sig: Take one 50 mg pill and one 25 mg pill twice a day   traZODone (DESYREL) 100 MG tablet   No No   Sig: Take 1 tablet (100 mg) by mouth At Bedtime   vitamin C (ASCORBIC ACID) 500 MG tablet   No No   Sig: TAKE 1 TABLET BY MOUTH TWICE A DAY       Facility-Administered Medications: None           Physical Exam   Vital Signs: Temp: 98.6  F (37  C) Temp src: Oral BP: 96/70 Pulse: 108   Resp: 16 SpO2: 97 % O2 Device: None (Room air)    Weight: 185 lbs 0 oz    General: no apparent distress   Cardiac: tachycardic, regular rhythm, no murmurs   Pulmonary: breathing comfortably on room air, productive cough appreciated, no audible wheezing, decreased breath sounds at LLL, no wheezing appreciated   Abdominal: soft, nontender, nondistended   Extremities: moving all extremities appropriately       Medical Decision Making             Data

## 2024-02-15 VITALS
BODY MASS INDEX: 34.93 KG/M2 | SYSTOLIC BLOOD PRESSURE: 111 MMHG | DIASTOLIC BLOOD PRESSURE: 53 MMHG | HEIGHT: 61 IN | HEART RATE: 78 BPM | TEMPERATURE: 97.7 F | RESPIRATION RATE: 17 BRPM | OXYGEN SATURATION: 97 % | WEIGHT: 185 LBS

## 2024-02-15 DIAGNOSIS — E84.0 CYSTIC FIBROSIS WITH PULMONARY MANIFESTATIONS (H): Primary | ICD-10-CM

## 2024-02-15 LAB
ANION GAP SERPL CALCULATED.3IONS-SCNC: 13 MMOL/L (ref 7–15)
BUN SERPL-MCNC: 5.4 MG/DL (ref 6–20)
CALCIUM SERPL-MCNC: 8.7 MG/DL (ref 8.6–10)
CHLORIDE SERPL-SCNC: 110 MMOL/L (ref 98–107)
CREAT SERPL-MCNC: 0.63 MG/DL (ref 0.51–0.95)
DEPRECATED HCO3 PLAS-SCNC: 20 MMOL/L (ref 22–29)
EGFRCR SERPLBLD CKD-EPI 2021: >90 ML/MIN/1.73M2
GLUCOSE SERPL-MCNC: 85 MG/DL (ref 70–99)
POTASSIUM SERPL-SCNC: 3.6 MMOL/L (ref 3.4–5.3)
SODIUM SERPL-SCNC: 143 MMOL/L (ref 135–145)

## 2024-02-15 PROCEDURE — 80048 BASIC METABOLIC PNL TOTAL CA: CPT

## 2024-02-15 PROCEDURE — 999N000157 HC STATISTIC RCP TIME EA 10 MIN

## 2024-02-15 PROCEDURE — 250N000009 HC RX 250: Performed by: NURSE PRACTITIONER

## 2024-02-15 PROCEDURE — 94640 AIRWAY INHALATION TREATMENT: CPT

## 2024-02-15 PROCEDURE — 250N000013 HC RX MED GY IP 250 OP 250 PS 637

## 2024-02-15 PROCEDURE — 99232 SBSQ HOSP IP/OBS MODERATE 35: CPT | Performed by: NURSE PRACTITIONER

## 2024-02-15 PROCEDURE — 99239 HOSP IP/OBS DSCHRG MGMT >30: CPT | Mod: GC | Performed by: INTERNAL MEDICINE

## 2024-02-15 PROCEDURE — 250N000011 HC RX IP 250 OP 636: Mod: JZ | Performed by: NURSE PRACTITIONER

## 2024-02-15 PROCEDURE — 36415 COLL VENOUS BLD VENIPUNCTURE: CPT

## 2024-02-15 PROCEDURE — 250N000011 HC RX IP 250 OP 636

## 2024-02-15 PROCEDURE — 94640 AIRWAY INHALATION TREATMENT: CPT | Mod: 76

## 2024-02-15 PROCEDURE — 94669 MECHANICAL CHEST WALL OSCILL: CPT

## 2024-02-15 RX ORDER — PHYTONADIONE 5 MG/1
5 TABLET ORAL DAILY
Qty: 3 TABLET | Refills: 0 | Status: SHIPPED | OUTPATIENT
Start: 2024-02-15 | End: 2024-02-27

## 2024-02-15 RX ORDER — OSELTAMIVIR PHOSPHATE 75 MG/1
75 CAPSULE ORAL 2 TIMES DAILY
Qty: 6 CAPSULE | Refills: 0 | Status: SHIPPED | OUTPATIENT
Start: 2024-02-15 | End: 2024-02-27

## 2024-02-15 RX ORDER — AZITHROMYCIN 250 MG/1
500 TABLET, FILM COATED ORAL ONCE
Qty: 2 TABLET | Refills: 0 | Status: DISCONTINUED | OUTPATIENT
Start: 2024-02-15 | End: 2024-02-15

## 2024-02-15 RX ORDER — CEFDINIR 300 MG/1
300 CAPSULE ORAL 2 TIMES DAILY
Qty: 26 CAPSULE | Refills: 0 | Status: SHIPPED | OUTPATIENT
Start: 2024-02-15 | End: 2024-02-27

## 2024-02-15 RX ORDER — FLUTICASONE PROPIONATE 50 MCG
1 SPRAY, SUSPENSION (ML) NASAL DAILY
Qty: 9.9 ML | Refills: 1 | Status: SHIPPED | OUTPATIENT
Start: 2024-02-15

## 2024-02-15 RX ORDER — AZITHROMYCIN 250 MG/1
500 TABLET, FILM COATED ORAL ONCE
Status: COMPLETED | OUTPATIENT
Start: 2024-02-15 | End: 2024-02-15

## 2024-02-15 RX ADMIN — OSELTAMIVIR PHOSPHATE 75 MG: 75 CAPSULE ORAL at 08:11

## 2024-02-15 RX ADMIN — ACETAMINOPHEN 975 MG: 325 TABLET, FILM COATED ORAL at 14:01

## 2024-02-15 RX ADMIN — NALTREXONE HYDROCHLORIDE 50 MG: 50 TABLET, FILM COATED ORAL at 08:10

## 2024-02-15 RX ADMIN — PHYTONADIONE 5 MG: 10 INJECTION, EMULSION INTRAMUSCULAR; INTRAVENOUS; SUBCUTANEOUS at 11:32

## 2024-02-15 RX ADMIN — FLUOXETINE HYDROCHLORIDE 80 MG: 40 CAPSULE ORAL at 08:11

## 2024-02-15 RX ADMIN — ALBUTEROL SULFATE 2.5 MG: 2.5 SOLUTION RESPIRATORY (INHALATION) at 12:00

## 2024-02-15 RX ADMIN — ACETAMINOPHEN 975 MG: 325 TABLET, FILM COATED ORAL at 08:11

## 2024-02-15 RX ADMIN — ACETYLCYSTEINE 2 ML: 200 SOLUTION ORAL; RESPIRATORY (INHALATION) at 12:00

## 2024-02-15 RX ADMIN — AZITHROMYCIN 500 MG: 250 TABLET, FILM COATED ORAL at 14:01

## 2024-02-15 RX ADMIN — ALBUTEROL SULFATE 2.5 MG: 2.5 SOLUTION RESPIRATORY (INHALATION) at 07:57

## 2024-02-15 RX ADMIN — ACETYLCYSTEINE 2 ML: 200 SOLUTION ORAL; RESPIRATORY (INHALATION) at 07:57

## 2024-02-15 RX ADMIN — FLUTICASONE PROPIONATE 1 SPRAY: 50 SPRAY, METERED NASAL at 08:10

## 2024-02-15 RX ADMIN — CEFTRIAXONE SODIUM 2 G: 2 INJECTION, POWDER, FOR SOLUTION INTRAMUSCULAR; INTRAVENOUS at 08:09

## 2024-02-15 RX ADMIN — BUSPIRONE HYDROCHLORIDE 30 MG: 30 TABLET ORAL at 08:11

## 2024-02-15 RX ADMIN — TOPIRAMATE 75 MG: 25 TABLET, FILM COATED ORAL at 08:11

## 2024-02-15 RX ADMIN — PANTOPRAZOLE SODIUM 40 MG: 40 TABLET, DELAYED RELEASE ORAL at 08:11

## 2024-02-15 RX ADMIN — DEXTROAMPHETAMINE SULFATE, DEXTROAMPHETAMINE SACCHARATE, AMPHETAMINE SULFATE AND AMPHETAMINE ASPARTATE 20 MG: 5; 5; 5; 5 CAPSULE, EXTENDED RELEASE ORAL at 08:11

## 2024-02-15 ASSESSMENT — ACTIVITIES OF DAILY LIVING (ADL)
ADLS_ACUITY_SCORE: 18
DEPENDENT_IADLS:: INDEPENDENT
ADLS_ACUITY_SCORE: 18

## 2024-02-15 NOTE — PROGRESS NOTES
"[facility] ADMISSION NOTE    Patient admitted to room 7-220 at approximately 19:00 via wheel chair from emergency room. Patient was accompanied by mother.     Verbal SBAR report received from 1845 prior to patient arrival.     Patient ambulated to bed independently. Patient alert and oriented X 3. Pain is controlled with current analgesics.  Medication(s) being used: acetaminophen.  . Admission vital signs: Blood pressure 127/58, pulse 99, temperature 98  F (36.7  C), temperature source Oral, resp. rate 18, height 1.549 m (5' 1\"), weight 83.9 kg (185 lb), SpO2 99%. mother was oriented to plan of care, call light, bed controls, tv, telephone, bathroom, and visiting hours.     Risk Assessment    The following safety risks were identified during admission: isolation. Yellow risk band applied: YES.     Skin Initial Assessment    This writer admitted this patient and completed a full skin assessment and Sarbjit score in the Adult PCS flowsheet. Appropriate interventions initiated as needed.     Secondary skin check completed by Chantal BIRCH RN, No Skin Issues     Sarbjit Risk Assessment  Sensory Perception: 4-->no impairment  Moisture: 4-->rarely moist  Activity: 4-->walks frequently  Mobility: 4-->no limitation  Nutrition: 3-->adequate  Friction and Shear: 3-->no apparent problem  Sarbjit Score: 22    Education    Patient has a [place] to Observation order: No  Observation education completed and documented: N/A      Katerina Jo RN      "

## 2024-02-15 NOTE — PLAN OF CARE
"/53 (BP Location: Left arm)   Pulse 78   Temp 97.7  F (36.5  C) (Oral)   Resp 17   Ht 1.549 m (5' 1\")   Wt 83.9 kg (185 lb)   SpO2 97%   BMI 34.96 kg/m       Problem: Adult Inpatient Plan of Care  Goal: Plan of Care Review  Description: The Plan of Care Review/Shift note should be completed every shift.  The Outcome Evaluation is a brief statement about your assessment that the patient is improving, declining, or no change.  This information will be displayed automatically on your shift  note.  Outcome: Adequate for Care Transition     Goal Outcome Evaluation: PIV removed, Zithromax dose given. AVS gone through w/ pt. No further questions. Pt discharged to home.      "

## 2024-02-15 NOTE — PROVIDER NOTIFICATION
7220. Dwayne. Can we ret[facility] the [facility]! Abigail [phone]

## 2024-02-15 NOTE — PLAN OF CARE
Goal Outcome Evaluation:      Plan of Care Reviewed With: patient    Overall Patient Progress: no change    Time:1930-0730  Neuros: A&Ox4, calls appropriately.   Cardiac: WNL  Respiratory:Sats >95% on RA. LS diminished at bases, infrequent productive cough, nebs & vesting with RT.   Pain: port vesting rates her chest pain 5/10 but tolerable, pt slept well overnight.   Nausea: Denies N/V  Diet: Regular diet, good appetite.   GI/: Voiding spontaneously, not saving. +BS, +flatus, LBM 2/14 per pt.   Skin: Intact, no skin deficits noted.   Lines: R PIV infusing TKO, IV ABX per order.   Labs: Reviewed.   Activity: Up independently.   New Changes this Shift: No acute changes.   Plan: Continue POC.

## 2024-02-15 NOTE — PROGRESS NOTES
Pulmonary Medicine  Cystic Fibrosis - Lung Transplant Team  Progress Note  02/15/2024     Patient: Mamie Rice  MRN: [id number]  : 1995 (age 28 year old)  Admission date: 2024    Assessment & Plan:     Mamie Rice is a 28 year old female with a history of CF lung disease with normal spirometry, CF sinus disease, CFRD, pancreatic insufficiency, ADHD, insomnia, MECHE, and depression.  The patient was admitted on 24 for CF pulmonary exacerbation due to influenza A and pneumonia.  Discharge to home today with PO ABX, Tamiflu, and increased vitamin K courses.     CF pulmonary exacerbation:   Influenza A (H1N1):  LLL pneumonia:  Hemoptysis: Admitted with 4 days of malaise, chest tightness, CRUM, cough (thick green, intermittently blood mixed sputum), sinus congestion, and bilateral rib/chest pain.  Typically does not do airway clearance therapy with vesting or nebs but did start treatment BID with current illness.  Colonized with MSSA with additional h/o MRSA (last ), PsA (last ) HIB, and Achromobacter on cultures. Respiratory panel with Influenza A.  No leukocytosis or fever.  CXR on admission with new LLL opacity and increased streaky opacities.  No hypoxia.  Symptom improvement with increased airway clearance, ABX, and Tamiflu.  Blood streaked sputum now less and darker.  - Sputum culture (bacterial, fungal, AFB, Nocardia) pending from , follow as OP  - ABX: transition from IV ceftriaxone () to PO cefdinir upon discharge for 2 week total course through ; azithromycin 500 mg daily 3 day course completed today prior to discharge  - Oseltamivir () through  to complete a 5 day course  - Vitamin K 5 mg daily x3 days (-) then resume PTA regimen of weekly qSaturday  - Vesting TID with nebs (albuterol and Mucomyst TID) upon discharge  - PFTs deferred while inpatient given influenza A  - Follow up CF pulmonary clinic  with repeat CXR and PFTs     CFTR modulation:  "Tolerating Trikafta well.  LFTs and CK stable on admission.     Cystic fibrosis sinus disease: H/o multiple endoscopic sinus surgeries (last 2015).  Chronic symptoms worsened with acute illness (influenza A) as above.  - Flonase daily (2/14) with nasal rinses ([facility]) BID     Exocrine pancreatic insufficiency: No signs of malabsorption.  Body mass index is 34.96 kg/m , above CFF goals.  - PTA vitamins per CF RD     We appreciate the excellent care provided by the [place] [id number] team.  Recommendations communicated via in person and this note.  Will continue to follow along closely, please do not hesitate to call with any questions or concerns.     Patient discussed with Dr. Baker.    Sandie Slade, DNP, APRN, CNP  Inpatient Nurse Practitioner  Pulmonary CF/Transplant     Subjective & Interval History:     Tolerating full vest therapy QID with intermittent sputum production, occasional maroon blood flecks/streaking.  Pulmonary symptoms continue to gradually improve, remains on RA.  Persistent sinus symptoms with illness unchanged.  Intermittent nausea and decreased appetite, stools stable.    Review of Systems:     C: No fever, no chills  INTEGUMENTARY/SKIN: No rash or obvious new lesions  ENT/MOUTH: See interval history  RESP: See interval history  CV: No cardiac-type chest pain, no palpitations, no peripheral edema, no orthopnea  GI: No vomiting, o reflux symptoms  : No dysuria  MUSCULOSKELETAL: MSK chest pain with vesting  ENDOCRINE: Blood sugars with adequate control  NEURO: No headache, no numbness or tingling  PSYCHIATRIC: Mood stable    Physical Exam:     All notes, images, and labs from past 24 hours (at minimum) were reviewed.    Vital signs:  Temp: 97.7  F (36.5  C) Temp src: Oral BP: 111/53 Pulse: 78   Resp: 17 SpO2: 98 % O2 Device: None (Room air)   Height: 154.9 cm (5' 1\") Weight: 83.9 kg (185 lb)  I/O:   Intake/Output Summary (Last 24 hours) at 2/15/2024 1151  Last data filed at 2/15/2024 " "0700  Gross per 24 hour   Intake 960 ml   Output --   Net 960 ml     Constitutional: Sitting up in bed, mother at bedside, in no apparent distress.   HEENT: Eyes with pink conjunctivae, anicteric.  Oral mucosa moist without lesions.   PULM: Mildly diminished bases bilaterally.  No crackles, no rhonchi, no wheezes.  Non-labored breathing on RA.  CV: Normal S1 and S2.  RRR.  No murmur, gallop, or rub.  No peripheral edema.   ABD: NABS, soft, nontender, nondistended.    MSK: Moves all extremities.  No apparent muscle wasting.   NEURO: Alert and conversant.   SKIN: Warm, dry.  No rash on limited exam.   PSYCH: Mood stable.     Data:     LABS    CMP:   Recent Labs   Lab 02/15/24  0803 02/14/24  0720 02/13/24  1712    139 137   POTASSIUM 3.6 3.7 4.4   CHLORIDE 110* 109* 100   CO2 20* 17* 24   ANIONGAP 13 13 13   GLC 85 75 99   BUN 5.4* 9.7 10.7   CR 0.63 0.59 0.73   GFRESTIMATED >90 >90 >90   ALAN 8.7 8.0* 9.0   PROTTOTAL  --   --  7.1   ALBUMIN  --   --  3.8   BILITOTAL  --   --  0.3   ALKPHOS  --   --  62   AST  --   --  42   ALT  --   --  20     CBC:   Recent Labs   Lab 02/14/24  0720 02/13/24  1712   WBC 4.8 8.3   RBC 4.79 5.41*   HGB 12.9 14.7   HCT 39.3 47.0   MCV 82 87   MCH 26.9 27.2   MCHC 32.8 31.3*   RDW 13.4 13.6    281       INR:   Recent Labs   Lab 02/14/24  1717   INR 0.95       Glucose:   Recent Labs   Lab 02/15/24  0803 02/14/24  0720 02/13/24  1712   GLC 85 75 99       Blood Gas: No lab results found in last 7 days.    Culture Data No results for input(s): \"CULT\" in the last 168 hours.    Virology Data:   Lab Results   Component Value Date    INFLUA Negative 01/19/2011    FLUAH1 Not Detected 02/13/2024    FLUAH3 Not Detected 02/13/2024    [id number] Detected (A) 02/13/2024    IFLUB Not Detected 02/13/2024    RSVA Not Detected 02/13/2024    RSVB Not Detected 02/13/2024    PIV1 Not Detected 02/13/2024    PIV2 Not Detected 02/13/2024    PIV3 Not Detected 02/13/2024    HMPV Not Detected 02/13/2024 " "      Historical CMV results (last 3 of prior testing):  No results found for: \"CMVQNT\"  No results found for: \"CMVLOG\"    Urine Studies    Recent Labs   Lab Test 08/09/22  0829 06/08/21  1016   URINEPH 5.0 7.0   NITRITE Negative Negative   LEUKEST Trace* Negative   WBCU 24* 1       Most Recent [facility] Pulmonary Function Testing (FVC/FEV1 only)  FVC-Pre   Date Value Ref Range Status   07/05/2023 3.56 L    01/17/2023 3.60 L    08/09/2022 3.64 L    10/22/2021 3.47 L      FVC-%Pred-Pre   Date Value Ref Range Status   07/05/2023 109 %    01/17/2023 110 %    08/09/2022 102 %    10/22/2021 97 %      FEV1-Pre   Date Value Ref Range Status   07/05/2023 3.12 L    01/17/2023 3.17 L    08/09/2022 3.15 L    10/22/2021 3.11 L      FEV1-%Pred-Pre   Date Value Ref Range Status   07/05/2023 110 %    01/17/2023 112 %    08/09/2022 103 %    10/22/2021 102 %        IMAGING    Recent Results (from the past 48 hour(s))   XR Chest 2 Views    Narrative    Exam: XR CHEST 2 VIEWS, 2/13/2024 5:26 PM    Indication: SOB, cough, fever, CF patient    Comparison: Chest radiograph 8/9/2022    Findings:   Upright PA and lateral chest radiograph. Trachea is midline.  Cardiomediastinal silhouette is within normal limits. Chronic changes  of cystic fibrosis with bronchiectasis, not significantly changed from  prior. Increased bilateral hilar and left lower lung perihilar  opacities. No pleural effusion or pneumothorax. No obstructive bowel  gas pattern.       Impression    Impression:  New patchy basilar opacity best seen on lateral  projection likely in the left lower lobe, concerning for infection.  Slight increased perihilar streakiness possibly atelectasis, edema  versus infection. Similar chronic changes of cystic fibrosis, compared  to radiograph from 8/9/2022.    I have personally reviewed the examination and initial interpretation  and I agree with the findings.    DARLING MARIE MD         SYSTEM ID:  [id number]     "

## 2024-02-15 NOTE — PROGRESS NOTES
Care Management Initial Consult    General Information  Assessment completed with: PatientMamie  Type of CM/SW Visit: Offer D/C Planning    Primary Care Provider verified and updated as needed:     Readmission within the last 30 days:        Reason for Consult: discharge planning     Communication Assessment  Patient's communication style: spoken language (English or Bilingual)    Hearing Difficulty or Deaf: no   Wear Glasses or Blind: no    Cognitive  Cognitive/Neuro/Behavioral: WDL                      Living Environment:   People in home: parent(s)     Current living Arrangements: house      Able to return to prior arrangements: yes     Family/Social Support:  Care provided by: self  Provides care for:  self  Marital Status: Single             Description of Support System:    supportive and involved       Current Resources:   Patient receiving home care services: No     Community Resources:  none  Equipment currently used at home: none  Supplies currently used at home:      Employment/Financial:  Employment Status: employed full-time        Financial Concerns: none   Referral to Financial Worker: No       Does the patient's insurance plan have a 3 day qualifying hospital stay waiver?  No    Lifestyle & Psychosocial Needs:  Social Determinants of Health     Food Insecurity: Not on file   Depression: Not at risk (6/1/2023)    PHQ-2     PHQ-2 Score: 0   Housing Stability: Not on file   Tobacco Use: Low Risk  (2/13/2024)    Patient History     Smoking Tobacco Use: Never     Smokeless Tobacco Use: Never     Passive Exposure: Not on file   Financial Resource Strain: Not on file   Alcohol Use: Not on file   Transportation Needs: Not on file   Physical Activity: Not on file   Interpersonal Safety: Not on file   Stress: Not on file   Social Connections: Not on file       Functional Status:  Prior to admission patient needed assistance:   Dependent ADLs:: Independent  Dependent IADLs:: Independent  Assesssment of  Functional Status: At functional baseline    Mental Health Status:  Mental Health Status: No Current Concerns  Mental Health Management: Medication, psychiatrist     Additional Information:  Mamie Rice is a 28 year old female with PMHx of cystic fibrosis currently admitted with cf exacerbation secondary to influenza A. Will be discharged today on PO abx with follow up appointment on Tuesday 2/27 with chest xray.    Provided patient with a work letter, patient will remain out of work this week, ok to return to work on Tuesday 2/20 if continuing to improve.    No other needs identified.    Jyoti Alcazar RN

## 2024-02-16 ENCOUNTER — CLINICAL UPDATE (OUTPATIENT)
Dept: PHARMACY | Facility: CLINIC | Age: 29
End: 2024-02-16
Payer: COMMERCIAL

## 2024-02-16 DIAGNOSIS — E84.9 CYSTIC FIBROSIS (H): Primary | ICD-10-CM

## 2024-02-16 PROCEDURE — 99207 PR NO CHARGE LOS: CPT | Performed by: PHARMACIST

## 2024-02-16 NOTE — PROGRESS NOTES
Clinical Update:                                                    A chart review was conducted for Mamie Rice.    Reason for Chart Review: Trikafta 6 Month Lab Follow Up     Discussion: Mamie has been on Trikafta since 11/19/2019.  Per chart review, patient continues full dose Trikafta.    Labs were reviewed from  2/13/24  at [facility] . All modulator labs are within normal limits    Lab Results   Component Value Date    ALT 20 02/13/2024    AST 42 02/13/2024    BILITOTAL 0.3 02/13/2024    DBIL <0.1 08/09/2022    CKT 37 02/14/2024     Plan:  1. Continue Trikafta   2. Recheck hepatic panel and CK in 6 months         Ping Cisneros, PharmD  Cystic Fibrosis MTM Pharmacist  [facility]  Voicemail: [phone]

## 2024-02-23 ENCOUNTER — MYC REFILL (OUTPATIENT)
Dept: PSYCHIATRY | Facility: CLINIC | Age: 29
End: 2024-02-23
Payer: COMMERCIAL

## 2024-02-23 DIAGNOSIS — F33.41 RECURRENT MAJOR DEPRESSIVE DISORDER, IN PARTIAL REMISSION (H): ICD-10-CM

## 2024-02-23 RX ORDER — FLUOXETINE 40 MG/1
CAPSULE ORAL
Qty: 180 CAPSULE | Refills: 1 | Status: CANCELLED | OUTPATIENT
Start: 2024-02-23

## 2024-02-25 NOTE — PROGRESS NOTES
[facility]  February 27, 2024         Assessment and Plan:   Mamie Rice is a 28 year old female with h/o CF lung disease with normal spirometry, CF sinus disease, CFRD, pancreatic insufficiency, ADHD, insomnia, MECHE, and depression.  The patient was admitted on 2/13-2/15  for CF pulmonary exacerbation due to influenza A and pneumonia. Discharge to home with oral cefdinir x 2 weeks and azithromycin daily X 3 days, Tamiflu, and increased vitamin K. This is her first post discharge follow up.     1. CF pulmonary exacerbation:   Influenza A (H1N1):  LLL pneumonia:  Hemoptysis: recovering and feels nearly near baseline with improved cough, no dyspnea. Sating 98% on room air; was vesting BID until a day or so ago, typically does not vest unless she feels unwell. PFTs are back to her baseline/best. Previous cultures + for MSSA with additional h/o MRSA (last 2019), PsA (last 2012) HIB, and Achromobacter.  CXR reviewed today and demonstrates resolved LLL opacity. Resolving exacerbation.   - Continue nebs and vesting PRN symptoms    2. CFTR modulation: tolerating Trikafta well. Labs stable on 2/13.   - Continue Trikafta  - Will need labs in August     3. Cystic fibrosis sinus disease: h/o multiple endoscopic sinus surgeries (last 2015).  Chronic symptoms worsened with acute illness, still slightly above baseline.  - Continue Flonase PRN     4. Exocrine pancreatic insufficiency: no signs of malabsorption. BMI is > CFF goal. Patient is asking if she should be on a MVI.   - Continue vitamins  - Will have annual labs in March 5. GERD: patient denies any symptoms.   - Decrease PPI to daily and monitor    RTC: in 3 months with routine cultures and spirometry  Annual studies due: overdue, scheduled for March during spring break (including DEXA)  Preventative care: Prevnar 13 today    Rabia Gray PA-C  Pulmonary, Allergy, Critical Care and Sleep Medicine         Interval History:     Feeling back to her baseline. No fever or chills, still has a semi runny nose, no allergy symptoms. Cough has improved, near her baseline, mainly dry. No No congestion or tightness, no shortness of breath, that has resolved. Doesn't do airway clearance when she feels well, but has been doing it since discharge, stopped three days ago. No bloating or gas, few loose stools, but no diarrhea.          Review of Systems:   Please see HPI. Otherwise, complete 10 point ROS negative.           Past Medical and Surgical History:     Past Medical History:   Diagnosis Date    ADHD (attention deficit hyperactivity disorder)     Anxiety     Aspergillosis, with pneumonia (H)     fugus found caused chest pain    Chronic infection     CF, MRSA.,     Chronic sinusitis     Constipation, chronic     Cystic fibrosis with pulmonary manifestations (H) 12/19/2011    Cystic fibrosis without mention of meconium ileus     SWEAT TEST:Date: 2/17/1994   [facility]  296 mg, 104 mmol/L ClSample #2  295 mg, 104 mmol/L Cl GENOTYPING:Date: 10/15/2007,  Laboratory: [facility]: [id number]    Depressive disorder     Diabetes     no meds currently    Dysthymic disorder     Exocrine pancreatic insufficiency     Gastro-oesophageal reflux disease     Hip pain, right     MRSA (methicillin resistant Staphylococcus aureus) carrier     Pancreatic disease      Past Surgical History:   Procedure Laterality Date    ARTHROSCOPY HIP, OSTEOPLASTY FEMUR PROXIMAL, COMBINED  3/11/2013    Procedure: COMBINED ARTHROSCOPY HIP, OSTEOPLASTY FEMUR PROXIMAL;  Right Hip Arthroscopy, Labral  Debridement.    surgeon request choice anesthesia/admit to [facility] after surgery;  Surgeon: Omkar Austin MD;  [facility]    ARTHROSCOPY KNEE WITH MEDIAL MENISCECTOMY Left 1/31/2017    Procedure: ARTHROSCOPY KNEE WITH MEDIAL MENISCECTOMY;  Surgeon: Jethro Coyle MD;  Location: [facility] OR    bronchoscopies      BRONCHOSCOPY       EXAM UNDER ANESTHESIA ANUS N/A 5/10/2016    Procedure: EXAM UNDER ANESTHESIA ANUS;  Surgeon: Chet Gaviria MD;  [facility]    EXAM UNDER ANESTHESIA, RESTORATIONS, EXTRACTION(S) DENTAL COMPLEX, COMBINED  5/13/2013    Procedure: COMBINED EXAM UNDER ANESTHESIA, RESTORATIONS, EXTRACTION(S) DENTAL COMPLEX;  Dental Exam, Radiographs, Restorations. Single Extraction  Tooth #2. Restorations x 3;  Surgeon: Danilo Ortiz DDS;  [facility] OR    HC KNEE SCOPE, DIAGNOSTIC      Arthroscopy, Knee- left    INJECT BOTOX N/A 5/10/2016    Procedure: INJECT BOTOX;  Surgeon: Chet Gaviria MD;  [facility]    left hip labral tear  5/11/2011    left hip arthroscopy with labral debridement and synovectomy    meniscus repair      OPTICAL TRACKING SYSTEM ENDOSCOPIC SINUS SURGERY  10/14/2011    Procedure:OPTICAL TRACKING SYSTEM ENDOSCOPIC SINUS SURGERY; FESS (functional endoscopic sinus surgery) with Image Guidance, bronchial lavage and cultures; Surgeon:GOYO KUO; [facility] OR    OPTICAL TRACKING SYSTEM ENDOSCOPIC SINUS SURGERY  5/18/2012    Procedure:OPTICAL TRACKING SYSTEM ENDOSCOPIC SINUS SURGERY; Right  and Left Image Guided Functional Endoscopic Sinus Surgery With  Frontal Approach, [employer]; Surgeon:GOYO KUO; [facility] OR    OPTICAL TRACKING SYSTEM ENDOSCOPIC SINUS SURGERY  9/26/2012    Procedure: OPTICAL TRACKING SYSTEM ENDOSCOPIC SINUS SURGERY;  Stealth Guided Bilateral Functional Endoscopic Sinus Surgery *Latex Safe*;  Surgeon: Goyo Kuo MD;  [facility]    OPTICAL TRACKING SYSTEM ENDOSCOPIC SINUS SURGERY Bilateral 10/16/2015    Procedure: OPTICAL TRACKING SYSTEM ENDOSCOPIC SINUS SURGERY;  Surgeon: Mariela Haile MD;  [facility]    ORTHOPEDIC SURGERY      left hip tear repair 2010    SINUS SURGERY             Family History:     Family History   Problem Relation Age of Onset    Cancer Paternal Grandmother     Skin Cancer Paternal Grandmother     Other Cancer  Paternal Grandmother         Skin    Obesity Paternal Grandmother     Cancer Paternal Grandfather         PGF had throat cancer (he was a smoker)    Other Cancer Paternal Grandfather     Anxiety Disorder Paternal Grandfather     Thyroid Disease Mother         ,    Hypertension Mother     Obesity Other     ALS Father 67        2 male cousins with ALS as well    Anesthesia Reaction No family hx of     Blood Disease No family hx of     Colon Polyps No family hx of     Crohn's Disease No family hx of     Ulcerative Colitis No family hx of     Colon Cancer No family hx of     Melanoma No family hx of             Social History:     Social History     Socioeconomic History    Marital status: Single     Spouse name: Not on file    Number of children: Not on file    Years of education: Not on file    Highest education level: Not on file   Occupational History    Not on file   Tobacco Use    Smoking status: Never    Smokeless tobacco: Never    Tobacco comments:     one person at home smokes outside   Substance and Sexual Activity    Alcohol use: No     Alcohol/week: 0.0 standard drinks of alcohol    Drug use: No    Sexual activity: Never   Other Topics Concern     Service Not Asked    Blood Transfusions No    Caffeine Concern Not Asked    Occupational Exposure Not Asked    Hobby Hazards Not Asked    Sleep Concern Not Asked    Stress Concern Not Asked    Weight Concern Not Asked    Special Diet Not Asked    Back Care Not Asked    Exercise Yes    Bike Helmet Not Asked    Seat Belt Not Asked    Self-Exams Not Asked    Parent/sibling w/ CABG, MI or angioplasty before 65F 55M? Yes   Social History Narrative    Mamie lives with mother in [place], [place].  There is a cat in the home, but Mamie does not have any litterbox duties.  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  She gets essentially no exercise because of the tingling in her feet (says it bothers her even to stand).        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        8/2015 no change in social situation        2/15/2016 Pt is single and lives with mother and stepfather.     Social Determinants of Health     Financial Resource Strain: Not on file   Food Insecurity: Not on file   Transportation Needs: Not on file   Physical Activity: Not on file   Stress: Not on file   Social Connections: Not on file   Interpersonal Safety: Not on file   Housing Stability: Not on file            Medications:     Current Outpatient Medications   Medication    acetylcysteine (MUCOMYST) 20 % neb solution    albuterol (PROAIR HFA/PROVENTIL HFA/VENTOLIN HFA) 108 (90 Base) MCG/ACT inhaler    albuterol (PROVENTIL) (2.5 MG/3ML) 0.083% neb solution    amphetamine-dextroamphetamine (ADDERALL XR) 20 MG 24 hr capsule    blood glucose (ACCU-CHEK GUIDE) test strip    blood glucose monitoring (ACCU-CHEK FASTCLIX) lancets    busPIRone HCl (BUSPAR) 30 MG tablet    cefdinir (OMNICEF) 300 MG capsule    cetirizine (ZYRTEC) 10 MG tablet    elexacaftor-tezacaftor-ivacaftor & ivacaftor (TRIKAFTA) 100-50-75 & 150 MG tablet pack    FLUoxetine (PROZAC) 40 MG capsule    fluticasone (FLONASE) 50 MCG/ACT nasal spray    hydrOXYzine (ATARAX) 25 MG tablet    medroxyPROGESTERone (DEPO-PROVERA) 150 MG/ML IM injection    naltrexone (DEPADE/REVIA) 50 MG tablet    omeprazole (PRILOSEC) 40 MG DR capsule    phytonadione (MEPHYTON) 5 MG tablet    phytonadione (MEPHYTON) 5 MG tablet    topiramate (TOPAMAX) 25 MG tablet    topiramate (TOPAMAX) 50 MG tablet    traZODone (DESYREL) 150 MG tablet    vitamin C (ASCORBIC ACID) 500 MG tablet    VITAMIN D3 25 MCG (1000 UT) tablet    Vitamin E 180 MG (400 UNIT) CAPS     No current facility-administered medications for this visit.            Physical Exam:   /70   Pulse 93   Ht 1.568 m (5' 1.73\")   Wt 79.2 kg (174 lb 9.7 oz)   SpO2 98%   BMI 32.21 kg/m      GENERAL: alert, NAD  HEENT: NCAT, EOMI, " anicteric sclera, no oral mucosal edema or erythema  Neck: no cervical or supraclavicular adenopathy  Respiratory: good air flow, no crackles, rhonchi or wheezing  CV: RRR, S1S2, no murmurs noted  Abdomen: normoactive BS, soft   Lymph: no edema, + digital clubbing  Neuro: AAO X 3, CN 2-12 grossly intact  Psychiatric: normal affect, good eye contact  Skin: no rash, jaundice or lesions on limited exam         Data:   All laboratory and imaging data reviewed.      Cystic Fibrosis Culture  Specimen Description   Date Value Ref Range Status   06/08/2021 Sputum  Final   11/12/2019 Midstream Urine  Final   11/12/2019 Throat  Final    Culture Micro   Date Value Ref Range Status   06/08/2021 Heavy growth  Normal roberto carlos    Final   11/12/2019   Final    <10,000 colonies/mL  urogenital roberto carlos  Susceptibility testing not routinely done     11/12/2019 Light growth  Normal roberto carlos    Final   11/12/2019 Moderate growth  Staphylococcus aureus   (A)  Final        PFT interpretation:  Maneuver: valid and meets ATS guidelines  Normal spirometry  Compared to prior: FEV1 of 3.26 is 140 ml above prior

## 2024-02-27 ENCOUNTER — OFFICE VISIT (OUTPATIENT)
Dept: PHARMACY | Facility: CLINIC | Age: 29
End: 2024-02-27
Payer: COMMERCIAL

## 2024-02-27 ENCOUNTER — ANCILLARY PROCEDURE (OUTPATIENT)
Dept: GENERAL RADIOLOGY | Facility: CLINIC | Age: 29
End: 2024-02-27
Attending: INTERNAL MEDICINE
Payer: COMMERCIAL

## 2024-02-27 ENCOUNTER — OFFICE VISIT (OUTPATIENT)
Dept: PULMONOLOGY | Facility: CLINIC | Age: 29
End: 2024-02-27
Attending: PHYSICIAN ASSISTANT
Payer: COMMERCIAL

## 2024-02-27 VITALS
SYSTOLIC BLOOD PRESSURE: 106 MMHG | WEIGHT: 174.6 LBS | HEART RATE: 93 BPM | DIASTOLIC BLOOD PRESSURE: 70 MMHG | BODY MASS INDEX: 32.13 KG/M2 | OXYGEN SATURATION: 98 % | HEIGHT: 62 IN

## 2024-02-27 DIAGNOSIS — G47.00 INSOMNIA, UNSPECIFIED TYPE: ICD-10-CM

## 2024-02-27 DIAGNOSIS — E84.9 CYSTIC FIBROSIS (H): ICD-10-CM

## 2024-02-27 DIAGNOSIS — E08.9 DIABETES MELLITUS DUE TO CYSTIC FIBROSIS (H): ICD-10-CM

## 2024-02-27 DIAGNOSIS — J32.9 SINUSITIS, CHRONIC: ICD-10-CM

## 2024-02-27 DIAGNOSIS — E10.9 DIABETES MELLITUS TYPE 1 (H): ICD-10-CM

## 2024-02-27 DIAGNOSIS — E84.9 CYSTIC FIBROSIS (H): Primary | ICD-10-CM

## 2024-02-27 DIAGNOSIS — E84.9 CF (CYSTIC FIBROSIS) (H): ICD-10-CM

## 2024-02-27 DIAGNOSIS — E84.9 CF (CYSTIC FIBROSIS) (H): Primary | ICD-10-CM

## 2024-02-27 DIAGNOSIS — E84.0 CYSTIC FIBROSIS WITH PULMONARY MANIFESTATIONS (H): ICD-10-CM

## 2024-02-27 DIAGNOSIS — F33.0 MILD EPISODE OF RECURRENT MAJOR DEPRESSIVE DISORDER (H): ICD-10-CM

## 2024-02-27 DIAGNOSIS — E84.9 DIABETES MELLITUS DUE TO CYSTIC FIBROSIS (H): ICD-10-CM

## 2024-02-27 DIAGNOSIS — E66.01 MORBID OBESITY (H): ICD-10-CM

## 2024-02-27 DIAGNOSIS — F90.2 ATTENTION DEFICIT HYPERACTIVITY DISORDER (ADHD), COMBINED TYPE: ICD-10-CM

## 2024-02-27 DIAGNOSIS — Z30.9 ENCOUNTER FOR CONTRACEPTIVE MANAGEMENT, UNSPECIFIED TYPE: ICD-10-CM

## 2024-02-27 DIAGNOSIS — K86.89 PANCREATIC INSUFFICIENCY: ICD-10-CM

## 2024-02-27 LAB
EXPTIME-PRE: 3.9 SEC
FEF2575-%PRED-PRE: 130 %
FEF2575-PRE: 4.19 L/SEC
FEF2575-PRED: 3.21 L/SEC
FEFMAX-%PRED-PRE: 143 %
FEFMAX-PRE: 9.54 L/SEC
FEFMAX-PRED: 6.66 L/SEC
FEV1-%PRED-PRE: 117 %
FEV1-PRE: 3.26 L
FEV1FEV6-PRE: 89 %
FEV1FEV6-PRED: 85 %
FEV1FVC-PRE: 89 %
FEV1FVC-PRED: 86 %
FIFMAX-PRE: 4.51 L/SEC
FVC-%PRED-PRE: 113 %
FVC-PRE: 3.64 L
FVC-PRED: 3.22 L

## 2024-02-27 PROCEDURE — 71046 X-RAY EXAM CHEST 2 VIEWS: CPT | Performed by: RADIOLOGY

## 2024-02-27 PROCEDURE — 87070 CULTURE OTHR SPECIMN AEROBIC: CPT | Performed by: PHYSICIAN ASSISTANT

## 2024-02-27 PROCEDURE — 94375 RESPIRATORY FLOW VOLUME LOOP: CPT | Performed by: PHYSICIAN ASSISTANT

## 2024-02-27 PROCEDURE — G0463 HOSPITAL OUTPT CLINIC VISIT: HCPCS | Mod: 25 | Performed by: PHYSICIAN ASSISTANT

## 2024-02-27 PROCEDURE — 99214 OFFICE O/P EST MOD 30 MIN: CPT | Mod: 25 | Performed by: PHYSICIAN ASSISTANT

## 2024-02-27 PROCEDURE — 99207 PR NO CHARGE LOS: CPT | Performed by: PHARMACIST

## 2024-02-27 PROCEDURE — G0009 ADMIN PNEUMOCOCCAL VACCINE: HCPCS | Performed by: PHYSICIAN ASSISTANT

## 2024-02-27 PROCEDURE — 250N000011 HC RX IP 250 OP 636: Performed by: PHYSICIAN ASSISTANT

## 2024-02-27 PROCEDURE — 90670 PCV13 VACCINE IM: CPT | Performed by: PHYSICIAN ASSISTANT

## 2024-02-27 RX ORDER — ALBUTEROL SULFATE 90 UG/1
2 AEROSOL, METERED RESPIRATORY (INHALATION) EVERY 6 HOURS PRN
Qty: 8.5 G | Refills: 11 | Status: SHIPPED | OUTPATIENT
Start: 2024-02-27

## 2024-02-27 RX ORDER — ACETYLCYSTEINE 200 MG/ML
SOLUTION ORAL; RESPIRATORY (INHALATION)
Qty: 180 ML | Refills: 11 | Status: SHIPPED | OUTPATIENT
Start: 2024-02-27

## 2024-02-27 RX ORDER — OMEPRAZOLE 40 MG/1
40 CAPSULE, DELAYED RELEASE ORAL DAILY
Start: 2024-02-27 | End: 2024-03-04

## 2024-02-27 RX ORDER — ELEXACAFTOR, TEZACAFTOR, AND IVACAFTOR 100-50-75
KIT ORAL
Qty: 84 TABLET | Refills: 5 | OUTPATIENT
Start: 2024-02-27 | End: 2024-02-28

## 2024-02-27 RX ORDER — ALBUTEROL SULFATE 0.83 MG/ML
SOLUTION RESPIRATORY (INHALATION)
Qty: 240 ML | Refills: 11 | Status: SHIPPED | OUTPATIENT
Start: 2024-02-27

## 2024-02-27 RX ADMIN — PNEUMOCOCCAL 13-VALENT CONJUGATE VACCINE 0.5 ML: 2.2; 2.2; 2.2; 2.2; 2.2; 4.4; 2.2; 2.2; 2.2; 2.2; 2.2; 2.2; 2.2 INJECTION, SUSPENSION INTRAMUSCULAR at 11:40

## 2024-02-27 ASSESSMENT — PAIN SCALES - GENERAL: PAINLEVEL: NO PAIN (0)

## 2024-02-27 NOTE — NURSING NOTE
Chief Complaint   Patient presents with    Cystic Fibrosis     F/u    Medications reviewed and vital signs taken.   Ariane Person, CMA

## 2024-02-27 NOTE — PROGRESS NOTES
Medication Therapy Management (MTM) Encounter    ASSESSMENT:                            Medication Adherence/Access: No issues identified    CF:   Trikafta labs stable, recheck in 6 months. Patient may benefit from refilling albuterol inhaler and nebs to ensure efficacy, given home supply is .     Pancreatic Insufficiency/Nutrition:   Annual vitamin labs are due. Dietitian to follow up with results.      CFRD:   Patient is meeting A1c goal of <7% per CFF guidelines. Due for A1c to be drawn with annual studies. Patient would benefit from continued follow-up with endocrine.    Depression/ADHD/Insomnia:   Patient may benefit from trialing melatonin to help with insomnia.      Women's health:   Stable per patient's report     Weight Management:   Stable per patient's report    PLAN:                            1. Try melatonin 1 - 3 mg 30 - 60 minutes before sleep.   2. Sent albuterol nebs and albuterol inhaler refills to local pharmacy. Encouraged patient to ensure home supply not .    Follow-up: August for Trikafta labs; annual MTM visit in 1 year    SUBJECTIVE/OBJECTIVE:                          Mamie Rice is a 28 year old female coming in for a follow-up visit from 23. Today's visit is a co-visit with Rabia Gray PA-C.  a transitions of care visit. She was discharged from [facility] on 2/15/24 for pneumonia.     Reason for visit: Annual CF Medication Review.    Allergies/ADRs: Reviewed in chart  Past Medical History: Reviewed in chart  Tobacco: She reports that she has never smoked. She has never used smokeless tobacco.  Alcohol: rarely    Medication Adherence/Access:   Medication Access: Patient fills medication at [facility] in [place] and [address] for [facility]. Patient expresses no concern today regarding cost of medications.   Medication Administration: Per patient, misses medication 1 times per month.     CF:    Inhaled medications:              Bronchodilator: albuterol nebs 0.083%  as needed during illness and albuterol HFA as needed (rarely, supply )              Mucolytic: acetylcysteine 20% nebs as needed during illness   Other: fluticasone nasal spray 1 spray in both nostrils daily  Oral medications:              CFTR modulator: Trikafta (full dose)              Other: cetirizine 10 mg daily  Genotype: [id number]  Cultures (last growth): throat cultures grow H. Flu (23)  Lab Results   Component Value Date    ALT 20 2024    AST 42 2024    BILITOTAL 0.3 2024    DBIL <0.1 2022    CKT 37 2024     Pancreatic Insufficiency/Nutrition:   Acid reducer: omeprazole 40 mg daily  Vitamins include: multivitamin daily, vitamin D 1000 units daily, vitamin E 400 units twice daily, vitamin C 500 mg twice daily, and Mephyton 5 mg weekly  No hemoptysis since hospital discharge. Mamie declines treatment with pancreatic enzymes. Patient is not experiencing sign/symptoms of malabsorption.    CFRD:    Mamie does not utilize any insulin.  SMBG: Has not been self-monitoring blood glucose because she keeps getting an error message and forgets to order a new machine. Did not use [employer]. Wondering if she can get a new meter.  Patient is not experiencing hypoglycemia  Recent symptoms of high blood sugar? none  Lab Results   Component Value Date    A1C 5.0 2022    A1C 5.1 2021    A1C 5.1 2019    A1C 5.5 2017    A1C 5.6 2017    A1C 5.5 2017     Patient follows with Dr. Prieto for endocrinology. Last visit 21.     Depression/ADHD/Insomnia:   Adderall XR 20 mg daily  buspirone 30 mg twice daily   fluoxetine 80 mg daily   trazodone 150 mg at bedtime  hydroxyzine 25-50 mg at bedtime as needed  Notes some difficulty with falling asleep/staying asleep. Otherwise, denies medication questions/concerns.  Follows with Dr. Pitts and Dr. Duong     Women's Health:   Depo injection every 3 months  No concerns noted today.      Weight management:  topiramate 75 mg twice daily  naltrexone 50 mg once daily.  No concerns noted today.  Follows with Dr. Park  ----------------  Post Discharge Medication Reconciliation Status: discharge medications reconciled, continue medications without change.    I spent 15 minutes with this patient today. All changes were made via verbal approval with Rabia Gray PA-C. A copy of the visit note was provided to the patient's provider(s).    A summary of these recommendations was given to the patient (see AVS from today's appointment with Rabia Gray PA-C).    Padma Alonso, PharmD   Medication Therapy Management   Cystic Fibrosis Pharmacist       Medication Therapy Recommendations  Insomnia, unspecified type    Rationale: Untreated condition - Needs additional medication therapy - Indication   Recommendation: Start Medication - melatonin 3 MG tablet   Status: Accepted - no CPA Needed

## 2024-02-27 NOTE — PATIENT INSTRUCTIONS
See provider AVS for a summary of recommendations from today's visit.  Padma Alonso, PharmD  Cystic Fibrosis MTM Pharmacist  [facility]

## 2024-02-27 NOTE — LETTER
2/27/2024         RE: Mamie Rice  [address] [facility] [id number]        Dear Colleague,    Thank you for referring your patient, Mamie Rice, to the [facility] [place]. Please see a copy of my visit note below.    [facility]  February 27, 2024         Assessment and Plan:   Mamie Rice is a 28 year old female with h/o CF lung disease with normal spirometry, CF sinus disease, CFRD, pancreatic insufficiency, ADHD, insomnia, MECHE, and depression.  The patient was admitted on 2/13-2/15  for CF pulmonary exacerbation due to influenza A and pneumonia. Discharge to home with oral cefdinir x 2 weeks and azithromycin daily X 3 days, Tamiflu, and increased vitamin K. This is her first post discharge follow up.     1. CF pulmonary exacerbation:   Influenza A (H1N1):  LLL pneumonia:  Hemoptysis: recovering and feels nearly near baseline with improved cough, no dyspnea. Sating 98% on room air; was vesting BID until a day or so ago, typically does not vest unless she feels unwell. PFTs are back to her baseline/best. Previous cultures + for MSSA with additional h/o MRSA (last 2019), PsA (last 2012) HIB, and Achromobacter.  CXR reviewed today and demonstrates resolved LLL opacity. Resolving exacerbation.   - Continue nebs and vesting PRN symptoms    2. CFTR modulation: tolerating Trikafta well. Labs stable on 2/13.   - Continue Trikafta  - Will need labs in August     3. Cystic fibrosis sinus disease: h/o multiple endoscopic sinus surgeries (last 2015).  Chronic symptoms worsened with acute illness, still slightly above baseline.  - Continue Flonase PRN     4. Exocrine pancreatic insufficiency: no signs of malabsorption. BMI is > CFF goal. Patient is asking if she should be on a MVI.   - Continue vitamins  - Will have annual labs in March 5. GERD: patient denies any symptoms.   - Decrease PPI  to daily and monitor    RTC: in 3 months with routine cultures and spirometry  Annual studies due: overdue, scheduled for March during spring break (including DEXA)  Preventative care: Prevnar 13 today    Rabia Gray PA-C  Pulmonary, Allergy, Critical Care and Sleep Medicine        Interval History:     Feeling back to her baseline. No fever or chills, still has a semi runny nose, no allergy symptoms. Cough has improved, near her baseline, mainly dry. No No congestion or tightness, no shortness of breath, that has resolved. Doesn't do airway clearance when she feels well, but has been doing it since discharge, stopped three days ago. No bloating or gas, few loose stools, but no diarrhea.          Review of Systems:   Please see HPI. Otherwise, complete 10 point ROS negative.           Past Medical and Surgical History:     Past Medical History:   Diagnosis Date    ADHD (attention deficit hyperactivity disorder)     Anxiety     Aspergillosis, with pneumonia (H)     fugus found caused chest pain    Chronic infection     CF, MRSA.,     Chronic sinusitis     Constipation, chronic     Cystic fibrosis with pulmonary manifestations (H) 12/19/2011    Cystic fibrosis without mention of meconium ileus     SWEAT TEST:Date: 2/17/1994   [facility]  296 mg, 104 mmol/L ClSample #2  295 mg, 104 mmol/L Cl GENOTYPING:Date: 10/15/2007,  Laboratory: [facility]: [id number]    Depressive disorder     Diabetes     no meds currently    Dysthymic disorder     Exocrine pancreatic insufficiency     Gastro-oesophageal reflux disease     Hip pain, right     MRSA (methicillin resistant Staphylococcus aureus) carrier     Pancreatic disease      Past Surgical History:   Procedure Laterality Date    ARTHROSCOPY HIP, OSTEOPLASTY FEMUR PROXIMAL, COMBINED  3/11/2013    Procedure: COMBINED ARTHROSCOPY HIP, OSTEOPLASTY FEMUR PROXIMAL;  Right Hip Arthroscopy, Labral  Debridement.    surgeon request choice anesthesia/admit to  [facility] after surgery;  Surgeon: Omkar Austin MD;  [facility]: [facility]    ARTHROSCOPY KNEE WITH MEDIAL MENISCECTOMY Left 1/31/2017    Procedure: ARTHROSCOPY KNEE WITH MEDIAL MENISCECTOMY;  Surgeon: Jethro Coyle MD;  Location: [facility] OR    bronchoscopies      BRONCHOSCOPY      EXAM UNDER ANESTHESIA ANUS N/A 5/10/2016    Procedure: EXAM UNDER ANESTHESIA ANUS;  Surgeon: Chet Gaviria MD;  [facility]    EXAM UNDER ANESTHESIA, RESTORATIONS, EXTRACTION(S) DENTAL COMPLEX, COMBINED  5/13/2013    Procedure: COMBINED EXAM UNDER ANESTHESIA, RESTORATIONS, EXTRACTION(S) DENTAL COMPLEX;  Dental Exam, Radiographs, Restorations. Single Extraction  Tooth #2. Restorations x 3;  Surgeon: Danilo Ortiz DDS;  Location: [facility] OR    HC KNEE SCOPE, DIAGNOSTIC      Arthroscopy, Knee- left    INJECT BOTOX N/A 5/10/2016    Procedure: INJECT BOTOX;  Surgeon: Chet Gaviria MD;  [facility]    left hip labral tear  5/11/2011    left hip arthroscopy with labral debridement and synovectomy    meniscus repair      OPTICAL TRACKING SYSTEM ENDOSCOPIC SINUS SURGERY  10/14/2011    Procedure:OPTICAL TRACKING SYSTEM ENDOSCOPIC SINUS SURGERY; FESS (functional endoscopic sinus surgery) with Image Guidance, bronchial lavage and cultures; Surgeon:GOYO KUO; Location:[facility] OR    OPTICAL TRACKING SYSTEM ENDOSCOPIC SINUS SURGERY  5/18/2012    Procedure:OPTICAL TRACKING SYSTEM ENDOSCOPIC SINUS SURGERY; Right  and Left Image Guided Functional Endoscopic Sinus Surgery With  Frontal Approach, [employer]; Surgeon:GOYO KUO; Location:[facility] OR    OPTICAL TRACKING SYSTEM ENDOSCOPIC SINUS SURGERY  9/26/2012    Procedure: OPTICAL TRACKING SYSTEM ENDOSCOPIC SINUS SURGERY;  Stealth Guided Bilateral Functional Endoscopic Sinus Surgery *Latex Safe*;  Surgeon: Goyo Kuo MD;  [facility]    OPTICAL TRACKING SYSTEM ENDOSCOPIC SINUS SURGERY Bilateral 10/16/2015    Procedure: OPTICAL TRACKING SYSTEM ENDOSCOPIC SINUS  SURGERY;  Surgeon: Mariela Haile MD;  [facility]    ORTHOPEDIC SURGERY      left hip tear repair 2010    SINUS SURGERY             Family History:     Family History   Problem Relation Age of Onset    Cancer Paternal Grandmother     Skin Cancer Paternal Grandmother     Other Cancer Paternal Grandmother         Skin    Obesity Paternal Grandmother     Cancer Paternal Grandfather         PGF had throat cancer (he was a smoker)    Other Cancer Paternal Grandfather     Anxiety Disorder Paternal Grandfather     Thyroid Disease Mother         ,    Hypertension Mother     Obesity Other     ALS Father 67        2 male cousins with ALS as well    Anesthesia Reaction No family hx of     Blood Disease No family hx of     Colon Polyps No family hx of     Crohn's Disease No family hx of     Ulcerative Colitis No family hx of     Colon Cancer No family hx of     Melanoma No family hx of             Social History:     Social History     Socioeconomic History    Marital status: Single     Spouse name: Not on file    Number of children: Not on file    Years of education: Not on file    Highest education level: Not on file   Occupational History    Not on file   Tobacco Use    Smoking status: Never    Smokeless tobacco: Never    Tobacco comments:     one person at home smokes outside   Substance and Sexual Activity    Alcohol use: No     Alcohol/week: 0.0 standard drinks of alcohol    Drug use: No    Sexual activity: Never   Other Topics Concern     Service Not Asked    Blood Transfusions No    Caffeine Concern Not Asked    Occupational Exposure Not Asked    Hobby Hazards Not Asked    Sleep Concern Not Asked    Stress Concern Not Asked    Weight Concern Not Asked    Special Diet Not Asked    Back Care Not Asked    Exercise Yes    Bike Helmet Not Asked    Seat Belt Not Asked    Self-Exams Not Asked    Parent/sibling w/ CABG, MI or angioplasty before 65F 55M? Yes   Social History Narrative    Mamie lives with mother in   [place].  There is a cat in the home, but Mamie does not have any litterbox duties.  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  She gets essentially no exercise because of the tingling in her feet (says it bothers her even to stand).        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        8/2015 no change in social situation        2/15/2016 Pt is single and lives with mother and stepfather.     Social Determinants of Health     Financial Resource Strain: Not on file   Food Insecurity: Not on file   Transportation Needs: Not on file   Physical Activity: Not on file   Stress: Not on file   Social Connections: Not on file   Interpersonal Safety: Not on file   Housing Stability: Not on file            Medications:     Current Outpatient Medications   Medication    acetylcysteine (MUCOMYST) 20 % neb solution    albuterol (PROAIR HFA/PROVENTIL HFA/VENTOLIN HFA) 108 (90 Base) MCG/ACT inhaler    albuterol (PROVENTIL) (2.5 MG/3ML) 0.083% neb solution    amphetamine-dextroamphetamine (ADDERALL XR) 20 MG 24 hr capsule    blood glucose (ACCU-CHEK GUIDE) test strip    blood glucose monitoring (ACCU-CHEK FASTCLIX) lancets    busPIRone HCl (BUSPAR) 30 MG tablet    cefdinir (OMNICEF) 300 MG capsule    cetirizine (ZYRTEC) 10 MG tablet    elexacaftor-tezacaftor-ivacaftor & ivacaftor (TRIKAFTA) 100-50-75 & 150 MG tablet pack    FLUoxetine (PROZAC) 40 MG capsule    fluticasone (FLONASE) 50 MCG/ACT nasal spray    hydrOXYzine (ATARAX) 25 MG tablet    medroxyPROGESTERone (DEPO-PROVERA) 150 MG/ML IM injection    naltrexone (DEPADE/REVIA) 50 MG tablet    omeprazole (PRILOSEC) 40 MG DR capsule    phytonadione (MEPHYTON) 5 MG tablet    phytonadione (MEPHYTON) 5 MG tablet    topiramate (TOPAMAX) 25 MG tablet    topiramate (TOPAMAX) 50 MG tablet    traZODone (DESYREL) 150 MG tablet    vitamin C (ASCORBIC ACID) 500 MG tablet    VITAMIN D3 25 MCG (1000 UT)  "tablet    Vitamin E 180 MG (400 UNIT) CAPS     No current facility-administered medications for this visit.            Physical Exam:   /70   Pulse 93   Ht 1.568 m (5' 1.73\")   Wt 79.2 kg (174 lb 9.7 oz)   SpO2 98%   BMI 32.21 kg/m      GENERAL: alert, NAD  HEENT: NCAT, EOMI, anicteric sclera, no oral mucosal edema or erythema  Neck: no cervical or supraclavicular adenopathy  Respiratory: good air flow, no crackles, rhonchi or wheezing  CV: RRR, S1S2, no murmurs noted  Abdomen: normoactive BS, soft   Lymph: no edema, + digital clubbing  Neuro: AAO X 3, CN 2-12 grossly intact  Psychiatric: normal affect, good eye contact  Skin: no rash, jaundice or lesions on limited exam         Data:   All laboratory and imaging data reviewed.      Cystic Fibrosis Culture  Specimen Description   Date Value Ref Range Status   06/08/2021 Sputum  Final   11/12/2019 Midstream Urine  Final   11/12/2019 Throat  Final    Culture Micro   Date Value Ref Range Status   06/08/2021 Heavy growth  Normal roberto carlos    Final   11/12/2019   Final    <10,000 colonies/mL  urogenital roberto carlos  Susceptibility testing not routinely done     11/12/2019 Light growth  Normal roberto carlos    Final   11/12/2019 Moderate growth  Staphylococcus aureus   (A)  Final        PFT interpretation:  Maneuver: valid and meets ATS guidelines  Normal spirometry  Compared to prior: FEV1 of 3.26 is 140 ml above prior        Rabia Gray PA-C  "

## 2024-02-27 NOTE — PROGRESS NOTES
Nutrition Note    Pt inquiring about taking a multivitamin, was discussing this with her chiropractor.  Currently only taking individual vitamin supplements (see MAR).  She is due for annual studies, discussed perhaps starting a general multivitamin + iron or a CF multivitamin after these tests are done, pending results.      Theresa Ceja MS, RD, LD (pager [phone])  Cystic Fibrosis/Lung Transplant Dietitian      -Available Mon-Thurs 8 AM-4:30 PM. On Fridays please contact pager [phone] (Amy Andino RD) and on weekends/holidays contact coverage dietitian at pager [phone] (inpatient use only).

## 2024-02-27 NOTE — PROGRESS NOTES
Mamie Rice comes into clinic today at the request of VANESA Gray Ordering Provider for spirometry     He Sanford, RRT

## 2024-02-28 DIAGNOSIS — E84.9 CYSTIC FIBROSIS (H): ICD-10-CM

## 2024-02-28 RX ORDER — ELEXACAFTOR, TEZACAFTOR, AND IVACAFTOR 100-50-75
KIT ORAL
Qty: 84 TABLET | Refills: 4 | Status: SHIPPED | OUTPATIENT
Start: 2024-02-28 | End: 2024-07-02

## 2024-03-03 LAB — BACTERIA SPEC CULT: NORMAL

## 2024-03-04 ENCOUNTER — MYC REFILL (OUTPATIENT)
Dept: PULMONOLOGY | Facility: CLINIC | Age: 29
End: 2024-03-04
Payer: COMMERCIAL

## 2024-03-04 DIAGNOSIS — E84.9 CF (CYSTIC FIBROSIS) (H): ICD-10-CM

## 2024-03-05 RX ORDER — OMEPRAZOLE 40 MG/1
40 CAPSULE, DELAYED RELEASE ORAL DAILY
Qty: 30 CAPSULE | Refills: 3 | Status: SHIPPED | OUTPATIENT
Start: 2024-03-05 | End: 2024-07-01

## 2024-03-11 RX ORDER — NALTREXONE HYDROCHLORIDE 50 MG/1
TABLET, FILM COATED ORAL
Qty: 90 TABLET | Refills: 3 | OUTPATIENT
Start: 2024-03-11

## 2024-03-13 LAB
BACTERIA SPT CULT: ABNORMAL
BACTERIA SPT CULT: NO GROWTH

## 2024-03-18 NOTE — PROGRESS NOTES
Outcome for 03/18/24 9:16 AM: FOCUS RESEARCHt message sent  Patrizia Artis MA  Outcome for 03/22/24 1:08 PM: Left Voicemail   Patrizia Artis MA  Outcome for 03/25/24 9:29 AM: Left Voicemail   Patrizia Artis MA  Outcome for 03/26/24 8:59 AM: Patient is not checking blood sugars  Patrizia ANGELIC Artis    Mamie is a 28 year old who is being evaluated via a billable video visit.      CF Endocrinology Return Consultation:  Diabetes  :   Patient: Mamie Rice MRN# [id number]   YOB: 1995 Age: 28 year old   Date of Visit: 03/26/2024     Dear Dr. Allison:    I had the pleasure of seeing your patient, Mamie Rice in the [facility] Endocr[facility]ology Clinic, [facility], for a return consultation regarding CFRD and reactive hypoglycemia.           Problem list:     Patient Active Problem List    Diagnosis Date Noted    Pneumonia due to infectious organism, unspecified laterality, unspecified part of lung 02/13/2024     Priority: Medium    Morbid obesity (H) 01/14/2022     Priority: Medium    Knee pain 10/16/2019     Priority: Medium     Added automatically from request for surgery [id number]      Mild episode of recurrent major depressive disorder (H24) 05/15/2018     Priority: Medium    Insomnia, unspecified type 05/15/2018     Priority: Medium    MECHE (generalized anxiety disorder) 05/15/2018     Priority: Medium    Attention deficit hyperactivity disorder (ADHD), combined type 05/15/2018     Priority: Medium    Diabetes mellitus, type 2 (H) 12/07/2016     Priority: Medium     Overview:   Diabetes Mellitus Type 2  Per External Records      Acne vulgaris 10/19/2016     Priority: Medium    Non morbid obesity due to excess calories 08/24/2016     Priority: Medium    Persistent depressive disorder 02/29/2016     Priority: Medium     Overview:   Disorder Depressive Persistent (Formerly Dysthymia) NOS      Pancreatic insufficiency 11/13/2014     Priority: Medium     Fecal elastase < 50      Back pain 10/22/2014      Priority: Medium    Frontal sinusitis 05/10/2012     Priority: Medium    Chronic constipation 03/04/2012     Priority: Medium    Cystic fibrosis of the lung (H) 12/19/2011     Priority: Medium     SWEAT TEST:  Date: 2/17/1994          Laboratory: [facility]  Sample #1  296 mg           104 mmol/L Cl  Sample #2  295 mg           104 mmol/L Cl     GENOTYPING:  Date: 10/15/2007               Laboratory: [facility]  Genotype: [id number]  Poly T Variant:  [  ]/[  ]        Hypoglycemia 10/28/2011     Priority: Medium     Reactive hypoglycemia  updating diagnosis code for icd10 cutover      Chronic maxillary sinusitis 09/08/2011     Priority: Medium    Aspergillosis (H) 07/21/2011     Priority: Medium    Hip joint pain 04/11/2011     Priority: Medium    Cystic fibrosis (H) 02/27/2011     Priority: Medium            HPI:   Mamie is a 28 year old female with Cystic Fibrosis Related Diabetes Mellitus (CFRD).    28 year old female with history of cystic fibrosis related diabetes and reactive hypoglycemia.    Last seen in CFRD clinic in 2021  She follows with weight management clinic as well    History of CF-related diabetes.  Has been off insulin for several years due to concern about hypoglycemia  Also has history of reactive hypoglycemia  Denies any acute concerns today    Currently not checking fingerstick glucose or using CGM  Last A1c was in 2022  She is scheduled for annual labs and OGTT tomorrow    Reports symptoms of reactive hypoglycemia are rare and occur about once a month  No severe lows    Patient has pancreatic insufficiency  On Trikafta    Current insulin regimen: None          Past Medical History:     Past Medical History:   Diagnosis Date    ADHD (attention deficit hyperactivity disorder)     Anxiety     Aspergillosis, with pneumonia (H)     fugus found caused chest pain    Chronic infection     CF, MRSA.,     Chronic sinusitis     Constipation, chronic     Cystic fibrosis with pulmonary manifestations (H)  12/19/2011    Cystic fibrosis without mention of meconium ileus     SWEAT TEST:Date: 2/17/1994   [facility]  296 mg, 104 mmol/L ClSample #2  295 mg, 104 mmol/L Cl GENOTYPING:Date: 10/15/2007,  Laboratory: [facility]: [id number]    Depressive disorder     Diabetes     no meds currently    Dysthymic disorder     Exocrine pancreatic insufficiency     Gastro-oesophageal reflux disease     Hip pain, right     MRSA (methicillin resistant Staphylococcus aureus) carrier     Pancreatic disease             Past Surgical History:     Past Surgical History:   Procedure Laterality Date    ARTHROSCOPY HIP, OSTEOPLASTY FEMUR PROXIMAL, COMBINED  3/11/2013    Procedure: COMBINED ARTHROSCOPY HIP, OSTEOPLASTY FEMUR PROXIMAL;  Right Hip Arthroscopy, Labral  Debridement.    surgeon request choice anesthesia/admit to [facility] after surgery;  Surgeon: Omkar Austin MD;  [facility]    ARTHROSCOPY KNEE WITH MEDIAL MENISCECTOMY Left 1/31/2017    Procedure: ARTHROSCOPY KNEE WITH MEDIAL MENISCECTOMY;  Surgeon: Jethro Coyle MD;  [facility]    bronchoscopies      BRONCHOSCOPY      EXAM UNDER ANESTHESIA ANUS N/A 5/10/2016    Procedure: EXAM UNDER ANESTHESIA ANUS;  Surgeon: Chet Gaviria MD;  [facility]    EXAM UNDER ANESTHESIA, RESTORATIONS, EXTRACTION(S) DENTAL COMPLEX, COMBINED  5/13/2013    Procedure: COMBINED EXAM UNDER ANESTHESIA, RESTORATIONS, EXTRACTION(S) DENTAL COMPLEX;  Dental Exam, Radiographs, Restorations. Single Extraction  Tooth #2. Restorations x 3;  Surgeon: Danilo Ortiz DDS;  [facility]    HC KNEE SCOPE, DIAGNOSTIC      Arthroscopy, Knee- left    INJECT BOTOX N/A 5/10/2016    Procedure: INJECT BOTOX;  Surgeon: Chet Gaviria MD;  [facility]    left hip labral tear  5/11/2011    left hip arthroscopy with labral debridement and synovectomy    meniscus repair      OPTICAL TRACKING SYSTEM ENDOSCOPIC SINUS SURGERY  10/14/2011     Procedure:OPTICAL TRACKING SYSTEM ENDOSCOPIC SINUS SURGERY; FESS (functional endoscopic sinus surgery) with Image Guidance, bronchial lavage and cultures; Surgeon:GOYO KUO; [facility]    OPTICAL TRACKING SYSTEM ENDOSCOPIC SINUS SURGERY  5/18/2012    Procedure:OPTICAL TRACKING SYSTEM ENDOSCOPIC SINUS SURGERY; Right  and Left Image Guided Functional Endoscopic Sinus Surgery With  Frontal Approach, [employer]; Surgeon:GOYO KUO; [facility]    OPTICAL TRACKING SYSTEM ENDOSCOPIC SINUS SURGERY  9/26/2012    Procedure: OPTICAL TRACKING SYSTEM ENDOSCOPIC SINUS SURGERY;  Stealth Guided Bilateral Functional Endoscopic Sinus Surgery *Latex Safe*;  Surgeon: Goyo Kuo MD;  [facility]    OPTICAL TRACKING SYSTEM ENDOSCOPIC SINUS SURGERY Bilateral 10/16/2015    Procedure: OPTICAL TRACKING SYSTEM ENDOSCOPIC SINUS SURGERY;  Surgeon: Mareila Haile MD;  [facility]    ORTHOPEDIC SURGERY      left hip tear repair 2010    SINUS SURGERY                 Social History:     Social History     Social History Narrative    Mamie lives with mother in [place], [place].  There is a cat in the home, but Mamie does not have any litterbox duties.  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  She gets essentially no exercise because of the tingling in her feet (says it bothers her even to stand).        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        8/2015 no change in social situation        2/15/2016 Pt is single and lives with mother and stepfather.              Family History:     Family History   Problem Relation Age of Onset    Cancer Paternal Grandmother     Skin Cancer Paternal Grandmother     Other Cancer Paternal Grandmother         Skin    Obesity Paternal Grandmother     Cancer Paternal Grandfather         PGF had throat cancer (he was a smoker)    Other Cancer Paternal Grandfather     Anxiety Disorder Paternal Grandfather      Thyroid Disease Mother         ,    Hypertension Mother     Obesity Other     ALS Father 67        2 male cousins with ALS as well    Anesthesia Reaction No family hx of     Blood Disease No family hx of     Colon Polyps No family hx of     Crohn's Disease No family hx of     Ulcerative Colitis No family hx of     Colon Cancer No family hx of     Melanoma No family hx of             Allergies:     Allergies   Allergen Reactions    Vancomycin Hives     Redmens skin rash   Redmens skin rash     Amoxicillin-Pot Clavulanate Rash             Medications:     Current Outpatient Rx   Medication Sig Dispense Refill    acetylcysteine (MUCOMYST) 20 % neb solution INHALE 4ML VIA NEBULIZER TWO TIMES A DAY. MAY INCREASE TO 3-4 TIMES A DAY WITH INCREASED COUGH / COLD SYMPTOMS. REFRIGERATE VIAL AND DISCARD 96 HOURS AFTER OPENING 180 mL 11    albuterol (PROAIR HFA/PROVENTIL HFA/VENTOLIN HFA) 108 (90 Base) MCG/ACT inhaler Inhale 2 puffs into the lungs every 6 hours as needed for shortness of breath or wheezing 8.5 g 11    albuterol (PROVENTIL) (2.5 MG/3ML) 0.083% neb solution INHALE 1 VIAL ( 2.5MG) BY NEBULIZATION EVERY 4 HOURS AS NEEDED FOR FOR SHORTNESS OF BREATH OR WHEEZING 240 mL 11    amphetamine-dextroamphetamine (ADDERALL XR) 20 MG 24 hr capsule TAKE ONE CAPSULE BY MOUTH ONCE EVERY DAY [FOR FILL ON OR AFTER 7-7-23] 30 capsule 0    blood glucose (ACCU-CHEK GUIDE) test strip Use to test blood sugar 4 times daily or as directed. 100 strip 11    blood glucose monitoring (ACCU-CHEK FASTCLIX) lancets Use to test blood sugar 4 times daily or as directed. 100 each 11    busPIRone HCl (BUSPAR) 30 MG tablet Take 1 tablet (30 mg) by mouth 2 times daily 180 tablet 1    cetirizine (ZYRTEC) 10 MG tablet Take 1 tablet (10 mg) by mouth every evening 30 tablet 5    elexacaftor-tezacaftor-ivacaftor & ivacaftor (TRIKAFTA) 100-50-75 & 150 MG tablet pack TAKE TWO ORANGE TABLETS BY MOUTH IN THE MORNING AND ONE BLUE TABLET IN THE EVENING AS  DIRECTED ON PACKAGE.  TAKE WITH FAT CONTAINING FOOD. 84 tablet 4    FLUoxetine (PROZAC) 40 MG capsule TAKE TWO CAPSULES BY MOUTH EVERY DAY . 180 capsule 1    fluticasone (FLONASE) 50 MCG/ACT nasal spray Spray 1 spray into both nostrils daily 9.9 mL 1    hydrOXYzine (ATARAX) 25 MG tablet Take 1-2 tablets (25-50 mg) by mouth nightly as needed (sleep) 60 tablet 1    medroxyPROGESTERone (DEPO-PROVERA) 150 MG/ML IM injection Inject 150 mg into the muscle every 3 months      naltrexone (DEPADE/REVIA) 50 MG tablet Take 50 mg by mouth daily      omeprazole (PRILOSEC) 40 MG DR capsule Take 1 capsule (40 mg) by mouth daily 30 capsule 3    phytonadione (MEPHYTON) 5 MG tablet TAKE ONE TABLET BY MOUTH ONCE A WEEK 4 tablet 11    topiramate (TOPAMAX) 25 MG tablet Take one 50 mg pill and one 25 mg pill twice a day 180 tablet 3    topiramate (TOPAMAX) 50 MG tablet Take one 50 mg pill and one 25 mg pill twice a day 180 tablet 3    traZODone (DESYREL) 150 MG tablet Take 150 mg by mouth at bedtime      vitamin C (ASCORBIC ACID) 500 MG tablet TAKE 1 TABLET BY MOUTH TWICE A  tablet 3    VITAMIN D3 25 MCG (1000 UT) tablet TAKE ONE TABLET BY MOUTH EVERY DAY 90 tablet 3    Vitamin E 180 MG (400 UNIT) CAPS Take 1 capsule (400 Units) by mouth 2 times daily 180 capsule 3             Review of Systems:             Physical Exam:   GENERAL: alert and no distress  EYES: Eyes grossly normal to inspection.  No discharge or erythema, or obvious scleral/conjunctival abnormalities.  RESP: No audible wheeze, cough, or visible cyanosis.    SKIN: Visible skin clear. No significant rash, abnormal pigmentation or lesions.  NEURO: Cranial nerves grossly intact.  Mentation and speech appropriate for age.  PSYCH: Appropriate affect, tone, and pace of words         Laboratory results:     TSH   Date Value Ref Range Status   08/09/2022 2.02 0.40 - 4.00 mU/L Final   06/08/2021 0.98 0.40 - 4.00 mU/L Final     Testosterone Total   Date Value Ref Range  Status   06/08/2021 26 8 - 60 ng/dL Final     Comment:     This test was developed and its performance characteristics determined by the   [facility].   It has not been cleared or approved by the FDA. The laboratory is regulated   under CLIA as qualified to perform high-complexity testing. This test is used   for clinical purposes. It should not be regarded as investigational or for   research.       Cholesterol   Date Value Ref Range Status   08/09/2022 126 <200 mg/dL Final   06/08/2021 138 <200 mg/dL Final     Albumin Urine mg/L   Date Value Ref Range Status   08/09/2022 20 mg/L Final   06/08/2021 7 mg/L Final     Triglycerides   Date Value Ref Range Status   08/09/2022 167 (H) <150 mg/dL Final   06/08/2021 120 <150 mg/dL Final     HDL Cholesterol   Date Value Ref Range Status   06/08/2021 52 >49 mg/dL Final     Direct Measure HDL   Date Value Ref Range Status   08/09/2022 45 (L) >=50 mg/dL Final     LDL Cholesterol Calculated   Date Value Ref Range Status   08/09/2022 48 <=100 mg/dL Final   06/08/2021 62 <100 mg/dL Final     Comment:     Desirable:       <100 mg/dl     Cholesterol/HDL Ratio   Date Value Ref Range Status   03/12/2014 3.0 0.0 - 5.0 Final     Non HDL Cholesterol   Date Value Ref Range Status   08/09/2022 81 <130 mg/dL Final   06/08/2021 86 <130 mg/dL Final           CF  Diabetes Health Maintenance    Date of Diabetes Diagnosis: on OGTT ~ 2014    Special Notes (if any): followed at the weight management clinic    Date Last Eye Exam:      Date Last Dental Appointment:     Dates of Episodes Severe* Hypoglycemia (month/year, cumulative, ongoing, assess each visit):    *Severe=patient unconscious, seizure, unable to help self    Last 25-Vitamin D (every year): 38 (2016)    Last DXA, lowest Z-score (every 2 years): Z-score of -1.3 (6/2021)       ?Bisphosphonates (yes/no):     Last Urine Microalbumin (every year): WNL (6/2021)     No  "results found for: \"MICROALBUMIN\"           Assessment and Plan:   Mamie is a 28 year old female with  CFRD, reactive hypoglycemia and obesity.      Assessment/Plan:    Cystic fibrosis related diabetes    She has been off insulin for several years due to concern about hypoglycemia.   Reports that she is scheduled for OGTT and A1c tomorrow  May consider use of periodic CGM  Further recommendations based on pending results    2. Reactive hypoglycemia  Currently not a concern.  Symptoms are rare and mild    3. Obesity  Follows at weight management clinic.  Could consider GLP-1 RA    LINDA Cortés    Note: Chart documentation done in part with Dragon Voice Recognition software. Although reviewed after completion, some word and grammatical errors may remain.  Please consider this when interpreting information in this chart     Video-Visit Details    Type of service:  Video visit   Video visit start time 9:11 AM  Video visit end time 9:22 AM  Patient location: Home  Provider location: Off-site  Platform used for Video Visit: Juan      "

## 2024-03-21 NOTE — PROGRESS NOTES
RT NOTE:    Met with patient for annual RT review. Patient was recently using vest and nebs for acute viral illness, and only uses as needed when well.     Patient has supplies and equipment at home, all in working order.     We again discussed the importance of daily physical activity for both airway clearance, and improved cardiovascular health. 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

## 2024-03-22 ENCOUNTER — TELEPHONE (OUTPATIENT)
Dept: ENDOCRINOLOGY | Facility: CLINIC | Age: 29
End: 2024-03-22
Payer: COMMERCIAL

## 2024-03-22 NOTE — TELEPHONE ENCOUNTER
Called patient and left voicemail. Patient has an appointment on  3/26/24 . Need to collect the last 14 days worth of blood sugar readings to prepare for patient's visit.   Patrizia Artis MA

## 2024-03-25 DIAGNOSIS — F33.41 RECURRENT MAJOR DEPRESSIVE DISORDER, IN PARTIAL REMISSION (H): ICD-10-CM

## 2024-03-25 NOTE — TELEPHONE ENCOUNTER
Called patient and left voicemail. Patient has an appointment on  3/25/24 . Need to collect the last 14 days worth of blood sugar readings to prepare for patient's visit.   Patrizia Artis MA

## 2024-03-26 ENCOUNTER — VIRTUAL VISIT (OUTPATIENT)
Dept: ENDOCRINOLOGY | Facility: CLINIC | Age: 29
End: 2024-03-26
Attending: PHYSICIAN ASSISTANT
Payer: COMMERCIAL

## 2024-03-26 DIAGNOSIS — E84.8 DIABETES MELLITUS RELATED TO CF (CYSTIC FIBROSIS) (H): Primary | ICD-10-CM

## 2024-03-26 DIAGNOSIS — E08.9 DIABETES MELLITUS RELATED TO CF (CYSTIC FIBROSIS) (H): Primary | ICD-10-CM

## 2024-03-26 PROCEDURE — 99214 OFFICE O/P EST MOD 30 MIN: CPT | Mod: 95 | Performed by: INTERNAL MEDICINE

## 2024-03-26 NOTE — NURSING NOTE
Is the patient currently in the state of [place]? YES    Visit mode:VIDEO    If the visit is dropped, the patient can be reconnected by: VIDEO VISIT: Text to cell phone:   Telephone Information:   Mobile [phone]       Will anyone else be joining the visit? NO  (If patient encounters technical issues they should call [phone]    How would you like to obtain your AVS? MyChart    Are changes needed to the allergy or medication list? Pt stated no changes to allergies and Pt stated no med changes    Reason for visit: JAYA Artis CMA VVF

## 2024-03-26 NOTE — LETTER
3/26/2024       RE: Mamie Rice  [address] [facility] [address]     Dear Colleague,    Thank you for referring your patient, Mamie Rice, to the [facility]. Please see a copy of my visit note below.    Outcome for 03/18/24 9:16 AM: [employer] message sent  Patrizia Artis MA  Outcome for 03/22/24 1:08 PM: Left Voicemail   Patrizia Artis MA  Outcome for 03/25/24 9:29 AM: Left Voicemail   Patrizia Artis MA  Outcome for 03/26/24 8:59 AM: Patient is not checking blood sugars  Patrizia Artis MA    Mamie is a 28 year old who is being evaluated via a billable video visit.      CF Endocrinology Return Consultation:  Diabetes  :   Patient: Mamie Rice MRN# [id number]   YOB: 1995 Age: 28 year old   Date of Visit: 03/26/2024     Dear Dr. Allison:    I had the pleasure of seeing your patient, Mamie Rice in the CF Endocrinology Clinic, [facility], for a return consultation regarding CFRD and reactive hypoglycemia.           Problem list:     Patient Active Problem List    Diagnosis Date Noted     Pneumonia due to infectious organism, unspecified laterality, unspecified part of lung 02/13/2024     Priority: Medium     Morbid obesity (H) 01/14/2022     Priority: Medium     Knee pain 10/16/2019     Priority: Medium     Added automatically from request for surgery [id number]       Mild episode of recurrent major depressive disorder (H24) 05/15/2018     Priority: Medium     Insomnia, unspecified type 05/15/2018     Priority: Medium     MECHE (generalized anxiety disorder) 05/15/2018     Priority: Medium     Attention deficit hyperactivity disorder (ADHD), combined type 05/15/2018     Priority: Medium     Diabetes mellitus, type 2 (H) 12/07/2016     Priority: Medium     Overview:   Diabetes Mellitus Type 2  Per External Records       Acne vulgaris 10/19/2016     Priority: Medium      Non morbid obesity due to excess calories 08/24/2016     Priority: Medium     Persistent depressive disorder 02/29/2016     Priority: Medium     Overview:   Disorder Depressive Persistent (Formerly Dysthymia) NOS       Pancreatic insufficiency 11/13/2014     Priority: Medium     Fecal elastase < 50       Back pain 10/22/2014     Priority: Medium     Frontal sinusitis 05/10/2012     Priority: Medium     Chronic constipation 03/04/2012     Priority: Medium     Cystic fibrosis of the lung (H) 12/19/2011     Priority: Medium     SWEAT TEST:  Date: 2/17/1994          Laboratory: [facility]  Sample #1  296 mg           104 mmol/L Cl  Sample #2  295 mg           104 mmol/L Cl     GENOTYPING:  Date: 10/15/2007               Laboratory: [facility]  Genotype: [id number]  Poly T Variant:  [  ]/[  ]         Hypoglycemia 10/28/2011     Priority: Medium     Reactive hypoglycemia  updating diagnosis code for icd10 cutover       Chronic maxillary sinusitis 09/08/2011     Priority: Medium     Aspergillosis (H) 07/21/2011     Priority: Medium     Hip joint pain 04/11/2011     Priority: Medium     Cystic fibrosis (H) 02/27/2011     Priority: Medium            HPI:   Mamie is a 28 year old female with Cystic Fibrosis Related Diabetes Mellitus (CFRD).    28 year old female with history of cystic fibrosis related diabetes and reactive hypoglycemia.    Last seen in CFRD clinic in 2021  She follows with weight management clinic as well    History of CF-related diabetes.  Has been off insulin for several years due to concern about hypoglycemia  Also has history of reactive hypoglycemia  Denies any acute concerns today    Currently not checking fingerstick glucose or using CGM  Last A1c was in 2022  She is scheduled for annual labs and OGTT tomorrow    Reports symptoms of reactive hypoglycemia are rare and occur about once a month  No severe lows    Patient has pancreatic insufficiency  On Trikafta    Current insulin regimen: None          Past  Medical History:     Past Medical History:   Diagnosis Date     ADHD (attention deficit hyperactivity disorder)      Anxiety      Aspergillosis, with pneumonia (H)     fugus found caused chest pain     Chronic infection     CF, MRSA.,      Chronic sinusitis      Constipation, chronic      Cystic fibrosis with pulmonary manifestations (H) 12/19/2011     Cystic fibrosis without mention of meconium ileus     SWEAT TEST:Date: 2/17/1994   [facility]  296 mg, 104 mmol/L ClSample #2  295 mg, 104 mmol/L Cl GENOTYPING:Date: 10/15/2007,  Laboratory: [facility]: [id number]     Depressive disorder      Diabetes     no meds currently     Dysthymic disorder      Exocrine pancreatic insufficiency      Gastro-oesophageal reflux disease      Hip pain, right      MRSA (methicillin resistant Staphylococcus aureus) carrier      Pancreatic disease             Past Surgical History:     Past Surgical History:   Procedure Laterality Date     ARTHROSCOPY HIP, OSTEOPLASTY FEMUR PROXIMAL, COMBINED  3/11/2013    Procedure: COMBINED ARTHROSCOPY HIP, OSTEOPLASTY FEMUR PROXIMAL;  Right Hip Arthroscopy, Labral  Debridement.    surgeon request choice anesthesia/admit to [facility] after surgery;  Surgeon: Omkar Austin MD;  [facility]     ARTHROSCOPY KNEE WITH MEDIAL MENISCECTOMY Left 1/31/2017    Procedure: ARTHROSCOPY KNEE WITH MEDIAL MENISCECTOMY;  Surgeon: Jethro Coyle MD;  [facility] OR     bronchoscopies       BRONCHOSCOPY       EXAM UNDER ANESTHESIA ANUS N/A 5/10/2016    Procedure: EXAM UNDER ANESTHESIA ANUS;  Surgeon: Chet Gaviria MD;  [facility]     EXAM UNDER ANESTHESIA, RESTORATIONS, EXTRACTION(S) DENTAL COMPLEX, COMBINED  5/13/2013    Procedure: COMBINED EXAM UNDER ANESTHESIA, RESTORATIONS, EXTRACTION(S) DENTAL COMPLEX;  Dental Exam, Radiographs, Restorations. Single Extraction  Tooth #2. Restorations x 3;  Surgeon: Danilo Ortiz DDS;  Location: [facility] OR     HC KNEE  SCOPE, DIAGNOSTIC      Arthroscopy, Knee- left     INJECT BOTOX N/A 5/10/2016    Procedure: INJECT BOTOX;  Surgeon: Chet Gaviria MD;  [facility]     left hip labral tear  5/11/2011    left hip arthroscopy with labral debridement and synovectomy     meniscus repair       OPTICAL TRACKING SYSTEM ENDOSCOPIC SINUS SURGERY  10/14/2011    Procedure:OPTICAL TRACKING SYSTEM ENDOSCOPIC SINUS SURGERY; FESS (functional endoscopic sinus surgery) with Image Guidance, bronchial lavage and cultures; Surgeon:GOYO KUO; [facility] OR     OPTICAL TRACKING SYSTEM ENDOSCOPIC SINUS SURGERY  5/18/2012    Procedure:OPTICAL TRACKING SYSTEM ENDOSCOPIC SINUS SURGERY; Right  and Left Image Guided Functional Endoscopic Sinus Surgery With  Frontal Approach, [employer]; Surgeon:GOYO KUO; [facility] OR     OPTICAL TRACKING SYSTEM ENDOSCOPIC SINUS SURGERY  9/26/2012    Procedure: OPTICAL TRACKING SYSTEM ENDOSCOPIC SINUS SURGERY;  Stealth Guided Bilateral Functional Endoscopic Sinus Surgery *Latex Safe*;  Surgeon: Goyo Kuo MD;  [facility]     OPTICAL TRACKING SYSTEM ENDOSCOPIC SINUS SURGERY Bilateral 10/16/2015    Procedure: OPTICAL TRACKING SYSTEM ENDOSCOPIC SINUS SURGERY;  Surgeon: Mariela Haile MD;  [facility]     ORTHOPEDIC SURGERY      left hip tear repair 2010     SINUS SURGERY                 Social History:     Social History     Social History Narrative    Mamie lives with mother in [place], [place].  There is a cat in the home, but Mamie does not have any litterbox duties.  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  She gets essentially no exercise because of the tingling in her feet (says it bothers her even to stand).        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        8/2015 no change in social situation        2/15/2016 Pt is single and lives with mother and stepfather.              Family History:     Family  History   Problem Relation Age of Onset     Cancer Paternal Grandmother      Skin Cancer Paternal Grandmother      Other Cancer Paternal Grandmother         Skin     Obesity Paternal Grandmother      Cancer Paternal Grandfather         PGF had throat cancer (he was a smoker)     Other Cancer Paternal Grandfather      Anxiety Disorder Paternal Grandfather      Thyroid Disease Mother         ,     Hypertension Mother      Obesity Other      ALS Father 67        2 male cousins with ALS as well     Anesthesia Reaction No family hx of      Blood Disease No family hx of      Colon Polyps No family hx of      Crohn's Disease No family hx of      Ulcerative Colitis No family hx of      Colon Cancer No family hx of      Melanoma No family hx of             Allergies:     Allergies   Allergen Reactions     Vancomycin Hives     Redmens skin rash   Redmens skin rash      Amoxicillin-Pot Clavulanate Rash             Medications:     Current Outpatient Rx   Medication Sig Dispense Refill     acetylcysteine (MUCOMYST) 20 % neb solution INHALE 4ML VIA NEBULIZER TWO TIMES A DAY. MAY INCREASE TO 3-4 TIMES A DAY WITH INCREASED COUGH / COLD SYMPTOMS. REFRIGERATE VIAL AND DISCARD 96 HOURS AFTER OPENING 180 mL 11     albuterol (PROAIR HFA/PROVENTIL HFA/VENTOLIN HFA) 108 (90 Base) MCG/ACT inhaler Inhale 2 puffs into the lungs every 6 hours as needed for shortness of breath or wheezing 8.5 g 11     albuterol (PROVENTIL) (2.5 MG/3ML) 0.083% neb solution INHALE 1 VIAL ( 2.5MG) BY NEBULIZATION EVERY 4 HOURS AS NEEDED FOR FOR SHORTNESS OF BREATH OR WHEEZING 240 mL 11     amphetamine-dextroamphetamine (ADDERALL XR) 20 MG 24 hr capsule TAKE ONE CAPSULE BY MOUTH ONCE EVERY DAY [FOR FILL ON OR AFTER 7-7-23] 30 capsule 0     blood glucose (ACCU-CHEK GUIDE) test strip Use to test blood sugar 4 times daily or as directed. 100 strip 11     blood glucose monitoring (ACCU-CHEK FASTCLIX) lancets Use to test blood sugar 4 times daily or as directed. 100  each 11     busPIRone HCl (BUSPAR) 30 MG tablet Take 1 tablet (30 mg) by mouth 2 times daily 180 tablet 1     cetirizine (ZYRTEC) 10 MG tablet Take 1 tablet (10 mg) by mouth every evening 30 tablet 5     elexacaftor-tezacaftor-ivacaftor & ivacaftor (TRIKAFTA) 100-50-75 & 150 MG tablet pack TAKE TWO ORANGE TABLETS BY MOUTH IN THE MORNING AND ONE BLUE TABLET IN THE EVENING AS DIRECTED ON PACKAGE.  TAKE WITH FAT CONTAINING FOOD. 84 tablet 4     FLUoxetine (PROZAC) 40 MG capsule TAKE TWO CAPSULES BY MOUTH EVERY DAY . 180 capsule 1     fluticasone (FLONASE) 50 MCG/ACT nasal spray Spray 1 spray into both nostrils daily 9.9 mL 1     hydrOXYzine (ATARAX) 25 MG tablet Take 1-2 tablets (25-50 mg) by mouth nightly as needed (sleep) 60 tablet 1     medroxyPROGESTERone (DEPO-PROVERA) 150 MG/ML IM injection Inject 150 mg into the muscle every 3 months       naltrexone (DEPADE/REVIA) 50 MG tablet Take 50 mg by mouth daily       omeprazole (PRILOSEC) 40 MG DR capsule Take 1 capsule (40 mg) by mouth daily 30 capsule 3     phytonadione (MEPHYTON) 5 MG tablet TAKE ONE TABLET BY MOUTH ONCE A WEEK 4 tablet 11     topiramate (TOPAMAX) 25 MG tablet Take one 50 mg pill and one 25 mg pill twice a day 180 tablet 3     topiramate (TOPAMAX) 50 MG tablet Take one 50 mg pill and one 25 mg pill twice a day 180 tablet 3     traZODone (DESYREL) 150 MG tablet Take 150 mg by mouth at bedtime       vitamin C (ASCORBIC ACID) 500 MG tablet TAKE 1 TABLET BY MOUTH TWICE A  tablet 3     VITAMIN D3 25 MCG (1000 UT) tablet TAKE ONE TABLET BY MOUTH EVERY DAY 90 tablet 3     Vitamin E 180 MG (400 UNIT) CAPS Take 1 capsule (400 Units) by mouth 2 times daily 180 capsule 3             Review of Systems:             Physical Exam:   GENERAL: alert and no distress  EYES: Eyes grossly normal to inspection.  No discharge or erythema, or obvious scleral/conjunctival abnormalities.  RESP: No audible wheeze, cough, or visible cyanosis.    SKIN: Visible skin  clear. No significant rash, abnormal pigmentation or lesions.  NEURO: Cranial nerves grossly intact.  Mentation and speech appropriate for age.  PSYCH: Appropriate affect, tone, and pace of words         Laboratory results:     TSH   Date Value Ref Range Status   08/09/2022 2.02 0.40 - 4.00 mU/L Final   06/08/2021 0.98 0.40 - 4.00 mU/L Final     Testosterone Total   Date Value Ref Range Status   06/08/2021 26 8 - 60 ng/dL Final     Comment:     This test was developed and its performance characteristics determined by the   [facility].   It has not been cleared or approved by the FDA. The laboratory is regulated   under CLIA as qualified to perform high-complexity testing. This test is used   for clinical purposes. It should not be regarded as investigational or for   research.       Cholesterol   Date Value Ref Range Status   08/09/2022 126 <200 mg/dL Final   06/08/2021 138 <200 mg/dL Final     Albumin Urine mg/L   Date Value Ref Range Status   08/09/2022 20 mg/L Final   06/08/2021 7 mg/L Final     Triglycerides   Date Value Ref Range Status   08/09/2022 167 (H) <150 mg/dL Final   06/08/2021 120 <150 mg/dL Final     HDL Cholesterol   Date Value Ref Range Status   06/08/2021 52 >49 mg/dL Final     Direct Measure HDL   Date Value Ref Range Status   08/09/2022 45 (L) >=50 mg/dL Final     LDL Cholesterol Calculated   Date Value Ref Range Status   08/09/2022 48 <=100 mg/dL Final   06/08/2021 62 <100 mg/dL Final     Comment:     Desirable:       <100 mg/dl     Cholesterol/HDL Ratio   Date Value Ref Range Status   03/12/2014 3.0 0.0 - 5.0 Final     Non HDL Cholesterol   Date Value Ref Range Status   08/09/2022 81 <130 mg/dL Final   06/08/2021 86 <130 mg/dL Final           CF  Diabetes Health Maintenance    Date of Diabetes Diagnosis: on OGTT ~ 2014    Special Notes (if any): followed at the weight management clinic    Date Last Eye Exam:      Date Last  "Dental Appointment:     Dates of Episodes Severe* Hypoglycemia (month/year, cumulative, ongoing, assess each visit):    *Severe=patient unconscious, seizure, unable to help self    Last 25-Vitamin D (every year): 38 (2016)    Last DXA, lowest Z-score (every 2 years): Z-score of -1.3 (6/2021)       ?Bisphosphonates (yes/no):     Last Urine Microalbumin (every year): WNL (6/2021)     No results found for: \"MICROALBUMIN\"           Assessment and Plan:   Mamie is a 28 year old female with  CFRD, reactive hypoglycemia and obesity.      Assessment/Plan:    Cystic fibrosis related diabetes    She has been off insulin for several years due to concern about hypoglycemia.   Reports that she is scheduled for OGTT and A1c tomorrow  May consider use of periodic CGM  Further recommendations based on pending results    2. Reactive hypoglycemia  Currently not a concern.  Symptoms are rare and mild    3. Obesity  Follows at weight management clinic.  Could consider GLP-1 RA    LINDA Cortés    Note: Chart documentation done in part with Dragon Voice Recognition software. Although reviewed after completion, some word and grammatical errors may remain.  Please consider this when interpreting information in this chart     Video-Visit Details    Type of service:  Video visit   Video visit start time 9:11 AM  Video visit end time 9:22 AM  Patient location: Home  Provider location: Off-site  Platform used for Video Visit: Juan        Again, thank you for allowing me to participate in the care of your patient.      Sincerely,    Hugo Prieto MD    "

## 2024-03-27 ENCOUNTER — ANCILLARY PROCEDURE (OUTPATIENT)
Dept: GENERAL RADIOLOGY | Facility: CLINIC | Age: 29
End: 2024-03-27
Attending: INTERNAL MEDICINE
Payer: COMMERCIAL

## 2024-03-27 ENCOUNTER — ANCILLARY PROCEDURE (OUTPATIENT)
Dept: BONE DENSITY | Facility: CLINIC | Age: 29
End: 2024-03-27
Attending: INTERNAL MEDICINE
Payer: COMMERCIAL

## 2024-03-27 ENCOUNTER — LAB (OUTPATIENT)
Dept: LAB | Facility: CLINIC | Age: 29
End: 2024-03-27
Attending: PHYSICIAN ASSISTANT
Payer: COMMERCIAL

## 2024-03-27 ENCOUNTER — CLINICAL UPDATE (OUTPATIENT)
Dept: PHARMACY | Facility: CLINIC | Age: 29
End: 2024-03-27
Payer: COMMERCIAL

## 2024-03-27 DIAGNOSIS — E84.9 CF (CYSTIC FIBROSIS) (H): ICD-10-CM

## 2024-03-27 DIAGNOSIS — E84.9 CYSTIC FIBROSIS (H): Primary | ICD-10-CM

## 2024-03-27 DIAGNOSIS — E84.0 CYSTIC FIBROSIS OF THE LUNG (H): ICD-10-CM

## 2024-03-27 DIAGNOSIS — K86.89 PANCREATIC INSUFFICIENCY: ICD-10-CM

## 2024-03-27 LAB
ALBUMIN SERPL BCG-MCNC: 4.1 G/DL (ref 3.5–5.2)
ALBUMIN UR-MCNC: NEGATIVE MG/DL
ALP SERPL-CCNC: 55 U/L (ref 40–150)
ALT SERPL W P-5'-P-CCNC: 12 U/L (ref 0–50)
ANION GAP SERPL CALCULATED.3IONS-SCNC: 9 MMOL/L (ref 7–15)
APPEARANCE UR: CLEAR
AST SERPL W P-5'-P-CCNC: 17 U/L (ref 0–45)
BASOPHILS # BLD AUTO: 0 10E3/UL (ref 0–0.2)
BASOPHILS NFR BLD AUTO: 1 %
BILIRUB DIRECT SERPL-MCNC: <0.2 MG/DL (ref 0–0.3)
BILIRUB SERPL-MCNC: 0.2 MG/DL
BILIRUB UR QL STRIP: NEGATIVE
BUN SERPL-MCNC: 10.6 MG/DL (ref 6–20)
CALCIUM SERPL-MCNC: 8.7 MG/DL (ref 8.6–10)
CHLORIDE SERPL-SCNC: 108 MMOL/L (ref 98–107)
CHOLEST SERPL-MCNC: 159 MG/DL
CK SERPL-CCNC: 55 U/L (ref 26–192)
COLOR UR AUTO: YELLOW
CREAT SERPL-MCNC: 0.89 MG/DL (ref 0.51–0.95)
CREAT UR-MCNC: 172 MG/DL
DEPRECATED HCO3 PLAS-SCNC: 24 MMOL/L (ref 22–29)
EGFRCR SERPLBLD CKD-EPI 2021: 89 ML/MIN/1.73M2
EOSINOPHIL # BLD AUTO: 0.1 10E3/UL (ref 0–0.7)
EOSINOPHIL NFR BLD AUTO: 2 %
ERYTHROCYTE [DISTWIDTH] IN BLOOD BY AUTOMATED COUNT: 13.7 % (ref 10–15)
ERYTHROCYTE [SEDIMENTATION RATE] IN BLOOD BY WESTERGREN METHOD: 11 MM/HR (ref 0–20)
FASTING STATUS PATIENT QL REPORTED: YES
GGT SERPL-CCNC: 24 U/L (ref 5–36)
GLUCOSE SERPL-MCNC: 90 MG/DL (ref 70–99)
GLUCOSE UR STRIP-MCNC: NEGATIVE MG/DL
HBA1C MFR BLD: 5.1 %
HCT VFR BLD AUTO: 45.1 % (ref 35–47)
HDLC SERPL-MCNC: 42 MG/DL
HGB BLD-MCNC: 14.8 G/DL (ref 11.7–15.7)
HGB UR QL STRIP: NEGATIVE
IMM GRANULOCYTES # BLD: 0 10E3/UL
IMM GRANULOCYTES NFR BLD: 0 %
INR PPP: 1.02 (ref 0.85–1.15)
IRON SERPL-MCNC: 55 UG/DL (ref 37–145)
KETONES UR STRIP-MCNC: NEGATIVE MG/DL
LDLC SERPL CALC-MCNC: 78 MG/DL
LEUKOCYTE ESTERASE UR QL STRIP: NEGATIVE
LYMPHOCYTES # BLD AUTO: 1.2 10E3/UL (ref 0.8–5.3)
LYMPHOCYTES NFR BLD AUTO: 33 %
MAGNESIUM SERPL-MCNC: 2.1 MG/DL (ref 1.7–2.3)
MCH RBC QN AUTO: 27.7 PG (ref 26.5–33)
MCHC RBC AUTO-ENTMCNC: 32.8 G/DL (ref 31.5–36.5)
MCV RBC AUTO: 84 FL (ref 78–100)
MICROALBUMIN UR-MCNC: <12 MG/L
MICROALBUMIN/CREAT UR: NORMAL MG/G{CREAT}
MONOCYTES # BLD AUTO: 0.3 10E3/UL (ref 0–1.3)
MONOCYTES NFR BLD AUTO: 8 %
MUCOUS THREADS #/AREA URNS LPF: PRESENT /LPF
NEUTROPHILS # BLD AUTO: 2.1 10E3/UL (ref 1.6–8.3)
NEUTROPHILS NFR BLD AUTO: 56 %
NITRATE UR QL: NEGATIVE
NONHDLC SERPL-MCNC: 117 MG/DL
NRBC # BLD AUTO: 0 10E3/UL
NRBC BLD AUTO-RTO: 0 /100
PH UR STRIP: 5 [PH] (ref 5–7)
PHOSPHATE SERPL-MCNC: 3.9 MG/DL (ref 2.5–4.5)
PLATELET # BLD AUTO: 296 10E3/UL (ref 150–450)
POTASSIUM SERPL-SCNC: 3.8 MMOL/L (ref 3.4–5.3)
PROT SERPL-MCNC: 7 G/DL (ref 6.4–8.3)
RBC # BLD AUTO: 5.35 10E6/UL (ref 3.8–5.2)
RBC URINE: <1 /HPF
SODIUM SERPL-SCNC: 141 MMOL/L (ref 135–145)
SP GR UR STRIP: 1.02 (ref 1–1.03)
SQUAMOUS EPITHELIAL: 4 /HPF
TRANSITIONAL EPI: <1 /HPF
TRIGL SERPL-MCNC: 193 MG/DL
TSH SERPL DL<=0.005 MIU/L-ACNC: 1.97 UIU/ML (ref 0.3–4.2)
UROBILINOGEN UR STRIP-MCNC: NORMAL MG/DL
VIT D+METAB SERPL-MCNC: 28 NG/ML (ref 20–50)
WBC # BLD AUTO: 3.7 10E3/UL (ref 4–11)
WBC URINE: 3 /HPF

## 2024-03-27 PROCEDURE — 82550 ASSAY OF CK (CPK): CPT | Performed by: PATHOLOGY

## 2024-03-27 PROCEDURE — 83036 HEMOGLOBIN GLYCOSYLATED A1C: CPT | Performed by: INTERNAL MEDICINE

## 2024-03-27 PROCEDURE — 80053 COMPREHEN METABOLIC PANEL: CPT | Performed by: PATHOLOGY

## 2024-03-27 PROCEDURE — 77080 DXA BONE DENSITY AXIAL: CPT | Performed by: INTERNAL MEDICINE

## 2024-03-27 PROCEDURE — 82977 ASSAY OF GGT: CPT | Performed by: PATHOLOGY

## 2024-03-27 PROCEDURE — 82784 ASSAY IGA/IGD/IGG/IGM EACH: CPT | Performed by: INTERNAL MEDICINE

## 2024-03-27 PROCEDURE — 84590 ASSAY OF VITAMIN A: CPT | Mod: 90 | Performed by: PATHOLOGY

## 2024-03-27 PROCEDURE — 84446 ASSAY OF VITAMIN E: CPT | Mod: 90 | Performed by: PATHOLOGY

## 2024-03-27 PROCEDURE — 82306 VITAMIN D 25 HYDROXY: CPT | Performed by: INTERNAL MEDICINE

## 2024-03-27 PROCEDURE — 84100 ASSAY OF PHOSPHORUS: CPT | Performed by: PATHOLOGY

## 2024-03-27 PROCEDURE — 81001 URINALYSIS AUTO W/SCOPE: CPT | Performed by: PATHOLOGY

## 2024-03-27 PROCEDURE — 99000 SPECIMEN HANDLING OFFICE-LAB: CPT | Performed by: PATHOLOGY

## 2024-03-27 PROCEDURE — 85652 RBC SED RATE AUTOMATED: CPT | Performed by: PATHOLOGY

## 2024-03-27 PROCEDURE — 84443 ASSAY THYROID STIM HORMONE: CPT | Performed by: PATHOLOGY

## 2024-03-27 PROCEDURE — 85025 COMPLETE CBC W/AUTO DIFF WBC: CPT | Performed by: PATHOLOGY

## 2024-03-27 PROCEDURE — 83540 ASSAY OF IRON: CPT | Performed by: PATHOLOGY

## 2024-03-27 PROCEDURE — 80061 LIPID PANEL: CPT | Performed by: PATHOLOGY

## 2024-03-27 PROCEDURE — 36415 COLL VENOUS BLD VENIPUNCTURE: CPT | Performed by: PATHOLOGY

## 2024-03-27 PROCEDURE — 85610 PROTHROMBIN TIME: CPT | Performed by: PATHOLOGY

## 2024-03-27 PROCEDURE — 82248 BILIRUBIN DIRECT: CPT | Performed by: PATHOLOGY

## 2024-03-27 PROCEDURE — 82570 ASSAY OF URINE CREATININE: CPT | Performed by: INTERNAL MEDICINE

## 2024-03-27 PROCEDURE — 83735 ASSAY OF MAGNESIUM: CPT | Performed by: PATHOLOGY

## 2024-03-27 PROCEDURE — 71046 X-RAY EXAM CHEST 2 VIEWS: CPT | Mod: GC | Performed by: RADIOLOGY

## 2024-03-27 PROCEDURE — 99207 PR NO CHARGE LOS: CPT | Performed by: PHARMACIST

## 2024-03-27 RX ORDER — FLUOXETINE 40 MG/1
80 CAPSULE ORAL DAILY
Qty: 180 CAPSULE | Refills: 1 | OUTPATIENT
Start: 2024-03-27

## 2024-03-27 NOTE — TELEPHONE ENCOUNTER
No longer seen by clinic. Pt was transferred to PCP in [facility].  Declined Feb refill as well and pt agreed to seek refill from PCP. Thank you

## 2024-03-27 NOTE — TELEPHONE ENCOUNTER
Date of Last Office Visit: 6/1/2023  [facility]     Gisselle JessieDANIEL torres     RTC: n/a  No shows: 0  Cancellations: 0  Date of Next Office Visit: 0  ------------------------------  FLUoxetine (PROZAC) 40 MG capsule 180 capsule 1 5/4/2023 - No  Sig: TAKE TWO CAPSULES BY MOUTH EVERY DAY .  Class: E-Prescribe  ------------------------------     Last Visit Treatment Plan     Medication: Continue on current medication regimen for now.  Complete EKG as soon as you are able, if the result is relatively normal, ok to continue on current medication regimen.  OTC Recommendations: none  Lab Orders:  EKG   Referrals: none  Release of Information: none  Future Treatment Considerations: Per symptoms.   Return for Follow Up: if EKG is normal, transfer to local psych service or PCP, if EKG is elevated, to follow up couple more times for medication adjustment.      Refill decision: Refill pended and routed to the provider for review/determination due to the following criteria not met: was patient going to establish care with community psych or transfer refills to PCP?     Medication unable to be refilled by RN due to criteria not met as indicated.                 []Eligibility - not seen in the last year              [x]Supervision - no future appointment              []Compliance - no shows, cancellations or lapse in therapy              []Verification - order discrepancy              []Controlled medication              []Medication not included in policy              []90-day supply request              []Other:

## 2024-03-27 NOTE — PROGRESS NOTES
Clinical Update:                                                    A chart review was conducted for Mamie Rice.    Reason for Chart Review: Trikafta 6 Month Lab Follow Up     Discussion: Mamie has been on Trikafta since 11/19/2019.  Per chart review, patient continues full dose Trikafta.    Labs were reviewed from  3/27/24  at [facility]  in conjunction with overdue annual labs. All modulator labs are within normal limits    Lab Results   Component Value Date    ALT 12 03/27/2024    AST 17 03/27/2024    BILITOTAL 0.2 03/27/2024    DBIL <0.20 03/27/2024    CKT 55 03/27/2024     Plan:  1. Continue Trikafta (refilled 2/2024)  2. Recheck hepatic panel and CK in 6 months  (ordered today)        Ping Cisneros, PharmD  Cystic Fibrosis MTM Pharmacist  [facility]  Voicemail: [phone]

## 2024-03-28 LAB
IGG SERPL-MCNC: 680 MG/DL (ref 610–1616)
IGM SERPL-MCNC: 325 MG/DL (ref 35–242)

## 2024-03-29 LAB
A-TOCOPHEROL VIT E SERPL-MCNC: 16.3 MG/L
ANNOTATION COMMENT IMP: NORMAL
BETA+GAMMA TOCOPHEROL SERPL-MCNC: 0.5 MG/L
RETINYL PALMITATE SERPL-MCNC: 0.07 MG/L
VIT A SERPL-MCNC: 0.86 MG/L

## 2024-04-11 LAB
ACID FAST STAIN (ARUP): NORMAL

## 2024-04-20 NOTE — PATIENT INSTRUCTIONS
1. Food Goal:  - Will have no more than 1 glass milk at dinner   - Will have no more than 1 Frapocchino daily     2. Activity Goal:  - Continue to be active as you are     3. Medications:   - Continue topiramate 75 mg twice a day (one 50 mg pill and one 25 mg pill twice a day)  - Continue naltrexone 50 mg daily  - Continue bupropion 300 mg daily    4. We can plan to see you again in clinic in around 6 months. If any questions or concerns before that time, please let us know.   Show Applicator Variable?: Yes Add 52 Modifier (Optional): no Spray Paint Text: The liquid nitrogen was applied to the skin utilizing a spray paint frosting technique. Medical Necessity Information: It is in your best interest to select a reason for this procedure from the list below. All of these items fulfill various CMS LCD requirements except the new and changing color options. Consent: The patient's consent was obtained including but not limited to risks of crusting, scabbing, blistering, scarring, darker or lighter pigmentary change, recurrence, incomplete removal and infection. Medical Necessity Clause: This procedure was medically necessary because the lesions that were treated were: Post-Care Instructions: I reviewed with the patient in detail post-care instructions. Patient is to wear sunprotection, and avoid picking at any of the treated lesions. Pt may apply Vaseline to crusted or scabbing areas. Detail Level: Detailed

## 2024-04-22 DIAGNOSIS — F41.1 GAD (GENERALIZED ANXIETY DISORDER): ICD-10-CM

## 2024-04-23 RX ORDER — BUSPIRONE HYDROCHLORIDE 30 MG/1
30 TABLET ORAL 2 TIMES DAILY
Qty: 180 TABLET | Refills: 1 | OUTPATIENT
Start: 2024-04-23

## 2024-04-23 NOTE — TELEPHONE ENCOUNTER
No longer under this writer's care. Refill declined. Jessie Pitts, CNP, 3/27/2024

## 2024-04-25 ENCOUNTER — HOSPITAL ENCOUNTER (OUTPATIENT)
Dept: RESEARCH | Facility: CLINIC | Age: 29
Discharge: HOME | End: 2024-04-25
Attending: INTERNAL MEDICINE | Admitting: INTERNAL MEDICINE
Payer: COMMERCIAL

## 2024-04-25 ENCOUNTER — LAB REQUISITION (OUTPATIENT)
Dept: LAB | Facility: CLINIC | Age: 29
End: 2024-04-25

## 2024-04-25 LAB — HCG SERPL QL: NEGATIVE

## 2024-04-25 PROCEDURE — 510N000009 HC RESEARCH FACILITY, PER 15 MIN

## 2024-04-25 PROCEDURE — 999N000129 HC STATISTIC PICC/MID LINE PROC CANCELLED SUBQ WORKUP

## 2024-04-25 PROCEDURE — 84703 CHORIONIC GONADOTROPIN ASSAY: CPT | Performed by: INTERNAL MEDICINE

## 2024-04-25 PROCEDURE — 36000 PLACE NEEDLE IN VEIN: CPT

## 2024-04-25 PROCEDURE — 510N000017 HC CRU PATIENT CARE, PER 15 MIN

## 2024-04-29 ENCOUNTER — TELEPHONE (OUTPATIENT)
Dept: PULMONOLOGY | Facility: CLINIC | Age: 29
End: 2024-04-29
Payer: COMMERCIAL

## 2024-04-29 NOTE — TELEPHONE ENCOUNTER
Prior Authorization Approval    Medication: TRIKAFTA 100-50-75 & 150 MG PO TBPK  Authorization Effective Date: 4/29/2024  Authorization Expiration Date: 4/29/2025  Approved Dose/Quantity: as directed  Reference #: Key: [id number]   Insurance Company: [employer] - Phone [phone] Fax [phone]  Expected CoPay: $    CoPay Card Available:     Which Pharmacy is filling the prescription: [id number] - [place], [place] - [address]

## 2024-04-29 NOTE — TELEPHONE ENCOUNTER
PA Initiation    Medication: TRIKAFTA 100-50-75 & 150 MG PO TBPK  Insurance Company: [employer] [facility] [employer] - Phone [phone] Fax [phone]  Pharmacy Filling the Rx: [id number] - [place], [place] - [address]  Filling Pharmacy Phone:    Filling Pharmacy Fax:    Start Date: 4/29/2024    Key: [id number]

## 2024-05-08 DIAGNOSIS — Z00.6 EXAMINATION OF PARTICIPANT OR CONTROL IN CLINICAL RESEARCH: Primary | ICD-10-CM

## 2024-05-10 ENCOUNTER — HOSPITAL ENCOUNTER (OUTPATIENT)
Dept: RESEARCH | Facility: CLINIC | Age: 29
Discharge: HOME | End: 2024-05-10
Attending: INTERNAL MEDICINE | Admitting: INTERNAL MEDICINE
Payer: COMMERCIAL

## 2024-05-10 ENCOUNTER — LAB REQUISITION (OUTPATIENT)
Dept: LAB | Facility: CLINIC | Age: 29
End: 2024-05-10

## 2024-05-10 VITALS
RESPIRATION RATE: 12 BRPM | HEART RATE: 66 BPM | HEIGHT: 63 IN | WEIGHT: 182.98 LBS | DIASTOLIC BLOOD PRESSURE: 73 MMHG | BODY MASS INDEX: 32.42 KG/M2 | SYSTOLIC BLOOD PRESSURE: 112 MMHG

## 2024-05-10 DIAGNOSIS — Z00.6 EXAMINATION OF PARTICIPANT OR CONTROL IN CLINICAL RESEARCH: Primary | ICD-10-CM

## 2024-05-10 LAB
ALT SERPL W P-5'-P-CCNC: 14 U/L (ref 0–50)
ANION GAP SERPL CALCULATED.3IONS-SCNC: 8 MMOL/L (ref 7–15)
AST SERPL W P-5'-P-CCNC: 18 U/L (ref 0–45)
BUN SERPL-MCNC: 10.1 MG/DL (ref 6–20)
CALCIUM SERPL-MCNC: 8.5 MG/DL (ref 8.6–10)
CHLORIDE SERPL-SCNC: 110 MMOL/L (ref 98–107)
CREAT SERPL-MCNC: 0.78 MG/DL (ref 0.51–0.95)
DEPRECATED HCO3 PLAS-SCNC: 22 MMOL/L (ref 22–29)
EGFRCR SERPLBLD CKD-EPI 2021: >90 ML/MIN/1.73M2
GLUCOSE SERPL-MCNC: 82 MG/DL (ref 70–99)
LIPASE SERPL-CCNC: 5 U/L (ref 13–60)
POTASSIUM SERPL-SCNC: 3.7 MMOL/L (ref 3.4–5.3)
SODIUM SERPL-SCNC: 140 MMOL/L (ref 135–145)

## 2024-05-10 PROCEDURE — 510N000016 HC RESEARCH MEALS, PER MEAL

## 2024-05-10 PROCEDURE — 250N000009 HC RX 250: Performed by: INTERNAL MEDICINE

## 2024-05-10 PROCEDURE — 84460 ALANINE AMINO (ALT) (SGPT): CPT | Performed by: INTERNAL MEDICINE

## 2024-05-10 PROCEDURE — 80048 BASIC METABOLIC PNL TOTAL CA: CPT | Performed by: INTERNAL MEDICINE

## 2024-05-10 PROCEDURE — 36000 PLACE NEEDLE IN VEIN: CPT

## 2024-05-10 PROCEDURE — 510N000017 HC CRU PATIENT CARE, PER 15 MIN

## 2024-05-10 PROCEDURE — 300N000003 HC RESEARCH SPECIMEN PROCESSING, SIMPLE

## 2024-05-10 PROCEDURE — 510N000009 HC RESEARCH FACILITY, PER 15 MIN

## 2024-05-10 PROCEDURE — 83690 ASSAY OF LIPASE: CPT | Performed by: INTERNAL MEDICINE

## 2024-05-10 PROCEDURE — 84450 TRANSFERASE (AST) (SGOT): CPT | Performed by: INTERNAL MEDICINE

## 2024-05-10 PROCEDURE — 999N000129 HC STATISTIC PICC/MID LINE PROC CANCELLED SUBQ WORKUP

## 2024-05-10 PROCEDURE — 510N000018 HC RESEARCH OGTT, PER HOUR

## 2024-05-10 RX ADMIN — ALCOHOL 75 G: 65 GEL TOPICAL at 09:54

## 2024-05-10 NOTE — ADDENDUM NOTE
Encounter addended by: Rj Joe on: 5/10/2024 2:00 PM   Actions taken: Charge Capture section accepted

## 2024-05-19 DIAGNOSIS — E66.01 MORBID OBESITY (H): ICD-10-CM

## 2024-05-19 DIAGNOSIS — F34.1 PERSISTENT DEPRESSIVE DISORDER: ICD-10-CM

## 2024-05-23 DIAGNOSIS — E84.0 CYSTIC FIBROSIS WITH PULMONARY MANIFESTATIONS (H): ICD-10-CM

## 2024-05-24 DIAGNOSIS — K86.89 PANCREATIC INSUFFICIENCY: ICD-10-CM

## 2024-05-24 DIAGNOSIS — B44.9 ASPERGILLOSIS (H): ICD-10-CM

## 2024-05-24 DIAGNOSIS — E84.0 CYSTIC FIBROSIS WITH PULMONARY MANIFESTATIONS (H): ICD-10-CM

## 2024-05-24 RX ORDER — PHYTONADIONE 5 MG/1
TABLET ORAL
Qty: 4 TABLET | Refills: 11 | Status: SHIPPED | OUTPATIENT
Start: 2024-05-24

## 2024-05-24 RX ORDER — ASCORBIC ACID 500 MG
500 TABLET ORAL 2 TIMES DAILY
Qty: 100 TABLET | Refills: 3 | Status: SHIPPED | OUTPATIENT
Start: 2024-05-24

## 2024-06-05 DIAGNOSIS — Z00.6 EXAMINATION OF PARTICIPANT OR CONTROL IN CLINICAL RESEARCH: Primary | ICD-10-CM

## 2024-06-07 ENCOUNTER — LAB REQUISITION (OUTPATIENT)
Dept: LAB | Facility: CLINIC | Age: 29
End: 2024-06-07

## 2024-06-07 ENCOUNTER — HOSPITAL ENCOUNTER (OUTPATIENT)
Dept: RESEARCH | Facility: CLINIC | Age: 29
Discharge: HOME | End: 2024-06-07
Attending: INTERNAL MEDICINE
Payer: COMMERCIAL

## 2024-06-07 LAB
ALT SERPL W P-5'-P-CCNC: 14 U/L (ref 0–50)
ANION GAP SERPL CALCULATED.3IONS-SCNC: 13 MMOL/L (ref 7–15)
AST SERPL W P-5'-P-CCNC: 16 U/L (ref 0–45)
BUN SERPL-MCNC: 9.4 MG/DL (ref 6–20)
CALCIUM SERPL-MCNC: 8.8 MG/DL (ref 8.6–10)
CHLORIDE SERPL-SCNC: 108 MMOL/L (ref 98–107)
CREAT SERPL-MCNC: 0.76 MG/DL (ref 0.51–0.95)
DEPRECATED HCO3 PLAS-SCNC: 20 MMOL/L (ref 22–29)
EGFRCR SERPLBLD CKD-EPI 2021: >90 ML/MIN/1.73M2
GLUCOSE SERPL-MCNC: 103 MG/DL (ref 70–99)
LIPASE SERPL-CCNC: 6 U/L (ref 13–60)
POTASSIUM SERPL-SCNC: 3.8 MMOL/L (ref 3.4–5.3)
SODIUM SERPL-SCNC: 141 MMOL/L (ref 135–145)

## 2024-06-07 PROCEDURE — 510N000017 HC CRU PATIENT CARE, PER 15 MIN

## 2024-06-07 PROCEDURE — 83690 ASSAY OF LIPASE: CPT | Performed by: INTERNAL MEDICINE

## 2024-06-07 PROCEDURE — 84450 TRANSFERASE (AST) (SGOT): CPT | Performed by: INTERNAL MEDICINE

## 2024-06-07 PROCEDURE — 80048 BASIC METABOLIC PNL TOTAL CA: CPT | Performed by: INTERNAL MEDICINE

## 2024-06-07 PROCEDURE — 84460 ALANINE AMINO (ALT) (SGPT): CPT | Performed by: INTERNAL MEDICINE

## 2024-06-07 PROCEDURE — 510N000009 HC RESEARCH FACILITY, PER 15 MIN

## 2024-06-09 ENCOUNTER — HEALTH MAINTENANCE LETTER (OUTPATIENT)
Age: 29
End: 2024-06-09

## 2024-06-09 NOTE — PROGRESS NOTES
[facility]  June 11, 2024         Assessment and Plan:   Mamie Rice is a 28 year old female with h/o CF lung disease with normal spirometry, CF sinus disease, CFRD, pancreatic insufficiency, ADHD, insomnia, MECHE, and depression who is seen for routine follow up.      1. Cystic fibrosis: feeling well, no new pulmonary complaints. Sating 98% on room air; typically does not vest unless she feels unwell and has not had to vest recently. PFTs are stable at her baseline. Previous cultures + for MSSA with additional h/o MRSA (last 2019), HIB, and Achromobacter.   - Continue nebs and vesting PRN symptoms    2. CFTR modulation: tolerating Trikafta well.  - Continue Trikafta  - Will need labs in September, ordered for next follow up     3. Cystic fibrosis sinus disease: h/o multiple endoscopic sinus surgeries (last 2015). Notes an increase in congestion and drainage, possibly related to allergies.  - Consider resuming Flonase     4. Exocrine pancreatic insufficiency: no signs of malabsorption. No GI issues with starting Ozempic as part of the study for Dr. Prieto. BMI is > CFF goal.   - Continue vitamins  - CRISTINA Reyes, to see today    5. GERD: patient denies any symptoms with the decrease in dose.  - Continue PPI daily    6. ADHD, depression and anxiety: following with a psychiatrist locally.     RTC: in 3 months with routine cultures and spirometry  Annual studies due: March 2025 (DEXA > March 2026)  Preventative care:     Rabia Gray PA-C  Pulmonary, Allergy, Critical Care and Sleep Medicine        Interval History:     Feeling well, doing a study with Dr. Prieto, taking Ozempic, started in May. No recent illnesses, no cough or congestion, no tightness. No shortness of breath. Had not needed to vest. No bloating or gas, stools are normal. Does have nasal congestion and drainage.          Review of Systems:   Please see HPI. Otherwise, complete 10 point ROS  negative.           Past Medical and Surgical History:     Past Medical History:   Diagnosis Date    ADHD (attention deficit hyperactivity disorder)     Anxiety     Aspergillosis, with pneumonia (H)     fugus found caused chest pain    Chronic infection     CF, MRSA.,     Chronic sinusitis     Constipation, chronic     Cystic fibrosis with pulmonary manifestations (H) 12/19/2011    Cystic fibrosis without mention of meconium ileus     SWEAT TEST:Date: 2/17/1994   [facility]  296 mg, 104 mmol/L ClSample #2  295 mg, 104 mmol/L Cl GENOTYPING:Date: 10/15/2007,  Laboratory: [facility]: [id number]    Depressive disorder     Diabetes     no meds currently    Dysthymic disorder     Exocrine pancreatic insufficiency     Gastro-oesophageal reflux disease     Hip pain, right     MRSA (methicillin resistant Staphylococcus aureus) carrier     Pancreatic disease      Past Surgical History:   Procedure Laterality Date    ARTHROSCOPY HIP, OSTEOPLASTY FEMUR PROXIMAL, COMBINED  3/11/2013    Procedure: COMBINED ARTHROSCOPY HIP, OSTEOPLASTY FEMUR PROXIMAL;  Right Hip Arthroscopy, Labral  Debridement.    surgeon request choice anesthesia/admit to [facility] after surgery;  Surgeon: Omkar Austin MD;  [facility]    ARTHROSCOPY KNEE WITH MEDIAL MENISCECTOMY Left 1/31/2017    Procedure: ARTHROSCOPY KNEE WITH MEDIAL MENISCECTOMY;  Surgeon: Jethro Coyle MD;  [facility]: [facility] OR    bronchoscopies      BRONCHOSCOPY      EXAM UNDER ANESTHESIA ANUS N/A 5/10/2016    Procedure: EXAM UNDER ANESTHESIA ANUS;  Surgeon: Chet Gaviria MD;  [facility]    EXAM UNDER ANESTHESIA, RESTORATIONS, EXTRACTION(S) DENTAL COMPLEX, COMBINED  5/13/2013    Procedure: COMBINED EXAM UNDER ANESTHESIA, RESTORATIONS, EXTRACTION(S) DENTAL COMPLEX;  Dental Exam, Radiographs, Restorations. Single Extraction  Tooth #2. Restorations x 3;  Surgeon: Danilo Ortiz DDS;  [facility] OR    HC KNEE SCOPE, DIAGNOSTIC       Arthroscopy, Knee- left    INJECT BOTOX N/A 5/10/2016    Procedure: INJECT BOTOX;  Surgeon: Chet Gaviria MD;  [facility]    left hip labral tear  5/11/2011    left hip arthroscopy with labral debridement and synovectomy    meniscus repair      OPTICAL TRACKING SYSTEM ENDOSCOPIC SINUS SURGERY  10/14/2011    Procedure:OPTICAL TRACKING SYSTEM ENDOSCOPIC SINUS SURGERY; FESS (functional endoscopic sinus surgery) with Image Guidance, bronchial lavage and cultures; Surgeon:GOYO KUO; [facility] OR    OPTICAL TRACKING SYSTEM ENDOSCOPIC SINUS SURGERY  5/18/2012    Procedure:OPTICAL TRACKING SYSTEM ENDOSCOPIC SINUS SURGERY; Right  and Left Image Guided Functional Endoscopic Sinus Surgery With  Frontal Approach, [employer]; Surgeon:GOYO KUO; [facility]    OPTICAL TRACKING SYSTEM ENDOSCOPIC SINUS SURGERY  9/26/2012    Procedure: OPTICAL TRACKING SYSTEM ENDOSCOPIC SINUS SURGERY;  Stealth Guided Bilateral Functional Endoscopic Sinus Surgery *Latex Safe*;  Surgeon: Goyo Kuo MD;  [facility]    OPTICAL TRACKING SYSTEM ENDOSCOPIC SINUS SURGERY Bilateral 10/16/2015    Procedure: OPTICAL TRACKING SYSTEM ENDOSCOPIC SINUS SURGERY;  Surgeon: Mariela Haile MD;  [facility]    ORTHOPEDIC SURGERY      left hip tear repair 2010    SINUS SURGERY             Family History:     Family History   Problem Relation Age of Onset    Cancer Paternal Grandmother     Skin Cancer Paternal Grandmother     Other Cancer Paternal Grandmother         Skin    Obesity Paternal Grandmother     Cancer Paternal Grandfather         PGF had throat cancer (he was a smoker)    Other Cancer Paternal Grandfather     Anxiety Disorder Paternal Grandfather     Thyroid Disease Mother         ,    Hypertension Mother     Obesity Other     ALS Father 67        2 male cousins with ALS as well    Anesthesia Reaction No family hx of     Blood Disease No family hx of     Colon Polyps No family hx of     Crohn's Disease No family  hx of     Ulcerative Colitis No family hx of     Colon Cancer No family hx of     Melanoma No family hx of             Social History:     Social History     Socioeconomic History    Marital status: Single     Spouse name: Not on file    Number of children: Not on file    Years of education: Not on file    Highest education level: Not on file   Occupational History    Not on file   Tobacco Use    Smoking status: Never    Smokeless tobacco: Never    Tobacco comments:     one person at home smokes outside   Vaping Use    Vaping status: Never Used   Substance and Sexual Activity    Alcohol use: No     Alcohol/week: 0.0 standard drinks of alcohol    Drug use: No    Sexual activity: Never   Other Topics Concern     Service Not Asked    Blood Transfusions No    Caffeine Concern Not Asked    Occupational Exposure Not Asked    Hobby Hazards Not Asked    Sleep Concern Not Asked    Stress Concern Not Asked    Weight Concern Not Asked    Special Diet Not Asked    Back Care Not Asked    Exercise Yes    Bike Helmet Not Asked    Seat Belt Not Asked    Self-Exams Not Asked    Parent/sibling w/ CABG, MI or angioplasty before 65F 55M? Yes   Social History Narrative    Mamie lives with mother in [place], [place].  There is a cat in the home, but Mamie does not have any litterbox duties.  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  She gets essentially no exercise because of the tingling in her feet (says it bothers her even to stand).        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        8/2015 no change in social situation        2/15/2016 Pt is single and lives with mother and stepfather.     Social Determinants of Health     Financial Resource Strain: Patient Declined (8/6/2023)    [facility]    Overall Financial Resource Strain (CARDIA)     Difficulty of Paying Living Expenses: Patient declined   Food Insecurity: No  Food Insecurity (8/6/2023)    Received from [facility]    Hunger Vital Sign     Worried About Running Out of Food in the Last Year: Never true     Ran Out of Food in the Last Year: Never true   Transportation Needs: No Transportation Needs (8/6/2023)    Received from [facility]    PRAPARE - Transportation     Lack of Transportation (Medical): No     Lack of Transportation (Non-Medical): No   Physical Activity: Insufficiently Active (8/6/2023)    Received from [facility]    Exercise Vital Sign     Days of Exercise per Week: 1 day     Minutes of Exercise per Session: 10 min   Stress: No Stress Concern Present (6/29/2020)    Received from [facility]    [employer] [place] [employer] - Occupational Stress Questionnaire     Feeling of Stress : Only a little   Social Connections: Moderately Integrated (6/29/2020)    Received from [facility]    Social Connection and Isolation Panel [NHANES]     Frequency of Communication with Friends and Family: More than three times a week     Frequency of Social Gatherings with Friends and Family: Twice a week     Attends Scientology Services: 1 to 4 times per year     Active Member of Clubs or Organizations: Yes     Attends Club or Organization Meetings: More than 4 times per year     Marital Status: Never    Interpersonal Safety: Not At Risk (8/6/2023)    Received from [facility]    Humiliation, Afraid, Rape, and Kick questionnaire     Fear of Current or Ex-Partner: No     Emotionally Abused: No     Physically Abused: No     Sexually Abused: No   Housing Stability: Low Risk  (8/6/2023)    Received from [facility]    Housing Stability     What is your living situation today?: I have a steady place to live            Medications:     Current Outpatient Medications   Medication Sig Dispense Refill    acetylcysteine (MUCOMYST) 20 % neb solution INHALE 4ML VIA NEBULIZER TWO TIMES  A DAY. MAY INCREASE TO 3-4 TIMES A DAY WITH INCREASED COUGH / COLD SYMPTOMS. REFRIGERATE VIAL AND DISCARD 96 HOURS AFTER OPENING 180 mL 11    albuterol (PROAIR HFA/PROVENTIL HFA/VENTOLIN HFA) 108 (90 Base) MCG/ACT inhaler Inhale 2 puffs into the lungs every 6 hours as needed for shortness of breath or wheezing 8.5 g 11    albuterol (PROVENTIL) (2.5 MG/3ML) 0.083% neb solution INHALE 1 VIAL ( 2.5MG) BY NEBULIZATION EVERY 4 HOURS AS NEEDED FOR FOR SHORTNESS OF BREATH OR WHEEZING 240 mL 11    amphetamine-dextroamphetamine (ADDERALL XR) 20 MG 24 hr capsule TAKE ONE CAPSULE BY MOUTH ONCE EVERY DAY [FOR FILL ON OR AFTER 7-7-23] 30 capsule 0    blood glucose (ACCU-CHEK GUIDE) test strip Use to test blood sugar 4 times daily or as directed. 100 strip 11    blood glucose monitoring (ACCU-CHEK FASTCLIX) lancets Use to test blood sugar 4 times daily or as directed. 100 each 11    busPIRone HCl (BUSPAR) 30 MG tablet Take 1 tablet (30 mg) by mouth 2 times daily 180 tablet 1    cetirizine (ZYRTEC) 10 MG tablet Take 1 tablet (10 mg) by mouth every evening 30 tablet 5    elexacaftor-tezacaftor-ivacaftor & ivacaftor (TRIKAFTA) 100-50-75 & 150 MG tablet pack TAKE TWO ORANGE TABLETS BY MOUTH IN THE MORNING AND ONE BLUE TABLET IN THE EVENING AS DIRECTED ON PACKAGE.  TAKE WITH FAT CONTAINING FOOD. 84 tablet 4    FLUoxetine (PROZAC) 40 MG capsule TAKE TWO CAPSULES BY MOUTH EVERY DAY . 180 capsule 1    fluticasone (FLONASE) 50 MCG/ACT nasal spray Spray 1 spray into both nostrils daily 9.9 mL 1    hydrOXYzine (ATARAX) 25 MG tablet Take 1-2 tablets (25-50 mg) by mouth nightly as needed (sleep) 60 tablet 1    medroxyPROGESTERone (DEPO-PROVERA) 150 MG/ML IM injection Inject 150 mg into the muscle every 3 months      naltrexone (DEPADE/REVIA) 50 MG tablet Take 50 mg by mouth daily      omeprazole (PRILOSEC) 40 MG DR capsule Take 1 capsule (40 mg) by mouth daily 30 capsule 3    phytonadione (MEPHYTON) 5 MG tablet TAKE ONE TALBET MY MOUTH ONCE A  "WEEK 4 tablet 11    study - semaglutideMARCYEMPIC,, IDS# [id number], 2 MG/3 ML injection Inject 0.5 mg Subcutaneous once a week 2 mL 0    study - semaglutide, OZEMPIC,, IDS# [id number], 2 MG/3 ML injection Inject 0.25 mg Subcutaneous once a week 2 mL 0    topiramate (TOPAMAX) 25 MG tablet Take one 50 mg pill and one 25 mg pill twice a day 180 tablet 3    topiramate (TOPAMAX) 50 MG tablet Take one 50 mg pill and one 25 mg pill twice a day 180 tablet 3    traZODone (DESYREL) 150 MG tablet Take 150 mg by mouth at bedtime      vitamin C (ASCORBIC ACID) 500 MG tablet TAKE ONE TABLET BY MOUTH TWICE A  tablet 3    VITAMIN D3 25 MCG (1000 UT) tablet TAKE ONE TABLET BY MOUTH EVERY DAY 90 tablet 3    Vitamin E 180 MG (400 UNIT) CAPS Take 1 capsule (400 Units) by mouth 2 times daily 180 capsule 3     No current facility-administered medications for this visit.            Physical Exam:   /76   Pulse 100   Ht 1.58 m (5' 2.21\")   Wt 82.9 kg (182 lb 12.2 oz)   SpO2 98%   BMI 33.21 kg/m      GENERAL: alert, NAD  HEENT: NCAT, EOMI, anicteric sclera, no oral mucosal edema or erythema  Neck: no cervical or supraclavicular adenopathy  Respiratory: good air flow, no crackles, rhonchi or wheezing  CV: RRR, S1S2, no murmurs noted  Abdomen: normoactive BS, soft   Lymph: no edema  Neuro: AAO X 3, CN 2-12 grossly intact  Psychiatric: normal affect, good eye contact  Skin: no rash, jaundice or lesions on limited exam         Data:   All laboratory and imaging data reviewed.      Cystic Fibrosis Culture  Specimen Description   Date Value Ref Range Status   06/08/2021 Sputum  Final   11/12/2019 Midstream Urine  Final   11/12/2019 Throat  Final    Culture Micro   Date Value Ref Range Status   06/08/2021 Heavy growth  Normal roberto carlos    Final   11/12/2019   Final    <10,000 colonies/mL  urogenital roberto carlos  Susceptibility testing not routinely done     11/12/2019 Light growth  Normal roberto carlos    Final   11/12/2019 Moderate growth  Staphylococcus " aureus   (A)  Final        PFT interpretation:  Maneuver: valid and meets ATS guidelines  Normal spirometry  Compared to prior: FEV1 of 3.23 is 30 ml below prior

## 2024-06-11 ENCOUNTER — OFFICE VISIT (OUTPATIENT)
Dept: PULMONOLOGY | Facility: CLINIC | Age: 29
End: 2024-06-11
Attending: PHYSICIAN ASSISTANT
Payer: COMMERCIAL

## 2024-06-11 ENCOUNTER — ALLIED HEALTH/NURSE VISIT (OUTPATIENT)
Dept: CARE COORDINATION | Facility: CLINIC | Age: 29
End: 2024-06-11

## 2024-06-11 VITALS
HEIGHT: 62 IN | HEART RATE: 100 BPM | SYSTOLIC BLOOD PRESSURE: 121 MMHG | OXYGEN SATURATION: 98 % | WEIGHT: 182.76 LBS | DIASTOLIC BLOOD PRESSURE: 76 MMHG | BODY MASS INDEX: 33.63 KG/M2

## 2024-06-11 DIAGNOSIS — E84.0 CYSTIC FIBROSIS WITH PULMONARY MANIFESTATIONS (H): Primary | ICD-10-CM

## 2024-06-11 DIAGNOSIS — Z13.9 RISK AND FUNCTIONAL ASSESSMENT: Primary | ICD-10-CM

## 2024-06-11 DIAGNOSIS — K86.81 EXOCRINE PANCREATIC INSUFFICIENCY: ICD-10-CM

## 2024-06-11 DIAGNOSIS — E84.0 CYSTIC FIBROSIS OF THE LUNG (H): Primary | ICD-10-CM

## 2024-06-11 DIAGNOSIS — E84.9 CF (CYSTIC FIBROSIS) (H): ICD-10-CM

## 2024-06-11 LAB
EXPTIME-PRE: 5.78 SEC
FEF2575-%PRED-PRE: 117 %
FEF2575-PRE: 3.8 L/SEC
FEF2575-PRED: 3.24 L/SEC
FEFMAX-%PRED-PRE: 139 %
FEFMAX-PRE: 9.39 L/SEC
FEFMAX-PRED: 6.73 L/SEC
FEV1-%PRED-PRE: 114 %
FEV1-PRE: 3.23 L
FEV1FEV6-PRE: 87 %
FEV1FEV6-PRED: 85 %
FEV1FVC-PRE: 87 %
FEV1FVC-PRED: 86 %
FIFMAX-PRE: 4.71 L/SEC
FVC-%PRED-PRE: 112 %
FVC-PRE: 3.69 L
FVC-PRED: 3.27 L

## 2024-06-11 PROCEDURE — 97803 MED NUTRITION INDIV SUBSEQ: CPT | Mod: XU | Performed by: DIETITIAN, REGISTERED

## 2024-06-11 PROCEDURE — 99213 OFFICE O/P EST LOW 20 MIN: CPT | Mod: 25 | Performed by: PHYSICIAN ASSISTANT

## 2024-06-11 PROCEDURE — 87186 SC STD MICRODIL/AGAR DIL: CPT | Performed by: PHYSICIAN ASSISTANT

## 2024-06-11 PROCEDURE — G0463 HOSPITAL OUTPT CLINIC VISIT: HCPCS | Performed by: PHYSICIAN ASSISTANT

## 2024-06-11 PROCEDURE — 94375 RESPIRATORY FLOW VOLUME LOOP: CPT | Performed by: PHYSICIAN ASSISTANT

## 2024-06-11 RX ORDER — MULTIVIT-MIN/IRON/FOLIC ACID/K 18-600-40
1 CAPSULE ORAL DAILY
Qty: 90 CAPSULE | Refills: 3 | Status: SHIPPED | OUTPATIENT
Start: 2024-06-11

## 2024-06-11 ASSESSMENT — ANXIETY QUESTIONNAIRES
2. NOT BEING ABLE TO STOP OR CONTROL WORRYING: NOT AT ALL
3. WORRYING TOO MUCH ABOUT DIFFERENT THINGS: SEVERAL DAYS
1. FEELING NERVOUS, ANXIOUS, OR ON EDGE: SEVERAL DAYS
7. FEELING AFRAID AS IF SOMETHING AWFUL MIGHT HAPPEN: NOT AT ALL
GAD7 TOTAL SCORE: 5
5. BEING SO RESTLESS THAT IT IS HARD TO SIT STILL: SEVERAL DAYS
6. BECOMING EASILY ANNOYED OR IRRITABLE: SEVERAL DAYS
GAD7 TOTAL SCORE: 5

## 2024-06-11 ASSESSMENT — PATIENT HEALTH QUESTIONNAIRE - PHQ9
SUM OF ALL RESPONSES TO PHQ QUESTIONS 1-9: 4
5. POOR APPETITE OR OVEREATING: SEVERAL DAYS

## 2024-06-11 ASSESSMENT — PAIN SCALES - GENERAL: PAINLEVEL: NO PAIN (0)

## 2024-06-11 NOTE — PROGRESS NOTES
CF Annual Nutrition Assessment    Reason for Assessment  Assessed during Dr. Brynn Gray's clinic r/t increased nutrition risk with diagnosis of CF per protocol.    Nutrition Significant PMH  Mild Lung Disease/Normal Lung Function     -On Trikafta  Pancreatic Insufficient (fecal elastase done 2013)  CFRD - sees CF Endocrinologist, Dr. Conner MITCHELL  Class I obesity - previously followed with Health system clinic.     -- reports taking 0.5 mg ozempic as participant in research study    Anthropometric Assessment  Height: 154.9 cm  IBW based on BMI 22 for females and 23 for males per [facility] recs: 54.6 kg  Today's Weight: 82.9 kg (182 lb)  Body Mass Index is 33.21 kg/m2    Weight History/Comments:   History following with the Health system clinic team and medications to support weight control. Last seen in May 2023 with Dr. Park. Taking topiramate, naltrexone, and bupropion for weight loss, as prescribed.  She states this helps somewhat with her cravings and snacking but the effectiveness seems to be waning over time.     Now on ozempic per research study x 1 month with recent dose increase.     Wt Readings from Last 10 Encounters:   06/11/24 82.9 kg (182 lb 12.2 oz)   05/10/24 83 kg (182 lb 15.7 oz)   02/27/24 79.2 kg (174 lb 9.7 oz)   02/13/24 83.9 kg (185 lb)   05/12/23 83.9 kg (185 lb)   01/17/23 83.9 kg (184 lb 15.5 oz)   12/09/22 81.6 kg (180 lb)   01/14/22 86.2 kg (190 lb)   10/22/21 81.6 kg (180 lb)   07/01/21 81.2 kg (179 lb)     Pancreatic Enzymes  Although tested as pancreatic insufficient (via fecal elastase) in the past pt does not take enzymes, stopped taking them in 2015 as she felt they had little affect on her GI symptoms. Ok'd this with CF MD prior to stopping.      Diet History and Assessment  Diet Preferences/Allergies/Intolerances: Regular diet, on appetite suppressive medication (ozempic)    Intake Recall/Comments:  Eats TID meals with TID snacks. Hx of eating a high CHO containing snack right before bed but  "has been counseled to modify this due to overnight hypoglycemia (reactive hypoglycemia). Does not cook or have an interest at this time; prefers quick and easy options.     Breakfast- cereal or banana or school breakfast or skips  Lunch- using \"easy and fast\" options at school- easy mac, ramen, hot pockets  Dinner- Mom makes; may be tacos or spaghetti  HS snack- fruit snacks    Calcium: BID glasses of skim milk + milk on cereal in AM + cheese throughout the day   Salt: salty snacks, boxed food, cheese, sauces/seasoning, Gatorade     Hydration: 1 medium bottle of Gatorade, 2 glasses of skim milk, 1 bottle of diet Dr. Pepper daily, 2 glasses of apple juice daily, mostly water  Supplements:  None     Laboratory Assessment  3/27/24  Vitamin A- 0.86 wnl  Vitamin D- 28 low  Vitamin E- 16.3 wnl  Iron- 55 wnl    DEXA: (2024) WNL    Current Vitamin/Mineral Supplements: Multivitamin, Vitamin D 1000 International Units, Vitamin E 400 International Units BID, Vitamin K weekly and Vitamin C 250 mg BID    Comments:  Pt states she is taking all of the above as prescribed. She uses a pill box that she sets up weekly to insure that she doesn't forget anything. These are either covered by insurance or affordable OOP (ex: Vitamin E about $6/month).    CF Related Diabetes Evaluation  HbAqc 5.1% on 3/27/24  Insulin: No - been off for several years  Carbohydrate Counting: No  Recent endo visit 3/26/24.    History low BGs overnight. Not a concern today and feels it has been less of an issue. Did not specifically change anything or eliminate simple CHO near bedtime.     NUTRITION DIAGNOIS  Overweight/obese r/t excessive energy intake and lack of physical activity AEB BMI >30 kg/m2 with pt-reported chronic overeating requiring intervention via [facility] clinic.   INTERVENTIONS/RECOMMENDATIONS  1) Oral Intake/Weight: Discussed current nutrition status and goals. Recently started ozempic as participant in research study. Encouraged pt to be mindful " of incorporating protein sources consistently in diet in order to maintain LBM. Not interested in further diet modifications today.    2) Enzymes: Denies any symptoms of malabsorption, will remain off enzyme therapy.     3) Vitamin/Mineral Supplementation: Reviewed most recent annual study labs. Vit D level slightly low 28 - increased Vit D to 2000 units daily. Prescription updated.     4) Diabetes: overnight hypoglycemia less of an issue lately. Encourage ongoing endo follow up.     Patient Understanding: Good  Expected Compliance: Good  Follow-Up Plans: Per Protocol  GOALS:   Weight maintenance vs reduction toward BMI <30  FOLLOW-UP/MONITORING:  Visit patient within 6-12 month(s) for annual review, support with weight loss/blood sugar control.  Continue to monitor for fat-soluble vitamin deficiencies as pt remains off pancreatic enzyme replacement therapy.     Face to Face Time: 15 minutes    Amy Andino RD, AMY, CACFD  Cystic Fibrosis/Lung Transplant Dietitian  Available via [employer]

## 2024-06-11 NOTE — LETTER
6/11/2024      Mamie Rice  [address] [facility] [address]      Dear Colleague,    Thank you for referring your patient, Mamie Rice, to the [facility] [place]. Please see a copy of my visit note below.    [facility]  June 11, 2024         Assessment and Plan:   Mamie Rice is a 28 year old female with h/o CF lung disease with normal spirometry, CF sinus disease, CFRD, pancreatic insufficiency, ADHD, insomnia, MECHE, and depression who is seen for routine follow up.      1. Cystic fibrosis: feeling well, no new pulmonary complaints. Sating 98% on room air; typically does not vest unless she feels unwell and has not had to vest recently. PFTs are stable at her baseline. Previous cultures + for MSSA with additional h/o MRSA (last 2019), HIB, and Achromobacter.   - Continue nebs and vesting PRN symptoms    2. CFTR modulation: tolerating Trikafta well.  - Continue Trikafta  - Will need labs in September, ordered for next follow up     3. Cystic fibrosis sinus disease: h/o multiple endoscopic sinus surgeries (last 2015). Notes an increase in congestion and drainage, possibly related to allergies.  - Consider resuming Flonase     4. Exocrine pancreatic insufficiency: no signs of malabsorption. No GI issues with starting Ozempic as part of the study for Dr. Prieto. BMI is > CFF goal.   - Continue vitamins  - CRISTINA Reyes, to see today    5. GERD: patient denies any symptoms with the decrease in dose.  - Continue PPI daily    6. ADHD, depression and anxiety: following with a psychiatrist locally.     RTC: in 3 months with routine cultures and spirometry  Annual studies due: March 2025 (DEXA > March 2026)  Preventative care:     Rabia Gray PA-C  Pulmonary, Allergy, Critical Care and Sleep Medicine        Interval History:     Feeling well, doing a study with Dr. Prieto, taking Ozempic, started in  May. No recent illnesses, no cough or congestion, no tightness. No shortness of breath. Had not needed to vest. No bloating or gas, stools are normal. Does have nasal congestion and drainage.          Review of Systems:   Please see HPI. Otherwise, complete 10 point ROS negative.           Past Medical and Surgical History:     Past Medical History:   Diagnosis Date     ADHD (attention deficit hyperactivity disorder)      Anxiety      Aspergillosis, with pneumonia (H)     fugus found caused chest pain     Chronic infection     CF, MRSA.,      Chronic sinusitis      Constipation, chronic      Cystic fibrosis with pulmonary manifestations (H) 12/19/2011     Cystic fibrosis without mention of meconium ileus     SWEAT TEST:Date: 2/17/1994   [facility]  296 mg, 104 mmol/L ClSample #2  295 mg, 104 mmol/L Cl GENOTYPING:Date: 10/15/2007,  Laboratory: [facility]: [id number]     Depressive disorder      Diabetes     no meds currently     Dysthymic disorder      Exocrine pancreatic insufficiency      Gastro-oesophageal reflux disease      Hip pain, right      MRSA (methicillin resistant Staphylococcus aureus) carrier      Pancreatic disease      Past Surgical History:   Procedure Laterality Date     ARTHROSCOPY HIP, OSTEOPLASTY FEMUR PROXIMAL, COMBINED  3/11/2013    Procedure: COMBINED ARTHROSCOPY HIP, OSTEOPLASTY FEMUR PROXIMAL;  Right Hip Arthroscopy, Labral  Debridement.    surgeon request choice anesthesia/admit to [facility] after surgery;  Surgeon: Omkar Austin MD;  [facility]     ARTHROSCOPY KNEE WITH MEDIAL MENISCECTOMY Left 1/31/2017    Procedure: ARTHROSCOPY KNEE WITH MEDIAL MENISCECTOMY;  Surgeon: Jethro Coyle MD;  [facility] OR     bronchoscopies       BRONCHOSCOPY       EXAM UNDER ANESTHESIA ANUS N/A 5/10/2016    Procedure: EXAM UNDER ANESTHESIA ANUS;  Surgeon: Chet Gaviria MD;  [facility]     EXAM UNDER ANESTHESIA, RESTORATIONS, EXTRACTION(S)  DENTAL COMPLEX, COMBINED  5/13/2013    Procedure: COMBINED EXAM UNDER ANESTHESIA, RESTORATIONS, EXTRACTION(S) DENTAL COMPLEX;  Dental Exam, Radiographs, Restorations. Single Extraction  Tooth #2. Restorations x 3;  Surgeon: Danilo Ortiz DDS;  [facility] OR     HC KNEE SCOPE, DIAGNOSTIC      Arthroscopy, Knee- left     INJECT BOTOX N/A 5/10/2016    Procedure: INJECT BOTOX;  Surgeon: Chet Gaviria MD;  [facility]     left hip labral tear  5/11/2011    left hip arthroscopy with labral debridement and synovectomy     meniscus repair       OPTICAL TRACKING SYSTEM ENDOSCOPIC SINUS SURGERY  10/14/2011    Procedure:OPTICAL TRACKING SYSTEM ENDOSCOPIC SINUS SURGERY; FESS (functional endoscopic sinus surgery) with Image Guidance, bronchial lavage and cultures; Surgeon:GOYO KUO; [facility] OR     OPTICAL TRACKING SYSTEM ENDOSCOPIC SINUS SURGERY  5/18/2012    Procedure:OPTICAL TRACKING SYSTEM ENDOSCOPIC SINUS SURGERY; Right  and Left Image Guided Functional Endoscopic Sinus Surgery With  Frontal Approach, [employer]x; Surgeon:GOYO KUO; [facility]     OPTICAL TRACKING SYSTEM ENDOSCOPIC SINUS SURGERY  9/26/2012    Procedure: OPTICAL TRACKING SYSTEM ENDOSCOPIC SINUS SURGERY;  Stealth Guided Bilateral Functional Endoscopic Sinus Surgery *Latex Safe*;  Surgeon: Goyo Kuo MD;  [facility]     OPTICAL TRACKING SYSTEM ENDOSCOPIC SINUS SURGERY Bilateral 10/16/2015    Procedure: OPTICAL TRACKING SYSTEM ENDOSCOPIC SINUS SURGERY;  Surgeon: Mariela Haile MD;  [facility]     ORTHOPEDIC SURGERY      left hip tear repair 2010     SINUS SURGERY             Family History:     Family History   Problem Relation Age of Onset     Cancer Paternal Grandmother      Skin Cancer Paternal Grandmother      Other Cancer Paternal Grandmother         Skin     Obesity Paternal Grandmother      Cancer Paternal Grandfather         PGF had throat cancer (he was a smoker)     Other Cancer Paternal Grandfather       Anxiety Disorder Paternal Grandfather      Thyroid Disease Mother         ,     Hypertension Mother      Obesity Other      ALS Father 67        2 male cousins with ALS as well     Anesthesia Reaction No family hx of      Blood Disease No family hx of      Colon Polyps No family hx of      Crohn's Disease No family hx of      Ulcerative Colitis No family hx of      Colon Cancer No family hx of      Melanoma No family hx of             Social History:     Social History     Socioeconomic History     Marital status: Single     Spouse name: Not on file     Number of children: Not on file     Years of education: Not on file     Highest education level: Not on file   Occupational History     Not on file   Tobacco Use     Smoking status: Never     Smokeless tobacco: Never     Tobacco comments:     one person at home smokes outside   Vaping Use     Vaping status: Never Used   Substance and Sexual Activity     Alcohol use: No     Alcohol/week: 0.0 standard drinks of alcohol     Drug use: No     Sexual activity: Never   Other Topics Concern      Service Not Asked     Blood Transfusions No     Caffeine Concern Not Asked     Occupational Exposure Not Asked     Hobby Hazards Not Asked     Sleep Concern Not Asked     Stress Concern Not Asked     Weight Concern Not Asked     Special Diet Not Asked     Back Care Not Asked     Exercise Yes     Bike Helmet Not Asked     Seat Belt Not Asked     Self-Exams Not Asked     Parent/sibling w/ CABG, MI or angioplasty before 65F 55M? Yes   Social History Narrative    Mamie lives with mother in [place], [place].  There is a cat in the home, but Mamie does not have any litterbox duties.  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  She gets essentially no exercise because of the tingling in her feet (says it bothers her even to stand).        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        8/2015 no  change in social situation        2/15/2016 Pt is single and lives with mother and stepfather.     Social Determinants of Health     Financial Resource Strain: Patient Declined (8/6/2023)    Received from [facility]    Overall Financial Resource Strain (CARDIA)      Difficulty of Paying Living Expenses: Patient declined   Food Insecurity: No Food Insecurity (8/6/2023)    Received from [facility]    Hunger Vital Sign      Worried About Running Out of Food in the Last Year: Never true      Ran Out of Food in the Last Year: Never true   Transportation Needs: No Transportation Needs (8/6/2023)    Received from [facility]    PRAPARE - Transportation      Lack of Transportation (Medical): No      Lack of Transportation (Non-Medical): No   Physical Activity: Insufficiently Active (8/6/2023)    Received from [facility]    Exercise Vital Sign      Days of Exercise per Week: 1 day      Minutes of Exercise per Session: 10 min   Stress: No Stress Concern Present (6/29/2020)    Received from [facility]    [place] [employer] - Occupational Stress Questionnaire      Feeling of Stress : Only a little   Social Connections: Moderately Integrated (6/29/2020)    Received from [facility]    Social Connection and Isolation Panel [NHANES]      Frequency of Communication with Friends and Family: More than three times a week      Frequency of Social Gatherings with Friends and Family: Twice a week      Attends Druze Services: 1 to 4 times per year      Active Member of Clubs or Organizations: Yes      Attends Club or Organization Meetings: More than 4 times per year      Marital Status: Never    Interpersonal Safety: Not At Risk (8/6/2023)    Received from [facility]    Humiliation, Afraid, Rape, and Kick questionnaire      Fear of Current or Ex-Partner: No      Emotionally Abused: No      Physically Abused: No       Sexually Abused: No   Housing Stability: Low Risk  (8/6/2023)    [facility]    Housing Stability      What is your living situation today?: I have a steady place to live            Medications:     Current Outpatient Medications   Medication Sig Dispense Refill     acetylcysteine (MUCOMYST) 20 % neb solution INHALE 4ML VIA NEBULIZER TWO TIMES A DAY. MAY INCREASE TO 3-4 TIMES A DAY WITH INCREASED COUGH / COLD SYMPTOMS. REFRIGERATE VIAL AND DISCARD 96 HOURS AFTER OPENING 180 mL 11     albuterol (PROAIR HFA/PROVENTIL HFA/VENTOLIN HFA) 108 (90 Base) MCG/ACT inhaler Inhale 2 puffs into the lungs every 6 hours as needed for shortness of breath or wheezing 8.5 g 11     albuterol (PROVENTIL) (2.5 MG/3ML) 0.083% neb solution INHALE 1 VIAL ( 2.5MG) BY NEBULIZATION EVERY 4 HOURS AS NEEDED FOR FOR SHORTNESS OF BREATH OR WHEEZING 240 mL 11     amphetamine-dextroamphetamine (ADDERALL XR) 20 MG 24 hr capsule TAKE ONE CAPSULE BY MOUTH ONCE EVERY DAY [FOR FILL ON OR AFTER 7-7-23] 30 capsule 0     blood glucose (ACCU-CHEK GUIDE) test strip Use to test blood sugar 4 times daily or as directed. 100 strip 11     blood glucose monitoring (ACCU-CHEK FASTCLIX) lancets Use to test blood sugar 4 times daily or as directed. 100 each 11     busPIRone HCl (BUSPAR) 30 MG tablet Take 1 tablet (30 mg) by mouth 2 times daily 180 tablet 1     cetirizine (ZYRTEC) 10 MG tablet Take 1 tablet (10 mg) by mouth every evening 30 tablet 5     elexacaftor-tezacaftor-ivacaftor & ivacaftor (TRIKAFTA) 100-50-75 & 150 MG tablet pack TAKE TWO ORANGE TABLETS BY MOUTH IN THE MORNING AND ONE BLUE TABLET IN THE EVENING AS DIRECTED ON PACKAGE.  TAKE WITH FAT CONTAINING FOOD. 84 tablet 4     FLUoxetine (PROZAC) 40 MG capsule TAKE TWO CAPSULES BY MOUTH EVERY DAY . 180 capsule 1     fluticasone (FLONASE) 50 MCG/ACT nasal spray Spray 1 spray into both nostrils daily 9.9 mL 1     hydrOXYzine (ATARAX) 25 MG tablet Take 1-2 tablets (25-50 mg)  "by mouth nightly as needed (sleep) 60 tablet 1     medroxyPROGESTERone (DEPO-PROVERA) 150 MG/ML IM injection Inject 150 mg into the muscle every 3 months       naltrexone (DEPADE/REVIA) 50 MG tablet Take 50 mg by mouth daily       omeprazole (PRILOSEC) 40 MG DR capsule Take 1 capsule (40 mg) by mouth daily 30 capsule 3     phytonadione (MEPHYTON) 5 MG tablet TAKE ONE TALBET MY MOUTH ONCE A WEEK 4 tablet 11     study - semaglutide, OZEMPIC,, IDS# [id number], 2 MG/3 ML injection Inject 0.5 mg Subcutaneous once a week 2 mL 0     study - semaglutide, OZEMPIC,, IDS# [id number], 2 MG/3 ML injection Inject 0.25 mg Subcutaneous once a week 2 mL 0     topiramate (TOPAMAX) 25 MG tablet Take one 50 mg pill and one 25 mg pill twice a day 180 tablet 3     topiramate (TOPAMAX) 50 MG tablet Take one 50 mg pill and one 25 mg pill twice a day 180 tablet 3     traZODone (DESYREL) 150 MG tablet Take 150 mg by mouth at bedtime       vitamin C (ASCORBIC ACID) 500 MG tablet TAKE ONE TABLET BY MOUTH TWICE A  tablet 3     VITAMIN D3 25 MCG (1000 UT) tablet TAKE ONE TABLET BY MOUTH EVERY DAY 90 tablet 3     Vitamin E 180 MG (400 UNIT) CAPS Take 1 capsule (400 Units) by mouth 2 times daily 180 capsule 3     No current facility-administered medications for this visit.            Physical Exam:   /76   Pulse 100   Ht 1.58 m (5' 2.21\")   Wt 82.9 kg (182 lb 12.2 oz)   SpO2 98%   BMI 33.21 kg/m      GENERAL: alert, NAD  HEENT: NCAT, EOMI, anicteric sclera, no oral mucosal edema or erythema  Neck: no cervical or supraclavicular adenopathy  Respiratory: good air flow, no crackles, rhonchi or wheezing  CV: RRR, S1S2, no murmurs noted  Abdomen: normoactive BS, soft   Lymph: no edema  Neuro: AAO X 3, CN 2-12 grossly intact  Psychiatric: normal affect, good eye contact  Skin: no rash, jaundice or lesions on limited exam         Data:   All laboratory and imaging data reviewed.      Cystic Fibrosis Culture  Specimen Description   Date Value Ref " Range Status   06/08/2021 Sputum  Final   11/12/2019 Midstream Urine  Final   11/12/2019 Throat  Final    Culture Micro   Date Value Ref Range Status   06/08/2021 Heavy growth  Normal roberto carlos    Final   11/12/2019   Final    <10,000 colonies/mL  urogenital roberto carlos  Susceptibility testing not routinely done     11/12/2019 Light growth  Normal roberto carlos    Final   11/12/2019 Moderate growth  Staphylococcus aureus   (A)  Final        PFT interpretation:  Maneuver: valid and meets ATS guidelines  Normal spirometry  Compared to prior: FEV1 of 3.23 is 30 ml below prior          CF Annual Nutrition Visit    Face to Face Time: 15 minutes    Amy Andino RD, LD, CACFD  Cystic Fibrosis/Lung Transplant Dietitian  Available via [employer]           Again, thank you for allowing me to participate in the care of your patient.        Sincerely,        Rabia Gray PA-C

## 2024-06-11 NOTE — PATIENT INSTRUCTIONS
Cystic Fibrosis Self-Care Plan       Patient: Mamie Rice   MRN: [id number]   Clinic Date: June 11, 2024     RECOMMENDATIONS:  1. Continue nebulizers and vest therapy as needed.  2. Consider resuming Flonase.  3. We will follow up on glucose test from August.   4. Have a great time in [place]!    Annual Studies:   IGG   Date Value Ref Range Status   06/08/2021 672 610 - 1,616 mg/dL Final     Immunoglobulin G   Date Value Ref Range Status   03/27/2024 680 610 - 1,616 mg/dL Final     Insulin   Date Value Ref Range Status   08/14/2019 41.0 (H) 3 - 25 mU/L Final     There are no preventive care reminders to display for this patient.    Pulmonary Function Tests  FEV1: amount of air you can blow out in 1 second  FVC: total amount of air you can take in and blow out    Your Goals:             Latest Ref Rng & Units 2/27/2024    10:51 AM   PFT   FVC L 3.64  P   FEV1 L 3.26  P   FVC% % 113  P   FEV1% % 117  P      P Preliminary result          Airway Clearance: The Most Important Way to Keep Your Lungs Healthy  Vest Settings:   Hill-Rom Frequencies: 8, 9, 10 Pressure 10 Then, Frequencies 18, 19, 20 Pressure 6     RespirTech: Quick Start with Pressure of     Do each frequency for 5 minutes; Deflate vest after each frequency & cough 3 times before beginning the next setting.    Vest and Neb Therapy should be done as needed.     Good Nutrition Can Improve Lung Function and Overall Health    Take ALL of your vitamins with food    Take 1/2 of your enzymes before EVERY meal/snack and the other 1/2 mid-meal/snack    Wt Readings from Last 3 Encounters:   05/10/24 83 kg (182 lb 15.7 oz)   02/27/24 79.2 kg (174 lb 9.7 oz)   02/13/24 83.9 kg (185 lb)       There is no height or weight on file to calculate BMI.         National CF Foundation Recommendations for BMI in CF Adults: Women: at least 22 Men: at least 23        Controlling Blood Sugars Helps Prevent Lung Infections & Improves Nutrition  Test blood sugar:    In the  morning before eating (goal is )    2 hours after a meal (goal is less than 150)    When pre-meal glucose is greater than 150 add correction    At bedtime (if less than 100 eat a snack with 15 grams of carbohydrates  Last A1C Results:   Hemoglobin A1C   Date Value Ref Range Status   03/27/2024 5.1 <5.7 % Final     Comment:     Normal <5.7%   Prediabetes 5.7-6.4%    Diabetes 6.5% or higher     Note: Adopted from ADA consensus guidelines.   06/08/2021 5.1 0 - 5.6 % Final     Comment:     Normal <5.7% Prediabetes 5.7-6.4%  Diabetes 6.5% or higher - adopted from ADA   consensus guidelines.           If diabetic, measure A1C every 6 months. Goal is under 7%.    Staying Healthy  Research: If you are interested in learning about research opportunities or have questions, please contact Mariana Smith at [phone] or [email or web address].     Foundation: Compass is a personalized resource service to help you with the insurance, financial, legal and other issues you are facing.  It's free, confidential and available to anyone with CF.  Ask your  for more information or contact Compass directly at 046-[facility] ([phone]) or compass@cff.org, or learn more at cff.org/compass.       CF Nurse Line: Radha Dickey and KJ: [phone]  Chaparrita Dennis or Sharon Gutiérrez RT: [phone]    Theresa Andino , Dieticians: [phone]    Rhonda Esteves, Diabetes Nurse: [phone]   Anahy Saez: [phone] or Faith Zamudio at [phone], Social Workers  [email or web address]

## 2024-06-11 NOTE — PROGRESS NOTES
"Adult Cystic Fibrosis Program  Annual Psychosocial Assessment    Presenting Information:  Mamie is a 28-year-old woman with cystic fibrosis, who presented in CF clinic for a regular follow up with CF provider, Rabia Gray PA-C. Met with Mamie to complete an annual psychosocial assessment. Mamie was unaccompanied in clinic during SW visit.     Living situation:  Mamie continues to live with her mother and stepfather in [place], [place]. Her sister (Chayo) and her sister's 3 children (Montiel-age 16, Max-age 6 and Brinly-age 4 yrs) also live in the home. There is a cat and a dog in the home. Mamie helps to take care of her niece and nephew while her sister works nights, and in return does not have to pay rent. Mamie denies any concerns about her living situation and shares that they are all \"one big happy family.\"      Family Constellation:  Mamie's parents  when she was [age] year old. After the divorce she was raised by her mother, who later remarried.  Mamie's dad recently passed away in 2021 from [facility]. Mamie had very limited contact with her father throughout her life. Sonya also lost both her maternal grandparents in the past few years which was hard on the entire family. Mamie has a 44-year-old half-brother through her father, whom she had previously only met twice but now communicates with only through Facebook. Her half-sister Chayo is 34 years old. She also has 33-year-old stepsister who she used to be in touch with but has not had as much contact with lately.  Mamie does not know of any family members with CF and sometimes wishes that she did because she wishes she had someone close to her that knew how she felt.  She is not currently in a significant relationship, has never been  and does not have children.       Social Support:  Mamie reports good social support. She identifies her mother as her strongest source of support and they are very close. She mostly has a good " "relationship with her sister Chayo but adds \"we are sisters so we also fight\". She gets along fine with her step-dad. She draws additional support from co-workers and friends. She does note that she used to be closer with her extended family members: aunts, uncles and cousins but she reports some family discord since her grandparents deaths. Mamie reports that she used to be more involved with the CF community, but has distanced herself since the [employer] Foundation came out with guidelines that place more restrictions on gathering with others with CF.     Adjustment to Illness:  Mamie was diagnosed with CF when she was about 5 months old. Her mom recalls that on the day of her [employer] she \"turned gray and her eyes rolled to the back of her head\", when she brought Mamie in her O2 sats were 70% and the CF diagnosis came shortly after. Mamie has received most of her CF care through the [facility], there was a short period of time that she sought care at [facility] because her mom didn't feel that the chest pain that Mamie was reporting was being thoroughly investigated. Mamie reports that her mother instilled a strong foundation of doing routine CF care. She had a consistent therapy routine and they brought the vest machine on vacations. She noted that her mother discussed therapy as \"part of life\" and the best way to reduce complications. Mamie reports going through more complications during high school and they believe she had reactions to the air quality in her school building. She often felt ill and missed school.  She recalls having a couple of PICC lines during that time. She reports that her school accommodated her through letting her go home early and make up work through other avenues. She feels her increased health complications caused her to lose some friends because some people didn't know how to handle her illness.      Mamie describes her current health status as \"good\". Clinically, she has CF lung disease " "with normal spirometry, CF sinus disease and abnormal glucose tolerance. She does not take enzymes and stopped taking these mid-highschool.  She has also been followed by Weight Management Clinic, ENT, Ortho and with the Psychiatry clinic on the [facility]. She takes Trikafta and feels that it's going well, and although she doesn't feel a whole lot different, she recognizes that her lung function has always been good and it has remained stable considering she stopped vesting regularly in June. She has not been exercising and reports that she this is because she \"hates\" exercise and has not been able to find exercise that she enjoys. She feels that her job does keep her fairly active.     Mamie describes herself as very open about having CF, growing up it was never a secret and they participated in [employer] events and their community knew.  When asked how CF has impacted her life, she stated that she looks at it as a part of who she is.  She doesn't feel like it's a burden as an adult, although maybe did when she was younger.  She does not feel that CF has impacted her QOL because she looks at herself as a healthy person with CF.     Mental Health:  Mamie has been diagnosed with depression, anxiety and ADHD. Mamie has described feelings of depression, starting in 9th grade. She initially had difficulty expressing her feelings, but got help about a year later, receiving both counseling and medication. Mamie struggled with symptoms of ADHD for many years and notes a strong family history of this disorder. She \"got by\" in younger years due to having a lot of support in school but was formally diagnosed ADHD and treated with Adderall in 2015 after doing poorly in college classes. She has continued to receive consistent psychiatric care and has been receiving her psychiatric care for the past 5 years at the [facility]. Her current psychotropic medications are: Adderall, Atarax, " "Prozac and Buspar.     Mamie completed the following screens:  MECHE-7 Score: 5, indicating mild symptoms of anxiety and described as somewhat in daily functioning.    PHQ-9 Score: 4, indicating minimal symptoms of depression and described as not difficult at all in daily functioning.       Mamie describes her current/recent mood as: \"pretty good\". She previously endorsed more significant OCD symptoms, but did not share any specific current mental health struggles today. 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 She could not identify any other current stressors during this meeting.      Mamie agreed with the scores above. She denied any additional symptoms not addressed on these screens. She denies any suicidal ideation. She declined needing further mental health resources at this time and feels that she is treating it adequately.      Health Care Directive:  Mamie has previously received Health Care Directive education. She is comfortable with her mom (SALOME) making medical decisions on her behalf in the absence of a health care directive. She declined wanting more info on HCD today.     Education:  Mamie completed high school but missed a lot of school due to health issues. She tried a semester of college classes, but had difficulty keeping up, which she feels was partially due to untreated ADHD. At this point, she has no specific plans for further education.        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 She will go back to working at the school later this summer when summer school starts up. She does report some issues with her new boss but otherwise feels that it is a supportive " work environment, they are aware of her CF diagnosis as it is a small town and she herself went to school there and works with a lot of the people that she had as teachers while attending school. She reports that this is job does offer benefits including health insurance but she remains on her MA plan.     Finances:  Mamie receives income from wages and parental support. She denies having any specific financial concerns, but does feel that money is always tight.       Insurance:  Mamie reports that she has Medical assistance through \[facility]\"". It appears to be a MA plan for people with disabilities (special needs basic care plan). She reports that her monthly premium is now $71/month, this went up from her ~$30/month payment after the COVID peacetime emergency ended. Writer continues to provide education eligibility for MA as she is working full-time but this plan likely has a higher income guideline than straight MA. She stated that her.     Chemical Health:  Mamie denies the use of tobacco products and denies second hand smoke exposure.  She denies the use of marijuana or other illegal drug use. She reports rare alcohol use.       Leisure Activities/Interests:  Mamie enjoys spending time with her family and friends, going out to eat, shopping and sleeping.  She describes herself as very organized.      Intervention:  -Psychosocial Assessment    Assessment:  Mamie appeared to be open in her responses. 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 She feels that she is doing well from a mental health standpoint and continues to see a psychiatrist regularly. Mamie noted that her health insurance plan did change this year, but she feels that the plan is similar and is still able to afford her monthly premium. Mamie seems to be psychosocially stable overall, with access to relevant resources and supports.  No  concerns expressed/noted.    Plan:  Re-consult for any psychosocial needs that may arise.    Complete psychosocial assessment annually.  Continue to follow for regular clinic consult.    [facility]  Adult Cystic Fibrosis   Ph: [phone]

## 2024-06-11 NOTE — NURSING NOTE
"Mamie Rice is a 28 year old year old who is being seen for Cystic Fibrosis (CF follow up )      Medications reviewed and Vital signs taken.    Specimen Collection Type: Throat Swab    Order(s) placed: CF Aerobic Bacterial    *IF AFB order placed - please enter \"PRIORITIZE AFB\" to order comments.       Lab Results   Component Value Date    ACIDFAST No acid fast bacilli seen 02/14/2024    ACIDFAST No acid fast bacilli seen 02/14/2024    ACIDFAST No acid fast bacilli seen 02/14/2024   Vitals were taken and medications were reconciled.    Shreya ROJO  10:10 AM        Lab Results   Component Value Date    AFBSMS  08/02/2017     Negative for acid fast bacteria  Less than 5ml of specimen received.  A minimum of 5 mL of sputum or fluid is recommended for recovery of acid fast   bacilli (AFB).  Volumes less than 5 mL are suboptimal and may compromise   recovery of AFB from culture.  Assayed at [facility] [address]             "

## 2024-06-16 LAB
BACTERIA SPEC CULT: ABNORMAL

## 2024-07-01 DIAGNOSIS — E84.9 CF (CYSTIC FIBROSIS) (H): ICD-10-CM

## 2024-07-01 RX ORDER — OMEPRAZOLE 40 MG/1
40 CAPSULE, DELAYED RELEASE ORAL DAILY
Qty: 30 CAPSULE | Refills: 3 | Status: SHIPPED | OUTPATIENT
Start: 2024-07-01

## 2024-07-02 DIAGNOSIS — E84.9 CYSTIC FIBROSIS (H): ICD-10-CM

## 2024-07-02 RX ORDER — ELEXACAFTOR, TEZACAFTOR, AND IVACAFTOR 100-50-75
KIT ORAL
Qty: 84 TABLET | Refills: 4 | Status: SHIPPED | OUTPATIENT
Start: 2024-07-02

## 2024-07-03 DIAGNOSIS — Z00.6 EXAMINATION OF PARTICIPANT OR CONTROL IN CLINICAL RESEARCH: Primary | ICD-10-CM

## 2024-07-11 ENCOUNTER — HOSPITAL ENCOUNTER (OUTPATIENT)
Dept: RESEARCH | Facility: CLINIC | Age: 29
Discharge: HOME | End: 2024-07-11
Attending: INTERNAL MEDICINE
Payer: COMMERCIAL

## 2024-07-11 ENCOUNTER — LAB REQUISITION (OUTPATIENT)
Dept: LAB | Facility: CLINIC | Age: 29
End: 2024-07-11

## 2024-07-11 VITALS
RESPIRATION RATE: 20 BRPM | TEMPERATURE: 98.1 F | SYSTOLIC BLOOD PRESSURE: 110 MMHG | HEIGHT: 63 IN | HEART RATE: 100 BPM | WEIGHT: 184.08 LBS | BODY MASS INDEX: 32.62 KG/M2 | DIASTOLIC BLOOD PRESSURE: 72 MMHG

## 2024-07-11 LAB
ALT SERPL W P-5'-P-CCNC: 17 U/L (ref 0–50)
ANION GAP SERPL CALCULATED.3IONS-SCNC: 9 MMOL/L (ref 7–15)
AST SERPL W P-5'-P-CCNC: 19 U/L (ref 0–45)
BUN SERPL-MCNC: 7.1 MG/DL (ref 6–20)
CALCIUM SERPL-MCNC: 8.8 MG/DL (ref 8.6–10)
CHLORIDE SERPL-SCNC: 106 MMOL/L (ref 98–107)
CREAT SERPL-MCNC: 0.82 MG/DL (ref 0.51–0.95)
DEPRECATED HCO3 PLAS-SCNC: 26 MMOL/L (ref 22–29)
EGFRCR SERPLBLD CKD-EPI 2021: >90 ML/MIN/1.73M2
GLUCOSE SERPL-MCNC: 79 MG/DL (ref 70–99)
LIPASE SERPL-CCNC: 7 U/L (ref 13–60)
POTASSIUM SERPL-SCNC: 3.9 MMOL/L (ref 3.4–5.3)
SODIUM SERPL-SCNC: 141 MMOL/L (ref 135–145)

## 2024-07-11 PROCEDURE — 83690 ASSAY OF LIPASE: CPT | Performed by: INTERNAL MEDICINE

## 2024-07-11 PROCEDURE — 84450 TRANSFERASE (AST) (SGOT): CPT | Performed by: INTERNAL MEDICINE

## 2024-07-11 PROCEDURE — 510N000009 HC RESEARCH FACILITY, PER 15 MIN

## 2024-07-11 PROCEDURE — 80048 BASIC METABOLIC PNL TOTAL CA: CPT | Performed by: INTERNAL MEDICINE

## 2024-07-11 PROCEDURE — 510N000017 HC CRU PATIENT CARE, PER 15 MIN

## 2024-07-11 PROCEDURE — 84460 ALANINE AMINO (ALT) (SGPT): CPT | Performed by: INTERNAL MEDICINE

## 2024-07-12 DIAGNOSIS — F33.41 RECURRENT MAJOR DEPRESSIVE DISORDER, IN PARTIAL REMISSION (H): ICD-10-CM

## 2024-07-12 RX ORDER — FLUOXETINE 40 MG/1
CAPSULE ORAL
Qty: 180 CAPSULE | Refills: 1 | OUTPATIENT
Start: 2024-07-12

## 2024-07-12 NOTE — ADDENDUM NOTE
Encounter addended by: Pricilla Bess RN on: 7/12/2024 6:22 AM   Actions taken: Charge Capture section accepted

## 2024-07-15 DIAGNOSIS — F34.1 PERSISTENT DEPRESSIVE DISORDER: ICD-10-CM

## 2024-07-15 DIAGNOSIS — E66.01 MORBID OBESITY (H): ICD-10-CM

## 2024-07-15 RX ORDER — TOPIRAMATE 50 MG/1
TABLET, FILM COATED ORAL
Qty: 180 TABLET | Refills: 3 | OUTPATIENT
Start: 2024-07-15

## 2024-07-15 RX ORDER — NALTREXONE HYDROCHLORIDE 50 MG/1
TABLET, FILM COATED ORAL
Qty: 90 TABLET | Refills: 3 | OUTPATIENT
Start: 2024-07-15

## 2024-07-15 RX ORDER — NALTREXONE HYDROCHLORIDE 50 MG/1
50 TABLET, FILM COATED ORAL DAILY
Qty: 90 TABLET | Refills: 1 | Status: SHIPPED | OUTPATIENT
Start: 2024-07-15 | End: 2024-08-09

## 2024-07-15 RX ORDER — TOPIRAMATE 25 MG/1
TABLET, FILM COATED ORAL
Qty: 180 TABLET | Refills: 3 | Status: SHIPPED | OUTPATIENT
Start: 2024-07-15 | End: 2024-08-09

## 2024-07-15 RX ORDER — TOPIRAMATE 50 MG/1
TABLET, FILM COATED ORAL
Qty: 180 TABLET | Refills: 3 | Status: SHIPPED | OUTPATIENT
Start: 2024-07-15 | End: 2024-08-09

## 2024-08-05 ENCOUNTER — HOSPITAL ENCOUNTER (OUTPATIENT)
Dept: RESEARCH | Facility: CLINIC | Age: 29
Discharge: HOME | End: 2024-08-05
Attending: INTERNAL MEDICINE
Payer: COMMERCIAL

## 2024-08-05 ENCOUNTER — LAB REQUISITION (OUTPATIENT)
Dept: LAB | Facility: CLINIC | Age: 29
End: 2024-08-05
Payer: COMMERCIAL

## 2024-08-05 VITALS
BODY MASS INDEX: 33.19 KG/M2 | RESPIRATION RATE: 14 BRPM | OXYGEN SATURATION: 97 % | DIASTOLIC BLOOD PRESSURE: 72 MMHG | TEMPERATURE: 98.4 F | SYSTOLIC BLOOD PRESSURE: 96 MMHG | HEIGHT: 62 IN | WEIGHT: 180.34 LBS | HEART RATE: 79 BPM

## 2024-08-05 DIAGNOSIS — Z00.6 EXAMINATION OF PARTICIPANT OR CONTROL IN CLINICAL RESEARCH: Primary | ICD-10-CM

## 2024-08-05 LAB
ALT SERPL W P-5'-P-CCNC: 12 U/L (ref 0–50)
ANION GAP SERPL CALCULATED.3IONS-SCNC: 9 MMOL/L (ref 7–15)
AST SERPL W P-5'-P-CCNC: 13 U/L (ref 0–45)
BUN SERPL-MCNC: 12.1 MG/DL (ref 6–20)
CALCIUM SERPL-MCNC: 8.5 MG/DL (ref 8.8–10.4)
CHLORIDE SERPL-SCNC: 111 MMOL/L (ref 98–107)
CREAT SERPL-MCNC: 0.84 MG/DL (ref 0.51–0.95)
EGFRCR SERPLBLD CKD-EPI 2021: >90 ML/MIN/1.73M2
GLUCOSE SERPL-MCNC: 86 MG/DL (ref 70–99)
HCO3 SERPL-SCNC: 21 MMOL/L (ref 22–29)
LIPASE SERPL-CCNC: 6 U/L (ref 13–60)
POTASSIUM SERPL-SCNC: 3.9 MMOL/L (ref 3.4–5.3)
SODIUM SERPL-SCNC: 141 MMOL/L (ref 135–145)

## 2024-08-05 PROCEDURE — 510N000009 HC RESEARCH FACILITY, PER 15 MIN

## 2024-08-05 PROCEDURE — 999N000129 HC STATISTIC PICC/MID LINE PROC CANCELLED SUBQ WORKUP

## 2024-08-05 PROCEDURE — 510N000018 HC RESEARCH OGTT, PER HOUR

## 2024-08-05 PROCEDURE — 510N000017 HC CRU PATIENT CARE, PER 15 MIN

## 2024-08-05 PROCEDURE — 84460 ALANINE AMINO (ALT) (SGPT): CPT | Performed by: INTERNAL MEDICINE

## 2024-08-05 PROCEDURE — 80048 BASIC METABOLIC PNL TOTAL CA: CPT | Performed by: INTERNAL MEDICINE

## 2024-08-05 PROCEDURE — 36000 PLACE NEEDLE IN VEIN: CPT

## 2024-08-05 PROCEDURE — 300N000003 HC RESEARCH SPECIMEN PROCESSING, SIMPLE

## 2024-08-05 PROCEDURE — 250N000009 HC RX 250: Performed by: INTERNAL MEDICINE

## 2024-08-05 PROCEDURE — 510N000016 HC RESEARCH MEALS, PER MEAL

## 2024-08-05 PROCEDURE — 83690 ASSAY OF LIPASE: CPT | Performed by: INTERNAL MEDICINE

## 2024-08-05 PROCEDURE — 84450 TRANSFERASE (AST) (SGOT): CPT | Performed by: INTERNAL MEDICINE

## 2024-08-05 RX ADMIN — ALCOHOL 75 G: 65 GEL TOPICAL at 09:05

## 2024-08-09 ENCOUNTER — VIRTUAL VISIT (OUTPATIENT)
Dept: ENDOCRINOLOGY | Facility: CLINIC | Age: 29
End: 2024-08-09
Payer: COMMERCIAL

## 2024-08-09 ENCOUNTER — TELEPHONE (OUTPATIENT)
Dept: ENDOCRINOLOGY | Facility: CLINIC | Age: 29
End: 2024-08-09

## 2024-08-09 VITALS — BODY MASS INDEX: 33.13 KG/M2 | HEIGHT: 62 IN | WEIGHT: 180 LBS

## 2024-08-09 DIAGNOSIS — F34.1 PERSISTENT DEPRESSIVE DISORDER: ICD-10-CM

## 2024-08-09 DIAGNOSIS — E66.01 MORBID OBESITY (H): ICD-10-CM

## 2024-08-09 DIAGNOSIS — F33.0 MILD EPISODE OF RECURRENT MAJOR DEPRESSIVE DISORDER (H): Primary | ICD-10-CM

## 2024-08-09 PROCEDURE — 99213 OFFICE O/P EST LOW 20 MIN: CPT | Mod: 95 | Performed by: PEDIATRICS

## 2024-08-09 PROCEDURE — G2211 COMPLEX E/M VISIT ADD ON: HCPCS | Mod: 95 | Performed by: PEDIATRICS

## 2024-08-09 RX ORDER — TOPIRAMATE 25 MG/1
TABLET, FILM COATED ORAL
Qty: 180 TABLET | Refills: 3 | Status: SHIPPED | OUTPATIENT
Start: 2024-08-09

## 2024-08-09 RX ORDER — BUPROPION HYDROCHLORIDE 300 MG/1
300 TABLET ORAL EVERY MORNING
Qty: 90 TABLET | Refills: 3 | Status: SHIPPED | OUTPATIENT
Start: 2024-08-09

## 2024-08-09 RX ORDER — NALTREXONE HYDROCHLORIDE 50 MG/1
50 TABLET, FILM COATED ORAL DAILY
Qty: 90 TABLET | Refills: 1 | Status: SHIPPED | OUTPATIENT
Start: 2024-08-09

## 2024-08-09 RX ORDER — TOPIRAMATE 50 MG/1
TABLET, FILM COATED ORAL
Qty: 180 TABLET | Refills: 3 | Status: SHIPPED | OUTPATIENT
Start: 2024-08-09

## 2024-08-09 ASSESSMENT — PAIN SCALES - GENERAL: PAINLEVEL: NO PAIN (0)

## 2024-08-09 NOTE — NURSING NOTE
Is the patient currently in the state of [place]? YES    Visit mode:VIDEO    If the visit is dropped, the patient can be reconnected by: VIDEO VISIT: Send to e-mail at: [email or web address]    Will anyone else be joining the visit? NO  (If patient encounters technical issues they should call [phone]    How would you like to obtain your AVS? MyChart    Are changes needed to the allergy or medication list? No      Rooming Documentation:  Assigned questionnaire(s) completed      Reason for visit: Video Visit (Follow up )    Julieta YARBROUGH

## 2024-08-09 NOTE — PROGRESS NOTES
"    Return Medical Weight Management Note     Mamie Rice  MRN:  [id number]  :  1995  STEPHEN:  2024    Dear Physician No Ref-Primary,    I had the pleasure of seeing your patient Mamie Rice. She is a 28 year old female who I am continuing to see for treatment of obesity related to:        10/9/2016     8:21 PM   --   I have the following health issues associated with obesity Back Pain   I have the following symptoms associated with obesity Knee Pain       INTERVAL HISTORY:  I last saw Mamie Rice on 2023. She continues to remain on topiramate 75 mg twice a day, naltrexone 50 mg daily, and bupropion 300 mg daily. She was in the interim enrolled in a clinical trial here at the [facility] evaluating the use of GLP1 agonists in CFRD. In this study, was on Ozempic for around 3 months and found this very helpful. She would be interested in continuing this.     Of note, she has no history of pancreatitis, and no personal or family history of MEN2 syndrome or MTC. She has no contraindications to GLP1 agonist use. She has been and continues to remain on a regimen of a low calorie diet and increased physical activity for at least 6 months and, despite this, has not experienced significant weight loss.     CURRENT WEIGHT:   180 lbs 0 oz    Initial weight was 182 pounds on 10/14/2016   Weight at last visit on 2023 was 185 pounds  Weight today is 180 pounds  Weight change since last seen on 2023 is down 5 pounds  Total weight loss is 2 pounds        2022    11:21 AM   Changes and Difficulties   I have made the following changes to my diet since my last visit: One less cup of milk with supper   I have made the following changes to my activity/exercise since my last visit: Starting walking daily       VITALS:  Ht 1.58 m (5' 2.21\")   Wt 81.6 kg (180 lb)   BMI 32.70 kg/m    GENERAL: Healthy, alert and no distress  EYES: Eyes grossly normal to inspection.   HENT: Normal " cephalic/atraumatic.   RESP: No audible wheeze, cough.  No visible retractions or increased work of breathing.    MS: No gross musculoskeletal defects noted.  Normal range of motion.    SKIN: Visible skin clear.   NEURO: Cranial nerves grossly intact.  Mentation and speech appropriate for age.  PSYCH: Mentation appears normal, affect normal/bright, judgement and insight intact, normal speech and appearance well-groomed.    MEDICATIONS:   Current Outpatient Medications   Medication Sig Dispense Refill    acetylcysteine (MUCOMYST) 20 % neb solution INHALE 4ML VIA NEBULIZER TWO TIMES A DAY. MAY INCREASE TO 3-4 TIMES A DAY WITH INCREASED COUGH / COLD SYMPTOMS. REFRIGERATE VIAL AND DISCARD 96 HOURS AFTER OPENING 180 mL 11    albuterol (PROAIR HFA/PROVENTIL HFA/VENTOLIN HFA) 108 (90 Base) MCG/ACT inhaler Inhale 2 puffs into the lungs every 6 hours as needed for shortness of breath or wheezing 8.5 g 11    albuterol (PROVENTIL) (2.5 MG/3ML) 0.083% neb solution INHALE 1 VIAL ( 2.5MG) BY NEBULIZATION EVERY 4 HOURS AS NEEDED FOR FOR SHORTNESS OF BREATH OR WHEEZING 240 mL 11    amphetamine-dextroamphetamine (ADDERALL XR) 20 MG 24 hr capsule TAKE ONE CAPSULE BY MOUTH ONCE EVERY DAY [FOR FILL ON OR AFTER 7-7-23] 30 capsule 0    blood glucose (ACCU-CHEK GUIDE) test strip Use to test blood sugar 4 times daily or as directed. 100 strip 11    blood glucose monitoring (ACCU-CHEK FASTCLIX) lancets Use to test blood sugar 4 times daily or as directed. 100 each 11    busPIRone HCl (BUSPAR) 30 MG tablet Take 1 tablet (30 mg) by mouth 2 times daily 180 tablet 1    cetirizine (ZYRTEC) 10 MG tablet Take 1 tablet (10 mg) by mouth every evening 30 tablet 5    Cholecalciferol (VITAMIN D) 50 MCG (2000 UT) CAPS Take 1 capsule by mouth daily 90 capsule 3    elexacaftor-tezacaftor-ivacaftor & ivacaftor (TRIKAFTA) 100-50-75 & 150 MG tablet pack TAKE 2 ORANGE TABS BY MOUTH IN THE A.M. AND 1 BLUE TAB IN THE P.M. 12 HOURS APART WITH FAT CONTAINING  FOOD. DO NOT TAKE WITH GRAPEFRUIT 84 tablet 4    FLUoxetine (PROZAC) 40 MG capsule TAKE TWO CAPSULES BY MOUTH EVERY DAY . 180 capsule 1    fluticasone (FLONASE) 50 MCG/ACT nasal spray Spray 1 spray into both nostrils daily 9.9 mL 1    hydrOXYzine (ATARAX) 25 MG tablet Take 1-2 tablets (25-50 mg) by mouth nightly as needed (sleep) 60 tablet 1    medroxyPROGESTERone (DEPO-PROVERA) 150 MG/ML IM injection Inject 150 mg into the muscle every 3 months      naltrexone (DEPADE/REVIA) 50 MG tablet Take 1 tablet (50 mg) by mouth daily 90 tablet 1    omeprazole (PRILOSEC) 40 MG DR capsule Take 1 capsule (40 mg) by mouth daily 30 capsule 3    phytonadione (MEPHYTON) 5 MG tablet TAKE ONE TALBET MY MOUTH ONCE A WEEK 4 tablet 11    study - semaglutide, OZEMPIC,, IDS# [id number], 2 MG/3 ML injection Inject 0.5 mg Subcutaneous once a week 2 mL 0    study - semaglutide, OZEMPIC,, IDS# [id number], 2 MG/3 ML injection Inject 0.25 mg Subcutaneous once a week 2 mL 0    study - semaglutide, OZEMPIC,, IDS# [id number], 4 MG/3ML injection Inject 1 mg Subcutaneous once a week 4 mL 0    topiramate (TOPAMAX) 25 MG tablet Take one 50 mg pill and one 25 mg pill twice a day 180 tablet 3    topiramate (TOPAMAX) 50 MG tablet Take one 50 mg pill and one 25 mg pill twice a day 180 tablet 3    traZODone (DESYREL) 150 MG tablet Take 150 mg by mouth at bedtime      vitamin C (ASCORBIC ACID) 500 MG tablet TAKE ONE TABLET BY MOUTH TWICE A  tablet 3    Vitamin E 180 MG (400 UNIT) CAPS Take 1 capsule (400 Units) by mouth 2 times daily 180 capsule 3           8/6/2024     2:12 PM   Weight Loss Medication History Reviewed With Patient   Which weight loss medications are you currently taking on a regular basis? Naltrexone    Topamax (topiramate)    Bupropion   Are you having any side effects from the weight loss medication that we have prescribed you? No     LABS:  Component      Latest Ref Rng 7/11/2024  3:30 PM 8/5/2024  9:00 AM   Sodium      135 - 145 mmol/L 141  141     Potassium      3.4 - 5.3 mmol/L 3.9  3.9    Chloride      98 - 107 mmol/L 106  111 (H)    Carbon Dioxide (CO2)      22 - 29 mmol/L 26  21 (L)    Anion Gap      7 - 15 mmol/L 9  9    Urea Nitrogen      6.0 - 20.0 mg/dL 7.1  12.1    Creatinine      0.51 - 0.95 mg/dL 0.82  0.84    GFR Estimate      >60 mL/min/1.73m2 >90  >90    Calcium      8.8 - 10.4 mg/dL 8.8  8.5 (L)    Glucose      70 - 99 mg/dL 79  86    ALT      0 - 50 U/L 17  12    AST      0 - 45 U/L 19  13    Lipase      13 - 60 U/L 7 (L)  6 (L)         ASSESSMENT:   Mamie Rice is a 28 year old female with a history of class 1 obesity (defined as BMI 30-35 kg/m2). She also has a history of CF, CFRD, ADHD, and depression. Currently on topiramate, naltrexone, and buproion, which have overall helped in terms of BMI stabilization, however, has not experienced significant weight loss. Continues to remain on a low calorie diet and regimen of increased physical activity, which she has been on for well over a year. She was recently in a clinical trial during which she was started on a GLP1 agonist, and benefited from this. She is interested in continuing, and I believe that this would be a reasonable option for her (and as noted above she has no contraindications). Therefore, will prescribe Wegovy, with plans to titrate up to 1.7 mg weekly. Will continue naltrexone, bupropion, and topiramate at current doses.       Additional plans and goals, made through shared decision making, as outlined below.    PLAN:   Patient Instructions   1. Food Goal:  - Will have no more than 1 glass milk at dinner   - Will have no more than 1 Frapocchino daily     2. Activity Goal: Continue to be active as you are     Medications:  Continue topiramate 75 mg twice a day (one 50 mg pill and one 25 mg pill twice a day)  Continue naltrexone 50 mg daily  Continue bupropion 300 mg daily  Will prescribe Wegovy. See below for information. If you are able to access this, we can make sure that  you know how to use this. If unable to access this, we could discuss other potential options.  Start with 0.25 mg weekly for 4 weeks, then  Take 0.5 mg weekly for 4 weeks, then  Take 1.0 mg weekly for 4 weeks, then  Take 1.7 mg weekly thereafter  We can schedule you to meet with our pharmacist in around 6-8 weeks to see how you are doing on this.      4. We can plan to see you again in clinic in around 4 months. If any questions or concerns before that time, please let us know.    WEGOVY (semaglutide)  What is Wegovy?  Wegovy (semaglutide) is an injectable prescription medicine FDA-approved for use in adults and adolescents aged 12 and older with obesity (BMI ?30) or overweight (BMI ?27) who also have weight-related medical problems. Wegovy helps them lose weight and maintain weight loss.     Wegovy works by mimicking a hormone that targets areas of the brain involved in regulating appetite and food intake. It also slows down digestion, so food moves more slowly through the digestive tract, helping you feel noel longer and eat less, which can lead to weight loss. Wegovy should be used in conjunction with a reduced-calorie meal plan and increased physical activity.     How to Start Wegovy     Dosage Schedule:  Start with 0.25 mg once weekly for 4 weeks.  If tolerated, increase to 0.5 mg once weekly for 4 weeks.  If tolerated, increase to 1 mg once weekly for 4 weeks.  If tolerated, increase to 1.7 mg once weekly.  Discuss with your doctor if increasing the dose to 2.4 mg once weekly is right for you.     Using Wegovy Pens:  Each box of Wegovy contains 4 pens of the same dose.  Each pen is a single-dose, prefilled pen with a built-in injector for once-weekly use.  Discard the Wegovy pen after use in a sharps container.    Storage:  Store Wegovy in the refrigerator until ready to use.  Once ready to use, the pen can be kept at room temperature for up to 28 days.     Potential Common Side Effects  Upset  stomach  Nausea  Vomiting  Headache  Diarrhea  Constipation  If these side effects become unmanageable or concerning, please contact your care team via [employer] or phone.     Tips to Minimize Side Effects  Eat slowly.  Eat smaller, more frequent meals.  Avoid fatty foods.     Additional Information  Visit [email or web address] to learn more and watch instructional videos.     Contact Information  For questions or concerns, send a [employer] message to our team or call our nurse coordinator at [phone] during regular business hours.  For questions during evenings or weekends, your messages will be addressed on the next business day.  For emergencies, please call 911 or seek immediate medical care.  [facility] Phone Number: [phone]      FOLLOW-UP:    2 months with JOSE ALFREDO; 4 months with MD.    Sincerely,    Charles Park MD      Virtual Visit Details    Type of service:  Video Visit   Video Start Time:  3:28 PM  Video End Time: 3:48 PM    Originating Location (pt. Location): Home    Distant Location (provider location):  On-site  Platform used for Video Visit: Juan    The longitudinal plan of care for the diagnosis(es)/condition(s) as documented were addressed during this visit. Due to the added complexity in care, I will continue to support Mamie in the subsequent management and with ongoing continuity of care.    I spent 27 minutes of total time, before, during, and after the visit reviewing previous labs and records, examining the patient, answering their questions, formulating and discussing the plan of care, reviewing resulted labs, and writing the visit note.

## 2024-08-09 NOTE — PATIENT INSTRUCTIONS
1. Food Goal:  - Will have no more than 1 glass milk at dinner   - Will have no more than 1 Frapocchino daily     2. Activity Goal: Continue to be active as you are     Medications:  Continue topiramate 75 mg twice a day (one 50 mg pill and one 25 mg pill twice a day)  Continue naltrexone 50 mg daily  Continue bupropion 300 mg daily  Will prescribe Wegovy. See below for information. If you are able to access this, we can make sure that you know how to use this. If unable to access this, we could discuss other potential options.  Start with 0.25 mg weekly for 4 weeks, then  Take 0.5 mg weekly for 4 weeks, then  Take 1.0 mg weekly for 4 weeks, then  Take 1.7 mg weekly thereafter  We can schedule you to meet with our pharmacist in around 6-8 weeks to see how you are doing on this.      4. We can plan to see you again in clinic in around 4 months. If any questions or concerns before that time, please let us know.    WEGOVY (semaglutide)  What is Wegovy?  Wegovy (semaglutide) is an injectable prescription medicine FDA-approved for use in adults and adolescents aged 12 and older with obesity (BMI ?30) or overweight (BMI ?27) who also have weight-related medical problems. Wegovy helps them lose weight and maintain weight loss.     Wegovy works by mimicking a hormone that targets areas of the brain involved in regulating appetite and food intake. It also slows down digestion, so food moves more slowly through the digestive tract, helping you feel noel longer and eat less, which can lead to weight loss. Wegovy should be used in conjunction with a reduced-calorie meal plan and increased physical activity.     How to Start Wegovy     Dosage Schedule:  Start with 0.25 mg once weekly for 4 weeks.  If tolerated, increase to 0.5 mg once weekly for 4 weeks.  If tolerated, increase to 1 mg once weekly for 4 weeks.  If tolerated, increase to 1.7 mg once weekly.  Discuss with your doctor if increasing the dose to 2.4 mg once weekly  is right for you.     Using Wegovy Pens:  Each box of Wegovy contains 4 pens of the same dose.  Each pen is a single-dose, prefilled pen with a built-in injector for once-weekly use.  Discard the Wegovy pen after use in a sharps container.    Storage:  Store Wegovy in the refrigerator until ready to use.  Once ready to use, the pen can be kept at room temperature for up to 28 days.     Potential Common Side Effects  Upset stomach  Nausea  Vomiting  Headache  Diarrhea  Constipation  If these side effects become unmanageable or concerning, please contact your care team via [employer] or phone.     Tips to Minimize Side Effects  Eat slowly.  Eat smaller, more frequent meals.  Avoid fatty foods.     Additional Information  Visit [email or web address] to learn more and watch instructional videos.     Contact Information  For questions or concerns, send a [employer] message to our team or call our nurse coordinator at [phone] during regular business hours.  For questions during evenings or weekends, your messages will be addressed on the next business day.  For emergencies, please call 911 or seek immediate medical care.  [facility] Phone Number: [phone]

## 2024-08-09 NOTE — TELEPHONE ENCOUNTER
PA Initiation Key: [id number]    Medication: WEGOVY 0.25 MG/0.5ML SC SOAJ  Insurance Company:    Pharmacy Filling the Rx: [facility] - [place], [place] - [address]  Filling Pharmacy Phone: [phone]  Filling Pharmacy Fax: [phone]  Start Date: 8/9/2024

## 2024-08-09 NOTE — LETTER
2024       RE: Mamie Rice  [address] [facility] [address]     Dear Colleague,    Thank you for referring your patient, Mamie Rice, to the [facility] [place] at [facility]. Please see a copy of my visit note below.        Return Medical Weight Management Note     Mamie Rice  MRN:  [id number]  :  1995  STEPHEN:  2024    Dear Physician No Ref-Primary,    I had the pleasure of seeing your patient Mamie Rice. She is a 28 year old female who I am continuing to see for treatment of obesity related to:        10/9/2016     8:21 PM   --   I have the following health issues associated with obesity Back Pain   I have the following symptoms associated with obesity Knee Pain       INTERVAL HISTORY:  I last saw Mamie Rice on 2023. She continues to remain on topiramate 75 mg twice a day, naltrexone 50 mg daily, and bupropion 300 mg daily. She was in the interim enrolled in a clinical trial here at the [facility] evaluating the use of GLP1 agonists in CFRD. In this study, was on Ozempic for around 3 months and found this very helpful. She would be interested in continuing this.     Of note, she has no history of pancreatitis, and no personal or family history of MEN2 syndrome or MTC. She has no contraindications to GLP1 agonist use. She has been and continues to remain on a regimen of a low calorie diet and increased physical activity for at least 6 months and, despite this, has not experienced significant weight loss.     CURRENT WEIGHT:   180 lbs 0 oz    Initial weight was 182 pounds on 10/14/2016   Weight at last visit on 2023 was 185 pounds  Weight today is 180 pounds  Weight change since last seen on 2023 is down 5 pounds  Total weight loss is 2 pounds        2022    11:21 AM   Changes and Difficulties   I have made the following changes to my diet since my last visit:  "One less cup of milk with supper   I have made the following changes to my activity/exercise since my last visit: Starting walking daily       VITALS:  Ht 1.58 m (5' 2.21\")   Wt 81.6 kg (180 lb)   BMI 32.70 kg/m    GENERAL: Healthy, alert and no distress  EYES: Eyes grossly normal to inspection.   HENT: Normal cephalic/atraumatic.   RESP: No audible wheeze, cough.  No visible retractions or increased work of breathing.    MS: No gross musculoskeletal defects noted.  Normal range of motion.    SKIN: Visible skin clear.   NEURO: Cranial nerves grossly intact.  Mentation and speech appropriate for age.  PSYCH: Mentation appears normal, affect normal/bright, judgement and insight intact, normal speech and appearance well-groomed.    MEDICATIONS:   Current Outpatient Medications   Medication Sig Dispense Refill     acetylcysteine (MUCOMYST) 20 % neb solution INHALE 4ML VIA NEBULIZER TWO TIMES A DAY. MAY INCREASE TO 3-4 TIMES A DAY WITH INCREASED COUGH / COLD SYMPTOMS. REFRIGERATE VIAL AND DISCARD 96 HOURS AFTER OPENING 180 mL 11     albuterol (PROAIR HFA/PROVENTIL HFA/VENTOLIN HFA) 108 (90 Base) MCG/ACT inhaler Inhale 2 puffs into the lungs every 6 hours as needed for shortness of breath or wheezing 8.5 g 11     albuterol (PROVENTIL) (2.5 MG/3ML) 0.083% neb solution INHALE 1 VIAL ( 2.5MG) BY NEBULIZATION EVERY 4 HOURS AS NEEDED FOR FOR SHORTNESS OF BREATH OR WHEEZING 240 mL 11     amphetamine-dextroamphetamine (ADDERALL XR) 20 MG 24 hr capsule TAKE ONE CAPSULE BY MOUTH ONCE EVERY DAY [FOR FILL ON OR AFTER 7-7-23] 30 capsule 0     blood glucose (ACCU-CHEK GUIDE) test strip Use to test blood sugar 4 times daily or as directed. 100 strip 11     blood glucose monitoring (ACCU-CHEK FASTCLIX) lancets Use to test blood sugar 4 times daily or as directed. 100 each 11     busPIRone HCl (BUSPAR) 30 MG tablet Take 1 tablet (30 mg) by mouth 2 times daily 180 tablet 1     cetirizine (ZYRTEC) 10 MG tablet Take 1 tablet (10 mg) by " mouth every evening 30 tablet 5     Cholecalciferol (VITAMIN D) 50 MCG (2000 UT) CAPS Take 1 capsule by mouth daily 90 capsule 3     elexacaftor-tezacaftor-ivacaftor & ivacaftor (TRIKAFTA) 100-50-75 & 150 MG tablet pack TAKE 2 ORANGE TABS BY MOUTH IN THE A.M. AND 1 BLUE TAB IN THE P.M. 12 HOURS APART WITH FAT CONTAINING FOOD. DO NOT TAKE WITH GRAPEFRUIT 84 tablet 4     FLUoxetine (PROZAC) 40 MG capsule TAKE TWO CAPSULES BY MOUTH EVERY DAY . 180 capsule 1     fluticasone (FLONASE) 50 MCG/ACT nasal spray Spray 1 spray into both nostrils daily 9.9 mL 1     hydrOXYzine (ATARAX) 25 MG tablet Take 1-2 tablets (25-50 mg) by mouth nightly as needed (sleep) 60 tablet 1     medroxyPROGESTERone (DEPO-PROVERA) 150 MG/ML IM injection Inject 150 mg into the muscle every 3 months       naltrexone (DEPADE/REVIA) 50 MG tablet Take 1 tablet (50 mg) by mouth daily 90 tablet 1     omeprazole (PRILOSEC) 40 MG DR capsule Take 1 capsule (40 mg) by mouth daily 30 capsule 3     phytonadione (MEPHYTON) 5 MG tablet TAKE ONE TALBET MY MOUTH ONCE A WEEK 4 tablet 11     study - semaglutide, OZEMPIC,, IDS# [id number], 2 MG/3 ML injection Inject 0.5 mg Subcutaneous once a week 2 mL 0     study - semaglutide, OZEMPIC,, IDS# [id number], 2 MG/3 ML injection Inject 0.25 mg Subcutaneous once a week 2 mL 0     study - semaglutide, OZEMPIC,, IDS# [id number], 4 MG/3ML injection Inject 1 mg Subcutaneous once a week 4 mL 0     topiramate (TOPAMAX) 25 MG tablet Take one 50 mg pill and one 25 mg pill twice a day 180 tablet 3     topiramate (TOPAMAX) 50 MG tablet Take one 50 mg pill and one 25 mg pill twice a day 180 tablet 3     traZODone (DESYREL) 150 MG tablet Take 150 mg by mouth at bedtime       vitamin C (ASCORBIC ACID) 500 MG tablet TAKE ONE TABLET BY MOUTH TWICE A  tablet 3     Vitamin E 180 MG (400 UNIT) CAPS Take 1 capsule (400 Units) by mouth 2 times daily 180 capsule 3           8/6/2024     2:12 PM   Weight Loss Medication History Reviewed With Patient    Which weight loss medications are you currently taking on a regular basis? Naltrexone    Topamax (topiramate)    Bupropion   Are you having any side effects from the weight loss medication that we have prescribed you? No     LABS:  Component      Latest Ref Rng 7/11/2024  3:30 PM 8/5/2024  9:00 AM   Sodium      135 - 145 mmol/L 141  141    Potassium      3.4 - 5.3 mmol/L 3.9  3.9    Chloride      98 - 107 mmol/L 106  111 (H)    Carbon Dioxide (CO2)      22 - 29 mmol/L 26  21 (L)    Anion Gap      7 - 15 mmol/L 9  9    Urea Nitrogen      6.0 - 20.0 mg/dL 7.1  12.1    Creatinine      0.51 - 0.95 mg/dL 0.82  0.84    GFR Estimate      >60 mL/min/1.73m2 >90  >90    Calcium      8.8 - 10.4 mg/dL 8.8  8.5 (L)    Glucose      70 - 99 mg/dL 79  86    ALT      0 - 50 U/L 17  12    AST      0 - 45 U/L 19  13    Lipase      13 - 60 U/L 7 (L)  6 (L)         ASSESSMENT:   Mamie Rice is a 28 year old female with a history of class 1 obesity (defined as BMI 30-35 kg/m2). She also has a history of CF, CFRD, ADHD, and depression. Currently on topiramate, naltrexone, and buproion, which have overall helped in terms of BMI stabilization, however, has not experienced significant weight loss. Continues to remain on a low calorie diet and regimen of increased physical activity, which she has been on for well over a year. She was recently in a clinical trial during which she was started on a GLP1 agonist, and benefited from this. She is interested in continuing, and I believe that this would be a reasonable option for her (and as noted above she has no contraindications). Therefore, will prescribe Wegovy, with plans to titrate up to 1.7 mg weekly. Will continue naltrexone, bupropion, and topiramate at current doses.       Additional plans and goals, made through shared decision making, as outlined below.    PLAN:   Patient Instructions   1. Food Goal:  - Will have no more than 1 glass milk at dinner   - Will have no more than 1  Brayanhino daily     2. Activity Goal: Continue to be active as you are     Medications:  Continue topiramate 75 mg twice a day (one 50 mg pill and one 25 mg pill twice a day)  Continue naltrexone 50 mg daily  Continue bupropion 300 mg daily  Will prescribe Wegovy. See below for information. If you are able to access this, we can make sure that you know how to use this. If unable to access this, we could discuss other potential options.  Start with 0.25 mg weekly for 4 weeks, then  Take 0.5 mg weekly for 4 weeks, then  Take 1.0 mg weekly for 4 weeks, then  Take 1.7 mg weekly thereafter  We can schedule you to meet with our pharmacist in around 6-8 weeks to see how you are doing on this.      4. We can plan to see you again in clinic in around 4 months. If any questions or concerns before that time, please let us know.    WEGOVY (semaglutide)  What is Wegovy?  Wegovy (semaglutide) is an injectable prescription medicine FDA-approved for use in adults and adolescents aged 12 and older with obesity (BMI =30) or overweight (BMI =27) who also have weight-related medical problems. Wegovy helps them lose weight and maintain weight loss.     Wegovy works by mimicking a hormone that targets areas of the brain involved in regulating appetite and food intake. It also slows down digestion, so food moves more slowly through the digestive tract, helping you feel noel longer and eat less, which can lead to weight loss. Wegovy should be used in conjunction with a reduced-calorie meal plan and increased physical activity.     How to Start Wegovy     Dosage Schedule:  Start with 0.25 mg once weekly for 4 weeks.  If tolerated, increase to 0.5 mg once weekly for 4 weeks.  If tolerated, increase to 1 mg once weekly for 4 weeks.  If tolerated, increase to 1.7 mg once weekly.  Discuss with your doctor if increasing the dose to 2.4 mg once weekly is right for you.     Using Wegovy Pens:  Each box of Wegovy contains 4 pens of the same  dose.  Each pen is a single-dose, prefilled pen with a built-in injector for once-weekly use.  Discard the Wegovy pen after use in a sharps container.    Storage:  Store Wegovy in the refrigerator until ready to use.  Once ready to use, the pen can be kept at room temperature for up to 28 days.     Potential Common Side Effects  Upset stomach  Nausea  Vomiting  Headache  Diarrhea  Constipation  If these side effects become unmanageable or concerning, please contact your care team via [employer] or phone.     Tips to Minimize Side Effects  Eat slowly.  Eat smaller, more frequent meals.  Avoid fatty foods.     Additional Information  Visit [email or web address] to learn more and watch instructional videos.     Contact Information  For questions or concerns, send a [employer] message to our team or call our nurse coordinator at [phone] during regular business hours.  For questions during evenings or weekends, your messages will be addressed on the next business day.  For emergencies, please call 911 or seek immediate medical care.  [facility] Phone Number: [phone]      FOLLOW-UP:    4 months.    Sincerely,    Charles Park MD      Virtual Visit Details    Type of service:  Video Visit   Video Start Time:  3:28 PM  Video End Time: 3:48 PM    Originating Location (pt. Location): Home    Distant Location (provider location):  On-site  Platform used for Video Visit: Juan    The longitudinal plan of care for the diagnosis(es)/condition(s) as documented were addressed during this visit. Due to the added complexity in care, I will continue to support Mamie in the subsequent management and with ongoing continuity of care.    I spent 27 minutes of total time, before, during, and after the visit reviewing previous labs and records, examining the patient, answering their questions, formulating and discussing the plan of care, reviewing resulted labs, and writing the visit note.       Again, thank you  for allowing me to participate in the care of your patient.      Sincerely,    Charles Park MD

## 2024-08-12 NOTE — LETTER
1/17/2023         RE: Mamie Rice  [address] [facility] [id number]        Dear Colleague,    Thank you for referring your patient, Mamie Rice, to the [facility] [place]. Please see a copy of my visit note below.    [facility]  January 17, 2023         Assessment and Plan:   Mamie Rice is a 27 year old female with cystic fibrosis.    1. CF lung disease with normal spirometry: Mamie reports from pulmonary point of view that she is doing quite well.  She has not done any bronchial drainage therapy for several months.  Despite this she denies any symptomatology that is concerning to her.  Her last sputum culture grew normal roberto carlos only.  Her pulmonary function tests continue to show improvement.  At this time I have recommended to Mamie:  -- She should certainly do her vest if she were to have any pulmonary symptoms  -- She remains quite active at work and I have encouraged her to do exercise if she is not active    2. Pancreatic Insufficiency/GI: Mamie has no new symptoms consistent with worsening malabsorption.  She is actively working with the weight management group.  She remains on medication for this.  She has tried to make some dietary changes.  - continue the present dose of pancreatic enzymes  - continue vitamin supplementation.    3.  CF TR modulator therapy: Mamie is on Trikafta and tolerating well.  She is due for repeat hepatic panel and CK in August of this year.    4.  Abnormal glucose tolerance: Mamie did try wearing a continuous glucose monitor but has some difficulty with it.  She was having some nighttime hypoglycemia.  She is aware of this and does wake up to snack at this point.  She will try to obtain a new glucometer so she could just do fingersticks instead of working with a CGM.    5.  Cystic fibrosis sinus disease: Mamie in the past has struggled with  "3:49 PM      This LCSW met with this pt to check in and provide support. The pt was accompanied by her  at chairside.   The pt said she is doing ok, but "this chemo is catching up with me".  She was very sleepy and said the benadryl was making her very drowsy and she was falling asleep.  The pt was in good spirits considering the challenges she has faced throughout this treatment.   The pt reported no practical needs at this time.  " sinus disease.  She reports very stable sinus symptoms at this time.      6.  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  She enjoys her work very much.  She does live at home with her family.  She continues to remain very positive about her work and family.  She reports being very busy at work.    Annual studies due: 8/2023  Immunizations: prevnar  Colonoscopy: age 38     Gavi Allison MD MPH  Associate Professor of Medicine  Pulmonary, Allergy, Critical Care and Sleep Medicine      Interval History:     Mamie denies any cough or sputum production.  She denies any chest congestion.  She does occasionally get short of breath with increased activity.  She has not done a vest therapy since July of last year.         Review of Systems:     CONSTITUTIONAL: no fever, no chills, chronic night sweats probably related to temperature in the room, no change in weight, no change in energy, no change in appetite--good    INTEGUMENTARY/SKIN: no rash, no obvious new lesions    ENT/MOUTH: no sore throat, no new sinus pain, no new nasal drainage, no new nasal congestion, no ear ringing     RESPIRATORY: see interval history    CV: no chest pain, no palpitations, no peripheral edema    GI: no nausea, no vomiting, no change in stools, no fatty stools, no GERD    : negative urinary, on Depo so does not menstruate    MUSCULOSKELETAL: no myalgias, no arthralgias    ENDOCRINE: Please see above    NEURO:  No headache    SLEEP: no issues    PSYCHIATRIC: mood stable          Past Medical and Surgical History:     Past Medical History:   Diagnosis Date     ADHD (attention deficit hyperactivity disorder)      Anxiety      Aspergillosis, with pneumonia (H)     fugus found caused chest pain     Chronic infection     CF, MRSA.,      Chronic sinusitis      Constipation, chronic      Cystic fibrosis with pulmonary manifestations (H) 12/19/2011     Cystic fibrosis without mention of meconium ileus      SWEAT TEST:Date: 2/17/1994   [facility]  296 mg, 104 mmol/L ClSample #2  295 mg, 104 mmol/L Cl GENOTYPING:Date: 10/15/2007,  Laboratory: [facility]: [id number]     Depressive disorder      Diabetes     no meds currently     Dysthymic disorder      Exocrine pancreatic insufficiency      Gastro-oesophageal reflux disease      Hip pain, right      MRSA (methicillin resistant Staphylococcus aureus) carrier      Pancreatic disease      Past Surgical History:   Procedure Laterality Date     ARTHROSCOPY HIP, OSTEOPLASTY FEMUR PROXIMAL, COMBINED  3/11/2013    Procedure: COMBINED ARTHROSCOPY HIP, OSTEOPLASTY FEMUR PROXIMAL;  Right Hip Arthroscopy, Labral  Debridement.    surgeon request choice anesthesia/admit to [facility] after surgery;  Surgeon: Omkar Austin MD;  [facility]     ARTHROSCOPY KNEE WITH MEDIAL MENISCECTOMY Left 1/31/2017    Procedure: ARTHROSCOPY KNEE WITH MEDIAL MENISCECTOMY;  Surgeon: Jethro Coyle MD;  [facility] OR     bronchoscopies       BRONCHOSCOPY       EXAM UNDER ANESTHESIA ANUS N/A 5/10/2016    Procedure: EXAM UNDER ANESTHESIA ANUS;  Surgeon: Chet Gaviria MD;  [facility]     EXAM UNDER ANESTHESIA, RESTORATIONS, EXTRACTION(S) DENTAL COMPLEX, COMBINED  5/13/2013    Procedure: COMBINED EXAM UNDER ANESTHESIA, RESTORATIONS, EXTRACTION(S) DENTAL COMPLEX;  Dental Exam, Radiographs, Restorations. Single Extraction  Tooth #2. Restorations x 3;  Surgeon: Danilo Ortiz DDS;  Location: [facility] OR     HC KNEE SCOPE, DIAGNOSTIC      Arthroscopy, Knee- left     INJECT BOTOX N/A 5/10/2016    Procedure: INJECT BOTOX;  Surgeon: Chet Gaviria MD;  [facility]     left hip labral tear  5/11/2011    left hip arthroscopy with labral debridement and synovectomy     meniscus repair       OPTICAL TRACKING SYSTEM ENDOSCOPIC SINUS SURGERY  10/14/2011    Procedure:OPTICAL TRACKING SYSTEM ENDOSCOPIC SINUS SURGERY; FESS (functional endoscopic  sinus surgery) with Image Guidance, bronchial lavage and cultures; Surgeon:GOYO KUO; Location:[facility] OR     OPTICAL TRACKING SYSTEM ENDOSCOPIC SINUS SURGERY  5/18/2012    Procedure:OPTICAL TRACKING SYSTEM ENDOSCOPIC SINUS SURGERY; Right  and Left Image Guided Functional Endoscopic Sinus Surgery With  Frontal Approach, [employer]; Surgeon:GOYO KUO; Location[facility] OR     OPTICAL TRACKING SYSTEM ENDOSCOPIC SINUS SURGERY  9/26/2012    Procedure: OPTICAL TRACKING SYSTEM ENDOSCOPIC SINUS SURGERY;  Stealth Guided Bilateral Functional Endoscopic Sinus Surgery *Latex Safe*;  Surgeon: Goyo Kuo MD;  [facility]     OPTICAL TRACKING SYSTEM ENDOSCOPIC SINUS SURGERY Bilateral 10/16/2015    Procedure: OPTICAL TRACKING SYSTEM ENDOSCOPIC SINUS SURGERY;  Surgeon: Mariela Haile MD;  [facility]     ORTHOPEDIC SURGERY      left hip tear repair 2010     SINUS SURGERY             Family History:     Family History   Problem Relation Age of Onset     Cancer Paternal Grandmother      Skin Cancer Paternal Grandmother      Other Cancer Paternal Grandmother         Skin     Obesity Paternal Grandmother      Cancer Paternal Grandfather         PGF had throat cancer (he was a smoker)     Other Cancer Paternal Grandfather      Anxiety Disorder Paternal Grandfather      Thyroid Disease Mother         ,     Hypertension Mother      Obesity Other      ALS Father 67        2 male cousins with ALS as well     Anesthesia Reaction No family hx of      Blood Disease No family hx of      Colon Polyps No family hx of      Crohn's Disease No family hx of      Ulcerative Colitis No family hx of      Colon Cancer No family hx of      Melanoma No family hx of             Social History:     Social History     Socioeconomic History     Marital status: Single     Spouse name: Not on file     Number of children: Not on file     Years of education: Not on file     Highest education level: Not on file   Occupational History     Not on file    Tobacco Use     Smoking status: Never     Smokeless tobacco: Never     Tobacco comments:     one person at home smokes outside   Substance and Sexual Activity     Alcohol use: No     Alcohol/week: 0.0 standard drinks     Drug use: No     Sexual activity: Never   Other Topics Concern      Service Not Asked     Blood Transfusions No     Caffeine Concern Not Asked     Occupational Exposure Not Asked     Hobby Hazards Not Asked     Sleep Concern Not Asked     Stress Concern Not Asked     Weight Concern Not Asked     Special Diet Not Asked     Back Care Not Asked     Exercise Yes     Bike Helmet Not Asked     Seat Belt Not Asked     Self-Exams Not Asked     Parent/sibling w/ CABG, MI or angioplasty before 65F 55M? Yes   Social History Narrative    Mamie lives with mother in [place], [place].  There is a cat in the home, but Mamie does not have any litterbox duties.  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  She gets essentially no exercise because of the tingling in her feet (says it bothers her even to stand).        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        8/2015 no change in social situation        2/15/2016 Pt is single and lives with mother and stepfather.     Social Determinants of Health     Financial Resource Strain: Not on file   Food Insecurity: Not on file   Transportation Needs: Not on file   Physical Activity: Not on file   Stress: Not on file   Social Connections: Not on file   Intimate Partner Violence: Not on file   Housing Stability: Not on file            Medications:     Current Outpatient Medications   Medication     acetylcysteine (MUCOMYST) 20 % neb solution     albuterol (PROAIR HFA/PROVENTIL HFA/VENTOLIN HFA) 108 (90 Base) MCG/ACT inhaler     azithromycin (ZITHROMAX) 500 MG tablet     buPROPion (WELLBUTRIN XL) 300 MG 24 hr tablet     busPIRone (BUSPAR) 15 MG tablet     cetirizine (ZYRTEC) 10 MG tablet  "    Continuous Blood Gluc  (FREESTYLE WYATT 2 READER) KIRSTEN     Continuous Blood Gluc Sensor (FREESTYLE WYATT 2 SENSOR) MISC     elexacaftor-tezacaftor-ivacaftor & ivacaftor (TRIKAFTA) 100-50-75 & 150 MG tablet pack     FLUoxetine (PROZAC) 40 MG capsule     hydrOXYzine (ATARAX) 25 MG tablet     medroxyPROGESTERone (DEPO-PROVERA) 150 MG/ML IM injection     Multiple Vitamin (MULTIVITAMIN OR)     naltrexone (DEPADE/REVIA) 50 MG tablet     omeprazole (PRILOSEC) 40 MG DR capsule     phytonadione (MEPHYTON) 5 MG tablet     topiramate (TOPAMAX) 25 MG tablet     topiramate (TOPAMAX) 50 MG tablet     traZODone (DESYREL) 100 MG tablet     vitamin C (ASCORBIC ACID) 500 MG tablet     vitamin D3 25 mcg (1000 units) tablet     vitamin E (TOCOPHEROL) 400 units (180 mg) capsule     albuterol (PROVENTIL) (2.5 MG/3ML) 0.083% neb solution     blood glucose (ACCU-CHEK GUIDE) test strip     blood glucose monitoring (ACCU-CHEK FASTCLIX) lancets     No current facility-administered medications for this visit.            Physical Exam:   /77   Pulse 87   Resp 17   Ht 1.57 m (5' 1.81\")   Wt 83.9 kg (184 lb 15.5 oz)   SpO2 100%   BMI 34.04 kg/m      Constitutional:   Awake, alert and in no apparent distress     Eyes:   nonicteric     ENT:   oral mucosa moist without lesions, normal tm bilaterally, bilateral mucosa normal     Neck:   Supple without supraclavicular or cervical lymphadenopathy     Lungs:   Good air flow.  No crackles. No rhonchi.  No wheezes.     Cardiovascular:   Normal S1 and S2.  RRR.  No murmur, gallop or rub.     Abdomen:   NABS, soft, nontender, nondistended.      Musculoskeletal:   No edema, digital clubbing present     Neurologic:   Alert and conversant.     Skin:   Warm, dry.  No rash on limited exam.             Data:   All laboratory and imaging data reviewed.      Cystic Fibrosis Culture  Recent Results (from the past 168 hour(s))   General PFT Lab (Please always keep checked)    Collection Time: " 01/17/23  9:15 AM   Result Value Ref Range    FVC-Pred 3.26 L    FVC-Pre 3.60 L    FVC-%Pred-Pre 110 %    FEV1-Pre 3.17 L    FEV1-%Pred-Pre 112 %    FEV1FVC-Pred 86 %    FEV1FVC-Pre 88 %    FEFMax-Pred 6.69 L/sec    FEFMax-Pre 9.69 L/sec    FEFMax-%Pred-Pre 144 %    FEF2575-Pred 3.29 L/sec    FEF2575-Pre 3.78 L/sec    ATC6409-%Pred-Pre 114 %    ExpTime-Pre 4.96 sec    FIFMax-Pre 5.13 L/sec    FEV1FEV6-Pred 86 %    FEV1FEV6-Pre 88 %       PFT: The spirometry is normal.  When compared to 8/9/2022, the FEV1 and FVC have little change.      Pulmonary exacerbation: absent    35 minutes spent on the date of the encounter doing chart review, history and exam, documentation and further activities per the note  Time documented is excluding time spent for PFT interpretation.        Again, thank you for allowing me to participate in the care of your patient.        Sincerely,        Gavi Allison MD

## 2024-08-15 ENCOUNTER — MYC MEDICAL ADVICE (OUTPATIENT)
Dept: ORTHOPEDICS | Facility: CLINIC | Age: 29
End: 2024-08-15
Payer: COMMERCIAL

## 2024-08-15 NOTE — TELEPHONE ENCOUNTER
Left Voicemail (1st Attempt) and Sent Mychart (1st Attempt) for the patient to call back and schedule the following:    Appointment type: Return Weight Mgmt  Provider: Dr Park  Return date: December Specialty phone number: [phone]

## 2024-08-21 NOTE — TELEPHONE ENCOUNTER
Prior Authorization Approval    Medication: WEGOVY 0.25 MG/0.5ML SC SOAJ  Authorization Effective Date: 8/21/2024  Authorization Expiration Date: 2/21/2025  Approved Dose/Quantity: 4 pens per 28 days  Reference #: Key: [id number]   Insurance Company: [employer] - Phone [phone] Fax [phone]  Expected CoPay: $ 0  CoPay Card Available: No    Financial Assistance Needed: N/A  Which Pharmacy is filling the prescription: [facility] - [place], [place] - [address]   Pharmacy Notified: Released to pharmacy  Patient Notified: By pharmacy

## 2024-10-15 NOTE — TELEPHONE ENCOUNTER
[facility] needs dosage clarification on naltrexone please.Pharmacy Contact    Telephone Fax   [phone]        normal mood with appropriate affect, speech clear, thought process logical

## 2024-10-21 DIAGNOSIS — E84.9 CF (CYSTIC FIBROSIS) (H): ICD-10-CM

## 2024-10-21 RX ORDER — OMEPRAZOLE 40 MG/1
40 CAPSULE, DELAYED RELEASE ORAL DAILY
Qty: 90 CAPSULE | Refills: 3 | Status: SHIPPED | OUTPATIENT
Start: 2024-10-21

## 2024-11-12 ENCOUNTER — VIRTUAL VISIT (OUTPATIENT)
Dept: PHARMACY | Facility: CLINIC | Age: 29
End: 2024-11-12
Attending: PEDIATRICS
Payer: COMMERCIAL

## 2024-11-12 VITALS — BODY MASS INDEX: 34.93 KG/M2 | WEIGHT: 185 LBS | HEIGHT: 61 IN

## 2024-11-12 DIAGNOSIS — F33.0 MILD EPISODE OF RECURRENT MAJOR DEPRESSIVE DISORDER (H): ICD-10-CM

## 2024-11-12 DIAGNOSIS — K21.9 GERD WITHOUT ESOPHAGITIS: ICD-10-CM

## 2024-11-12 DIAGNOSIS — K86.89 PANCREATIC INSUFFICIENCY: ICD-10-CM

## 2024-11-12 DIAGNOSIS — E08.9 DIABETES MELLITUS DUE TO CYSTIC FIBROSIS (H): ICD-10-CM

## 2024-11-12 DIAGNOSIS — J30.2 SEASONAL ALLERGIES: ICD-10-CM

## 2024-11-12 DIAGNOSIS — E84.9 CYSTIC FIBROSIS (H): ICD-10-CM

## 2024-11-12 DIAGNOSIS — E66.01 MORBID OBESITY (H): Primary | ICD-10-CM

## 2024-11-12 DIAGNOSIS — E84.9 DIABETES MELLITUS DUE TO CYSTIC FIBROSIS (H): ICD-10-CM

## 2024-11-12 DIAGNOSIS — F90.2 ATTENTION DEFICIT HYPERACTIVITY DISORDER (ADHD), COMBINED TYPE: ICD-10-CM

## 2024-11-12 DIAGNOSIS — F41.1 GAD (GENERALIZED ANXIETY DISORDER): ICD-10-CM

## 2024-11-12 RX ORDER — SEMAGLUTIDE 0.25 MG/.5ML
0.25 INJECTION, SOLUTION SUBCUTANEOUS WEEKLY
Qty: 2 ML | Refills: 0 | Status: SHIPPED | OUTPATIENT
Start: 2024-11-12

## 2024-11-12 NOTE — Clinical Note
Cesar Park- patient never started Wegovy. She never called to have the RX mailed to her home. Getting her hooked up now. Wondering your thoughts of other Weight Management meds. She is on naltrexone sounds like did okay with GI stuff/nausea when on study agent (Ozempic), but do not want to exacerbate any GI issues so was considering stopping naltrexone when Wegovy started? Also her labs in the recent past are looking like elevated chloride and now she had been intermittently acidotic, it looks to me like her topiramate is showing now more persistently acidotic and lower calcium from most recent labs. Typically when I see this I lower the dose of topiramate if patient experiencing efficacy. In her case I am questioning if its lower dose or taper off. I definitely don't want to make rash changes. You have followed her longer so want to be respectful to your plans/thoughts. Let me know. Thanks! Abigail VELIZ

## 2024-11-12 NOTE — PROGRESS NOTES
Medication Therapy Management (MTM) Encounter    ASSESSMENT:                            Medication Adherence/Access: No issues identified.    Weight Management   Would benefit from starting Wegovy. Completed teaching of administration of Wegovy. Verified RX was received by [facility]. Patient just has to call and set up mailing out RX to her home. Due to risk of GI issues like nausea with Wegovy start with naltrexone on board, may benefit from stopping naltrexone. Due to high chloride, low calcium and persistently low bicarb, could make consideration to lower/taper off topiramate. From regimen it does appear that weight has been maintained but do see lack of evidence of weight loss on the current regimen. With transitioning to Wegovy start, could be considered to stop 1 or both agents for Weight Management.     GERD    Stable.     Allergies:   Stable.     CF:   Provided recommendation to take Trikafta with fat containing meal/snack.    CFRD:    Stable. To continue to monitor blood sugars as needed.    Supplements:   Labs generally stable, vitamin D just below goal, to continue current regimen.     Anxiety and Depression  Stable.     ADHD:   Stable.     PLAN:                            Start Wegovy 0.25 mg weekly for 4 weeks then if tolerating increase to 0.5 mg weekly thereafter.   Pharmacist to follow up with Dr. Park regarding potential to:   Stop naltrexone   Lowering of topiramate to 50 mg twice daily versus trial off.   Take Trikafta with fat containing meal/snack to improve absorption.     Update 11/18/2024: approved by Dr. Park to stop naltrexone and taper down on topirmate from 75 mg twice daily to 75 mg once daily then re-assess at follow up with Dr. Park in January. Can order BMP to be completed at that time. Called patient to fin    Follow-up: Return in about 8 weeks (around 1/7/2025) for Medication Therapy Management Pharmacist Visit, Call [phone] to schedule.  "Patient said she would call to schedule.     SUBJECTIVE/OBJECTIVE:                          Mamie Rice is a 29 year old female seen for an initial visit. She was referred to me from Dr. Park. Has previously been seen by Ping German, last [facility] visit was 1/17/2023.      Reason for visit: Weight Management follow up.    Allergies/ADRs: Reviewed in chart  Past Medical History: Reviewed in chart  Tobacco: She reports that she has never smoked. She has never used smokeless tobacco.  Alcohol: Less than 1 beverage / month  Caffeine: 1 coffee and 1 soda daily     Medication Adherence/Access:   no issues reported.  Patient uses pill box(es).  Patient takes medications 2 time(s) per day.   Per patient, misses medication 0-1time(s) per week.   Feels polypharmacy will pills.     Weight Management   Wegovy 0.25 mg once weekly - not taking   Naltrexone 50 mg daily  Topiramate 75 mg twice daily      Enrolled in a clinical trial here at the [facility] evaluating the use of GLP1 agonists in CFRD. In this study, was on Ozempic for around 3 months and found this very helpful. Last followed up with [facility]/9/[facility], prescribed Wegovy. Patient reports she did not start because   Nutrition/Eating Habits: she is getting in 2 meals per day.  Lunch: school lunch or hot pocket or ramen noodles. Supper: spaghetti, burgers, lasagnas. Sometimes protein at meals sometimes not. Feels she needs to be better with water intake.   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        Current weight today: 185 lbs 0 oz    Wt Readings from Last 4 Encounters:   11/12/24 185 lb (83.9 kg)   08/09/24 180 lb (81.6 kg)   08/05/24 180 lb 5.4 oz (81.8 kg)   07/11/24 184 lb 1.4 oz (83.5 kg)     Estimated body mass index is 34.97 kg/m  as calculated from the following:    Height as of this encounter: 5' 0.98\" (1.549 m).    Weight as of this encounter: 185 lb (83.9 " kg).    Lab Results   Component Value Date    A1C 5.1 03/27/2024    A1C 5.0 08/09/2022    A1C 5.1 06/08/2021    A1C 5.1 08/14/2019    A1C 5.5 08/02/2017    A1C 5.6 05/04/2017    A1C 5.5 02/07/2017     GERD    Omeprazole 40  mg once daily   Patient reports no current symptoms.   Patient feels that current regimen is effective.  The patient does not have a history of GI bleed.       Allergies:   cetirizine 10 mg once daily as needed   Flonase 1 spray each nostril daily as needed     Patient reports no current medication side effects.    Allergies during seasonable changes, hasn't needed recently.    Patient feels that current therapy is effective.     CF:   Trikafta 2 orange tablets in AM and 1 blue tablet in PM   Mucomyst neb during illness   Albuterol HFA during illness  Albuterol neb during illness     She was unaware regarding taking with fat containing food/meal. Takes with banana or cereal with skim milk. Reports overall things are going well especially since starting Trikafta. No shortness of breath, wheezing, shortness of breath or cough. History of pancreatitic insufficiency, not currently on pancreatic enzymes. Has refused int he patient. Reports no signs/symptoms of malabsorption or GI disturbances.   Genotype: [id number]     CFRD:    No medications    She is not on insulin nor has she required insulin.   SMBG: as needed if concerns of hypoglycemia. Last was 2 months ago. Did not use Freestyle bry when prescribed historical.   Patient is not experiencing hypoglycemia recently, reports a couple months ago had 1 instance of hypoglycemia when checking blood sugar. 69. Corrected with juice. Feels was related to skipping multiple meals.   Recent symptoms of high blood sugar? None  Previously seen by [facility] Diabetes Educator/Dietitian.     Supplements:   Multivitamin daily  Vitamin D 1000 units maribel  Vitamin E 400 units twice daily  Vitamin C 500mg twice daily  Mephyton 5mg weekly     No concerns. Has been on  "regimen for a while. History of pancreatitic insufficiency.     Hemoglobin   Date Value Ref Range Status   03/27/2024 14.8 11.7 - 15.7 g/dL Final   06/08/2021 14.1 11.7 - 15.7 g/dL Final     No results found for: \"BREANNA\"  Lab Results   Component Value Date    VITDT 28 03/27/2024      No results found for: \"PTHI\"   No results found for: \"B12\"   Lab Results   Component Value Date    LALA 0.86 03/27/2024       Mental Health   Anxiety and Depression  Bupropion XL 300mg once daily  Buspirone 30mg twice daily  Fluoxetine 40mg once daily    Patient reports no current medication side effects. Does not follow with therapist or psychiatrist but does feel that overall her mood is doing well on the regimen.   Patient reports symptoms are stable.       ADHD:   Adderall XR 20 mg daily     She reports that she would not be able to focus all day at work without it. She does find that it works throughout the whole day to stay on task.     Today's Vitals: Ht 5' 0.98\" (1.549 m)   Wt 185 lb (83.9 kg)   BMI 34.97 kg/m    ----------------      I spent 30 minutes with this patient today. All changes were made via collaborative practice agreement with Charles Park. A copy of the visit note was provided to the patient's provider(s).    A summary of these recommendations was sent via [employer]    Lauren Bloch, PharmD, BCACP   Medication Therapy Management Pharmacist   [facility]    Telemedicine Visit Details  The patient's medications can be safely assessed via a telemedicine encounter.  Type of service:  Telephone visit  Originating Location (pt. Location): Home    Distant Location (provider location):  Off-site  Start Time:  2:14 PM  End Time:  2:44 PM     Medication Therapy Recommendations  Cystic fibrosis (H)   1 Current Medication: elexacaftor-tezacaftor-ivacaftor & ivacaftor (TRIKAFTA) 100-50-75 & 150 MG tablet pack   Current Medication Sig: TAKE 2 ORANGE TABS BY MOUTH IN THE A.M. AND 1 " BLUE TAB IN THE P.M. 12 HOURS APART WITH FAT CONTAINING FOOD. DO NOT TAKE WITH GRAPEFRUIT   Rationale: Patient forgets to take - Adherence - Adherence   Recommendation: Provide Education   Status: Patient Agreed - Adherence/Education   Identified Date: 11/12/2024 Completed Date: 11/12/2024         Morbid obesity (H)   1 Current Medication: naltrexone (DEPADE/REVIA) 50 MG tablet   Current Medication Sig: Take 1 tablet (50 mg) by mouth daily   Rationale: More effective medication available - Ineffective medication - Effectiveness   Recommendation: Discontinue Medication - Wegovy 0.25 MG/0.5ML Soaj   Status: Contact Provider - Awaiting Response   Identified Date: 11/12/2024         2 Current Medication: topiramate (TOPAMAX) 50 MG tablet   Current Medication Sig: Take one 50 mg pill and one 25 mg pill twice a day   Rationale: Unsafe medication for the patient - Adverse medication event - Safety   Recommendation: Discontinue Medication   Status: Contact Provider - Awaiting Response   Identified Date: 11/12/2024         3 Rationale: Untreated condition - Needs additional medication therapy - Indication   Recommendation: Start Medication - Wegovy 0.25 MG/0.5ML Soaj   Status: Accepted per CPA   Identified Date: 11/12/2024 Completed Date: 11/12/2024

## 2024-11-12 NOTE — PATIENT INSTRUCTIONS
"Recommendations from today's MTM visit:                                                       Start Wegovy 0.25 mg weekly for 4 weeks then if tolerating increase to 0.5 mg weekly thereafter.   Pharmacist to follow up with Dr. Park regarding potential to:   Stop naltrexone   Lowering of topiramate to 50 mg twice daily versus trial off.   Take Trikafta with fat containing meal/snack to improve absorption.     Follow-up: Return in about 8 weeks (around 1/7/2025) for Medication Therapy Management Pharmacist Visit, Call [phone] to schedule.    It was great speaking with you today.  I value your experience and would be very thankful for your time in providing feedback in our clinic survey. In the next few days, you may receive an email or text message from [employer] with a link to a survey related to your  clinical pharmacist.\"     To schedule another MTM appointment, please call the clinic directly or you may call the MTM scheduling line at [phone] or toll-free at [phone].     My Clinical Pharmacist's contact information:                                                      Please feel free to contact me with any questions or concerns you have.      Lauren Bloch, PharmD  Medication Therapy Management Pharmacist   [facility] [place]             "

## 2024-11-12 NOTE — PROGRESS NOTES
"Virtual Visit Details    Type of service:  Telephone Visit   Phone call duration: *** minutes   Originating Location (pt. Location): {patient location:549149::\"Home\"}  {PROVIDER LOCATION On-site should be selected for visits conducted from your clinic location or adjoining [facility] hospital, academic office, or other nearby [facility] building. Off-site should be selected for all other provider locations, including home:[id number]}  Distant Location (provider location):  {virtual location provider:[id number]}      "

## 2024-11-12 NOTE — NURSING NOTE
Current patient location:  work in [place], [place]    Is the patient currently in the state of [place]? YES    Visit mode:TELEPHONE    If the visit is dropped, the patient can be reconnected by: TELEPHONE VISIT: Phone number: [phone]    Will anyone else be joining the visit? NO  (If patient encounters technical issues they should call [phone] :[id number])    Are changes needed to the allergy or medication list? Pt stated no changes to allergies and Pt stated no med changes    Are refills needed on medications prescribed by this physician? NO    Reason for visit: Medication Therapy Management    Rabia BARKERF

## 2024-11-12 NOTE — Clinical Note
Cesar Feng - was referred to follow up with patient for Wegovy start. Looks like it has been a bit since you saw her but just thought would send FYI. Thanks, Abigail VELIZ

## 2024-11-18 ENCOUNTER — MYC MEDICAL ADVICE (OUTPATIENT)
Dept: PHARMACY | Facility: CLINIC | Age: 29
End: 2024-11-18
Payer: COMMERCIAL

## 2024-11-18 ENCOUNTER — TELEPHONE (OUTPATIENT)
Dept: PULMONOLOGY | Facility: CLINIC | Age: 29
End: 2024-11-18
Payer: COMMERCIAL

## 2024-11-18 DIAGNOSIS — E66.01 MORBID OBESITY (H): Primary | ICD-10-CM

## 2024-11-18 DIAGNOSIS — E84.9 CYSTIC FIBROSIS (H): ICD-10-CM

## 2024-11-18 RX ORDER — ELEXACAFTOR, TEZACAFTOR, AND IVACAFTOR 100-50-75
KIT ORAL
Qty: 84 TABLET | Refills: 0 | Status: SHIPPED | OUTPATIENT
Start: 2024-11-18

## 2024-11-18 RX ORDER — TOPIRAMATE 25 MG/1
75 TABLET, FILM COATED ORAL DAILY
Qty: 90 TABLET | Refills: 2 | Status: SHIPPED | OUTPATIENT
Start: 2024-11-18

## 2024-11-18 NOTE — TELEPHONE ENCOUNTER
Duplicate request. Closing encounter.    Ping Cisneros PharmD  Cystic Fibrosis MTM Pharmacist  [facility]  Voicemail: [phone]

## 2024-11-18 NOTE — TELEPHONE ENCOUNTER
Health Call Center    Phone Message    May a detailed message be left on voicemail: yes     Reason for Call: Medication Refill Request    Has the patient contacted the pharmacy for the refill? Yes   Name of medication being requested: elexacaftor-tezacaftor-ivacaftor & ivacaftor (TRIKAFTA) 100-50-75 & 150 MG tablet pack   Provider who prescribed the medication:     Rabia Gray PA-C     Pharmacy:   [id number] [address]     Date medication is needed: asap. FAX to: [phone]  or e-scribe.    Action Taken: Message routed to:  [facility] PULM    Travel Screening: Not Applicable     Date of Service: 11/18/24

## 2024-11-21 NOTE — PROGRESS NOTES
[facility]  November 25, 2024         Assessment and Plan:   Mamie Rice is a 29 year old female with h/o CF lung disease with normal spirometry, CF sinus disease, CFRD, pancreatic insufficiency, ADHD, insomnia, MECHE, and depression who is seen for routine follow up.      1. Cystic fibrosis: notes some mild allergy/URI complaints, with a slight cough above baseline, but no new dyspnea. Sating 99% on room air; typically does not vest unless she feels unwell. PFTs are down significantly from her baseline. Previous cultures + for MSSA with additional h/o MRSA (last 2019), HIB, and Achromobacter.   - Continue nebs and vesting, recommend vesting 3-4 times/week over the winter  - Resume Zyrtec until a freeze and resume Flonase PRN.     2. CFTR modulation: tolerating Trikafta well. Labs today WNL.   - Continue Trikafta    3. Cystic fibrosis sinus disease: h/o multiple endoscopic sinus surgeries (last 2015). Notes an increase in congestion and drainage, possibly related to allergies. Doing some nasal rinses.  - Resume Zyrtec and Flonase per above      4. Exocrine pancreatic insufficiency: no signs of malabsorption. Has been started on Wegovy by the weight loss clinic. BMI >> CFF goal.  - Continue vitamins    5. GERD: patient denies any symptoms.  - Continue PPI daily    6. ADHD, depression and anxiety: following with a psychiatrist locally.     RTC: 3 months with annuals including OGTT (no CXR)  Annual studies due: March 2025 (DEXA > March 2026)  Preventative care: got her flu vaccine; recommend covid booster    Rabia Gray PA-C  Pulmonary, Allergy, Critical Care and Sleep Medicine        Interval History:     No longer doing study with Dr. Prieto. Nothing new since the last visit, going to her cousin's for Thanksgiving. No recent illnesses, occasional cold symptoms, does have some sneezing and a runny nose. Cough is slightly more than baseline, more frequent,  mainly dry. No chest congestion, no shortness of breath. Did some airway clearance last month when she felt like she was getting sick. No bloating or gas, stools are stable, not fatty or floating.          Review of Systems:   Please see HPI. Otherwise, complete 10 point ROS negative.           Past Medical and Surgical History:     Past Medical History:   Diagnosis Date    ADHD (attention deficit hyperactivity disorder)     Anxiety     Aspergillosis, with pneumonia (H)     fugus found caused chest pain    Chronic infection     CF, MRSA.,     Chronic sinusitis     Constipation, chronic     Cystic fibrosis with pulmonary manifestations (H) 12/19/2011    Cystic fibrosis without mention of meconium ileus     SWEAT TEST:Date: 2/17/1994   [facility]  296 mg, 104 mmol/L ClSample #2  295 mg, 104 mmol/L Cl GENOTYPING:Date: 10/15/2007,  Laboratory: [facility]: [id number]    Depressive disorder     Diabetes     no meds currently    Dysthymic disorder     Exocrine pancreatic insufficiency     Gastro-oesophageal reflux disease     Hip pain, right     MRSA (methicillin resistant Staphylococcus aureus) carrier     Pancreatic disease      Past Surgical History:   Procedure Laterality Date    ARTHROSCOPY HIP, OSTEOPLASTY FEMUR PROXIMAL, COMBINED  3/11/2013    Procedure: COMBINED ARTHROSCOPY HIP, OSTEOPLASTY FEMUR PROXIMAL;  Right Hip Arthroscopy, Labral  Debridement.    surgeon request choice anesthesia/admit to [facility] after surgery;  Surgeon: Omkar Austin MD;  [facility]    ARTHROSCOPY KNEE WITH MEDIAL MENISCECTOMY Left 1/31/2017    Procedure: ARTHROSCOPY KNEE WITH MEDIAL MENISCECTOMY;  Surgeon: Jethro Coyle MD;  [facility] OR    bronchoscopies      BRONCHOSCOPY      EXAM UNDER ANESTHESIA ANUS N/A 5/10/2016    Procedure: EXAM UNDER ANESTHESIA ANUS;  Surgeon: Chet Gaviria MD;  [facility]    EXAM UNDER ANESTHESIA, RESTORATIONS, EXTRACTION(S) DENTAL COMPLEX,  COMBINED  5/13/2013    Procedure: COMBINED EXAM UNDER ANESTHESIA, RESTORATIONS, EXTRACTION(S) DENTAL COMPLEX;  Dental Exam, Radiographs, Restorations. Single Extraction  Tooth #2. Restorations x 3;  Surgeon: Danilo Ortiz DDS;  [facility] OR    HC KNEE SCOPE, DIAGNOSTIC      Arthroscopy, Knee- left    INJECT BOTOX N/A 5/10/2016    Procedure: INJECT BOTOX;  Surgeon: Chet Gaviria MD;  [facility]    left hip labral tear  5/11/2011    left hip arthroscopy with labral debridement and synovectomy    meniscus repair      OPTICAL TRACKING SYSTEM ENDOSCOPIC SINUS SURGERY  10/14/2011    Procedure:OPTICAL TRACKING SYSTEM ENDOSCOPIC SINUS SURGERY; FESS (functional endoscopic sinus surgery) with Image Guidance, bronchial lavage and cultures; Surgeon:GOYO KUO; Location:[facility] OR    OPTICAL TRACKING SYSTEM ENDOSCOPIC SINUS SURGERY  5/18/2012    Procedure:OPTICAL TRACKING SYSTEM ENDOSCOPIC SINUS SURGERY; Right  and Left Image Guided Functional Endoscopic Sinus Surgery With  Frontal Approach, [employer]; Surgeon:GOYO KUO; [facility]    OPTICAL TRACKING SYSTEM ENDOSCOPIC SINUS SURGERY  9/26/2012    Procedure: OPTICAL TRACKING SYSTEM ENDOSCOPIC SINUS SURGERY;  Stealth Guided Bilateral Functional Endoscopic Sinus Surgery *Latex Safe*;  Surgeon: Goyo Kuo MD;  [facility]    OPTICAL TRACKING SYSTEM ENDOSCOPIC SINUS SURGERY Bilateral 10/16/2015    Procedure: OPTICAL TRACKING SYSTEM ENDOSCOPIC SINUS SURGERY;  Surgeon: Mariela Haile MD;  [facility]    ORTHOPEDIC SURGERY      left hip tear repair 2010    SINUS SURGERY             Family History:     Family History   Problem Relation Age of Onset    Cancer Paternal Grandmother     Skin Cancer Paternal Grandmother     Other Cancer Paternal Grandmother         Skin    Obesity Paternal Grandmother     Cancer Paternal Grandfather         PGF had throat cancer (he was a smoker)    Other Cancer Paternal Grandfather     Anxiety Disorder Paternal  Grandfather     Thyroid Disease Mother         ,    Hypertension Mother     Obesity Other     ALS Father 67        2 male cousins with ALS as well    Anesthesia Reaction No family hx of     Blood Disease No family hx of     Colon Polyps No family hx of     Crohn's Disease No family hx of     Ulcerative Colitis No family hx of     Colon Cancer No family hx of     Melanoma No family hx of             Social History:     Social History     Socioeconomic History    Marital status: Single     Spouse name: Not on file    Number of children: Not on file    Years of education: Not on file    Highest education level: Not on file   Occupational History    Not on file   Tobacco Use    Smoking status: Never    Smokeless tobacco: Never    Tobacco comments:     one person at home smokes outside   Vaping Use    Vaping status: Never Used   Substance and Sexual Activity    Alcohol use: No     Alcohol/week: 0.0 standard drinks of alcohol    Drug use: No    Sexual activity: Never   Other Topics Concern     Service Not Asked    Blood Transfusions No    Caffeine Concern Not Asked    Occupational Exposure Not Asked    Hobby Hazards Not Asked    Sleep Concern Not Asked    Stress Concern Not Asked    Weight Concern Not Asked    Special Diet Not Asked    Back Care Not Asked    Exercise Yes    Bike Helmet Not Asked    Seat Belt Not Asked    Self-Exams Not Asked    Parent/sibling w/ CABG, MI or angioplasty before 65F 55M? Yes   Social History Narrative    Mamie lives with mother in [place], [place].  There is a cat in the home, but Mamie does not have any litterbox duties.  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  She gets essentially no exercise because of the tingling in her feet (says it bothers her even to stand).        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        8/2015 no change in social situation        2/15/2016 Pt is single and lives with  mother and stepfather.     Social Drivers of Health     Financial Resource Strain: Patient Declined (8/6/2023)    Received from [facility]    Overall Financial Resource Strain (CARDIA)     Difficulty of Paying Living Expenses: Patient declined   Food Insecurity: No Food Insecurity (8/6/2023)    Received from [facility]    Hunger Vital Sign     Worried About Running Out of Food in the Last Year: Never true     Ran Out of Food in the Last Year: Never true   Transportation Needs: No Transportation Needs (8/6/2023)    Received from [facility]    PRAPARE - Transportation     Lack of Transportation (Medical): No     Lack of Transportation (Non-Medical): No   Physical Activity: Insufficiently Active (8/6/2023)    Received from [facility]    Exercise Vital Sign     Days of Exercise per Week: 1 day     Minutes of Exercise per Session: 10 min   Stress: No Stress Concern Present (6/29/2020)    Received from [facility]    [employer] [place] [employer] - Occupational Stress Questionnaire     Feeling of Stress : Only a little   Social Connections: Moderately Integrated (6/29/2020)    Received from [facility]    Social Connection and Isolation Panel [NHANES]     Frequency of Communication with Friends and Family: More than three times a week     Frequency of Social Gatherings with Friends and Family: Twice a week     Attends [employer] Services: 1 to 4 times per year     Active Member of Clubs or Organizations: Yes     Attends Club or Organization Meetings: More than 4 times per year     Marital Status: Never    Interpersonal Safety: Not At Risk (8/6/2023)    Received from [facility]    Humiliation, Afraid, Rape, and Kick questionnaire     Fear of Current or Ex-Partner: No     Emotionally Abused: No     Physically Abused: No     Sexually Abused: No   Housing Stability: Low Risk  (8/6/2023)    Received from [facility]    Housing Stability     What is your living situation today?: I have a steady place to live            Medications:     Current Outpatient Medications   Medication Sig Dispense Refill    acetylcysteine (MUCOMYST) 20 % neb solution INHALE 4ML VIA NEBULIZER TWO TIMES A DAY. MAY INCREASE TO 3-4 TIMES A DAY WITH INCREASED COUGH / COLD SYMPTOMS. REFRIGERATE VIAL AND DISCARD 96 HOURS AFTER OPENING 180 mL 11    albuterol (PROAIR HFA/PROVENTIL HFA/VENTOLIN HFA) 108 (90 Base) MCG/ACT inhaler Inhale 2 puffs into the lungs every 6 hours as needed for shortness of breath or wheezing 8.5 g 11    albuterol (PROVENTIL) (2.5 MG/3ML) 0.083% neb solution INHALE 1 VIAL ( 2.5MG) BY NEBULIZATION EVERY 4 HOURS AS NEEDED FOR FOR SHORTNESS OF BREATH OR WHEEZING 240 mL 11    amphetamine-dextroamphetamine (ADDERALL XR) 20 MG 24 hr capsule TAKE ONE CAPSULE BY MOUTH ONCE EVERY DAY [FOR FILL ON OR AFTER 7-7-23] 30 capsule 0    blood glucose (ACCU-CHEK GUIDE) test strip Use to test blood sugar 4 times daily or as directed. 100 strip 11    blood glucose monitoring (ACCU-CHEK FASTCLIX) lancets Use to test blood sugar 4 times daily or as directed. 100 each 11    buPROPion (WELLBUTRIN XL) 300 MG 24 hr tablet Take 1 tablet (300 mg) by mouth every morning 90 tablet 3    busPIRone HCl (BUSPAR) 30 MG tablet Take 1 tablet (30 mg) by mouth 2 times daily 180 tablet 1    cetirizine (ZYRTEC) 10 MG tablet Take 1 tablet (10 mg) by mouth every evening 30 tablet 5    Cholecalciferol (VITAMIN D) 50 MCG (2000 UT) CAPS Take 1 capsule by mouth daily 90 capsule 3    elexacaftor-tezacaftor-ivacaftor & ivacaftor (TRIKAFTA) 100-50-75 & 150 MG tablet pack TAKE 2 ORANGE TABS BY MOUTH IN THE A.M. AND 1 BLUE TAB IN THE P.M. 12 HOURS APART WITH FAT CONTAINING FOOD. DO NOT TAKE WITH GRAPEFRUIT. 11/18: must attend appointment 11/25 for additional refills. 84 tablet 0    FLUoxetine (PROZAC) 40 MG capsule TAKE TWO CAPSULES BY MOUTH EVERY DAY . 180 capsule 1  "   fluticasone (FLONASE) 50 MCG/ACT nasal spray Spray 1 spray into both nostrils daily 9.9 mL 1    hydrOXYzine (ATARAX) 25 MG tablet Take 1-2 tablets (25-50 mg) by mouth nightly as needed (sleep) 60 tablet 1    medroxyPROGESTERone (DEPO-PROVERA) 150 MG/ML IM injection Inject 150 mg into the muscle every 3 months      naltrexone (DEPADE/REVIA) 50 MG tablet Take 1 tablet (50 mg) by mouth daily 90 tablet 1    omeprazole (PRILOSEC) 40 MG DR capsule Take 1 capsule (40 mg) by mouth daily. 90 capsule 3    phytonadione (MEPHYTON) 5 MG tablet TAKE ONE TALBET MY MOUTH ONCE A WEEK 4 tablet 11    Semaglutide-Weight Management (WEGOVY) 0.25 MG/0.5ML pen Inject 0.25 mg subcutaneously once a week. 2 mL 0    Semaglutide-Weight Management (WEGOVY) 0.5 MG/0.5ML pen Inject 0.5 mg subcutaneously once a week. After completing 4 weeks of 0.25 mg weekly 2 mL 0    topiramate (TOPAMAX) 25 MG tablet Take 3 tablets (75 mg) by mouth daily. 90 tablet 2    traZODone (DESYREL) 150 MG tablet Take 150 mg by mouth at bedtime      vitamin C (ASCORBIC ACID) 500 MG tablet Take 1 tablet (500 mg) by mouth 2 times daily. 100 tablet 3    Vitamin E 180 MG (400 UNIT) CAPS Take 1 capsule (400 Units) by mouth 2 times daily 180 capsule 3     No current facility-administered medications for this visit.            Physical Exam:   BP 95/64   Pulse 87   Ht 1.549 m (5' 1\")   Wt 86.2 kg (190 lb)   SpO2 99%   BMI 35.90 kg/m      GENERAL: alert, NAD  HEENT: NCAT, EOMI, anicteric sclera, no oral mucosal edema or erythema  Neck: no cervical or supraclavicular adenopathy  Respiratory: good air flow, mainly clear  CV: RRR, S1S2, no murmurs noted  Abdomen: normoactive BS, soft   Lymph: no edema  Neuro: AAO X 3, CN 2-12 grossly intact  Psychiatric: normal affect, good eye contact  Skin: no rash, jaundice or lesions on limited exam         Data:   All laboratory and imaging data reviewed.      Cystic Fibrosis Culture  Specimen Description   Date Value Ref Range Status "   06/08/2021 Sputum  Final   11/12/2019 Midstream Urine  Final   11/12/2019 Throat  Final    Culture Micro   Date Value Ref Range Status   06/08/2021 Heavy growth  Normal roberto carlos    Final   11/12/2019   Final    <10,000 colonies/mL  urogenital roberto carlos  Susceptibility testing not routinely done     11/12/2019 Light growth  Normal roberto carlos    Final   11/12/2019 Moderate growth  Staphylococcus aureus   (A)  Final        PFT interpretation:  Maneuver: valid and meets ATS guidelines  Normal spirometry  Compared to prior: FEV1 of 2.97 is 160 ml below prior

## 2024-11-25 ENCOUNTER — LAB (OUTPATIENT)
Dept: LAB | Facility: CLINIC | Age: 29
End: 2024-11-25
Payer: COMMERCIAL

## 2024-11-25 ENCOUNTER — OFFICE VISIT (OUTPATIENT)
Dept: PULMONOLOGY | Facility: CLINIC | Age: 29
End: 2024-11-25
Attending: PHYSICIAN ASSISTANT
Payer: COMMERCIAL

## 2024-11-25 ENCOUNTER — CLINICAL UPDATE (OUTPATIENT)
Dept: PHARMACY | Facility: CLINIC | Age: 29
End: 2024-11-25
Payer: COMMERCIAL

## 2024-11-25 VITALS
OXYGEN SATURATION: 99 % | WEIGHT: 190 LBS | HEIGHT: 61 IN | HEART RATE: 87 BPM | BODY MASS INDEX: 35.87 KG/M2 | SYSTOLIC BLOOD PRESSURE: 95 MMHG | DIASTOLIC BLOOD PRESSURE: 64 MMHG

## 2024-11-25 DIAGNOSIS — E84.9 CYSTIC FIBROSIS (H): Primary | ICD-10-CM

## 2024-11-25 DIAGNOSIS — E84.0 CYSTIC FIBROSIS WITH PULMONARY MANIFESTATIONS (H): ICD-10-CM

## 2024-11-25 DIAGNOSIS — E84.9 CYSTIC FIBROSIS (H): ICD-10-CM

## 2024-11-25 DIAGNOSIS — K86.89 PANCREATIC INSUFFICIENCY: ICD-10-CM

## 2024-11-25 DIAGNOSIS — E84.9 CF (CYSTIC FIBROSIS) (H): ICD-10-CM

## 2024-11-25 LAB
ALBUMIN SERPL BCG-MCNC: 4.3 G/DL (ref 3.5–5.2)
ALP SERPL-CCNC: 74 U/L (ref 40–150)
ALT SERPL W P-5'-P-CCNC: 12 U/L (ref 0–50)
AST SERPL W P-5'-P-CCNC: 15 U/L (ref 0–45)
BILIRUB DIRECT SERPL-MCNC: <0.2 MG/DL (ref 0–0.3)
BILIRUB SERPL-MCNC: 0.3 MG/DL
CK SERPL-CCNC: 60 U/L (ref 26–192)
EXPTIME-PRE: 4.06 SEC
FEF2575-%PRED-PRE: 108 %
FEF2575-PRE: 3.45 L/SEC
FEF2575-PRED: 3.19 L/SEC
FEFMAX-%PRED-PRE: 127 %
FEFMAX-PRE: 8.48 L/SEC
FEFMAX-PRED: 6.67 L/SEC
FEV1-%PRED-PRE: 107 %
FEV1-PRE: 2.97 L
FEV1FEV6-PRE: 88 %
FEV1FEV6-PRED: 85 %
FEV1FVC-PRE: 88 %
FEV1FVC-PRED: 86 %
FIFMAX-PRE: 5 L/SEC
FVC-%PRED-PRE: 104 %
FVC-PRE: 3.37 L
FVC-PRED: 3.22 L
PROT SERPL-MCNC: 7.3 G/DL (ref 6.4–8.3)

## 2024-11-25 PROCEDURE — 99214 OFFICE O/P EST MOD 30 MIN: CPT | Mod: 25 | Performed by: PHYSICIAN ASSISTANT

## 2024-11-25 PROCEDURE — 99207 PR NO CHARGE LOS: CPT | Performed by: PHARMACIST

## 2024-11-25 PROCEDURE — 82550 ASSAY OF CK (CPK): CPT | Performed by: PATHOLOGY

## 2024-11-25 PROCEDURE — 36415 COLL VENOUS BLD VENIPUNCTURE: CPT | Performed by: PATHOLOGY

## 2024-11-25 PROCEDURE — 80076 HEPATIC FUNCTION PANEL: CPT | Performed by: PATHOLOGY

## 2024-11-25 PROCEDURE — 87185 SC STD ENZYME DETCJ PER NZM: CPT | Performed by: PHYSICIAN ASSISTANT

## 2024-11-25 PROCEDURE — G0463 HOSPITAL OUTPT CLINIC VISIT: HCPCS | Performed by: PHYSICIAN ASSISTANT

## 2024-11-25 PROCEDURE — 94375 RESPIRATORY FLOW VOLUME LOOP: CPT | Performed by: PHYSICIAN ASSISTANT

## 2024-11-25 RX ORDER — ELEXACAFTOR, TEZACAFTOR, AND IVACAFTOR 100-50-75
KIT ORAL
Qty: 84 TABLET | Refills: 4 | Status: SHIPPED | OUTPATIENT
Start: 2024-11-25

## 2024-11-25 RX ORDER — ASCORBIC ACID 500 MG
500 TABLET ORAL 2 TIMES DAILY
Qty: 100 TABLET | Refills: 3 | Status: SHIPPED | OUTPATIENT
Start: 2024-11-25

## 2024-11-25 RX ORDER — VITAMIN E 268 MG
1 CAPSULE ORAL 2 TIMES DAILY
Qty: 180 CAPSULE | Refills: 3 | Status: SHIPPED | OUTPATIENT
Start: 2024-11-25

## 2024-11-25 ASSESSMENT — PAIN SCALES - GENERAL: PAINLEVEL_OUTOF10: NO PAIN (0)

## 2024-11-25 NOTE — LETTER
11/25/2024      Mamie Rice  [address] [facility] [address]      Dear Colleague,    Thank you for referring your patient, Mamie Rice, to the [facility] [place]. Please see a copy of my visit note below.    [facility]  November 25, 2024         Assessment and Plan:   Mamie Rice is a 29 year old female with h/o CF lung disease with normal spirometry, CF sinus disease, CFRD, pancreatic insufficiency, ADHD, insomnia, MECHE, and depression who is seen for routine follow up.      1. Cystic fibrosis: notes some mild allergy/URI complaints, with a slight cough above baseline, but no new dyspnea. Sating 99% on room air; typically does not vest unless she feels unwell. PFTs are down significantly from her baseline. Previous cultures + for MSSA with additional h/o MRSA (last 2019), HIB, and Achromobacter.   - Continue nebs and vesting, recommend vesting 3-4 times/week over the winter  - Resume Zyrtec until a freeze and resume Flonase PRN.     2. CFTR modulation: tolerating Trikafta well. Labs today WNL.   - Continue Trikafta    3. Cystic fibrosis sinus disease: h/o multiple endoscopic sinus surgeries (last 2015). Notes an increase in congestion and drainage, possibly related to allergies. Doing some nasal rinses.  - Resume Zyrtec and Flonase per above      4. Exocrine pancreatic insufficiency: no signs of malabsorption. Has been started on Wegovy by the weight loss clinic. BMI >> CFF goal.  - Continue vitamins    5. GERD: patient denies any symptoms.  - Continue PPI daily    6. ADHD, depression and anxiety: following with a psychiatrist locally.     RTC: 3 months with annuals including OGTT (no CXR)  Annual studies due: March 2025 (DEXA > March 2026)  Preventative care: got her flu vaccine; recommend covid booster    Rabia Gray PA-C  Pulmonary, Allergy, Critical Care and Sleep Medicine         Interval History:     No longer doing study with Dr. Prieto. Nothing new since the last visit, going to her cousin's for Thanksgiving. No recent illnesses, occasional cold symptoms, does have some sneezing and a runny nose. Cough is slightly more than baseline, more frequent, mainly dry. No chest congestion, no shortness of breath. Did some airway clearance last month when she felt like she was getting sick. No bloating or gas, stools are stable, not fatty or floating.          Review of Systems:   Please see HPI. Otherwise, complete 10 point ROS negative.           Past Medical and Surgical History:     Past Medical History:   Diagnosis Date     ADHD (attention deficit hyperactivity disorder)      Anxiety      Aspergillosis, with pneumonia (H)     fugus found caused chest pain     Chronic infection     CF, MRSA.,      Chronic sinusitis      Constipation, chronic      Cystic fibrosis with pulmonary manifestations (H) 12/19/2011     Cystic fibrosis without mention of meconium ileus     SWEAT TEST:Date: 2/17/1994   [facility]  296 mg, 104 mmol/L ClSample #2  295 mg, 104 mmol/L Cl GENOTYPING:Date: 10/15/2007,  Laboratory: [facility]: [id number]     Depressive disorder      Diabetes     no meds currently     Dysthymic disorder      Exocrine pancreatic insufficiency      Gastro-oesophageal reflux disease      Hip pain, right      MRSA (methicillin resistant Staphylococcus aureus) carrier      Pancreatic disease      Past Surgical History:   Procedure Laterality Date     ARTHROSCOPY HIP, OSTEOPLASTY FEMUR PROXIMAL, COMBINED  3/11/2013    Procedure: COMBINED ARTHROSCOPY HIP, OSTEOPLASTY FEMUR PROXIMAL;  Right Hip Arthroscopy, Labral  Debridement.    surgeon request choice anesthesia/admit to [facility] after surgery;  Surgeon: Omkar Austin MD;  [facility]     ARTHROSCOPY KNEE WITH MEDIAL MENISCECTOMY Left 1/31/2017    Procedure: ARTHROSCOPY KNEE WITH MEDIAL MENISCECTOMY;  Surgeon: Leonides  Jethro Greene MD;  [facility] OR     bronchoscopies       BRONCHOSCOPY       EXAM UNDER ANESTHESIA ANUS N/A 5/10/2016    Procedure: EXAM UNDER ANESTHESIA ANUS;  Surgeon: Chet Gaviria MD;  [facility]     EXAM UNDER ANESTHESIA, RESTORATIONS, EXTRACTION(S) DENTAL COMPLEX, COMBINED  5/13/2013    Procedure: COMBINED EXAM UNDER ANESTHESIA, RESTORATIONS, EXTRACTION(S) DENTAL COMPLEX;  Dental Exam, Radiographs, Restorations. Single Extraction  Tooth #2. Restorations x 3;  Surgeon: Danilo Ortiz DDS;  [facility] OR     HC KNEE SCOPE, DIAGNOSTIC      Arthroscopy, Knee- left     INJECT BOTOX N/A 5/10/2016    Procedure: INJECT BOTOX;  Surgeon: Chet Gaviria MD;  [facility]     left hip labral tear  5/11/2011    left hip arthroscopy with labral debridement and synovectomy     meniscus repair       OPTICAL TRACKING SYSTEM ENDOSCOPIC SINUS SURGERY  10/14/2011    Procedure:OPTICAL TRACKING SYSTEM ENDOSCOPIC SINUS SURGERY; FESS (functional endoscopic sinus surgery) with Image Guidance, bronchial lavage and cultures; Surgeon:GOYO KUO; [facility]     OPTICAL TRACKING SYSTEM ENDOSCOPIC SINUS SURGERY  5/18/2012    Procedure:OPTICAL TRACKING SYSTEM ENDOSCOPIC SINUS SURGERY; Right  and Left Image Guided Functional Endoscopic Sinus Surgery With  Frontal Approach, Land[employer]x; Surgeon:GOYO KUO; [facility]     OPTICAL TRACKING SYSTEM ENDOSCOPIC SINUS SURGERY  9/26/2012    Procedure: OPTICAL TRACKING SYSTEM ENDOSCOPIC SINUS SURGERY;  Stealth Guided Bilateral Functional Endoscopic Sinus Surgery *Latex Safe*;  Surgeon: Goyo Kuo MD;  [facility]     OPTICAL TRACKING SYSTEM ENDOSCOPIC SINUS SURGERY Bilateral 10/16/2015    Procedure: OPTICAL TRACKING SYSTEM ENDOSCOPIC SINUS SURGERY;  Surgeon: Mariela Haile MD;  [facility]     ORTHOPEDIC SURGERY      left hip tear repair 2010     SINUS SURGERY             Family History:     Family History   Problem Relation Age of  Onset     Cancer Paternal Grandmother      Skin Cancer Paternal Grandmother      Other Cancer Paternal Grandmother         Skin     Obesity Paternal Grandmother      Cancer Paternal Grandfather         PGF had throat cancer (he was a smoker)     Other Cancer Paternal Grandfather      Anxiety Disorder Paternal Grandfather      Thyroid Disease Mother         ,     Hypertension Mother      Obesity Other      ALS Father 67        2 male cousins with ALS as well     Anesthesia Reaction No family hx of      Blood Disease No family hx of      Colon Polyps No family hx of      Crohn's Disease No family hx of      Ulcerative Colitis No family hx of      Colon Cancer No family hx of      Melanoma No family hx of             Social History:     Social History     Socioeconomic History     Marital status: Single     Spouse name: Not on file     Number of children: Not on file     Years of education: Not on file     Highest education level: Not on file   Occupational History     Not on file   Tobacco Use     Smoking status: Never     Smokeless tobacco: Never     Tobacco comments:     one person at home smokes outside   Vaping Use     Vaping status: Never Used   Substance and Sexual Activity     Alcohol use: No     Alcohol/week: 0.0 standard drinks of alcohol     Drug use: No     Sexual activity: Never   Other Topics Concern      Service Not Asked     Blood Transfusions No     Caffeine Concern Not Asked     Occupational Exposure Not Asked     Hobby Hazards Not Asked     Sleep Concern Not Asked     Stress Concern Not Asked     Weight Concern Not Asked     Special Diet Not Asked     Back Care Not Asked     Exercise Yes     Bike Helmet Not Asked     Seat Belt Not Asked     Self-Exams Not Asked     Parent/sibling w/ CABG, MI or angioplasty before 65F 55M? Yes   Social History Narrative    Mamie lives with mother in [place], [place].  There is a cat in the home, but Mamie does not have any litterbox duties.  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  She gets essentially no exercise because of the tingling in her feet (says it bothers her even to stand).        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        8/2015 no change in social situation        2/15/2016 Pt is single and lives with mother and stepfather.     Social Drivers of Health     Financial Resource Strain: Patient Declined (8/6/2023)    Received from [facility]    Overall Financial Resource Strain (CARDIA)      Difficulty of Paying Living Expenses: Patient declined   Food Insecurity: No Food Insecurity (8/6/2023)    Received from [facility]    Hunger Vital Sign      Worried About Running Out of Food in the Last Year: Never true      Ran Out of Food in the Last Year: Never true   Transportation Needs: No Transportation Needs (8/6/2023)    Received from [facility]    PRAPARE - Transportation      Lack of Transportation (Medical): No      Lack of Transportation (Non-Medical): No   Physical Activity: Insufficiently Active (8/6/2023)    Received from [facility]    Exercise Vital Sign      Days of Exercise per Week: 1 day      Minutes of Exercise per Session: 10 min   Stress: No Stress Concern Present (6/29/2020)    Received from [facility]    [employer] - Occupational Stress Questionnaire      Feeling of Stress : Only a little   Social Connections: Moderately Integrated (6/29/2020)    Received from [facility]    Social Connection and Isolation Panel [NHANES]      Frequency of Communication with Friends and Family: More than three times a week      Frequency of Social Gatherings with Friends and Family: Twice a week      Attends Sikhism Services: 1 to 4 times per year      Active Member of Clubs or Organizations: Yes      Attends Club or Organization Meetings: More than 4 times per  year      Marital Status: Never    Interpersonal Safety: Not At Risk (8/6/2023)    [facility]    Humiliation, Afraid, Rape, and Kick questionnaire      Fear of Current or Ex-Partner: No      Emotionally Abused: No      Physically Abused: No      Sexually Abused: No   Housing Stability: Low Risk  (8/6/2023)    [facility]    Housing Stability      What is your living situation today?: I have a steady place to live            Medications:     Current Outpatient Medications   Medication Sig Dispense Refill     acetylcysteine (MUCOMYST) 20 % neb solution INHALE 4ML VIA NEBULIZER TWO TIMES A DAY. MAY INCREASE TO 3-4 TIMES A DAY WITH INCREASED COUGH / COLD SYMPTOMS. REFRIGERATE VIAL AND DISCARD 96 HOURS AFTER OPENING 180 mL 11     albuterol (PROAIR HFA/PROVENTIL HFA/VENTOLIN HFA) 108 (90 Base) MCG/ACT inhaler Inhale 2 puffs into the lungs every 6 hours as needed for shortness of breath or wheezing 8.5 g 11     albuterol (PROVENTIL) (2.5 MG/3ML) 0.083% neb solution INHALE 1 VIAL ( 2.5MG) BY NEBULIZATION EVERY 4 HOURS AS NEEDED FOR FOR SHORTNESS OF BREATH OR WHEEZING 240 mL 11     amphetamine-dextroamphetamine (ADDERALL XR) 20 MG 24 hr capsule TAKE ONE CAPSULE BY MOUTH ONCE EVERY DAY [FOR FILL ON OR AFTER 7-7-23] 30 capsule 0     blood glucose (ACCU-CHEK GUIDE) test strip Use to test blood sugar 4 times daily or as directed. 100 strip 11     blood glucose monitoring (ACCU-CHEK FASTCLIX) lancets Use to test blood sugar 4 times daily or as directed. 100 each 11     buPROPion (WELLBUTRIN XL) 300 MG 24 hr tablet Take 1 tablet (300 mg) by mouth every morning 90 tablet 3     busPIRone HCl (BUSPAR) 30 MG tablet Take 1 tablet (30 mg) by mouth 2 times daily 180 tablet 1     cetirizine (ZYRTEC) 10 MG tablet Take 1 tablet (10 mg) by mouth every evening 30 tablet 5     Cholecalciferol (VITAMIN D) 50 MCG (2000 UT) CAPS Take 1 capsule by mouth daily 90 capsule 3      "elexacaftor-tezacaftor-ivacaftor & ivacaftor (TRIKAFTA) 100-50-75 & 150 MG tablet pack TAKE 2 ORANGE TABS BY MOUTH IN THE A.M. AND 1 BLUE TAB IN THE P.M. 12 HOURS APART WITH FAT CONTAINING FOOD. DO NOT TAKE WITH GRAPEFRUIT. 11/18: must attend appointment 11/25 for additional refills. 84 tablet 0     FLUoxetine (PROZAC) 40 MG capsule TAKE TWO CAPSULES BY MOUTH EVERY DAY . 180 capsule 1     fluticasone (FLONASE) 50 MCG/ACT nasal spray Spray 1 spray into both nostrils daily 9.9 mL 1     hydrOXYzine (ATARAX) 25 MG tablet Take 1-2 tablets (25-50 mg) by mouth nightly as needed (sleep) 60 tablet 1     medroxyPROGESTERone (DEPO-PROVERA) 150 MG/ML IM injection Inject 150 mg into the muscle every 3 months       naltrexone (DEPADE/REVIA) 50 MG tablet Take 1 tablet (50 mg) by mouth daily 90 tablet 1     omeprazole (PRILOSEC) 40 MG DR capsule Take 1 capsule (40 mg) by mouth daily. 90 capsule 3     phytonadione (MEPHYTON) 5 MG tablet TAKE ONE TALBET MY MOUTH ONCE A WEEK 4 tablet 11     Semaglutide-Weight Management (WEGOVY) 0.25 MG/0.5ML pen Inject 0.25 mg subcutaneously once a week. 2 mL 0     Semaglutide-Weight Management (WEGOVY) 0.5 MG/0.5ML pen Inject 0.5 mg subcutaneously once a week. After completing 4 weeks of 0.25 mg weekly 2 mL 0     topiramate (TOPAMAX) 25 MG tablet Take 3 tablets (75 mg) by mouth daily. 90 tablet 2     traZODone (DESYREL) 150 MG tablet Take 150 mg by mouth at bedtime       vitamin C (ASCORBIC ACID) 500 MG tablet Take 1 tablet (500 mg) by mouth 2 times daily. 100 tablet 3     Vitamin E 180 MG (400 UNIT) CAPS Take 1 capsule (400 Units) by mouth 2 times daily 180 capsule 3     No current facility-administered medications for this visit.            Physical Exam:   BP 95/64   Pulse 87   Ht 1.549 m (5' 1\")   Wt 86.2 kg (190 lb)   SpO2 99%   BMI 35.90 kg/m      GENERAL: alert, NAD  HEENT: NCAT, EOMI, anicteric sclera, no oral mucosal edema or erythema  Neck: no cervical or supraclavicular " adenopathy  Respiratory: good air flow, mainly clear  CV: RRR, S1S2, no murmurs noted  Abdomen: normoactive BS, soft   Lymph: no edema  Neuro: AAO X 3, CN 2-12 grossly intact  Psychiatric: normal affect, good eye contact  Skin: no rash, jaundice or lesions on limited exam         Data:   All laboratory and imaging data reviewed.      Cystic Fibrosis Culture  Specimen Description   Date Value Ref Range Status   06/08/2021 Sputum  Final   11/12/2019 Midstream Urine  Final   11/12/2019 Throat  Final    Culture Micro   Date Value Ref Range Status   06/08/2021 Heavy growth  Normal roberto carlos    Final   11/12/2019   Final    <10,000 colonies/mL  urogenital roberto carlos  Susceptibility testing not routinely done     11/12/2019 Light growth  Normal roberto carlos    Final   11/12/2019 Moderate growth  Staphylococcus aureus   (A)  Final        PFT interpretation:  Maneuver: valid and meets ATS guidelines  Normal spirometry  Compared to prior: FEV1 of 2.97 is 160 ml below prior          Again, thank you for allowing me to participate in the care of your patient.        Sincerely,        Rabia Gray PA-C

## 2024-11-25 NOTE — PATIENT INSTRUCTIONS
Cystic Fibrosis Self-Care Plan       Patient: Mamie Rice   MRN: [id number]   Clinic Date: November 25, 2024     RECOMMENDATIONS:  1. Continue nebulizers and vest therapy, 3-4 times/week during the winter for prevention.  2. Resume Zyrtec for 2-3 weeks until we have some good freezes.  3. Flonase nasal spray as needed for runny noise or nasal congestion (1-2 sprays up to 1-2 times/day).     Annual Studies:   IGG   Date Value Ref Range Status   06/08/2021 672 610 - 1,616 mg/dL Final     Immunoglobulin G   Date Value Ref Range Status   03/27/2024 680 610 - 1,616 mg/dL Final     Insulin   Date Value Ref Range Status   08/14/2019 41.0 (H) 3 - 25 mU/L Final     There are no preventive care reminders to display for this patient.    Pulmonary Function Tests  FEV1: amount of air you can blow out in 1 second  FVC: total amount of air you can take in and blow out    Your Goals:             Latest Ref Rng & Units 11/25/2024     9:59 AM   PFT   FVC L 3.37  P   FEV1 L 2.97  P   FVC% % 104  P   FEV1% % 107  P      P Preliminary result          Airway Clearance: The Most Important Way to Keep Your Lungs Healthy  Vest Settings:   Hill-Rom Frequencies: 8, 9, 10 Pressure 10 Then, Frequencies 18, 19, 20 Pressure 6     RespirTech: Quick Start with Pressure of     Do each frequency for 5 minutes; Deflate vest after each frequency & cough 3 times before beginning the next setting.    Vest and Neb Therapy should be done 1 times/day.    Good Nutrition Can Improve Lung Function and Overall Health    Take ALL of your vitamins with food    Take 1/2 of your enzymes before EVERY meal/snack and the other 1/2 mid-meal/snack    Wt Readings from Last 3 Encounters:   11/25/24 86.2 kg (190 lb)   11/12/24 83.9 kg (185 lb)   08/09/24 81.6 kg (180 lb)       Body mass index is 35.9 kg/m .         National CF Foundation Recommendations for BMI in CF Adults: Women: at least 22 Men: at least 23        Controlling Blood Sugars Helps Prevent Lung  Infections & Improves Nutrition  Test blood sugar:    In the morning before eating (goal is )    2 hours after a meal (goal is less than 150)    When pre-meal glucose is greater than 150 add correction    At bedtime (if less than 100 eat a snack with 15 grams of carbohydrates  Last A1C Results:   Hemoglobin A1C   Date Value Ref Range Status   03/27/2024 5.1 <5.7 % Final     Comment:     Normal <5.7%   Prediabetes 5.7-6.4%    Diabetes 6.5% or higher     Note: Adopted from ADA consensus guidelines.   06/08/2021 5.1 0 - 5.6 % Final     Comment:     Normal <5.7% Prediabetes 5.7-6.4%  Diabetes 6.5% or higher - adopted from ADA   consensus guidelines.           If diabetic, measure A1C every 6 months. Goal is under 7%.    Staying Healthy  Research: If you are interested in learning about research opportunities or have questions, please contact Mariana Smith at [phone] or [email or web address].     Foundation: Comp[employer] is a personalized resource service to help you with the insurance, financial, legal and other issues you are facing.  It's free, confidential and available to anyone with CF.  Ask your  for more information or contact Compass directly at [facility] ([phone]) or compass@cff.org, or learn more at cff.org/compass.       CF Nurse Line: Radha Dickey and KJ: [phone]  Chaparrita Dennis or Shraon Gutiérrez RT: [phone]    Theresa Ceja and Amy Andino , Dieticians: [phone]    Rhonda Esteves, Diabetes Nurse: [phone]   Anahy Saez: [phone] or Faith Zamudio at [phone], Social Workers  [email or web address]

## 2024-11-25 NOTE — PROGRESS NOTES
Clinical Update:                                                    Chart review was conducted for Mamie Rice.    Reason for Chart Review: Trikafta 6 Month Lab Monitoring     Discussion: Mamie has been on Trikafta since 11/19/2019. Mamie continues full Trikafta dose (per chart review).    Labs were reviewed from 11/25/2024 at [facility] . All modulator labs are within normal limits (WNL), as they were at last Clinical Update (8/23/22). ALT was elevated at 68 in 11/12/19    Lab Results   Component Value Date    ALT 12 11/25/2024    AST 15 11/25/2024    BILITOTAL 0.3 11/25/2024    DBIL <0.20 11/25/2024    ALKPHOS 74 11/25/2024    CKT 60 11/25/2024     Plan:  Continue Trikafta full dose   Recheck Trikafta labs (hepatic panel : AST, ALT, ALK PHOS, bilirubin total and direct) and total creatine kinase (CK total) in 6 months in May 2025    Narcisa Mims (IV) pharmacy student    Padma Alonso, PharmD   Cystic Fibrosis MTM Pharmacist  [facility]

## 2024-11-25 NOTE — NURSING NOTE
Chief Complaint   Patient presents with    Cystic Fibrosis     Follow up visit with Mamie Mcknight, NEEL CMA at 10:26 AM on 11/25/2024

## 2024-11-28 LAB
BACTERIA SPEC CULT: ABNORMAL

## 2024-11-30 LAB
BACTERIA SPEC CULT: ABNORMAL

## 2024-12-09 DIAGNOSIS — E84.9 CYSTIC FIBROSIS (H): ICD-10-CM

## 2024-12-09 RX ORDER — ELEXACAFTOR, TEZACAFTOR, AND IVACAFTOR 100-50-75
KIT ORAL
Qty: 84 TABLET | Refills: 4 | OUTPATIENT
Start: 2024-12-09

## 2024-12-13 ENCOUNTER — MYC MEDICAL ADVICE (OUTPATIENT)
Dept: PHARMACY | Facility: CLINIC | Age: 29
End: 2024-12-13
Payer: COMMERCIAL

## 2025-01-07 ENCOUNTER — MYC REFILL (OUTPATIENT)
Dept: PHARMACY | Facility: CLINIC | Age: 30
End: 2025-01-07
Payer: COMMERCIAL

## 2025-01-07 DIAGNOSIS — E66.01 MORBID OBESITY (H): ICD-10-CM

## 2025-01-07 RX ORDER — SEMAGLUTIDE 0.25 MG/.5ML
0.25 INJECTION, SOLUTION SUBCUTANEOUS WEEKLY
Qty: 2 ML | Refills: 0 | OUTPATIENT
Start: 2025-01-07

## 2025-01-07 NOTE — TELEPHONE ENCOUNTER
Forwarding to Lauren Bloch [facility] pharmacist who last saw patient

## 2025-01-12 DIAGNOSIS — E66.01 MORBID OBESITY (H): ICD-10-CM

## 2025-01-12 DIAGNOSIS — F34.1 PERSISTENT DEPRESSIVE DISORDER: ICD-10-CM

## 2025-01-13 RX ORDER — NALTREXONE HYDROCHLORIDE 50 MG/1
50 TABLET, FILM COATED ORAL DAILY
Qty: 90 TABLET | Refills: 1 | OUTPATIENT
Start: 2025-01-13

## 2025-02-24 NOTE — PROGRESS NOTES
[facility]  February 25, 2025         Assessment and Plan:   Mamie Rice is a 29 year old female with h/o CF lung disease with normal spirometry, CF sinus disease, CFRD, pancreatic insufficiency, ADHD, insomnia, MECHE, and depression who is seen for routine follow up.      1. Cystic fibrosis: no new pulmonary complaints and no recent illnesses. Sating 99% on room air; has not needed to do any nebs or airway clearance. PFTs improved today, still below her best.  Previous cultures + for MSSA with additional h/o MRSA (last 2019), HIB, and Achromobacter.   - Continue nebs and vesting PRN    2. CFTR modulation: tolerating Trikafta well. Labs today WNL.   - Recheck labs in August  - Continue Trikafta    3. Cystic fibrosis sinus disease: h/o multiple endoscopic sinus surgeries (last 2015). Feels symptoms are controlled with the pablo pot PRN.     4. Exocrine pancreatic insufficiency: no signs of malabsorption. Has been started on Wegovy by the weight loss clinic. BMI >> CFF goal, but veronica have ongoing weight loss.  - Continue vitamins    5. GERD: patient denies any symptoms.  - Continue PPI daily, will decrease to 20 mg and monitor    6. ADHD, depression and anxiety: following with a psychiatrist locally.     7. HCM: reviewed with the patient including low WBC, normal hgb and platelets. Normal CMP, cholesterol, other than slightly low HDL, multiple labs pending.  - Recheck CBC in 6 months with next labs    8. Abnormal OGTT: borderline with CFRD with fasting BS of 83, but two hour BS of 196. Interestingly, AIC of 4.9.  - Will have patient follow up with Dr. Prieto    RTC: 4 months  Annual studies due: March 2025 (DEXA > March 2026)  Preventative care: got her flu vaccine; recommend covid booster    Rabia Gray PA-C  Pulmonary, Allergy, Critical Care and Sleep Medicine        Interval History:     Going to [place] on Sat with niece and a nephew. No recent  illnesses, no cough or shortness of breath. Does have some sinus congestion, but a pablo potty if very helpful. No tightness or chest pains, no recent need to vest. Still on Wegovy and it's going well. No new GI complaints. Stools are stable. Normal BP (other than chronically elevated Cl), normal LFTS, cholesterol panel.          Review of Systems:   Please see HPI. Otherwise, complete 10 point ROS negative.           Past Medical and Surgical History:     Past Medical History:   Diagnosis Date    ADHD (attention deficit hyperactivity disorder)     Anxiety     Aspergillosis, with pneumonia (H)     fugus found caused chest pain    Chronic infection     CF, MRSA.,     Chronic sinusitis     Constipation, chronic     Cystic fibrosis with pulmonary manifestations (H) 12/19/2011    Cystic fibrosis without mention of meconium ileus     SWEAT TEST:Date: 2/17/1994   [facility]  296 mg, 104 mmol/L ClSample #2  295 mg, 104 mmol/L Cl GENOTYPING:Date: 10/15/2007,  Laboratory: [facility]: [id number]    Depressive disorder     Diabetes     no meds currently    Dysthymic disorder     Exocrine pancreatic insufficiency     Gastro-oesophageal reflux disease     Hip pain, right     MRSA (methicillin resistant Staphylococcus aureus) carrier     Pancreatic disease      Past Surgical History:   Procedure Laterality Date    ARTHROSCOPY HIP, OSTEOPLASTY FEMUR PROXIMAL, COMBINED  3/11/2013    Procedure: COMBINED ARTHROSCOPY HIP, OSTEOPLASTY FEMUR PROXIMAL;  Right Hip Arthroscopy, Labral  Debridement.    surgeon request choice anesthesia/admit to [facility] after surgery;  Surgeon: Omkar Austin MD;  [facility]: [facility]    ARTHROSCOPY KNEE WITH MEDIAL MENISCECTOMY Left 1/31/2017    Procedure: ARTHROSCOPY KNEE WITH MEDIAL MENISCECTOMY;  Surgeon: Jethro Coyle MD;  [facility] OR    bronchoscopies      BRONCHOSCOPY      EXAM UNDER ANESTHESIA ANUS N/A 5/10/2016    Procedure: EXAM UNDER ANESTHESIA ANUS;   Surgeon: Chet Gaviria MD;  [facility]    EXAM UNDER ANESTHESIA, RESTORATIONS, EXTRACTION(S) DENTAL COMPLEX, COMBINED  5/13/2013    Procedure: COMBINED EXAM UNDER ANESTHESIA, RESTORATIONS, EXTRACTION(S) DENTAL COMPLEX;  Dental Exam, Radiographs, Restorations. Single Extraction  Tooth #2. Restorations x 3;  Surgeon: Danilo Ortiz DDS;  Location[facility] OR    HC KNEE SCOPE, DIAGNOSTIC      Arthroscopy, Knee- left    INJECT BOTOX N/A 5/10/2016    Procedure: INJECT BOTOX;  Surgeon: Chet Gaviria MD;  [facility]    left hip labral tear  5/11/2011    left hip arthroscopy with labral debridement and synovectomy    meniscus repair      OPTICAL TRACKING SYSTEM ENDOSCOPIC SINUS SURGERY  10/14/2011    Procedure:OPTICAL TRACKING SYSTEM ENDOSCOPIC SINUS SURGERY; FESS (functional endoscopic sinus surgery) with Image Guidance, bronchial lavage and cultures; Surgeon:GOYO KUO; Location:[facility] OR    OPTICAL TRACKING SYSTEM ENDOSCOPIC SINUS SURGERY  5/18/2012    Procedure:OPTICAL TRACKING SYSTEM ENDOSCOPIC SINUS SURGERY; Right  and Left Image Guided Functional Endoscopic Sinus Surgery With  Frontal Approach, [employer]x; Surgeon:GOYO KUO; [facility]    OPTICAL TRACKING SYSTEM ENDOSCOPIC SINUS SURGERY  9/26/2012    Procedure: OPTICAL TRACKING SYSTEM ENDOSCOPIC SINUS SURGERY;  Stealth Guided Bilateral Functional Endoscopic Sinus Surgery *Latex Safe*;  Surgeon: Goyo Kuo MD;  [facility]    OPTICAL TRACKING SYSTEM ENDOSCOPIC SINUS SURGERY Bilateral 10/16/2015    Procedure: OPTICAL TRACKING SYSTEM ENDOSCOPIC SINUS SURGERY;  Surgeon: Mariela Haile MD;  [facility]    ORTHOPEDIC SURGERY      left hip tear repair 2010    SINUS SURGERY             Family History:     Family History   Problem Relation Age of Onset    Cancer Paternal Grandmother     Skin Cancer Paternal Grandmother     Other Cancer Paternal Grandmother         Skin    Obesity Paternal Grandmother     Cancer Paternal  Grandfather         PGF had throat cancer (he was a smoker)    Other Cancer Paternal Grandfather     Anxiety Disorder Paternal Grandfather     Thyroid Disease Mother         ,    Hypertension Mother     Obesity Other     ALS Father 67        2 male cousins with ALS as well    Anesthesia Reaction No family hx of     Blood Disease No family hx of     Colon Polyps No family hx of     Crohn's Disease No family hx of     Ulcerative Colitis No family hx of     Colon Cancer No family hx of     Melanoma No family hx of             Social History:     Social History     Socioeconomic History    Marital status: Single     Spouse name: Not on file    Number of children: Not on file    Years of education: Not on file    Highest education level: Not on file   Occupational History    Not on file   Tobacco Use    Smoking status: Never    Smokeless tobacco: Never    Tobacco comments:     one person at home smokes outside   Vaping Use    Vaping status: Never Used   Substance and Sexual Activity    Alcohol use: No     Alcohol/week: 0.0 standard drinks of alcohol    Drug use: No    Sexual activity: Never   Other Topics Concern     Service Not Asked    Blood Transfusions No    Caffeine Concern Not Asked    Occupational Exposure Not Asked    Hobby Hazards Not Asked    Sleep Concern Not Asked    Stress Concern Not Asked    Weight Concern Not Asked    Special Diet Not Asked    Back Care Not Asked    Exercise Yes    Bike Helmet Not Asked    Seat Belt Not Asked    Self-Exams Not Asked    Parent/sibling w/ CABG, MI or angioplasty before 65F 55M? Yes   Social History Narrative    Mamie lives with mother in [place], [place].  There is a cat in the home, but Mamie does not have any litterbox duties.  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  She gets essentially no exercise because of the tingling in her feet (says it bothers her even to stand).        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        8/2015 no change in social situation        2/15/2016 Pt is single and lives with mother and stepfather.     Social Drivers of Health     Financial Resource Strain: Patient Declined (8/6/2023)    Received from [facility]    Overall Financial Resource Strain (CARDIA)     Difficulty of Paying Living Expenses: Patient declined   Food Insecurity: No Food Insecurity (8/6/2023)    Received from [facility]    Hunger Vital Sign     Worried About Running Out of Food in the Last Year: Never true     Ran Out of Food in the Last Year: Never true   Transportation Needs: No Transportation Needs (8/6/2023)    Received from [facility]    PRAPARE - Transportation     Lack of Transportation (Medical): No     Lack of Transportation (Non-Medical): No   Physical Activity: Insufficiently Active (8/6/2023)    Received from [facility]    Exercise Vital Sign     Days of Exercise per Week: 1 day     Minutes of Exercise per Session: 10 min   Stress: No Stress Concern Present (6/29/2020)    Received from [facility]    [place] [employer] - Occupational Stress Questionnaire     Feeling of Stress : Only a little   Social Connections: Moderately Integrated (6/29/2020)    Received from [facility]    Social Connection and Isolation Panel [NHANES]     Frequency of Communication with Friends and Family: More than three times a week     Frequency of Social Gatherings with Friends and Family: Twice a week     Attends Sikh Services: 1 to 4 times per year     Active Member of Clubs or Organizations: Yes     Attends Club or Organization Meetings: More than 4 times per year     Marital Status: Never    Interpersonal Safety: Not At Risk (8/6/2023)    Received from [facility]    Humiliation, Afraid, Rape, and Kick questionnaire     Fear of Current or Ex-Partner: No      Emotionally Abused: No     Physically Abused: No     Sexually Abused: No   Housing Stability: Low Risk  (8/6/2023)    Received from [facility], [facility]    Housing Stability     What is your living situation today?: I have a steady place to live            Medications:     Current Outpatient Medications   Medication Sig Dispense Refill    omeprazole (PRILOSEC) 20 MG DR capsule Take 1 capsule (20 mg) by mouth daily. 30 capsule 11    acetylcysteine (MUCOMYST) 20 % neb solution INHALE 4ML VIA NEBULIZER TWO TIMES A DAY. MAY INCREASE TO 3-4 TIMES A DAY WITH INCREASED COUGH / COLD SYMPTOMS. REFRIGERATE VIAL AND DISCARD 96 HOURS AFTER OPENING 180 mL 11    albuterol (PROAIR HFA/PROVENTIL HFA/VENTOLIN HFA) 108 (90 Base) MCG/ACT inhaler Inhale 2 puffs into the lungs every 6 hours as needed for shortness of breath or wheezing 8.5 g 11    albuterol (PROVENTIL) (2.5 MG/3ML) 0.083% neb solution INHALE 1 VIAL ( 2.5MG) BY NEBULIZATION EVERY 4 HOURS AS NEEDED FOR FOR SHORTNESS OF BREATH OR WHEEZING 240 mL 11    amphetamine-dextroamphetamine (ADDERALL XR) 20 MG 24 hr capsule TAKE ONE CAPSULE BY MOUTH ONCE EVERY DAY [FOR FILL ON OR AFTER 7-7-23] 30 capsule 0    blood glucose (ACCU-CHEK GUIDE) test strip Use to test blood sugar 4 times daily or as directed. 100 strip 11    blood glucose monitoring (ACCU-CHEK FASTCLIX) lancets Use to test blood sugar 4 times daily or as directed. 100 each 11    buPROPion (WELLBUTRIN XL) 300 MG 24 hr tablet Take 1 tablet (300 mg) by mouth every morning. 90 tablet 3    busPIRone HCl (BUSPAR) 30 MG tablet Take 1 tablet (30 mg) by mouth 2 times daily 180 tablet 1    Cholecalciferol (VITAMIN D) 50 MCG (2000 UT) CAPS Take 1 capsule by mouth daily 90 capsule 3    elexacaftor-tezacaftor-ivacaftor & ivacaftor (TRIKAFTA) 100-50-75 & 150 MG tablet pack TAKE 2 ORANGE TABS BY MOUTH IN THE A.M. AND 1 BLUE TAB IN THE P.M. 12 HOURS APART WITH FAT CONTAINING FOOD. DO NOT TAKE WITH GRAPEFRUIT. 84 tablet 4     "FLUoxetine (PROZAC) 40 MG capsule TAKE TWO CAPSULES BY MOUTH EVERY DAY . 180 capsule 1    hydrOXYzine (ATARAX) 25 MG tablet Take 1-2 tablets (25-50 mg) by mouth nightly as needed (sleep) 60 tablet 1    medroxyPROGESTERone (DEPO-PROVERA) 150 MG/ML IM injection Inject 150 mg into the muscle every 3 months      naltrexone (DEPADE/REVIA) 50 MG tablet Take 1 tablet (50 mg) by mouth daily. 90 tablet 3    phytonadione (MEPHYTON) 5 MG tablet TAKE ONE TALBET MY MOUTH ONCE A WEEK 4 tablet 11    Semaglutide-Weight Management (WEGOVY) 1.7 MG/0.75ML pen Inject 1.7 mg subcutaneously once a week. 3 mL 5    topiramate (TOPAMAX) 25 MG tablet Take one 50 mg tablet and one 25 mg tablet daily (75 mg daily total) 90 tablet 3    topiramate (TOPAMAX) 50 MG tablet Take one 50 mg tablet and one 25 mg tablet daily (75 mg daily total) 90 tablet 3    traZODone (DESYREL) 150 MG tablet Take 150 mg by mouth at bedtime      vitamin C (ASCORBIC ACID) 500 MG tablet Take 1 tablet (500 mg) by mouth 2 times daily. 100 tablet 3    Vitamin E 180 MG (400 UNIT) CAPS Take 1 capsule (400 Units) by mouth 2 times daily. 180 capsule 3     No current facility-administered medications for this visit.            Physical Exam:   /72   Pulse 86   Temp 98.6  F (37  C) (Oral)   Resp 18   Ht 1.549 m (5' 1\")   Wt 80.1 kg (176 lb 9.6 oz)   SpO2 99%   BMI 33.37 kg/m      GENERAL: alert, NAD  HEENT: NCAT, EOMI, anicteric sclera, no oral mucosal edema or erythema  Neck: no cervical or supraclavicular adenopathy  Respiratory: good air flow, mainly clear  CV: RRR, S1S2, no murmurs noted  Abdomen: normoactive BS, soft   Lymph: no edema  Neuro: AAO X 3, CN 2-12 grossly intact  Psychiatric: normal affect, good eye contact  Skin: no rash, jaundice or lesions on limited exam         Data:   All laboratory and imaging data reviewed.      Cystic Fibrosis Culture  Specimen Description   Date Value Ref Range Status   06/08/2021 Sputum  Final   11/12/2019 Midstream Urine  " Final   11/12/2019 Throat  Final    Culture Micro   Date Value Ref Range Status   06/08/2021 Heavy growth  Normal roberto carlos    Final   11/12/2019   Final    <10,000 colonies/mL  urogenital roberto carlos  Susceptibility testing not routinely done     11/12/2019 Light growth  Normal roberto carlos    Final   11/12/2019 Moderate growth  Staphylococcus aureus   (A)  Final        PFT interpretation:  Maneuver: valid and meets ATS guidelines  Normal spirometry  Compared to prior: FEV1 of 3.09 is 120 ml above prior

## 2025-02-25 ENCOUNTER — OFFICE VISIT (OUTPATIENT)
Dept: PULMONOLOGY | Facility: CLINIC | Age: 30
End: 2025-02-25
Attending: PHYSICIAN ASSISTANT
Payer: COMMERCIAL

## 2025-02-25 ENCOUNTER — LAB (OUTPATIENT)
Dept: LAB | Facility: CLINIC | Age: 30
End: 2025-02-25
Attending: PHYSICIAN ASSISTANT
Payer: COMMERCIAL

## 2025-02-25 ENCOUNTER — INFUSION THERAPY VISIT (OUTPATIENT)
Dept: INFUSION THERAPY | Facility: CLINIC | Age: 30
End: 2025-02-25
Attending: PHYSICIAN ASSISTANT
Payer: COMMERCIAL

## 2025-02-25 ENCOUNTER — OFFICE VISIT (OUTPATIENT)
Dept: PHARMACY | Facility: CLINIC | Age: 30
End: 2025-02-25
Payer: COMMERCIAL

## 2025-02-25 VITALS
RESPIRATION RATE: 18 BRPM | OXYGEN SATURATION: 99 % | DIASTOLIC BLOOD PRESSURE: 72 MMHG | TEMPERATURE: 98.6 F | SYSTOLIC BLOOD PRESSURE: 105 MMHG | HEART RATE: 97 BPM | BODY MASS INDEX: 33.37 KG/M2 | WEIGHT: 176.6 LBS

## 2025-02-25 VITALS
OXYGEN SATURATION: 99 % | BODY MASS INDEX: 33.34 KG/M2 | WEIGHT: 176.6 LBS | DIASTOLIC BLOOD PRESSURE: 72 MMHG | RESPIRATION RATE: 18 BRPM | HEIGHT: 61 IN | SYSTOLIC BLOOD PRESSURE: 107 MMHG | HEART RATE: 86 BPM | TEMPERATURE: 98.6 F

## 2025-02-25 DIAGNOSIS — E84.9 CYSTIC FIBROSIS (H): ICD-10-CM

## 2025-02-25 DIAGNOSIS — E84.0 CYSTIC FIBROSIS OF THE LUNG (H): Primary | ICD-10-CM

## 2025-02-25 DIAGNOSIS — E66.01 MORBID OBESITY (H): ICD-10-CM

## 2025-02-25 DIAGNOSIS — E08.9 DIABETES MELLITUS DUE TO CYSTIC FIBROSIS (H): ICD-10-CM

## 2025-02-25 DIAGNOSIS — K21.9 GERD WITHOUT ESOPHAGITIS: ICD-10-CM

## 2025-02-25 DIAGNOSIS — G47.00 INSOMNIA, UNSPECIFIED TYPE: ICD-10-CM

## 2025-02-25 DIAGNOSIS — E11.65 TYPE 2 DIABETES MELLITUS WITH HYPERGLYCEMIA, WITHOUT LONG-TERM CURRENT USE OF INSULIN (H): ICD-10-CM

## 2025-02-25 DIAGNOSIS — E84.9 DIABETES MELLITUS DUE TO CYSTIC FIBROSIS (H): ICD-10-CM

## 2025-02-25 DIAGNOSIS — F33.0 MILD EPISODE OF RECURRENT MAJOR DEPRESSIVE DISORDER: ICD-10-CM

## 2025-02-25 DIAGNOSIS — E84.0 CYSTIC FIBROSIS WITH PULMONARY MANIFESTATIONS (H): ICD-10-CM

## 2025-02-25 DIAGNOSIS — F41.1 GAD (GENERALIZED ANXIETY DISORDER): ICD-10-CM

## 2025-02-25 DIAGNOSIS — Z30.9 ENCOUNTER FOR CONTRACEPTIVE MANAGEMENT, UNSPECIFIED TYPE: ICD-10-CM

## 2025-02-25 DIAGNOSIS — E16.2 HYPOGLYCEMIA: ICD-10-CM

## 2025-02-25 DIAGNOSIS — E84.9 CYSTIC FIBROSIS (H): Primary | ICD-10-CM

## 2025-02-25 LAB
ALBUMIN SERPL BCG-MCNC: 4.2 G/DL (ref 3.5–5.2)
ALBUMIN UR-MCNC: 10 MG/DL
ALP SERPL-CCNC: 71 U/L (ref 40–150)
ALT SERPL W P-5'-P-CCNC: 15 U/L (ref 0–50)
ANION GAP SERPL CALCULATED.3IONS-SCNC: 10 MMOL/L (ref 7–15)
APPEARANCE UR: ABNORMAL
AST SERPL W P-5'-P-CCNC: 16 U/L (ref 0–45)
BASOPHILS # BLD AUTO: 0 10E3/UL (ref 0–0.2)
BASOPHILS NFR BLD AUTO: 1 %
BILIRUB DIRECT SERPL-MCNC: 0.13 MG/DL (ref 0–0.3)
BILIRUB SERPL-MCNC: 0.2 MG/DL
BILIRUB UR QL STRIP: NEGATIVE
BUN SERPL-MCNC: 10 MG/DL (ref 6–20)
CALCIUM SERPL-MCNC: 8.8 MG/DL (ref 8.8–10.4)
CAOX CRY #/AREA URNS HPF: ABNORMAL /HPF
CHLORIDE SERPL-SCNC: 108 MMOL/L (ref 98–107)
CHOLEST SERPL-MCNC: 145 MG/DL
CK SERPL-CCNC: 63 U/L (ref 26–192)
COLOR UR AUTO: YELLOW
CREAT SERPL-MCNC: 0.91 MG/DL (ref 0.51–0.95)
CREAT UR-MCNC: 257 MG/DL
EBV DNA SERPL NAA+PROBE-ACNC: NOT DETECTED IU/ML
EGFRCR SERPLBLD CKD-EPI 2021: 86 ML/MIN/1.73M2
EOSINOPHIL # BLD AUTO: 0 10E3/UL (ref 0–0.7)
EOSINOPHIL NFR BLD AUTO: 0 %
ERYTHROCYTE [DISTWIDTH] IN BLOOD BY AUTOMATED COUNT: 13 % (ref 10–15)
ERYTHROCYTE [SEDIMENTATION RATE] IN BLOOD BY WESTERGREN METHOD: 8 MM/HR (ref 0–20)
EST. AVERAGE GLUCOSE BLD GHB EST-MCNC: 94 MG/DL
EXPTIME-PRE: 4.93 SEC
FASTING STATUS PATIENT QL REPORTED: YES
FEF2575-%PRED-PRE: 106 %
FEF2575-PRE: 3.4 L/SEC
FEF2575-PRED: 3.18 L/SEC
FEFMAX-%PRED-PRE: 131 %
FEFMAX-PRE: 8.77 L/SEC
FEFMAX-PRED: 6.67 L/SEC
FEV1-%PRED-PRE: 112 %
FEV1-PRE: 3.09 L
FEV1FEV6-PRE: 86 %
FEV1FEV6-PRED: 85 %
FEV1FVC-PRE: 86 %
FEV1FVC-PRED: 86 %
FIFMAX-PRE: 4.42 L/SEC
FVC-%PRED-PRE: 112 %
FVC-PRE: 3.61 L
FVC-PRED: 3.22 L
GGT SERPL-CCNC: 28 U/L (ref 5–36)
GLUCOSE BLDC GLUCOMTR-MCNC: 177 MG/DL (ref 70–99)
GLUCOSE SERPL-MCNC: 166 MG/DL (ref 70–99)
GLUCOSE SERPL-MCNC: 196 MG/DL (ref 70–99)
GLUCOSE SERPL-MCNC: 255 MG/DL (ref 70–99)
GLUCOSE SERPL-MCNC: 262 MG/DL (ref 70–99)
GLUCOSE SERPL-MCNC: 83 MG/DL (ref 70–99)
GLUCOSE SERPL-MCNC: 83 MG/DL (ref 70–99)
GLUCOSE UR STRIP-MCNC: NEGATIVE MG/DL
HBA1C MFR BLD: 4.9 %
HCO3 SERPL-SCNC: 24 MMOL/L (ref 22–29)
HCT VFR BLD AUTO: 43.6 % (ref 35–47)
HDLC SERPL-MCNC: 43 MG/DL
HGB BLD-MCNC: 14.2 G/DL (ref 11.7–15.7)
HGB UR QL STRIP: NEGATIVE
IGG SERPL-MCNC: 702 MG/DL (ref 610–1616)
IMM GRANULOCYTES # BLD: 0 10E3/UL
IMM GRANULOCYTES NFR BLD: 0 %
INR PPP: 0.98 (ref 0.85–1.15)
INSULIN SERPL-ACNC: 38.2 UU/ML (ref 2.6–24.9)
INSULIN SERPL-ACNC: 5.9 UU/ML (ref 2.6–24.9)
INSULIN SERPL-ACNC: 62.1 UU/ML (ref 2.6–24.9)
INSULIN SERPL-ACNC: 82.5 UU/ML (ref 2.6–24.9)
IRON SERPL-MCNC: 97 UG/DL (ref 37–145)
KETONES UR STRIP-MCNC: NEGATIVE MG/DL
LDLC SERPL CALC-MCNC: 78 MG/DL
LEUKOCYTE ESTERASE UR QL STRIP: NEGATIVE
LYMPHOCYTES # BLD AUTO: 1.1 10E3/UL (ref 0.8–5.3)
LYMPHOCYTES NFR BLD AUTO: 34 %
MAGNESIUM SERPL-MCNC: 2.1 MG/DL (ref 1.7–2.3)
MCH RBC QN AUTO: 27.3 PG (ref 26.5–33)
MCHC RBC AUTO-ENTMCNC: 32.6 G/DL (ref 31.5–36.5)
MCV RBC AUTO: 84 FL (ref 78–100)
MICROALBUMIN UR-MCNC: 13.6 MG/L
MICROALBUMIN/CREAT UR: 5.29 MG/G CR (ref 0–25)
MONOCYTES # BLD AUTO: 0.4 10E3/UL (ref 0–1.3)
MONOCYTES NFR BLD AUTO: 11 %
MUCOUS THREADS #/AREA URNS LPF: PRESENT /LPF
NEUTROPHILS # BLD AUTO: 1.8 10E3/UL (ref 1.6–8.3)
NEUTROPHILS NFR BLD AUTO: 54 %
NITRATE UR QL: NEGATIVE
NONHDLC SERPL-MCNC: 102 MG/DL
NRBC # BLD AUTO: 0 10E3/UL
NRBC BLD AUTO-RTO: 0 /100
PH UR STRIP: 6 [PH] (ref 5–7)
PHOSPHATE SERPL-MCNC: 3.7 MG/DL (ref 2.5–4.5)
PLATELET # BLD AUTO: 300 10E3/UL (ref 150–450)
POTASSIUM SERPL-SCNC: 3.9 MMOL/L (ref 3.4–5.3)
PROT SERPL-MCNC: 6.8 G/DL (ref 6.4–8.3)
RBC # BLD AUTO: 5.21 10E6/UL (ref 3.8–5.2)
RBC URINE: 1 /HPF
SODIUM SERPL-SCNC: 142 MMOL/L (ref 135–145)
SP GR UR STRIP: 1.03 (ref 1–1.03)
SQUAMOUS EPITHELIAL: 1 /HPF
TRIGL SERPL-MCNC: 122 MG/DL
TSH SERPL DL<=0.005 MIU/L-ACNC: 1.38 UIU/ML (ref 0.3–4.2)
UROBILINOGEN UR STRIP-MCNC: 2 MG/DL
VIT D+METAB SERPL-MCNC: 39 NG/ML (ref 20–50)
WBC # BLD AUTO: 3.3 10E3/UL (ref 4–11)
WBC URINE: 2 /HPF

## 2025-02-25 PROCEDURE — 82962 GLUCOSE BLOOD TEST: CPT

## 2025-02-25 PROCEDURE — 84443 ASSAY THYROID STIM HORMONE: CPT

## 2025-02-25 PROCEDURE — 82465 ASSAY BLD/SERUM CHOLESTEROL: CPT

## 2025-02-25 PROCEDURE — 80051 ELECTROLYTE PANEL: CPT

## 2025-02-25 PROCEDURE — 83036 HEMOGLOBIN GLYCOSYLATED A1C: CPT

## 2025-02-25 PROCEDURE — 84100 ASSAY OF PHOSPHORUS: CPT

## 2025-02-25 PROCEDURE — 85025 COMPLETE CBC W/AUTO DIFF WBC: CPT

## 2025-02-25 PROCEDURE — 84446 ASSAY OF VITAMIN E: CPT

## 2025-02-25 PROCEDURE — 83525 ASSAY OF INSULIN: CPT | Performed by: PHYSICIAN ASSISTANT

## 2025-02-25 PROCEDURE — 87070 CULTURE OTHR SPECIMN AEROBIC: CPT | Performed by: PHYSICIAN ASSISTANT

## 2025-02-25 PROCEDURE — 84590 ASSAY OF VITAMIN A: CPT

## 2025-02-25 PROCEDURE — 82306 VITAMIN D 25 HYDROXY: CPT

## 2025-02-25 PROCEDURE — 82947 ASSAY GLUCOSE BLOOD QUANT: CPT | Performed by: PHYSICIAN ASSISTANT

## 2025-02-25 PROCEDURE — 82784 ASSAY IGA/IGD/IGG/IGM EACH: CPT

## 2025-02-25 PROCEDURE — 82043 UR ALBUMIN QUANTITATIVE: CPT

## 2025-02-25 PROCEDURE — 36415 COLL VENOUS BLD VENIPUNCTURE: CPT | Performed by: PHYSICIAN ASSISTANT

## 2025-02-25 PROCEDURE — 99214 OFFICE O/P EST MOD 30 MIN: CPT | Mod: 25 | Performed by: PHYSICIAN ASSISTANT

## 2025-02-25 PROCEDURE — G0463 HOSPITAL OUTPT CLINIC VISIT: HCPCS | Performed by: PHYSICIAN ASSISTANT

## 2025-02-25 PROCEDURE — 36415 COLL VENOUS BLD VENIPUNCTURE: CPT

## 2025-02-25 PROCEDURE — 85610 PROTHROMBIN TIME: CPT

## 2025-02-25 PROCEDURE — 81001 URINALYSIS AUTO W/SCOPE: CPT

## 2025-02-25 PROCEDURE — 94375 RESPIRATORY FLOW VOLUME LOOP: CPT | Performed by: PHYSICIAN ASSISTANT

## 2025-02-25 PROCEDURE — 99605 MTMS BY PHARM NP 15 MIN: CPT | Performed by: PHARMACIST

## 2025-02-25 PROCEDURE — 82550 ASSAY OF CK (CPK): CPT

## 2025-02-25 PROCEDURE — 82248 BILIRUBIN DIRECT: CPT

## 2025-02-25 PROCEDURE — 85652 RBC SED RATE AUTOMATED: CPT

## 2025-02-25 PROCEDURE — 83735 ASSAY OF MAGNESIUM: CPT

## 2025-02-25 PROCEDURE — 250N000009 HC RX 250: Performed by: PHYSICIAN ASSISTANT

## 2025-02-25 PROCEDURE — 82977 ASSAY OF GGT: CPT

## 2025-02-25 PROCEDURE — 87799 DETECT AGENT NOS DNA QUANT: CPT

## 2025-02-25 PROCEDURE — 80076 HEPATIC FUNCTION PANEL: CPT

## 2025-02-25 PROCEDURE — 82785 ASSAY OF IGE: CPT

## 2025-02-25 PROCEDURE — 83540 ASSAY OF IRON: CPT

## 2025-02-25 RX ORDER — OMEPRAZOLE 20 MG/1
20 CAPSULE, DELAYED RELEASE ORAL DAILY
Qty: 30 CAPSULE | Refills: 11 | Status: SHIPPED | OUTPATIENT
Start: 2025-02-25

## 2025-02-25 RX ADMIN — ALCOHOL 75 G: 65 GEL TOPICAL at 09:06

## 2025-02-25 ASSESSMENT — PAIN SCALES - GENERAL: PAINLEVEL_OUTOF10: NO PAIN (0)

## 2025-02-25 NOTE — PROGRESS NOTES
Respiratory Therapist Note:         Vest                Brand: Hill-Rom - traditional Hill Rom: Frequencies 8, 9, 10 at pressure 10 then frequencies 18, 19, 20 at pressure 6.                Cough Pause: Cough Pause; Yes                Vest Garment Size: Adult Large                Last Fitting Date: Due as needed                Frequency of therapy: as needed with illness         Exercise (purposeful and aerobic for >20 minutes each session): No - physically active job, no exercise routine                Does this qualify as additional airway clearance: No         Alternative Airway Clearance:          Nebulized Medications                Bronchodilators: Albuterol                Mucolytic: Mucomyst                Antibiotics:                 Additional Inhaled Medications:                 Spacer Use:          Education and Transition Information                Correct order of inhaled medications: Yes                Mechanism of Action of inhaled medications: Yes                Frequency of inhaled medications: Yes                Dosage of inhaled medications: Yes                Other: Uses nebulized medications as needed         Home Care:                Nebulizer Cups (Brand/Type): [employer]                Nebulizer Compressor                            Year Purchased: Works                            [facility], Phone: [phone], Fax: [phone]                Nebulizer Supply Company:                            [facility], Phone: [phone], Fax: [phone]         Oxygen: N/A                                Pulmonary Rehab: N/A                    Plan of Care and Goals for next visit:  Please reach out with any airway clearance needs

## 2025-02-25 NOTE — PROGRESS NOTES
Mamie is here for a glucose tolerance test for a history of Cystic Fibrosis.  Orders written by VANESA Gray  on 02/25/2025.  Mamie arrived NPO, last eating at 9pm last evening.  IV placed right lower forearm without difficulty by LAURA, vascular RN.  Requested labs drawn.  Glucose and Insulin levels drawn at -0- 856, +30 936, +60 1006, +90 1036, and + 120 1106.  Mamie started drinking the Glucola at 906 and completed within 10 minutes.  Post blood sugar tested and was 177.  Snack given to patient prior to discharge.   PIV discontinued without difficulty.    Mamie will follow up, as planned, with her CF team for test results and all future appointments.       JACE Bliss LPN    Administrations This Visit       glucose tolerence test (GLUCOLA) 25% oral liquid 75 g       Admin Date  02/25/2025 Action  $Given Dose  75 g Route  Oral Documented By  Krystle Bliss LPN

## 2025-02-25 NOTE — NURSING NOTE
Chief Complaint   Patient presents with    RECHECK     Return Cystic Fibrosis     Medications reviewed and vital signs taken.   Ariane Person, CMA

## 2025-02-25 NOTE — LETTER
2/25/2025      Mamie Rice  [address] [facility] [address]      Dear Colleague,    Thank you for referring your patient, Mamie Rice, to the [facility] [place]. Please see a copy of my visit note below.    [facility]  February 25, 2025         Assessment and Plan:   Mamie Rice is a 29 year old female with h/o CF lung disease with normal spirometry, CF sinus disease, CFRD, pancreatic insufficiency, ADHD, insomnia, MECHE, and depression who is seen for routine follow up.      1. Cystic fibrosis: no new pulmonary complaints and no recent illnesses. Sating 99% on room air; has not needed to do any nebs or airway clearance. PFTs improved today, still below her best.  Previous cultures + for MSSA with additional h/o MRSA (last 2019), HIB, and Achromobacter.   - Continue nebs and vesting PRN    2. CFTR modulation: tolerating Trikafta well. Labs today WNL.   - Recheck labs in August  - Continue Trikafta    3. Cystic fibrosis sinus disease: h/o multiple endoscopic sinus surgeries (last 2015). Feels symptoms are controlled with the pablo pot PRN.     4. Exocrine pancreatic insufficiency: no signs of malabsorption. Has been started on Wegovy by the weight loss clinic. BMI >> CFF goal, but veronica have ongoing weight loss.  - Continue vitamins    5. GERD: patient denies any symptoms.  - Continue PPI daily, will decrease to 20 mg and monitor    6. ADHD, depression and anxiety: following with a psychiatrist locally.     7. HCM: reviewed with the patient including low WBC, normal hgb and platelets. Normal CMP, cholesterol, other than slightly low HDL, multiple labs pending.  - Recheck CBC in 6 months with next labs    8. Abnormal OGTT: borderline with CFRD with fasting BS of 83, but two hour BS of 196. Interestingly, AIC of 4.9.  - Will have patient follow up with Dr. Prieto    RTC: 4 months  Annual  studies due: March 2025 (DEXA > March 2026)  Preventative care: got her flu vaccine; recommend covid booster    Rabia Gray PA-C  Pulmonary, Allergy, Critical Care and Sleep Medicine        Interval History:     Going to [place] on [place] with niece and a nephew. No recent illnesses, no cough or shortness of breath. Does have some sinus congestion, but a pablo potty if very helpful. No tightness or chest pains, no recent need to vest. Still on Wegovy and it's going well. No new GI complaints. Stools are stable. Normal BP (other than chronically elevated Cl), normal LFTS, cholesterol panel.          Review of Systems:   Please see HPI. Otherwise, complete 10 point ROS negative.           Past Medical and Surgical History:     Past Medical History:   Diagnosis Date     ADHD (attention deficit hyperactivity disorder)      Anxiety      Aspergillosis, with pneumonia (H)     fugus found caused chest pain     Chronic infection     CF, MRSA.,      Chronic sinusitis      Constipation, chronic      Cystic fibrosis with pulmonary manifestations (H) 12/19/2011     Cystic fibrosis without mention of meconium ileus     SWEAT TEST:Date: 2/17/1994   [facility]  296 mg, 104 mmol/L ClSample #2  295 mg, 104 mmol/L Cl GENOTYPING:Date: 10/15/2007,  Laboratory: [facility]otype: [id number]     Depressive disorder      Diabetes     no meds currently     Dysthymic disorder      Exocrine pancreatic insufficiency      Gastro-oesophageal reflux disease      Hip pain, right      MRSA (methicillin resistant Staphylococcus aureus) carrier      Pancreatic disease      Past Surgical History:   Procedure Laterality Date     ARTHROSCOPY HIP, OSTEOPLASTY FEMUR PROXIMAL, COMBINED  3/11/2013    Procedure: COMBINED ARTHROSCOPY HIP, OSTEOPLASTY FEMUR PROXIMAL;  Right Hip Arthroscopy, Labral  Debridement.    surgeon request choice anesthesia/admit to [facility] after surgery;  Surgeon: Omkar Austin MD;  Location:  OR      ARTHROSCOPY KNEE WITH MEDIAL MENISCECTOMY Left 1/31/2017    Procedure: ARTHROSCOPY KNEE WITH MEDIAL MENISCECTOMY;  Surgeon: Jethro Coyle MD;  Location: [facility] OR     bronchoscopies       BRONCHOSCOPY       EXAM UNDER ANESTHESIA ANUS N/A 5/10/2016    Procedure: EXAM UNDER ANESTHESIA ANUS;  Surgeon: Chet Gaviria MD;  [facility]     EXAM UNDER ANESTHESIA, RESTORATIONS, EXTRACTION(S) DENTAL COMPLEX, COMBINED  5/13/2013    Procedure: COMBINED EXAM UNDER ANESTHESIA, RESTORATIONS, EXTRACTION(S) DENTAL COMPLEX;  Dental Exam, Radiographs, Restorations. Single Extraction  Tooth #2. Restorations x 3;  Surgeon: Danilo Ortiz DDS;  Location: [facility] OR     HC KNEE SCOPE, DIAGNOSTIC      Arthroscopy, Knee- left     INJECT BOTOX N/A 5/10/2016    Procedure: INJECT BOTOX;  Surgeon: Chet Gaviria MD;  [facility] OR     left hip labral tear  5/11/2011    left hip arthroscopy with labral debridement and synovectomy     meniscus repair       OPTICAL TRACKING SYSTEM ENDOSCOPIC SINUS SURGERY  10/14/2011    Procedure:OPTICAL TRACKING SYSTEM ENDOSCOPIC SINUS SURGERY; FESS (functional endoscopic sinus surgery) with Image Guidance, bronchial lavage and cultures; Surgeon:GOYO KUO; Location:[facility] OR     OPTICAL TRACKING SYSTEM ENDOSCOPIC SINUS SURGERY  5/18/2012    Procedure:OPTICAL TRACKING SYSTEM ENDOSCOPIC SINUS SURGERY; Right  and Left Image Guided Functional Endoscopic Sinus Surgery With  Frontal Approach, [employer]x; Surgeon:GOYO KUO; Location:[facility] OR     OPTICAL TRACKING SYSTEM ENDOSCOPIC SINUS SURGERY  9/26/2012    Procedure: OPTICAL TRACKING SYSTEM ENDOSCOPIC SINUS SURGERY;  Stealth Guided Bilateral Functional Endoscopic Sinus Surgery *Latex Safe*;  Surgeon: Goyo Kuo MD;  [facility]     OPTICAL TRACKING SYSTEM ENDOSCOPIC SINUS SURGERY Bilateral 10/16/2015    Procedure: OPTICAL TRACKING SYSTEM ENDOSCOPIC SINUS SURGERY;  Surgeon: Mariela Haile MD;  [facility]     ORTHOPEDIC  SURGERY      left hip tear repair 2010     SINUS SURGERY             Family History:     Family History   Problem Relation Age of Onset     Cancer Paternal Grandmother      Skin Cancer Paternal Grandmother      Other Cancer Paternal Grandmother         Skin     Obesity Paternal Grandmother      Cancer Paternal Grandfather         PGF had throat cancer (he was a smoker)     Other Cancer Paternal Grandfather      Anxiety Disorder Paternal Grandfather      Thyroid Disease Mother         ,     Hypertension Mother      Obesity Other      ALS Father 67        2 male cousins with ALS as well     Anesthesia Reaction No family hx of      Blood Disease No family hx of      Colon Polyps No family hx of      Crohn's Disease No family hx of      Ulcerative Colitis No family hx of      Colon Cancer No family hx of      Melanoma No family hx of             Social History:     Social History     Socioeconomic History     Marital status: Single     Spouse name: Not on file     Number of children: Not on file     Years of education: Not on file     Highest education level: Not on file   Occupational History     Not on file   Tobacco Use     Smoking status: Never     Smokeless tobacco: Never     Tobacco comments:     one person at home smokes outside   Vaping Use     Vaping status: Never Used   Substance and Sexual Activity     Alcohol use: No     Alcohol/week: 0.0 standard drinks of alcohol     Drug use: No     Sexual activity: Never   Other Topics Concern      Service Not Asked     Blood Transfusions No     Caffeine Concern Not Asked     Occupational Exposure Not Asked     Hobby Hazards Not Asked     Sleep Concern Not Asked     Stress Concern Not Asked     Weight Concern Not Asked     Special Diet Not Asked     Back Care Not Asked     Exercise Yes     Bike Helmet Not Asked     Seat Belt Not Asked     Self-Exams Not Asked     Parent/sibling w/ CABG, MI or angioplasty before 65F 55M? Yes   Social History Narrative    Mamie  lives with mother in [place], [place].  There is a cat in the home, but Mamie does not have any litterbox duties.  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  She gets essentially no exercise because of the tingling in her feet (says it bothers her even to stand).        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        8/2015 no change in social situation        2/15/2016 Pt is single and lives with mother and stepfather.     Social Drivers of Health     Financial Resource Strain: Patient Declined (8/6/2023)    Received from [facility]    Overall Financial Resource Strain (CARDIA)      Difficulty of Paying Living Expenses: Patient declined   Food Insecurity: No Food Insecurity (8/6/2023)    Received from [facility]    Hunger Vital Sign      Worried About Running Out of Food in the Last Year: Never true      Ran Out of Food in the Last Year: Never true   Transportation Needs: No Transportation Needs (8/6/2023)    Received from [facility]    PRAPARE - Transportation      Lack of Transportation (Medical): No      Lack of Transportation (Non-Medical): No   Physical Activity: Insufficiently Active (8/6/2023)    Received from [facility]    Exercise Vital Sign      Days of Exercise per Week: 1 day      Minutes of Exercise per Session: 10 min   Stress: No Stress Concern Present (6/29/2020)    Received from [facility]    [employer] [place] - Occupational Stress Questionnaire      Feeling of Stress : Only a little   Social Connections: Moderately Integrated (6/29/2020)    Received from [facility]    Social Connection and Isolation Panel [NHANES]      Frequency of Communication with Friends and Family: More than three times a week      Frequency of Social Gatherings with Friends and Family: Twice a week      Attends Evangelical Services: 1 to 4  times per year      Active Member of Clubs or Organizations: Yes      Attends Club or Organization Meetings: More than 4 times per year      Marital Status: Never    Interpersonal Safety: Not At Risk (8/6/2023)    Received from [facility]    Humiliation, Afraid, Rape, and Kick questionnaire      Fear of Current or Ex-Partner: No      Emotionally Abused: No      Physically Abused: No      Sexually Abused: No   Housing Stability: Low Risk  (8/6/2023)    Received from [facility], [facility]    Housing Stability      What is your living situation today?: I have a steady place to live            Medications:     Current Outpatient Medications   Medication Sig Dispense Refill     omeprazole (PRILOSEC) 20 MG DR capsule Take 1 capsule (20 mg) by mouth daily. 30 capsule 11     acetylcysteine (MUCOMYST) 20 % neb solution INHALE 4ML VIA NEBULIZER TWO TIMES A DAY. MAY INCREASE TO 3-4 TIMES A DAY WITH INCREASED COUGH / COLD SYMPTOMS. REFRIGERATE VIAL AND DISCARD 96 HOURS AFTER OPENING 180 mL 11     albuterol (PROAIR HFA/PROVENTIL HFA/VENTOLIN HFA) 108 (90 Base) MCG/ACT inhaler Inhale 2 puffs into the lungs every 6 hours as needed for shortness of breath or wheezing 8.5 g 11     albuterol (PROVENTIL) (2.5 MG/3ML) 0.083% neb solution INHALE 1 VIAL ( 2.5MG) BY NEBULIZATION EVERY 4 HOURS AS NEEDED FOR FOR SHORTNESS OF BREATH OR WHEEZING 240 mL 11     amphetamine-dextroamphetamine (ADDERALL XR) 20 MG 24 hr capsule TAKE ONE CAPSULE BY MOUTH ONCE EVERY DAY [FOR FILL ON OR AFTER 7-7-23] 30 capsule 0     blood glucose (ACCU-CHEK GUIDE) test strip Use to test blood sugar 4 times daily or as directed. 100 strip 11     blood glucose monitoring (ACCU-CHEK FASTCLIX) lancets Use to test blood sugar 4 times daily or as directed. 100 each 11     buPROPion (WELLBUTRIN XL) 300 MG 24 hr tablet Take 1 tablet (300 mg) by mouth every morning. 90 tablet 3     busPIRone HCl (BUSPAR) 30 MG tablet Take 1 tablet (30 mg) by mouth 2  "times daily 180 tablet 1     Cholecalciferol (VITAMIN D) 50 MCG (2000 UT) CAPS Take 1 capsule by mouth daily 90 capsule 3     elexacaftor-tezacaftor-ivacaftor & ivacaftor (TRIKAFTA) 100-50-75 & 150 MG tablet pack TAKE 2 ORANGE TABS BY MOUTH IN THE A.M. AND 1 BLUE TAB IN THE P.M. 12 HOURS APART WITH FAT CONTAINING FOOD. DO NOT TAKE WITH GRAPEFRUIT. 84 tablet 4     FLUoxetine (PROZAC) 40 MG capsule TAKE TWO CAPSULES BY MOUTH EVERY DAY . 180 capsule 1     hydrOXYzine (ATARAX) 25 MG tablet Take 1-2 tablets (25-50 mg) by mouth nightly as needed (sleep) 60 tablet 1     medroxyPROGESTERone (DEPO-PROVERA) 150 MG/ML IM injection Inject 150 mg into the muscle every 3 months       naltrexone (DEPADE/REVIA) 50 MG tablet Take 1 tablet (50 mg) by mouth daily. 90 tablet 3     phytonadione (MEPHYTON) 5 MG tablet TAKE ONE TALBET MY MOUTH ONCE A WEEK 4 tablet 11     Semaglutide-Weight Management (WEGOVY) 1.7 MG/0.75ML pen Inject 1.7 mg subcutaneously once a week. 3 mL 5     topiramate (TOPAMAX) 25 MG tablet Take one 50 mg tablet and one 25 mg tablet daily (75 mg daily total) 90 tablet 3     topiramate (TOPAMAX) 50 MG tablet Take one 50 mg tablet and one 25 mg tablet daily (75 mg daily total) 90 tablet 3     traZODone (DESYREL) 150 MG tablet Take 150 mg by mouth at bedtime       vitamin C (ASCORBIC ACID) 500 MG tablet Take 1 tablet (500 mg) by mouth 2 times daily. 100 tablet 3     Vitamin E 180 MG (400 UNIT) CAPS Take 1 capsule (400 Units) by mouth 2 times daily. 180 capsule 3     No current facility-administered medications for this visit.            Physical Exam:   /72   Pulse 86   Temp 98.6  F (37  C) (Oral)   Resp 18   Ht 1.549 m (5' 1\")   Wt 80.1 kg (176 lb 9.6 oz)   SpO2 99%   BMI 33.37 kg/m      GENERAL: alert, NAD  HEENT: NCAT, EOMI, anicteric sclera, no oral mucosal edema or erythema  Neck: no cervical or supraclavicular adenopathy  Respiratory: good air flow, mainly clear  CV: RRR, S1S2, no murmurs " noted  Abdomen: normoactive BS, soft   Lymph: no edema  Neuro: AAO X 3, CN 2-12 grossly intact  Psychiatric: normal affect, good eye contact  Skin: no rash, jaundice or lesions on limited exam         Data:   All laboratory and imaging data reviewed.      Cystic Fibrosis Culture  Specimen Description   Date Value Ref Range Status   06/08/2021 Sputum  Final   11/12/2019 Midstream Urine  Final   11/12/2019 Throat  Final    Culture Micro   Date Value Ref Range Status   06/08/2021 Heavy growth  Normal roberto carlos    Final   11/12/2019   Final    <10,000 colonies/mL  urogenital roberto carlos  Susceptibility testing not routinely done     11/12/2019 Light growth  Normal roberto carlos    Final   11/12/2019 Moderate growth  Staphylococcus aureus   (A)  Final        PFT interpretation:  Maneuver: valid and meets ATS guidelines  Normal spirometry  Compared to prior: FEV1 of 3.09 is 120 ml above prior          Again, thank you for allowing me to participate in the care of your patient.        Sincerely,        Rabia Gray PA-C    Electronically signed

## 2025-02-25 NOTE — PROGRESS NOTES
Medication Therapy Management (MTM) Encounter    ASSESSMENT:                            Medication Adherence/Access: No issues identified.    CF:   Trikafta labs within normal limits, recommend continue full dose and standard lab monitoring. Review of Alyftrek today, shared decision not interested in trialing Alyftrek at this time due to happy on current modulator.     Pancreatic Insufficiency/Nutrition:   Annual vitamin labs today, CF dietitian following. Per visit with Rabia Gray PA-C, would benefit from decreasing dose of omeprazole and monitor for rebound symptoms.     CFRD/Weight Management:    Patient is meeting A1c goal of <7% per CFF guidelines. Patient to continue following with endocrinology and weight management.     Depression/ADHD/Insomnia:   Stable per patient's report     Women's health:   Stable per patient's report    PLAN:                            1. Decrease dose of omeprazole - 20 mg once daily, per visit with Rabia Gray PA-C.   2. Patient will reach out if interested in revisiting Alyftrek in the future    Follow-up: Trikafta labs in 6 months, annual CF MTM visit in 1 year; visit with MTM pharmacist Lauren Bloch 4/1/25    SUBJECTIVE/OBJECTIVE:                          Mamie Rice is a 29 year old female seen for a follow-up visit. Today's visit is a co-visit with Rabia Gray PA-C.  Has previously been seen by Lauren Bloch, PharmD, last visit was 11/12/24.     Reason for visit: Annual CF Medication Review.    Allergies/ADRs: Reviewed in chart  Past Medical History: Reviewed in chart  Tobacco: She reports that she has never smoked. She has never used smokeless tobacco.  Alcohol: rarely     Medication Access: Patient fills medication at [facility] [place] [facility] [place], [facility], [facility]. Patient expresses no concern today regarding cost of medications.      CF:    Inhaled medications:              Bronchodilator: albuterol nebs 0.083% as needed  during illness and albuterol HFA as needed (rarely)              Mucolytic: acetylcysteine 20% nebs as needed during illness  Oral medications:              CFTR modulator: Trikafta (full dose) with fat-containing meals/snacks  Denies CF medication concerns today.   Genotype: [id number]  Lab Results   Component Value Date    ALT 15 02/25/2025    AST 16 02/25/2025    BILITOTAL 0.2 02/25/2025    DBIL 0.13 02/25/2025    ALKPHOS 71 02/25/2025    CKT 63 02/25/2025     Pancreatic Insufficiency/Nutrition:   Acid reducer: omeprazole 40 mg daily  Vitamins include: vitamin D 2000 units daily, vitamin E 400 units twice daily, vitamin C 500 mg twice daily, and Mephyton 5 mg weekly  No hemoptysis, Mamie declines treatment with pancreatic enzymes. Patient is not experiencing sign/symptoms of malabsorption.    CFRD/Weight Management:    Wegovy 1.7 mg once weekly  Naltrexone 50 mg daily  Topiramate 75 mg twice daily  She is not on insulin. Glucose readings not reported today, denies hypoglycemia recently  Patient follows with Dr. Prieto for endocrinology. Follows with Dr. Vivian MD and Lauren Bloch, PharmD for weight management.     Depression/ADHD/Insomnia:   Adderall XR 20 mg daily  bupropion 300 mg XL daily  buspirone 30 mg twice daily   fluoxetine 80 mg daily   trazodone 150 mg at bedtime  hydroxyzine 25-50 mg at bedtime as needed  Patient reports no current medication side effects. Does not follow with therapist or psychiatrist but does feel that overall her mood is doing well on the regimen. Denies difficulty sleeping.  Follows with Dr. Pitts and Dr. Duong     Women's Health:   Depo injection 150 mg every 3 months  No concerns noted today.  ----------------  I spent 15 minutes with this patient today. All changes were made via verbal approval with Rabia Gray PA-C.     A summary of these recommendations was given to the patient (see AVS from today's appointment with Rabia Gray PA-C).    Padma Alonso,  PharmD   Cystic Fibrosis MTM Pharmacist  [facility]     Medication Therapy Recommendations  No medication therapy recommendations to display

## 2025-02-25 NOTE — NURSING NOTE
Chief Complaint   Patient presents with    Blood Draw     Labs drawn via PIV by RN in lab. VS taken.      Labs drawn via peripheral IV. Vital signs taken. Checked into next appointment.   Myrtle Reid RN

## 2025-02-25 NOTE — PATIENT INSTRUCTIONS
Cystic Fibrosis Self-Care Plan       Patient: Mamie Rice   MRN: [id number]   Clinic Date: February 25, 2025     RECOMMENDATIONS:  1. Continue nebulizers and vest therapy as needed.  2. Schedule visit with Dr. Prieto  3. Start intentional exercise, 30 minutes 5 days per week is goal.    Annual Studies:   IGG   Date Value Ref Range Status   06/08/2021 672 610 - 1,616 mg/dL Final     Immunoglobulin G   Date Value Ref Range Status   03/27/2024 680 610 - 1,616 mg/dL Final     Insulin   Date Value Ref Range Status   08/14/2019 41.0 (H) 3 - 25 mU/L Final     There are no preventive care reminders to display for this patient.    Pulmonary Function Tests  FEV1: amount of air you can blow out in 1 second  FVC: total amount of air you can take in and blow out    Your Goals:             Latest Ref Rng & Units 2/25/2025    11:15 AM   PFT   FVC L 3.61  P   FEV1 L 3.09  P   FVC% % 112  P   FEV1% % 112  P      P Preliminary result          Airway Clearance: The Most Important Way to Keep Your Lungs Healthy  Vest Settings:   Hill-Rom Frequencies: 8, 9, 10 Pressure 10 Then, Frequencies 18, 19, 20 Pressure 6     RespirTech: Quick Start with Pressure of     Do each frequency for 5 minutes; Deflate vest after each frequency & cough 3 times before beginning the next setting.    Vest and Neb Therapy should be done as needed.    Good Nutrition Can Improve Lung Function and Overall Health    Take ALL of your vitamins with food    Take 1/2 of your enzymes before EVERY meal/snack and the other 1/2 mid-meal/snack    Wt Readings from Last 3 Encounters:   02/25/25 80.1 kg (176 lb 9.6 oz)   02/25/25 80.1 kg (176 lb 9.6 oz)   01/10/25 81.6 kg (180 lb)       Body mass index is 33.37 kg/m .         National CF Foundation Recommendations for BMI in CF Adults: Women: at least 22 Men: at least 23        Controlling Blood Sugars Helps Prevent Lung Infections & Improves Nutrition  Test blood sugar:    In the morning before eating (goal is  )    2 hours after a meal (goal is less than 150)    When pre-meal glucose is greater than 150 add correction    At bedtime (if less than 100 eat a snack with 15 grams of carbohydrates  Last A1C Results:   Hemoglobin A1C   Date Value Ref Range Status   02/25/2025 4.9 <5.7 % Final     Comment:     Normal <5.7%   Prediabetes 5.7-6.4%    Diabetes 6.5% or higher     Note: Adopted from ADA consensus guidelines.   06/08/2021 5.1 0 - 5.6 % Final     Comment:     Normal <5.7% Prediabetes 5.7-6.4%  Diabetes 6.5% or higher - adopted from ADA   consensus guidelines.           If diabetic, measure A1C every 6 months. Goal is under 7%.    Staying Healthy  Research: If you are interested in learning about research opportunities or have questions, please contact Mariana Smith at [phone] or [email or web address].     Foundation: Compass is a personalized resource service to help you with the insurance, financial, legal and other issues you are facing.  It's free, confidential and available to anyone with CF.  Ask your  for more information or contact Compass directly at [facility] ([phone]) or compass@cff.org, or learn more at cff.org/compass.       CF Nurse Line: Radha Dickey and KJ: [phone]  Chaparrita Dennis or Sharon Gutiérrez RT: [phone]    Theresa Ceja and Amy Andino , Dieticians: [phone]    Rhonda Esteves, Diabetes Nurse: [phone]   Anahy Saez: [phone] or Faith Zamudio at [phone], Social Workers  [email or web address]

## 2025-02-26 LAB
BACTERIA SPEC CULT: ABNORMAL
BACTERIA SPEC CULT: ABNORMAL
IGE SERPL-ACNC: 23 KU/L (ref 0–114)

## 2025-02-26 NOTE — PATIENT INSTRUCTIONS
See provider AVS for a summary of recommendations from today's visit.  Padma Alonso, PharmD  Cystic Fibrosis MTM Pharmacist  [facility]

## 2025-03-02 LAB
BACTERIA SPEC CULT: ABNORMAL

## 2025-03-27 ENCOUNTER — MYC MEDICAL ADVICE (OUTPATIENT)
Dept: PULMONOLOGY | Facility: CLINIC | Age: 30
End: 2025-03-27
Payer: COMMERCIAL

## 2025-03-27 DIAGNOSIS — E84.9 CYSTIC FIBROSIS (H): Primary | ICD-10-CM

## 2025-03-27 RX ORDER — FLUCONAZOLE 150 MG/1
150 TABLET ORAL ONCE
Qty: 1 TABLET | Refills: 0 | Status: SHIPPED | OUTPATIENT
Start: 2025-03-27 | End: 2025-03-27

## 2025-04-01 ENCOUNTER — VIRTUAL VISIT (OUTPATIENT)
Dept: PHARMACY | Facility: CLINIC | Age: 30
End: 2025-04-01
Attending: PEDIATRICS
Payer: COMMERCIAL

## 2025-04-01 VITALS — BODY MASS INDEX: 33.04 KG/M2 | HEIGHT: 61 IN | WEIGHT: 175 LBS

## 2025-04-01 DIAGNOSIS — E66.01 MORBID OBESITY (H): Primary | ICD-10-CM

## 2025-04-01 DIAGNOSIS — K86.89 PANCREATIC INSUFFICIENCY: ICD-10-CM

## 2025-04-01 ASSESSMENT — PAIN SCALES - GENERAL: PAINLEVEL_OUTOF10: NO PAIN (0)

## 2025-04-01 NOTE — NURSING NOTE
Current patient location:  at work     Is the patient currently in the state of [place]? YES    Visit mode:TELEPHONE    If the visit is dropped, the patient can be reconnected by: cell phone:   Telephone Information:   Mobile [phone]       Will anyone else be joining the visit? NO  (If patient encounters technical issues they should call [phone]    Are changes needed to the allergy or medication list? Pt stated no changes to allergies and Pt stated no med changes    Are refills needed on medications prescribed by this physician? YES    Reason for visit: Medication Therapy Management    Rabia Terrell VVF

## 2025-04-01 NOTE — PATIENT INSTRUCTIONS
"Recommendations from today's MTM visit:                                                       Remain off Naltrexone since already stopped.   Wegovy 1.7 mg refills sent to [facility].   Follow up with Dr. Park as planned in 3 months.     Follow-up: Return in about 6 months (around 10/1/2025) for Medication Therapy Management Pharmacist Visit.  Appointments in Next Year      Jun 13, 2025 12:20 PM  (Arrive by 12:05 PM)  Return Weight Management Visit with Charles Park MD  [facility] ) [phone]     Jun 17, 2025 10:30 AM  (Arrive by 10:15 AM)  CF LOOP with  PFL CF  [facility] Pul[facility] Function [facility] ) [phone]     Jun 17, 2025 11:10 AM  (Arrive by 10:55 AM)  RETURN CYSTIC FIBROSIS VISIT with Hugo Prieto MD  [facility] ) [phone]     Jun 17, 2025 12:10 PM  (Arrive by 11:55 AM)  RETURN CYSTIC FIBROSIS VISIT with Rabia Gray PA-C  [facility] [place] ([facility] ) [phone]            It was great speaking with you today.  I value your experience and would be very thankful for your time in providing feedback in our clinic survey. In the next few days, you may receive an email or text message from [facility] [employer] with a link to a survey related to your  clinical pharmacist.\"     To schedule another MTM appointment, please call the clinic directly or you may call the MTM scheduling line at [phone] or toll-free at [phone].     My Clinical Pharmacist's contact information:                                                      Please feel free to contact me with any questions or concerns you have.      Lauren Bloch, PharmD  Medication " Therapy Management Pharmacist   [facility]

## 2025-04-01 NOTE — PROGRESS NOTES
Medication Therapy Management (MTM) Encounter    ASSESSMENT:                            Medication Adherence/Access: No issues identified.    Weight Management:   Discussed potential of dose increase versus continue Wegovy at this dose, patient preferring to continue at this Wegovy 1.7 mg weekly dose for now. Discussed potential of tapering off topiramate in future if Wegovy dose was escalated.     Pancreatic Insufficiency/Nutrition:   To transition to omeprazole 20 mg daily as planned once out of 40 mg.     PLAN:                            Remain off Naltrexone since already stopped.   Wegovy 1.7 mg refills sent to [facility].   Follow up with Dr. Park as planned in 3 months.   Transition to omeprazole 20 mg daily after finished 40 mg daily at home.     Follow-up: Return in about 6 months (around 10/1/2025) for Medication Therapy Management Pharmacist Visit.    SUBJECTIVE/OBJECTIVE:                          Mamie Rice is a 29 year old female seen for a follow-up visit.       Reason for visit: Weight Management follow up.    Allergies/ADRs: Reviewed in chart  Past Medical History: Reviewed in chart  Tobacco: She reports that she has never smoked. She has never used smokeless tobacco.  Alcohol: not currently using    Medication Adherence/Access: no issues reported.    Weight Management   Wegovy 1.7 mg weekly   Bupropion  mg daily - depression  - self stopped   Topiramate 75 mg twice daily      Enrolled in a clinical trial here at the [facility] evaluating the use of GLP1 agonists in CFRD. Last [facility]10[facility]. No side effects. Not snacking as often as historical. She reports people are noticing differences. She reports she self stopped naltrexone. Remained on bupropion due to assistance with mood.   Nutrition/Eating Habits: she is getting in 2 meals per day.  Lunch: egg bites. Snack: fruit. Supper:  "meat + potato + vegetable; spaghetti, burgers, lasagnas. Sometimes protein at meals sometimes not. Feels she needs to be better with water intake.   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     Initial Consult Weight: 182 lb     Current weight today: 175 lbs 0 oz  Cumulative Weight Loss: -7 lb, -3.9% from baseline    Wt Readings from Last 4 Encounters:   04/01/25 175 lb (79.4 kg)   02/25/25 176 lb 9.6 oz (80.1 kg)   02/25/25 176 lb 9.6 oz (80.1 kg)   01/10/25 180 lb (81.6 kg)     Estimated body mass index is 33.08 kg/m  as calculated from the following:    Height as of this encounter: 5' 0.98\" (1.549 m).    Weight as of this encounter: 175 lb (79.4 kg).    Pancreatic Insufficiency/Nutrition:   Acid reducer: omeprazole 40 mg daily  Vitamins include: vitamin D 2000 units daily, vitamin E 400 units twice daily, vitamin C 500 mg twice daily, and Mephyton 5 mg weekly    Hasn't started the omeprazole 20 mg yet, finishing 40 mg, plans to start this soon. No hemoptysis, Mamie declines treatment with pancreatic enzymes. Patient is not experiencing sign/symptoms of malabsorption.      Today's Vitals: Ht 5' 0.98\" (1.549 m)   Wt 175 lb (79.4 kg)   BMI 33.08 kg/m    ----------------      I spent 15 minutes with this patient today. All changes were made via collaborative practice agreement with Charles Park.     A summary of these recommendations was sent via nano[employer].    Lauren Bloch, PharmD, BCACP   Medication Therapy Management Pharmacist   [facility]      Telemedicine Visit Details  The patient's medications can be safely assessed via a telemedicine encounter.  Type of service:  Telephone visit  Originating Location (pt. Location): Home    Distant Location (provider location):  Off-site  Start Time:  11:07 AM  End Time:  11:22 AM     Medication Therapy Recommendations  Morbid obesity (H)   1 Current Medication: naltrexone (DEPADE/REVIA) 50 " MG tablet (Discontinued)   Current Medication Sig: Take 1 tablet (50 mg) by mouth daily   Rationale: More effective medication available - Ineffective medication - Effectiveness   Recommendation: Discontinue Medication - Wegovy 0.25 MG/0.5ML Soaj   Status: Accepted per CPA   Identified Date: 11/12/2024 Completed Date: 3/2/2025         2 Current Medication: topiramate (TOPAMAX) 50 MG tablet (Discontinued)   Current Medication Sig: Take one 50 mg pill and one 25 mg pill twice a day   Rationale: Unsafe medication for the patient - Adverse medication event - Safety   Recommendation: Discontinue Medication   Status: No Longer Relevant   Identified Date: 11/12/2024 Completed Date: 4/1/2025

## 2025-04-15 ENCOUNTER — TELEPHONE (OUTPATIENT)
Dept: PULMONOLOGY | Facility: CLINIC | Age: 30
End: 2025-04-15
Payer: COMMERCIAL

## 2025-04-15 DIAGNOSIS — E84.9 CYSTIC FIBROSIS (H): ICD-10-CM

## 2025-04-15 RX ORDER — ELEXACAFTOR, TEZACAFTOR, AND IVACAFTOR 100-50-75
KIT ORAL
Qty: 84 TABLET | Refills: 4 | Status: SHIPPED | OUTPATIENT
Start: 2025-04-15

## 2025-04-15 NOTE — TELEPHONE ENCOUNTER
[facility] Call Center    Phone Message    May a detailed message be left on voicemail: yes     Reason for Call: Medication Refill Request    Has the patient contacted the pharmacy for the refill? Yes   Name of medication being requested:   elexacaftor-tezacaftor-ivacaftor & ivacaftor (TRIKAFTA) 100-50-75 & 150 MG tablet pack   Provider who prescribed the medication: Rabia Gray PA-C  Pharmacy:   [facility] [id number]   Date medication is needed: Asap    Action Taken: Message routed to:  [facility] PULM    Travel Screening: Not Applicable     Date of Service: 4/15/25

## 2025-05-04 DIAGNOSIS — E84.0 CYSTIC FIBROSIS WITH PULMONARY MANIFESTATIONS (H): ICD-10-CM

## 2025-05-04 DIAGNOSIS — K86.89 PANCREATIC INSUFFICIENCY: ICD-10-CM

## 2025-05-04 DIAGNOSIS — B44.9 ASPERGILLOSIS (H): ICD-10-CM

## 2025-05-05 RX ORDER — PHYTONADIONE 5 MG/1
TABLET ORAL
Qty: 4 TABLET | Refills: 11 | Status: SHIPPED | OUTPATIENT
Start: 2025-05-05

## 2025-05-30 ENCOUNTER — TELEPHONE (OUTPATIENT)
Dept: PULMONOLOGY | Facility: CLINIC | Age: 30
End: 2025-05-30

## 2025-05-30 NOTE — TELEPHONE ENCOUNTER
Prior Authorization Retail Medication Request    Medication/Dose: Vitamin E 180 MG   Diagnosis and ICD code (if different than what is on RX):    New/renewal/insurance change PA/secondary ins. PA:  Previously Tried and Failed:    Rationale:      Insurance   Primary:   Insurance ID:      Secondary (if applicable):  Insurance ID:      Pharmacy Information (if different than what is on RX)  Name:    Phone:    Fax:    Clinic Information  Preferred routing pool for dept communication:

## 2025-05-31 ENCOUNTER — MYC REFILL (OUTPATIENT)
Dept: ENDOCRINOLOGY | Facility: CLINIC | Age: 30
End: 2025-05-31
Payer: COMMERCIAL

## 2025-05-31 DIAGNOSIS — E66.01 MORBID OBESITY (H): ICD-10-CM

## 2025-05-31 DIAGNOSIS — F33.0 MILD EPISODE OF RECURRENT MAJOR DEPRESSIVE DISORDER: ICD-10-CM

## 2025-05-31 RX ORDER — TOPIRAMATE 50 MG/1
TABLET, FILM COATED ORAL
Qty: 90 TABLET | Refills: 3 | Status: CANCELLED | OUTPATIENT
Start: 2025-05-31

## 2025-05-31 RX ORDER — BUPROPION HYDROCHLORIDE 300 MG/1
300 TABLET ORAL EVERY MORNING
Qty: 90 TABLET | Refills: 3 | Status: CANCELLED | OUTPATIENT
Start: 2025-05-31

## 2025-06-01 NOTE — TELEPHONE ENCOUNTER
Prior Authorization Not Needed per Insurance    Medication: VITAMIN E 180 MG (400 UNIT) PO CAPS  Insurance Company: [facility] [employer] [facility] - Phone [phone] Fax [phone]  Expected CoPay: $    Pharmacy Filling the Rx: [facility] - [address]  Pharmacy Notified: YES  Patient Notified: Instructed pharmacy to notify patient once order is ready.     PER PC WITH [facility] NOT REQUIRED.

## 2025-06-04 ENCOUNTER — MYC REFILL (OUTPATIENT)
Dept: PSYCHIATRY | Facility: CLINIC | Age: 30
End: 2025-06-04
Payer: COMMERCIAL

## 2025-06-04 DIAGNOSIS — F41.1 GAD (GENERALIZED ANXIETY DISORDER): ICD-10-CM

## 2025-06-04 RX ORDER — BUSPIRONE HYDROCHLORIDE 30 MG/1
30 TABLET ORAL 2 TIMES DAILY
Qty: 180 TABLET | Refills: 1 | Status: CANCELLED | OUTPATIENT
Start: 2025-06-04

## 2025-06-09 ENCOUNTER — MYC MEDICAL ADVICE (OUTPATIENT)
Dept: PULMONOLOGY | Facility: CLINIC | Age: 30
End: 2025-06-09
Payer: COMMERCIAL

## 2025-06-09 DIAGNOSIS — E84.9 CYSTIC FIBROSIS (H): Primary | ICD-10-CM

## 2025-06-09 DIAGNOSIS — K86.81 EXOCRINE PANCREATIC INSUFFICIENCY: ICD-10-CM

## 2025-06-09 DIAGNOSIS — E84.0 CYSTIC FIBROSIS WITH PULMONARY MANIFESTATIONS (H): ICD-10-CM

## 2025-06-09 RX ORDER — MULTIVIT-MIN/IRON/FOLIC ACID/K 18-600-40
1 CAPSULE ORAL DAILY
Qty: 90 CAPSULE | Refills: 3 | Status: SHIPPED | OUTPATIENT
Start: 2025-06-09

## 2025-06-09 RX ORDER — SULFAMETHOXAZOLE AND TRIMETHOPRIM 800; 160 MG/1; MG/1
1 TABLET ORAL 3 TIMES DAILY
Qty: 30 TABLET | Refills: 0 | Status: SHIPPED | OUTPATIENT
Start: 2025-06-09 | End: 2025-06-19

## 2025-06-09 NOTE — TELEPHONE ENCOUNTER
Called and spoke with patient. Relayed provider recommendation.    Patient verbalized understanding

## 2025-06-13 NOTE — MR AVS SNAPSHOT
After Visit Summary   4/18/2018    Mamie Rice    MRN: [id number]           Patient Information     Date Of Birth          1995        Visit Information        Provider Department      4/18/2018 9:00 AM [facility]        Today's Diagnoses     Cystic fibrosis (H)    -  1    Pancreatic insufficiency        Major depressive disorder        Primary insomnia        Overactive child        Moderate episode of recurrent major depressive disorder (H)           Follow-ups after your visit        Your next 10 appointments already scheduled     May 01, 2018  1:45 PM CDT   (Arrive by 1:30 PM)   Return Visit with Randa Ho MD   [facility] ([facility])    [address] [place] [address]  [address] [facility] [address]   [phone]            May 15, 2018  9:45 AM CDT   Adult Med Follow UP with DANIEL Mtz CNS   [facility] ([facility])    [address]  [address] [address] [phone]            May 30, 2018  1:45 PM CDT   (Arrive by 1:30 PM)   Return Visit with Mariela Haile MD   [facility] ([facility])    [address] [facility]  [address] [facility] [id number] [phone]            Jul 18, 2018  8:45 AM CDT   (Arrive by 8:30 AM)   Return Visit with Paige Reid PA-C   [facility] Dermatology ([facility])    [address] [place] [address]  3rd [facility] [address]   [phone]            Jul 18, 2018  9:30 AM CDT   CF LOOP with  PFL CF   [facility] Pulmonary Function Testing ([facility])    [address] [address]   [phone]            Jul 18, 2018  9:50 AM CDT   (Arrive by 9:35 AM)   RETURN CYSTIC FIBROSIS VISIT with Gavi Allison MD     [facility] ([facility])    [address]  [address]  [facility] [address] [phone]              Who to contact     If you have questions or need follow up information about today's clinic visit or your schedule please contact [facility] CYSTIC FIBROSIS CENTER [facility] directly at [phone].  Normal or non-critical lab and imaging results will be communicated to you by MyChart, letter or phone within 4 business days after the clinic has received the results. If you do not hear from us within 7 days, please contact the clinic through [facility] or phone. If you have a critical or abnormal lab result, we will notify you by phone as soon as possible.  Submit refill requests through [employer] or call your pharmacy and they will forward the refill request to us. Please allow 3 business days for your refill to be completed.          Additional Information About Your Visit        [facility] Information     [facility] gives you secure access to your electronic health record. If you see a primary care provider, you can also send messages to your care team and make appointments. If you have questions, please call your primary care clinic.  If you do not have a primary care provider, please call [phone] and they will assist you.        Care EveryWhere ID     This is your Care EveryWhere ID. This could be used by other organizations to access your [facility] medical records  [id number]         Blood Pressure from Last 3 Encounters:   04/18/18 120/83   01/17/18 114/68   11/01/17 126/86    Weight from Last 3 Encounters:   04/18/18 166 lb (75.3 kg)   03/21/18 166 lb (75.3 kg)   01/17/18 165 lb 5.5 oz (75 kg)              Today, you had the following     No orders found for display         Today's Medication Changes          These changes are accurate as of 4/18/18 11:59 PM.  If you have any questions, ask your nurse or doctor.               Start  taking these medicines.        Dose/Directions    aluminum chloride 20 % external solution   Commonly known as:  DRYSOL   Used for:  Focal hyperhidrosis   Started by:  Paige Reid PA-C        Apply topically At Bedtime Everyother night to every 3rd night. With impovement, decrease to 1-2 times weekly. Irritation may occur.   Quantity:  60 mL   Refills:  3       polyethylene glycol powder   Commonly known as:  MIRALAX   Used for:  Cystic fibrosis of the lung (H), Constipation, unspecified constipation type   Started by:  Gavi Allison MD        Dose:  1 capful   Take 17 g (1 capful) by mouth daily as needed for constipation   Quantity:  119 g   Refills:  3         These medicines have changed or have updated prescriptions.        Dose/Directions    * meropenem 500 MG vial   Commonly known as:  MERREM   This may have changed:  Another medication with the same name was removed. Continue taking this medication, and follow the directions you see here.   Changed by:  Gavi Allison MD        Quantity:  60 each   Refills:  0       * meropenem 500 MG vial   Commonly known as:  MERREM   This may have changed:  Another medication with the same name was removed. Continue taking this medication, and follow the directions you see here.   Changed by:  Gavi Allison MD        Give x 1 today   Quantity:  1 each   Refills:  0       * Notice:  This list has 2 medication(s) that are the same as other medications prescribed for you. Read the directions carefully, and ask your doctor or other care provider to review them with you.      Stop taking these medicines if you haven't already. Please contact your care team if you have questions.     beclomethasone 80 MCG/ACT Inhaler   Commonly known as:  QVAR   Stopped by:  Gavi Allison MD           ISOtretinoin 30 MG Caps   Stopped by:  Gavi Allison MD           mupirocin 2 % ointment   Commonly known as:  BACTROBAN    Stopped by:  Gavi Allison MD                Where to get your medicines      These medications were sent to [facility] - [place], [place] - [address]  [address] [address]     Phone:  [phone]     aluminum chloride 20 % external solution    polyethylene glycol powder                Primary Care Provider Office Phone # Fax #    Malik De La Rosa [phone]       [address] [facility] [address]        Equal Access to Services     [facility] : Hadii martin good hadasho Soomaali, waaxda luqadaha, qaybta kaalmada adeegyada, waxsilver rutledge . So [facility] [phone].    ATENCIÓN: Si habla español, tiene a hidalgo disposición servicios gratuitos de asistencia lingüística. [facility] [phone].    We comply with applicable federal civil rights laws and [place] laws. We do not discriminate on the basis of race, color, national origin, age, disability, sex, sexual orientation, or gender identity.            Thank you!     Thank you for choosing [facility] CYSTIC FIBROSIS CENTER [facility]  for your care. Our goal is always to provide you with excellent care. Hearing back from our patients is one way we can continue to improve our services. Please take a few minutes to complete the written survey that you may receive in the mail after your visit with us. Thank you!             Your Updated Medication List - Protect others around you: Learn how to safely use, store and throw away your medicines at www.disposemymeds.org.          This list is accurate as of 4/18/18 11:59 PM.  Always use your most recent med list.                   Brand Name Dispense Instructions for use Diagnosis    acetaminophen 325 MG tablet    TYLENOL    100 tablet    Take 2 tablets (650 mg) by mouth every 4 hours as needed for other (mild pain)    Chronic pain of left knee       acetylcysteine 20 % nebulizer solution     MUCOMYST    360 mL    INHALE ONE 4ML NEB INTO LUNGS VIA NEBULIZER TWO TIMES A DAY, MAY INCREASE TO 3-4 TIMES A DAY WITH INCREASE COUGH/COLD SYMPTOMS    CF (cystic fibrosis) (H)       * albuterol 108 (90 Base) MCG/ACT Inhaler    PROAIR HFA/PROVENTIL HFA/VENTOLIN HFA    1 Inhaler    Inhale 2 puffs into the lungs every 6 hours as needed for shortness of breath / dyspnea or wheezing    Cystic fibrosis with pulmonary manifestations (H), Sinusitis, chronic, Diabetes mellitus type 1 (H)       * albuterol (2.5 MG/3ML) 0.083% neb solution     360 mL    Take 1 vial (2.5 mg) by nebulization 4 times daily    CF (cystic fibrosis) (H)       aluminum chloride 20 % external solution    DRYSOL    60 mL    Apply topically At Bedtime Everyother night to every 3rd night. With impovement, decrease to 1-2 times weekly. Irritation may occur.    Focal hyperhidrosis       * amphetamine-dextroamphetamine 20 MG per 24 hr capsule    ADDERALL XR    30 capsule    Take 1 capsule (20 mg) by mouth daily    Attention deficit hyperactivity disorder (ADHD), predominantly inattentive type       * amphetamine-dextroamphetamine 20 MG per 24 hr capsule    ADDERALL XR    30 capsule    Take 1 capsule (20 mg) by mouth daily    Attention deficit hyperactivity disorder (ADHD), predominantly inattentive type       * amphetamine-dextroamphetamine 20 MG per 24 hr capsule    ADDERALL XR    30 capsule    Take 1 capsule (20 mg) by mouth daily    Attention deficit hyperactivity disorder (ADHD), predominantly inattentive type       ascorbic acid 500 MG tablet    VITAMIN C    100 tablet    TAKE ONE TABLET BY MOUTH TWICE A DAY    Cystic fibrosis with pulmonary manifestations (H)       azithromycin 500 MG tablet    ZITHROMAX    36 tablet    TAKE ONE TABLET BY MOUTH ON MONDAY, WEDNESDAY AND FRIDAY    CF (cystic fibrosis) (H)       beta carotene 54857 UNIT capsule     36 capsule    Take 1 capsule (25,000 Units) by mouth Every Mon, Wed, Fri Morning    Cystic fibrosis  with pulmonary manifestations (H)       blood glucose monitoring test strip    ACCU-CHEK SMARTVIEW    1 Month    Use to test blood sugar 4 times daily or as directed.    Type I (juvenile type) diabetes mellitus without mention of complication, not stated as uncontrolled       buPROPion 150 MG 12 hr tablet    WELLBUTRIN SR    60 tablet    Take 1 tablet (150 mg) by mouth 2 times daily    Major depressive disorder, recurrent episode, mild (H)       cetirizine 10 MG tablet    zyrTEC    30 tablet    Take 1 tablet (10 mg) by mouth every evening    Allergic rhinitis due to [employer] house dust mite, Urticaria, chronic       cholecalciferol 1000 UNIT tablet    vitamin D3    100 tablet    TAKE 1 TABLET BY MOUTH EVERY DAY    CF (cystic fibrosis) (H), Exocrine pancreatic insufficiency       DEPO-PROVERA IM           dornase alpha 1 MG/ML neb solution    PULMOZYME    75 mL    Inhale 2.5 mg into the lungs daily    Cystic fibrosis with pulmonary manifestations (H)       FLUoxetine 40 MG capsule    PROZAC    60 capsule    Take 2 capsules (80 mg) by mouth daily    Cystic fibrosis with pulmonary manifestations (H)       GLYCOPYRROLATE PO      Take 1 mg by mouth 2 times daily        hydrOXYzine 25 MG tablet    ATARAX    90 tablet    Take 1 tablet (25 mg) by mouth every 6 hours as needed for itching (and nausea) And one to two at bedtime for insomnia    Chronic pain of left knee       MEPHYTON 5 MG tablet   Generic drug:  phytonadione     4 tablet    TAKE 1 TABLET BY MOUTH ONCE A WEEK    Cystic fibrosis with pulmonary manifestations (H), Cystic fibrosis with pulmonary manifestations (H), Aspergillosis (H), Pancreatic insufficiency       * meropenem 500 MG vial    MERREM    60 each         * meropenem 500 MG vial    MERREM    1 each    Give x 1 today        methylcellulose (laxative) Powd    CITRUCEL    479 g    Start with 1 heaping tablespoon. Increase as needed, 1 heaping tablespoon at a time, up to 3 times per day.    Other  constipation       montelukast 10 MG tablet    SINGULAIR    30 tablet    TAKE 1 TABLET BY MOUTH DAILY AT BEDTIME    CF (cystic fibrosis) (H)       MULTIVITAMIN PO      Take 1 tablet by mouth daily.        * naltrexone 50 MG tablet    DEPADE;REVIA    30 tablet    Take 1/2 Tablet daily then increase to maximum of 1 Full Tablet daily as tolerated.  Time it one to two hours prior to worst cravings    Non morbid obesity due to excess calories       * naltrexone 50 MG tablet    DEPADE;REVIA    60 tablet    Take 1 tablet.  Time it one to two hours prior to worst cravings.    Obesity       omeprazole 20 MG CR capsule    priLOSEC    60 capsule    TAKE 1 CAPSULE BY MOUTH TWICE A DAY    CF (cystic fibrosis) (H)       oseltamivir 75 MG capsule    TAMIFLU    10 capsule    Take 1 capsule (75 mg) by mouth daily    Cystic fibrosis with pulmonary manifestations (H)       polyethylene glycol powder    MIRALAX    119 g    Take 17 g (1 capful) by mouth daily as needed for constipation    Cystic fibrosis of the lung (H), Constipation, unspecified constipation type       traZODone 100 MG tablet    DESYREL    30 tablet    Take 1 tablet (100 mg) by mouth At Bedtime    Insomnia, unspecified type       vitamin E 400 UNIT capsule     60 capsule    TAKE 1 CAPSULE BY MOUTH TWICE A DAY    CF (cystic fibrosis) (H), Pancreatic insufficiency       * Notice:  This list has 9 medication(s) that are the same as other medications prescribed for you. Read the directions carefully, and ask your doctor or other care provider to review them with you.       regular

## 2025-06-15 ENCOUNTER — HEALTH MAINTENANCE LETTER (OUTPATIENT)
Age: 30
End: 2025-06-15

## 2025-06-16 NOTE — PROGRESS NOTES
[facility]  June 17, 2025         Assessment and Plan:   Mamie Rice is a 29 year old female with h/o CF lung disease with normal spirometry, CF sinus disease, CFRD, pancreatic insufficiency, ADHD, MECHE, and depression who is seen for routine follow up.      1. Cystic fibrosis: no pulmonary complaints, notes her symptoms last week were mainly sinus related (and also has two teeth that are going to get pulled). Did nebs and vesting X 2 times. Sating 97% on room air. PFTs improved today, still below her best.  Previous cultures + for MSSA with additional h/o MRSA (last 2019), HIB, and Achromobacter.   - Continue nebs and vesting PRN    2. CFTR modulation: tolerating Trikafta well. Labs today WNL.   - Recheck labs in August locally  - Continue Trikafta    3. Cystic fibrosis sinus disease: h/o multiple endoscopic sinus surgeries (last 2015). Finishing up a course of Bactrim and feeling better with her rinses.   - Complete course of Bactrim and continue with rinses     4. Exocrine pancreatic insufficiency: no signs of malabsorption. Has been started on Wegovy by the weight loss clinic. BMI >> CFF goal.  - Continue vitamins    5. GERD: patientt has some intermittent symptoms, but feels the lower dose of her PPI is tolerable.  - Discussed trigger foods  - Continue PPI daily    6. ADHD, depression and anxiety: following with a psychiatrist locally.     7. Abnormal OGTT: borderline with CFRD with fasting BS of 83, but two hour BS of 196. Interestingly, AIC of 4.9. Saw Endocrine today with plan for placement of CGM.     RTC: labs in August (CBC and hepatic panel) and follow up in 4 months  Annual studies due: February 2026 (DEXA > March 2026)  Preventative care:     Rabia Gray PA-C  Pulmonary, Allergy, Critical Care and Sleep Medicine        Interval History:     Going to [place] and then going on a cruise in [place]. 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 Started on Bactrim last week for sinus issues and was prescribed amoxicillin for her tooth. Getting a molar and a wisdom tooth removed last week. Symptoms are better today, headaches are unchanged, but wonders if it's from her jaw pain, as the pain radiate from her jaw. Some nasal congestion and drainage, sore throat has resolved. No cough, tightness or shortness of breath. Did do nebs and vesting x 2 times and sinus rinses with sterile water. No bloating or gas, stools are stable.          Review of Systems:   Please see HPI. Otherwise, complete 10 point ROS negative.           Past Medical and Surgical History:     Past Medical History:   Diagnosis Date    ADHD (attention deficit hyperactivity disorder)     Anxiety     Aspergillosis, with pneumonia (H)     fugus found caused chest pain    Chronic infection     CF, MRSA.,     Chronic sinusitis     Constipation, chronic     Cystic fibrosis with pulmonary manifestations (H) 12/19/2011    Cystic fibrosis without mention of meconium ileus     SWEAT TEST:Date: 2/17/1994   [facility]  296 mg, 104 mmol/L ClSample #2  295 mg, 104 mmol/L Cl GENOTYPING:Date: 10/15/2007,  Laboratory: [facility]: [id number]    Depressive disorder     Diabetes     no meds currently    Dysthymic disorder     Exocrine pancreatic insufficiency     Gastro-oesophageal reflux disease     Hip pain, right     MRSA (methicillin resistant Staphylococcus aureus) carrier     Pancreatic disease      Past Surgical History:   Procedure Laterality Date    ARTHROSCOPY HIP, OSTEOPLASTY FEMUR PROXIMAL, COMBINED  3/11/2013    Procedure: COMBINED ARTHROSCOPY HIP, OSTEOPLASTY FEMUR PROXIMAL;  Right Hip Arthroscopy, Labral  Debridement.    surgeon request choice anesthesia/admit to [facility] after surgery;  Surgeon: Omkar Austin MD;  [facility]    ARTHROSCOPY KNEE WITH MEDIAL MENISCECTOMY Left 1/31/2017    Procedure: ARTHROSCOPY KNEE WITH MEDIAL  MENISCECTOMY;  Surgeon: Jethro Coyle MD;  [facility] OR    bronchoscopies      BRONCHOSCOPY      EXAM UNDER ANESTHESIA ANUS N/A 5/10/2016    Procedure: EXAM UNDER ANESTHESIA ANUS;  Surgeon: Chet Gaviria MD;  [facility]    EXAM UNDER ANESTHESIA, RESTORATIONS, EXTRACTION(S) DENTAL COMPLEX, COMBINED  5/13/2013    Procedure: COMBINED EXAM UNDER ANESTHESIA, RESTORATIONS, EXTRACTION(S) DENTAL COMPLEX;  Dental Exam, Radiographs, Restorations. Single Extraction  Tooth #2. Restorations x 3;  Surgeon: Danilo Ortiz DDS;  [facility]    HC KNEE SCOPE, DIAGNOSTIC      Arthroscopy, Knee- left    INJECT BOTOX N/A 5/10/2016    Procedure: INJECT BOTOX;  Surgeon: Chet Gaviria MD;  [facility]    left hip labral tear  5/11/2011    left hip arthroscopy with labral debridement and synovectomy    meniscus repair      OPTICAL TRACKING SYSTEM ENDOSCOPIC SINUS SURGERY  10/14/2011    Procedure:OPTICAL TRACKING SYSTEM ENDOSCOPIC SINUS SURGERY; FESS (functional endoscopic sinus surgery) with Image Guidance, bronchial lavage and cultures; Surgeon:GOYO KUO; [facility] OR    OPTICAL TRACKING SYSTEM ENDOSCOPIC SINUS SURGERY  5/18/2012    Procedure:OPTICAL TRACKING SYSTEM ENDOSCOPIC SINUS SURGERY; Right  and Left Image Guided Functional Endoscopic Sinus Surgery With  Frontal Approach, [employer]; Surgeon:GOYO KUO; [facility]    OPTICAL TRACKING SYSTEM ENDOSCOPIC SINUS SURGERY  9/26/2012    Procedure: OPTICAL TRACKING SYSTEM ENDOSCOPIC SINUS SURGERY;  Stealth Guided Bilateral Functional Endoscopic Sinus Surgery *Latex Safe*;  Surgeon: Goyo Kuo MD;  [facility]    OPTICAL TRACKING SYSTEM ENDOSCOPIC SINUS SURGERY Bilateral 10/16/2015    Procedure: OPTICAL TRACKING SYSTEM ENDOSCOPIC SINUS SURGERY;  Surgeon: Mariela Haile MD;  [facility]    ORTHOPEDIC SURGERY      left hip tear repair 2010    SINUS SURGERY             Family History:     Family History   Problem  Relation Age of Onset    Cancer Paternal Grandmother     Skin Cancer Paternal Grandmother     Other Cancer Paternal Grandmother         Skin    Obesity Paternal Grandmother     Cancer Paternal Grandfather         PGF had throat cancer (he was a smoker)    Other Cancer Paternal Grandfather     Anxiety Disorder Paternal Grandfather     Thyroid Disease Mother         ,    Hypertension Mother     Obesity Other     ALS Father 67        2 male cousins with ALS as well    Anesthesia Reaction No family hx of     Blood Disease No family hx of     Colon Polyps No family hx of     Crohn's Disease No family hx of     Ulcerative Colitis No family hx of     Colon Cancer No family hx of     Melanoma No family hx of             Social History:     Social History     Socioeconomic History    Marital status: Single     Spouse name: Not on file    Number of children: Not on file    Years of education: Not on file    Highest education level: Not on file   Occupational History    Not on file   Tobacco Use    Smoking status: Never    Smokeless tobacco: Never    Tobacco comments:     one person at home smokes outside   Vaping Use    Vaping status: Never Used   Substance and Sexual Activity    Alcohol use: No     Alcohol/week: 0.0 standard drinks of alcohol    Drug use: No    Sexual activity: Never   Other Topics Concern     Service Not Asked    Blood Transfusions No    Caffeine Concern Not Asked    Occupational Exposure Not Asked    Hobby Hazards Not Asked    Sleep Concern Not Asked    Stress Concern Not Asked    Weight Concern Not Asked    Special Diet Not Asked    Back Care Not Asked    Exercise Yes    Bike Helmet Not Asked    Seat Belt Not Asked    Self-Exams Not Asked    Parent/sibling w/ CABG, MI or angioplasty before 65F 55M? Yes   Social History Narrative    Mamie lives with mother in [place], [place].  There is a cat in the home, but Mamie does not have any litterbox duties.  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  She gets essentially no exercise because of the tingling in her feet (says it bothers her even to stand).        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        8/2015 no change in social situation        2/15/2016 Pt is single and lives with mother and stepfather.     Social Drivers of Health     Financial Resource Strain: Patient Declined (8/6/2023)    Received from [facility]    Overall Financial Resource Strain (CARDIA)     Difficulty of Paying Living Expenses: Patient declined   Food Insecurity: No Food Insecurity (2/1/2025)    Received from [facility]    Hunger Vital Sign     Worried About Running Out of Food in the Last Year: Never true     Ran Out of Food in the Last Year: Never true   Transportation Needs: No Transportation Needs (2/1/2025)    Received from [facility]    PRAPARE - Transportation     Lack of Transportation (Medical): No     Lack of Transportation (Non-Medical): No   Physical Activity: Insufficiently Active (2/1/2025)    Received from [facility]    Exercise Vital Sign     Days of Exercise per Week: 2 days     Minutes of Exercise per Session: 10 min   Stress: No Stress Concern Present (6/29/2020)    Received from [facility]    [place] [employer] - Occupational Stress Questionnaire     Feeling of Stress : Only a little   Social Connections: Moderately Integrated (6/29/2020)    Received from [facility]    Social Connection and Isolation Panel [NHANES]     Frequency of Communication with Friends and Family: More than three times a week     Frequency of Social Gatherings with Friends and Family: Twice a week     Attends Christianity Services: 1 to 4 times per year     Active Member of Clubs or Organizations: Yes     Attends Club or Organization Meetings: More than 4 times per year     Marital Status: Never    Interpersonal Safety: Not At Risk (8/6/2023)    Received from [facility]     Humiliation, Afraid, Rape, and Kick questionnaire     Fear of Current or Ex-Partner: No     Emotionally Abused: No     Physically Abused: No     Sexually Abused: No   Housing Stability: Low Risk  (2/1/2025)    Received from [facility]    Housing Stability     What is your living situation today?: I have a steady place to live            Medications:     Current Outpatient Medications   Medication Sig Dispense Refill    acetylcysteine (MUCOMYST) 20 % neb solution INHALE 4ML VIA NEBULIZER TWO TIMES A DAY. MAY INCREASE TO 3-4 TIMES A DAY WITH INCREASED COUGH / COLD SYMPTOMS. REFRIGERATE VIAL AND DISCARD 96 HOURS AFTER OPENING 180 mL 11    albuterol (PROAIR HFA/PROVENTIL HFA/VENTOLIN HFA) 108 (90 Base) MCG/ACT inhaler Inhale 2 puffs into the lungs every 6 hours as needed for shortness of breath or wheezing 8.5 g 11    albuterol (PROVENTIL) (2.5 MG/3ML) 0.083% neb solution INHALE 1 VIAL ( 2.5MG) BY NEBULIZATION EVERY 4 HOURS AS NEEDED FOR FOR SHORTNESS OF BREATH OR WHEEZING 240 mL 11    amoxicillin (AMOXIL) 500 MG tablet Take 500 mg by mouth 2 times daily.      amphetamine-dextroamphetamine (ADDERALL XR) 20 MG 24 hr capsule TAKE ONE CAPSULE BY MOUTH ONCE EVERY DAY [FOR FILL ON OR AFTER 7-7-23] 30 capsule 0    blood glucose (ACCU-CHEK GUIDE) test strip Use to test blood sugar 4 times daily or as directed. 100 strip 11    blood glucose monitoring (ACCU-CHEK FASTCLIX) lancets Use to test blood sugar 4 times daily or as directed. 100 each 11    buPROPion (WELLBUTRIN XL) 300 MG 24 hr tablet Take 1 tablet (300 mg) by mouth every morning. 90 tablet 3    busPIRone HCl (BUSPAR) 30 MG tablet Take 1 tablet (30 mg) by mouth 2 times daily 180 tablet 1    Cholecalciferol (VITAMIN D) 50 MCG (2000 UT) CAPS Take 1 capsule by mouth daily. 90 capsule 3    Continuous Glucose Sensor (FREESTYLE WYATT 3 PLUS SENSOR) MISC 1 Application See Admin Instructions. Change sensor every 15 days 2 each 1    elexacaftor-tezacaftor-ivacaftor & ivacaftor  "(TRIKAFTA) 100-50-75 & 150 MG tablet pack TAKE 2 ORANGE TABS BY MOUTH IN THE A.M. AND 1 BLUE TAB IN THE P.M. 12 HOURS APART WITH FAT CONTAINING FOOD. DO NOT TAKE WITH GRAPEFRUIT. 84 tablet 4    FLUoxetine (PROZAC) 40 MG capsule TAKE TWO CAPSULES BY MOUTH EVERY DAY . 180 capsule 1    medroxyPROGESTERone (DEPO-PROVERA) 150 MG/ML IM injection Inject 150 mg into the muscle every 3 months      omeprazole (PRILOSEC) 20 MG DR capsule Take 1 capsule (20 mg) by mouth daily. 30 capsule 11    Semaglutide-Weight Management (WEGOVY) 1.7 MG/0.75ML pen Inject 1.7 mg subcutaneously once a week. 3 mL 5    sulfamethoxazole-trimethoprim (BACTRIM DS) 800-160 MG tablet Take 1 tablet by mouth 3 times daily for 10 days. 30 tablet 0    topiramate (TOPAMAX) 25 MG tablet Take one 50 mg tablet and one 25 mg tablet daily (75 mg daily total) 90 tablet 3    topiramate (TOPAMAX) 50 MG tablet Take one 50 mg tablet and one 25 mg tablet daily (75 mg daily total) 90 tablet 3    traZODone (DESYREL) 150 MG tablet Take 150 mg by mouth at bedtime      vitamin C (ASCORBIC ACID) 500 MG tablet Take 1 tablet (500 mg) by mouth 2 times daily. 100 tablet 3    Vitamin E 180 MG (400 UNIT) CAPS Take 1 capsule (400 Units) by mouth 2 times daily. 180 capsule 3     No current facility-administered medications for this visit.            Physical Exam:   /75   Pulse 88   Ht 1.568 m (5' 1.75\")   Wt 80.5 kg (177 lb 7.5 oz)   SpO2 97%   BMI 32.72 kg/m      GENERAL: alert, NAD  HEENT: NCAT, EOMI, anicteric sclera, no oral mucosal edema or erythema  Neck: no cervical or supraclavicular adenopathy  Respiratory: good air flow, mainly clear  CV: RRR, S1S2, no murmurs noted  Abdomen: normoactive BS, soft   Lymph: no edema  Neuro: AAO X 3, CN 2-12 grossly intact  Psychiatric: normal affect, good eye contact  Skin: no rash, jaundice or lesions on limited exam         Data:   All laboratory and imaging data reviewed.      Cystic Fibrosis Culture  Specimen Description "   Date Value Ref Range Status   06/08/2021 Sputum  Final   11/12/2019 Midstream Urine  Final   11/12/2019 Throat  Final    Culture Micro   Date Value Ref Range Status   06/08/2021 Heavy growth  Normal roberto carlos    Final   11/12/2019   Final    <10,000 colonies/mL  urogenital roberto carlos  Susceptibility testing not routinely done     11/12/2019 Light growth  Normal roberto carlos    Final   11/12/2019 Moderate growth  Staphylococcus aureus   (A)  Final        PFT interpretation:  Maneuver: valid and meets ATS guidelines  Normal spirometry  Compared to prior: FEV1 of 3.18 is 90 ml above prior

## 2025-06-17 ENCOUNTER — ALLIED HEALTH/NURSE VISIT (OUTPATIENT)
Dept: CARE COORDINATION | Facility: CLINIC | Age: 30
End: 2025-06-17

## 2025-06-17 ENCOUNTER — OFFICE VISIT (OUTPATIENT)
Dept: PULMONOLOGY | Facility: CLINIC | Age: 30
End: 2025-06-17
Attending: INTERNAL MEDICINE
Payer: COMMERCIAL

## 2025-06-17 ENCOUNTER — OFFICE VISIT (OUTPATIENT)
Dept: ENDOCRINOLOGY | Facility: CLINIC | Age: 30
End: 2025-06-17
Attending: INTERNAL MEDICINE
Payer: COMMERCIAL

## 2025-06-17 VITALS
OXYGEN SATURATION: 97 % | SYSTOLIC BLOOD PRESSURE: 112 MMHG | DIASTOLIC BLOOD PRESSURE: 75 MMHG | HEIGHT: 62 IN | WEIGHT: 177.47 LBS | HEART RATE: 88 BPM | BODY MASS INDEX: 32.66 KG/M2

## 2025-06-17 VITALS
HEART RATE: 88 BPM | SYSTOLIC BLOOD PRESSURE: 112 MMHG | OXYGEN SATURATION: 97 % | HEIGHT: 62 IN | DIASTOLIC BLOOD PRESSURE: 75 MMHG | WEIGHT: 177.47 LBS | BODY MASS INDEX: 32.66 KG/M2

## 2025-06-17 DIAGNOSIS — E84.0 CYSTIC FIBROSIS WITH PULMONARY MANIFESTATIONS (H): Primary | ICD-10-CM

## 2025-06-17 DIAGNOSIS — E84.9 CYSTIC FIBROSIS (H): ICD-10-CM

## 2025-06-17 DIAGNOSIS — E10.9 DIABETES MELLITUS TYPE 1 (H): ICD-10-CM

## 2025-06-17 DIAGNOSIS — J32.9 SINUSITIS, CHRONIC: ICD-10-CM

## 2025-06-17 DIAGNOSIS — E84.8 DIABETES MELLITUS RELATED TO CF (CYSTIC FIBROSIS) (H): Primary | ICD-10-CM

## 2025-06-17 DIAGNOSIS — E08.9 DIABETES MELLITUS RELATED TO CF (CYSTIC FIBROSIS) (H): Primary | ICD-10-CM

## 2025-06-17 DIAGNOSIS — Z13.9 RISK AND FUNCTIONAL ASSESSMENT: Primary | ICD-10-CM

## 2025-06-17 DIAGNOSIS — E84.9 CF (CYSTIC FIBROSIS) (H): ICD-10-CM

## 2025-06-17 DIAGNOSIS — E84.0 CYSTIC FIBROSIS WITH PULMONARY MANIFESTATIONS (H): ICD-10-CM

## 2025-06-17 LAB
EXPTIME-PRE: 4.72 SEC
FEF2575-%PRED-PRE: 110 %
FEF2575-PRE: 3.51 L/SEC
FEF2575-PRED: 3.17 L/SEC
FEFMAX-%PRED-PRE: 142 %
FEFMAX-PRE: 9.51 L/SEC
FEFMAX-PRED: 6.67 L/SEC
FEV1-%PRED-PRE: 115 %
FEV1-PRE: 3.18 L
FEV1FEV6-PRE: 86 %
FEV1FEV6-PRED: 85 %
FEV1FVC-PRE: 86 %
FEV1FVC-PRED: 86 %
FIFMAX-PRE: 5.43 L/SEC
FVC-%PRED-PRE: 114 %
FVC-PRE: 3.7 L
FVC-PRED: 3.21 L

## 2025-06-17 PROCEDURE — 87070 CULTURE OTHR SPECIMN AEROBIC: CPT | Performed by: PHYSICIAN ASSISTANT

## 2025-06-17 PROCEDURE — G0463 HOSPITAL OUTPT CLINIC VISIT: HCPCS | Performed by: PHYSICIAN ASSISTANT

## 2025-06-17 PROCEDURE — G0463 HOSPITAL OUTPT CLINIC VISIT: HCPCS | Performed by: INTERNAL MEDICINE

## 2025-06-17 RX ORDER — ALBUTEROL SULFATE 0.83 MG/ML
SOLUTION RESPIRATORY (INHALATION)
Qty: 240 ML | Refills: 11 | Status: SHIPPED | OUTPATIENT
Start: 2025-06-17

## 2025-06-17 RX ORDER — AMOXICILLIN 500 MG/1
500 TABLET, FILM COATED ORAL 2 TIMES DAILY
COMMUNITY
End: 2025-06-19

## 2025-06-17 RX ORDER — ACETYLCYSTEINE 200 MG/ML
SOLUTION ORAL; RESPIRATORY (INHALATION)
Qty: 180 ML | Refills: 11 | Status: SHIPPED | OUTPATIENT
Start: 2025-06-17

## 2025-06-17 RX ORDER — HYDROCHLOROTHIAZIDE 12.5 MG/1
1 CAPSULE ORAL SEE ADMIN INSTRUCTIONS
Qty: 2 EACH | Refills: 1 | Status: SHIPPED | OUTPATIENT
Start: 2025-06-17

## 2025-06-17 RX ORDER — ALBUTEROL SULFATE 90 UG/1
2 INHALANT RESPIRATORY (INHALATION) EVERY 6 HOURS PRN
Qty: 8.5 G | Refills: 11 | Status: SHIPPED | OUTPATIENT
Start: 2025-06-17

## 2025-06-17 ASSESSMENT — ANXIETY QUESTIONNAIRES
5. BEING SO RESTLESS THAT IT IS HARD TO SIT STILL: SEVERAL DAYS
1. FEELING NERVOUS, ANXIOUS, OR ON EDGE: NOT AT ALL
GAD7 TOTAL SCORE: 3
3. WORRYING TOO MUCH ABOUT DIFFERENT THINGS: NOT AT ALL
GAD7 TOTAL SCORE: 3
IF YOU CHECKED OFF ANY PROBLEMS ON THIS QUESTIONNAIRE, HOW DIFFICULT HAVE THESE PROBLEMS MADE IT FOR YOU TO DO YOUR WORK, TAKE CARE OF THINGS AT HOME, OR GET ALONG WITH OTHER PEOPLE: NOT DIFFICULT AT ALL
7. FEELING AFRAID AS IF SOMETHING AWFUL MIGHT HAPPEN: NOT AT ALL
6. BECOMING EASILY ANNOYED OR IRRITABLE: SEVERAL DAYS
2. NOT BEING ABLE TO STOP OR CONTROL WORRYING: NOT AT ALL

## 2025-06-17 ASSESSMENT — PATIENT HEALTH QUESTIONNAIRE - PHQ9
SUM OF ALL RESPONSES TO PHQ QUESTIONS 1-9: 10
5. POOR APPETITE OR OVEREATING: SEVERAL DAYS

## 2025-06-17 ASSESSMENT — PAIN SCALES - GENERAL: PAINLEVEL_OUTOF10: NO PAIN (0)

## 2025-06-17 NOTE — PATIENT INSTRUCTIONS
Cystic Fibrosis Self-Care Plan       Patient: Mamie Rice   MRN: [id number]   Clinic Date: June 17, 2025     RECOMMENDATIONS:  1. Continue nebulizers and vest therapy as needed.   2. Enjoy [employer] and the cruise!  3. Labs locally in August.     Annual Studies:   IGG   Date Value Ref Range Status   06/08/2021 672 610 - 1,616 mg/dL Final     Immunoglobulin G   Date Value Ref Range Status   02/25/2025 702 610 - 1,616 mg/dL Final     Insulin   Date Value Ref Range Status   02/25/2025 82.5 (H) 2.6 - 24.9 uU/mL Final   08/14/2019 41.0 (H) 3 - 25 mU/L Final     There are no preventive care reminders to display for this patient.    Pulmonary Function Tests  FEV1: amount of air you can blow out in 1 second  FVC: total amount of air you can take in and blow out    Your Goals:             Latest Ref Rng & Units 6/17/2025     9:53 AM   PFT   FVC L 3.70  P   FEV1 L 3.18  P   FVC% % 114  P   FEV1% % 115  P      P Preliminary result          Airway Clearance: The Most Important Way to Keep Your Lungs Healthy  Vest Settings:   Hill-Rom Frequencies: 8, 9, 10 Pressure 10 Then, Frequencies 18, 19, 20 Pressure 6     RespirTech: Quick Start with Pressure of     Do each frequency for 5 minutes; Deflate vest after each frequency & cough 3 times before beginning the next setting.    Vest and Neb Therapy should be done 1 times/day.    Good Nutrition Can Improve Lung Function and Overall Health    Take ALL of your vitamins with food    Take 1/2 of your enzymes before EVERY meal/snack and the other 1/2 mid-meal/snack    Wt Readings from Last 3 Encounters:   06/17/25 80.5 kg (177 lb 7.5 oz)   06/17/25 80.5 kg (177 lb 7.5 oz)   04/01/25 79.4 kg (175 lb)       Body mass index is 32.72 kg/m .         National CF Foundation Recommendations for BMI in CF Adults: Women: at least 22 Men: at least 23        Controlling Blood Sugars Helps Prevent Lung Infections & Improves Nutrition  Test blood sugar:    In the morning before eating (goal  is )    2 hours after a meal (goal is less than 150)    When pre-meal glucose is greater than 150 add correction    At bedtime (if less than 100 eat a snack with 15 grams of carbohydrates  Last A1C Results:   Hemoglobin A1C   Date Value Ref Range Status   02/25/2025 4.9 <5.7 % Final     Comment:     Normal <5.7%   Prediabetes 5.7-6.4%    Diabetes 6.5% or higher     Note: Adopted from ADA consensus guidelines.   06/08/2021 5.1 0 - 5.6 % Final     Comment:     Normal <5.7% Prediabetes 5.7-6.4%  Diabetes 6.5% or higher - adopted from ADA   consensus guidelines.           If diabetic, measure A1C every 6 months. Goal is under 7%.    Staying Healthy  Research: If you are interested in learning about research opportunities or have questions, please contact Mariana Smith at [phone] or [email or web address].     Foundation: Compass is a personalized resource service to help you with the insurance, financial, legal and other issues you are facing.  It's free, confidential and available to anyone with CF.  Ask your  for more information or contact Compass directly at [facility] ([phone]) or compass@cff.org, or learn more at cff.org/compass.       CF Nurse Line: Radha Dickey and KJ: [phone]  Chaparrita Dennis or Sharon Gutiérrez RT: [phone]    Theresa Ceja and Amy Andino , Dieticians: [phone]    Rhonda Esteves, Diabetes Nurse: [phone]   Anahy Saez: [phone] or Faith Zamudio at [phone], Social Workers  [email or web address]

## 2025-06-17 NOTE — LETTER
6/17/2025       RE: Mamie Rice  [address] [facility] [address]     Dear Colleague,    Thank you for referring your patient, Mamie Rice, to the [facility] [place] at [facility]. Please see a copy of my visit note below.    Mamie is a 29 year old who is being evaluated via a billable video visit.      CF Endocrinology Return Consultation:  Diabetes  :   Patient: Mamie Rice MRN# [id number]   YOB: 1995 Age: 29 year old   Date of Visit: 06/17/2025     Dear Dr. Allison:    I had the pleasure of seeing your patient, Mamie Rice in the [facility] Endocrinology Clinic, [facility], for a return consultation regarding CFRD and reactive hypoglycemia.           Problem list:     Patient Active Problem List    Diagnosis Date Noted     Pneumonia due to infectious organism, unspecified laterality, unspecified part of lung 02/13/2024     Priority: Medium     Morbid obesity (H) 01/14/2022     Priority: Medium     Knee pain 10/16/2019     Priority: Medium     Added automatically from request for surgery [id number]       Mild episode of recurrent major depressive disorder 05/15/2018     Priority: Medium     Insomnia, unspecified type 05/15/2018     Priority: Medium     MECHE (generalized anxiety disorder) 05/15/2018     Priority: Medium     Attention deficit hyperactivity disorder (ADHD), combined type 05/15/2018     Priority: Medium     Diabetes mellitus, type 2 (H) 12/07/2016     Priority: Medium     Overview:   Diabetes Mellitus Type 2  Per External Records       Acne vulgaris 10/19/2016     Priority: Medium     Non morbid obesity due to excess calories 08/24/2016     Priority: Medium     Persistent depressive disorder 02/29/2016     Priority: Medium     Overview:   Disorder Depressive Persistent (Formerly Dysthymia) NOS       Pancreatic insufficiency 11/13/2014     Priority: Medium     Fecal elastase < 50        Back pain 10/22/2014     Priority: Medium     Frontal sinusitis 05/10/2012     Priority: Medium     Chronic constipation 03/04/2012     Priority: Medium     Cystic fibrosis of the lung (H) 12/19/2011     Priority: Medium     SWEAT TEST:  Date: 2/17/1994          Laboratory: [facility]  Sample #1  296 mg           104 mmol/L Cl  Sample #2  295 mg           104 mmol/L Cl     GENOTYPING:  Date: 10/15/2007               Laboratory: [facility]  Genotype: [id number]  Poly T Variant:  [  ]/[  ]         Hypoglycemia 10/28/2011     Priority: Medium     Reactive hypoglycemia  updating diagnosis code for icd10 cutover       Chronic maxillary sinusitis 09/08/2011     Priority: Medium     Aspergillosis (H) 07/21/2011     Priority: Medium     Hip joint pain 04/11/2011     Priority: Medium     Cystic fibrosis (H) 02/27/2011     Priority: Medium            HPI:   Mamie is a 29 year old female with Cystic Fibrosis Related Diabetes Mellitus (CFRD).    29 year old female with history of cystic fibrosis related diabetes and reactive hypoglycemia.    Last seen in CFRD clinic in 3/2024  She follows with weight management clinic as well  On Wegovy and loperamide  Denies any side effects from Wegovy    History of CF-related diabetes.  Has been off insulin for several years due to concern about hypoglycemia  Also has history of reactive hypoglycemia  Denies any acute concerns today  Reports that she experiences postprandial symptoms of reactive hypoglycemia about couple of times a month which improved with eating.  She has not confirmed with checking fingerstick glucose recently    Currently not checking fingerstick glucose or using CGM  Most recent A1c was 4.9% and fasting glucose 83 and 2-hour glucose 196 on recent OGTT    Patient has pancreatic insufficiency  On Trikafta     Latest Reference Range & Units 02/25/25 08:56 02/25/25 09:36 02/25/25 10:06 02/25/25 10:36 02/25/25 11:06   Glucose 70 - 99 mg/dL 83  83 166 (H) 262 (H) 255 (H) 196 (H)    Hemoglobin A1C <5.7 % 4.9       Insulin 2.6 - 24.9 uU/mL 5.9  38.2 (H) 62.1 (H) 82.5 (H)   (H): Data is abnormally high  (L): Data is abnormally low    Current insulin regimen: None          Past Medical History:     Past Medical History:   Diagnosis Date     ADHD (attention deficit hyperactivity disorder)      Anxiety      Aspergillosis, with pneumonia (H)     fugus found caused chest pain     Chronic infection     CF, MRSA.,      Chronic sinusitis      Constipation, chronic      Cystic fibrosis with pulmonary manifestations (H) 12/19/2011     Cystic fibrosis without mention of meconium ileus     SWEAT TEST:Date: 2/17/1994   [facility]  296 mg, 104 mmol/L ClSample #2  295 mg, 104 mmol/L Cl GENOTYPING:Date: 10/15/2007,  Laboratory: [facility]: [id number]     Depressive disorder      Diabetes     no meds currently     Dysthymic disorder      Exocrine pancreatic insufficiency      Gastro-oesophageal reflux disease      Hip pain, right      MRSA (methicillin resistant Staphylococcus aureus) carrier      Pancreatic disease             Past Surgical History:     Past Surgical History:   Procedure Laterality Date     ARTHROSCOPY HIP, OSTEOPLASTY FEMUR PROXIMAL, COMBINED  3/11/2013    Procedure: COMBINED ARTHROSCOPY HIP, OSTEOPLASTY FEMUR PROXIMAL;  Right Hip Arthroscopy, Labral  Debridement.    surgeon request choice anesthesia/admit to [facility] after surgery;  Surgeon: Omkar Austin MD;  [facility]     ARTHROSCOPY KNEE WITH MEDIAL MENISCECTOMY Left 1/31/2017    Procedure: ARTHROSCOPY KNEE WITH MEDIAL MENISCECTOMY;  Surgeon: Jethro Coyle MD;  [facility]     bronchoscopies       BRONCHOSCOPY       EXAM UNDER ANESTHESIA ANUS N/A 5/10/2016    Procedure: EXAM UNDER ANESTHESIA ANUS;  Surgeon: Chet Gaviria MD;  [facility]     EXAM UNDER ANESTHESIA, RESTORATIONS, EXTRACTION(S) DENTAL COMPLEX, COMBINED  5/13/2013    Procedure: COMBINED EXAM UNDER  ANESTHESIA, RESTORATIONS, EXTRACTION(S) DENTAL COMPLEX;  Dental Exam, Radiographs, Restorations. Single Extraction  Tooth #2. Restorations x 3;  Surgeon: Danilo Ortiz DDS;  [facility]: [facility] OR     HC KNEE SCOPE, DIAGNOSTIC      Arthroscopy, Knee- left     INJECT BOTOX N/A 5/10/2016    Procedure: INJECT BOTOX;  Surgeon: Chet Gaviria MD;  [facility]     left hip labral tear  5/11/2011    left hip arthroscopy with labral debridement and synovectomy     meniscus repair       OPTICAL TRACKING SYSTEM ENDOSCOPIC SINUS SURGERY  10/14/2011    Procedure:OPTICAL TRACKING SYSTEM ENDOSCOPIC SINUS SURGERY; FESS (functional endoscopic sinus surgery) with Image Guidance, bronchial lavage and cultures; Surgeon:GOYO KUO; Location:[facility] OR     OPTICAL TRACKING SYSTEM ENDOSCOPIC SINUS SURGERY  5/18/2012    Procedure:OPTICAL TRACKING SYSTEM ENDOSCOPIC SINUS SURGERY; Right  and Left Image Guided Functional Endoscopic Sinus Surgery With  Frontal Approach, [place]; Surgeon:GOYO KUO; [facility]     OPTICAL TRACKING SYSTEM ENDOSCOPIC SINUS SURGERY  9/26/2012    Procedure: OPTICAL TRACKING SYSTEM ENDOSCOPIC SINUS SURGERY;  Stealth Guided Bilateral Functional Endoscopic Sinus Surgery *Latex Safe*;  Surgeon: Goyo Kuo MD;  [facility]     OPTICAL TRACKING SYSTEM ENDOSCOPIC SINUS SURGERY Bilateral 10/16/2015    Procedure: OPTICAL TRACKING SYSTEM ENDOSCOPIC SINUS SURGERY;  Surgeon: Mariela Haile MD;  [facility]     ORTHOPEDIC SURGERY      left hip tear repair 2010     SINUS SURGERY                 Social History:     Social History     Social History Narrative    Mamie lives with mother in [place], [place].  There is a cat in the home, but Mamie does not have any litterbox duties.  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  She gets essentially no exercise because of the tingling in her feet (says it bothers her even to stand).        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        8/2015 no change in social situation        2/15/2016 Pt is single and lives with mother and stepfather.              Family History:     Family History   Problem Relation Age of Onset     Cancer Paternal Grandmother      Skin Cancer Paternal Grandmother      Other Cancer Paternal Grandmother         Skin     Obesity Paternal Grandmother      Cancer Paternal Grandfather         PGF had throat cancer (he was a smoker)     Other Cancer Paternal Grandfather      Anxiety Disorder Paternal Grandfather      Thyroid Disease Mother         ,     Hypertension Mother      Obesity Other      ALS Father 67        2 male cousins with ALS as well     Anesthesia Reaction No family hx of      Blood Disease No family hx of      Colon Polyps No family hx of      Crohn's Disease No family hx of      Ulcerative Colitis No family hx of      Colon Cancer No family hx of      Melanoma No family hx of             Allergies:     Allergies   Allergen Reactions     Vancomycin Hives     Redmens skin rash   Redmens skin rash      Amoxicillin-Pot Clavulanate Rash             Medications:     Current Outpatient Rx   Medication Sig Dispense Refill     acetylcysteine (MUCOMYST) 20 % neb solution INHALE 4ML VIA NEBULIZER TWO TIMES A DAY. MAY INCREASE TO 3-4 TIMES A DAY WITH INCREASED COUGH / COLD SYMPTOMS. REFRIGERATE VIAL AND DISCARD 96 HOURS AFTER OPENING 180 mL 11     albuterol (PROAIR HFA/PROVENTIL HFA/VENTOLIN HFA) 108 (90 Base) MCG/ACT inhaler Inhale 2 puffs into the lungs every 6 hours as needed for shortness of breath or wheezing 8.5 g 11     albuterol (PROVENTIL) (2.5 MG/3ML) 0.083% neb solution INHALE 1 VIAL ( 2.5MG) BY NEBULIZATION EVERY 4 HOURS AS NEEDED FOR FOR SHORTNESS OF BREATH OR WHEEZING 240 mL 11     amoxicillin (AMOXIL) 500 MG tablet Take 500 mg by mouth 2 times daily.       amphetamine-dextroamphetamine (ADDERALL XR) 20 MG 24 hr capsule TAKE ONE CAPSULE BY MOUTH ONCE  EVERY DAY [FOR FILL ON OR AFTER 7-7-23] 30 capsule 0     blood glucose (ACCU-CHEK GUIDE) test strip Use to test blood sugar 4 times daily or as directed. 100 strip 11     blood glucose monitoring (ACCU-CHEK FASTCLIX) lancets Use to test blood sugar 4 times daily or as directed. 100 each 11     buPROPion (WELLBUTRIN XL) 300 MG 24 hr tablet Take 1 tablet (300 mg) by mouth every morning. 90 tablet 3     busPIRone HCl (BUSPAR) 30 MG tablet Take 1 tablet (30 mg) by mouth 2 times daily 180 tablet 1     Cholecalciferol (VITAMIN D) 50 MCG (2000 UT) CAPS Take 1 capsule by mouth daily. 90 capsule 3     elexacaftor-tezacaftor-ivacaftor & ivacaftor (TRIKAFTA) 100-50-75 & 150 MG tablet pack TAKE 2 ORANGE TABS BY MOUTH IN THE A.M. AND 1 BLUE TAB IN THE P.M. 12 HOURS APART WITH FAT CONTAINING FOOD. DO NOT TAKE WITH GRAPEFRUIT. 84 tablet 4     FLUoxetine (PROZAC) 40 MG capsule TAKE TWO CAPSULES BY MOUTH EVERY DAY . 180 capsule 1     hydrOXYzine (ATARAX) 25 MG tablet Take 1-2 tablets (25-50 mg) by mouth nightly as needed (sleep) 60 tablet 1     medroxyPROGESTERone (DEPO-PROVERA) 150 MG/ML IM injection Inject 150 mg into the muscle every 3 months       omeprazole (PRILOSEC) 20 MG DR capsule Take 1 capsule (20 mg) by mouth daily. 30 capsule 11     phytonadione (MEPHYTON) 5 MG tablet TAKE ONE TALBET MY MOUTH ONCE A WEEK 4 tablet 11     Semaglutide-Weight Management (WEGOVY) 1.7 MG/0.75ML pen Inject 1.7 mg subcutaneously once a week. 3 mL 5     sulfamethoxazole-trimethoprim (BACTRIM DS) 800-160 MG tablet Take 1 tablet by mouth 3 times daily for 10 days. 30 tablet 0     topiramate (TOPAMAX) 25 MG tablet Take one 50 mg tablet and one 25 mg tablet daily (75 mg daily total) 90 tablet 3     topiramate (TOPAMAX) 50 MG tablet Take one 50 mg tablet and one 25 mg tablet daily (75 mg daily total) 90 tablet 3     traZODone (DESYREL) 150 MG tablet Take 150 mg by mouth at bedtime       vitamin C (ASCORBIC ACID) 500 MG tablet Take 1 tablet (500 mg)  by mouth 2 times daily. 100 tablet 3     Vitamin E 180 MG (400 UNIT) CAPS Take 1 capsule (400 Units) by mouth 2 times daily. 180 capsule 3             Review of Systems:             Physical Exam:   GENERAL: alert and no distress  EYES: Eyes grossly normal to inspection.  No discharge or erythema, or obvious scleral/conjunctival abnormalities.  RESP: No audible wheeze, cough, or visible cyanosis.    SKIN: Visible skin clear. No significant rash, abnormal pigmentation or lesions.  NEURO: Cranial nerves grossly intact.  Mentation and speech appropriate for age.  PSYCH: Appropriate affect, tone, and pace of words         Laboratory results:     TSH   Date Value Ref Range Status   02/25/2025 1.38 0.30 - 4.20 uIU/mL Final   08/09/2022 2.02 0.40 - 4.00 mU/L Final   06/08/2021 0.98 0.40 - 4.00 mU/L Final     Testosterone Total   Date Value Ref Range Status   06/08/2021 26 8 - 60 ng/dL Final     Comment:     This test was developed and its performance characteristics determined by the   [facility].   It has not been cleared or approved by the FDA. The laboratory is regulated   under CLIA as qualified to perform high-complexity testing. This test is used   for clinical purposes. It should not be regarded as investigational or for   research.       Cholesterol   Date Value Ref Range Status   02/25/2025 145 <200 mg/dL Final   06/08/2021 138 <200 mg/dL Final     Albumin Urine mg/L   Date Value Ref Range Status   02/25/2025 13.6 mg/L Final     Comment:     The reference ranges have not been established in urine albumin. The results should be integrated into the clinical context for interpretation.   08/09/2022 20 mg/L Final   06/08/2021 7 mg/L Final     Triglycerides   Date Value Ref Range Status   02/25/2025 122 <150 mg/dL Final   06/08/2021 120 <150 mg/dL Final     HDL Cholesterol   Date Value Ref Range Status   06/08/2021 52 >49 mg/dL Final     Direct Measure  "HDL   Date Value Ref Range Status   02/25/2025 43 (L) >=50 mg/dL Final     LDL Cholesterol Calculated   Date Value Ref Range Status   02/25/2025 78 <100 mg/dL Final   06/08/2021 62 <100 mg/dL Final     Comment:     Desirable:       <100 mg/dl     Cholesterol/HDL Ratio   Date Value Ref Range Status   03/12/2014 3.0 0.0 - 5.0 Final     Non HDL Cholesterol   Date Value Ref Range Status   02/25/2025 102 <130 mg/dL Final   06/08/2021 86 <130 mg/dL Final           CF  Diabetes Health Maintenance    Date of Diabetes Diagnosis: on OGTT ~ 2014    Special Notes (if any): followed at the weight management clinic    Date Last Eye Exam:      Date Last Dental Appointment:     Dates of Episodes Severe* Hypoglycemia (month/year, cumulative, ongoing, assess each visit):    *Severe=patient unconscious, seizure, unable to help self    Last 25-Vitamin D (every year): 38 (2016)    Last DXA, lowest Z-score (every 2 years): Z-score of -1.3 (6/2021)       ?Bisphosphonates (yes/no):     Last Urine Microalbumin (every year): WNL (6/2021)     No results found for: \"MICROALBUMIN\"           Assessment and Plan:   Mamie is a 29 year old female with  CFRD, reactive hypoglycemia and obesity.      Assessment/Plan:    Cystic fibrosis related diabetes    She has been off insulin for several years due to concern about hypoglycemia.   On Wegovy for obesity management  Recent A1c was 4.9% and impaired glucose tolerance on recent OGTT  Not checking fingerstick glucose at home  Discussed periodic monitoring with using CGM or fingerstick glucose.  Her preference is to use CGM if covered by insurance.  Consider using CGM monitoring every 6 months or so    2. Reactive hypoglycemia  Reports that symptoms are rare and mild.  Counseled to confirm suspected hypoglycemia related symptoms with checking fingerstick glucose    3. Obesity  Follows at weight management clinic.  On Wegovy    Return to clinic in 6 months with CGM data    LINDA Cortés    Note: " Chart documentation done in part with Dragon Voice Recognition software. Although reviewed after completion, some word and grammatical errors may remain.  Please consider this when interpreting information in this chart           Again, thank you for allowing me to participate in the care of your patient.      Sincerely,    Hugo Prieto MD

## 2025-06-17 NOTE — LETTER
6/17/2025      Mamie Rice  [address] [facility] [id number]      Dear Colleague,    Thank you for referring your patient, Mamie Rice, to the [facility] [place]. Please see a copy of my visit note below.    [facility]  June 17, 2025         Assessment and Plan:   Mamie Rice is a 29 year old female with h/o CF lung disease with normal spirometry, CF sinus disease, CFRD, pancreatic insufficiency, ADHD, MECHE, and depression who is seen for routine follow up.      1. Cystic fibrosis: no pulmonary complaints, notes her symptoms last week were mainly sinus related (and also has two teeth that are going to get pulled). Did nebs and vesting X 2 times. Sating 97% on room air. PFTs improved today, still below her best.  Previous cultures + for MSSA with additional h/o MRSA (last 2019), HIB, and Achromobacter.   - Continue nebs and vesting PRN    2. CFTR modulation: tolerating Trikafta well. Labs today WNL.   - Recheck labs in August locally  - Continue Trikafta    3. Cystic fibrosis sinus disease: h/o multiple endoscopic sinus surgeries (last 2015). Finishing up a course of Bactrim and feeling better with her rinses.   - Complete course of Bactrim and continue with rinses     4. Exocrine pancreatic insufficiency: no signs of malabsorption. Has been started on Wegovy by the weight loss clinic. BMI >> CFF goal.  - Continue vitamins    5. GERD: patientt has some intermittent symptoms, but feels the lower dose of her PPI is tolerable.  - Discussed trigger foods  - Continue PPI daily    6. ADHD, depression and anxiety: following with a psychiatrist locally.     7. Abnormal OGTT: borderline with CFRD with fasting BS of 83, but two hour BS of 196. Interestingly, AIC of 4.9. Saw Endocrine today with plan for placement of CGM.     RTC: labs in August (CBC and hepatic panel) and follow up in 4  months  Annual studies due: February 2026 (DEXA > March 2026)  Preventative care:     Rabia Gray PA-C  Pulmonary, Allergy, Critical Care and Sleep Medicine        Interval History:     Going to [facility] and then going on a cruise in [place]. 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 Started on Bactrim last week for sinus issues and was prescribed amoxicillin for her tooth. Getting a molar and a wisdom tooth removed last week. Symptoms are better today, headaches are unchanged, but wonders if it's from her jaw pain, as the pain radiate from her jaw. Some nasal congestion and drainage, sore throat has resolved. No cough, tightness or shortness of breath. Did do nebs and vesting x 2 times and sinus rinses with sterile water. No bloating or gas, stools are stable.          Review of Systems:   Please see HPI. Otherwise, complete 10 point ROS negative.           Past Medical and Surgical History:     Past Medical History:   Diagnosis Date     ADHD (attention deficit hyperactivity disorder)      Anxiety      Aspergillosis, with pneumonia (H)     fugus found caused chest pain     Chronic infection     CF, MRSA.,      Chronic sinusitis      Constipation, chronic      Cystic fibrosis with pulmonary manifestations (H) 12/19/2011     Cystic fibrosis without mention of meconium ileus     SWEAT TEST:Date: 2/17/1994   [facility]  296 mg, 104 mmol/L ClSample #2  295 mg, 104 mmol/L Cl GENOTYPING:Date: 10/15/2007,  Laboratory: [facility]: [id number]     Depressive disorder      Diabetes     no meds currently     Dysthymic disorder      Exocrine pancreatic insufficiency      Gastro-oesophageal reflux disease      Hip pain, right      MRSA (methicillin resistant Staphylococcus aureus) carrier      Pancreatic disease      Past Surgical History:   Procedure Laterality Date     ARTHROSCOPY HIP, OSTEOPLASTY FEMUR PROXIMAL, COMBINED  3/11/2013    Procedure: COMBINED ARTHROSCOPY HIP,  OSTEOPLASTY FEMUR PROXIMAL;  Right Hip Arthroscopy, Labral  Debridement.    surgeon request choice anesthesia/admit to [facility] after surgery;  Surgeon: Omkar Austin MD;  [facility]     ARTHROSCOPY KNEE WITH MEDIAL MENISCECTOMY Left 1/31/2017    Procedure: ARTHROSCOPY KNEE WITH MEDIAL MENISCECTOMY;  Surgeon: Jethro Coyle MD;  [facility] OR     bronchoscopies       BRONCHOSCOPY       EXAM UNDER ANESTHESIA ANUS N/A 5/10/2016    Procedure: EXAM UNDER ANESTHESIA ANUS;  Surgeon: Chet Gaviria MD;  [facility]     EXAM UNDER ANESTHESIA, RESTORATIONS, EXTRACTION(S) DENTAL COMPLEX, COMBINED  5/13/2013    Procedure: COMBINED EXAM UNDER ANESTHESIA, RESTORATIONS, EXTRACTION(S) DENTAL COMPLEX;  Dental Exam, Radiographs, Restorations. Single Extraction  Tooth #2. Restorations x 3;  Surgeon: Danilo Ortiz DDS;  [facility] OR     HC KNEE SCOPE, DIAGNOSTIC      Arthroscopy, Knee- left     INJECT BOTOX N/A 5/10/2016    Procedure: INJECT BOTOX;  Surgeon: Chet Gaviria MD;  [facility]     left hip labral tear  5/11/2011    left hip arthroscopy with labral debridement and synovectomy     meniscus repair       OPTICAL TRACKING SYSTEM ENDOSCOPIC SINUS SURGERY  10/14/2011    Procedure:OPTICAL TRACKING SYSTEM ENDOSCOPIC SINUS SURGERY; FESS (functional endoscopic sinus surgery) with Image Guidance, bronchial lavage and cultures; Surgeon:GOYO KUO; Location:[facility] OR     OPTICAL TRACKING SYSTEM ENDOSCOPIC SINUS SURGERY  5/18/2012    Procedure:OPTICAL TRACKING SYSTEM ENDOSCOPIC SINUS SURGERY; Right  and Left Image Guided Functional Endoscopic Sinus Surgery With  Frontal Approach, [employer]marx; Surgeon:GOYO KUO; [facility]     OPTICAL TRACKING SYSTEM ENDOSCOPIC SINUS SURGERY  9/26/2012    Procedure: OPTICAL TRACKING SYSTEM ENDOSCOPIC SINUS SURGERY;  Stealth Guided Bilateral Functional Endoscopic Sinus Surgery *Latex Safe*;  Surgeon: Goyo Kuo MD;  [facility]      OPTICAL TRACKING SYSTEM ENDOSCOPIC SINUS SURGERY Bilateral 10/16/2015    Procedure: OPTICAL TRACKING SYSTEM ENDOSCOPIC SINUS SURGERY;  Surgeon: Mariela Haile MD;  [facility]     ORTHOPEDIC SURGERY      left hip tear repair 2010     SINUS SURGERY             Family History:     Family History   Problem Relation Age of Onset     Cancer Paternal Grandmother      Skin Cancer Paternal Grandmother      Other Cancer Paternal Grandmother         Skin     Obesity Paternal Grandmother      Cancer Paternal Grandfather         PGF had throat cancer (he was a smoker)     Other Cancer Paternal Grandfather      Anxiety Disorder Paternal Grandfather      Thyroid Disease Mother         ,     Hypertension Mother      Obesity Other      ALS Father 67        2 male cousins with ALS as well     Anesthesia Reaction No family hx of      Blood Disease No family hx of      Colon Polyps No family hx of      Crohn's Disease No family hx of      Ulcerative Colitis No family hx of      Colon Cancer No family hx of      Melanoma No family hx of             Social History:     Social History     Socioeconomic History     Marital status: Single     Spouse name: Not on file     Number of children: Not on file     Years of education: Not on file     Highest education level: Not on file   Occupational History     Not on file   Tobacco Use     Smoking status: Never     Smokeless tobacco: Never     Tobacco comments:     one person at home smokes outside   Vaping Use     Vaping status: Never Used   Substance and Sexual Activity     Alcohol use: No     Alcohol/week: 0.0 standard drinks of alcohol     Drug use: No     Sexual activity: Never   Other Topics Concern      Service Not Asked     Blood Transfusions No     Caffeine Concern Not Asked     Occupational Exposure Not Asked     Hobby Hazards Not Asked     Sleep Concern Not Asked     Stress Concern Not Asked     Weight Concern Not Asked     Special Diet Not Asked     Back Care Not Asked      Exercise Yes     Bike Helmet Not Asked     Seat Belt Not Asked     Self-Exams Not Asked     Parent/sibling w/ CABG, MI or angioplasty before 65F 55M? Yes   Social History Narrative    Mamie lives with mother in [place], [place].  There is a cat in the home, but Mamie does not have any litterbox duties.  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  She gets essentially no exercise because of the tingling in her feet (says it bothers her even to stand).        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        8/2015 no change in social situation        2/15/2016 Pt is single and lives with mother and stepfather.     Social Drivers of Health     Financial Resource Strain: Patient Declined (8/6/2023)    Received from [facility]    Overall Financial Resource Strain (CARDIA)      Difficulty of Paying Living Expenses: Patient declined   Food Insecurity: No Food Insecurity (2/1/2025)    Received from [facility]    Hunger Vital Sign      Worried About Running Out of Food in the Last Year: Never true      Ran Out of Food in the Last Year: Never true   Transportation Needs: No Transportation Needs (2/1/2025)    Received from [facility]    PRAPARE - Transportation      Lack of Transportation (Medical): No      Lack of Transportation (Non-Medical): No   Physical Activity: Insufficiently Active (2/1/2025)    Received from [facility]    Exercise Vital Sign      Days of Exercise per Week: 2 days      Minutes of Exercise per Session: 10 min   Stress: No Stress Concern Present (6/29/2020)    Received from [facility]    [place] [employer] - Occupational Stress Questionnaire      Feeling of Stress : Only a little   Social Connections: Moderately Integrated (6/29/2020)    Received from [facility]    Social Connection and Isolation Panel [NHANES]      Frequency of Communication with Friends and Family: More than three times a week       Frequency of Social Gatherings with Friends and Family: Twice a week      Attends Jehovah's witness Services: 1 to 4 times per year      Active Member of Clubs or Organizations: Yes      Attends Club or Organization Meetings: More than 4 times per year      Marital Status: Never    Interpersonal Safety: Not At Risk (8/6/2023)    Received from [facility]    Humiliation, Afraid, Rape, and Kick questionnaire      Fear of Current or Ex-Partner: No      Emotionally Abused: No      Physically Abused: No      Sexually Abused: No   Housing Stability: Low Risk  (2/1/2025)    Received from [facility]    Housing Stability      What is your living situation today?: I have a steady place to live            Medications:     Current Outpatient Medications   Medication Sig Dispense Refill     acetylcysteine (MUCOMYST) 20 % neb solution INHALE 4ML VIA NEBULIZER TWO TIMES A DAY. MAY INCREASE TO 3-4 TIMES A DAY WITH INCREASED COUGH / COLD SYMPTOMS. REFRIGERATE VIAL AND DISCARD 96 HOURS AFTER OPENING 180 mL 11     albuterol (PROAIR HFA/PROVENTIL HFA/VENTOLIN HFA) 108 (90 Base) MCG/ACT inhaler Inhale 2 puffs into the lungs every 6 hours as needed for shortness of breath or wheezing 8.5 g 11     albuterol (PROVENTIL) (2.5 MG/3ML) 0.083% neb solution INHALE 1 VIAL ( 2.5MG) BY NEBULIZATION EVERY 4 HOURS AS NEEDED FOR FOR SHORTNESS OF BREATH OR WHEEZING 240 mL 11     amoxicillin (AMOXIL) 500 MG tablet Take 500 mg by mouth 2 times daily.       amphetamine-dextroamphetamine (ADDERALL XR) 20 MG 24 hr capsule TAKE ONE CAPSULE BY MOUTH ONCE EVERY DAY [FOR FILL ON OR AFTER 7-7-23] 30 capsule 0     blood glucose (ACCU-CHEK GUIDE) test strip Use to test blood sugar 4 times daily or as directed. 100 strip 11     blood glucose monitoring (ACCU-CHEK FASTCLIX) lancets Use to test blood sugar 4 times daily or as directed. 100 each 11     buPROPion (WELLBUTRIN XL) 300 MG 24 hr tablet Take 1 tablet (300 mg) by mouth every morning. 90 tablet 3      "busPIRone HCl (BUSPAR) 30 MG tablet Take 1 tablet (30 mg) by mouth 2 times daily 180 tablet 1     Cholecalciferol (VITAMIN D) 50 MCG (2000 UT) CAPS Take 1 capsule by mouth daily. 90 capsule 3     Continuous Glucose Sensor (FREESTYLE WYATT 3 PLUS SENSOR) MISC 1 Application See Admin Instructions. Change sensor every 15 days 2 each 1     elexacaftor-tezacaftor-ivacaftor & ivacaftor (TRIKAFTA) 100-50-75 & 150 MG tablet pack TAKE 2 ORANGE TABS BY MOUTH IN THE A.M. AND 1 BLUE TAB IN THE P.M. 12 HOURS APART WITH FAT CONTAINING FOOD. DO NOT TAKE WITH GRAPEFRUIT. 84 tablet 4     FLUoxetine (PROZAC) 40 MG capsule TAKE TWO CAPSULES BY MOUTH EVERY DAY . 180 capsule 1     medroxyPROGESTERone (DEPO-PROVERA) 150 MG/ML IM injection Inject 150 mg into the muscle every 3 months       omeprazole (PRILOSEC) 20 MG DR capsule Take 1 capsule (20 mg) by mouth daily. 30 capsule 11     Semaglutide-Weight Management (WEGOVY) 1.7 MG/0.75ML pen Inject 1.7 mg subcutaneously once a week. 3 mL 5     sulfamethoxazole-trimethoprim (BACTRIM DS) 800-160 MG tablet Take 1 tablet by mouth 3 times daily for 10 days. 30 tablet 0     topiramate (TOPAMAX) 25 MG tablet Take one 50 mg tablet and one 25 mg tablet daily (75 mg daily total) 90 tablet 3     topiramate (TOPAMAX) 50 MG tablet Take one 50 mg tablet and one 25 mg tablet daily (75 mg daily total) 90 tablet 3     traZODone (DESYREL) 150 MG tablet Take 150 mg by mouth at bedtime       vitamin C (ASCORBIC ACID) 500 MG tablet Take 1 tablet (500 mg) by mouth 2 times daily. 100 tablet 3     Vitamin E 180 MG (400 UNIT) CAPS Take 1 capsule (400 Units) by mouth 2 times daily. 180 capsule 3     No current facility-administered medications for this visit.            Physical Exam:   /75   Pulse 88   Ht 1.568 m (5' 1.75\")   Wt 80.5 kg (177 lb 7.5 oz)   SpO2 97%   BMI 32.72 kg/m      GENERAL: alert, NAD  HEENT: NCAT, EOMI, anicteric sclera, no oral mucosal edema or erythema  Neck: no cervical or " supraclavicular adenopathy  Respiratory: good air flow, mainly clear  CV: RRR, S1S2, no murmurs noted  Abdomen: normoactive BS, soft   Lymph: no edema  Neuro: AAO X 3, CN 2-12 grossly intact  Psychiatric: normal affect, good eye contact  Skin: no rash, jaundice or lesions on limited exam         Data:   All laboratory and imaging data reviewed.      Cystic Fibrosis Culture  Specimen Description   Date Value Ref Range Status   06/08/2021 Sputum  Final   11/12/2019 Midstream Urine  Final   11/12/2019 Throat  Final    Culture Micro   Date Value Ref Range Status   06/08/2021 Heavy growth  Normal roberto carlos    Final   11/12/2019   Final    <10,000 colonies/mL  urogenital roberto carlos  Susceptibility testing not routinely done     11/12/2019 Light growth  Normal roberto carlos    Final   11/12/2019 Moderate growth  Staphylococcus aureus   (A)  Final        PFT interpretation:  Maneuver: valid and meets ATS guidelines  Normal spirometry  Compared to prior: FEV1 of 3.18 is 90 ml above prior          Again, thank you for allowing me to participate in the care of your patient.        Sincerely,        Rabia Gray PA-C    Electronically signed

## 2025-06-17 NOTE — PROGRESS NOTES
Mamie is a 29 year old who is being evaluated via a billable video visit.      CF Endocrinology Return Consultation:  Diabetes  :   Patient: Mamie Rice MRN# [id number]   YOB: 1995 Age: 29 year old   Date of Visit: 06/17/2025     Dear Dr. Allison:    I had the pleasure of seeing your patient, Mamie Rice in the [facility] Clinic, [facility], for a return consultation regarding CFRD and reactive hypoglycemia.           Problem list:     Patient Active Problem List    Diagnosis Date Noted    Pneumonia due to infectious organism, unspecified laterality, unspecified part of lung 02/13/2024     Priority: Medium    Morbid obesity (H) 01/14/2022     Priority: Medium    Knee pain 10/16/2019     Priority: Medium     Added automatically from request for surgery [id number]      Mild episode of recurrent major depressive disorder 05/15/2018     Priority: Medium    Insomnia, unspecified type 05/15/2018     Priority: Medium    MECHE (generalized anxiety disorder) 05/15/2018     Priority: Medium    Attention deficit hyperactivity disorder (ADHD), combined type 05/15/2018     Priority: Medium    Diabetes mellitus, type 2 (H) 12/07/2016     Priority: Medium     Overview:   Diabetes Mellitus Type 2  Per External Records      Acne vulgaris 10/19/2016     Priority: Medium    Non morbid obesity due to excess calories 08/24/2016     Priority: Medium    Persistent depressive disorder 02/29/2016     Priority: Medium     Overview:   Disorder Depressive Persistent (Formerly Dysthymia) NOS      Pancreatic insufficiency 11/13/2014     Priority: Medium     Fecal elastase < 50      Back pain 10/22/2014     Priority: Medium    Frontal sinusitis 05/10/2012     Priority: Medium    Chronic constipation 03/04/2012     Priority: Medium    Cystic fibrosis of the lung (H) 12/19/2011     Priority: Medium     SWEAT TEST:  Date: 2/17/1994          Laboratory: [facility]  Sample #1  296 mg           104 mmol/L Cl  Sample  #2  295 mg           104 mmol/L Cl     GENOTYPING:  Date: 10/15/2007               Laboratory: [facility]  Genotype: [id number]  Poly T Variant:  [  ]/[  ]        Hypoglycemia 10/28/2011     Priority: Medium     Reactive hypoglycemia  updating diagnosis code for icd10 cutover      Chronic maxillary sinusitis 09/08/2011     Priority: Medium    Aspergillosis (H) 07/21/2011     Priority: Medium    Hip joint pain 04/11/2011     Priority: Medium    Cystic fibrosis (H) 02/27/2011     Priority: Medium            HPI:   Mamie is a 29 year old female with Cystic Fibrosis Related Diabetes Mellitus (CFRD).    29 year old female with history of cystic fibrosis related diabetes and reactive hypoglycemia.    Last seen in CFRD clinic in 3/2024  She follows with weight management clinic as well  On Wegovy and loperamide  Denies any side effects from Wegovy    History of CF-related diabetes.  Has been off insulin for several years due to concern about hypoglycemia  Also has history of reactive hypoglycemia  Denies any acute concerns today  Reports that she experiences postprandial symptoms of reactive hypoglycemia about couple of times a month which improved with eating.  She has not confirmed with checking fingerstick glucose recently    Currently not checking fingerstick glucose or using CGM  Most recent A1c was 4.9% and fasting glucose 83 and 2-hour glucose 196 on recent OGTT    Patient has pancreatic insufficiency  On Trikafta     Latest Reference Range & Units 02/25/25 08:56 02/25/25 09:36 02/25/25 10:06 02/25/25 10:36 02/25/25 11:06   Glucose 70 - 99 mg/dL 83  83 166 (H) 262 (H) 255 (H) 196 (H)   Hemoglobin A1C <5.7 % 4.9       Insulin 2.6 - 24.9 uU/mL 5.9  38.2 (H) 62.1 (H) 82.5 (H)   (H): Data is abnormally high  (L): Data is abnormally low    Current insulin regimen: None          Past Medical History:     Past Medical History:   Diagnosis Date    ADHD (attention deficit hyperactivity disorder)     Anxiety      Aspergillosis, with pneumonia (H)     fugus found caused chest pain    Chronic infection     CF, MRSA.,     Chronic sinusitis     Constipation, chronic     Cystic fibrosis with pulmonary manifestations (H) 12/19/2011    Cystic fibrosis without mention of meconium ileus     SWEAT TEST:Date: 2/17/1994   [facility]  296 mg, 104 mmol/L ClSample #2  295 mg, 104 mmol/L Cl GENOTYPING:Date: 10/15/2007,  Laboratory: [facility]: [id number]    Depressive disorder     Diabetes     no meds currently    Dysthymic disorder     Exocrine pancreatic insufficiency     Gastro-oesophageal reflux disease     Hip pain, right     MRSA (methicillin resistant Staphylococcus aureus) carrier     Pancreatic disease             Past Surgical History:     Past Surgical History:   Procedure Laterality Date    ARTHROSCOPY HIP, OSTEOPLASTY FEMUR PROXIMAL, COMBINED  3/11/2013    Procedure: COMBINED ARTHROSCOPY HIP, OSTEOPLASTY FEMUR PROXIMAL;  Right Hip Arthroscopy, Labral  Debridement.    surgeon request choice anesthesia/admit to [facility] after surgery;  Surgeon: Omkar Austin MD;  [facility]    ARTHROSCOPY KNEE WITH MEDIAL MENISCECTOMY Left 1/31/2017    Procedure: ARTHROSCOPY KNEE WITH MEDIAL MENISCECTOMY;  Surgeon: Jethro Coyle MD;  [facility]    bronchoscopies      BRONCHOSCOPY      EXAM UNDER ANESTHESIA ANUS N/A 5/10/2016    Procedure: EXAM UNDER ANESTHESIA ANUS;  Surgeon: Chet Gaviria MD;  [facility]    EXAM UNDER ANESTHESIA, RESTORATIONS, EXTRACTION(S) DENTAL COMPLEX, COMBINED  5/13/2013    Procedure: COMBINED EXAM UNDER ANESTHESIA, RESTORATIONS, EXTRACTION(S) DENTAL COMPLEX;  Dental Exam, Radiographs, Restorations. Single Extraction  Tooth #2. Restorations x 3;  Surgeon: Danilo Ortiz DDS;  [facility]: [facility]    HC KNEE SCOPE, DIAGNOSTIC      Arthroscopy, Knee- left    INJECT BOTOX N/A 5/10/2016    Procedure: INJECT BOTOX;  Surgeon: Chet Gaviria MD;   [facility]    left hip labral tear  5/11/2011    left hip arthroscopy with labral debridement and synovectomy    meniscus repair      OPTICAL TRACKING SYSTEM ENDOSCOPIC SINUS SURGERY  10/14/2011    Procedure:OPTICAL TRACKING SYSTEM ENDOSCOPIC SINUS SURGERY; FESS (functional endoscopic sinus surgery) with Image Guidance, bronchial lavage and cultures; Surgeon:GOYO KUO; Location:[facility]    OPTICAL TRACKING SYSTEM ENDOSCOPIC SINUS SURGERY  5/18/2012    Procedure:OPTICAL TRACKING SYSTEM ENDOSCOPIC SINUS SURGERY; Right  and Left Image Guided Functional Endoscopic Sinus Surgery With  Frontal Approach, [facility]; Surgeon:GOYO KOU; [facility]    OPTICAL TRACKING SYSTEM ENDOSCOPIC SINUS SURGERY  9/26/2012    Procedure: OPTICAL TRACKING SYSTEM ENDOSCOPIC SINUS SURGERY;  Stealth Guided Bilateral Functional Endoscopic Sinus Surgery *Latex Safe*;  Surgeon: Goyo Kuo MD;  [facility]    OPTICAL TRACKING SYSTEM ENDOSCOPIC SINUS SURGERY Bilateral 10/16/2015    Procedure: OPTICAL TRACKING SYSTEM ENDOSCOPIC SINUS SURGERY;  Surgeon: Mariela Haile MD;  [facility]    ORTHOPEDIC SURGERY      left hip tear repair 2010    SINUS SURGERY                 Social History:     Social History     Social History Narrative    Mamie lives with mother in [place], [place].  There is a cat in the home, but Mamie does not have any litterbox duties.  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  She gets essentially no exercise because of the tingling in her feet (says it bothers her even to stand).        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        8/2015 no change in social situation        2/15/2016 Pt is single and lives with mother and stepfather.              Family History:     Family History   Problem Relation Age of Onset    Cancer Paternal Grandmother     Skin Cancer Paternal Grandmother     Other Cancer Paternal Grandmother          Skin    Obesity Paternal Grandmother     Cancer Paternal Grandfather         PGF had throat cancer (he was a smoker)    Other Cancer Paternal Grandfather     Anxiety Disorder Paternal Grandfather     Thyroid Disease Mother         ,    Hypertension Mother     Obesity Other     ALS Father 67        2 male cousins with ALS as well    Anesthesia Reaction No family hx of     Blood Disease No family hx of     Colon Polyps No family hx of     Crohn's Disease No family hx of     Ulcerative Colitis No family hx of     Colon Cancer No family hx of     Melanoma No family hx of             Allergies:     Allergies   Allergen Reactions    Vancomycin Hives     Redmens skin rash   Redmens skin rash     Amoxicillin-Pot Clavulanate Rash             Medications:     Current Outpatient Rx   Medication Sig Dispense Refill    acetylcysteine (MUCOMYST) 20 % neb solution INHALE 4ML VIA NEBULIZER TWO TIMES A DAY. MAY INCREASE TO 3-4 TIMES A DAY WITH INCREASED COUGH / COLD SYMPTOMS. REFRIGERATE VIAL AND DISCARD 96 HOURS AFTER OPENING 180 mL 11    albuterol (PROAIR HFA/PROVENTIL HFA/VENTOLIN HFA) 108 (90 Base) MCG/ACT inhaler Inhale 2 puffs into the lungs every 6 hours as needed for shortness of breath or wheezing 8.5 g 11    albuterol (PROVENTIL) (2.5 MG/3ML) 0.083% neb solution INHALE 1 VIAL ( 2.5MG) BY NEBULIZATION EVERY 4 HOURS AS NEEDED FOR FOR SHORTNESS OF BREATH OR WHEEZING 240 mL 11    amoxicillin (AMOXIL) 500 MG tablet Take 500 mg by mouth 2 times daily.      amphetamine-dextroamphetamine (ADDERALL XR) 20 MG 24 hr capsule TAKE ONE CAPSULE BY MOUTH ONCE EVERY DAY [FOR FILL ON OR AFTER 7-7-23] 30 capsule 0    blood glucose (ACCU-CHEK GUIDE) test strip Use to test blood sugar 4 times daily or as directed. 100 strip 11    blood glucose monitoring (ACCU-CHEK FASTCLIX) lancets Use to test blood sugar 4 times daily or as directed. 100 each 11    buPROPion (WELLBUTRIN XL) 300 MG 24 hr tablet Take 1 tablet (300 mg) by mouth every morning.  90 tablet 3    busPIRone HCl (BUSPAR) 30 MG tablet Take 1 tablet (30 mg) by mouth 2 times daily 180 tablet 1    Cholecalciferol (VITAMIN D) 50 MCG (2000 UT) CAPS Take 1 capsule by mouth daily. 90 capsule 3    elexacaftor-tezacaftor-ivacaftor & ivacaftor (TRIKAFTA) 100-50-75 & 150 MG tablet pack TAKE 2 ORANGE TABS BY MOUTH IN THE A.M. AND 1 BLUE TAB IN THE P.M. 12 HOURS APART WITH FAT CONTAINING FOOD. DO NOT TAKE WITH GRAPEFRUIT. 84 tablet 4    FLUoxetine (PROZAC) 40 MG capsule TAKE TWO CAPSULES BY MOUTH EVERY DAY . 180 capsule 1    hydrOXYzine (ATARAX) 25 MG tablet Take 1-2 tablets (25-50 mg) by mouth nightly as needed (sleep) 60 tablet 1    medroxyPROGESTERone (DEPO-PROVERA) 150 MG/ML IM injection Inject 150 mg into the muscle every 3 months      omeprazole (PRILOSEC) 20 MG DR capsule Take 1 capsule (20 mg) by mouth daily. 30 capsule 11    phytonadione (MEPHYTON) 5 MG tablet TAKE ONE TALBET MY MOUTH ONCE A WEEK 4 tablet 11    Semaglutide-Weight Management (WEGOVY) 1.7 MG/0.75ML pen Inject 1.7 mg subcutaneously once a week. 3 mL 5    sulfamethoxazole-trimethoprim (BACTRIM DS) 800-160 MG tablet Take 1 tablet by mouth 3 times daily for 10 days. 30 tablet 0    topiramate (TOPAMAX) 25 MG tablet Take one 50 mg tablet and one 25 mg tablet daily (75 mg daily total) 90 tablet 3    topiramate (TOPAMAX) 50 MG tablet Take one 50 mg tablet and one 25 mg tablet daily (75 mg daily total) 90 tablet 3    traZODone (DESYREL) 150 MG tablet Take 150 mg by mouth at bedtime      vitamin C (ASCORBIC ACID) 500 MG tablet Take 1 tablet (500 mg) by mouth 2 times daily. 100 tablet 3    Vitamin E 180 MG (400 UNIT) CAPS Take 1 capsule (400 Units) by mouth 2 times daily. 180 capsule 3             Review of Systems:             Physical Exam:   GENERAL: alert and no distress  EYES: Eyes grossly normal to inspection.  No discharge or erythema, or obvious scleral/conjunctival abnormalities.  RESP: No audible wheeze, cough, or visible cyanosis.     SKIN: Visible skin clear. No significant rash, abnormal pigmentation or lesions.  NEURO: Cranial nerves grossly intact.  Mentation and speech appropriate for age.  PSYCH: Appropriate affect, tone, and pace of words         Laboratory results:     TSH   Date Value Ref Range Status   02/25/2025 1.38 0.30 - 4.20 uIU/mL Final   08/09/2022 2.02 0.40 - 4.00 mU/L Final   06/08/2021 0.98 0.40 - 4.00 mU/L Final     Testosterone Total   Date Value Ref Range Status   06/08/2021 26 8 - 60 ng/dL Final     Comment:     This test was developed and its performance characteristics determined by the   [facility].   It has not been cleared or approved by the FDA. The laboratory is regulated   under CLIA as qualified to perform high-complexity testing. This test is used   for clinical purposes. It should not be regarded as investigational or for   research.       Cholesterol   Date Value Ref Range Status   02/25/2025 145 <200 mg/dL Final   06/08/2021 138 <200 mg/dL Final     Albumin Urine mg/L   Date Value Ref Range Status   02/25/2025 13.6 mg/L Final     Comment:     The reference ranges have not been established in urine albumin. The results should be integrated into the clinical context for interpretation.   08/09/2022 20 mg/L Final   06/08/2021 7 mg/L Final     Triglycerides   Date Value Ref Range Status   02/25/2025 122 <150 mg/dL Final   06/08/2021 120 <150 mg/dL Final     HDL Cholesterol   Date Value Ref Range Status   06/08/2021 52 >49 mg/dL Final     Direct Measure HDL   Date Value Ref Range Status   02/25/2025 43 (L) >=50 mg/dL Final     LDL Cholesterol Calculated   Date Value Ref Range Status   02/25/2025 78 <100 mg/dL Final   06/08/2021 62 <100 mg/dL Final     Comment:     Desirable:       <100 mg/dl     Cholesterol/HDL Ratio   Date Value Ref Range Status   03/12/2014 3.0 0.0 - 5.0 Final     Non HDL Cholesterol   Date Value Ref Range Status   02/25/2025  "102 <130 mg/dL Final   06/08/2021 86 <130 mg/dL Final           CF  Diabetes Health Maintenance    Date of Diabetes Diagnosis: on OGTT ~ 2014    Special Notes (if any): followed at the weight management clinic    Date Last Eye Exam:      Date Last Dental Appointment:     Dates of Episodes Severe* Hypoglycemia (month/year, cumulative, ongoing, assess each visit):    *Severe=patient unconscious, seizure, unable to help self    Last 25-Vitamin D (every year): 38 (2016)    Last DXA, lowest Z-score (every 2 years): Z-score of -1.3 (6/2021)       ?Bisphosphonates (yes/no):     Last Urine Microalbumin (every year): WNL (6/2021)     No results found for: \"MICROALBUMIN\"           Assessment and Plan:   Mamie is a 29 year old female with  CFRD, reactive hypoglycemia and obesity.      Assessment/Plan:    Cystic fibrosis related diabetes    She has been off insulin for several years due to concern about hypoglycemia.   [facility]  Recent A1c was 4.9% and impaired glucose tolerance on recent OGTT  Not checking fingerstick glucose at home  Discussed periodic monitoring with using CGM or fingerstick glucose.  Her preference is to use CGM if covered by insurance.  Consider using CGM monitoring every 6 months or so    2. Reactive hypoglycemia  Reports that symptoms are rare and mild.  Counseled to confirm suspected hypoglycemia related symptoms with checking fingerstick glucose    3. Obesity  Follows at weight management clinic.  On Wegovy    Return to clinic in 6 months with CGM data    LINDA Coréts    Note: Chart documentation done in part with Dragon Voice Recognition software. Although reviewed after completion, some word and grammatical errors may remain.  Please consider this when interpreting information in this chart         "

## 2025-06-17 NOTE — PROGRESS NOTES
Adult Cystic Fibrosis Program  Annual Psychosocial Assessment    Presenting Information:  Mamie is a 29-year-old woman with cystic fibrosis, who presented in CF clinic for a regular follow up with CF provider, Rabia Gray PA-C. Met with Mamie to complete an annual psychosocial assessment. Mamie was unaccompanied in clinic during SW visit.     Living situation:  Mamie continues to live with her mother and stepfather in [place], [place]. Her sister (Chayo) and her sister's 3 children (Montiel-age 17, Max-age 7 and Pool-age 5 yrs) also live in the home. There is a cat and a dog in the home. Mamie helps to take care of her niece and nephew while her sister works nights, and in return does not have to pay rent. Mamie denies any concerns about her living situation.    Family Constellation:  Mamie's parents  when she was [age] year old. After the divorce she was raised by her mother, who later remarried.  Mamie's dad recently passed away in 2021 from [facility]. Mamie had very limited contact with her father throughout her life. Sonya also lost both her maternal grandparents in the past few years which was hard on the entire family. Mamie has a 45-year-old half-brother through her father, whom she had previously only met twice but now communicates with only through Facebook. Her half-sister Chayo is 35 years old. Mamie shared that Chayo's 7 year old son, Layo, has some pretty extreme behavioral concerns at home. She also has 34-year-old stepsister who she used to be in touch with but has not had as much contact with lately.  Mamie does not know of any family members with CF and sometimes wishes that she did because she wishes she had someone close to her that knew how she felt.  She is not currently in a significant relationship, has never been  and does not have children.      Social Support:  Mamie reports good social support. She identifies her mother as her strongest source of support and they  "are very close. She mostly has a good relationship with her sister Chayo but adds \"we are sisters so we also fight\". She gets along fine with her step-dad. She draws additional support from co-workers and friends. She does note that she used to be closer with her extended family members: aunts, uncles and cousins but she reports some family discord since her grandparents deaths. Mamie reports that she used to be more involved with the CF community, but has distanced herself since the CF Foundation came out with guidelines that place more restrictions on gathering with others with CF.    Adjustment to Illness:  Mamie was diagnosed with CF when she was about 5 months old. Her mom recalls that on the day of her Religious she \"turned gray and her eyes rolled to the back of her head\", when she brought Mamie in her O2 sats were 70% and the CF diagnosis came shortly after. Mamie has received most of her CF care through the [facility], there was a short period of time that she sought care at [facility] because her mom didn't feel that the chest pain that Mamie was reporting was being thoroughly investigated. Mamie reports that her mother instilled a strong foundation of doing routine CF care. She had a consistent therapy routine and they brought the vest machine on vacations. She noted that her mother discussed therapy as \"part of life\" and the best way to reduce complications. Mamie reports going through more complications during high school and they believe she had reactions to the air quality in her school building. She often felt ill and missed school.  She recalls having a couple of PICC lines during that time. She reports that her school accommodated her through letting her go home early and make up work through other avenues. She feels her increased health complications caused her to lose some friends because some people didn't know how to handle her illness.      Mamie describes her current health status as \"good\". " "Clinically, she has CF lung disease with normal spirometry, CF sinus disease and abnormal glucose tolerance. She does not take enzymes and stopped taking these mid-highschool. She has also been followed by [facility], ENT, and Ortho. She takes Trikafta and feels that it's going well, and although she doesn't feel a whole lot different, she recognizes that her lung function has always been good. She does not participate in airway clearance. She has not been exercising and reports that she this is because she \"hates\" exercise and has not been able to find exercise that she enjoys. She feels that her job does keep her fairly active.     Mamie describes herself as very open about having CF, growing up it was never a secret and they participated in fund raising events and their community knew. When asked how CF has impacted her life, she stated that she looks at it as a part of who she is. She doesn't feel like it's a burden as an adult, although maybe did when she was younger. She does not feel that CF has impacted her QOL because she looks at herself as a healthy person with CF.     Mental Health:  Mamie has been diagnosed with depression, anxiety and ADHD. Mamie has described feelings of depression, starting in 9th grade. She initially had difficulty expressing her feelings, but got help about a year later, receiving both counseling and medication. Mamie struggled with symptoms of ADHD for many years and notes a strong family history of this disorder. She \"got by\" in younger years due to having a lot of support in school but was formally diagnosed ADHD and treated with Adderall in 2015 after doing poorly in college classes. She has continued to receive consistent psychiatric care and has been receiving her psychiatric care for the many years at the [facility]. Her current psychotropic medications are: Adderall, Atarax, Prozac and Buspar. Mamie now sees a provider once/year " "in [place] for these medications.      Mamie completed the following screens:  MECHE-7 Score: 3, indicating mild symptoms of anxiety and described as not difficult at all in daily functioning.    PHQ-9 Score: 10, indicating moderate symptoms of depression and described as somewhat difficult in daily functioning.       Mamie agreed with the scores above. She denied any additional symptoms not addressed on these screens. She denies any suicidal ideation. She declined needing further mental health resources at this time and feels that she is treating it adequately.     Mamie describes her current/recent mood as: \"good\". She previously endorsed more significant OCD symptoms, but did not share any specific current mental health struggles today. Mamie endorsed many symptoms related to sleep and said that she has sleep issues \"all around.\" She also reports feeling pretty fidgety lately. She could not name any more specific current stressors but does feel that she could nap to cope with any stress.      Health Care Directive:  Mamie has previously received Health Care Directive education. She is comfortable with her mom (SALOME) making medical decisions on her behalf in the absence of a health care directive. She declined wanting more info on HCD today.     Education:  Mamie completed high school but missed a lot of school due to health issues. She tried a semester of college classes, but had difficulty keeping up, which she feels was partially due to untreated ADHD. At this point, she has no specific plans for further education.        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 She reports a supportive work environment, they are aware of her CF diagnosis as [place] town and she herself went to " school there and works with a lot of the people that she had as teachers while attending school. She reports that this is job does offer benefits including health insurance but she remains on her MA plan.    Finances:  Mamie receives income from wages and parental support. She denies having any specific financial concerns, but does feel that money is always tight. Mamie does feel that she cannot work more as earning too much money would make her health insurance unaffordable for her.     Insurance:  Mamie reports that she has Medical assistance through [employer] [facility]. It appears to be a MA plan for people with disabilities (special needs basic care plan). She reports that her monthly premium is now $147/month.      Chemical Health:  Mamie denies the use of tobacco products and denies second hand smoke exposure. She denies the use of marijuana or other illegal drug use. She reports rare alcohol use.      Leisure Activities/Interests:  Mamie enjoys spending time with her family and friends, going out to eat, shopping and sleeping. She is looking forward to a trip to [facility] with her family later this summer.       Intervention:  -Psychosocial Assessment    Assessment:  Mamie appeared to be open in her responses. She continues to work as a paraprofessional in a pre-school/ classroom and enjoys her work. Mamie did endorse difficulties sleeping, but does not feel that she has other mental health concerns. Mamie has no financial, insurance or housing concerns. Mamie seems to be psychosocially stable overall, with access to relevant resources and supports. No concerns expressed/noted.    Plan:  Re-consult for any psychosocial needs that may arise.    Complete psychosocial assessment annually.  Continue to follow for regular clinic consult.    TAY Arias, [facility]  Adult Cystic Fibrosis   [facility] Acute Care Management  Ph: [phone]  Available on [employer]

## 2025-06-18 NOTE — PROGRESS NOTES
"CF Annual Nutrition Assessment     Reason for Assessment  Assessed during Dr. Brynn Gray's clinic r/t increased nutrition risk with diagnosis of CF per protocol.     Nutrition Significant PMH  Mild Lung Disease/Normal Lung Function     -On Trikafta  Pancreatic Insufficient (fecal elastase done 2013)  CFRD - sees CF Endocrinology  GERD  Class I obesity - currently followed by [facility] and taking Wegovy      Anthropometric Assessment  Height: 154.9 cm  IBW based on BMI 22 for females and 23 for males per CF Foundation recs: 54.6 kg  Today's Weight: 80.5 kg (actual weight)  Body mass index is 32.72 kg/m .     Weight History/Comments:   Has not yet seen lasting weight loss on GLP therapy, hoping for this to happen in the near future.  She reports no GI symptoms with this medication at any point.     Wt Readings from Last 5 Encounters:   06/17/25 80.5 kg (177 lb 7.5 oz)   06/17/25 80.5 kg (177 lb 7.5 oz)   04/01/25 79.4 kg (175 lb)   02/25/25 80.1 kg (176 lb 9.6 oz)   02/25/25 80.1 kg (176 lb 9.6 oz)     Pancreatic Enzymes  Although tested as pancreatic insufficient (via fecal elastase) in the past pt does not take enzymes, stopped taking them in 2015 as she felt they had little affect on her GI symptoms. Ok'd this with CF MD prior to stopping.       Diet History and Assessment  Diet Preferences/Allergies/Intolerances: Regular diet, on appetite suppressive medication (Wegovy)     Intake Recall/Comments:  Eats TID meals with 1-3 snacks. Reports modifying diet as recommended by MVM team as able.  Does not cook or have an interest at this time; prefers quick and easy options.      Recall Hx:  Breakfast- cereal or banana or school breakfast or skips  Lunch- using \"easy and fast\" options at school- easy mac, ramen, hot pockets  Dinner- Mom makes; may be tacos or spaghetti  HS snack- fruit snacks     Calcium: BID glasses of skim milk + milk on cereal in AM + cheese throughout the day   Salt: Adequate per pt, does notice salty " sweat often    Hydration: Not discussed in detail this visit but pt reports adequate intake  Supplements:  None      Laboratory Assessment  Annual studies completed 2/25/25    Vitamin A - 0.77 wnl  Vitamin D - 39 wnl  Vitamin E - 13.3 wnl  Iron - 97 wnl  Lipid Panel - wnl     DEXA: (2024) WNL     Current Vitamin/Mineral Supplements: Multivitamin, Vitamin D 2000 units, Vitamin E 400 units BID, Vitamin K weekly and Vitamin C 250 mg BID     Comments:  Pt states she is taking all of the above as prescribed. She uses a pill box that she sets up weekly to insure that she doesn't forget anything. These are either covered by insurance or affordable OOP (ex: Vitamin E about $6/month).     CF Related Diabetes Evaluation  HbA1c: 4.9% on 2/25/25  Insulin: No - been off for several years  Carbohydrate Counting: No  Seeing endocrine MD today. Considering CGM     NUTRITION DIAGNOIS  Overweight/obese r/t excessive energy intake and lack of physical activity AEB BMI >30 kg/m2 with pt-reported chronic overeating requiring intervention via [facility] clinic.     INTERVENTIONS/RECOMMENDATIONS  1) Oral Intake/Weight: Discussed current nutrition status and goals in regards to overall nutrition and goal for healthy weight loss. Encouraged pt to be mindful of incorporating protein sources consistently in diet in order to maintain LBM.      2) Enzymes: Denies any symptoms of malabsorption, will remain off enzyme therapy.      3) Vitamin/Mineral Supplementation: Reviewed most recent annual study labs and supplements, no changes made today.       4) Diabetes: Encourage ongoing endo follow up and reinforced current plan of care.      Patient Understanding: Good  Expected Compliance: Good  Follow-Up Plans: Per Protocol    GOALS:   Weight maintenance vs reduction toward BMI <30    FOLLOW-UP/MONITORING:  Visit patient within 6-12 month(s) for annual review, support with weight loss/blood sugar control.  Continue to monitor for fat-soluble vitamin  deficiencies as pt remains off pancreatic enzyme replacement therapy.      Face to Face Time: 15 minutes    Theresa Ceja MS, RDN, LD, CACFD   Cystic Fibrosis/CF Lung Transplant Dietitian      -Available Mon-Thurs 8 AM-4:30 PM. Contact via [employer] or [employer] In[employer].      -On Fridays please contact CF dietitians Amy Andino or Pricilla Dallas. On weekends/holidays contact coverage dietitian via [employer] (inpatient use only).

## 2025-06-19 LAB — BACTERIA SPEC CULT: NORMAL

## 2025-06-20 ENCOUNTER — RESULTS FOLLOW-UP (OUTPATIENT)
Dept: PHARMACY | Facility: CLINIC | Age: 30
End: 2025-06-20

## 2025-06-22 LAB — BACTERIA SPEC CULT: ABNORMAL

## 2025-07-07 ENCOUNTER — TELEPHONE (OUTPATIENT)
Dept: ENDOCRINOLOGY | Facility: CLINIC | Age: 30
End: 2025-07-07
Payer: COMMERCIAL

## 2025-07-07 NOTE — TELEPHONE ENCOUNTER
Left Voicemail (1st Attempt) and Sent Mychart (1st Attempt) for the patient to call back and schedule the following:    Appointment type: [facility]  Provider: Dr Park  Return date: October 2025  Specialty phone number: [phone]  Additional appointment(s) needed: n/a  Additonal Notes: n/a

## 2025-07-10 ENCOUNTER — TELEPHONE (OUTPATIENT)
Dept: ENDOCRINOLOGY | Facility: CLINIC | Age: 30
End: 2025-07-10
Payer: COMMERCIAL

## 2025-07-10 NOTE — TELEPHONE ENCOUNTER
Patient confirmed scheduled appointment:     Date: 12/12/25  Time: 3:20 PM  Visit type: Return Weight Management  Visit mode: Virtual Visit  Provider:  Dr. Charles Park  Location: [facility]    Additional Notes:

## 2025-07-24 ENCOUNTER — TELEPHONE (OUTPATIENT)
Dept: ENDOCRINOLOGY | Facility: CLINIC | Age: 30
End: 2025-07-24
Payer: COMMERCIAL

## 2025-07-24 NOTE — TELEPHONE ENCOUNTER
PA RENEWAL Initiation    Medication: WEGOVY 1.7 MG/0.75ML SC SOAJ  Insurance Company: \[facility]\[employer] [place] - Phone [phone] Fax [phone]  Pharmacy Filling the Rx:    Filling Pharmacy Phone:    Filling Pharmacy Fax:    Start Date: 7/24/2025    [id number]

## 2025-08-25 ENCOUNTER — CLINICAL UPDATE (OUTPATIENT)
Dept: PHARMACY | Facility: CLINIC | Age: 30
End: 2025-08-25
Payer: COMMERCIAL

## 2025-08-25 DIAGNOSIS — E84.9 CYSTIC FIBROSIS (H): ICD-10-CM

## 2025-08-25 DIAGNOSIS — E84.9 CYSTIC FIBROSIS (H): Primary | ICD-10-CM

## 2025-08-25 PROCEDURE — 99207 PR NO CHARGE LOS: CPT | Performed by: PHARMACIST

## 2025-08-25 RX ORDER — ELEXACAFTOR, TEZACAFTOR, AND IVACAFTOR 100-50-75
KIT ORAL
Qty: 84 TABLET | Refills: 0 | Status: SHIPPED | OUTPATIENT
Start: 2025-08-25

## 2025-08-28 ENCOUNTER — TELEPHONE (OUTPATIENT)
Dept: ENDOCRINOLOGY | Facility: CLINIC | Age: 30
End: 2025-08-28
Payer: COMMERCIAL

## (undated) DEVICE — SOL NACL 0.9% IRRIG 3000ML BAG 2B7477

## (undated) DEVICE — STRAP KNEE/BODY 31143004

## (undated) DEVICE — SU ETHILON 3-0 PS-1 18" 1663H

## (undated) DEVICE — LINEN DRAPE 54X72" 5467

## (undated) DEVICE — BLADE SHAVER ARTHRO 3.5MM FULL RADIUS C9248

## (undated) DEVICE — SOL WATER IRRIG 1000ML BOTTLE 2F7114

## (undated) DEVICE — Device

## (undated) DEVICE — NDL 22GA 1.5"

## (undated) DEVICE — GLOVE PROTEXIS POWDER FREE 8.0 ORTHOPEDIC 2D73ET80

## (undated) DEVICE — PAD ARMBOARD FOAM EGGCRATE COVIDEN 3114367

## (undated) DEVICE — GLOVE PROTEXIS BLUE W/NEU-THERA 8.0  2D73EB80

## (undated) DEVICE — TUBING ARTHRO CONMED/LINVATEC PUMP BLUE INFLOW 10K100

## (undated) DEVICE — SUCTION MANIFOLD DORNOCH ULTRA CART UL-CL500

## (undated) DEVICE — LINEN TOWEL PACK X5 5464

## (undated) RX ORDER — MEROPENEM 500 MG/1
INJECTION, POWDER, FOR SOLUTION INTRAVENOUS
Status: DISPENSED
Start: 2017-08-02

## (undated) RX ORDER — BUPIVACAINE HYDROCHLORIDE AND EPINEPHRINE 2.5; 5 MG/ML; UG/ML
INJECTION, SOLUTION EPIDURAL; INFILTRATION; INTRACAUDAL; PERINEURAL
Status: DISPENSED
Start: 2017-01-31

## (undated) RX ORDER — MEROPENEM 500 MG/1
INJECTION, POWDER, FOR SOLUTION INTRAVENOUS
Status: DISPENSED
Start: 2018-08-07

## (undated) RX ORDER — MEROPENEM 500 MG/1
INJECTION, POWDER, FOR SOLUTION INTRAVENOUS
Status: DISPENSED
Start: 2018-07-06

## (undated) RX ORDER — MEROPENEM 500 MG/1
INJECTION, POWDER, FOR SOLUTION INTRAVENOUS
Status: DISPENSED
Start: 2017-04-03

## (undated) RX ORDER — MEROPENEM 500 MG/1
INJECTION, POWDER, FOR SOLUTION INTRAVENOUS
Status: DISPENSED
Start: 2017-06-07

## (undated) RX ORDER — MEROPENEM 500 MG/1
INJECTION, POWDER, FOR SOLUTION INTRAVENOUS
Status: DISPENSED
Start: 2019-08-05

## (undated) RX ORDER — MEROPENEM 500 MG/1
INJECTION, POWDER, FOR SOLUTION INTRAVENOUS
Status: DISPENSED
Start: 2017-10-11

## (undated) RX ORDER — MEROPENEM 500 MG/1
INJECTION, POWDER, FOR SOLUTION INTRAVENOUS
Status: DISPENSED
Start: 2018-01-17

## (undated) RX ORDER — MEROPENEM 500 MG/1
INJECTION, POWDER, FOR SOLUTION INTRAVENOUS
Status: DISPENSED
Start: 2018-04-18

## (undated) RX ORDER — MEROPENEM 500 MG/1
INJECTION, POWDER, FOR SOLUTION INTRAVENOUS
Status: DISPENSED
Start: 2017-11-01

## (undated) RX ORDER — MEROPENEM 500 MG/1
INJECTION, POWDER, FOR SOLUTION INTRAVENOUS
Status: DISPENSED
Start: 2017-05-01

## (undated) RX ORDER — MEROPENEM 500 MG/1
INJECTION, POWDER, FOR SOLUTION INTRAVENOUS
Status: DISPENSED
Start: 2019-04-18

## (undated) RX ORDER — MEROPENEM 500 MG/1
INJECTION, POWDER, FOR SOLUTION INTRAVENOUS
Status: DISPENSED
Start: 2018-03-21

## (undated) RX ORDER — MEROPENEM 500 MG/1
INJECTION, POWDER, FOR SOLUTION INTRAVENOUS
Status: DISPENSED
Start: 2020-02-28

## (undated) RX ORDER — MEROPENEM 500 MG/1
INJECTION, POWDER, FOR SOLUTION INTRAVENOUS
Status: DISPENSED
Start: 2019-05-09

## (undated) RX ORDER — MEROPENEM 500 MG/1
INJECTION, POWDER, FOR SOLUTION INTRAVENOUS
Status: DISPENSED
Start: 2017-09-06

## (undated) RX ORDER — DEXTROSE MONOHYDRATE 50 MG/ML
INJECTION, SOLUTION INTRAVENOUS
Status: DISPENSED
Start: 2017-01-31

## (undated) RX ORDER — MEROPENEM 500 MG/1
INJECTION, POWDER, FOR SOLUTION INTRAVENOUS
Status: DISPENSED
Start: 2019-03-14

## (undated) RX ORDER — MEROPENEM 500 MG/1
INJECTION, POWDER, FOR SOLUTION INTRAVENOUS
Status: DISPENSED
Start: 2017-01-05

## (undated) RX ORDER — CEFAZOLIN SODIUM 2 G/100ML
INJECTION, SOLUTION INTRAVENOUS
Status: DISPENSED
Start: 2017-01-31

## (undated) RX ORDER — MEROPENEM 500 MG/1
INJECTION, POWDER, FOR SOLUTION INTRAVENOUS
Status: DISPENSED
Start: 2017-03-09